# Patient Record
Sex: MALE | Race: WHITE | NOT HISPANIC OR LATINO | Employment: OTHER | URBAN - METROPOLITAN AREA
[De-identification: names, ages, dates, MRNs, and addresses within clinical notes are randomized per-mention and may not be internally consistent; named-entity substitution may affect disease eponyms.]

---

## 2017-01-01 ENCOUNTER — APPOINTMENT (OUTPATIENT)
Dept: CT IMAGING | Facility: HOSPITAL | Age: 72
End: 2017-01-01

## 2017-01-01 ENCOUNTER — APPOINTMENT (OUTPATIENT)
Dept: LAB | Facility: HOSPITAL | Age: 72
End: 2017-01-01

## 2017-01-01 ENCOUNTER — TREATMENT (OUTPATIENT)
Dept: CARDIAC REHAB | Facility: HOSPITAL | Age: 72
End: 2017-01-01

## 2017-01-01 ENCOUNTER — APPOINTMENT (OUTPATIENT)
Dept: GENERAL RADIOLOGY | Facility: HOSPITAL | Age: 72
End: 2017-01-01

## 2017-01-01 ENCOUNTER — APPOINTMENT (OUTPATIENT)
Dept: CARDIOLOGY | Facility: HOSPITAL | Age: 72
End: 2017-01-01
Attending: INTERNAL MEDICINE

## 2017-01-01 ENCOUNTER — APPOINTMENT (OUTPATIENT)
Dept: GENERAL RADIOLOGY | Facility: HOSPITAL | Age: 72
End: 2017-01-01
Attending: INTERNAL MEDICINE

## 2017-01-01 ENCOUNTER — ANESTHESIA EVENT (OUTPATIENT)
Dept: PERIOP | Facility: HOSPITAL | Age: 72
End: 2017-01-01

## 2017-01-01 ENCOUNTER — TRANSITIONAL CARE MANAGEMENT TELEPHONE ENCOUNTER (OUTPATIENT)
Dept: INTERNAL MEDICINE | Facility: CLINIC | Age: 72
End: 2017-01-01

## 2017-01-01 ENCOUNTER — TRANSCRIBE ORDERS (OUTPATIENT)
Dept: LAB | Facility: HOSPITAL | Age: 72
End: 2017-01-01

## 2017-01-01 ENCOUNTER — OFFICE VISIT (OUTPATIENT)
Dept: PULMONOLOGY | Facility: CLINIC | Age: 72
End: 2017-01-01

## 2017-01-01 ENCOUNTER — OUTSIDE FACILITY SERVICE (OUTPATIENT)
Dept: PULMONOLOGY | Facility: CLINIC | Age: 72
End: 2017-01-01

## 2017-01-01 ENCOUNTER — APPOINTMENT (OUTPATIENT)
Dept: CARDIAC REHAB | Facility: HOSPITAL | Age: 72
End: 2017-01-01

## 2017-01-01 ENCOUNTER — TREATMENT (OUTPATIENT)
Dept: INTERNAL MEDICINE | Facility: CLINIC | Age: 72
End: 2017-01-01

## 2017-01-01 ENCOUNTER — HOSPITAL ENCOUNTER (INPATIENT)
Facility: HOSPITAL | Age: 72
LOS: 6 days | Discharge: NURSING, REHAB OR LONG-TERM CARE | End: 2017-10-27
Attending: EMERGENCY MEDICINE | Admitting: INTERNAL MEDICINE

## 2017-01-01 ENCOUNTER — APPOINTMENT (OUTPATIENT)
Dept: MRI IMAGING | Facility: HOSPITAL | Age: 72
End: 2017-01-01

## 2017-01-01 ENCOUNTER — HOSPITAL ENCOUNTER (INPATIENT)
Facility: HOSPITAL | Age: 72
LOS: 5 days | Discharge: HOME HEALTH | End: 2017-08-11
Attending: EMERGENCY MEDICINE | Admitting: HOSPITALIST

## 2017-01-01 ENCOUNTER — HOSPITAL ENCOUNTER (INPATIENT)
Facility: HOSPITAL | Age: 72
LOS: 4 days | Discharge: HOME HEALTH | End: 2017-07-21
Attending: EMERGENCY MEDICINE | Admitting: FAMILY MEDICINE

## 2017-01-01 ENCOUNTER — TRANSCRIBE ORDERS (OUTPATIENT)
Dept: CARDIAC REHAB | Facility: HOSPITAL | Age: 72
End: 2017-01-01

## 2017-01-01 ENCOUNTER — TELEPHONE (OUTPATIENT)
Dept: INTERNAL MEDICINE | Facility: CLINIC | Age: 72
End: 2017-01-01

## 2017-01-01 ENCOUNTER — OFFICE VISIT (OUTPATIENT)
Dept: INTERNAL MEDICINE | Facility: CLINIC | Age: 72
End: 2017-01-01

## 2017-01-01 ENCOUNTER — TELEPHONE (OUTPATIENT)
Dept: CARDIOLOGY | Facility: CLINIC | Age: 72
End: 2017-01-01

## 2017-01-01 ENCOUNTER — ANESTHESIA (OUTPATIENT)
Dept: PERIOP | Facility: HOSPITAL | Age: 72
End: 2017-01-01

## 2017-01-01 ENCOUNTER — HOSPITAL ENCOUNTER (OUTPATIENT)
Facility: HOSPITAL | Age: 72
Setting detail: OBSERVATION
Discharge: HOME | End: 2017-08-25
Attending: EMERGENCY MEDICINE | Admitting: FAMILY MEDICINE

## 2017-01-01 ENCOUNTER — TELEPHONE (OUTPATIENT)
Dept: NEUROLOGY | Facility: CLINIC | Age: 72
End: 2017-01-01

## 2017-01-01 ENCOUNTER — HOSPITAL ENCOUNTER (INPATIENT)
Facility: HOSPITAL | Age: 72
LOS: 16 days | Discharge: NURSING, REHAB OR LONG-TERM CARE | End: 2017-12-06
Attending: EMERGENCY MEDICINE | Admitting: SURGERY

## 2017-01-01 ENCOUNTER — HOSPITAL ENCOUNTER (INPATIENT)
Facility: HOSPITAL | Age: 72
LOS: 11 days | Discharge: NURSING, REHAB OR LONG-TERM CARE | End: 2017-11-16
Attending: EMERGENCY MEDICINE | Admitting: FAMILY MEDICINE

## 2017-01-01 ENCOUNTER — APPOINTMENT (OUTPATIENT)
Dept: CARDIOLOGY | Facility: HOSPITAL | Age: 72
End: 2017-01-01

## 2017-01-01 ENCOUNTER — LAB (OUTPATIENT)
Dept: LAB | Facility: HOSPITAL | Age: 72
End: 2017-01-01

## 2017-01-01 ENCOUNTER — APPOINTMENT (OUTPATIENT)
Dept: NEUROLOGY | Facility: HOSPITAL | Age: 72
End: 2017-01-01

## 2017-01-01 ENCOUNTER — OFFICE VISIT (OUTPATIENT)
Dept: CARDIOLOGY | Facility: CLINIC | Age: 72
End: 2017-01-01

## 2017-01-01 ENCOUNTER — OFFICE VISIT (OUTPATIENT)
Dept: CARDIOLOGY | Facility: HOSPITAL | Age: 72
End: 2017-01-01

## 2017-01-01 ENCOUNTER — LAB (OUTPATIENT)
Dept: LAB | Facility: HOSPITAL | Age: 72
End: 2017-01-01
Attending: INTERNAL MEDICINE

## 2017-01-01 VITALS
BODY MASS INDEX: 25.67 KG/M2 | DIASTOLIC BLOOD PRESSURE: 87 MMHG | RESPIRATION RATE: 16 BRPM | HEART RATE: 64 BPM | WEIGHT: 169.38 LBS | WEIGHT: 188.8 LBS | TEMPERATURE: 98.5 F | OXYGEN SATURATION: 97 % | DIASTOLIC BLOOD PRESSURE: 79 MMHG | HEIGHT: 68 IN | SYSTOLIC BLOOD PRESSURE: 164 MMHG | HEIGHT: 68 IN | OXYGEN SATURATION: 92 % | HEART RATE: 62 BPM | BODY MASS INDEX: 28.61 KG/M2 | TEMPERATURE: 97.6 F | RESPIRATION RATE: 16 BRPM | SYSTOLIC BLOOD PRESSURE: 137 MMHG

## 2017-01-01 VITALS
DIASTOLIC BLOOD PRESSURE: 60 MMHG | WEIGHT: 194 LBS | RESPIRATION RATE: 20 BRPM | HEART RATE: 60 BPM | TEMPERATURE: 97 F | SYSTOLIC BLOOD PRESSURE: 100 MMHG | OXYGEN SATURATION: 93 % | BODY MASS INDEX: 29.5 KG/M2

## 2017-01-01 VITALS
WEIGHT: 185 LBS | HEIGHT: 68 IN | HEART RATE: 52 BPM | TEMPERATURE: 97.5 F | RESPIRATION RATE: 18 BRPM | SYSTOLIC BLOOD PRESSURE: 141 MMHG | OXYGEN SATURATION: 95 % | BODY MASS INDEX: 28.04 KG/M2 | DIASTOLIC BLOOD PRESSURE: 86 MMHG

## 2017-01-01 VITALS
HEART RATE: 66 BPM | OXYGEN SATURATION: 84 % | TEMPERATURE: 97.6 F | RESPIRATION RATE: 16 BRPM | BODY MASS INDEX: 28.79 KG/M2 | DIASTOLIC BLOOD PRESSURE: 72 MMHG | HEIGHT: 68 IN | WEIGHT: 190 LBS | SYSTOLIC BLOOD PRESSURE: 130 MMHG

## 2017-01-01 VITALS
SYSTOLIC BLOOD PRESSURE: 140 MMHG | BODY MASS INDEX: 29.37 KG/M2 | WEIGHT: 193.8 LBS | HEART RATE: 69 BPM | DIASTOLIC BLOOD PRESSURE: 78 MMHG | HEIGHT: 68 IN

## 2017-01-01 VITALS
OXYGEN SATURATION: 95 % | WEIGHT: 197 LBS | RESPIRATION RATE: 16 BRPM | DIASTOLIC BLOOD PRESSURE: 70 MMHG | BODY MASS INDEX: 29.95 KG/M2 | HEART RATE: 68 BPM | SYSTOLIC BLOOD PRESSURE: 136 MMHG | TEMPERATURE: 98.4 F

## 2017-01-01 VITALS
HEART RATE: 67 BPM | SYSTOLIC BLOOD PRESSURE: 128 MMHG | BODY MASS INDEX: 28.92 KG/M2 | DIASTOLIC BLOOD PRESSURE: 80 MMHG | HEIGHT: 68 IN | RESPIRATION RATE: 18 BRPM | OXYGEN SATURATION: 93 % | WEIGHT: 190.8 LBS

## 2017-01-01 VITALS
OXYGEN SATURATION: 94 % | DIASTOLIC BLOOD PRESSURE: 66 MMHG | TEMPERATURE: 98.6 F | WEIGHT: 189 LBS | HEART RATE: 68 BPM | SYSTOLIC BLOOD PRESSURE: 106 MMHG | RESPIRATION RATE: 20 BRPM | BODY MASS INDEX: 28.74 KG/M2

## 2017-01-01 VITALS
DIASTOLIC BLOOD PRESSURE: 70 MMHG | TEMPERATURE: 97.7 F | RESPIRATION RATE: 16 BRPM | WEIGHT: 185 LBS | SYSTOLIC BLOOD PRESSURE: 110 MMHG | OXYGEN SATURATION: 95 % | BODY MASS INDEX: 28.13 KG/M2 | HEART RATE: 64 BPM

## 2017-01-01 VITALS
HEART RATE: 68 BPM | TEMPERATURE: 98.6 F | WEIGHT: 176.81 LBS | BODY MASS INDEX: 26.8 KG/M2 | RESPIRATION RATE: 20 BRPM | DIASTOLIC BLOOD PRESSURE: 76 MMHG | HEIGHT: 68 IN | OXYGEN SATURATION: 97 % | SYSTOLIC BLOOD PRESSURE: 135 MMHG

## 2017-01-01 VITALS
BODY MASS INDEX: 28.73 KG/M2 | SYSTOLIC BLOOD PRESSURE: 135 MMHG | OXYGEN SATURATION: 96 % | TEMPERATURE: 97.7 F | HEART RATE: 57 BPM | HEIGHT: 68 IN | RESPIRATION RATE: 18 BRPM | DIASTOLIC BLOOD PRESSURE: 72 MMHG | WEIGHT: 189.6 LBS

## 2017-01-01 VITALS
BODY MASS INDEX: 28.64 KG/M2 | DIASTOLIC BLOOD PRESSURE: 64 MMHG | RESPIRATION RATE: 16 BRPM | SYSTOLIC BLOOD PRESSURE: 111 MMHG | TEMPERATURE: 99 F | HEART RATE: 75 BPM | OXYGEN SATURATION: 94 % | WEIGHT: 189 LBS | HEIGHT: 68 IN

## 2017-01-01 VITALS
SYSTOLIC BLOOD PRESSURE: 120 MMHG | WEIGHT: 193 LBS | DIASTOLIC BLOOD PRESSURE: 70 MMHG | OXYGEN SATURATION: 96 % | BODY MASS INDEX: 31.15 KG/M2 | HEART RATE: 64 BPM | TEMPERATURE: 97.7 F | RESPIRATION RATE: 16 BRPM

## 2017-01-01 VITALS
OXYGEN SATURATION: 92 % | RESPIRATION RATE: 16 BRPM | HEIGHT: 68 IN | TEMPERATURE: 97.8 F | BODY MASS INDEX: 29.07 KG/M2 | DIASTOLIC BLOOD PRESSURE: 82 MMHG | SYSTOLIC BLOOD PRESSURE: 140 MMHG | HEART RATE: 66 BPM | WEIGHT: 191.8 LBS

## 2017-01-01 DIAGNOSIS — I25.10 CORONARY ARTERY DISEASE INVOLVING NATIVE CORONARY ARTERY OF NATIVE HEART WITHOUT ANGINA PECTORIS: ICD-10-CM

## 2017-01-01 DIAGNOSIS — Z95.5 STENTED CORONARY ARTERY: Primary | ICD-10-CM

## 2017-01-01 DIAGNOSIS — I82.451 DEEP VEIN THROMBOSIS OF PERONEAL VEIN, RIGHT (HCC): ICD-10-CM

## 2017-01-01 DIAGNOSIS — D72.829 LEUKOCYTOSIS, UNSPECIFIED TYPE: ICD-10-CM

## 2017-01-01 DIAGNOSIS — Z74.09 IMPAIRED MOBILITY AND ADLS: ICD-10-CM

## 2017-01-01 DIAGNOSIS — S81.011A KNEE LACERATION, RIGHT, INITIAL ENCOUNTER: ICD-10-CM

## 2017-01-01 DIAGNOSIS — M15.9 PRIMARY OSTEOARTHRITIS INVOLVING MULTIPLE JOINTS: ICD-10-CM

## 2017-01-01 DIAGNOSIS — Z74.09 IMPAIRED FUNCTIONAL MOBILITY, BALANCE, GAIT, AND ENDURANCE: ICD-10-CM

## 2017-01-01 DIAGNOSIS — I50.42 CHRONIC COMBINED SYSTOLIC AND DIASTOLIC CONGESTIVE HEART FAILURE (HCC): ICD-10-CM

## 2017-01-01 DIAGNOSIS — I50.33 ACUTE ON CHRONIC DIASTOLIC HEART FAILURE (HCC): ICD-10-CM

## 2017-01-01 DIAGNOSIS — F33.42 RECURRENT MAJOR DEPRESSIVE DISORDER, IN FULL REMISSION (HCC): ICD-10-CM

## 2017-01-01 DIAGNOSIS — R41.82 ALTERED MENTAL STATUS, UNSPECIFIED ALTERED MENTAL STATUS TYPE: ICD-10-CM

## 2017-01-01 DIAGNOSIS — J44.9 CHRONIC OBSTRUCTIVE PULMONARY DISEASE, UNSPECIFIED COPD TYPE (HCC): ICD-10-CM

## 2017-01-01 DIAGNOSIS — Z78.9 IMPAIRED MOBILITY AND ADLS: ICD-10-CM

## 2017-01-01 DIAGNOSIS — I48.3 TYPICAL ATRIAL FLUTTER (HCC): ICD-10-CM

## 2017-01-01 DIAGNOSIS — R53.1 LEFT-SIDED WEAKNESS: ICD-10-CM

## 2017-01-01 DIAGNOSIS — I95.9 HYPOTENSION, UNSPECIFIED HYPOTENSION TYPE: Primary | ICD-10-CM

## 2017-01-01 DIAGNOSIS — R93.89 ABNORMAL CT SCAN, CHEST: Primary | ICD-10-CM

## 2017-01-01 DIAGNOSIS — R05.9 COUGH: ICD-10-CM

## 2017-01-01 DIAGNOSIS — I21.4 NSTEMI (NON-ST ELEVATED MYOCARDIAL INFARCTION) (HCC): Primary | ICD-10-CM

## 2017-01-01 DIAGNOSIS — L03.115 CELLULITIS OF RIGHT FOOT: ICD-10-CM

## 2017-01-01 DIAGNOSIS — I25.810 CORONARY ARTERY DISEASE INVOLVING CORONARY BYPASS GRAFT OF NATIVE HEART WITHOUT ANGINA PECTORIS: ICD-10-CM

## 2017-01-01 DIAGNOSIS — I13.0 MALIGNANT HYPERTENSIVE HEART AND RENAL DISEASE WITH RENAL FAILURE, STAGE 1 THROUGH STAGE 4 OR UNSPECIFIED CHRONIC KIDNEY DISEASE, WITH HEART FAILURE (HCC): Primary | ICD-10-CM

## 2017-01-01 DIAGNOSIS — K92.0 HEMATEMESIS WITH NAUSEA: ICD-10-CM

## 2017-01-01 DIAGNOSIS — I15.0 RENOVASCULAR HYPERTENSION: ICD-10-CM

## 2017-01-01 DIAGNOSIS — B16.9 ACUTE FULMINATING TYPE B VIRAL HEPATITIS: Primary | ICD-10-CM

## 2017-01-01 DIAGNOSIS — E61.1 IRON DEFICIENCY: ICD-10-CM

## 2017-01-01 DIAGNOSIS — J44.9 CHRONIC OBSTRUCTIVE PULMONARY DISEASE, UNSPECIFIED COPD TYPE (HCC): Primary | ICD-10-CM

## 2017-01-01 DIAGNOSIS — G47.33 OSA (OBSTRUCTIVE SLEEP APNEA): ICD-10-CM

## 2017-01-01 DIAGNOSIS — T82.7XXD INFECTION, DIALYSIS VASCULAR ACCESS, SUBSEQUENT ENCOUNTER: ICD-10-CM

## 2017-01-01 DIAGNOSIS — R79.89 ELEVATED LFTS: ICD-10-CM

## 2017-01-01 DIAGNOSIS — B16.9 ACUTE FULMINATING TYPE B VIRAL HEPATITIS: ICD-10-CM

## 2017-01-01 DIAGNOSIS — Z20.9 HISTORY OF EXPOSURE TO INFECTIOUS DISEASE: ICD-10-CM

## 2017-01-01 DIAGNOSIS — J47.9 BRONCHIECTASIS WITHOUT COMPLICATION (HCC): ICD-10-CM

## 2017-01-01 DIAGNOSIS — S00.81XA ABRASION OF FACE, INITIAL ENCOUNTER: ICD-10-CM

## 2017-01-01 DIAGNOSIS — I13.0 HYPERTENSIVE HEART AND RENAL DISEASE WITH CONGESTIVE HEART FAILURE (HCC): Primary | ICD-10-CM

## 2017-01-01 DIAGNOSIS — L03.116 CELLULITIS OF LEFT LOWER LEG: ICD-10-CM

## 2017-01-01 DIAGNOSIS — F01.50 VASCULAR DEMENTIA WITHOUT BEHAVIORAL DISTURBANCE (HCC): ICD-10-CM

## 2017-01-01 DIAGNOSIS — R55 NEAR SYNCOPE: ICD-10-CM

## 2017-01-01 DIAGNOSIS — I42.9 CARDIOMYOPATHY (HCC): ICD-10-CM

## 2017-01-01 DIAGNOSIS — I50.9 CONGESTIVE HEART FAILURE, UNSPECIFIED CONGESTIVE HEART FAILURE CHRONICITY, UNSPECIFIED CONGESTIVE HEART FAILURE TYPE: ICD-10-CM

## 2017-01-01 DIAGNOSIS — G93.6 CEREBRAL EDEMA (HCC): Primary | ICD-10-CM

## 2017-01-01 DIAGNOSIS — N18.30 CKD (CHRONIC KIDNEY DISEASE), STAGE III (HCC): ICD-10-CM

## 2017-01-01 DIAGNOSIS — E55.9 VITAMIN D DEFICIENCY: ICD-10-CM

## 2017-01-01 DIAGNOSIS — I25.5 ISCHEMIC CARDIOMYOPATHY: ICD-10-CM

## 2017-01-01 DIAGNOSIS — J45.20 MILD INTERMITTENT ASTHMA WITHOUT COMPLICATION: ICD-10-CM

## 2017-01-01 DIAGNOSIS — S02.2XXA CLOSED FRACTURE OF NASAL BONE, INITIAL ENCOUNTER: ICD-10-CM

## 2017-01-01 DIAGNOSIS — I95.9 HYPOTENSION, UNSPECIFIED HYPOTENSION TYPE: ICD-10-CM

## 2017-01-01 DIAGNOSIS — I50.42 CHRONIC COMBINED SYSTOLIC AND DIASTOLIC CONGESTIVE HEART FAILURE (HCC): Primary | ICD-10-CM

## 2017-01-01 DIAGNOSIS — S06.5X0D TRAUMATIC SUBDURAL HEMATOMA WITHOUT LOSS OF CONSCIOUSNESS, SUBSEQUENT ENCOUNTER: ICD-10-CM

## 2017-01-01 DIAGNOSIS — R55 SYNCOPE, NEAR: Primary | ICD-10-CM

## 2017-01-01 DIAGNOSIS — R13.13 PHARYNGEAL DYSPHAGIA: Primary | ICD-10-CM

## 2017-01-01 DIAGNOSIS — R06.02 SOB (SHORTNESS OF BREATH): Primary | ICD-10-CM

## 2017-01-01 DIAGNOSIS — I50.22 CHRONIC SYSTOLIC CONGESTIVE HEART FAILURE (HCC): Primary | Chronic | ICD-10-CM

## 2017-01-01 DIAGNOSIS — N28.9 RENAL INSUFFICIENCY: ICD-10-CM

## 2017-01-01 DIAGNOSIS — E78.5 DYSLIPIDEMIA: ICD-10-CM

## 2017-01-01 DIAGNOSIS — I25.5 ISCHEMIC CARDIOMYOPATHY: Primary | ICD-10-CM

## 2017-01-01 DIAGNOSIS — R13.13 PHARYNGEAL DYSPHAGIA: ICD-10-CM

## 2017-01-01 DIAGNOSIS — S06.5XAA SDH (SUBDURAL HEMATOMA) (HCC): Primary | ICD-10-CM

## 2017-01-01 DIAGNOSIS — B16.9: ICD-10-CM

## 2017-01-01 DIAGNOSIS — R79.89 ELEVATED LFTS: Primary | ICD-10-CM

## 2017-01-01 DIAGNOSIS — S01.511A LIP LACERATION, INITIAL ENCOUNTER: ICD-10-CM

## 2017-01-01 DIAGNOSIS — S81.802A WOUND OF LEFT LEG, INITIAL ENCOUNTER: ICD-10-CM

## 2017-01-01 DIAGNOSIS — J44.1 OBSTRUCTIVE CHRONIC BRONCHITIS WITH EXACERBATION (HCC): ICD-10-CM

## 2017-01-01 DIAGNOSIS — E78.5 DYSLIPIDEMIA, GOAL LDL BELOW 70: ICD-10-CM

## 2017-01-01 DIAGNOSIS — S81.802A WOUND OF LEFT LEG, INITIAL ENCOUNTER: Primary | ICD-10-CM

## 2017-01-01 DIAGNOSIS — Z98.890 STATUS POST CRANIOTOMY: Primary | ICD-10-CM

## 2017-01-01 DIAGNOSIS — R09.02 HYPOXEMIA: ICD-10-CM

## 2017-01-01 DIAGNOSIS — K56.609 SMALL BOWEL OBSTRUCTION (HCC): ICD-10-CM

## 2017-01-01 DIAGNOSIS — I50.22 CHRONIC SYSTOLIC CONGESTIVE HEART FAILURE (HCC): ICD-10-CM

## 2017-01-01 DIAGNOSIS — R10.30 LOWER ABDOMINAL PAIN: ICD-10-CM

## 2017-01-01 DIAGNOSIS — I25.810 CORONARY ARTERY DISEASE INVOLVING CORONARY BYPASS GRAFT OF NATIVE HEART WITHOUT ANGINA PECTORIS: Primary | ICD-10-CM

## 2017-01-01 LAB
25(OH)D3 SERPL-MCNC: 26 NG/ML
ABO + RH BLD: NORMAL
ABO GROUP BLD: NORMAL
ACT BLD: 197 SECONDS (ref 82–152)
ACT BLD: 230 SECONDS (ref 82–152)
ALBUMIN SERPL-MCNC: 2.5 G/DL (ref 3.2–4.8)
ALBUMIN SERPL-MCNC: 2.5 G/DL (ref 3.2–4.8)
ALBUMIN SERPL-MCNC: 2.7 G/DL (ref 3.2–4.8)
ALBUMIN SERPL-MCNC: 2.8 G/DL (ref 3.2–4.8)
ALBUMIN SERPL-MCNC: 2.9 G/DL (ref 3.2–4.8)
ALBUMIN SERPL-MCNC: 3 G/DL (ref 3.2–4.8)
ALBUMIN SERPL-MCNC: 3.1 G/DL (ref 3.2–4.8)
ALBUMIN SERPL-MCNC: 3.1 G/DL (ref 3.2–4.8)
ALBUMIN SERPL-MCNC: 3.2 G/DL (ref 3.2–4.8)
ALBUMIN SERPL-MCNC: 3.3 G/DL (ref 3.2–4.8)
ALBUMIN SERPL-MCNC: 3.4 G/DL (ref 3.2–4.8)
ALBUMIN SERPL-MCNC: 3.4 G/DL (ref 3.2–4.8)
ALBUMIN SERPL-MCNC: 3.5 G/DL (ref 3.2–4.8)
ALBUMIN SERPL-MCNC: 3.6 G/DL (ref 3.2–4.8)
ALBUMIN SERPL-MCNC: 3.8 G/DL (ref 3.2–4.8)
ALBUMIN SERPL-MCNC: 3.9 G/DL (ref 3.2–4.8)
ALBUMIN SERPL-MCNC: 4 G/DL (ref 3.2–4.8)
ALBUMIN/GLOB SERPL: 0.7 G/DL (ref 1.5–2.5)
ALBUMIN/GLOB SERPL: 0.8 G/DL (ref 1.5–2.5)
ALBUMIN/GLOB SERPL: 0.9 G/DL (ref 1.5–2.5)
ALBUMIN/GLOB SERPL: 1 G/DL (ref 1.5–2.5)
ALBUMIN/GLOB SERPL: 1.1 G/DL (ref 1.5–2.5)
ALP SERPL-CCNC: 105 U/L (ref 25–100)
ALP SERPL-CCNC: 105 U/L (ref 25–100)
ALP SERPL-CCNC: 108 U/L (ref 25–100)
ALP SERPL-CCNC: 114 U/L (ref 25–100)
ALP SERPL-CCNC: 122 U/L (ref 25–100)
ALP SERPL-CCNC: 127 U/L (ref 25–100)
ALP SERPL-CCNC: 127 U/L (ref 25–100)
ALP SERPL-CCNC: 128 U/L (ref 25–100)
ALP SERPL-CCNC: 131 U/L (ref 25–100)
ALP SERPL-CCNC: 136 U/L (ref 25–100)
ALP SERPL-CCNC: 143 U/L (ref 25–100)
ALP SERPL-CCNC: 156 U/L (ref 25–100)
ALP SERPL-CCNC: 158 U/L (ref 25–100)
ALP SERPL-CCNC: 230 U/L (ref 25–100)
ALP SERPL-CCNC: 270 U/L (ref 25–100)
ALP SERPL-CCNC: 65 U/L (ref 25–100)
ALP SERPL-CCNC: 67 U/L (ref 25–100)
ALP SERPL-CCNC: 72 U/L (ref 25–100)
ALP SERPL-CCNC: 77 U/L (ref 25–100)
ALP SERPL-CCNC: 78 U/L (ref 25–100)
ALP SERPL-CCNC: 83 U/L (ref 25–100)
ALP SERPL-CCNC: 85 U/L (ref 25–100)
ALP SERPL-CCNC: 85 U/L (ref 25–100)
ALP SERPL-CCNC: 91 U/L (ref 25–100)
ALT SERPL W P-5'-P-CCNC: 10 U/L (ref 7–40)
ALT SERPL W P-5'-P-CCNC: 104 U/L (ref 7–40)
ALT SERPL W P-5'-P-CCNC: 1076 U/L (ref 7–40)
ALT SERPL W P-5'-P-CCNC: 12 U/L (ref 7–40)
ALT SERPL W P-5'-P-CCNC: 1222 U/L (ref 7–40)
ALT SERPL W P-5'-P-CCNC: 128 U/L (ref 7–40)
ALT SERPL W P-5'-P-CCNC: 13 U/L (ref 7–40)
ALT SERPL W P-5'-P-CCNC: 14 U/L (ref 7–40)
ALT SERPL W P-5'-P-CCNC: 16 U/L (ref 7–40)
ALT SERPL W P-5'-P-CCNC: 18 U/L (ref 7–40)
ALT SERPL W P-5'-P-CCNC: 23 U/L (ref 7–40)
ALT SERPL W P-5'-P-CCNC: 294 U/L (ref 7–40)
ALT SERPL W P-5'-P-CCNC: 298 U/L (ref 7–40)
ALT SERPL W P-5'-P-CCNC: 354 U/L (ref 7–40)
ALT SERPL W P-5'-P-CCNC: 371 U/L (ref 7–40)
ALT SERPL W P-5'-P-CCNC: 380 U/L (ref 7–40)
ALT SERPL W P-5'-P-CCNC: 497 U/L (ref 7–40)
ALT SERPL W P-5'-P-CCNC: 613 U/L (ref 7–40)
ALT SERPL W P-5'-P-CCNC: 755 U/L (ref 7–40)
ALT SERPL W P-5'-P-CCNC: 93 U/L (ref 7–40)
AMMONIA BLD-SCNC: 36 UMOL/L (ref 19–60)
ANION GAP SERPL CALCULATED.3IONS-SCNC: 1 MMOL/L (ref 3–11)
ANION GAP SERPL CALCULATED.3IONS-SCNC: 10 MMOL/L (ref 3–11)
ANION GAP SERPL CALCULATED.3IONS-SCNC: 10 MMOL/L (ref 3–11)
ANION GAP SERPL CALCULATED.3IONS-SCNC: 11 MMOL/L (ref 3–11)
ANION GAP SERPL CALCULATED.3IONS-SCNC: 12 MMOL/L (ref 3–11)
ANION GAP SERPL CALCULATED.3IONS-SCNC: 16 MMOL/L (ref 3–11)
ANION GAP SERPL CALCULATED.3IONS-SCNC: 2 MMOL/L (ref 3–11)
ANION GAP SERPL CALCULATED.3IONS-SCNC: 3 MMOL/L (ref 3–11)
ANION GAP SERPL CALCULATED.3IONS-SCNC: 4 MMOL/L (ref 3–11)
ANION GAP SERPL CALCULATED.3IONS-SCNC: 5 MMOL/L (ref 3–11)
ANION GAP SERPL CALCULATED.3IONS-SCNC: 6 MMOL/L (ref 3–11)
ANION GAP SERPL CALCULATED.3IONS-SCNC: 7 MMOL/L (ref 3–11)
ANION GAP SERPL CALCULATED.3IONS-SCNC: 7 MMOL/L (ref 3–11)
ANION GAP SERPL CALCULATED.3IONS-SCNC: 8 MMOL/L (ref 3–11)
ANION GAP SERPL CALCULATED.3IONS-SCNC: 9 MMOL/L (ref 3–11)
ANION GAP SERPL CALCULATED.3IONS-SCNC: 9 MMOL/L (ref 3–11)
APTT PPP: 25.4 SECONDS (ref 24–31)
APTT PPP: 27.8 SECONDS (ref 24–31)
APTT PPP: 27.9 SECONDS (ref 24–31)
APTT PPP: 31 SECONDS (ref 24–31)
ARTICHOKE IGE QN: 106 MG/DL (ref 0–130)
ARTICHOKE IGE QN: 55 MG/DL (ref 0–130)
AST SERPL-CCNC: 10 U/L (ref 0–33)
AST SERPL-CCNC: 12 U/L (ref 0–33)
AST SERPL-CCNC: 13 U/L (ref 0–33)
AST SERPL-CCNC: 143 U/L (ref 0–33)
AST SERPL-CCNC: 16 U/L (ref 0–33)
AST SERPL-CCNC: 164 U/L (ref 0–33)
AST SERPL-CCNC: 214 U/L (ref 0–33)
AST SERPL-CCNC: 26 U/L (ref 0–33)
AST SERPL-CCNC: 28 U/L (ref 0–33)
AST SERPL-CCNC: 30 U/L (ref 0–33)
AST SERPL-CCNC: 317 U/L (ref 0–33)
AST SERPL-CCNC: 324 U/L (ref 0–33)
AST SERPL-CCNC: 407 U/L (ref 0–33)
AST SERPL-CCNC: 408 U/L (ref 0–33)
AST SERPL-CCNC: 42 U/L (ref 0–33)
AST SERPL-CCNC: 5 U/L (ref 0–33)
AST SERPL-CCNC: 527 U/L (ref 0–33)
AST SERPL-CCNC: 605 U/L (ref 0–33)
AST SERPL-CCNC: 798 U/L (ref 0–33)
AST SERPL-CCNC: 9 U/L (ref 0–33)
BACTERIA SPEC AEROBE CULT: ABNORMAL
BACTERIA SPEC AEROBE CULT: ABNORMAL
BACTERIA SPEC AEROBE CULT: NORMAL
BACTERIA UR QL AUTO: ABNORMAL /HPF
BASOPHILS # BLD AUTO: 0 10*3/MM3 (ref 0–0.2)
BASOPHILS # BLD AUTO: 0.01 10*3/MM3 (ref 0–0.2)
BASOPHILS # BLD AUTO: 0.02 10*3/MM3 (ref 0–0.2)
BASOPHILS # BLD AUTO: 0.03 10*3/MM3 (ref 0–0.2)
BASOPHILS # BLD AUTO: 0.03 10*3/MM3 (ref 0–0.2)
BASOPHILS # BLD AUTO: 0.04 10*3/MM3 (ref 0–0.2)
BASOPHILS # BLD AUTO: 0.05 10*3/MM3 (ref 0–0.2)
BASOPHILS # BLD AUTO: 0.06 10*3/MM3 (ref 0–0.2)
BASOPHILS # BLD AUTO: 0.06 10*3/MM3 (ref 0–0.2)
BASOPHILS # BLD AUTO: 0.07 10*3/MM3 (ref 0–0.2)
BASOPHILS # BLD AUTO: 0.07 10*3/MM3 (ref 0–0.2)
BASOPHILS # BLD AUTO: 0.08 10*3/MM3 (ref 0–0.2)
BASOPHILS # BLD AUTO: 0.09 10*3/MM3 (ref 0–0.2)
BASOPHILS # BLD AUTO: 0.1 10*3/MM3 (ref 0–0.2)
BASOPHILS # BLD AUTO: 0.1 10*3/MM3 (ref 0–0.2)
BASOPHILS # BLD AUTO: 0.11 10*3/MM3 (ref 0–0.2)
BASOPHILS # BLD MANUAL: 0 10*3/MM3 (ref 0–0.2)
BASOPHILS NFR BLD AUTO: 0 % (ref 0–1)
BASOPHILS NFR BLD AUTO: 0.1 % (ref 0–1)
BASOPHILS NFR BLD AUTO: 0.2 % (ref 0–1)
BASOPHILS NFR BLD AUTO: 0.3 % (ref 0–1)
BASOPHILS NFR BLD AUTO: 0.3 % (ref 0–1)
BASOPHILS NFR BLD AUTO: 0.5 % (ref 0–1)
BASOPHILS NFR BLD AUTO: 0.6 % (ref 0–1)
BASOPHILS NFR BLD AUTO: 0.7 % (ref 0–1)
BASOPHILS NFR BLD AUTO: 0.8 % (ref 0–1)
BASOPHILS NFR BLD AUTO: 0.9 % (ref 0–1)
BASOPHILS NFR BLD AUTO: 0.9 % (ref 0–1)
BASOPHILS NFR BLD AUTO: 1.2 % (ref 0–1)
BASOPHILS NFR BLD AUTO: 1.5 % (ref 0–1)
BASOPHILS NFR BLD AUTO: 2.1 % (ref 0–1)
BASOPHILS NFR BLD AUTO: 2.1 % (ref 0–1)
BH BB BLOOD EXPIRATION DATE: NORMAL
BH BB BLOOD TYPE BARCODE: 600
BH BB DISPENSE STATUS: NORMAL
BH BB PRODUCT CODE: NORMAL
BH BB UNIT NUMBER: NORMAL
BH CV ECHO MEAS - AO MAX PG (FULL): 11.6 MMHG
BH CV ECHO MEAS - AO MAX PG: 15.9 MMHG
BH CV ECHO MEAS - AO MEAN PG (FULL): 6 MMHG
BH CV ECHO MEAS - AO MEAN PG: 8 MMHG
BH CV ECHO MEAS - AO ROOT AREA (BSA CORRECTED): 1.4
BH CV ECHO MEAS - AO ROOT AREA (BSA CORRECTED): 1.5
BH CV ECHO MEAS - AO ROOT AREA (BSA CORRECTED): 1.7
BH CV ECHO MEAS - AO ROOT AREA (BSA CORRECTED): 1.7
BH CV ECHO MEAS - AO ROOT AREA: 6.2 CM^2
BH CV ECHO MEAS - AO ROOT AREA: 7.5 CM^2
BH CV ECHO MEAS - AO ROOT AREA: 9.1 CM^2
BH CV ECHO MEAS - AO ROOT AREA: 9.1 CM^2
BH CV ECHO MEAS - AO ROOT DIAM: 2.8 CM
BH CV ECHO MEAS - AO ROOT DIAM: 3.1 CM
BH CV ECHO MEAS - AO ROOT DIAM: 3.4 CM
BH CV ECHO MEAS - AO ROOT DIAM: 3.4 CM
BH CV ECHO MEAS - AO V2 MAX: 199 CM/SEC
BH CV ECHO MEAS - AO V2 MEAN: 129.3 CM/SEC
BH CV ECHO MEAS - AO V2 VTI: 39.4 CM
BH CV ECHO MEAS - AVA(I,A): 2 CM^2
BH CV ECHO MEAS - AVA(I,D): 2 CM^2
BH CV ECHO MEAS - AVA(V,A): 1.8 CM^2
BH CV ECHO MEAS - AVA(V,D): 1.8 CM^2
BH CV ECHO MEAS - BSA(HAYCOCK): 2 M^2
BH CV ECHO MEAS - BSA(HAYCOCK): 2.1 M^2
BH CV ECHO MEAS - BSA: 1.9 M^2
BH CV ECHO MEAS - BSA: 2 M^2
BH CV ECHO MEAS - BZI_BMI: 27.1 KILOGRAMS/M^2
BH CV ECHO MEAS - BZI_BMI: 27.4 KILOGRAMS/M^2
BH CV ECHO MEAS - BZI_BMI: 28.7 KILOGRAMS/M^2
BH CV ECHO MEAS - BZI_BMI: 29.8 KILOGRAMS/M^2
BH CV ECHO MEAS - BZI_METRIC_HEIGHT: 172.7 CM
BH CV ECHO MEAS - BZI_METRIC_WEIGHT: 80.7 KG
BH CV ECHO MEAS - BZI_METRIC_WEIGHT: 81.6 KG
BH CV ECHO MEAS - BZI_METRIC_WEIGHT: 85.7 KG
BH CV ECHO MEAS - BZI_METRIC_WEIGHT: 88.9 KG
BH CV ECHO MEAS - CONTRAST EF (2CH): 34.5 ML/M^2
BH CV ECHO MEAS - CONTRAST EF (2CH): 44.3 ML/M^2
BH CV ECHO MEAS - CONTRAST EF (2CH): 47.4 ML/M^2
BH CV ECHO MEAS - CONTRAST EF (2CH): 56.3 ML/M^2
BH CV ECHO MEAS - CONTRAST EF 4CH: 36.1 ML/M^2
BH CV ECHO MEAS - CONTRAST EF 4CH: 41.9 ML/M^2
BH CV ECHO MEAS - CONTRAST EF 4CH: 43.8 ML/M^2
BH CV ECHO MEAS - CONTRAST EF 4CH: 48.2 ML/M^2
BH CV ECHO MEAS - EDV(CUBED): 102.5 ML
BH CV ECHO MEAS - EDV(CUBED): 191.1 ML
BH CV ECHO MEAS - EDV(CUBED): 50.2 ML
BH CV ECHO MEAS - EDV(CUBED): 74.6 ML
BH CV ECHO MEAS - EDV(MOD-SP2): 116 ML
BH CV ECHO MEAS - EDV(MOD-SP2): 149 ML
BH CV ECHO MEAS - EDV(MOD-SP2): 151 ML
BH CV ECHO MEAS - EDV(MOD-SP2): 194 ML
BH CV ECHO MEAS - EDV(MOD-SP4): 144 ML
BH CV ECHO MEAS - EDV(MOD-SP4): 144 ML
BH CV ECHO MEAS - EDV(MOD-SP4): 197 ML
BH CV ECHO MEAS - EDV(MOD-SP4): 203 ML
BH CV ECHO MEAS - EDV(TEICH): 101.3 ML
BH CV ECHO MEAS - EDV(TEICH): 163.9 ML
BH CV ECHO MEAS - EDV(TEICH): 57.8 ML
BH CV ECHO MEAS - EDV(TEICH): 79 ML
BH CV ECHO MEAS - EF(CUBED): 24.3 %
BH CV ECHO MEAS - EF(CUBED): 45.4 %
BH CV ECHO MEAS - EF(CUBED): 55.2 %
BH CV ECHO MEAS - EF(MOD-SP2): 47.4 %
BH CV ECHO MEAS - EF(MOD-SP2): 56.3 %
BH CV ECHO MEAS - EF(MOD-SP4): 43 %
BH CV ECHO MEAS - EF(MOD-SP4): 54 %
BH CV ECHO MEAS - EF(TEICH): 19.2 %
BH CV ECHO MEAS - EF(TEICH): 38.3 %
BH CV ECHO MEAS - EF(TEICH): 47 %
BH CV ECHO MEAS - ESV(CUBED): 144.7 ML
BH CV ECHO MEAS - ESV(CUBED): 40.7 ML
BH CV ECHO MEAS - ESV(CUBED): 45.9 ML
BH CV ECHO MEAS - ESV(MOD-SP2): 127 ML
BH CV ECHO MEAS - ESV(MOD-SP2): 61 ML
BH CV ECHO MEAS - ESV(MOD-SP2): 66 ML
BH CV ECHO MEAS - ESV(MOD-SP2): 83 ML
BH CV ECHO MEAS - ESV(MOD-SP4): 102 ML
BH CV ECHO MEAS - ESV(MOD-SP4): 118 ML
BH CV ECHO MEAS - ESV(MOD-SP4): 81 ML
BH CV ECHO MEAS - ESV(MOD-SP4): 92 ML
BH CV ECHO MEAS - ESV(TEICH): 132.4 ML
BH CV ECHO MEAS - ESV(TEICH): 48.8 ML
BH CV ECHO MEAS - ESV(TEICH): 53.7 ML
BH CV ECHO MEAS - FS: 18.3 %
BH CV ECHO MEAS - FS: 23.5 %
BH CV ECHO MEAS - FS: 8.9 %
BH CV ECHO MEAS - IVS/LVPW: 0.91
BH CV ECHO MEAS - IVS/LVPW: 1
BH CV ECHO MEAS - IVS/LVPW: 1
BH CV ECHO MEAS - IVS/LVPW: 1.3
BH CV ECHO MEAS - IVSD: 1.1 CM
BH CV ECHO MEAS - IVSD: 1.4 CM
BH CV ECHO MEAS - IVSD: 1.4 CM
BH CV ECHO MEAS - IVSD: 1.5 CM
BH CV ECHO MEAS - LA DIMENSION: 3.2 CM
BH CV ECHO MEAS - LA DIMENSION: 3.6 CM
BH CV ECHO MEAS - LA DIMENSION: 3.8 CM
BH CV ECHO MEAS - LA DIMENSION: 3.8 CM
BH CV ECHO MEAS - LA/AO: 1.1
BH CV ECHO MEAS - LA/AO: 1.2
BH CV ECHO MEAS - LAT PEAK E' VEL: 5 CM/SEC
BH CV ECHO MEAS - LV DIASTOLIC VOL/BSA (35-75): 103.9 ML/M^2
BH CV ECHO MEAS - LV DIASTOLIC VOL/BSA (35-75): 72.2 ML/M^2
BH CV ECHO MEAS - LV DIASTOLIC VOL/BSA (35-75): 74 ML/M^2
BH CV ECHO MEAS - LV DIASTOLIC VOL/BSA (35-75): 97.2 ML/M^2
BH CV ECHO MEAS - LV IVRT: 0.1 SEC
BH CV ECHO MEAS - LV MASS(C)D: 193.7 GRAMS
BH CV ECHO MEAS - LV MASS(C)D: 226.9 GRAMS
BH CV ECHO MEAS - LV MASS(C)D: 237.6 GRAMS
BH CV ECHO MEAS - LV MASS(C)D: 246.4 GRAMS
BH CV ECHO MEAS - LV MASS(C)DI: 113.7 GRAMS/M^2
BH CV ECHO MEAS - LV MASS(C)DI: 121.6 GRAMS/M^2
BH CV ECHO MEAS - LV MASS(C)DI: 121.6 GRAMS/M^2
BH CV ECHO MEAS - LV MASS(C)DI: 99.6 GRAMS/M^2
BH CV ECHO MEAS - LV MAX PG: 4.2 MMHG
BH CV ECHO MEAS - LV MEAN PG: 2 MMHG
BH CV ECHO MEAS - LV SYSTOLIC VOL/BSA (12-30): 41.6 ML/M^2
BH CV ECHO MEAS - LV SYSTOLIC VOL/BSA (12-30): 46.1 ML/M^2
BH CV ECHO MEAS - LV SYSTOLIC VOL/BSA (12-30): 50.3 ML/M^2
BH CV ECHO MEAS - LV SYSTOLIC VOL/BSA (12-30): 60.4 ML/M^2
BH CV ECHO MEAS - LV V1 MAX: 103 CM/SEC
BH CV ECHO MEAS - LV V1 MEAN: 65.3 CM/SEC
BH CV ECHO MEAS - LV V1 VTI: 22.6 CM
BH CV ECHO MEAS - LVIDD: 3.7 CM
BH CV ECHO MEAS - LVIDD: 4.2 CM
BH CV ECHO MEAS - LVIDD: 4.7 CM
BH CV ECHO MEAS - LVIDD: 5.8 CM
BH CV ECHO MEAS - LVIDS: 3.4 CM
BH CV ECHO MEAS - LVIDS: 3.6 CM
BH CV ECHO MEAS - LVIDS: 5.3 CM
BH CV ECHO MEAS - LVLD AP2: 10.3 CM
BH CV ECHO MEAS - LVLD AP2: 9.1 CM
BH CV ECHO MEAS - LVLD AP2: 9.4 CM
BH CV ECHO MEAS - LVLD AP2: 9.9 CM
BH CV ECHO MEAS - LVLD AP4: 10.6 CM
BH CV ECHO MEAS - LVLD AP4: 10.7 CM
BH CV ECHO MEAS - LVLD AP4: 9 CM
BH CV ECHO MEAS - LVLD AP4: 9.4 CM
BH CV ECHO MEAS - LVLS AP2: 10.4 CM
BH CV ECHO MEAS - LVLS AP2: 8.5 CM
BH CV ECHO MEAS - LVLS AP2: 9.1 CM
BH CV ECHO MEAS - LVLS AP2: 9.8 CM
BH CV ECHO MEAS - LVLS AP4: 8 CM
BH CV ECHO MEAS - LVLS AP4: 9.1 CM
BH CV ECHO MEAS - LVLS AP4: 9.5 CM
BH CV ECHO MEAS - LVLS AP4: 9.8 CM
BH CV ECHO MEAS - LVOT AREA (M): 3.1 CM^2
BH CV ECHO MEAS - LVOT AREA (M): 3.5 CM^2
BH CV ECHO MEAS - LVOT AREA: 3.1 CM^2
BH CV ECHO MEAS - LVOT AREA: 3.5 CM^2
BH CV ECHO MEAS - LVOT DIAM: 2 CM
BH CV ECHO MEAS - LVOT DIAM: 2.1 CM
BH CV ECHO MEAS - LVPWD: 0.88 CM
BH CV ECHO MEAS - LVPWD: 1.3 CM
BH CV ECHO MEAS - LVPWD: 1.4 CM
BH CV ECHO MEAS - LVPWD: 1.5 CM
BH CV ECHO MEAS - MED PEAK E' VEL: 4.28 CM/SEC
BH CV ECHO MEAS - MV A MAX VEL: 117 CM/SEC
BH CV ECHO MEAS - MV DEC TIME: 0.35 SEC
BH CV ECHO MEAS - MV E MAX VEL: 71.6 CM/SEC
BH CV ECHO MEAS - MV E/A: 0.61
BH CV ECHO MEAS - PA ACC SLOPE: 761.5 CM/SEC^2
BH CV ECHO MEAS - PA ACC TIME: 0.11 SEC
BH CV ECHO MEAS - PA PR(ACCEL): 29.7 MMHG
BH CV ECHO MEAS - PULM A REVS VEL: 24.3 CM/SEC
BH CV ECHO MEAS - PULM DIAS VEL: 59 CM/SEC
BH CV ECHO MEAS - PULM S/D: 0.77
BH CV ECHO MEAS - PULM SYS VEL: 45.5 CM/SEC
BH CV ECHO MEAS - RAP SYSTOLE: 3 MMHG
BH CV ECHO MEAS - RVDD: 3.9 CM
BH CV ECHO MEAS - RVSP: 26 MMHG
BH CV ECHO MEAS - SI(AO): 146.6 ML/M^2
BH CV ECHO MEAS - SI(CUBED): 17.4 ML/M^2
BH CV ECHO MEAS - SI(CUBED): 23.3 ML/M^2
BH CV ECHO MEAS - SI(CUBED): 27.9 ML/M^2
BH CV ECHO MEAS - SI(LVOT): 38.6 ML/M^2
BH CV ECHO MEAS - SI(MOD-SP2): 28.3 ML/M^2
BH CV ECHO MEAS - SI(MOD-SP2): 33.6 ML/M^2
BH CV ECHO MEAS - SI(MOD-SP2): 33.8 ML/M^2
BH CV ECHO MEAS - SI(MOD-SP2): 41.9 ML/M^2
BH CV ECHO MEAS - SI(MOD-SP4): 26.1 ML/M^2
BH CV ECHO MEAS - SI(MOD-SP4): 32.4 ML/M^2
BH CV ECHO MEAS - SI(MOD-SP4): 43.5 ML/M^2
BH CV ECHO MEAS - SI(MOD-SP4): 46.9 ML/M^2
BH CV ECHO MEAS - SI(TEICH): 15.5 ML/M^2
BH CV ECHO MEAS - SI(TEICH): 15.8 ML/M^2
BH CV ECHO MEAS - SI(TEICH): 23.5 ML/M^2
BH CV ECHO MEAS - SV(AO): 297.1 ML
BH CV ECHO MEAS - SV(CUBED): 33.9 ML
BH CV ECHO MEAS - SV(CUBED): 46.4 ML
BH CV ECHO MEAS - SV(CUBED): 56.6 ML
BH CV ECHO MEAS - SV(LVOT): 78.3 ML
BH CV ECHO MEAS - SV(MOD-SP2): 55 ML
BH CV ECHO MEAS - SV(MOD-SP2): 66 ML
BH CV ECHO MEAS - SV(MOD-SP2): 67 ML
BH CV ECHO MEAS - SV(MOD-SP2): 85 ML
BH CV ECHO MEAS - SV(MOD-SP4): 52 ML
BH CV ECHO MEAS - SV(MOD-SP4): 63 ML
BH CV ECHO MEAS - SV(MOD-SP4): 85 ML
BH CV ECHO MEAS - SV(MOD-SP4): 95 ML
BH CV ECHO MEAS - SV(TEICH): 30.2 ML
BH CV ECHO MEAS - SV(TEICH): 31.5 ML
BH CV ECHO MEAS - SV(TEICH): 47.6 ML
BH CV ECHO MEAS - TAPSE (>1.6): 1.9 CM2
BH CV ECHO MEAS - TR MAX VEL: 238 CM/SEC
BH CV VAS BP LEFT ARM: NORMAL MMHG
BH CV VAS BP LEFT ARM: NORMAL MMHG
BH CV XLRA - RV BASE: 4 CM
BH CV XLRA - RV BASE: 4.9 CM
BH CV XLRA - RV LENGTH: 7.5 CM
BH CV XLRA - RV LENGTH: 9.4 CM
BH CV XLRA - RV MID: 4.1 CM
BH CV XLRA - RV MID: 4.2 CM
BH CV XLRA - TDI S': 13 CM/SEC
BILIRUB CONJ SERPL-MCNC: 0.3 MG/DL (ref 0–0.2)
BILIRUB INDIRECT SERPL-MCNC: 0.3 MG/DL (ref 0.1–1.1)
BILIRUB SERPL-MCNC: 0.2 MG/DL (ref 0.3–1.2)
BILIRUB SERPL-MCNC: 0.2 MG/DL (ref 0.3–1.2)
BILIRUB SERPL-MCNC: 0.3 MG/DL (ref 0.3–1.2)
BILIRUB SERPL-MCNC: 0.4 MG/DL (ref 0.3–1.2)
BILIRUB SERPL-MCNC: 0.5 MG/DL (ref 0.3–1.2)
BILIRUB SERPL-MCNC: 0.6 MG/DL (ref 0.3–1.2)
BILIRUB SERPL-MCNC: 0.6 MG/DL (ref 0.3–1.2)
BILIRUB SERPL-MCNC: 0.7 MG/DL (ref 0.3–1.2)
BILIRUB SERPL-MCNC: 0.7 MG/DL (ref 0.3–1.2)
BILIRUB SERPL-MCNC: 0.8 MG/DL (ref 0.3–1.2)
BILIRUB SERPL-MCNC: 0.9 MG/DL (ref 0.3–1.2)
BILIRUB SERPL-MCNC: 1.5 MG/DL (ref 0.3–1.2)
BILIRUB UR QL STRIP: NEGATIVE
BLD GP AB SCN SERPL QL: NEGATIVE
BNP SERPL-MCNC: 214 PG/ML (ref 0–100)
BNP SERPL-MCNC: 219 PG/ML (ref 0–100)
BNP SERPL-MCNC: 404 PG/ML (ref 0–100)
BNP SERPL-MCNC: 752 PG/ML (ref 0–100)
BNP SERPL-MCNC: >5000 PG/ML (ref 0–100)
BUN BLD-MCNC: 19 MG/DL (ref 9–23)
BUN BLD-MCNC: 21 MG/DL (ref 9–23)
BUN BLD-MCNC: 22 MG/DL (ref 9–23)
BUN BLD-MCNC: 22 MG/DL (ref 9–23)
BUN BLD-MCNC: 25 MG/DL (ref 9–23)
BUN BLD-MCNC: 26 MG/DL (ref 9–23)
BUN BLD-MCNC: 27 MG/DL (ref 9–23)
BUN BLD-MCNC: 29 MG/DL (ref 9–23)
BUN BLD-MCNC: 30 MG/DL (ref 9–23)
BUN BLD-MCNC: 31 MG/DL (ref 9–23)
BUN BLD-MCNC: 31 MG/DL (ref 9–23)
BUN BLD-MCNC: 33 MG/DL (ref 9–23)
BUN BLD-MCNC: 35 MG/DL (ref 9–23)
BUN BLD-MCNC: 37 MG/DL (ref 9–23)
BUN BLD-MCNC: 38 MG/DL (ref 9–23)
BUN BLD-MCNC: 39 MG/DL (ref 9–23)
BUN BLD-MCNC: 40 MG/DL (ref 9–23)
BUN BLD-MCNC: 41 MG/DL (ref 9–23)
BUN BLD-MCNC: 42 MG/DL (ref 9–23)
BUN BLD-MCNC: 43 MG/DL (ref 9–23)
BUN BLD-MCNC: 44 MG/DL (ref 9–23)
BUN BLD-MCNC: 46 MG/DL (ref 9–23)
BUN BLD-MCNC: 48 MG/DL (ref 9–23)
BUN BLD-MCNC: 49 MG/DL (ref 9–23)
BUN BLD-MCNC: 49 MG/DL (ref 9–23)
BUN BLD-MCNC: 50 MG/DL (ref 9–23)
BUN BLD-MCNC: 51 MG/DL (ref 9–23)
BUN BLD-MCNC: 52 MG/DL (ref 9–23)
BUN BLD-MCNC: 52 MG/DL (ref 9–23)
BUN BLD-MCNC: 54 MG/DL (ref 9–23)
BUN BLD-MCNC: 56 MG/DL (ref 9–23)
BUN BLD-MCNC: 59 MG/DL (ref 9–23)
BUN BLD-MCNC: 61 MG/DL (ref 9–23)
BUN/CREAT SERPL: 11.9 (ref 7–25)
BUN/CREAT SERPL: 12.2 (ref 7–25)
BUN/CREAT SERPL: 14.4 (ref 7–25)
BUN/CREAT SERPL: 14.7 (ref 7–25)
BUN/CREAT SERPL: 15 (ref 7–25)
BUN/CREAT SERPL: 15.9 (ref 7–25)
BUN/CREAT SERPL: 16.3 (ref 7–25)
BUN/CREAT SERPL: 16.7 (ref 7–25)
BUN/CREAT SERPL: 16.9 (ref 7–25)
BUN/CREAT SERPL: 16.9 (ref 7–25)
BUN/CREAT SERPL: 17.1 (ref 7–25)
BUN/CREAT SERPL: 19.1 (ref 7–25)
BUN/CREAT SERPL: 19.4 (ref 7–25)
BUN/CREAT SERPL: 20 (ref 7–25)
BUN/CREAT SERPL: 20 (ref 7–25)
BUN/CREAT SERPL: 20.6 (ref 7–25)
BUN/CREAT SERPL: 20.8 (ref 7–25)
BUN/CREAT SERPL: 20.9 (ref 7–25)
BUN/CREAT SERPL: 22 (ref 7–25)
BUN/CREAT SERPL: 22.6 (ref 7–25)
BUN/CREAT SERPL: 22.9 (ref 7–25)
BUN/CREAT SERPL: 23.1 (ref 7–25)
BUN/CREAT SERPL: 23.5 (ref 7–25)
BUN/CREAT SERPL: 23.8 (ref 7–25)
BUN/CREAT SERPL: 24.3 (ref 7–25)
BUN/CREAT SERPL: 25.3 (ref 7–25)
BUN/CREAT SERPL: 26.3 (ref 7–25)
BUN/CREAT SERPL: 26.8 (ref 7–25)
BUN/CREAT SERPL: 26.8 (ref 7–25)
BUN/CREAT SERPL: 27.9 (ref 7–25)
BUN/CREAT SERPL: 30.5 (ref 7–25)
BUN/CREAT SERPL: 30.7 (ref 7–25)
BUN/CREAT SERPL: 32.5 (ref 7–25)
BUN/CREAT SERPL: 35 (ref 7–25)
BUN/CREAT SERPL: 40 (ref 7–25)
BUN/CREAT SERPL: 40 (ref 7–25)
BUN/CREAT SERPL: 40.8 (ref 7–25)
BUN/CREAT SERPL: 41 (ref 7–25)
BUN/CREAT SERPL: 41.7 (ref 7–25)
BUN/CREAT SERPL: 43 (ref 7–25)
BUN/CREAT SERPL: 43.3 (ref 7–25)
BUN/CREAT SERPL: 43.3 (ref 7–25)
BUN/CREAT SERPL: 46 (ref 7–25)
BUN/CREAT SERPL: 46 (ref 7–25)
BUN/CREAT SERPL: 46.7 (ref 7–25)
BUN/CREAT SERPL: 49 (ref 7–25)
BUN/CREAT SERPL: 55.7 (ref 7–25)
CA-I SERPL ISE-MCNC: 1.31 MMOL/L (ref 1.12–1.32)
CA-I SERPL ISE-MCNC: 1.39 MMOL/L (ref 1.12–1.32)
CA-I SERPL ISE-MCNC: 1.47 MMOL/L (ref 1.12–1.32)
CALCIUM SPEC-SCNC: 10 MG/DL (ref 8.7–10.4)
CALCIUM SPEC-SCNC: 10 MG/DL (ref 8.7–10.4)
CALCIUM SPEC-SCNC: 10.1 MG/DL (ref 8.7–10.4)
CALCIUM SPEC-SCNC: 10.1 MG/DL (ref 8.7–10.4)
CALCIUM SPEC-SCNC: 10.2 MG/DL (ref 8.7–10.4)
CALCIUM SPEC-SCNC: 10.3 MG/DL (ref 8.7–10.4)
CALCIUM SPEC-SCNC: 10.3 MG/DL (ref 8.7–10.4)
CALCIUM SPEC-SCNC: 10.4 MG/DL (ref 8.7–10.4)
CALCIUM SPEC-SCNC: 10.4 MG/DL (ref 8.7–10.4)
CALCIUM SPEC-SCNC: 8 MG/DL (ref 8.7–10.4)
CALCIUM SPEC-SCNC: 8.1 MG/DL (ref 8.7–10.4)
CALCIUM SPEC-SCNC: 8.1 MG/DL (ref 8.7–10.4)
CALCIUM SPEC-SCNC: 8.3 MG/DL (ref 8.7–10.4)
CALCIUM SPEC-SCNC: 8.4 MG/DL (ref 8.7–10.4)
CALCIUM SPEC-SCNC: 8.4 MG/DL (ref 8.7–10.4)
CALCIUM SPEC-SCNC: 8.5 MG/DL (ref 8.7–10.4)
CALCIUM SPEC-SCNC: 8.6 MG/DL (ref 8.7–10.4)
CALCIUM SPEC-SCNC: 8.6 MG/DL (ref 8.7–10.4)
CALCIUM SPEC-SCNC: 8.8 MG/DL (ref 8.7–10.4)
CALCIUM SPEC-SCNC: 8.9 MG/DL (ref 8.7–10.4)
CALCIUM SPEC-SCNC: 9 MG/DL (ref 8.7–10.4)
CALCIUM SPEC-SCNC: 9.1 MG/DL (ref 8.7–10.4)
CALCIUM SPEC-SCNC: 9.1 MG/DL (ref 8.7–10.4)
CALCIUM SPEC-SCNC: 9.2 MG/DL (ref 8.7–10.4)
CALCIUM SPEC-SCNC: 9.3 MG/DL (ref 8.7–10.4)
CALCIUM SPEC-SCNC: 9.3 MG/DL (ref 8.7–10.4)
CALCIUM SPEC-SCNC: 9.4 MG/DL (ref 8.7–10.4)
CALCIUM SPEC-SCNC: 9.4 MG/DL (ref 8.7–10.4)
CALCIUM SPEC-SCNC: 9.5 MG/DL (ref 8.7–10.4)
CALCIUM SPEC-SCNC: 9.5 MG/DL (ref 8.7–10.4)
CALCIUM SPEC-SCNC: 9.6 MG/DL (ref 8.7–10.4)
CALCIUM SPEC-SCNC: 9.7 MG/DL (ref 8.7–10.4)
CALCIUM SPEC-SCNC: 9.8 MG/DL (ref 8.7–10.4)
CALCIUM SPEC-SCNC: 9.8 MG/DL (ref 8.7–10.4)
CALCIUM SPEC-SCNC: 9.9 MG/DL (ref 8.7–10.4)
CHLORIDE SERPL-SCNC: 100 MMOL/L (ref 99–109)
CHLORIDE SERPL-SCNC: 100 MMOL/L (ref 99–109)
CHLORIDE SERPL-SCNC: 101 MMOL/L (ref 99–109)
CHLORIDE SERPL-SCNC: 101 MMOL/L (ref 99–109)
CHLORIDE SERPL-SCNC: 103 MMOL/L (ref 99–109)
CHLORIDE SERPL-SCNC: 104 MMOL/L (ref 99–109)
CHLORIDE SERPL-SCNC: 105 MMOL/L (ref 99–109)
CHLORIDE SERPL-SCNC: 106 MMOL/L (ref 99–109)
CHLORIDE SERPL-SCNC: 107 MMOL/L (ref 99–109)
CHLORIDE SERPL-SCNC: 108 MMOL/L (ref 99–109)
CHLORIDE SERPL-SCNC: 109 MMOL/L (ref 99–109)
CHLORIDE SERPL-SCNC: 110 MMOL/L (ref 99–109)
CHLORIDE SERPL-SCNC: 111 MMOL/L (ref 99–109)
CHLORIDE SERPL-SCNC: 112 MMOL/L (ref 99–109)
CHLORIDE SERPL-SCNC: 114 MMOL/L (ref 99–109)
CHLORIDE SERPL-SCNC: 115 MMOL/L (ref 99–109)
CHLORIDE SERPL-SCNC: 115 MMOL/L (ref 99–109)
CHLORIDE SERPL-SCNC: 116 MMOL/L (ref 99–109)
CHLORIDE SERPL-SCNC: 119 MMOL/L (ref 99–109)
CHLORIDE SERPL-SCNC: 119 MMOL/L (ref 99–109)
CHLORIDE SERPL-SCNC: 121 MMOL/L (ref 99–109)
CHLORIDE SERPL-SCNC: 98 MMOL/L (ref 99–109)
CHOLEST SERPL-MCNC: 102 MG/DL (ref 0–200)
CHOLEST SERPL-MCNC: 113 MG/DL (ref 0–200)
CHOLEST SERPL-MCNC: 132 MG/DL (ref 0–200)
CHOLEST SERPL-MCNC: 180 MG/DL (ref 0–200)
CLARITY UR: ABNORMAL
CLARITY UR: ABNORMAL
CLARITY UR: CLEAR
CLARITY UR: CLEAR
CO2 SERPL-SCNC: 19 MMOL/L (ref 20–31)
CO2 SERPL-SCNC: 21 MMOL/L (ref 20–31)
CO2 SERPL-SCNC: 22 MMOL/L (ref 20–31)
CO2 SERPL-SCNC: 23 MMOL/L (ref 20–31)
CO2 SERPL-SCNC: 24 MMOL/L (ref 20–31)
CO2 SERPL-SCNC: 25 MMOL/L (ref 20–31)
CO2 SERPL-SCNC: 26 MMOL/L (ref 20–31)
CO2 SERPL-SCNC: 27 MMOL/L (ref 20–31)
CO2 SERPL-SCNC: 28 MMOL/L (ref 20–31)
CO2 SERPL-SCNC: 29 MMOL/L (ref 20–31)
CO2 SERPL-SCNC: 29 MMOL/L (ref 20–31)
CO2 SERPL-SCNC: 30 MMOL/L (ref 20–31)
CO2 SERPL-SCNC: 31 MMOL/L (ref 20–31)
CO2 SERPL-SCNC: 32 MMOL/L (ref 20–31)
CO2 SERPL-SCNC: 33 MMOL/L (ref 20–31)
COLOR UR: YELLOW
CREAT BLD-MCNC: 0.7 MG/DL (ref 0.6–1.3)
CREAT BLD-MCNC: 0.9 MG/DL (ref 0.6–1.3)
CREAT BLD-MCNC: 0.9 MG/DL (ref 0.6–1.3)
CREAT BLD-MCNC: 1 MG/DL (ref 0.6–1.3)
CREAT BLD-MCNC: 1.1 MG/DL (ref 0.6–1.3)
CREAT BLD-MCNC: 1.2 MG/DL (ref 0.6–1.3)
CREAT BLD-MCNC: 1.3 MG/DL (ref 0.6–1.3)
CREAT BLD-MCNC: 1.4 MG/DL (ref 0.6–1.3)
CREAT BLD-MCNC: 1.5 MG/DL (ref 0.6–1.3)
CREAT BLD-MCNC: 1.6 MG/DL (ref 0.6–1.3)
CREAT BLD-MCNC: 1.7 MG/DL (ref 0.6–1.3)
CREAT BLD-MCNC: 1.8 MG/DL (ref 0.6–1.3)
CREAT BLD-MCNC: 1.9 MG/DL (ref 0.6–1.3)
CREAT BLD-MCNC: 2 MG/DL (ref 0.6–1.3)
CREAT BLD-MCNC: 2 MG/DL (ref 0.6–1.3)
CREAT BLD-MCNC: 2.2 MG/DL (ref 0.6–1.3)
CREAT BLD-MCNC: 2.3 MG/DL (ref 0.6–1.3)
CREAT BLD-MCNC: 2.3 MG/DL (ref 0.6–1.3)
CRP SERPL-MCNC: 12.08 MG/DL (ref 0–1)
CRP SERPL-MCNC: 2.17 MG/DL (ref 0–1)
CRP SERPL-MCNC: 3.63 MG/DL (ref 0–1)
CYTO UR: NORMAL
CYTO UR: NORMAL
D-LACTATE SERPL-SCNC: 0.7 MMOL/L (ref 0.5–2)
D-LACTATE SERPL-SCNC: 1.7 MMOL/L (ref 0.5–2)
DEPRECATED RDW RBC AUTO: 47.5 FL (ref 37–54)
DEPRECATED RDW RBC AUTO: 47.6 FL (ref 37–54)
DEPRECATED RDW RBC AUTO: 47.7 FL (ref 37–54)
DEPRECATED RDW RBC AUTO: 49.3 FL (ref 37–54)
DEPRECATED RDW RBC AUTO: 49.4 FL (ref 37–54)
DEPRECATED RDW RBC AUTO: 49.4 FL (ref 37–54)
DEPRECATED RDW RBC AUTO: 49.5 FL (ref 37–54)
DEPRECATED RDW RBC AUTO: 50.2 FL (ref 37–54)
DEPRECATED RDW RBC AUTO: 50.2 FL (ref 37–54)
DEPRECATED RDW RBC AUTO: 50.6 FL (ref 37–54)
DEPRECATED RDW RBC AUTO: 50.7 FL (ref 37–54)
DEPRECATED RDW RBC AUTO: 51.3 FL (ref 37–54)
DEPRECATED RDW RBC AUTO: 52.2 FL (ref 37–54)
DEPRECATED RDW RBC AUTO: 52.6 FL (ref 37–54)
DEPRECATED RDW RBC AUTO: 52.9 FL (ref 37–54)
DEPRECATED RDW RBC AUTO: 53 FL (ref 37–54)
DEPRECATED RDW RBC AUTO: 53.2 FL (ref 37–54)
DEPRECATED RDW RBC AUTO: 53.6 FL (ref 37–54)
DEPRECATED RDW RBC AUTO: 53.7 FL (ref 37–54)
DEPRECATED RDW RBC AUTO: 53.8 FL (ref 37–54)
DEPRECATED RDW RBC AUTO: 54 FL (ref 37–54)
DEPRECATED RDW RBC AUTO: 54.3 FL (ref 37–54)
DEPRECATED RDW RBC AUTO: 54.6 FL (ref 37–54)
DEPRECATED RDW RBC AUTO: 54.7 FL (ref 37–54)
DEPRECATED RDW RBC AUTO: 55.1 FL (ref 37–54)
DEPRECATED RDW RBC AUTO: 55.4 FL (ref 37–54)
DEPRECATED RDW RBC AUTO: 55.6 FL (ref 37–54)
DEPRECATED RDW RBC AUTO: 55.9 FL (ref 37–54)
DEPRECATED RDW RBC AUTO: 56.3 FL (ref 37–54)
DEPRECATED RDW RBC AUTO: 57.3 FL (ref 37–54)
DEPRECATED RDW RBC AUTO: 58.3 FL (ref 37–54)
DEPRECATED RDW RBC AUTO: 58.4 FL (ref 37–54)
DEPRECATED RDW RBC AUTO: 60 FL (ref 37–54)
DEPRECATED RDW RBC AUTO: 60.4 FL (ref 37–54)
DEPRECATED RDW RBC AUTO: 60.5 FL (ref 37–54)
DEPRECATED RDW RBC AUTO: 60.7 FL (ref 37–54)
DEPRECATED RDW RBC AUTO: 60.8 FL (ref 37–54)
DEPRECATED RDW RBC AUTO: 61.9 FL (ref 37–54)
DEPRECATED RDW RBC AUTO: 63.7 FL (ref 37–54)
DEPRECATED RDW RBC AUTO: 64.2 FL (ref 37–54)
DEPRECATED RDW RBC AUTO: 65.2 FL (ref 37–54)
DEPRECATED RDW RBC AUTO: 66 FL (ref 37–54)
DEPRECATED RDW RBC AUTO: 67.9 FL (ref 37–54)
DEPRECATED RDW RBC AUTO: 69.9 FL (ref 37–54)
DICE SURVEILLANCE CULTURE: NORMAL
E/E' RATIO: 15
EOSINOPHIL # BLD AUTO: 0 10*3/MM3 (ref 0–0.3)
EOSINOPHIL # BLD AUTO: 0.01 10*3/MM3 (ref 0–0.3)
EOSINOPHIL # BLD AUTO: 0.01 10*3/MM3 (ref 0–0.3)
EOSINOPHIL # BLD AUTO: 0.04 10*3/MM3 (ref 0–0.3)
EOSINOPHIL # BLD AUTO: 0.09 10*3/MM3 (ref 0–0.3)
EOSINOPHIL # BLD AUTO: 0.1 10*3/MM3 (ref 0–0.3)
EOSINOPHIL # BLD AUTO: 0.12 10*3/MM3 (ref 0–0.3)
EOSINOPHIL # BLD AUTO: 0.13 10*3/MM3 (ref 0–0.3)
EOSINOPHIL # BLD AUTO: 0.14 10*3/MM3 (ref 0–0.3)
EOSINOPHIL # BLD AUTO: 0.17 10*3/MM3 (ref 0–0.3)
EOSINOPHIL # BLD AUTO: 0.17 10*3/MM3 (ref 0–0.3)
EOSINOPHIL # BLD AUTO: 0.22 10*3/MM3 (ref 0–0.3)
EOSINOPHIL # BLD AUTO: 0.23 10*3/MM3 (ref 0–0.3)
EOSINOPHIL # BLD AUTO: 0.26 10*3/MM3 (ref 0–0.3)
EOSINOPHIL # BLD AUTO: 0.26 10*3/MM3 (ref 0–0.3)
EOSINOPHIL # BLD AUTO: 0.27 10*3/MM3 (ref 0–0.3)
EOSINOPHIL # BLD AUTO: 0.27 10*3/MM3 (ref 0–0.3)
EOSINOPHIL # BLD AUTO: 0.32 10*3/MM3 (ref 0–0.3)
EOSINOPHIL # BLD AUTO: 0.33 10*3/MM3 (ref 0–0.3)
EOSINOPHIL # BLD AUTO: 0.36 10*3/MM3 (ref 0–0.3)
EOSINOPHIL # BLD AUTO: 0.57 10*3/MM3 (ref 0–0.3)
EOSINOPHIL # BLD MANUAL: 0.1 10*3/MM3 (ref 0.1–0.3)
EOSINOPHIL NFR BLD AUTO: 0 % (ref 0–3)
EOSINOPHIL NFR BLD AUTO: 0.1 % (ref 0–3)
EOSINOPHIL NFR BLD AUTO: 0.1 % (ref 0–3)
EOSINOPHIL NFR BLD AUTO: 0.3 % (ref 0–3)
EOSINOPHIL NFR BLD AUTO: 0.7 % (ref 0–3)
EOSINOPHIL NFR BLD AUTO: 0.8 % (ref 0–3)
EOSINOPHIL NFR BLD AUTO: 0.9 % (ref 0–3)
EOSINOPHIL NFR BLD AUTO: 1.3 % (ref 0–3)
EOSINOPHIL NFR BLD AUTO: 1.6 % (ref 0–3)
EOSINOPHIL NFR BLD AUTO: 1.7 % (ref 0–3)
EOSINOPHIL NFR BLD AUTO: 2.1 % (ref 0–3)
EOSINOPHIL NFR BLD AUTO: 2.1 % (ref 0–3)
EOSINOPHIL NFR BLD AUTO: 2.2 % (ref 0–3)
EOSINOPHIL NFR BLD AUTO: 2.2 % (ref 0–3)
EOSINOPHIL NFR BLD AUTO: 3.1 % (ref 0–3)
EOSINOPHIL NFR BLD AUTO: 3.3 % (ref 0–3)
EOSINOPHIL NFR BLD AUTO: 3.3 % (ref 0–3)
EOSINOPHIL NFR BLD AUTO: 3.6 % (ref 0–3)
EOSINOPHIL NFR BLD AUTO: 4.9 % (ref 0–3)
EOSINOPHIL NFR BLD AUTO: 5.1 % (ref 0–3)
EOSINOPHIL NFR BLD AUTO: 7.5 % (ref 0–3)
EOSINOPHIL NFR BLD MANUAL: 2 % (ref 0–3)
ERYTHROCYTE [DISTWIDTH] IN BLOOD BY AUTOMATED COUNT: 13.9 % (ref 11.3–14.5)
ERYTHROCYTE [DISTWIDTH] IN BLOOD BY AUTOMATED COUNT: 14 % (ref 11.3–14.5)
ERYTHROCYTE [DISTWIDTH] IN BLOOD BY AUTOMATED COUNT: 14.2 % (ref 11.3–14.5)
ERYTHROCYTE [DISTWIDTH] IN BLOOD BY AUTOMATED COUNT: 14.4 % (ref 11.3–14.5)
ERYTHROCYTE [DISTWIDTH] IN BLOOD BY AUTOMATED COUNT: 14.5 % (ref 11.3–14.5)
ERYTHROCYTE [DISTWIDTH] IN BLOOD BY AUTOMATED COUNT: 14.6 % (ref 11.3–14.5)
ERYTHROCYTE [DISTWIDTH] IN BLOOD BY AUTOMATED COUNT: 14.7 % (ref 11.3–14.5)
ERYTHROCYTE [DISTWIDTH] IN BLOOD BY AUTOMATED COUNT: 14.9 % (ref 11.3–14.5)
ERYTHROCYTE [DISTWIDTH] IN BLOOD BY AUTOMATED COUNT: 14.9 % (ref 11.3–14.5)
ERYTHROCYTE [DISTWIDTH] IN BLOOD BY AUTOMATED COUNT: 15 % (ref 11.3–14.5)
ERYTHROCYTE [DISTWIDTH] IN BLOOD BY AUTOMATED COUNT: 15.1 % (ref 11.3–14.5)
ERYTHROCYTE [DISTWIDTH] IN BLOOD BY AUTOMATED COUNT: 15.1 % (ref 11.3–14.5)
ERYTHROCYTE [DISTWIDTH] IN BLOOD BY AUTOMATED COUNT: 15.2 % (ref 11.3–14.5)
ERYTHROCYTE [DISTWIDTH] IN BLOOD BY AUTOMATED COUNT: 15.3 % (ref 11.3–14.5)
ERYTHROCYTE [DISTWIDTH] IN BLOOD BY AUTOMATED COUNT: 15.3 % (ref 11.3–14.5)
ERYTHROCYTE [DISTWIDTH] IN BLOOD BY AUTOMATED COUNT: 15.4 % (ref 11.3–14.5)
ERYTHROCYTE [DISTWIDTH] IN BLOOD BY AUTOMATED COUNT: 15.7 % (ref 11.3–14.5)
ERYTHROCYTE [DISTWIDTH] IN BLOOD BY AUTOMATED COUNT: 15.8 % (ref 11.3–14.5)
ERYTHROCYTE [DISTWIDTH] IN BLOOD BY AUTOMATED COUNT: 15.8 % (ref 11.3–14.5)
ERYTHROCYTE [DISTWIDTH] IN BLOOD BY AUTOMATED COUNT: 15.9 % (ref 11.3–14.5)
ERYTHROCYTE [DISTWIDTH] IN BLOOD BY AUTOMATED COUNT: 16 % (ref 11.3–14.5)
ERYTHROCYTE [DISTWIDTH] IN BLOOD BY AUTOMATED COUNT: 16.5 % (ref 11.3–14.5)
ERYTHROCYTE [DISTWIDTH] IN BLOOD BY AUTOMATED COUNT: 16.9 % (ref 11.3–14.5)
ERYTHROCYTE [DISTWIDTH] IN BLOOD BY AUTOMATED COUNT: 17 % (ref 11.3–14.5)
ERYTHROCYTE [DISTWIDTH] IN BLOOD BY AUTOMATED COUNT: 17.1 % (ref 11.3–14.5)
ERYTHROCYTE [DISTWIDTH] IN BLOOD BY AUTOMATED COUNT: 17.3 % (ref 11.3–14.5)
ERYTHROCYTE [DISTWIDTH] IN BLOOD BY AUTOMATED COUNT: 17.3 % (ref 11.3–14.5)
ERYTHROCYTE [DISTWIDTH] IN BLOOD BY AUTOMATED COUNT: 17.6 % (ref 11.3–14.5)
ERYTHROCYTE [DISTWIDTH] IN BLOOD BY AUTOMATED COUNT: 17.6 % (ref 11.3–14.5)
ERYTHROCYTE [DISTWIDTH] IN BLOOD BY AUTOMATED COUNT: 17.9 % (ref 11.3–14.5)
ERYTHROCYTE [DISTWIDTH] IN BLOOD BY AUTOMATED COUNT: 18.1 % (ref 11.3–14.5)
ERYTHROCYTE [DISTWIDTH] IN BLOOD BY AUTOMATED COUNT: 18.2 % (ref 11.3–14.5)
ERYTHROCYTE [DISTWIDTH] IN BLOOD BY AUTOMATED COUNT: 18.2 % (ref 11.3–14.5)
ERYTHROCYTE [DISTWIDTH] IN BLOOD BY AUTOMATED COUNT: 18.5 % (ref 11.3–14.5)
ERYTHROCYTE [DISTWIDTH] IN BLOOD BY AUTOMATED COUNT: 19 % (ref 11.3–14.5)
ERYTHROCYTE [DISTWIDTH] IN BLOOD BY AUTOMATED COUNT: 19.2 % (ref 11.3–14.5)
ERYTHROCYTE [DISTWIDTH] IN BLOOD BY AUTOMATED COUNT: 19.2 % (ref 11.3–14.5)
ERYTHROCYTE [DISTWIDTH] IN BLOOD BY AUTOMATED COUNT: 19.9 % (ref 11.3–14.5)
ERYTHROCYTE [DISTWIDTH] IN BLOOD BY AUTOMATED COUNT: 20.1 % (ref 11.3–14.5)
GFR SERPL CREATININE-BSD FRML MDRD: 111 ML/MIN/1.73
GFR SERPL CREATININE-BSD FRML MDRD: 28 ML/MIN/1.73
GFR SERPL CREATININE-BSD FRML MDRD: 28 ML/MIN/1.73
GFR SERPL CREATININE-BSD FRML MDRD: 30 ML/MIN/1.73
GFR SERPL CREATININE-BSD FRML MDRD: 33 ML/MIN/1.73
GFR SERPL CREATININE-BSD FRML MDRD: 33 ML/MIN/1.73
GFR SERPL CREATININE-BSD FRML MDRD: 35 ML/MIN/1.73
GFR SERPL CREATININE-BSD FRML MDRD: 37 ML/MIN/1.73
GFR SERPL CREATININE-BSD FRML MDRD: 40 ML/MIN/1.73
GFR SERPL CREATININE-BSD FRML MDRD: 43 ML/MIN/1.73
GFR SERPL CREATININE-BSD FRML MDRD: 46 ML/MIN/1.73
GFR SERPL CREATININE-BSD FRML MDRD: 50 ML/MIN/1.73
GFR SERPL CREATININE-BSD FRML MDRD: 50 ML/MIN/1.73
GFR SERPL CREATININE-BSD FRML MDRD: 54 ML/MIN/1.73
GFR SERPL CREATININE-BSD FRML MDRD: 60 ML/MIN/1.73
GFR SERPL CREATININE-BSD FRML MDRD: 66 ML/MIN/1.73
GFR SERPL CREATININE-BSD FRML MDRD: 73 ML/MIN/1.73
GFR SERPL CREATININE-BSD FRML MDRD: 83 ML/MIN/1.73
GLOBULIN UR ELPH-MCNC: 2.5 GM/DL
GLOBULIN UR ELPH-MCNC: 3 GM/DL
GLOBULIN UR ELPH-MCNC: 3.3 GM/DL
GLOBULIN UR ELPH-MCNC: 3.5 GM/DL
GLOBULIN UR ELPH-MCNC: 3.5 GM/DL
GLOBULIN UR ELPH-MCNC: 3.6 GM/DL
GLOBULIN UR ELPH-MCNC: 3.7 GM/DL
GLOBULIN UR ELPH-MCNC: 3.7 GM/DL
GLOBULIN UR ELPH-MCNC: 3.8 GM/DL
GLOBULIN UR ELPH-MCNC: 3.8 GM/DL
GLOBULIN UR ELPH-MCNC: 4 GM/DL
GLOBULIN UR ELPH-MCNC: 4.1 GM/DL
GLOBULIN UR ELPH-MCNC: 4.2 GM/DL
GLOBULIN UR ELPH-MCNC: 4.4 GM/DL
GLOBULIN UR ELPH-MCNC: 4.4 GM/DL
GLOBULIN UR ELPH-MCNC: 4.5 GM/DL
GLOBULIN UR ELPH-MCNC: 4.6 GM/DL
GLOBULIN UR ELPH-MCNC: 4.8 GM/DL
GLUCOSE BLD-MCNC: 101 MG/DL (ref 70–100)
GLUCOSE BLD-MCNC: 102 MG/DL (ref 70–100)
GLUCOSE BLD-MCNC: 102 MG/DL (ref 70–100)
GLUCOSE BLD-MCNC: 103 MG/DL (ref 70–100)
GLUCOSE BLD-MCNC: 104 MG/DL (ref 70–100)
GLUCOSE BLD-MCNC: 106 MG/DL (ref 70–100)
GLUCOSE BLD-MCNC: 106 MG/DL (ref 70–100)
GLUCOSE BLD-MCNC: 107 MG/DL (ref 70–100)
GLUCOSE BLD-MCNC: 108 MG/DL (ref 70–100)
GLUCOSE BLD-MCNC: 109 MG/DL (ref 70–100)
GLUCOSE BLD-MCNC: 112 MG/DL (ref 70–100)
GLUCOSE BLD-MCNC: 115 MG/DL (ref 70–100)
GLUCOSE BLD-MCNC: 115 MG/DL (ref 70–100)
GLUCOSE BLD-MCNC: 116 MG/DL (ref 70–100)
GLUCOSE BLD-MCNC: 118 MG/DL (ref 70–100)
GLUCOSE BLD-MCNC: 118 MG/DL (ref 70–100)
GLUCOSE BLD-MCNC: 119 MG/DL (ref 70–100)
GLUCOSE BLD-MCNC: 120 MG/DL (ref 70–100)
GLUCOSE BLD-MCNC: 128 MG/DL (ref 70–100)
GLUCOSE BLD-MCNC: 131 MG/DL (ref 70–100)
GLUCOSE BLD-MCNC: 132 MG/DL (ref 70–100)
GLUCOSE BLD-MCNC: 133 MG/DL (ref 70–100)
GLUCOSE BLD-MCNC: 134 MG/DL (ref 70–100)
GLUCOSE BLD-MCNC: 136 MG/DL (ref 70–100)
GLUCOSE BLD-MCNC: 142 MG/DL (ref 70–100)
GLUCOSE BLD-MCNC: 142 MG/DL (ref 70–100)
GLUCOSE BLD-MCNC: 147 MG/DL (ref 70–100)
GLUCOSE BLD-MCNC: 152 MG/DL (ref 70–100)
GLUCOSE BLD-MCNC: 70 MG/DL (ref 70–100)
GLUCOSE BLD-MCNC: 74 MG/DL (ref 70–100)
GLUCOSE BLD-MCNC: 78 MG/DL (ref 70–100)
GLUCOSE BLD-MCNC: 79 MG/DL (ref 70–100)
GLUCOSE BLD-MCNC: 81 MG/DL (ref 70–100)
GLUCOSE BLD-MCNC: 81 MG/DL (ref 70–100)
GLUCOSE BLD-MCNC: 83 MG/DL (ref 70–100)
GLUCOSE BLD-MCNC: 86 MG/DL (ref 70–100)
GLUCOSE BLD-MCNC: 86 MG/DL (ref 70–100)
GLUCOSE BLD-MCNC: 87 MG/DL (ref 70–100)
GLUCOSE BLD-MCNC: 87 MG/DL (ref 70–100)
GLUCOSE BLD-MCNC: 88 MG/DL (ref 70–100)
GLUCOSE BLD-MCNC: 90 MG/DL (ref 70–100)
GLUCOSE BLD-MCNC: 92 MG/DL (ref 70–100)
GLUCOSE BLD-MCNC: 93 MG/DL (ref 70–100)
GLUCOSE BLD-MCNC: 96 MG/DL (ref 70–100)
GLUCOSE BLD-MCNC: 97 MG/DL (ref 70–100)
GLUCOSE BLDC GLUCOMTR-MCNC: 101 MG/DL (ref 70–130)
GLUCOSE BLDC GLUCOMTR-MCNC: 103 MG/DL (ref 70–130)
GLUCOSE BLDC GLUCOMTR-MCNC: 118 MG/DL (ref 70–130)
GLUCOSE BLDC GLUCOMTR-MCNC: 120 MG/DL (ref 70–130)
GLUCOSE BLDC GLUCOMTR-MCNC: 123 MG/DL (ref 70–130)
GLUCOSE BLDC GLUCOMTR-MCNC: 130 MG/DL (ref 70–130)
GLUCOSE BLDC GLUCOMTR-MCNC: 132 MG/DL (ref 70–130)
GLUCOSE BLDC GLUCOMTR-MCNC: 76 MG/DL (ref 70–130)
GLUCOSE BLDC GLUCOMTR-MCNC: 77 MG/DL (ref 70–130)
GLUCOSE BLDC GLUCOMTR-MCNC: 80 MG/DL (ref 70–130)
GLUCOSE BLDC GLUCOMTR-MCNC: 81 MG/DL (ref 70–130)
GLUCOSE BLDC GLUCOMTR-MCNC: 81 MG/DL (ref 70–130)
GLUCOSE BLDC GLUCOMTR-MCNC: 88 MG/DL (ref 70–130)
GLUCOSE BLDC GLUCOMTR-MCNC: 91 MG/DL (ref 70–130)
GLUCOSE UR STRIP-MCNC: NEGATIVE MG/DL
HBA1C MFR BLD: 5 % (ref 4.8–5.6)
HBA1C MFR BLD: 5.1 % (ref 4.8–5.6)
HCT VFR BLD AUTO: 22.7 % (ref 38.9–50.9)
HCT VFR BLD AUTO: 23.3 % (ref 38.9–50.9)
HCT VFR BLD AUTO: 25.2 % (ref 38.9–50.9)
HCT VFR BLD AUTO: 25.3 % (ref 38.9–50.9)
HCT VFR BLD AUTO: 26.9 % (ref 38.9–50.9)
HCT VFR BLD AUTO: 27.4 % (ref 38.9–50.9)
HCT VFR BLD AUTO: 28 % (ref 38.9–50.9)
HCT VFR BLD AUTO: 28.2 % (ref 38.9–50.9)
HCT VFR BLD AUTO: 28.3 % (ref 38.9–50.9)
HCT VFR BLD AUTO: 28.4 % (ref 38.9–50.9)
HCT VFR BLD AUTO: 28.5 % (ref 38.9–50.9)
HCT VFR BLD AUTO: 28.9 % (ref 38.9–50.9)
HCT VFR BLD AUTO: 28.9 % (ref 38.9–50.9)
HCT VFR BLD AUTO: 29.3 % (ref 38.9–50.9)
HCT VFR BLD AUTO: 29.6 % (ref 38.9–50.9)
HCT VFR BLD AUTO: 29.9 % (ref 38.9–50.9)
HCT VFR BLD AUTO: 29.9 % (ref 38.9–50.9)
HCT VFR BLD AUTO: 30.1 % (ref 38.9–50.9)
HCT VFR BLD AUTO: 30.5 % (ref 38.9–50.9)
HCT VFR BLD AUTO: 31.1 % (ref 38.9–50.9)
HCT VFR BLD AUTO: 31.7 % (ref 38.9–50.9)
HCT VFR BLD AUTO: 31.7 % (ref 38.9–50.9)
HCT VFR BLD AUTO: 32 % (ref 38.9–50.9)
HCT VFR BLD AUTO: 32.6 % (ref 38.9–50.9)
HCT VFR BLD AUTO: 32.6 % (ref 38.9–50.9)
HCT VFR BLD AUTO: 33.1 % (ref 38.9–50.9)
HCT VFR BLD AUTO: 33.4 % (ref 38.9–50.9)
HCT VFR BLD AUTO: 33.5 % (ref 38.9–50.9)
HCT VFR BLD AUTO: 33.5 % (ref 38.9–50.9)
HCT VFR BLD AUTO: 33.7 % (ref 38.9–50.9)
HCT VFR BLD AUTO: 33.7 % (ref 38.9–50.9)
HCT VFR BLD AUTO: 33.8 % (ref 38.9–50.9)
HCT VFR BLD AUTO: 33.9 % (ref 38.9–50.9)
HCT VFR BLD AUTO: 33.9 % (ref 38.9–50.9)
HCT VFR BLD AUTO: 34.1 % (ref 38.9–50.9)
HCT VFR BLD AUTO: 34.7 % (ref 38.9–50.9)
HCT VFR BLD AUTO: 35.2 % (ref 38.9–50.9)
HCT VFR BLD AUTO: 35.9 % (ref 38.9–50.9)
HCT VFR BLD AUTO: 36.4 % (ref 38.9–50.9)
HCT VFR BLD AUTO: 36.5 % (ref 38.9–50.9)
HCT VFR BLD AUTO: 36.6 % (ref 38.9–50.9)
HCT VFR BLD AUTO: 36.7 % (ref 38.9–50.9)
HCT VFR BLD AUTO: 36.9 % (ref 38.9–50.9)
HCT VFR BLD AUTO: 37.6 % (ref 38.9–50.9)
HCT VFR BLD AUTO: 38.2 % (ref 38.9–50.9)
HCT VFR BLD AUTO: 38.5 % (ref 38.9–50.9)
HCT VFR BLD AUTO: 38.7 % (ref 38.9–50.9)
HCT VFR BLD AUTO: 40.1 % (ref 38.9–50.9)
HDLC SERPL-MCNC: 41 MG/DL (ref 40–60)
HDLC SERPL-MCNC: 65 MG/DL (ref 40–60)
HGB BLD-MCNC: 10 G/DL (ref 13.1–17.5)
HGB BLD-MCNC: 10.1 G/DL (ref 13.1–17.5)
HGB BLD-MCNC: 10.1 G/DL (ref 13.1–17.5)
HGB BLD-MCNC: 10.3 G/DL (ref 13.1–17.5)
HGB BLD-MCNC: 10.3 G/DL (ref 13.1–17.5)
HGB BLD-MCNC: 10.4 G/DL (ref 13.1–17.5)
HGB BLD-MCNC: 10.5 G/DL (ref 13.1–17.5)
HGB BLD-MCNC: 10.6 G/DL (ref 13.1–17.5)
HGB BLD-MCNC: 10.9 G/DL (ref 13.1–17.5)
HGB BLD-MCNC: 11 G/DL (ref 13.1–17.5)
HGB BLD-MCNC: 11 G/DL (ref 13.1–17.5)
HGB BLD-MCNC: 11.6 G/DL (ref 13.1–17.5)
HGB BLD-MCNC: 11.7 G/DL (ref 13.1–17.5)
HGB BLD-MCNC: 11.8 G/DL (ref 13.1–17.5)
HGB BLD-MCNC: 11.9 G/DL (ref 13.1–17.5)
HGB BLD-MCNC: 12.1 G/DL (ref 13.1–17.5)
HGB BLD-MCNC: 12.2 G/DL (ref 13.1–17.5)
HGB BLD-MCNC: 12.4 G/DL (ref 13.1–17.5)
HGB BLD-MCNC: 12.5 G/DL (ref 13.1–17.5)
HGB BLD-MCNC: 13 G/DL (ref 13.1–17.5)
HGB BLD-MCNC: 7.3 G/DL (ref 13.1–17.5)
HGB BLD-MCNC: 7.4 G/DL (ref 13.1–17.5)
HGB BLD-MCNC: 8.2 G/DL (ref 13.1–17.5)
HGB BLD-MCNC: 8.3 G/DL (ref 13.1–17.5)
HGB BLD-MCNC: 8.5 G/DL (ref 13.1–17.5)
HGB BLD-MCNC: 8.6 G/DL (ref 13.1–17.5)
HGB BLD-MCNC: 8.7 G/DL (ref 13.1–17.5)
HGB BLD-MCNC: 8.8 G/DL (ref 13.1–17.5)
HGB BLD-MCNC: 9.1 G/DL (ref 13.1–17.5)
HGB BLD-MCNC: 9.4 G/DL (ref 13.1–17.5)
HGB BLD-MCNC: 9.5 G/DL (ref 13.1–17.5)
HGB BLD-MCNC: 9.5 G/DL (ref 13.1–17.5)
HGB BLD-MCNC: 9.6 G/DL (ref 13.1–17.5)
HGB BLD-MCNC: 9.7 G/DL (ref 13.1–17.5)
HGB BLD-MCNC: 9.8 G/DL (ref 13.1–17.5)
HGB BLD-MCNC: 9.9 G/DL (ref 13.1–17.5)
HGB UR QL STRIP.AUTO: ABNORMAL
HGB UR QL STRIP.AUTO: ABNORMAL
HGB UR QL STRIP.AUTO: NEGATIVE
HGB UR QL STRIP.AUTO: NEGATIVE
HOLD SPECIMEN: NORMAL
HYALINE CASTS UR QL AUTO: ABNORMAL /LPF
IMM GRANULOCYTES # BLD: 0.01 10*3/MM3 (ref 0–0.03)
IMM GRANULOCYTES # BLD: 0.02 10*3/MM3 (ref 0–0.03)
IMM GRANULOCYTES # BLD: 0.03 10*3/MM3 (ref 0–0.03)
IMM GRANULOCYTES # BLD: 0.04 10*3/MM3 (ref 0–0.03)
IMM GRANULOCYTES # BLD: 0.05 10*3/MM3 (ref 0–0.03)
IMM GRANULOCYTES # BLD: 0.05 10*3/MM3 (ref 0–0.03)
IMM GRANULOCYTES # BLD: 0.06 10*3/MM3 (ref 0–0.03)
IMM GRANULOCYTES # BLD: 0.06 10*3/MM3 (ref 0–0.03)
IMM GRANULOCYTES # BLD: 0.08 10*3/MM3 (ref 0–0.03)
IMM GRANULOCYTES # BLD: 0.09 10*3/MM3 (ref 0–0.03)
IMM GRANULOCYTES # BLD: 0.1 10*3/MM3 (ref 0–0.03)
IMM GRANULOCYTES # BLD: 0.1 10*3/MM3 (ref 0–0.03)
IMM GRANULOCYTES # BLD: 0.12 10*3/MM3 (ref 0–0.03)
IMM GRANULOCYTES # BLD: 0.13 10*3/MM3 (ref 0–0.03)
IMM GRANULOCYTES # BLD: 0.13 10*3/MM3 (ref 0–0.03)
IMM GRANULOCYTES # BLD: 0.14 10*3/MM3 (ref 0–0.03)
IMM GRANULOCYTES # BLD: 0.15 10*3/MM3 (ref 0–0.03)
IMM GRANULOCYTES # BLD: 0.16 10*3/MM3 (ref 0–0.03)
IMM GRANULOCYTES # BLD: 0.2 10*3/MM3 (ref 0–0.03)
IMM GRANULOCYTES # BLD: 0.3 10*3/MM3 (ref 0–0.03)
IMM GRANULOCYTES NFR BLD: 0.1 % (ref 0–0.6)
IMM GRANULOCYTES NFR BLD: 0.2 % (ref 0–0.6)
IMM GRANULOCYTES NFR BLD: 0.3 % (ref 0–0.6)
IMM GRANULOCYTES NFR BLD: 0.4 % (ref 0–0.6)
IMM GRANULOCYTES NFR BLD: 0.5 % (ref 0–0.6)
IMM GRANULOCYTES NFR BLD: 0.5 % (ref 0–0.6)
IMM GRANULOCYTES NFR BLD: 0.7 % (ref 0–0.6)
IMM GRANULOCYTES NFR BLD: 0.8 % (ref 0–0.6)
IMM GRANULOCYTES NFR BLD: 0.9 % (ref 0–0.6)
IMM GRANULOCYTES NFR BLD: 1 % (ref 0–0.6)
IMM GRANULOCYTES NFR BLD: 1.1 % (ref 0–0.6)
IMM GRANULOCYTES NFR BLD: 1.3 % (ref 0–0.6)
IMM GRANULOCYTES NFR BLD: 1.7 % (ref 0–0.6)
INR PPP: 1.04
INR PPP: 1.1
INR PPP: 1.12
IRON 24H UR-MRATE: 83 MCG/DL (ref 50–175)
IRON 24H UR-MRATE: 95 MCG/DL (ref 50–175)
IRON SATN MFR SERPL: 30 % (ref 20–50)
IRON SATN MFR SERPL: 37 % (ref 20–50)
KETONES UR QL STRIP: NEGATIVE
LAB AP CASE REPORT: NORMAL
LAB AP CASE REPORT: NORMAL
LAB AP CLINICAL INFORMATION: NORMAL
LAB AP CLINICAL INFORMATION: NORMAL
LEFT ATRIUM VOLUME INDEX: 32 ML/M2
LEFT ATRIUM VOLUME INDEX: 51.2 ML/M2
LEUKOCYTE ESTERASE UR QL STRIP.AUTO: ABNORMAL
LEUKOCYTE ESTERASE UR QL STRIP.AUTO: NEGATIVE
LIPASE SERPL-CCNC: 30 U/L (ref 6–51)
LV EF 2D ECHO EST: 30 %
LV EF 2D ECHO EST: 30 %
LV EF 2D ECHO EST: 40 %
LV EF 2D ECHO EST: 54 %
LYMPHOCYTES # BLD AUTO: 0.32 10*3/MM3 (ref 0.6–4.8)
LYMPHOCYTES # BLD AUTO: 0.32 10*3/MM3 (ref 0.6–4.8)
LYMPHOCYTES # BLD AUTO: 0.34 10*3/MM3 (ref 0.6–4.8)
LYMPHOCYTES # BLD AUTO: 0.37 10*3/MM3 (ref 0.6–4.8)
LYMPHOCYTES # BLD AUTO: 0.4 10*3/MM3 (ref 0.6–4.8)
LYMPHOCYTES # BLD AUTO: 0.41 10*3/MM3 (ref 0.6–4.8)
LYMPHOCYTES # BLD AUTO: 0.59 10*3/MM3 (ref 0.6–4.8)
LYMPHOCYTES # BLD AUTO: 0.64 10*3/MM3 (ref 0.6–4.8)
LYMPHOCYTES # BLD AUTO: 0.68 10*3/MM3 (ref 0.6–4.8)
LYMPHOCYTES # BLD AUTO: 0.71 10*3/MM3 (ref 0.6–4.8)
LYMPHOCYTES # BLD AUTO: 0.79 10*3/MM3 (ref 0.6–4.8)
LYMPHOCYTES # BLD AUTO: 0.81 10*3/MM3 (ref 0.6–4.8)
LYMPHOCYTES # BLD AUTO: 0.87 10*3/MM3 (ref 0.6–4.8)
LYMPHOCYTES # BLD AUTO: 0.89 10*3/MM3 (ref 0.6–4.8)
LYMPHOCYTES # BLD AUTO: 0.9 10*3/MM3 (ref 0.6–4.8)
LYMPHOCYTES # BLD AUTO: 0.91 10*3/MM3 (ref 0.6–4.8)
LYMPHOCYTES # BLD AUTO: 0.96 10*3/MM3 (ref 0.6–4.8)
LYMPHOCYTES # BLD AUTO: 1.02 10*3/MM3 (ref 0.6–4.8)
LYMPHOCYTES # BLD AUTO: 1.02 10*3/MM3 (ref 0.6–4.8)
LYMPHOCYTES # BLD AUTO: 1.11 10*3/MM3 (ref 0.6–4.8)
LYMPHOCYTES # BLD AUTO: 1.12 10*3/MM3 (ref 0.6–4.8)
LYMPHOCYTES # BLD AUTO: 1.19 10*3/MM3 (ref 0.6–4.8)
LYMPHOCYTES # BLD AUTO: 1.36 10*3/MM3 (ref 0.6–4.8)
LYMPHOCYTES # BLD AUTO: 1.38 10*3/MM3 (ref 0.6–4.8)
LYMPHOCYTES # BLD AUTO: 1.46 10*3/MM3 (ref 0.6–4.8)
LYMPHOCYTES # BLD AUTO: 1.57 10*3/MM3 (ref 0.6–4.8)
LYMPHOCYTES # BLD AUTO: 1.63 10*3/MM3 (ref 0.6–4.8)
LYMPHOCYTES # BLD AUTO: 1.74 10*3/MM3 (ref 0.6–4.8)
LYMPHOCYTES # BLD AUTO: 1.74 10*3/MM3 (ref 0.6–4.8)
LYMPHOCYTES # BLD MANUAL: 0.97 10*3/MM3 (ref 0.6–4.8)
LYMPHOCYTES NFR BLD AUTO: 1.2 % (ref 24–44)
LYMPHOCYTES NFR BLD AUTO: 1.2 % (ref 24–44)
LYMPHOCYTES NFR BLD AUTO: 1.4 % (ref 24–44)
LYMPHOCYTES NFR BLD AUTO: 1.8 % (ref 24–44)
LYMPHOCYTES NFR BLD AUTO: 11 % (ref 24–44)
LYMPHOCYTES NFR BLD AUTO: 11.8 % (ref 24–44)
LYMPHOCYTES NFR BLD AUTO: 12.8 % (ref 24–44)
LYMPHOCYTES NFR BLD AUTO: 13.4 % (ref 24–44)
LYMPHOCYTES NFR BLD AUTO: 16.1 % (ref 24–44)
LYMPHOCYTES NFR BLD AUTO: 16.1 % (ref 24–44)
LYMPHOCYTES NFR BLD AUTO: 19.4 % (ref 24–44)
LYMPHOCYTES NFR BLD AUTO: 2.7 % (ref 24–44)
LYMPHOCYTES NFR BLD AUTO: 2.9 % (ref 24–44)
LYMPHOCYTES NFR BLD AUTO: 22.9 % (ref 24–44)
LYMPHOCYTES NFR BLD AUTO: 25.2 % (ref 24–44)
LYMPHOCYTES NFR BLD AUTO: 27.8 % (ref 24–44)
LYMPHOCYTES NFR BLD AUTO: 3.1 % (ref 24–44)
LYMPHOCYTES NFR BLD AUTO: 3.6 % (ref 24–44)
LYMPHOCYTES NFR BLD AUTO: 4.2 % (ref 24–44)
LYMPHOCYTES NFR BLD AUTO: 4.7 % (ref 24–44)
LYMPHOCYTES NFR BLD AUTO: 5.2 % (ref 24–44)
LYMPHOCYTES NFR BLD AUTO: 5.2 % (ref 24–44)
LYMPHOCYTES NFR BLD AUTO: 6.1 % (ref 24–44)
LYMPHOCYTES NFR BLD AUTO: 7.4 % (ref 24–44)
LYMPHOCYTES NFR BLD AUTO: 7.6 % (ref 24–44)
LYMPHOCYTES NFR BLD AUTO: 7.7 % (ref 24–44)
LYMPHOCYTES NFR BLD AUTO: 8.3 % (ref 24–44)
LYMPHOCYTES NFR BLD AUTO: 9.2 % (ref 24–44)
LYMPHOCYTES NFR BLD AUTO: 9.5 % (ref 24–44)
LYMPHOCYTES NFR BLD MANUAL: 16 % (ref 0–12)
LYMPHOCYTES NFR BLD MANUAL: 20 % (ref 24–44)
Lab: NORMAL
Lab: NORMAL
MAGNESIUM SERPL-MCNC: 1.7 MG/DL (ref 1.3–2.7)
MAGNESIUM SERPL-MCNC: 1.8 MG/DL (ref 1.3–2.7)
MAGNESIUM SERPL-MCNC: 1.9 MG/DL (ref 1.3–2.7)
MAGNESIUM SERPL-MCNC: 1.9 MG/DL (ref 1.3–2.7)
MAGNESIUM SERPL-MCNC: 2 MG/DL (ref 1.3–2.7)
MAGNESIUM SERPL-MCNC: 2.1 MG/DL (ref 1.3–2.7)
MAGNESIUM SERPL-MCNC: 2.2 MG/DL (ref 1.3–2.7)
MAGNESIUM SERPL-MCNC: 2.3 MG/DL (ref 1.3–2.7)
MAGNESIUM SERPL-MCNC: 2.6 MG/DL (ref 1.3–2.7)
MAXIMAL PREDICTED HEART RATE: 148 BPM
MCH RBC QN AUTO: 28.1 PG (ref 27–31)
MCH RBC QN AUTO: 28.4 PG (ref 27–31)
MCH RBC QN AUTO: 28.7 PG (ref 27–31)
MCH RBC QN AUTO: 28.8 PG (ref 27–31)
MCH RBC QN AUTO: 28.9 PG (ref 27–31)
MCH RBC QN AUTO: 29 PG (ref 27–31)
MCH RBC QN AUTO: 29.1 PG (ref 27–31)
MCH RBC QN AUTO: 29.2 PG (ref 27–31)
MCH RBC QN AUTO: 29.3 PG (ref 27–31)
MCH RBC QN AUTO: 29.4 PG (ref 27–31)
MCH RBC QN AUTO: 29.5 PG (ref 27–31)
MCH RBC QN AUTO: 29.6 PG (ref 27–31)
MCH RBC QN AUTO: 29.7 PG (ref 27–31)
MCH RBC QN AUTO: 29.7 PG (ref 27–31)
MCH RBC QN AUTO: 29.8 PG (ref 27–31)
MCH RBC QN AUTO: 29.9 PG (ref 27–31)
MCH RBC QN AUTO: 30 PG (ref 27–31)
MCH RBC QN AUTO: 30.1 PG (ref 27–31)
MCH RBC QN AUTO: 30.2 PG (ref 27–31)
MCH RBC QN AUTO: 30.3 PG (ref 27–31)
MCH RBC QN AUTO: 30.3 PG (ref 27–31)
MCH RBC QN AUTO: 30.6 PG (ref 27–31)
MCH RBC QN AUTO: 30.6 PG (ref 27–31)
MCH RBC QN AUTO: 30.7 PG (ref 27–31)
MCH RBC QN AUTO: 30.8 PG (ref 27–31)
MCH RBC QN AUTO: 31 PG (ref 27–31)
MCHC RBC AUTO-ENTMCNC: 29.2 G/DL (ref 32–36)
MCHC RBC AUTO-ENTMCNC: 29.9 G/DL (ref 32–36)
MCHC RBC AUTO-ENTMCNC: 30 G/DL (ref 32–36)
MCHC RBC AUTO-ENTMCNC: 30.5 G/DL (ref 32–36)
MCHC RBC AUTO-ENTMCNC: 30.7 G/DL (ref 32–36)
MCHC RBC AUTO-ENTMCNC: 30.7 G/DL (ref 32–36)
MCHC RBC AUTO-ENTMCNC: 30.8 G/DL (ref 32–36)
MCHC RBC AUTO-ENTMCNC: 30.8 G/DL (ref 32–36)
MCHC RBC AUTO-ENTMCNC: 30.9 G/DL (ref 32–36)
MCHC RBC AUTO-ENTMCNC: 30.9 G/DL (ref 32–36)
MCHC RBC AUTO-ENTMCNC: 31 G/DL (ref 32–36)
MCHC RBC AUTO-ENTMCNC: 31 G/DL (ref 32–36)
MCHC RBC AUTO-ENTMCNC: 31.1 G/DL (ref 32–36)
MCHC RBC AUTO-ENTMCNC: 31.1 G/DL (ref 32–36)
MCHC RBC AUTO-ENTMCNC: 31.3 G/DL (ref 32–36)
MCHC RBC AUTO-ENTMCNC: 31.4 G/DL (ref 32–36)
MCHC RBC AUTO-ENTMCNC: 31.5 G/DL (ref 32–36)
MCHC RBC AUTO-ENTMCNC: 31.5 G/DL (ref 32–36)
MCHC RBC AUTO-ENTMCNC: 31.6 G/DL (ref 32–36)
MCHC RBC AUTO-ENTMCNC: 31.7 G/DL (ref 32–36)
MCHC RBC AUTO-ENTMCNC: 31.7 G/DL (ref 32–36)
MCHC RBC AUTO-ENTMCNC: 31.9 G/DL (ref 32–36)
MCHC RBC AUTO-ENTMCNC: 32 G/DL (ref 32–36)
MCHC RBC AUTO-ENTMCNC: 32 G/DL (ref 32–36)
MCHC RBC AUTO-ENTMCNC: 32.1 G/DL (ref 32–36)
MCHC RBC AUTO-ENTMCNC: 32.3 G/DL (ref 32–36)
MCHC RBC AUTO-ENTMCNC: 32.4 G/DL (ref 32–36)
MCHC RBC AUTO-ENTMCNC: 32.5 G/DL (ref 32–36)
MCHC RBC AUTO-ENTMCNC: 32.8 G/DL (ref 32–36)
MCHC RBC AUTO-ENTMCNC: 33.1 G/DL (ref 32–36)
MCHC RBC AUTO-ENTMCNC: 33.6 G/DL (ref 32–36)
MCV RBC AUTO: 88.8 FL (ref 80–99)
MCV RBC AUTO: 88.9 FL (ref 80–99)
MCV RBC AUTO: 90.2 FL (ref 80–99)
MCV RBC AUTO: 90.3 FL (ref 80–99)
MCV RBC AUTO: 90.8 FL (ref 80–99)
MCV RBC AUTO: 91.2 FL (ref 80–99)
MCV RBC AUTO: 91.3 FL (ref 80–99)
MCV RBC AUTO: 91.5 FL (ref 80–99)
MCV RBC AUTO: 92.3 FL (ref 80–99)
MCV RBC AUTO: 92.4 FL (ref 80–99)
MCV RBC AUTO: 92.5 FL (ref 80–99)
MCV RBC AUTO: 92.6 FL (ref 80–99)
MCV RBC AUTO: 92.6 FL (ref 80–99)
MCV RBC AUTO: 92.7 FL (ref 80–99)
MCV RBC AUTO: 92.8 FL (ref 80–99)
MCV RBC AUTO: 93.1 FL (ref 80–99)
MCV RBC AUTO: 93.2 FL (ref 80–99)
MCV RBC AUTO: 93.3 FL (ref 80–99)
MCV RBC AUTO: 93.3 FL (ref 80–99)
MCV RBC AUTO: 93.4 FL (ref 80–99)
MCV RBC AUTO: 93.5 FL (ref 80–99)
MCV RBC AUTO: 93.6 FL (ref 80–99)
MCV RBC AUTO: 93.8 FL (ref 80–99)
MCV RBC AUTO: 93.8 FL (ref 80–99)
MCV RBC AUTO: 93.9 FL (ref 80–99)
MCV RBC AUTO: 94 FL (ref 80–99)
MCV RBC AUTO: 94.1 FL (ref 80–99)
MCV RBC AUTO: 94.5 FL (ref 80–99)
MCV RBC AUTO: 94.7 FL (ref 80–99)
MCV RBC AUTO: 94.9 FL (ref 80–99)
MCV RBC AUTO: 94.9 FL (ref 80–99)
MCV RBC AUTO: 95.3 FL (ref 80–99)
MCV RBC AUTO: 95.4 FL (ref 80–99)
MCV RBC AUTO: 96.1 FL (ref 80–99)
MCV RBC AUTO: 96.4 FL (ref 80–99)
MCV RBC AUTO: 96.6 FL (ref 80–99)
MCV RBC AUTO: 97.1 FL (ref 80–99)
MCV RBC AUTO: 97.2 FL (ref 80–99)
MCV RBC AUTO: 98.6 FL (ref 80–99)
MCV RBC AUTO: 99.7 FL (ref 80–99)
MONOCYTES # BLD AUTO: 0.12 10*3/MM3 (ref 0–1)
MONOCYTES # BLD AUTO: 0.5 10*3/MM3 (ref 0–1)
MONOCYTES # BLD AUTO: 0.51 10*3/MM3 (ref 0–1)
MONOCYTES # BLD AUTO: 0.55 10*3/MM3 (ref 0–1)
MONOCYTES # BLD AUTO: 0.57 10*3/MM3 (ref 0–1)
MONOCYTES # BLD AUTO: 0.59 10*3/MM3 (ref 0–1)
MONOCYTES # BLD AUTO: 0.7 10*3/MM3 (ref 0–1)
MONOCYTES # BLD AUTO: 0.71 10*3/MM3 (ref 0–1)
MONOCYTES # BLD AUTO: 0.71 10*3/MM3 (ref 0–1)
MONOCYTES # BLD AUTO: 0.75 10*3/MM3 (ref 0–1)
MONOCYTES # BLD AUTO: 0.75 10*3/MM3 (ref 0–1)
MONOCYTES # BLD AUTO: 0.78 10*3/MM3 (ref 0–1)
MONOCYTES # BLD AUTO: 0.84 10*3/MM3 (ref 0–1)
MONOCYTES # BLD AUTO: 0.87 10*3/MM3 (ref 0–1)
MONOCYTES # BLD AUTO: 0.93 10*3/MM3 (ref 0–1)
MONOCYTES # BLD AUTO: 0.94 10*3/MM3 (ref 0–1)
MONOCYTES # BLD AUTO: 0.95 10*3/MM3 (ref 0–1)
MONOCYTES # BLD AUTO: 0.98 10*3/MM3 (ref 0–1)
MONOCYTES # BLD AUTO: 1.03 10*3/MM3 (ref 0–1)
MONOCYTES # BLD AUTO: 1.05 10*3/MM3 (ref 0–1)
MONOCYTES # BLD AUTO: 1.16 10*3/MM3 (ref 0–1)
MONOCYTES # BLD AUTO: 1.16 10*3/MM3 (ref 0–1)
MONOCYTES # BLD AUTO: 1.17 10*3/MM3 (ref 0–1)
MONOCYTES # BLD AUTO: 1.21 10*3/MM3 (ref 0–1)
MONOCYTES # BLD AUTO: 1.31 10*3/MM3 (ref 0–1)
MONOCYTES # BLD AUTO: 1.36 10*3/MM3 (ref 0–1)
MONOCYTES # BLD AUTO: 1.5 10*3/MM3 (ref 0–1)
MONOCYTES # BLD AUTO: 1.89 10*3/MM3 (ref 0–1)
MONOCYTES NFR BLD AUTO: 0.5 % (ref 0–12)
MONOCYTES NFR BLD AUTO: 11.3 % (ref 0–12)
MONOCYTES NFR BLD AUTO: 12.1 % (ref 0–12)
MONOCYTES NFR BLD AUTO: 13.8 % (ref 0–12)
MONOCYTES NFR BLD AUTO: 14.7 % (ref 0–12)
MONOCYTES NFR BLD AUTO: 14.9 % (ref 0–12)
MONOCYTES NFR BLD AUTO: 18 % (ref 0–12)
MONOCYTES NFR BLD AUTO: 2.6 % (ref 0–12)
MONOCYTES NFR BLD AUTO: 22.1 % (ref 0–12)
MONOCYTES NFR BLD AUTO: 3.7 % (ref 0–12)
MONOCYTES NFR BLD AUTO: 3.8 % (ref 0–12)
MONOCYTES NFR BLD AUTO: 4 % (ref 0–12)
MONOCYTES NFR BLD AUTO: 4.3 % (ref 0–12)
MONOCYTES NFR BLD AUTO: 4.3 % (ref 0–12)
MONOCYTES NFR BLD AUTO: 4.5 % (ref 0–12)
MONOCYTES NFR BLD AUTO: 4.7 % (ref 0–12)
MONOCYTES NFR BLD AUTO: 5 % (ref 0–12)
MONOCYTES NFR BLD AUTO: 6 % (ref 0–12)
MONOCYTES NFR BLD AUTO: 7.3 % (ref 0–12)
MONOCYTES NFR BLD AUTO: 7.7 % (ref 0–12)
MONOCYTES NFR BLD AUTO: 7.9 % (ref 0–12)
MONOCYTES NFR BLD AUTO: 8 % (ref 0–12)
MONOCYTES NFR BLD AUTO: 8.9 % (ref 0–12)
MONOCYTES NFR BLD AUTO: 8.9 % (ref 0–12)
MONOCYTES NFR BLD AUTO: 9.3 % (ref 0–12)
MONOCYTES NFR BLD AUTO: 9.9 % (ref 0–12)
MONOCYTES NFR BLD AUTO: 9.9 % (ref 0–12)
MUCOUS THREADS URNS QL MICRO: ABNORMAL /HPF
NEUTROPHILS # BLD AUTO: 10 10*3/MM3 (ref 1.5–8.3)
NEUTROPHILS # BLD AUTO: 10.33 10*3/MM3 (ref 1.5–8.3)
NEUTROPHILS # BLD AUTO: 10.47 10*3/MM3 (ref 1.5–8.3)
NEUTROPHILS # BLD AUTO: 11.21 10*3/MM3 (ref 1.5–8.3)
NEUTROPHILS # BLD AUTO: 11.47 10*3/MM3 (ref 1.5–8.3)
NEUTROPHILS # BLD AUTO: 11.64 10*3/MM3 (ref 1.5–8.3)
NEUTROPHILS # BLD AUTO: 13.61 10*3/MM3 (ref 1.5–8.3)
NEUTROPHILS # BLD AUTO: 13.79 10*3/MM3 (ref 1.5–8.3)
NEUTROPHILS # BLD AUTO: 14.08 10*3/MM3 (ref 1.5–8.3)
NEUTROPHILS # BLD AUTO: 18.95 10*3/MM3 (ref 1.5–8.3)
NEUTROPHILS # BLD AUTO: 2.26 10*3/MM3 (ref 1.5–8.3)
NEUTROPHILS # BLD AUTO: 2.79 10*3/MM3 (ref 1.5–8.3)
NEUTROPHILS # BLD AUTO: 21.44 10*3/MM3 (ref 1.5–8.3)
NEUTROPHILS # BLD AUTO: 25.13 10*3/MM3 (ref 1.5–8.3)
NEUTROPHILS # BLD AUTO: 26.88 10*3/MM3 (ref 1.5–8.3)
NEUTROPHILS # BLD AUTO: 3.02 10*3/MM3 (ref 1.5–8.3)
NEUTROPHILS # BLD AUTO: 3.23 10*3/MM3 (ref 1.5–8.3)
NEUTROPHILS # BLD AUTO: 31.99 10*3/MM3 (ref 1.5–8.3)
NEUTROPHILS # BLD AUTO: 37.62 10*3/MM3 (ref 1.5–8.3)
NEUTROPHILS # BLD AUTO: 4.45 10*3/MM3 (ref 1.5–8.3)
NEUTROPHILS # BLD AUTO: 4.51 10*3/MM3 (ref 1.5–8.3)
NEUTROPHILS # BLD AUTO: 5.99 10*3/MM3 (ref 1.5–8.3)
NEUTROPHILS # BLD AUTO: 7.03 10*3/MM3 (ref 1.5–8.3)
NEUTROPHILS # BLD AUTO: 7.03 10*3/MM3 (ref 1.5–8.3)
NEUTROPHILS # BLD AUTO: 8.13 10*3/MM3 (ref 1.5–8.3)
NEUTROPHILS # BLD AUTO: 8.27 10*3/MM3 (ref 1.5–8.3)
NEUTROPHILS # BLD AUTO: 8.28 10*3/MM3 (ref 1.5–8.3)
NEUTROPHILS # BLD AUTO: 8.83 10*3/MM3 (ref 1.5–8.3)
NEUTROPHILS # BLD AUTO: 9.44 10*3/MM3 (ref 1.5–8.3)
NEUTROPHILS # BLD AUTO: 9.91 10*3/MM3 (ref 1.5–8.3)
NEUTROPHILS NFR BLD AUTO: 42.9 % (ref 41–71)
NEUTROPHILS NFR BLD AUTO: 50 % (ref 41–71)
NEUTROPHILS NFR BLD AUTO: 58.4 % (ref 41–71)
NEUTROPHILS NFR BLD AUTO: 59.6 % (ref 41–71)
NEUTROPHILS NFR BLD AUTO: 65.6 % (ref 41–71)
NEUTROPHILS NFR BLD AUTO: 69.1 % (ref 41–71)
NEUTROPHILS NFR BLD AUTO: 72.3 % (ref 41–71)
NEUTROPHILS NFR BLD AUTO: 75.2 % (ref 41–71)
NEUTROPHILS NFR BLD AUTO: 75.2 % (ref 41–71)
NEUTROPHILS NFR BLD AUTO: 75.5 % (ref 41–71)
NEUTROPHILS NFR BLD AUTO: 77.1 % (ref 41–71)
NEUTROPHILS NFR BLD AUTO: 79.2 % (ref 41–71)
NEUTROPHILS NFR BLD AUTO: 80.3 % (ref 41–71)
NEUTROPHILS NFR BLD AUTO: 81.2 % (ref 41–71)
NEUTROPHILS NFR BLD AUTO: 81.8 % (ref 41–71)
NEUTROPHILS NFR BLD AUTO: 82.5 % (ref 41–71)
NEUTROPHILS NFR BLD AUTO: 86.2 % (ref 41–71)
NEUTROPHILS NFR BLD AUTO: 89 % (ref 41–71)
NEUTROPHILS NFR BLD AUTO: 90.2 % (ref 41–71)
NEUTROPHILS NFR BLD AUTO: 90.5 % (ref 41–71)
NEUTROPHILS NFR BLD AUTO: 90.5 % (ref 41–71)
NEUTROPHILS NFR BLD AUTO: 91 % (ref 41–71)
NEUTROPHILS NFR BLD AUTO: 91.4 % (ref 41–71)
NEUTROPHILS NFR BLD AUTO: 92 % (ref 41–71)
NEUTROPHILS NFR BLD AUTO: 92.7 % (ref 41–71)
NEUTROPHILS NFR BLD AUTO: 93.6 % (ref 41–71)
NEUTROPHILS NFR BLD AUTO: 94.5 % (ref 41–71)
NEUTROPHILS NFR BLD AUTO: 95.1 % (ref 41–71)
NEUTROPHILS NFR BLD AUTO: 97.9 % (ref 41–71)
NEUTROPHILS NFR BLD MANUAL: 53 % (ref 41–71)
NEUTS BAND NFR BLD MANUAL: 9 % (ref 0–5)
NITRITE UR QL STRIP: NEGATIVE
PATH REPORT.FINAL DX SPEC: NORMAL
PATH REPORT.FINAL DX SPEC: NORMAL
PATH REPORT.GROSS SPEC: NORMAL
PATH REPORT.GROSS SPEC: NORMAL
PH UR STRIP.AUTO: 5.5 [PH] (ref 5–8)
PH UR STRIP.AUTO: 6.5 [PH] (ref 5–8)
PH UR STRIP.AUTO: <=5 [PH] (ref 5–8)
PH UR STRIP.AUTO: <=5 [PH] (ref 5–8)
PHOSPHATE SERPL-MCNC: 1 MG/DL (ref 2.4–5.1)
PHOSPHATE SERPL-MCNC: 1.2 MG/DL (ref 2.4–5.1)
PHOSPHATE SERPL-MCNC: 1.9 MG/DL (ref 2.4–5.1)
PHOSPHATE SERPL-MCNC: 2 MG/DL (ref 2.4–5.1)
PHOSPHATE SERPL-MCNC: 2.1 MG/DL (ref 2.4–5.1)
PHOSPHATE SERPL-MCNC: 2.2 MG/DL (ref 2.4–5.1)
PHOSPHATE SERPL-MCNC: 2.2 MG/DL (ref 2.4–5.1)
PHOSPHATE SERPL-MCNC: 2.6 MG/DL (ref 2.4–5.1)
PHOSPHATE SERPL-MCNC: 3.1 MG/DL (ref 2.4–5.1)
PHOSPHATE SERPL-MCNC: 3.2 MG/DL (ref 2.4–5.1)
PHOSPHATE SERPL-MCNC: 3.6 MG/DL (ref 2.4–5.1)
PHOSPHATE SERPL-MCNC: 4.4 MG/DL (ref 2.4–5.1)
PHOSPHATE SERPL-MCNC: 5 MG/DL (ref 2.4–5.1)
PHOSPHATE SERPL-MCNC: 5.2 MG/DL (ref 2.4–5.1)
PLAT MORPH BLD: NORMAL
PLATELET # BLD AUTO: 128 10*3/MM3 (ref 150–450)
PLATELET # BLD AUTO: 132 10*3/MM3 (ref 150–450)
PLATELET # BLD AUTO: 135 10*3/MM3 (ref 150–450)
PLATELET # BLD AUTO: 142 10*3/MM3 (ref 150–450)
PLATELET # BLD AUTO: 147 10*3/MM3 (ref 150–450)
PLATELET # BLD AUTO: 151 10*3/MM3 (ref 150–450)
PLATELET # BLD AUTO: 153 10*3/MM3 (ref 150–450)
PLATELET # BLD AUTO: 155 10*3/MM3 (ref 150–450)
PLATELET # BLD AUTO: 157 10*3/MM3 (ref 150–450)
PLATELET # BLD AUTO: 167 10*3/MM3 (ref 150–450)
PLATELET # BLD AUTO: 174 10*3/MM3 (ref 150–450)
PLATELET # BLD AUTO: 175 10*3/MM3 (ref 150–450)
PLATELET # BLD AUTO: 176 10*3/MM3 (ref 150–450)
PLATELET # BLD AUTO: 184 10*3/MM3 (ref 150–450)
PLATELET # BLD AUTO: 194 10*3/MM3 (ref 150–450)
PLATELET # BLD AUTO: 198 10*3/MM3 (ref 150–450)
PLATELET # BLD AUTO: 210 10*3/MM3 (ref 150–450)
PLATELET # BLD AUTO: 217 10*3/MM3 (ref 150–450)
PLATELET # BLD AUTO: 224 10*3/MM3 (ref 150–450)
PLATELET # BLD AUTO: 225 10*3/MM3 (ref 150–450)
PLATELET # BLD AUTO: 227 10*3/MM3 (ref 150–450)
PLATELET # BLD AUTO: 236 10*3/MM3 (ref 150–450)
PLATELET # BLD AUTO: 241 10*3/MM3 (ref 150–450)
PLATELET # BLD AUTO: 255 10*3/MM3 (ref 150–450)
PLATELET # BLD AUTO: 256 10*3/MM3 (ref 150–450)
PLATELET # BLD AUTO: 260 10*3/MM3 (ref 150–450)
PLATELET # BLD AUTO: 263 10*3/MM3 (ref 150–450)
PLATELET # BLD AUTO: 268 10*3/MM3 (ref 150–450)
PLATELET # BLD AUTO: 284 10*3/MM3 (ref 150–450)
PLATELET # BLD AUTO: 285 10*3/MM3 (ref 150–450)
PLATELET # BLD AUTO: 287 10*3/MM3 (ref 150–450)
PLATELET # BLD AUTO: 291 10*3/MM3 (ref 150–450)
PLATELET # BLD AUTO: 294 10*3/MM3 (ref 150–450)
PLATELET # BLD AUTO: 304 10*3/MM3 (ref 150–450)
PLATELET # BLD AUTO: 304 10*3/MM3 (ref 150–450)
PLATELET # BLD AUTO: 311 10*3/MM3 (ref 150–450)
PLATELET # BLD AUTO: 313 10*3/MM3 (ref 150–450)
PLATELET # BLD AUTO: 313 10*3/MM3 (ref 150–450)
PLATELET # BLD AUTO: 332 10*3/MM3 (ref 150–450)
PLATELET # BLD AUTO: 341 10*3/MM3 (ref 150–450)
PLATELET # BLD AUTO: 368 10*3/MM3 (ref 150–450)
PLATELET # BLD AUTO: 378 10*3/MM3 (ref 150–450)
PMV BLD AUTO: 10.1 FL (ref 6–12)
PMV BLD AUTO: 10.4 FL (ref 6–12)
PMV BLD AUTO: 10.5 FL (ref 6–12)
PMV BLD AUTO: 10.6 FL (ref 6–12)
PMV BLD AUTO: 10.7 FL (ref 6–12)
PMV BLD AUTO: 10.8 FL (ref 6–12)
PMV BLD AUTO: 10.9 FL (ref 6–12)
PMV BLD AUTO: 11 FL (ref 6–12)
PMV BLD AUTO: 11.1 FL (ref 6–12)
PMV BLD AUTO: 11.1 FL (ref 6–12)
PMV BLD AUTO: 11.3 FL (ref 6–12)
PMV BLD AUTO: 11.4 FL (ref 6–12)
PMV BLD AUTO: 11.4 FL (ref 6–12)
PMV BLD AUTO: 11.6 FL (ref 6–12)
PMV BLD AUTO: 11.9 FL (ref 6–12)
POTASSIUM BLD-SCNC: 3.2 MMOL/L (ref 3.5–5.5)
POTASSIUM BLD-SCNC: 3.3 MMOL/L (ref 3.5–5.5)
POTASSIUM BLD-SCNC: 3.4 MMOL/L (ref 3.5–5.5)
POTASSIUM BLD-SCNC: 3.5 MMOL/L (ref 3.5–5.5)
POTASSIUM BLD-SCNC: 3.6 MMOL/L (ref 3.5–5.5)
POTASSIUM BLD-SCNC: 3.7 MMOL/L (ref 3.5–5.5)
POTASSIUM BLD-SCNC: 3.8 MMOL/L (ref 3.5–5.5)
POTASSIUM BLD-SCNC: 3.8 MMOL/L (ref 3.5–5.5)
POTASSIUM BLD-SCNC: 3.9 MMOL/L (ref 3.5–5.5)
POTASSIUM BLD-SCNC: 3.9 MMOL/L (ref 3.5–5.5)
POTASSIUM BLD-SCNC: 4 MMOL/L (ref 3.5–5.5)
POTASSIUM BLD-SCNC: 4.2 MMOL/L (ref 3.5–5.5)
POTASSIUM BLD-SCNC: 4.3 MMOL/L (ref 3.5–5.5)
POTASSIUM BLD-SCNC: 4.4 MMOL/L (ref 3.5–5.5)
POTASSIUM BLD-SCNC: 4.5 MMOL/L (ref 3.5–5.5)
POTASSIUM BLD-SCNC: 4.6 MMOL/L (ref 3.5–5.5)
POTASSIUM BLD-SCNC: 4.8 MMOL/L (ref 3.5–5.5)
POTASSIUM BLD-SCNC: 4.8 MMOL/L (ref 3.5–5.5)
POTASSIUM BLD-SCNC: 4.9 MMOL/L (ref 3.5–5.5)
POTASSIUM BLD-SCNC: 5 MMOL/L (ref 3.5–5.5)
POTASSIUM BLD-SCNC: 5 MMOL/L (ref 3.5–5.5)
POTASSIUM BLD-SCNC: 5.1 MMOL/L (ref 3.5–5.5)
POTASSIUM BLD-SCNC: 5.1 MMOL/L (ref 3.5–5.5)
POTASSIUM BLD-SCNC: 5.2 MMOL/L (ref 3.5–5.5)
POTASSIUM BLD-SCNC: 5.2 MMOL/L (ref 3.5–5.5)
POTASSIUM BLD-SCNC: 5.3 MMOL/L (ref 3.5–5.5)
POTASSIUM BLD-SCNC: 5.3 MMOL/L (ref 3.5–5.5)
POTASSIUM BLD-SCNC: 5.4 MMOL/L (ref 3.5–5.5)
POTASSIUM BLD-SCNC: 5.4 MMOL/L (ref 3.5–5.5)
PREALB SERPL-MCNC: 26.1 MG/DL (ref 10–40)
PREALB SERPL-MCNC: 9.5 MG/DL (ref 10–40)
PREALB SERPL-MCNC: <5 MG/DL (ref 10–40)
PROT SERPL-MCNC: 5.3 G/DL (ref 5.7–8.2)
PROT SERPL-MCNC: 6 G/DL (ref 5.7–8.2)
PROT SERPL-MCNC: 6.1 G/DL (ref 5.7–8.2)
PROT SERPL-MCNC: 6.2 G/DL (ref 5.7–8.2)
PROT SERPL-MCNC: 6.6 G/DL (ref 5.7–8.2)
PROT SERPL-MCNC: 6.6 G/DL (ref 5.7–8.2)
PROT SERPL-MCNC: 6.7 G/DL (ref 5.7–8.2)
PROT SERPL-MCNC: 6.7 G/DL (ref 5.7–8.2)
PROT SERPL-MCNC: 6.8 G/DL (ref 5.7–8.2)
PROT SERPL-MCNC: 7.1 G/DL (ref 5.7–8.2)
PROT SERPL-MCNC: 7.2 G/DL (ref 5.7–8.2)
PROT SERPL-MCNC: 7.2 G/DL (ref 5.7–8.2)
PROT SERPL-MCNC: 7.3 G/DL (ref 5.7–8.2)
PROT SERPL-MCNC: 7.4 G/DL (ref 5.7–8.2)
PROT SERPL-MCNC: 7.5 G/DL (ref 5.7–8.2)
PROT SERPL-MCNC: 7.6 G/DL (ref 5.7–8.2)
PROT SERPL-MCNC: 7.6 G/DL (ref 5.7–8.2)
PROT SERPL-MCNC: 7.7 G/DL (ref 5.7–8.2)
PROT SERPL-MCNC: 7.8 G/DL (ref 5.7–8.2)
PROT SERPL-MCNC: 8 G/DL (ref 5.7–8.2)
PROT SERPL-MCNC: 8.4 G/DL (ref 5.7–8.2)
PROT UR QL STRIP: ABNORMAL
PROTHROMBIN TIME: 11.3 SECONDS (ref 9.6–11.5)
PROTHROMBIN TIME: 11.3 SECONDS (ref 9.6–11.5)
PROTHROMBIN TIME: 11.4 SECONDS (ref 9.6–11.5)
PROTHROMBIN TIME: 12 SECONDS (ref 9.6–11.5)
PROTHROMBIN TIME: 12.3 SECONDS (ref 9.6–11.5)
RBC # BLD AUTO: 2.49 10*6/MM3 (ref 4.2–5.76)
RBC # BLD AUTO: 2.77 10*6/MM3 (ref 4.2–5.76)
RBC # BLD AUTO: 2.85 10*6/MM3 (ref 4.2–5.76)
RBC # BLD AUTO: 2.87 10*6/MM3 (ref 4.2–5.76)
RBC # BLD AUTO: 2.9 10*6/MM3 (ref 4.2–5.76)
RBC # BLD AUTO: 2.97 10*6/MM3 (ref 4.2–5.76)
RBC # BLD AUTO: 2.99 10*6/MM3 (ref 4.2–5.76)
RBC # BLD AUTO: 3 10*6/MM3 (ref 4.2–5.76)
RBC # BLD AUTO: 3.02 10*6/MM3 (ref 4.2–5.76)
RBC # BLD AUTO: 3.06 10*6/MM3 (ref 4.2–5.76)
RBC # BLD AUTO: 3.07 10*6/MM3 (ref 4.2–5.76)
RBC # BLD AUTO: 3.15 10*6/MM3 (ref 4.2–5.76)
RBC # BLD AUTO: 3.25 10*6/MM3 (ref 4.2–5.76)
RBC # BLD AUTO: 3.26 10*6/MM3 (ref 4.2–5.76)
RBC # BLD AUTO: 3.26 10*6/MM3 (ref 4.2–5.76)
RBC # BLD AUTO: 3.28 10*6/MM3 (ref 4.2–5.76)
RBC # BLD AUTO: 3.3 10*6/MM3 (ref 4.2–5.76)
RBC # BLD AUTO: 3.38 10*6/MM3 (ref 4.2–5.76)
RBC # BLD AUTO: 3.43 10*6/MM3 (ref 4.2–5.76)
RBC # BLD AUTO: 3.51 10*6/MM3 (ref 4.2–5.76)
RBC # BLD AUTO: 3.52 10*6/MM3 (ref 4.2–5.76)
RBC # BLD AUTO: 3.53 10*6/MM3 (ref 4.2–5.76)
RBC # BLD AUTO: 3.56 10*6/MM3 (ref 4.2–5.76)
RBC # BLD AUTO: 3.57 10*6/MM3 (ref 4.2–5.76)
RBC # BLD AUTO: 3.59 10*6/MM3 (ref 4.2–5.76)
RBC # BLD AUTO: 3.59 10*6/MM3 (ref 4.2–5.76)
RBC # BLD AUTO: 3.6 10*6/MM3 (ref 4.2–5.76)
RBC # BLD AUTO: 3.6 10*6/MM3 (ref 4.2–5.76)
RBC # BLD AUTO: 3.63 10*6/MM3 (ref 4.2–5.76)
RBC # BLD AUTO: 3.64 10*6/MM3 (ref 4.2–5.76)
RBC # BLD AUTO: 3.66 10*6/MM3 (ref 4.2–5.76)
RBC # BLD AUTO: 3.73 10*6/MM3 (ref 4.2–5.76)
RBC # BLD AUTO: 3.77 10*6/MM3 (ref 4.2–5.76)
RBC # BLD AUTO: 3.88 10*6/MM3 (ref 4.2–5.76)
RBC # BLD AUTO: 3.93 10*6/MM3 (ref 4.2–5.76)
RBC # BLD AUTO: 3.95 10*6/MM3 (ref 4.2–5.76)
RBC # BLD AUTO: 3.96 10*6/MM3 (ref 4.2–5.76)
RBC # BLD AUTO: 3.98 10*6/MM3 (ref 4.2–5.76)
RBC # BLD AUTO: 4 10*6/MM3 (ref 4.2–5.76)
RBC # BLD AUTO: 4.02 10*6/MM3 (ref 4.2–5.76)
RBC # BLD AUTO: 4.06 10*6/MM3 (ref 4.2–5.76)
RBC # BLD AUTO: 4.07 10*6/MM3 (ref 4.2–5.76)
RBC # BLD AUTO: 4.09 10*6/MM3 (ref 4.2–5.76)
RBC # BLD AUTO: 4.32 10*6/MM3 (ref 4.2–5.76)
RBC # UR: ABNORMAL /HPF
RBC MORPH BLD: NORMAL
REF LAB TEST METHOD: ABNORMAL
RH BLD: NEGATIVE
SCAN SLIDE: NORMAL
SODIUM BLD-SCNC: 131 MMOL/L (ref 132–146)
SODIUM BLD-SCNC: 132 MMOL/L (ref 132–146)
SODIUM BLD-SCNC: 133 MMOL/L (ref 132–146)
SODIUM BLD-SCNC: 133 MMOL/L (ref 132–146)
SODIUM BLD-SCNC: 134 MMOL/L (ref 132–146)
SODIUM BLD-SCNC: 135 MMOL/L (ref 132–146)
SODIUM BLD-SCNC: 135 MMOL/L (ref 132–146)
SODIUM BLD-SCNC: 136 MMOL/L (ref 132–146)
SODIUM BLD-SCNC: 137 MMOL/L (ref 132–146)
SODIUM BLD-SCNC: 138 MMOL/L (ref 132–146)
SODIUM BLD-SCNC: 139 MMOL/L (ref 132–146)
SODIUM BLD-SCNC: 139 MMOL/L (ref 132–146)
SODIUM BLD-SCNC: 140 MMOL/L (ref 132–146)
SODIUM BLD-SCNC: 141 MMOL/L (ref 132–146)
SODIUM BLD-SCNC: 142 MMOL/L (ref 132–146)
SODIUM BLD-SCNC: 142 MMOL/L (ref 132–146)
SODIUM BLD-SCNC: 143 MMOL/L (ref 132–146)
SODIUM BLD-SCNC: 144 MMOL/L (ref 132–146)
SODIUM BLD-SCNC: 144 MMOL/L (ref 132–146)
SODIUM BLD-SCNC: 145 MMOL/L (ref 132–146)
SODIUM BLD-SCNC: 146 MMOL/L (ref 132–146)
SODIUM BLD-SCNC: 147 MMOL/L (ref 132–146)
SODIUM BLD-SCNC: 148 MMOL/L (ref 132–146)
SODIUM BLD-SCNC: 149 MMOL/L (ref 132–146)
SODIUM BLD-SCNC: 152 MMOL/L (ref 132–146)
SP GR UR STRIP: 1.01 (ref 1–1.03)
SP GR UR STRIP: 1.02 (ref 1–1.03)
SQUAMOUS #/AREA URNS HPF: ABNORMAL /HPF
STRESS TARGET HR: 126 BPM
TIBC SERPL-MCNC: 260 MCG/DL (ref 250–450)
TIBC SERPL-MCNC: 278 MCG/DL (ref 250–450)
TRIGL SERPL-MCNC: 102 MG/DL (ref 0–150)
TRIGL SERPL-MCNC: 109 MG/DL (ref 0–150)
TRIGL SERPL-MCNC: 169 MG/DL (ref 0–150)
TRIGL SERPL-MCNC: 63 MG/DL (ref 0–150)
TROPONIN I SERPL-MCNC: 0.02 NG/ML
TROPONIN I SERPL-MCNC: 0.02 NG/ML (ref 0–0.07)
TROPONIN I SERPL-MCNC: 0.03 NG/ML (ref 0–0.07)
TROPONIN I SERPL-MCNC: 0.03 NG/ML (ref 0–0.07)
TROPONIN I SERPL-MCNC: 0.04 NG/ML
TROPONIN I SERPL-MCNC: 0.06 NG/ML
TROPONIN I SERPL-MCNC: 0.07 NG/ML (ref 0–0.07)
TROPONIN I SERPL-MCNC: 0.17 NG/ML
TROPONIN I SERPL-MCNC: 0.17 NG/ML
TROPONIN I SERPL-MCNC: 0.19 NG/ML
TROPONIN I SERPL-MCNC: 0.21 NG/ML
TROPONIN I SERPL-MCNC: 0.23 NG/ML
TSH SERPL DL<=0.05 MIU/L-ACNC: 1.72 MIU/ML (ref 0.35–5.35)
UNIT  ABO: NORMAL
UNIT  RH: NORMAL
UROBILINOGEN UR QL STRIP: ABNORMAL
WBC MORPH BLD: NORMAL
WBC NRBC COR # BLD: 10.17 10*3/MM3 (ref 3.5–10.8)
WBC NRBC COR # BLD: 10.45 10*3/MM3 (ref 3.5–10.8)
WBC NRBC COR # BLD: 10.72 10*3/MM3 (ref 3.5–10.8)
WBC NRBC COR # BLD: 10.81 10*3/MM3 (ref 3.5–10.8)
WBC NRBC COR # BLD: 11.25 10*3/MM3 (ref 3.5–10.8)
WBC NRBC COR # BLD: 11.51 10*3/MM3 (ref 3.5–10.8)
WBC NRBC COR # BLD: 11.66 10*3/MM3 (ref 3.5–10.8)
WBC NRBC COR # BLD: 11.74 10*3/MM3 (ref 3.5–10.8)
WBC NRBC COR # BLD: 11.75 10*3/MM3 (ref 3.5–10.8)
WBC NRBC COR # BLD: 12.14 10*3/MM3 (ref 3.5–10.8)
WBC NRBC COR # BLD: 12.2 10*3/MM3 (ref 3.5–10.8)
WBC NRBC COR # BLD: 12.23 10*3/MM3 (ref 3.5–10.8)
WBC NRBC COR # BLD: 12.45 10*3/MM3 (ref 3.5–10.8)
WBC NRBC COR # BLD: 12.52 10*3/MM3 (ref 3.5–10.8)
WBC NRBC COR # BLD: 12.52 10*3/MM3 (ref 3.5–10.8)
WBC NRBC COR # BLD: 12.66 10*3/MM3 (ref 3.5–10.8)
WBC NRBC COR # BLD: 12.66 10*3/MM3 (ref 3.5–10.8)
WBC NRBC COR # BLD: 12.89 10*3/MM3 (ref 3.5–10.8)
WBC NRBC COR # BLD: 13 10*3/MM3 (ref 3.5–10.8)
WBC NRBC COR # BLD: 13.22 10*3/MM3 (ref 3.5–10.8)
WBC NRBC COR # BLD: 13.23 10*3/MM3 (ref 3.5–10.8)
WBC NRBC COR # BLD: 13.49 10*3/MM3 (ref 3.5–10.8)
WBC NRBC COR # BLD: 15.08 10*3/MM3 (ref 3.5–10.8)
WBC NRBC COR # BLD: 15.08 10*3/MM3 (ref 3.5–10.8)
WBC NRBC COR # BLD: 15.55 10*3/MM3 (ref 3.5–10.8)
WBC NRBC COR # BLD: 20.47 10*3/MM3 (ref 3.5–10.8)
WBC NRBC COR # BLD: 22.69 10*3/MM3 (ref 3.5–10.8)
WBC NRBC COR # BLD: 25.05 10*3/MM3 (ref 3.5–10.8)
WBC NRBC COR # BLD: 25.68 10*3/MM3 (ref 3.5–10.8)
WBC NRBC COR # BLD: 28.74 10*3/MM3 (ref 3.5–10.8)
WBC NRBC COR # BLD: 33.6 10*3/MM3 (ref 3.5–10.8)
WBC NRBC COR # BLD: 4.69 10*3/MM3 (ref 3.5–10.8)
WBC NRBC COR # BLD: 4.87 10*3/MM3 (ref 3.5–10.8)
WBC NRBC COR # BLD: 41.58 10*3/MM3 (ref 3.5–10.8)
WBC NRBC COR # BLD: 5.26 10*3/MM3 (ref 3.5–10.8)
WBC NRBC COR # BLD: 6.46 10*3/MM3 (ref 3.5–10.8)
WBC NRBC COR # BLD: 6.88 10*3/MM3 (ref 3.5–10.8)
WBC NRBC COR # BLD: 7.61 10*3/MM3 (ref 3.5–10.8)
WBC NRBC COR # BLD: 7.94 10*3/MM3 (ref 3.5–10.8)
WBC NRBC COR # BLD: 8.14 10*3/MM3 (ref 3.5–10.8)
WBC NRBC COR # BLD: 8.36 10*3/MM3 (ref 3.5–10.8)
WBC NRBC COR # BLD: 8.45 10*3/MM3 (ref 3.5–10.8)
WBC NRBC COR # BLD: 8.91 10*3/MM3 (ref 3.5–10.8)
WBC NRBC COR # BLD: 9.73 10*3/MM3 (ref 3.5–10.8)
WBC UR QL AUTO: ABNORMAL /HPF
WHOLE BLOOD HOLD SPECIMEN: NORMAL
YEAST URNS QL MICRO: ABNORMAL /HPF

## 2017-01-01 PROCEDURE — 94760 N-INVAS EAR/PLS OXIMETRY 1: CPT

## 2017-01-01 PROCEDURE — 84100 ASSAY OF PHOSPHORUS: CPT | Performed by: INTERNAL MEDICINE

## 2017-01-01 PROCEDURE — 97110 THERAPEUTIC EXERCISES: CPT

## 2017-01-01 PROCEDURE — 25010000002 ONDANSETRON PER 1 MG: Performed by: NURSE ANESTHETIST, CERTIFIED REGISTERED

## 2017-01-01 PROCEDURE — 84132 ASSAY OF SERUM POTASSIUM: CPT | Performed by: NURSE PRACTITIONER

## 2017-01-01 PROCEDURE — 85027 COMPLETE CBC AUTOMATED: CPT | Performed by: INTERNAL MEDICINE

## 2017-01-01 PROCEDURE — 87070 CULTURE OTHR SPECIMN AEROBIC: CPT | Performed by: INTERNAL MEDICINE

## 2017-01-01 PROCEDURE — 86923 COMPATIBILITY TEST ELECTRIC: CPT

## 2017-01-01 PROCEDURE — 97165 OT EVAL LOW COMPLEX 30 MIN: CPT

## 2017-01-01 PROCEDURE — 25010000002 NEOSTIGMINE PER 0.5 MG: Performed by: NURSE ANESTHETIST, CERTIFIED REGISTERED

## 2017-01-01 PROCEDURE — 99232 SBSQ HOSP IP/OBS MODERATE 35: CPT | Performed by: HOSPITALIST

## 2017-01-01 PROCEDURE — 0DTA0ZZ RESECTION OF JEJUNUM, OPEN APPROACH: ICD-10-PCS | Performed by: SURGERY

## 2017-01-01 PROCEDURE — 84100 ASSAY OF PHOSPHORUS: CPT | Performed by: SURGERY

## 2017-01-01 PROCEDURE — 25010000002 HEPARIN (PORCINE) PER 1000 UNITS: Performed by: NURSE PRACTITIONER

## 2017-01-01 PROCEDURE — 84100 ASSAY OF PHOSPHORUS: CPT | Performed by: NURSE PRACTITIONER

## 2017-01-01 PROCEDURE — 93005 ELECTROCARDIOGRAM TRACING: CPT | Performed by: EMERGENCY MEDICINE

## 2017-01-01 PROCEDURE — 94799 UNLISTED PULMONARY SVC/PX: CPT

## 2017-01-01 PROCEDURE — C1887 CATHETER, GUIDING: HCPCS | Performed by: INTERNAL MEDICINE

## 2017-01-01 PROCEDURE — 99214 OFFICE O/P EST MOD 30 MIN: CPT | Performed by: NURSE PRACTITIONER

## 2017-01-01 PROCEDURE — 93010 ELECTROCARDIOGRAM REPORT: CPT | Performed by: INTERNAL MEDICINE

## 2017-01-01 PROCEDURE — 81001 URINALYSIS AUTO W/SCOPE: CPT | Performed by: EMERGENCY MEDICINE

## 2017-01-01 PROCEDURE — 86900 BLOOD TYPING SEROLOGIC ABO: CPT

## 2017-01-01 PROCEDURE — 84134 ASSAY OF PREALBUMIN: CPT | Performed by: SURGERY

## 2017-01-01 PROCEDURE — 83550 IRON BINDING TEST: CPT | Performed by: INTERNAL MEDICINE

## 2017-01-01 PROCEDURE — 84484 ASSAY OF TROPONIN QUANT: CPT | Performed by: PHYSICIAN ASSISTANT

## 2017-01-01 PROCEDURE — 71010 HC CHEST PA OR AP: CPT

## 2017-01-01 PROCEDURE — 85730 THROMBOPLASTIN TIME PARTIAL: CPT | Performed by: PHYSICIAN ASSISTANT

## 2017-01-01 PROCEDURE — 99291 CRITICAL CARE FIRST HOUR: CPT | Performed by: INTERNAL MEDICINE

## 2017-01-01 PROCEDURE — 70450 CT HEAD/BRAIN W/O DYE: CPT

## 2017-01-01 PROCEDURE — 80048 BASIC METABOLIC PNL TOTAL CA: CPT | Performed by: INTERNAL MEDICINE

## 2017-01-01 PROCEDURE — 82465 ASSAY BLD/SERUM CHOLESTEROL: CPT | Performed by: SURGERY

## 2017-01-01 PROCEDURE — 94660 CPAP INITIATION&MGMT: CPT

## 2017-01-01 PROCEDURE — C1725 CATH, TRANSLUMIN NON-LASER: HCPCS | Performed by: INTERNAL MEDICINE

## 2017-01-01 PROCEDURE — 83735 ASSAY OF MAGNESIUM: CPT | Performed by: NURSE PRACTITIONER

## 2017-01-01 PROCEDURE — 99226 PR SBSQ OBSERVATION CARE/DAY 35 MINUTES: CPT | Performed by: FAMILY MEDICINE

## 2017-01-01 PROCEDURE — 85025 COMPLETE CBC W/AUTO DIFF WBC: CPT | Performed by: EMERGENCY MEDICINE

## 2017-01-01 PROCEDURE — 85025 COMPLETE CBC W/AUTO DIFF WBC: CPT | Performed by: NURSE PRACTITIONER

## 2017-01-01 PROCEDURE — 93457 R HRT ART/GRFT ANGIO: CPT | Performed by: INTERNAL MEDICINE

## 2017-01-01 PROCEDURE — 25010000002 SULFUR HEXAFLUORIDE MICROSPH 60.7-25 MG RECONSTITUTED SUSPENSION: Performed by: INTERNAL MEDICINE

## 2017-01-01 PROCEDURE — 92610 EVALUATE SWALLOWING FUNCTION: CPT

## 2017-01-01 PROCEDURE — 93325 DOPPLER ECHO COLOR FLOW MAPG: CPT

## 2017-01-01 PROCEDURE — 83735 ASSAY OF MAGNESIUM: CPT | Performed by: INTERNAL MEDICINE

## 2017-01-01 PROCEDURE — 84484 ASSAY OF TROPONIN QUANT: CPT | Performed by: NURSE PRACTITIONER

## 2017-01-01 PROCEDURE — 25010000002 METOCLOPRAMIDE PER 10 MG: Performed by: SURGERY

## 2017-01-01 PROCEDURE — 73560 X-RAY EXAM OF KNEE 1 OR 2: CPT

## 2017-01-01 PROCEDURE — 92526 ORAL FUNCTION THERAPY: CPT

## 2017-01-01 PROCEDURE — 94640 AIRWAY INHALATION TREATMENT: CPT

## 2017-01-01 PROCEDURE — 83735 ASSAY OF MAGNESIUM: CPT | Performed by: PHYSICIAN ASSISTANT

## 2017-01-01 PROCEDURE — P9016 RBC LEUKOCYTES REDUCED: HCPCS

## 2017-01-01 PROCEDURE — 82962 GLUCOSE BLOOD TEST: CPT

## 2017-01-01 PROCEDURE — 25010000002 PIPERACILLIN SOD-TAZOBACTAM PER 1 G: Performed by: INTERNAL MEDICINE

## 2017-01-01 PROCEDURE — 80048 BASIC METABOLIC PNL TOTAL CA: CPT | Performed by: NURSE PRACTITIONER

## 2017-01-01 PROCEDURE — 93306 TTE W/DOPPLER COMPLETE: CPT | Performed by: INTERNAL MEDICINE

## 2017-01-01 PROCEDURE — 25010000002 HYDRALAZINE PER 20 MG: Performed by: NURSE PRACTITIONER

## 2017-01-01 PROCEDURE — 93005 ELECTROCARDIOGRAM TRACING: CPT | Performed by: NURSE PRACTITIONER

## 2017-01-01 PROCEDURE — 80053 COMPREHEN METABOLIC PANEL: CPT | Performed by: NURSE PRACTITIONER

## 2017-01-01 PROCEDURE — 99232 SBSQ HOSP IP/OBS MODERATE 35: CPT | Performed by: PSYCHIATRY & NEUROLOGY

## 2017-01-01 PROCEDURE — 36415 COLL VENOUS BLD VENIPUNCTURE: CPT | Performed by: INTERNAL MEDICINE

## 2017-01-01 PROCEDURE — 93798 PHYS/QHP OP CAR RHAB W/ECG: CPT

## 2017-01-01 PROCEDURE — 85610 PROTHROMBIN TIME: CPT | Performed by: NURSE PRACTITIONER

## 2017-01-01 PROCEDURE — 92611 MOTION FLUOROSCOPY/SWALLOW: CPT

## 2017-01-01 PROCEDURE — 85014 HEMATOCRIT: CPT | Performed by: SURGERY

## 2017-01-01 PROCEDURE — 25010000003 POTASSIUM CHLORIDE 10 MEQ/100ML SOLUTION: Performed by: NURSE PRACTITIONER

## 2017-01-01 PROCEDURE — 99213 OFFICE O/P EST LOW 20 MIN: CPT | Performed by: NURSE PRACTITIONER

## 2017-01-01 PROCEDURE — 83735 ASSAY OF MAGNESIUM: CPT | Performed by: EMERGENCY MEDICINE

## 2017-01-01 PROCEDURE — 25010000002 FENTANYL CITRATE (PF) 100 MCG/2ML SOLUTION: Performed by: INTERNAL MEDICINE

## 2017-01-01 PROCEDURE — 99232 SBSQ HOSP IP/OBS MODERATE 35: CPT | Performed by: NURSE PRACTITIONER

## 2017-01-01 PROCEDURE — 99214 OFFICE O/P EST MOD 30 MIN: CPT | Performed by: INTERNAL MEDICINE

## 2017-01-01 PROCEDURE — 97116 GAIT TRAINING THERAPY: CPT

## 2017-01-01 PROCEDURE — B2111ZZ FLUOROSCOPY OF MULTIPLE CORONARY ARTERIES USING LOW OSMOLAR CONTRAST: ICD-10-PCS | Performed by: INTERNAL MEDICINE

## 2017-01-01 PROCEDURE — 99292 CRITICAL CARE ADDL 30 MIN: CPT | Performed by: INTERNAL MEDICINE

## 2017-01-01 PROCEDURE — 99231 SBSQ HOSP IP/OBS SF/LOW 25: CPT | Performed by: NEUROLOGICAL SURGERY

## 2017-01-01 PROCEDURE — 74000 HC ABDOMEN KUB: CPT

## 2017-01-01 PROCEDURE — 25010000002 FUROSEMIDE PER 20 MG: Performed by: INTERNAL MEDICINE

## 2017-01-01 PROCEDURE — 99291 CRITICAL CARE FIRST HOUR: CPT | Performed by: NURSE PRACTITIONER

## 2017-01-01 PROCEDURE — 80069 RENAL FUNCTION PANEL: CPT | Performed by: INTERNAL MEDICINE

## 2017-01-01 PROCEDURE — 84484 ASSAY OF TROPONIN QUANT: CPT | Performed by: EMERGENCY MEDICINE

## 2017-01-01 PROCEDURE — 99231 SBSQ HOSP IP/OBS SF/LOW 25: CPT | Performed by: NURSE PRACTITIONER

## 2017-01-01 PROCEDURE — 99233 SBSQ HOSP IP/OBS HIGH 50: CPT | Performed by: INTERNAL MEDICINE

## 2017-01-01 PROCEDURE — 97530 THERAPEUTIC ACTIVITIES: CPT

## 2017-01-01 PROCEDURE — 99232 SBSQ HOSP IP/OBS MODERATE 35: CPT | Performed by: NEUROLOGICAL SURGERY

## 2017-01-01 PROCEDURE — 94667 MNPJ CHEST WALL 1ST: CPT

## 2017-01-01 PROCEDURE — 99285 EMERGENCY DEPT VISIT HI MDM: CPT

## 2017-01-01 PROCEDURE — 88307 TISSUE EXAM BY PATHOLOGIST: CPT | Performed by: SURGERY

## 2017-01-01 PROCEDURE — 99232 SBSQ HOSP IP/OBS MODERATE 35: CPT | Performed by: INTERNAL MEDICINE

## 2017-01-01 PROCEDURE — G8978 MOBILITY CURRENT STATUS: HCPCS

## 2017-01-01 PROCEDURE — C1713 ANCHOR/SCREW BN/BN,TIS/BN: HCPCS | Performed by: NEUROLOGICAL SURGERY

## 2017-01-01 PROCEDURE — 87086 URINE CULTURE/COLONY COUNT: CPT | Performed by: EMERGENCY MEDICINE

## 2017-01-01 PROCEDURE — 86900 BLOOD TYPING SEROLOGIC ABO: CPT | Performed by: NEUROLOGICAL SURGERY

## 2017-01-01 PROCEDURE — 85025 COMPLETE CBC W/AUTO DIFF WBC: CPT | Performed by: INTERNAL MEDICINE

## 2017-01-01 PROCEDURE — 84478 ASSAY OF TRIGLYCERIDES: CPT | Performed by: SURGERY

## 2017-01-01 PROCEDURE — 25010000002 METHYLPREDNISOLONE PER 40 MG: Performed by: NURSE PRACTITIONER

## 2017-01-01 PROCEDURE — B2181ZZ FLUOROSCOPY OF LEFT INTERNAL MAMMARY BYPASS GRAFT USING LOW OSMOLAR CONTRAST: ICD-10-PCS | Performed by: INTERNAL MEDICINE

## 2017-01-01 PROCEDURE — 83735 ASSAY OF MAGNESIUM: CPT | Performed by: FAMILY MEDICINE

## 2017-01-01 PROCEDURE — 83735 ASSAY OF MAGNESIUM: CPT

## 2017-01-01 PROCEDURE — 25010000002 SULFUR HEXAFLUORIDE MICROSPH 60.7-25 MG RECONSTITUTED SUSPENSION: Performed by: FAMILY MEDICINE

## 2017-01-01 PROCEDURE — 90686 IIV4 VACC NO PRSV 0.5 ML IM: CPT | Performed by: INTERNAL MEDICINE

## 2017-01-01 PROCEDURE — 74230 X-RAY XM SWLNG FUNCJ C+: CPT

## 2017-01-01 PROCEDURE — 86900 BLOOD TYPING SEROLOGIC ABO: CPT | Performed by: EMERGENCY MEDICINE

## 2017-01-01 PROCEDURE — 25010000002 DEXAMETHASONE PER 1 MG: Performed by: NEUROLOGICAL SURGERY

## 2017-01-01 PROCEDURE — 99222 1ST HOSP IP/OBS MODERATE 55: CPT | Performed by: INTERNAL MEDICINE

## 2017-01-01 PROCEDURE — 71250 CT THORAX DX C-: CPT

## 2017-01-01 PROCEDURE — 82330 ASSAY OF CALCIUM: CPT | Performed by: NURSE PRACTITIONER

## 2017-01-01 PROCEDURE — 25010000002 FENTANYL CITRATE (PF) 100 MCG/2ML SOLUTION: Performed by: EMERGENCY MEDICINE

## 2017-01-01 PROCEDURE — 85027 COMPLETE CBC AUTOMATED: CPT | Performed by: NURSE PRACTITIONER

## 2017-01-01 PROCEDURE — B2131ZZ FLUOROSCOPY OF MULTIPLE CORONARY ARTERY BYPASS GRAFTS USING LOW OSMOLAR CONTRAST: ICD-10-PCS | Performed by: INTERNAL MEDICINE

## 2017-01-01 PROCEDURE — 25010000003 CEFAZOLIN IN DEXTROSE 2-4 GM/100ML-% SOLUTION: Performed by: NEUROLOGICAL SURGERY

## 2017-01-01 PROCEDURE — 25010000002 HEPARIN (PORCINE) PER 1000 UNITS: Performed by: PHYSICIAN ASSISTANT

## 2017-01-01 PROCEDURE — 83036 HEMOGLOBIN GLYCOSYLATED A1C: CPT | Performed by: NURSE PRACTITIONER

## 2017-01-01 PROCEDURE — 0CQ0XZZ REPAIR UPPER LIP, EXTERNAL APPROACH: ICD-10-PCS | Performed by: EMERGENCY MEDICINE

## 2017-01-01 PROCEDURE — G8979 MOBILITY GOAL STATUS: HCPCS

## 2017-01-01 PROCEDURE — C8924 2D TTE W OR W/O FOL W/CON,FU: HCPCS

## 2017-01-01 PROCEDURE — 51798 US URINE CAPACITY MEASURE: CPT

## 2017-01-01 PROCEDURE — 93308 TTE F-UP OR LMTD: CPT

## 2017-01-01 PROCEDURE — 85347 COAGULATION TIME ACTIVATED: CPT

## 2017-01-01 PROCEDURE — 99223 1ST HOSP IP/OBS HIGH 75: CPT | Performed by: INTERNAL MEDICINE

## 2017-01-01 PROCEDURE — 84484 ASSAY OF TROPONIN QUANT: CPT

## 2017-01-01 PROCEDURE — 027034Z DILATION OF CORONARY ARTERY, ONE ARTERY WITH DRUG-ELUTING INTRALUMINAL DEVICE, PERCUTANEOUS APPROACH: ICD-10-PCS | Performed by: INTERNAL MEDICINE

## 2017-01-01 PROCEDURE — 25010000002 FENTANYL CITRATE (PF) 100 MCG/2ML SOLUTION: Performed by: NURSE ANESTHETIST, CERTIFIED REGISTERED

## 2017-01-01 PROCEDURE — 25010000002 PHENYLEPHRINE PER 1 ML: Performed by: NURSE ANESTHETIST, CERTIFIED REGISTERED

## 2017-01-01 PROCEDURE — G0179 MD RECERTIFICATION HHA PT: HCPCS | Performed by: INTERNAL MEDICINE

## 2017-01-01 PROCEDURE — 80053 COMPREHEN METABOLIC PANEL: CPT | Performed by: INTERNAL MEDICINE

## 2017-01-01 PROCEDURE — 85007 BL SMEAR W/DIFF WBC COUNT: CPT | Performed by: NURSE PRACTITIONER

## 2017-01-01 PROCEDURE — 70551 MRI BRAIN STEM W/O DYE: CPT

## 2017-01-01 PROCEDURE — 74176 CT ABD & PELVIS W/O CONTRAST: CPT

## 2017-01-01 PROCEDURE — 99284 EMERGENCY DEPT VISIT MOD MDM: CPT

## 2017-01-01 PROCEDURE — 80053 COMPREHEN METABOLIC PANEL: CPT | Performed by: EMERGENCY MEDICINE

## 2017-01-01 PROCEDURE — C1894 INTRO/SHEATH, NON-LASER: HCPCS | Performed by: INTERNAL MEDICINE

## 2017-01-01 PROCEDURE — 94668 MNPJ CHEST WALL SBSQ: CPT

## 2017-01-01 PROCEDURE — 25010000002 SUCCINYLCHOLINE PER 20 MG: Performed by: NURSE ANESTHETIST, CERTIFIED REGISTERED

## 2017-01-01 PROCEDURE — 83880 ASSAY OF NATRIURETIC PEPTIDE: CPT | Performed by: NURSE PRACTITIONER

## 2017-01-01 PROCEDURE — 83880 ASSAY OF NATRIURETIC PEPTIDE: CPT | Performed by: INTERNAL MEDICINE

## 2017-01-01 PROCEDURE — C1769 GUIDE WIRE: HCPCS | Performed by: INTERNAL MEDICINE

## 2017-01-01 PROCEDURE — 70486 CT MAXILLOFACIAL W/O DYE: CPT

## 2017-01-01 PROCEDURE — 25010000002 PIPERACILLIN SOD-TAZOBACTAM PER 1 G: Performed by: PHYSICIAN ASSISTANT

## 2017-01-01 PROCEDURE — 80048 BASIC METABOLIC PNL TOTAL CA: CPT | Performed by: FAMILY MEDICINE

## 2017-01-01 PROCEDURE — 90715 TDAP VACCINE 7 YRS/> IM: CPT | Performed by: NURSE PRACTITIONER

## 2017-01-01 PROCEDURE — 99232 SBSQ HOSP IP/OBS MODERATE 35: CPT | Performed by: FAMILY MEDICINE

## 2017-01-01 PROCEDURE — 25010000002 TDAP 5-2.5-18.5 LF-MCG/0.5 SUSPENSION: Performed by: NURSE PRACTITIONER

## 2017-01-01 PROCEDURE — C8929 TTE W OR WO FOL WCON,DOPPLER: HCPCS

## 2017-01-01 PROCEDURE — 99223 1ST HOSP IP/OBS HIGH 75: CPT | Performed by: HOSPITALIST

## 2017-01-01 PROCEDURE — 85025 COMPLETE CBC W/AUTO DIFF WBC: CPT | Performed by: PHYSICIAN ASSISTANT

## 2017-01-01 PROCEDURE — C9600 PERC DRUG-EL COR STENT SING: HCPCS | Performed by: INTERNAL MEDICINE

## 2017-01-01 PROCEDURE — 25010000002 FENTANYL CITRATE (PF) 100 MCG/2ML SOLUTION: Performed by: NURSE PRACTITIONER

## 2017-01-01 PROCEDURE — 99213 OFFICE O/P EST LOW 20 MIN: CPT | Performed by: INTERNAL MEDICINE

## 2017-01-01 PROCEDURE — 99496 TRANSJ CARE MGMT HIGH F2F 7D: CPT | Performed by: INTERNAL MEDICINE

## 2017-01-01 PROCEDURE — 87040 BLOOD CULTURE FOR BACTERIA: CPT | Performed by: PHYSICIAN ASSISTANT

## 2017-01-01 PROCEDURE — G0378 HOSPITAL OBSERVATION PER HR: HCPCS

## 2017-01-01 PROCEDURE — 85018 HEMOGLOBIN: CPT | Performed by: SURGERY

## 2017-01-01 PROCEDURE — 0 IOPAMIDOL 61 % SOLUTION: Performed by: INTERNAL MEDICINE

## 2017-01-01 PROCEDURE — 85610 PROTHROMBIN TIME: CPT | Performed by: PHYSICIAN ASSISTANT

## 2017-01-01 PROCEDURE — 93005 ELECTROCARDIOGRAM TRACING: CPT | Performed by: PHYSICIAN ASSISTANT

## 2017-01-01 PROCEDURE — 92928 PRQ TCAT PLMT NTRAC ST 1 LES: CPT | Performed by: INTERNAL MEDICINE

## 2017-01-01 PROCEDURE — 36415 COLL VENOUS BLD VENIPUNCTURE: CPT

## 2017-01-01 PROCEDURE — 83540 ASSAY OF IRON: CPT | Performed by: NURSE PRACTITIONER

## 2017-01-01 PROCEDURE — 36415 COLL VENOUS BLD VENIPUNCTURE: CPT | Performed by: NURSE PRACTITIONER

## 2017-01-01 PROCEDURE — 84443 ASSAY THYROID STIM HORMONE: CPT | Performed by: NURSE PRACTITIONER

## 2017-01-01 PROCEDURE — 63710000001 DEXAMETHASONE PER 0.25 MG: Performed by: NEUROLOGICAL SURGERY

## 2017-01-01 PROCEDURE — 93005 ELECTROCARDIOGRAM TRACING: CPT

## 2017-01-01 PROCEDURE — 87077 CULTURE AEROBIC IDENTIFY: CPT | Performed by: EMERGENCY MEDICINE

## 2017-01-01 PROCEDURE — 87186 SC STD MICRODIL/AGAR DIL: CPT | Performed by: EMERGENCY MEDICINE

## 2017-01-01 PROCEDURE — 86900 BLOOD TYPING SEROLOGIC ABO: CPT | Performed by: INTERNAL MEDICINE

## 2017-01-01 PROCEDURE — 80053 COMPREHEN METABOLIC PANEL: CPT | Performed by: PHYSICIAN ASSISTANT

## 2017-01-01 PROCEDURE — 94003 VENT MGMT INPAT SUBQ DAY: CPT

## 2017-01-01 PROCEDURE — C1760 CLOSURE DEV, VASC: HCPCS | Performed by: INTERNAL MEDICINE

## 2017-01-01 PROCEDURE — 009400Z DRAINAGE OF INTRACRANIAL SUBDURAL SPACE WITH DRAINAGE DEVICE, OPEN APPROACH: ICD-10-PCS | Performed by: NEUROLOGICAL SURGERY

## 2017-01-01 PROCEDURE — 25010000002 ONDANSETRON PER 1 MG: Performed by: PHYSICIAN ASSISTANT

## 2017-01-01 PROCEDURE — 25010000002 DEXAMETHASONE SODIUM PHOSPHATE 10 MG/ML SOLUTION 1 ML VIAL: Performed by: NURSE ANESTHETIST, CERTIFIED REGISTERED

## 2017-01-01 PROCEDURE — 86850 RBC ANTIBODY SCREEN: CPT | Performed by: INTERNAL MEDICINE

## 2017-01-01 PROCEDURE — 83605 ASSAY OF LACTIC ACID: CPT | Performed by: PHYSICIAN ASSISTANT

## 2017-01-01 PROCEDURE — 80061 LIPID PANEL: CPT | Performed by: INTERNAL MEDICINE

## 2017-01-01 PROCEDURE — G0181 HOME HEALTH CARE SUPERVISION: HCPCS | Performed by: INTERNAL MEDICINE

## 2017-01-01 PROCEDURE — 99223 1ST HOSP IP/OBS HIGH 75: CPT | Performed by: PSYCHIATRY & NEUROLOGY

## 2017-01-01 PROCEDURE — 99024 POSTOP FOLLOW-UP VISIT: CPT | Performed by: INTERNAL MEDICINE

## 2017-01-01 PROCEDURE — 83036 HEMOGLOBIN GLYCOSYLATED A1C: CPT | Performed by: INTERNAL MEDICINE

## 2017-01-01 PROCEDURE — 88304 TISSUE EXAM BY PATHOLOGIST: CPT | Performed by: NEUROLOGICAL SURGERY

## 2017-01-01 PROCEDURE — 36430 TRANSFUSION BLD/BLD COMPNT: CPT

## 2017-01-01 PROCEDURE — 0 IOPAMIDOL PER 1 ML: Performed by: INTERNAL MEDICINE

## 2017-01-01 PROCEDURE — 25010000002 FUROSEMIDE PER 20 MG: Performed by: EMERGENCY MEDICINE

## 2017-01-01 PROCEDURE — P9041 ALBUMIN (HUMAN),5%, 50ML: HCPCS | Performed by: NURSE ANESTHETIST, CERTIFIED REGISTERED

## 2017-01-01 PROCEDURE — 94002 VENT MGMT INPAT INIT DAY: CPT

## 2017-01-01 PROCEDURE — 85025 COMPLETE CBC W/AUTO DIFF WBC: CPT | Performed by: SURGERY

## 2017-01-01 PROCEDURE — 80053 COMPREHEN METABOLIC PANEL: CPT | Performed by: SURGERY

## 2017-01-01 PROCEDURE — 97162 PT EVAL MOD COMPLEX 30 MIN: CPT

## 2017-01-01 PROCEDURE — G0180 MD CERTIFICATION HHA PATIENT: HCPCS | Performed by: INTERNAL MEDICINE

## 2017-01-01 PROCEDURE — 97164 PT RE-EVAL EST PLAN CARE: CPT

## 2017-01-01 PROCEDURE — 80048 BASIC METABOLIC PNL TOTAL CA: CPT | Performed by: PHYSICIAN ASSISTANT

## 2017-01-01 PROCEDURE — 86140 C-REACTIVE PROTEIN: CPT | Performed by: SURGERY

## 2017-01-01 PROCEDURE — 83735 ASSAY OF MAGNESIUM: CPT | Performed by: SURGERY

## 2017-01-01 PROCEDURE — 85025 COMPLETE CBC W/AUTO DIFF WBC: CPT

## 2017-01-01 PROCEDURE — 99238 HOSP IP/OBS DSCHRG MGMT 30/<: CPT | Performed by: INTERNAL MEDICINE

## 2017-01-01 PROCEDURE — 25010000002 MIDAZOLAM PER 1 MG: Performed by: INTERNAL MEDICINE

## 2017-01-01 PROCEDURE — 80048 BASIC METABOLIC PNL TOTAL CA: CPT | Performed by: NEUROLOGICAL SURGERY

## 2017-01-01 PROCEDURE — 25010000002 ALBUMIN HUMAN 5% PER 50 ML: Performed by: NURSE ANESTHETIST, CERTIFIED REGISTERED

## 2017-01-01 PROCEDURE — 83540 ASSAY OF IRON: CPT | Performed by: INTERNAL MEDICINE

## 2017-01-01 PROCEDURE — 83550 IRON BINDING TEST: CPT | Performed by: NURSE PRACTITIONER

## 2017-01-01 PROCEDURE — 72125 CT NECK SPINE W/O DYE: CPT

## 2017-01-01 PROCEDURE — 73110 X-RAY EXAM OF WRIST: CPT

## 2017-01-01 PROCEDURE — 85730 THROMBOPLASTIN TIME PARTIAL: CPT | Performed by: NURSE PRACTITIONER

## 2017-01-01 PROCEDURE — 61312 CRNEC/CRNOT STTL XDRL/SDRL: CPT | Performed by: NEUROLOGICAL SURGERY

## 2017-01-01 PROCEDURE — 25010000002 PROPOFOL 10 MG/ML EMULSION: Performed by: NURSE ANESTHETIST, CERTIFIED REGISTERED

## 2017-01-01 PROCEDURE — 99223 1ST HOSP IP/OBS HIGH 75: CPT | Performed by: FAMILY MEDICINE

## 2017-01-01 PROCEDURE — 86901 BLOOD TYPING SEROLOGIC RH(D): CPT | Performed by: INTERNAL MEDICINE

## 2017-01-01 PROCEDURE — 87086 URINE CULTURE/COLONY COUNT: CPT | Performed by: INTERNAL MEDICINE

## 2017-01-01 PROCEDURE — 0DH67UZ INSERTION OF FEEDING DEVICE INTO STOMACH, VIA NATURAL OR ARTIFICIAL OPENING: ICD-10-PCS | Performed by: INTERNAL MEDICINE

## 2017-01-01 PROCEDURE — G0008 ADMIN INFLUENZA VIRUS VAC: HCPCS | Performed by: INTERNAL MEDICINE

## 2017-01-01 PROCEDURE — 99217 PR OBSERVATION CARE DISCHARGE MANAGEMENT: CPT | Performed by: FAMILY MEDICINE

## 2017-01-01 PROCEDURE — 4A023N8 MEASUREMENT OF CARDIAC SAMPLING AND PRESSURE, BILATERAL, PERCUTANEOUS APPROACH: ICD-10-PCS | Performed by: INTERNAL MEDICINE

## 2017-01-01 PROCEDURE — 93571 IV DOP VEL&/PRESS C FLO 1ST: CPT | Performed by: INTERNAL MEDICINE

## 2017-01-01 PROCEDURE — 83690 ASSAY OF LIPASE: CPT | Performed by: EMERGENCY MEDICINE

## 2017-01-01 PROCEDURE — 85007 BL SMEAR W/DIFF WBC COUNT: CPT | Performed by: EMERGENCY MEDICINE

## 2017-01-01 PROCEDURE — 85018 HEMOGLOBIN: CPT | Performed by: INTERNAL MEDICINE

## 2017-01-01 PROCEDURE — 86140 C-REACTIVE PROTEIN: CPT | Performed by: INTERNAL MEDICINE

## 2017-01-01 PROCEDURE — 80048 BASIC METABOLIC PNL TOTAL CA: CPT | Performed by: SURGERY

## 2017-01-01 PROCEDURE — 99220 PR INITIAL OBSERVATION CARE/DAY 70 MINUTES: CPT | Performed by: NURSE PRACTITIONER

## 2017-01-01 PROCEDURE — 83605 ASSAY OF LACTIC ACID: CPT | Performed by: EMERGENCY MEDICINE

## 2017-01-01 PROCEDURE — 82140 ASSAY OF AMMONIA: CPT | Performed by: NURSE PRACTITIONER

## 2017-01-01 PROCEDURE — 81001 URINALYSIS AUTO W/SCOPE: CPT | Performed by: INTERNAL MEDICINE

## 2017-01-01 PROCEDURE — 86901 BLOOD TYPING SEROLOGIC RH(D): CPT | Performed by: NEUROLOGICAL SURGERY

## 2017-01-01 PROCEDURE — C1874 STENT, COATED/COV W/DEL SYS: HCPCS | Performed by: INTERNAL MEDICINE

## 2017-01-01 PROCEDURE — 87040 BLOOD CULTURE FOR BACTERIA: CPT | Performed by: EMERGENCY MEDICINE

## 2017-01-01 PROCEDURE — 93308 TTE F-UP OR LMTD: CPT | Performed by: INTERNAL MEDICINE

## 2017-01-01 PROCEDURE — 85018 HEMOGLOBIN: CPT | Performed by: NURSE PRACTITIONER

## 2017-01-01 PROCEDURE — 25010000003 POTASSIUM CHLORIDE 10 MEQ/100ML SOLUTION: Performed by: INTERNAL MEDICINE

## 2017-01-01 PROCEDURE — 86850 RBC ANTIBODY SCREEN: CPT | Performed by: EMERGENCY MEDICINE

## 2017-01-01 PROCEDURE — 25010000002 INFLUENZA VAC SPLIT QUAD 0.5 ML SUSPENSION PREFILLED SYRINGE: Performed by: INTERNAL MEDICINE

## 2017-01-01 PROCEDURE — 83880 ASSAY OF NATRIURETIC PEPTIDE: CPT | Performed by: EMERGENCY MEDICINE

## 2017-01-01 PROCEDURE — 25010000002 DIPHENHYDRAMINE PER 50 MG: Performed by: NURSE PRACTITIONER

## 2017-01-01 PROCEDURE — 84132 ASSAY OF SERUM POTASSIUM: CPT | Performed by: INTERNAL MEDICINE

## 2017-01-01 PROCEDURE — 99221 1ST HOSP IP/OBS SF/LOW 40: CPT | Performed by: NEUROLOGICAL SURGERY

## 2017-01-01 PROCEDURE — 85014 HEMATOCRIT: CPT | Performed by: NURSE PRACTITIONER

## 2017-01-01 PROCEDURE — 90471 IMMUNIZATION ADMIN: CPT | Performed by: NURSE PRACTITIONER

## 2017-01-01 PROCEDURE — 73502 X-RAY EXAM HIP UNI 2-3 VIEWS: CPT

## 2017-01-01 PROCEDURE — 99239 HOSP IP/OBS DSCHRG MGMT >30: CPT | Performed by: INTERNAL MEDICINE

## 2017-01-01 PROCEDURE — 85007 BL SMEAR W/DIFF WBC COUNT: CPT | Performed by: FAMILY MEDICINE

## 2017-01-01 PROCEDURE — 80076 HEPATIC FUNCTION PANEL: CPT | Performed by: NURSE PRACTITIONER

## 2017-01-01 PROCEDURE — 99239 HOSP IP/OBS DSCHRG MGMT >30: CPT | Performed by: NURSE PRACTITIONER

## 2017-01-01 PROCEDURE — 0HQKXZZ REPAIR RIGHT LOWER LEG SKIN, EXTERNAL APPROACH: ICD-10-PCS | Performed by: EMERGENCY MEDICINE

## 2017-01-01 PROCEDURE — 25010000002 PIPERACILLIN SOD-TAZOBACTAM PER 1 G: Performed by: SURGERY

## 2017-01-01 PROCEDURE — 85027 COMPLETE CBC AUTOMATED: CPT | Performed by: NEUROLOGICAL SURGERY

## 2017-01-01 PROCEDURE — 97167 OT EVAL HIGH COMPLEX 60 MIN: CPT

## 2017-01-01 PROCEDURE — 99231 SBSQ HOSP IP/OBS SF/LOW 25: CPT | Performed by: INTERNAL MEDICINE

## 2017-01-01 PROCEDURE — 95819 EEG AWAKE AND ASLEEP: CPT

## 2017-01-01 PROCEDURE — 93005 ELECTROCARDIOGRAM TRACING: CPT | Performed by: ANESTHESIOLOGY

## 2017-01-01 PROCEDURE — 85014 HEMATOCRIT: CPT | Performed by: INTERNAL MEDICINE

## 2017-01-01 PROCEDURE — 85007 BL SMEAR W/DIFF WBC COUNT: CPT | Performed by: INTERNAL MEDICINE

## 2017-01-01 PROCEDURE — 25010000002 BIVALIRUDIN PER 1 MG: Performed by: INTERNAL MEDICINE

## 2017-01-01 PROCEDURE — 93005 ELECTROCARDIOGRAM TRACING: CPT | Performed by: INTERNAL MEDICINE

## 2017-01-01 PROCEDURE — 3E0G76Z INTRODUCTION OF NUTRITIONAL SUBSTANCE INTO UPPER GI, VIA NATURAL OR ARTIFICIAL OPENING: ICD-10-PCS | Performed by: INTERNAL MEDICINE

## 2017-01-01 PROCEDURE — 25010000002 ONDANSETRON PER 1 MG: Performed by: NURSE PRACTITIONER

## 2017-01-01 PROCEDURE — 25010000002 FUROSEMIDE PER 20 MG: Performed by: HOSPITALIST

## 2017-01-01 PROCEDURE — 99024 POSTOP FOLLOW-UP VISIT: CPT | Performed by: NEUROLOGICAL SURGERY

## 2017-01-01 PROCEDURE — 82306 VITAMIN D 25 HYDROXY: CPT | Performed by: INTERNAL MEDICINE

## 2017-01-01 PROCEDURE — 96374 THER/PROPH/DIAG INJ IV PUSH: CPT

## 2017-01-01 PROCEDURE — 83880 ASSAY OF NATRIURETIC PEPTIDE: CPT

## 2017-01-01 PROCEDURE — 84134 ASSAY OF PREALBUMIN: CPT | Performed by: NURSE PRACTITIONER

## 2017-01-01 PROCEDURE — 86901 BLOOD TYPING SEROLOGIC RH(D): CPT | Performed by: EMERGENCY MEDICINE

## 2017-01-01 PROCEDURE — 25010000002 BUPRENORPHINE PER 0.1 MG: Performed by: NURSE ANESTHETIST, CERTIFIED REGISTERED

## 2017-01-01 PROCEDURE — 86850 RBC ANTIBODY SCREEN: CPT | Performed by: NEUROLOGICAL SURGERY

## 2017-01-01 PROCEDURE — 85027 COMPLETE CBC AUTOMATED: CPT | Performed by: SURGERY

## 2017-01-01 PROCEDURE — 71020 CHG CHEST X-RAY 2 VW: CPT | Performed by: NURSE PRACTITIONER

## 2017-01-01 PROCEDURE — 85025 COMPLETE CBC W/AUTO DIFF WBC: CPT | Performed by: FAMILY MEDICINE

## 2017-01-01 PROCEDURE — 80053 COMPREHEN METABOLIC PANEL: CPT

## 2017-01-01 DEVICE — SCRW MATRIXNEURO SD TI 4MM: Type: IMPLANTABLE DEVICE | Site: CRANIAL | Status: FUNCTIONAL

## 2017-01-01 DEVICE — XIENCE ALPINE EVEROLIMUS ELUTING CORONARY STENT SYSTEM 2.50 MM X 23 MM / RAPID-EXCHANGE
Type: IMPLANTABLE DEVICE | Site: CORONARY | Status: FUNCTIONAL
Brand: XIENCE ALPINE

## 2017-01-01 DEVICE — PLT CVR BURHL MATRIXNEURO 12MM: Type: IMPLANTABLE DEVICE | Site: CRANIAL | Status: FUNCTIONAL

## 2017-01-01 DEVICE — DURAGEN® PLUS DURAL REGENERATION MATRIX, 3 IN X 3 IN (7.5 CM X 7.5 CM)
Type: IMPLANTABLE DEVICE | Status: FUNCTIONAL
Brand: DURAGEN® PLUS

## 2017-01-01 RX ORDER — PHENYLEPHRINE HCL IN 0.9% NACL 0.5 MG/5ML
.5-3 SYRINGE (ML) INTRAVENOUS
Status: DISCONTINUED | OUTPATIENT
Start: 2017-01-01 | End: 2017-01-01

## 2017-01-01 RX ORDER — RIVASTIGMINE 9.5 MG/24H
1 PATCH, EXTENDED RELEASE TRANSDERMAL DAILY
Status: DISCONTINUED | OUTPATIENT
Start: 2017-01-01 | End: 2017-01-01 | Stop reason: HOSPADM

## 2017-01-01 RX ORDER — ACETAMINOPHEN 325 MG/1
650 TABLET ORAL EVERY 4 HOURS PRN
Status: DISCONTINUED | OUTPATIENT
Start: 2017-01-01 | End: 2017-01-01 | Stop reason: HOSPADM

## 2017-01-01 RX ORDER — PAROXETINE HYDROCHLORIDE 20 MG/1
30 TABLET, FILM COATED ORAL EVERY MORNING
Status: DISCONTINUED | OUTPATIENT
Start: 2017-01-01 | End: 2017-01-01 | Stop reason: HOSPADM

## 2017-01-01 RX ORDER — DIPHENHYDRAMINE HYDROCHLORIDE 50 MG/ML
25 INJECTION INTRAMUSCULAR; INTRAVENOUS ONCE
Status: COMPLETED | OUTPATIENT
Start: 2017-01-01 | End: 2017-01-01

## 2017-01-01 RX ORDER — AMLODIPINE BESYLATE 5 MG/1
5 TABLET ORAL
Status: DISCONTINUED | OUTPATIENT
Start: 2017-01-01 | End: 2017-01-01

## 2017-01-01 RX ORDER — FUROSEMIDE 40 MG/1
40 TABLET ORAL DAILY
Status: DISCONTINUED | OUTPATIENT
Start: 2017-01-01 | End: 2017-01-01 | Stop reason: HOSPADM

## 2017-01-01 RX ORDER — BISACODYL 10 MG
10 SUPPOSITORY, RECTAL RECTAL DAILY PRN
Start: 2017-01-01 | End: 2018-01-01

## 2017-01-01 RX ORDER — HYDROCODONE BITARTRATE AND ACETAMINOPHEN 5; 325 MG/1; MG/1
1 TABLET ORAL EVERY 4 HOURS PRN
Status: DISCONTINUED | OUTPATIENT
Start: 2017-01-01 | End: 2017-01-01 | Stop reason: HOSPADM

## 2017-01-01 RX ORDER — DEXAMETHASONE 4 MG/1
2 TABLET ORAL EVERY 8 HOURS SCHEDULED
Status: DISCONTINUED | OUTPATIENT
Start: 2017-01-01 | End: 2017-01-01

## 2017-01-01 RX ORDER — DEXAMETHASONE SODIUM PHOSPHATE 10 MG/ML
INJECTION INTRAMUSCULAR; INTRAVENOUS AS NEEDED
Status: DISCONTINUED | OUTPATIENT
Start: 2017-01-01 | End: 2017-01-01

## 2017-01-01 RX ORDER — ONDANSETRON 2 MG/ML
4 INJECTION INTRAMUSCULAR; INTRAVENOUS EVERY 6 HOURS PRN
Status: DISCONTINUED | OUTPATIENT
Start: 2017-01-01 | End: 2017-01-01

## 2017-01-01 RX ORDER — NITROGLYCERIN 0.4 MG/1
0.4 TABLET SUBLINGUAL
Refills: 12
Start: 2017-01-01 | End: 2018-01-01

## 2017-01-01 RX ORDER — SODIUM CHLORIDE 0.9 % (FLUSH) 0.9 %
1-10 SYRINGE (ML) INJECTION AS NEEDED
Status: DISCONTINUED | OUTPATIENT
Start: 2017-01-01 | End: 2017-01-01 | Stop reason: HOSPADM

## 2017-01-01 RX ORDER — LEVOFLOXACIN 500 MG/1
500 TABLET, FILM COATED ORAL EVERY OTHER DAY
Qty: 3 TABLET | Refills: 0 | Status: SHIPPED | OUTPATIENT
Start: 2017-01-01 | End: 2017-01-01

## 2017-01-01 RX ORDER — FUROSEMIDE 10 MG/ML
40 INJECTION INTRAMUSCULAR; INTRAVENOUS ONCE
Status: COMPLETED | OUTPATIENT
Start: 2017-01-01 | End: 2017-01-01

## 2017-01-01 RX ORDER — SODIUM CHLORIDE 9 MG/ML
75 INJECTION, SOLUTION INTRAVENOUS CONTINUOUS
Status: DISCONTINUED | OUTPATIENT
Start: 2017-01-01 | End: 2017-01-01

## 2017-01-01 RX ORDER — ACETAMINOPHEN 650 MG/1
650 SUPPOSITORY RECTAL EVERY 4 HOURS PRN
Status: ON HOLD
Start: 2017-01-01 | End: 2017-01-01

## 2017-01-01 RX ORDER — POTASSIUM CHLORIDE 1.5 G/1.77G
40 POWDER, FOR SOLUTION ORAL AS NEEDED
Status: DISCONTINUED | OUTPATIENT
Start: 2017-01-01 | End: 2017-01-01 | Stop reason: SDUPTHER

## 2017-01-01 RX ORDER — AMINO ACIDS/PROTEIN HYDROLYS 15G-100/30
1 LIQUID (ML) ORAL DAILY
Start: 2017-01-01 | End: 2018-01-01

## 2017-01-01 RX ORDER — CLOPIDOGREL BISULFATE 75 MG/1
75 TABLET ORAL DAILY
Status: DISCONTINUED | OUTPATIENT
Start: 2017-01-01 | End: 2017-01-01 | Stop reason: HOSPADM

## 2017-01-01 RX ORDER — ACETAMINOPHEN 325 MG/1
650 TABLET ORAL EVERY 4 HOURS PRN
Status: DISCONTINUED | OUTPATIENT
Start: 2017-01-01 | End: 2017-01-01

## 2017-01-01 RX ORDER — HEPARIN SODIUM 5000 [USP'U]/ML
5000 INJECTION, SOLUTION INTRAVENOUS; SUBCUTANEOUS EVERY 12 HOURS SCHEDULED
Status: DISCONTINUED | OUTPATIENT
Start: 2017-01-01 | End: 2017-01-01 | Stop reason: HOSPADM

## 2017-01-01 RX ORDER — ALBUMIN, HUMAN INJ 5% 5 %
SOLUTION INTRAVENOUS CONTINUOUS PRN
Status: DISCONTINUED | OUTPATIENT
Start: 2017-01-01 | End: 2017-01-01 | Stop reason: SURG

## 2017-01-01 RX ORDER — METOCLOPRAMIDE HYDROCHLORIDE 5 MG/ML
10 INJECTION INTRAMUSCULAR; INTRAVENOUS EVERY 6 HOURS SCHEDULED
Start: 2017-01-01 | End: 2018-01-01

## 2017-01-01 RX ORDER — SODIUM CHLORIDE 0.9 % (FLUSH) 0.9 %
10 SYRINGE (ML) INJECTION AS NEEDED
Status: DISCONTINUED | OUTPATIENT
Start: 2017-01-01 | End: 2017-01-01 | Stop reason: HOSPADM

## 2017-01-01 RX ORDER — FUROSEMIDE 40 MG/1
40 TABLET ORAL 2 TIMES DAILY
Status: ON HOLD | COMMUNITY
End: 2017-01-01

## 2017-01-01 RX ORDER — MAGNESIUM HYDROXIDE 1200 MG/15ML
LIQUID ORAL AS NEEDED
Status: DISCONTINUED | OUTPATIENT
Start: 2017-01-01 | End: 2017-01-01 | Stop reason: HOSPADM

## 2017-01-01 RX ORDER — ONDANSETRON 2 MG/ML
4 INJECTION INTRAMUSCULAR; INTRAVENOUS ONCE
Status: COMPLETED | OUTPATIENT
Start: 2017-01-01 | End: 2017-01-01

## 2017-01-01 RX ORDER — LIDOCAINE HYDROCHLORIDE 10 MG/ML
INJECTION, SOLUTION INFILTRATION; PERINEURAL AS NEEDED
Status: DISCONTINUED | OUTPATIENT
Start: 2017-01-01 | End: 2017-01-01 | Stop reason: HOSPADM

## 2017-01-01 RX ORDER — BUPROPION HYDROCHLORIDE 75 MG/1
75 TABLET ORAL EVERY 12 HOURS SCHEDULED
Status: DISCONTINUED | OUTPATIENT
Start: 2017-01-01 | End: 2017-01-01 | Stop reason: HOSPADM

## 2017-01-01 RX ORDER — FAMOTIDINE 10 MG/ML
20 INJECTION, SOLUTION INTRAVENOUS ONCE
Status: DISCONTINUED | OUTPATIENT
Start: 2017-01-01 | End: 2017-01-01 | Stop reason: HOSPADM

## 2017-01-01 RX ORDER — CHOLECALCIFEROL (VITAMIN D3) 125 MCG
1 CAPSULE ORAL DAILY
COMMUNITY
End: 2017-01-01 | Stop reason: HOSPADM

## 2017-01-01 RX ORDER — FAMOTIDINE 20 MG/1
20 TABLET, FILM COATED ORAL ONCE
Status: DISCONTINUED | OUTPATIENT
Start: 2017-01-01 | End: 2017-01-01 | Stop reason: HOSPADM

## 2017-01-01 RX ORDER — DEXAMETHASONE 4 MG/1
2 TABLET ORAL EVERY 12 HOURS SCHEDULED
Status: DISCONTINUED | OUTPATIENT
Start: 2017-01-01 | End: 2017-01-01

## 2017-01-01 RX ORDER — MEMANTINE HYDROCHLORIDE 10 MG/1
10 TABLET ORAL 2 TIMES DAILY
Status: DISCONTINUED | OUTPATIENT
Start: 2017-01-01 | End: 2017-01-01 | Stop reason: HOSPADM

## 2017-01-01 RX ORDER — ATORVASTATIN CALCIUM 10 MG/1
10 TABLET, FILM COATED ORAL NIGHTLY
Start: 2017-01-01 | End: 2018-01-01 | Stop reason: SDUPTHER

## 2017-01-01 RX ORDER — ASCORBIC ACID 500 MG
250 TABLET ORAL 2 TIMES DAILY WITH MEALS
Status: DISCONTINUED | OUTPATIENT
Start: 2017-01-01 | End: 2017-01-01 | Stop reason: HOSPADM

## 2017-01-01 RX ORDER — ATORVASTATIN CALCIUM 10 MG/1
10 TABLET, FILM COATED ORAL NIGHTLY
Status: DISCONTINUED | OUTPATIENT
Start: 2017-01-01 | End: 2017-01-01 | Stop reason: HOSPADM

## 2017-01-01 RX ORDER — FAMOTIDINE 10 MG/ML
20 INJECTION, SOLUTION INTRAVENOUS EVERY 12 HOURS SCHEDULED
Status: DISCONTINUED | OUTPATIENT
Start: 2017-01-01 | End: 2017-01-01

## 2017-01-01 RX ORDER — ACETAMINOPHEN 650 MG/1
650 SUPPOSITORY RECTAL EVERY 4 HOURS PRN
Status: DISCONTINUED | OUTPATIENT
Start: 2017-01-01 | End: 2017-01-01

## 2017-01-01 RX ORDER — BISACODYL 5 MG/1
5 TABLET, DELAYED RELEASE ORAL DAILY PRN
Status: DISCONTINUED | OUTPATIENT
Start: 2017-01-01 | End: 2017-01-01 | Stop reason: HOSPADM

## 2017-01-01 RX ORDER — HYDRALAZINE HYDROCHLORIDE 20 MG/ML
5 INJECTION INTRAMUSCULAR; INTRAVENOUS
Status: DISCONTINUED | OUTPATIENT
Start: 2017-01-01 | End: 2017-01-01 | Stop reason: HOSPADM

## 2017-01-01 RX ORDER — BUDESONIDE AND FORMOTEROL FUMARATE DIHYDRATE 160; 4.5 UG/1; UG/1
2 AEROSOL RESPIRATORY (INHALATION)
Status: DISCONTINUED | OUTPATIENT
Start: 2017-01-01 | End: 2017-01-01 | Stop reason: HOSPADM

## 2017-01-01 RX ORDER — BUPROPION HYDROCHLORIDE 150 MG/1
150 TABLET ORAL DAILY
Status: DISCONTINUED | OUTPATIENT
Start: 2017-01-01 | End: 2017-01-01

## 2017-01-01 RX ORDER — NICOTINE 14MG/24HR
1 PATCH, TRANSDERMAL 24 HOURS TRANSDERMAL 2 TIMES DAILY
Qty: 180 CAPSULE | Refills: 1 | Status: SHIPPED | OUTPATIENT
Start: 2017-01-01 | End: 2017-01-01

## 2017-01-01 RX ORDER — SENNA AND DOCUSATE SODIUM 50; 8.6 MG/1; MG/1
1 TABLET, FILM COATED ORAL NIGHTLY PRN
Status: DISCONTINUED | OUTPATIENT
Start: 2017-01-01 | End: 2017-01-01 | Stop reason: HOSPADM

## 2017-01-01 RX ORDER — POTASSIUM CHLORIDE 29.8 MG/ML
20 INJECTION INTRAVENOUS
Status: DISCONTINUED | OUTPATIENT
Start: 2017-01-01 | End: 2017-01-01 | Stop reason: HOSPADM

## 2017-01-01 RX ORDER — CARVEDILOL 6.25 MG/1
3.12 TABLET ORAL EVERY 12 HOURS SCHEDULED
Qty: 60 TABLET | Refills: 6
Start: 2017-01-01 | End: 2017-01-01 | Stop reason: SINTOL

## 2017-01-01 RX ORDER — LIDOCAINE HYDROCHLORIDE 10 MG/ML
10 INJECTION, SOLUTION EPIDURAL; INFILTRATION; INTRACAUDAL; PERINEURAL ONCE
Status: COMPLETED | OUTPATIENT
Start: 2017-01-01 | End: 2017-01-01

## 2017-01-01 RX ORDER — CARVEDILOL 6.25 MG/1
6.25 TABLET ORAL EVERY 12 HOURS SCHEDULED
Start: 2017-01-01 | End: 2018-01-01 | Stop reason: DRUGHIGH

## 2017-01-01 RX ORDER — ACETAMINOPHEN 500 MG
1000 TABLET ORAL EVERY 6 HOURS PRN
Status: DISCONTINUED | OUTPATIENT
Start: 2017-01-01 | End: 2017-01-01

## 2017-01-01 RX ORDER — MEMANTINE HYDROCHLORIDE 10 MG/1
10 TABLET ORAL EVERY 12 HOURS SCHEDULED
Start: 2017-01-01 | End: 2018-01-01 | Stop reason: SDUPTHER

## 2017-01-01 RX ORDER — DEXAMETHASONE SODIUM PHOSPHATE 4 MG/ML
4 INJECTION, SOLUTION INTRA-ARTICULAR; INTRALESIONAL; INTRAMUSCULAR; INTRAVENOUS; SOFT TISSUE EVERY 6 HOURS
Status: DISCONTINUED | OUTPATIENT
Start: 2017-01-01 | End: 2017-01-01

## 2017-01-01 RX ORDER — IPRATROPIUM BROMIDE AND ALBUTEROL SULFATE 2.5; .5 MG/3ML; MG/3ML
3 SOLUTION RESPIRATORY (INHALATION)
Qty: 360 ML
Start: 2017-01-01 | End: 2018-01-01

## 2017-01-01 RX ORDER — FENTANYL CITRATE 50 UG/ML
INJECTION, SOLUTION INTRAMUSCULAR; INTRAVENOUS AS NEEDED
Status: DISCONTINUED | OUTPATIENT
Start: 2017-01-01 | End: 2017-01-01 | Stop reason: HOSPADM

## 2017-01-01 RX ORDER — ONDANSETRON 2 MG/ML
4 INJECTION INTRAMUSCULAR; INTRAVENOUS ONCE AS NEEDED
Status: DISCONTINUED | OUTPATIENT
Start: 2017-01-01 | End: 2017-01-01 | Stop reason: HOSPADM

## 2017-01-01 RX ORDER — FENTANYL CITRATE 50 UG/ML
25 INJECTION, SOLUTION INTRAMUSCULAR; INTRAVENOUS
Status: DISCONTINUED | OUTPATIENT
Start: 2017-01-01 | End: 2017-01-01

## 2017-01-01 RX ORDER — AMINO ACIDS/PROTEIN HYDROLYS 15G-100/30
1 LIQUID (ML) ORAL 3 TIMES DAILY
Status: DISCONTINUED | OUTPATIENT
Start: 2017-01-01 | End: 2017-01-01

## 2017-01-01 RX ORDER — FAMOTIDINE 10 MG/ML
20 INJECTION, SOLUTION INTRAVENOUS DAILY
Status: DISCONTINUED | OUTPATIENT
Start: 2017-01-01 | End: 2017-01-01 | Stop reason: ALTCHOICE

## 2017-01-01 RX ORDER — FUROSEMIDE 40 MG/1
40 TABLET ORAL DAILY
Qty: 30 TABLET | Refills: 11 | Status: SHIPPED | OUTPATIENT
Start: 2017-01-01 | End: 2017-01-01 | Stop reason: HOSPADM

## 2017-01-01 RX ORDER — PANTOPRAZOLE SODIUM 40 MG/10ML
80 INJECTION, POWDER, LYOPHILIZED, FOR SOLUTION INTRAVENOUS ONCE
Status: COMPLETED | OUTPATIENT
Start: 2017-01-01 | End: 2017-01-01

## 2017-01-01 RX ORDER — SODIUM CHLORIDE 9 MG/ML
3 INJECTION, SOLUTION INTRAVENOUS CONTINUOUS
Status: ACTIVE | OUTPATIENT
Start: 2017-01-01 | End: 2017-01-01

## 2017-01-01 RX ORDER — FERROUS SULFATE 325(65) MG
325 TABLET ORAL
Status: DISCONTINUED | OUTPATIENT
Start: 2017-01-01 | End: 2017-01-01 | Stop reason: HOSPADM

## 2017-01-01 RX ORDER — AMLODIPINE BESYLATE 5 MG/1
5 TABLET ORAL
Status: DISCONTINUED | OUTPATIENT
Start: 2017-01-01 | End: 2017-01-01 | Stop reason: HOSPADM

## 2017-01-01 RX ORDER — LEVOFLOXACIN 500 MG/1
500 TABLET, FILM COATED ORAL EVERY OTHER DAY
Status: DISCONTINUED | OUTPATIENT
Start: 2017-01-01 | End: 2017-01-01 | Stop reason: HOSPADM

## 2017-01-01 RX ORDER — SODIUM CHLORIDE 9 MG/ML
50 INJECTION, SOLUTION INTRAVENOUS CONTINUOUS
Status: DISCONTINUED | OUTPATIENT
Start: 2017-01-01 | End: 2017-01-01

## 2017-01-01 RX ORDER — IPRATROPIUM BROMIDE AND ALBUTEROL SULFATE 2.5; .5 MG/3ML; MG/3ML
3 SOLUTION RESPIRATORY (INHALATION)
Status: DISCONTINUED | OUTPATIENT
Start: 2017-01-01 | End: 2017-01-01 | Stop reason: HOSPADM

## 2017-01-01 RX ORDER — BISACODYL 5 MG/1
5 TABLET, DELAYED RELEASE ORAL DAILY PRN
Start: 2017-01-01 | End: 2017-01-01 | Stop reason: HOSPADM

## 2017-01-01 RX ORDER — DOCUSATE SODIUM 50 MG/5 ML
100 LIQUID (ML) ORAL 2 TIMES DAILY PRN
Status: DISCONTINUED | OUTPATIENT
Start: 2017-01-01 | End: 2017-01-01 | Stop reason: HOSPADM

## 2017-01-01 RX ORDER — ATRACURIUM BESYLATE 10 MG/ML
INJECTION, SOLUTION INTRAVENOUS AS NEEDED
Status: DISCONTINUED | OUTPATIENT
Start: 2017-01-01 | End: 2017-01-01 | Stop reason: SURG

## 2017-01-01 RX ORDER — NITROGLYCERIN 0.4 MG/1
0.4 TABLET SUBLINGUAL
Status: DISCONTINUED | OUTPATIENT
Start: 2017-01-01 | End: 2017-01-01 | Stop reason: HOSPADM

## 2017-01-01 RX ORDER — DEXTROSE MONOHYDRATE 50 MG/ML
100 INJECTION, SOLUTION INTRAVENOUS CONTINUOUS
Status: DISCONTINUED | OUTPATIENT
Start: 2017-01-01 | End: 2017-01-01

## 2017-01-01 RX ORDER — GLYCOPYRROLATE 0.2 MG/ML
INJECTION INTRAMUSCULAR; INTRAVENOUS AS NEEDED
Status: DISCONTINUED | OUTPATIENT
Start: 2017-01-01 | End: 2017-01-01 | Stop reason: SURG

## 2017-01-01 RX ORDER — IPRATROPIUM BROMIDE AND ALBUTEROL SULFATE 2.5; .5 MG/3ML; MG/3ML
3 SOLUTION RESPIRATORY (INHALATION) ONCE AS NEEDED
Status: DISCONTINUED | OUTPATIENT
Start: 2017-01-01 | End: 2017-01-01 | Stop reason: HOSPADM

## 2017-01-01 RX ORDER — ONDANSETRON 2 MG/ML
4 INJECTION INTRAMUSCULAR; INTRAVENOUS EVERY 6 HOURS PRN
Status: DISCONTINUED | OUTPATIENT
Start: 2017-01-01 | End: 2017-01-01 | Stop reason: SDUPTHER

## 2017-01-01 RX ORDER — MONTELUKAST SODIUM 10 MG/1
10 TABLET ORAL NIGHTLY
Qty: 30 TABLET | Refills: 11 | Status: SHIPPED | OUTPATIENT
Start: 2017-01-01 | End: 2017-01-01 | Stop reason: HOSPADM

## 2017-01-01 RX ORDER — IPRATROPIUM BROMIDE AND ALBUTEROL SULFATE 2.5; .5 MG/3ML; MG/3ML
3 SOLUTION RESPIRATORY (INHALATION)
Status: DISCONTINUED | OUTPATIENT
Start: 2017-01-01 | End: 2017-01-01

## 2017-01-01 RX ORDER — MONTELUKAST SODIUM 10 MG/1
10 TABLET ORAL NIGHTLY
Status: DISCONTINUED | OUTPATIENT
Start: 2017-01-01 | End: 2017-01-01 | Stop reason: HOSPADM

## 2017-01-01 RX ORDER — ACETAMINOPHEN 325 MG/1
650 TABLET ORAL ONCE
Status: COMPLETED | OUTPATIENT
Start: 2017-01-01 | End: 2017-01-01

## 2017-01-01 RX ORDER — POTASSIUM CHLORIDE 750 MG/1
40 CAPSULE, EXTENDED RELEASE ORAL AS NEEDED
Status: DISCONTINUED | OUTPATIENT
Start: 2017-01-01 | End: 2017-01-01 | Stop reason: SDUPTHER

## 2017-01-01 RX ORDER — IPRATROPIUM BROMIDE AND ALBUTEROL SULFATE 2.5; .5 MG/3ML; MG/3ML
3 SOLUTION RESPIRATORY (INHALATION) EVERY 4 HOURS PRN
Status: DISCONTINUED | OUTPATIENT
Start: 2017-01-01 | End: 2017-01-01 | Stop reason: HOSPADM

## 2017-01-01 RX ORDER — DOCUSATE SODIUM 100 MG/1
100 CAPSULE, LIQUID FILLED ORAL 2 TIMES DAILY
Status: DISCONTINUED | OUTPATIENT
Start: 2017-01-01 | End: 2017-01-01 | Stop reason: HOSPADM

## 2017-01-01 RX ORDER — PROPOFOL 10 MG/ML
VIAL (ML) INTRAVENOUS AS NEEDED
Status: DISCONTINUED | OUTPATIENT
Start: 2017-01-01 | End: 2017-01-01 | Stop reason: SURG

## 2017-01-01 RX ORDER — BISACODYL 10 MG
10 SUPPOSITORY, RECTAL RECTAL DAILY PRN
Status: DISCONTINUED | OUTPATIENT
Start: 2017-01-01 | End: 2017-01-01

## 2017-01-01 RX ORDER — PROMETHAZINE HYDROCHLORIDE 25 MG/ML
6.25 INJECTION, SOLUTION INTRAMUSCULAR; INTRAVENOUS ONCE AS NEEDED
Status: DISCONTINUED | OUTPATIENT
Start: 2017-01-01 | End: 2017-01-01 | Stop reason: HOSPADM

## 2017-01-01 RX ORDER — DOCUSATE SODIUM 50 MG/5 ML
100 LIQUID (ML) ORAL 2 TIMES DAILY PRN
Start: 2017-01-01 | End: 2018-01-01

## 2017-01-01 RX ORDER — FENTANYL CITRATE 50 UG/ML
25 INJECTION, SOLUTION INTRAMUSCULAR; INTRAVENOUS
Status: DISCONTINUED | OUTPATIENT
Start: 2017-01-01 | End: 2017-01-01 | Stop reason: HOSPADM

## 2017-01-01 RX ORDER — FERROUS SULFATE 325(65) MG
325 TABLET ORAL
COMMUNITY
End: 2017-01-01 | Stop reason: HOSPADM

## 2017-01-01 RX ORDER — CEFAZOLIN SODIUM 2 G/100ML
2 INJECTION, SOLUTION INTRAVENOUS EVERY 8 HOURS
Status: COMPLETED | OUTPATIENT
Start: 2017-01-01 | End: 2017-01-01

## 2017-01-01 RX ORDER — PANTOPRAZOLE SODIUM 40 MG/10ML
40 INJECTION, POWDER, LYOPHILIZED, FOR SOLUTION INTRAVENOUS EVERY 12 HOURS
Status: DISCONTINUED | OUTPATIENT
Start: 2017-01-01 | End: 2017-01-01

## 2017-01-01 RX ORDER — FAMOTIDINE 20 MG/1
20 TABLET, FILM COATED ORAL DAILY
COMMUNITY
End: 2017-01-01 | Stop reason: HOSPADM

## 2017-01-01 RX ORDER — ONDANSETRON 2 MG/ML
4 INJECTION INTRAMUSCULAR; INTRAVENOUS EVERY 6 HOURS PRN
Status: DISCONTINUED | OUTPATIENT
Start: 2017-01-01 | End: 2017-01-01 | Stop reason: HOSPADM

## 2017-01-01 RX ORDER — NALOXONE HCL 0.4 MG/ML
0.4 VIAL (ML) INJECTION
Status: DISCONTINUED | OUTPATIENT
Start: 2017-01-01 | End: 2017-01-01

## 2017-01-01 RX ORDER — ONDANSETRON 4 MG/1
4 TABLET, FILM COATED ORAL EVERY 6 HOURS PRN
Start: 2017-01-01 | End: 2018-01-01

## 2017-01-01 RX ORDER — HYDROMORPHONE HYDROCHLORIDE 1 MG/ML
1 INJECTION, SOLUTION INTRAMUSCULAR; INTRAVENOUS; SUBCUTANEOUS EVERY 4 HOURS PRN
Status: DISCONTINUED | OUTPATIENT
Start: 2017-01-01 | End: 2017-01-01

## 2017-01-01 RX ORDER — HYDROCODONE BITARTRATE AND ACETAMINOPHEN 5; 325 MG/1; MG/1
1 TABLET ORAL EVERY 6 HOURS PRN
Qty: 12 TABLET | Refills: 0 | Status: SHIPPED | OUTPATIENT
Start: 2017-01-01 | End: 2017-01-01

## 2017-01-01 RX ORDER — POTASSIUM CHLORIDE 7.45 MG/ML
10 INJECTION INTRAVENOUS
Status: COMPLETED | OUTPATIENT
Start: 2017-01-01 | End: 2017-01-01

## 2017-01-01 RX ORDER — ACETAMINOPHEN 325 MG/1
650 TABLET ORAL EVERY 4 HOURS PRN
Start: 2017-01-01 | End: 2017-01-01 | Stop reason: HOSPADM

## 2017-01-01 RX ORDER — DOCUSATE SODIUM 100 MG/1
100 CAPSULE, LIQUID FILLED ORAL 2 TIMES DAILY PRN
Status: DISCONTINUED | OUTPATIENT
Start: 2017-01-01 | End: 2017-01-01

## 2017-01-01 RX ORDER — LABETALOL HYDROCHLORIDE 5 MG/ML
INJECTION, SOLUTION INTRAVENOUS AS NEEDED
Status: DISCONTINUED | OUTPATIENT
Start: 2017-01-01 | End: 2017-01-01 | Stop reason: SURG

## 2017-01-01 RX ORDER — SENNOSIDES 8.6 MG
2 TABLET ORAL 2 TIMES DAILY
Status: DISCONTINUED | OUTPATIENT
Start: 2017-01-01 | End: 2017-01-01

## 2017-01-01 RX ORDER — FAMOTIDINE 20 MG/1
20 TABLET, FILM COATED ORAL DAILY
Start: 2017-01-01 | End: 2018-01-01 | Stop reason: SDUPTHER

## 2017-01-01 RX ORDER — MEMANTINE HYDROCHLORIDE 10 MG/1
10 TABLET ORAL EVERY 12 HOURS SCHEDULED
Status: DISCONTINUED | OUTPATIENT
Start: 2017-01-01 | End: 2017-01-01 | Stop reason: HOSPADM

## 2017-01-01 RX ORDER — ENTECAVIR 0.5 MG/1
0.5 TABLET, FILM COATED ORAL
Status: DISCONTINUED | OUTPATIENT
Start: 2017-01-01 | End: 2017-01-01 | Stop reason: HOSPADM

## 2017-01-01 RX ORDER — HYDROCODONE BITARTRATE AND ACETAMINOPHEN 5; 325 MG/1; MG/1
1 TABLET ORAL EVERY 4 HOURS PRN
Status: DISCONTINUED | OUTPATIENT
Start: 2017-01-01 | End: 2017-01-01

## 2017-01-01 RX ORDER — CARVEDILOL 3.12 MG/1
3.12 TABLET ORAL EVERY 12 HOURS SCHEDULED
Status: DISCONTINUED | OUTPATIENT
Start: 2017-01-01 | End: 2017-01-01 | Stop reason: HOSPADM

## 2017-01-01 RX ORDER — ACETAMINOPHEN 650 MG/1
650 SUPPOSITORY RECTAL EVERY 4 HOURS PRN
Status: DISCONTINUED | OUTPATIENT
Start: 2017-01-01 | End: 2017-01-01 | Stop reason: HOSPADM

## 2017-01-01 RX ORDER — ASPIRIN 81 MG/1
81 TABLET, CHEWABLE ORAL DAILY
Status: DISCONTINUED | OUTPATIENT
Start: 2017-01-01 | End: 2017-01-01 | Stop reason: HOSPADM

## 2017-01-01 RX ORDER — ONDANSETRON 2 MG/ML
INJECTION INTRAMUSCULAR; INTRAVENOUS AS NEEDED
Status: DISCONTINUED | OUTPATIENT
Start: 2017-01-01 | End: 2017-01-01 | Stop reason: SURG

## 2017-01-01 RX ORDER — SODIUM CHLORIDE 0.9 % (FLUSH) 0.9 %
1-10 SYRINGE (ML) INJECTION AS NEEDED
Status: DISCONTINUED | OUTPATIENT
Start: 2017-01-01 | End: 2017-01-01

## 2017-01-01 RX ORDER — BUDESONIDE AND FORMOTEROL FUMARATE DIHYDRATE 160; 4.5 UG/1; UG/1
2 AEROSOL RESPIRATORY (INHALATION)
Status: DISCONTINUED | OUTPATIENT
Start: 2017-01-01 | End: 2017-01-01

## 2017-01-01 RX ORDER — FUROSEMIDE 20 MG/1
20 TABLET ORAL DAILY
Status: DISCONTINUED | OUTPATIENT
Start: 2017-01-01 | End: 2017-01-01

## 2017-01-01 RX ORDER — ATORVASTATIN CALCIUM 40 MG/1
40 TABLET, FILM COATED ORAL NIGHTLY
Status: DISCONTINUED | OUTPATIENT
Start: 2017-01-01 | End: 2017-01-01 | Stop reason: HOSPADM

## 2017-01-01 RX ORDER — POTASSIUM CHLORIDE 1.5 G/1.77G
40 POWDER, FOR SOLUTION ORAL ONCE
Status: COMPLETED | OUTPATIENT
Start: 2017-01-01 | End: 2017-01-01

## 2017-01-01 RX ORDER — HYDROMORPHONE HYDROCHLORIDE 1 MG/ML
0.5 INJECTION, SOLUTION INTRAMUSCULAR; INTRAVENOUS; SUBCUTANEOUS
Status: CANCELLED | OUTPATIENT
Start: 2017-01-01

## 2017-01-01 RX ORDER — DOCUSATE SODIUM 100 MG/1
100 CAPSULE, LIQUID FILLED ORAL 2 TIMES DAILY
Status: DISCONTINUED | OUTPATIENT
Start: 2017-01-01 | End: 2017-01-01

## 2017-01-01 RX ORDER — HYDROCODONE BITARTRATE AND ACETAMINOPHEN 7.5; 325 MG/1; MG/1
2 TABLET ORAL EVERY 4 HOURS PRN
Status: DISCONTINUED | OUTPATIENT
Start: 2017-01-01 | End: 2017-01-01

## 2017-01-01 RX ORDER — TRAMADOL HYDROCHLORIDE 50 MG/1
50 TABLET ORAL EVERY 4 HOURS PRN
Status: DISCONTINUED | OUTPATIENT
Start: 2017-01-01 | End: 2017-01-01

## 2017-01-01 RX ORDER — SODIUM CHLORIDE 9 MG/ML
INJECTION, SOLUTION INTRAVENOUS CONTINUOUS PRN
Status: DISCONTINUED | OUTPATIENT
Start: 2017-01-01 | End: 2017-01-01 | Stop reason: SURG

## 2017-01-01 RX ORDER — LEVOFLOXACIN 500 MG/1
500 TABLET, FILM COATED ORAL EVERY 24 HOURS
Status: DISCONTINUED | OUTPATIENT
Start: 2017-01-01 | End: 2017-01-01

## 2017-01-01 RX ORDER — CLOPIDOGREL BISULFATE 75 MG/1
TABLET ORAL AS NEEDED
Status: DISCONTINUED | OUTPATIENT
Start: 2017-01-01 | End: 2017-01-01 | Stop reason: HOSPADM

## 2017-01-01 RX ORDER — CEFTRIAXONE SODIUM 1 G/50ML
1 INJECTION, SOLUTION INTRAVENOUS ONCE
Status: DISCONTINUED | OUTPATIENT
Start: 2017-01-01 | End: 2017-01-01

## 2017-01-01 RX ORDER — PHENYLEPHRINE HCL IN 0.9% NACL 0.5 MG/5ML
SYRINGE (ML) INTRAVENOUS
Status: COMPLETED
Start: 2017-01-01 | End: 2017-01-01

## 2017-01-01 RX ORDER — NITROGLYCERIN 20 MG/100ML
10-50 INJECTION INTRAVENOUS
Status: DISCONTINUED | OUTPATIENT
Start: 2017-01-01 | End: 2017-01-01

## 2017-01-01 RX ORDER — MAGNESIUM SULFATE HEPTAHYDRATE 40 MG/ML
2 INJECTION, SOLUTION INTRAVENOUS AS NEEDED
Status: DISCONTINUED | OUTPATIENT
Start: 2017-01-01 | End: 2017-01-01 | Stop reason: HOSPADM

## 2017-01-01 RX ORDER — DEXAMETHASONE SODIUM PHOSPHATE 4 MG/ML
4 INJECTION, SOLUTION INTRA-ARTICULAR; INTRALESIONAL; INTRAMUSCULAR; INTRAVENOUS; SOFT TISSUE 3 TIMES DAILY
Status: DISCONTINUED | OUTPATIENT
Start: 2017-01-01 | End: 2017-01-01

## 2017-01-01 RX ORDER — LORATADINE 10 MG/1
10 TABLET ORAL DAILY
Qty: 30 TABLET | Refills: 1 | Status: SHIPPED | OUTPATIENT
Start: 2017-01-01 | End: 2017-01-01 | Stop reason: HOSPADM

## 2017-01-01 RX ORDER — CEFAZOLIN SODIUM 2 G/100ML
2 INJECTION, SOLUTION INTRAVENOUS EVERY 8 HOURS
Status: DISCONTINUED | OUTPATIENT
Start: 2017-01-01 | End: 2017-01-01

## 2017-01-01 RX ORDER — PROMETHAZINE HYDROCHLORIDE 25 MG/1
25 SUPPOSITORY RECTAL ONCE AS NEEDED
Status: DISCONTINUED | OUTPATIENT
Start: 2017-01-01 | End: 2017-01-01 | Stop reason: HOSPADM

## 2017-01-01 RX ORDER — CARVEDILOL 3.12 MG/1
3.12 TABLET ORAL EVERY 12 HOURS SCHEDULED
Status: DISCONTINUED | OUTPATIENT
Start: 2017-01-01 | End: 2017-01-01

## 2017-01-01 RX ORDER — FENTANYL CITRATE 50 UG/ML
100 INJECTION, SOLUTION INTRAMUSCULAR; INTRAVENOUS
Status: DISCONTINUED | OUTPATIENT
Start: 2017-01-01 | End: 2017-01-01

## 2017-01-01 RX ORDER — BISACODYL 5 MG/1
5 TABLET, DELAYED RELEASE ORAL DAILY PRN
Status: DISCONTINUED | OUTPATIENT
Start: 2017-01-01 | End: 2017-01-01

## 2017-01-01 RX ORDER — DIPHENHYDRAMINE HCL 25 MG
25 CAPSULE ORAL EVERY 6 HOURS PRN
Start: 2017-01-01 | End: 2018-01-01

## 2017-01-01 RX ORDER — FUROSEMIDE 10 MG/ML
20 INJECTION INTRAMUSCULAR; INTRAVENOUS ONCE
Status: COMPLETED | OUTPATIENT
Start: 2017-01-01 | End: 2017-01-01

## 2017-01-01 RX ORDER — DOCUSATE SODIUM 100 MG/1
100 CAPSULE, LIQUID FILLED ORAL 2 TIMES DAILY PRN
Status: DISCONTINUED | OUTPATIENT
Start: 2017-01-01 | End: 2017-01-01 | Stop reason: HOSPADM

## 2017-01-01 RX ORDER — NALOXONE HCL 0.4 MG/ML
0.4 VIAL (ML) INJECTION AS NEEDED
Status: DISCONTINUED | OUTPATIENT
Start: 2017-01-01 | End: 2017-01-01 | Stop reason: HOSPADM

## 2017-01-01 RX ORDER — FUROSEMIDE 10 MG/ML
40 INJECTION INTRAMUSCULAR; INTRAVENOUS EVERY 8 HOURS
Status: DISCONTINUED | OUTPATIENT
Start: 2017-01-01 | End: 2017-01-01

## 2017-01-01 RX ORDER — HYDROMORPHONE HYDROCHLORIDE 1 MG/ML
0.5 INJECTION, SOLUTION INTRAMUSCULAR; INTRAVENOUS; SUBCUTANEOUS
Status: DISCONTINUED | OUTPATIENT
Start: 2017-01-01 | End: 2017-01-01

## 2017-01-01 RX ORDER — FAMOTIDINE 20 MG/1
20 TABLET, FILM COATED ORAL 2 TIMES DAILY
Status: DISCONTINUED | OUTPATIENT
Start: 2017-01-01 | End: 2017-01-01 | Stop reason: HOSPADM

## 2017-01-01 RX ORDER — ASPIRIN 81 MG/1
81 TABLET, CHEWABLE ORAL DAILY
Start: 2017-01-01 | End: 2018-07-05 | Stop reason: HOSPADM

## 2017-01-01 RX ORDER — ONDANSETRON 2 MG/ML
4 INJECTION INTRAMUSCULAR; INTRAVENOUS EVERY 6 HOURS PRN
Start: 2017-01-01 | End: 2018-01-01

## 2017-01-01 RX ORDER — FUROSEMIDE 10 MG/ML
80 INJECTION INTRAMUSCULAR; INTRAVENOUS ONCE
Status: COMPLETED | OUTPATIENT
Start: 2017-01-01 | End: 2017-01-01

## 2017-01-01 RX ORDER — HYDRALAZINE HYDROCHLORIDE 20 MG/ML
10 INJECTION INTRAMUSCULAR; INTRAVENOUS EVERY 6 HOURS PRN
Status: DISCONTINUED | OUTPATIENT
Start: 2017-01-01 | End: 2017-01-01 | Stop reason: HOSPADM

## 2017-01-01 RX ORDER — LABETALOL HYDROCHLORIDE 5 MG/ML
10 INJECTION, SOLUTION INTRAVENOUS
Status: DISCONTINUED | OUTPATIENT
Start: 2017-01-01 | End: 2017-01-01 | Stop reason: HOSPADM

## 2017-01-01 RX ORDER — PAROXETINE 30 MG/1
30 TABLET, FILM COATED ORAL EVERY MORNING
Start: 2017-01-01 | End: 2017-01-01 | Stop reason: HOSPADM

## 2017-01-01 RX ORDER — NALOXONE HCL 0.4 MG/ML
0.1 VIAL (ML) INJECTION
Status: DISCONTINUED | OUTPATIENT
Start: 2017-01-01 | End: 2017-01-01

## 2017-01-01 RX ORDER — ACETAMINOPHEN 325 MG/1
650 TABLET ORAL EVERY 4 HOURS PRN
Start: 2017-01-01 | End: 2018-01-01

## 2017-01-01 RX ORDER — ONDANSETRON 4 MG/1
4 TABLET, FILM COATED ORAL EVERY 6 HOURS PRN
Status: DISCONTINUED | OUTPATIENT
Start: 2017-01-01 | End: 2017-01-01 | Stop reason: SDUPTHER

## 2017-01-01 RX ORDER — SODIUM CHLORIDE 9 MG/ML
INJECTION, SOLUTION INTRAVENOUS AS NEEDED
Status: DISCONTINUED | OUTPATIENT
Start: 2017-01-01 | End: 2017-01-01 | Stop reason: HOSPADM

## 2017-01-01 RX ORDER — NITROGLYCERIN 5 MG/ML
INJECTION, SOLUTION INTRAVENOUS AS NEEDED
Status: DISCONTINUED | OUTPATIENT
Start: 2017-01-01 | End: 2017-01-01 | Stop reason: HOSPADM

## 2017-01-01 RX ORDER — CEFAZOLIN SODIUM 2 G/100ML
2 INJECTION, SOLUTION INTRAVENOUS ONCE
Status: COMPLETED | OUTPATIENT
Start: 2017-01-01 | End: 2017-01-01

## 2017-01-01 RX ORDER — HYDROMORPHONE HYDROCHLORIDE 1 MG/ML
0.25 INJECTION, SOLUTION INTRAMUSCULAR; INTRAVENOUS; SUBCUTANEOUS
Status: DISCONTINUED | OUTPATIENT
Start: 2017-01-01 | End: 2017-01-01 | Stop reason: HOSPADM

## 2017-01-01 RX ORDER — CARVEDILOL 6.25 MG/1
6.25 TABLET ORAL EVERY 12 HOURS SCHEDULED
Status: DISCONTINUED | OUTPATIENT
Start: 2017-01-01 | End: 2017-01-01 | Stop reason: HOSPADM

## 2017-01-01 RX ORDER — DIPHENHYDRAMINE HCL 25 MG
25 CAPSULE ORAL EVERY 6 HOURS PRN
Status: DISCONTINUED | OUTPATIENT
Start: 2017-01-01 | End: 2017-01-01 | Stop reason: HOSPADM

## 2017-01-01 RX ORDER — FERROUS SULFATE 325(65) MG
325 TABLET ORAL
Status: DISCONTINUED | OUTPATIENT
Start: 2017-01-01 | End: 2017-01-01

## 2017-01-01 RX ORDER — POTASSIUM CHLORIDE 750 MG/1
40 CAPSULE, EXTENDED RELEASE ORAL AS NEEDED
Status: DISCONTINUED | OUTPATIENT
Start: 2017-01-01 | End: 2017-01-01 | Stop reason: HOSPADM

## 2017-01-01 RX ORDER — METOLAZONE 2.5 MG/1
2.5 TABLET ORAL 3 TIMES WEEKLY
Qty: 30 TABLET | Refills: 0 | Status: ON HOLD | OUTPATIENT
Start: 2017-01-01 | End: 2017-01-01

## 2017-01-01 RX ORDER — LIDOCAINE HYDROCHLORIDE 10 MG/ML
INJECTION, SOLUTION INFILTRATION; PERINEURAL AS NEEDED
Status: DISCONTINUED | OUTPATIENT
Start: 2017-01-01 | End: 2017-01-01 | Stop reason: SURG

## 2017-01-01 RX ORDER — LIDOCAINE HYDROCHLORIDE 10 MG/ML
0.5 INJECTION, SOLUTION EPIDURAL; INFILTRATION; INTRACAUDAL; PERINEURAL ONCE AS NEEDED
Status: DISCONTINUED | OUTPATIENT
Start: 2017-01-01 | End: 2017-01-01 | Stop reason: HOSPADM

## 2017-01-01 RX ORDER — BISACODYL 10 MG
10 SUPPOSITORY, RECTAL RECTAL DAILY PRN
Status: DISCONTINUED | OUTPATIENT
Start: 2017-01-01 | End: 2017-01-01 | Stop reason: HOSPADM

## 2017-01-01 RX ORDER — SUCCINYLCHOLINE CHLORIDE 20 MG/ML
INJECTION INTRAMUSCULAR; INTRAVENOUS AS NEEDED
Status: DISCONTINUED | OUTPATIENT
Start: 2017-01-01 | End: 2017-01-01 | Stop reason: SURG

## 2017-01-01 RX ORDER — MAGNESIUM SULFATE HEPTAHYDRATE 40 MG/ML
4 INJECTION, SOLUTION INTRAVENOUS ONCE
Status: COMPLETED | OUTPATIENT
Start: 2017-01-01 | End: 2017-01-01

## 2017-01-01 RX ORDER — MORPHINE SULFATE 2 MG/ML
1 INJECTION, SOLUTION INTRAMUSCULAR; INTRAVENOUS EVERY 4 HOURS PRN
Status: DISCONTINUED | OUTPATIENT
Start: 2017-01-01 | End: 2017-01-01

## 2017-01-01 RX ORDER — CARVEDILOL 3.12 MG/1
3.12 TABLET ORAL EVERY 12 HOURS SCHEDULED
Qty: 60 TABLET | Refills: 11 | Status: SHIPPED | OUTPATIENT
Start: 2017-01-01 | End: 2017-01-01 | Stop reason: HOSPADM

## 2017-01-01 RX ORDER — SODIUM CHLORIDE 0.9 % (FLUSH) 0.9 %
10 SYRINGE (ML) INJECTION AS NEEDED
Status: DISCONTINUED | OUTPATIENT
Start: 2017-01-01 | End: 2017-01-01

## 2017-01-01 RX ORDER — FAMOTIDINE 10 MG/ML
20 INJECTION, SOLUTION INTRAVENOUS DAILY
Status: DISCONTINUED | OUTPATIENT
Start: 2017-01-01 | End: 2017-01-01

## 2017-01-01 RX ORDER — PROMETHAZINE HYDROCHLORIDE 25 MG/1
25 TABLET ORAL ONCE AS NEEDED
Status: DISCONTINUED | OUTPATIENT
Start: 2017-01-01 | End: 2017-01-01 | Stop reason: HOSPADM

## 2017-01-01 RX ORDER — ONDANSETRON 4 MG/1
4 TABLET, FILM COATED ORAL EVERY 6 HOURS PRN
Status: DISCONTINUED | OUTPATIENT
Start: 2017-01-01 | End: 2017-01-01

## 2017-01-01 RX ORDER — ONDANSETRON 4 MG/1
4 TABLET, FILM COATED ORAL EVERY 6 HOURS PRN
Start: 2017-01-01 | End: 2017-01-01 | Stop reason: HOSPADM

## 2017-01-01 RX ORDER — ENTECAVIR 0.5 MG/1
0.5 TABLET, FILM COATED ORAL
Start: 2017-01-01 | End: 2018-01-01 | Stop reason: SDUPTHER

## 2017-01-01 RX ORDER — METOCLOPRAMIDE HYDROCHLORIDE 5 MG/ML
10 INJECTION INTRAMUSCULAR; INTRAVENOUS EVERY 6 HOURS SCHEDULED
Status: DISCONTINUED | OUTPATIENT
Start: 2017-01-01 | End: 2017-01-01 | Stop reason: HOSPADM

## 2017-01-01 RX ORDER — MAGNESIUM SULFATE HEPTAHYDRATE 40 MG/ML
4 INJECTION, SOLUTION INTRAVENOUS AS NEEDED
Status: DISCONTINUED | OUTPATIENT
Start: 2017-01-01 | End: 2017-01-01 | Stop reason: HOSPADM

## 2017-01-01 RX ORDER — LABETALOL HYDROCHLORIDE 5 MG/ML
10 INJECTION, SOLUTION INTRAVENOUS
Status: DISCONTINUED | OUTPATIENT
Start: 2017-01-01 | End: 2017-01-01

## 2017-01-01 RX ORDER — FENTANYL CITRATE 50 UG/ML
INJECTION, SOLUTION INTRAMUSCULAR; INTRAVENOUS AS NEEDED
Status: DISCONTINUED | OUTPATIENT
Start: 2017-01-01 | End: 2017-01-01 | Stop reason: SURG

## 2017-01-01 RX ORDER — HYDRALAZINE HYDROCHLORIDE 20 MG/ML
10 INJECTION INTRAMUSCULAR; INTRAVENOUS EVERY 4 HOURS PRN
Status: DISCONTINUED | OUTPATIENT
Start: 2017-01-01 | End: 2017-01-01

## 2017-01-01 RX ORDER — SODIUM CHLORIDE, SODIUM LACTATE, POTASSIUM CHLORIDE, CALCIUM CHLORIDE 600; 310; 30; 20 MG/100ML; MG/100ML; MG/100ML; MG/100ML
9 INJECTION, SOLUTION INTRAVENOUS CONTINUOUS
Status: DISCONTINUED | OUTPATIENT
Start: 2017-01-01 | End: 2017-01-01

## 2017-01-01 RX ORDER — FENTANYL CITRATE 50 UG/ML
50 INJECTION, SOLUTION INTRAMUSCULAR; INTRAVENOUS
Status: CANCELLED | OUTPATIENT
Start: 2017-01-01

## 2017-01-01 RX ORDER — PAROXETINE 10 MG/1
30 TABLET, FILM COATED ORAL EVERY MORNING
Status: DISCONTINUED | OUTPATIENT
Start: 2017-01-01 | End: 2017-01-01

## 2017-01-01 RX ORDER — MONTELUKAST SODIUM 10 MG/1
10 TABLET ORAL NIGHTLY
Status: DISCONTINUED | OUTPATIENT
Start: 2017-01-01 | End: 2017-01-01

## 2017-01-01 RX ORDER — PSEUDOEPHEDRINE HCL 30 MG
100 TABLET ORAL 2 TIMES DAILY PRN
Start: 2017-01-01 | End: 2017-01-01 | Stop reason: HOSPADM

## 2017-01-01 RX ORDER — FAMOTIDINE 20 MG/1
20 TABLET, FILM COATED ORAL DAILY
Status: DISCONTINUED | OUTPATIENT
Start: 2017-01-01 | End: 2017-01-01 | Stop reason: HOSPADM

## 2017-01-01 RX ORDER — FAMOTIDINE 20 MG/1
20 TABLET, FILM COATED ORAL
Status: COMPLETED | OUTPATIENT
Start: 2017-01-01 | End: 2017-01-01

## 2017-01-01 RX ORDER — CLOPIDOGREL BISULFATE 75 MG/1
75 TABLET ORAL DAILY
Qty: 30 TABLET | Refills: 11 | Status: SHIPPED | OUTPATIENT
Start: 2017-01-01 | End: 2017-01-01 | Stop reason: HOSPADM

## 2017-01-01 RX ORDER — SENNA AND DOCUSATE SODIUM 50; 8.6 MG/1; MG/1
1 TABLET, FILM COATED ORAL NIGHTLY PRN
Start: 2017-01-01 | End: 2017-01-01 | Stop reason: HOSPADM

## 2017-01-01 RX ORDER — SODIUM POLYSTYRENE SULFONATE 15 G/60ML
15 SUSPENSION ORAL; RECTAL ONCE
Status: COMPLETED | OUTPATIENT
Start: 2017-01-01 | End: 2017-01-01

## 2017-01-01 RX ORDER — HYDRALAZINE HYDROCHLORIDE 20 MG/ML
20 INJECTION INTRAMUSCULAR; INTRAVENOUS EVERY 4 HOURS PRN
Status: DISCONTINUED | OUTPATIENT
Start: 2017-01-01 | End: 2017-01-01

## 2017-01-01 RX ORDER — SODIUM CHLORIDE 450 MG/100ML
50 INJECTION, SOLUTION INTRAVENOUS CONTINUOUS
Status: DISCONTINUED | OUTPATIENT
Start: 2017-01-01 | End: 2017-01-01

## 2017-01-01 RX ORDER — SODIUM CHLORIDE, SODIUM LACTATE, POTASSIUM CHLORIDE, CALCIUM CHLORIDE 600; 310; 30; 20 MG/100ML; MG/100ML; MG/100ML; MG/100ML
10 INJECTION, SOLUTION INTRAVENOUS CONTINUOUS
Status: DISCONTINUED | OUTPATIENT
Start: 2017-01-01 | End: 2017-01-01

## 2017-01-01 RX ORDER — DOCUSATE SODIUM 50 MG/5 ML
100 LIQUID (ML) ORAL 2 TIMES DAILY
Status: DISCONTINUED | OUTPATIENT
Start: 2017-01-01 | End: 2017-01-01

## 2017-01-01 RX ORDER — HYDROCODONE BITARTRATE AND ACETAMINOPHEN 7.5; 325 MG/1; MG/1
2 TABLET ORAL EVERY 4 HOURS PRN
Status: DISCONTINUED | OUTPATIENT
Start: 2017-01-01 | End: 2017-01-01 | Stop reason: HOSPADM

## 2017-01-01 RX ORDER — METOLAZONE 2.5 MG/1
2.5 TABLET ORAL AS NEEDED
COMMUNITY
End: 2017-01-01 | Stop reason: HOSPADM

## 2017-01-01 RX ORDER — LABETALOL HYDROCHLORIDE 5 MG/ML
5 INJECTION, SOLUTION INTRAVENOUS
Status: DISCONTINUED | OUTPATIENT
Start: 2017-01-01 | End: 2017-01-01 | Stop reason: HOSPADM

## 2017-01-01 RX ORDER — PANTOPRAZOLE SODIUM 40 MG/10ML
40 INJECTION, POWDER, LYOPHILIZED, FOR SOLUTION INTRAVENOUS
Status: DISCONTINUED | OUTPATIENT
Start: 2017-01-01 | End: 2017-01-01

## 2017-01-01 RX ORDER — METHYLPREDNISOLONE SODIUM SUCCINATE 40 MG/ML
20 INJECTION, POWDER, LYOPHILIZED, FOR SOLUTION INTRAMUSCULAR; INTRAVENOUS ONCE
Status: COMPLETED | OUTPATIENT
Start: 2017-01-01 | End: 2017-01-01

## 2017-01-01 RX ORDER — MIDAZOLAM HYDROCHLORIDE 1 MG/ML
INJECTION INTRAMUSCULAR; INTRAVENOUS AS NEEDED
Status: DISCONTINUED | OUTPATIENT
Start: 2017-01-01 | End: 2017-01-01 | Stop reason: HOSPADM

## 2017-01-01 RX ORDER — CLOPIDOGREL BISULFATE 75 MG/1
75 TABLET ORAL DAILY
Status: DISCONTINUED | OUTPATIENT
Start: 2017-01-01 | End: 2017-01-01

## 2017-01-01 RX ORDER — POTASSIUM CHLORIDE 1.5 G/1.77G
40 POWDER, FOR SOLUTION ORAL AS NEEDED
Status: DISCONTINUED | OUTPATIENT
Start: 2017-01-01 | End: 2017-01-01 | Stop reason: HOSPADM

## 2017-01-01 RX ORDER — ENTECAVIR 0.5 MG/1
0.5 TABLET, FILM COATED ORAL DAILY
Status: DISCONTINUED | OUTPATIENT
Start: 2017-01-01 | End: 2017-01-01 | Stop reason: HOSPADM

## 2017-01-01 RX ORDER — ATORVASTATIN CALCIUM 40 MG/1
40 TABLET, FILM COATED ORAL NIGHTLY
Status: DISCONTINUED | OUTPATIENT
Start: 2017-01-01 | End: 2017-01-01

## 2017-01-01 RX ORDER — AMLODIPINE BESYLATE 5 MG/1
5 TABLET ORAL
Start: 2017-01-01 | End: 2017-01-01 | Stop reason: HOSPADM

## 2017-01-01 RX ORDER — RIVASTIGMINE 9.5 MG/24H
1 PATCH, EXTENDED RELEASE TRANSDERMAL DAILY
Start: 2017-01-01 | End: 2018-01-01

## 2017-01-01 RX ORDER — METOLAZONE 2.5 MG/1
2.5 TABLET ORAL 3 TIMES WEEKLY
Status: DISCONTINUED | OUTPATIENT
Start: 2017-01-01 | End: 2017-01-01 | Stop reason: HOSPADM

## 2017-01-01 RX ORDER — ESMOLOL HYDROCHLORIDE 10 MG/ML
INJECTION INTRAVENOUS AS NEEDED
Status: DISCONTINUED | OUTPATIENT
Start: 2017-01-01 | End: 2017-01-01 | Stop reason: SURG

## 2017-01-01 RX ORDER — ONDANSETRON 4 MG/1
4 TABLET, FILM COATED ORAL EVERY 6 HOURS PRN
Status: DISCONTINUED | OUTPATIENT
Start: 2017-01-01 | End: 2017-01-01 | Stop reason: HOSPADM

## 2017-01-01 RX ORDER — FUROSEMIDE 10 MG/ML
80 INJECTION INTRAMUSCULAR; INTRAVENOUS ONCE
Status: DISCONTINUED | OUTPATIENT
Start: 2017-01-01 | End: 2017-01-01

## 2017-01-01 RX ORDER — CHLORHEXIDINE GLUCONATE 0.12 MG/ML
15 RINSE ORAL EVERY 12 HOURS SCHEDULED
Status: DISCONTINUED | OUTPATIENT
Start: 2017-01-01 | End: 2017-01-01

## 2017-01-01 RX ORDER — AMINO ACIDS/PROTEIN HYDROLYS 15G-100/30
1 LIQUID (ML) ORAL DAILY
Status: DISCONTINUED | OUTPATIENT
Start: 2017-01-01 | End: 2017-01-01 | Stop reason: HOSPADM

## 2017-01-01 RX ORDER — DIAPER,BRIEF,INFANT-TODD,DISP
EACH MISCELLANEOUS EVERY 8 HOURS SCHEDULED
Status: DISCONTINUED | OUTPATIENT
Start: 2017-01-01 | End: 2017-01-01

## 2017-01-01 RX ORDER — BUPROPION HYDROCHLORIDE 75 MG/1
75 TABLET ORAL EVERY 12 HOURS SCHEDULED
Start: 2017-01-01 | End: 2018-01-01 | Stop reason: SDUPTHER

## 2017-01-01 RX ORDER — DRONABINOL 2.5 MG/1
2.5 CAPSULE ORAL
COMMUNITY
End: 2017-01-01 | Stop reason: HOSPADM

## 2017-01-01 RX ORDER — HEPARIN SODIUM 5000 [USP'U]/ML
5000 INJECTION, SOLUTION INTRAVENOUS; SUBCUTANEOUS EVERY 8 HOURS SCHEDULED
Status: DISCONTINUED | OUTPATIENT
Start: 2017-01-01 | End: 2017-01-01 | Stop reason: HOSPADM

## 2017-01-01 RX ORDER — SENNA AND DOCUSATE SODIUM 50; 8.6 MG/1; MG/1
1 TABLET, FILM COATED ORAL NIGHTLY PRN
Status: DISCONTINUED | OUTPATIENT
Start: 2017-01-01 | End: 2017-01-01

## 2017-01-01 RX ORDER — DEXTROSE MONOHYDRATE 50 MG/ML
50 INJECTION, SOLUTION INTRAVENOUS CONTINUOUS
Status: ACTIVE | OUTPATIENT
Start: 2017-01-01 | End: 2017-01-01

## 2017-01-01 RX ORDER — ATORVASTATIN CALCIUM 10 MG/1
10 TABLET, FILM COATED ORAL DAILY
Qty: 90 TABLET | Refills: 4 | Status: SHIPPED | OUTPATIENT
Start: 2017-01-01 | End: 2017-01-01 | Stop reason: HOSPADM

## 2017-01-01 RX ORDER — METOCLOPRAMIDE HYDROCHLORIDE 5 MG/ML
10 INJECTION INTRAMUSCULAR; INTRAVENOUS EVERY 6 HOURS
Status: DISCONTINUED | OUTPATIENT
Start: 2017-01-01 | End: 2017-01-01

## 2017-01-01 RX ORDER — BACITRACIN, NEOMYCIN, POLYMYXIN B 400; 3.5; 5 [USP'U]/G; MG/G; [USP'U]/G
1 OINTMENT TOPICAL ONCE
Status: COMPLETED | OUTPATIENT
Start: 2017-01-01 | End: 2017-01-01

## 2017-01-01 RX ADMIN — ENTECAVIR 0.5 MG: 0.5 TABLET ORAL at 09:00

## 2017-01-01 RX ADMIN — SENNOSIDES 17.2 MG: 8.6 TABLET, FILM COATED ORAL at 12:03

## 2017-01-01 RX ADMIN — FUROSEMIDE 40 MG: 10 INJECTION, SOLUTION INTRAMUSCULAR; INTRAVENOUS at 04:45

## 2017-01-01 RX ADMIN — DEXAMETHASONE SODIUM PHOSPHATE 4 MG: 4 INJECTION, SOLUTION INTRA-ARTICULAR; INTRALESIONAL; INTRAMUSCULAR; INTRAVENOUS; SOFT TISSUE at 00:09

## 2017-01-01 RX ADMIN — DEXAMETHASONE SODIUM PHOSPHATE 4 MG: 4 INJECTION, SOLUTION INTRA-ARTICULAR; INTRALESIONAL; INTRAMUSCULAR; INTRAVENOUS; SOFT TISSUE at 00:13

## 2017-01-01 RX ADMIN — ASPIRIN 81 MG 81 MG: 81 TABLET ORAL at 08:35

## 2017-01-01 RX ADMIN — RIVASTIGMINE TRANSDERMAL SYSTEM 1 PATCH: 9.5 PATCH, EXTENDED RELEASE TRANSDERMAL at 08:41

## 2017-01-01 RX ADMIN — HEPARIN SODIUM 5000 UNITS: 5000 INJECTION, SOLUTION INTRAVENOUS; SUBCUTANEOUS at 22:25

## 2017-01-01 RX ADMIN — NICARDIPINE HYDROCHLORIDE 5 MG/HR: 0.1 INJECTION, SOLUTION INTRAVENOUS at 11:28

## 2017-01-01 RX ADMIN — ACETAMINOPHEN 650 MG: 325 TABLET, FILM COATED ORAL at 00:14

## 2017-01-01 RX ADMIN — HEPARIN SODIUM 5000 UNITS: 5000 INJECTION, SOLUTION INTRAVENOUS; SUBCUTANEOUS at 09:07

## 2017-01-01 RX ADMIN — BUDESONIDE AND FORMOTEROL FUMARATE DIHYDRATE 2 PUFF: 160; 4.5 AEROSOL RESPIRATORY (INHALATION) at 21:03

## 2017-01-01 RX ADMIN — RIVASTIGMINE TRANSDERMAL SYSTEM 1 PATCH: 9.5 PATCH, EXTENDED RELEASE TRANSDERMAL at 08:25

## 2017-01-01 RX ADMIN — TAZOBACTAM SODIUM AND PIPERACILLIN SODIUM 3.38 G: 375; 3 INJECTION, SOLUTION INTRAVENOUS at 15:32

## 2017-01-01 RX ADMIN — SACUBITRIL AND VALSARTAN 0.5 TABLET: 49; 51 TABLET, FILM COATED ORAL at 20:16

## 2017-01-01 RX ADMIN — Medication 325 MG: at 08:00

## 2017-01-01 RX ADMIN — BUDESONIDE AND FORMOTEROL FUMARATE DIHYDRATE 2 PUFF: 160; 4.5 AEROSOL RESPIRATORY (INHALATION) at 08:29

## 2017-01-01 RX ADMIN — RIVASTIGMINE TRANSDERMAL SYSTEM 1 PATCH: 9.5 PATCH, EXTENDED RELEASE TRANSDERMAL at 08:15

## 2017-01-01 RX ADMIN — ASPIRIN 81 MG 81 MG: 81 TABLET ORAL at 10:25

## 2017-01-01 RX ADMIN — SACUBITRIL AND VALSARTAN 1 TABLET: 49; 51 TABLET, FILM COATED ORAL at 20:40

## 2017-01-01 RX ADMIN — FAMOTIDINE 20 MG: 20 TABLET ORAL at 17:32

## 2017-01-01 RX ADMIN — BUPROPION HYDROCHLORIDE 75 MG: 75 TABLET, FILM COATED ORAL at 11:25

## 2017-01-01 RX ADMIN — ASPIRIN 81 MG CHEWABLE TABLET 81 MG: 81 TABLET CHEWABLE at 10:02

## 2017-01-01 RX ADMIN — SENNOSIDES A AND B 10 ML: 415.36 LIQUID ORAL at 18:18

## 2017-01-01 RX ADMIN — TAZOBACTAM SODIUM AND PIPERACILLIN SODIUM 3.38 G: 375; 3 INJECTION, SOLUTION INTRAVENOUS at 06:28

## 2017-01-01 RX ADMIN — BUPROPION HYDROCHLORIDE 75 MG: 75 TABLET, FILM COATED ORAL at 20:46

## 2017-01-01 RX ADMIN — SACUBITRIL AND VALSARTAN 0.5 TABLET: 49; 51 TABLET, FILM COATED ORAL at 10:02

## 2017-01-01 RX ADMIN — METOPROLOL TARTRATE 2.5 MG: 5 INJECTION, SOLUTION INTRAVENOUS at 11:48

## 2017-01-01 RX ADMIN — SODIUM CHLORIDE, POTASSIUM CHLORIDE, SODIUM LACTATE AND CALCIUM CHLORIDE 100 ML/HR: 600; 310; 30; 20 INJECTION, SOLUTION INTRAVENOUS at 11:44

## 2017-01-01 RX ADMIN — HEPARIN SODIUM 5000 UNITS: 5000 INJECTION, SOLUTION INTRAVENOUS; SUBCUTANEOUS at 06:02

## 2017-01-01 RX ADMIN — ENTECAVIR 0.5 MG: 0.5 TABLET ORAL at 12:21

## 2017-01-01 RX ADMIN — MEMANTINE 10 MG: 10 TABLET ORAL at 20:44

## 2017-01-01 RX ADMIN — POTASSIUM CHLORIDE 40 MEQ: 1.5 POWDER, FOR SOLUTION ORAL at 16:43

## 2017-01-01 RX ADMIN — LEVOFLOXACIN 500 MG: 500 TABLET, FILM COATED ORAL at 13:55

## 2017-01-01 RX ADMIN — BUDESONIDE AND FORMOTEROL FUMARATE DIHYDRATE 2 PUFF: 160; 4.5 AEROSOL RESPIRATORY (INHALATION) at 09:50

## 2017-01-01 RX ADMIN — ENTECAVIR 0.5 MG: 0.5 TABLET ORAL at 08:34

## 2017-01-01 RX ADMIN — SACUBITRIL AND VALSARTAN 0.5 TABLET: 49; 51 TABLET, FILM COATED ORAL at 17:32

## 2017-01-01 RX ADMIN — ASPIRIN 81 MG 81 MG: 81 TABLET ORAL at 08:27

## 2017-01-01 RX ADMIN — DEXAMETHASONE 2 MG: 4 TABLET ORAL at 15:30

## 2017-01-01 RX ADMIN — SACUBITRIL AND VALSARTAN 1 TABLET: 49; 51 TABLET, FILM COATED ORAL at 08:46

## 2017-01-01 RX ADMIN — SODIUM CHLORIDE 3 ML/KG/HR: 9 INJECTION, SOLUTION INTRAVENOUS at 14:31

## 2017-01-01 RX ADMIN — SACUBITRIL AND VALSARTAN 0.5 TABLET: 49; 51 TABLET, FILM COATED ORAL at 17:53

## 2017-01-01 RX ADMIN — RIVASTIGMINE TRANSDERMAL SYSTEM 1 PATCH: 9.5 PATCH, EXTENDED RELEASE TRANSDERMAL at 08:42

## 2017-01-01 RX ADMIN — BUDESONIDE AND FORMOTEROL FUMARATE DIHYDRATE 2 PUFF: 160; 4.5 AEROSOL RESPIRATORY (INHALATION) at 13:10

## 2017-01-01 RX ADMIN — FAMOTIDINE 20 MG: 20 TABLET, FILM COATED ORAL at 07:41

## 2017-01-01 RX ADMIN — IPRATROPIUM BROMIDE AND ALBUTEROL SULFATE 3 ML: .5; 3 SOLUTION RESPIRATORY (INHALATION) at 08:00

## 2017-01-01 RX ADMIN — POTASSIUM & SODIUM PHOSPHATES POWDER PACK 280-160-250 MG 2 PACKET: 280-160-250 PACK at 18:06

## 2017-01-01 RX ADMIN — SACUBITRIL AND VALSARTAN 0.5 TABLET: 49; 51 TABLET, FILM COATED ORAL at 18:04

## 2017-01-01 RX ADMIN — PAROXETINE HYDROCHLORIDE 30 MG: 10 TABLET, FILM COATED ORAL at 06:30

## 2017-01-01 RX ADMIN — ATORVASTATIN CALCIUM 10 MG: 10 TABLET, FILM COATED ORAL at 21:15

## 2017-01-01 RX ADMIN — IPRATROPIUM BROMIDE AND ALBUTEROL SULFATE 3 ML: .5; 3 SOLUTION RESPIRATORY (INHALATION) at 20:02

## 2017-01-01 RX ADMIN — SACUBITRIL AND VALSARTAN 0.5 TABLET: 49; 51 TABLET, FILM COATED ORAL at 17:18

## 2017-01-01 RX ADMIN — OXYCODONE HYDROCHLORIDE AND ACETAMINOPHEN 250 MG: 500 TABLET ORAL at 08:00

## 2017-01-01 RX ADMIN — IPRATROPIUM BROMIDE AND ALBUTEROL SULFATE 3 ML: .5; 3 SOLUTION RESPIRATORY (INHALATION) at 08:41

## 2017-01-01 RX ADMIN — MEMANTINE 10 MG: 10 TABLET ORAL at 10:10

## 2017-01-01 RX ADMIN — METOCLOPRAMIDE 10 MG: 5 INJECTION, SOLUTION INTRAMUSCULAR; INTRAVENOUS at 05:51

## 2017-01-01 RX ADMIN — SODIUM CHLORIDE 125 ML/HR: 9 INJECTION, SOLUTION INTRAVENOUS at 04:40

## 2017-01-01 RX ADMIN — OXYCODONE HYDROCHLORIDE AND ACETAMINOPHEN 250 MG: 500 TABLET ORAL at 17:43

## 2017-01-01 RX ADMIN — SACUBITRIL AND VALSARTAN 1 TABLET: 49; 51 TABLET, FILM COATED ORAL at 20:44

## 2017-01-01 RX ADMIN — MONTELUKAST SODIUM 10 MG: 10 TABLET, FILM COATED ORAL at 21:23

## 2017-01-01 RX ADMIN — Medication 1 PACKET: at 09:15

## 2017-01-01 RX ADMIN — LABETALOL HYDROCHLORIDE 10 MG: 5 INJECTION INTRAVENOUS at 01:18

## 2017-01-01 RX ADMIN — ACETAMINOPHEN 650 MG: 325 TABLET, FILM COATED ORAL at 10:26

## 2017-01-01 RX ADMIN — SODIUM CHLORIDE 1000 ML: 9 INJECTION, SOLUTION INTRAVENOUS at 01:25

## 2017-01-01 RX ADMIN — METOCLOPRAMIDE 10 MG: 5 INJECTION, SOLUTION INTRAMUSCULAR; INTRAVENOUS at 18:33

## 2017-01-01 RX ADMIN — HEPARIN SODIUM 5000 UNITS: 5000 INJECTION, SOLUTION INTRAVENOUS; SUBCUTANEOUS at 20:43

## 2017-01-01 RX ADMIN — MEMANTINE 10 MG: 10 TABLET ORAL at 20:24

## 2017-01-01 RX ADMIN — MEMANTINE 10 MG: 10 TABLET ORAL at 09:08

## 2017-01-01 RX ADMIN — DEXAMETHASONE SODIUM PHOSPHATE 4 MG: 4 INJECTION, SOLUTION INTRA-ARTICULAR; INTRALESIONAL; INTRAMUSCULAR; INTRAVENOUS; SOFT TISSUE at 11:55

## 2017-01-01 RX ADMIN — PAROXETINE 30 MG: 10 TABLET, FILM COATED ORAL at 06:03

## 2017-01-01 RX ADMIN — RIVASTIGMINE TRANSDERMAL SYSTEM 1 PATCH: 9.5 PATCH, EXTENDED RELEASE TRANSDERMAL at 08:38

## 2017-01-01 RX ADMIN — MEMANTINE 10 MG: 10 TABLET ORAL at 08:41

## 2017-01-01 RX ADMIN — Medication 1 PACKET: at 20:10

## 2017-01-01 RX ADMIN — ATORVASTATIN CALCIUM 10 MG: 10 TABLET, FILM COATED ORAL at 20:24

## 2017-01-01 RX ADMIN — NICARDIPINE HYDROCHLORIDE 7.5 MG/HR: 2.5 INJECTION INTRAVENOUS at 13:46

## 2017-01-01 RX ADMIN — HEPARIN SODIUM 5000 UNITS: 5000 INJECTION, SOLUTION INTRAVENOUS; SUBCUTANEOUS at 20:40

## 2017-01-01 RX ADMIN — RIVASTIGMINE TRANSDERMAL SYSTEM 1 PATCH: 9.5 PATCH, EXTENDED RELEASE TRANSDERMAL at 09:06

## 2017-01-01 RX ADMIN — FUROSEMIDE 40 MG: 10 INJECTION, SOLUTION INTRAMUSCULAR; INTRAVENOUS at 11:05

## 2017-01-01 RX ADMIN — ENTECAVIR 0.5 MG: 0.5 TABLET ORAL at 09:48

## 2017-01-01 RX ADMIN — PAROXETINE HYDROCHLORIDE 30 MG: 10 TABLET, FILM COATED ORAL at 06:13

## 2017-01-01 RX ADMIN — OXYCODONE HYDROCHLORIDE AND ACETAMINOPHEN 250 MG: 500 TABLET ORAL at 18:00

## 2017-01-01 RX ADMIN — METOCLOPRAMIDE 10 MG: 5 INJECTION, SOLUTION INTRAMUSCULAR; INTRAVENOUS at 23:30

## 2017-01-01 RX ADMIN — FAMOTIDINE 20 MG: 20 TABLET ORAL at 09:30

## 2017-01-01 RX ADMIN — SACUBITRIL AND VALSARTAN 0.5 TABLET: 49; 51 TABLET, FILM COATED ORAL at 09:35

## 2017-01-01 RX ADMIN — POTASSIUM & SODIUM PHOSPHATES POWDER PACK 280-160-250 MG 2 PACKET: 280-160-250 PACK at 20:45

## 2017-01-01 RX ADMIN — HEPARIN SODIUM 5000 UNITS: 5000 INJECTION, SOLUTION INTRAVENOUS; SUBCUTANEOUS at 20:32

## 2017-01-01 RX ADMIN — RIVASTIGMINE TRANSDERMAL SYSTEM 1 PATCH: 9.5 PATCH, EXTENDED RELEASE TRANSDERMAL at 09:26

## 2017-01-01 RX ADMIN — METOCLOPRAMIDE 10 MG: 5 INJECTION, SOLUTION INTRAMUSCULAR; INTRAVENOUS at 18:38

## 2017-01-01 RX ADMIN — TAZOBACTAM SODIUM AND PIPERACILLIN SODIUM 3.38 G: 375; 3 INJECTION, SOLUTION INTRAVENOUS at 06:03

## 2017-01-01 RX ADMIN — ASPIRIN 81 MG CHEWABLE TABLET 81 MG: 81 TABLET CHEWABLE at 08:41

## 2017-01-01 RX ADMIN — ATRACURIUM BESYLATE 50 MG: 10 INJECTION, SOLUTION INTRAVENOUS at 08:11

## 2017-01-01 RX ADMIN — ENTECAVIR 0.5 MG: 0.5 TABLET ORAL at 06:24

## 2017-01-01 RX ADMIN — Medication 1 PACKET: at 08:40

## 2017-01-01 RX ADMIN — CARVEDILOL 3.12 MG: 3.12 TABLET, FILM COATED ORAL at 09:08

## 2017-01-01 RX ADMIN — MEMANTINE 10 MG: 10 TABLET ORAL at 17:44

## 2017-01-01 RX ADMIN — Medication 1 PACKET: at 09:21

## 2017-01-01 RX ADMIN — ASPIRIN 81 MG CHEWABLE TABLET 81 MG: 81 TABLET CHEWABLE at 08:45

## 2017-01-01 RX ADMIN — OXYCODONE HYDROCHLORIDE AND ACETAMINOPHEN 250 MG: 500 TABLET ORAL at 17:02

## 2017-01-01 RX ADMIN — TAZOBACTAM SODIUM AND PIPERACILLIN SODIUM 3.38 G: 375; 3 INJECTION, SOLUTION INTRAVENOUS at 09:28

## 2017-01-01 RX ADMIN — NICARDIPINE HYDROCHLORIDE 5 MG/HR: 25 INJECTION INTRAVENOUS at 10:45

## 2017-01-01 RX ADMIN — FUROSEMIDE 40 MG: 40 TABLET ORAL at 09:30

## 2017-01-01 RX ADMIN — METOCLOPRAMIDE 10 MG: 5 INJECTION, SOLUTION INTRAMUSCULAR; INTRAVENOUS at 00:15

## 2017-01-01 RX ADMIN — ACETAMINOPHEN 650 MG: 325 TABLET ORAL at 18:58

## 2017-01-01 RX ADMIN — RIVASTIGMINE TRANSDERMAL SYSTEM 1 PATCH: 9.5 PATCH, EXTENDED RELEASE TRANSDERMAL at 08:27

## 2017-01-01 RX ADMIN — BUDESONIDE AND FORMOTEROL FUMARATE DIHYDRATE 2 PUFF: 160; 4.5 AEROSOL RESPIRATORY (INHALATION) at 19:57

## 2017-01-01 RX ADMIN — FAMOTIDINE 20 MG: 20 TABLET ORAL at 09:42

## 2017-01-01 RX ADMIN — CARVEDILOL 6.25 MG: 6.25 TABLET, FILM COATED ORAL at 23:30

## 2017-01-01 RX ADMIN — IPRATROPIUM BROMIDE AND ALBUTEROL SULFATE 3 ML: .5; 3 SOLUTION RESPIRATORY (INHALATION) at 12:54

## 2017-01-01 RX ADMIN — MEMANTINE 10 MG: 10 TABLET ORAL at 20:48

## 2017-01-01 RX ADMIN — POTASSIUM CHLORIDE 10 MEQ: 7.46 INJECTION, SOLUTION INTRAVENOUS at 15:20

## 2017-01-01 RX ADMIN — PAROXETINE 30 MG: 10 TABLET, FILM COATED ORAL at 06:30

## 2017-01-01 RX ADMIN — ACETAMINOPHEN 650 MG: 325 TABLET, FILM COATED ORAL at 20:45

## 2017-01-01 RX ADMIN — FAMOTIDINE 20 MG: 20 TABLET ORAL at 08:51

## 2017-01-01 RX ADMIN — POTASSIUM CHLORIDE 10 MEQ: 7.46 INJECTION, SOLUTION INTRAVENOUS at 02:00

## 2017-01-01 RX ADMIN — ATORVASTATIN CALCIUM 40 MG: 40 TABLET, FILM COATED ORAL at 20:22

## 2017-01-01 RX ADMIN — ASPIRIN 81 MG 81 MG: 81 TABLET ORAL at 09:18

## 2017-01-01 RX ADMIN — MONTELUKAST SODIUM 10 MG: 10 TABLET, FILM COATED ORAL at 20:40

## 2017-01-01 RX ADMIN — MONTELUKAST SODIUM 10 MG: 10 TABLET, FILM COATED ORAL at 20:22

## 2017-01-01 RX ADMIN — PHENYLEPHRINE HYDROCHLORIDE 80 MCG: 10 INJECTION INTRAVENOUS at 08:30

## 2017-01-01 RX ADMIN — Medication 325 MG: at 14:18

## 2017-01-01 RX ADMIN — ENTECAVIR 0.5 MG: 0.5 TABLET ORAL at 08:42

## 2017-01-01 RX ADMIN — ALBUTEROL SULFATE 2.5 MG: 2.5 SOLUTION RESPIRATORY (INHALATION) at 16:09

## 2017-01-01 RX ADMIN — SACUBITRIL AND VALSARTAN 0.5 TABLET: 49; 51 TABLET, FILM COATED ORAL at 08:21

## 2017-01-01 RX ADMIN — Medication 1 PACKET: at 21:31

## 2017-01-01 RX ADMIN — REMIFENTANIL HYDROCHLORIDE 0.5 MCG/KG/MIN: 1 INJECTION, POWDER, LYOPHILIZED, FOR SOLUTION INTRAVENOUS at 08:26

## 2017-01-01 RX ADMIN — MEMANTINE 10 MG: 10 TABLET ORAL at 20:07

## 2017-01-01 RX ADMIN — DEXAMETHASONE 2 MG: 4 TABLET ORAL at 21:15

## 2017-01-01 RX ADMIN — TAZOBACTAM SODIUM AND PIPERACILLIN SODIUM 3.38 G: 375; 3 INJECTION, SOLUTION INTRAVENOUS at 23:10

## 2017-01-01 RX ADMIN — SODIUM CHLORIDE 125 ML/HR: 9 INJECTION, SOLUTION INTRAVENOUS at 09:04

## 2017-01-01 RX ADMIN — TAZOBACTAM SODIUM AND PIPERACILLIN SODIUM 4.5 G: 500; 4 INJECTION, SOLUTION INTRAVENOUS at 16:03

## 2017-01-01 RX ADMIN — PAROXETINE HYDROCHLORIDE 30 MG: 20 TABLET, FILM COATED ORAL at 09:34

## 2017-01-01 RX ADMIN — CARVEDILOL 3.12 MG: 3.12 TABLET, FILM COATED ORAL at 20:22

## 2017-01-01 RX ADMIN — ALBUMIN HUMAN: 0.05 INJECTION, SOLUTION INTRAVENOUS at 16:24

## 2017-01-01 RX ADMIN — ENTECAVIR 0.5 MG: 0.5 TABLET ORAL at 05:51

## 2017-01-01 RX ADMIN — PROPOFOL 50 MG: 10 INJECTION, EMULSION INTRAVENOUS at 08:15

## 2017-01-01 RX ADMIN — BUDESONIDE AND FORMOTEROL FUMARATE DIHYDRATE 2 PUFF: 160; 4.5 AEROSOL RESPIRATORY (INHALATION) at 12:12

## 2017-01-01 RX ADMIN — FENTANYL CITRATE 25 MCG: 50 INJECTION INTRAMUSCULAR; INTRAVENOUS at 04:19

## 2017-01-01 RX ADMIN — PAROXETINE HYDROCHLORIDE 30 MG: 20 TABLET, FILM COATED ORAL at 06:41

## 2017-01-01 RX ADMIN — SACUBITRIL AND VALSARTAN 1 TABLET: 49; 51 TABLET, FILM COATED ORAL at 09:52

## 2017-01-01 RX ADMIN — BUDESONIDE AND FORMOTEROL FUMARATE DIHYDRATE 2 PUFF: 160; 4.5 AEROSOL RESPIRATORY (INHALATION) at 19:14

## 2017-01-01 RX ADMIN — FENTANYL CITRATE 25 MCG: 50 INJECTION, SOLUTION INTRAMUSCULAR; INTRAVENOUS at 10:06

## 2017-01-01 RX ADMIN — ONDANSETRON 4 MG: 2 INJECTION INTRAMUSCULAR; INTRAVENOUS at 09:27

## 2017-01-01 RX ADMIN — HYDRALAZINE HYDROCHLORIDE 10 MG: 20 INJECTION INTRAMUSCULAR; INTRAVENOUS at 07:37

## 2017-01-01 RX ADMIN — AMLODIPINE BESYLATE 5 MG: 5 TABLET ORAL at 09:40

## 2017-01-01 RX ADMIN — IPRATROPIUM BROMIDE AND ALBUTEROL SULFATE 3 ML: .5; 3 SOLUTION RESPIRATORY (INHALATION) at 20:25

## 2017-01-01 RX ADMIN — RIVASTIGMINE TRANSDERMAL SYSTEM 1 PATCH: 9.5 PATCH, EXTENDED RELEASE TRANSDERMAL at 08:39

## 2017-01-01 RX ADMIN — BUPROPION HYDROCHLORIDE 75 MG: 75 TABLET, FILM COATED ORAL at 21:34

## 2017-01-01 RX ADMIN — SACUBITRIL AND VALSARTAN 0.5 TABLET: 49; 51 TABLET, FILM COATED ORAL at 17:31

## 2017-01-01 RX ADMIN — SULFUR HEXAFLUORIDE 4 ML: KIT at 16:44

## 2017-01-01 RX ADMIN — CARVEDILOL 6.25 MG: 6.25 TABLET, FILM COATED ORAL at 12:53

## 2017-01-01 RX ADMIN — SACUBITRIL AND VALSARTAN 1 TABLET: 49; 51 TABLET, FILM COATED ORAL at 08:24

## 2017-01-01 RX ADMIN — PANTOPRAZOLE SODIUM 40 MG: 40 INJECTION, POWDER, FOR SOLUTION INTRAVENOUS at 06:24

## 2017-01-01 RX ADMIN — RIVASTIGMINE TRANSDERMAL SYSTEM 1 PATCH: 9.5 PATCH, EXTENDED RELEASE TRANSDERMAL at 09:45

## 2017-01-01 RX ADMIN — RIVASTIGMINE TRANSDERMAL SYSTEM 1 PATCH: 9.5 PATCH, EXTENDED RELEASE TRANSDERMAL at 09:31

## 2017-01-01 RX ADMIN — FAMOTIDINE 20 MG: 20 TABLET ORAL at 08:46

## 2017-01-01 RX ADMIN — MEMANTINE 10 MG: 10 TABLET ORAL at 09:17

## 2017-01-01 RX ADMIN — FUROSEMIDE 40 MG: 10 INJECTION, SOLUTION INTRAMUSCULAR; INTRAVENOUS at 14:08

## 2017-01-01 RX ADMIN — CARVEDILOL 6.25 MG: 6.25 TABLET, FILM COATED ORAL at 20:32

## 2017-01-01 RX ADMIN — ONDANSETRON 4 MG: 2 INJECTION INTRAMUSCULAR; INTRAVENOUS at 16:17

## 2017-01-01 RX ADMIN — IPRATROPIUM BROMIDE AND ALBUTEROL SULFATE 3 ML: .5; 3 SOLUTION RESPIRATORY (INHALATION) at 21:04

## 2017-01-01 RX ADMIN — IPRATROPIUM BROMIDE AND ALBUTEROL SULFATE 3 ML: .5; 3 SOLUTION RESPIRATORY (INHALATION) at 13:17

## 2017-01-01 RX ADMIN — NITROGLYCERIN 10 MCG/MIN: 20 INJECTION INTRAVENOUS at 23:53

## 2017-01-01 RX ADMIN — SACUBITRIL AND VALSARTAN 0.5 TABLET: 49; 51 TABLET, FILM COATED ORAL at 09:24

## 2017-01-01 RX ADMIN — METOLAZONE 2.5 MG: 2.5 TABLET ORAL at 08:50

## 2017-01-01 RX ADMIN — ASPIRIN 81 MG CHEWABLE TABLET 81 MG: 81 TABLET CHEWABLE at 08:37

## 2017-01-01 RX ADMIN — MEMANTINE 10 MG: 10 TABLET ORAL at 08:45

## 2017-01-01 RX ADMIN — HEPARIN SODIUM 5000 UNITS: 5000 INJECTION, SOLUTION INTRAVENOUS; SUBCUTANEOUS at 20:24

## 2017-01-01 RX ADMIN — MEMANTINE 10 MG: 10 TABLET ORAL at 20:12

## 2017-01-01 RX ADMIN — SODIUM CHLORIDE 1355 ML: 9 INJECTION, SOLUTION INTRAVENOUS at 14:52

## 2017-01-01 RX ADMIN — CARVEDILOL 6.25 MG: 6.25 TABLET, FILM COATED ORAL at 09:13

## 2017-01-01 RX ADMIN — ATORVASTATIN CALCIUM 10 MG: 10 TABLET, FILM COATED ORAL at 20:11

## 2017-01-01 RX ADMIN — SODIUM CHLORIDE 75 ML/HR: 9 INJECTION, SOLUTION INTRAVENOUS at 12:41

## 2017-01-01 RX ADMIN — IPRATROPIUM BROMIDE AND ALBUTEROL SULFATE 3 ML: .5; 3 SOLUTION RESPIRATORY (INHALATION) at 15:43

## 2017-01-01 RX ADMIN — DEXAMETHASONE SODIUM PHOSPHATE 4 MG: 4 INJECTION, SOLUTION INTRA-ARTICULAR; INTRALESIONAL; INTRAMUSCULAR; INTRAVENOUS; SOFT TISSUE at 05:04

## 2017-01-01 RX ADMIN — Medication 325 MG: at 08:58

## 2017-01-01 RX ADMIN — RIVASTIGMINE TRANSDERMAL SYSTEM 1 PATCH: 9.5 PATCH, EXTENDED RELEASE TRANSDERMAL at 08:52

## 2017-01-01 RX ADMIN — ENTECAVIR 0.5 MG: 0.5 TABLET ORAL at 09:44

## 2017-01-01 RX ADMIN — CLOPIDOGREL BISULFATE 75 MG: 75 TABLET ORAL at 08:49

## 2017-01-01 RX ADMIN — ATORVASTATIN CALCIUM 10 MG: 10 TABLET, FILM COATED ORAL at 21:23

## 2017-01-01 RX ADMIN — MEMANTINE 10 MG: 10 TABLET ORAL at 08:38

## 2017-01-01 RX ADMIN — ENTECAVIR 0.5 MG: 0.5 TABLET ORAL at 10:26

## 2017-01-01 RX ADMIN — DEXAMETHASONE 2 MG: 4 TABLET ORAL at 06:29

## 2017-01-01 RX ADMIN — CARVEDILOL 6.25 MG: 6.25 TABLET, FILM COATED ORAL at 08:46

## 2017-01-01 RX ADMIN — RIVASTIGMINE TRANSDERMAL SYSTEM 1 PATCH: 9.5 PATCH, EXTENDED RELEASE TRANSDERMAL at 08:26

## 2017-01-01 RX ADMIN — ENTECAVIR 0.5 MG: 0.5 TABLET ORAL at 06:44

## 2017-01-01 RX ADMIN — SACUBITRIL AND VALSARTAN 1 TABLET: 49; 51 TABLET, FILM COATED ORAL at 08:38

## 2017-01-01 RX ADMIN — BUDESONIDE AND FORMOTEROL FUMARATE DIHYDRATE 2 PUFF: 160; 4.5 AEROSOL RESPIRATORY (INHALATION) at 20:26

## 2017-01-01 RX ADMIN — ASPIRIN 81 MG 81 MG: 81 TABLET ORAL at 08:32

## 2017-01-01 RX ADMIN — DEXAMETHASONE 2 MG: 4 TABLET ORAL at 13:56

## 2017-01-01 RX ADMIN — DOCUSATE SODIUM 100 MG: 50 LIQUID ORAL at 18:38

## 2017-01-01 RX ADMIN — BUDESONIDE AND FORMOTEROL FUMARATE DIHYDRATE 2 PUFF: 160; 4.5 AEROSOL RESPIRATORY (INHALATION) at 07:32

## 2017-01-01 RX ADMIN — CARVEDILOL 6.25 MG: 6.25 TABLET, FILM COATED ORAL at 21:18

## 2017-01-01 RX ADMIN — IPRATROPIUM BROMIDE AND ALBUTEROL SULFATE 3 ML: .5; 3 SOLUTION RESPIRATORY (INHALATION) at 23:36

## 2017-01-01 RX ADMIN — FAMOTIDINE 20 MG: 20 TABLET ORAL at 17:25

## 2017-01-01 RX ADMIN — SODIUM CHLORIDE, POTASSIUM CHLORIDE, SODIUM LACTATE AND CALCIUM CHLORIDE 100 ML/HR: 600; 310; 30; 20 INJECTION, SOLUTION INTRAVENOUS at 06:43

## 2017-01-01 RX ADMIN — LABETALOL HYDROCHLORIDE 10 MG: 5 INJECTION, SOLUTION INTRAVENOUS at 10:40

## 2017-01-01 RX ADMIN — BUDESONIDE AND FORMOTEROL FUMARATE DIHYDRATE 2 PUFF: 160; 4.5 AEROSOL RESPIRATORY (INHALATION) at 20:19

## 2017-01-01 RX ADMIN — FUROSEMIDE 40 MG: 40 TABLET ORAL at 08:49

## 2017-01-01 RX ADMIN — FAMOTIDINE 20 MG: 10 INJECTION, SOLUTION INTRAVENOUS at 08:30

## 2017-01-01 RX ADMIN — POTASSIUM CHLORIDE 10 MEQ: 7.46 INJECTION, SOLUTION INTRAVENOUS at 14:06

## 2017-01-01 RX ADMIN — RIVASTIGMINE TRANSDERMAL SYSTEM 1 PATCH: 9.5 PATCH, EXTENDED RELEASE TRANSDERMAL at 09:07

## 2017-01-01 RX ADMIN — POTASSIUM & SODIUM PHOSPHATES POWDER PACK 280-160-250 MG 2 PACKET: 280-160-250 PACK at 06:44

## 2017-01-01 RX ADMIN — Medication 1 PACKET: at 17:11

## 2017-01-01 RX ADMIN — ASPIRIN 81 MG 81 MG: 81 TABLET ORAL at 08:46

## 2017-01-01 RX ADMIN — HYDROCORTISONE: 1 CREAM TOPICAL at 00:01

## 2017-01-01 RX ADMIN — FAMOTIDINE 20 MG: 20 TABLET ORAL at 08:42

## 2017-01-01 RX ADMIN — Medication 0.5 MCG/KG/MIN: at 02:25

## 2017-01-01 RX ADMIN — SACUBITRIL AND VALSARTAN 1 TABLET: 49; 51 TABLET, FILM COATED ORAL at 21:01

## 2017-01-01 RX ADMIN — RIVASTIGMINE TRANSDERMAL SYSTEM 1 PATCH: 9.5 PATCH, EXTENDED RELEASE TRANSDERMAL at 09:33

## 2017-01-01 RX ADMIN — BUDESONIDE AND FORMOTEROL FUMARATE DIHYDRATE 2 PUFF: 160; 4.5 AEROSOL RESPIRATORY (INHALATION) at 21:36

## 2017-01-01 RX ADMIN — IPRATROPIUM BROMIDE AND ALBUTEROL SULFATE 3 ML: .5; 3 SOLUTION RESPIRATORY (INHALATION) at 07:31

## 2017-01-01 RX ADMIN — SUCCINYLCHOLINE CHLORIDE 160 MG: 20 INJECTION, SOLUTION INTRAMUSCULAR; INTRAVENOUS at 16:06

## 2017-01-01 RX ADMIN — SENNOSIDES A AND B 10 ML: 415.36 LIQUID ORAL at 08:46

## 2017-01-01 RX ADMIN — ASPIRIN 81 MG CHEWABLE TABLET 81 MG: 81 TABLET CHEWABLE at 10:09

## 2017-01-01 RX ADMIN — IPRATROPIUM BROMIDE AND ALBUTEROL SULFATE 3 ML: .5; 3 SOLUTION RESPIRATORY (INHALATION) at 16:24

## 2017-01-01 RX ADMIN — DEXAMETHASONE SODIUM PHOSPHATE 4 MG: 4 INJECTION, SOLUTION INTRA-ARTICULAR; INTRALESIONAL; INTRAMUSCULAR; INTRAVENOUS; SOFT TISSUE at 06:03

## 2017-01-01 RX ADMIN — SENNOSIDES A AND B 10 ML: 415.36 LIQUID ORAL at 18:38

## 2017-01-01 RX ADMIN — ENTECAVIR 0.5 MG: 0.5 TABLET ORAL at 06:13

## 2017-01-01 RX ADMIN — ATORVASTATIN CALCIUM 10 MG: 10 TABLET, FILM COATED ORAL at 21:34

## 2017-01-01 RX ADMIN — SODIUM CHLORIDE 125 ML/HR: 9 INJECTION, SOLUTION INTRAVENOUS at 16:47

## 2017-01-01 RX ADMIN — SACUBITRIL AND VALSARTAN 1 TABLET: 49; 51 TABLET, FILM COATED ORAL at 09:18

## 2017-01-01 RX ADMIN — BUDESONIDE AND FORMOTEROL FUMARATE DIHYDRATE 2 PUFF: 160; 4.5 AEROSOL RESPIRATORY (INHALATION) at 20:25

## 2017-01-01 RX ADMIN — DEXAMETHASONE 2 MG: 4 TABLET ORAL at 20:48

## 2017-01-01 RX ADMIN — SULFUR HEXAFLUORIDE 3 ML: KIT at 16:00

## 2017-01-01 RX ADMIN — PAROXETINE HYDROCHLORIDE 30 MG: 10 TABLET, FILM COATED ORAL at 06:24

## 2017-01-01 RX ADMIN — IPRATROPIUM BROMIDE AND ALBUTEROL SULFATE 3 ML: .5; 3 SOLUTION RESPIRATORY (INHALATION) at 19:57

## 2017-01-01 RX ADMIN — MONTELUKAST SODIUM 10 MG: 10 TABLET, FILM COATED ORAL at 21:42

## 2017-01-01 RX ADMIN — PROPOFOL 50 MG: 10 INJECTION, EMULSION INTRAVENOUS at 08:13

## 2017-01-01 RX ADMIN — METOCLOPRAMIDE 10 MG: 5 INJECTION, SOLUTION INTRAMUSCULAR; INTRAVENOUS at 18:37

## 2017-01-01 RX ADMIN — ACETAMINOPHEN 650 MG: 325 TABLET, FILM COATED ORAL at 19:43

## 2017-01-01 RX ADMIN — BUDESONIDE AND FORMOTEROL FUMARATE DIHYDRATE 2 PUFF: 160; 4.5 AEROSOL RESPIRATORY (INHALATION) at 19:33

## 2017-01-01 RX ADMIN — METOCLOPRAMIDE 10 MG: 5 INJECTION, SOLUTION INTRAMUSCULAR; INTRAVENOUS at 06:49

## 2017-01-01 RX ADMIN — MEMANTINE 10 MG: 10 TABLET ORAL at 21:23

## 2017-01-01 RX ADMIN — BARIUM SULFATE 100 ML: 0.81 POWDER, FOR SUSPENSION ORAL at 14:00

## 2017-01-01 RX ADMIN — ACETAMINOPHEN 650 MG: 325 TABLET, FILM COATED ORAL at 14:31

## 2017-01-01 RX ADMIN — METOPROLOL TARTRATE 2.5 MG: 5 INJECTION, SOLUTION INTRAVENOUS at 11:46

## 2017-01-01 RX ADMIN — HEPARIN SODIUM 5000 UNITS: 5000 INJECTION, SOLUTION INTRAVENOUS; SUBCUTANEOUS at 20:22

## 2017-01-01 RX ADMIN — MEMANTINE 10 MG: 10 TABLET ORAL at 17:52

## 2017-01-01 RX ADMIN — FAMOTIDINE 20 MG: 20 TABLET ORAL at 08:34

## 2017-01-01 RX ADMIN — SACUBITRIL AND VALSARTAN 1 TABLET: 49; 51 TABLET, FILM COATED ORAL at 21:13

## 2017-01-01 RX ADMIN — SACUBITRIL AND VALSARTAN 1 TABLET: 49; 51 TABLET, FILM COATED ORAL at 09:26

## 2017-01-01 RX ADMIN — IPRATROPIUM BROMIDE AND ALBUTEROL SULFATE 3 ML: .5; 3 SOLUTION RESPIRATORY (INHALATION) at 16:34

## 2017-01-01 RX ADMIN — ENTECAVIR 0.5 MG: 0.5 TABLET ORAL at 11:48

## 2017-01-01 RX ADMIN — METOCLOPRAMIDE 10 MG: 5 INJECTION, SOLUTION INTRAMUSCULAR; INTRAVENOUS at 12:43

## 2017-01-01 RX ADMIN — RIVASTIGMINE TRANSDERMAL SYSTEM 1 PATCH: 9.5 PATCH, EXTENDED RELEASE TRANSDERMAL at 08:48

## 2017-01-01 RX ADMIN — MEMANTINE 10 MG: 10 TABLET ORAL at 17:43

## 2017-01-01 RX ADMIN — METOCLOPRAMIDE 10 MG: 5 INJECTION, SOLUTION INTRAMUSCULAR; INTRAVENOUS at 06:25

## 2017-01-01 RX ADMIN — ATORVASTATIN CALCIUM 10 MG: 10 TABLET, FILM COATED ORAL at 20:48

## 2017-01-01 RX ADMIN — ESMOLOL HYDROCHLORIDE 20 MG: 10 INJECTION, SOLUTION INTRAVENOUS at 10:23

## 2017-01-01 RX ADMIN — ESMOLOL HYDROCHLORIDE 30 MG: 10 INJECTION, SOLUTION INTRAVENOUS at 08:16

## 2017-01-01 RX ADMIN — AMLODIPINE BESYLATE 5 MG: 5 TABLET ORAL at 08:31

## 2017-01-01 RX ADMIN — SACUBITRIL AND VALSARTAN 1 TABLET: 49; 51 TABLET, FILM COATED ORAL at 20:12

## 2017-01-01 RX ADMIN — OXYCODONE HYDROCHLORIDE AND ACETAMINOPHEN 250 MG: 500 TABLET ORAL at 10:10

## 2017-01-01 RX ADMIN — MEMANTINE 10 MG: 10 TABLET ORAL at 08:42

## 2017-01-01 RX ADMIN — ATORVASTATIN CALCIUM 40 MG: 40 TABLET, FILM COATED ORAL at 20:32

## 2017-01-01 RX ADMIN — CARVEDILOL 6.25 MG: 6.25 TABLET, FILM COATED ORAL at 09:18

## 2017-01-01 RX ADMIN — CARVEDILOL 6.25 MG: 6.25 TABLET, FILM COATED ORAL at 20:44

## 2017-01-01 RX ADMIN — HEPARIN SODIUM 5000 UNITS: 5000 INJECTION, SOLUTION INTRAVENOUS; SUBCUTANEOUS at 08:37

## 2017-01-01 RX ADMIN — MONTELUKAST SODIUM 10 MG: 10 TABLET, FILM COATED ORAL at 20:21

## 2017-01-01 RX ADMIN — DEXAMETHASONE SODIUM PHOSPHATE 4 MG: 4 INJECTION, SOLUTION INTRA-ARTICULAR; INTRALESIONAL; INTRAMUSCULAR; INTRAVENOUS; SOFT TISSUE at 02:46

## 2017-01-01 RX ADMIN — SACUBITRIL AND VALSARTAN 0.5 TABLET: 49; 51 TABLET, FILM COATED ORAL at 22:25

## 2017-01-01 RX ADMIN — DOCUSATE SODIUM 100 MG: 100 CAPSULE, LIQUID FILLED ORAL at 09:00

## 2017-01-01 RX ADMIN — IPRATROPIUM BROMIDE AND ALBUTEROL SULFATE 3 ML: .5; 3 SOLUTION RESPIRATORY (INHALATION) at 08:36

## 2017-01-01 RX ADMIN — SACUBITRIL AND VALSARTAN 0.5 TABLET: 49; 51 TABLET, FILM COATED ORAL at 08:50

## 2017-01-01 RX ADMIN — LABETALOL HYDROCHLORIDE 10 MG: 5 INJECTION INTRAVENOUS at 08:47

## 2017-01-01 RX ADMIN — IPRATROPIUM BROMIDE AND ALBUTEROL SULFATE 3 ML: .5; 3 SOLUTION RESPIRATORY (INHALATION) at 07:53

## 2017-01-01 RX ADMIN — BUDESONIDE AND FORMOTEROL FUMARATE DIHYDRATE 2 PUFF: 160; 4.5 AEROSOL RESPIRATORY (INHALATION) at 09:47

## 2017-01-01 RX ADMIN — SENNOSIDES 17.2 MG: 8.6 TABLET, FILM COATED ORAL at 08:59

## 2017-01-01 RX ADMIN — MEMANTINE 10 MG: 10 TABLET ORAL at 17:57

## 2017-01-01 RX ADMIN — Medication 1 PACKET: at 09:31

## 2017-01-01 RX ADMIN — MEMANTINE 10 MG: 10 TABLET ORAL at 20:34

## 2017-01-01 RX ADMIN — DOCUSATE SODIUM 100 MG: 50 LIQUID ORAL at 18:18

## 2017-01-01 RX ADMIN — METOPROLOL TARTRATE 2.5 MG: 5 INJECTION, SOLUTION INTRAVENOUS at 17:19

## 2017-01-01 RX ADMIN — FAMOTIDINE 20 MG: 20 TABLET ORAL at 10:27

## 2017-01-01 RX ADMIN — SACUBITRIL AND VALSARTAN 0.5 TABLET: 49; 51 TABLET, FILM COATED ORAL at 08:46

## 2017-01-01 RX ADMIN — ESMOLOL HYDROCHLORIDE 20 MG: 10 INJECTION, SOLUTION INTRAVENOUS at 10:24

## 2017-01-01 RX ADMIN — BUDESONIDE AND FORMOTEROL FUMARATE DIHYDRATE 2 PUFF: 160; 4.5 AEROSOL RESPIRATORY (INHALATION) at 19:32

## 2017-01-01 RX ADMIN — RIVASTIGMINE TRANSDERMAL SYSTEM 1 PATCH: 9.5 PATCH, EXTENDED RELEASE TRANSDERMAL at 09:52

## 2017-01-01 RX ADMIN — MEMANTINE 10 MG: 10 TABLET ORAL at 17:23

## 2017-01-01 RX ADMIN — BUDESONIDE AND FORMOTEROL FUMARATE DIHYDRATE 2 PUFF: 160; 4.5 AEROSOL RESPIRATORY (INHALATION) at 09:15

## 2017-01-01 RX ADMIN — BUDESONIDE AND FORMOTEROL FUMARATE DIHYDRATE 2 PUFF: 160; 4.5 AEROSOL RESPIRATORY (INHALATION) at 20:13

## 2017-01-01 RX ADMIN — IPRATROPIUM BROMIDE AND ALBUTEROL SULFATE 3 ML: .5; 3 SOLUTION RESPIRATORY (INHALATION) at 13:26

## 2017-01-01 RX ADMIN — ASPIRIN 81 MG CHEWABLE TABLET 81 MG: 81 TABLET CHEWABLE at 08:51

## 2017-01-01 RX ADMIN — OXYCODONE HYDROCHLORIDE AND ACETAMINOPHEN 250 MG: 500 TABLET ORAL at 17:20

## 2017-01-01 RX ADMIN — FAMOTIDINE 20 MG: 20 TABLET ORAL at 17:44

## 2017-01-01 RX ADMIN — Medication 3 MG: at 10:17

## 2017-01-01 RX ADMIN — SODIUM CHLORIDE 125 ML/HR: 9 INJECTION, SOLUTION INTRAVENOUS at 20:53

## 2017-01-01 RX ADMIN — ENTECAVIR 0.5 MG: 0.5 TABLET ORAL at 09:24

## 2017-01-01 RX ADMIN — TETANUS TOXOID, REDUCED DIPHTHERIA TOXOID AND ACELLULAR PERTUSSIS VACCINE, ADSORBED 0.5 ML: 5; 2.5; 8; 8; 2.5 SUSPENSION INTRAMUSCULAR at 22:01

## 2017-01-01 RX ADMIN — Medication 325 MG: at 08:09

## 2017-01-01 RX ADMIN — FAMOTIDINE 20 MG: 20 TABLET ORAL at 08:39

## 2017-01-01 RX ADMIN — IPRATROPIUM BROMIDE AND ALBUTEROL SULFATE 3 ML: .5; 3 SOLUTION RESPIRATORY (INHALATION) at 16:22

## 2017-01-01 RX ADMIN — MONTELUKAST SODIUM 10 MG: 10 TABLET, FILM COATED ORAL at 20:41

## 2017-01-01 RX ADMIN — SACUBITRIL AND VALSARTAN 1 TABLET: 49; 51 TABLET, FILM COATED ORAL at 20:24

## 2017-01-01 RX ADMIN — CARVEDILOL 6.25 MG: 6.25 TABLET, FILM COATED ORAL at 08:24

## 2017-01-01 RX ADMIN — ASPIRIN 81 MG CHEWABLE TABLET 81 MG: 81 TABLET CHEWABLE at 08:42

## 2017-01-01 RX ADMIN — SACUBITRIL AND VALSARTAN 0.5 TABLET: 49; 51 TABLET, FILM COATED ORAL at 18:14

## 2017-01-01 RX ADMIN — MONTELUKAST SODIUM 10 MG: 10 TABLET, FILM COATED ORAL at 20:44

## 2017-01-01 RX ADMIN — CARVEDILOL 6.25 MG: 6.25 TABLET, FILM COATED ORAL at 10:10

## 2017-01-01 RX ADMIN — DEXAMETHASONE SODIUM PHOSPHATE 4 MG: 4 INJECTION, SOLUTION INTRA-ARTICULAR; INTRALESIONAL; INTRAMUSCULAR; INTRAVENOUS; SOFT TISSUE at 17:27

## 2017-01-01 RX ADMIN — AMLODIPINE BESYLATE 5 MG: 5 TABLET ORAL at 08:48

## 2017-01-01 RX ADMIN — ENTECAVIR 0.5 MG: 0.5 TABLET ORAL at 05:18

## 2017-01-01 RX ADMIN — PAROXETINE HYDROCHLORIDE 30 MG: 10 TABLET, FILM COATED ORAL at 06:45

## 2017-01-01 RX ADMIN — FAMOTIDINE 20 MG: 20 TABLET ORAL at 09:25

## 2017-01-01 RX ADMIN — DEXAMETHASONE SODIUM PHOSPHATE 4 MG: 4 INJECTION, SOLUTION INTRA-ARTICULAR; INTRALESIONAL; INTRAMUSCULAR; INTRAVENOUS; SOFT TISSUE at 08:42

## 2017-01-01 RX ADMIN — BUDESONIDE AND FORMOTEROL FUMARATE DIHYDRATE 2 PUFF: 160; 4.5 AEROSOL RESPIRATORY (INHALATION) at 09:05

## 2017-01-01 RX ADMIN — SODIUM CHLORIDE: 9 INJECTION, SOLUTION INTRAVENOUS at 17:10

## 2017-01-01 RX ADMIN — FAMOTIDINE 20 MG: 20 TABLET ORAL at 17:18

## 2017-01-01 RX ADMIN — DEXAMETHASONE SODIUM PHOSPHATE 4 MG: 4 INJECTION, SOLUTION INTRA-ARTICULAR; INTRALESIONAL; INTRAMUSCULAR; INTRAVENOUS; SOFT TISSUE at 03:06

## 2017-01-01 RX ADMIN — MONTELUKAST SODIUM 10 MG: 10 TABLET, FILM COATED ORAL at 20:48

## 2017-01-01 RX ADMIN — INFLUENZA VIRUS VACCINE 0.5 ML: 15; 15; 15; 15 SUSPENSION INTRAMUSCULAR at 14:35

## 2017-01-01 RX ADMIN — ASPIRIN 81 MG CHEWABLE TABLET 81 MG: 81 TABLET CHEWABLE at 09:28

## 2017-01-01 RX ADMIN — METOCLOPRAMIDE 10 MG: 5 INJECTION, SOLUTION INTRAMUSCULAR; INTRAVENOUS at 05:59

## 2017-01-01 RX ADMIN — BUDESONIDE AND FORMOTEROL FUMARATE DIHYDRATE 2 PUFF: 160; 4.5 AEROSOL RESPIRATORY (INHALATION) at 20:50

## 2017-01-01 RX ADMIN — Medication 325 MG: at 08:50

## 2017-01-01 RX ADMIN — METOCLOPRAMIDE 10 MG: 5 INJECTION, SOLUTION INTRAMUSCULAR; INTRAVENOUS at 23:55

## 2017-01-01 RX ADMIN — SACUBITRIL AND VALSARTAN 0.5 TABLET: 49; 51 TABLET, FILM COATED ORAL at 09:45

## 2017-01-01 RX ADMIN — RIVASTIGMINE TRANSDERMAL SYSTEM 1 PATCH: 9.5 PATCH, EXTENDED RELEASE TRANSDERMAL at 09:38

## 2017-01-01 RX ADMIN — CARVEDILOL 6.25 MG: 6.25 TABLET, FILM COATED ORAL at 20:10

## 2017-01-01 RX ADMIN — BUDESONIDE AND FORMOTEROL FUMARATE DIHYDRATE 2 PUFF: 160; 4.5 AEROSOL RESPIRATORY (INHALATION) at 19:29

## 2017-01-01 RX ADMIN — DOCUSATE SODIUM 100 MG: 100 CAPSULE, LIQUID FILLED ORAL at 08:41

## 2017-01-01 RX ADMIN — FAMOTIDINE 20 MG: 20 TABLET ORAL at 09:13

## 2017-01-01 RX ADMIN — MEMANTINE 10 MG: 10 TABLET ORAL at 08:24

## 2017-01-01 RX ADMIN — IPRATROPIUM BROMIDE AND ALBUTEROL SULFATE 3 ML: .5; 3 SOLUTION RESPIRATORY (INHALATION) at 20:06

## 2017-01-01 RX ADMIN — SACUBITRIL AND VALSARTAN 0.5 TABLET: 49; 51 TABLET, FILM COATED ORAL at 18:48

## 2017-01-01 RX ADMIN — IPRATROPIUM BROMIDE AND ALBUTEROL SULFATE 3 ML: .5; 3 SOLUTION RESPIRATORY (INHALATION) at 12:55

## 2017-01-01 RX ADMIN — METOPROLOL TARTRATE 2.5 MG: 5 INJECTION, SOLUTION INTRAVENOUS at 00:08

## 2017-01-01 RX ADMIN — MONTELUKAST SODIUM 10 MG: 10 TABLET, FILM COATED ORAL at 20:32

## 2017-01-01 RX ADMIN — IPRATROPIUM BROMIDE AND ALBUTEROL SULFATE 3 ML: .5; 3 SOLUTION RESPIRATORY (INHALATION) at 08:11

## 2017-01-01 RX ADMIN — PHENYLEPHRINE HYDROCHLORIDE 80 MCG: 10 INJECTION INTRAVENOUS at 08:47

## 2017-01-01 RX ADMIN — BUDESONIDE AND FORMOTEROL FUMARATE DIHYDRATE 2 PUFF: 160; 4.5 AEROSOL RESPIRATORY (INHALATION) at 08:36

## 2017-01-01 RX ADMIN — SENNOSIDES 17.2 MG: 8.6 TABLET, FILM COATED ORAL at 17:58

## 2017-01-01 RX ADMIN — RIVASTIGMINE TRANSDERMAL SYSTEM 1 PATCH: 9.5 PATCH, EXTENDED RELEASE TRANSDERMAL at 11:29

## 2017-01-01 RX ADMIN — DOCUSATE SODIUM 100 MG: 50 LIQUID ORAL at 08:11

## 2017-01-01 RX ADMIN — LABETALOL HYDROCHLORIDE 10 MG: 5 INJECTION INTRAVENOUS at 14:19

## 2017-01-01 RX ADMIN — PROPOFOL 80 MG: 10 INJECTION, EMULSION INTRAVENOUS at 16:06

## 2017-01-01 RX ADMIN — MEMANTINE 10 MG: 10 TABLET ORAL at 08:32

## 2017-01-01 RX ADMIN — POTASSIUM CHLORIDE 10 MEQ: 7.46 INJECTION, SOLUTION INTRAVENOUS at 12:03

## 2017-01-01 RX ADMIN — RIVASTIGMINE TRANSDERMAL SYSTEM 1 PATCH: 9.5 PATCH, EXTENDED RELEASE TRANSDERMAL at 08:34

## 2017-01-01 RX ADMIN — IPRATROPIUM BROMIDE AND ALBUTEROL SULFATE 3 ML: .5; 3 SOLUTION RESPIRATORY (INHALATION) at 11:56

## 2017-01-01 RX ADMIN — CARVEDILOL 6.25 MG: 6.25 TABLET, FILM COATED ORAL at 22:19

## 2017-01-01 RX ADMIN — PAROXETINE 30 MG: 10 TABLET, FILM COATED ORAL at 06:28

## 2017-01-01 RX ADMIN — IPRATROPIUM BROMIDE AND ALBUTEROL SULFATE 3 ML: .5; 3 SOLUTION RESPIRATORY (INHALATION) at 08:50

## 2017-01-01 RX ADMIN — DEXAMETHASONE SODIUM PHOSPHATE 4 MG: 4 INJECTION, SOLUTION INTRA-ARTICULAR; INTRALESIONAL; INTRAMUSCULAR; INTRAVENOUS; SOFT TISSUE at 20:34

## 2017-01-01 RX ADMIN — MONTELUKAST SODIUM 10 MG: 10 TABLET, FILM COATED ORAL at 20:24

## 2017-01-01 RX ADMIN — OXYCODONE HYDROCHLORIDE AND ACETAMINOPHEN 250 MG: 500 TABLET ORAL at 18:04

## 2017-01-01 RX ADMIN — FAMOTIDINE 20 MG: 20 TABLET ORAL at 08:11

## 2017-01-01 RX ADMIN — METOLAZONE 2.5 MG: 2.5 TABLET ORAL at 08:45

## 2017-01-01 RX ADMIN — RIVASTIGMINE TRANSDERMAL SYSTEM 1 PATCH: 9.5 PATCH, EXTENDED RELEASE TRANSDERMAL at 08:49

## 2017-01-01 RX ADMIN — POTASSIUM & SODIUM PHOSPHATES POWDER PACK 280-160-250 MG 2 PACKET: 280-160-250 PACK at 06:14

## 2017-01-01 RX ADMIN — AMLODIPINE BESYLATE 5 MG: 5 TABLET ORAL at 08:56

## 2017-01-01 RX ADMIN — FAMOTIDINE 20 MG: 10 INJECTION, SOLUTION INTRAVENOUS at 09:07

## 2017-01-01 RX ADMIN — METOCLOPRAMIDE 10 MG: 5 INJECTION, SOLUTION INTRAMUSCULAR; INTRAVENOUS at 11:28

## 2017-01-01 RX ADMIN — SACUBITRIL AND VALSARTAN 0.5 TABLET: 49; 51 TABLET, FILM COATED ORAL at 08:51

## 2017-01-01 RX ADMIN — METOCLOPRAMIDE 10 MG: 5 INJECTION, SOLUTION INTRAMUSCULAR; INTRAVENOUS at 13:17

## 2017-01-01 RX ADMIN — IPRATROPIUM BROMIDE AND ALBUTEROL SULFATE 3 ML: .5; 3 SOLUTION RESPIRATORY (INHALATION) at 21:08

## 2017-01-01 RX ADMIN — RIVASTIGMINE TRANSDERMAL SYSTEM 1 PATCH: 9.5 PATCH, EXTENDED RELEASE TRANSDERMAL at 08:03

## 2017-01-01 RX ADMIN — IPRATROPIUM BROMIDE AND ALBUTEROL SULFATE 3 ML: .5; 3 SOLUTION RESPIRATORY (INHALATION) at 09:34

## 2017-01-01 RX ADMIN — Medication 1 PACKET: at 08:25

## 2017-01-01 RX ADMIN — SACUBITRIL AND VALSARTAN 1 TABLET: 49; 51 TABLET, FILM COATED ORAL at 21:34

## 2017-01-01 RX ADMIN — DEXAMETHASONE 2 MG: 4 TABLET ORAL at 21:23

## 2017-01-01 RX ADMIN — DEXTROSE MONOHYDRATE 50 ML/HR: 50 INJECTION, SOLUTION INTRAVENOUS at 13:12

## 2017-01-01 RX ADMIN — DEXAMETHASONE SODIUM PHOSPHATE 4 MG: 4 INJECTION, SOLUTION INTRA-ARTICULAR; INTRALESIONAL; INTRAMUSCULAR; INTRAVENOUS; SOFT TISSUE at 15:45

## 2017-01-01 RX ADMIN — DOCUSATE SODIUM 100 MG: 100 CAPSULE, LIQUID FILLED ORAL at 17:58

## 2017-01-01 RX ADMIN — BUPROPION HYDROCHLORIDE 75 MG: 75 TABLET, FILM COATED ORAL at 09:25

## 2017-01-01 RX ADMIN — IPRATROPIUM BROMIDE AND ALBUTEROL SULFATE 3 ML: .5; 3 SOLUTION RESPIRATORY (INHALATION) at 12:28

## 2017-01-01 RX ADMIN — ASPIRIN 81 MG CHEWABLE TABLET 81 MG: 81 TABLET CHEWABLE at 08:02

## 2017-01-01 RX ADMIN — MEMANTINE 10 MG: 10 TABLET ORAL at 08:50

## 2017-01-01 RX ADMIN — ASPIRIN 81 MG CHEWABLE TABLET 81 MG: 81 TABLET CHEWABLE at 08:56

## 2017-01-01 RX ADMIN — ASPIRIN 81 MG 81 MG: 81 TABLET ORAL at 08:11

## 2017-01-01 RX ADMIN — IPRATROPIUM BROMIDE AND ALBUTEROL SULFATE 3 ML: .5; 3 SOLUTION RESPIRATORY (INHALATION) at 13:07

## 2017-01-01 RX ADMIN — METOCLOPRAMIDE 10 MG: 5 INJECTION, SOLUTION INTRAMUSCULAR; INTRAVENOUS at 00:30

## 2017-01-01 RX ADMIN — SACUBITRIL AND VALSARTAN 0.5 TABLET: 49; 51 TABLET, FILM COATED ORAL at 20:22

## 2017-01-01 RX ADMIN — MEMANTINE 10 MG: 10 TABLET ORAL at 09:39

## 2017-01-01 RX ADMIN — Medication 1 PACKET: at 18:32

## 2017-01-01 RX ADMIN — FUROSEMIDE 20 MG: 20 TABLET ORAL at 09:24

## 2017-01-01 RX ADMIN — FAMOTIDINE 20 MG: 20 TABLET ORAL at 18:14

## 2017-01-01 RX ADMIN — IPRATROPIUM BROMIDE AND ALBUTEROL SULFATE 3 ML: .5; 3 SOLUTION RESPIRATORY (INHALATION) at 21:54

## 2017-01-01 RX ADMIN — MEMANTINE 10 MG: 10 TABLET ORAL at 08:46

## 2017-01-01 RX ADMIN — BUPROPION HYDROCHLORIDE 75 MG: 75 TABLET, FILM COATED ORAL at 09:17

## 2017-01-01 RX ADMIN — IPRATROPIUM BROMIDE AND ALBUTEROL SULFATE 3 ML: .5; 3 SOLUTION RESPIRATORY (INHALATION) at 16:39

## 2017-01-01 RX ADMIN — FUROSEMIDE 20 MG: 10 INJECTION, SOLUTION INTRAMUSCULAR; INTRAVENOUS at 11:30

## 2017-01-01 RX ADMIN — ASPIRIN 81 MG 81 MG: 81 TABLET ORAL at 08:39

## 2017-01-01 RX ADMIN — BUDESONIDE AND FORMOTEROL FUMARATE DIHYDRATE 2 PUFF: 160; 4.5 AEROSOL RESPIRATORY (INHALATION) at 08:28

## 2017-01-01 RX ADMIN — CLOPIDOGREL BISULFATE 75 MG: 75 TABLET ORAL at 08:45

## 2017-01-01 RX ADMIN — MEMANTINE 10 MG: 10 TABLET ORAL at 20:30

## 2017-01-01 RX ADMIN — ENTECAVIR 0.5 MG: 0.5 TABLET ORAL at 06:00

## 2017-01-01 RX ADMIN — ASPIRIN 81 MG 81 MG: 81 TABLET ORAL at 08:24

## 2017-01-01 RX ADMIN — PANTOPRAZOLE SODIUM 80 MG: 40 INJECTION, POWDER, FOR SOLUTION INTRAVENOUS at 13:54

## 2017-01-01 RX ADMIN — OXYCODONE HYDROCHLORIDE AND ACETAMINOPHEN 250 MG: 500 TABLET ORAL at 08:50

## 2017-01-01 RX ADMIN — METOCLOPRAMIDE 10 MG: 5 INJECTION, SOLUTION INTRAMUSCULAR; INTRAVENOUS at 12:56

## 2017-01-01 RX ADMIN — CLOPIDOGREL BISULFATE 75 MG: 75 TABLET ORAL at 08:50

## 2017-01-01 RX ADMIN — CHLORHEXIDINE GLUCONATE 15 ML: 1.2 RINSE ORAL at 20:29

## 2017-01-01 RX ADMIN — SACUBITRIL AND VALSARTAN 1 TABLET: 49; 51 TABLET, FILM COATED ORAL at 21:18

## 2017-01-01 RX ADMIN — SODIUM CHLORIDE: 9 INJECTION, SOLUTION INTRAVENOUS at 16:03

## 2017-01-01 RX ADMIN — HYDROCODONE BITARTRATE AND ACETAMINOPHEN 1 TABLET: 5; 325 TABLET ORAL at 13:56

## 2017-01-01 RX ADMIN — MEMANTINE 10 MG: 10 TABLET ORAL at 20:38

## 2017-01-01 RX ADMIN — BUDESONIDE AND FORMOTEROL FUMARATE DIHYDRATE 2 PUFF: 160; 4.5 AEROSOL RESPIRATORY (INHALATION) at 09:51

## 2017-01-01 RX ADMIN — PAROXETINE HYDROCHLORIDE 30 MG: 20 TABLET, FILM COATED ORAL at 05:29

## 2017-01-01 RX ADMIN — BUDESONIDE AND FORMOTEROL FUMARATE DIHYDRATE 2 PUFF: 160; 4.5 AEROSOL RESPIRATORY (INHALATION) at 08:35

## 2017-01-01 RX ADMIN — MEMANTINE 10 MG: 10 TABLET ORAL at 22:25

## 2017-01-01 RX ADMIN — MONTELUKAST SODIUM 10 MG: 10 TABLET, FILM COATED ORAL at 21:01

## 2017-01-01 RX ADMIN — SODIUM CHLORIDE 50 ML/HR: 9 INJECTION, SOLUTION INTRAVENOUS at 00:18

## 2017-01-01 RX ADMIN — METOCLOPRAMIDE 10 MG: 5 INJECTION, SOLUTION INTRAMUSCULAR; INTRAVENOUS at 00:44

## 2017-01-01 RX ADMIN — AMLODIPINE BESYLATE 5 MG: 5 TABLET ORAL at 09:07

## 2017-01-01 RX ADMIN — HYDRALAZINE HYDROCHLORIDE 10 MG: 20 INJECTION INTRAMUSCULAR; INTRAVENOUS at 15:45

## 2017-01-01 RX ADMIN — FERROUS SULFATE TAB 325 MG (65 MG ELEMENTAL FE) 325 MG: 325 (65 FE) TAB at 08:44

## 2017-01-01 RX ADMIN — FAMOTIDINE 20 MG: 10 INJECTION, SOLUTION INTRAVENOUS at 08:45

## 2017-01-01 RX ADMIN — MEMANTINE 10 MG: 10 TABLET ORAL at 21:01

## 2017-01-01 RX ADMIN — PAROXETINE HYDROCHLORIDE 30 MG: 20 TABLET, FILM COATED ORAL at 06:11

## 2017-01-01 RX ADMIN — HEPARIN SODIUM 5000 UNITS: 5000 INJECTION, SOLUTION INTRAVENOUS; SUBCUTANEOUS at 09:35

## 2017-01-01 RX ADMIN — RIVASTIGMINE TRANSDERMAL SYSTEM 1 PATCH: 9.5 PATCH, EXTENDED RELEASE TRANSDERMAL at 10:04

## 2017-01-01 RX ADMIN — METOCLOPRAMIDE 10 MG: 5 INJECTION, SOLUTION INTRAMUSCULAR; INTRAVENOUS at 12:04

## 2017-01-01 RX ADMIN — FAMOTIDINE 20 MG: 20 TABLET ORAL at 08:24

## 2017-01-01 RX ADMIN — RIVASTIGMINE TRANSDERMAL SYSTEM 1 PATCH: 9.5 PATCH, EXTENDED RELEASE TRANSDERMAL at 09:48

## 2017-01-01 RX ADMIN — PAROXETINE HYDROCHLORIDE 30 MG: 20 TABLET, FILM COATED ORAL at 05:48

## 2017-01-01 RX ADMIN — DEXAMETHASONE SODIUM PHOSPHATE 4 MG: 4 INJECTION, SOLUTION INTRA-ARTICULAR; INTRALESIONAL; INTRAMUSCULAR; INTRAVENOUS; SOFT TISSUE at 18:48

## 2017-01-01 RX ADMIN — IPRATROPIUM BROMIDE AND ALBUTEROL SULFATE 3 ML: .5; 3 SOLUTION RESPIRATORY (INHALATION) at 21:10

## 2017-01-01 RX ADMIN — TRAMADOL HYDROCHLORIDE 50 MG: 50 TABLET, COATED ORAL at 09:38

## 2017-01-01 RX ADMIN — METOCLOPRAMIDE 10 MG: 5 INJECTION, SOLUTION INTRAMUSCULAR; INTRAVENOUS at 08:25

## 2017-01-01 RX ADMIN — BUDESONIDE AND FORMOTEROL FUMARATE DIHYDRATE 2 PUFF: 160; 4.5 AEROSOL RESPIRATORY (INHALATION) at 08:51

## 2017-01-01 RX ADMIN — MEMANTINE 10 MG: 10 TABLET ORAL at 08:09

## 2017-01-01 RX ADMIN — OXYCODONE HYDROCHLORIDE AND ACETAMINOPHEN 250 MG: 500 TABLET ORAL at 08:44

## 2017-01-01 RX ADMIN — ENTECAVIR 0.5 MG: 0.5 TABLET ORAL at 08:48

## 2017-01-01 RX ADMIN — FAMOTIDINE 20 MG: 20 TABLET ORAL at 17:23

## 2017-01-01 RX ADMIN — MONTELUKAST SODIUM 10 MG: 10 TABLET, FILM COATED ORAL at 21:24

## 2017-01-01 RX ADMIN — DEXAMETHASONE 2 MG: 4 TABLET ORAL at 08:56

## 2017-01-01 RX ADMIN — METOCLOPRAMIDE 10 MG: 5 INJECTION, SOLUTION INTRAMUSCULAR; INTRAVENOUS at 17:58

## 2017-01-01 RX ADMIN — FUROSEMIDE 20 MG: 20 TABLET ORAL at 08:51

## 2017-01-01 RX ADMIN — Medication 10 ML: at 12:17

## 2017-01-01 RX ADMIN — ATORVASTATIN CALCIUM 40 MG: 40 TABLET, FILM COATED ORAL at 20:44

## 2017-01-01 RX ADMIN — NITROGLYCERIN 1 INCH: 20 OINTMENT TOPICAL at 17:48

## 2017-01-01 RX ADMIN — ATORVASTATIN CALCIUM 10 MG: 10 TABLET, FILM COATED ORAL at 20:30

## 2017-01-01 RX ADMIN — SACUBITRIL AND VALSARTAN 1 TABLET: 49; 51 TABLET, FILM COATED ORAL at 20:45

## 2017-01-01 RX ADMIN — CARVEDILOL 3.12 MG: 3.12 TABLET, FILM COATED ORAL at 08:02

## 2017-01-01 RX ADMIN — ATORVASTATIN CALCIUM 40 MG: 40 TABLET, FILM COATED ORAL at 20:40

## 2017-01-01 RX ADMIN — ASPIRIN 81 MG CHEWABLE TABLET 81 MG: 81 TABLET CHEWABLE at 08:50

## 2017-01-01 RX ADMIN — IPRATROPIUM BROMIDE AND ALBUTEROL SULFATE 3 ML: .5; 3 SOLUTION RESPIRATORY (INHALATION) at 21:24

## 2017-01-01 RX ADMIN — METOPROLOL TARTRATE 2.5 MG: 5 INJECTION, SOLUTION INTRAVENOUS at 00:21

## 2017-01-01 RX ADMIN — BUPROPION HYDROCHLORIDE 75 MG: 75 TABLET, FILM COATED ORAL at 21:20

## 2017-01-01 RX ADMIN — TAZOBACTAM SODIUM AND PIPERACILLIN SODIUM 3.38 G: 375; 3 INJECTION, SOLUTION INTRAVENOUS at 22:29

## 2017-01-01 RX ADMIN — SACUBITRIL AND VALSARTAN 1 TABLET: 49; 51 TABLET, FILM COATED ORAL at 23:30

## 2017-01-01 RX ADMIN — Medication 325 MG: at 08:44

## 2017-01-01 RX ADMIN — BACITRACIN, NEOMYCIN, POLYMYXIN B 1 APPLICATION: 400; 3.5; 5 OINTMENT TOPICAL at 23:39

## 2017-01-01 RX ADMIN — METOCLOPRAMIDE 10 MG: 5 INJECTION, SOLUTION INTRAMUSCULAR; INTRAVENOUS at 05:52

## 2017-01-01 RX ADMIN — PHENYLEPHRINE HYDROCHLORIDE 100 MCG: 10 INJECTION INTRAVENOUS at 16:15

## 2017-01-01 RX ADMIN — CEFAZOLIN SODIUM 2 G: 2 INJECTION, SOLUTION INTRAVENOUS at 15:40

## 2017-01-01 RX ADMIN — IPRATROPIUM BROMIDE AND ALBUTEROL SULFATE 3 ML: .5; 3 SOLUTION RESPIRATORY (INHALATION) at 11:41

## 2017-01-01 RX ADMIN — DEXAMETHASONE SODIUM PHOSPHATE 4 MG: 4 INJECTION, SOLUTION INTRA-ARTICULAR; INTRALESIONAL; INTRAMUSCULAR; INTRAVENOUS; SOFT TISSUE at 08:44

## 2017-01-01 RX ADMIN — METOPROLOL TARTRATE 2.5 MG: 5 INJECTION, SOLUTION INTRAVENOUS at 06:24

## 2017-01-01 RX ADMIN — MEMANTINE 10 MG: 10 TABLET ORAL at 09:35

## 2017-01-01 RX ADMIN — CHLORHEXIDINE GLUCONATE 15 ML: 1.2 RINSE ORAL at 21:05

## 2017-01-01 RX ADMIN — ATORVASTATIN CALCIUM 10 MG: 10 TABLET, FILM COATED ORAL at 20:46

## 2017-01-01 RX ADMIN — FUROSEMIDE 20 MG: 20 TABLET ORAL at 08:50

## 2017-01-01 RX ADMIN — ALBUTEROL SULFATE 2.5 MG: 2.5 SOLUTION RESPIRATORY (INHALATION) at 08:08

## 2017-01-01 RX ADMIN — DIPHENHYDRAMINE HYDROCHLORIDE 25 MG: 50 INJECTION INTRAMUSCULAR; INTRAVENOUS at 00:01

## 2017-01-01 RX ADMIN — BUDESONIDE AND FORMOTEROL FUMARATE DIHYDRATE 2 PUFF: 160; 4.5 AEROSOL RESPIRATORY (INHALATION) at 21:55

## 2017-01-01 RX ADMIN — FAMOTIDINE 20 MG: 20 TABLET ORAL at 08:38

## 2017-01-01 RX ADMIN — FAMOTIDINE 20 MG: 10 INJECTION, SOLUTION INTRAVENOUS at 20:53

## 2017-01-01 RX ADMIN — ATORVASTATIN CALCIUM 10 MG: 10 TABLET, FILM COATED ORAL at 20:38

## 2017-01-01 RX ADMIN — AMLODIPINE BESYLATE 5 MG: 5 TABLET ORAL at 08:24

## 2017-01-01 RX ADMIN — MEMANTINE 10 MG: 10 TABLET ORAL at 09:28

## 2017-01-01 RX ADMIN — PAROXETINE 30 MG: 10 TABLET, FILM COATED ORAL at 06:51

## 2017-01-01 RX ADMIN — MEMANTINE 10 MG: 10 TABLET ORAL at 08:37

## 2017-01-01 RX ADMIN — SODIUM CHLORIDE: 9 INJECTION, SOLUTION INTRAVENOUS at 08:04

## 2017-01-01 RX ADMIN — PAROXETINE HYDROCHLORIDE 30 MG: 10 TABLET, FILM COATED ORAL at 08:28

## 2017-01-01 RX ADMIN — DEXAMETHASONE SODIUM PHOSPHATE 4 MG: 4 INJECTION, SOLUTION INTRA-ARTICULAR; INTRALESIONAL; INTRAMUSCULAR; INTRAVENOUS; SOFT TISSUE at 15:28

## 2017-01-01 RX ADMIN — IPRATROPIUM BROMIDE AND ALBUTEROL SULFATE 3 ML: .5; 3 SOLUTION RESPIRATORY (INHALATION) at 07:23

## 2017-01-01 RX ADMIN — RIVASTIGMINE TRANSDERMAL SYSTEM 1 PATCH: 9.5 PATCH, EXTENDED RELEASE TRANSDERMAL at 08:56

## 2017-01-01 RX ADMIN — ASPIRIN 81 MG CHEWABLE TABLET 81 MG: 81 TABLET CHEWABLE at 09:08

## 2017-01-01 RX ADMIN — RIVASTIGMINE TRANSDERMAL SYSTEM 1 PATCH: 9.5 PATCH, EXTENDED RELEASE TRANSDERMAL at 10:11

## 2017-01-01 RX ADMIN — CARVEDILOL 6.25 MG: 6.25 TABLET, FILM COATED ORAL at 08:11

## 2017-01-01 RX ADMIN — METOCLOPRAMIDE 10 MG: 5 INJECTION, SOLUTION INTRAMUSCULAR; INTRAVENOUS at 00:07

## 2017-01-01 RX ADMIN — PAROXETINE HYDROCHLORIDE 30 MG: 20 TABLET, FILM COATED ORAL at 10:03

## 2017-01-01 RX ADMIN — FUROSEMIDE 40 MG: 10 INJECTION, SOLUTION INTRAMUSCULAR; INTRAVENOUS at 21:17

## 2017-01-01 RX ADMIN — SACUBITRIL AND VALSARTAN 1 TABLET: 49; 51 TABLET, FILM COATED ORAL at 10:26

## 2017-01-01 RX ADMIN — OXYCODONE HYDROCHLORIDE AND ACETAMINOPHEN 250 MG: 500 TABLET ORAL at 08:08

## 2017-01-01 RX ADMIN — CLOPIDOGREL BISULFATE 75 MG: 75 TABLET ORAL at 08:41

## 2017-01-01 RX ADMIN — MEMANTINE 10 MG: 10 TABLET ORAL at 08:34

## 2017-01-01 RX ADMIN — HEPARIN SODIUM 5000 UNITS: 5000 INJECTION, SOLUTION INTRAVENOUS; SUBCUTANEOUS at 08:42

## 2017-01-01 RX ADMIN — OXYCODONE HYDROCHLORIDE AND ACETAMINOPHEN 250 MG: 500 TABLET ORAL at 08:45

## 2017-01-01 RX ADMIN — MEMANTINE 10 MG: 10 TABLET ORAL at 21:20

## 2017-01-01 RX ADMIN — ENTECAVIR 0.5 MG: 0.5 TABLET ORAL at 09:39

## 2017-01-01 RX ADMIN — ASPIRIN 81 MG 81 MG: 81 TABLET ORAL at 09:15

## 2017-01-01 RX ADMIN — PAROXETINE HYDROCHLORIDE 30 MG: 20 TABLET, FILM COATED ORAL at 06:00

## 2017-01-01 RX ADMIN — CARVEDILOL 6.25 MG: 6.25 TABLET, FILM COATED ORAL at 09:53

## 2017-01-01 RX ADMIN — SACUBITRIL AND VALSARTAN 0.5 TABLET: 49; 51 TABLET, FILM COATED ORAL at 08:10

## 2017-01-01 RX ADMIN — SODIUM POLYSTYRENE SULFONATE 15 G: 15 SUSPENSION ORAL; RECTAL at 11:54

## 2017-01-01 RX ADMIN — RIVASTIGMINE TRANSDERMAL SYSTEM 1 PATCH: 9.5 PATCH, EXTENDED RELEASE TRANSDERMAL at 09:19

## 2017-01-01 RX ADMIN — BUDESONIDE AND FORMOTEROL FUMARATE DIHYDRATE 2 PUFF: 160; 4.5 AEROSOL RESPIRATORY (INHALATION) at 09:53

## 2017-01-01 RX ADMIN — MEMANTINE 10 MG: 10 TABLET ORAL at 17:38

## 2017-01-01 RX ADMIN — METOPROLOL TARTRATE 2.5 MG: 5 INJECTION, SOLUTION INTRAVENOUS at 06:01

## 2017-01-01 RX ADMIN — FENTANYL CITRATE 25 MCG: 50 INJECTION INTRAMUSCULAR; INTRAVENOUS at 16:26

## 2017-01-01 RX ADMIN — MEMANTINE 10 MG: 10 TABLET ORAL at 21:13

## 2017-01-01 RX ADMIN — MEMANTINE 10 MG: 10 TABLET ORAL at 09:26

## 2017-01-01 RX ADMIN — FAMOTIDINE 20 MG: 20 TABLET ORAL at 20:49

## 2017-01-01 RX ADMIN — CARVEDILOL 3.12 MG: 3.12 TABLET, FILM COATED ORAL at 08:42

## 2017-01-01 RX ADMIN — CLOPIDOGREL BISULFATE 75 MG: 75 TABLET ORAL at 08:36

## 2017-01-01 RX ADMIN — PHENYLEPHRINE HYDROCHLORIDE 100 MCG: 10 INJECTION INTRAVENOUS at 16:17

## 2017-01-01 RX ADMIN — PAROXETINE HYDROCHLORIDE 30 MG: 20 TABLET, FILM COATED ORAL at 06:19

## 2017-01-01 RX ADMIN — MEMANTINE 10 MG: 10 TABLET ORAL at 08:11

## 2017-01-01 RX ADMIN — NICARDIPINE HYDROCHLORIDE 7.5 MG/HR: 2.5 INJECTION INTRAVENOUS at 17:28

## 2017-01-01 RX ADMIN — ATORVASTATIN CALCIUM 40 MG: 40 TABLET, FILM COATED ORAL at 22:25

## 2017-01-01 RX ADMIN — DOCUSATE SODIUM 100 MG: 100 CAPSULE, LIQUID FILLED ORAL at 09:16

## 2017-01-01 RX ADMIN — METOCLOPRAMIDE 10 MG: 5 INJECTION, SOLUTION INTRAMUSCULAR; INTRAVENOUS at 06:17

## 2017-01-01 RX ADMIN — RIVASTIGMINE TRANSDERMAL SYSTEM 1 PATCH: 9.5 PATCH, EXTENDED RELEASE TRANSDERMAL at 09:39

## 2017-01-01 RX ADMIN — ENTECAVIR 0.5 MG: 0.5 TABLET ORAL at 06:39

## 2017-01-01 RX ADMIN — ATORVASTATIN CALCIUM 10 MG: 10 TABLET, FILM COATED ORAL at 20:45

## 2017-01-01 RX ADMIN — MEMANTINE 10 MG: 10 TABLET ORAL at 18:04

## 2017-01-01 RX ADMIN — POTASSIUM CHLORIDE 40 MEQ: 1.5 POWDER, FOR SOLUTION ORAL at 00:45

## 2017-01-01 RX ADMIN — MEMANTINE 10 MG: 10 TABLET ORAL at 21:15

## 2017-01-01 RX ADMIN — DEXAMETHASONE SODIUM PHOSPHATE 4 MG: 4 INJECTION, SOLUTION INTRA-ARTICULAR; INTRALESIONAL; INTRAMUSCULAR; INTRAVENOUS; SOFT TISSUE at 20:38

## 2017-01-01 RX ADMIN — OXYCODONE HYDROCHLORIDE AND ACETAMINOPHEN 250 MG: 500 TABLET ORAL at 08:37

## 2017-01-01 RX ADMIN — ENTECAVIR 0.5 MG: 0.5 TABLET, FILM COATED ORAL at 09:37

## 2017-01-01 RX ADMIN — DOCUSATE SODIUM 100 MG: 50 LIQUID ORAL at 08:23

## 2017-01-01 RX ADMIN — HEPARIN SODIUM 5000 UNITS: 5000 INJECTION, SOLUTION INTRAVENOUS; SUBCUTANEOUS at 10:02

## 2017-01-01 RX ADMIN — HEPARIN SODIUM 5000 UNITS: 5000 INJECTION, SOLUTION INTRAVENOUS; SUBCUTANEOUS at 05:22

## 2017-01-01 RX ADMIN — MEMANTINE 10 MG: 10 TABLET ORAL at 08:59

## 2017-01-01 RX ADMIN — FUROSEMIDE 40 MG: 10 INJECTION, SOLUTION INTRAMUSCULAR; INTRAVENOUS at 13:55

## 2017-01-01 RX ADMIN — METOCLOPRAMIDE 10 MG: 5 INJECTION, SOLUTION INTRAMUSCULAR; INTRAVENOUS at 18:25

## 2017-01-01 RX ADMIN — FUROSEMIDE 20 MG: 20 TABLET ORAL at 08:44

## 2017-01-01 RX ADMIN — SULFUR HEXAFLUORIDE 5 ML: KIT at 09:20

## 2017-01-01 RX ADMIN — MONTELUKAST SODIUM 10 MG: 10 TABLET, FILM COATED ORAL at 20:08

## 2017-01-01 RX ADMIN — OXYCODONE HYDROCHLORIDE AND ACETAMINOPHEN 250 MG: 500 TABLET ORAL at 08:39

## 2017-01-01 RX ADMIN — ATORVASTATIN CALCIUM 10 MG: 10 TABLET, FILM COATED ORAL at 21:20

## 2017-01-01 RX ADMIN — MEMANTINE 10 MG: 10 TABLET ORAL at 08:48

## 2017-01-01 RX ADMIN — AMLODIPINE BESYLATE 5 MG: 5 TABLET ORAL at 08:34

## 2017-01-01 RX ADMIN — ATORVASTATIN CALCIUM 10 MG: 10 TABLET, FILM COATED ORAL at 20:08

## 2017-01-01 RX ADMIN — FUROSEMIDE 20 MG: 20 TABLET ORAL at 08:42

## 2017-01-01 RX ADMIN — ATORVASTATIN CALCIUM 10 MG: 10 TABLET, FILM COATED ORAL at 20:10

## 2017-01-01 RX ADMIN — SACUBITRIL AND VALSARTAN 0.5 TABLET: 49; 51 TABLET, FILM COATED ORAL at 17:23

## 2017-01-01 RX ADMIN — CARVEDILOL 6.25 MG: 6.25 TABLET, FILM COATED ORAL at 08:37

## 2017-01-01 RX ADMIN — SACUBITRIL AND VALSARTAN 1 TABLET: 49; 51 TABLET, FILM COATED ORAL at 08:39

## 2017-01-01 RX ADMIN — MEMANTINE 10 MG: 10 TABLET ORAL at 17:40

## 2017-01-01 RX ADMIN — IPRATROPIUM BROMIDE AND ALBUTEROL SULFATE 3 ML: .5; 3 SOLUTION RESPIRATORY (INHALATION) at 13:38

## 2017-01-01 RX ADMIN — FENTANYL CITRATE 25 MCG: 50 INJECTION INTRAMUSCULAR; INTRAVENOUS at 08:45

## 2017-01-01 RX ADMIN — SODIUM CHLORIDE 125 ML/HR: 9 INJECTION, SOLUTION INTRAVENOUS at 00:21

## 2017-01-01 RX ADMIN — PAROXETINE HYDROCHLORIDE 30 MG: 10 TABLET, FILM COATED ORAL at 08:23

## 2017-01-01 RX ADMIN — IPRATROPIUM BROMIDE AND ALBUTEROL SULFATE 3 ML: .5; 3 SOLUTION RESPIRATORY (INHALATION) at 08:35

## 2017-01-01 RX ADMIN — MEMANTINE 10 MG: 10 TABLET ORAL at 21:42

## 2017-01-01 RX ADMIN — METOCLOPRAMIDE 10 MG: 5 INJECTION, SOLUTION INTRAMUSCULAR; INTRAVENOUS at 18:07

## 2017-01-01 RX ADMIN — MEMANTINE 10 MG: 10 TABLET ORAL at 20:16

## 2017-01-01 RX ADMIN — SULFUR HEXAFLUORIDE 4 ML: KIT at 14:30

## 2017-01-01 RX ADMIN — PANTOPRAZOLE SODIUM 40 MG: 40 INJECTION, POWDER, FOR SOLUTION INTRAVENOUS at 06:33

## 2017-01-01 RX ADMIN — SACUBITRIL AND VALSARTAN 0.5 TABLET: 49; 51 TABLET, FILM COATED ORAL at 17:25

## 2017-01-01 RX ADMIN — OXYCODONE HYDROCHLORIDE AND ACETAMINOPHEN 250 MG: 500 TABLET ORAL at 17:44

## 2017-01-01 RX ADMIN — MONTELUKAST SODIUM 10 MG: 10 TABLET, FILM COATED ORAL at 20:46

## 2017-01-01 RX ADMIN — PAROXETINE 30 MG: 10 TABLET, FILM COATED ORAL at 09:28

## 2017-01-01 RX ADMIN — PANTOPRAZOLE SODIUM 40 MG: 40 INJECTION, POWDER, FOR SOLUTION INTRAVENOUS at 06:03

## 2017-01-01 RX ADMIN — BUPROPION HYDROCHLORIDE 75 MG: 75 TABLET, FILM COATED ORAL at 21:01

## 2017-01-01 RX ADMIN — MONTELUKAST SODIUM 10 MG: 10 TABLET, FILM COATED ORAL at 20:38

## 2017-01-01 RX ADMIN — FAMOTIDINE 20 MG: 20 TABLET ORAL at 08:01

## 2017-01-01 RX ADMIN — SACUBITRIL AND VALSARTAN 1 TABLET: 49; 51 TABLET, FILM COATED ORAL at 20:11

## 2017-01-01 RX ADMIN — ASPIRIN 81 MG 81 MG: 81 TABLET ORAL at 09:07

## 2017-01-01 RX ADMIN — SODIUM CHLORIDE 1000 ML: 9 INJECTION, SOLUTION INTRAVENOUS at 13:53

## 2017-01-01 RX ADMIN — PAROXETINE HYDROCHLORIDE 30 MG: 10 TABLET, FILM COATED ORAL at 05:52

## 2017-01-01 RX ADMIN — ASPIRIN 81 MG CHEWABLE TABLET 81 MG: 81 TABLET CHEWABLE at 09:52

## 2017-01-01 RX ADMIN — CLOPIDOGREL BISULFATE 75 MG: 75 TABLET ORAL at 09:16

## 2017-01-01 RX ADMIN — METOPROLOL TARTRATE 2.5 MG: 5 INJECTION, SOLUTION INTRAVENOUS at 11:50

## 2017-01-01 RX ADMIN — GLYCOPYRROLATE 0.3 MG: 0.2 INJECTION, SOLUTION INTRAMUSCULAR; INTRAVENOUS at 10:17

## 2017-01-01 RX ADMIN — TAZOBACTAM SODIUM AND PIPERACILLIN SODIUM 3.38 G: 375; 3 INJECTION, SOLUTION INTRAVENOUS at 13:32

## 2017-01-01 RX ADMIN — SACUBITRIL AND VALSARTAN 0.5 TABLET: 49; 51 TABLET, FILM COATED ORAL at 23:00

## 2017-01-01 RX ADMIN — BUDESONIDE AND FORMOTEROL FUMARATE DIHYDRATE 2 PUFF: 160; 4.5 AEROSOL RESPIRATORY (INHALATION) at 08:02

## 2017-01-01 RX ADMIN — SACUBITRIL AND VALSARTAN 0.5 TABLET: 49; 51 TABLET, FILM COATED ORAL at 17:57

## 2017-01-01 RX ADMIN — PROPOFOL 100 MG: 10 INJECTION, EMULSION INTRAVENOUS at 08:11

## 2017-01-01 RX ADMIN — METOCLOPRAMIDE 10 MG: 5 INJECTION, SOLUTION INTRAMUSCULAR; INTRAVENOUS at 12:07

## 2017-01-01 RX ADMIN — FAMOTIDINE 20 MG: 20 TABLET ORAL at 09:18

## 2017-01-01 RX ADMIN — FAMOTIDINE 20 MG: 10 INJECTION, SOLUTION INTRAVENOUS at 09:39

## 2017-01-01 RX ADMIN — BUDESONIDE AND FORMOTEROL FUMARATE DIHYDRATE 2 PUFF: 160; 4.5 AEROSOL RESPIRATORY (INHALATION) at 08:41

## 2017-01-01 RX ADMIN — FAMOTIDINE 20 MG: 20 TABLET ORAL at 08:10

## 2017-01-01 RX ADMIN — TAZOBACTAM SODIUM AND PIPERACILLIN SODIUM 3.38 G: 375; 3 INJECTION, SOLUTION INTRAVENOUS at 15:15

## 2017-01-01 RX ADMIN — LEVOFLOXACIN 500 MG: 500 TABLET, FILM COATED ORAL at 09:34

## 2017-01-01 RX ADMIN — MEMANTINE 10 MG: 10 TABLET ORAL at 09:25

## 2017-01-01 RX ADMIN — ATORVASTATIN CALCIUM 10 MG: 10 TABLET, FILM COATED ORAL at 23:30

## 2017-01-01 RX ADMIN — METOCLOPRAMIDE 10 MG: 5 INJECTION, SOLUTION INTRAMUSCULAR; INTRAVENOUS at 20:35

## 2017-01-01 RX ADMIN — SACUBITRIL AND VALSARTAN 1 TABLET: 49; 51 TABLET, FILM COATED ORAL at 09:25

## 2017-01-01 RX ADMIN — TAZOBACTAM SODIUM AND PIPERACILLIN SODIUM 3.38 G: 375; 3 INJECTION, SOLUTION INTRAVENOUS at 17:57

## 2017-01-01 RX ADMIN — ASPIRIN 81 MG CHEWABLE TABLET 81 MG: 81 TABLET CHEWABLE at 09:40

## 2017-01-01 RX ADMIN — SACUBITRIL AND VALSARTAN 1 TABLET: 49; 51 TABLET, FILM COATED ORAL at 09:15

## 2017-01-01 RX ADMIN — POTASSIUM CHLORIDE 10 MEQ: 7.46 INJECTION, SOLUTION INTRAVENOUS at 00:35

## 2017-01-01 RX ADMIN — ENTECAVIR 0.5 MG: 0.5 TABLET ORAL at 08:52

## 2017-01-01 RX ADMIN — IOPAMIDOL 20 ML: 612 INJECTION, SOLUTION INTRAVENOUS at 11:16

## 2017-01-01 RX ADMIN — ESMOLOL HYDROCHLORIDE 20 MG: 10 INJECTION, SOLUTION INTRAVENOUS at 08:18

## 2017-01-01 RX ADMIN — TAZOBACTAM SODIUM AND PIPERACILLIN SODIUM 3.38 G: 375; 3 INJECTION, SOLUTION INTRAVENOUS at 22:25

## 2017-01-01 RX ADMIN — Medication 1 PACKET: at 23:30

## 2017-01-01 RX ADMIN — METOCLOPRAMIDE 10 MG: 5 INJECTION, SOLUTION INTRAMUSCULAR; INTRAVENOUS at 00:16

## 2017-01-01 RX ADMIN — METOCLOPRAMIDE 10 MG: 5 INJECTION, SOLUTION INTRAMUSCULAR; INTRAVENOUS at 06:13

## 2017-01-01 RX ADMIN — CARVEDILOL 6.25 MG: 6.25 TABLET, FILM COATED ORAL at 20:40

## 2017-01-01 RX ADMIN — METOCLOPRAMIDE 10 MG: 5 INJECTION, SOLUTION INTRAMUSCULAR; INTRAVENOUS at 09:48

## 2017-01-01 RX ADMIN — OXYCODONE HYDROCHLORIDE AND ACETAMINOPHEN 250 MG: 500 TABLET ORAL at 17:07

## 2017-01-01 RX ADMIN — MEMANTINE 10 MG: 10 TABLET ORAL at 10:25

## 2017-01-01 RX ADMIN — ASPIRIN 81 MG CHEWABLE TABLET 81 MG: 81 TABLET CHEWABLE at 09:35

## 2017-01-01 RX ADMIN — Medication 1 PACKET: at 17:58

## 2017-01-01 RX ADMIN — MEMANTINE 10 MG: 10 TABLET ORAL at 08:49

## 2017-01-01 RX ADMIN — PANTOPRAZOLE SODIUM 40 MG: 40 INJECTION, POWDER, FOR SOLUTION INTRAVENOUS at 06:01

## 2017-01-01 RX ADMIN — SENNOSIDES 17.2 MG: 8.6 TABLET, FILM COATED ORAL at 10:25

## 2017-01-01 RX ADMIN — MEMANTINE 10 MG: 10 TABLET ORAL at 23:30

## 2017-01-01 RX ADMIN — LEVOFLOXACIN 500 MG: 500 TABLET, FILM COATED ORAL at 09:08

## 2017-01-01 RX ADMIN — MEMANTINE 10 MG: 10 TABLET ORAL at 09:24

## 2017-01-01 RX ADMIN — Medication 1 PACKET: at 11:31

## 2017-01-01 RX ADMIN — FUROSEMIDE 40 MG: 40 TABLET ORAL at 08:41

## 2017-01-01 RX ADMIN — BUDESONIDE AND FORMOTEROL FUMARATE DIHYDRATE 2 PUFF: 160; 4.5 AEROSOL RESPIRATORY (INHALATION) at 20:30

## 2017-01-01 RX ADMIN — BUDESONIDE AND FORMOTEROL FUMARATE DIHYDRATE 2 PUFF: 160; 4.5 AEROSOL RESPIRATORY (INHALATION) at 21:13

## 2017-01-01 RX ADMIN — FAMOTIDINE 20 MG: 10 INJECTION, SOLUTION INTRAVENOUS at 08:34

## 2017-01-01 RX ADMIN — IPRATROPIUM BROMIDE AND ALBUTEROL SULFATE 3 ML: .5; 3 SOLUTION RESPIRATORY (INHALATION) at 08:10

## 2017-01-01 RX ADMIN — TAZOBACTAM SODIUM AND PIPERACILLIN SODIUM 3.38 G: 375; 3 INJECTION, SOLUTION INTRAVENOUS at 21:11

## 2017-01-01 RX ADMIN — OXYCODONE HYDROCHLORIDE AND ACETAMINOPHEN 250 MG: 500 TABLET ORAL at 11:55

## 2017-01-01 RX ADMIN — BUDESONIDE AND FORMOTEROL FUMARATE DIHYDRATE 2 PUFF: 160; 4.5 AEROSOL RESPIRATORY (INHALATION) at 20:00

## 2017-01-01 RX ADMIN — SACUBITRIL AND VALSARTAN 0.5 TABLET: 49; 51 TABLET, FILM COATED ORAL at 08:40

## 2017-01-01 RX ADMIN — PAROXETINE 30 MG: 10 TABLET, FILM COATED ORAL at 06:16

## 2017-01-01 RX ADMIN — ASPIRIN 81 MG CHEWABLE TABLET 81 MG: 81 TABLET CHEWABLE at 08:47

## 2017-01-01 RX ADMIN — METOCLOPRAMIDE 10 MG: 5 INJECTION, SOLUTION INTRAMUSCULAR; INTRAVENOUS at 17:08

## 2017-01-01 RX ADMIN — ENTECAVIR 0.5 MG: 0.5 TABLET ORAL at 08:45

## 2017-01-01 RX ADMIN — ATORVASTATIN CALCIUM 10 MG: 10 TABLET, FILM COATED ORAL at 20:12

## 2017-01-01 RX ADMIN — MONTELUKAST SODIUM 10 MG: 10 TABLET, FILM COATED ORAL at 20:49

## 2017-01-01 RX ADMIN — SACUBITRIL AND VALSARTAN 0.5 TABLET: 49; 51 TABLET, FILM COATED ORAL at 08:56

## 2017-01-01 RX ADMIN — HYDRALAZINE HYDROCHLORIDE 20 MG: 20 INJECTION INTRAMUSCULAR; INTRAVENOUS at 02:08

## 2017-01-01 RX ADMIN — ATORVASTATIN CALCIUM 10 MG: 10 TABLET, FILM COATED ORAL at 20:35

## 2017-01-01 RX ADMIN — MEMANTINE 10 MG: 10 TABLET ORAL at 20:11

## 2017-01-01 RX ADMIN — OXYCODONE HYDROCHLORIDE AND ACETAMINOPHEN 250 MG: 500 TABLET ORAL at 17:57

## 2017-01-01 RX ADMIN — MEMANTINE 10 MG: 10 TABLET ORAL at 20:49

## 2017-01-01 RX ADMIN — PAROXETINE HYDROCHLORIDE 30 MG: 10 TABLET, FILM COATED ORAL at 06:36

## 2017-01-01 RX ADMIN — ASPIRIN 81 MG CHEWABLE TABLET 81 MG: 81 TABLET CHEWABLE at 09:25

## 2017-01-01 RX ADMIN — FENTANYL CITRATE 50 MCG: 50 INJECTION, SOLUTION INTRAMUSCULAR; INTRAVENOUS at 08:11

## 2017-01-01 RX ADMIN — HEPARIN SODIUM 5000 UNITS: 5000 INJECTION, SOLUTION INTRAVENOUS; SUBCUTANEOUS at 23:00

## 2017-01-01 RX ADMIN — Medication 1 PACKET: at 08:12

## 2017-01-01 RX ADMIN — SACUBITRIL AND VALSARTAN 0.5 TABLET: 49; 51 TABLET, FILM COATED ORAL at 17:44

## 2017-01-01 RX ADMIN — ATORVASTATIN CALCIUM 10 MG: 10 TABLET, FILM COATED ORAL at 20:49

## 2017-01-01 RX ADMIN — MONTELUKAST SODIUM 10 MG: 10 TABLET, FILM COATED ORAL at 21:16

## 2017-01-01 RX ADMIN — DOCUSATE SODIUM 100 MG: 50 LIQUID ORAL at 08:46

## 2017-01-01 RX ADMIN — IPRATROPIUM BROMIDE AND ALBUTEROL SULFATE 3 ML: .5; 3 SOLUTION RESPIRATORY (INHALATION) at 12:00

## 2017-01-01 RX ADMIN — ENTECAVIR 0.5 MG: 0.5 TABLET ORAL at 09:30

## 2017-01-01 RX ADMIN — DOCUSATE SODIUM 100 MG: 100 CAPSULE, LIQUID FILLED ORAL at 17:40

## 2017-01-01 RX ADMIN — SODIUM POLYSTYRENE SULFONATE 15 G: 15 SUSPENSION ORAL; RECTAL at 13:08

## 2017-01-01 RX ADMIN — SACUBITRIL AND VALSARTAN 0.5 TABLET: 49; 51 TABLET, FILM COATED ORAL at 08:45

## 2017-01-01 RX ADMIN — METOCLOPRAMIDE 10 MG: 5 INJECTION, SOLUTION INTRAMUSCULAR; INTRAVENOUS at 23:40

## 2017-01-01 RX ADMIN — FUROSEMIDE 20 MG: 20 TABLET ORAL at 08:56

## 2017-01-01 RX ADMIN — FAMOTIDINE 20 MG: 10 INJECTION, SOLUTION INTRAVENOUS at 08:42

## 2017-01-01 RX ADMIN — IPRATROPIUM BROMIDE AND ALBUTEROL SULFATE 3 ML: .5; 3 SOLUTION RESPIRATORY (INHALATION) at 15:25

## 2017-01-01 RX ADMIN — SACUBITRIL AND VALSARTAN 0.5 TABLET: 49; 51 TABLET, FILM COATED ORAL at 09:29

## 2017-01-01 RX ADMIN — MONTELUKAST SODIUM 10 MG: 10 TABLET, FILM COATED ORAL at 20:34

## 2017-01-01 RX ADMIN — METOCLOPRAMIDE 10 MG: 5 INJECTION, SOLUTION INTRAMUSCULAR; INTRAVENOUS at 12:42

## 2017-01-01 RX ADMIN — MEMANTINE 10 MG: 10 TABLET ORAL at 08:27

## 2017-01-01 RX ADMIN — MONTELUKAST SODIUM 10 MG: 10 TABLET, FILM COATED ORAL at 22:25

## 2017-01-01 RX ADMIN — FENTANYL CITRATE 25 MCG: 50 INJECTION, SOLUTION INTRAMUSCULAR; INTRAVENOUS at 10:27

## 2017-01-01 RX ADMIN — SACUBITRIL AND VALSARTAN 1 TABLET: 49; 51 TABLET, FILM COATED ORAL at 10:09

## 2017-01-01 RX ADMIN — BUDESONIDE AND FORMOTEROL FUMARATE DIHYDRATE 2 PUFF: 160; 4.5 AEROSOL RESPIRATORY (INHALATION) at 20:43

## 2017-01-01 RX ADMIN — AMLODIPINE BESYLATE 5 MG: 5 TABLET ORAL at 10:28

## 2017-01-01 RX ADMIN — TRAMADOL HYDROCHLORIDE 50 MG: 50 TABLET, COATED ORAL at 20:57

## 2017-01-01 RX ADMIN — FAMOTIDINE 20 MG: 20 TABLET ORAL at 17:52

## 2017-01-01 RX ADMIN — DEXAMETHASONE SODIUM PHOSPHATE 4 MG: 4 INJECTION, SOLUTION INTRA-ARTICULAR; INTRALESIONAL; INTRAMUSCULAR; INTRAVENOUS; SOFT TISSUE at 13:39

## 2017-01-01 RX ADMIN — ENTECAVIR 0.5 MG: 0.5 TABLET, FILM COATED ORAL at 10:00

## 2017-01-01 RX ADMIN — BUDESONIDE AND FORMOTEROL FUMARATE DIHYDRATE 2 PUFF: 160; 4.5 AEROSOL RESPIRATORY (INHALATION) at 07:30

## 2017-01-01 RX ADMIN — BUDESONIDE AND FORMOTEROL FUMARATE DIHYDRATE 2 PUFF: 160; 4.5 AEROSOL RESPIRATORY (INHALATION) at 08:05

## 2017-01-01 RX ADMIN — BUDESONIDE AND FORMOTEROL FUMARATE DIHYDRATE 2 PUFF: 160; 4.5 AEROSOL RESPIRATORY (INHALATION) at 20:10

## 2017-01-01 RX ADMIN — BUPROPION HYDROCHLORIDE 75 MG: 75 TABLET, FILM COATED ORAL at 11:28

## 2017-01-01 RX ADMIN — MEMANTINE 10 MG: 10 TABLET ORAL at 08:39

## 2017-01-01 RX ADMIN — ATORVASTATIN CALCIUM 10 MG: 10 TABLET, FILM COATED ORAL at 23:29

## 2017-01-01 RX ADMIN — CEFAZOLIN SODIUM 2 G: 2 INJECTION, SOLUTION INTRAVENOUS at 00:32

## 2017-01-01 RX ADMIN — LABETALOL HYDROCHLORIDE 10 MG: 5 INJECTION INTRAVENOUS at 04:03

## 2017-01-01 RX ADMIN — MEMANTINE 10 MG: 10 TABLET ORAL at 20:45

## 2017-01-01 RX ADMIN — BUDESONIDE AND FORMOTEROL FUMARATE DIHYDRATE 2 PUFF: 160; 4.5 AEROSOL RESPIRATORY (INHALATION) at 21:29

## 2017-01-01 RX ADMIN — SACUBITRIL AND VALSARTAN 0.5 TABLET: 49; 51 TABLET, FILM COATED ORAL at 17:43

## 2017-01-01 RX ADMIN — DEXAMETHASONE SODIUM PHOSPHATE 61.4 ML: 10 INJECTION, SOLUTION INTRAMUSCULAR; INTRAVENOUS at 16:09

## 2017-01-01 RX ADMIN — ASPIRIN 81 MG CHEWABLE TABLET 81 MG: 81 TABLET CHEWABLE at 08:46

## 2017-01-01 RX ADMIN — BUDESONIDE AND FORMOTEROL FUMARATE DIHYDRATE 2 PUFF: 160; 4.5 AEROSOL RESPIRATORY (INHALATION) at 20:34

## 2017-01-01 RX ADMIN — ENTECAVIR 0.5 MG: 0.5 TABLET, FILM COATED ORAL at 17:52

## 2017-01-01 RX ADMIN — ASPIRIN 81 MG 81 MG: 81 TABLET ORAL at 08:50

## 2017-01-01 RX ADMIN — METOCLOPRAMIDE 10 MG: 5 INJECTION, SOLUTION INTRAMUSCULAR; INTRAVENOUS at 00:22

## 2017-01-01 RX ADMIN — MEMANTINE 10 MG: 10 TABLET ORAL at 09:41

## 2017-01-01 RX ADMIN — MEMANTINE 10 MG: 10 TABLET ORAL at 20:35

## 2017-01-01 RX ADMIN — ATORVASTATIN CALCIUM 10 MG: 10 TABLET, FILM COATED ORAL at 21:01

## 2017-01-01 RX ADMIN — ATORVASTATIN CALCIUM 10 MG: 10 TABLET, FILM COATED ORAL at 21:18

## 2017-01-01 RX ADMIN — BUDESONIDE AND FORMOTEROL FUMARATE DIHYDRATE 2 PUFF: 160; 4.5 AEROSOL RESPIRATORY (INHALATION) at 21:54

## 2017-01-01 RX ADMIN — SENNOSIDES A AND B 10 ML: 415.36 LIQUID ORAL at 08:30

## 2017-01-01 RX ADMIN — AMLODIPINE BESYLATE 5 MG: 5 TABLET ORAL at 08:57

## 2017-01-01 RX ADMIN — METOPROLOL TARTRATE 2.5 MG: 5 INJECTION, SOLUTION INTRAVENOUS at 23:53

## 2017-01-01 RX ADMIN — DEXAMETHASONE SODIUM PHOSPHATE 10 MG: 4 INJECTION, SOLUTION INTRAMUSCULAR; INTRAVENOUS at 08:19

## 2017-01-01 RX ADMIN — ENTECAVIR 0.5 MG: 0.5 TABLET ORAL at 08:24

## 2017-01-01 RX ADMIN — RIVASTIGMINE TRANSDERMAL SYSTEM 1 PATCH: 9.5 PATCH, EXTENDED RELEASE TRANSDERMAL at 08:50

## 2017-01-01 RX ADMIN — BUDESONIDE AND FORMOTEROL FUMARATE DIHYDRATE 2 PUFF: 160; 4.5 AEROSOL RESPIRATORY (INHALATION) at 09:03

## 2017-01-01 RX ADMIN — MEMANTINE 10 MG: 10 TABLET ORAL at 17:49

## 2017-01-01 RX ADMIN — MEMANTINE 10 MG: 10 TABLET ORAL at 08:01

## 2017-01-01 RX ADMIN — HEPARIN SODIUM 5000 UNITS: 5000 INJECTION, SOLUTION INTRAVENOUS; SUBCUTANEOUS at 20:17

## 2017-01-01 RX ADMIN — MEMANTINE 10 MG: 10 TABLET ORAL at 23:00

## 2017-01-01 RX ADMIN — NICARDIPINE HYDROCHLORIDE 5 MG/HR: 0.1 INJECTION, SOLUTION INTRAVENOUS at 10:45

## 2017-01-01 RX ADMIN — FUROSEMIDE 20 MG: 20 TABLET ORAL at 08:47

## 2017-01-01 RX ADMIN — HYDROCORTISONE: 1 CREAM TOPICAL at 05:51

## 2017-01-01 RX ADMIN — PAROXETINE HYDROCHLORIDE 30 MG: 10 TABLET, FILM COATED ORAL at 06:00

## 2017-01-01 RX ADMIN — ATORVASTATIN CALCIUM 10 MG: 10 TABLET, FILM COATED ORAL at 20:34

## 2017-01-01 RX ADMIN — ATORVASTATIN CALCIUM 40 MG: 40 TABLET, FILM COATED ORAL at 20:24

## 2017-01-01 RX ADMIN — FAMOTIDINE 20 MG: 20 TABLET ORAL at 08:47

## 2017-01-01 RX ADMIN — DEXAMETHASONE SODIUM PHOSPHATE 4 MG: 4 INJECTION, SOLUTION INTRA-ARTICULAR; INTRALESIONAL; INTRAMUSCULAR; INTRAVENOUS; SOFT TISSUE at 03:14

## 2017-01-01 RX ADMIN — FAMOTIDINE 20 MG: 20 TABLET ORAL at 09:26

## 2017-01-01 RX ADMIN — FAMOTIDINE 20 MG: 20 TABLET ORAL at 08:56

## 2017-01-01 RX ADMIN — ATORVASTATIN CALCIUM 10 MG: 10 TABLET, FILM COATED ORAL at 21:13

## 2017-01-01 RX ADMIN — METOCLOPRAMIDE 10 MG: 5 INJECTION, SOLUTION INTRAMUSCULAR; INTRAVENOUS at 12:09

## 2017-01-01 RX ADMIN — ENTECAVIR 0.5 MG: 0.5 TABLET ORAL at 08:56

## 2017-01-01 RX ADMIN — BUDESONIDE AND FORMOTEROL FUMARATE DIHYDRATE 2 PUFF: 160; 4.5 AEROSOL RESPIRATORY (INHALATION) at 09:28

## 2017-01-01 RX ADMIN — SODIUM CHLORIDE: 9 INJECTION, SOLUTION INTRAVENOUS at 10:25

## 2017-01-01 RX ADMIN — SACUBITRIL AND VALSARTAN 1 TABLET: 49; 51 TABLET, FILM COATED ORAL at 23:29

## 2017-01-01 RX ADMIN — METOPROLOL TARTRATE 2.5 MG: 5 INJECTION, SOLUTION INTRAVENOUS at 06:29

## 2017-01-01 RX ADMIN — MEMANTINE 10 MG: 10 TABLET ORAL at 09:06

## 2017-01-01 RX ADMIN — FAMOTIDINE 20 MG: 10 INJECTION, SOLUTION INTRAVENOUS at 09:01

## 2017-01-01 RX ADMIN — PAROXETINE 30 MG: 10 TABLET, FILM COATED ORAL at 08:42

## 2017-01-01 RX ADMIN — ASPIRIN 81 MG 81 MG: 81 TABLET ORAL at 09:39

## 2017-01-01 RX ADMIN — BUPROPION HYDROCHLORIDE 75 MG: 75 TABLET, FILM COATED ORAL at 08:39

## 2017-01-01 RX ADMIN — MONTELUKAST SODIUM 10 MG: 10 TABLET, FILM COATED ORAL at 20:11

## 2017-01-01 RX ADMIN — BUDESONIDE AND FORMOTEROL FUMARATE DIHYDRATE 2 PUFF: 160; 4.5 AEROSOL RESPIRATORY (INHALATION) at 19:55

## 2017-01-01 RX ADMIN — AMLODIPINE BESYLATE 5 MG: 5 TABLET ORAL at 08:41

## 2017-01-01 RX ADMIN — METOCLOPRAMIDE 10 MG: 5 INJECTION, SOLUTION INTRAMUSCULAR; INTRAVENOUS at 17:20

## 2017-01-01 RX ADMIN — SACUBITRIL AND VALSARTAN 1 TABLET: 49; 51 TABLET, FILM COATED ORAL at 21:19

## 2017-01-01 RX ADMIN — METOPROLOL TARTRATE 2.5 MG: 5 INJECTION, SOLUTION INTRAVENOUS at 00:34

## 2017-01-01 RX ADMIN — MONTELUKAST SODIUM 10 MG: 10 TABLET, FILM COATED ORAL at 20:07

## 2017-01-01 RX ADMIN — MEMANTINE 10 MG: 10 TABLET ORAL at 08:56

## 2017-01-01 RX ADMIN — MEMANTINE 10 MG: 10 TABLET ORAL at 09:18

## 2017-01-01 RX ADMIN — BUDESONIDE AND FORMOTEROL FUMARATE DIHYDRATE 2 PUFF: 160; 4.5 AEROSOL RESPIRATORY (INHALATION) at 07:23

## 2017-01-01 RX ADMIN — BUDESONIDE AND FORMOTEROL FUMARATE DIHYDRATE 2 PUFF: 160; 4.5 AEROSOL RESPIRATORY (INHALATION) at 19:09

## 2017-01-01 RX ADMIN — CLOPIDOGREL BISULFATE 75 MG: 75 TABLET ORAL at 14:18

## 2017-01-01 RX ADMIN — BUDESONIDE AND FORMOTEROL FUMARATE DIHYDRATE 2 PUFF: 160; 4.5 AEROSOL RESPIRATORY (INHALATION) at 07:05

## 2017-01-01 RX ADMIN — MEMANTINE 10 MG: 10 TABLET ORAL at 20:33

## 2017-01-01 RX ADMIN — Medication 1 PACKET: at 09:33

## 2017-01-01 RX ADMIN — CLOPIDOGREL BISULFATE 75 MG: 75 TABLET ORAL at 08:00

## 2017-01-01 RX ADMIN — FAMOTIDINE 20 MG: 20 TABLET ORAL at 17:57

## 2017-01-01 RX ADMIN — BUPROPION HYDROCHLORIDE 75 MG: 75 TABLET, FILM COATED ORAL at 08:38

## 2017-01-01 RX ADMIN — CARVEDILOL 6.25 MG: 6.25 TABLET, FILM COATED ORAL at 08:47

## 2017-01-01 RX ADMIN — MEMANTINE 10 MG: 10 TABLET ORAL at 21:18

## 2017-01-01 RX ADMIN — FUROSEMIDE 40 MG: 40 TABLET ORAL at 09:41

## 2017-01-01 RX ADMIN — PAROXETINE HYDROCHLORIDE 30 MG: 20 TABLET, FILM COATED ORAL at 05:45

## 2017-01-01 RX ADMIN — PAROXETINE HYDROCHLORIDE 30 MG: 20 TABLET, FILM COATED ORAL at 09:08

## 2017-01-01 RX ADMIN — RIVASTIGMINE TRANSDERMAL SYSTEM 1 PATCH: 9.5 PATCH, EXTENDED RELEASE TRANSDERMAL at 11:28

## 2017-01-01 RX ADMIN — PHENYLEPHRINE HYDROCHLORIDE 100 MCG: 10 INJECTION INTRAVENOUS at 16:19

## 2017-01-01 RX ADMIN — SACUBITRIL AND VALSARTAN 0.5 TABLET: 49; 51 TABLET, FILM COATED ORAL at 08:42

## 2017-01-01 RX ADMIN — MEMANTINE 10 MG: 10 TABLET ORAL at 21:24

## 2017-01-01 RX ADMIN — MAGNESIUM SULFATE IN WATER 4 G: 40 INJECTION, SOLUTION INTRAVENOUS at 00:41

## 2017-01-01 RX ADMIN — RIVASTIGMINE TRANSDERMAL SYSTEM 1 PATCH: 9.5 PATCH, EXTENDED RELEASE TRANSDERMAL at 08:51

## 2017-01-01 RX ADMIN — IPRATROPIUM BROMIDE AND ALBUTEROL SULFATE 3 ML: .5; 3 SOLUTION RESPIRATORY (INHALATION) at 16:19

## 2017-01-01 RX ADMIN — ASPIRIN 81 MG 81 MG: 81 TABLET ORAL at 09:32

## 2017-01-01 RX ADMIN — Medication 1 PACKET: at 08:44

## 2017-01-01 RX ADMIN — CARVEDILOL 3.12 MG: 3.12 TABLET, FILM COATED ORAL at 09:35

## 2017-01-01 RX ADMIN — CARVEDILOL 6.25 MG: 6.25 TABLET, FILM COATED ORAL at 08:40

## 2017-01-01 RX ADMIN — CHLORHEXIDINE GLUCONATE 15 ML: 1.2 RINSE ORAL at 08:28

## 2017-01-01 RX ADMIN — ATRACURIUM BESYLATE 5 MG: 10 INJECTION, SOLUTION INTRAVENOUS at 16:06

## 2017-01-01 RX ADMIN — MONTELUKAST SODIUM 10 MG: 10 TABLET, FILM COATED ORAL at 21:41

## 2017-01-01 RX ADMIN — HEPARIN SODIUM 5000 UNITS: 5000 INJECTION, SOLUTION INTRAVENOUS; SUBCUTANEOUS at 15:19

## 2017-01-01 RX ADMIN — ENTECAVIR 0.5 MG: 0.5 TABLET ORAL at 05:52

## 2017-01-01 RX ADMIN — AMLODIPINE BESYLATE 5 MG: 5 TABLET ORAL at 08:51

## 2017-01-01 RX ADMIN — MEMANTINE 10 MG: 10 TABLET ORAL at 09:53

## 2017-01-01 RX ADMIN — SACUBITRIL AND VALSARTAN 0.5 TABLET: 49; 51 TABLET, FILM COATED ORAL at 18:46

## 2017-01-01 RX ADMIN — TAZOBACTAM SODIUM AND PIPERACILLIN SODIUM 3.38 G: 375; 3 INJECTION, SOLUTION INTRAVENOUS at 06:01

## 2017-01-01 RX ADMIN — HEPARIN SODIUM 5000 UNITS: 5000 INJECTION, SOLUTION INTRAVENOUS; SUBCUTANEOUS at 08:47

## 2017-01-01 RX ADMIN — RIVASTIGMINE TRANSDERMAL SYSTEM 1 PATCH: 9.5 PATCH, EXTENDED RELEASE TRANSDERMAL at 08:13

## 2017-01-01 RX ADMIN — SODIUM CHLORIDE 75 ML/HR: 9 INJECTION, SOLUTION INTRAVENOUS at 00:59

## 2017-01-01 RX ADMIN — SACUBITRIL AND VALSARTAN 1 TABLET: 49; 51 TABLET, FILM COATED ORAL at 08:11

## 2017-01-01 RX ADMIN — POTASSIUM CHLORIDE 40 MEQ: 1.5 POWDER, FOR SOLUTION ORAL at 14:27

## 2017-01-01 RX ADMIN — BUDESONIDE AND FORMOTEROL FUMARATE DIHYDRATE 2 PUFF: 160; 4.5 AEROSOL RESPIRATORY (INHALATION) at 09:48

## 2017-01-01 RX ADMIN — PHENYLEPHRINE HYDROCHLORIDE 200 MCG: 10 INJECTION INTRAVENOUS at 16:21

## 2017-01-01 RX ADMIN — FAMOTIDINE 20 MG: 20 TABLET ORAL at 08:50

## 2017-01-01 RX ADMIN — CARVEDILOL 3.12 MG: 3.12 TABLET, FILM COATED ORAL at 20:21

## 2017-01-01 RX ADMIN — MEMANTINE 10 MG: 10 TABLET ORAL at 21:34

## 2017-01-01 RX ADMIN — CARVEDILOL 6.25 MG: 6.25 TABLET, FILM COATED ORAL at 21:19

## 2017-01-01 RX ADMIN — HEPARIN SODIUM 5000 UNITS: 5000 INJECTION, SOLUTION INTRAVENOUS; SUBCUTANEOUS at 09:52

## 2017-01-01 RX ADMIN — CEFAZOLIN SODIUM 2 G: 2 INJECTION, SOLUTION INTRAVENOUS at 08:04

## 2017-01-01 RX ADMIN — CARVEDILOL 6.25 MG: 6.25 TABLET, FILM COATED ORAL at 09:25

## 2017-01-01 RX ADMIN — CARVEDILOL 6.25 MG: 6.25 TABLET, FILM COATED ORAL at 21:34

## 2017-01-01 RX ADMIN — METOCLOPRAMIDE 10 MG: 5 INJECTION, SOLUTION INTRAMUSCULAR; INTRAVENOUS at 11:31

## 2017-01-01 RX ADMIN — IPRATROPIUM BROMIDE AND ALBUTEROL SULFATE 3 ML: .5; 3 SOLUTION RESPIRATORY (INHALATION) at 20:48

## 2017-01-01 RX ADMIN — IPRATROPIUM BROMIDE AND ALBUTEROL SULFATE 3 ML: .5; 3 SOLUTION RESPIRATORY (INHALATION) at 08:45

## 2017-01-01 RX ADMIN — IPRATROPIUM BROMIDE AND ALBUTEROL SULFATE 3 ML: .5; 3 SOLUTION RESPIRATORY (INHALATION) at 19:20

## 2017-01-01 RX ADMIN — BUDESONIDE AND FORMOTEROL FUMARATE DIHYDRATE 2 PUFF: 160; 4.5 AEROSOL RESPIRATORY (INHALATION) at 23:35

## 2017-01-01 RX ADMIN — DOCUSATE SODIUM 100 MG: 100 CAPSULE, LIQUID FILLED ORAL at 09:39

## 2017-01-01 RX ADMIN — MEMANTINE 10 MG: 10 TABLET ORAL at 08:51

## 2017-01-01 RX ADMIN — FUROSEMIDE 40 MG: 10 INJECTION, SOLUTION INTRAMUSCULAR; INTRAVENOUS at 16:31

## 2017-01-01 RX ADMIN — TAZOBACTAM SODIUM AND PIPERACILLIN SODIUM 3.38 G: 375; 3 INJECTION, SOLUTION INTRAVENOUS at 06:37

## 2017-01-01 RX ADMIN — SACUBITRIL AND VALSARTAN 0.5 TABLET: 49; 51 TABLET, FILM COATED ORAL at 08:48

## 2017-01-01 RX ADMIN — ATORVASTATIN CALCIUM 10 MG: 10 TABLET, FILM COATED ORAL at 20:07

## 2017-01-01 RX ADMIN — ASPIRIN 81 MG 81 MG: 81 TABLET ORAL at 08:38

## 2017-01-01 RX ADMIN — DOCUSATE SODIUM 100 MG: 50 LIQUID ORAL at 09:18

## 2017-01-01 RX ADMIN — PAROXETINE HYDROCHLORIDE 30 MG: 20 TABLET, FILM COATED ORAL at 08:49

## 2017-01-01 RX ADMIN — BUDESONIDE AND FORMOTEROL FUMARATE DIHYDRATE 2 PUFF: 160; 4.5 AEROSOL RESPIRATORY (INHALATION) at 21:01

## 2017-01-01 RX ADMIN — CARVEDILOL 6.25 MG: 6.25 TABLET, FILM COATED ORAL at 09:26

## 2017-01-01 RX ADMIN — DEXAMETHASONE SODIUM PHOSPHATE 4 MG: 4 INJECTION, SOLUTION INTRA-ARTICULAR; INTRALESIONAL; INTRAMUSCULAR; INTRAVENOUS; SOFT TISSUE at 21:24

## 2017-01-01 RX ADMIN — ATORVASTATIN CALCIUM 10 MG: 10 TABLET, FILM COATED ORAL at 20:40

## 2017-01-01 RX ADMIN — BUDESONIDE AND FORMOTEROL FUMARATE DIHYDRATE 2 PUFF: 160; 4.5 AEROSOL RESPIRATORY (INHALATION) at 08:44

## 2017-01-01 RX ADMIN — CLOPIDOGREL BISULFATE 75 MG: 75 TABLET ORAL at 08:27

## 2017-01-01 RX ADMIN — IPRATROPIUM BROMIDE AND ALBUTEROL SULFATE 3 ML: .5; 3 SOLUTION RESPIRATORY (INHALATION) at 16:17

## 2017-01-01 RX ADMIN — BARIUM SULFATE 50 ML: 400 SUSPENSION ORAL at 14:00

## 2017-01-01 RX ADMIN — DEXAMETHASONE 2 MG: 4 TABLET ORAL at 06:57

## 2017-01-01 RX ADMIN — HYDRALAZINE HYDROCHLORIDE 20 MG: 20 INJECTION INTRAMUSCULAR; INTRAVENOUS at 20:20

## 2017-01-01 RX ADMIN — SACUBITRIL AND VALSARTAN 0.5 TABLET: 49; 51 TABLET, FILM COATED ORAL at 17:28

## 2017-01-01 RX ADMIN — ATORVASTATIN CALCIUM 10 MG: 10 TABLET, FILM COATED ORAL at 21:42

## 2017-01-01 RX ADMIN — LIDOCAINE HYDROCHLORIDE 10 ML: 10 INJECTION, SOLUTION EPIDURAL; INFILTRATION; INTRACAUDAL; PERINEURAL at 22:02

## 2017-01-01 RX ADMIN — PAROXETINE HYDROCHLORIDE 30 MG: 20 TABLET, FILM COATED ORAL at 05:54

## 2017-01-01 RX ADMIN — PAROXETINE 30 MG: 10 TABLET, FILM COATED ORAL at 09:41

## 2017-01-01 RX ADMIN — BUDESONIDE AND FORMOTEROL FUMARATE DIHYDRATE 2 PUFF: 160; 4.5 AEROSOL RESPIRATORY (INHALATION) at 20:15

## 2017-01-01 RX ADMIN — FENTANYL CITRATE 100 MCG: 50 INJECTION INTRAMUSCULAR; INTRAVENOUS at 23:39

## 2017-01-01 RX ADMIN — FUROSEMIDE 80 MG: 10 INJECTION, SOLUTION INTRAMUSCULAR; INTRAVENOUS at 17:51

## 2017-01-01 RX ADMIN — RIVASTIGMINE TRANSDERMAL SYSTEM 1 PATCH: 9.5 PATCH, EXTENDED RELEASE TRANSDERMAL at 08:00

## 2017-01-01 RX ADMIN — PAROXETINE HYDROCHLORIDE 30 MG: 20 TABLET, FILM COATED ORAL at 06:38

## 2017-01-01 RX ADMIN — PAROXETINE 30 MG: 10 TABLET, FILM COATED ORAL at 06:00

## 2017-01-01 RX ADMIN — HEPARIN SODIUM 5000 UNITS: 5000 INJECTION, SOLUTION INTRAVENOUS; SUBCUTANEOUS at 10:10

## 2017-01-01 RX ADMIN — METOPROLOL TARTRATE 2.5 MG: 5 INJECTION, SOLUTION INTRAVENOUS at 06:03

## 2017-01-01 RX ADMIN — MEMANTINE 10 MG: 10 TABLET ORAL at 08:02

## 2017-01-01 RX ADMIN — PANTOPRAZOLE SODIUM 40 MG: 40 INJECTION, POWDER, FOR SOLUTION INTRAVENOUS at 06:37

## 2017-01-01 RX ADMIN — METOPROLOL TARTRATE 2.5 MG: 5 INJECTION, SOLUTION INTRAVENOUS at 17:10

## 2017-01-01 RX ADMIN — POTASSIUM CHLORIDE 40 MEQ: 1.5 POWDER, FOR SOLUTION ORAL at 08:24

## 2017-01-01 RX ADMIN — ENTECAVIR 0.5 MG: 0.5 TABLET ORAL at 06:17

## 2017-01-01 RX ADMIN — CARVEDILOL 6.25 MG: 6.25 TABLET, FILM COATED ORAL at 20:12

## 2017-01-01 RX ADMIN — Medication 325 MG: at 08:34

## 2017-01-01 RX ADMIN — DEXAMETHASONE SODIUM PHOSPHATE 4 MG: 4 INJECTION, SOLUTION INTRA-ARTICULAR; INTRALESIONAL; INTRAMUSCULAR; INTRAVENOUS; SOFT TISSUE at 21:42

## 2017-01-01 RX ADMIN — AMLODIPINE BESYLATE 5 MG: 5 TABLET ORAL at 09:30

## 2017-01-01 RX ADMIN — CLOPIDOGREL BISULFATE 75 MG: 75 TABLET ORAL at 08:06

## 2017-01-01 RX ADMIN — OXYCODONE HYDROCHLORIDE AND ACETAMINOPHEN 250 MG: 500 TABLET ORAL at 17:53

## 2017-01-01 RX ADMIN — SACUBITRIL AND VALSARTAN 1 TABLET: 49; 51 TABLET, FILM COATED ORAL at 08:37

## 2017-01-01 RX ADMIN — CARVEDILOL 6.25 MG: 6.25 TABLET, FILM COATED ORAL at 20:24

## 2017-01-01 RX ADMIN — AMLODIPINE BESYLATE 5 MG: 5 TABLET ORAL at 09:38

## 2017-01-01 RX ADMIN — BUDESONIDE AND FORMOTEROL FUMARATE DIHYDRATE 2 PUFF: 160; 4.5 AEROSOL RESPIRATORY (INHALATION) at 21:04

## 2017-01-01 RX ADMIN — SACUBITRIL AND VALSARTAN 0.5 TABLET: 49; 51 TABLET, FILM COATED ORAL at 17:27

## 2017-01-01 RX ADMIN — ASPIRIN 81 MG 81 MG: 81 TABLET ORAL at 08:59

## 2017-01-01 RX ADMIN — METOLAZONE 2.5 MG: 2.5 TABLET ORAL at 08:36

## 2017-01-01 RX ADMIN — RIVASTIGMINE TRANSDERMAL SYSTEM 1 PATCH: 9.5 PATCH, EXTENDED RELEASE TRANSDERMAL at 09:00

## 2017-01-01 RX ADMIN — ATORVASTATIN CALCIUM 10 MG: 10 TABLET, FILM COATED ORAL at 21:24

## 2017-01-01 RX ADMIN — IPRATROPIUM BROMIDE AND ALBUTEROL SULFATE 3 ML: .5; 3 SOLUTION RESPIRATORY (INHALATION) at 16:45

## 2017-01-01 RX ADMIN — RIVASTIGMINE TRANSDERMAL SYSTEM 1 PATCH: 9.5 PATCH, EXTENDED RELEASE TRANSDERMAL at 08:45

## 2017-01-01 RX ADMIN — POTASSIUM CHLORIDE 40 MEQ: 1.5 POWDER, FOR SOLUTION ORAL at 08:47

## 2017-01-01 RX ADMIN — MEMANTINE 10 MG: 10 TABLET ORAL at 10:02

## 2017-01-01 RX ADMIN — HYDRALAZINE HYDROCHLORIDE 10 MG: 20 INJECTION INTRAMUSCULAR; INTRAVENOUS at 03:12

## 2017-01-01 RX ADMIN — POTASSIUM CHLORIDE 10 MEQ: 7.46 INJECTION, SOLUTION INTRAVENOUS at 16:44

## 2017-01-01 RX ADMIN — METHYLPREDNISOLONE SODIUM SUCCINATE 20 MG: 40 INJECTION, POWDER, FOR SOLUTION INTRAMUSCULAR; INTRAVENOUS at 00:01

## 2017-01-01 RX ADMIN — ASPIRIN 81 MG 81 MG: 81 TABLET ORAL at 09:26

## 2017-01-01 RX ADMIN — BARIUM SULFATE 20 ML: 400 PASTE ORAL at 14:00

## 2017-01-01 RX ADMIN — FAMOTIDINE 20 MG: 20 TABLET ORAL at 09:24

## 2017-01-01 RX ADMIN — Medication 325 MG: at 08:27

## 2017-01-01 RX ADMIN — METOCLOPRAMIDE 10 MG: 5 INJECTION, SOLUTION INTRAMUSCULAR; INTRAVENOUS at 11:25

## 2017-01-01 RX ADMIN — RIVASTIGMINE TRANSDERMAL SYSTEM 1 PATCH: 9.5 PATCH, EXTENDED RELEASE TRANSDERMAL at 09:17

## 2017-01-01 RX ADMIN — METOCLOPRAMIDE 10 MG: 5 INJECTION, SOLUTION INTRAMUSCULAR; INTRAVENOUS at 06:45

## 2017-01-01 RX ADMIN — METOCLOPRAMIDE 10 MG: 5 INJECTION, SOLUTION INTRAMUSCULAR; INTRAVENOUS at 14:05

## 2017-01-01 RX ADMIN — SENNOSIDES A AND B 10 ML: 415.36 LIQUID ORAL at 08:23

## 2017-01-01 RX ADMIN — LIDOCAINE HYDROCHLORIDE 50 MG: 10 INJECTION, SOLUTION INFILTRATION; PERINEURAL at 08:11

## 2017-01-01 RX ADMIN — ONDANSETRON 4 MG: 2 INJECTION INTRAMUSCULAR; INTRAVENOUS at 14:03

## 2017-01-01 RX ADMIN — SACUBITRIL AND VALSARTAN 1 TABLET: 49; 51 TABLET, FILM COATED ORAL at 20:33

## 2017-01-01 RX ADMIN — MEMANTINE 10 MG: 10 TABLET ORAL at 20:41

## 2017-01-01 RX ADMIN — PAROXETINE HYDROCHLORIDE 30 MG: 20 TABLET, FILM COATED ORAL at 08:26

## 2017-01-01 RX ADMIN — IPRATROPIUM BROMIDE AND ALBUTEROL SULFATE 3 ML: .5; 3 SOLUTION RESPIRATORY (INHALATION) at 19:54

## 2017-01-01 RX ADMIN — HEPARIN SODIUM 5000 UNITS: 5000 INJECTION, SOLUTION INTRAVENOUS; SUBCUTANEOUS at 20:21

## 2017-01-02 DIAGNOSIS — J45.909 UNCOMPLICATED ASTHMA, UNSPECIFIED ASTHMA SEVERITY: ICD-10-CM

## 2017-01-04 RX ORDER — BUDESONIDE AND FORMOTEROL FUMARATE DIHYDRATE 160; 4.5 UG/1; UG/1
AEROSOL RESPIRATORY (INHALATION)
Refills: 5 | OUTPATIENT
Start: 2017-01-04

## 2017-01-06 PROBLEM — J45.909 ASTHMA: Status: ACTIVE | Noted: 2017-01-06

## 2017-01-06 PROBLEM — J30.9 ALLERGIC RHINITIS: Status: ACTIVE | Noted: 2017-01-06

## 2017-01-06 PROBLEM — R93.89 ABNORMAL CT SCAN, CHEST: Status: ACTIVE | Noted: 2017-01-06

## 2017-01-06 PROBLEM — F32.A DEPRESSION: Status: ACTIVE | Noted: 2017-01-06

## 2017-01-06 PROBLEM — E78.5 DYSLIPIDEMIA: Status: ACTIVE | Noted: 2017-01-06

## 2017-01-06 PROBLEM — F03.90 DEMENTIA (HCC): Status: ACTIVE | Noted: 2017-01-06

## 2017-01-06 PROBLEM — M19.90 OSTEOARTHRITIS: Status: ACTIVE | Noted: 2017-01-06

## 2017-01-06 PROBLEM — I51.89 DIASTOLIC DYSFUNCTION: Status: ACTIVE | Noted: 2017-01-06

## 2017-01-06 PROBLEM — E55.9 VITAMIN D DEFICIENCY: Status: ACTIVE | Noted: 2017-01-06

## 2017-01-06 PROBLEM — I10 HYPERTENSION: Status: ACTIVE | Noted: 2017-01-06

## 2017-01-06 PROBLEM — E66.9 OBESITY: Status: ACTIVE | Noted: 2017-01-06

## 2017-01-06 PROBLEM — G47.33 OSA (OBSTRUCTIVE SLEEP APNEA): Status: ACTIVE | Noted: 2017-01-06

## 2017-01-06 PROBLEM — J47.9 BRONCHIECTASIS (HCC): Status: ACTIVE | Noted: 2017-01-06

## 2017-01-23 ENCOUNTER — OFFICE VISIT (OUTPATIENT)
Dept: PULMONOLOGY | Facility: CLINIC | Age: 72
End: 2017-01-23

## 2017-01-23 VITALS
HEIGHT: 68 IN | HEART RATE: 76 BPM | WEIGHT: 213 LBS | OXYGEN SATURATION: 95 % | SYSTOLIC BLOOD PRESSURE: 152 MMHG | DIASTOLIC BLOOD PRESSURE: 78 MMHG | TEMPERATURE: 98.3 F | BODY MASS INDEX: 32.28 KG/M2 | RESPIRATION RATE: 16 BRPM

## 2017-01-23 DIAGNOSIS — G47.33 OSA (OBSTRUCTIVE SLEEP APNEA): ICD-10-CM

## 2017-01-23 DIAGNOSIS — R93.89 ABNORMAL CT SCAN, CHEST: Primary | ICD-10-CM

## 2017-01-23 DIAGNOSIS — F03.90 DEMENTIA WITHOUT BEHAVIORAL DISTURBANCE, UNSPECIFIED DEMENTIA TYPE: ICD-10-CM

## 2017-01-23 DIAGNOSIS — I10 ESSENTIAL HYPERTENSION: ICD-10-CM

## 2017-01-23 DIAGNOSIS — J47.0 BRONCHIECTASIS WITH ACUTE LOWER RESPIRATORY INFECTION (HCC): ICD-10-CM

## 2017-01-23 DIAGNOSIS — I51.89 DIASTOLIC DYSFUNCTION: ICD-10-CM

## 2017-01-23 PROCEDURE — 94010 BREATHING CAPACITY TEST: CPT | Performed by: NURSE PRACTITIONER

## 2017-01-23 PROCEDURE — 99214 OFFICE O/P EST MOD 30 MIN: CPT | Performed by: NURSE PRACTITIONER

## 2017-01-23 RX ORDER — DOXAZOSIN 2 MG/1
TABLET ORAL NIGHTLY
COMMUNITY
Start: 2016-12-19 | End: 2017-03-06

## 2017-01-23 RX ORDER — VENLAFAXINE HYDROCHLORIDE 150 MG/1
1 CAPSULE, EXTENDED RELEASE ORAL DAILY
COMMUNITY
Start: 2017-01-20 | End: 2017-03-06

## 2017-01-23 RX ORDER — FERROUS SULFATE 325(65) MG
325 TABLET ORAL
COMMUNITY
End: 2017-04-17

## 2017-01-23 RX ORDER — RIVASTIGMINE 9.5 MG/24H
1 PATCH, EXTENDED RELEASE TRANSDERMAL DAILY
COMMUNITY
Start: 2016-12-25 | End: 2017-01-01 | Stop reason: HOSPADM

## 2017-01-23 RX ORDER — MEMANTINE HYDROCHLORIDE 10 MG/1
10 TABLET ORAL 2 TIMES DAILY
COMMUNITY
Start: 2017-01-17 | End: 2017-01-01 | Stop reason: HOSPADM

## 2017-01-23 RX ORDER — LISINOPRIL 10 MG/1
1 TABLET ORAL DAILY
COMMUNITY
Start: 2016-11-11 | End: 2017-04-20 | Stop reason: HOSPADM

## 2017-01-23 RX ORDER — AZITHROMYCIN 250 MG/1
TABLET, FILM COATED ORAL
Qty: 11 TABLET | Refills: 2 | Status: SHIPPED | OUTPATIENT
Start: 2017-01-23 | End: 2017-03-06

## 2017-01-23 RX ORDER — GABAPENTIN 100 MG/1
100 CAPSULE ORAL 2 TIMES DAILY
COMMUNITY
Start: 2017-01-02 | End: 2017-06-27

## 2017-01-23 RX ORDER — NISOLDIPINE 8.5 MG/1
8.5 TABLET, FILM COATED, EXTENDED RELEASE ORAL DAILY
COMMUNITY
Start: 2017-01-02 | End: 2017-05-02 | Stop reason: HOSPADM

## 2017-01-23 RX ORDER — PAROXETINE HYDROCHLORIDE 20 MG/1
1 TABLET, FILM COATED ORAL DAILY
COMMUNITY
Start: 2017-01-10 | End: 2017-01-01

## 2017-01-23 RX ORDER — SIMVASTATIN 40 MG
40 TABLET ORAL DAILY
COMMUNITY
Start: 2017-01-02 | End: 2017-05-02 | Stop reason: HOSPADM

## 2017-01-23 RX ORDER — MONTELUKAST SODIUM 10 MG/1
10 TABLET ORAL NIGHTLY
Qty: 30 TABLET | Refills: 11 | Status: SHIPPED | OUTPATIENT
Start: 2017-01-23 | End: 2017-01-01

## 2017-01-23 RX ORDER — LORATADINE 10 MG/1
10 TABLET ORAL DAILY
COMMUNITY
Start: 2014-10-31 | End: 2017-01-01 | Stop reason: SDUPTHER

## 2017-01-23 NOTE — PROGRESS NOTES
[facility], [place] [facility] Follow up    CHIEF COMPLAINT    Follow-up bronchiectasis    HISTORY OF PRESENT ILLNESS    Magdi Arriaga is a 71 y.o.male here today for follow-up.   He was last in the office and saw Dr. Velez 04/14/2016. He has a history of bronchiectasis, asthma, and high resolution CT scan of the chest most recently in 01/2016 noted below, and abnormal CT of the chest.     He has a history of dementia and is here today with his wife. He does have memory loss and a little bit of forgetfulness, but his wife states that overall he usually is pleasant without agitation. She does have some trouble getting him to use the spacer with the Symbicort, but he does use Symbicort 160, two puffs in the morning and 2 puffs at night. She does not think he rinses his mouth out. He has no nebulizers. He does have a cough but she notes that it usually is dry. She does think that the cough sounds loose and he may swallow sputum. He has not recently been on antibiotics. They deny any wheezing.    He is compliant with the CPAP and wears oxygen with this at night. He does have significant dyspnea but it is unchanged, and he as well as his wife admit it is probably a little bit due to deconditioning.    He has never been seen by a cardiologist in the past. He has no chest pains or palpitations. No history of CAD. No edema.    He has had a V/Q scan in 02/2016 showing low probability for PE. He had an echocardiogram earlier in 2016 showing mild elevation in PA pressures of 38 as well as diastolic dysfunction.     He does take hydrochlorothiazide 25 mg daily.    He does have a history of smoking 1/3 of a pack of cigarettes for 15 years but he quit 20 years ago.    He and his wife are traveling to [place] in about 2 weeks. He denies any nasal drainage. No epistaxis.    He has had no recent hospitalizations or emergency room visits. No fevers, chills or night sweats.      Patient Active Problem List   Diagnosis   • Abnormal CT scan,  chest   • Allergic rhinitis   • Asthma   • Bronchiectasis   • Dementia   • Depression   • Diastolic dysfunction   • Dyslipidemia   • Hypertension   • Obesity   • JONNY (obstructive sleep apnea)   • Osteoarthritis   • Vitamin D deficiency       Allergies   Allergen Reactions   • Sulfa Antibiotics        Current Outpatient Prescriptions:   •  budesonide-formoterol (SYMBICORT) 160-4.5 MCG/ACT inhaler, Inhale 2 puffs 2 (Two) Times a Day., Disp: 1 inhaler, Rfl: 11  •  doxazosin (CARDURA) 2 MG tablet, Every Night., Disp: , Rfl:   •  ferrous sulfate 325 (65 FE) MG tablet, Take 325 mg by mouth Daily With Breakfast., Disp: , Rfl:   •  gabapentin (NEURONTIN) 100 MG capsule, 1 capsule 3 (Three) Times a Day., Disp: , Rfl:   •  lisinopril (PRINIVIL,ZESTRIL) 10 MG tablet, 1 tablet Daily., Disp: , Rfl:   •  loratadine (CLARITIN) 10 MG tablet, Take  by mouth Daily., Disp: , Rfl:   •  memantine (NAMENDA) 10 MG tablet, 1 tablet 2 (Two) Times a Day., Disp: , Rfl:   •  nisoldipine (SULAR) 8.5 MG 24 hr tablet, 1 tablet Daily., Disp: , Rfl:   •  PARoxetine (PAXIL) 20 MG tablet, 1 tablet Daily., Disp: , Rfl:   •  rivastigmine (EXELON) 9.5 MG/24HR patch, Daily., Disp: , Rfl:   •  simvastatin (ZOCOR) 40 MG tablet, 1 tablet Daily., Disp: , Rfl:   •  venlafaxine XR (EFFEXOR-XR) 150 MG 24 hr capsule, 1 capsule Daily., Disp: , Rfl:   •  azithromycin (ZITHROMAX) 250 MG tablet, Take 2 tablets qd x 1 day, then 1 tab qd x 9 day, Disp: 11 tablet, Rfl: 2  •  montelukast (SINGULAIR) 10 MG tablet, Take 1 tablet by mouth Every Night., Disp: 30 tablet, Rfl: 11  •  PROAIR  (90 BASE) MCG/ACT inhaler, INHALE TWO PUFFS BY MOUTH EVERY 4 TO 6 HOURS AS NEEDED, Disp: 1 inhaler, Rfl: 11  MEDICATION LIST AND ALLERGIES REVIEWED.    Social History   Substance Use Topics   • Smoking status: Former Smoker     Years: 15.00     Types: Cigarettes   • Smokeless tobacco: Not on file      Comment: quit 20 years ago   • Alcohol use No       FAMILY AND SOCIAL HISTORY  "REVIEWED.    Review of Systems   Constitutional: Negative for chills, fatigue and fever.   HENT: Negative for nosebleeds, postnasal drip and sore throat.    Eyes: Negative.  Negative for pain and redness.   Respiratory: Positive for cough and shortness of breath. Negative for apnea, choking, chest tightness, wheezing and stridor.    Cardiovascular: Negative for chest pain, palpitations and leg swelling.   Gastrointestinal: Negative for abdominal distention, abdominal pain and nausea.   Endocrine: Negative for cold intolerance and heat intolerance.   Genitourinary: Negative for dysuria, flank pain and hematuria.   Musculoskeletal: Negative for arthralgias and myalgias.   Skin: Negative for color change and rash.   Neurological: Negative for dizziness, syncope and numbness.   Hematological: Negative for adenopathy. Does not bruise/bleed easily.   Psychiatric/Behavioral: Negative for agitation, behavioral problems, confusion and sleep disturbance.   .    Visit Vitals   • /78   • Pulse 76   • Temp 98.3 °F (36.8 °C)   • Resp 16   • Ht 67.5\" (171.5 cm)   • Wt 213 lb (96.6 kg)   • SpO2 95%  Comment: RA   • BMI 32.87 kg/m2     Physical Exam   Constitutional: He is oriented to person, place, and time. Vital signs are normal. He appears well-developed. He is cooperative.  Non-toxic appearance. No distress.   HENT:   Head: Normocephalic. Head is without abrasion and without contusion.   Mouth/Throat: Oropharynx is clear and moist and mucous membranes are normal.   White patch in the back of the posterior pharnyx   Eyes: Conjunctivae are normal. Pupils are equal, round, and reactive to light.   Neck: Trachea normal. No JVD present. No tracheal deviation present. No thyroid mass and no thyromegaly present.   Cardiovascular: Normal rate, regular rhythm and normal heart sounds.  Exam reveals no gallop.    No murmur heard.  no edema cyanosis or calf tenderness   Pulmonary/Chest: Effort normal. No stridor. No respiratory " distress. He has no wheezes. He has no rales.   lungs are coarse, especially in the left upper and right upper lobe posteriorly, when he coughed he has a very strong loose sounding cough and this did improve his lung sounds, he did have very faint coarse Rales in the right base.   Abdominal: Soft. He exhibits no ascites. There is no hepatosplenomegaly. There is no tenderness.   Lymphadenopathy:        Head (right side): No submandibular adenopathy present.        Head (left side): No submandibular adenopathy present.     He has no cervical adenopathy.        Right: No supraclavicular adenopathy present.        Left: No supraclavicular adenopathy present.   Neurological: He is alert and oriented to person, place, and time. He has normal strength.   Skin: Skin is warm and dry. No abrasion and no rash noted.   Psychiatric: He has a normal mood and affect. His speech is normal and behavior is normal. Cognition and memory are normal.       RESULTS    Chest x-ray PA and lateral obtained in the office today compared to his film from February 2016, he appears to have increased prominence of interstitial markings which may just be due to film technique,that film was taken at [facility] to/19/16 which notes the dictation below:COMPARISON- 12/03/2015.      FINDINGS- There are diffuse postinflammatory pulmonary changes, most  prominent in the left upper and left lower lobes. There is no acute  inflammatory process, mass, or effusion. The cardiac silhouette is  normal.      IMPRESSION- Extensive, but stable postinflammatory pulmonary changes.  There has been no change since the previous examination of 12/03/2015.      D- 02/19/2016  Reading Radiologist- MARYA FRANCIS    Pulmonary function tests today reveal no obstruction based on an FEV1/FVC ratio of 83%.  Minute ventilation is low with suboptimal effort, FVC is 2.14 L, 53% which is reduced compared to December 2015 when he was 2.63, 66%.  FEV1 is increased  however at 2.04 L, 70%.  Flow volume loop reveals blunting    PROBLEM LIST    Problem List Items Addressed This Visit        Cardiovascular and Mediastinum    Diastolic dysfunction    Hypertension    Relevant Medications    lisinopril (PRINIVIL,ZESTRIL) 10 MG tablet    doxazosin (CARDURA) 2 MG tablet    nisoldipine (SULAR) 8.5 MG 24 hr tablet       Respiratory    Bronchiectasis    Overview     A.  CT scan of the chest 12/13/2015 reports mild to moderate bronchiectasis.           Relevant Medications    loratadine (CLARITIN) 10 MG tablet    montelukast (SINGULAIR) 10 MG tablet    JONNY (obstructive sleep apnea)    Overview     A.  Obstructive sleep apnea with central component.B.  On home CPAP therapy.            Nervous and Auditory    Dementia    Relevant Medications    rivastigmine (EXELON) 9.5 MG/24HR patch    memantine (NAMENDA) 10 MG tablet    PARoxetine (PAXIL) 20 MG tablet    venlafaxine XR (EFFEXOR-XR) 150 MG 24 hr capsule       Other    Abnormal CT scan, chest - Primary    Relevant Orders    XR Chest PA & Lateral (Completed)    Spirometry Without Bronchodilator (Completed)            DISCUSSION    His x-ray did look as above as if he had more prominent markings in the right lower lung zone but for now he and his wife are leaving going to [place] for 10 days, for the loose cough and congestion I discussed with he and his wife adding an nebulizer machine.    However he does have a history of dementia and although he is agreeable most of the time can become agitated according to his wife.  She does not think he would be very compliant with the nebulizer.  He is compliant with the Symbicort that she has trouble getting him to use the spacer.    For the anti-inflammatory effects and to help clear some of the sputum will give him a course of azithromycin over 10 days, his wife was instructed to have him stop this if he develops diarrhea.  We can always keep him on this chronically, though at that time I would  probably change it to 3 days a week.  Will reassess with a chest x-ray when he returns in about 6-8 weeks.    Remain on the oxygen and BiPAP at night; his shortness of breath and his wife state is about at baseline, we spoke briefly about referring him to a cardiologist given the persistent dyspnea but he has no chest pains or palpitations and no history of coronary artery disease.  We can always reassess this in the future    The patient and his wife weretold to call and given extensions 104 if needed for an earlier visit if problems arise prior to the scheduled followup.      Tonya Bonilla, APRN  01/23/201712:57 PM  Electronically signed     Please note that portions of this note were completed with a voice recognition program. Efforts were made to edit the dictations, but occasionally words are mistranscribed.      CC: Rod Colindres MD

## 2017-01-23 NOTE — PROGRESS NOTES
[facility] Follow [facility]    CHIEF COMPLAINT    ***    HISTORY OF PRESENT ILLNESS    Magdi Arriaga is a 71 y.o.male here today for         Patient Active Problem List   Diagnosis   • Abnormal CT scan, chest   • Allergic rhinitis   • Asthma   • Bronchiectasis   • Dementia   • Depression   • Diastolic dysfunction   • Dyslipidemia   • Hypertension   • Obesity   • JONNY (obstructive sleep apnea)   • Osteoarthritis   • Vitamin D deficiency       Allergies   Allergen Reactions   • Sulfa Antibiotics        Current Outpatient Prescriptions:   •  budesonide-formoterol (SYMBICORT) 160-4.5 MCG/ACT inhaler, Inhale 2 puffs 2 (Two) Times a Day., Disp: 1 inhaler, Rfl: 11  •  doxazosin (CARDURA) 2 MG tablet, Every Night., Disp: , Rfl:   •  ferrous sulfate 325 (65 FE) MG tablet, Take 325 mg by mouth Daily With Breakfast., Disp: , Rfl:   •  gabapentin (NEURONTIN) 100 MG capsule, 1 capsule 3 (Three) Times a Day., Disp: , Rfl:   •  lisinopril (PRINIVIL,ZESTRIL) 10 MG tablet, 1 tablet Daily., Disp: , Rfl:   •  loratadine (CLARITIN) 10 MG tablet, Take  by mouth Daily., Disp: , Rfl:   •  memantine (NAMENDA) 10 MG tablet, 1 tablet 2 (Two) Times a Day., Disp: , Rfl:   •  nisoldipine (SULAR) 8.5 MG 24 hr tablet, 1 tablet Daily., Disp: , Rfl:   •  PARoxetine (PAXIL) 20 MG tablet, 1 tablet Daily., Disp: , Rfl:   •  PROAIR  (90 BASE) MCG/ACT inhaler, As Needed., Disp: , Rfl:   •  rivastigmine (EXELON) 9.5 MG/24HR patch, Daily., Disp: , Rfl:   •  simvastatin (ZOCOR) 40 MG tablet, 1 tablet Daily., Disp: , Rfl:   •  venlafaxine XR (EFFEXOR-XR) 150 MG 24 hr capsule, 1 capsule Daily., Disp: , Rfl:   MEDICATION LIST AND ALLERGIES REVIEWED.    Social History   Substance Use Topics   • Smoking status: Former Smoker     Years: 15.00     Types: Cigarettes   • Smokeless tobacco: Not on file      Comment: quit 20 years ago   • Alcohol use No       FAMILY AND SOCIAL HISTORY REVIEWED.    Review of Systems   Constitutional: Negative for chills,  "fatigue and fever.   HENT: Negative for nosebleeds, postnasal drip, sore throat and trouble swallowing.    Eyes: Negative.  Negative for pain and redness.   Respiratory: Positive for cough and shortness of breath. Negative for apnea, chest tightness, wheezing and stridor.    Cardiovascular: Negative for chest pain, palpitations and leg swelling.   Gastrointestinal: Negative for abdominal distention, abdominal pain and nausea.   Endocrine: Negative for cold intolerance and heat intolerance.   Genitourinary: Negative for dysuria, flank pain and hematuria.   Musculoskeletal: Negative for arthralgias and myalgias.   Skin: Negative for color change and rash.   Neurological: Negative for dizziness, syncope and numbness.   Hematological: Negative for adenopathy. Does not bruise/bleed easily.   Psychiatric/Behavioral: Negative for agitation, behavioral problems, confusion and sleep disturbance.   .    Visit Vitals   • /78   • Pulse 76   • Temp 98.3 °F (36.8 °C)   • Resp 16   • Ht 67.5\" (171.5 cm)   • Wt 213 lb (96.6 kg)   • SpO2 95%  Comment: RA   • BMI 32.87 kg/m2     Physical Exam   Constitutional: He is oriented to person, place, and time. Vital signs are normal. He appears well-developed. He is cooperative.  Non-toxic appearance. No distress.   HENT:   Head: Normocephalic. Head is without abrasion and without contusion.   Mouth/Throat: Oropharynx is clear and moist and mucous membranes are normal.   Eyes: Conjunctivae are normal. Pupils are equal, round, and reactive to light.   Neck: Trachea normal. No JVD present. No tracheal deviation present. No thyroid mass and no thyromegaly present.   Cardiovascular: Normal rate, regular rhythm and normal heart sounds.  Exam reveals no gallop.    No murmur heard.  No edema   Pulmonary/Chest: Effort normal. No stridor. No respiratory distress. He has no wheezes. He has no rales.   Coarse    Abdominal: Soft. He exhibits no ascites. There is no hepatosplenomegaly. There is no " tenderness.   Lymphadenopathy:        Head (right side): No submandibular adenopathy present.        Head (left side): No submandibular adenopathy present.     He has no cervical adenopathy.        Right: No supraclavicular adenopathy present.        Left: No supraclavicular adenopathy present.   Neurological: He is alert and oriented to person, place, and time. He has normal strength.   Skin: Skin is warm and dry. No abrasion and no rash noted.   Psychiatric: He has a normal mood and affect. His speech is normal and behavior is normal. Cognition and memory are normal.       RESULTS        PROBLEM LIST    Problem List Items Addressed This Visit        Other    Abnormal CT scan, chest - Primary    Relevant Orders    XR Chest PA & Lateral (Completed)    Spirometry Without Bronchodilator (Completed)            DISCUSSION    Azithromycin taper 10 days    Add singulari    Fu 6 -8 wks cxr and maykel    Tonya Bonilla, APRN  01/23/20171:05 PM  Electronically signed     Please note that portions of this note were completed with a voice recognition program. Efforts were made to edit the dictations, but occasionally words are mistranscribed.      CC: Rod Colindres MD

## 2017-01-23 NOTE — LETTER
January 27, 2017     Rod Colindres MD  [address] [id number]    Patient: Magdi Arriaga   YOB: 1945   Date of Visit: 1/23/2017       Dear Dr. Bernadine MD:    Magdi Arriaga was in my office today. Below is a copy of my note.    If you have questions, please do not hesitate to call me. I look forward to following Magdi along with you.         Sincerely,        REI Lopes        CC: No Recipients    [facility] ([facility]    CHIEF COMPLAINT    Follow-up bronchiectasis    HISTORY OF PRESENT ILLNESS    Magdi Arriaga is a 71 y.o.male here today for follow-up.   He was last in the office and saw Dr. Velez 04/14/2016. He has a history of bronchiectasis, asthma, and high resolution CT scan of the chest most recently in 01/2016 noted below, and abnormal CT of the chest.     He has a history of dementia and is here today with his wife. He does have memory loss and a little bit of forgetfulness, but his wife states that overall he usually is pleasant without agitation. She does have some trouble getting him to use the spacer with the Symbicort, but he does use Symbicort 160, two puffs in the morning and 2 puffs at night. She does not think he rinses his mouth out. He has no nebulizers. He does have a cough but she notes that it usually is dry. She does think that the cough sounds loose and he may swallow sputum. He has not recently been on antibiotics. They deny any wheezing.    He is compliant with the CPAP and wears oxygen with this at night. He does have significant dyspnea but it is unchanged, and he as well as his wife admit it is probably a little bit due to deconditioning.    He has never been seen by a cardiologist in the past. He has no chest pains or palpitations. No history of CAD. No edema.    He has had a V/Q scan in 02/2016 showing low probability for PE. He had an echocardiogram earlier in 2016 showing mild elevation in PA pressures of 38 as  well as diastolic dysfunction.     He does take hydrochlorothiazide 25 mg daily.    He does have a history of smoking 1/3 of a pack of cigarettes for 15 years but he quit 20 years ago.    He and his wife are traveling to [place] in about 2 weeks. He denies any nasal drainage. No epistaxis.    He has had no recent hospitalizations or emergency room visits. No fevers, chills or night sweats.      Patient Active Problem List   Diagnosis   • Abnormal CT scan, chest   • Allergic rhinitis   • Asthma   • Bronchiectasis   • Dementia   • Depression   • Diastolic dysfunction   • Dyslipidemia   • Hypertension   • Obesity   • JONNY (obstructive sleep apnea)   • Osteoarthritis   • Vitamin D deficiency       Allergies   Allergen Reactions   • Sulfa Antibiotics        Current Outpatient Prescriptions:   •  budesonide-formoterol (SYMBICORT) 160-4.5 MCG/ACT inhaler, Inhale 2 puffs 2 (Two) Times a Day., Disp: 1 inhaler, Rfl: 11  •  doxazosin (CARDURA) 2 MG tablet, Every Night., Disp: , Rfl:   •  ferrous sulfate 325 (65 FE) MG tablet, Take 325 mg by mouth Daily With Breakfast., Disp: , Rfl:   •  gabapentin (NEURONTIN) 100 MG capsule, 1 capsule 3 (Three) Times a Day., Disp: , Rfl:   •  lisinopril (PRINIVIL,ZESTRIL) 10 MG tablet, 1 tablet Daily., Disp: , Rfl:   •  loratadine (CLARITIN) 10 MG tablet, Take  by mouth Daily., Disp: , Rfl:   •  memantine (NAMENDA) 10 MG tablet, 1 tablet 2 (Two) Times a Day., Disp: , Rfl:   •  nisoldipine (SULAR) 8.5 MG 24 hr tablet, 1 tablet Daily., Disp: , Rfl:   •  PARoxetine (PAXIL) 20 MG tablet, 1 tablet Daily., Disp: , Rfl:   •  rivastigmine (EXELON) 9.5 MG/24HR patch, Daily., Disp: , Rfl:   •  simvastatin (ZOCOR) 40 MG tablet, 1 tablet Daily., Disp: , Rfl:   •  venlafaxine XR (EFFEXOR-XR) 150 MG 24 hr capsule, 1 capsule Daily., Disp: , Rfl:   •  azithromycin (ZITHROMAX) 250 MG tablet, Take 2 tablets qd x 1 day, then 1 tab qd x 9 day, Disp: 11 tablet, Rfl: 2  •  montelukast (SINGULAIR) 10 MG tablet, Take  "1 tablet by mouth Every Night., Disp: 30 tablet, Rfl: 11  •  PROAIR  (90 BASE) MCG/ACT inhaler, INHALE TWO PUFFS BY MOUTH EVERY 4 TO 6 HOURS AS NEEDED, Disp: 1 inhaler, Rfl: 11  MEDICATION LIST AND ALLERGIES REVIEWED.    Social History   Substance Use Topics   • Smoking status: Former Smoker     Years: 15.00     Types: Cigarettes   • Smokeless tobacco: Not on file      Comment: quit 20 years ago   • Alcohol use No       FAMILY AND SOCIAL HISTORY REVIEWED.    Review of Systems   Constitutional: Negative for chills, fatigue and fever.   HENT: Negative for nosebleeds, postnasal drip and sore throat.    Eyes: Negative.  Negative for pain and redness.   Respiratory: Positive for cough and shortness of breath. Negative for apnea, choking, chest tightness, wheezing and stridor.    Cardiovascular: Negative for chest pain, palpitations and leg swelling.   Gastrointestinal: Negative for abdominal distention, abdominal pain and nausea.   Endocrine: Negative for cold intolerance and heat intolerance.   Genitourinary: Negative for dysuria, flank pain and hematuria.   Musculoskeletal: Negative for arthralgias and myalgias.   Skin: Negative for color change and rash.   Neurological: Negative for dizziness, syncope and numbness.   Hematological: Negative for adenopathy. Does not bruise/bleed easily.   Psychiatric/Behavioral: Negative for agitation, behavioral problems, confusion and sleep disturbance.   .    Visit Vitals   • /78   • Pulse 76   • Temp 98.3 °F (36.8 °C)   • Resp 16   • Ht 67.5\" (171.5 cm)   • Wt 213 lb (96.6 kg)   • SpO2 95%  Comment: RA   • BMI 32.87 kg/m2     Physical Exam   Constitutional: He is oriented to person, place, and time. Vital signs are normal. He appears well-developed. He is cooperative.  Non-toxic appearance. No distress.   HENT:   Head: Normocephalic. Head is without abrasion and without contusion.   Mouth/Throat: Oropharynx is clear and moist and mucous membranes are normal.   White " patch in the back of the posterior pharnyx   Eyes: Conjunctivae are normal. Pupils are equal, round, and reactive to light.   Neck: Trachea normal. No JVD present. No tracheal deviation present. No thyroid mass and no thyromegaly present.   Cardiovascular: Normal rate, regular rhythm and normal heart sounds.  Exam reveals no gallop.    No murmur heard.  no edema cyanosis or calf tenderness   Pulmonary/Chest: Effort normal. No stridor. No respiratory distress. He has no wheezes. He has no rales.   lungs are coarse, especially in the left upper and right upper lobe posteriorly, when he coughed he has a very strong loose sounding cough and this did improve his lung sounds, he did have very faint coarse Rales in the right base.   Abdominal: Soft. He exhibits no ascites. There is no hepatosplenomegaly. There is no tenderness.   Lymphadenopathy:        Head (right side): No submandibular adenopathy present.        Head (left side): No submandibular adenopathy present.     He has no cervical adenopathy.        Right: No supraclavicular adenopathy present.        Left: No supraclavicular adenopathy present.   Neurological: He is alert and oriented to person, place, and time. He has normal strength.   Skin: Skin is warm and dry. No abrasion and no rash noted.   Psychiatric: He has a normal mood and affect. His speech is normal and behavior is normal. Cognition and memory are normal.       RESULTS    Chest x-ray PA and lateral obtained in the office today compared to his film from February 2016, he appears to have increased prominence of interstitial markings which may just be due to film technique,that film was taken at [facility] to/19/16 which notes the dictation below:COMPARISON- 12/03/2015.      FINDINGS- There are diffuse postinflammatory pulmonary changes, most  prominent in the left upper and left lower lobes. There is no acute  inflammatory process, mass, or effusion. The cardiac silhouette  is  normal.      IMPRESSION- Extensive, but stable postinflammatory pulmonary changes.  There has been no change since the previous examination of 12/03/2015.      D- 02/19/2016  Reading Radiologist- MARYA FRANCIS    Pulmonary function tests today reveal no obstruction based on an FEV1/FVC ratio of 83%.  Minute ventilation is low with suboptimal effort, FVC is 2.14 L, 53% which is reduced compared to December 2015 when he was 2.63, 66%.  FEV1 is increased however at 2.04 L, 70%.  Flow volume loop reveals blunting    PROBLEM LIST    Problem List Items Addressed This Visit        Cardiovascular and Mediastinum    Diastolic dysfunction    Hypertension    Relevant Medications    lisinopril (PRINIVIL,ZESTRIL) 10 MG tablet    doxazosin (CARDURA) 2 MG tablet    nisoldipine (SULAR) 8.5 MG 24 hr tablet       Respiratory    Bronchiectasis    Overview     A.  CT scan of the chest 12/13/2015 reports mild to moderate bronchiectasis.           Relevant Medications    loratadine (CLARITIN) 10 MG tablet    montelukast (SINGULAIR) 10 MG tablet    JONNY (obstructive sleep apnea)    Overview     A.  Obstructive sleep apnea with central component.B.  On home CPAP therapy.            Nervous and Auditory    Dementia    Relevant Medications    rivastigmine (EXELON) 9.5 MG/24HR patch    memantine (NAMENDA) 10 MG tablet    PARoxetine (PAXIL) 20 MG tablet    venlafaxine XR (EFFEXOR-XR) 150 MG 24 hr capsule       Other    Abnormal CT scan, chest - Primary    Relevant Orders    XR Chest PA & Lateral (Completed)    Spirometry Without Bronchodilator (Completed)            DISCUSSION    His x-ray did look as above as if he had more prominent markings in the right lower lung zone but for now he and his wife are leaving going to [place] for 10 days, for the loose cough and congestion I discussed with he and his wife adding an nebulizer machine.    However he does have a history of dementia and although he is agreeable most of the time can become  agitated according to his wife.  She does not think he would be very compliant with the nebulizer.  He is compliant with the Symbicort that she has trouble getting him to use the spacer.    For the anti-inflammatory effects and to help clear some of the sputum will give him a course of azithromycin over 10 days, his wife was instructed to have him stop this if he develops diarrhea.  We can always keep him on this chronically, though at that time I would probably change it to 3 days a week.  Will reassess with a chest x-ray when he returns in about 6-8 weeks.    Remain on the oxygen and BiPAP at night; his shortness of breath and his wife state is about at baseline, we spoke briefly about referring him to a cardiologist given the persistent dyspnea but he has no chest pains or palpitations and no history of coronary artery disease.  We can always reassess this in the future    The patient and his wife weretold to call and given extensions 104 if needed for an earlier visit if problems arise prior to the scheduled followup.      Tonya Bonilla, REI  01/23/201712:57 PM  Electronically signed     Please note that portions of this note were completed with a voice recognition program. Efforts were made to edit the dictations, but occasionally words are mistranscribed.      CC: Rod Colindres MD

## 2017-01-23 NOTE — MR AVS SNAPSHOT
Magdi Arriaga   1/23/2017 12:45 PM   Office Visit    Dept Phone:  [phone]   Encounter #:  [id number]    Provider:  REI Fernández   Department:  [facility]                Your Full Care Plan              Today's Medication Changes          These changes are accurate as of: 1/23/17  1:22 PM.  If you have any questions, ask your nurse or doctor.               New Medication(s)Ordered:     azithromycin 250 MG tablet   Commonly known as:  ZITHROMAX   Take 2 tablets qd x 1 day, then 1 tab qd x 9 day   Started by:  REI Fernández       montelukast 10 MG tablet   Commonly known as:  SINGULAIR   Take 1 tablet by mouth Every Night.   Started by:  REI Fernández            Where to Get Your Medications      These medications were sent to [address] AT [facility] [phone] [facility] [phone] [address], [address] [place] [address]     Phone:  [phone]     azithromycin 250 MG tablet    montelukast 10 MG tablet                  Your Updated Medication List          This list is accurate as of: 1/23/17  1:22 PM.  Always use your most recent med list.                azithromycin 250 MG tablet   Commonly known as:  ZITHROMAX   Take 2 tablets qd x 1 day, then 1 tab qd x 9 day       budesonide-formoterol 160-4.5 MCG/ACT inhaler   Commonly known as:  SYMBICORT   Inhale 2 puffs 2 (Two) Times a Day.       CLARITIN 10 MG tablet   Generic drug:  loratadine       doxazosin 2 MG tablet   Commonly known as:  CARDURA       ferrous sulfate 325 (65 FE) MG tablet       gabapentin 100 MG capsule   Commonly known as:  NEURONTIN       lisinopril 10 MG tablet   Commonly known as:  PRINIVIL,ZESTRIL       memantine 10 MG tablet   Commonly known as:  NAMENDA       montelukast 10 MG tablet   Commonly known as:  SINGULAIR   Take 1 tablet by mouth Every Night.       nisoldipine 8.5 MG 24 hr  tablet   Commonly known as:  SULAR       PARoxetine 20 MG tablet   Commonly known as:  PAXIL       PROAIR  (90 BASE) MCG/ACT inhaler   Generic drug:  albuterol       rivastigmine 9.5 MG/24HR patch   Commonly known as:  EXELON       simvastatin 40 MG tablet   Commonly known as:  ZOCOR       venlafaxine  MG 24 hr capsule   Commonly known as:  EFFEXOR-XR               We Performed the Following     Spirometry Without Bronchodilator     XR Chest PA & Lateral       You Were Diagnosed With        Codes Comments    Abnormal CT scan, chest    -  Primary ICD-10-CM: R93.8  ICD-9-CM: 793.2       Instructions     None    Patient Instructions History      Upcoming Appointments     Visit Type Date Time Department    FOLLOW UP 1/23/2017 12:45 PM [facility]    FULL PFT 1/23/2017 12:00 PM [facility]    CHEST X-RAY 1/23/2017 12:30 PM [facility]    XR CHEST PA AND LATERAL 1/23/2017 12:40 PM [facility]    SPIROMETRY PRE POST 3/6/2017  9:00 AM [facility]    CHEST X-RAY 3/6/2017  9:30 AM [facility]    FOLLOW UP 3/6/2017  9:45 AM [facility]      MyChart Signup     Our records indicate that you have declined [facility] MyChart signup. If you would like to sign up for [facility], please email [email or web address] or call [phone] to obtain an activation code.             Other Info from Your Visit           Your Appointments     Mar 06, 2017  9:00 AM EST   Spirometry Pre Post with [facility]   [facility] (--)    [address]  [facility] [address]   [phone]            Mar 06, 2017  9:30 AM EST   Chest X-Ray with [facility]CH [facility]   [facility] (--)    [address]  [facility] [address]   [phone]            Mar 06, 2017  9:45 AM EST   Follow Up with REI Fernández   [facility] (--)    [address]  [facility] [address] [phone]           Arrive 15 minutes prior to appointment.              Allergies     Sulfa Antibiotics        Reason for Visit     ABN Follow-Up           Vital Signs     Blood Pressure Pulse Temperature Respirations Height Weight    152/78 76 98.3 °F (36.8 °C) 16 67.5\" (171.5 cm) 213 lb (96.6 kg)    Oxygen Saturation Body Mass Index Smoking Status             95% 32.87 kg/m2 Former Smoker         Problems and Diagnoses Noted     Abnormal CT scan, chest      Results     XR Chest PA & Lateral           Spirometry Without Bronchodilator               "

## 2017-03-06 ENCOUNTER — OFFICE VISIT (OUTPATIENT)
Dept: PULMONOLOGY | Facility: CLINIC | Age: 72
End: 2017-03-06

## 2017-03-06 VITALS
SYSTOLIC BLOOD PRESSURE: 128 MMHG | DIASTOLIC BLOOD PRESSURE: 74 MMHG | HEART RATE: 66 BPM | OXYGEN SATURATION: 96 % | TEMPERATURE: 98.6 F | WEIGHT: 206 LBS | HEIGHT: 68 IN | BODY MASS INDEX: 31.22 KG/M2 | RESPIRATION RATE: 16 BRPM

## 2017-03-06 DIAGNOSIS — F01.50 VASCULAR DEMENTIA WITHOUT BEHAVIORAL DISTURBANCE (HCC): ICD-10-CM

## 2017-03-06 DIAGNOSIS — G47.33 OSA (OBSTRUCTIVE SLEEP APNEA): ICD-10-CM

## 2017-03-06 DIAGNOSIS — I51.89 DIASTOLIC DYSFUNCTION: ICD-10-CM

## 2017-03-06 DIAGNOSIS — J30.1 ALLERGIC RHINITIS DUE TO POLLEN, UNSPECIFIED RHINITIS SEASONALITY: ICD-10-CM

## 2017-03-06 DIAGNOSIS — E66.01 MORBID OBESITY, UNSPECIFIED OBESITY TYPE (HCC): ICD-10-CM

## 2017-03-06 DIAGNOSIS — J47.9 BRONCHIECTASIS WITHOUT COMPLICATION (HCC): Primary | ICD-10-CM

## 2017-03-06 PROCEDURE — 94010 BREATHING CAPACITY TEST: CPT | Performed by: NURSE PRACTITIONER

## 2017-03-06 PROCEDURE — 99214 OFFICE O/P EST MOD 30 MIN: CPT | Performed by: NURSE PRACTITIONER

## 2017-03-06 RX ORDER — AZITHROMYCIN 250 MG/1
TABLET, FILM COATED ORAL
Qty: 30 TABLET | Refills: 10 | Status: SHIPPED | OUTPATIENT
Start: 2017-03-06 | End: 2017-04-17

## 2017-03-06 RX ORDER — DOXAZOSIN MESYLATE 1 MG/1
1 TABLET ORAL NIGHTLY
COMMUNITY
End: 2017-05-22 | Stop reason: HOSPADM

## 2017-03-06 NOTE — PROGRESS NOTES
[facility] Follow up    CHIEF COMPLAINT    Follow up JONNY  Bronchiectasis    HISTORY OF PRESENT ILLNESS    Magdi Arriaga is a 71 y.o.male here today for follow-up.  I last saw him in the office one/23/17, that time he had an x-ray which though had no acute infiltrates appeared to show more prominent interstitial markings, he had a cough that was loose and some nasal drainage.  I ordered a course of azithromycin, as he and his wife are about to go on vacation to [place].    His wife indicates that seemed to help a lot with the cough, he took 2 rounds of the azithromycin.  He has chronic exertional dyspnea, with a chronic cough and nasal drainage as well, his wife said that it seems to have helped with all of those things while he is taking it, now that he stopped taking it the congestion has returned and he really does not expectorate.  She thinks that most of the time he swallows it.  He denies hemoptysis and she's never seen him cough up any blood.    He has a history of diastolic dysfunction and chronic lower extremity edema which is stable, he denies any chest pains or palpitations, he is on hydrochlorothiazide 25 mg daily..    They did well on vacation with no difficulties, he tolerated the plane ride very well.    He remains on oxygen and BiPAP at night, he tolerates this well.  He denies daytime hypersomnolence.    He has a history of dementia, pleasantly so with memory loss.  He does have trouble using Symbicort even with a spacer and does not ever rinse his mouth out, I discussed the possibility of ordering nebulizers at his last visit she was fairly certain he would not be compliant taking this.      For the nasal drainage he does remain on Singulair.            Patient Active Problem List   Diagnosis   • Abnormal CT scan, chest   • Allergic rhinitis   • Asthma   • Bronchiectasis   • Dementia   • Depression   • Diastolic dysfunction   • Dyslipidemia   • Hypertension   • Obesity   • JONNY  (obstructive sleep apnea)   • Osteoarthritis   • Vitamin D deficiency       Allergies   Allergen Reactions   • Sulfa Antibiotics        Current Outpatient Prescriptions:   •  budesonide-formoterol (SYMBICORT) 160-4.5 MCG/ACT inhaler, Inhale 2 puffs 2 (Two) Times a Day., Disp: 1 inhaler, Rfl: 11  •  doxazosin (CARDURA) 1 MG tablet, Take 1 mg by mouth Every Night., Disp: , Rfl:   •  ferrous sulfate 325 (65 FE) MG tablet, Take 325 mg by mouth Daily With Breakfast., Disp: , Rfl:   •  gabapentin (NEURONTIN) 100 MG capsule, 1 capsule 2 (Two) Times a Day., Disp: , Rfl:   •  lisinopril (PRINIVIL,ZESTRIL) 10 MG tablet, 1 tablet Daily., Disp: , Rfl:   •  loratadine (CLARITIN) 10 MG tablet, Take  by mouth Daily., Disp: , Rfl:   •  memantine (NAMENDA) 10 MG tablet, 1 tablet 2 (Two) Times a Day., Disp: , Rfl:   •  montelukast (SINGULAIR) 10 MG tablet, Take 1 tablet by mouth Every Night., Disp: 30 tablet, Rfl: 11  •  nisoldipine (SULAR) 8.5 MG 24 hr tablet, 1 tablet Daily., Disp: , Rfl:   •  PARoxetine (PAXIL) 20 MG tablet, 1 tablet Daily., Disp: , Rfl:   •  PROAIR  (90 BASE) MCG/ACT inhaler, INHALE TWO PUFFS BY MOUTH EVERY 4 TO 6 HOURS AS NEEDED, Disp: 1 inhaler, Rfl: 11  •  rivastigmine (EXELON) 9.5 MG/24HR patch, Daily., Disp: , Rfl:   •  simvastatin (ZOCOR) 40 MG tablet, 1 tablet Daily., Disp: , Rfl:   •  azithromycin (ZITHROMAX) 250 MG tablet, Take 1 tablet daily, Disp: 30 tablet, Rfl: 10  MEDICATION LIST AND ALLERGIES REVIEWED.    Social History   Substance Use Topics   • Smoking status: Former Smoker     Years: 15.00     Types: Cigarettes   • Smokeless tobacco: Not on file      Comment: quit 20 years ago   • Alcohol use No       FAMILY AND SOCIAL HISTORY REVIEWED.    Review of Systems   Constitutional: Negative for activity change, chills, fatigue, fever and unexpected weight change.   HENT: Positive for postnasal drip. Negative for hearing loss, nosebleeds and sneezing.    Respiratory: Positive for cough and  "shortness of breath. Negative for apnea, chest tightness, wheezing and stridor.    Cardiovascular: Positive for leg swelling. Negative for chest pain and palpitations.        NO INCREASE IN MILD BLE EDEMA; NO CALF TENDERNESS    Gastrointestinal: Negative for abdominal pain, diarrhea and nausea.   Neurological: Negative for dizziness, syncope and light-headedness.   .    Visit Vitals   • /74   • Pulse 66   • Temp 98.6 °F (37 °C)   • Resp 16   • Ht 67.5\" (171.5 cm)   • Wt 206 lb (93.4 kg)   • SpO2 96%  Comment: RA   • BMI 31.79 kg/m2     Physical Exam   Constitutional: He is oriented to person, place, and time. He appears well-developed. No distress.   HENT:   Head: Normocephalic.   Eyes: Pupils are equal, round, and reactive to light.   Neck: No JVD present. No thyromegaly present.   Cardiovascular: Normal rate, regular rhythm and normal heart sounds.  Exam reveals no friction rub.    Mild BLE edema; no venous cords or calf tenderness.    Pulmonary/Chest: Effort normal. No stridor. No respiratory distress. He has wheezes. He has rales. He exhibits no tenderness.   Bibasilar rales; L > R; with some wheezing in the bases; no rhonchi, mild wheezing.    Lymphadenopathy:     He has no cervical adenopathy.   Neurological: He is alert and oriented to person, place, and time.   Skin: He is not diaphoretic.   Psychiatric: He has a normal mood and affect. His behavior is normal. Thought content normal.       RESULTS    No obstruction, restrictive pattern as before, minute ventilation reduced.      Chest x-ray reveals prominent interstitial markings as in January, no acute infiltrates or effusions, markings still appear little more prominent than prior film from February 2016.    PROBLEM LIST    Problem List Items Addressed This Visit        Cardiovascular and Mediastinum    Diastolic dysfunction       Respiratory    Allergic rhinitis    Bronchiectasis - Primary    Overview     A.  CT scan of the chest 12/13/2015 reports " mild to moderate bronchiectasis.           Relevant Orders    XR Chest PA & Lateral (Completed)    Spirometry Without Bronchodilator (Completed)    JONNY (obstructive sleep apnea)    Overview     A.  Obstructive sleep apnea with central component.B.  On home CPAP therapy.            Digestive    Obesity       Nervous and Auditory    Dementia            DISCUSSION    We'll add a azithromycin for the anti-inflammatory effects, 250 mg daily.  His wife was instructed to make sure that was his stopped.  He has diarrhea or develops any hearing loss.      Continue on BiPAP and oxygen at night.    Follow-up in 6 months, sooner if needed    Tonya Bonilla, APRN  03/06/201710:23 AM  Electronically signed     Please note that portions of this note were completed with a voice recognition program. Efforts were made to edit the dictations, but occasionally words are mistranscribed.      CC: Rod Colindres MD

## 2017-04-17 ENCOUNTER — HOSPITAL ENCOUNTER (INPATIENT)
Facility: HOSPITAL | Age: 72
LOS: 3 days | Discharge: HOME | End: 2017-04-20
Attending: EMERGENCY MEDICINE | Admitting: FAMILY MEDICINE

## 2017-04-17 ENCOUNTER — APPOINTMENT (OUTPATIENT)
Dept: GENERAL RADIOLOGY | Facility: HOSPITAL | Age: 72
End: 2017-04-17

## 2017-04-17 DIAGNOSIS — N28.9 ACUTE ON CHRONIC RENAL INSUFFICIENCY: ICD-10-CM

## 2017-04-17 DIAGNOSIS — N18.9 ACUTE ON CHRONIC RENAL INSUFFICIENCY: ICD-10-CM

## 2017-04-17 DIAGNOSIS — R77.8 ELEVATED TROPONIN: ICD-10-CM

## 2017-04-17 DIAGNOSIS — I21.4 NSTEMI (NON-ST ELEVATED MYOCARDIAL INFARCTION) (HCC): Primary | ICD-10-CM

## 2017-04-17 DIAGNOSIS — I25.118 ATHEROSCLEROSIS OF NATIVE CORONARY ARTERY OF NATIVE HEART WITH OTHER FORM OF ANGINA PECTORIS (HCC): ICD-10-CM

## 2017-04-17 DIAGNOSIS — I50.9 ACUTE ON CHRONIC CONGESTIVE HEART FAILURE, UNSPECIFIED CONGESTIVE HEART FAILURE TYPE: ICD-10-CM

## 2017-04-17 PROBLEM — J44.9 COPD (CHRONIC OBSTRUCTIVE PULMONARY DISEASE) (HCC): Status: ACTIVE | Noted: 2017-04-17

## 2017-04-17 PROBLEM — D64.9 NORMOCYTIC ANEMIA: Status: ACTIVE | Noted: 2017-04-17

## 2017-04-17 PROBLEM — I44.7 LEFT BUNDLE BRANCH BLOCK: Status: ACTIVE | Noted: 2017-04-17

## 2017-04-17 LAB
ALBUMIN SERPL-MCNC: 4.2 G/DL (ref 3.2–4.8)
ALBUMIN/GLOB SERPL: 1.1 G/DL (ref 1.5–2.5)
ALP SERPL-CCNC: 82 U/L (ref 25–100)
ALT SERPL W P-5'-P-CCNC: 12 U/L (ref 7–40)
ANION GAP SERPL CALCULATED.3IONS-SCNC: 7 MMOL/L (ref 3–11)
APTT PPP: 28.1 SECONDS (ref 24–31)
AST SERPL-CCNC: 19 U/L (ref 0–33)
BASOPHILS # BLD AUTO: 0.05 10*3/MM3 (ref 0–0.2)
BASOPHILS # BLD AUTO: 0.06 10*3/MM3 (ref 0–0.2)
BASOPHILS NFR BLD AUTO: 0.5 % (ref 0–1)
BASOPHILS NFR BLD AUTO: 0.6 % (ref 0–1)
BILIRUB SERPL-MCNC: 0.5 MG/DL (ref 0.3–1.2)
BNP SERPL-MCNC: 461 PG/ML (ref 0–100)
BUN BLD-MCNC: 22 MG/DL (ref 9–23)
BUN/CREAT SERPL: 13.8 (ref 7–25)
CALCIUM SPEC-SCNC: 10.6 MG/DL (ref 8.7–10.4)
CHLORIDE SERPL-SCNC: 105 MMOL/L (ref 99–109)
CO2 SERPL-SCNC: 28 MMOL/L (ref 20–31)
CREAT BLD-MCNC: 1.6 MG/DL (ref 0.6–1.3)
CREAT UR-MCNC: 16.5 MG/DL
DEPRECATED RDW RBC AUTO: 52.7 FL (ref 37–54)
DEPRECATED RDW RBC AUTO: 52.8 FL (ref 37–54)
EOSINOPHIL # BLD AUTO: 0.25 10*3/MM3 (ref 0.1–0.3)
EOSINOPHIL # BLD AUTO: 0.29 10*3/MM3 (ref 0.1–0.3)
EOSINOPHIL NFR BLD AUTO: 2.3 % (ref 0–3)
EOSINOPHIL NFR BLD AUTO: 3 % (ref 0–3)
ERYTHROCYTE [DISTWIDTH] IN BLOOD BY AUTOMATED COUNT: 15.5 % (ref 11.3–14.5)
ERYTHROCYTE [DISTWIDTH] IN BLOOD BY AUTOMATED COUNT: 15.7 % (ref 11.3–14.5)
GFR SERPL CREATININE-BSD FRML MDRD: 43 ML/MIN/1.73
GLOBULIN UR ELPH-MCNC: 3.7 GM/DL
GLUCOSE BLD-MCNC: 94 MG/DL (ref 70–100)
HCT VFR BLD AUTO: 37.2 % (ref 38.9–50.9)
HCT VFR BLD AUTO: 38 % (ref 38.9–50.9)
HGB BLD-MCNC: 11.5 G/DL (ref 13.1–17.5)
HGB BLD-MCNC: 11.7 G/DL (ref 13.1–17.5)
HOLD SPECIMEN: NORMAL
HOLD SPECIMEN: NORMAL
IMM GRANULOCYTES # BLD: 0.01 10*3/MM3 (ref 0–0.03)
IMM GRANULOCYTES # BLD: 0.02 10*3/MM3 (ref 0–0.03)
IMM GRANULOCYTES NFR BLD: 0.1 % (ref 0–0.6)
IMM GRANULOCYTES NFR BLD: 0.2 % (ref 0–0.6)
INR PPP: 1.1
LYMPHOCYTES # BLD AUTO: 1.57 10*3/MM3 (ref 0.6–4.8)
LYMPHOCYTES # BLD AUTO: 1.65 10*3/MM3 (ref 0.6–4.8)
LYMPHOCYTES NFR BLD AUTO: 15.5 % (ref 24–44)
LYMPHOCYTES NFR BLD AUTO: 16.1 % (ref 24–44)
MCH RBC QN AUTO: 28.3 PG (ref 27–31)
MCH RBC QN AUTO: 28.4 PG (ref 27–31)
MCHC RBC AUTO-ENTMCNC: 30.8 G/DL (ref 32–36)
MCHC RBC AUTO-ENTMCNC: 30.9 G/DL (ref 32–36)
MCV RBC AUTO: 91.9 FL (ref 80–99)
MCV RBC AUTO: 92 FL (ref 80–99)
MONOCYTES # BLD AUTO: 0.93 10*3/MM3 (ref 0–1)
MONOCYTES # BLD AUTO: 1.05 10*3/MM3 (ref 0–1)
MONOCYTES NFR BLD AUTO: 9.5 % (ref 0–12)
MONOCYTES NFR BLD AUTO: 9.9 % (ref 0–12)
NEUTROPHILS # BLD AUTO: 6.9 10*3/MM3 (ref 1.5–8.3)
NEUTROPHILS # BLD AUTO: 7.64 10*3/MM3 (ref 1.5–8.3)
NEUTROPHILS NFR BLD AUTO: 70.6 % (ref 41–71)
NEUTROPHILS NFR BLD AUTO: 71.7 % (ref 41–71)
PLATELET # BLD AUTO: 226 10*3/MM3 (ref 150–450)
PLATELET # BLD AUTO: 230 10*3/MM3 (ref 150–450)
PMV BLD AUTO: 11.2 FL (ref 6–12)
PMV BLD AUTO: 11.4 FL (ref 6–12)
POTASSIUM BLD-SCNC: 4.7 MMOL/L (ref 3.5–5.5)
PROT SERPL-MCNC: 7.9 G/DL (ref 5.7–8.2)
PROTHROMBIN TIME: 12 SECONDS (ref 9.6–11.5)
RBC # BLD AUTO: 4.05 10*6/MM3 (ref 4.2–5.76)
RBC # BLD AUTO: 4.13 10*6/MM3 (ref 4.2–5.76)
SODIUM BLD-SCNC: 140 MMOL/L (ref 132–146)
SODIUM UR-SCNC: 123 MMOL/L (ref 30–90)
TROPONIN I SERPL-MCNC: 4.66 NG/ML (ref 0–0.07)
TROPONIN I SERPL-MCNC: 4.69 NG/ML (ref 0–0.07)
UUN 24H UR-MCNC: 139 MG/DL
WBC NRBC COR # BLD: 10.65 10*3/MM3 (ref 3.5–10.8)
WBC NRBC COR # BLD: 9.77 10*3/MM3 (ref 3.5–10.8)
WHOLE BLOOD HOLD SPECIMEN: NORMAL
WHOLE BLOOD HOLD SPECIMEN: NORMAL

## 2017-04-17 PROCEDURE — 83880 ASSAY OF NATRIURETIC PEPTIDE: CPT | Performed by: EMERGENCY MEDICINE

## 2017-04-17 PROCEDURE — 25010000002 FUROSEMIDE PER 20 MG: Performed by: EMERGENCY MEDICINE

## 2017-04-17 PROCEDURE — 84540 ASSAY OF URINE/UREA-N: CPT | Performed by: NURSE PRACTITIONER

## 2017-04-17 PROCEDURE — 81003 URINALYSIS AUTO W/O SCOPE: CPT | Performed by: INTERNAL MEDICINE

## 2017-04-17 PROCEDURE — 93005 ELECTROCARDIOGRAM TRACING: CPT

## 2017-04-17 PROCEDURE — 82570 ASSAY OF URINE CREATININE: CPT | Performed by: NURSE PRACTITIONER

## 2017-04-17 PROCEDURE — 84484 ASSAY OF TROPONIN QUANT: CPT | Performed by: NURSE PRACTITIONER

## 2017-04-17 PROCEDURE — 85610 PROTHROMBIN TIME: CPT | Performed by: EMERGENCY MEDICINE

## 2017-04-17 PROCEDURE — 85730 THROMBOPLASTIN TIME PARTIAL: CPT | Performed by: EMERGENCY MEDICINE

## 2017-04-17 PROCEDURE — 25010000002 HEPARIN (PORCINE) PER 1000 UNITS: Performed by: FAMILY MEDICINE

## 2017-04-17 PROCEDURE — 99223 1ST HOSP IP/OBS HIGH 75: CPT | Performed by: FAMILY MEDICINE

## 2017-04-17 PROCEDURE — 71010 HC CHEST PA OR AP: CPT

## 2017-04-17 PROCEDURE — 25010000002 HEPARIN (PORCINE) PER 1000 UNITS: Performed by: EMERGENCY MEDICINE

## 2017-04-17 PROCEDURE — 80053 COMPREHEN METABOLIC PANEL: CPT | Performed by: EMERGENCY MEDICINE

## 2017-04-17 PROCEDURE — 84300 ASSAY OF URINE SODIUM: CPT | Performed by: NURSE PRACTITIONER

## 2017-04-17 PROCEDURE — 93005 ELECTROCARDIOGRAM TRACING: CPT | Performed by: FAMILY MEDICINE

## 2017-04-17 PROCEDURE — 99285 EMERGENCY DEPT VISIT HI MDM: CPT

## 2017-04-17 PROCEDURE — 84484 ASSAY OF TROPONIN QUANT: CPT

## 2017-04-17 PROCEDURE — 85730 THROMBOPLASTIN TIME PARTIAL: CPT | Performed by: FAMILY MEDICINE

## 2017-04-17 PROCEDURE — 85025 COMPLETE CBC W/AUTO DIFF WBC: CPT | Performed by: EMERGENCY MEDICINE

## 2017-04-17 RX ORDER — IPRATROPIUM BROMIDE AND ALBUTEROL SULFATE 2.5; .5 MG/3ML; MG/3ML
3 SOLUTION RESPIRATORY (INHALATION) EVERY 4 HOURS PRN
Status: DISCONTINUED | OUTPATIENT
Start: 2017-04-17 | End: 2017-04-20 | Stop reason: HOSPADM

## 2017-04-17 RX ORDER — GABAPENTIN 100 MG/1
100 CAPSULE ORAL NIGHTLY
Status: DISCONTINUED | OUTPATIENT
Start: 2017-04-17 | End: 2017-04-20 | Stop reason: HOSPADM

## 2017-04-17 RX ORDER — FUROSEMIDE 10 MG/ML
40 INJECTION INTRAMUSCULAR; INTRAVENOUS ONCE
Status: COMPLETED | OUTPATIENT
Start: 2017-04-17 | End: 2017-04-17

## 2017-04-17 RX ORDER — MEMANTINE HYDROCHLORIDE 10 MG/1
10 TABLET ORAL DAILY
Status: DISCONTINUED | OUTPATIENT
Start: 2017-04-18 | End: 2017-04-20 | Stop reason: HOSPADM

## 2017-04-17 RX ORDER — NISOLDIPINE 8.5 MG/1
8.5 TABLET, FILM COATED, EXTENDED RELEASE ORAL
Status: DISCONTINUED | OUTPATIENT
Start: 2017-04-18 | End: 2017-04-20 | Stop reason: HOSPADM

## 2017-04-17 RX ORDER — MELATONIN
2000 DAILY
Status: ON HOLD | COMMUNITY
End: 2017-04-24

## 2017-04-17 RX ORDER — HEPARIN SODIUM 1000 [USP'U]/ML
60 INJECTION, SOLUTION INTRAVENOUS; SUBCUTANEOUS AS NEEDED
Status: DISCONTINUED | OUTPATIENT
Start: 2017-04-17 | End: 2017-04-17 | Stop reason: SDUPTHER

## 2017-04-17 RX ORDER — CETIRIZINE HYDROCHLORIDE 10 MG/1
5 TABLET ORAL DAILY
Status: DISCONTINUED | OUTPATIENT
Start: 2017-04-18 | End: 2017-04-20 | Stop reason: HOSPADM

## 2017-04-17 RX ORDER — SODIUM CHLORIDE 0.9 % (FLUSH) 0.9 %
10 SYRINGE (ML) INJECTION AS NEEDED
Status: DISCONTINUED | OUTPATIENT
Start: 2017-04-17 | End: 2017-04-20 | Stop reason: HOSPADM

## 2017-04-17 RX ORDER — ONDANSETRON 4 MG/1
4 TABLET, FILM COATED ORAL EVERY 6 HOURS PRN
Status: DISCONTINUED | OUTPATIENT
Start: 2017-04-17 | End: 2017-04-20 | Stop reason: HOSPADM

## 2017-04-17 RX ORDER — MONTELUKAST SODIUM 10 MG/1
10 TABLET ORAL NIGHTLY
Status: DISCONTINUED | OUTPATIENT
Start: 2017-04-17 | End: 2017-04-20 | Stop reason: HOSPADM

## 2017-04-17 RX ORDER — ASPIRIN 81 MG/1
324 TABLET, CHEWABLE ORAL ONCE
Status: COMPLETED | OUTPATIENT
Start: 2017-04-17 | End: 2017-04-17

## 2017-04-17 RX ORDER — HEPARIN SODIUM 1000 [USP'U]/ML
30 INJECTION, SOLUTION INTRAVENOUS; SUBCUTANEOUS AS NEEDED
Status: DISCONTINUED | OUTPATIENT
Start: 2017-04-17 | End: 2017-04-17 | Stop reason: SDUPTHER

## 2017-04-17 RX ORDER — ACETAMINOPHEN 325 MG/1
650 TABLET ORAL EVERY 4 HOURS PRN
Status: DISCONTINUED | OUTPATIENT
Start: 2017-04-17 | End: 2017-04-19 | Stop reason: SDUPTHER

## 2017-04-17 RX ORDER — AMOXICILLIN 500 MG
1200 CAPSULE ORAL DAILY
COMMUNITY
End: 2017-01-01 | Stop reason: SINTOL

## 2017-04-17 RX ORDER — RIVASTIGMINE 9.5 MG/24H
1 PATCH, EXTENDED RELEASE TRANSDERMAL DAILY
Status: DISCONTINUED | OUTPATIENT
Start: 2017-04-18 | End: 2017-04-20 | Stop reason: HOSPADM

## 2017-04-17 RX ORDER — HEPARIN SODIUM 1000 [USP'U]/ML
41.6 INJECTION, SOLUTION INTRAVENOUS; SUBCUTANEOUS ONCE
Status: COMPLETED | OUTPATIENT
Start: 2017-04-17 | End: 2017-04-17

## 2017-04-17 RX ORDER — BUDESONIDE AND FORMOTEROL FUMARATE DIHYDRATE 160; 4.5 UG/1; UG/1
2 AEROSOL RESPIRATORY (INHALATION)
Status: DISCONTINUED | OUTPATIENT
Start: 2017-04-17 | End: 2017-04-20 | Stop reason: HOSPADM

## 2017-04-17 RX ORDER — PAROXETINE HYDROCHLORIDE 20 MG/1
20 TABLET, FILM COATED ORAL DAILY
Status: DISCONTINUED | OUTPATIENT
Start: 2017-04-18 | End: 2017-04-20 | Stop reason: HOSPADM

## 2017-04-17 RX ORDER — CLOPIDOGREL BISULFATE 75 MG/1
600 TABLET ORAL ONCE
Status: COMPLETED | OUTPATIENT
Start: 2017-04-17 | End: 2017-04-17

## 2017-04-17 RX ORDER — FERROUS SULFATE 325(65) MG
325 TABLET ORAL
Status: DISCONTINUED | OUTPATIENT
Start: 2017-04-18 | End: 2017-04-20 | Stop reason: HOSPADM

## 2017-04-17 RX ORDER — SODIUM CHLORIDE 0.9 % (FLUSH) 0.9 %
1-10 SYRINGE (ML) INJECTION AS NEEDED
Status: DISCONTINUED | OUTPATIENT
Start: 2017-04-17 | End: 2017-04-20 | Stop reason: HOSPADM

## 2017-04-17 RX ORDER — DOXAZOSIN MESYLATE 4 MG/1
1 TABLET ORAL NIGHTLY
Status: DISCONTINUED | OUTPATIENT
Start: 2017-04-17 | End: 2017-04-20 | Stop reason: HOSPADM

## 2017-04-17 RX ORDER — ONDANSETRON 2 MG/ML
4 INJECTION INTRAMUSCULAR; INTRAVENOUS EVERY 6 HOURS PRN
Status: DISCONTINUED | OUTPATIENT
Start: 2017-04-17 | End: 2017-04-20 | Stop reason: HOSPADM

## 2017-04-17 RX ORDER — NITROGLYCERIN 0.4 MG/1
0.4 TABLET SUBLINGUAL
Status: DISCONTINUED | OUTPATIENT
Start: 2017-04-17 | End: 2017-04-20 | Stop reason: HOSPADM

## 2017-04-17 RX ADMIN — DOXAZOSIN MESYLATE 1 MG: 4 TABLET ORAL at 23:52

## 2017-04-17 RX ADMIN — ASPIRIN 324 MG: 81 TABLET, CHEWABLE ORAL at 18:30

## 2017-04-17 RX ADMIN — FUROSEMIDE 40 MG: 10 INJECTION, SOLUTION INTRAMUSCULAR; INTRAVENOUS at 18:30

## 2017-04-17 RX ADMIN — HEPARIN SODIUM 4000 UNITS: 1000 INJECTION, SOLUTION INTRAVENOUS; SUBCUTANEOUS at 18:38

## 2017-04-17 RX ADMIN — HEPARIN SODIUM 10.4 UNITS/KG/HR: 10000 INJECTION, SOLUTION INTRAVENOUS at 18:38

## 2017-04-17 RX ADMIN — MONTELUKAST SODIUM 10 MG: 10 TABLET, FILM COATED ORAL at 23:52

## 2017-04-17 RX ADMIN — GABAPENTIN 100 MG: 100 CAPSULE ORAL at 23:52

## 2017-04-17 RX ADMIN — CLOPIDOGREL BISULFATE 600 MG: 75 TABLET ORAL at 18:30

## 2017-04-17 NOTE — ED PROVIDER NOTES
Subjective   HPI Comments: Mr. Magdi Arrigaa is a 71 y.o. male who presents to the ED with c/o SOA. He notes of worsening SOA for 3 days along with some pain across his shoulder blades. He has tried taking albuterol since the onset of his sx without any relief of his SOA. Both his SOA and CP progressed as the day progressed and is worsened with exertion. His chest pain radiates to his left shoulder. He denies any N/V/D, abnormal bowels, abnormal urinary sx, fevers, or any other acute sx at this time. He denies a hx of MI.    He only reports of a surgical hx of appendectomy and tonsillectomy.    Patient is a 71 y.o. male presenting with shortness of breath.   History provided by:  Patient  Shortness of Breath   Severity:  Mild  Onset quality:  Gradual  Duration:  3 days  Timing:  Constant  Progression:  Unchanged  Chronicity:  New  Relieved by:  Nothing  Worsened by:  Exertion  Ineffective treatments: Albuterol.  Associated symptoms: chest pain    Associated symptoms: no abdominal pain, no fever and no vomiting        Review of Systems   Constitutional: Negative for chills and fever.   Respiratory: Positive for shortness of breath.    Cardiovascular: Positive for chest pain.   Gastrointestinal: Negative for abdominal pain, blood in stool, diarrhea, nausea and vomiting.   Genitourinary: Negative for difficulty urinating, dysuria and hematuria.   All other systems reviewed and are negative.      Past Medical History:   Diagnosis Date   • DVT (deep venous thrombosis)     Bilateral   • Falls     A.  History of falling traumatic injuries in April 2014 and May 2014 resulting and left rib  fracture and left clavicle fracture.   • Fracture of rib    • History of chest x-ray 02/19/2016    Extensive, but stable postinflammatory pulmonary changes. There has been no change since the previous examination of 12/03/2015   • History of chest x-ray 12/03/2015    Slight increased markings bilaterally, may be progression of his  bronchiectasis   • History of chest x-ray 09/06/2014    There has been no change since 09/05/2014   • History of echocardiogram 02/19/2016    Mild concentric LVH is observed.Estimated EF is 50-55%.E to A reversal in the mitral valve flow pattern suggestive of diastolic dysfunction.RV is mildly dilated.Moderate aortic cusp sclerosis is present.Mitral annular calcification.Mild MR.Evidence of borderline pulmonary hypertension.   • History of PFTs 12/03/2015    Values are slightly worse.TLC is c/w mild restriction.Values reduced from 02/16/13.Diffusion capacity is slightly worse   • History of PFTs 02/06/2013    Mild decrease in FVC, may be due to less maximal effort.TLC is within normal levels. Mild reduction in diffusion in absolute value   • Melena    • Patellar tendon rupture     A.  Status post primary repair of the left periprosthetic patellar tendon tear 6/11/2014, post fall at the end of April 2014.   • Pneumonia      A.  Left lower lobe pneumonia treated at [facility], 5/8/2014 through 5/20/2014.   • Pneumothorax      A.  Status post chest tube placed 5/13/2014 and discontinued 5/15/2014 with stable chest x-ray.   • Rotator cuff tear     A.  Left rotator cuff tear status post fall prior to admission at the end of April 2014.   • Traumatic hemorrhagic shock      A.  Secondary to right renal retroperitoneal hemorrhage, 9/2014.       Allergies   Allergen Reactions   • Sulfa Antibiotics        Past Surgical History:   Procedure Laterality Date   • APPENDECTOMY     • KNEE SURGERY      A.  Status post primary repair of the left periprosthetic patellar tendon tear 6/11/2014, post fall at the end of April 2014   • REPLACEMENT TOTAL KNEE      A.  Left knee replacement in 2009 and right knee replacement in 2013.B.  History of prior right knee tendon repair in 2002.   • TONSILLECTOMY     • VENA CAVA FILTER PLACEMENT         Family History   Problem Relation Age of Onset   • Adopted: Yes       Social History     Social History    • Marital status:      Spouse name: N/A   • Number of children: N/A   • Years of education: N/A     Social History Main Topics   • Smoking status: Former Smoker     Years: 15.00     Types: Cigarettes   • Smokeless tobacco: None      Comment: quit 20 years ago   • Alcohol use No   • Drug use: No   • Sexual activity: Not Asked     Other Topics Concern   • None     Social History Narrative         Objective   Physical Exam   Constitutional: He is oriented to person, place, and time. He appears well-developed and well-nourished. No distress.   HENT:   Head: Normocephalic and atraumatic.   Right Ear: External ear normal.   Left Ear: External ear normal.   Mouth/Throat: Oropharynx is clear and moist.   Eyes: Conjunctivae are normal.   Neck: Normal range of motion. Neck supple.   Cardiovascular: Normal rate, regular rhythm, normal heart sounds and intact distal pulses.  Exam reveals no gallop and no friction rub.    No murmur heard.  Pulmonary/Chest: Effort normal. No respiratory distress.   Inspiratory crackles both lungs from mid lungs down   Abdominal: Soft. There is no tenderness.   Musculoskeletal: Normal range of motion. He exhibits edema (+1 edema bilateral from 2/3 of the way down from knees).   Neurological: He is alert and oriented to person, place, and time.   Skin: Skin is warm and dry.   Psychiatric: He has a normal mood and affect. His behavior is normal.   Nursing note and vitals reviewed.      Procedures         ED Course  ED Course   Comment By Time   Discussed case with Dr. Flores, hospitalist, who recommends 40mg of IV Lasix, Aspirin, Plavix, and Heparin. -JAKOB Velásquez 04/17 1811     Recent Results (from the past 24 hour(s))   BNP    Collection Time: 04/17/17  4:58 PM   Result Value Ref Range    .0 (H) 0.0 - 100.0 pg/mL   CBC Auto Differential    Collection Time: 04/17/17  4:58 PM   Result Value Ref Range    WBC 10.65 3.50 - 10.80 10*3/mm3    RBC 4.05 (L) 4.20 - 5.76 10*6/mm3    Hemoglobin  11.5 (L) 13.1 - 17.5 g/dL    Hematocrit 37.2 (L) 38.9 - 50.9 %    MCV 91.9 80.0 - 99.0 fL    MCH 28.4 27.0 - 31.0 pg    MCHC 30.9 (L) 32.0 - 36.0 g/dL    RDW 15.7 (H) 11.3 - 14.5 %    RDW-SD 52.8 37.0 - 54.0 fl    MPV 11.2 6.0 - 12.0 fL    Platelets 226 150 - 450 10*3/mm3    Neutrophil % 71.7 (H) 41.0 - 71.0 %    Lymphocyte % 15.5 (L) 24.0 - 44.0 %    Monocyte % 9.9 0.0 - 12.0 %    Eosinophil % 2.3 0.0 - 3.0 %    Basophil % 0.5 0.0 - 1.0 %    Immature Grans % 0.1 0.0 - 0.6 %    Neutrophils, Absolute 7.64 1.50 - 8.30 10*3/mm3    Lymphocytes, Absolute 1.65 0.60 - 4.80 10*3/mm3    Monocytes, Absolute 1.05 (H) 0.00 - 1.00 10*3/mm3    Eosinophils, Absolute 0.25 0.10 - 0.30 10*3/mm3    Basophils, Absolute 0.05 0.00 - 0.20 10*3/mm3    Immature Grans, Absolute 0.01 0.00 - 0.03 10*3/mm3   POC Troponin, Rapid    Collection Time: 04/17/17  5:05 PM   Result Value Ref Range    Troponin I 4.66 (C) 0.00 - 0.07 ng/mL   Comprehensive Metabolic Panel    Collection Time: 04/17/17  5:31 PM   Result Value Ref Range    Glucose 94 70 - 100 mg/dL    BUN 22 9 - 23 mg/dL    Creatinine 1.60 (H) 0.60 - 1.30 mg/dL    Sodium 140 132 - 146 mmol/L    Potassium 4.7 3.5 - 5.5 mmol/L    Chloride 105 99 - 109 mmol/L    CO2 28.0 20.0 - 31.0 mmol/L    Calcium 10.6 (H) 8.7 - 10.4 mg/dL    Total Protein 7.9 5.7 - 8.2 g/dL    Albumin 4.20 3.20 - 4.80 g/dL    ALT (SGPT) 12 7 - 40 U/L    AST (SGOT) 19 0 - 33 U/L    Alkaline Phosphatase 82 25 - 100 U/L    Total Bilirubin 0.5 0.3 - 1.2 mg/dL    eGFR Non African Amer 43 (L) >60 mL/min/1.73    Globulin 3.7 gm/dL    A/G Ratio 1.1 (L) 1.5 - 2.5 g/dL    BUN/Creatinine Ratio 13.8 7.0 - 25.0    Anion Gap 7.0 3.0 - 11.0 mmol/L     Note: In addition to lab results from this visit, the labs listed above may include labs taken at another facility or during a different encounter within the last 24 hours. Please correlate lab times with ED admission and discharge times for further clarification of the services performed  during this visit.    XR Chest 1 View    (Results Pending)     Vitals:    04/17/17 1730 04/17/17 1745 04/17/17 1800 04/17/17 1800   BP: 143/82 142/78  141/81   BP Location:       Patient Position:       Pulse:  62 64    Resp:       Temp:       TempSrc:       SpO2:  95% 95%    Weight:       Height:         Medications   sodium chloride 0.9 % flush 10 mL (not administered)   aspirin chewable tablet 324 mg (not administered)   clopidogrel (PLAVIX) tablet 600 mg (not administered)   furosemide (LASIX) injection 40 mg (not administered)   heparin (porcine) injection 4,000 Units (not administered)   heparin infusion 37754 units in 250 mL 0.45 % NaCl (not administered)   heparin (porcine) injection 5,770 Units (not administered)   heparin (porcine) injection 2,890 Units (not administered)     ECG/EMG Results (last 24 hours)     Procedure Component Value Units Date/Time    ECG 12 Lead [[id number]] Collected:  04/17/17 1636     Updated:  04/17/17 1741    Narrative:       Test Reason : SOA Protocol  Blood Pressure : **/** mmHG  Vent. Rate : 061 BPM     Atrial Rate : 061 BPM     P-R Int : 148 ms          QRS Dur : 130 ms      QT Int : 454 ms       P-R-T Axes : 008 -08 137 degrees     QTc Int : 457 ms    Normal sinus rhythm  Left bundle branch block  Abnormal ECG  When compared with ECG of 04-SEP-2014 18:52,  Left bundle branch block is now present  Confirmed by RUBA MORENO ([id number]) on 4/17/2017 5:41:26 PM    Referred By:  MD ER           Confirmed By:RUBA MORENO                    [facility]  Number of Diagnoses or Management Options  Acute on chronic congestive heart failure, unspecified congestive heart failure type: new and requires workup  Elevated troponin: new and requires workup  NSTEMI (non-ST elevated myocardial infarction): new and requires workup  Diagnosis management comments: Labs/EKG consistent with LBBB, Nonstemi, and suspected secondary CHF exacerbation.     Patient placed on oxygen, appear comfortable.      Discussed with Dr. Flores, suggests aspirin, plavix, and heparin bolus and drip, will also give 40 mg IV lasix.     Dr. Flores to admit to Tele.        Amount and/or Complexity of Data Reviewed  Clinical lab tests: ordered and reviewed  Tests in the radiology section of CPT®: ordered and reviewed  Decide to obtain previous medical records or to obtain history from someone other than the patient: yes  Obtain history from someone other than the patient: yes  Review and summarize past medical records: yes  Discuss the patient with other providers: yes  Independent visualization of images, tracings, or specimens: yes    Risk of Complications, Morbidity, and/or Mortality  Presenting problems: high  Diagnostic procedures: high  Management options: high    Patient Progress  Patient progress: stable      Final diagnoses:   NSTEMI (non-ST elevated myocardial infarction)   Elevated troponin   Acute on chronic congestive heart failure, unspecified congestive heart failure type       Documentation assistance provided by christine YOUNG.  Information recorded by the christine was done at my direction and has been verified and validated by me.     Candace Young  04/17/17 1752       Candace Young  04/17/17 1752       Candace Young  04/17/17 1806       Yvette Villanueva MD  04/17/17 1811

## 2017-04-17 NOTE — H&P
[facility] Medicine Services  HISTORY AND PHYSICAL    Primary Care Physician: Rod Colindres MD    Subjective     Chief Complaint: shortness of air    History of Present Illness  This is a pleasant 71 year old male with a history of COPD, essential hypertension, hyperlipidemia, JONNY, Diastolic dysfunction, CKD stage III, OA and dementia who presents to the ED with complaints of shortness of air.  On Friday patient states he returned from a baseball game when he was very short of breath worse with exertion.  He does have underlying COPD and thought this was the causing factor.  He put himself on his CPAP machine and went to bed.  His symptoms continued over the weekend in which he states he used his albuterol inhaler and had some relief.  Today the patient states his symptoms still were present therefore he called his pulmonologist who was unable to see him but referred him to his PCP.  He states his PCP was unable to see him today and recommended he go to [facility].  At [facility] a CXR showed underlying CHF along with EKG showing new LBBB.  He was told to go to the ED.  Currently patient denies any chest pain but states on Friday when his symptoms began he did have some left shoulder pain.  He also denies any fever, chills, nausea, diaphoresis, weakness, numbness or tingling.  He does however complain of a chronic non-productive cough that has worsened over the weekend.  He will be admitted to [facility] under the care of the Hospitalist for further evaluation and treatment.          Review of Systems   Constitutional: Positive for activity change. Negative for appetite change, chills, diaphoresis, fatigue, fever and unexpected weight change.   HENT: Negative.    Eyes: Negative.    Respiratory: Positive for cough and shortness of breath. Negative for chest tightness and wheezing.    Cardiovascular: Positive for leg swelling. Negative for chest pain and palpitations.   Gastrointestinal: Negative for abdominal  distention, abdominal pain, blood in stool, constipation, diarrhea, nausea and vomiting.   Endocrine: Negative.    Genitourinary: Negative for difficulty urinating, dysuria, flank pain, frequency, hematuria and urgency.   Musculoskeletal: Negative.    Skin: Negative.    Allergic/Immunologic: Negative.    Neurological: Negative for dizziness, tremors, seizures, syncope, facial asymmetry, speech difficulty, weakness, light-headedness, numbness and headaches.   Psychiatric/Behavioral: Negative.       Otherwise complete ROS reviewed and negative except as mentioned in the HPI.      Past Medical History:   Past Medical History:   Diagnosis Date   • DVT (deep venous thrombosis)     Bilateral   • Falls     A.  History of falling traumatic injuries in April 2014 and May 2014 resulting and left rib  fracture and left clavicle fracture.   • Fracture of rib    • History of chest x-ray 02/19/2016    Extensive, but stable postinflammatory pulmonary changes. There has been no change since the previous examination of 12/03/2015   • History of chest x-ray 12/03/2015    Slight increased markings bilaterally, may be progression of his bronchiectasis   • History of chest x-ray 09/06/2014    There has been no change since 09/05/2014   • History of echocardiogram 02/19/2016    Mild concentric LVH is observed.Estimated EF is 50-55%.E to A reversal in the mitral valve flow pattern suggestive of diastolic dysfunction.RV is mildly dilated.Moderate aortic cusp sclerosis is present.Mitral annular calcification.Mild MR.Evidence of borderline pulmonary hypertension.   • History of PFTs 12/03/2015    Values are slightly worse.TLC is c/w mild restriction.Values reduced from 02/16/13.Diffusion capacity is slightly worse   • History of PFTs 02/06/2013    Mild decrease in FVC, may be due to less maximal effort.TLC is within normal levels. Mild reduction in diffusion in absolute value   • Melena    • Patellar tendon rupture     A.  Status post  "primary repair of the left periprosthetic patellar tendon tear 6/11/2014, post fall at the end of April 2014.   • Pneumonia      A.  Left lower lobe pneumonia treated at [facility], 5/8/2014 through 5/20/2014.   • Pneumothorax      A.  Status post chest tube placed 5/13/2014 and discontinued 5/15/2014 with stable chest x-ray.   • Rotator cuff tear     A.  Left rotator cuff tear status post fall prior to admission at the end of April 2014.   • Traumatic hemorrhagic shock      A.  Secondary to right renal retroperitoneal hemorrhage, 9/2014.       Past Surgical History:  Past Surgical History:   Procedure Laterality Date   • APPENDECTOMY     • KNEE SURGERY      A.  Status post primary repair of the left periprosthetic patellar tendon tear 6/11/2014, post fall at the end of April 2014   • REPLACEMENT TOTAL KNEE      A.  Left knee replacement in 2009 and right knee replacement in 2013.B.  History of prior right knee tendon repair in 2002.   • TONSILLECTOMY     • VENA CAVA FILTER PLACEMENT         Family History:   Family History   Problem Relation Age of Onset   • Adopted: Yes     Social History:   Social History     Social History   • Marital status:      Spouse name: N/A   • Number of children: N/A   • Years of education: N/A     Occupational History   • Not on file.     Social History Main Topics   • Smoking status: Former Smoker     Years: 15.00     Types: Cigarettes   • Smokeless tobacco: Never Used      Comment: quit 20 years ago   • Alcohol use No   • Drug use: No   • Sexual activity: Not on file     Other Topics Concern   • Not on file     Social History Narrative    Retired, lives with wife in [place].        Medications:    (Not in a hospital admission)  Allergies:  Allergies   Allergen Reactions   • Sulfa Antibiotics        Objective     Vital Signs: /91  Pulse 70  Temp 97.7 °F (36.5 °C) (Oral)   Resp 18  Ht 68\" (172.7 cm)  Wt 212 lb (96.2 kg)  SpO2 94%  BMI 32.23 kg/m2  Physical Exam "   Constitutional: He is oriented to person, place, and time. He appears well-developed and well-nourished. No distress.   Alert and oriented x3, pleasant cooperative.  Appears comfortable and in no acute distress.  Wife at bedside.    HENT:   Head: Normocephalic and atraumatic.   Eyes: Conjunctivae and EOM are normal. Pupils are equal, round, and reactive to light. Right eye exhibits no discharge. Left eye exhibits no discharge. No scleral icterus.   Neck: Normal range of motion. Neck supple. No JVD present. No tracheal deviation present. No thyromegaly present.   Cardiovascular: Normal rate, regular rhythm, normal heart sounds and intact distal pulses.  Exam reveals no gallop and no friction rub.    No murmur heard.  Pulmonary/Chest: Effort normal. No stridor. No respiratory distress. He has no wheezes. He has rales. He exhibits no tenderness.   Fine crackles to LLL   Abdominal: Soft. Bowel sounds are normal. He exhibits no distension and no mass. There is no tenderness. There is no rebound and no guarding. No hernia.   Musculoskeletal: Normal range of motion. He exhibits edema. He exhibits no tenderness or deformity.   Trace edema to bilateral ankles   Lymphadenopathy:     He has no cervical adenopathy.   Neurological: He is alert and oriented to person, place, and time. He has normal reflexes.   Skin: Skin is warm and dry. No rash noted. He is not diaphoretic. No erythema. No pallor.   Psychiatric: He has a normal mood and affect. His behavior is normal. Thought content normal.   Vitals reviewed.            Results Reviewed:    Results from last 7 days  Lab Units 04/17/17  1833   WBC 10*3/mm3 9.77   HEMOGLOBIN g/dL 11.7*   PLATELETS 10*3/mm3 230       Results from last 7 days  Lab Units 04/17/17  1731   SODIUM mmol/L 140   POTASSIUM mmol/L 4.7   TOTAL CO2 mmol/L 28.0   CREATININE mg/dL 1.60*   GLUCOSE mg/dL 94   CALCIUM mg/dL 10.6*     Imaging Results (last 24 hours)     Procedure Component Value Units Date/Time     XR Chest 1 View [[id number]] Updated:  04/17/17 5318          I have personally reviewed and interpreted the radiology studies and ECG obtained at time of admission.     Assessment / Plan      Assessment & Plan  Principal Problem:    NSTEMI (non-ST elevated myocardial infarction)  Active Problems:    Acute on chronic congestive heart failure    Acute on chronic renal insufficiency    Left bundle branch block    Normocytic anemia    Bronchiectasis    Dementia    Diastolic dysfunction    Dyslipidemia    Hypertension    JONNY (obstructive sleep apnea)    COPD (chronic obstructive pulmonary disease)    1) NSTEMI  -troponin 4.66, will continue to trend  -NARINDER score: 3-4  -new LBBB on EKG  -was given 600 mg plavix,  mg in ED along with initiation of heparin drip, patient does have remote history DVT and started on xarelto in which he had a spontaneous right renal retroperitoneal hemorrhage.    -NPO after midnight  -check FLP, Hgb A1c and TSH in am  -EKG in am  -Cardiology to see in am, presumably will need LHC  -prn SL nitro and morphine for chest pain    2) BUBBA on chronic kidney disease stage III  -likely secondary to #1  -baseline creatinine 1.3, currently 1.6  -will hold of on any fluids due to underlying NSTEMI related acute CHF with crackles and SOA, hopefully diuresing with Lasix will actually help his GFR/creatinine   -continue to monitor, if receives LHC will need to closely monitor creatinine s/p contrast  -check urine creatinine, sodium, urea, and protein     3) Acute on Chronic diastolic congestive heart failure  -likely secondary to #1  -CXR pending but appears to have infiltrate  -patient does have underlying diastolic dysfunction per pulmonary and associates Note, unable to find last echo in records  -patient unaware of having diastolic dysfunction.    -was given 40 mg of IV lasix in ED for crackles and SOA (now much improved and diuresing quickly)  -  -BMP in am  -strict I &O  -daily  weights  -echo in am    4) Normocytic anemia  -likely secondary to CKD  -appears at baseline  -no signs of acute blood loss  -will continue to follow    5) COPD  -followed by pulmonary and associates  -continue symbicort and singular   -add prn duo-nebs  -continuous pulse ox  -keep sats > 90%    6) Essential Hypertension  -continue home medications    7) hyperlipidemia  -increase statin to high dose  -currently on simvastatin 40 mg daily    8) JONNY  -cpap at hs    9) Remote history of DVT   -started on Xarelto with subsequent right renal retroperitoneal hemorrhage  -s/p IVC filter            DVT prophylaxis:teds/scds, heparin drip  Code Status: full code      I discussed the patients findings and my recommendations with:patient, patient's family and primary care team.     REI Boss 04/17/17 7:39 PM      Brief Attending Note   I have seen and examined the patient, performing an independent face-to-face diagnostic evaluation.    The plan of care reviewed and developed with the advanced practice clinician (APC).    Brief Summary Statement/HPI:   See thorough history by REI above.  Briefly, 72yo began having left sided upper CP ~4/14/17 radiating to left arm/shoulder and associated with chest tightness perceived as SOA.  Hx sx's were not fully relieved by home inhalers for his COPD or use of home CPAP.  He was seen at [facility] today with CXR shoeing pulm vasc overload and given his history of sx's was sent to [facility] ED for further eval.  Work up reveals NSTEMI with positive troponin and EKG changes with developing LBBB. Currently SOA greatly improved s/p 40mg Lasix x1 and CP free.  S/p ASA/Plavix, on hep gtt.       Attending Physical Exam:  Temp:  [97.5 °F (36.4 °C)-97.7 °F (36.5 °C)] 97.5 °F (36.4 °C)  Heart Rate:  [57-70] 65  Resp:  [18] 18  BP: (110-168)/() 137/65  Constitutional: no acute distress, awake, alert  Eyes: PERRLA, sclerae anicteric, no conjunctival injection  Neck: supple, no thyromegaly,  trachea midline  Respiratory: R>L basilar crackles, nonlabored respirations   Cardiovascular: RRR, no murmur  Gastrointestinal: Positive bowel sounds, soft, nontender, nondistended  Musculoskeletal: No bilateral ankle edema, no clubbing or cyanosis to bilateral lower extremities  Psychiatric: oriented x 3, appropriate affect, cooperative  Neurologic: Strength symmetric in all extremities, Cranial Nerves grossly intact to confrontation         Brief Assessment/Plan :      See above for further detailed assessment and plan developed with APC which I have reviewed and/or edited.    I believe this patient requires INPATIENT status due to the need for care which can only be reasonably provided in an hospital setting such as aggressive/expedited ancillary services and Cards consultation services and the necessity for IV medications, close physician monitoring and need for likely cardiac procedures.  In such, I feel patient’s risk for adverse outcomes and need for care warrant INPATIENT evaluation and predict the patient’s care encounter to likely last beyond 2 midnights.      Dea Flores MD  11:48 PM

## 2017-04-18 ENCOUNTER — APPOINTMENT (OUTPATIENT)
Dept: CARDIOLOGY | Facility: HOSPITAL | Age: 72
End: 2017-04-18

## 2017-04-18 ENCOUNTER — APPOINTMENT (OUTPATIENT)
Dept: ULTRASOUND IMAGING | Facility: HOSPITAL | Age: 72
End: 2017-04-18

## 2017-04-18 LAB
ALBUMIN SERPL-MCNC: 4.3 G/DL (ref 3.2–4.8)
ALBUMIN/GLOB SERPL: 1.1 G/DL (ref 1.5–2.5)
ALP SERPL-CCNC: 84 U/L (ref 25–100)
ALT SERPL W P-5'-P-CCNC: 15 U/L (ref 7–40)
ANION GAP SERPL CALCULATED.3IONS-SCNC: 8 MMOL/L (ref 3–11)
APTT PPP: 35.5 SECONDS (ref 24–31)
APTT PPP: 39.7 SECONDS (ref 24–31)
ARTICHOKE IGE QN: 87 MG/DL (ref 0–130)
AST SERPL-CCNC: 17 U/L (ref 0–33)
BASOPHILS # BLD AUTO: 0.07 10*3/MM3 (ref 0–0.2)
BASOPHILS NFR BLD AUTO: 0.6 % (ref 0–1)
BILIRUB SERPL-MCNC: 0.5 MG/DL (ref 0.3–1.2)
BILIRUB UR QL STRIP: NEGATIVE
BUN BLD-MCNC: 26 MG/DL (ref 9–23)
BUN/CREAT SERPL: 14.4 (ref 7–25)
CALCIUM SPEC-SCNC: 10.8 MG/DL (ref 8.7–10.4)
CHLORIDE SERPL-SCNC: 103 MMOL/L (ref 99–109)
CHOLEST SERPL-MCNC: 166 MG/DL (ref 0–200)
CLARITY UR: CLEAR
CO2 SERPL-SCNC: 29 MMOL/L (ref 20–31)
COLOR UR: YELLOW
CREAT BLD-MCNC: 1.8 MG/DL (ref 0.6–1.3)
DEPRECATED RDW RBC AUTO: 51.6 FL (ref 37–54)
EOSINOPHIL # BLD AUTO: 0.39 10*3/MM3 (ref 0.1–0.3)
EOSINOPHIL NFR BLD AUTO: 3.5 % (ref 0–3)
ERYTHROCYTE [DISTWIDTH] IN BLOOD BY AUTOMATED COUNT: 15.4 % (ref 11.3–14.5)
GFR SERPL CREATININE-BSD FRML MDRD: 37 ML/MIN/1.73
GLOBULIN UR ELPH-MCNC: 3.9 GM/DL
GLUCOSE BLD-MCNC: 98 MG/DL (ref 70–100)
GLUCOSE UR STRIP-MCNC: NEGATIVE MG/DL
HBA1C MFR BLD: 5.3 % (ref 4.8–5.6)
HCT VFR BLD AUTO: 36.6 % (ref 38.9–50.9)
HDLC SERPL-MCNC: 54 MG/DL (ref 40–60)
HGB BLD-MCNC: 11.5 G/DL (ref 13.1–17.5)
HGB UR QL STRIP.AUTO: NEGATIVE
IMM GRANULOCYTES # BLD: 0.03 10*3/MM3 (ref 0–0.03)
IMM GRANULOCYTES NFR BLD: 0.3 % (ref 0–0.6)
INR PPP: 1.11
KETONES UR QL STRIP: NEGATIVE
LEUKOCYTE ESTERASE UR QL STRIP.AUTO: NEGATIVE
LYMPHOCYTES # BLD AUTO: 1.83 10*3/MM3 (ref 0.6–4.8)
LYMPHOCYTES NFR BLD AUTO: 16.2 % (ref 24–44)
MCH RBC QN AUTO: 28.6 PG (ref 27–31)
MCHC RBC AUTO-ENTMCNC: 31.4 G/DL (ref 32–36)
MCV RBC AUTO: 91 FL (ref 80–99)
MONOCYTES # BLD AUTO: 0.98 10*3/MM3 (ref 0–1)
MONOCYTES NFR BLD AUTO: 8.7 % (ref 0–12)
NEUTROPHILS # BLD AUTO: 7.98 10*3/MM3 (ref 1.5–8.3)
NEUTROPHILS NFR BLD AUTO: 70.7 % (ref 41–71)
NITRITE UR QL STRIP: NEGATIVE
PH UR STRIP.AUTO: <=5 [PH] (ref 5–8)
PLATELET # BLD AUTO: 232 10*3/MM3 (ref 150–450)
PMV BLD AUTO: 10.8 FL (ref 6–12)
POTASSIUM BLD-SCNC: 4.1 MMOL/L (ref 3.5–5.5)
PROT SERPL-MCNC: 8.2 G/DL (ref 5.7–8.2)
PROT UR QL STRIP: ABNORMAL
PROTHROMBIN TIME: 12.1 SECONDS (ref 9.6–11.5)
RBC # BLD AUTO: 4.02 10*6/MM3 (ref 4.2–5.76)
SODIUM BLD-SCNC: 140 MMOL/L (ref 132–146)
SP GR UR STRIP: 1.01 (ref 1–1.03)
TRIGL SERPL-MCNC: 121 MG/DL (ref 0–150)
TROPONIN I SERPL-MCNC: 7.4 NG/ML
TROPONIN I SERPL-MCNC: 9.43 NG/ML
TSH SERPL DL<=0.05 MIU/L-ACNC: 1.95 MIU/ML (ref 0.35–5.35)
UROBILINOGEN UR QL STRIP: ABNORMAL
WBC NRBC COR # BLD: 11.28 10*3/MM3 (ref 3.5–10.8)

## 2017-04-18 PROCEDURE — 93458 L HRT ARTERY/VENTRICLE ANGIO: CPT | Performed by: INTERNAL MEDICINE

## 2017-04-18 PROCEDURE — B2211ZZ COMPUTERIZED TOMOGRAPHY (CT SCAN) OF MULTIPLE CORONARY ARTERIES USING LOW OSMOLAR CONTRAST: ICD-10-PCS | Performed by: INTERNAL MEDICINE

## 2017-04-18 PROCEDURE — 93306 TTE W/DOPPLER COMPLETE: CPT | Performed by: INTERNAL MEDICINE

## 2017-04-18 PROCEDURE — C1769 GUIDE WIRE: HCPCS | Performed by: INTERNAL MEDICINE

## 2017-04-18 PROCEDURE — 99233 SBSQ HOSP IP/OBS HIGH 50: CPT | Performed by: INTERNAL MEDICINE

## 2017-04-18 PROCEDURE — 84484 ASSAY OF TROPONIN QUANT: CPT | Performed by: NURSE PRACTITIONER

## 2017-04-18 PROCEDURE — 94799 UNLISTED PULMONARY SVC/PX: CPT

## 2017-04-18 PROCEDURE — 80053 COMPREHEN METABOLIC PANEL: CPT | Performed by: NURSE PRACTITIONER

## 2017-04-18 PROCEDURE — 85730 THROMBOPLASTIN TIME PARTIAL: CPT

## 2017-04-18 PROCEDURE — 85610 PROTHROMBIN TIME: CPT | Performed by: NURSE PRACTITIONER

## 2017-04-18 PROCEDURE — C8929 TTE W OR WO FOL WCON,DOPPLER: HCPCS

## 2017-04-18 PROCEDURE — 94640 AIRWAY INHALATION TREATMENT: CPT

## 2017-04-18 PROCEDURE — 93005 ELECTROCARDIOGRAM TRACING: CPT | Performed by: NURSE PRACTITIONER

## 2017-04-18 PROCEDURE — 93010 ELECTROCARDIOGRAM REPORT: CPT | Performed by: INTERNAL MEDICINE

## 2017-04-18 PROCEDURE — 84443 ASSAY THYROID STIM HORMONE: CPT | Performed by: NURSE PRACTITIONER

## 2017-04-18 PROCEDURE — 0 IOPAMIDOL PER 1 ML: Performed by: INTERNAL MEDICINE

## 2017-04-18 PROCEDURE — 83036 HEMOGLOBIN GLYCOSYLATED A1C: CPT | Performed by: NURSE PRACTITIONER

## 2017-04-18 PROCEDURE — B2151ZZ FLUOROSCOPY OF LEFT HEART USING LOW OSMOLAR CONTRAST: ICD-10-PCS | Performed by: INTERNAL MEDICINE

## 2017-04-18 PROCEDURE — C1894 INTRO/SHEATH, NON-LASER: HCPCS | Performed by: INTERNAL MEDICINE

## 2017-04-18 PROCEDURE — B2111ZZ FLUOROSCOPY OF MULTIPLE CORONARY ARTERIES USING LOW OSMOLAR CONTRAST: ICD-10-PCS | Performed by: INTERNAL MEDICINE

## 2017-04-18 PROCEDURE — 25010000002 HEPARIN (PORCINE) PER 1000 UNITS: Performed by: INTERNAL MEDICINE

## 2017-04-18 PROCEDURE — 80061 LIPID PANEL: CPT | Performed by: NURSE PRACTITIONER

## 2017-04-18 PROCEDURE — 4A023N7 MEASUREMENT OF CARDIAC SAMPLING AND PRESSURE, LEFT HEART, PERCUTANEOUS APPROACH: ICD-10-PCS | Performed by: INTERNAL MEDICINE

## 2017-04-18 PROCEDURE — 25010000002 HEPARIN (PORCINE) PER 1000 UNITS

## 2017-04-18 PROCEDURE — 94760 N-INVAS EAR/PLS OXIMETRY 1: CPT

## 2017-04-18 PROCEDURE — 85025 COMPLETE CBC W/AUTO DIFF WBC: CPT | Performed by: NURSE PRACTITIONER

## 2017-04-18 PROCEDURE — 25010000002 SULFUR HEXAFLUORIDE MICROSPH 60.7-25 MG RECONSTITUTED SUSPENSION: Performed by: INTERNAL MEDICINE

## 2017-04-18 PROCEDURE — 25010000002 FENTANYL CITRATE (PF) 100 MCG/2ML SOLUTION: Performed by: INTERNAL MEDICINE

## 2017-04-18 PROCEDURE — 25010000002 MIDAZOLAM PER 1 MG: Performed by: INTERNAL MEDICINE

## 2017-04-18 PROCEDURE — 99222 1ST HOSP IP/OBS MODERATE 55: CPT | Performed by: INTERNAL MEDICINE

## 2017-04-18 PROCEDURE — 76775 US EXAM ABDO BACK WALL LIM: CPT

## 2017-04-18 RX ORDER — DIPHENHYDRAMINE HYDROCHLORIDE 50 MG/ML
25 INJECTION INTRAMUSCULAR; INTRAVENOUS EVERY 6 HOURS PRN
Status: DISCONTINUED | OUTPATIENT
Start: 2017-04-18 | End: 2017-04-20 | Stop reason: HOSPADM

## 2017-04-18 RX ORDER — ACETAMINOPHEN 325 MG/1
650 TABLET ORAL EVERY 4 HOURS PRN
Status: DISCONTINUED | OUTPATIENT
Start: 2017-04-18 | End: 2017-04-20 | Stop reason: HOSPADM

## 2017-04-18 RX ORDER — HEPARIN SODIUM 1000 [USP'U]/ML
3000 INJECTION, SOLUTION INTRAVENOUS; SUBCUTANEOUS ONCE
Status: COMPLETED | OUTPATIENT
Start: 2017-04-18 | End: 2017-04-18

## 2017-04-18 RX ORDER — MAGNESIUM HYDROXIDE/ALUMINUM HYDROXICE/SIMETHICONE 120; 1200; 1200 MG/30ML; MG/30ML; MG/30ML
30 SUSPENSION ORAL EVERY 6 HOURS PRN
Status: DISCONTINUED | OUTPATIENT
Start: 2017-04-18 | End: 2017-04-20 | Stop reason: HOSPADM

## 2017-04-18 RX ORDER — HEPARIN SODIUM 1000 [USP'U]/ML
1500 INJECTION, SOLUTION INTRAVENOUS; SUBCUTANEOUS ONCE
Status: COMPLETED | OUTPATIENT
Start: 2017-04-18 | End: 2017-04-18

## 2017-04-18 RX ORDER — FENTANYL CITRATE 50 UG/ML
INJECTION, SOLUTION INTRAMUSCULAR; INTRAVENOUS AS NEEDED
Status: DISCONTINUED | OUTPATIENT
Start: 2017-04-18 | End: 2017-04-18 | Stop reason: HOSPADM

## 2017-04-18 RX ORDER — MIDAZOLAM HYDROCHLORIDE 1 MG/ML
INJECTION INTRAMUSCULAR; INTRAVENOUS AS NEEDED
Status: DISCONTINUED | OUTPATIENT
Start: 2017-04-18 | End: 2017-04-18 | Stop reason: HOSPADM

## 2017-04-18 RX ORDER — HYDROCODONE BITARTRATE AND ACETAMINOPHEN 5; 325 MG/1; MG/1
1 TABLET ORAL EVERY 4 HOURS PRN
Status: DISCONTINUED | OUTPATIENT
Start: 2017-04-18 | End: 2017-04-20 | Stop reason: HOSPADM

## 2017-04-18 RX ORDER — LIDOCAINE HYDROCHLORIDE 10 MG/ML
INJECTION, SOLUTION INFILTRATION; PERINEURAL AS NEEDED
Status: DISCONTINUED | OUTPATIENT
Start: 2017-04-18 | End: 2017-04-18 | Stop reason: HOSPADM

## 2017-04-18 RX ADMIN — NISOLDIPINE 8.5 MG: 8.5 TABLET, FILM COATED, EXTENDED RELEASE ORAL at 08:50

## 2017-04-18 RX ADMIN — SULFUR HEXAFLUORIDE 5 ML: KIT at 16:00

## 2017-04-18 RX ADMIN — BUDESONIDE AND FORMOTEROL FUMARATE DIHYDRATE 2 PUFF: 160; 4.5 AEROSOL RESPIRATORY (INHALATION) at 07:54

## 2017-04-18 RX ADMIN — RIVASTIGMINE TRANSDERMAL SYSTEM 1 PATCH: 9.5 PATCH, EXTENDED RELEASE TRANSDERMAL at 08:50

## 2017-04-18 RX ADMIN — FERROUS SULFATE TAB 325 MG (65 MG ELEMENTAL FE) 325 MG: 325 (65 FE) TAB at 08:51

## 2017-04-18 RX ADMIN — PAROXETINE HYDROCHLORIDE 20 MG: 20 TABLET, FILM COATED ORAL at 08:51

## 2017-04-18 RX ADMIN — BUDESONIDE AND FORMOTEROL FUMARATE DIHYDRATE 2 PUFF: 160; 4.5 AEROSOL RESPIRATORY (INHALATION) at 21:26

## 2017-04-18 RX ADMIN — DOXAZOSIN MESYLATE 1 MG: 4 TABLET ORAL at 21:20

## 2017-04-18 RX ADMIN — CETIRIZINE HYDROCHLORIDE 5 MG: 10 TABLET, FILM COATED ORAL at 08:51

## 2017-04-18 RX ADMIN — HEPARIN SODIUM 1500 UNITS: 1000 INJECTION, SOLUTION INTRAVENOUS; SUBCUTANEOUS at 08:51

## 2017-04-18 RX ADMIN — MONTELUKAST SODIUM 10 MG: 10 TABLET, FILM COATED ORAL at 21:21

## 2017-04-18 RX ADMIN — MEMANTINE HYDROCHLORIDE 10 MG: 10 TABLET, FILM COATED ORAL at 08:50

## 2017-04-18 RX ADMIN — HEPARIN SODIUM 3000 UNITS: 1000 INJECTION, SOLUTION INTRAVENOUS; SUBCUTANEOUS at 01:24

## 2017-04-18 RX ADMIN — GABAPENTIN 100 MG: 100 CAPSULE ORAL at 21:20

## 2017-04-18 NOTE — PROGRESS NOTES
Discharge Planning Assessment  [facility]     Patient Name: Magdi Arriaga  MRN: [id number]  Today's Date: 4/18/2017    Admit Date: 4/17/2017          Discharge Needs Assessment       04/18/17 1023    Living Environment    Lives With spouse    Living Arrangements house   2 stairs to enter, second floor stairs, basement stairs    Provides Primary Care For no one    Quality Of Family Relationships supportive;helpful;involved    Able to Return to Prior Living Arrangements yes    Discharge Needs Assessment    Concerns To Be Addressed denies needs/concerns at this time    Readmission Within The Last 30 Days no previous admission in last 30 days    Community Agency Name(S) Pt has used [facility] in the past.  Would like to use them again if HH services recommended.     Anticipated Changes Related to Illness none    Equipment Currently Used at Home cane, straight;shower chair;wheelchair;raised toilet;walker, rolling;respiratory supplies;oxygen   CPAP and Oxygen supplied by [facility]    Equipment Needed After Discharge none    Transportation Available car;family or friend will provide    Discharge Disposition home or self-care    Discharge Contact Information if Applicable Spouse- Linda Arriaga- ([phone]) ([phone])            Discharge Plan       04/18/17 1028    Case Management/Social Work Plan    Plan Home    Patient/Family In Agreement With Plan yes    Additional Comments Spoke with patient and wife at bedside.  Pt resides with spouse in [facility].  Pt is independent with ADL's, however uses a walker or cane as needed.  Pt has a CPAP and Oxygen supplied by [facility] and has used [facility] in the past, not currently.  Pt prefers to use [facility] again if HH services recommended.  Pt and spouse deny any discharge needs at this time, family to transport at discharge.  CM will continue to follow for any discharge needs.         Discharge Placement     No information found         Expected Discharge Date and Time     Expected Discharge Date Expected Discharge Time    Apr 21, 2017               Demographic Summary       04/18/17 1021    Referral Information    Admission Type inpatient    Arrived From admitted as an inpatient    Referral Source admission list    Reason For Consult discharge planning    Record Reviewed history and physical;medical record;patient profile    Contact Information    Permission Granted to Share Information With family/designee    Primary Care Physician Information    Name Rod Colindres            Functional Status       04/18/17 1021    Functional Status Current    Current Functional Level Comment See Nursing assessment    Change in Functional Status Since Onset of Current Illness/Injury yes    Functional Status Prior    Ambulation 1-->assistive equipment   as needed    Transferring 0-->independent    Toileting 0-->independent    Bathing 0-->independent    Dressing 0-->independent    Eating 0-->independent    Communication 0-->understands/communicates without difficulty    Swallowing 0-->swallows foods/liquids without difficulty    IADL    Medications independent    Meal Preparation independent    Housekeeping independent    Laundry independent    Shopping independent    Oral Care independent    Employment/Financial    Employment/Finance Comments Pt confirms he has Medicare A/B and [place] [employer] with prescription coverage            Psychosocial     None            Abuse/Neglect     None            Legal     None            Substance Abuse     None            Patient Forms     None          Kasia Duvall RN

## 2017-04-18 NOTE — PLAN OF CARE
Problem: Patient Care Overview (Adult)  Goal: Plan of Care Review  Outcome: Ongoing (interventions implemented as appropriate)    04/17/17 5981   Coping/Psychosocial Response Interventions   Plan Of Care Reviewed With patient   Patient Care Overview   Progress no change

## 2017-04-18 NOTE — PLAN OF CARE
Problem: Patient Care Overview (Adult)  Goal: Plan of Care Review  Outcome: Ongoing (interventions implemented as appropriate)    04/18/17 7893   Outcome Evaluation   Outcome Summary/Follow up Plan Pt arrived to the floor with a Left radial TR Band in place. Pt is agreeable and pleasant. CABG is planned but not yet scheduled. VSS. NSR. No s/s of discomfort. RA       Goal: Adult Individualization and Mutuality  Outcome: Ongoing (interventions implemented as appropriate)  Goal: Discharge Needs Assessment  Outcome: Ongoing (interventions implemented as appropriate)    Problem: Pain, Acute (Adult)  Goal: Identify Related Risk Factors and Signs and Symptoms  Outcome: Ongoing (interventions implemented as appropriate)  Goal: Acceptable Pain Control/Comfort Level  Outcome: Ongoing (interventions implemented as appropriate)    Problem: Fall Risk (Adult)  Goal: Identify Related Risk Factors and Signs and Symptoms  Outcome: Ongoing (interventions implemented as appropriate)  Goal: Absence of Falls  Outcome: Ongoing (interventions implemented as appropriate)

## 2017-04-18 NOTE — PROGRESS NOTES
[facility] Medicine Services  INPATIENT PROGRESS NOTE    Date of Admission: 4/17/2017  Length of Stay: 1  Primary Care Physician: Rod Colindres MD    Subjective   CC: NSTEMI  HPI:  Denies chest pain or dyspnea. Agreeable to evaluation for CABG and in good spirits.      Review Of Systems:   Review of Systems   Constitutional: Negative.    HENT: Negative.    Eyes: Negative.    Respiratory: Negative.    Cardiovascular: Negative.    Gastrointestinal: Negative.    Endocrine: Negative.    Genitourinary: Negative.    Musculoskeletal: Negative.    Skin: Negative.    Allergic/Immunologic: Negative.    Neurological: Negative.    Hematological: Negative.    Psychiatric/Behavioral: Negative.          Objective      Temp:  [97.5 °F (36.4 °C)-98.5 °F (36.9 °C)] 98 °F (36.7 °C)  Heart Rate:  [57-72] 65  Resp:  [16-18] 16  BP: (110-156)/(56-88) 136/76  Physical Exam    Non toxic, no acute distress  Alert, oriented, sitting up in bed  Speech clear, content appropriate  RRR, no murmur rub or gallop  CTAB  Abdomen soft, non tender, non distended, normal bowel sounds  obese  No lower extremity cyanosis, clubbing or edema  Normal affect  Moves all extremities with ease  No rash UE or LE      Results Review:    I have reviewed the labs, radiology results and diagnostic studies.      Results from last 7 days  Lab Units 04/18/17  0757   WBC 10*3/mm3 11.28*   HEMOGLOBIN g/dL 11.5*   PLATELETS 10*3/mm3 232       Results from last 7 days  Lab Units 04/18/17  0757   SODIUM mmol/L 140   POTASSIUM mmol/L 4.1   CHLORIDE mmol/L 103   TOTAL CO2 mmol/L 29.0   BUN mg/dL 26*   CREATININE mg/dL 1.80*   GLUCOSE mg/dL 98   CALCIUM mg/dL 10.8*       Culture Data: Cultures:         Radiology Data:     I have reviewed the medications.    budesonide-formoterol 2 puff Inhalation BID - RT   cetirizine 5 mg Oral Daily   doxazosin 1 mg Oral Nightly   ferrous sulfate 325 mg Oral Daily With Breakfast   gabapentin 100 mg Oral Nightly    memantine 10 mg Oral Daily   montelukast 10 mg Oral Nightly   nisoldipine 8.5 mg Oral Q24H   PARoxetine 20 mg Oral Daily   pharmacy consult - MTM  Does not apply Daily   rivastigmine 1 patch Transdermal Daily         Assessment/Plan     Problem List  Hospital Problem List     * (Principal)NSTEMI (non-ST elevated myocardial infarction)    Bronchiectasis    Overview Signed 1/6/2017  8:57 AM by Davis BERMAN  CT scan of the chest 12/13/2015 reports mild to moderate bronchiectasis.           Dementia    Diastolic dysfunction    Overview Signed 4/17/2017  7:27 PM by REI Guzman     Mild concentric LVH is observed.Estimated EF is 50-55%.E to A reversal in the mitral valve flow pattern suggestive of diastolic dysfunction.RV is mildly dilated.Moderate aortic cusp sclerosis is present.Mitral annular calcification.Mild MR.Evidence of borderline pulmonary hypertension.         Dyslipidemia    Hypertension    JONNY (obstructive sleep apnea)    Overview Signed 1/6/2017  8:57 AM by Davis BERMAN  Obstructive sleep apnea with central component.B.  On home CPAP therapy.         Acute on chronic congestive heart failure    Acute on chronic renal insufficiency    Left bundle branch block    Normocytic anemia    COPD (chronic obstructive pulmonary disease)               Assessment/Plan:    NSTEMI  Multi-vessel CAD -- CABG evaluation  A/C CHF, mixed  JONNY  Anemia  Asthma  Bronchiectasis  Dementia  HTN  DL  CKDIII  - check UA and consider renal ultrasound in am. BMP am. May benefit from nephrology consultation prior to bypass grafting.  H/o DVT  H/o of RP hemorrhage on xarelto            DVT prophylaxis:  Discharge Planning: I expect patient to be discharged to home in ? days.    Charanjit Yepez MD   04/18/17   7:44 PM    Please note that portions of this note may have been completed with a voice recognition program. Efforts were made to edit the dictations, but occasionally words are mistranscribed.

## 2017-04-18 NOTE — CONSULTS
[facility] Cardiology Services  CONSULTATION NOTE      Date of Hospital Visit: 2017  Hospital Day:  LOS: 1 day     IDENTIFICATION:   Patient Name: Magdi Arriaga    :1945    Primary Care Provider: Rod Colindres MD     Treatment Team:   Attending Provider: Charanjit Yepez MD  Consulting Physician: Kvng Stauffer MD  Nurse Practitioner: REI Kuhn  Admitting Provider: Charanjit Yepez MD    Referral Provider: Dr. Dea Flores    REASON FOR CONSULTATION: NSTEMI    Patient Active Problem List   Diagnosis   • Dementia   • Diastolic dysfunction   • Dyslipidemia   • Hypertension   • Obstructive sleep apnea   • Non-ST elevated myocardial infarction   • Acute on chronic congestive heart failure   • Acute on chronic renal insufficiency   • Left bundle branch block   • Chronic obstructive pulmonary disease     Past Medical History:   Diagnosis Date    Depression     Vitamin D Deficiency     Osteoarthritis     Obesity    • Arthritis    • Asthma    • DVT (deep venous thrombosis) - bilateral    • Falls     A.  History of falling traumatic injuries in ; resulting in left rib  Fracture;left clavicle fracture.   • CXR 2016    Extensive, but stable postinflammatory pulmonary changes. No changes since previous exam 2015   • Echocardiogram 2016    Mild concentric LVH. LVEF 50-55%.E to A reversal in mitral valve flow pattern suggestive of diastolic dysfunction.RV is mildly dilated.Moderate aortic cusp sclerosis present.Mitral annular calcification. Mild MR.Evidence of borderline pulmonary hypertension.   • History of PFT's 2015    Values slightly worse.TLC is c/w mild restriction.Values reduced from 13.Diffusion capacity slightly worse   • History of transfusion    • Patellar tendon rupture     A.  S/P primary repair of left periprosthetic patellar tendon tear 2014, post fall at end of .   • Pneumonia      A.  LLL PNA treated at [facility],  5/8/2014 - 5/20/2014.   • Pneumothorax      A.  S/P chest tube placed 5/13/2014; D.C'd 5/15/2014 with stable chest x-ray.   • Rotator cuff tear - Left      s/p fall prior to admission at end of 04/2014.   • Traumatic hemorrhagic shock      A.  Secondary to right renal retroperitoneal hemorrhage, 9/2014.     Past Surgical History:   Procedure Laterality Date   • APPENDECTOMY     • BACK SURGERY     • FRACTURE SURGERY     • KNEE SURGERY      A.  S/P repair of left periprosthetic patellar tendon tear 6/11/2014, post fall at end of April 2014   • REPLACEMENT TOTAL KNEE      A.  Left TKR 2009; \Right TKR 2013.  Prior right knee tendon repair 2002.   • SKIN BIOPSY     • TONSILLECTOMY     • INFERIOR VENA CAVA FILTER PLACEMENT       Allergies as of 04/17/2017 - [place] as Reviewed 04/17/2017   Allergen Reaction Noted   • Sulfa antibiotics  01/06/2017     MEDICATIONS:  budesonide-formoterol 2 puff Inhalation BID - RT   cetirizine 5 mg Oral Daily   doxazosin 1 mg Oral Nightly   ferrous sulfate 325 mg Oral Daily With Breakfast   gabapentin 100 mg Oral Nightly   memantine 10 mg Oral Daily   montelukast 10 mg Oral Nightly   nisoldipine 8.5 mg Oral Q24H   paroxetine 20 mg Oral Daily   rivastigmine 1 patch Transdermal Daily     HISTORY OF PRESENT ILLNESS: Patient is a 71 year old, hypertensive, hyperlipidemic  male with no PMH of coronary artery Disease with new onset shortness of breath starting last Friday. Though patient has a history of COPD, JONNY, (on CPAP), the shortness of breath was debilitating to the point he could only walk no  More than 10 feet without having to stop and rest. Patient also had upper chest tightness and left shoulder pain.  Patient had only minimal relief with an albuterol inhaler. Yesterday patient contacted his pulmonologist who was  Unable to see him but referred him to his PCP who was also unable to see him, therefore he was referred to a   [facility]. [facility] chest x-ray revealed CHF and an EKG revealed a  "new left bundle branch block. Patient therefore  Was referred to [facility] ED for further evaluation yesterday afternoon. BNP was elevated at 461. Serial  troponins have continued to rise consistent with NSTEMI. Patient was admitted by the Hospitalist and Dr. Dea Flores consulted cardiology for further evaluation and medical management. Patient has multiple risk factors for  CAD.    CARDIAC RISK FACTORS:  advanced age, dyslipidemia, hypertension, male gender, obesity, smoking/ tobacco exposure.     Social History   • Marital status:  - Lives with wife in [place]   • Number of children:    • Years of education:      Occupational History   •      Social History Main Topics   • Smoking status: Former Smoker     Years: 15.00     Types: Cigarettes      Comment: quit 20 years ago   • Alcohol use No   • Drug use: No     Family History   Problem Relation Age of Onset   • Adopted: Yes     REVIEW OF SYSTEMS:   CONST:  Obesity.  No recent weight loss, fever, chills, weakness, fatigue.   HEENT:  No visual loss, blurred vision, double vision, yellow sclerae.                   No hearing loss, congestion, sore throat.   SKIN:      No rashes, urticaria, ulcers, sores.     RESP:     No shortness of breath, hemoptysis, cough, sputum.   GI:           No anorexia, nausea, vomiting, diarrhea. No abdominal pain, melena.   :         No burning on urination, hematuria or increased frequency.  ENDO:    No diaphoresis, cold or heat intolerance. No polyuria, polydipsia.   NEURO: No headache, dizziness, syncope, paralysis, ataxia, parasthesias.                  No change in bowel or bladder control. No history of CVA/TIA  MUSC:    No muscle, back pain, joint pain or stiffness.   HEME:    No anemia, bleeding, bruising. History of DVT with IVC filter.  PSYCH:  History of depression. No anxiety.  I have  PHYSICAL EXAM:  Flowsheet Rows         First Filed Value    Admission Height  68\" (172.7 cm) Documented at 04/17/2017 1609    " Admission Weight  212 lb (96.2 kg) Documented at 04/17/2017 1609        BMI: 32.83 kg/(m^2).       Vital Sign Min/Max for last 24 hours  Temp  Min: 97.5 °F  Max: 98.5 °F    BP  Min: 110/57  Max: 168/77   Pulse  Min: 57  Max: 72   Resp  Min: 16  Max: 18   SpO2  Min: 90 %  Max: 97 %   Flow (L/min)  Min: 2  Max: 2    Intake/Output Summary (Last 24 hours) at 04/18/17 1006  Last data filed at 04/18/17 0556   Gross per 24 hour   Intake                0 ml   Output             2425 ml   Net            -2425 ml           Gen:   No acute distress well-developed well-nourished    Heent:  Oropharynx moist no lesion    Neck: No JVD no thyromegaly 2+ carotids without bruit    Resp:   Bibasal rales     Heart:  Normal S1-S2 no murmurs gallops    Abd:   Positive bowel sounds soft nondistended nontender no masses    Ext: No edema.  1+ pulses    Neuro: Alert and oriented.  Nonfocal.     Skin: Warm and dry no rashes    LAB DATA:   Lab Results (last 24 hours)     POC Troponin, Rapid [[id number]]  (Abnormal)     Troponin I 4.66 (C) ng/mL    BNP [[id number]]  (Abnormal)     .0 (H) pg/mL    POC Troponin, Rapid [[id number]]  (Abnormal)     Troponin I 4.69 (C) ng/mL     Troponin I 7.403 (C) ng/mL    CBC Auto Differential [[id number]]  (Abnormal)     WBC 11.28 (H) 10*3/mm3     RBC 4.02 (L) 10*6/mm3     Hemoglobin 11.5 (L) g/dL     Hematocrit 36.6 (L) %     MCV 91.0 fL     MCH 28.6 pg     MCHC 31.4 (L) g/dL     RDW 15.4 (H) %     RDW-SD 51.6 fl     MPV 10.8 fL     Platelets 232 10*3/mm3    Hemoglobin A1c [[id number]]    Protime-INR [[id number]]  (Abnormal)     Protime 12.1 (H) Seconds     INR    Comprehensive Metabolic Panel [[id number]]  (Abnormal)     Glucose 98 mg/dL     BUN 26 (H) mg/dL     Creatinine 1.80 (H) mg/dL     Sodium 140 mmol/L     Potassium 4.1 mmol/L     Chloride 103 mmol/L     CO2 29.0 mmol/L     Calcium 10.8 (H) mg/dL     Total Protein 8.2 g/dL     Albumin 4.30 g/dL     ALT  15 U/L     AST  17 U/L     ALP 84 U/L     Total Bilirubin  0.5 mg/dL     eGFR Non AA 37 (L) mL/min/1.73     Globulin 3.9 gm/dL     A/G Ratio 1.1 (L) g/dL     BUN/CR Ratio     Anion Gap 8.0 mmol/L    Lipid Panel [[id number]]  (Normal)      mg/dL      mg/dL     HDL  54 mg/dL     LDL  87 mg/dL    TSH [[id number]]  (Normal)     TSH 1.945 mIU/mL    Troponin [[id number]]  (Abnormal)     Troponin I 9.425 (C) ng/mL     DIAGNOSTIC DATA:  EK bpm, NSR. LBBB    IMAGING:  Imaging Results (last 24 hours)     Procedure Component Value Units Date/Time    XR Chest 1 View [[id number]] Collected:  17     Updated:  17    Diffuse increased markings seen throughout the lung fields bilaterally suggesting either diffuse edema or pneumonia. Clinical correlation is needed. The heart is enlarged.       I personally viewed and interpreted patient's EKG, Diagnostics, Labs, and Imaging data.    Assessment:  1. NSTEMI (4.66, 7.4, 9.4)  2. LBBB. (new)  3. A/C CHF.  - CXR + for infiltrate. .  - Given IV Lasix 40 mg x 1.   2. HTN  3. CKD Stage III (Cr. 1.8)  4. COPD  -   Duonebs. O2 Tx.  5. HLP  - Currently on Simvastatin 40 mg nightly  6. Remote h/o DVT   - H/O Xarelto (but d/c due to right renal retroperitoneal hemorrhage)  - s/p IVC filter  7. JONNY  - On CPAP at night.    Plan:  1. Left Heart Catheterization +/- CBI with Dr. Kvng Stauffer.      Patient was informed of this procedure, the risks, benefits, and alternatives and agrees to proceed.      Further recommendations will be based on outcomes of this procedure.    Scribed by Evita Valdivia PA-C for Kvng Harper.  2017     Patient with non-STEMI and new heart failure.  Is doing better with diuresis.  Catheterization plus or minus PCI today.  Continue IV diuresis.  Add beta blocker    Kvng VALDIVIA MD, personally performed the services described in this documentation as scribed by the above individual in my presence, and it is both accurate and complete

## 2017-04-19 ENCOUNTER — APPOINTMENT (OUTPATIENT)
Dept: CARDIOLOGY | Facility: HOSPITAL | Age: 72
End: 2017-04-19

## 2017-04-19 ENCOUNTER — APPOINTMENT (OUTPATIENT)
Dept: CT IMAGING | Facility: HOSPITAL | Age: 72
End: 2017-04-19

## 2017-04-19 LAB
ANION GAP SERPL CALCULATED.3IONS-SCNC: 8 MMOL/L (ref 3–11)
ARTICHOKE IGE QN: 83 MG/DL (ref 0–130)
BUN BLD-MCNC: 26 MG/DL (ref 9–23)
BUN/CREAT SERPL: 14.4 (ref 7–25)
CALCIUM SPEC-SCNC: 10.1 MG/DL (ref 8.7–10.4)
CHLORIDE SERPL-SCNC: 104 MMOL/L (ref 99–109)
CHOLEST SERPL-MCNC: 144 MG/DL (ref 0–200)
CO2 SERPL-SCNC: 28 MMOL/L (ref 20–31)
CREAT BLD-MCNC: 1.8 MG/DL (ref 0.6–1.3)
DEPRECATED RDW RBC AUTO: 52.2 FL (ref 37–54)
ERYTHROCYTE [DISTWIDTH] IN BLOOD BY AUTOMATED COUNT: 15.5 % (ref 11.3–14.5)
GFR SERPL CREATININE-BSD FRML MDRD: 37 ML/MIN/1.73
GLUCOSE BLD-MCNC: 87 MG/DL (ref 70–100)
HCT VFR BLD AUTO: 34.2 % (ref 38.9–50.9)
HDLC SERPL-MCNC: 50 MG/DL (ref 40–60)
HGB BLD-MCNC: 10.4 G/DL (ref 13.1–17.5)
MCH RBC QN AUTO: 27.8 PG (ref 27–31)
MCHC RBC AUTO-ENTMCNC: 30.4 G/DL (ref 32–36)
MCV RBC AUTO: 91.4 FL (ref 80–99)
PA ADP PRP-ACNC: 249 PRU
PLATELET # BLD AUTO: 224 10*3/MM3 (ref 150–450)
PMV BLD AUTO: 11.2 FL (ref 6–12)
POTASSIUM BLD-SCNC: 3.9 MMOL/L (ref 3.5–5.5)
RBC # BLD AUTO: 3.74 10*6/MM3 (ref 4.2–5.76)
SODIUM BLD-SCNC: 140 MMOL/L (ref 132–146)
TRIGL SERPL-MCNC: 99 MG/DL (ref 0–150)
WBC NRBC COR # BLD: 9.62 10*3/MM3 (ref 3.5–10.8)

## 2017-04-19 PROCEDURE — 85027 COMPLETE CBC AUTOMATED: CPT | Performed by: INTERNAL MEDICINE

## 2017-04-19 PROCEDURE — 94640 AIRWAY INHALATION TREATMENT: CPT

## 2017-04-19 PROCEDURE — 80048 BASIC METABOLIC PNL TOTAL CA: CPT | Performed by: INTERNAL MEDICINE

## 2017-04-19 PROCEDURE — 99223 1ST HOSP IP/OBS HIGH 75: CPT | Performed by: THORACIC SURGERY (CARDIOTHORACIC VASCULAR SURGERY)

## 2017-04-19 PROCEDURE — 85576 BLOOD PLATELET AGGREGATION: CPT | Performed by: INTERNAL MEDICINE

## 2017-04-19 PROCEDURE — 80061 LIPID PANEL: CPT | Performed by: INTERNAL MEDICINE

## 2017-04-19 PROCEDURE — 70450 CT HEAD/BRAIN W/O DYE: CPT

## 2017-04-19 PROCEDURE — 93880 EXTRACRANIAL BILAT STUDY: CPT

## 2017-04-19 PROCEDURE — 93880 EXTRACRANIAL BILAT STUDY: CPT | Performed by: INTERNAL MEDICINE

## 2017-04-19 PROCEDURE — 99232 SBSQ HOSP IP/OBS MODERATE 35: CPT | Performed by: INTERNAL MEDICINE

## 2017-04-19 RX ORDER — CARVEDILOL 3.12 MG/1
3.12 TABLET ORAL EVERY 12 HOURS SCHEDULED
Status: DISCONTINUED | OUTPATIENT
Start: 2017-04-19 | End: 2017-04-20 | Stop reason: HOSPADM

## 2017-04-19 RX ORDER — ATORVASTATIN CALCIUM 40 MG/1
40 TABLET, FILM COATED ORAL NIGHTLY
Status: DISCONTINUED | OUTPATIENT
Start: 2017-04-19 | End: 2017-04-20 | Stop reason: HOSPADM

## 2017-04-19 RX ORDER — ASPIRIN 81 MG/1
81 TABLET, CHEWABLE ORAL DAILY
Status: DISCONTINUED | OUTPATIENT
Start: 2017-04-19 | End: 2017-04-20 | Stop reason: HOSPADM

## 2017-04-19 RX ADMIN — BUDESONIDE AND FORMOTEROL FUMARATE DIHYDRATE 2 PUFF: 160; 4.5 AEROSOL RESPIRATORY (INHALATION) at 20:39

## 2017-04-19 RX ADMIN — ATORVASTATIN CALCIUM 40 MG: 40 TABLET, FILM COATED ORAL at 21:24

## 2017-04-19 RX ADMIN — RIVASTIGMINE TRANSDERMAL SYSTEM 1 PATCH: 9.5 PATCH, EXTENDED RELEASE TRANSDERMAL at 08:53

## 2017-04-19 RX ADMIN — CARVEDILOL 3.12 MG: 3.12 TABLET, FILM COATED ORAL at 08:48

## 2017-04-19 RX ADMIN — CARVEDILOL 3.12 MG: 3.12 TABLET, FILM COATED ORAL at 21:23

## 2017-04-19 RX ADMIN — CETIRIZINE HYDROCHLORIDE 5 MG: 10 TABLET, FILM COATED ORAL at 08:48

## 2017-04-19 RX ADMIN — FERROUS SULFATE TAB 325 MG (65 MG ELEMENTAL FE) 325 MG: 325 (65 FE) TAB at 08:49

## 2017-04-19 RX ADMIN — MONTELUKAST SODIUM 10 MG: 10 TABLET, FILM COATED ORAL at 21:25

## 2017-04-19 RX ADMIN — MEMANTINE HYDROCHLORIDE 10 MG: 10 TABLET, FILM COATED ORAL at 08:48

## 2017-04-19 RX ADMIN — GABAPENTIN 100 MG: 100 CAPSULE ORAL at 21:25

## 2017-04-19 RX ADMIN — PAROXETINE HYDROCHLORIDE 20 MG: 20 TABLET, FILM COATED ORAL at 08:48

## 2017-04-19 RX ADMIN — NISOLDIPINE 8.5 MG: 8.5 TABLET, FILM COATED, EXTENDED RELEASE ORAL at 08:49

## 2017-04-19 RX ADMIN — ASPIRIN 81 MG: 81 TABLET, CHEWABLE ORAL at 08:48

## 2017-04-19 RX ADMIN — DOXAZOSIN MESYLATE 1 MG: 4 TABLET ORAL at 21:24

## 2017-04-19 NOTE — PROGRESS NOTES
[facility] Medicine Services  INPATIENT PROGRESS NOTE    Date of Admission: 4/17/2017  Length of Stay: 2  Primary Care Physician: Rod Colindres MD    Subjective   CC: NSTEMI  HPI:    No further CP or Dyspnea. Strength at baseline. Awaits final preop evaluations, then likely home with return next week for surgery.    Review Of Systems:   Review of Systems   Constitutional: Negative.    HENT: Negative.    Eyes: Negative.    Respiratory: Negative.    Cardiovascular: Negative.    Gastrointestinal: Negative.    Endocrine: Negative.    Genitourinary: Negative.    Musculoskeletal: Negative.    Skin: Negative.    Allergic/Immunologic: Negative.    Neurological: Negative.    Hematological: Negative.    Psychiatric/Behavioral: Negative.          Objective      Temp:  [96.9 °F (36.1 °C)-98.2 °F (36.8 °C)] 97.9 °F (36.6 °C)  Heart Rate:  [60-65] 62  Resp:  [16-20] 16  BP: (116-145)/(65-79) 145/75  Physical Exam    Non toxic, no acute distress  Alert, oriented, sitting up in chair  Speech clear, content appropriate  RRR, no murmur rub or gallop  CTAB  Abdomen soft, non tender, non distended, normal bowel sounds  obese  No lower extremity cyanosis, clubbing or edema  Normal affect  Moves all extremities with ease  No rash UE or LE      Results Review:    I have reviewed the labs, radiology results and diagnostic studies.      Results from last 7 days  Lab Units 04/19/17  0511   WBC 10*3/mm3 9.62   HEMOGLOBIN g/dL 10.4*   PLATELETS 10*3/mm3 224       Results from last 7 days  Lab Units 04/19/17  0511   SODIUM mmol/L 140   POTASSIUM mmol/L 3.9   CHLORIDE mmol/L 104   TOTAL CO2 mmol/L 28.0   BUN mg/dL 26*   CREATININE mg/dL 1.80*   GLUCOSE mg/dL 87   CALCIUM mg/dL 10.1       Culture Data: Cultures:       Radiology Data:     I have reviewed the medications.    aspirin 81 mg Oral Daily   atorvastatin 40 mg Oral Nightly   budesonide-formoterol 2 puff Inhalation BID - RT   carvedilol 3.125 mg Oral Q12H    cetirizine 5 mg Oral Daily   doxazosin 1 mg Oral Nightly   ferrous sulfate 325 mg Oral Daily With Breakfast   gabapentin 100 mg Oral Nightly   memantine 10 mg Oral Daily   montelukast 10 mg Oral Nightly   nisoldipine 8.5 mg Oral Q24H   PARoxetine 20 mg Oral Daily   pharmacy consult - MTM  Does not apply Daily   rivastigmine 1 patch Transdermal Daily         Assessment/Plan     Problem List  Hospital Problem List     * (Principal)NSTEMI (non-ST elevated myocardial infarction)    Bronchiectasis    Overview Signed 1/6/2017  8:57 AM by Davis BERMAN  CT scan of the chest 12/13/2015 reports mild to moderate bronchiectasis.           Dementia    Diastolic dysfunction    Overview Signed 4/17/2017  7:27 PM by REI Guzman     Mild concentric LVH is observed.Estimated EF is 50-55%.E to A reversal in the mitral valve flow pattern suggestive of diastolic dysfunction.RV is mildly dilated.Moderate aortic cusp sclerosis is present.Mitral annular calcification.Mild MR.Evidence of borderline pulmonary hypertension.         Dyslipidemia    Hypertension    JONNY (obstructive sleep apnea)    Overview Signed 1/6/2017  8:57 AM by Davis BERMAN  Obstructive sleep apnea with central component.B.  On home CPAP therapy.         Acute on chronic congestive heart failure    Acute on chronic renal insufficiency    Left bundle branch block    Normocytic anemia    COPD (chronic obstructive pulmonary disease)               Assessment/Plan:    NSTEMI  Multi-vessel CAD -- CABG evaluation continues  A/C CHF, mixed  JONNY  Anemia  Asthma  Bronchiectasis  Dementia  HTN  DL  CKDIII  H/o DVT  H/o of RP hemorrhage on xarelto    Preop evaluations from Nephrology and Pulmonology requested - once complete, anticipate DC home with scheduled return next week for CABG      DVT prophylaxis:  Discharge Planning: I expect patient to be discharged to home in 1-2 days.    Charanjit Yepez MD   04/19/17   7:34 PM    Please note that portions  of this note may have been completed with a voice recognition program. Efforts were made to edit the dictations, but occasionally words are mistranscribed.

## 2017-04-19 NOTE — CONSULTS
CTS Consult    Patient Care Team:  Rod Colindres MD as PCP - General  Rod Colindres MD as PCP - Family Medicine      Reason for Consult:  Evaluate for coronary bypass surgery    HPI  Patient is a 71 y.o. male presents to an urgent treatment center with significantly worsening shortness of breath.  The patient is already on home oxygen at night and uses a CPAP machine.  He has significant underlying pulmonary disease and his current patient of pulmonary [facility].  At the urgent treatment center he was noted to have cardiomegaly with probable congestive heart failure on his chest x-ray ultimately he was transferred here.  Cardiac catheterization demonstrated multivessel coronary disease I was asked to see and evaluate for coronary surgery.  The time of my arrival today patient is pain-free and he is accompanied by his wife in the hospital room to answer questions    Review of Systems    Ocular: Denies blurred vision denies retinal disease denies glaucoma  Nose and throat: Denies thyroid disease denies known tracheal malignancy history of tonsillectomy  Pulmonary: denies tuberculosis history denies recent bronchitis positive history of chronic obstructive pulmonary disease  Endocrine: Denies thyroid disease, does have a history of hyperlipidemia  Gastrointestinal: Denies chronic constipation denies chronic diarrhea denies a history of                           ulcer surgery  Neurologic: Denies a history of strokes denies a history of seizures there are reports of baseline dementia  Integument: denies a history of psoriasis or malignant melanoma  Psychiatric: Denies a history of psychosis denies a diagnosis of neurolysis  Cardiac: Denies a history of rheumatic fever or palpitations  Urologic: Denies a history of nephrolithiasis or hematuria history of stage III renal dysfunction  Hematologic: Denies known blood dyscrasias also denies any hemophilia positive history of deep venous thrombosis and in a  vena cava filter  Immunologic: Denies any known immunologic deficiency denies recent steroid usage        History  Past Medical History:   Diagnosis Date   • Arthritis    • Asthma    • CHF (congestive heart failure)    • COPD (chronic obstructive pulmonary disease)    • DVT (deep venous thrombosis)     Bilateral   • Falls     A.  History of falling traumatic injuries in April 2014 and May 2014 resulting and left rib  fracture and left clavicle fracture.   • Fracture of rib    • History of chest x-ray 02/19/2016    Extensive, but stable postinflammatory pulmonary changes. There has been no change since the previous examination of 12/03/2015   • History of chest x-ray 12/03/2015    Slight increased markings bilaterally, may be progression of his bronchiectasis   • History of chest x-ray 09/06/2014    There has been no change since 09/05/2014   • History of echocardiogram 02/19/2016    Mild concentric LVH is observed.Estimated EF is 50-55%.E to A reversal in the mitral valve flow pattern suggestive of diastolic dysfunction.RV is mildly dilated.Moderate aortic cusp sclerosis is present.Mitral annular calcification.Mild MR.Evidence of borderline pulmonary hypertension.   • History of PFTs 12/03/2015    Values are slightly worse.TLC is c/w mild restriction.Values reduced from 02/16/13.Diffusion capacity is slightly worse   • History of PFTs 02/06/2013    Mild decrease in FVC, may be due to less maximal effort.TLC is within normal levels. Mild reduction in diffusion in absolute value   • History of transfusion    • Hypertension    • Melena    • Patellar tendon rupture     A.  Status post primary repair of the left periprosthetic patellar tendon tear 6/11/2014, post fall at the end of April 2014.   • Pneumonia      A.  Left lower lobe pneumonia treated at [facility], 5/8/2014 through 5/20/2014.   • Pneumothorax      A.  Status post chest tube placed 5/13/2014 and discontinued 5/15/2014 with stable chest x-ray.   • Rotator cuff  tear     A.  Left rotator cuff tear status post fall prior to admission at the end of April 2014.   • Traumatic hemorrhagic shock      A.  Secondary to right renal retroperitoneal hemorrhage, 9/2014.     Past Surgical History:   Procedure Laterality Date   • APPENDECTOMY     • BACK SURGERY     • FRACTURE SURGERY     • JOINT REPLACEMENT     • KNEE SURGERY      A.  Status post primary repair of the left periprosthetic patellar tendon tear 6/11/2014, post fall at the end of April 2014   • REPLACEMENT TOTAL KNEE      A.  Left knee replacement in 2009 and right knee replacement in 2013.B.  History of prior right knee tendon repair in 2002.   • SKIN BIOPSY     • TONSILLECTOMY     • VENA CAVA FILTER PLACEMENT       Family History   Problem Relation Age of Onset   • Adopted: Yes     Social History   Substance Use Topics   • Smoking status: Former Smoker     Years: 15.00     Types: Cigarettes   • Smokeless tobacco: Never Used      Comment: quit 20 years ago   • Alcohol use No     Prescriptions Prior to Admission   Medication Sig Dispense Refill Last Dose   • cholecalciferol (VITAMIN D3) 1000 UNITS tablet Take 2,000 Units by mouth Daily.      • Omega-3 Fatty Acids (FISH OIL) 1200 MG capsule capsule Take 1,200 mg by mouth Daily.      • budesonide-formoterol (SYMBICORT) 160-4.5 MCG/ACT inhaler Inhale 2 puffs 2 (Two) Times a Day. 1 inhaler 11 Taking   • doxazosin (CARDURA) 1 MG tablet Take 1 mg by mouth Every Night.      • gabapentin (NEURONTIN) 100 MG capsule Take 1 capsule by mouth 2 (Two) Times a Day.   Taking   • lisinopril (PRINIVIL,ZESTRIL) 10 MG tablet Take 1 tablet by mouth Daily.   Taking   • loratadine (CLARITIN) 10 MG tablet Take 10 mg by mouth Daily.   Taking   • memantine (NAMENDA) 10 MG tablet Take 1 tablet by mouth 2 (Two) Times a Day.   Taking   • montelukast (SINGULAIR) 10 MG tablet Take 1 tablet by mouth Every Night. 30 tablet 11 Taking   • nisoldipine (SULAR) 8.5 MG 24 hr tablet Take 1 tablet by mouth Daily.    Taking   • PARoxetine (PAXIL) 20 MG tablet Take 1 tablet by mouth Daily.   Taking   • PROAIR  (90 BASE) MCG/ACT inhaler INHALE TWO PUFFS BY MOUTH EVERY 4 TO 6 HOURS AS NEEDED 1 inhaler 11 Taking   • rivastigmine (EXELON) 9.5 MG/24HR patch Place 1 patch on the skin Daily.   Taking   • simvastatin (ZOCOR) 40 MG tablet Take 1 tablet by mouth Daily.   Taking     Allergies:  Sulfa antibiotics    Objective    Vital Signs  Temp:  [96.9 °F (36.1 °C)-98.2 °F (36.8 °C)] 96.9 °F (36.1 °C)  Heart Rate:  [57-69] 60  Resp:  [16-20] 20  BP: (112-156)/(56-88) 144/74    Physical Exam:CONSTITUTIONAL: Alert and conversant, in his hospital bed No acute distress  EYES: Sclera clean, Anicteric, Pupils equal  ENT: No nasal deviation, Trachea midline  NECK: No neck masses, Supple  LUNGS: No wheezing, Cough, non-congested  HEART: No rubs, No murmurs  ABDOMEN: Soft, non-distended, No masses, Non tender  to palpation  NEURO: No motor deficits, No sensory deficits, Cranial Nerves 2 through 12 grossly intact  PSYCHIATRIC: Oriented to person, place and time, No memory deficits, Mood appropriate  VASCULAR: No carotid bruits, 1+ edema of the lower extremities, Femoral pulses palpable and symmetric            Data Review:    Results from last 7 days  Lab Units 04/19/17  0511   WBC 10*3/mm3 9.62   HEMOGLOBIN g/dL 10.4*   HEMATOCRIT % 34.2*   PLATELETS 10*3/mm3 224       Results from last 7 days  Lab Units 04/19/17  0511   SODIUM mmol/L 140   POTASSIUM mmol/L 3.9   CHLORIDE mmol/L 104   TOTAL CO2 mmol/L 28.0   BUN mg/dL 26*   CREATININE mg/dL 1.80*   GLUCOSE mg/dL 87   CALCIUM mg/dL 10.1     Coagulation:   Lab Results   Component Value Date    INR 1.11 04/18/2017     Cardiac markers:     ABGs:       Invalid input(s): PO2, PCO2        Radiology:      Imaging Results (last 72 hours)     Procedure Component Value Units Date/Time    XR Chest 1 View [[id number]] Collected:  04/18/17 0945     Updated:  04/18/17 0945    Narrative:       EXAMINATION: XR  CHEST 1 VW- 04/17/2017     INDICATION: SOA triage protocol      COMPARISON: 03/06/2017     FINDINGS: Portable chest reveals heart to be enlarged. Increased  markings seen diffusely throughout the lung fields bilaterally  suggesting either edema or diffuse pneumonia. Clinical correlation is  needed. Degenerative changes seen within the spine. No pleural effusion  or pneumothorax.       Impression:       Diffuse increased markings seen throughout the lung fields  bilaterally suggesting either diffuse edema or pneumonia. Clinical  correlation is needed. The heart is enlarged.     D:  04/17/2017  E:  04/18/2017           Renal Limited [[id number]] Updated:  04/18/17 2232            Assessment:      Principal Problem:    NSTEMI (non-ST elevated myocardial infarction)  Active Problems:    Bronchiectasis    Dementia    Diastolic dysfunction    Dyslipidemia    Hypertension    JONNY (obstructive sleep apnea)    Acute on chronic congestive heart failure    Acute on chronic renal insufficiency    Left bundle branch block    Normocytic anemia    COPD (chronic obstructive pulmonary disease)    This patient is at at least moderate risk or more for coronary surgery.  He has a number of underlying issues including some baseline dementia chronic renal insufficiency congestive heart failure decreased ejection fraction chronic obstructive pulmonary disease with requirement for home oxygen at night.  He states that he can walk 1 flight of stairs but has to stop and rest at the end.  He cannot walk the length of an IL at [facility] without becoming severely winded.  At this point my plan is to try to optimize his status from a pulmonary standpoint and obtain repeat pulmonary function testing and carotid duplex scan today.  We will also obtain a CT scan of the brain secondary to his underlying dementia.  He could potentially be discharged home Thursday or Friday of this week if the other physicians agree and we will try to schedule him for  surgery next week.  I've discussed this plan with Dr. Stauffer and he is in agreement.  We will have pulmonary associates see him on this admission.    Plan:  CT scan of the brain today without contrast.  Carotid duplex scan today.    Please note that portions of this note were completed with a voice recognition program. Efforts were made to edit the dictations, but occasionally words are mistranscribed.    Kanu Berger MD  04/19/17  7:50 AM

## 2017-04-19 NOTE — PROGRESS NOTES
"  [facility] at [facility]   Inpatient Progress Note       LOS: 2 days   Patient Care Team:  Rod Colindres MD as PCP - General  Rod Colindres MD as PCP - Family Medicine    Chief Complaint:  Follow-up for NSTEMI    Subjective     Interval History:   Patient denies any chest pain or dyspnea. States surgeons said his surgery will be early next week.  No further chest pain or cardiovascular symptoms    Review of Systems:   Pertinent positives noted in history, exam, and assessment. Otherwise reviewed and negative.      Objective     Vitals:  Blood pressure 144/74, pulse 60, temperature 96.9 °F (36.1 °C), temperature source Temporal Artery , resp. rate 20, height 66\" (167.6 cm), weight 201 lb 1.6 oz (91.2 kg), SpO2 90 %.   No intake or output data in the 24 hours ending 04/19/17 0751     Physical Exam   Constitutional: He is oriented to person, place, and time. He appears well-developed and well-nourished. No distress.   HENT:   Head: Normocephalic and atraumatic.   Neck: No JVD present. No tracheal deviation present.   Cardiovascular: Normal rate, regular rhythm, normal heart sounds and intact distal pulses.  Exam reveals no friction rub.    No murmur heard.  Pulmonary/Chest: Effort normal and breath sounds normal. No respiratory distress.   Abdominal: Soft. Bowel sounds are normal. There is no tenderness.   Musculoskeletal: He exhibits no edema or deformity.   Neurological: He is alert and oriented to person, place, and time.   Skin: Skin is warm and dry.   Psychiatric: His behavior is normal. Thought content normal.    I have examined the patient and agree with the above findings   Results Review:     I reviewed the patient's new clinical results.      Results from last 7 days  Lab Units 04/19/17  0511   WBC 10*3/mm3 9.62   HEMOGLOBIN g/dL 10.4*   HEMATOCRIT % 34.2*   PLATELETS 10*3/mm3 224       Results from last 7 days  Lab Units 04/19/17  0511 04/18/17  0757   SODIUM mmol/L 140 140   POTASSIUM " "mmol/L 3.9 4.1   CHLORIDE mmol/L 104 103   TOTAL CO2 mmol/L 28.0 29.0   BUN mg/dL 26* 26*   CREATININE mg/dL 1.80* 1.80*   CALCIUM mg/dL 10.1 10.8*   BILIRUBIN mg/dL  --  0.5   ALK PHOS U/L  --  84   ALT (SGPT) U/L  --  15   AST (SGOT) U/L  --  17   GLUCOSE mg/dL 87 98       Results from last 7 days  Lab Units 04/19/17  0511   SODIUM mmol/L 140   POTASSIUM mmol/L 3.9   CHLORIDE mmol/L 104   TOTAL CO2 mmol/L 28.0   BUN mg/dL 26*   CREATININE mg/dL 1.80*   GLUCOSE mg/dL 87   CALCIUM mg/dL 10.1       Results from last 7 days  Lab Units 04/18/17  0757 04/17/17  1658   INR  1.11 1.10       0  Lab Value Date/Time   TROPONINI 9.425 (C) 04/18/2017 0757   TROPONINI 7.403 (C) 04/17/2017 2340       Results from last 7 days  Lab Units 04/18/17  0757   TSH mIU/mL 1.945       Results from last 7 days  Lab Units 04/19/17  0511   CHOLESTEROL mg/dL 144   TRIGLYCERIDES mg/dL 99   HDL CHOL mg/dL 50         LHC:  IMPRESSION:  1. Occluded proximal LAD.  2. 80% large ostial first diagonal stenosis  3. Occluded mid left circumflex  4. 80% large OM1 stenosis  5. 80% mid RCA stenosis  6. LVEF 25    Tele:  SR    Assessment/Plan     Principal Problem:    NSTEMI (non-ST elevated myocardial infarction)  Active Problems:    Bronchiectasis    Dementia    Diastolic dysfunction    Dyslipidemia    Hypertension    JONNY (obstructive sleep apnea)    Acute on chronic congestive heart failure    Acute on chronic renal insufficiency    Left bundle branch block    Normocytic anemia    COPD (chronic obstructive pulmonary disease)      1. NSTEMI with MVCAD  2. Ischemic cardiomyopathy- EF 25% by LV gram  3. Hypertension  4. Dyslipidemia   5. CKD  Overall patient feels fairly well today.  No further chest pain    Plan:  Plan for CABG early next week. Will order ASA, BB, and statin.  Pulmonary evaluation/\"tune up\"    Sosa Decker, REI  04/19/17  7:51 AM  IKvng have reviewed the note in full and agree with all aspects of the above including " physical exam, assessment, labs and plan with changes made accordingly.     Kvng Stauffer MD  04/19/17  9:21 AM        Please note that portions of this note may have been completed with a voice recognition program. Efforts were made to edit the dictations, but occasionally words are mistranscribed.

## 2017-04-19 NOTE — NURSING NOTE
Pt. Referred for Phase II Cardiac Rehab. Staff discussed benefits of exercise, program protocol, and educational material provided. Teach back verified.  Patient has been referred for surgery (possibly Thurs or Fri) so he does not wish to schedule at this time. He does wish to speak to staff following his surgery.  Staff to f/u when patient returns. Left program contact information with patient. Thank you.

## 2017-04-19 NOTE — PLAN OF CARE
Problem: Patient Care Overview (Adult)  Goal: Plan of Care Review  Outcome: Ongoing (interventions implemented as appropriate)    04/19/17 0440   Coping/Psychosocial Response Interventions   Plan Of Care Reviewed With patient   Patient Care Overview   Progress no change   Outcome Evaluation   Outcome Summary/Follow up Plan Pt. was pleasant and cooperative during the evening; TR band was removed at 1900; site is CDI; NSR on the monitor; 2L of O2 used to maintained sats above 90%; seemed to rest well through the night.         Problem: Fall Risk (Adult)  Goal: Identify Related Risk Factors and Signs and Symptoms  Outcome: Ongoing (interventions implemented as appropriate)  Goal: Absence of Falls  Outcome: Ongoing (interventions implemented as appropriate)

## 2017-04-19 NOTE — PLAN OF CARE
Problem: Patient Care Overview (Adult)  Goal: Plan of Care Review  Outcome: Ongoing (interventions implemented as appropriate)    04/19/17 6550   Outcome Evaluation   Outcome Summary/Follow up Plan Pt walked the halls this afternoon. NSR. VSS. No s/s of distress.        Goal: Adult Individualization and Mutuality  Outcome: Ongoing (interventions implemented as appropriate)  Goal: Discharge Needs Assessment  Outcome: Ongoing (interventions implemented as appropriate)    Problem: Pain, Acute (Adult)  Goal: Identify Related Risk Factors and Signs and Symptoms  Outcome: Ongoing (interventions implemented as appropriate)  Goal: Acceptable Pain Control/Comfort Level  Outcome: Ongoing (interventions implemented as appropriate)    Problem: Fall Risk (Adult)  Goal: Identify Related Risk Factors and Signs and Symptoms  Outcome: Ongoing (interventions implemented as appropriate)  Goal: Absence of Falls  Outcome: Ongoing (interventions implemented as appropriate)

## 2017-04-20 ENCOUNTER — TRANSCRIBE ORDERS (OUTPATIENT)
Dept: CARDIAC REHAB | Facility: HOSPITAL | Age: 72
End: 2017-04-20

## 2017-04-20 VITALS
TEMPERATURE: 98.2 F | BODY MASS INDEX: 32.5 KG/M2 | HEART RATE: 56 BPM | HEIGHT: 66 IN | WEIGHT: 202.2 LBS | DIASTOLIC BLOOD PRESSURE: 74 MMHG | SYSTOLIC BLOOD PRESSURE: 142 MMHG | RESPIRATION RATE: 20 BRPM | OXYGEN SATURATION: 98 %

## 2017-04-20 DIAGNOSIS — I21.4 NSTEMI (NON-ST ELEVATED MYOCARDIAL INFARCTION) (HCC): Primary | ICD-10-CM

## 2017-04-20 LAB
ANION GAP SERPL CALCULATED.3IONS-SCNC: 6 MMOL/L (ref 3–11)
BH CV ECHO MEAS - AO MAX PG (FULL): 15.1 MMHG
BH CV ECHO MEAS - AO MAX PG: 20.9 MMHG
BH CV ECHO MEAS - AO MEAN PG (FULL): 8 MMHG
BH CV ECHO MEAS - AO MEAN PG: 11 MMHG
BH CV ECHO MEAS - AO ROOT AREA (BSA CORRECTED): 1.6
BH CV ECHO MEAS - AO ROOT AREA: 8 CM^2
BH CV ECHO MEAS - AO ROOT DIAM: 3.2 CM
BH CV ECHO MEAS - AO V2 MAX: 228.5 CM/SEC
BH CV ECHO MEAS - AO V2 MEAN: 150.5 CM/SEC
BH CV ECHO MEAS - AO V2 VTI: 43.7 CM
BH CV ECHO MEAS - AVA(I,A): 1.6 CM^2
BH CV ECHO MEAS - AVA(I,D): 1.6 CM^2
BH CV ECHO MEAS - AVA(V,A): 1.7 CM^2
BH CV ECHO MEAS - AVA(V,D): 1.7 CM^2
BH CV ECHO MEAS - BSA(HAYCOCK): 2.1 M^2
BH CV ECHO MEAS - BSA: 2 M^2
BH CV ECHO MEAS - BZI_BMI: 32.8 KILOGRAMS/M^2
BH CV ECHO MEAS - BZI_METRIC_HEIGHT: 167.6 CM
BH CV ECHO MEAS - BZI_METRIC_WEIGHT: 92.1 KG
BH CV ECHO MEAS - CONTRAST EF (2CH): 34 ML/M^2
BH CV ECHO MEAS - CONTRAST EF 4CH: 47.6 ML/M^2
BH CV ECHO MEAS - EDV(CUBED): 220.3 ML
BH CV ECHO MEAS - EDV(MOD-SP2): 197 ML
BH CV ECHO MEAS - EDV(MOD-SP4): 248 ML
BH CV ECHO MEAS - EDV(TEICH): 182.8 ML
BH CV ECHO MEAS - EF(CUBED): 29.7 %
BH CV ECHO MEAS - EF(MOD-SP2): 34 %
BH CV ECHO MEAS - EF(MOD-SP4): 47.6 %
BH CV ECHO MEAS - EF(TEICH): 23.7 %
BH CV ECHO MEAS - ESV(CUBED): 154.9 ML
BH CV ECHO MEAS - ESV(MOD-SP2): 130 ML
BH CV ECHO MEAS - ESV(MOD-SP4): 130 ML
BH CV ECHO MEAS - ESV(TEICH): 139.5 ML
BH CV ECHO MEAS - FS: 11.1 %
BH CV ECHO MEAS - IVS/LVPW: 1.2
BH CV ECHO MEAS - IVSD: 1.2 CM
BH CV ECHO MEAS - LA DIMENSION: 3.8 CM
BH CV ECHO MEAS - LA/AO: 1.2
BH CV ECHO MEAS - LAT PEAK E' VEL: 5.2 CM/SEC
BH CV ECHO MEAS - LV DIASTOLIC VOL/BSA (35-75): 123.2 ML/M^2
BH CV ECHO MEAS - LV MASS(C)D: 291.3 GRAMS
BH CV ECHO MEAS - LV MASS(C)DI: 144.7 GRAMS/M^2
BH CV ECHO MEAS - LV MAX PG: 5.9 MMHG
BH CV ECHO MEAS - LV MEAN PG: 3 MMHG
BH CV ECHO MEAS - LV SYSTOLIC VOL/BSA (12-30): 64.6 ML/M^2
BH CV ECHO MEAS - LV V1 MAX: 121 CM/SEC
BH CV ECHO MEAS - LV V1 MEAN: 76.2 CM/SEC
BH CV ECHO MEAS - LV V1 VTI: 22.9 CM
BH CV ECHO MEAS - LVIDD: 6 CM
BH CV ECHO MEAS - LVIDS: 5.4 CM
BH CV ECHO MEAS - LVLD AP2: 10.7 CM
BH CV ECHO MEAS - LVLD AP4: 10.6 CM
BH CV ECHO MEAS - LVLS AP2: 10.2 CM
BH CV ECHO MEAS - LVLS AP4: 9.8 CM
BH CV ECHO MEAS - LVOT AREA (M): 3.1 CM^2
BH CV ECHO MEAS - LVOT AREA: 3.1 CM^2
BH CV ECHO MEAS - LVOT DIAM: 2 CM
BH CV ECHO MEAS - LVPWD: 1 CM
BH CV ECHO MEAS - MED PEAK E' VEL: 3.76 CM/SEC
BH CV ECHO MEAS - MV A MAX VEL: 120 CM/SEC
BH CV ECHO MEAS - MV DEC TIME: 0.31 SEC
BH CV ECHO MEAS - MV E MAX VEL: 99.3 CM/SEC
BH CV ECHO MEAS - MV E/A: 0.83
BH CV ECHO MEAS - PA ACC SLOPE: 1528 CM/SEC^2
BH CV ECHO MEAS - PA ACC TIME: 0.07 SEC
BH CV ECHO MEAS - PA MAX PG: 4.1 MMHG
BH CV ECHO MEAS - PA PR(ACCEL): 49.3 MMHG
BH CV ECHO MEAS - PA V2 MAX: 101 CM/SEC
BH CV ECHO MEAS - PULM DIAS VEL: 47.8 CM/SEC
BH CV ECHO MEAS - PULM S/D: 1
BH CV ECHO MEAS - PULM SYS VEL: 50.1 CM/SEC
BH CV ECHO MEAS - RVDD: 3.5 CM
BH CV ECHO MEAS - SI(AO): 174.4 ML/M^2
BH CV ECHO MEAS - SI(CUBED): 32.5 ML/M^2
BH CV ECHO MEAS - SI(LVOT): 35.7 ML/M^2
BH CV ECHO MEAS - SI(MOD-SP2): 33.3 ML/M^2
BH CV ECHO MEAS - SI(MOD-SP4): 58.6 ML/M^2
BH CV ECHO MEAS - SI(TEICH): 21.5 ML/M^2
BH CV ECHO MEAS - SV(AO): 351.1 ML
BH CV ECHO MEAS - SV(CUBED): 65.5 ML
BH CV ECHO MEAS - SV(LVOT): 71.9 ML
BH CV ECHO MEAS - SV(MOD-SP2): 67 ML
BH CV ECHO MEAS - SV(MOD-SP4): 118 ML
BH CV ECHO MEAS - SV(TEICH): 43.2 ML
BH CV ECHO MEAS - TAPSE (>1.6): 2 CM2
BH CV ECHO MEAS - TR MAX VEL: 268 CM/SEC
BH CV XLRA - RV BASE: 4.2 CM
BH CV XLRA - RV LENGTH: 10.5 CM
BH CV XLRA - RV MID: 4.2 CM
BH CV XLRA - TDI S': 15 CM/SEC
BH CV XLRA MEAS LEFT CCA RATIO VEL: 65.2 CM/SEC
BH CV XLRA MEAS LEFT DIST CCA EDV: 16 CM/SEC
BH CV XLRA MEAS LEFT DIST CCA PSV: 65.2 CM/SEC
BH CV XLRA MEAS LEFT DIST ICA EDV: 20.2 CM/SEC
BH CV XLRA MEAS LEFT DIST ICA PSV: 67 CM/SEC
BH CV XLRA MEAS LEFT ICA RATIO VEL: 91.9 CM/SEC
BH CV XLRA MEAS LEFT ICA/CCA RATIO: 1.4
BH CV XLRA MEAS LEFT MID ICA EDV: 19.6 CM/SEC
BH CV XLRA MEAS LEFT MID ICA PSV: 64 CM/SEC
BH CV XLRA MEAS LEFT PROX CCA EDV: 19 CM/SEC
BH CV XLRA MEAS LEFT PROX CCA PSV: 84.8 CM/SEC
BH CV XLRA MEAS LEFT PROX ECA PSV: 137 CM/SEC
BH CV XLRA MEAS LEFT PROX ICA EDV: 16.6 CM/SEC
BH CV XLRA MEAS LEFT PROX ICA PSV: 91.9 CM/SEC
BH CV XLRA MEAS LEFT PROX SCLA PSV: 93 CM/SEC
BH CV XLRA MEAS LEFT VERTEBRAL A PSV: 32.2 CM/SEC
BH CV XLRA MEAS RIGHT CCA RATIO VEL: 68.2 CM/SEC
BH CV XLRA MEAS RIGHT DIST CCA EDV: 10.7 CM/SEC
BH CV XLRA MEAS RIGHT DIST CCA PSV: 68.2 CM/SEC
BH CV XLRA MEAS RIGHT DIST ICA EDV: 33.7 CM/SEC
BH CV XLRA MEAS RIGHT DIST ICA PSV: 117 CM/SEC
BH CV XLRA MEAS RIGHT ICA RATIO VEL: 50.8 CM/SEC
BH CV XLRA MEAS RIGHT ICA/CCA RATIO: 0.74
BH CV XLRA MEAS RIGHT MID ICA EDV: 33.7 CM/SEC
BH CV XLRA MEAS RIGHT MID ICA PSV: 117 CM/SEC
BH CV XLRA MEAS RIGHT PROX CCA EDV: 25.3 CM/SEC
BH CV XLRA MEAS RIGHT PROX CCA PSV: 103 CM/SEC
BH CV XLRA MEAS RIGHT PROX ECA PSV: 110 CM/SEC
BH CV XLRA MEAS RIGHT PROX ICA EDV: 21.6 CM/SEC
BH CV XLRA MEAS RIGHT PROX ICA PSV: 50.8 CM/SEC
BH CV XLRA MEAS RIGHT PROX SCLA PSV: 234 CM/SEC
BH CV XLRA MEAS RIGHT VERTEBRAL A PSV: 42.2 CM/SEC
BUN BLD-MCNC: 28 MG/DL (ref 9–23)
BUN/CREAT SERPL: 17.5 (ref 7–25)
CALCIUM SPEC-SCNC: 10.2 MG/DL (ref 8.7–10.4)
CHLORIDE SERPL-SCNC: 105 MMOL/L (ref 99–109)
CO2 SERPL-SCNC: 28 MMOL/L (ref 20–31)
CREAT BLD-MCNC: 1.6 MG/DL (ref 0.6–1.3)
E/E' RATIO: 22.8
GFR SERPL CREATININE-BSD FRML MDRD: 43 ML/MIN/1.73
GLUCOSE BLD-MCNC: 87 MG/DL (ref 70–100)
LEFT ATRIUM VOLUME INDEX: 40.3 ML/M2
LV EF 2D ECHO EST: 40 %
POTASSIUM BLD-SCNC: 4.1 MMOL/L (ref 3.5–5.5)
SODIUM BLD-SCNC: 139 MMOL/L (ref 132–146)

## 2017-04-20 PROCEDURE — 99239 HOSP IP/OBS DSCHRG MGMT >30: CPT | Performed by: NURSE PRACTITIONER

## 2017-04-20 PROCEDURE — 99232 SBSQ HOSP IP/OBS MODERATE 35: CPT | Performed by: THORACIC SURGERY (CARDIOTHORACIC VASCULAR SURGERY)

## 2017-04-20 PROCEDURE — 99221 1ST HOSP IP/OBS SF/LOW 40: CPT | Performed by: INTERNAL MEDICINE

## 2017-04-20 PROCEDURE — 99232 SBSQ HOSP IP/OBS MODERATE 35: CPT | Performed by: INTERNAL MEDICINE

## 2017-04-20 PROCEDURE — 80048 BASIC METABOLIC PNL TOTAL CA: CPT | Performed by: INTERNAL MEDICINE

## 2017-04-20 PROCEDURE — 94799 UNLISTED PULMONARY SVC/PX: CPT

## 2017-04-20 PROCEDURE — 94640 AIRWAY INHALATION TREATMENT: CPT

## 2017-04-20 RX ORDER — FERROUS SULFATE 325(65) MG
325 TABLET ORAL
Status: ON HOLD
Start: 2017-04-20 | End: 2017-04-24

## 2017-04-20 RX ORDER — CARVEDILOL 3.12 MG/1
3.12 TABLET ORAL EVERY 12 HOURS SCHEDULED
Qty: 60 TABLET | Refills: 0 | Status: SHIPPED | OUTPATIENT
Start: 2017-04-20 | End: 2017-05-02 | Stop reason: HOSPADM

## 2017-04-20 RX ORDER — NITROGLYCERIN 0.4 MG/1
0.4 TABLET SUBLINGUAL
Qty: 30 TABLET | Refills: 0 | Status: SHIPPED | OUTPATIENT
Start: 2017-04-20 | End: 2017-06-19 | Stop reason: SDUPTHER

## 2017-04-20 RX ORDER — ASPIRIN 81 MG/1
81 TABLET, CHEWABLE ORAL DAILY
Qty: 30 TABLET | Refills: 0 | Status: SHIPPED | OUTPATIENT
Start: 2017-04-20 | End: 2017-05-02 | Stop reason: HOSPADM

## 2017-04-20 RX ORDER — LISINOPRIL 5 MG/1
5 TABLET ORAL
Status: DISCONTINUED | OUTPATIENT
Start: 2017-04-20 | End: 2017-04-20

## 2017-04-20 RX ADMIN — BUDESONIDE AND FORMOTEROL FUMARATE DIHYDRATE 2 PUFF: 160; 4.5 AEROSOL RESPIRATORY (INHALATION) at 09:05

## 2017-04-20 RX ADMIN — LISINOPRIL 5 MG: 5 TABLET ORAL at 10:13

## 2017-04-20 RX ADMIN — CARVEDILOL 3.12 MG: 3.12 TABLET, FILM COATED ORAL at 08:27

## 2017-04-20 RX ADMIN — FERROUS SULFATE TAB 325 MG (65 MG ELEMENTAL FE) 325 MG: 325 (65 FE) TAB at 08:27

## 2017-04-20 RX ADMIN — RIVASTIGMINE TRANSDERMAL SYSTEM 1 PATCH: 9.5 PATCH, EXTENDED RELEASE TRANSDERMAL at 08:28

## 2017-04-20 RX ADMIN — ASPIRIN 81 MG: 81 TABLET, CHEWABLE ORAL at 08:27

## 2017-04-20 RX ADMIN — PAROXETINE HYDROCHLORIDE 20 MG: 20 TABLET, FILM COATED ORAL at 08:27

## 2017-04-20 RX ADMIN — MEMANTINE HYDROCHLORIDE 10 MG: 10 TABLET, FILM COATED ORAL at 08:28

## 2017-04-20 RX ADMIN — NISOLDIPINE 8.5 MG: 8.5 TABLET, FILM COATED, EXTENDED RELEASE ORAL at 08:28

## 2017-04-20 RX ADMIN — CETIRIZINE HYDROCHLORIDE 5 MG: 10 TABLET, FILM COATED ORAL at 08:27

## 2017-04-20 NOTE — CONSULTS
Pulmonary Medicine Consultation     Patient Care Team:  Rod Colindres MD as PCP - General  Rod Colindres MD as PCP - Family Medicine    Reason for Consultation: H/O COPD with anticipated need for CABG    Subjective   History of Present Illness:  Mr. Magdi Arriaga is a 71 y.o. male who is followed in our office by Dr. Velez. He has a history of bronchiectasis as well as obstructive sleep apnea. He also historically has a history of COPD though his most recent spirometry performed on 03/06/2017 show only moderate nonspecific reduction of the flows with no definitive evidence for obstruction. His FEV1 at that time was 1.92 L which is 66% of predicted. I also note that this consult apparently was entered on the 18th however was just called to me today on the 20th.    Patient states that he was in his usual state of health when on April 17 became more short of breath and apparently did have some occasional chest discomfort. He was found on x-ray to have some increased interstitial markings consistent with pulmonary edema as well as cephalization. He was also found to have elevated cardiac troponins and was diagnosed with non-ST elevation MI. He underwent left heart catheterization and was found to have multivessel coronary artery disease. He was evaluated by Dr. Berger and there is a plan for probable coronary artery bypass grafting the beginning of the week.    Patient states that he is currently not short of breath and is pain-free. He does have a cough which is typically nonproductive and this is gone on for several months. He has been compliant with his CPAP which she wears at nighttime. He states that he is never had difficulty with anesthesia and typically does not have exacerbations of his bronchiectasis or underlying lung disease.    He recently has had some lower extremity edema though this has cleared up. He denies any weight loss or weight gain. His appetite is good. He denies fevers, chills, night  sweats, odynophagia, dysphagia, bowel or bladder changes, or hemoptysis.    Review of Systems:  Pertinent items are noted in HPI, all other systems reviewed and negative    Past Medical History:  Past Medical History:   Diagnosis Date   • Arthritis    • Asthma    • CHF (congestive heart failure)    • COPD (chronic obstructive pulmonary disease)    • DVT (deep venous thrombosis)     Bilateral   • Falls     A.  History of falling traumatic injuries in April 2014 and May 2014 resulting and left rib  fracture and left clavicle fracture.   • Fracture of rib    • History of chest x-ray 02/19/2016    Extensive, but stable postinflammatory pulmonary changes. There has been no change since the previous examination of 12/03/2015   • History of chest x-ray 12/03/2015    Slight increased markings bilaterally, may be progression of his bronchiectasis   • History of chest x-ray 09/06/2014    There has been no change since 09/05/2014   • History of echocardiogram 02/19/2016    Mild concentric LVH is observed.Estimated EF is 50-55%.E to A reversal in the mitral valve flow pattern suggestive of diastolic dysfunction.RV is mildly dilated.Moderate aortic cusp sclerosis is present.Mitral annular calcification.Mild MR.Evidence of borderline pulmonary hypertension.   • History of PFTs 12/03/2015    Values are slightly worse.TLC is c/w mild restriction.Values reduced from 02/16/13.Diffusion capacity is slightly worse   • History of PFTs 02/06/2013    Mild decrease in FVC, may be due to less maximal effort.TLC is within normal levels. Mild reduction in diffusion in absolute value   • History of transfusion    • Hypertension    • Melena    • Patellar tendon rupture     A.  Status post primary repair of the left periprosthetic patellar tendon tear 6/11/2014, post fall at the end of April 2014.   • Pneumonia      A.  Left lower lobe pneumonia treated at [facility], 5/8/2014 through 5/20/2014.   • Pneumothorax      A.  Status post chest tube placed  5/13/2014 and discontinued 5/15/2014 with stable chest x-ray.   • Rotator cuff tear     A.  Left rotator cuff tear status post fall prior to admission at the end of April 2014.   • Traumatic hemorrhagic shock      A.  Secondary to right renal retroperitoneal hemorrhage, 9/2014.     Past Surgical History:   Procedure Laterality Date   • APPENDECTOMY     • BACK SURGERY     • CARDIAC CATHETERIZATION N/A 4/18/2017    Procedure: Left Heart Cath;  Surgeon: Kvng Stauffer MD;  Location: [facility] CATH INVASIVE LOCATION;  Service:    • FRACTURE SURGERY     • JOINT REPLACEMENT     • KNEE SURGERY      A.  Status post primary repair of the left periprosthetic patellar tendon tear 6/11/2014, post fall at the end of April 2014   • REPLACEMENT TOTAL KNEE      A.  Left knee replacement in 2009 and right knee replacement in 2013.B.  History of prior right knee tendon repair in 2002.   • SKIN BIOPSY     • TONSILLECTOMY     • VENA CAVA FILTER PLACEMENT         Allergies:  Allergies   Allergen Reactions   • Sulfa Antibiotics        Medications:  No current facility-administered medications on file prior to encounter.      Current Outpatient Prescriptions on File Prior to Encounter   Medication Sig Dispense Refill   • budesonide-formoterol (SYMBICORT) 160-4.5 MCG/ACT inhaler Inhale 2 puffs 2 (Two) Times a Day. 1 inhaler 11   • doxazosin (CARDURA) 1 MG tablet Take 1 mg by mouth Every Night.     • gabapentin (NEURONTIN) 100 MG capsule Take 1 capsule by mouth 2 (Two) Times a Day.     • lisinopril (PRINIVIL,ZESTRIL) 10 MG tablet Take 1 tablet by mouth Daily.     • loratadine (CLARITIN) 10 MG tablet Take 10 mg by mouth Daily.     • memantine (NAMENDA) 10 MG tablet Take 1 tablet by mouth 2 (Two) Times a Day.     • montelukast (SINGULAIR) 10 MG tablet Take 1 tablet by mouth Every Night. 30 tablet 11   • nisoldipine (SULAR) 8.5 MG 24 hr tablet Take 1 tablet by mouth Daily.     • PARoxetine (PAXIL) 20 MG tablet Take 1 tablet by mouth Daily.     •  PROAIR  (90 BASE) MCG/ACT inhaler INHALE TWO PUFFS BY MOUTH EVERY 4 TO 6 HOURS AS NEEDED 1 inhaler 11   • rivastigmine (EXELON) 9.5 MG/24HR patch Place 1 patch on the skin Daily.     • simvastatin (ZOCOR) 40 MG tablet Take 1 tablet by mouth Daily.            aspirin 81 mg Oral Daily   atorvastatin 40 mg Oral Nightly   budesonide-formoterol 2 puff Inhalation BID - RT   carvedilol 3.125 mg Oral Q12H   cetirizine 5 mg Oral Daily   doxazosin 1 mg Oral Nightly   ferrous sulfate 325 mg Oral Daily With Breakfast   gabapentin 100 mg Oral Nightly   lisinopril 5 mg Oral Q24H   memantine 10 mg Oral Daily   montelukast 10 mg Oral Nightly   nisoldipine 8.5 mg Oral Q24H   PARoxetine 20 mg Oral Daily   pharmacy consult - MTM  Does not apply Daily   rivastigmine 1 patch Transdermal Daily       Social History:  Social History     Social History   • Marital status:      Spouse name: N/A   • Number of children: N/A   • Years of education: N/A     Occupational History   • Not on file.     Social History Main Topics   • Smoking status: Former Smoker     Years: 15.00     Types: Cigarettes   • Smokeless tobacco: Never Used      Comment: quit 20 years ago   • Alcohol use No   • Drug use: No   • Sexual activity: Not on file     Other Topics Concern   • Not on file     Social History Narrative    Retired, lives with wife in [facility].        Family History:  Family History   Problem Relation Age of Onset   • Adopted: Yes     Family history is reviewed and is not contributory to the case.    Objective   Objective     Physical Exam:  Vitals:    04/20/17 1013   BP: 110/79   Pulse:    Resp:    Temp:    SpO2:      Physical Exam   Constitutional: He is oriented to person, place, and time. He appears well-developed and well-nourished.   obese   HENT:   Head: Normocephalic and atraumatic.   Nose: Nose normal.   Mouth/Throat: Oropharynx is clear and moist. No oropharyngeal exudate.   Eyes: Conjunctivae and EOM are normal. Pupils are  equal, round, and reactive to light. Right eye exhibits no discharge. Left eye exhibits no discharge. No scleral icterus.   Neck: Normal range of motion. Neck supple. No JVD present. No tracheal deviation present. No thyromegaly present.   Cardiovascular: Normal rate, regular rhythm and normal heart sounds.  Exam reveals no gallop and no friction rub.    No murmur heard.  Pulmonary/Chest: Effort normal. No stridor. No respiratory distress. He has no wheezes. He exhibits no tenderness.   There are mainly basilar coarse crackles on late inspiration which to move somewhat when he coughs. There are also some late inspiratory squats. I do not hear wheezing.   Abdominal: Soft. Bowel sounds are normal. There is no tenderness. There is no rebound and no guarding.   Musculoskeletal: Normal range of motion. He exhibits no edema or deformity.   Lymphadenopathy:     He has no cervical adenopathy.   Neurological: He is alert and oriented to person, place, and time.   Skin: Skin is warm. No erythema. No pallor.   Psychiatric: He has a normal mood and affect. His behavior is normal. Thought content normal.   Nursing note and vitals reviewed.      Lab Results/Imaging/Diagnostics:    Results from last 7 days  Lab Units 04/19/17  0511 04/18/17  0757 04/17/17  1833   WBC 10*3/mm3 9.62 11.28* 9.77   HEMOGLOBIN g/dL 10.4* 11.5* 11.7*   PLATELETS 10*3/mm3 224 232 230       Results from last 7 days  Lab Units 04/20/17  0542 04/19/17  0511 04/18/17  0757   SODIUM mmol/L 139 140 140   POTASSIUM mmol/L 4.1 3.9 4.1   TOTAL CO2 mmol/L 28.0 28.0 29.0   BUN mg/dL 28* 26* 26*   CREATININE mg/dL 1.60* 1.80* 1.80*   GLUCOSE mg/dL 87 87 98     Estimated Creatinine Clearance: 44.9 mL/min (by C-G formula based on Cr of 1.6).      Results from last 7 days  Lab Units 04/18/17  0757   HEMOGLOBIN A1C % 5.30             Assessment/Plan   Impression & Plan     Impression:  Principal Problem:    NSTEMI (non-ST elevated myocardial infarction)  Active  Problems:    Bronchiectasis    COPD (chronic obstructive pulmonary disease)    Dementia    Diastolic dysfunction    Dyslipidemia    Hypertension    JONNY (obstructive sleep apnea)    Acute on chronic congestive heart failure    Acute on chronic renal insufficiency    Left bundle branch block    Normocytic anemia      Plan:  Mr. Arriaga seems to be doing very well from a pulmonary standpoint. He is currently on room air with saturations between 99 and 100%. I see no evidence of active infection. I feel he is a suitable operative candidate for coronary artery bypass grafting. I did explain to him in particular the postoperative concerns from a pulmonary standpoint are primarily atelectasis, pneumonia, and suitable pain control to ensure good pulmonary hygiene. If surgery does proceed as planned, I will plan to follow him in the postoperative setting as I will be working in the CT surgery ICU that week.From my standpoint he would be fine for him to be discharged from the hospital with plans to return for his surgery next week if this is desired by the primary team.    Thank you very much rest been to help in the care of this very nice patient. I will follow along closely with you.    I discussed these findings and my recommendations with patient and family       Donny Hinds MD, [facility]  Pulmonary and Critical Care Medicine  04/20/17 12:13 PM

## 2017-04-20 NOTE — DISCHARGE SUMMARY
[facility]  DISCHARGE SUMMARY       Date of Admission: 4/17/2017  Date of Discharge:  4/20/2017  Primary Care Physician: Rod Colindres MD  Consulting Physician(s)     Provider Relationship    Kvng Stauffer MD Consulting Physician    Bola Velez MD Consulting Physician    Kanu Berger MD Consulting Physician    Tramaine Velarde MD Consulting Physician          Discharge Diagnoses:  Active Hospital Problems (** Indicates Principal Problem)    Diagnosis Date Noted   • **NSTEMI (non-ST elevated myocardial infarction) [I21.4] 04/17/2017   • Acute on chronic congestive heart failure [I50.9] 04/17/2017   • Acute on chronic renal insufficiency [N28.9, N18.9] 04/17/2017   • Left bundle branch block [I44.7] 04/17/2017   • Normocytic anemia [D64.9] 04/17/2017   • COPD (chronic obstructive pulmonary disease) [J44.9] 04/17/2017   • JONNY (obstructive sleep apnea) [G47.33] 01/06/2017     A.  Obstructive sleep apnea with central component.B.  On home CPAP therapy.     • Bronchiectasis [J47.9] 01/06/2017     A.  CT scan of the chest 12/13/2015 reports mild to moderate bronchiectasis.       • Dementia [F03.90] 01/06/2017   • Hypertension [I10] 01/06/2017   • Dyslipidemia [E78.5] 01/06/2017   • Diastolic dysfunction [I51.9] 01/06/2017     Mild concentric LVH is observed.Estimated EF is 50-55%.E to A reversal in the mitral valve flow pattern suggestive of diastolic dysfunction.RV is mildly dilated.Moderate aortic cusp sclerosis is present.Mitral annular calcification.Mild MR.Evidence of borderline pulmonary hypertension.        Resolved Hospital Problems    Diagnosis Date Noted Date Resolved   No resolved problems to display.       Presenting Problem/History of Present Illness  NSTEMI (non-ST elevated myocardial infarction) [I21.4]     Chief Complaint on Day of Discharge: SOA/ chest pain    Hospital Course  Patient is a 71 y.o. male  With PMH significant for COPD, HTN, HLP, JONNY,  diastolic dysfunction, CKD III, dementia that presented to [facility] ED on 4/17/17 with complaints of SOA worsening with exertion.  Symptoms have been occurring intermittently over the weekend and began to worsen which prompted arrival to the ED.  Patient tried to get into see PCP and was referred to [facility] who found a new left bundle branch block on EKG along with chest x-ray showing underlying CHF.  On arrival to the ED, he was given IV Lasix for acute on chronic diastolic congestive heart failure.  Cardiac enzymes were trended and cardiology was also consulted.  During hospitalization patient's troponin did continue to elevate and patient was diagnosed with  NSTEMI.  Patient subsequently underwent left heart catheterization which did reveal significant multivessel coronary artery disease.  Cardiothoracic surgery, Dr. Berger was consulted who did recommend CABG.    Due to patient's underlying pulmonary and kidney issues, nephrology and pulmonary were also consulted.  Patient did have noted elevated creatinine after left heart catheterization.  Nephrology recommended ACE inhibitor the discontinued until after surgery as this has previously caused elevated creatinine to already chronic kidney disease.  Currently, patient is close to baseline with creatinine of 1.6.    After lori Hinds, has clear patient for surgery and feels he is suitable for cardiac surgery at this point.  He will follow patient after surgery in the ICU next week.  Patient has been scheduled for CABG on Monday, 4/24/2017.  Patient will be called with arrival time 2 admissions.  Preadmission testing has been completed during this hospitalization and patient will not need to return prior to the surgery unless warranted.    Patient currently has returned to baseline.  Has ambulated hallways without dyspnea, chest pain, palpitations, dizziness or lightheadedness.  Patient tolerating oral diet and urinating well.  All other medical problems have  remained stable and patient will be discharged home with plans for return on 4/24/2017 for scheduled CABG.    Procedures Performed  Procedure(s):  Left Heart Cath       Consults:   Consults     Date and Time Order Name Status Description    4/19/2017 1934 Inpatient Consult to Nephrology Completed     4/18/2017 1606 Inpatient Consult to Pulmonology Completed     4/18/2017 1517 Inpatient Consult to Cardiothoracic Surgery Completed     4/17/2017 2216 Inpatient Consult to Cardiology Completed           Pertinent Test Results:  Basic Metabolic Panel [[id number]] (Abnormal) Collected: 04/20/17 0542        Lab Status: Final result Specimen: Blood Updated: 04/20/17 0724        Glucose 87 mg/dL         BUN 28 (H) mg/dL         Creatinine 1.60 (H) mg/dL         Sodium 139 mmol/L         Potassium 4.1 mmol/L         Chloride 105 mmol/L         CO2 28.0 mmol/L         Calcium 10.2 mg/dL         eGFR Non African Amer 43 (L) mL/min/1.73         BUN/Creatinine Ratio 17.5        Anion Gap 6.0 mmol/L        Narrative:         National Kidney Foundation Guidelines    Stage                           Description                             GFR                      1                               Normal or High                          90+  2                               Mild decrease                            60-89  3                               Moderate decrease                   30-59  4                               Severe decrease                       15-29  5                               Kidney failure                             <15       P2Y12 Platelet Inhibition [[id number]] Collected: 04/19/17 0511       Lab Status: Final result Specimen: Blood Updated: 04/19/17 0629        P2Y12 Reactivity Unit 249 PRU        Narrative:        CBC (No Diff) [[id number]] (Abnormal) Collected: 04/19/17 0511        Lab Status: Final result Specimen: Blood Updated: 04/19/17 0636        WBC 9.62 10*3/mm3         RBC 3.74 (L) 10*6/mm3          Hemoglobin 10.4 (L) g/dL         Hematocrit 34.2 (L) %         MCV 91.4 fL         MCH 27.8 pg         MCHC 30.4 (L) g/dL         RDW 15.5 (H) %         RDW-SD 52.2 fl         MPV 11.2 fL         Platelets 224 10*3/mm3         Lipid Panel [[id number]] (Normal) Collected: 04/19/17 0511       Lab Status: Final result Specimen: Blood Updated: 04/19/17 0644        Total Cholesterol 144 mg/dL         Triglycerides 99 mg/dL         HDL Cholesterol 50 mg/dL         LDL Cholesterol  83 mg/dL       Troponin [[id number]] (Abnormal) Collected: 04/18/17 0757        Lab Status: Final result Specimen: Blood Updated: 04/18/17 0840        Troponin I 9.425 (C) ng/mL         TSH [[id number]] (Normal) Collected: 04/18/17 0757        Lab Status: Final result Specimen: Blood Updated: 04/18/17 0837        TSH 1.945 mIU/mL        Protime-INR [[id number]] (Abnormal)       Hemoglobin A1c [[id number]] (Normal) Collected: 04/18/17 0757        Lab Status: Final result Specimen: Blood Updated: 04/18/17 1028        Hemoglobin A1C 5.30 %       Narrative:          EXAMINATION: CT HEAD WO CONTRAST-      INDICATION: Dementia, preop CABG; I21.4-Non-ST elevation (NSTEMI)  myocardial infarction; R79.89-Other specified abnormal findings of blood  chemistry; I50.9-Heart failure, unspecified; I25.118-Atherosclerotic  heart disease of native coronary artery with other forms of angina  pectoris.     TECHNIQUE: CT scan of the head was performed at 5 mm intervals. No  intravenous contrast was utilized.     COMPARISON: 02/15/2015.     FINDINGS: There is no intraaxial mass. There is no hemorrhage. There is  no midline shift or extraaxial fluid collection.  There has been no  significant change since 02/15/2015.          Impression:         Normal for age. There are no acute findings.           Duplex Carotid Ultrasound CAR [[id number]] Reviewed: 04/20/17 1506        Order Status: Completed Collected: 04/19/17 0927        Updated: 04/20/17 1044        Prox CCA PSV 84.8  cm/sec         Prox CCA .0 cm/sec         Prox CCA EDV 19.0 cm/sec         Prox CCA EDV 25.3 cm/sec         Dist CCA PSV 65.2 cm/sec         Dist CCA PSV 68.2 cm/sec         Dist CCA EDV 16.0 cm/sec         Dist CCA EDV 10.7 cm/sec         CCA ratio nya 65.2 cm/sec         CCA ratio nya 68.2 cm/sec         Prox ECA .0 cm/sec         Prox ECA .0 cm/sec         Prox ICA PSV 91.9 cm/sec         Prox ICA PSV 50.8 cm/sec         Prox ICA EDV 16.6 cm/sec         Prox ICA EDV 21.6 cm/sec         Mid ICA PSV 64.0 cm/sec         Mid ICA .0 cm/sec         Mid ICA EDV 19.6 cm/sec         Mid ICA EDV 33.7 cm/sec         Dist ICA PSV 67.0 cm/sec         Dist ICA .0 cm/sec         Dist ICA EDV 20.2 cm/sec         Dist ICA EDV 33.7 cm/sec         ICA ratio nya 91.9 cm/sec         ICA ratio nya 50.8 cm/sec         Vertebral A PSV 32.2 cm/sec         Vertebral A PSV 42.2 cm/sec         ICA/CCA ratio 1.4        ICA/CCA ratio 0.74        Prox SCLA PSV 93.0 cm/sec         Prox SCLA .0 cm/sec        Narrative:         · Right internal carotid artery stenosis of 0-49%.  · Left internal carotid artery stenosis of 0-49%.           Renal Limited [[id number]] Not Reviewed       Order Status: Completed Collected: 04/19/17 0823        Updated: 04/19/17 1234       Narrative:         EXAMINATION: US RENAL, LIMITED-04/18/2017:     INDICATION: BUBBA on CKD; I21.4-Non-ST elevation (NSTEMI) myocardial  infarction; R79.89-Other specified abnormal findings of blood chemistry;  I50.9-Heart failure, unspecified; I25.118-Atherosclerotic heart disease  of native coronary artery with other forms of angina pectoris.         COMPARISON: NONE.     FINDINGS:   1. The right kidney measures 11.1 x 5.9 x 5.2 cm. There is 1.3 cm of  cortical thickness. There is no evidence of right renal mass or  hydronephrosis and no obstruction is noted.  2. The left kidney measures 13.5 x 5.5 x 5.6 cm. There is 1.2 cm of  cortical  thickness. There is no evidence of hydronephrosis or solid  mass. There is a simple cyst associated with the left kidney measuring  3.3 x 3.8 x 3 cm.  3. The bladder exam is normal.          Impression:         1.  Simple left renal cyst.  2.  Normal renal size and normal parenchymal complement bilaterally.  3.  Solid renal mass, significant atrophy or hydronephrotic obstruction  is not currently identified.     D:  04/19/2017  E:  04/19/2017            This report was finalized on 4/19/2017 12:32 PM by Dr. Phillip Rivera MD.          XR Chest 1 View [[id number]] Not Reviewed       Order Status: Completed Collected: 04/18/17 0945        Updated: 04/19/17 1108       Narrative:         EXAMINATION: XR CHEST 1 VW- 04/17/2017     INDICATION: SOA triage protocol      COMPARISON: 03/06/2017     FINDINGS: Portable chest reveals heart to be enlarged. Increased  markings seen diffusely throughout the lung fields bilaterally  suggesting either edema or diffuse pneumonia. Clinical correlation is  needed. Degenerative changes seen within the spine. No pleural effusion  or pneumothorax.          Impression:         Diffuse increased markings seen throughout the lung fields  bilaterally suggesting either diffuse edema or pneumonia. Clinical  correlation is needed. The heart is enlarged.     D:  04/17/2017  E:  04/18/2017     This report was finalized on 4/19/2017 11:06 AM by Dr. Carlie Alcantar MD.         Magdi Arriaga [id number] Male 1945 (71 y.o.)         Date of Birth Sex Race Ethnicity Encounter Category     Jul 9, 1945 Male White or  Not  or  Emergency       Physicians      Panel Physicians Referring Physician Case Authorizing Physician     Kvng Stauffer MD (Primary)         Procedures      Left Heart Cath           Conclusion         Clinical History: Non-STEMI, heart failure     Access Site: Left radial artery     Procedure narrative:  Left heart catheterization, left ventriculography,  "and selective coronary tomography via the left radial artery using standard 6 [place] catheters. No complications. Hemostasis using a TR band.      IMPRESSION:  1. Occluded proximal LAD.  2. 80% large ostial first diagonal stenosis  3. Occluded mid left circumflex  4. 80% large OM1 stenosis  5. 80% mid RCA stenosis  6. LVEF 25     RECOMMENDATIONS:  Surgical consult for revascularization.     Angiographic Findings:  right coronary dominance  · LM: Short though no normal vessel without atherosclerosis.  · LAD: Large vessel, 100% occluded proximally, at the origin the first septal . There is a large first diagonal rising just at the conclusion that has some ostial 80% stenosis. Distal LAD, is fed via 4+ over 4+ collaterals from the RCA.  · LCX: Large nondominant vessel. There is a small ramus branch. There is a large OM1 that has a 80% ostial stenosis. The left circumflex beyond the OM1 is occluded. There were 2 left posterior lateral seen to fill via 2+ over 4+ collaterals from the OM1.  · RCA: Large vessel, that is dominant. There is a large RV marginal branch that supplies 4+ over 4+ collaterals to the mid and distal LAD. The distal RCA has a hazy eccentric 90% stenosis in supplies a branching posterior lateral and a right PDA.     LV: LVEF 25%  Mitral regurgitation: Mild         Condition on Discharge:  stable    Physical Exam on Discharge:/74 (BP Location: Left arm, Patient Position: Sitting)  Pulse 56  Temp 98.2 °F (36.8 °C) (Oral)   Resp 20  Ht 66\" (167.6 cm)  Wt 202 lb 3.2 oz (91.7 kg)  SpO2 98%  BMI 32.64 kg/m2  Physical Exam   Constitutional: He is oriented to person, place, and time. He appears well-developed and well-nourished. No distress.   HENT:   Head: Normocephalic and atraumatic.   Eyes: Pupils are equal, round, and reactive to light. No scleral icterus.   Neck: Normal range of motion. No JVD present.   Cardiovascular: Normal rate, regular rhythm, normal heart sounds and intact " distal pulses.    No murmur heard.  Pulmonary/Chest: Effort normal and breath sounds normal. No respiratory distress. He has no wheezes.   Abdominal: Soft. Bowel sounds are normal. He exhibits no distension. There is no tenderness.   Musculoskeletal: Normal range of motion. He exhibits no edema.   Neurological: He is alert and oriented to person, place, and time.   Skin: Skin is warm.   Psychiatric: He has a normal mood and affect. His behavior is normal.         Discharge Disposition  Home or Self Care    Discharge Medications   Magdi Arriaga   Home Medication Instructions RADHA:[id number]    Printed on:04/20/17 3580   Medication Information                      aspirin 81 MG chewable tablet  Chew 1 tablet Daily.             budesonide-formoterol (SYMBICORT) 160-4.5 MCG/ACT inhaler  Inhale 2 puffs 2 (Two) Times a Day.             carvedilol (COREG) 3.125 MG tablet  Take 1 tablet by mouth Every 12 (Twelve) Hours.             cholecalciferol (VITAMIN D3) 1000 UNITS tablet  Take 2,000 Units by mouth Daily.             doxazosin (CARDURA) 1 MG tablet  Take 1 mg by mouth Every Night.             ferrous sulfate 325 (65 FE) MG tablet  Take 1 tablet by mouth Daily With Breakfast.             gabapentin (NEURONTIN) 100 MG capsule  Take 1 capsule by mouth 2 (Two) Times a Day.             loratadine (CLARITIN) 10 MG tablet  Take 10 mg by mouth Daily.             memantine (NAMENDA) 10 MG tablet  Take 1 tablet by mouth 2 (Two) Times a Day.             montelukast (SINGULAIR) 10 MG tablet  Take 1 tablet by mouth Every Night.             nisoldipine (SULAR) 8.5 MG 24 hr tablet  Take 1 tablet by mouth Daily.             nitroglycerin (NITROSTAT) 0.4 MG SL tablet  Place 1 tablet under the tongue Every 5 (Five) Minutes As Needed for Chest Pain. Take no more than 3 doses in 15 minutes.             Omega-3 Fatty Acids (FISH OIL) 1200 MG capsule capsule  Take 1,200 mg by mouth Daily.             PARoxetine (PAXIL) 20 MG  tablet  Take 1 tablet by mouth Daily.             PROAIR  (90 BASE) MCG/ACT inhaler  INHALE TWO PUFFS BY MOUTH EVERY 4 TO 6 HOURS AS NEEDED             rivastigmine (EXELON) 9.5 MG/24HR patch  Place 1 patch on the skin Daily.             simvastatin (ZOCOR) 40 MG tablet  Take 1 tablet by mouth Daily.                 Discharge Diet:   Diet Instructions     Diet: Regular, Consistent Carbohydrate, Cardiac; Thin Liquids, No Restrictions       Discharge Diet:   Regular  Consistent Carbohydrate  Cardiac      Fluid Consistency:  Thin Liquids, No Restrictions                 Discharge Care Plan / Instructions:    Activity at Discharge:   Activity Instructions     Activity as Tolerated                     Follow-up Appointments  Future Appointments  Date Time Provider Department Center   5/30/2017 10:30 AM ORIENTATION -  [facility]     Additional Instructions for the Follow-ups that You Need to Schedule     Discharge Follow-Up With Specified Provider    As directed    To:  [facility] Dr. Berger for CABG   Follow Up Details:  Patient will be called with details                 Test Results Pending at Discharge       REI Manzanares 04/20/17 3:53 PM    Time: Discharge 36 min    Please note that portions of this note may have been completed with a voice recognition program. Efforts were made to edit the dictations, but occasionally words are mistranscribed.

## 2017-04-20 NOTE — PROGRESS NOTES
CTS Progress Note       LOS: 3 days   Patient Care Team:  Rod Colindres MD as PCP - General  Rod Colindres MD as PCP - Family Medicine        Vital Signs  Temp:  [97.7 °F (36.5 °C)-98.2 °F (36.8 °C)] 98.2 °F (36.8 °C)  Heart Rate:  [53-63] 53  Resp:  [16-20] 20  BP: (116-145)/(67-79) 145/77    Physical Exam:  No angina     Results     Results from last 7 days  Lab Units 04/19/17  0511   WBC 10*3/mm3 9.62   HEMOGLOBIN g/dL 10.4*   HEMATOCRIT % 34.2*   PLATELETS 10*3/mm3 224       Results from last 7 days  Lab Units 04/19/17  0511   SODIUM mmol/L 140   POTASSIUM mmol/L 3.9   CHLORIDE mmol/L 104   TOTAL CO2 mmol/L 28.0   BUN mg/dL 26*   CREATININE mg/dL 1.80*   GLUCOSE mg/dL 87   CALCIUM mg/dL 10.1           Imaging Results (last 24 hours)     Procedure Component Value Units Date/Time    XR Chest 1 View [[id number]] Collected:  04/18/17 0945     Updated:  04/19/17 1108    Narrative:       EXAMINATION: XR CHEST 1 VW- 04/17/2017     INDICATION: SOA triage protocol      COMPARISON: 03/06/2017     FINDINGS: Portable chest reveals heart to be enlarged. Increased  markings seen diffusely throughout the lung fields bilaterally  suggesting either edema or diffuse pneumonia. Clinical correlation is  needed. Degenerative changes seen within the spine. No pleural effusion  or pneumothorax.       Impression:       Diffuse increased markings seen throughout the lung fields  bilaterally suggesting either diffuse edema or pneumonia. Clinical  correlation is needed. The heart is enlarged.     D:  04/17/2017  E:  04/18/2017     This report was finalized on 4/19/2017 11:06 AM by Dr. Carlie Alcantar MD.        Renal Limited [[id number]] Collected:  04/19/17 0823     Updated:  04/19/17 1234    Narrative:       EXAMINATION: US RENAL, LIMITED-04/18/2017:     INDICATION: BUBBA on CKD; I21.4-Non-ST elevation (NSTEMI) myocardial  infarction; R79.89-Other specified abnormal findings of blood chemistry;  I50.9-Heart failure, unspecified;  I25.118-Atherosclerotic heart disease  of native coronary artery with other forms of angina pectoris.         COMPARISON: NONE.     FINDINGS:   1. The right kidney measures 11.1 x 5.9 x 5.2 cm. There is 1.3 cm of  cortical thickness. There is no evidence of right renal mass or  hydronephrosis and no obstruction is noted.  2. The left kidney measures 13.5 x 5.5 x 5.6 cm. There is 1.2 cm of  cortical thickness. There is no evidence of hydronephrosis or solid  mass. There is a simple cyst associated with the left kidney measuring  3.3 x 3.8 x 3 cm.  3. The bladder exam is normal.       Impression:       1.  Simple left renal cyst.  2.  Normal renal size and normal parenchymal complement bilaterally.  3.  Solid renal mass, significant atrophy or hydronephrotic obstruction  is not currently identified.     D:  04/19/2017  E:  04/19/2017            This report was finalized on 4/19/2017 12:32 PM by Dr. Phillip Rivera MD.       CT Head Without Contrast [[id number]] Updated:  04/19/17 1525          Assessment    Principal Problem:    NSTEMI (non-ST elevated myocardial infarction)  Active Problems:    Bronchiectasis    Dementia    Diastolic dysfunction    Dyslipidemia    Hypertension    JONNY (obstructive sleep apnea)    Acute on chronic congestive heart failure    Acute on chronic renal insufficiency    Left bundle branch block    Normocytic anemia    COPD (chronic obstructive pulmonary disease)  I cannot see that pulmonary associates have yet seen the patient.  The carotid duplex scan is satisfactory.  The patient has a significant pulmonary history and is a current patient of Dr. Bola Velez.  Would like him to evaluate today.  Patient could then either go home and return for surgery next week.  If I can get him on the schedule for Monday may give him the option of staying here until Monday.  Note creatinine did also have a slight increase since the catheterization.  Plan   Consult Dr. Bola Velez from pulmonary associates.   (Current patient of his).  CMP in a.m.    Please note that portions of this note were completed with a voice recognition program. Efforts were made to edit the dictations, but occasionally words are mistranscribed.    Kanu Berger MD  04/20/17  7:05 AM

## 2017-04-20 NOTE — PLAN OF CARE
Problem: Patient Care Overview (Adult)  Goal: Plan of Care Review  Outcome: Ongoing (interventions implemented as appropriate)    04/20/17 0435   Coping/Psychosocial Response Interventions   Plan Of Care Reviewed With patient   Patient Care Overview   Progress improving   Outcome Evaluation   Outcome Summary/Follow up Plan Pt. up in the recliner during the evening; hopeful at being able to go home but waiting on Pulmonary & Nephrology; Pt. sinus reece overnight on 2L of O2; remaining VSS.         Problem: Fall Risk (Adult)  Goal: Identify Related Risk Factors and Signs and Symptoms  Outcome: Ongoing (interventions implemented as appropriate)  Goal: Absence of Falls  Outcome: Ongoing (interventions implemented as appropriate)

## 2017-04-20 NOTE — CONSULTS
NAL Consult Note    Magdi Arriaga  1945  [id number]    Date of Admit:  4/17/2017    Date of Consult: 4/20/2017        Requesting Provider: No ref. provider found  Evaluating Physician: Tramaine Velarde MD        Reason for Consultation: SOA , ckd    History of present illness:    Patient is a 71 y.o.  Yr old male with a history of COPD, essential hypertension, hyperlipidemia, JONNY, Diastolic dysfunction, CKD stage III, OA and dementia who presents to the ED with complaints of shortness of air. On Friday patient states he returned from a baseball game when he was very short of breath worse with exertion. He does have underlying COPD and thought this was the causing factor. He put himself on his CPAP machine and went to bed. His symptoms continued over the weekend in which he states he used his albuterol inhaler and had some relief. 4/17/17  his symptoms still were present therefore he called his pulmonologist who was unable to see him but referred him to his PCP. He states his PCP was unable to see him and recommended he go to ER. He presented and admitted at [facility]. He had heart cath showing multi vessel disease. His baseline cr runs between 1.4-1.9-- last was 1.7. He has a history of BUBBA with ACE-i which are being avoided for that reason.    Past Medical History:   Diagnosis Date   • Arthritis    • Asthma    • CHF (congestive heart failure)    • COPD (chronic obstructive pulmonary disease)    • DVT (deep venous thrombosis)     Bilateral   • Falls     A.  History of falling traumatic injuries in April 2014 and May 2014 resulting and left rib  fracture and left clavicle fracture.   • Fracture of rib    • History of chest x-ray 02/19/2016    Extensive, but stable postinflammatory pulmonary changes. There has been no change since the previous examination of 12/03/2015   • History of chest x-ray 12/03/2015    Slight increased markings bilaterally, may be progression of his bronchiectasis   • History of chest x-ray  09/06/2014    There has been no change since 09/05/2014   • History of echocardiogram 02/19/2016    Mild concentric LVH is observed.Estimated EF is 50-55%.E to A reversal in the mitral valve flow pattern suggestive of diastolic dysfunction.RV is mildly dilated.Moderate aortic cusp sclerosis is present.Mitral annular calcification.Mild MR.Evidence of borderline pulmonary hypertension.   • History of PFTs 12/03/2015    Values are slightly worse.TLC is c/w mild restriction.Values reduced from 02/16/13.Diffusion capacity is slightly worse   • History of PFTs 02/06/2013    Mild decrease in FVC, may be due to less maximal effort.TLC is within normal levels. Mild reduction in diffusion in absolute value   • History of transfusion    • Hypertension    • Melena    • Patellar tendon rupture     A.  Status post primary repair of the left periprosthetic patellar tendon tear 6/11/2014, post fall at the end of April 2014.   • Pneumonia      A.  Left lower lobe pneumonia treated at [facility], 5/8/2014 through 5/20/2014.   • Pneumothorax      A.  Status post chest tube placed 5/13/2014 and discontinued 5/15/2014 with stable chest x-ray.   • Rotator cuff tear     A.  Left rotator cuff tear status post fall prior to admission at the end of April 2014.   • Traumatic hemorrhagic shock      A.  Secondary to right renal retroperitoneal hemorrhage, 9/2014.       Past Surgical History:   Procedure Laterality Date   • APPENDECTOMY     • BACK SURGERY     • CARDIAC CATHETERIZATION N/A 4/18/2017    Procedure: Left Heart Cath;  Surgeon: Kvng Stauffer MD;  Location: [facility] CATH INVASIVE LOCATION;  Service:    • FRACTURE SURGERY     • JOINT REPLACEMENT     • KNEE SURGERY      A.  Status post primary repair of the left periprosthetic patellar tendon tear 6/11/2014, post fall at the end of April 2014   • REPLACEMENT TOTAL KNEE      A.  Left knee replacement in 2009 and right knee replacement in 2013.B.  History of prior right knee tendon repair in  2002.   • SKIN BIOPSY     • TONSILLECTOMY     • VENA CAVA FILTER PLACEMENT         Social History     Social History   • Marital status:      Spouse name: N/A   • Number of children: N/A   • Years of education: N/A     Social History Main Topics   • Smoking status: Former Smoker     Years: 15.00     Types: Cigarettes   • Smokeless tobacco: Never Used      Comment: quit 20 years ago   • Alcohol use No   • Drug use: No   • Sexual activity: Not Asked     Other Topics Concern   • None     Social History Narrative    Retired, lives with wife in [facility].        family history is not on file. He was adopted.    Allergies   Allergen Reactions   • Sulfa Antibiotics        Medication:    Current Facility-Administered Medications:   •  acetaminophen (TYLENOL) tablet 650 mg, 650 mg, Oral, Q4H PRN, Kvng Stauffer MD  •  aluminum-magnesium hydroxide-simethicone (MAALOX/MYLANTA) suspension 30 mL, 30 mL, Oral, Q6H PRN, Kvng Stauffer MD  •  aspirin chewable tablet 81 mg, 81 mg, Oral, Daily, Sosa Decker APRN, 81 mg at 04/20/17 0827  •  atorvastatin (LIPITOR) tablet 40 mg, 40 mg, Oral, Nightly, Sosa Clevinger APRN, 40 mg at 04/19/17 2124  •  budesonide-formoterol (SYMBICORT) 160-4.5 MCG/ACT inhaler 2 puff, 2 puff, Inhalation, BID - RT, Yue Светлана, APRN, 2 puff at 04/20/17 0905  •  carvedilol (COREG) tablet 3.125 mg, 3.125 mg, Oral, Q12H, Sosa Decker APRN, 3.125 mg at 04/20/17 0827  •  cetirizine (zyrTEC) tablet 5 mg, 5 mg, Oral, Daily, Yue Светлана, APRN, 5 mg at 04/20/17 0827  •  diphenhydrAMINE (BENADRYL) injection 25 mg, 25 mg, Intravenous, Q6H PRN, Kvng Stauffer MD  •  doxazosin (CARDURA) tablet 1 mg, 1 mg, Oral, Nightly, Yue Светлана, APRN, 1 mg at 04/19/17 2124  •  ferrous sulfate tablet 325 mg, 325 mg, Oral, Daily With Breakfast, Yue Светлана, APRN, 325 mg at 04/20/17 0827  •  gabapentin (NEURONTIN) capsule 100 mg, 100 mg, Oral, Nightly, Yue Светлана, APRN, 100 mg at 04/19/17  2125  •  HYDROcodone-acetaminophen (NORCO) 5-325 MG per tablet 1 tablet, 1 tablet, Oral, Q4H PRN, Kvng Stauffer MD  •  ipratropium-albuterol (DUO-NEB) nebulizer solution 3 mL, 3 mL, Nebulization, Q4H PRN, Yue Светлана, APRN  •  memantine (NAMENDA) tablet 10 mg, 10 mg, Oral, Daily, Yue Светлана, APRN, 10 mg at 04/20/17 0828  •  montelukast (SINGULAIR) tablet 10 mg, 10 mg, Oral, Nightly, Yue Светлана, APRN, 10 mg at 04/19/17 2125  •  nisoldipine (SULAR) 24 hr tablet 8.5 mg, 8.5 mg, Oral, Q24H, Yue Светлана, APRN, 8.5 mg at 04/20/17 0828  •  nitroglycerin (NITROSTAT) SL tablet 0.4 mg, 0.4 mg, Sublingual, Q5 Min PRN, Yue Светлана, APRN  •  ondansetron (ZOFRAN) tablet 4 mg, 4 mg, Oral, Q6H PRN **OR** ondansetron (ZOFRAN) injection 4 mg, 4 mg, Intravenous, Q6H PRN, Yue Светлана, APRN  •  PARoxetine (PAXIL) tablet 20 mg, 20 mg, Oral, Daily, Yue Светлана, APRN, 20 mg at 04/20/17 0827  •  Pharmacy Consult - [facility], , Does not apply, Daily, Vesta Ram [facility]  •  rivastigmine (EXELON) 9.5 MG/24HR patch 1 patch, 1 patch, Transdermal, Daily, Yue Светлана, APRN, 1 patch at 04/20/17 0828  •  sodium chloride 0.9 % flush 1-10 mL, 1-10 mL, Intravenous, PRN, Yue Светлана, APRN  •  sodium chloride 0.9 % flush 10 mL, 10 mL, Intravenous, PRN, Yvette Villanueva MD    Prescriptions Prior to Admission   Medication Sig Dispense Refill Last Dose   • cholecalciferol (VITAMIN D3) 1000 UNITS tablet Take 2,000 Units by mouth Daily.      • Omega-3 Fatty Acids (FISH OIL) 1200 MG capsule capsule Take 1,200 mg by mouth Daily.      • budesonide-formoterol (SYMBICORT) 160-4.5 MCG/ACT inhaler Inhale 2 puffs 2 (Two) Times a Day. 1 inhaler 11 Taking   • doxazosin (CARDURA) 1 MG tablet Take 1 mg by mouth Every Night.      • gabapentin (NEURONTIN) 100 MG capsule Take 1 capsule by mouth 2 (Two) Times a Day.   Taking   • lisinopril (PRINIVIL,ZESTRIL) 10 MG tablet Take 1 tablet by mouth Daily.   Taking   • loratadine (CLARITIN)  "10 MG tablet Take 10 mg by mouth Daily.   Taking   • memantine (NAMENDA) 10 MG tablet Take 1 tablet by mouth 2 (Two) Times a Day.   Taking   • montelukast (SINGULAIR) 10 MG tablet Take 1 tablet by mouth Every Night. 30 tablet 11 Taking   • nisoldipine (SULAR) 8.5 MG 24 hr tablet Take 1 tablet by mouth Daily.   Taking   • PARoxetine (PAXIL) 20 MG tablet Take 1 tablet by mouth Daily.   Taking   • PROAIR  (90 BASE) MCG/ACT inhaler INHALE TWO PUFFS BY MOUTH EVERY 4 TO 6 HOURS AS NEEDED 1 inhaler 11 Taking   • rivastigmine (EXELON) 9.5 MG/24HR patch Place 1 patch on the skin Daily.   Taking   • simvastatin (ZOCOR) 40 MG tablet Take 1 tablet by mouth Daily.   Taking       Review of Systems:    Constitutional-- No Fever, chills or sweats. No significant change in weight  Eye-- no diplopia, no conjunctivitis  ENT-- No new hearing or throat complaints.  No epistaxis or oral sores. No odynophagia or dysphagia. No headache, photophobia or neck stiffness.  CV-- No chest pain, palpitations, soa, or edema  Resp-- No SOB/cough/Hemoptysis  GI- No nausea, vomiting, or diarrhea.  No hematochezia, melena, or hematemesis.  -- No dysuria, hematuria, or flank pain. No lower tract obstructive symptoms  Skin-- No rash, warm and dry  Lymph- no painful or swollen lymph nodes in neck/axilla or groin.   Heme- No active bruising or bleeding; no Hx of DVT or PE.  MS-- no swelling or pain in the joints  Neuro-- No acute focal weakness or numbness in the arms or legs.  No seizures.  Psych--No anxiety or depression  Endo--No cold or heat intolerance.  No polyuria, polydipsia, or polyphagia    Full review of systems reviewed and negative otherwise for acute complaints    Physical Exam:   Vital Signs   Blood pressure 110/79, pulse 55, temperature 98.1 °F (36.7 °C), temperature source Oral, resp. rate 20, height 66\" (167.6 cm), weight 202 lb 3.2 oz (91.7 kg), SpO2 97 %.     GENERAL: Awake and alert, in no acute distress.   HEENT: " Normocephalic, atraumatic.  PER.  No conjunctivitis. No icterus. Oropharynx clear without evidence of thrush or exudate. No evidence of peridontal disease.    NECK: Supple without nuchal rigidity. No mass.  LYMPH: No painful cervical, axillary or inguinal lymphadenopathy.  HEART: RRR; No murmur, rubs, gallops. No bruits in neck, abdomen, or groins, no edema  LUNGS: Normal respiratory effort. Nonlabored. No dullness.  No crepitus.  Clear to auscultation bilaterally without wheezing, rales, rhonchi.  ABDOMEN: Soft, nontender, nondistended. Positive bowel sounds. No rebound or guarding. NO mass or HSM.  JOINTS:  Full range of motion, no redness or tenderness.  EXT:  No cyanosis, clubbing or edema.  :  External genitalia without swelling or lesion  SKIN: Warm and dry without rash  NEURO: Oriented to PPT. No focal neurological deficits. Strength equal bilateral  PSYCHIATRIC: Normal insight and judgement. Cooperative with PE    Laboratory Data    Results from last 7 days  Lab Units 04/19/17  0511 04/18/17  0757 04/17/17  1833   HEMOGLOBIN g/dL 10.4* 11.5* 11.7*   HEMATOCRIT % 34.2* 36.6* 38.0*       Results from last 7 days  Lab Units 04/20/17  0542 04/19/17  0511 04/18/17  0757 04/17/17  1731   SODIUM mmol/L 139 140 140 140   POTASSIUM mmol/L 4.1 3.9 4.1 4.7   CHLORIDE mmol/L 105 104 103 105   TOTAL CO2 mmol/L 28.0 28.0 29.0 28.0   BUN mg/dL 28* 26* 26* 22   CREATININE mg/dL 1.60* 1.80* 1.80* 1.60*   CALCIUM mg/dL 10.2 10.1 10.8* 10.6*   ALBUMIN g/dL  --   --  4.30 4.20       Results from last 7 days  Lab Units 04/20/17  0542   GLUCOSE mg/dL 87       Results from last 7 days  Lab Units 04/17/17  2230   COLOR UA  Yellow   CLARITY UA  Clear   PH, URINE  <=5.0   SPECIFIC GRAVITY, URINE  1.008   GLUCOSE UA  Negative   KETONES UA  Negative   BILIRUBIN UA  Negative   PROTEIN UA  Trace*   BLOOD UA  Negative   LEUKOCYTES UA  Negative   NITRITE UA  Negative       Results from last 7 days  Lab Units 04/18/17  0757   ALK PHOS U/L  84   BILIRUBIN mg/dL 0.5   ALT (SGPT) U/L 15   AST (SGOT) U/L 17     Estimated Creatinine Clearance: 44.9 mL/min (by C-G formula based on Cr of 1.6).    Radiology:      Renal Imaging:    I personally read  the radiographic studies    Impression:   BUBBA- Better  CKD; Stage 3- baseline cr around 1.4-1.9, last was 1.7  Anemia; Stable  HTN; In control  CAD; Possible elective CABG next week    PLAN: Thank you for asking us to see Magdi Arriaga, I recommend the following:   DC ace-i at this time- hx of BUBBA with ace-i-- I would avoid till he is done with his surgery  Ok for dc if medically stable till he has surgery    Tramaine Velarde MD  4/20/2017  12:35 PM

## 2017-04-20 NOTE — NURSING NOTE
CT Surgery Nurse Navigator    Reviewed chart and notes.  Met with patient and spouse re: upcoming CABG.  Reviewed CT Surgery booklet. Answered basic questions about post op expectations.   Will follow up after CABG and PRN.    Ling MINAYA

## 2017-04-20 NOTE — PROGRESS NOTES
Pt. Referred for Phase II Cardiac Rehab. Staff discussed benefits of exercise, program protocol, and educational material provided. Teach back verified.  Patient scheduled for orientation at [facility] on Tuesday, May 30th at 1030. Thank you for this referral.

## 2017-04-20 NOTE — PROGRESS NOTES
"  [facility] at [facility]   Inpatient Progress Note       LOS: 3 days   Patient Care Team:  Rod Colindres MD as PCP - General  Rod Colindres MD as PCP - Family Medicine    Chief Complaint:  Follow-up for NSTEMI    Subjective     Interval History:   No cardiac complaints no more chest pain    Review of Systems:   Pertinent positives noted in history, exam, and assessment. Otherwise reviewed and negative.      Objective     Vitals:  Blood pressure 145/77, pulse 53, temperature 98.2 °F (36.8 °C), temperature source Oral, resp. rate 20, height 66\" (167.6 cm), weight 202 lb 3.2 oz (91.7 kg), SpO2 93 %.     Intake/Output Summary (Last 24 hours) at 04/20/17 0803  Last data filed at 04/19/17 1054   Gross per 24 hour   Intake              360 ml   Output                0 ml   Net              360 ml        Physical Exam   Constitutional: He is oriented to person, place, and time. He appears well-developed and well-nourished.   Neck: No JVD present. No tracheal deviation present.   Cardiovascular: Normal rate, regular rhythm and normal heart sounds.  Exam reveals no friction rub.    No murmur heard.  Pulmonary/Chest: Effort normal and breath sounds normal. No respiratory distress.   Abdominal: Soft. Bowel sounds are normal. There is no tenderness.   Musculoskeletal: He exhibits no edema or deformity.   Neurological: He is alert and oriented to person, place, and time.   Skin: Skin is warm and dry.   Psychiatric: His behavior is normal. Thought content normal.     the patient and agree with the above findings  Results Review:     I reviewed the patient's new clinical results.      Results from last 7 days  Lab Units 04/19/17  0511   WBC 10*3/mm3 9.62   HEMOGLOBIN g/dL 10.4*   HEMATOCRIT % 34.2*   PLATELETS 10*3/mm3 224       Results from last 7 days  Lab Units 04/20/17  0542  04/18/17  0757   SODIUM mmol/L 139  < > 140   POTASSIUM mmol/L 4.1  < > 4.1   CHLORIDE mmol/L 105  < > 103   TOTAL CO2 mmol/L 28.0  < " > 29.0   BUN mg/dL 28*  < > 26*   CREATININE mg/dL 1.60*  < > 1.80*   CALCIUM mg/dL 10.2  < > 10.8*   BILIRUBIN mg/dL  --   --  0.5   ALK PHOS U/L  --   --  84   ALT (SGPT) U/L  --   --  15   AST (SGOT) U/L  --   --  17   GLUCOSE mg/dL 87  < > 98   < > = values in this interval not displayed.    Results from last 7 days  Lab Units 04/20/17  0542   SODIUM mmol/L 139   POTASSIUM mmol/L 4.1   CHLORIDE mmol/L 105   TOTAL CO2 mmol/L 28.0   BUN mg/dL 28*   CREATININE mg/dL 1.60*   GLUCOSE mg/dL 87   CALCIUM mg/dL 10.2       Results from last 7 days  Lab Units 04/18/17  0757 04/17/17  1658   INR  1.11 1.10       0  Lab Value Date/Time   TROPONINI 9.425 (C) 04/18/2017 0757   TROPONINI 7.403 (C) 04/17/2017 2340       Results from last 7 days  Lab Units 04/18/17  0757   TSH mIU/mL 1.945       Results from last 7 days  Lab Units 04/19/17  0511   CHOLESTEROL mg/dL 144   TRIGLYCERIDES mg/dL 99   HDL CHOL mg/dL 50         LHC:  IMPRESSION:  1. Occluded proximal LAD.  2. 80% large ostial first diagonal stenosis  3. Occluded mid left circumflex  4. 80% large OM1 stenosis  5. 80% mid RCA stenosis  6. LVEF 25    Tele:  SR    Assessment/Plan     Principal Problem:    NSTEMI (non-ST elevated myocardial infarction)  Active Problems:    Bronchiectasis    Dementia    Diastolic dysfunction    Dyslipidemia    Hypertension    JONNY (obstructive sleep apnea)    Acute on chronic congestive heart failure    Acute on chronic renal insufficiency    Left bundle branch block    Normocytic anemia    COPD (chronic obstructive pulmonary disease)      1. NSTEMI with MVCAD  2. Ischemic cardiomyopathy- EF 25% by LV gram  3. Hypertension  4. Dyslipidemia   5. CKD      Plan:  Start low dose ACE for cardiomyopathy. Still awaiting pulmonary evaluation. May possibly be discharged home prior to  CABG depending on when he can be put on the schedule per CTS. plan CABG, likely next week after pulmonary evaluation.  Necessary, patient is clinically stable from a  cardiovascular standpoint to be furloughed until CABG.    Sosa Decker, REI  04/20/17  8:03 AM    IKvng have reviewed the note in full and agree with all aspects of the above including physical exam, assessment, labs and plan with changes made accordingly.     Kvng Stauffer MD  04/20/17  10:19 AM          Please note that portions of this note may have been completed with a voice recognition program. Efforts were made to edit the dictations, but occasionally words are mistranscribed.

## 2017-04-21 ENCOUNTER — APPOINTMENT (OUTPATIENT)
Dept: PREADMISSION TESTING | Facility: HOSPITAL | Age: 72
End: 2017-04-21

## 2017-04-21 ENCOUNTER — PREP FOR SURGERY (OUTPATIENT)
Dept: CARDIAC SURGERY | Facility: CLINIC | Age: 72
End: 2017-04-21

## 2017-04-21 ENCOUNTER — APPOINTMENT (OUTPATIENT)
Dept: LAB | Facility: HOSPITAL | Age: 72
End: 2017-04-21

## 2017-04-21 ENCOUNTER — HOSPITAL ENCOUNTER (OUTPATIENT)
Dept: GENERAL RADIOLOGY | Facility: HOSPITAL | Age: 72
Discharge: HOME | End: 2017-04-21
Admitting: PHYSICIAN ASSISTANT

## 2017-04-21 ENCOUNTER — HOSPITAL ENCOUNTER (OUTPATIENT)
Dept: PULMONOLOGY | Facility: HOSPITAL | Age: 72
Discharge: HOME | End: 2017-04-21

## 2017-04-21 VITALS — OXYGEN SATURATION: 95 % | HEIGHT: 68 IN | BODY MASS INDEX: 32.91 KG/M2 | WEIGHT: 217.15 LBS

## 2017-04-21 DIAGNOSIS — I25.10 CAD IN NATIVE ARTERY: ICD-10-CM

## 2017-04-21 DIAGNOSIS — I25.10 CAD IN NATIVE ARTERY: Primary | ICD-10-CM

## 2017-04-21 LAB
ABO GROUP BLD: NORMAL
ALBUMIN SERPL-MCNC: 4.1 G/DL (ref 3.2–4.8)
ALBUMIN/GLOB SERPL: 1.2 G/DL (ref 1.5–2.5)
ALP SERPL-CCNC: 84 U/L (ref 25–100)
ALT SERPL W P-5'-P-CCNC: 14 U/L (ref 7–40)
AMPHET+METHAMPHET UR QL: NEGATIVE
AMPHETAMINES UR QL: NEGATIVE
ANION GAP SERPL CALCULATED.3IONS-SCNC: 9 MMOL/L (ref 3–11)
APTT PPP: <24 SECONDS (ref 24–31)
AST SERPL-CCNC: 14 U/L (ref 0–33)
BARBITURATES UR QL SCN: NEGATIVE
BASOPHILS # BLD AUTO: 0.03 10*3/MM3 (ref 0–0.2)
BASOPHILS NFR BLD AUTO: 0.3 % (ref 0–1)
BENZODIAZ UR QL SCN: NEGATIVE
BILIRUB SERPL-MCNC: 0.3 MG/DL (ref 0.3–1.2)
BLD GP AB SCN SERPL QL: NEGATIVE
BUN BLD-MCNC: 33 MG/DL (ref 9–23)
BUN/CREAT SERPL: 20.6 (ref 7–25)
BUPRENORPHINE SERPL-MCNC: NEGATIVE NG/ML
CALCIUM SPEC-SCNC: 10 MG/DL (ref 8.7–10.4)
CANNABINOIDS SERPL QL: NEGATIVE
CHLORIDE SERPL-SCNC: 104 MMOL/L (ref 99–109)
CO2 SERPL-SCNC: 24 MMOL/L (ref 20–31)
COCAINE UR QL: NEGATIVE
CREAT BLD-MCNC: 1.6 MG/DL (ref 0.6–1.3)
DEPRECATED RDW RBC AUTO: 51.7 FL (ref 37–54)
EOSINOPHIL # BLD AUTO: 0.34 10*3/MM3 (ref 0.1–0.3)
EOSINOPHIL NFR BLD AUTO: 3 % (ref 0–3)
ERYTHROCYTE [DISTWIDTH] IN BLOOD BY AUTOMATED COUNT: 15.4 % (ref 11.3–14.5)
GFR SERPL CREATININE-BSD FRML MDRD: 43 ML/MIN/1.73
GLOBULIN UR ELPH-MCNC: 3.4 GM/DL
GLUCOSE BLD-MCNC: 109 MG/DL (ref 70–100)
HCT VFR BLD AUTO: 35.4 % (ref 38.9–50.9)
HGB BLD-MCNC: 10.9 G/DL (ref 13.1–17.5)
IMM GRANULOCYTES # BLD: 0.01 10*3/MM3 (ref 0–0.03)
IMM GRANULOCYTES NFR BLD: 0.1 % (ref 0–0.6)
INR PPP: 1.04
LYMPHOCYTES # BLD AUTO: 0.93 10*3/MM3 (ref 0.6–4.8)
LYMPHOCYTES NFR BLD AUTO: 8.2 % (ref 24–44)
MAGNESIUM SERPL-MCNC: 2.2 MG/DL (ref 1.3–2.7)
MCH RBC QN AUTO: 28.2 PG (ref 27–31)
MCHC RBC AUTO-ENTMCNC: 30.8 G/DL (ref 32–36)
MCV RBC AUTO: 91.5 FL (ref 80–99)
METHADONE UR QL SCN: NEGATIVE
MONOCYTES # BLD AUTO: 0.72 10*3/MM3 (ref 0–1)
MONOCYTES NFR BLD AUTO: 6.3 % (ref 0–12)
NEUTROPHILS # BLD AUTO: 9.38 10*3/MM3 (ref 1.5–8.3)
NEUTROPHILS NFR BLD AUTO: 82.1 % (ref 41–71)
OPIATES UR QL: NEGATIVE
OXYCODONE UR QL SCN: NEGATIVE
PCP UR QL SCN: NEGATIVE
PLATELET # BLD AUTO: 247 10*3/MM3 (ref 150–450)
PMV BLD AUTO: 10.9 FL (ref 6–12)
POTASSIUM BLD-SCNC: 4.4 MMOL/L (ref 3.5–5.5)
PROPOXYPH UR QL: NEGATIVE
PROT SERPL-MCNC: 7.5 G/DL (ref 5.7–8.2)
PROTHROMBIN TIME: 11.4 SECONDS (ref 9.6–11.5)
RBC # BLD AUTO: 3.87 10*6/MM3 (ref 4.2–5.76)
RH BLD: NEGATIVE
SODIUM BLD-SCNC: 137 MMOL/L (ref 132–146)
TRICYCLICS UR QL SCN: NEGATIVE
WBC NRBC COR # BLD: 11.41 10*3/MM3 (ref 3.5–10.8)

## 2017-04-21 PROCEDURE — 94010 BREATHING CAPACITY TEST: CPT | Performed by: INTERNAL MEDICINE

## 2017-04-21 RX ORDER — CHLORHEXIDINE GLUCONATE 0.12 MG/ML
15 RINSE ORAL ONCE
Status: CANCELLED | OUTPATIENT
Start: 2017-04-21 | End: 2017-04-21

## 2017-04-21 RX ORDER — NITROGLYCERIN 0.4 MG/1
0.4 TABLET SUBLINGUAL
Status: CANCELLED | OUTPATIENT
Start: 2017-04-21

## 2017-04-21 RX ORDER — ASPIRIN 325 MG
325 TABLET ORAL NIGHTLY
Status: SHIPPED | OUTPATIENT
Start: 2017-04-21 | End: 2017-04-22

## 2017-04-21 RX ORDER — CHLORHEXIDINE GLUCONATE 500 MG/1
1 CLOTH TOPICAL EVERY 12 HOURS PRN
Status: CANCELLED | OUTPATIENT
Start: 2017-04-21

## 2017-04-21 RX ORDER — ACETAMINOPHEN 325 MG/1
650 TABLET ORAL EVERY 4 HOURS PRN
Status: CANCELLED | OUTPATIENT
Start: 2017-04-21

## 2017-04-21 RX ORDER — ALBUTEROL SULFATE 90 UG/1
2 AEROSOL, METERED RESPIRATORY (INHALATION) EVERY 4 HOURS PRN
COMMUNITY
End: 2017-01-01 | Stop reason: HOSPADM

## 2017-04-21 RX ORDER — CHLORHEXIDINE GLUCONATE 500 MG/1
1 CLOTH TOPICAL EVERY 12 HOURS PRN
Status: DISCONTINUED | OUTPATIENT
Start: 2017-04-21 | End: 2017-05-22

## 2017-04-21 RX ORDER — ASPIRIN 325 MG
325 TABLET ORAL NIGHTLY
Status: CANCELLED | OUTPATIENT
Start: 2017-04-21 | End: 2017-04-22

## 2017-04-23 ENCOUNTER — APPOINTMENT (OUTPATIENT)
Dept: PREADMISSION TESTING | Facility: HOSPITAL | Age: 72
End: 2017-04-23

## 2017-04-24 ENCOUNTER — ANESTHESIA EVENT (OUTPATIENT)
Dept: PERIOP | Facility: HOSPITAL | Age: 72
End: 2017-04-24

## 2017-04-24 ENCOUNTER — HOSPITAL ENCOUNTER (INPATIENT)
Facility: HOSPITAL | Age: 72
LOS: 8 days | Discharge: NURSING, REHAB OR LONG-TERM CARE | End: 2017-05-02
Attending: THORACIC SURGERY (CARDIOTHORACIC VASCULAR SURGERY) | Admitting: THORACIC SURGERY (CARDIOTHORACIC VASCULAR SURGERY)

## 2017-04-24 ENCOUNTER — ANESTHESIA (OUTPATIENT)
Dept: PERIOP | Facility: HOSPITAL | Age: 72
End: 2017-04-24

## 2017-04-24 ENCOUNTER — APPOINTMENT (OUTPATIENT)
Dept: GENERAL RADIOLOGY | Facility: HOSPITAL | Age: 72
End: 2017-04-24

## 2017-04-24 DIAGNOSIS — Z74.09 IMPAIRED FUNCTIONAL MOBILITY, BALANCE, GAIT, AND ENDURANCE: Primary | ICD-10-CM

## 2017-04-24 DIAGNOSIS — I10 ESSENTIAL HYPERTENSION: ICD-10-CM

## 2017-04-24 DIAGNOSIS — I25.10 CAD IN NATIVE ARTERY: ICD-10-CM

## 2017-04-24 DIAGNOSIS — I15.0 RENOVASCULAR HYPERTENSION: ICD-10-CM

## 2017-04-24 PROBLEM — I25.5 ISCHEMIC CARDIOMYOPATHY: Status: ACTIVE | Noted: 2017-04-24

## 2017-04-24 LAB
ABO GROUP BLD: NORMAL
ACT BLD: 131 SECONDS (ref 82–152)
ACT BLD: 131 SECONDS (ref 82–152)
ACT BLD: 152 SECONDS (ref 82–152)
ACT BLD: 477 SECONDS (ref 82–152)
ACT BLD: 497 SECONDS (ref 82–152)
ACT BLD: 590 SECONDS (ref 82–152)
ACT BLD: 616 SECONDS (ref 82–152)
ALBUMIN SERPL-MCNC: 3.1 G/DL (ref 3.2–4.8)
ALBUMIN SERPL-MCNC: 3.8 G/DL (ref 3.2–4.8)
ANION GAP SERPL CALCULATED.3IONS-SCNC: 6 MMOL/L (ref 3–11)
ANION GAP SERPL CALCULATED.3IONS-SCNC: 7 MMOL/L (ref 3–11)
APTT PPP: 27.8 SECONDS (ref 24–31)
ARTERIAL PATENCY WRIST A: ABNORMAL
ARTERIAL PATENCY WRIST A: ABNORMAL
ATMOSPHERIC PRESS: ABNORMAL MMHG
ATMOSPHERIC PRESS: ABNORMAL MMHG
BACTERIA UR QL AUTO: ABNORMAL /HPF
BASE EXCESS BLDA CALC-SCNC: -2.2 MMOL/L (ref 0–2)
BASE EXCESS BLDA CALC-SCNC: -3.5 MMOL/L (ref 0–2)
BASE EXCESS BLDA CALC-SCNC: -4 MMOL/L (ref -5–5)
BASE EXCESS BLDA CALC-SCNC: -4 MMOL/L (ref -5–5)
BASE EXCESS BLDA CALC-SCNC: -5 MMOL/L (ref -5–5)
BASE EXCESS BLDA CALC-SCNC: -6 MMOL/L (ref -5–5)
BASE EXCESS BLDA CALC-SCNC: 2 MMOL/L (ref -5–5)
BASE EXCESS BLDA CALC-SCNC: 3 MMOL/L (ref -5–5)
BDY SITE: ABNORMAL
BDY SITE: ABNORMAL
BILIRUB UR QL STRIP: NEGATIVE
BLD GP AB SCN SERPL QL: NEGATIVE
BUN BLD-MCNC: 29 MG/DL (ref 9–23)
BUN BLD-MCNC: 30 MG/DL (ref 9–23)
BUN/CREAT SERPL: 17.1 (ref 7–25)
BUN/CREAT SERPL: 20 (ref 7–25)
CA-I BLDA-SCNC: 1.11 MMOL/L (ref 1.2–1.32)
CA-I BLDA-SCNC: 1.11 MMOL/L (ref 1.2–1.32)
CA-I BLDA-SCNC: 1.13 MMOL/L (ref 1.2–1.32)
CA-I BLDA-SCNC: 1.24 MMOL/L (ref 1.2–1.32)
CA-I BLDA-SCNC: 1.39 MMOL/L (ref 1.2–1.32)
CA-I BLDA-SCNC: 1.43 MMOL/L (ref 1.2–1.32)
CA-I SERPL ISE-MCNC: 1.48 MMOL/L (ref 1.12–1.32)
CALCIUM SPEC-SCNC: 10.5 MG/DL (ref 8.7–10.4)
CALCIUM SPEC-SCNC: 10.8 MG/DL (ref 8.7–10.4)
CHLORIDE SERPL-SCNC: 110 MMOL/L (ref 99–109)
CHLORIDE SERPL-SCNC: 112 MMOL/L (ref 99–109)
CLARITY UR: ABNORMAL
CO2 BLDA-SCNC: 21 MMOL/L (ref 24–29)
CO2 BLDA-SCNC: 22 MMOL/L (ref 24–29)
CO2 BLDA-SCNC: 23 MMOL/L (ref 24–29)
CO2 BLDA-SCNC: 23 MMOL/L (ref 24–29)
CO2 BLDA-SCNC: 23.9 MMOL/L (ref 22–33)
CO2 BLDA-SCNC: 24.1 MMOL/L (ref 22–33)
CO2 BLDA-SCNC: 28 MMOL/L (ref 24–29)
CO2 BLDA-SCNC: 29 MMOL/L (ref 24–29)
CO2 SERPL-SCNC: 23 MMOL/L (ref 20–31)
CO2 SERPL-SCNC: 27 MMOL/L (ref 20–31)
COHGB MFR BLD: 0.9 % (ref 0–2)
COHGB MFR BLD: 1.2 % (ref 0–2)
COLOR UR: YELLOW
CREAT BLD-MCNC: 1.5 MG/DL (ref 0.6–1.3)
CREAT BLD-MCNC: 1.7 MG/DL (ref 0.6–1.3)
DEPRECATED RDW RBC AUTO: 48.3 FL (ref 37–54)
DEPRECATED RDW RBC AUTO: 49.2 FL (ref 37–54)
ERYTHROCYTE [DISTWIDTH] IN BLOOD BY AUTOMATED COUNT: 14.7 % (ref 11.3–14.5)
ERYTHROCYTE [DISTWIDTH] IN BLOOD BY AUTOMATED COUNT: 15.1 % (ref 11.3–14.5)
GFR SERPL CREATININE-BSD FRML MDRD: 40 ML/MIN/1.73
GFR SERPL CREATININE-BSD FRML MDRD: 46 ML/MIN/1.73
GLUCOSE BLD-MCNC: 116 MG/DL (ref 70–100)
GLUCOSE BLD-MCNC: 168 MG/DL (ref 70–100)
GLUCOSE BLDC GLUCOMTR-MCNC: 111 MG/DL (ref 70–130)
GLUCOSE BLDC GLUCOMTR-MCNC: 129 MG/DL (ref 70–130)
GLUCOSE BLDC GLUCOMTR-MCNC: 139 MG/DL (ref 70–130)
GLUCOSE BLDC GLUCOMTR-MCNC: 139 MG/DL (ref 70–130)
GLUCOSE BLDC GLUCOMTR-MCNC: 150 MG/DL (ref 70–130)
GLUCOSE BLDC GLUCOMTR-MCNC: 154 MG/DL (ref 70–130)
GLUCOSE BLDC GLUCOMTR-MCNC: 165 MG/DL (ref 70–130)
GLUCOSE BLDC GLUCOMTR-MCNC: 166 MG/DL (ref 70–130)
GLUCOSE BLDC GLUCOMTR-MCNC: 172 MG/DL (ref 70–130)
GLUCOSE BLDC GLUCOMTR-MCNC: 92 MG/DL (ref 70–130)
GLUCOSE UR STRIP-MCNC: NEGATIVE MG/DL
HCO3 BLDA-SCNC: 19.8 MMOL/L (ref 22–26)
HCO3 BLDA-SCNC: 20.3 MMOL/L (ref 22–26)
HCO3 BLDA-SCNC: 21.8 MMOL/L (ref 22–26)
HCO3 BLDA-SCNC: 21.9 MMOL/L (ref 22–26)
HCO3 BLDA-SCNC: 22.7 MMOL/L (ref 20–26)
HCO3 BLDA-SCNC: 22.7 MMOL/L (ref 20–26)
HCO3 BLDA-SCNC: 26.4 MMOL/L (ref 22–26)
HCO3 BLDA-SCNC: 27.4 MMOL/L (ref 22–26)
HCT VFR BLD AUTO: 29.2 % (ref 38.9–50.9)
HCT VFR BLD AUTO: 32.3 % (ref 38.9–50.9)
HCT VFR BLD CALC: 30.3 %
HCT VFR BLD CALC: 34.9 %
HCT VFR BLDA CALC: 20 % (ref 38–51)
HCT VFR BLDA CALC: 26 % (ref 38–51)
HCT VFR BLDA CALC: 29 % (ref 38–51)
HCT VFR BLDA CALC: 32 % (ref 38–51)
HGB BLD-MCNC: 10.4 G/DL (ref 13.1–17.5)
HGB BLD-MCNC: 9.5 G/DL (ref 13.1–17.5)
HGB BLDA-MCNC: 10.9 G/DL (ref 12–17)
HGB BLDA-MCNC: 11.4 G/DL (ref 13.5–17.5)
HGB BLDA-MCNC: 6.8 G/DL (ref 12–17)
HGB BLDA-MCNC: 8.8 G/DL (ref 12–17)
HGB BLDA-MCNC: 9.9 G/DL (ref 12–17)
HGB BLDA-MCNC: 9.9 G/DL (ref 13.5–17.5)
HGB UR QL STRIP.AUTO: ABNORMAL
HOROWITZ INDEX BLD+IHG-RTO: 100 %
HOROWITZ INDEX BLD+IHG-RTO: 45 %
HYALINE CASTS UR QL AUTO: ABNORMAL /LPF
INR PPP: 0.99
KETONES UR QL STRIP: NEGATIVE
LEUKOCYTE ESTERASE UR QL STRIP.AUTO: NEGATIVE
MAGNESIUM SERPL-MCNC: 4.1 MG/DL (ref 1.3–2.7)
MAGNESIUM SERPL-MCNC: 4.4 MG/DL (ref 1.3–2.7)
MCH RBC QN AUTO: 28.8 PG (ref 27–31)
MCH RBC QN AUTO: 29.2 PG (ref 27–31)
MCHC RBC AUTO-ENTMCNC: 32.2 G/DL (ref 32–36)
MCHC RBC AUTO-ENTMCNC: 32.5 G/DL (ref 32–36)
MCV RBC AUTO: 89.5 FL (ref 80–99)
MCV RBC AUTO: 89.8 FL (ref 80–99)
METHGB BLD QL: 1.2 % (ref 0–1.5)
METHGB BLD QL: 1.4 % (ref 0–1.5)
MODALITY: ABNORMAL
MODALITY: ABNORMAL
NITRITE UR QL STRIP: NEGATIVE
OXYHGB MFR BLDV: 92.8 % (ref 94–99)
OXYHGB MFR BLDV: 97.7 % (ref 94–99)
PCO2 BLDA: 32.3 MM HG (ref 35–45)
PCO2 BLDA: 38.5 MM HG (ref 35–48)
PCO2 BLDA: 39.1 MM HG (ref 35–45)
PCO2 BLDA: 40.4 MM HG (ref 35–45)
PCO2 BLDA: 40.5 MM HG (ref 35–45)
PCO2 BLDA: 42.6 MM HG (ref 35–45)
PCO2 BLDA: 42.8 MM HG (ref 35–45)
PCO2 BLDA: 46.6 MM HG (ref 35–48)
PH BLDA: 7.3 PH UNITS (ref 7.35–7.45)
PH BLDA: 7.31 PH UNITS (ref 7.35–7.6)
PH BLDA: 7.32 PH UNITS (ref 7.35–7.6)
PH BLDA: 7.35 PH UNITS (ref 7.35–7.6)
PH BLDA: 7.38 PH UNITS (ref 7.35–7.45)
PH BLDA: 7.4 PH UNITS (ref 7.35–7.6)
PH BLDA: 7.42 PH UNITS (ref 7.35–7.6)
PH BLDA: 7.42 PH UNITS (ref 7.35–7.6)
PH UR STRIP.AUTO: 5.5 [PH] (ref 5–8)
PHOSPHATE SERPL-MCNC: 2.8 MG/DL (ref 2.4–5.1)
PHOSPHATE SERPL-MCNC: 3 MG/DL (ref 2.4–5.1)
PLATELET # BLD AUTO: 176 10*3/MM3 (ref 150–450)
PLATELET # BLD AUTO: 177 10*3/MM3 (ref 150–450)
PMV BLD AUTO: 11 FL (ref 6–12)
PMV BLD AUTO: 11 FL (ref 6–12)
PO2 BLDA: 114 MMHG (ref 80–105)
PO2 BLDA: 180 MMHG (ref 80–105)
PO2 BLDA: 187 MMHG (ref 80–105)
PO2 BLDA: 270 MM HG (ref 83–108)
PO2 BLDA: 39 MMHG (ref 80–105)
PO2 BLDA: 502 MMHG (ref 80–105)
PO2 BLDA: 532 MMHG (ref 80–105)
PO2 BLDA: 78.7 MM HG (ref 83–108)
POTASSIUM BLD-SCNC: 4.2 MMOL/L (ref 3.5–5.5)
POTASSIUM BLD-SCNC: 4.8 MMOL/L (ref 3.5–5.5)
POTASSIUM BLDA-SCNC: 4.2 MMOL/L (ref 3.5–4.9)
POTASSIUM BLDA-SCNC: 4.2 MMOL/L (ref 3.5–4.9)
POTASSIUM BLDA-SCNC: 4.6 MMOL/L (ref 3.5–4.9)
POTASSIUM BLDA-SCNC: 5.3 MMOL/L (ref 3.5–4.9)
POTASSIUM BLDA-SCNC: 5.7 MMOL/L (ref 3.5–4.9)
POTASSIUM BLDA-SCNC: 5.7 MMOL/L (ref 3.5–4.9)
PROT UR QL STRIP: ABNORMAL
PROTHROMBIN TIME: 10.8 SECONDS (ref 9.6–11.5)
RBC # BLD AUTO: 3.25 10*6/MM3 (ref 4.2–5.76)
RBC # BLD AUTO: 3.61 10*6/MM3 (ref 4.2–5.76)
RBC # UR: ABNORMAL /HPF
REF LAB TEST METHOD: ABNORMAL
RH BLD: NEGATIVE
SAO2 % BLDA: 100 % (ref 95–98)
SAO2 % BLDA: 71 % (ref 95–98)
SAO2 % BLDA: 98 % (ref 95–98)
SAO2 % BLDA: 99 % (ref 95–98)
SAO2 % BLDCOA: 92.8 %
SODIUM BLD-SCNC: 142 MMOL/L (ref 132–146)
SODIUM BLD-SCNC: 143 MMOL/L (ref 132–146)
SODIUM BLDA-SCNC: 138 MMOL/L (ref 138–146)
SODIUM BLDA-SCNC: 139 MMOL/L (ref 138–146)
SODIUM BLDA-SCNC: 139 MMOL/L (ref 138–146)
SODIUM BLDA-SCNC: 140 MMOL/L (ref 138–146)
SODIUM BLDA-SCNC: 142 MMOL/L (ref 138–146)
SODIUM BLDA-SCNC: 144 MMOL/L (ref 138–146)
SP GR UR STRIP: 1.01 (ref 1–1.03)
SPERM URNS QL MICRO: ABNORMAL /HPF
SQUAMOUS #/AREA URNS HPF: ABNORMAL /HPF
UROBILINOGEN UR QL STRIP: ABNORMAL
WBC NRBC COR # BLD: 20.01 10*3/MM3 (ref 3.5–10.8)
WBC NRBC COR # BLD: 20.64 10*3/MM3 (ref 3.5–10.8)
WBC UR QL AUTO: ABNORMAL /HPF

## 2017-04-24 PROCEDURE — P9016 RBC LEUKOCYTES REDUCED: HCPCS

## 2017-04-24 PROCEDURE — 87086 URINE CULTURE/COLONY COUNT: CPT | Performed by: PHYSICIAN ASSISTANT

## 2017-04-24 PROCEDURE — 33508 ENDOSCOPIC VEIN HARVEST: CPT | Performed by: THORACIC SURGERY (CARDIOTHORACIC VASCULAR SURGERY)

## 2017-04-24 PROCEDURE — 81001 URINALYSIS AUTO W/SCOPE: CPT | Performed by: PHYSICIAN ASSISTANT

## 2017-04-24 PROCEDURE — 86900 BLOOD TYPING SEROLOGIC ABO: CPT | Performed by: THORACIC SURGERY (CARDIOTHORACIC VASCULAR SURGERY)

## 2017-04-24 PROCEDURE — 84132 ASSAY OF SERUM POTASSIUM: CPT

## 2017-04-24 PROCEDURE — 82805 BLOOD GASES W/O2 SATURATION: CPT | Performed by: PHYSICIAN ASSISTANT

## 2017-04-24 PROCEDURE — 36430 TRANSFUSION BLD/BLD COMPNT: CPT

## 2017-04-24 PROCEDURE — 85730 THROMBOPLASTIN TIME PARTIAL: CPT | Performed by: PHYSICIAN ASSISTANT

## 2017-04-24 PROCEDURE — 33533 CABG ARTERIAL SINGLE: CPT | Performed by: PHYSICIAN ASSISTANT

## 2017-04-24 PROCEDURE — 25010000002 ALBUMIN HUMAN 5% PER 50 ML: Performed by: PHYSICIAN ASSISTANT

## 2017-04-24 PROCEDURE — 94799 UNLISTED PULMONARY SVC/PX: CPT

## 2017-04-24 PROCEDURE — 80069 RENAL FUNCTION PANEL: CPT | Performed by: PHYSICIAN ASSISTANT

## 2017-04-24 PROCEDURE — C1751 CATH, INF, PER/CENT/MIDLINE: HCPCS | Performed by: ANESTHESIOLOGY

## 2017-04-24 PROCEDURE — 85610 PROTHROMBIN TIME: CPT | Performed by: PHYSICIAN ASSISTANT

## 2017-04-24 PROCEDURE — 25010000002 CEFUROXIME PER 750 MG: Performed by: PHYSICIAN ASSISTANT

## 2017-04-24 PROCEDURE — 25010000002 POTASSIUM CHLORIDE PER 2 MEQ OF POTASSIUM

## 2017-04-24 PROCEDURE — 25010000002 MIDAZOLAM PER 1 MG: Performed by: ANESTHESIOLOGY

## 2017-04-24 PROCEDURE — 84295 ASSAY OF SERUM SODIUM: CPT

## 2017-04-24 PROCEDURE — 86850 RBC ANTIBODY SCREEN: CPT | Performed by: THORACIC SURGERY (CARDIOTHORACIC VASCULAR SURGERY)

## 2017-04-24 PROCEDURE — 25010000002 ALBUMIN HUMAN 5% PER 50 ML

## 2017-04-24 PROCEDURE — 83735 ASSAY OF MAGNESIUM: CPT | Performed by: PHYSICIAN ASSISTANT

## 2017-04-24 PROCEDURE — 85027 COMPLETE CBC AUTOMATED: CPT | Performed by: PHYSICIAN ASSISTANT

## 2017-04-24 PROCEDURE — 86920 COMPATIBILITY TEST SPIN: CPT

## 2017-04-24 PROCEDURE — 85347 COAGULATION TIME ACTIVATED: CPT

## 2017-04-24 PROCEDURE — 33533 CABG ARTERIAL SINGLE: CPT | Performed by: THORACIC SURGERY (CARDIOTHORACIC VASCULAR SURGERY)

## 2017-04-24 PROCEDURE — 86900 BLOOD TYPING SEROLOGIC ABO: CPT

## 2017-04-24 PROCEDURE — 25010000002 FUROSEMIDE PER 20 MG

## 2017-04-24 PROCEDURE — 0213093 BYPASS CORONARY ARTERY, FOUR OR MORE ARTERIES FROM CORONARY ARTERY WITH AUTOLOGOUS VENOUS TISSUE, OPEN APPROACH: ICD-10-PCS | Performed by: THORACIC SURGERY (CARDIOTHORACIC VASCULAR SURGERY)

## 2017-04-24 PROCEDURE — 94002 VENT MGMT INPAT INIT DAY: CPT

## 2017-04-24 PROCEDURE — 82330 ASSAY OF CALCIUM: CPT

## 2017-04-24 PROCEDURE — 85014 HEMATOCRIT: CPT

## 2017-04-24 PROCEDURE — C1729 CATH, DRAINAGE: HCPCS | Performed by: THORACIC SURGERY (CARDIOTHORACIC VASCULAR SURGERY)

## 2017-04-24 PROCEDURE — 25010000002 PROTAMINE SULFATE PER 10 MG: Performed by: ANESTHESIOLOGY

## 2017-04-24 PROCEDURE — 25010000002 PROTAMINE SULFATE PER 10 MG

## 2017-04-24 PROCEDURE — 02100Z9 BYPASS CORONARY ARTERY, ONE ARTERY FROM LEFT INTERNAL MAMMARY, OPEN APPROACH: ICD-10-PCS | Performed by: THORACIC SURGERY (CARDIOTHORACIC VASCULAR SURGERY)

## 2017-04-24 PROCEDURE — 93005 ELECTROCARDIOGRAM TRACING: CPT | Performed by: PHYSICIAN ASSISTANT

## 2017-04-24 PROCEDURE — P9041 ALBUMIN (HUMAN),5%, 50ML: HCPCS | Performed by: PHYSICIAN ASSISTANT

## 2017-04-24 PROCEDURE — 99291 CRITICAL CARE FIRST HOUR: CPT | Performed by: INTERNAL MEDICINE

## 2017-04-24 PROCEDURE — 25010000002 PHENYLEPHRINE PER 1 ML

## 2017-04-24 PROCEDURE — 82330 ASSAY OF CALCIUM: CPT | Performed by: PHYSICIAN ASSISTANT

## 2017-04-24 PROCEDURE — 25010000002 HEPARIN (PORCINE) PER 1000 UNITS

## 2017-04-24 PROCEDURE — P9035 PLATELET PHERES LEUKOREDUCED: HCPCS

## 2017-04-24 PROCEDURE — 25810000003 DEXTROSE 5 % WITH KCL 20 MEQ 20-5 MEQ/L-% SOLUTION: Performed by: PHYSICIAN ASSISTANT

## 2017-04-24 PROCEDURE — 25010000002 MORPHINE SULFATE (PF) 2 MG/ML SOLUTION: Performed by: THORACIC SURGERY (CARDIOTHORACIC VASCULAR SURGERY)

## 2017-04-24 PROCEDURE — 25010000002 AMIODARONE PER 30 MG

## 2017-04-24 PROCEDURE — 25010000002 PROPOFOL 1000 MG/ML EMULSION: Performed by: PHYSICIAN ASSISTANT

## 2017-04-24 PROCEDURE — 25010000002 PROPOFOL 10 MG/ML EMULSION: Performed by: ANESTHESIOLOGY

## 2017-04-24 PROCEDURE — 82803 BLOOD GASES ANY COMBINATION: CPT

## 2017-04-24 PROCEDURE — 25010000002 CEFUROXIME: Performed by: PHYSICIAN ASSISTANT

## 2017-04-24 PROCEDURE — 25010000002 VANCOMYCIN PER 500 MG: Performed by: THORACIC SURGERY (CARDIOTHORACIC VASCULAR SURGERY)

## 2017-04-24 PROCEDURE — C1713 ANCHOR/SCREW BN/BN,TIS/BN: HCPCS | Performed by: THORACIC SURGERY (CARDIOTHORACIC VASCULAR SURGERY)

## 2017-04-24 PROCEDURE — P9041 ALBUMIN (HUMAN),5%, 50ML: HCPCS

## 2017-04-24 PROCEDURE — B24BZZ4 ULTRASONOGRAPHY OF HEART WITH AORTA, TRANSESOPHAGEAL: ICD-10-PCS | Performed by: ANESTHESIOLOGY

## 2017-04-24 PROCEDURE — 25010000002 HEPARIN (PORCINE) PER 1000 UNITS: Performed by: ANESTHESIOLOGY

## 2017-04-24 PROCEDURE — 71010 HC CHEST PA OR AP: CPT

## 2017-04-24 PROCEDURE — 82947 ASSAY GLUCOSE BLOOD QUANT: CPT

## 2017-04-24 PROCEDURE — C1894 INTRO/SHEATH, NON-LASER: HCPCS | Performed by: THORACIC SURGERY (CARDIOTHORACIC VASCULAR SURGERY)

## 2017-04-24 PROCEDURE — 33521 CABG ARTERY-VEIN FOUR: CPT | Performed by: THORACIC SURGERY (CARDIOTHORACIC VASCULAR SURGERY)

## 2017-04-24 PROCEDURE — 93010 ELECTROCARDIOGRAM REPORT: CPT | Performed by: INTERNAL MEDICINE

## 2017-04-24 PROCEDURE — 25010000002 MANNITOL PER 50 ML

## 2017-04-24 PROCEDURE — 33521 CABG ARTERY-VEIN FOUR: CPT | Performed by: PHYSICIAN ASSISTANT

## 2017-04-24 PROCEDURE — 25010000002 DOPAMINE PER 40 MG

## 2017-04-24 PROCEDURE — 25010000002 INSULIN REGULAR HUMAN PER 5 UNITS: Performed by: PHYSICIAN ASSISTANT

## 2017-04-24 PROCEDURE — 82805 BLOOD GASES W/O2 SATURATION: CPT | Performed by: THORACIC SURGERY (CARDIOTHORACIC VASCULAR SURGERY)

## 2017-04-24 PROCEDURE — 86901 BLOOD TYPING SEROLOGIC RH(D): CPT | Performed by: THORACIC SURGERY (CARDIOTHORACIC VASCULAR SURGERY)

## 2017-04-24 PROCEDURE — 5A1221Z PERFORMANCE OF CARDIAC OUTPUT, CONTINUOUS: ICD-10-PCS | Performed by: THORACIC SURGERY (CARDIOTHORACIC VASCULAR SURGERY)

## 2017-04-24 PROCEDURE — 25010000002 MAGNESIUM SULFATE PER 500 MG OF MAGNESIUM

## 2017-04-24 PROCEDURE — 06BP0ZZ EXCISION OF RIGHT SAPHENOUS VEIN, OPEN APPROACH: ICD-10-PCS | Performed by: THORACIC SURGERY (CARDIOTHORACIC VASCULAR SURGERY)

## 2017-04-24 RX ORDER — MAGNESIUM SULFATE HEPTAHYDRATE 40 MG/ML
2 INJECTION, SOLUTION INTRAVENOUS AS NEEDED
Status: DISCONTINUED | OUTPATIENT
Start: 2017-04-24 | End: 2017-04-27

## 2017-04-24 RX ORDER — POTASSIUM CHLORIDE 29.8 MG/ML
20 INJECTION INTRAVENOUS
Status: DISCONTINUED | OUTPATIENT
Start: 2017-04-24 | End: 2017-04-27

## 2017-04-24 RX ORDER — DEXMEDETOMIDINE HYDROCHLORIDE 4 UG/ML
.2-1.5 INJECTION, SOLUTION INTRAVENOUS
Status: DISCONTINUED | OUTPATIENT
Start: 2017-04-24 | End: 2017-04-27

## 2017-04-24 RX ORDER — PROTAMINE SULFATE 10 MG/ML
INJECTION, SOLUTION INTRAVENOUS
Status: COMPLETED
Start: 2017-04-24 | End: 2017-04-24

## 2017-04-24 RX ORDER — BISACODYL 5 MG/1
10 TABLET, DELAYED RELEASE ORAL DAILY PRN
Status: DISCONTINUED | OUTPATIENT
Start: 2017-04-24 | End: 2017-05-02 | Stop reason: HOSPADM

## 2017-04-24 RX ORDER — HEPARIN SODIUM 1000 [USP'U]/ML
INJECTION, SOLUTION INTRAVENOUS; SUBCUTANEOUS AS NEEDED
Status: DISCONTINUED | OUTPATIENT
Start: 2017-04-24 | End: 2017-04-24 | Stop reason: SURG

## 2017-04-24 RX ORDER — PROPOFOL 10 MG/ML
VIAL (ML) INTRAVENOUS AS NEEDED
Status: DISCONTINUED | OUTPATIENT
Start: 2017-04-24 | End: 2017-04-24 | Stop reason: SURG

## 2017-04-24 RX ORDER — FAMOTIDINE 10 MG/ML
20 INJECTION, SOLUTION INTRAVENOUS
Status: DISCONTINUED | OUTPATIENT
Start: 2017-04-24 | End: 2017-04-24 | Stop reason: HOSPADM

## 2017-04-24 RX ORDER — PROTAMINE SULFATE 10 MG/ML
50 INJECTION, SOLUTION INTRAVENOUS ONCE
Status: COMPLETED | OUTPATIENT
Start: 2017-04-24 | End: 2017-04-24

## 2017-04-24 RX ORDER — FAMOTIDINE 20 MG/1
20 TABLET, FILM COATED ORAL 2 TIMES DAILY
Status: DISCONTINUED | OUTPATIENT
Start: 2017-04-24 | End: 2017-04-27

## 2017-04-24 RX ORDER — DOCUSATE SODIUM 100 MG/1
100 CAPSULE, LIQUID FILLED ORAL 2 TIMES DAILY PRN
Status: DISCONTINUED | OUTPATIENT
Start: 2017-04-24 | End: 2017-05-02 | Stop reason: HOSPADM

## 2017-04-24 RX ORDER — THROMBIN HUMAN AND FIBRINOGEN 2; 5.5 [USP'U]/1; MG/1
PATCH TOPICAL AS NEEDED
Status: DISCONTINUED | OUTPATIENT
Start: 2017-04-24 | End: 2017-04-24 | Stop reason: HOSPADM

## 2017-04-24 RX ORDER — DOPAMINE HYDROCHLORIDE 160 MG/100ML
2-20 INJECTION, SOLUTION INTRAVENOUS CONTINUOUS PRN
Status: DISCONTINUED | OUTPATIENT
Start: 2017-04-24 | End: 2017-04-27

## 2017-04-24 RX ORDER — ALBUMIN, HUMAN INJ 5% 5 %
500 SOLUTION INTRAVENOUS AS NEEDED
Status: COMPLETED | OUTPATIENT
Start: 2017-04-24 | End: 2017-04-24

## 2017-04-24 RX ORDER — ASPIRIN 325 MG
325 TABLET ORAL ONCE
Status: COMPLETED | OUTPATIENT
Start: 2017-04-24 | End: 2017-04-24

## 2017-04-24 RX ORDER — SODIUM CHLORIDE 9 MG/ML
30 INJECTION, SOLUTION INTRAVENOUS CONTINUOUS PRN
Status: DISCONTINUED | OUTPATIENT
Start: 2017-04-24 | End: 2017-04-25

## 2017-04-24 RX ORDER — NALOXONE HCL 0.4 MG/ML
0.4 VIAL (ML) INJECTION
Status: DISCONTINUED | OUTPATIENT
Start: 2017-04-24 | End: 2017-04-25

## 2017-04-24 RX ORDER — ONDANSETRON 2 MG/ML
4 INJECTION INTRAMUSCULAR; INTRAVENOUS EVERY 6 HOURS PRN
Status: DISCONTINUED | OUTPATIENT
Start: 2017-04-24 | End: 2017-04-27

## 2017-04-24 RX ORDER — MAGNESIUM HYDROXIDE 1200 MG/15ML
LIQUID ORAL AS NEEDED
Status: DISCONTINUED | OUTPATIENT
Start: 2017-04-24 | End: 2017-04-24 | Stop reason: HOSPADM

## 2017-04-24 RX ORDER — MAGNESIUM SULFATE HEPTAHYDRATE 40 MG/ML
4 INJECTION, SOLUTION INTRAVENOUS AS NEEDED
Status: DISCONTINUED | OUTPATIENT
Start: 2017-04-24 | End: 2017-04-27

## 2017-04-24 RX ORDER — AMINOCAPROIC ACID 250 MG/ML
INJECTION, SOLUTION INTRAVENOUS AS NEEDED
Status: DISCONTINUED | OUTPATIENT
Start: 2017-04-24 | End: 2017-04-24 | Stop reason: SURG

## 2017-04-24 RX ORDER — SODIUM CHLORIDE 0.9 % (FLUSH) 0.9 %
1-10 SYRINGE (ML) INJECTION AS NEEDED
Status: DISCONTINUED | OUTPATIENT
Start: 2017-04-24 | End: 2017-04-24 | Stop reason: HOSPADM

## 2017-04-24 RX ORDER — MIDAZOLAM HYDROCHLORIDE 1 MG/ML
INJECTION INTRAMUSCULAR; INTRAVENOUS AS NEEDED
Status: DISCONTINUED | OUTPATIENT
Start: 2017-04-24 | End: 2017-04-24 | Stop reason: SURG

## 2017-04-24 RX ORDER — AZITHROMYCIN 250 MG/1
250 TABLET, FILM COATED ORAL DAILY
COMMUNITY
End: 2017-05-22 | Stop reason: HOSPADM

## 2017-04-24 RX ORDER — SODIUM CHLORIDE 0.9 % (FLUSH) 0.9 %
30 SYRINGE (ML) INJECTION ONCE AS NEEDED
Status: DISCONTINUED | OUTPATIENT
Start: 2017-04-24 | End: 2017-04-25

## 2017-04-24 RX ORDER — ROCURONIUM BROMIDE 10 MG/ML
INJECTION, SOLUTION INTRAVENOUS AS NEEDED
Status: DISCONTINUED | OUTPATIENT
Start: 2017-04-24 | End: 2017-04-24 | Stop reason: SURG

## 2017-04-24 RX ORDER — PHENYLEPHRINE HCL IN 0.9% NACL 0.5 MG/5ML
.5-3 SYRINGE (ML) INTRAVENOUS CONTINUOUS PRN
Status: DISCONTINUED | OUTPATIENT
Start: 2017-04-24 | End: 2017-04-27

## 2017-04-24 RX ORDER — ALBUTEROL SULFATE 90 UG/1
4 AEROSOL, METERED RESPIRATORY (INHALATION) EVERY 4 HOURS PRN
Status: DISCONTINUED | OUTPATIENT
Start: 2017-04-24 | End: 2017-04-25

## 2017-04-24 RX ORDER — FENTANYL CITRATE 50 UG/ML
25 INJECTION, SOLUTION INTRAMUSCULAR; INTRAVENOUS
Status: DISCONTINUED | OUTPATIENT
Start: 2017-04-24 | End: 2017-04-25

## 2017-04-24 RX ORDER — METOPROLOL TARTRATE 5 MG/5ML
2.5 INJECTION INTRAVENOUS EVERY 6 HOURS SCHEDULED
Status: DISCONTINUED | OUTPATIENT
Start: 2017-04-24 | End: 2017-04-25

## 2017-04-24 RX ORDER — BISACODYL 10 MG
10 SUPPOSITORY, RECTAL RECTAL DAILY PRN
Status: DISCONTINUED | OUTPATIENT
Start: 2017-04-25 | End: 2017-04-27

## 2017-04-24 RX ORDER — DOBUTAMINE HYDROCHLORIDE 100 MG/100ML
2-20 INJECTION INTRAVENOUS CONTINUOUS PRN
Status: DISCONTINUED | OUTPATIENT
Start: 2017-04-24 | End: 2017-04-25

## 2017-04-24 RX ORDER — CHLORHEXIDINE GLUCONATE 0.12 MG/ML
15 RINSE ORAL EVERY 12 HOURS SCHEDULED
Status: DISCONTINUED | OUTPATIENT
Start: 2017-04-24 | End: 2017-04-25

## 2017-04-24 RX ORDER — HYDROCODONE BITARTRATE AND ACETAMINOPHEN 7.5; 325 MG/1; MG/1
1 TABLET ORAL EVERY 4 HOURS PRN
Status: DISCONTINUED | OUTPATIENT
Start: 2017-04-24 | End: 2017-05-02 | Stop reason: HOSPADM

## 2017-04-24 RX ORDER — CHLORHEXIDINE GLUCONATE 0.12 MG/ML
15 RINSE ORAL ONCE
Status: COMPLETED | OUTPATIENT
Start: 2017-04-24 | End: 2017-04-24

## 2017-04-24 RX ORDER — SENNA AND DOCUSATE SODIUM 50; 8.6 MG/1; MG/1
2 TABLET, FILM COATED ORAL 2 TIMES DAILY
Status: DISCONTINUED | OUTPATIENT
Start: 2017-04-24 | End: 2017-05-02 | Stop reason: HOSPADM

## 2017-04-24 RX ORDER — NITROGLYCERIN 20 MG/100ML
5-200 INJECTION INTRAVENOUS CONTINUOUS PRN
Status: DISCONTINUED | OUTPATIENT
Start: 2017-04-24 | End: 2017-04-27

## 2017-04-24 RX ORDER — PROTAMINE SULFATE 10 MG/ML
INJECTION, SOLUTION INTRAVENOUS AS NEEDED
Status: DISCONTINUED | OUTPATIENT
Start: 2017-04-24 | End: 2017-04-24 | Stop reason: SURG

## 2017-04-24 RX ORDER — ASPIRIN 325 MG
325 TABLET, DELAYED RELEASE (ENTERIC COATED) ORAL DAILY
Status: DISCONTINUED | OUTPATIENT
Start: 2017-04-25 | End: 2017-05-02 | Stop reason: HOSPADM

## 2017-04-24 RX ORDER — ATORVASTATIN CALCIUM 40 MG/1
40 TABLET, FILM COATED ORAL NIGHTLY
Status: DISCONTINUED | OUTPATIENT
Start: 2017-04-24 | End: 2017-05-02 | Stop reason: HOSPADM

## 2017-04-24 RX ORDER — ACETAMINOPHEN 325 MG/1
650 TABLET ORAL EVERY 4 HOURS PRN
Status: DISCONTINUED | OUTPATIENT
Start: 2017-04-24 | End: 2017-04-24 | Stop reason: HOSPADM

## 2017-04-24 RX ORDER — PAPAVERINE HYDROCHLORIDE 30 MG/ML
INJECTION INTRAMUSCULAR; INTRAVENOUS AS NEEDED
Status: DISCONTINUED | OUTPATIENT
Start: 2017-04-24 | End: 2017-04-24 | Stop reason: HOSPADM

## 2017-04-24 RX ORDER — FAMOTIDINE 20 MG/1
20 TABLET, FILM COATED ORAL
Status: DISCONTINUED | OUTPATIENT
Start: 2017-04-24 | End: 2017-04-24 | Stop reason: HOSPADM

## 2017-04-24 RX ORDER — DOPAMINE HYDROCHLORIDE 160 MG/100ML
INJECTION, SOLUTION INTRAVENOUS
Status: COMPLETED
Start: 2017-04-24 | End: 2017-04-24

## 2017-04-24 RX ORDER — ALBUMIN, HUMAN INJ 5% 5 %
SOLUTION INTRAVENOUS
Status: COMPLETED
Start: 2017-04-24 | End: 2017-04-24

## 2017-04-24 RX ORDER — ACETAMINOPHEN 325 MG/1
650 TABLET ORAL EVERY 4 HOURS PRN
Status: DISCONTINUED | OUTPATIENT
Start: 2017-04-24 | End: 2017-05-02 | Stop reason: HOSPADM

## 2017-04-24 RX ORDER — POTASSIUM CHLORIDE, DEXTROSE MONOHYDRATE 150; 5 MG/100ML; G/100ML
30 INJECTION, SOLUTION INTRAVENOUS CONTINUOUS
Status: DISCONTINUED | OUTPATIENT
Start: 2017-04-24 | End: 2017-04-25

## 2017-04-24 RX ORDER — VANCOMYCIN HYDROCHLORIDE 500 MG/10ML
INJECTION, POWDER, LYOPHILIZED, FOR SOLUTION INTRAVENOUS AS NEEDED
Status: DISCONTINUED | OUTPATIENT
Start: 2017-04-24 | End: 2017-04-24 | Stop reason: HOSPADM

## 2017-04-24 RX ORDER — NITROGLYCERIN 0.4 MG/1
0.4 TABLET SUBLINGUAL
Status: DISCONTINUED | OUTPATIENT
Start: 2017-04-24 | End: 2017-04-24 | Stop reason: HOSPADM

## 2017-04-24 RX ORDER — LIDOCAINE HYDROCHLORIDE 10 MG/ML
0.5 INJECTION, SOLUTION EPIDURAL; INFILTRATION; INTRACAUDAL; PERINEURAL ONCE AS NEEDED
Status: COMPLETED | OUTPATIENT
Start: 2017-04-24 | End: 2017-04-24

## 2017-04-24 RX ORDER — SODIUM CHLORIDE 9 MG/ML
INJECTION, SOLUTION INTRAVENOUS CONTINUOUS PRN
Status: DISCONTINUED | OUTPATIENT
Start: 2017-04-24 | End: 2017-04-24 | Stop reason: SURG

## 2017-04-24 RX ORDER — POTASSIUM CHLORIDE 750 MG/1
40 CAPSULE, EXTENDED RELEASE ORAL AS NEEDED
Status: DISCONTINUED | OUTPATIENT
Start: 2017-04-24 | End: 2017-04-27

## 2017-04-24 RX ORDER — CHLORHEXIDINE GLUCONATE 500 MG/1
1 CLOTH TOPICAL EVERY 12 HOURS PRN
Status: DISCONTINUED | OUTPATIENT
Start: 2017-04-24 | End: 2017-04-24 | Stop reason: HOSPADM

## 2017-04-24 RX ORDER — MORPHINE SULFATE 2 MG/ML
2 INJECTION, SOLUTION INTRAMUSCULAR; INTRAVENOUS
Status: DISCONTINUED | OUTPATIENT
Start: 2017-04-24 | End: 2017-04-27

## 2017-04-24 RX ORDER — SUFENTANIL CITRATE 50 UG/ML
INJECTION EPIDURAL; INTRAVENOUS AS NEEDED
Status: DISCONTINUED | OUTPATIENT
Start: 2017-04-24 | End: 2017-04-24 | Stop reason: SURG

## 2017-04-24 RX ORDER — FAMOTIDINE 10 MG/ML
20 INJECTION, SOLUTION INTRAVENOUS 2 TIMES DAILY
Status: DISCONTINUED | OUTPATIENT
Start: 2017-04-24 | End: 2017-04-27

## 2017-04-24 RX ORDER — POTASSIUM CHLORIDE 1.5 G/1.77G
40 POWDER, FOR SOLUTION ORAL AS NEEDED
Status: DISCONTINUED | OUTPATIENT
Start: 2017-04-24 | End: 2017-04-25

## 2017-04-24 RX ORDER — OXYCODONE HYDROCHLORIDE AND ACETAMINOPHEN 5; 325 MG/1; MG/1
2 TABLET ORAL EVERY 4 HOURS PRN
Status: DISCONTINUED | OUTPATIENT
Start: 2017-04-24 | End: 2017-04-27

## 2017-04-24 RX ORDER — MEPERIDINE HYDROCHLORIDE 25 MG/ML
25 INJECTION INTRAMUSCULAR; INTRAVENOUS; SUBCUTANEOUS EVERY 4 HOURS PRN
Status: DISCONTINUED | OUTPATIENT
Start: 2017-04-24 | End: 2017-04-25

## 2017-04-24 RX ORDER — SODIUM CHLORIDE 9 MG/ML
9 INJECTION, SOLUTION INTRAVENOUS CONTINUOUS PRN
Status: DISCONTINUED | OUTPATIENT
Start: 2017-04-24 | End: 2017-04-24 | Stop reason: HOSPADM

## 2017-04-24 RX ADMIN — PROTAMINE SULFATE 50 MG: 10 INJECTION, SOLUTION INTRAVENOUS at 16:19

## 2017-04-24 RX ADMIN — ALBUMIN HUMAN 500 ML: 0.05 INJECTION, SOLUTION INTRAVENOUS at 16:00

## 2017-04-24 RX ADMIN — DOPAMINE HYDROCHLORIDE 5 MCG/KG/MIN: 160 INJECTION, SOLUTION INTRAVENOUS at 16:45

## 2017-04-24 RX ADMIN — SUFENTANIL CITRATE 50 MCG: 50 INJECTION EPIDURAL; INTRAVENOUS at 11:34

## 2017-04-24 RX ADMIN — ALBUMIN HUMAN 500 ML: 0.05 INJECTION, SOLUTION INTRAVENOUS at 18:38

## 2017-04-24 RX ADMIN — HEPARIN SODIUM 35000 UNITS: 1000 INJECTION, SOLUTION INTRAVENOUS; SUBCUTANEOUS at 12:45

## 2017-04-24 RX ADMIN — CEFUROXIME 1.5 G: 1.5 INJECTION, SOLUTION INTRAVENOUS at 15:01

## 2017-04-24 RX ADMIN — PROTAMINE SULFATE 500 MG: 10 INJECTION, SOLUTION INTRAVENOUS at 15:00

## 2017-04-24 RX ADMIN — CHLORHEXIDINE GLUCONATE 15 ML: 1.2 RINSE ORAL at 20:42

## 2017-04-24 RX ADMIN — AMINOCAPROIC ACID 10 G: 250 INJECTION, SOLUTION INTRAVENOUS at 12:12

## 2017-04-24 RX ADMIN — SUFENTANIL CITRATE 100 MCG: 50 INJECTION EPIDURAL; INTRAVENOUS at 15:34

## 2017-04-24 RX ADMIN — SUFENTANIL CITRATE 100 MCG: 50 INJECTION EPIDURAL; INTRAVENOUS at 12:30

## 2017-04-24 RX ADMIN — MUPIROCIN 1 APPLICATION: 20 OINTMENT TOPICAL at 09:56

## 2017-04-24 RX ADMIN — SODIUM CHLORIDE 9 ML/HR: 9 INJECTION, SOLUTION INTRAVENOUS at 09:35

## 2017-04-24 RX ADMIN — MORPHINE SULFATE 2 MG: 2 INJECTION, SOLUTION INTRAMUSCULAR; INTRAVENOUS at 22:27

## 2017-04-24 RX ADMIN — CEFUROXIME 1.5 G: 1.5 INJECTION, SOLUTION INTRAVENOUS at 11:57

## 2017-04-24 RX ADMIN — PROPOFOL 100 MG: 10 INJECTION, EMULSION INTRAVENOUS at 11:34

## 2017-04-24 RX ADMIN — ROCURONIUM BROMIDE 50 MG: 10 INJECTION INTRAVENOUS at 13:08

## 2017-04-24 RX ADMIN — ROCURONIUM BROMIDE 50 MG: 10 INJECTION INTRAVENOUS at 11:34

## 2017-04-24 RX ADMIN — MIDAZOLAM HYDROCHLORIDE 3 MG: 1 INJECTION, SOLUTION INTRAMUSCULAR; INTRAVENOUS at 11:34

## 2017-04-24 RX ADMIN — SODIUM CHLORIDE 2 UNITS/HR: 9 INJECTION, SOLUTION INTRAVENOUS at 19:00

## 2017-04-24 RX ADMIN — ASPIRIN 325 MG ORAL TABLET 325 MG: 325 PILL ORAL at 18:43

## 2017-04-24 RX ADMIN — PROPOFOL 40 MCG/KG/MIN: 10 INJECTION, EMULSION INTRAVENOUS at 18:48

## 2017-04-24 RX ADMIN — LIDOCAINE HYDROCHLORIDE 0.2 ML: 10 INJECTION, SOLUTION EPIDURAL; INFILTRATION; INTRACAUDAL; PERINEURAL at 09:35

## 2017-04-24 RX ADMIN — FAMOTIDINE 20 MG: 20 TABLET ORAL at 09:56

## 2017-04-24 RX ADMIN — POTASSIUM CHLORIDE AND DEXTROSE MONOHYDRATE 30 ML/HR: 150; 5 INJECTION, SOLUTION INTRAVENOUS at 16:15

## 2017-04-24 RX ADMIN — HYDROCODONE BITARTRATE AND ACETAMINOPHEN 1 TABLET: 7.5; 325 TABLET ORAL at 22:27

## 2017-04-24 RX ADMIN — ATORVASTATIN CALCIUM 40 MG: 40 TABLET, FILM COATED ORAL at 20:42

## 2017-04-24 RX ADMIN — CHLORHEXIDINE GLUCONATE 15 ML: 1.2 RINSE ORAL at 09:56

## 2017-04-24 RX ADMIN — PROPOFOL 50 MCG/KG/MIN: 10 INJECTION, EMULSION INTRAVENOUS at 16:20

## 2017-04-24 RX ADMIN — POTASSIUM CHLORIDE AND DEXTROSE MONOHYDRATE 30 ML/HR: 150; 5 INJECTION, SOLUTION INTRAVENOUS at 16:17

## 2017-04-24 RX ADMIN — AMINOCAPROIC ACID 10 G: 250 INJECTION, SOLUTION INTRAVENOUS at 15:03

## 2017-04-24 RX ADMIN — FAMOTIDINE 20 MG: 10 INJECTION, SOLUTION INTRAVENOUS at 18:44

## 2017-04-24 RX ADMIN — CEFUROXIME 1.5 G: 1.5 INJECTION, POWDER, FOR SOLUTION INTRAVENOUS at 22:11

## 2017-04-24 RX ADMIN — SODIUM CHLORIDE: 9 INJECTION, SOLUTION INTRAVENOUS at 10:49

## 2017-04-24 RX ADMIN — MIDAZOLAM HYDROCHLORIDE 2 MG: 1 INJECTION, SOLUTION INTRAMUSCULAR; INTRAVENOUS at 13:08

## 2017-04-24 NOTE — ANESTHESIA PREPROCEDURE EVALUATION
Anesthesia Evaluation     Patient summary reviewed and Nursing notes reviewed      Airway   Mallampati: I  TM distance: >3 FB  Neck ROM: full  Dental      Pulmonary    (+) pneumonia , COPD, asthma, sleep apnea,   Cardiovascular     ECG reviewed    (+) hypertension, past MI , CAD, dysrhythmias, CHF,       Neuro/Psych  (+) psychiatric history, dementia,    GI/Hepatic/Renal/Endo    (+)  chronic renal disease,     Musculoskeletal (-) negative ROS    Abdominal    Substance History - negative use     OB/GYN negative ob/gyn ROS         Other                                    Anesthesia Plan    ASA 4     general   (GERSON PAC)  intravenous induction   Anesthetic plan and risks discussed with patient.    Plan discussed with CRNA.

## 2017-04-24 NOTE — ANESTHESIA PROCEDURE NOTES
Central Line    Patient location during procedure: OR  Indications: vascular access  Staff  Anesthesiologist: BELLA ALLAN  Preanesthetic Checklist  Completed: patient identified, site marked, surgical consent, pre-op evaluation, timeout performed, IV checked, risks and benefits discussed and monitors and equipment checked  Central Line Prep  Sterile Tech:cap, gloves, gown, mask and sterile barriers  Prep: chloraprep  Patient monitoring: blood pressure monitoring, continuous pulse oximetry and EKG  Central Line Procedure  Location:internal jugular  Catheter Type:Cordis  Catheter Size:9 Fr  Guidance:ultrasound guided  PROCEDURE NOTE/ULTRASOUND INTERPRETATION.  Using ultrasound guidance the potential vascular sites for insertion of the catheter were visualized to determine the patency of the vessel to be used for vascular access.  After selecting the appropriate site for insertion, the needle was visualized under ultrasound being inserted into the internal jugular vein, followed by ultrasound confirmation of wire and catheter placement. There were no abnormalities seen on ultrasound; an image was taken; and the patient tolerated the procedure with no complications.   Assessment  Post procedure:biopatch applied, line sutured, occlusive dressing applied and secured with tape  Assessement:blood return through all ports, free fluid flow and chest x-ray ordered  Complications:no  Patient Tolerance:patient tolerated the procedure well with no apparent complications

## 2017-04-24 NOTE — ANESTHESIA POSTPROCEDURE EVALUATION
Patient: Magdi Arriaga    Procedure Summary     Date Anesthesia Start Anesthesia Stop Room / Location    04/24/17 1129   [facility] OR 09 / [facility]       Procedure Diagnosis Surgeon Provider    MEDIAN STERNOTOMY, CORONARY ARTERY BYPASS GRAFT X5 UTILIZING THE INTERNAL MAMMARY ARTERY, ENDOVASCULAR VEING HARVEST UTILIZING RIGHT SAPHENOUS VEIN,TRANSESOPHAGEAL ECHO (N/A Chest) Atherosclerosis of native coronary artery of native heart with other form of angina pectoris  (Atherosclerosis of native coronary artery of native heart with other form of angina pectoris [I25.118]) MD Russell Chao MD          Anesthesia Type: general  Last vitals  BP      Temp      Pulse     Resp      SpO2        Post Anesthesia Care and Evaluation    Patient location during evaluation: ICU  Patient participation: complete - patient cannot participate  Level of consciousness: obtunded/minimal responses  Airway patency: patent  Anesthetic complications: No anesthetic complications  PONV Status: NA  Cardiovascular status: acceptable and hemodynamically stable  Respiratory status: acceptable and ventilator  Hydration status: stable

## 2017-04-24 NOTE — ANESTHESIA PROCEDURE NOTES
Airway  Airway not difficult    General Information and Staff    Patient location during procedure: OR  Anesthesiologist: BELLA ALLAN    Indications and Patient Condition  Indications for airway management: airway protection    Preoxygenated: yes  MILS not maintained throughout  Mask difficulty assessment: 1 - vent by mask    Final Airway Details  Final airway type: endotracheal airway      Successful airway: ETT  Cuffed: yes   Successful intubation technique: direct laryngoscopy  Endotracheal tube insertion site: oral  Blade: Man  Blade size: #3  ETT size: 8.0 mm  Cormack-Lehane Classification: grade I - full view of glottis  Placement verified by: chest auscultation and capnometry   Measured from: lips  ETT to lips (cm): 20  Number of attempts at approach: 1    Additional Comments  Negative epigastric sounds, Breath sound equal bilaterally with symmetric chest rise and fall

## 2017-04-25 ENCOUNTER — APPOINTMENT (OUTPATIENT)
Dept: GENERAL RADIOLOGY | Facility: HOSPITAL | Age: 72
End: 2017-04-25

## 2017-04-25 LAB
ABO + RH BLD: NORMAL
ALBUMIN SERPL-MCNC: 3.6 G/DL (ref 3.2–4.8)
ANION GAP SERPL CALCULATED.3IONS-SCNC: 9 MMOL/L (ref 3–11)
ARTERIAL PATENCY WRIST A: ABNORMAL
ATMOSPHERIC PRESS: ABNORMAL MMHG
BASE EXCESS BLDA CALC-SCNC: -3.5 MMOL/L (ref 0–2)
BASOPHILS # BLD AUTO: 0.02 10*3/MM3 (ref 0–0.2)
BASOPHILS NFR BLD AUTO: 0.2 % (ref 0–1)
BDY SITE: ABNORMAL
BH BB BLOOD EXPIRATION DATE: NORMAL
BH BB BLOOD TYPE BARCODE: 600
BH BB BLOOD TYPE BARCODE: 6200
BH BB DISPENSE STATUS: NORMAL
BH BB PRODUCT CODE: NORMAL
BH BB UNIT NUMBER: NORMAL
BUN BLD-MCNC: 33 MG/DL (ref 9–23)
BUN/CREAT SERPL: 15.7 (ref 7–25)
CALCIUM SPEC-SCNC: 10.2 MG/DL (ref 8.7–10.4)
CHLORIDE SERPL-SCNC: 110 MMOL/L (ref 99–109)
CO2 BLDA-SCNC: 23.8 MMOL/L (ref 22–33)
CO2 SERPL-SCNC: 24 MMOL/L (ref 20–31)
COHGB MFR BLD: 1.3 % (ref 0–2)
CREAT BLD-MCNC: 2.1 MG/DL (ref 0.6–1.3)
CROSSMATCH INTERPRETATION: NORMAL
DEPRECATED RDW RBC AUTO: 52.9 FL (ref 37–54)
EOSINOPHIL # BLD AUTO: 0 10*3/MM3 (ref 0.1–0.3)
EOSINOPHIL NFR BLD AUTO: 0 % (ref 0–3)
ERYTHROCYTE [DISTWIDTH] IN BLOOD BY AUTOMATED COUNT: 15.6 % (ref 11.3–14.5)
GFR SERPL CREATININE-BSD FRML MDRD: 31 ML/MIN/1.73
GLUCOSE BLD-MCNC: 128 MG/DL (ref 70–100)
GLUCOSE BLDC GLUCOMTR-MCNC: 101 MG/DL (ref 70–130)
GLUCOSE BLDC GLUCOMTR-MCNC: 111 MG/DL (ref 70–130)
GLUCOSE BLDC GLUCOMTR-MCNC: 123 MG/DL (ref 70–130)
GLUCOSE BLDC GLUCOMTR-MCNC: 123 MG/DL (ref 70–130)
GLUCOSE BLDC GLUCOMTR-MCNC: 126 MG/DL (ref 70–130)
GLUCOSE BLDC GLUCOMTR-MCNC: 132 MG/DL (ref 70–130)
GLUCOSE BLDC GLUCOMTR-MCNC: 133 MG/DL (ref 70–130)
GLUCOSE BLDC GLUCOMTR-MCNC: 134 MG/DL (ref 70–130)
GLUCOSE BLDC GLUCOMTR-MCNC: 136 MG/DL (ref 70–130)
GLUCOSE BLDC GLUCOMTR-MCNC: 137 MG/DL (ref 70–130)
GLUCOSE BLDC GLUCOMTR-MCNC: 139 MG/DL (ref 70–130)
GLUCOSE BLDC GLUCOMTR-MCNC: 148 MG/DL (ref 70–130)
GLUCOSE BLDC GLUCOMTR-MCNC: 152 MG/DL (ref 70–130)
GLUCOSE BLDC GLUCOMTR-MCNC: 182 MG/DL (ref 70–130)
GLUCOSE BLDC GLUCOMTR-MCNC: 93 MG/DL (ref 70–130)
HCO3 BLDA-SCNC: 22.5 MMOL/L (ref 20–26)
HCT VFR BLD AUTO: 28.9 % (ref 38.9–50.9)
HCT VFR BLD CALC: 29.6 %
HGB BLD-MCNC: 9 G/DL (ref 13.1–17.5)
HGB BLDA-MCNC: 9.7 G/DL (ref 13.5–17.5)
HOROWITZ INDEX BLD+IHG-RTO: 45 %
IMM GRANULOCYTES # BLD: 0.06 10*3/MM3 (ref 0–0.03)
IMM GRANULOCYTES NFR BLD: 0.5 % (ref 0–0.6)
INR PPP: 1.08
LYMPHOCYTES # BLD AUTO: 0.5 10*3/MM3 (ref 0.6–4.8)
LYMPHOCYTES NFR BLD AUTO: 3.8 % (ref 24–44)
MAGNESIUM SERPL-MCNC: 3.5 MG/DL (ref 1.3–2.7)
MCH RBC QN AUTO: 28.8 PG (ref 27–31)
MCHC RBC AUTO-ENTMCNC: 31.1 G/DL (ref 32–36)
MCV RBC AUTO: 92.3 FL (ref 80–99)
METHGB BLD QL: 1.3 % (ref 0–1.5)
MODALITY: ABNORMAL
MONOCYTES # BLD AUTO: 1.26 10*3/MM3 (ref 0–1)
MONOCYTES NFR BLD AUTO: 9.5 % (ref 0–12)
NEUTROPHILS # BLD AUTO: 11.36 10*3/MM3 (ref 1.5–8.3)
NEUTROPHILS NFR BLD AUTO: 86 % (ref 41–71)
OXYHGB MFR BLDV: 94.8 % (ref 94–99)
PCO2 BLDA: 44.8 MM HG (ref 35–48)
PH BLDA: 7.31 PH UNITS (ref 7.35–7.45)
PHOSPHATE SERPL-MCNC: 3.9 MG/DL (ref 2.4–5.1)
PLAT MORPH BLD: NORMAL
PLATELET # BLD AUTO: 186 10*3/MM3 (ref 150–450)
PMV BLD AUTO: 11.1 FL (ref 6–12)
PO2 BLDA: 93.4 MM HG (ref 83–108)
POTASSIUM BLD-SCNC: 4.5 MMOL/L (ref 3.5–5.5)
PROTHROMBIN TIME: 11.8 SECONDS (ref 9.6–11.5)
RBC # BLD AUTO: 3.13 10*6/MM3 (ref 4.2–5.76)
RBC MORPH BLD: NORMAL
SAO2 % BLDCOA: 94.8 %
SODIUM BLD-SCNC: 143 MMOL/L (ref 132–146)
UNIT  ABO: NORMAL
UNIT  RH: NORMAL
WBC MORPH BLD: NORMAL
WBC NRBC COR # BLD: 13.2 10*3/MM3 (ref 3.5–10.8)

## 2017-04-25 PROCEDURE — 94799 UNLISTED PULMONARY SVC/PX: CPT

## 2017-04-25 PROCEDURE — 25010000002 CEFUROXIME: Performed by: PHYSICIAN ASSISTANT

## 2017-04-25 PROCEDURE — 85610 PROTHROMBIN TIME: CPT | Performed by: PHYSICIAN ASSISTANT

## 2017-04-25 PROCEDURE — 99232 SBSQ HOSP IP/OBS MODERATE 35: CPT | Performed by: INTERNAL MEDICINE

## 2017-04-25 PROCEDURE — 25010000002 DOPAMINE PER 40 MG: Performed by: PHYSICIAN ASSISTANT

## 2017-04-25 PROCEDURE — 85007 BL SMEAR W/DIFF WBC COUNT: CPT | Performed by: PHYSICIAN ASSISTANT

## 2017-04-25 PROCEDURE — 94660 CPAP INITIATION&MGMT: CPT

## 2017-04-25 PROCEDURE — 97162 PT EVAL MOD COMPLEX 30 MIN: CPT

## 2017-04-25 PROCEDURE — 99024 POSTOP FOLLOW-UP VISIT: CPT | Performed by: THORACIC SURGERY (CARDIOTHORACIC VASCULAR SURGERY)

## 2017-04-25 PROCEDURE — 71010 HC CHEST PA OR AP: CPT

## 2017-04-25 PROCEDURE — 80069 RENAL FUNCTION PANEL: CPT | Performed by: PHYSICIAN ASSISTANT

## 2017-04-25 PROCEDURE — 83735 ASSAY OF MAGNESIUM: CPT | Performed by: PHYSICIAN ASSISTANT

## 2017-04-25 PROCEDURE — 93010 ELECTROCARDIOGRAM REPORT: CPT | Performed by: INTERNAL MEDICINE

## 2017-04-25 PROCEDURE — 93005 ELECTROCARDIOGRAM TRACING: CPT | Performed by: PHYSICIAN ASSISTANT

## 2017-04-25 PROCEDURE — 82962 GLUCOSE BLOOD TEST: CPT

## 2017-04-25 PROCEDURE — 25010000002 MORPHINE SULFATE (PF) 2 MG/ML SOLUTION: Performed by: THORACIC SURGERY (CARDIOTHORACIC VASCULAR SURGERY)

## 2017-04-25 PROCEDURE — 85025 COMPLETE CBC W/AUTO DIFF WBC: CPT | Performed by: PHYSICIAN ASSISTANT

## 2017-04-25 PROCEDURE — 99233 SBSQ HOSP IP/OBS HIGH 50: CPT | Performed by: INTERNAL MEDICINE

## 2017-04-25 RX ORDER — DOXAZOSIN MESYLATE 1 MG/1
1 TABLET ORAL NIGHTLY
Status: DISCONTINUED | OUTPATIENT
Start: 2017-04-25 | End: 2017-04-29

## 2017-04-25 RX ORDER — PAROXETINE HYDROCHLORIDE 20 MG/1
20 TABLET, FILM COATED ORAL DAILY
Status: DISCONTINUED | OUTPATIENT
Start: 2017-04-25 | End: 2017-05-02 | Stop reason: HOSPADM

## 2017-04-25 RX ORDER — DEXTROSE MONOHYDRATE 25 G/50ML
25 INJECTION, SOLUTION INTRAVENOUS
Status: DISCONTINUED | OUTPATIENT
Start: 2017-04-25 | End: 2017-04-29

## 2017-04-25 RX ORDER — NICOTINE POLACRILEX 4 MG
15 LOZENGE BUCCAL
Status: DISCONTINUED | OUTPATIENT
Start: 2017-04-25 | End: 2017-04-29

## 2017-04-25 RX ORDER — GABAPENTIN 100 MG/1
100 CAPSULE ORAL EVERY 12 HOURS SCHEDULED
Status: DISCONTINUED | OUTPATIENT
Start: 2017-04-25 | End: 2017-05-02 | Stop reason: HOSPADM

## 2017-04-25 RX ORDER — MEMANTINE HYDROCHLORIDE 10 MG/1
10 TABLET ORAL EVERY 12 HOURS SCHEDULED
Status: DISCONTINUED | OUTPATIENT
Start: 2017-04-25 | End: 2017-05-02 | Stop reason: HOSPADM

## 2017-04-25 RX ORDER — DOPAMINE HYDROCHLORIDE 160 MG/100ML
INJECTION, SOLUTION INTRAVENOUS
Status: DISPENSED
Start: 2017-04-25 | End: 2017-04-25

## 2017-04-25 RX ORDER — MONTELUKAST SODIUM 10 MG/1
10 TABLET ORAL NIGHTLY
Status: DISCONTINUED | OUTPATIENT
Start: 2017-04-25 | End: 2017-05-02 | Stop reason: HOSPADM

## 2017-04-25 RX ADMIN — MORPHINE SULFATE 2 MG: 2 INJECTION, SOLUTION INTRAMUSCULAR; INTRAVENOUS at 19:44

## 2017-04-25 RX ADMIN — FAMOTIDINE 20 MG: 10 INJECTION, SOLUTION INTRAVENOUS at 18:38

## 2017-04-25 RX ADMIN — ATORVASTATIN CALCIUM 40 MG: 40 TABLET, FILM COATED ORAL at 21:10

## 2017-04-25 RX ADMIN — GABAPENTIN 100 MG: 100 CAPSULE ORAL at 21:10

## 2017-04-25 RX ADMIN — MONTELUKAST SODIUM 10 MG: 10 TABLET, FILM COATED ORAL at 22:18

## 2017-04-25 RX ADMIN — CHLORHEXIDINE GLUCONATE 15 ML: 1.2 RINSE ORAL at 08:03

## 2017-04-25 RX ADMIN — FAMOTIDINE 20 MG: 20 TABLET ORAL at 08:04

## 2017-04-25 RX ADMIN — CEFUROXIME 1.5 G: 1.5 INJECTION, POWDER, FOR SOLUTION INTRAVENOUS at 16:04

## 2017-04-25 RX ADMIN — HYDROCODONE BITARTRATE AND ACETAMINOPHEN 1 TABLET: 7.5; 325 TABLET ORAL at 19:44

## 2017-04-25 RX ADMIN — CEFUROXIME 1.5 G: 1.5 INJECTION, POWDER, FOR SOLUTION INTRAVENOUS at 22:18

## 2017-04-25 RX ADMIN — METOPROLOL TARTRATE 12.5 MG: 25 TABLET, FILM COATED ORAL at 21:10

## 2017-04-25 RX ADMIN — Medication 2 TABLET: at 18:38

## 2017-04-25 RX ADMIN — DOPAMINE HYDROCHLORIDE 5 MCG/KG/MIN: 160 INJECTION, SOLUTION INTRAVENOUS at 10:49

## 2017-04-25 RX ADMIN — CEFUROXIME 1.5 G: 1.5 INJECTION, POWDER, FOR SOLUTION INTRAVENOUS at 06:16

## 2017-04-25 RX ADMIN — MEMANTINE 10 MG: 10 TABLET ORAL at 21:10

## 2017-04-25 RX ADMIN — HYDROCODONE BITARTRATE AND ACETAMINOPHEN 1 TABLET: 7.5; 325 TABLET ORAL at 08:19

## 2017-04-25 RX ADMIN — ASPIRIN 325 MG: 325 TABLET, DELAYED RELEASE ORAL at 08:04

## 2017-04-25 RX ADMIN — PAROXETINE HYDROCHLORIDE 20 MG: 20 TABLET, FILM COATED ORAL at 16:04

## 2017-04-25 RX ADMIN — Medication 2 TABLET: at 08:04

## 2017-04-26 ENCOUNTER — APPOINTMENT (OUTPATIENT)
Dept: GENERAL RADIOLOGY | Facility: HOSPITAL | Age: 72
End: 2017-04-26

## 2017-04-26 LAB
ANION GAP SERPL CALCULATED.3IONS-SCNC: 4 MMOL/L (ref 3–11)
BACTERIA SPEC AEROBE CULT: NORMAL
BUN BLD-MCNC: 38 MG/DL (ref 9–23)
BUN/CREAT SERPL: 14.1 (ref 7–25)
CALCIUM SPEC-SCNC: 10.4 MG/DL (ref 8.7–10.4)
CHLORIDE SERPL-SCNC: 105 MMOL/L (ref 99–109)
CO2 SERPL-SCNC: 26 MMOL/L (ref 20–31)
CREAT BLD-MCNC: 2.7 MG/DL (ref 0.6–1.3)
CREAT UR-MCNC: 136.3 MG/DL
DEPRECATED RDW RBC AUTO: 50.9 FL (ref 37–54)
EOSINOPHIL SPEC QL MICRO: 0 % EOS/100 CELLS (ref 0–0)
ERYTHROCYTE [DISTWIDTH] IN BLOOD BY AUTOMATED COUNT: 15.5 % (ref 11.3–14.5)
GFR SERPL CREATININE-BSD FRML MDRD: 23 ML/MIN/1.73
GLUCOSE BLD-MCNC: 135 MG/DL (ref 70–100)
GLUCOSE BLDC GLUCOMTR-MCNC: 107 MG/DL (ref 70–130)
GLUCOSE BLDC GLUCOMTR-MCNC: 111 MG/DL (ref 70–130)
GLUCOSE BLDC GLUCOMTR-MCNC: 111 MG/DL (ref 70–130)
GLUCOSE BLDC GLUCOMTR-MCNC: 117 MG/DL (ref 70–130)
GLUCOSE BLDC GLUCOMTR-MCNC: 121 MG/DL (ref 70–130)
HCT VFR BLD AUTO: 26.8 % (ref 38.9–50.9)
HGB BLD-MCNC: 8.5 G/DL (ref 13.1–17.5)
MCH RBC QN AUTO: 28.5 PG (ref 27–31)
MCHC RBC AUTO-ENTMCNC: 31.7 G/DL (ref 32–36)
MCV RBC AUTO: 89.9 FL (ref 80–99)
PLATELET # BLD AUTO: 174 10*3/MM3 (ref 150–450)
PMV BLD AUTO: 11.5 FL (ref 6–12)
POTASSIUM BLD-SCNC: 4.5 MMOL/L (ref 3.5–5.5)
RBC # BLD AUTO: 2.98 10*6/MM3 (ref 4.2–5.76)
SODIUM BLD-SCNC: 135 MMOL/L (ref 132–146)
SODIUM UR-SCNC: 15 MMOL/L (ref 30–90)
WBC NRBC COR # BLD: 12.9 10*3/MM3 (ref 3.5–10.8)

## 2017-04-26 PROCEDURE — 25010000002 CEFUROXIME: Performed by: PHYSICIAN ASSISTANT

## 2017-04-26 PROCEDURE — 99232 SBSQ HOSP IP/OBS MODERATE 35: CPT | Performed by: INTERNAL MEDICINE

## 2017-04-26 PROCEDURE — 93010 ELECTROCARDIOGRAM REPORT: CPT | Performed by: INTERNAL MEDICINE

## 2017-04-26 PROCEDURE — 93005 ELECTROCARDIOGRAM TRACING: CPT | Performed by: PHYSICIAN ASSISTANT

## 2017-04-26 PROCEDURE — 87205 SMEAR GRAM STAIN: CPT | Performed by: INTERNAL MEDICINE

## 2017-04-26 PROCEDURE — 99291 CRITICAL CARE FIRST HOUR: CPT | Performed by: INTERNAL MEDICINE

## 2017-04-26 PROCEDURE — 94799 UNLISTED PULMONARY SVC/PX: CPT

## 2017-04-26 PROCEDURE — 82962 GLUCOSE BLOOD TEST: CPT

## 2017-04-26 PROCEDURE — 99024 POSTOP FOLLOW-UP VISIT: CPT | Performed by: THORACIC SURGERY (CARDIOTHORACIC VASCULAR SURGERY)

## 2017-04-26 PROCEDURE — 85027 COMPLETE CBC AUTOMATED: CPT | Performed by: PHYSICIAN ASSISTANT

## 2017-04-26 PROCEDURE — 97110 THERAPEUTIC EXERCISES: CPT

## 2017-04-26 PROCEDURE — 71010 HC CHEST PA OR AP: CPT

## 2017-04-26 PROCEDURE — 80048 BASIC METABOLIC PNL TOTAL CA: CPT | Performed by: INTERNAL MEDICINE

## 2017-04-26 PROCEDURE — 84300 ASSAY OF URINE SODIUM: CPT | Performed by: INTERNAL MEDICINE

## 2017-04-26 PROCEDURE — 82570 ASSAY OF URINE CREATININE: CPT | Performed by: INTERNAL MEDICINE

## 2017-04-26 RX ADMIN — PAROXETINE HYDROCHLORIDE 20 MG: 20 TABLET, FILM COATED ORAL at 08:35

## 2017-04-26 RX ADMIN — HYDROCODONE BITARTRATE AND ACETAMINOPHEN 1 TABLET: 7.5; 325 TABLET ORAL at 10:07

## 2017-04-26 RX ADMIN — CEFUROXIME 1.5 G: 1.5 INJECTION, POWDER, FOR SOLUTION INTRAVENOUS at 06:01

## 2017-04-26 RX ADMIN — Medication 2 TABLET: at 08:35

## 2017-04-26 RX ADMIN — GABAPENTIN 100 MG: 100 CAPSULE ORAL at 20:02

## 2017-04-26 RX ADMIN — FAMOTIDINE 20 MG: 20 TABLET ORAL at 17:31

## 2017-04-26 RX ADMIN — HYDROCODONE BITARTRATE AND ACETAMINOPHEN 1 TABLET: 7.5; 325 TABLET ORAL at 17:31

## 2017-04-26 RX ADMIN — ATORVASTATIN CALCIUM 40 MG: 40 TABLET, FILM COATED ORAL at 20:02

## 2017-04-26 RX ADMIN — MEMANTINE 10 MG: 10 TABLET ORAL at 20:03

## 2017-04-26 RX ADMIN — FAMOTIDINE 20 MG: 20 TABLET ORAL at 08:35

## 2017-04-26 RX ADMIN — GABAPENTIN 100 MG: 100 CAPSULE ORAL at 10:07

## 2017-04-26 RX ADMIN — Medication 0.02 MCG/KG/MIN: at 10:08

## 2017-04-26 RX ADMIN — MEMANTINE 10 MG: 10 TABLET ORAL at 10:07

## 2017-04-26 RX ADMIN — ASPIRIN 325 MG: 325 TABLET, DELAYED RELEASE ORAL at 08:35

## 2017-04-26 RX ADMIN — Medication 2 TABLET: at 17:31

## 2017-04-26 RX ADMIN — MONTELUKAST SODIUM 10 MG: 10 TABLET, FILM COATED ORAL at 20:02

## 2017-04-27 ENCOUNTER — APPOINTMENT (OUTPATIENT)
Dept: GENERAL RADIOLOGY | Facility: HOSPITAL | Age: 72
End: 2017-04-27

## 2017-04-27 LAB
ANION GAP SERPL CALCULATED.3IONS-SCNC: 8 MMOL/L (ref 3–11)
BASOPHILS # BLD AUTO: 0.05 10*3/MM3 (ref 0–0.2)
BASOPHILS NFR BLD AUTO: 0.4 % (ref 0–1)
BUN BLD-MCNC: 45 MG/DL (ref 9–23)
BUN/CREAT SERPL: 17.3 (ref 7–25)
CA-I SERPL ISE-MCNC: 1.34 MMOL/L (ref 1.12–1.32)
CALCIUM SPEC-SCNC: 10 MG/DL (ref 8.7–10.4)
CHLORIDE SERPL-SCNC: 105 MMOL/L (ref 99–109)
CO2 SERPL-SCNC: 23 MMOL/L (ref 20–31)
CREAT BLD-MCNC: 2.6 MG/DL (ref 0.6–1.3)
DEPRECATED RDW RBC AUTO: 52.2 FL (ref 37–54)
EOSINOPHIL # BLD AUTO: 0.56 10*3/MM3 (ref 0.1–0.3)
EOSINOPHIL NFR BLD AUTO: 4.3 % (ref 0–3)
ERYTHROCYTE [DISTWIDTH] IN BLOOD BY AUTOMATED COUNT: 15.4 % (ref 11.3–14.5)
GFR SERPL CREATININE-BSD FRML MDRD: 24 ML/MIN/1.73
GLUCOSE BLD-MCNC: 89 MG/DL (ref 70–100)
GLUCOSE BLDC GLUCOMTR-MCNC: 113 MG/DL (ref 70–130)
GLUCOSE BLDC GLUCOMTR-MCNC: 154 MG/DL (ref 70–130)
GLUCOSE BLDC GLUCOMTR-MCNC: 90 MG/DL (ref 70–130)
GLUCOSE BLDC GLUCOMTR-MCNC: 96 MG/DL (ref 70–130)
HCT VFR BLD AUTO: 28.6 % (ref 38.9–50.9)
HGB BLD-MCNC: 8.8 G/DL (ref 13.1–17.5)
IMM GRANULOCYTES # BLD: 0.03 10*3/MM3 (ref 0–0.03)
IMM GRANULOCYTES NFR BLD: 0.2 % (ref 0–0.6)
LYMPHOCYTES # BLD AUTO: 1.64 10*3/MM3 (ref 0.6–4.8)
LYMPHOCYTES NFR BLD AUTO: 12.7 % (ref 24–44)
MAGNESIUM SERPL-MCNC: 3 MG/DL (ref 1.3–2.7)
MCH RBC QN AUTO: 28.5 PG (ref 27–31)
MCHC RBC AUTO-ENTMCNC: 30.8 G/DL (ref 32–36)
MCV RBC AUTO: 92.6 FL (ref 80–99)
MONOCYTES # BLD AUTO: 1.77 10*3/MM3 (ref 0–1)
MONOCYTES NFR BLD AUTO: 13.7 % (ref 0–12)
NEUTROPHILS # BLD AUTO: 8.84 10*3/MM3 (ref 1.5–8.3)
NEUTROPHILS NFR BLD AUTO: 68.7 % (ref 41–71)
PHOSPHATE SERPL-MCNC: 3.9 MG/DL (ref 2.4–5.1)
PLATELET # BLD AUTO: 187 10*3/MM3 (ref 150–450)
PMV BLD AUTO: 11.9 FL (ref 6–12)
POTASSIUM BLD-SCNC: 4.3 MMOL/L (ref 3.5–5.5)
RBC # BLD AUTO: 3.09 10*6/MM3 (ref 4.2–5.76)
SODIUM BLD-SCNC: 136 MMOL/L (ref 132–146)
WBC NRBC COR # BLD: 12.89 10*3/MM3 (ref 3.5–10.8)

## 2017-04-27 PROCEDURE — 82330 ASSAY OF CALCIUM: CPT | Performed by: INTERNAL MEDICINE

## 2017-04-27 PROCEDURE — 82962 GLUCOSE BLOOD TEST: CPT

## 2017-04-27 PROCEDURE — 83735 ASSAY OF MAGNESIUM: CPT | Performed by: INTERNAL MEDICINE

## 2017-04-27 PROCEDURE — 97110 THERAPEUTIC EXERCISES: CPT

## 2017-04-27 PROCEDURE — 71010 HC CHEST PA OR AP: CPT

## 2017-04-27 PROCEDURE — 94799 UNLISTED PULMONARY SVC/PX: CPT

## 2017-04-27 PROCEDURE — 94660 CPAP INITIATION&MGMT: CPT

## 2017-04-27 PROCEDURE — 80048 BASIC METABOLIC PNL TOTAL CA: CPT | Performed by: PHYSICIAN ASSISTANT

## 2017-04-27 PROCEDURE — 85025 COMPLETE CBC W/AUTO DIFF WBC: CPT | Performed by: INTERNAL MEDICINE

## 2017-04-27 PROCEDURE — 99232 SBSQ HOSP IP/OBS MODERATE 35: CPT | Performed by: NURSE PRACTITIONER

## 2017-04-27 PROCEDURE — 84100 ASSAY OF PHOSPHORUS: CPT | Performed by: INTERNAL MEDICINE

## 2017-04-27 PROCEDURE — 99024 POSTOP FOLLOW-UP VISIT: CPT | Performed by: THORACIC SURGERY (CARDIOTHORACIC VASCULAR SURGERY)

## 2017-04-27 RX ORDER — HYDROCODONE BITARTRATE AND ACETAMINOPHEN 7.5; 325 MG/1; MG/1
1 TABLET ORAL EVERY 4 HOURS PRN
Qty: 30 TABLET | Refills: 0 | Status: SHIPPED | OUTPATIENT
Start: 2017-04-27 | End: 2017-05-04

## 2017-04-27 RX ORDER — SODIUM CHLORIDE 0.9 % (FLUSH) 0.9 %
1-10 SYRINGE (ML) INJECTION EVERY 8 HOURS
Status: DISCONTINUED | OUTPATIENT
Start: 2017-04-27 | End: 2017-05-02 | Stop reason: HOSPADM

## 2017-04-27 RX ADMIN — Medication 2 TABLET: at 08:06

## 2017-04-27 RX ADMIN — GABAPENTIN 100 MG: 100 CAPSULE ORAL at 21:45

## 2017-04-27 RX ADMIN — MONTELUKAST SODIUM 10 MG: 10 TABLET, FILM COATED ORAL at 21:45

## 2017-04-27 RX ADMIN — METOPROLOL TARTRATE 12.5 MG: 25 TABLET, FILM COATED ORAL at 21:45

## 2017-04-27 RX ADMIN — FAMOTIDINE 20 MG: 20 TABLET ORAL at 08:06

## 2017-04-27 RX ADMIN — GABAPENTIN 100 MG: 100 CAPSULE ORAL at 08:06

## 2017-04-27 RX ADMIN — ATORVASTATIN CALCIUM 40 MG: 40 TABLET, FILM COATED ORAL at 21:45

## 2017-04-27 RX ADMIN — Medication 10 ML: at 21:46

## 2017-04-27 RX ADMIN — Medication 10 ML: at 12:59

## 2017-04-27 RX ADMIN — MEMANTINE 10 MG: 10 TABLET ORAL at 21:45

## 2017-04-27 RX ADMIN — MEMANTINE 10 MG: 10 TABLET ORAL at 08:06

## 2017-04-27 RX ADMIN — ASPIRIN 325 MG: 325 TABLET, DELAYED RELEASE ORAL at 08:06

## 2017-04-27 RX ADMIN — DOXAZOSIN MESYLATE 1 MG: 1 TABLET ORAL at 21:45

## 2017-04-27 RX ADMIN — PAROXETINE HYDROCHLORIDE 20 MG: 20 TABLET, FILM COATED ORAL at 08:06

## 2017-04-28 LAB
ABO + RH BLD: NORMAL
ABO + RH BLD: NORMAL
ALBUMIN SERPL-MCNC: 3.4 G/DL (ref 3.2–4.8)
ANION GAP SERPL CALCULATED.3IONS-SCNC: 4 MMOL/L (ref 3–11)
BH BB BLOOD EXPIRATION DATE: NORMAL
BH BB BLOOD EXPIRATION DATE: NORMAL
BH BB BLOOD TYPE BARCODE: 600
BH BB BLOOD TYPE BARCODE: 600
BH BB DISPENSE STATUS: NORMAL
BH BB DISPENSE STATUS: NORMAL
BH BB PRODUCT CODE: NORMAL
BH BB PRODUCT CODE: NORMAL
BH BB UNIT NUMBER: NORMAL
BH BB UNIT NUMBER: NORMAL
BUN BLD-MCNC: 46 MG/DL (ref 9–23)
BUN/CREAT SERPL: 23 (ref 7–25)
CALCIUM SPEC-SCNC: 9.6 MG/DL (ref 8.7–10.4)
CHLORIDE SERPL-SCNC: 108 MMOL/L (ref 99–109)
CO2 SERPL-SCNC: 24 MMOL/L (ref 20–31)
CREAT BLD-MCNC: 2 MG/DL (ref 0.6–1.3)
CREAT UR-MCNC: 81 MG/DL
CROSSMATCH INTERPRETATION: NORMAL
CROSSMATCH INTERPRETATION: NORMAL
DEPRECATED RDW RBC AUTO: 52.2 FL (ref 37–54)
EOSINOPHIL SPEC QL MICRO: 0 % EOS/100 CELLS (ref 0–0)
ERYTHROCYTE [DISTWIDTH] IN BLOOD BY AUTOMATED COUNT: 15.4 % (ref 11.3–14.5)
GFR SERPL CREATININE-BSD FRML MDRD: 33 ML/MIN/1.73
GLUCOSE BLD-MCNC: 92 MG/DL (ref 70–100)
GLUCOSE BLDC GLUCOMTR-MCNC: 111 MG/DL (ref 70–130)
GLUCOSE BLDC GLUCOMTR-MCNC: 93 MG/DL (ref 70–130)
GLUCOSE BLDC GLUCOMTR-MCNC: 97 MG/DL (ref 70–130)
GLUCOSE BLDC GLUCOMTR-MCNC: 99 MG/DL (ref 70–130)
HCT VFR BLD AUTO: 26.2 % (ref 38.9–50.9)
HGB BLD-MCNC: 8.1 G/DL (ref 13.1–17.5)
MCH RBC QN AUTO: 28.4 PG (ref 27–31)
MCHC RBC AUTO-ENTMCNC: 30.9 G/DL (ref 32–36)
MCV RBC AUTO: 91.9 FL (ref 80–99)
PHOSPHATE SERPL-MCNC: 3.4 MG/DL (ref 2.4–5.1)
PLATELET # BLD AUTO: 211 10*3/MM3 (ref 150–450)
PMV BLD AUTO: 11.4 FL (ref 6–12)
POTASSIUM BLD-SCNC: 4.3 MMOL/L (ref 3.5–5.5)
PROT UR-MCNC: 104 MG/DL (ref 1–14)
RBC # BLD AUTO: 2.85 10*6/MM3 (ref 4.2–5.76)
SODIUM BLD-SCNC: 136 MMOL/L (ref 132–146)
SODIUM UR-SCNC: 40 MMOL/L (ref 30–90)
UNIT  ABO: NORMAL
UNIT  ABO: NORMAL
UNIT  RH: NORMAL
UNIT  RH: NORMAL
WBC NRBC COR # BLD: 12.66 10*3/MM3 (ref 3.5–10.8)

## 2017-04-28 PROCEDURE — 94799 UNLISTED PULMONARY SVC/PX: CPT

## 2017-04-28 PROCEDURE — 94660 CPAP INITIATION&MGMT: CPT

## 2017-04-28 PROCEDURE — 99024 POSTOP FOLLOW-UP VISIT: CPT | Performed by: THORACIC SURGERY (CARDIOTHORACIC VASCULAR SURGERY)

## 2017-04-28 PROCEDURE — 85027 COMPLETE CBC AUTOMATED: CPT | Performed by: INTERNAL MEDICINE

## 2017-04-28 PROCEDURE — 84156 ASSAY OF PROTEIN URINE: CPT | Performed by: INTERNAL MEDICINE

## 2017-04-28 PROCEDURE — 87205 SMEAR GRAM STAIN: CPT | Performed by: INTERNAL MEDICINE

## 2017-04-28 PROCEDURE — 82570 ASSAY OF URINE CREATININE: CPT | Performed by: INTERNAL MEDICINE

## 2017-04-28 PROCEDURE — 99231 SBSQ HOSP IP/OBS SF/LOW 25: CPT | Performed by: PHYSICIAN ASSISTANT

## 2017-04-28 PROCEDURE — 82962 GLUCOSE BLOOD TEST: CPT

## 2017-04-28 PROCEDURE — 80069 RENAL FUNCTION PANEL: CPT | Performed by: INTERNAL MEDICINE

## 2017-04-28 PROCEDURE — 84300 ASSAY OF URINE SODIUM: CPT | Performed by: INTERNAL MEDICINE

## 2017-04-28 PROCEDURE — 99232 SBSQ HOSP IP/OBS MODERATE 35: CPT | Performed by: INTERNAL MEDICINE

## 2017-04-28 RX ORDER — AMLODIPINE BESYLATE 5 MG/1
5 TABLET ORAL
Status: DISCONTINUED | OUTPATIENT
Start: 2017-04-28 | End: 2017-05-01

## 2017-04-28 RX ADMIN — DOXAZOSIN MESYLATE 1 MG: 1 TABLET ORAL at 20:23

## 2017-04-28 RX ADMIN — MEMANTINE 10 MG: 10 TABLET ORAL at 20:22

## 2017-04-28 RX ADMIN — GABAPENTIN 100 MG: 100 CAPSULE ORAL at 20:23

## 2017-04-28 RX ADMIN — MEMANTINE 10 MG: 10 TABLET ORAL at 09:39

## 2017-04-28 RX ADMIN — PAROXETINE HYDROCHLORIDE 20 MG: 20 TABLET, FILM COATED ORAL at 08:46

## 2017-04-28 RX ADMIN — MONTELUKAST SODIUM 10 MG: 10 TABLET, FILM COATED ORAL at 20:22

## 2017-04-28 RX ADMIN — GABAPENTIN 100 MG: 100 CAPSULE ORAL at 08:46

## 2017-04-28 RX ADMIN — ATORVASTATIN CALCIUM 40 MG: 40 TABLET, FILM COATED ORAL at 20:23

## 2017-04-28 RX ADMIN — ASPIRIN 325 MG: 325 TABLET, DELAYED RELEASE ORAL at 08:46

## 2017-04-28 RX ADMIN — Medication 2 TABLET: at 08:46

## 2017-04-28 RX ADMIN — METOPROLOL TARTRATE 12.5 MG: 25 TABLET, FILM COATED ORAL at 08:46

## 2017-04-29 LAB
ANION GAP SERPL CALCULATED.3IONS-SCNC: 12 MMOL/L (ref 3–11)
BUN BLD-MCNC: 49 MG/DL (ref 9–23)
BUN/CREAT SERPL: 24.5 (ref 7–25)
CALCIUM SPEC-SCNC: 10.3 MG/DL (ref 8.7–10.4)
CHLORIDE SERPL-SCNC: 106 MMOL/L (ref 99–109)
CO2 SERPL-SCNC: 21 MMOL/L (ref 20–31)
CREAT BLD-MCNC: 2 MG/DL (ref 0.6–1.3)
GFR SERPL CREATININE-BSD FRML MDRD: 33 ML/MIN/1.73
GLUCOSE BLD-MCNC: 108 MG/DL (ref 70–100)
GLUCOSE BLDC GLUCOMTR-MCNC: 117 MG/DL (ref 70–130)
GLUCOSE BLDC GLUCOMTR-MCNC: 92 MG/DL (ref 70–130)
HCT VFR BLD AUTO: 27.5 % (ref 38.9–50.9)
HGB BLD-MCNC: 8.3 G/DL (ref 13.1–17.5)
POTASSIUM BLD-SCNC: 4.4 MMOL/L (ref 3.5–5.5)
SODIUM BLD-SCNC: 139 MMOL/L (ref 132–146)

## 2017-04-29 PROCEDURE — 82962 GLUCOSE BLOOD TEST: CPT

## 2017-04-29 PROCEDURE — 85014 HEMATOCRIT: CPT | Performed by: PHYSICIAN ASSISTANT

## 2017-04-29 PROCEDURE — 94660 CPAP INITIATION&MGMT: CPT

## 2017-04-29 PROCEDURE — 97110 THERAPEUTIC EXERCISES: CPT

## 2017-04-29 PROCEDURE — 80048 BASIC METABOLIC PNL TOTAL CA: CPT | Performed by: PHYSICIAN ASSISTANT

## 2017-04-29 PROCEDURE — 99231 SBSQ HOSP IP/OBS SF/LOW 25: CPT | Performed by: NURSE PRACTITIONER

## 2017-04-29 PROCEDURE — 97116 GAIT TRAINING THERAPY: CPT

## 2017-04-29 PROCEDURE — 94799 UNLISTED PULMONARY SVC/PX: CPT

## 2017-04-29 PROCEDURE — 99024 POSTOP FOLLOW-UP VISIT: CPT | Performed by: THORACIC SURGERY (CARDIOTHORACIC VASCULAR SURGERY)

## 2017-04-29 PROCEDURE — 85018 HEMOGLOBIN: CPT | Performed by: PHYSICIAN ASSISTANT

## 2017-04-29 PROCEDURE — 99232 SBSQ HOSP IP/OBS MODERATE 35: CPT | Performed by: INTERNAL MEDICINE

## 2017-04-29 RX ORDER — DOXAZOSIN MESYLATE 1 MG/1
2 TABLET ORAL NIGHTLY
Status: DISCONTINUED | OUTPATIENT
Start: 2017-04-29 | End: 2017-04-30

## 2017-04-29 RX ADMIN — MEMANTINE 10 MG: 10 TABLET ORAL at 09:45

## 2017-04-29 RX ADMIN — AMLODIPINE BESYLATE 5 MG: 5 TABLET ORAL at 09:45

## 2017-04-29 RX ADMIN — PAROXETINE HYDROCHLORIDE 20 MG: 20 TABLET, FILM COATED ORAL at 09:45

## 2017-04-29 RX ADMIN — Medication 10 ML: at 04:31

## 2017-04-29 RX ADMIN — GABAPENTIN 100 MG: 100 CAPSULE ORAL at 22:03

## 2017-04-29 RX ADMIN — Medication 2 TABLET: at 18:07

## 2017-04-29 RX ADMIN — GABAPENTIN 100 MG: 100 CAPSULE ORAL at 09:45

## 2017-04-29 RX ADMIN — Medication 10 ML: at 22:04

## 2017-04-29 RX ADMIN — MEMANTINE 10 MG: 10 TABLET ORAL at 22:03

## 2017-04-29 RX ADMIN — ATORVASTATIN CALCIUM 40 MG: 40 TABLET, FILM COATED ORAL at 22:04

## 2017-04-29 RX ADMIN — MONTELUKAST SODIUM 10 MG: 10 TABLET, FILM COATED ORAL at 22:04

## 2017-04-29 RX ADMIN — DOXAZOSIN MESYLATE 2 MG: 1 TABLET ORAL at 22:03

## 2017-04-29 RX ADMIN — ASPIRIN 325 MG: 325 TABLET, DELAYED RELEASE ORAL at 09:45

## 2017-04-29 RX ADMIN — Medication 10 ML: at 14:02

## 2017-04-30 PROCEDURE — 99232 SBSQ HOSP IP/OBS MODERATE 35: CPT | Performed by: INTERNAL MEDICINE

## 2017-04-30 PROCEDURE — 99024 POSTOP FOLLOW-UP VISIT: CPT | Performed by: THORACIC SURGERY (CARDIOTHORACIC VASCULAR SURGERY)

## 2017-04-30 PROCEDURE — 94799 UNLISTED PULMONARY SVC/PX: CPT

## 2017-04-30 PROCEDURE — 94660 CPAP INITIATION&MGMT: CPT

## 2017-04-30 PROCEDURE — 97116 GAIT TRAINING THERAPY: CPT

## 2017-04-30 RX ORDER — AMLODIPINE BESYLATE 5 MG/1
5 TABLET ORAL
Qty: 30 TABLET | Refills: 11 | Status: CANCELLED | OUTPATIENT
Start: 2017-04-30

## 2017-04-30 RX ORDER — DOXAZOSIN MESYLATE 1 MG/1
2 TABLET ORAL EVERY 12 HOURS SCHEDULED
Status: DISCONTINUED | OUTPATIENT
Start: 2017-04-30 | End: 2017-05-02 | Stop reason: HOSPADM

## 2017-04-30 RX ADMIN — Medication 2 TABLET: at 17:15

## 2017-04-30 RX ADMIN — Medication 10 ML: at 19:45

## 2017-04-30 RX ADMIN — ATORVASTATIN CALCIUM 40 MG: 40 TABLET, FILM COATED ORAL at 20:33

## 2017-04-30 RX ADMIN — ASPIRIN 325 MG: 325 TABLET, DELAYED RELEASE ORAL at 09:22

## 2017-04-30 RX ADMIN — MEMANTINE 10 MG: 10 TABLET ORAL at 09:23

## 2017-04-30 RX ADMIN — PAROXETINE HYDROCHLORIDE 20 MG: 20 TABLET, FILM COATED ORAL at 09:23

## 2017-04-30 RX ADMIN — Medication 2 TABLET: at 09:23

## 2017-04-30 RX ADMIN — GABAPENTIN 100 MG: 100 CAPSULE ORAL at 20:33

## 2017-04-30 RX ADMIN — GABAPENTIN 100 MG: 100 CAPSULE ORAL at 09:23

## 2017-04-30 RX ADMIN — AMLODIPINE BESYLATE 5 MG: 5 TABLET ORAL at 09:22

## 2017-04-30 RX ADMIN — MEMANTINE 10 MG: 10 TABLET ORAL at 20:34

## 2017-04-30 RX ADMIN — DOXAZOSIN MESYLATE 2 MG: 1 TABLET ORAL at 20:33

## 2017-04-30 RX ADMIN — MONTELUKAST SODIUM 10 MG: 10 TABLET, FILM COATED ORAL at 20:33

## 2017-04-30 RX ADMIN — Medication 10 ML: at 11:36

## 2017-05-01 LAB
ALBUMIN SERPL-MCNC: 3.7 G/DL (ref 3.2–4.8)
ANION GAP SERPL CALCULATED.3IONS-SCNC: 9 MMOL/L (ref 3–11)
BUN BLD-MCNC: 39 MG/DL (ref 9–23)
BUN/CREAT SERPL: 24.4 (ref 7–25)
CALCIUM SPEC-SCNC: 10.2 MG/DL (ref 8.7–10.4)
CHLORIDE SERPL-SCNC: 106 MMOL/L (ref 99–109)
CO2 SERPL-SCNC: 25 MMOL/L (ref 20–31)
CREAT BLD-MCNC: 1.6 MG/DL (ref 0.6–1.3)
GFR SERPL CREATININE-BSD FRML MDRD: 43 ML/MIN/1.73
GLUCOSE BLD-MCNC: 95 MG/DL (ref 70–100)
PHOSPHATE SERPL-MCNC: 3.1 MG/DL (ref 2.4–5.1)
POTASSIUM BLD-SCNC: 4.4 MMOL/L (ref 3.5–5.5)
SODIUM BLD-SCNC: 140 MMOL/L (ref 132–146)

## 2017-05-01 PROCEDURE — 97116 GAIT TRAINING THERAPY: CPT

## 2017-05-01 PROCEDURE — 99232 SBSQ HOSP IP/OBS MODERATE 35: CPT | Performed by: INTERNAL MEDICINE

## 2017-05-01 PROCEDURE — 97110 THERAPEUTIC EXERCISES: CPT

## 2017-05-01 PROCEDURE — 99024 POSTOP FOLLOW-UP VISIT: CPT | Performed by: THORACIC SURGERY (CARDIOTHORACIC VASCULAR SURGERY)

## 2017-05-01 PROCEDURE — 80069 RENAL FUNCTION PANEL: CPT | Performed by: INTERNAL MEDICINE

## 2017-05-01 PROCEDURE — 94660 CPAP INITIATION&MGMT: CPT

## 2017-05-01 PROCEDURE — 94799 UNLISTED PULMONARY SVC/PX: CPT

## 2017-05-01 RX ORDER — FUROSEMIDE 20 MG/1
20 TABLET ORAL DAILY
Status: DISCONTINUED | OUTPATIENT
Start: 2017-05-01 | End: 2017-05-02 | Stop reason: HOSPADM

## 2017-05-01 RX ORDER — DIAPER,BRIEF,INFANT-TODD,DISP
EACH MISCELLANEOUS EVERY 12 HOURS SCHEDULED
Status: DISCONTINUED | OUTPATIENT
Start: 2017-05-01 | End: 2017-05-02 | Stop reason: HOSPADM

## 2017-05-01 RX ORDER — AMLODIPINE BESYLATE 10 MG/1
10 TABLET ORAL
Status: DISCONTINUED | OUTPATIENT
Start: 2017-05-01 | End: 2017-05-02 | Stop reason: HOSPADM

## 2017-05-01 RX ORDER — ATORVASTATIN CALCIUM 40 MG/1
40 TABLET, FILM COATED ORAL NIGHTLY
Qty: 30 TABLET | Refills: 11 | Status: SHIPPED | OUTPATIENT
Start: 2017-05-01 | End: 2017-06-28 | Stop reason: SDUPTHER

## 2017-05-01 RX ORDER — AMLODIPINE BESYLATE 10 MG/1
5 TABLET ORAL DAILY
Qty: 30 TABLET | Refills: 11 | Status: CANCELLED | OUTPATIENT
Start: 2017-05-01

## 2017-05-01 RX ADMIN — FUROSEMIDE 20 MG: 20 TABLET ORAL at 18:14

## 2017-05-01 RX ADMIN — Medication 10 ML: at 22:40

## 2017-05-01 RX ADMIN — PAROXETINE HYDROCHLORIDE 20 MG: 20 TABLET, FILM COATED ORAL at 09:27

## 2017-05-01 RX ADMIN — ASPIRIN 325 MG: 325 TABLET, DELAYED RELEASE ORAL at 09:26

## 2017-05-01 RX ADMIN — MONTELUKAST SODIUM 10 MG: 10 TABLET, FILM COATED ORAL at 20:17

## 2017-05-01 RX ADMIN — MEMANTINE 10 MG: 10 TABLET ORAL at 20:16

## 2017-05-01 RX ADMIN — DOXAZOSIN MESYLATE 2 MG: 1 TABLET ORAL at 20:16

## 2017-05-01 RX ADMIN — GABAPENTIN 100 MG: 100 CAPSULE ORAL at 20:16

## 2017-05-01 RX ADMIN — GABAPENTIN 100 MG: 100 CAPSULE ORAL at 09:26

## 2017-05-01 RX ADMIN — Medication 2 TABLET: at 18:14

## 2017-05-01 RX ADMIN — AMLODIPINE BESYLATE 10 MG: 10 TABLET ORAL at 09:26

## 2017-05-01 RX ADMIN — DOXAZOSIN MESYLATE 2 MG: 1 TABLET ORAL at 09:26

## 2017-05-01 RX ADMIN — HYDROCODONE BITARTRATE AND ACETAMINOPHEN 1 TABLET: 7.5; 325 TABLET ORAL at 11:44

## 2017-05-01 RX ADMIN — Medication 2 TABLET: at 11:46

## 2017-05-01 RX ADMIN — MEMANTINE 10 MG: 10 TABLET ORAL at 09:29

## 2017-05-01 RX ADMIN — ATORVASTATIN CALCIUM 40 MG: 40 TABLET, FILM COATED ORAL at 20:16

## 2017-05-02 ENCOUNTER — APPOINTMENT (OUTPATIENT)
Dept: CARDIOLOGY | Facility: HOSPITAL | Age: 72
End: 2017-05-02

## 2017-05-02 VITALS
RESPIRATION RATE: 18 BRPM | BODY MASS INDEX: 32.89 KG/M2 | SYSTOLIC BLOOD PRESSURE: 147 MMHG | HEIGHT: 68 IN | OXYGEN SATURATION: 91 % | DIASTOLIC BLOOD PRESSURE: 79 MMHG | TEMPERATURE: 98.8 F | WEIGHT: 217 LBS | HEART RATE: 76 BPM

## 2017-05-02 LAB
BH CV LOWER VASCULAR LEFT COMMON FEMORAL AUGMENT: NORMAL
BH CV LOWER VASCULAR LEFT COMMON FEMORAL COMPRESS: NORMAL
BH CV LOWER VASCULAR LEFT COMMON FEMORAL PHASIC: NORMAL
BH CV LOWER VASCULAR LEFT COMMON FEMORAL SPONT: NORMAL
BH CV LOWER VASCULAR RIGHT COMMON FEMORAL AUGMENT: NORMAL
BH CV LOWER VASCULAR RIGHT COMMON FEMORAL COMPRESS: NORMAL
BH CV LOWER VASCULAR RIGHT COMMON FEMORAL PHASIC: NORMAL
BH CV LOWER VASCULAR RIGHT COMMON FEMORAL SPONT: NORMAL
BH CV LOWER VASCULAR RIGHT DISTAL FEMORAL COMPRESS: NORMAL
BH CV LOWER VASCULAR RIGHT GASTRONEMIUS COMPRESS: NORMAL
BH CV LOWER VASCULAR RIGHT LESSER SAPH COMPRESS: NORMAL
BH CV LOWER VASCULAR RIGHT MID FEMORAL AUGMENT: NORMAL
BH CV LOWER VASCULAR RIGHT MID FEMORAL COMPRESS: NORMAL
BH CV LOWER VASCULAR RIGHT MID FEMORAL PHASIC: NORMAL
BH CV LOWER VASCULAR RIGHT MID FEMORAL SPONT: NORMAL
BH CV LOWER VASCULAR RIGHT PERONEAL COMPRESS: NORMAL
BH CV LOWER VASCULAR RIGHT POPLITEAL AUGMENT: NORMAL
BH CV LOWER VASCULAR RIGHT POPLITEAL COMPRESS: NORMAL
BH CV LOWER VASCULAR RIGHT POPLITEAL PHASIC: NORMAL
BH CV LOWER VASCULAR RIGHT POPLITEAL SPONT: NORMAL
BH CV LOWER VASCULAR RIGHT POSTERIOR TIBIAL COMPRESS: NORMAL
BH CV LOWER VASCULAR RIGHT PROXIMAL FEMORAL COMPRESS: NORMAL
BH CV LOWER VASCULAR RIGHT SAPHENOFEMORAL JUNCTION AUGMENT: NORMAL
BH CV LOWER VASCULAR RIGHT SAPHENOFEMORAL JUNCTION COMPRESS: NORMAL
BH CV LOWER VASCULAR RIGHT SAPHENOFEMORAL JUNCTION PHASIC: NORMAL
BH CV LOWER VASCULAR RIGHT SAPHENOFEMORAL JUNCTION SPONT: NORMAL
POTASSIUM BLD-SCNC: 4.4 MMOL/L (ref 3.5–5.5)

## 2017-05-02 PROCEDURE — 99024 POSTOP FOLLOW-UP VISIT: CPT | Performed by: THORACIC SURGERY (CARDIOTHORACIC VASCULAR SURGERY)

## 2017-05-02 PROCEDURE — 84132 ASSAY OF SERUM POTASSIUM: CPT | Performed by: PHYSICIAN ASSISTANT

## 2017-05-02 PROCEDURE — 94799 UNLISTED PULMONARY SVC/PX: CPT

## 2017-05-02 PROCEDURE — 94660 CPAP INITIATION&MGMT: CPT

## 2017-05-02 PROCEDURE — 93971 EXTREMITY STUDY: CPT

## 2017-05-02 RX ORDER — POTASSIUM CHLORIDE 750 MG/1
20 CAPSULE, EXTENDED RELEASE ORAL 2 TIMES DAILY
Qty: 20 CAPSULE | Refills: 0 | Status: SHIPPED | OUTPATIENT
Start: 2017-05-02 | End: 2017-05-02

## 2017-05-02 RX ORDER — FUROSEMIDE 20 MG/1
20 TABLET ORAL DAILY
Qty: 5 TABLET | Refills: 0 | Status: SHIPPED | OUTPATIENT
Start: 2017-05-02 | End: 2017-05-03 | Stop reason: HOSPADM

## 2017-05-02 RX ORDER — POTASSIUM CHLORIDE 750 MG/1
20 CAPSULE, EXTENDED RELEASE ORAL DAILY
Qty: 5 CAPSULE | Refills: 0 | Status: SHIPPED | OUTPATIENT
Start: 2017-05-02 | End: 2017-05-03 | Stop reason: HOSPADM

## 2017-05-02 RX ORDER — DIAPER,BRIEF,INFANT-TODD,DISP
EACH MISCELLANEOUS EVERY 12 HOURS SCHEDULED
Qty: 3 G | Refills: 0 | Status: SHIPPED | OUTPATIENT
Start: 2017-05-02 | End: 2017-05-03 | Stop reason: HOSPADM

## 2017-05-02 RX ORDER — POTASSIUM CHLORIDE 750 MG/1
20 CAPSULE, EXTENDED RELEASE ORAL 2 TIMES DAILY WITH MEALS
Status: DISCONTINUED | OUTPATIENT
Start: 2017-05-02 | End: 2017-05-02 | Stop reason: HOSPADM

## 2017-05-02 RX ADMIN — ASPIRIN 325 MG: 325 TABLET, DELAYED RELEASE ORAL at 08:37

## 2017-05-02 RX ADMIN — PAROXETINE HYDROCHLORIDE 20 MG: 20 TABLET, FILM COATED ORAL at 08:37

## 2017-05-02 RX ADMIN — DOXAZOSIN MESYLATE 2 MG: 1 TABLET ORAL at 08:36

## 2017-05-02 RX ADMIN — GABAPENTIN 100 MG: 100 CAPSULE ORAL at 08:36

## 2017-05-02 RX ADMIN — FUROSEMIDE 20 MG: 20 TABLET ORAL at 08:37

## 2017-05-02 RX ADMIN — MEMANTINE 10 MG: 10 TABLET ORAL at 08:37

## 2017-05-02 RX ADMIN — BACITRACIN ZINC 1 APPLICATION: 500 OINTMENT TOPICAL at 08:37

## 2017-05-02 RX ADMIN — AMLODIPINE BESYLATE 10 MG: 10 TABLET ORAL at 08:36

## 2017-05-09 ENCOUNTER — TELEPHONE (OUTPATIENT)
Dept: CARDIOLOGY | Facility: CLINIC | Age: 72
End: 2017-05-09

## 2017-05-11 ENCOUNTER — APPOINTMENT (OUTPATIENT)
Dept: CARDIOLOGY | Facility: HOSPITAL | Age: 72
End: 2017-05-11
Attending: EMERGENCY MEDICINE

## 2017-05-11 ENCOUNTER — HOSPITAL ENCOUNTER (INPATIENT)
Facility: HOSPITAL | Age: 72
LOS: 11 days | Discharge: NURSING, REHAB OR LONG-TERM CARE | End: 2017-05-22
Attending: EMERGENCY MEDICINE | Admitting: INTERNAL MEDICINE

## 2017-05-11 ENCOUNTER — APPOINTMENT (OUTPATIENT)
Dept: GENERAL RADIOLOGY | Facility: HOSPITAL | Age: 72
End: 2017-05-11

## 2017-05-11 DIAGNOSIS — N19 RENAL FAILURE: ICD-10-CM

## 2017-05-11 DIAGNOSIS — I25.10 CORONARY ARTERY DISEASE INVOLVING NATIVE CORONARY ARTERY OF NATIVE HEART WITHOUT ANGINA PECTORIS: ICD-10-CM

## 2017-05-11 DIAGNOSIS — Z74.09 IMPAIRED FUNCTIONAL MOBILITY, BALANCE, GAIT, AND ENDURANCE: ICD-10-CM

## 2017-05-11 DIAGNOSIS — I50.9 ACUTE CONGESTIVE HEART FAILURE, UNSPECIFIED CONGESTIVE HEART FAILURE TYPE: ICD-10-CM

## 2017-05-11 DIAGNOSIS — I50.23 ACUTE ON CHRONIC SYSTOLIC CONGESTIVE HEART FAILURE (HCC): ICD-10-CM

## 2017-05-11 DIAGNOSIS — I48.4 ATYPICAL ATRIAL FLUTTER (HCC): Primary | ICD-10-CM

## 2017-05-11 DIAGNOSIS — I48.92 ATRIAL FLUTTER WITH RAPID VENTRICULAR RESPONSE (HCC): ICD-10-CM

## 2017-05-11 DIAGNOSIS — L03.115 CELLULITIS OF RIGHT LEG: ICD-10-CM

## 2017-05-11 DIAGNOSIS — IMO0002 SEROMA: ICD-10-CM

## 2017-05-11 PROBLEM — R60.0 LEG EDEMA, RIGHT: Status: ACTIVE | Noted: 2017-05-11

## 2017-05-11 PROBLEM — D64.9 ANEMIA: Status: ACTIVE | Noted: 2017-05-11

## 2017-05-11 PROBLEM — R06.02 SHORTNESS OF BREATH: Status: ACTIVE | Noted: 2017-05-11

## 2017-05-11 LAB
ALBUMIN SERPL-MCNC: 3.4 G/DL (ref 3.2–4.8)
ALBUMIN/GLOB SERPL: 0.9 G/DL (ref 1.5–2.5)
ALP SERPL-CCNC: 76 U/L (ref 25–100)
ALT SERPL W P-5'-P-CCNC: 24 U/L (ref 7–40)
ANION GAP SERPL CALCULATED.3IONS-SCNC: 5 MMOL/L (ref 3–11)
AST SERPL-CCNC: 18 U/L (ref 0–33)
BASOPHILS # BLD AUTO: 0.05 10*3/MM3 (ref 0–0.2)
BASOPHILS NFR BLD AUTO: 0.4 % (ref 0–1)
BILIRUB SERPL-MCNC: 0.5 MG/DL (ref 0.3–1.2)
BNP SERPL-MCNC: 1290 PG/ML (ref 0–100)
BUN BLD-MCNC: 35 MG/DL (ref 9–23)
BUN/CREAT SERPL: 20.6 (ref 7–25)
CALCIUM SPEC-SCNC: 9.7 MG/DL (ref 8.7–10.4)
CHLORIDE SERPL-SCNC: 106 MMOL/L (ref 99–109)
CO2 SERPL-SCNC: 28 MMOL/L (ref 20–31)
CREAT BLD-MCNC: 1.7 MG/DL (ref 0.6–1.3)
DEPRECATED RDW RBC AUTO: 53 FL (ref 37–54)
EOSINOPHIL # BLD AUTO: 0.1 10*3/MM3 (ref 0.1–0.3)
EOSINOPHIL NFR BLD AUTO: 0.8 % (ref 0–3)
ERYTHROCYTE [DISTWIDTH] IN BLOOD BY AUTOMATED COUNT: 15.6 % (ref 11.3–14.5)
GFR SERPL CREATININE-BSD FRML MDRD: 40 ML/MIN/1.73
GLOBULIN UR ELPH-MCNC: 4 GM/DL
GLUCOSE BLD-MCNC: 96 MG/DL (ref 70–100)
HCT VFR BLD AUTO: 25.8 % (ref 38.9–50.9)
HGB BLD-MCNC: 7.8 G/DL (ref 13.1–17.5)
HOLD SPECIMEN: NORMAL
HOLD SPECIMEN: NORMAL
IMM GRANULOCYTES # BLD: 0.03 10*3/MM3 (ref 0–0.03)
IMM GRANULOCYTES NFR BLD: 0.2 % (ref 0–0.6)
LYMPHOCYTES # BLD AUTO: 1.23 10*3/MM3 (ref 0.6–4.8)
LYMPHOCYTES NFR BLD AUTO: 10.2 % (ref 24–44)
MCH RBC QN AUTO: 27.7 PG (ref 27–31)
MCHC RBC AUTO-ENTMCNC: 30.2 G/DL (ref 32–36)
MCV RBC AUTO: 91.5 FL (ref 80–99)
MONOCYTES # BLD AUTO: 1.15 10*3/MM3 (ref 0–1)
MONOCYTES NFR BLD AUTO: 9.5 % (ref 0–12)
NEUTROPHILS # BLD AUTO: 9.52 10*3/MM3 (ref 1.5–8.3)
NEUTROPHILS NFR BLD AUTO: 78.9 % (ref 41–71)
PLATELET # BLD AUTO: 362 10*3/MM3 (ref 150–450)
PMV BLD AUTO: 9.6 FL (ref 6–12)
POTASSIUM BLD-SCNC: 4.7 MMOL/L (ref 3.5–5.5)
PROT SERPL-MCNC: 7.4 G/DL (ref 5.7–8.2)
RBC # BLD AUTO: 2.82 10*6/MM3 (ref 4.2–5.76)
SODIUM BLD-SCNC: 139 MMOL/L (ref 132–146)
TROPONIN I SERPL-MCNC: 0.16 NG/ML (ref 0–0.07)
TROPONIN I SERPL-MCNC: 0.17 NG/ML (ref 0–0.07)
WBC NRBC COR # BLD: 12.08 10*3/MM3 (ref 3.5–10.8)
WHOLE BLOOD HOLD SPECIMEN: NORMAL
WHOLE BLOOD HOLD SPECIMEN: NORMAL

## 2017-05-11 PROCEDURE — 25010000003 CEFTRIAXONE PER 250 MG: Performed by: EMERGENCY MEDICINE

## 2017-05-11 PROCEDURE — 93306 TTE W/DOPPLER COMPLETE: CPT | Performed by: INTERNAL MEDICINE

## 2017-05-11 PROCEDURE — 93971 EXTREMITY STUDY: CPT | Performed by: INTERNAL MEDICINE

## 2017-05-11 PROCEDURE — 94799 UNLISTED PULMONARY SVC/PX: CPT

## 2017-05-11 PROCEDURE — 87070 CULTURE OTHR SPECIMN AEROBIC: CPT | Performed by: EMERGENCY MEDICINE

## 2017-05-11 PROCEDURE — 25010000002 FUROSEMIDE PER 20 MG: Performed by: EMERGENCY MEDICINE

## 2017-05-11 PROCEDURE — 87205 SMEAR GRAM STAIN: CPT | Performed by: EMERGENCY MEDICINE

## 2017-05-11 PROCEDURE — 94640 AIRWAY INHALATION TREATMENT: CPT

## 2017-05-11 PROCEDURE — 80053 COMPREHEN METABOLIC PANEL: CPT | Performed by: EMERGENCY MEDICINE

## 2017-05-11 PROCEDURE — 93971 EXTREMITY STUDY: CPT

## 2017-05-11 PROCEDURE — 99222 1ST HOSP IP/OBS MODERATE 55: CPT | Performed by: INTERNAL MEDICINE

## 2017-05-11 PROCEDURE — 93005 ELECTROCARDIOGRAM TRACING: CPT | Performed by: EMERGENCY MEDICINE

## 2017-05-11 PROCEDURE — 99285 EMERGENCY DEPT VISIT HI MDM: CPT

## 2017-05-11 PROCEDURE — 99223 1ST HOSP IP/OBS HIGH 75: CPT | Performed by: INTERNAL MEDICINE

## 2017-05-11 PROCEDURE — 85025 COMPLETE CBC W/AUTO DIFF WBC: CPT | Performed by: EMERGENCY MEDICINE

## 2017-05-11 PROCEDURE — 93306 TTE W/DOPPLER COMPLETE: CPT

## 2017-05-11 PROCEDURE — 71010 HC CHEST PA OR AP: CPT

## 2017-05-11 PROCEDURE — 25010000002 DAPTOMYCIN PER 1 MG

## 2017-05-11 PROCEDURE — 25010000002 DIGOXIN PER 500 MCG: Performed by: NURSE PRACTITIONER

## 2017-05-11 PROCEDURE — 84484 ASSAY OF TROPONIN QUANT: CPT

## 2017-05-11 PROCEDURE — 94760 N-INVAS EAR/PLS OXIMETRY 1: CPT

## 2017-05-11 PROCEDURE — 83880 ASSAY OF NATRIURETIC PEPTIDE: CPT | Performed by: EMERGENCY MEDICINE

## 2017-05-11 RX ORDER — GABAPENTIN 100 MG/1
100 CAPSULE ORAL 2 TIMES DAILY
Status: DISCONTINUED | OUTPATIENT
Start: 2017-05-11 | End: 2017-05-22 | Stop reason: HOSPADM

## 2017-05-11 RX ORDER — FUROSEMIDE 20 MG/1
20 TABLET ORAL DAILY
Status: DISCONTINUED | OUTPATIENT
Start: 2017-05-12 | End: 2017-05-12

## 2017-05-11 RX ORDER — ATORVASTATIN CALCIUM 40 MG/1
40 TABLET, FILM COATED ORAL NIGHTLY
Status: DISCONTINUED | OUTPATIENT
Start: 2017-05-11 | End: 2017-05-22 | Stop reason: HOSPADM

## 2017-05-11 RX ORDER — PAROXETINE HYDROCHLORIDE 20 MG/1
20 TABLET, FILM COATED ORAL DAILY
Status: DISCONTINUED | OUTPATIENT
Start: 2017-05-12 | End: 2017-05-22 | Stop reason: HOSPADM

## 2017-05-11 RX ORDER — CEFTRIAXONE SODIUM 1 G/50ML
1 INJECTION, SOLUTION INTRAVENOUS ONCE
Status: COMPLETED | OUTPATIENT
Start: 2017-05-11 | End: 2017-05-11

## 2017-05-11 RX ORDER — SODIUM CHLORIDE 0.9 % (FLUSH) 0.9 %
1-10 SYRINGE (ML) INJECTION AS NEEDED
Status: DISCONTINUED | OUTPATIENT
Start: 2017-05-11 | End: 2017-05-22 | Stop reason: HOSPADM

## 2017-05-11 RX ORDER — ASPIRIN 81 MG/1
81 TABLET, CHEWABLE ORAL DAILY
Status: DISCONTINUED | OUTPATIENT
Start: 2017-05-11 | End: 2017-05-22 | Stop reason: HOSPADM

## 2017-05-11 RX ORDER — BUDESONIDE AND FORMOTEROL FUMARATE DIHYDRATE 160; 4.5 UG/1; UG/1
2 AEROSOL RESPIRATORY (INHALATION)
Status: DISCONTINUED | OUTPATIENT
Start: 2017-05-11 | End: 2017-05-22 | Stop reason: HOSPADM

## 2017-05-11 RX ORDER — MEMANTINE HYDROCHLORIDE 10 MG/1
10 TABLET ORAL 2 TIMES DAILY
Status: DISCONTINUED | OUTPATIENT
Start: 2017-05-11 | End: 2017-05-22 | Stop reason: HOSPADM

## 2017-05-11 RX ORDER — METOPROLOL TARTRATE 5 MG/5ML
2.5 INJECTION INTRAVENOUS ONCE
Status: DISCONTINUED | OUTPATIENT
Start: 2017-05-11 | End: 2017-05-20

## 2017-05-11 RX ORDER — DIGOXIN 0.25 MG/ML
500 INJECTION INTRAMUSCULAR; INTRAVENOUS ONCE
Status: COMPLETED | OUTPATIENT
Start: 2017-05-11 | End: 2017-05-11

## 2017-05-11 RX ORDER — RIVASTIGMINE 9.5 MG/24H
1 PATCH, EXTENDED RELEASE TRANSDERMAL DAILY
Status: DISCONTINUED | OUTPATIENT
Start: 2017-05-12 | End: 2017-05-22 | Stop reason: HOSPADM

## 2017-05-11 RX ORDER — IPRATROPIUM BROMIDE AND ALBUTEROL SULFATE 2.5; .5 MG/3ML; MG/3ML
3 SOLUTION RESPIRATORY (INHALATION) 2 TIMES DAILY PRN
COMMUNITY
End: 2017-01-01

## 2017-05-11 RX ORDER — FUROSEMIDE 10 MG/ML
20 INJECTION INTRAMUSCULAR; INTRAVENOUS ONCE
Status: COMPLETED | OUTPATIENT
Start: 2017-05-11 | End: 2017-05-11

## 2017-05-11 RX ORDER — SODIUM CHLORIDE 0.9 % (FLUSH) 0.9 %
10 SYRINGE (ML) INJECTION AS NEEDED
Status: DISCONTINUED | OUTPATIENT
Start: 2017-05-11 | End: 2017-05-22 | Stop reason: HOSPADM

## 2017-05-11 RX ORDER — AMIODARONE HYDROCHLORIDE 200 MG/1
200 TABLET ORAL DAILY
COMMUNITY
End: 2017-06-05 | Stop reason: ALTCHOICE

## 2017-05-11 RX ORDER — FUROSEMIDE 20 MG/1
20 TABLET ORAL DAILY
COMMUNITY
End: 2017-05-22 | Stop reason: HOSPADM

## 2017-05-11 RX ORDER — AMIODARONE HYDROCHLORIDE 200 MG/1
200 TABLET ORAL DAILY
Status: DISCONTINUED | OUTPATIENT
Start: 2017-05-12 | End: 2017-05-12

## 2017-05-11 RX ADMIN — DAPTOMYCIN 400 MG: 500 INJECTION, POWDER, LYOPHILIZED, FOR SOLUTION INTRAVENOUS at 20:19

## 2017-05-11 RX ADMIN — FUROSEMIDE 20 MG: 10 INJECTION, SOLUTION INTRAMUSCULAR; INTRAVENOUS at 14:10

## 2017-05-11 RX ADMIN — BUDESONIDE AND FORMOTEROL FUMARATE DIHYDRATE 2 PUFF: 160; 4.5 AEROSOL RESPIRATORY (INHALATION) at 21:00

## 2017-05-11 RX ADMIN — ASPIRIN 81 MG 81 MG: 81 TABLET ORAL at 16:44

## 2017-05-11 RX ADMIN — GABAPENTIN 100 MG: 100 CAPSULE ORAL at 20:18

## 2017-05-11 RX ADMIN — CEFTRIAXONE SODIUM 1 G: 1 INJECTION, SOLUTION INTRAVENOUS at 14:36

## 2017-05-11 RX ADMIN — DIGOXIN 500 MCG: 0.25 INJECTION INTRAMUSCULAR; INTRAVENOUS at 16:00

## 2017-05-11 RX ADMIN — METOPROLOL TARTRATE 12.5 MG: 25 TABLET, FILM COATED ORAL at 20:18

## 2017-05-11 RX ADMIN — ATORVASTATIN CALCIUM 40 MG: 40 TABLET, FILM COATED ORAL at 20:18

## 2017-05-12 ENCOUNTER — DOCUMENTATION (OUTPATIENT)
Dept: CARDIOLOGY | Facility: CLINIC | Age: 72
End: 2017-05-12

## 2017-05-12 PROBLEM — T14.8XXA HEMATOMA: Status: ACTIVE | Noted: 2017-05-12

## 2017-05-12 PROBLEM — O22.30 DVT (DEEP VEIN THROMBOSIS) IN PREGNANCY: Status: ACTIVE | Noted: 2017-05-12

## 2017-05-12 LAB
ABO GROUP BLD: NORMAL
ANION GAP SERPL CALCULATED.3IONS-SCNC: 7 MMOL/L (ref 3–11)
BASOPHILS # BLD AUTO: 0.06 10*3/MM3 (ref 0–0.2)
BASOPHILS NFR BLD AUTO: 0.4 % (ref 0–1)
BH CV ECHO MEAS - AO MAX PG (FULL): 4.6 MMHG
BH CV ECHO MEAS - AO MAX PG: 7.1 MMHG
BH CV ECHO MEAS - AO MEAN PG (FULL): 3.2 MMHG
BH CV ECHO MEAS - AO MEAN PG: 4.2 MMHG
BH CV ECHO MEAS - AO ROOT AREA (BSA CORRECTED): 1.3
BH CV ECHO MEAS - AO ROOT AREA: 5.5 CM^2
BH CV ECHO MEAS - AO ROOT DIAM: 2.6 CM
BH CV ECHO MEAS - AO V2 MAX: 132.9 CM/SEC
BH CV ECHO MEAS - AO V2 MEAN: 98.6 CM/SEC
BH CV ECHO MEAS - AO V2 VTI: 17.7 CM
BH CV ECHO MEAS - ASC AORTA: 3.7 CM
BH CV ECHO MEAS - AVA(I,A): 1.2 CM^2
BH CV ECHO MEAS - AVA(I,D): 1.2 CM^2
BH CV ECHO MEAS - AVA(V,A): 1.5 CM^2
BH CV ECHO MEAS - AVA(V,D): 1.5 CM^2
BH CV ECHO MEAS - BSA(HAYCOCK): 2.1 M^2
BH CV ECHO MEAS - BSA(HAYCOCK): 2.1 M^2
BH CV ECHO MEAS - BSA: 2 M^2
BH CV ECHO MEAS - BSA: 2 M^2
BH CV ECHO MEAS - BZI_BMI: 30.4 KILOGRAMS/M^2
BH CV ECHO MEAS - BZI_BMI: 30.4 KILOGRAMS/M^2
BH CV ECHO MEAS - BZI_METRIC_HEIGHT: 172.7 CM
BH CV ECHO MEAS - BZI_METRIC_HEIGHT: 172.7 CM
BH CV ECHO MEAS - BZI_METRIC_WEIGHT: 90.7 KG
BH CV ECHO MEAS - BZI_METRIC_WEIGHT: 90.7 KG
BH CV ECHO MEAS - EDV(CUBED): 215.8 ML
BH CV ECHO MEAS - EDV(TEICH): 179.9 ML
BH CV ECHO MEAS - EF(CUBED): 34.4 %
BH CV ECHO MEAS - EF(TEICH): 27.7 %
BH CV ECHO MEAS - ESV(CUBED): 141.5 ML
BH CV ECHO MEAS - ESV(TEICH): 130.1 ML
BH CV ECHO MEAS - FS: 13.1 %
BH CV ECHO MEAS - IVS/LVPW: 1
BH CV ECHO MEAS - IVSD: 1.5 CM
BH CV ECHO MEAS - LA DIMENSION: 4.1 CM
BH CV ECHO MEAS - LA/AO: 1.6
BH CV ECHO MEAS - LAT PEAK E' VEL: 8.2 CM/SEC
BH CV ECHO MEAS - LV MASS(C)D: 426.7 GRAMS
BH CV ECHO MEAS - LV MASS(C)DI: 208.8 GRAMS/M^2
BH CV ECHO MEAS - LV MAX PG: 2.5 MMHG
BH CV ECHO MEAS - LV MEAN PG: 1.1 MMHG
BH CV ECHO MEAS - LV V1 MAX: 78.5 CM/SEC
BH CV ECHO MEAS - LV V1 MEAN: 47.1 CM/SEC
BH CV ECHO MEAS - LV V1 VTI: 8.7 CM
BH CV ECHO MEAS - LVIDD: 6 CM
BH CV ECHO MEAS - LVIDS: 5.2 CM
BH CV ECHO MEAS - LVOT AREA (M): 2.5 CM^2
BH CV ECHO MEAS - LVOT AREA: 2.5 CM^2
BH CV ECHO MEAS - LVOT DIAM: 1.8 CM
BH CV ECHO MEAS - LVPWD: 1.5 CM
BH CV ECHO MEAS - MED PEAK E' VEL: 2.69 CM/SEC
BH CV ECHO MEAS - PA ACC SLOPE: 448.6 CM/SEC^2
BH CV ECHO MEAS - PA ACC TIME: 0.11 SEC
BH CV ECHO MEAS - PA MAX PG: 1.7 MMHG
BH CV ECHO MEAS - PA PR(ACCEL): 31.5 MMHG
BH CV ECHO MEAS - PA V2 MAX: 65.2 CM/SEC
BH CV ECHO MEAS - RAP SYSTOLE: 8 MMHG
BH CV ECHO MEAS - RVDD: 2.6 CM
BH CV ECHO MEAS - RVSP: 39 MMHG
BH CV ECHO MEAS - SI(AO): 47.2 ML/M^2
BH CV ECHO MEAS - SI(CUBED): 36.4 ML/M^2
BH CV ECHO MEAS - SI(LVOT): 10.7 ML/M^2
BH CV ECHO MEAS - SI(TEICH): 24.3 ML/M^2
BH CV ECHO MEAS - SV(AO): 96.5 ML
BH CV ECHO MEAS - SV(CUBED): 74.3 ML
BH CV ECHO MEAS - SV(LVOT): 21.9 ML
BH CV ECHO MEAS - SV(TEICH): 49.8 ML
BH CV ECHO MEAS - TAPSE (>1.6): 1.7 CM2
BH CV ECHO MEAS - TR MAX VEL: 279 CM/SEC
BH CV LOW VAS RIGHT GASTRONEMIUS VESSEL: 1
BH CV LOWER VASCULAR LEFT COMMON FEMORAL AUGMENT: NORMAL
BH CV LOWER VASCULAR LEFT COMMON FEMORAL COMPRESS: NORMAL
BH CV LOWER VASCULAR LEFT COMMON FEMORAL PHASIC: NORMAL
BH CV LOWER VASCULAR LEFT COMMON FEMORAL SPONT: NORMAL
BH CV LOWER VASCULAR LEFT SAPHENOFEMORAL JUNCTION AUGMENT: NORMAL
BH CV LOWER VASCULAR LEFT SAPHENOFEMORAL JUNCTION COMPRESS: NORMAL
BH CV LOWER VASCULAR LEFT SAPHENOFEMORAL JUNCTION PHASIC: NORMAL
BH CV LOWER VASCULAR LEFT SAPHENOFEMORAL JUNCTION SPONT: NORMAL
BH CV LOWER VASCULAR RIGHT COMMON FEMORAL AUGMENT: NORMAL
BH CV LOWER VASCULAR RIGHT COMMON FEMORAL COMPRESS: NORMAL
BH CV LOWER VASCULAR RIGHT COMMON FEMORAL PHASIC: NORMAL
BH CV LOWER VASCULAR RIGHT COMMON FEMORAL SPONT: NORMAL
BH CV LOWER VASCULAR RIGHT DISTAL FEMORAL AUGMENT: NORMAL
BH CV LOWER VASCULAR RIGHT DISTAL FEMORAL COMPRESS: NORMAL
BH CV LOWER VASCULAR RIGHT DISTAL FEMORAL PHASIC: NORMAL
BH CV LOWER VASCULAR RIGHT DISTAL FEMORAL SPONT: NORMAL
BH CV LOWER VASCULAR RIGHT GASTRONEMIUS COMPRESS: NORMAL
BH CV LOWER VASCULAR RIGHT LESSER SAPH COMPRESS: NORMAL
BH CV LOWER VASCULAR RIGHT MID FEMORAL AUGMENT: NORMAL
BH CV LOWER VASCULAR RIGHT MID FEMORAL COMPRESS: NORMAL
BH CV LOWER VASCULAR RIGHT MID FEMORAL PHASIC: NORMAL
BH CV LOWER VASCULAR RIGHT MID FEMORAL SPONT: NORMAL
BH CV LOWER VASCULAR RIGHT PERONEAL AUGMENT: NORMAL
BH CV LOWER VASCULAR RIGHT PERONEAL COMPRESS: NORMAL
BH CV LOWER VASCULAR RIGHT POPLITEAL AUGMENT: NORMAL
BH CV LOWER VASCULAR RIGHT POPLITEAL COMPRESS: NORMAL
BH CV LOWER VASCULAR RIGHT POPLITEAL PHASIC: NORMAL
BH CV LOWER VASCULAR RIGHT POPLITEAL SPONT: NORMAL
BH CV LOWER VASCULAR RIGHT POSTERIOR TIBIAL AUGMENT: NORMAL
BH CV LOWER VASCULAR RIGHT POSTERIOR TIBIAL COMPRESS: NORMAL
BH CV LOWER VASCULAR RIGHT PROXIMAL FEMORAL AUGMENT: NORMAL
BH CV LOWER VASCULAR RIGHT PROXIMAL FEMORAL COMPRESS: NORMAL
BH CV LOWER VASCULAR RIGHT PROXIMAL FEMORAL PHASIC: NORMAL
BH CV LOWER VASCULAR RIGHT PROXIMAL FEMORAL SPONT: NORMAL
BH CV LOWER VASCULAR RIGHT SAPHENOFEMORAL JUNCTION AUGMENT: NORMAL
BH CV LOWER VASCULAR RIGHT SAPHENOFEMORAL JUNCTION COMPRESS: NORMAL
BH CV LOWER VASCULAR RIGHT SAPHENOFEMORAL JUNCTION PHASIC: NORMAL
BH CV LOWER VASCULAR RIGHT SAPHENOFEMORAL JUNCTION SPONT: NORMAL
BH CV XLRA - RV BASE: 4.7 CM
BH CV XLRA - RV LENGTH: 9.6 CM
BH CV XLRA - RV MID: 3.5 CM
BLD GP AB SCN SERPL QL: NEGATIVE
BUN BLD-MCNC: 37 MG/DL (ref 9–23)
BUN/CREAT SERPL: 20.6 (ref 7–25)
CALCIUM SPEC-SCNC: 9.4 MG/DL (ref 8.7–10.4)
CHLORIDE SERPL-SCNC: 105 MMOL/L (ref 99–109)
CK SERPL-CCNC: 31 U/L (ref 26–174)
CO2 SERPL-SCNC: 26 MMOL/L (ref 20–31)
CREAT BLD-MCNC: 1.8 MG/DL (ref 0.6–1.3)
DEPRECATED RDW RBC AUTO: 50.1 FL (ref 37–54)
EOSINOPHIL # BLD AUTO: 0.1 10*3/MM3 (ref 0.1–0.3)
EOSINOPHIL NFR BLD AUTO: 0.7 % (ref 0–3)
ERYTHROCYTE [DISTWIDTH] IN BLOOD BY AUTOMATED COUNT: 15.2 % (ref 11.3–14.5)
FERRITIN SERPL-MCNC: 431 NG/ML (ref 22–322)
FOLATE SERPL-MCNC: 5.04 NG/ML (ref 3.2–20)
GFR SERPL CREATININE-BSD FRML MDRD: 37 ML/MIN/1.73
GLUCOSE BLD-MCNC: 91 MG/DL (ref 70–100)
GLUCOSE BLDC GLUCOMTR-MCNC: 103 MG/DL (ref 70–130)
GLUCOSE BLDC GLUCOMTR-MCNC: 106 MG/DL (ref 70–130)
GLUCOSE BLDC GLUCOMTR-MCNC: 124 MG/DL (ref 70–130)
GLUCOSE BLDC GLUCOMTR-MCNC: 97 MG/DL (ref 70–130)
HCT VFR BLD AUTO: 25.2 % (ref 38.9–50.9)
HGB BLD-MCNC: 7.6 G/DL (ref 13.1–17.5)
IMM GRANULOCYTES # BLD: 0.04 10*3/MM3 (ref 0–0.03)
IMM GRANULOCYTES NFR BLD: 0.3 % (ref 0–0.6)
IRON 24H UR-MRATE: 11 MCG/DL (ref 50–175)
IRON SATN MFR SERPL: 5 % (ref 20–50)
LEFT ATRIUM VOLUME INDEX: 40.2 ML/M2
LV EF 2D ECHO EST: 30 %
LYMPHOCYTES # BLD AUTO: 1.04 10*3/MM3 (ref 0.6–4.8)
LYMPHOCYTES NFR BLD AUTO: 7.1 % (ref 24–44)
MCH RBC QN AUTO: 27.1 PG (ref 27–31)
MCHC RBC AUTO-ENTMCNC: 30.2 G/DL (ref 32–36)
MCV RBC AUTO: 90 FL (ref 80–99)
MONOCYTES # BLD AUTO: 1.09 10*3/MM3 (ref 0–1)
MONOCYTES NFR BLD AUTO: 7.4 % (ref 0–12)
NEUTROPHILS # BLD AUTO: 12.32 10*3/MM3 (ref 1.5–8.3)
NEUTROPHILS NFR BLD AUTO: 84.1 % (ref 41–71)
PLATELET # BLD AUTO: 374 10*3/MM3 (ref 150–450)
PMV BLD AUTO: 10.1 FL (ref 6–12)
POTASSIUM BLD-SCNC: 4.8 MMOL/L (ref 3.5–5.5)
RBC # BLD AUTO: 2.8 10*6/MM3 (ref 4.2–5.76)
RH BLD: NEGATIVE
SODIUM BLD-SCNC: 138 MMOL/L (ref 132–146)
TIBC SERPL-MCNC: 215 MCG/DL (ref 250–450)
VIT B12 BLD-MCNC: 618 PG/ML (ref 211–911)
WBC NRBC COR # BLD: 14.65 10*3/MM3 (ref 3.5–10.8)

## 2017-05-12 PROCEDURE — 25010000003 CEFTRIAXONE PER 250 MG: Performed by: INTERNAL MEDICINE

## 2017-05-12 PROCEDURE — 86901 BLOOD TYPING SEROLOGIC RH(D): CPT | Performed by: PHYSICIAN ASSISTANT

## 2017-05-12 PROCEDURE — 99232 SBSQ HOSP IP/OBS MODERATE 35: CPT | Performed by: HOSPITALIST

## 2017-05-12 PROCEDURE — 83550 IRON BINDING TEST: CPT | Performed by: INTERNAL MEDICINE

## 2017-05-12 PROCEDURE — 86900 BLOOD TYPING SEROLOGIC ABO: CPT

## 2017-05-12 PROCEDURE — 99232 SBSQ HOSP IP/OBS MODERATE 35: CPT | Performed by: INTERNAL MEDICINE

## 2017-05-12 PROCEDURE — 94799 UNLISTED PULMONARY SVC/PX: CPT

## 2017-05-12 PROCEDURE — 82550 ASSAY OF CK (CPK): CPT | Performed by: INTERNAL MEDICINE

## 2017-05-12 PROCEDURE — 86850 RBC ANTIBODY SCREEN: CPT | Performed by: PHYSICIAN ASSISTANT

## 2017-05-12 PROCEDURE — 86920 COMPATIBILITY TEST SPIN: CPT

## 2017-05-12 PROCEDURE — 83540 ASSAY OF IRON: CPT | Performed by: INTERNAL MEDICINE

## 2017-05-12 PROCEDURE — P9016 RBC LEUKOCYTES REDUCED: HCPCS

## 2017-05-12 PROCEDURE — 93005 ELECTROCARDIOGRAM TRACING: CPT | Performed by: NURSE PRACTITIONER

## 2017-05-12 PROCEDURE — 99024 POSTOP FOLLOW-UP VISIT: CPT | Performed by: THORACIC SURGERY (CARDIOTHORACIC VASCULAR SURGERY)

## 2017-05-12 PROCEDURE — 94640 AIRWAY INHALATION TREATMENT: CPT

## 2017-05-12 PROCEDURE — 94760 N-INVAS EAR/PLS OXIMETRY 1: CPT

## 2017-05-12 PROCEDURE — 36430 TRANSFUSION BLD/BLD COMPNT: CPT

## 2017-05-12 PROCEDURE — 82962 GLUCOSE BLOOD TEST: CPT

## 2017-05-12 PROCEDURE — 80048 BASIC METABOLIC PNL TOTAL CA: CPT | Performed by: NURSE PRACTITIONER

## 2017-05-12 PROCEDURE — 87040 BLOOD CULTURE FOR BACTERIA: CPT | Performed by: INTERNAL MEDICINE

## 2017-05-12 PROCEDURE — 82746 ASSAY OF FOLIC ACID SERUM: CPT | Performed by: INTERNAL MEDICINE

## 2017-05-12 PROCEDURE — 86900 BLOOD TYPING SEROLOGIC ABO: CPT | Performed by: PHYSICIAN ASSISTANT

## 2017-05-12 PROCEDURE — 25010000002 DAPTOMYCIN PER 1 MG

## 2017-05-12 PROCEDURE — 25010000002 FUROSEMIDE PER 20 MG: Performed by: NURSE PRACTITIONER

## 2017-05-12 PROCEDURE — 82728 ASSAY OF FERRITIN: CPT | Performed by: INTERNAL MEDICINE

## 2017-05-12 PROCEDURE — 85025 COMPLETE CBC W/AUTO DIFF WBC: CPT | Performed by: NURSE PRACTITIONER

## 2017-05-12 PROCEDURE — 82607 VITAMIN B-12: CPT | Performed by: INTERNAL MEDICINE

## 2017-05-12 RX ORDER — AMIODARONE HYDROCHLORIDE 200 MG/1
200 TABLET ORAL EVERY 12 HOURS SCHEDULED
Status: DISCONTINUED | OUTPATIENT
Start: 2017-05-12 | End: 2017-05-22 | Stop reason: HOSPADM

## 2017-05-12 RX ORDER — CEFTRIAXONE SODIUM 1 G/50ML
1 INJECTION, SOLUTION INTRAVENOUS EVERY 24 HOURS
Status: DISCONTINUED | OUTPATIENT
Start: 2017-05-12 | End: 2017-05-22 | Stop reason: HOSPADM

## 2017-05-12 RX ORDER — FUROSEMIDE 10 MG/ML
20 INJECTION INTRAMUSCULAR; INTRAVENOUS ONCE
Status: COMPLETED | OUTPATIENT
Start: 2017-05-12 | End: 2017-05-12

## 2017-05-12 RX ORDER — FUROSEMIDE 40 MG/1
40 TABLET ORAL DAILY
Status: DISCONTINUED | OUTPATIENT
Start: 2017-05-13 | End: 2017-05-22 | Stop reason: HOSPADM

## 2017-05-12 RX ADMIN — DOXYCYCLINE 100 MG: 100 INJECTION, POWDER, LYOPHILIZED, FOR SOLUTION INTRAVENOUS at 10:53

## 2017-05-12 RX ADMIN — MEMANTINE 10 MG: 10 TABLET ORAL at 17:40

## 2017-05-12 RX ADMIN — RIVAROXABAN 15 MG: 15 TABLET, FILM COATED ORAL at 18:46

## 2017-05-12 RX ADMIN — BUDESONIDE AND FORMOTEROL FUMARATE DIHYDRATE 2 PUFF: 160; 4.5 AEROSOL RESPIRATORY (INHALATION) at 08:26

## 2017-05-12 RX ADMIN — BUDESONIDE AND FORMOTEROL FUMARATE DIHYDRATE 2 PUFF: 160; 4.5 AEROSOL RESPIRATORY (INHALATION) at 21:08

## 2017-05-12 RX ADMIN — METOPROLOL TARTRATE 12.5 MG: 25 TABLET, FILM COATED ORAL at 20:11

## 2017-05-12 RX ADMIN — GABAPENTIN 100 MG: 100 CAPSULE ORAL at 17:40

## 2017-05-12 RX ADMIN — ASPIRIN 81 MG 81 MG: 81 TABLET ORAL at 09:37

## 2017-05-12 RX ADMIN — DOXYCYCLINE 100 MG: 100 INJECTION, POWDER, LYOPHILIZED, FOR SOLUTION INTRAVENOUS at 20:12

## 2017-05-12 RX ADMIN — CEFTRIAXONE SODIUM 1 G: 1 INJECTION, SOLUTION INTRAVENOUS at 11:53

## 2017-05-12 RX ADMIN — RIVASTIGMINE TRANSDERMAL SYSTEM 1 PATCH: 9.5 PATCH, EXTENDED RELEASE TRANSDERMAL at 17:39

## 2017-05-12 RX ADMIN — FUROSEMIDE 20 MG: 20 TABLET ORAL at 09:27

## 2017-05-12 RX ADMIN — AMIODARONE HYDROCHLORIDE 200 MG: 200 TABLET ORAL at 20:12

## 2017-05-12 RX ADMIN — METOPROLOL TARTRATE 12.5 MG: 25 TABLET, FILM COATED ORAL at 09:27

## 2017-05-12 RX ADMIN — DAPTOMYCIN 400 MG: 500 INJECTION, POWDER, LYOPHILIZED, FOR SOLUTION INTRAVENOUS at 18:46

## 2017-05-12 RX ADMIN — PAROXETINE 20 MG: 20 TABLET, FILM COATED ORAL at 09:27

## 2017-05-12 RX ADMIN — MEMANTINE 10 MG: 10 TABLET ORAL at 09:27

## 2017-05-12 RX ADMIN — ATORVASTATIN CALCIUM 40 MG: 40 TABLET, FILM COATED ORAL at 20:12

## 2017-05-12 RX ADMIN — FUROSEMIDE 20 MG: 10 INJECTION, SOLUTION INTRAMUSCULAR; INTRAVENOUS at 11:50

## 2017-05-12 RX ADMIN — GABAPENTIN 100 MG: 100 CAPSULE ORAL at 09:27

## 2017-05-12 RX ADMIN — AMIODARONE HYDROCHLORIDE 200 MG: 200 TABLET ORAL at 09:27

## 2017-05-13 LAB
ABO + RH BLD: NORMAL
ALBUMIN SERPL-MCNC: 3.2 G/DL (ref 3.2–4.8)
ALBUMIN/GLOB SERPL: 0.9 G/DL (ref 1.5–2.5)
ALP SERPL-CCNC: 77 U/L (ref 25–100)
ALT SERPL W P-5'-P-CCNC: 16 U/L (ref 7–40)
ANION GAP SERPL CALCULATED.3IONS-SCNC: 9 MMOL/L (ref 3–11)
AST SERPL-CCNC: 16 U/L (ref 0–33)
BACTERIA SPEC AEROBE CULT: NORMAL
BH BB BLOOD EXPIRATION DATE: NORMAL
BH BB BLOOD TYPE BARCODE: 600
BH BB DISPENSE STATUS: NORMAL
BH BB PRODUCT CODE: NORMAL
BH BB UNIT NUMBER: NORMAL
BILIRUB SERPL-MCNC: 0.7 MG/DL (ref 0.3–1.2)
BILIRUB UR QL STRIP: NEGATIVE
BUN BLD-MCNC: 37 MG/DL (ref 9–23)
BUN/CREAT SERPL: 23.1 (ref 7–25)
CALCIUM SPEC-SCNC: 9.8 MG/DL (ref 8.7–10.4)
CHLORIDE SERPL-SCNC: 106 MMOL/L (ref 99–109)
CK SERPL-CCNC: 44 U/L (ref 26–174)
CLARITY UR: CLEAR
CO2 SERPL-SCNC: 26 MMOL/L (ref 20–31)
COLOR UR: YELLOW
CREAT BLD-MCNC: 1.6 MG/DL (ref 0.6–1.3)
CROSSMATCH INTERPRETATION: NORMAL
DEPRECATED RDW RBC AUTO: 52.6 FL (ref 37–54)
ERYTHROCYTE [DISTWIDTH] IN BLOOD BY AUTOMATED COUNT: 15.6 % (ref 11.3–14.5)
GFR SERPL CREATININE-BSD FRML MDRD: 43 ML/MIN/1.73
GLOBULIN UR ELPH-MCNC: 3.6 GM/DL
GLUCOSE BLD-MCNC: 100 MG/DL (ref 70–100)
GLUCOSE UR STRIP-MCNC: NEGATIVE MG/DL
GRAM STN SPEC: NORMAL
HCT VFR BLD AUTO: 29.1 % (ref 38.9–50.9)
HGB BLD-MCNC: 8.8 G/DL (ref 13.1–17.5)
HGB UR QL STRIP.AUTO: NEGATIVE
KETONES UR QL STRIP: NEGATIVE
LEUKOCYTE ESTERASE UR QL STRIP.AUTO: NEGATIVE
MCH RBC QN AUTO: 27.7 PG (ref 27–31)
MCHC RBC AUTO-ENTMCNC: 30.2 G/DL (ref 32–36)
MCV RBC AUTO: 91.5 FL (ref 80–99)
NITRITE UR QL STRIP: NEGATIVE
PH UR STRIP.AUTO: <=5 [PH] (ref 5–8)
PLATELET # BLD AUTO: 355 10*3/MM3 (ref 150–450)
PMV BLD AUTO: 9.9 FL (ref 6–12)
POTASSIUM BLD-SCNC: 4.4 MMOL/L (ref 3.5–5.5)
PROT SERPL-MCNC: 6.8 G/DL (ref 5.7–8.2)
PROT UR QL STRIP: ABNORMAL
RBC # BLD AUTO: 3.18 10*6/MM3 (ref 4.2–5.76)
SODIUM BLD-SCNC: 141 MMOL/L (ref 132–146)
SP GR UR STRIP: 1.02 (ref 1–1.03)
UNIT  ABO: NORMAL
UNIT  RH: NORMAL
UROBILINOGEN UR QL STRIP: ABNORMAL
WBC NRBC COR # BLD: 11.9 10*3/MM3 (ref 3.5–10.8)

## 2017-05-13 PROCEDURE — 99232 SBSQ HOSP IP/OBS MODERATE 35: CPT | Performed by: HOSPITALIST

## 2017-05-13 PROCEDURE — 99232 SBSQ HOSP IP/OBS MODERATE 35: CPT | Performed by: INTERNAL MEDICINE

## 2017-05-13 PROCEDURE — 99024 POSTOP FOLLOW-UP VISIT: CPT | Performed by: THORACIC SURGERY (CARDIOTHORACIC VASCULAR SURGERY)

## 2017-05-13 PROCEDURE — 87086 URINE CULTURE/COLONY COUNT: CPT | Performed by: INTERNAL MEDICINE

## 2017-05-13 PROCEDURE — 85027 COMPLETE CBC AUTOMATED: CPT | Performed by: INTERNAL MEDICINE

## 2017-05-13 PROCEDURE — 94799 UNLISTED PULMONARY SVC/PX: CPT

## 2017-05-13 PROCEDURE — 93005 ELECTROCARDIOGRAM TRACING: CPT | Performed by: INTERNAL MEDICINE

## 2017-05-13 PROCEDURE — 25010000002 DAPTOMYCIN PER 1 MG

## 2017-05-13 PROCEDURE — 81003 URINALYSIS AUTO W/O SCOPE: CPT | Performed by: INTERNAL MEDICINE

## 2017-05-13 PROCEDURE — 82550 ASSAY OF CK (CPK): CPT | Performed by: INTERNAL MEDICINE

## 2017-05-13 PROCEDURE — 93010 ELECTROCARDIOGRAM REPORT: CPT | Performed by: INTERNAL MEDICINE

## 2017-05-13 PROCEDURE — 87449 NOS EACH ORGANISM AG IA: CPT | Performed by: INTERNAL MEDICINE

## 2017-05-13 PROCEDURE — 94760 N-INVAS EAR/PLS OXIMETRY 1: CPT

## 2017-05-13 PROCEDURE — 94640 AIRWAY INHALATION TREATMENT: CPT

## 2017-05-13 PROCEDURE — 25010000003 CEFTRIAXONE PER 250 MG: Performed by: INTERNAL MEDICINE

## 2017-05-13 PROCEDURE — 80053 COMPREHEN METABOLIC PANEL: CPT | Performed by: INTERNAL MEDICINE

## 2017-05-13 PROCEDURE — 94660 CPAP INITIATION&MGMT: CPT

## 2017-05-13 RX ORDER — ACETYLCYSTEINE 200 MG/ML
4 SOLUTION ORAL; RESPIRATORY (INHALATION)
Status: DISCONTINUED | OUTPATIENT
Start: 2017-05-13 | End: 2017-05-13

## 2017-05-13 RX ORDER — IPRATROPIUM BROMIDE AND ALBUTEROL SULFATE 2.5; .5 MG/3ML; MG/3ML
3 SOLUTION RESPIRATORY (INHALATION) EVERY 4 HOURS PRN
Status: DISCONTINUED | OUTPATIENT
Start: 2017-05-13 | End: 2017-05-22 | Stop reason: HOSPADM

## 2017-05-13 RX ORDER — IPRATROPIUM BROMIDE AND ALBUTEROL SULFATE 2.5; .5 MG/3ML; MG/3ML
3 SOLUTION RESPIRATORY (INHALATION)
Status: DISCONTINUED | OUTPATIENT
Start: 2017-05-13 | End: 2017-05-20

## 2017-05-13 RX ORDER — FERROUS SULFATE 325(65) MG
325 TABLET ORAL 2 TIMES DAILY WITH MEALS
Status: DISCONTINUED | OUTPATIENT
Start: 2017-05-13 | End: 2017-05-22 | Stop reason: HOSPADM

## 2017-05-13 RX ORDER — ACETYLCYSTEINE 200 MG/ML
4 SOLUTION ORAL; RESPIRATORY (INHALATION)
Status: DISCONTINUED | OUTPATIENT
Start: 2017-05-14 | End: 2017-05-18

## 2017-05-13 RX ADMIN — ATORVASTATIN CALCIUM 40 MG: 40 TABLET, FILM COATED ORAL at 20:34

## 2017-05-13 RX ADMIN — IPRATROPIUM BROMIDE AND ALBUTEROL SULFATE 3 ML: .5; 3 SOLUTION RESPIRATORY (INHALATION) at 19:12

## 2017-05-13 RX ADMIN — DAPTOMYCIN 400 MG: 500 INJECTION, POWDER, LYOPHILIZED, FOR SOLUTION INTRAVENOUS at 18:11

## 2017-05-13 RX ADMIN — Medication 325 MG: at 18:06

## 2017-05-13 RX ADMIN — IPRATROPIUM BROMIDE AND ALBUTEROL SULFATE 3 ML: .5; 3 SOLUTION RESPIRATORY (INHALATION) at 16:12

## 2017-05-13 RX ADMIN — METOPROLOL TARTRATE 12.5 MG: 25 TABLET, FILM COATED ORAL at 20:34

## 2017-05-13 RX ADMIN — DOXYCYCLINE 100 MG: 100 INJECTION, POWDER, LYOPHILIZED, FOR SOLUTION INTRAVENOUS at 20:34

## 2017-05-13 RX ADMIN — METOPROLOL TARTRATE 12.5 MG: 25 TABLET, FILM COATED ORAL at 08:23

## 2017-05-13 RX ADMIN — AMIODARONE HYDROCHLORIDE 200 MG: 200 TABLET ORAL at 20:34

## 2017-05-13 RX ADMIN — RIVASTIGMINE TRANSDERMAL SYSTEM 1 PATCH: 9.5 PATCH, EXTENDED RELEASE TRANSDERMAL at 08:27

## 2017-05-13 RX ADMIN — CEFTRIAXONE SODIUM 1 G: 1 INJECTION, SOLUTION INTRAVENOUS at 08:43

## 2017-05-13 RX ADMIN — AMIODARONE HYDROCHLORIDE 200 MG: 200 TABLET ORAL at 08:29

## 2017-05-13 RX ADMIN — BUDESONIDE AND FORMOTEROL FUMARATE DIHYDRATE 2 PUFF: 160; 4.5 AEROSOL RESPIRATORY (INHALATION) at 09:32

## 2017-05-13 RX ADMIN — FUROSEMIDE 40 MG: 40 TABLET ORAL at 08:23

## 2017-05-13 RX ADMIN — DOXYCYCLINE 100 MG: 100 INJECTION, POWDER, LYOPHILIZED, FOR SOLUTION INTRAVENOUS at 08:30

## 2017-05-13 RX ADMIN — PAROXETINE 20 MG: 20 TABLET, FILM COATED ORAL at 08:32

## 2017-05-13 RX ADMIN — BUDESONIDE AND FORMOTEROL FUMARATE DIHYDRATE 2 PUFF: 160; 4.5 AEROSOL RESPIRATORY (INHALATION) at 19:12

## 2017-05-13 RX ADMIN — MEMANTINE 10 MG: 10 TABLET ORAL at 08:31

## 2017-05-13 RX ADMIN — ASPIRIN 81 MG 81 MG: 81 TABLET ORAL at 08:26

## 2017-05-13 RX ADMIN — GABAPENTIN 100 MG: 100 CAPSULE ORAL at 18:06

## 2017-05-13 RX ADMIN — MEMANTINE 10 MG: 10 TABLET ORAL at 18:07

## 2017-05-13 RX ADMIN — RIVAROXABAN 15 MG: 15 TABLET, FILM COATED ORAL at 18:06

## 2017-05-13 RX ADMIN — GABAPENTIN 100 MG: 100 CAPSULE ORAL at 08:23

## 2017-05-13 RX ADMIN — ACETYLCYSTEINE 4 ML: 200 SOLUTION ORAL; RESPIRATORY (INHALATION) at 16:12

## 2017-05-14 LAB
ANION GAP SERPL CALCULATED.3IONS-SCNC: 9 MMOL/L (ref 3–11)
BASOPHILS # BLD AUTO: 0.06 10*3/MM3 (ref 0–0.2)
BASOPHILS NFR BLD AUTO: 0.5 % (ref 0–1)
BUN BLD-MCNC: 39 MG/DL (ref 9–23)
BUN/CREAT SERPL: 24.4 (ref 7–25)
CALCIUM SPEC-SCNC: 9.7 MG/DL (ref 8.7–10.4)
CHLORIDE SERPL-SCNC: 105 MMOL/L (ref 99–109)
CO2 SERPL-SCNC: 26 MMOL/L (ref 20–31)
CREAT BLD-MCNC: 1.6 MG/DL (ref 0.6–1.3)
DEPRECATED RDW RBC AUTO: 52.7 FL (ref 37–54)
EOSINOPHIL # BLD AUTO: 0.5 10*3/MM3 (ref 0.1–0.3)
EOSINOPHIL NFR BLD AUTO: 4.2 % (ref 0–3)
ERYTHROCYTE [DISTWIDTH] IN BLOOD BY AUTOMATED COUNT: 15.5 % (ref 11.3–14.5)
GFR SERPL CREATININE-BSD FRML MDRD: 43 ML/MIN/1.73
GLUCOSE BLD-MCNC: 103 MG/DL (ref 70–100)
HCT VFR BLD AUTO: 30.1 % (ref 38.9–50.9)
HGB BLD-MCNC: 9.1 G/DL (ref 13.1–17.5)
IMM GRANULOCYTES # BLD: 0.03 10*3/MM3 (ref 0–0.03)
IMM GRANULOCYTES NFR BLD: 0.3 % (ref 0–0.6)
LYMPHOCYTES # BLD AUTO: 1.42 10*3/MM3 (ref 0.6–4.8)
LYMPHOCYTES NFR BLD AUTO: 11.9 % (ref 24–44)
MCH RBC QN AUTO: 27.9 PG (ref 27–31)
MCHC RBC AUTO-ENTMCNC: 30.2 G/DL (ref 32–36)
MCV RBC AUTO: 92.3 FL (ref 80–99)
MONOCYTES # BLD AUTO: 1.07 10*3/MM3 (ref 0–1)
MONOCYTES NFR BLD AUTO: 9 % (ref 0–12)
NEUTROPHILS # BLD AUTO: 8.81 10*3/MM3 (ref 1.5–8.3)
NEUTROPHILS NFR BLD AUTO: 74.1 % (ref 41–71)
PLATELET # BLD AUTO: 352 10*3/MM3 (ref 150–450)
PMV BLD AUTO: 10.1 FL (ref 6–12)
POTASSIUM BLD-SCNC: 4.2 MMOL/L (ref 3.5–5.5)
RBC # BLD AUTO: 3.26 10*6/MM3 (ref 4.2–5.76)
SODIUM BLD-SCNC: 140 MMOL/L (ref 132–146)
WBC NRBC COR # BLD: 11.89 10*3/MM3 (ref 3.5–10.8)

## 2017-05-14 PROCEDURE — 93005 ELECTROCARDIOGRAM TRACING: CPT | Performed by: INTERNAL MEDICINE

## 2017-05-14 PROCEDURE — 94799 UNLISTED PULMONARY SVC/PX: CPT

## 2017-05-14 PROCEDURE — 99024 POSTOP FOLLOW-UP VISIT: CPT | Performed by: THORACIC SURGERY (CARDIOTHORACIC VASCULAR SURGERY)

## 2017-05-14 PROCEDURE — 94640 AIRWAY INHALATION TREATMENT: CPT

## 2017-05-14 PROCEDURE — 93010 ELECTROCARDIOGRAM REPORT: CPT | Performed by: INTERNAL MEDICINE

## 2017-05-14 PROCEDURE — 80048 BASIC METABOLIC PNL TOTAL CA: CPT | Performed by: HOSPITALIST

## 2017-05-14 PROCEDURE — 94760 N-INVAS EAR/PLS OXIMETRY 1: CPT

## 2017-05-14 PROCEDURE — 25010000003 CEFTRIAXONE PER 250 MG: Performed by: INTERNAL MEDICINE

## 2017-05-14 PROCEDURE — 94660 CPAP INITIATION&MGMT: CPT

## 2017-05-14 PROCEDURE — 25010000002 DAPTOMYCIN PER 1 MG

## 2017-05-14 PROCEDURE — 85025 COMPLETE CBC W/AUTO DIFF WBC: CPT | Performed by: HOSPITALIST

## 2017-05-14 PROCEDURE — 99232 SBSQ HOSP IP/OBS MODERATE 35: CPT | Performed by: HOSPITALIST

## 2017-05-14 PROCEDURE — 99232 SBSQ HOSP IP/OBS MODERATE 35: CPT | Performed by: INTERNAL MEDICINE

## 2017-05-14 RX ADMIN — ASPIRIN 81 MG 81 MG: 81 TABLET ORAL at 08:23

## 2017-05-14 RX ADMIN — Medication 325 MG: at 12:18

## 2017-05-14 RX ADMIN — RIVASTIGMINE TRANSDERMAL SYSTEM 1 PATCH: 9.5 PATCH, EXTENDED RELEASE TRANSDERMAL at 08:20

## 2017-05-14 RX ADMIN — IPRATROPIUM BROMIDE AND ALBUTEROL SULFATE 3 ML: .5; 3 SOLUTION RESPIRATORY (INHALATION) at 00:12

## 2017-05-14 RX ADMIN — AMIODARONE HYDROCHLORIDE 200 MG: 200 TABLET ORAL at 08:23

## 2017-05-14 RX ADMIN — PAROXETINE 20 MG: 20 TABLET, FILM COATED ORAL at 08:24

## 2017-05-14 RX ADMIN — DAPTOMYCIN 400 MG: 500 INJECTION, POWDER, LYOPHILIZED, FOR SOLUTION INTRAVENOUS at 19:01

## 2017-05-14 RX ADMIN — BUDESONIDE AND FORMOTEROL FUMARATE DIHYDRATE 2 PUFF: 160; 4.5 AEROSOL RESPIRATORY (INHALATION) at 19:41

## 2017-05-14 RX ADMIN — MEMANTINE 10 MG: 10 TABLET ORAL at 08:24

## 2017-05-14 RX ADMIN — APIXABAN 2.5 MG: 2.5 TABLET, FILM COATED ORAL at 21:12

## 2017-05-14 RX ADMIN — CEFTRIAXONE SODIUM 1 G: 1 INJECTION, SOLUTION INTRAVENOUS at 08:24

## 2017-05-14 RX ADMIN — ACETYLCYSTEINE 4 ML: 200 SOLUTION ORAL; RESPIRATORY (INHALATION) at 00:11

## 2017-05-14 RX ADMIN — ATORVASTATIN CALCIUM 40 MG: 40 TABLET, FILM COATED ORAL at 21:12

## 2017-05-14 RX ADMIN — AMIODARONE HYDROCHLORIDE 200 MG: 200 TABLET ORAL at 21:12

## 2017-05-14 RX ADMIN — Medication 325 MG: at 17:09

## 2017-05-14 RX ADMIN — FUROSEMIDE 40 MG: 40 TABLET ORAL at 08:24

## 2017-05-14 RX ADMIN — IPRATROPIUM BROMIDE AND ALBUTEROL SULFATE 3 ML: .5; 3 SOLUTION RESPIRATORY (INHALATION) at 19:41

## 2017-05-14 RX ADMIN — ACETYLCYSTEINE 4 ML: 200 SOLUTION ORAL; RESPIRATORY (INHALATION) at 19:40

## 2017-05-14 RX ADMIN — ACETYLCYSTEINE 4 ML: 200 SOLUTION ORAL; RESPIRATORY (INHALATION) at 13:45

## 2017-05-14 RX ADMIN — ACETYLCYSTEINE 4 ML: 200 SOLUTION ORAL; RESPIRATORY (INHALATION) at 07:10

## 2017-05-14 RX ADMIN — GABAPENTIN 100 MG: 100 CAPSULE ORAL at 08:24

## 2017-05-14 RX ADMIN — DOXYCYCLINE 100 MG: 100 INJECTION, POWDER, LYOPHILIZED, FOR SOLUTION INTRAVENOUS at 08:24

## 2017-05-14 RX ADMIN — GABAPENTIN 100 MG: 100 CAPSULE ORAL at 17:09

## 2017-05-14 RX ADMIN — IPRATROPIUM BROMIDE AND ALBUTEROL SULFATE 3 ML: .5; 3 SOLUTION RESPIRATORY (INHALATION) at 07:09

## 2017-05-14 RX ADMIN — BUDESONIDE AND FORMOTEROL FUMARATE DIHYDRATE 2 PUFF: 160; 4.5 AEROSOL RESPIRATORY (INHALATION) at 07:10

## 2017-05-14 RX ADMIN — METOPROLOL TARTRATE 25 MG: 25 TABLET ORAL at 21:12

## 2017-05-14 RX ADMIN — APIXABAN 2.5 MG: 2.5 TABLET, FILM COATED ORAL at 12:18

## 2017-05-14 RX ADMIN — IPRATROPIUM BROMIDE AND ALBUTEROL SULFATE 3 ML: .5; 3 SOLUTION RESPIRATORY (INHALATION) at 13:45

## 2017-05-14 RX ADMIN — DOXYCYCLINE 100 MG: 100 INJECTION, POWDER, LYOPHILIZED, FOR SOLUTION INTRAVENOUS at 21:13

## 2017-05-14 RX ADMIN — METOPROLOL TARTRATE 12.5 MG: 25 TABLET, FILM COATED ORAL at 08:24

## 2017-05-14 RX ADMIN — MEMANTINE 10 MG: 10 TABLET ORAL at 17:09

## 2017-05-15 LAB — BACTERIA SPEC AEROBE CULT: NORMAL

## 2017-05-15 PROCEDURE — 25010000002 DAPTOMYCIN PER 1 MG

## 2017-05-15 PROCEDURE — 94799 UNLISTED PULMONARY SVC/PX: CPT

## 2017-05-15 PROCEDURE — 99232 SBSQ HOSP IP/OBS MODERATE 35: CPT | Performed by: INTERNAL MEDICINE

## 2017-05-15 PROCEDURE — 99232 SBSQ HOSP IP/OBS MODERATE 35: CPT | Performed by: NURSE PRACTITIONER

## 2017-05-15 PROCEDURE — 94640 AIRWAY INHALATION TREATMENT: CPT

## 2017-05-15 PROCEDURE — 93005 ELECTROCARDIOGRAM TRACING: CPT | Performed by: INTERNAL MEDICINE

## 2017-05-15 PROCEDURE — 94760 N-INVAS EAR/PLS OXIMETRY 1: CPT

## 2017-05-15 PROCEDURE — 94660 CPAP INITIATION&MGMT: CPT

## 2017-05-15 PROCEDURE — 99024 POSTOP FOLLOW-UP VISIT: CPT | Performed by: THORACIC SURGERY (CARDIOTHORACIC VASCULAR SURGERY)

## 2017-05-15 PROCEDURE — 25010000003 CEFTRIAXONE PER 250 MG: Performed by: INTERNAL MEDICINE

## 2017-05-15 RX ADMIN — IPRATROPIUM BROMIDE AND ALBUTEROL SULFATE 3 ML: .5; 3 SOLUTION RESPIRATORY (INHALATION) at 18:32

## 2017-05-15 RX ADMIN — APIXABAN 2.5 MG: 2.5 TABLET, FILM COATED ORAL at 10:04

## 2017-05-15 RX ADMIN — GABAPENTIN 100 MG: 100 CAPSULE ORAL at 18:22

## 2017-05-15 RX ADMIN — RIVASTIGMINE TRANSDERMAL SYSTEM 1 PATCH: 9.5 PATCH, EXTENDED RELEASE TRANSDERMAL at 10:05

## 2017-05-15 RX ADMIN — BUDESONIDE AND FORMOTEROL FUMARATE DIHYDRATE 2 PUFF: 160; 4.5 AEROSOL RESPIRATORY (INHALATION) at 06:13

## 2017-05-15 RX ADMIN — IPRATROPIUM BROMIDE AND ALBUTEROL SULFATE 3 ML: .5; 3 SOLUTION RESPIRATORY (INHALATION) at 12:24

## 2017-05-15 RX ADMIN — AMIODARONE HYDROCHLORIDE 200 MG: 200 TABLET ORAL at 21:21

## 2017-05-15 RX ADMIN — ACETYLCYSTEINE 4 ML: 200 SOLUTION ORAL; RESPIRATORY (INHALATION) at 00:44

## 2017-05-15 RX ADMIN — DOXYCYCLINE 100 MG: 100 INJECTION, POWDER, LYOPHILIZED, FOR SOLUTION INTRAVENOUS at 10:03

## 2017-05-15 RX ADMIN — DOXYCYCLINE 100 MG: 100 INJECTION, POWDER, LYOPHILIZED, FOR SOLUTION INTRAVENOUS at 18:22

## 2017-05-15 RX ADMIN — PAROXETINE 20 MG: 20 TABLET, FILM COATED ORAL at 10:04

## 2017-05-15 RX ADMIN — ASPIRIN 81 MG 81 MG: 81 TABLET ORAL at 10:04

## 2017-05-15 RX ADMIN — ATORVASTATIN CALCIUM 40 MG: 40 TABLET, FILM COATED ORAL at 21:21

## 2017-05-15 RX ADMIN — BUDESONIDE AND FORMOTEROL FUMARATE DIHYDRATE 2 PUFF: 160; 4.5 AEROSOL RESPIRATORY (INHALATION) at 18:33

## 2017-05-15 RX ADMIN — Medication 325 MG: at 13:23

## 2017-05-15 RX ADMIN — IPRATROPIUM BROMIDE AND ALBUTEROL SULFATE 3 ML: .5; 3 SOLUTION RESPIRATORY (INHALATION) at 06:13

## 2017-05-15 RX ADMIN — ACETYLCYSTEINE 4 ML: 200 SOLUTION ORAL; RESPIRATORY (INHALATION) at 18:33

## 2017-05-15 RX ADMIN — CEFTRIAXONE SODIUM 1 G: 1 INJECTION, SOLUTION INTRAVENOUS at 10:02

## 2017-05-15 RX ADMIN — ACETYLCYSTEINE 4 ML: 200 SOLUTION ORAL; RESPIRATORY (INHALATION) at 12:24

## 2017-05-15 RX ADMIN — APIXABAN 2.5 MG: 2.5 TABLET, FILM COATED ORAL at 21:21

## 2017-05-15 RX ADMIN — IPRATROPIUM BROMIDE AND ALBUTEROL SULFATE 3 ML: .5; 3 SOLUTION RESPIRATORY (INHALATION) at 00:44

## 2017-05-15 RX ADMIN — AMIODARONE HYDROCHLORIDE 200 MG: 200 TABLET ORAL at 10:04

## 2017-05-15 RX ADMIN — GABAPENTIN 100 MG: 100 CAPSULE ORAL at 10:04

## 2017-05-15 RX ADMIN — MEMANTINE 10 MG: 10 TABLET ORAL at 10:03

## 2017-05-15 RX ADMIN — MEMANTINE 10 MG: 10 TABLET ORAL at 18:22

## 2017-05-15 RX ADMIN — METOPROLOL TARTRATE 25 MG: 25 TABLET ORAL at 10:04

## 2017-05-15 RX ADMIN — DOXYCYCLINE 100 MG: 100 INJECTION, POWDER, LYOPHILIZED, FOR SOLUTION INTRAVENOUS at 21:21

## 2017-05-15 RX ADMIN — Medication 325 MG: at 18:22

## 2017-05-15 RX ADMIN — FUROSEMIDE 40 MG: 40 TABLET ORAL at 10:03

## 2017-05-15 RX ADMIN — METOPROLOL TARTRATE 25 MG: 25 TABLET ORAL at 21:21

## 2017-05-15 RX ADMIN — DAPTOMYCIN 400 MG: 500 INJECTION, POWDER, LYOPHILIZED, FOR SOLUTION INTRAVENOUS at 18:23

## 2017-05-16 ENCOUNTER — APPOINTMENT (OUTPATIENT)
Dept: CARDIOLOGY | Facility: HOSPITAL | Age: 72
End: 2017-05-16
Attending: INTERNAL MEDICINE

## 2017-05-16 PROBLEM — Z20.9 HISTORY OF EXPOSURE TO INFECTIOUS DISEASE: Status: ACTIVE | Noted: 2017-05-16

## 2017-05-16 LAB
ANION GAP SERPL CALCULATED.3IONS-SCNC: 19 MMOL/L (ref 3–11)
BACTERIA FLD CULT: NORMAL
BUN BLD-MCNC: 39 MG/DL (ref 9–23)
BUN/CREAT SERPL: 22.9 (ref 7–25)
CALCIUM SPEC-SCNC: 9.7 MG/DL (ref 8.7–10.4)
CHLORIDE SERPL-SCNC: 106 MMOL/L (ref 99–109)
CO2 SERPL-SCNC: 16 MMOL/L (ref 20–31)
CREAT BLD-MCNC: 1.7 MG/DL (ref 0.6–1.3)
DEPRECATED RDW RBC AUTO: 52.9 FL (ref 37–54)
ERYTHROCYTE [DISTWIDTH] IN BLOOD BY AUTOMATED COUNT: 15.5 % (ref 11.3–14.5)
GFR SERPL CREATININE-BSD FRML MDRD: 40 ML/MIN/1.73
GLUCOSE BLD-MCNC: 85 MG/DL (ref 70–100)
HAV IGM SERPL QL IA: NORMAL
HBV CORE IGM SERPL QL IA: NORMAL
HBV SURFACE AG SERPL QL IA: NORMAL
HCT VFR BLD AUTO: 28.8 % (ref 38.9–50.9)
HCV AB SER DONR QL: NORMAL
HGB BLD-MCNC: 8.5 G/DL (ref 13.1–17.5)
LV EF 2D ECHO EST: 25 %
Lab: NORMAL
MCH RBC QN AUTO: 27.4 PG (ref 27–31)
MCHC RBC AUTO-ENTMCNC: 29.5 G/DL (ref 32–36)
MCV RBC AUTO: 92.9 FL (ref 80–99)
ORGANISM ID: NORMAL
PLATELET # BLD AUTO: 356 10*3/MM3 (ref 150–450)
PMV BLD AUTO: 9.9 FL (ref 6–12)
POTASSIUM BLD-SCNC: 4 MMOL/L (ref 3.5–5.5)
RBC # BLD AUTO: 3.1 10*6/MM3 (ref 4.2–5.76)
S PNEUM AG SPEC QL LA: NEGATIVE
SODIUM BLD-SCNC: 141 MMOL/L (ref 132–146)
SPECIMEN SOURCE: NORMAL
WBC NRBC COR # BLD: 10.08 10*3/MM3 (ref 3.5–10.8)

## 2017-05-16 PROCEDURE — 94799 UNLISTED PULMONARY SVC/PX: CPT

## 2017-05-16 PROCEDURE — 99232 SBSQ HOSP IP/OBS MODERATE 35: CPT | Performed by: INTERNAL MEDICINE

## 2017-05-16 PROCEDURE — 93321 DOPPLER ECHO F-UP/LMTD STD: CPT

## 2017-05-16 PROCEDURE — 25010000002 DAPTOMYCIN PER 1 MG

## 2017-05-16 PROCEDURE — 94760 N-INVAS EAR/PLS OXIMETRY 1: CPT

## 2017-05-16 PROCEDURE — 25010000003 CEFTRIAXONE PER 250 MG: Performed by: INTERNAL MEDICINE

## 2017-05-16 PROCEDURE — 92960 CARDIOVERSION ELECTRIC EXT: CPT

## 2017-05-16 PROCEDURE — 80048 BASIC METABOLIC PNL TOTAL CA: CPT | Performed by: NURSE PRACTITIONER

## 2017-05-16 PROCEDURE — 25010000002 MIDAZOLAM PER 1 MG: Performed by: INTERNAL MEDICINE

## 2017-05-16 PROCEDURE — 93325 DOPPLER ECHO COLOR FLOW MAPG: CPT | Performed by: INTERNAL MEDICINE

## 2017-05-16 PROCEDURE — 85027 COMPLETE CBC AUTOMATED: CPT | Performed by: NURSE PRACTITIONER

## 2017-05-16 PROCEDURE — 94660 CPAP INITIATION&MGMT: CPT

## 2017-05-16 PROCEDURE — 80074 ACUTE HEPATITIS PANEL: CPT | Performed by: HOSPITALIST

## 2017-05-16 PROCEDURE — 93005 ELECTROCARDIOGRAM TRACING: CPT | Performed by: INTERNAL MEDICINE

## 2017-05-16 PROCEDURE — 93312 ECHO TRANSESOPHAGEAL: CPT | Performed by: INTERNAL MEDICINE

## 2017-05-16 PROCEDURE — 99232 SBSQ HOSP IP/OBS MODERATE 35: CPT | Performed by: HOSPITALIST

## 2017-05-16 PROCEDURE — 94640 AIRWAY INHALATION TREATMENT: CPT

## 2017-05-16 PROCEDURE — 93321 DOPPLER ECHO F-UP/LMTD STD: CPT | Performed by: INTERNAL MEDICINE

## 2017-05-16 PROCEDURE — B24BZZ4 ULTRASONOGRAPHY OF HEART WITH AORTA, TRANSESOPHAGEAL: ICD-10-PCS | Performed by: INTERNAL MEDICINE

## 2017-05-16 PROCEDURE — 92960 CARDIOVERSION ELECTRIC EXT: CPT | Performed by: INTERNAL MEDICINE

## 2017-05-16 PROCEDURE — 5A2204Z RESTORATION OF CARDIAC RHYTHM, SINGLE: ICD-10-PCS | Performed by: INTERNAL MEDICINE

## 2017-05-16 PROCEDURE — 93010 ELECTROCARDIOGRAM REPORT: CPT | Performed by: INTERNAL MEDICINE

## 2017-05-16 PROCEDURE — 93325 DOPPLER ECHO COLOR FLOW MAPG: CPT

## 2017-05-16 PROCEDURE — 93312 ECHO TRANSESOPHAGEAL: CPT

## 2017-05-16 RX ORDER — MIDAZOLAM HYDROCHLORIDE 1 MG/ML
INJECTION INTRAMUSCULAR; INTRAVENOUS
Status: DISCONTINUED
Start: 2017-05-16 | End: 2017-05-22 | Stop reason: HOSPADM

## 2017-05-16 RX ORDER — FLUMAZENIL 0.1 MG/ML
INJECTION INTRAVENOUS
Status: DISCONTINUED
Start: 2017-05-16 | End: 2017-05-16 | Stop reason: WASHOUT

## 2017-05-16 RX ORDER — NALOXONE HYDROCHLORIDE 0.4 MG/ML
INJECTION, SOLUTION INTRAMUSCULAR; INTRAVENOUS; SUBCUTANEOUS
Status: DISCONTINUED
Start: 2017-05-16 | End: 2017-05-16 | Stop reason: WASHOUT

## 2017-05-16 RX ORDER — MIDAZOLAM HYDROCHLORIDE 1 MG/ML
INJECTION INTRAMUSCULAR; INTRAVENOUS
Status: COMPLETED | OUTPATIENT
Start: 2017-05-16 | End: 2017-05-16

## 2017-05-16 RX ORDER — FENTANYL CITRATE 50 UG/ML
INJECTION, SOLUTION INTRAMUSCULAR; INTRAVENOUS
Status: DISCONTINUED
Start: 2017-05-16 | End: 2017-05-16 | Stop reason: WASHOUT

## 2017-05-16 RX ADMIN — ACETYLCYSTEINE 4 ML: 200 SOLUTION ORAL; RESPIRATORY (INHALATION) at 00:38

## 2017-05-16 RX ADMIN — GABAPENTIN 100 MG: 100 CAPSULE ORAL at 09:27

## 2017-05-16 RX ADMIN — ACETYLCYSTEINE 4 ML: 200 SOLUTION ORAL; RESPIRATORY (INHALATION) at 19:12

## 2017-05-16 RX ADMIN — GABAPENTIN 100 MG: 100 CAPSULE ORAL at 18:08

## 2017-05-16 RX ADMIN — METHOHEXITAL SODIUM 30 MG: 500 INJECTION, POWDER, LYOPHILIZED, FOR SOLUTION INTRAMUSCULAR; INTRAVENOUS; RECTAL at 11:33

## 2017-05-16 RX ADMIN — FUROSEMIDE 40 MG: 40 TABLET ORAL at 09:27

## 2017-05-16 RX ADMIN — IPRATROPIUM BROMIDE AND ALBUTEROL SULFATE 3 ML: .5; 3 SOLUTION RESPIRATORY (INHALATION) at 06:44

## 2017-05-16 RX ADMIN — RIVASTIGMINE TRANSDERMAL SYSTEM 1 PATCH: 9.5 PATCH, EXTENDED RELEASE TRANSDERMAL at 09:26

## 2017-05-16 RX ADMIN — BUDESONIDE AND FORMOTEROL FUMARATE DIHYDRATE 2 PUFF: 160; 4.5 AEROSOL RESPIRATORY (INHALATION) at 06:46

## 2017-05-16 RX ADMIN — ACETYLCYSTEINE 4 ML: 200 SOLUTION ORAL; RESPIRATORY (INHALATION) at 13:08

## 2017-05-16 RX ADMIN — BUDESONIDE AND FORMOTEROL FUMARATE DIHYDRATE 2 PUFF: 160; 4.5 AEROSOL RESPIRATORY (INHALATION) at 19:13

## 2017-05-16 RX ADMIN — AMIODARONE HYDROCHLORIDE 200 MG: 200 TABLET ORAL at 20:25

## 2017-05-16 RX ADMIN — CEFTRIAXONE SODIUM 1 G: 1 INJECTION, SOLUTION INTRAVENOUS at 09:27

## 2017-05-16 RX ADMIN — IPRATROPIUM BROMIDE AND ALBUTEROL SULFATE 3 ML: .5; 3 SOLUTION RESPIRATORY (INHALATION) at 13:05

## 2017-05-16 RX ADMIN — AMIODARONE HYDROCHLORIDE 200 MG: 200 TABLET ORAL at 09:27

## 2017-05-16 RX ADMIN — Medication 325 MG: at 18:08

## 2017-05-16 RX ADMIN — MEMANTINE 10 MG: 10 TABLET ORAL at 18:08

## 2017-05-16 RX ADMIN — DAPTOMYCIN 400 MG: 500 INJECTION, POWDER, LYOPHILIZED, FOR SOLUTION INTRAVENOUS at 18:07

## 2017-05-16 RX ADMIN — METOPROLOL TARTRATE 25 MG: 25 TABLET ORAL at 09:27

## 2017-05-16 RX ADMIN — DOXYCYCLINE 100 MG: 100 INJECTION, POWDER, LYOPHILIZED, FOR SOLUTION INTRAVENOUS at 20:28

## 2017-05-16 RX ADMIN — IPRATROPIUM BROMIDE AND ALBUTEROL SULFATE 3 ML: .5; 3 SOLUTION RESPIRATORY (INHALATION) at 00:38

## 2017-05-16 RX ADMIN — APIXABAN 2.5 MG: 2.5 TABLET, FILM COATED ORAL at 20:25

## 2017-05-16 RX ADMIN — APIXABAN 2.5 MG: 2.5 TABLET, FILM COATED ORAL at 09:27

## 2017-05-16 RX ADMIN — RIVASTIGMINE TRANSDERMAL SYSTEM 1 PATCH: 9.5 PATCH, EXTENDED RELEASE TRANSDERMAL at 09:29

## 2017-05-16 RX ADMIN — ATORVASTATIN CALCIUM 40 MG: 40 TABLET, FILM COATED ORAL at 20:25

## 2017-05-16 RX ADMIN — DOXYCYCLINE 100 MG: 100 INJECTION, POWDER, LYOPHILIZED, FOR SOLUTION INTRAVENOUS at 09:26

## 2017-05-16 RX ADMIN — MIDAZOLAM HYDROCHLORIDE 2 MG: 1 INJECTION, SOLUTION INTRAMUSCULAR; INTRAVENOUS at 11:33

## 2017-05-16 RX ADMIN — PAROXETINE 20 MG: 20 TABLET, FILM COATED ORAL at 09:26

## 2017-05-16 RX ADMIN — ACETYLCYSTEINE 4 ML: 200 SOLUTION ORAL; RESPIRATORY (INHALATION) at 06:45

## 2017-05-16 RX ADMIN — IPRATROPIUM BROMIDE AND ALBUTEROL SULFATE 3 ML: .5; 3 SOLUTION RESPIRATORY (INHALATION) at 19:13

## 2017-05-16 RX ADMIN — ASPIRIN 81 MG 81 MG: 81 TABLET ORAL at 09:27

## 2017-05-16 RX ADMIN — Medication 325 MG: at 12:40

## 2017-05-17 LAB
ANION GAP SERPL CALCULATED.3IONS-SCNC: 6 MMOL/L (ref 3–11)
BACTERIA SPEC AEROBE CULT: NORMAL
BACTERIA SPEC AEROBE CULT: NORMAL
BUN BLD-MCNC: 41 MG/DL (ref 9–23)
BUN/CREAT SERPL: 24.1 (ref 7–25)
CALCIUM SPEC-SCNC: 10.1 MG/DL (ref 8.7–10.4)
CHLORIDE SERPL-SCNC: 108 MMOL/L (ref 99–109)
CO2 SERPL-SCNC: 28 MMOL/L (ref 20–31)
CREAT BLD-MCNC: 1.7 MG/DL (ref 0.6–1.3)
DEPRECATED RDW RBC AUTO: 53.3 FL (ref 37–54)
ERYTHROCYTE [DISTWIDTH] IN BLOOD BY AUTOMATED COUNT: 15.6 % (ref 11.3–14.5)
GFR SERPL CREATININE-BSD FRML MDRD: 40 ML/MIN/1.73
GLUCOSE BLD-MCNC: 108 MG/DL (ref 70–100)
HBV DNA SERPL NAA+PROBE-ACNC: 20 IU/ML
HCT VFR BLD AUTO: 30.3 % (ref 38.9–50.9)
HGB BLD-MCNC: 9 G/DL (ref 13.1–17.5)
LOG10 HBV IU/ML: 1.3 LOG10IU/ML
MCH RBC QN AUTO: 27.6 PG (ref 27–31)
MCHC RBC AUTO-ENTMCNC: 29.7 G/DL (ref 32–36)
MCV RBC AUTO: 92.9 FL (ref 80–99)
PLATELET # BLD AUTO: 342 10*3/MM3 (ref 150–450)
PMV BLD AUTO: 9.1 FL (ref 6–12)
POTASSIUM BLD-SCNC: 3.5 MMOL/L (ref 3.5–5.5)
RBC # BLD AUTO: 3.26 10*6/MM3 (ref 4.2–5.76)
SODIUM BLD-SCNC: 142 MMOL/L (ref 132–146)
TEST INFORMATION: NORMAL
WBC NRBC COR # BLD: 10.76 10*3/MM3 (ref 3.5–10.8)

## 2017-05-17 PROCEDURE — 94799 UNLISTED PULMONARY SVC/PX: CPT

## 2017-05-17 PROCEDURE — 97162 PT EVAL MOD COMPLEX 30 MIN: CPT

## 2017-05-17 PROCEDURE — 99232 SBSQ HOSP IP/OBS MODERATE 35: CPT | Performed by: INTERNAL MEDICINE

## 2017-05-17 PROCEDURE — 85027 COMPLETE CBC AUTOMATED: CPT | Performed by: NURSE PRACTITIONER

## 2017-05-17 PROCEDURE — 94640 AIRWAY INHALATION TREATMENT: CPT

## 2017-05-17 PROCEDURE — 80048 BASIC METABOLIC PNL TOTAL CA: CPT | Performed by: NURSE PRACTITIONER

## 2017-05-17 PROCEDURE — 25010000003 CEFTRIAXONE PER 250 MG: Performed by: INTERNAL MEDICINE

## 2017-05-17 PROCEDURE — 97110 THERAPEUTIC EXERCISES: CPT

## 2017-05-17 PROCEDURE — 99232 SBSQ HOSP IP/OBS MODERATE 35: CPT | Performed by: HOSPITALIST

## 2017-05-17 PROCEDURE — 94760 N-INVAS EAR/PLS OXIMETRY 1: CPT

## 2017-05-17 PROCEDURE — 25010000002 DAPTOMYCIN PER 1 MG

## 2017-05-17 RX ORDER — CASTOR OIL AND BALSAM, PERU 788; 87 MG/G; MG/G
OINTMENT TOPICAL EVERY 12 HOURS SCHEDULED
Status: DISCONTINUED | OUTPATIENT
Start: 2017-05-17 | End: 2017-05-22 | Stop reason: HOSPADM

## 2017-05-17 RX ADMIN — SACUBITRIL AND VALSARTAN 1 TABLET: 24; 26 TABLET, FILM COATED ORAL at 21:12

## 2017-05-17 RX ADMIN — ATORVASTATIN CALCIUM 40 MG: 40 TABLET, FILM COATED ORAL at 21:12

## 2017-05-17 RX ADMIN — BUDESONIDE AND FORMOTEROL FUMARATE DIHYDRATE 2 PUFF: 160; 4.5 AEROSOL RESPIRATORY (INHALATION) at 18:44

## 2017-05-17 RX ADMIN — ACETYLCYSTEINE 4 ML: 200 SOLUTION ORAL; RESPIRATORY (INHALATION) at 13:13

## 2017-05-17 RX ADMIN — APIXABAN 2.5 MG: 2.5 TABLET, FILM COATED ORAL at 21:12

## 2017-05-17 RX ADMIN — ASPIRIN 81 MG 81 MG: 81 TABLET ORAL at 08:50

## 2017-05-17 RX ADMIN — Medication 325 MG: at 17:01

## 2017-05-17 RX ADMIN — ACETYLCYSTEINE 4 ML: 200 SOLUTION ORAL; RESPIRATORY (INHALATION) at 07:44

## 2017-05-17 RX ADMIN — RIVASTIGMINE TRANSDERMAL SYSTEM 1 PATCH: 9.5 PATCH, EXTENDED RELEASE TRANSDERMAL at 09:02

## 2017-05-17 RX ADMIN — BUDESONIDE AND FORMOTEROL FUMARATE DIHYDRATE 2 PUFF: 160; 4.5 AEROSOL RESPIRATORY (INHALATION) at 07:44

## 2017-05-17 RX ADMIN — DOXYCYCLINE 100 MG: 100 INJECTION, POWDER, LYOPHILIZED, FOR SOLUTION INTRAVENOUS at 09:01

## 2017-05-17 RX ADMIN — AMIODARONE HYDROCHLORIDE 200 MG: 200 TABLET ORAL at 08:50

## 2017-05-17 RX ADMIN — IPRATROPIUM BROMIDE AND ALBUTEROL SULFATE 3 ML: .5; 3 SOLUTION RESPIRATORY (INHALATION) at 13:14

## 2017-05-17 RX ADMIN — IPRATROPIUM BROMIDE AND ALBUTEROL SULFATE 3 ML: .5; 3 SOLUTION RESPIRATORY (INHALATION) at 07:43

## 2017-05-17 RX ADMIN — Medication 325 MG: at 11:29

## 2017-05-17 RX ADMIN — METOPROLOL TARTRATE 25 MG: 25 TABLET ORAL at 21:12

## 2017-05-17 RX ADMIN — DOXYCYCLINE 100 MG: 100 INJECTION, POWDER, LYOPHILIZED, FOR SOLUTION INTRAVENOUS at 21:12

## 2017-05-17 RX ADMIN — APIXABAN 2.5 MG: 2.5 TABLET, FILM COATED ORAL at 08:50

## 2017-05-17 RX ADMIN — AMIODARONE HYDROCHLORIDE 200 MG: 200 TABLET ORAL at 21:12

## 2017-05-17 RX ADMIN — MEMANTINE 10 MG: 10 TABLET ORAL at 08:50

## 2017-05-17 RX ADMIN — GABAPENTIN 100 MG: 100 CAPSULE ORAL at 08:50

## 2017-05-17 RX ADMIN — PAROXETINE 20 MG: 20 TABLET, FILM COATED ORAL at 08:50

## 2017-05-17 RX ADMIN — MEMANTINE 10 MG: 10 TABLET ORAL at 17:00

## 2017-05-17 RX ADMIN — GABAPENTIN 100 MG: 100 CAPSULE ORAL at 17:01

## 2017-05-17 RX ADMIN — DAPTOMYCIN 400 MG: 500 INJECTION, POWDER, LYOPHILIZED, FOR SOLUTION INTRAVENOUS at 18:02

## 2017-05-17 RX ADMIN — IPRATROPIUM BROMIDE AND ALBUTEROL SULFATE 3 ML: .5; 3 SOLUTION RESPIRATORY (INHALATION) at 18:43

## 2017-05-17 RX ADMIN — SACUBITRIL AND VALSARTAN 1 TABLET: 24; 26 TABLET, FILM COATED ORAL at 11:29

## 2017-05-17 RX ADMIN — CEFTRIAXONE SODIUM 1 G: 1 INJECTION, SOLUTION INTRAVENOUS at 09:02

## 2017-05-17 RX ADMIN — IPRATROPIUM BROMIDE AND ALBUTEROL SULFATE 3 ML: .5; 3 SOLUTION RESPIRATORY (INHALATION) at 00:46

## 2017-05-17 RX ADMIN — FUROSEMIDE 40 MG: 40 TABLET ORAL at 08:58

## 2017-05-17 RX ADMIN — CASTOR OIL AND BALSAM, PERU: 788; 87 OINTMENT TOPICAL at 21:00

## 2017-05-17 RX ADMIN — METOPROLOL TARTRATE 25 MG: 25 TABLET ORAL at 08:50

## 2017-05-18 LAB — MAGNESIUM SERPL-MCNC: 1.8 MG/DL (ref 1.3–2.7)

## 2017-05-18 PROCEDURE — 94760 N-INVAS EAR/PLS OXIMETRY 1: CPT

## 2017-05-18 PROCEDURE — 94799 UNLISTED PULMONARY SVC/PX: CPT

## 2017-05-18 PROCEDURE — 25010000002 DAPTOMYCIN PER 1 MG

## 2017-05-18 PROCEDURE — 25010000002 HEPATITIS B VACCINE (RECOMBINANT) 10 MCG/0.5ML SUSPENSION: Performed by: HOSPITALIST

## 2017-05-18 PROCEDURE — 93010 ELECTROCARDIOGRAM REPORT: CPT | Performed by: INTERNAL MEDICINE

## 2017-05-18 PROCEDURE — 99232 SBSQ HOSP IP/OBS MODERATE 35: CPT | Performed by: NURSE PRACTITIONER

## 2017-05-18 PROCEDURE — 97110 THERAPEUTIC EXERCISES: CPT

## 2017-05-18 PROCEDURE — 25010000003 CEFTRIAXONE PER 250 MG: Performed by: INTERNAL MEDICINE

## 2017-05-18 PROCEDURE — 94640 AIRWAY INHALATION TREATMENT: CPT

## 2017-05-18 PROCEDURE — 93005 ELECTROCARDIOGRAM TRACING: CPT | Performed by: NURSE PRACTITIONER

## 2017-05-18 PROCEDURE — 90744 HEPB VACC 3 DOSE PED/ADOL IM: CPT | Performed by: HOSPITALIST

## 2017-05-18 PROCEDURE — G0010 ADMIN HEPATITIS B VACCINE: HCPCS | Performed by: HOSPITALIST

## 2017-05-18 PROCEDURE — 83735 ASSAY OF MAGNESIUM: CPT | Performed by: NURSE PRACTITIONER

## 2017-05-18 PROCEDURE — 99232 SBSQ HOSP IP/OBS MODERATE 35: CPT | Performed by: INTERNAL MEDICINE

## 2017-05-18 PROCEDURE — 97116 GAIT TRAINING THERAPY: CPT

## 2017-05-18 RX ORDER — POTASSIUM CHLORIDE 750 MG/1
10 CAPSULE, EXTENDED RELEASE ORAL DAILY
Status: DISCONTINUED | OUTPATIENT
Start: 2017-05-18 | End: 2017-05-19

## 2017-05-18 RX ORDER — CARVEDILOL 3.12 MG/1
3.12 TABLET ORAL EVERY 12 HOURS SCHEDULED
Status: DISCONTINUED | OUTPATIENT
Start: 2017-05-18 | End: 2017-05-22 | Stop reason: HOSPADM

## 2017-05-18 RX ADMIN — POTASSIUM CHLORIDE 10 MEQ: 750 CAPSULE, EXTENDED RELEASE ORAL at 15:12

## 2017-05-18 RX ADMIN — MEMANTINE 10 MG: 10 TABLET ORAL at 17:40

## 2017-05-18 RX ADMIN — PAROXETINE 20 MG: 20 TABLET, FILM COATED ORAL at 09:48

## 2017-05-18 RX ADMIN — HEPATITIS B VACCINE (RECOMBINANT) 10 MCG: 10 INJECTION, SUSPENSION INTRAMUSCULAR at 12:26

## 2017-05-18 RX ADMIN — Medication 325 MG: at 12:25

## 2017-05-18 RX ADMIN — Medication 325 MG: at 17:40

## 2017-05-18 RX ADMIN — AMIODARONE HYDROCHLORIDE 200 MG: 200 TABLET ORAL at 21:02

## 2017-05-18 RX ADMIN — IPRATROPIUM BROMIDE AND ALBUTEROL SULFATE 3 ML: .5; 3 SOLUTION RESPIRATORY (INHALATION) at 13:22

## 2017-05-18 RX ADMIN — APIXABAN 2.5 MG: 2.5 TABLET, FILM COATED ORAL at 09:50

## 2017-05-18 RX ADMIN — RIVASTIGMINE TRANSDERMAL SYSTEM 1 PATCH: 9.5 PATCH, EXTENDED RELEASE TRANSDERMAL at 09:50

## 2017-05-18 RX ADMIN — IPRATROPIUM BROMIDE AND ALBUTEROL SULFATE 3 ML: .5; 3 SOLUTION RESPIRATORY (INHALATION) at 00:00

## 2017-05-18 RX ADMIN — GABAPENTIN 100 MG: 100 CAPSULE ORAL at 17:40

## 2017-05-18 RX ADMIN — GABAPENTIN 100 MG: 100 CAPSULE ORAL at 09:49

## 2017-05-18 RX ADMIN — IPRATROPIUM BROMIDE AND ALBUTEROL SULFATE 3 ML: .5; 3 SOLUTION RESPIRATORY (INHALATION) at 19:46

## 2017-05-18 RX ADMIN — FUROSEMIDE 40 MG: 40 TABLET ORAL at 09:50

## 2017-05-18 RX ADMIN — IPRATROPIUM BROMIDE AND ALBUTEROL SULFATE 3 ML: .5; 3 SOLUTION RESPIRATORY (INHALATION) at 06:45

## 2017-05-18 RX ADMIN — CARVEDILOL 3.12 MG: 3.12 TABLET, FILM COATED ORAL at 12:25

## 2017-05-18 RX ADMIN — BUDESONIDE AND FORMOTEROL FUMARATE DIHYDRATE 2 PUFF: 160; 4.5 AEROSOL RESPIRATORY (INHALATION) at 06:45

## 2017-05-18 RX ADMIN — BUDESONIDE AND FORMOTEROL FUMARATE DIHYDRATE 2 PUFF: 160; 4.5 AEROSOL RESPIRATORY (INHALATION) at 19:46

## 2017-05-18 RX ADMIN — AMIODARONE HYDROCHLORIDE 200 MG: 200 TABLET ORAL at 09:49

## 2017-05-18 RX ADMIN — APIXABAN 2.5 MG: 2.5 TABLET, FILM COATED ORAL at 21:02

## 2017-05-18 RX ADMIN — CEFTRIAXONE SODIUM 1 G: 1 INJECTION, SOLUTION INTRAVENOUS at 12:25

## 2017-05-18 RX ADMIN — ACETYLCYSTEINE 4 ML: 200 SOLUTION ORAL; RESPIRATORY (INHALATION) at 06:45

## 2017-05-18 RX ADMIN — DOXYCYCLINE 100 MG: 100 INJECTION, POWDER, LYOPHILIZED, FOR SOLUTION INTRAVENOUS at 21:03

## 2017-05-18 RX ADMIN — ASPIRIN 81 MG 81 MG: 81 TABLET ORAL at 09:48

## 2017-05-18 RX ADMIN — DOXYCYCLINE 100 MG: 100 INJECTION, POWDER, LYOPHILIZED, FOR SOLUTION INTRAVENOUS at 09:59

## 2017-05-18 RX ADMIN — SACUBITRIL AND VALSARTAN 1 TABLET: 24; 26 TABLET, FILM COATED ORAL at 09:50

## 2017-05-18 RX ADMIN — DAPTOMYCIN 400 MG: 500 INJECTION, POWDER, LYOPHILIZED, FOR SOLUTION INTRAVENOUS at 18:56

## 2017-05-18 RX ADMIN — SACUBITRIL AND VALSARTAN 1 TABLET: 24; 26 TABLET, FILM COATED ORAL at 21:02

## 2017-05-18 RX ADMIN — ATORVASTATIN CALCIUM 40 MG: 40 TABLET, FILM COATED ORAL at 21:02

## 2017-05-18 RX ADMIN — MEMANTINE 10 MG: 10 TABLET ORAL at 09:49

## 2017-05-18 RX ADMIN — CARVEDILOL 3.12 MG: 3.12 TABLET, FILM COATED ORAL at 21:03

## 2017-05-19 LAB
ANION GAP SERPL CALCULATED.3IONS-SCNC: 5 MMOL/L (ref 3–11)
BASOPHILS # BLD AUTO: 0.06 10*3/MM3 (ref 0–0.2)
BASOPHILS NFR BLD AUTO: 0.6 % (ref 0–1)
BUN BLD-MCNC: 31 MG/DL (ref 9–23)
BUN/CREAT SERPL: 22.1 (ref 7–25)
CALCIUM SPEC-SCNC: 9.8 MG/DL (ref 8.7–10.4)
CHLORIDE SERPL-SCNC: 105 MMOL/L (ref 99–109)
CK SERPL-CCNC: 35 U/L (ref 26–174)
CO2 SERPL-SCNC: 28 MMOL/L (ref 20–31)
CREAT BLD-MCNC: 1.4 MG/DL (ref 0.6–1.3)
DEPRECATED RDW RBC AUTO: 52.1 FL (ref 37–54)
EOSINOPHIL # BLD AUTO: 0.52 10*3/MM3 (ref 0.1–0.3)
EOSINOPHIL NFR BLD AUTO: 5.1 % (ref 0–3)
ERYTHROCYTE [DISTWIDTH] IN BLOOD BY AUTOMATED COUNT: 15.5 % (ref 11.3–14.5)
GFR SERPL CREATININE-BSD FRML MDRD: 50 ML/MIN/1.73
GLUCOSE BLD-MCNC: 90 MG/DL (ref 70–100)
HCT VFR BLD AUTO: 29.5 % (ref 38.9–50.9)
HGB BLD-MCNC: 8.8 G/DL (ref 13.1–17.5)
IMM GRANULOCYTES # BLD: 0.03 10*3/MM3 (ref 0–0.03)
IMM GRANULOCYTES NFR BLD: 0.3 % (ref 0–0.6)
LYMPHOCYTES # BLD AUTO: 1.56 10*3/MM3 (ref 0.6–4.8)
LYMPHOCYTES NFR BLD AUTO: 15.2 % (ref 24–44)
MCH RBC QN AUTO: 27.3 PG (ref 27–31)
MCHC RBC AUTO-ENTMCNC: 29.8 G/DL (ref 32–36)
MCV RBC AUTO: 91.6 FL (ref 80–99)
MONOCYTES # BLD AUTO: 0.79 10*3/MM3 (ref 0–1)
MONOCYTES NFR BLD AUTO: 7.7 % (ref 0–12)
NEUTROPHILS # BLD AUTO: 7.3 10*3/MM3 (ref 1.5–8.3)
NEUTROPHILS NFR BLD AUTO: 71.1 % (ref 41–71)
PLATELET # BLD AUTO: 319 10*3/MM3 (ref 150–450)
PMV BLD AUTO: 9.7 FL (ref 6–12)
POTASSIUM BLD-SCNC: 3.4 MMOL/L (ref 3.5–5.5)
RBC # BLD AUTO: 3.22 10*6/MM3 (ref 4.2–5.76)
SODIUM BLD-SCNC: 138 MMOL/L (ref 132–146)
WBC NRBC COR # BLD: 10.26 10*3/MM3 (ref 3.5–10.8)

## 2017-05-19 PROCEDURE — 94640 AIRWAY INHALATION TREATMENT: CPT

## 2017-05-19 PROCEDURE — 94799 UNLISTED PULMONARY SVC/PX: CPT

## 2017-05-19 PROCEDURE — 97110 THERAPEUTIC EXERCISES: CPT

## 2017-05-19 PROCEDURE — 85025 COMPLETE CBC W/AUTO DIFF WBC: CPT | Performed by: NURSE PRACTITIONER

## 2017-05-19 PROCEDURE — 80048 BASIC METABOLIC PNL TOTAL CA: CPT | Performed by: NURSE PRACTITIONER

## 2017-05-19 PROCEDURE — 97116 GAIT TRAINING THERAPY: CPT

## 2017-05-19 PROCEDURE — 82550 ASSAY OF CK (CPK): CPT

## 2017-05-19 PROCEDURE — 99232 SBSQ HOSP IP/OBS MODERATE 35: CPT | Performed by: INTERNAL MEDICINE

## 2017-05-19 PROCEDURE — 99233 SBSQ HOSP IP/OBS HIGH 50: CPT | Performed by: INTERNAL MEDICINE

## 2017-05-19 PROCEDURE — 25010000003 CEFTRIAXONE PER 250 MG: Performed by: INTERNAL MEDICINE

## 2017-05-19 RX ORDER — POTASSIUM CHLORIDE 750 MG/1
20 CAPSULE, EXTENDED RELEASE ORAL DAILY
Status: DISCONTINUED | OUTPATIENT
Start: 2017-05-20 | End: 2017-05-22 | Stop reason: HOSPADM

## 2017-05-19 RX ORDER — FUROSEMIDE 40 MG/1
40 TABLET ORAL DAILY
Qty: 30 TABLET | Refills: 11 | Status: SHIPPED | OUTPATIENT
Start: 2017-05-19 | End: 2017-01-01

## 2017-05-19 RX ORDER — ASPIRIN 81 MG/1
81 TABLET, CHEWABLE ORAL DAILY
Start: 2017-05-19 | End: 2017-01-01 | Stop reason: HOSPADM

## 2017-05-19 RX ORDER — ASPIRIN 81 MG/1
81 TABLET ORAL DAILY
Start: 2017-05-19 | End: 2017-05-22 | Stop reason: HOSPADM

## 2017-05-19 RX ORDER — POTASSIUM CHLORIDE 750 MG/1
20 CAPSULE, EXTENDED RELEASE ORAL DAILY
Qty: 30 CAPSULE | Refills: 11 | Status: SHIPPED | OUTPATIENT
Start: 2017-05-20 | End: 2017-06-27

## 2017-05-19 RX ORDER — CARVEDILOL 3.12 MG/1
3.12 TABLET ORAL EVERY 12 HOURS SCHEDULED
Qty: 60 TABLET | Refills: 11 | Status: SHIPPED | OUTPATIENT
Start: 2017-05-19 | End: 2017-06-05 | Stop reason: SDUPTHER

## 2017-05-19 RX ADMIN — GABAPENTIN 100 MG: 100 CAPSULE ORAL at 17:25

## 2017-05-19 RX ADMIN — CEFTRIAXONE SODIUM 1 G: 1 INJECTION, SOLUTION INTRAVENOUS at 09:52

## 2017-05-19 RX ADMIN — PAROXETINE 20 MG: 20 TABLET, FILM COATED ORAL at 09:52

## 2017-05-19 RX ADMIN — RIVASTIGMINE TRANSDERMAL SYSTEM 1 PATCH: 9.5 PATCH, EXTENDED RELEASE TRANSDERMAL at 09:59

## 2017-05-19 RX ADMIN — BUDESONIDE AND FORMOTEROL FUMARATE DIHYDRATE 2 PUFF: 160; 4.5 AEROSOL RESPIRATORY (INHALATION) at 18:40

## 2017-05-19 RX ADMIN — DOXYCYCLINE 100 MG: 100 INJECTION, POWDER, LYOPHILIZED, FOR SOLUTION INTRAVENOUS at 20:06

## 2017-05-19 RX ADMIN — ASPIRIN 81 MG 81 MG: 81 TABLET ORAL at 09:56

## 2017-05-19 RX ADMIN — FUROSEMIDE 40 MG: 40 TABLET ORAL at 09:52

## 2017-05-19 RX ADMIN — SACUBITRIL AND VALSARTAN 1 TABLET: 24; 26 TABLET, FILM COATED ORAL at 20:06

## 2017-05-19 RX ADMIN — MEMANTINE 10 MG: 10 TABLET ORAL at 09:52

## 2017-05-19 RX ADMIN — BUDESONIDE AND FORMOTEROL FUMARATE DIHYDRATE 2 PUFF: 160; 4.5 AEROSOL RESPIRATORY (INHALATION) at 07:23

## 2017-05-19 RX ADMIN — POTASSIUM CHLORIDE 10 MEQ: 750 CAPSULE, EXTENDED RELEASE ORAL at 09:52

## 2017-05-19 RX ADMIN — APIXABAN 2.5 MG: 2.5 TABLET, FILM COATED ORAL at 09:52

## 2017-05-19 RX ADMIN — Medication 325 MG: at 17:25

## 2017-05-19 RX ADMIN — DOXYCYCLINE 100 MG: 100 INJECTION, POWDER, LYOPHILIZED, FOR SOLUTION INTRAVENOUS at 09:52

## 2017-05-19 RX ADMIN — Medication 325 MG: at 12:45

## 2017-05-19 RX ADMIN — APIXABAN 2.5 MG: 2.5 TABLET, FILM COATED ORAL at 20:05

## 2017-05-19 RX ADMIN — ATORVASTATIN CALCIUM 40 MG: 40 TABLET, FILM COATED ORAL at 20:05

## 2017-05-19 RX ADMIN — AMIODARONE HYDROCHLORIDE 200 MG: 200 TABLET ORAL at 20:06

## 2017-05-19 RX ADMIN — MEMANTINE 10 MG: 10 TABLET ORAL at 17:25

## 2017-05-19 RX ADMIN — IPRATROPIUM BROMIDE AND ALBUTEROL SULFATE 3 ML: .5; 3 SOLUTION RESPIRATORY (INHALATION) at 13:01

## 2017-05-19 RX ADMIN — IPRATROPIUM BROMIDE AND ALBUTEROL SULFATE 3 ML: .5; 3 SOLUTION RESPIRATORY (INHALATION) at 01:01

## 2017-05-19 RX ADMIN — AMIODARONE HYDROCHLORIDE 200 MG: 200 TABLET ORAL at 09:52

## 2017-05-19 RX ADMIN — IPRATROPIUM BROMIDE AND ALBUTEROL SULFATE 3 ML: .5; 3 SOLUTION RESPIRATORY (INHALATION) at 18:40

## 2017-05-19 RX ADMIN — IPRATROPIUM BROMIDE AND ALBUTEROL SULFATE 3 ML: .5; 3 SOLUTION RESPIRATORY (INHALATION) at 07:23

## 2017-05-19 RX ADMIN — SACUBITRIL AND VALSARTAN 1 TABLET: 24; 26 TABLET, FILM COATED ORAL at 09:56

## 2017-05-19 RX ADMIN — CASTOR OIL AND BALSAM, PERU: 788; 87 OINTMENT TOPICAL at 09:52

## 2017-05-19 RX ADMIN — CASTOR OIL AND BALSAM, PERU: 788; 87 OINTMENT TOPICAL at 20:05

## 2017-05-19 RX ADMIN — CARVEDILOL 3.12 MG: 3.12 TABLET, FILM COATED ORAL at 20:10

## 2017-05-19 RX ADMIN — GABAPENTIN 100 MG: 100 CAPSULE ORAL at 09:52

## 2017-05-19 RX ADMIN — CARVEDILOL 3.12 MG: 3.12 TABLET, FILM COATED ORAL at 09:52

## 2017-05-20 PROCEDURE — 99231 SBSQ HOSP IP/OBS SF/LOW 25: CPT | Performed by: PHYSICIAN ASSISTANT

## 2017-05-20 PROCEDURE — 94799 UNLISTED PULMONARY SVC/PX: CPT

## 2017-05-20 PROCEDURE — 94760 N-INVAS EAR/PLS OXIMETRY 1: CPT

## 2017-05-20 PROCEDURE — 25010000003 CEFTRIAXONE PER 250 MG: Performed by: INTERNAL MEDICINE

## 2017-05-20 PROCEDURE — 94640 AIRWAY INHALATION TREATMENT: CPT

## 2017-05-20 RX ORDER — IPRATROPIUM BROMIDE AND ALBUTEROL SULFATE 2.5; .5 MG/3ML; MG/3ML
3 SOLUTION RESPIRATORY (INHALATION) EVERY 6 HOURS PRN
Status: DISCONTINUED | OUTPATIENT
Start: 2017-05-20 | End: 2017-05-22 | Stop reason: HOSPADM

## 2017-05-20 RX ADMIN — IPRATROPIUM BROMIDE AND ALBUTEROL SULFATE 3 ML: .5; 3 SOLUTION RESPIRATORY (INHALATION) at 13:48

## 2017-05-20 RX ADMIN — Medication 325 MG: at 12:32

## 2017-05-20 RX ADMIN — DOXYCYCLINE 100 MG: 100 INJECTION, POWDER, LYOPHILIZED, FOR SOLUTION INTRAVENOUS at 09:01

## 2017-05-20 RX ADMIN — DOXYCYCLINE 100 MG: 100 INJECTION, POWDER, LYOPHILIZED, FOR SOLUTION INTRAVENOUS at 21:31

## 2017-05-20 RX ADMIN — IPRATROPIUM BROMIDE AND ALBUTEROL SULFATE 3 ML: .5; 3 SOLUTION RESPIRATORY (INHALATION) at 01:09

## 2017-05-20 RX ADMIN — Medication 325 MG: at 17:53

## 2017-05-20 RX ADMIN — CARVEDILOL 3.12 MG: 3.12 TABLET, FILM COATED ORAL at 21:31

## 2017-05-20 RX ADMIN — ATORVASTATIN CALCIUM 40 MG: 40 TABLET, FILM COATED ORAL at 21:31

## 2017-05-20 RX ADMIN — PAROXETINE 20 MG: 20 TABLET, FILM COATED ORAL at 09:01

## 2017-05-20 RX ADMIN — MEMANTINE 10 MG: 10 TABLET ORAL at 09:01

## 2017-05-20 RX ADMIN — RIVASTIGMINE TRANSDERMAL SYSTEM 1 PATCH: 9.5 PATCH, EXTENDED RELEASE TRANSDERMAL at 09:00

## 2017-05-20 RX ADMIN — FUROSEMIDE 40 MG: 40 TABLET ORAL at 09:01

## 2017-05-20 RX ADMIN — CEFTRIAXONE SODIUM 1 G: 1 INJECTION, SOLUTION INTRAVENOUS at 09:01

## 2017-05-20 RX ADMIN — ASPIRIN 81 MG 81 MG: 81 TABLET ORAL at 09:01

## 2017-05-20 RX ADMIN — CASTOR OIL AND BALSAM, PERU: 788; 87 OINTMENT TOPICAL at 21:31

## 2017-05-20 RX ADMIN — SACUBITRIL AND VALSARTAN 1 TABLET: 24; 26 TABLET, FILM COATED ORAL at 09:01

## 2017-05-20 RX ADMIN — IPRATROPIUM BROMIDE AND ALBUTEROL SULFATE 3 ML: .5; 3 SOLUTION RESPIRATORY (INHALATION) at 19:50

## 2017-05-20 RX ADMIN — CASTOR OIL AND BALSAM, PERU: 788; 87 OINTMENT TOPICAL at 09:02

## 2017-05-20 RX ADMIN — APIXABAN 2.5 MG: 2.5 TABLET, FILM COATED ORAL at 21:31

## 2017-05-20 RX ADMIN — SACUBITRIL AND VALSARTAN 1 TABLET: 24; 26 TABLET, FILM COATED ORAL at 21:31

## 2017-05-20 RX ADMIN — GABAPENTIN 100 MG: 100 CAPSULE ORAL at 09:01

## 2017-05-20 RX ADMIN — BUDESONIDE AND FORMOTEROL FUMARATE DIHYDRATE 2 PUFF: 160; 4.5 AEROSOL RESPIRATORY (INHALATION) at 19:50

## 2017-05-20 RX ADMIN — MEMANTINE 10 MG: 10 TABLET ORAL at 17:53

## 2017-05-20 RX ADMIN — CARVEDILOL 3.12 MG: 3.12 TABLET, FILM COATED ORAL at 09:01

## 2017-05-20 RX ADMIN — POTASSIUM CHLORIDE 20 MEQ: 750 CAPSULE, EXTENDED RELEASE ORAL at 09:01

## 2017-05-20 RX ADMIN — AMIODARONE HYDROCHLORIDE 200 MG: 200 TABLET ORAL at 09:01

## 2017-05-20 RX ADMIN — IPRATROPIUM BROMIDE AND ALBUTEROL SULFATE 3 ML: .5; 3 SOLUTION RESPIRATORY (INHALATION) at 07:24

## 2017-05-20 RX ADMIN — BUDESONIDE AND FORMOTEROL FUMARATE DIHYDRATE 2 PUFF: 160; 4.5 AEROSOL RESPIRATORY (INHALATION) at 07:25

## 2017-05-20 RX ADMIN — AMIODARONE HYDROCHLORIDE 200 MG: 200 TABLET ORAL at 21:31

## 2017-05-20 RX ADMIN — APIXABAN 2.5 MG: 2.5 TABLET, FILM COATED ORAL at 09:01

## 2017-05-20 RX ADMIN — GABAPENTIN 100 MG: 100 CAPSULE ORAL at 17:53

## 2017-05-21 LAB
ANION GAP SERPL CALCULATED.3IONS-SCNC: 6 MMOL/L (ref 3–11)
BUN BLD-MCNC: 29 MG/DL (ref 9–23)
BUN/CREAT SERPL: 19.3 (ref 7–25)
CALCIUM SPEC-SCNC: 10.3 MG/DL (ref 8.7–10.4)
CHLORIDE SERPL-SCNC: 107 MMOL/L (ref 99–109)
CO2 SERPL-SCNC: 28 MMOL/L (ref 20–31)
CREAT BLD-MCNC: 1.5 MG/DL (ref 0.6–1.3)
DEPRECATED RDW RBC AUTO: 52.9 FL (ref 37–54)
ERYTHROCYTE [DISTWIDTH] IN BLOOD BY AUTOMATED COUNT: 15.9 % (ref 11.3–14.5)
GFR SERPL CREATININE-BSD FRML MDRD: 46 ML/MIN/1.73
GLUCOSE BLD-MCNC: 102 MG/DL (ref 70–100)
HCT VFR BLD AUTO: 31.9 % (ref 38.9–50.9)
HGB BLD-MCNC: 9.7 G/DL (ref 13.1–17.5)
MCH RBC QN AUTO: 27.9 PG (ref 27–31)
MCHC RBC AUTO-ENTMCNC: 30.4 G/DL (ref 32–36)
MCV RBC AUTO: 91.7 FL (ref 80–99)
PLATELET # BLD AUTO: 338 10*3/MM3 (ref 150–450)
PMV BLD AUTO: 9.8 FL (ref 6–12)
POTASSIUM BLD-SCNC: 3.8 MMOL/L (ref 3.5–5.5)
RBC # BLD AUTO: 3.48 10*6/MM3 (ref 4.2–5.76)
SODIUM BLD-SCNC: 141 MMOL/L (ref 132–146)
WBC NRBC COR # BLD: 9.99 10*3/MM3 (ref 3.5–10.8)

## 2017-05-21 PROCEDURE — 94760 N-INVAS EAR/PLS OXIMETRY 1: CPT

## 2017-05-21 PROCEDURE — 94799 UNLISTED PULMONARY SVC/PX: CPT

## 2017-05-21 PROCEDURE — 94660 CPAP INITIATION&MGMT: CPT

## 2017-05-21 PROCEDURE — 85027 COMPLETE CBC AUTOMATED: CPT | Performed by: PHYSICIAN ASSISTANT

## 2017-05-21 PROCEDURE — 25010000003 CEFTRIAXONE PER 250 MG: Performed by: INTERNAL MEDICINE

## 2017-05-21 PROCEDURE — 99232 SBSQ HOSP IP/OBS MODERATE 35: CPT | Performed by: INTERNAL MEDICINE

## 2017-05-21 PROCEDURE — 80048 BASIC METABOLIC PNL TOTAL CA: CPT | Performed by: PHYSICIAN ASSISTANT

## 2017-05-21 PROCEDURE — 97110 THERAPEUTIC EXERCISES: CPT | Performed by: PHYSICAL THERAPIST

## 2017-05-21 PROCEDURE — 94640 AIRWAY INHALATION TREATMENT: CPT

## 2017-05-21 RX ORDER — ACETAMINOPHEN 500 MG
1000 TABLET ORAL EVERY 6 HOURS PRN
Status: DISCONTINUED | OUTPATIENT
Start: 2017-05-21 | End: 2017-05-22 | Stop reason: HOSPADM

## 2017-05-21 RX ORDER — DOXYCYCLINE HYCLATE 100 MG/1
100 CAPSULE ORAL EVERY 12 HOURS SCHEDULED
Status: DISCONTINUED | OUTPATIENT
Start: 2017-05-21 | End: 2017-05-22 | Stop reason: HOSPADM

## 2017-05-21 RX ORDER — AMLODIPINE BESYLATE 5 MG/1
5 TABLET ORAL
Status: DISCONTINUED | OUTPATIENT
Start: 2017-05-21 | End: 2017-05-22 | Stop reason: HOSPADM

## 2017-05-21 RX ADMIN — PAROXETINE 20 MG: 20 TABLET, FILM COATED ORAL at 09:01

## 2017-05-21 RX ADMIN — Medication 325 MG: at 12:20

## 2017-05-21 RX ADMIN — POTASSIUM CHLORIDE 20 MEQ: 750 CAPSULE, EXTENDED RELEASE ORAL at 09:00

## 2017-05-21 RX ADMIN — GABAPENTIN 100 MG: 100 CAPSULE ORAL at 18:00

## 2017-05-21 RX ADMIN — BUDESONIDE AND FORMOTEROL FUMARATE DIHYDRATE 2 PUFF: 160; 4.5 AEROSOL RESPIRATORY (INHALATION) at 08:37

## 2017-05-21 RX ADMIN — BUDESONIDE AND FORMOTEROL FUMARATE DIHYDRATE 2 PUFF: 160; 4.5 AEROSOL RESPIRATORY (INHALATION) at 19:48

## 2017-05-21 RX ADMIN — AMIODARONE HYDROCHLORIDE 200 MG: 200 TABLET ORAL at 09:01

## 2017-05-21 RX ADMIN — MEMANTINE 10 MG: 10 TABLET ORAL at 18:00

## 2017-05-21 RX ADMIN — SACUBITRIL AND VALSARTAN 1 TABLET: 24; 26 TABLET, FILM COATED ORAL at 09:02

## 2017-05-21 RX ADMIN — APIXABAN 2.5 MG: 2.5 TABLET, FILM COATED ORAL at 21:00

## 2017-05-21 RX ADMIN — SACUBITRIL AND VALSARTAN 1 TABLET: 24; 26 TABLET, FILM COATED ORAL at 21:01

## 2017-05-21 RX ADMIN — RIVASTIGMINE TRANSDERMAL SYSTEM 1 PATCH: 9.5 PATCH, EXTENDED RELEASE TRANSDERMAL at 09:04

## 2017-05-21 RX ADMIN — CASTOR OIL AND BALSAM, PERU: 788; 87 OINTMENT TOPICAL at 09:01

## 2017-05-21 RX ADMIN — DOXYCYCLINE HYCLATE 100 MG: 100 CAPSULE ORAL at 21:00

## 2017-05-21 RX ADMIN — DOXYCYCLINE 100 MG: 100 INJECTION, POWDER, LYOPHILIZED, FOR SOLUTION INTRAVENOUS at 09:04

## 2017-05-21 RX ADMIN — FUROSEMIDE 40 MG: 40 TABLET ORAL at 09:01

## 2017-05-21 RX ADMIN — APIXABAN 2.5 MG: 2.5 TABLET, FILM COATED ORAL at 09:01

## 2017-05-21 RX ADMIN — CARVEDILOL 3.12 MG: 3.12 TABLET, FILM COATED ORAL at 21:00

## 2017-05-21 RX ADMIN — GABAPENTIN 100 MG: 100 CAPSULE ORAL at 09:01

## 2017-05-21 RX ADMIN — ATORVASTATIN CALCIUM 40 MG: 40 TABLET, FILM COATED ORAL at 21:00

## 2017-05-21 RX ADMIN — CASTOR OIL AND BALSAM, PERU: 788; 87 OINTMENT TOPICAL at 21:00

## 2017-05-21 RX ADMIN — AMLODIPINE BESYLATE 5 MG: 5 TABLET ORAL at 10:29

## 2017-05-21 RX ADMIN — AMIODARONE HYDROCHLORIDE 200 MG: 200 TABLET ORAL at 21:00

## 2017-05-21 RX ADMIN — ASPIRIN 81 MG 81 MG: 81 TABLET ORAL at 09:01

## 2017-05-21 RX ADMIN — MEMANTINE 10 MG: 10 TABLET ORAL at 09:01

## 2017-05-21 RX ADMIN — CEFTRIAXONE SODIUM 1 G: 1 INJECTION, SOLUTION INTRAVENOUS at 09:03

## 2017-05-21 RX ADMIN — CARVEDILOL 3.12 MG: 3.12 TABLET, FILM COATED ORAL at 09:01

## 2017-05-21 RX ADMIN — ACETAMINOPHEN 1000 MG: 500 TABLET ORAL at 12:20

## 2017-05-21 RX ADMIN — Medication 325 MG: at 18:00

## 2017-05-22 VITALS
BODY MASS INDEX: 30.31 KG/M2 | RESPIRATION RATE: 18 BRPM | OXYGEN SATURATION: 98 % | HEIGHT: 68 IN | WEIGHT: 200 LBS | HEART RATE: 62 BPM | DIASTOLIC BLOOD PRESSURE: 85 MMHG | SYSTOLIC BLOOD PRESSURE: 155 MMHG | TEMPERATURE: 96.7 F

## 2017-05-22 PROBLEM — I50.9 ACUTE ON CHRONIC CONGESTIVE HEART FAILURE (HCC): Status: RESOLVED | Noted: 2017-04-17 | Resolved: 2017-05-22

## 2017-05-22 PROBLEM — N18.30 CKD (CHRONIC KIDNEY DISEASE), STAGE III (HCC): Status: ACTIVE | Noted: 2017-05-22

## 2017-05-22 PROBLEM — R60.0 LEG EDEMA, RIGHT: Status: RESOLVED | Noted: 2017-05-11 | Resolved: 2017-05-22

## 2017-05-22 PROBLEM — N18.9 ACUTE ON CHRONIC RENAL INSUFFICIENCY: Status: RESOLVED | Noted: 2017-04-17 | Resolved: 2017-05-22

## 2017-05-22 PROBLEM — I82.401 ACUTE DEEP VEIN THROMBOSIS (DVT) OF RIGHT LOWER EXTREMITY (HCC): Status: ACTIVE | Noted: 2017-05-12

## 2017-05-22 PROBLEM — I50.9 CHRONIC CONGESTIVE HEART FAILURE (HCC): Status: ACTIVE | Noted: 2017-05-22

## 2017-05-22 PROBLEM — I48.92 ATRIAL FLUTTER WITH RAPID VENTRICULAR RESPONSE (HCC): Status: RESOLVED | Noted: 2017-05-11 | Resolved: 2017-05-22

## 2017-05-22 PROBLEM — N28.9 ACUTE ON CHRONIC RENAL INSUFFICIENCY: Status: RESOLVED | Noted: 2017-04-17 | Resolved: 2017-05-22

## 2017-05-22 PROCEDURE — 97116 GAIT TRAINING THERAPY: CPT

## 2017-05-22 PROCEDURE — 25010000003 CEFTRIAXONE PER 250 MG: Performed by: INTERNAL MEDICINE

## 2017-05-22 PROCEDURE — 97110 THERAPEUTIC EXERCISES: CPT

## 2017-05-22 PROCEDURE — 94640 AIRWAY INHALATION TREATMENT: CPT

## 2017-05-22 PROCEDURE — 99239 HOSP IP/OBS DSCHRG MGMT >30: CPT | Performed by: PHYSICIAN ASSISTANT

## 2017-05-22 PROCEDURE — 94799 UNLISTED PULMONARY SVC/PX: CPT

## 2017-05-22 RX ORDER — FERROUS SULFATE 325(65) MG
325 TABLET ORAL 2 TIMES DAILY WITH MEALS
Start: 2017-05-22 | End: 2017-01-01 | Stop reason: ALTCHOICE

## 2017-05-22 RX ORDER — ACETAMINOPHEN 500 MG
1000 TABLET ORAL EVERY 6 HOURS PRN
Refills: 0
Start: 2017-05-22 | End: 2017-01-01 | Stop reason: HOSPADM

## 2017-05-22 RX ORDER — DOXYCYCLINE HYCLATE 100 MG/1
100 CAPSULE ORAL 2 TIMES DAILY
Refills: 0
Start: 2017-05-22 | End: 2017-05-26

## 2017-05-22 RX ORDER — MULTIVIT WITH MINERALS/LUTEIN
250 TABLET ORAL 2 TIMES DAILY WITH MEALS
Start: 2017-05-22 | End: 2017-01-01 | Stop reason: HOSPADM

## 2017-05-22 RX ORDER — CEFDINIR 300 MG/1
300 CAPSULE ORAL 2 TIMES DAILY
Refills: 0
Start: 2017-05-22 | End: 2017-05-26

## 2017-05-22 RX ORDER — AMLODIPINE BESYLATE 5 MG/1
5 TABLET ORAL
Start: 2017-05-22 | End: 2017-06-28 | Stop reason: SDUPTHER

## 2017-05-22 RX ORDER — SACCHAROMYCES BOULARDII 250 MG
250 CAPSULE ORAL 2 TIMES DAILY
Start: 2017-05-22 | End: 2017-06-21

## 2017-05-22 RX ADMIN — CASTOR OIL AND BALSAM, PERU: 788; 87 OINTMENT TOPICAL at 08:18

## 2017-05-22 RX ADMIN — CEFTRIAXONE SODIUM 1 G: 1 INJECTION, SOLUTION INTRAVENOUS at 10:24

## 2017-05-22 RX ADMIN — MEMANTINE 10 MG: 10 TABLET ORAL at 08:18

## 2017-05-22 RX ADMIN — RIVASTIGMINE TRANSDERMAL SYSTEM 1 PATCH: 9.5 PATCH, EXTENDED RELEASE TRANSDERMAL at 10:24

## 2017-05-22 RX ADMIN — SACUBITRIL AND VALSARTAN 1 TABLET: 24; 26 TABLET, FILM COATED ORAL at 10:24

## 2017-05-22 RX ADMIN — DOXYCYCLINE HYCLATE 100 MG: 100 CAPSULE ORAL at 08:18

## 2017-05-22 RX ADMIN — APIXABAN 2.5 MG: 2.5 TABLET, FILM COATED ORAL at 08:19

## 2017-05-22 RX ADMIN — AMLODIPINE BESYLATE 5 MG: 5 TABLET ORAL at 08:19

## 2017-05-22 RX ADMIN — BUDESONIDE AND FORMOTEROL FUMARATE DIHYDRATE 2 PUFF: 160; 4.5 AEROSOL RESPIRATORY (INHALATION) at 09:44

## 2017-05-22 RX ADMIN — PAROXETINE 20 MG: 20 TABLET, FILM COATED ORAL at 08:18

## 2017-05-22 RX ADMIN — FUROSEMIDE 40 MG: 40 TABLET ORAL at 08:19

## 2017-05-22 RX ADMIN — POTASSIUM CHLORIDE 20 MEQ: 750 CAPSULE, EXTENDED RELEASE ORAL at 08:19

## 2017-05-22 RX ADMIN — ASPIRIN 81 MG 81 MG: 81 TABLET ORAL at 08:18

## 2017-05-22 RX ADMIN — AMIODARONE HYDROCHLORIDE 200 MG: 200 TABLET ORAL at 08:18

## 2017-05-22 RX ADMIN — GABAPENTIN 100 MG: 100 CAPSULE ORAL at 08:19

## 2017-05-24 ENCOUNTER — OFFICE VISIT (OUTPATIENT)
Dept: CARDIAC SURGERY | Facility: CLINIC | Age: 72
End: 2017-05-24

## 2017-05-24 VITALS
DIASTOLIC BLOOD PRESSURE: 80 MMHG | BODY MASS INDEX: 29.55 KG/M2 | OXYGEN SATURATION: 95 % | HEART RATE: 67 BPM | TEMPERATURE: 97.2 F | SYSTOLIC BLOOD PRESSURE: 110 MMHG | WEIGHT: 195 LBS | HEIGHT: 68 IN

## 2017-05-24 DIAGNOSIS — Z95.1 S/P CABG (CORONARY ARTERY BYPASS GRAFT): Primary | ICD-10-CM

## 2017-05-24 DIAGNOSIS — I25.10 CORONARY ARTERY DISEASE INVOLVING NATIVE CORONARY ARTERY OF NATIVE HEART WITHOUT ANGINA PECTORIS: ICD-10-CM

## 2017-05-24 PROCEDURE — 71010: CPT | Performed by: THORACIC SURGERY (CARDIOTHORACIC VASCULAR SURGERY)

## 2017-05-24 PROCEDURE — 99024 POSTOP FOLLOW-UP VISIT: CPT | Performed by: PHYSICIAN ASSISTANT

## 2017-05-26 ENCOUNTER — TELEPHONE (OUTPATIENT)
Dept: CARDIOLOGY | Facility: CLINIC | Age: 72
End: 2017-05-26

## 2017-05-30 ENCOUNTER — APPOINTMENT (OUTPATIENT)
Dept: CARDIAC REHAB | Facility: HOSPITAL | Age: 72
End: 2017-05-30

## 2017-06-05 ENCOUNTER — OFFICE VISIT (OUTPATIENT)
Dept: CARDIOLOGY | Facility: CLINIC | Age: 72
End: 2017-06-05

## 2017-06-05 VITALS
DIASTOLIC BLOOD PRESSURE: 68 MMHG | SYSTOLIC BLOOD PRESSURE: 122 MMHG | WEIGHT: 195 LBS | BODY MASS INDEX: 29.55 KG/M2 | HEART RATE: 67 BPM | HEIGHT: 68 IN

## 2017-06-05 DIAGNOSIS — E78.5 DYSLIPIDEMIA: ICD-10-CM

## 2017-06-05 DIAGNOSIS — I25.10 CORONARY ARTERY DISEASE INVOLVING NATIVE CORONARY ARTERY OF NATIVE HEART WITHOUT ANGINA PECTORIS: ICD-10-CM

## 2017-06-05 DIAGNOSIS — I48.3 TYPICAL ATRIAL FLUTTER (HCC): ICD-10-CM

## 2017-06-05 DIAGNOSIS — I50.22 CHRONIC SYSTOLIC CHF (CONGESTIVE HEART FAILURE) (HCC): ICD-10-CM

## 2017-06-05 DIAGNOSIS — I25.810 CORONARY ARTERY DISEASE INVOLVING CORONARY BYPASS GRAFT OF NATIVE HEART WITHOUT ANGINA PECTORIS: Primary | ICD-10-CM

## 2017-06-05 DIAGNOSIS — I15.0 RENOVASCULAR HYPERTENSION: ICD-10-CM

## 2017-06-05 PROCEDURE — 93000 ELECTROCARDIOGRAM COMPLETE: CPT | Performed by: INTERNAL MEDICINE

## 2017-06-05 PROCEDURE — 99214 OFFICE O/P EST MOD 30 MIN: CPT | Performed by: INTERNAL MEDICINE

## 2017-06-05 RX ORDER — CARVEDILOL 6.25 MG/1
6.25 TABLET ORAL EVERY 12 HOURS SCHEDULED
Qty: 60 TABLET | Refills: 6 | Status: SHIPPED | OUTPATIENT
Start: 2017-06-05 | End: 2017-06-05 | Stop reason: SDUPTHER

## 2017-06-05 RX ORDER — CARVEDILOL 6.25 MG/1
6.25 TABLET ORAL EVERY 12 HOURS SCHEDULED
Qty: 60 TABLET | Refills: 6 | Status: SHIPPED | OUTPATIENT
Start: 2017-06-05 | End: 2017-01-01

## 2017-06-05 NOTE — PROGRESS NOTES
Subjective:     Encounter Date:06/05/2017      Patient ID: Magdi Arriaga is a 71 y.o. male.    Chief Complaint: Coronary Artery Disease; Atrial flutter; Hypertension; and Hyperlipidemia    PROBLEM LIST:  1. Coronary artery disease  a. April 2017 NSTEMI  b. [facility] 4/18/17 showed occluded proximal LAD, 80% large ostial first diagonal, occluded mid Circ, 80 % large OM1, 80% mid RCA. EF 25%.  c. EF 40% by Echo  d. 4/24/17 CABG x 5 HERMAN to LAD, VG sequence OM1 and OM2. VG to DM of LAD and VG to RCA. (Dr. Berger)  2. Atrial flutter s/p ECV May 2017, on life Vest.  3. LBBB  4. Hypertension  5. Hyperlipidemia  6. Sleep apnea  7. COPD  8. Renal insufficiency  9. Arthritis  10. Asthma  11. Hx of bilatreal DVT  12. Depression  13. Hx of hemorrhagic shock and PTX from right renal RP bleed Sep 2014.  14. Surgical Hx:  a. Appendectomy  b. Back surgery  c. CABG  d. L knee surgery  e. L TKR  f. Tonsillectomy  g. IVC filter    History of Present Illness  Patient returns today for follow up after recent CABG. Since then he has been doing much better than before. Will be starting cardiac rehab soon. Wears a life vest. Denies any exertional chest pain, shortness of breath, orthopnea, PND, or palpitations.Her only in a rehabilitation/nursing home facility and is willing to go home.  Just wants to start cardiac rehabilitation.    Allergies   Allergen Reactions   • Sulfa Antibiotics Other (See Comments)     UNKNOWN CHILDHOOD REACTION         Current Outpatient Prescriptions:   •  acetaminophen (TYLENOL) 500 MG tablet, Take 2 tablets by mouth Every 6 (Six) Hours As Needed for Mild Pain (1-3)., Disp: , Rfl: 0  •  albuterol (PROVENTIL HFA;VENTOLIN HFA) 108 (90 BASE) MCG/ACT inhaler, Inhale 2 puffs Every 4 (Four) Hours As Needed for Wheezing., Disp: , Rfl:   •  amLODIPine (NORVASC) 5 MG tablet, Take 1 tablet by mouth Daily., Disp: , Rfl:   •  aspirin 81 MG chewable tablet, Chew 1 tablet Daily., Disp: , Rfl:   •  atorvastatin (LIPITOR)  40 MG tablet, Take 1 tablet by mouth Every Night., Disp: 30 tablet, Rfl: 11  •  budesonide-formoterol (SYMBICORT) 160-4.5 MCG/ACT inhaler, Inhale 2 puffs 2 (Two) Times a Day., Disp: 1 inhaler, Rfl: 11  •  carvedilol (COREG) 6.25 MG tablet, Take 1 tablet by mouth Every 12 (Twelve) Hours., Disp: 60 tablet, Rfl: 6  •  ferrous sulfate 325 (65 FE) MG tablet, Take 1 tablet by mouth 2 (Two) Times a Day With Meals., Disp: , Rfl:   •  furosemide (LASIX) 40 MG tablet, Take 1 tablet by mouth Daily., Disp: 30 tablet, Rfl: 11  •  gabapentin (NEURONTIN) 100 MG capsule, Take 100 mg by mouth 2 (Two) Times a Day., Disp: , Rfl:   •  ipratropium-albuterol (DUONEB) 0.5-2.5 mg/mL nebulizer, Take 3 mL by nebulization Every 4 (Four) Hours As Needed for Wheezing., Disp: , Rfl:   •  loratadine (CLARITIN) 10 MG tablet, Take 10 mg by mouth Daily., Disp: , Rfl:   •  memantine (NAMENDA) 10 MG tablet, Take 10 mg by mouth 2 (Two) Times a Day., Disp: , Rfl:   •  montelukast (SINGULAIR) 10 MG tablet, Take 1 tablet by mouth Every Night., Disp: 30 tablet, Rfl: 11  •  nitroglycerin (NITROSTAT) 0.4 MG SL tablet, Place 1 tablet under the tongue Every 5 (Five) Minutes As Needed for Chest Pain. Take no more than 3 doses in 15 minutes., Disp: 30 tablet, Rfl: 0  •  Omega-3 Fatty Acids (FISH OIL) 1200 MG capsule capsule, Take 1,200 mg by mouth Daily., Disp: , Rfl:   •  PARoxetine (PAXIL) 20 MG tablet, Take 20 mg by mouth Daily., Disp: , Rfl:   •  potassium chloride (MICRO-K) 10 MEQ CR capsule, Take 2 capsules by mouth Daily., Disp: 30 capsule, Rfl: 11  •  rivastigmine (EXELON) 9.5 MG/24HR patch, Place 1 patch on the skin Daily., Disp: , Rfl:   •  saccharomyces boulardii (FLORASTOR) 250 MG capsule, Take 1 capsule by mouth 2 (Two) Times a Day for 30 days., Disp: , Rfl:   •  sacubitril-valsartan (ENTRESTO) 49-51 MG tablet, Take 1 tablet by mouth 2 (Two) Times a Day., Disp: 60 tablet, Rfl: 11  •  vitamin C (ASCORBIC ACID) 250 MG tablet, Take 1 tablet by mouth 2  "(Two) Times a Day With Meals. Take with iron supplement to enhance absorption, Disp: , Rfl:     The following portions of the patient's history were reviewed and updated as appropriate: allergies, current medications, past family history, past medical history, past social history, past surgical history and problem list.    Review of Systems   Constitution: Negative.   Cardiovascular: Negative.    Respiratory: Negative.    Hematologic/Lymphatic: Negative for bleeding problem. Does not bruise/bleed easily.   Skin: Negative for rash.   Musculoskeletal: Negative for muscle weakness and myalgias.   Gastrointestinal: Negative for heartburn, nausea and vomiting.   Neurological: Negative.      Objective:     Vitals:    06/05/17 1056   BP: 122/68   BP Location: Right arm   Patient Position: Sitting   Pulse: 67   Weight: 195 lb (88.5 kg)   Height: 68\" (172.7 cm)         Physical Exam   Constitutional: He is oriented to person, place, and time. He appears well-developed and well-nourished.   HENT:   Mouth/Throat: Oropharynx is clear and moist.   Neck: No JVD present. Carotid bruit is not present. No thyromegaly present.   Cardiovascular: Regular rhythm, S1 normal, S2 normal, normal heart sounds and intact distal pulses.  Exam reveals no gallop, no S3 and no S4.    No murmur heard.  Pulses:       Carotid pulses are 2+ on the right side, and 2+ on the left side.       Radial pulses are 2+ on the right side, and 2+ on the left side.   Pulmonary/Chest: Breath sounds normal.   Noted sternotomy scar   Abdominal: Soft. Bowel sounds are normal. He exhibits no mass. There is no tenderness.   Musculoskeletal: He exhibits no edema.   Neurological: He is alert and oriented to person, place, and time.   Skin: Skin is warm and dry. No rash noted.     Lab Review:      ECG 12 Lead  Date/Time: 6/5/2017 11:28 AM  Performed by: ALIE BUSH  Authorized by: ALIE BUSH   Comparison: compared with previous ECG   Rhythm: sinus rhythm  BPM: " 61  Conduction: 1st degree  Other findings: LVH  Clinical impression: abnormal ECG                Assessment:   Magdi was seen today for coronary artery disease, atrial flutter, hypertension and hyperlipidemia.    Diagnoses and all orders for this visit:    Coronary artery disease involving coronary bypass graft of native heart without angina pectoris  -     ECG 12 Lead    Typical atrial flutter  -     ECG 12 Lead    Renovascular hypertension    Dyslipidemia    Coronary artery disease involving native coronary artery of native heart without angina pectoris  -     carvedilol (COREG) 6.25 MG tablet; Take 1 tablet by mouth Every 12 (Twelve) Hours.        Impression  1. 1.  Coronary artery disease, status post CABG 6 weeks ago.  Is stable.  2. Ischemic cardio myopathy LVEF decreased preoperatively.  Wearing LifeVest.  No current evidence of heart failure  3. Hypertension controlled  4. Atrial flutter status post cardioversion no recurrence  5. Dyslipidemia on statin    Plan:  1. Will do an echocardiogram 90 days from ECV in May 2017.  Assess for ICD risk.  2. Discontinue Amiodarone and Eliquis.  3. Increase Coreg to 6.25 mg twice daily.  Patient may be discharged home from the skilled nursing facility.  Initiate cardiac rehabilitation.  4. Continue current medications.  5. Revisit in 6 weeks, or sooner as needed.    Scribed for Kvng Stauffer MD by Merrick Sprague. 6/5/2017  11:54 AM    I, Kvng Stauffer MD, personally performed the services described in this documentation as scribed by the above individual in my presence, and it is both accurate and complete      Please note that portions of this note may have been completed with a voice recognition program. Efforts were made to edit the dictations, but occasionally words are mistranscribed.

## 2017-06-06 ENCOUNTER — TREATMENT (OUTPATIENT)
Dept: CARDIAC REHAB | Facility: HOSPITAL | Age: 72
End: 2017-06-06

## 2017-06-06 VITALS
SYSTOLIC BLOOD PRESSURE: 102 MMHG | WEIGHT: 197.6 LBS | DIASTOLIC BLOOD PRESSURE: 68 MMHG | HEART RATE: 61 BPM | BODY MASS INDEX: 29.95 KG/M2 | HEIGHT: 68 IN | OXYGEN SATURATION: 96 %

## 2017-06-06 DIAGNOSIS — I21.4 NSTEMI (NON-ST ELEVATED MYOCARDIAL INFARCTION) (HCC): Primary | ICD-10-CM

## 2017-06-06 PROCEDURE — 93798 PHYS/QHP OP CAR RHAB W/ECG: CPT

## 2017-06-06 NOTE — PROGRESS NOTES
Cardiac Rehab Initial Assessment      Name: Magdi Arriaga  :1945 Allergies:Sulfa antibiotics   MRN: [id number] 71 y.o. Physician: Colton Dickens MD   Primary Diagnosis:    Diagnosis Plan   1. NSTEMI (non-ST elevated myocardial infarction)      Event Date: 17 Specialist: Dr. Stauffer   Secondary Diagnosis: CABG Risk Stratification:High Risk Note Author: Nancie Kurtz     Cardiovascular History: Comments NA     EXERCISE AT HOME  no  NA  N/A    EF: 25%      Source: STEPHANIE          Ambulatory Status:Walker  Ambulatory Fall Risk Assessed on Initial Visit: yes 6 Minute Walk Pre- Cardiac Rehab:  Distance:455ft      RPE:14  Max. HR: 82       SPO2:96    MET: 1.7  MPH: NA             RPD: NA  Resting BP: 102/68 LA, 104/64 RA    Peak BP: 120/64  Recovery BP: 110/62  Comments: Patient had to rest during 6 MWT but was able to resume walking and complete.       NUTRITION  Lipids:yes If yes, labs as follows;  Total: No components found for: CHOLESTEROL  HDL:   HDL Cholesterol   Date Value Ref Range Status   2017 50 40 - 60 mg/dL Final    Lipids continued:  LDL:No results found for: LDL  Triglyceride: No components found for: TRIGLYCERIDE   Weight Management:                 Weight: 197.6 lb  Height: 68 in                                   BMI: Body mass index is 30.04 kg/(m^2).  Waist Circumference: na  inches   Alcohol Use: none Diabetes:    Last HGBA1C with date if applicable:No components found for: A1C         SOCIAL HISTORY  Social History     Social History   • Marital status:      Spouse name: N/A   • Number of children: 2   • Years of education: N/A     Occupational History   • [employer] at [employer]     Social History Main Topics   • Smoking status: Former Smoker     Packs/day: 1.00     Years: 15.00     Types: Cigarettes   • Smokeless tobacco: Never Used      Comment: quit 35 years ago   • Alcohol use No   • Drug use: No   • Sexual activity: Defer     Other Topics Concern   • None      Social History Narrative    Retired, lives with wife in [facility].        Educational Level (choose one that applies) na Learning Barriers:[employer], [employer]    Family Support:yes    Living Arrangement: lives with their spouse    Risk Factors: Hyperlipidemia  Yes and Diabetes  Yes If Yes: Do you check blood glucose daily  No Today's glucose level na     Tobacco Adjunct: No  Former smoker      Comorbidities: Dementia, Pulmonary disease, Renal disease, Diabetes Mellitus and Previous MI     PSYCHOSOCIAL  Clinical Depression: no    Stress: no     Assess presence or absence of depression using a valid screening tool: yes      PHYSICAL ASSESSMENT  Influenza vaccine: yes  Pneumococcal vaccine: yes          Angina: no    Describe angina scale of 0 - 4: 0 = none    Today are you having incisional pain? No. If, Yes, Scale: 0    No pain reported at this time.    Today are you having any other pain? No. If, Yes, Scale: na    Na   Diagnosed with Hypertension:yes    Heart Sounds: s1, s2     Lung Sounds: normal air entry, lungs clear to auscultation         Assessment: WNL Orthopedic Problems: BILATERAL KNEE REPLACEMENTS    Are you being hurt, hit, or frightened by anyone at home or in your life? no    Are you being neglected by a caregiver? No Shoulder flexibility/Range of motion: Average     Recommended arm activity: Hand weights    Chair sit and reach within: 0 inches   Leg flexibility: Average    Leg Strength/Balance/Five times sit to stand: 0 seconds.   NA  Chose one: Fall Risk    Recommended stretching: Chair    Assessment: WNL    Family attends [place]: yes Time of arrival: 1105  Time of departure: 1245     Patient Goals: Build stamina to be able to walk short distances without walker by the end of the program.          6/6/2017  2:24 PM  Nancie Kurtz

## 2017-06-07 ENCOUNTER — TREATMENT (OUTPATIENT)
Dept: INTERNAL MEDICINE | Facility: CLINIC | Age: 72
End: 2017-06-07

## 2017-06-07 DIAGNOSIS — I13.0 HYPERTENSIVE HEART AND RENAL DISEASE WITH CONGESTIVE HEART FAILURE (HCC): Primary | ICD-10-CM

## 2017-06-07 DIAGNOSIS — I50.33 ACUTE ON CHRONIC DIASTOLIC HEART FAILURE (HCC): ICD-10-CM

## 2017-06-12 ENCOUNTER — APPOINTMENT (OUTPATIENT)
Dept: GENERAL RADIOLOGY | Facility: HOSPITAL | Age: 72
End: 2017-06-12

## 2017-06-12 ENCOUNTER — HOSPITAL ENCOUNTER (EMERGENCY)
Facility: HOSPITAL | Age: 72
Discharge: HOME | End: 2017-06-13
Attending: EMERGENCY MEDICINE | Admitting: EMERGENCY MEDICINE

## 2017-06-12 ENCOUNTER — TELEPHONE (OUTPATIENT)
Dept: INTERNAL MEDICINE | Facility: CLINIC | Age: 72
End: 2017-06-12

## 2017-06-12 ENCOUNTER — APPOINTMENT (OUTPATIENT)
Dept: CARDIOLOGY | Facility: HOSPITAL | Age: 72
End: 2017-06-12
Attending: EMERGENCY MEDICINE

## 2017-06-12 DIAGNOSIS — R76.8 HEPATITIS B SURFACE ANTIGEN POSITIVE: ICD-10-CM

## 2017-06-12 DIAGNOSIS — N28.9 RENAL INSUFFICIENCY: ICD-10-CM

## 2017-06-12 DIAGNOSIS — L03.115 CELLULITIS OF RIGHT LOWER EXTREMITY: Primary | ICD-10-CM

## 2017-06-12 DIAGNOSIS — R79.82 CRP ELEVATED: ICD-10-CM

## 2017-06-12 DIAGNOSIS — D72.829 LEUKOCYTOSIS, UNSPECIFIED TYPE: ICD-10-CM

## 2017-06-12 LAB
ALBUMIN SERPL-MCNC: 4.2 G/DL (ref 3.2–4.8)
ALBUMIN/GLOB SERPL: 1 G/DL (ref 1.5–2.5)
ALP SERPL-CCNC: 122 U/L (ref 25–100)
ALT SERPL W P-5'-P-CCNC: 16 U/L (ref 7–40)
ANION GAP SERPL CALCULATED.3IONS-SCNC: 7 MMOL/L (ref 3–11)
AST SERPL-CCNC: 17 U/L (ref 0–33)
BACTERIA UR QL AUTO: ABNORMAL /HPF
BASOPHILS # BLD AUTO: 0.03 10*3/MM3 (ref 0–0.2)
BASOPHILS NFR BLD AUTO: 0.2 % (ref 0–1)
BILIRUB SERPL-MCNC: 0.6 MG/DL (ref 0.3–1.2)
BILIRUB UR QL STRIP: NEGATIVE
BUN BLD-MCNC: 38 MG/DL (ref 9–23)
BUN/CREAT SERPL: 19 (ref 7–25)
CALCIUM SPEC-SCNC: 10.4 MG/DL (ref 8.7–10.4)
CHLORIDE SERPL-SCNC: 104 MMOL/L (ref 99–109)
CLARITY UR: CLEAR
CO2 SERPL-SCNC: 27 MMOL/L (ref 20–31)
COLOR UR: YELLOW
CREAT BLD-MCNC: 2 MG/DL (ref 0.6–1.3)
CRP SERPL-MCNC: 11.42 MG/DL (ref 0–1)
DEPRECATED RDW RBC AUTO: 61.6 FL (ref 37–54)
EOSINOPHIL # BLD AUTO: 0.18 10*3/MM3 (ref 0.1–0.3)
EOSINOPHIL NFR BLD AUTO: 1.4 % (ref 0–3)
ERYTHROCYTE [DISTWIDTH] IN BLOOD BY AUTOMATED COUNT: 18.3 % (ref 11.3–14.5)
GFR SERPL CREATININE-BSD FRML MDRD: 33 ML/MIN/1.73
GLOBULIN UR ELPH-MCNC: 4.1 GM/DL
GLUCOSE BLD-MCNC: 95 MG/DL (ref 70–100)
GLUCOSE UR STRIP-MCNC: NEGATIVE MG/DL
HAV IGM SERPL QL IA: ABNORMAL
HBV CORE IGM SERPL QL IA: ABNORMAL
HBV SURFACE AG SERPL QL IA: REACTIVE
HCT VFR BLD AUTO: 32.8 % (ref 38.9–50.9)
HCV AB SER DONR QL: ABNORMAL
HGB BLD-MCNC: 10 G/DL (ref 13.1–17.5)
HGB UR QL STRIP.AUTO: NEGATIVE
HOLD SPECIMEN: NORMAL
HYALINE CASTS UR QL AUTO: ABNORMAL /LPF
IMM GRANULOCYTES # BLD: 0.04 10*3/MM3 (ref 0–0.03)
IMM GRANULOCYTES NFR BLD: 0.3 % (ref 0–0.6)
KETONES UR QL STRIP: NEGATIVE
LEUKOCYTE ESTERASE UR QL STRIP.AUTO: NEGATIVE
LYMPHOCYTES # BLD AUTO: 1.1 10*3/MM3 (ref 0.6–4.8)
LYMPHOCYTES NFR BLD AUTO: 8.6 % (ref 24–44)
MAGNESIUM SERPL-MCNC: 2.1 MG/DL (ref 1.3–2.7)
MCH RBC QN AUTO: 28.3 PG (ref 27–31)
MCHC RBC AUTO-ENTMCNC: 30.5 G/DL (ref 32–36)
MCV RBC AUTO: 92.9 FL (ref 80–99)
MONOCYTES # BLD AUTO: 0.88 10*3/MM3 (ref 0–1)
MONOCYTES NFR BLD AUTO: 6.9 % (ref 0–12)
NEUTROPHILS # BLD AUTO: 10.59 10*3/MM3 (ref 1.5–8.3)
NEUTROPHILS NFR BLD AUTO: 82.6 % (ref 41–71)
NITRITE UR QL STRIP: NEGATIVE
PH UR STRIP.AUTO: <=5 [PH] (ref 5–8)
PLATELET # BLD AUTO: 242 10*3/MM3 (ref 150–450)
PMV BLD AUTO: 10.7 FL (ref 6–12)
POTASSIUM BLD-SCNC: 4.2 MMOL/L (ref 3.5–5.5)
PROT SERPL-MCNC: 8.3 G/DL (ref 5.7–8.2)
PROT UR QL STRIP: ABNORMAL
RBC # BLD AUTO: 3.53 10*6/MM3 (ref 4.2–5.76)
RBC # UR: ABNORMAL /HPF
REF LAB TEST METHOD: ABNORMAL
SODIUM BLD-SCNC: 138 MMOL/L (ref 132–146)
SP GR UR STRIP: 1.01 (ref 1–1.03)
SQUAMOUS #/AREA URNS HPF: ABNORMAL /HPF
TROPONIN I SERPL-MCNC: 0.04 NG/ML
TROPONIN I SERPL-MCNC: 0.04 NG/ML (ref 0–0.07)
UROBILINOGEN UR QL STRIP: ABNORMAL
WBC NRBC COR # BLD: 12.82 10*3/MM3 (ref 3.5–10.8)
WBC UR QL AUTO: ABNORMAL /HPF
WHOLE BLOOD HOLD SPECIMEN: NORMAL
WHOLE BLOOD HOLD SPECIMEN: NORMAL

## 2017-06-12 PROCEDURE — 96368 THER/DIAG CONCURRENT INF: CPT

## 2017-06-12 PROCEDURE — 85025 COMPLETE CBC W/AUTO DIFF WBC: CPT | Performed by: EMERGENCY MEDICINE

## 2017-06-12 PROCEDURE — 93971 EXTREMITY STUDY: CPT | Performed by: INTERNAL MEDICINE

## 2017-06-12 PROCEDURE — 93005 ELECTROCARDIOGRAM TRACING: CPT | Performed by: EMERGENCY MEDICINE

## 2017-06-12 PROCEDURE — 87040 BLOOD CULTURE FOR BACTERIA: CPT | Performed by: EMERGENCY MEDICINE

## 2017-06-12 PROCEDURE — 80074 ACUTE HEPATITIS PANEL: CPT | Performed by: EMERGENCY MEDICINE

## 2017-06-12 PROCEDURE — 36415 COLL VENOUS BLD VENIPUNCTURE: CPT

## 2017-06-12 PROCEDURE — 71020 HC CHEST PA AND LATERAL: CPT

## 2017-06-12 PROCEDURE — 86140 C-REACTIVE PROTEIN: CPT | Performed by: EMERGENCY MEDICINE

## 2017-06-12 PROCEDURE — 96365 THER/PROPH/DIAG IV INF INIT: CPT

## 2017-06-12 PROCEDURE — 83735 ASSAY OF MAGNESIUM: CPT | Performed by: EMERGENCY MEDICINE

## 2017-06-12 PROCEDURE — 84484 ASSAY OF TROPONIN QUANT: CPT

## 2017-06-12 PROCEDURE — 25010000003 CEFTRIAXONE PER 250 MG: Performed by: EMERGENCY MEDICINE

## 2017-06-12 PROCEDURE — 84484 ASSAY OF TROPONIN QUANT: CPT | Performed by: EMERGENCY MEDICINE

## 2017-06-12 PROCEDURE — 80053 COMPREHEN METABOLIC PANEL: CPT | Performed by: EMERGENCY MEDICINE

## 2017-06-12 PROCEDURE — 81001 URINALYSIS AUTO W/SCOPE: CPT | Performed by: EMERGENCY MEDICINE

## 2017-06-12 PROCEDURE — 99284 EMERGENCY DEPT VISIT MOD MDM: CPT

## 2017-06-12 PROCEDURE — 93971 EXTREMITY STUDY: CPT

## 2017-06-12 PROCEDURE — 25010000002 DAPTOMYCIN PER 1 MG: Performed by: EMERGENCY MEDICINE

## 2017-06-12 PROCEDURE — 87341 HEP B SURFACE AG NEUTRLZJ IA: CPT | Performed by: EMERGENCY MEDICINE

## 2017-06-12 RX ORDER — CEFTRIAXONE SODIUM 2 G/50ML
2 INJECTION, SOLUTION INTRAVENOUS ONCE
Status: COMPLETED | OUTPATIENT
Start: 2017-06-12 | End: 2017-06-12

## 2017-06-12 RX ORDER — SODIUM CHLORIDE 0.9 % (FLUSH) 0.9 %
10 SYRINGE (ML) INJECTION AS NEEDED
Status: DISCONTINUED | OUTPATIENT
Start: 2017-06-12 | End: 2017-06-13 | Stop reason: HOSPADM

## 2017-06-12 RX ORDER — SODIUM CHLORIDE 9 MG/ML
125 INJECTION, SOLUTION INTRAVENOUS CONTINUOUS
Status: DISCONTINUED | OUTPATIENT
Start: 2017-06-12 | End: 2017-06-13 | Stop reason: HOSPADM

## 2017-06-12 RX ADMIN — CEFTRIAXONE SODIUM 2 G: 2 INJECTION, SOLUTION INTRAVENOUS at 22:22

## 2017-06-12 RX ADMIN — SODIUM CHLORIDE 1000 ML: 9 INJECTION, SOLUTION INTRAVENOUS at 22:13

## 2017-06-12 RX ADMIN — DAPTOMYCIN 350 MG: 500 INJECTION, POWDER, LYOPHILIZED, FOR SOLUTION INTRAVENOUS at 22:22

## 2017-06-12 NOTE — TELEPHONE ENCOUNTER
Received call from [facility] nurse Miguel Slater [phone] stating pt to start cardiac rehab tomorrow but T 99, BP 90/60 O2 97 % P 73; pt needs to be seen here soon to evaluate per TGF.   nurse called back stating pt was re-admitted to hosp today; still scheduled for OX here 6-27-17

## 2017-06-12 NOTE — ED PROVIDER NOTES
Subjective   HPI Comments: Magdi Arriaga is a 71 y.o. male with hx of 5 CABG, HTN, CHF, and COPD presents to the ED w/ c/o hypotension. He was seen by Eh on 6/5/17 for atrial flutter, CAD, and HTN, and during this time was told to discontinue Ambien and Eliquis and increase his co-regs to 6.5mg BID. He was recently diagnosed with PNA. Today a home health nurse came and measured a BP of 90/60 and a fever of 99, so she recommended presentation to the ED secondary to his other health problems. According to his wife, last night he had an increase in swelling of his feet, but not in his right leg more than baseline. He also notes a slight non-productive cough but denies any abdominal pain, CP, sore throat, dysuria, urinary frequency or urgency, hematuria, hematochezia, or any others symptoms. He reports nothing else acute at this time.   Patient is a 71 y.o. male presenting with general illness.   History provided by:  Patient  Illness   Location:  Hypotension  Severity:  Moderate  Onset quality:  Sudden  Timing:  Constant  Progression:  Improving  Chronicity:  New  Context:  90/60  Relieved by:  None tried  Worsened by:  Nothing   Ineffective treatments:  None tried  Associated symptoms: cough and fever    Associated symptoms: no abdominal pain, no chest pain, no congestion, no diarrhea, no headaches, no nausea, no shortness of breath, no sore throat and no vomiting        Review of Systems   Constitutional: Positive for fever.   HENT: Negative for congestion and sore throat.    Respiratory: Positive for cough. Negative for shortness of breath.    Cardiovascular: Negative for chest pain.   Gastrointestinal: Negative for abdominal pain, blood in stool, diarrhea, nausea and vomiting.   Genitourinary: Negative for dysuria, frequency, hematuria and urgency.   Neurological: Negative for headaches.       Past Medical History:   Diagnosis Date   • Abnormal finding on lung imaging      CT scan of the chest to be wary  2013 reports interstitial lung disease with marked diffuse interstitial and peripheral scarring and significant bronchiectasis in both upper   and lower lung zones.    • Allergic rhinitis     History of allergy injections as a child.   • Arthritis    • Asthma      Patient has had a history of asthma since childhood.   • CHF (congestive heart failure)    • COPD (chronic obstructive pulmonary disease)    • Coronary artery disease    • DVT (deep venous thrombosis)     Bilateral   • Falls     A.  History of falling traumatic injuries in April 2014 and May 2014 resulting and left rib  fracture and left clavicle fracture.   • Fracture of rib    • History of chest x-ray 02/19/2016    Extensive, but stable postinflammatory pulmonary changes. There has been no change since the previous examination of 12/03/2015   • History of chest x-ray 12/03/2015    Slight increased markings bilaterally, may be progression of his bronchiectasis   • History of chest x-ray 09/06/2014    There has been no change since 09/05/2014   • History of echocardiogram 02/19/2016    Mild concentric LVH is observed.Estimated EF is 50-55%.E to A reversal in the mitral valve flow pattern suggestive of diastolic dysfunction.RV is mildly dilated.Moderate aortic cusp sclerosis is present.Mitral annular calcification.Mild MR.Evidence of borderline pulmonary hypertension.   • History of PFTs 12/03/2015    Values are slightly worse.TLC is c/w mild restriction.Values reduced from 02/16/13.Diffusion capacity is slightly worse   • History of PFTs 02/06/2013    Mild decrease in FVC, may be due to less maximal effort.TLC is within normal levels. Mild reduction in diffusion in absolute value   • History of transfusion    • Hypertension    • Melena    • Patellar tendon rupture     A.  Status post primary repair of the left periprosthetic patellar tendon tear 6/11/2014, post fall at the end of April 2014.   • Pneumonia      A.  Left lower lobe pneumonia treated at [facility],  5/8/2014 through 5/20/2014.   • Pneumothorax      A.  Status post chest tube placed 5/13/2014 and discontinued 5/15/2014 with stable chest x-ray.   • Rotator cuff tear     A.  Left rotator cuff tear status post fall prior to admission at the end of April 2014.   • Sleep apnea with use of continuous positive airway pressure (CPAP)    • Traumatic hemorrhagic shock      A.  Secondary to right renal retroperitoneal hemorrhage, 9/2014.       Allergies   Allergen Reactions   • Sulfa Antibiotics Other (See Comments)     UNKNOWN CHILDHOOD REACTION       Past Surgical History:   Procedure Laterality Date   • APPENDECTOMY     • BACK SURGERY     • CARDIAC CATHETERIZATION N/A 4/18/2017    Procedure: Left Heart Cath;  Surgeon: Kvng Stauffer MD;  Location:  [facility];  Service:    • CORONARY ARTERY BYPASS GRAFT N/A 4/24/2017    Procedure: MEDIAN STERNOTOMY, CORONARY ARTERY BYPASS GRAFT X5 UTILIZING THE INTERNAL MAMMARY ARTERY, ENDOVASCULAR VEING HARVEST UTILIZING RIGHT SAPHENOUS VEIN,TRANSESOPHAGEAL ECHO;  Surgeon: Kanu Berger MD;  Location: [facility] OR;  Service:    • FRACTURE SURGERY     • JOINT REPLACEMENT     • KNEE SURGERY      A.  Status post primary repair of the left periprosthetic patellar tendon tear 6/11/2014, post fall at the end of April 2014   • REPLACEMENT TOTAL KNEE      A.  Left knee replacement in 2009 and right knee replacement in 2013.B.  History of prior right knee tendon repair in 2002.   • SKIN BIOPSY     • TONSILLECTOMY     • VENA CAVA FILTER PLACEMENT         Family History   Problem Relation Age of Onset   • Adopted: Yes       Social History     Social History   • Marital status:      Spouse name: N/A   • Number of children: 2   • Years of education: N/A     Occupational History   • Post Office at       Retired     Social History Main Topics   • Smoking status: Former Smoker     Packs/day: 1.00     Years: 15.00     Types: Cigarettes   • Smokeless tobacco: Never Used       Comment: quit 35 years ago   • Alcohol use No   • Drug use: No   • Sexual activity: Defer     Other Topics Concern   • None     Social History Narrative    Retired, lives with wife in [facility].          Objective   Physical Exam   Constitutional: He is oriented to person, place, and time. He appears well-developed and well-nourished. No distress.   HENT:   Head: Normocephalic and atraumatic.   Nose: Nose normal.   Eyes: Conjunctivae and EOM are normal. No scleral icterus. Left pupil is not reactive.   Nonreactive left pupil with an eccentric pupil. Visual field intact in each eye separately and in tandem.   Neck: Normal range of motion. Neck supple.   Cardiovascular: Normal rate and regular rhythm.  Exam reveals no gallop and no friction rub.    Murmur heard.   Systolic murmur is present with a grade of 2/6   Pulmonary/Chest: Effort normal and breath sounds normal. No respiratory distress. He has no wheezes.   Abdominal: Soft. Bowel sounds are normal. He exhibits no distension. There is no tenderness.   Musculoskeletal: Normal range of motion. He exhibits edema (Mild edema of the right leg.).   Neurological: He is alert and oriented to person, place, and time.   Skin: Skin is warm and dry. He is not diaphoretic.   Psychiatric: He has a normal mood and affect. His behavior is normal.   Nursing note and vitals reviewed.      Procedures         ED Course  ED Course   Comment By Time   Patient is serially reevaluated throughout the ED course with last reevaluation now.  He was not hypotensive on arrival and is remained normotensive throughout the ED course.  His creatinine is mildly increased from his baseline.  He does have significant leukocytosis, left shift and an elevated CRP about signs of UTI or pneumonia.  2 troponins of been stable he's never had chest pain palpitations or syncope.I discussed findings at length with the patient and spouseI discussed the evaluation with Dr. Ng, as well as the patient's  evaluation in the previous hospitalization by Dr. Morse.  He feels that if I feel the patient is stable he can go home and follow-up with Dr. Morse in the office tomorrow, but not admission to hospitalist service and they will consult in the morningI discussed findings with the patient and spouse.  He feels well and they've his baseline now.  He has been hydrated in the ED and I treated him with ceftriaxone and daptomycin IV.  He is declined hospitalization now after thorough discussion of risks and benefits and feels well enough to attempt home care with follow-up tomorrow, with the option to immediately return if he feels poorly in any time.I discussed decreasing his Coreg back to his 3.125 doses as it was just increased a week ago.  I'll have him call Dr. Dickens for instructions on whether to return him to the higher dose midweek.  We faxed the forms to [facility] for follow-up tomorrow as per Dr. Ng.  He has 24 hours worth of IV antibiotics on board with stable vitals on last of serial re-evaluations nowI discussed indications for return including significant fever or chills toxicity lethargy hypertension or any worsening symptoms with patient and spouse.Very pleasant and reliable patient and spouse verbalized understanding agreement, need for follow-up, and indications for return. Cheko Jacob MD 06/13 0039                 Recent Results (from the past 24 hour(s))   Comprehensive Metabolic Panel    Collection Time: 06/12/17  6:40 PM   Result Value Ref Range    Glucose 95 70 - 100 mg/dL    BUN 38 (H) 9 - 23 mg/dL    Creatinine 2.00 (H) 0.60 - 1.30 mg/dL    Sodium 138 132 - 146 mmol/L    Potassium 4.2 3.5 - 5.5 mmol/L    Chloride 104 99 - 109 mmol/L    CO2 27.0 20.0 - 31.0 mmol/L    Calcium 10.4 8.7 - 10.4 mg/dL    Total Protein 8.3 (H) 5.7 - 8.2 g/dL    Albumin 4.20 3.20 - 4.80 g/dL    ALT (SGPT) 16 7 - 40 U/L    AST (SGOT) 17 0 - 33 U/L    Alkaline Phosphatase 122 (H) 25 - 100 U/L    Total Bilirubin 0.6  0.3 - 1.2 mg/dL    eGFR Non African Amer 33 (L) >60 mL/min/1.73    Globulin 4.1 gm/dL    A/G Ratio 1.0 (L) 1.5 - 2.5 g/dL    BUN/Creatinine Ratio 19.0 7.0 - 25.0    Anion Gap 7.0 3.0 - 11.0 mmol/L   Magnesium    Collection Time: 06/12/17  6:40 PM   Result Value Ref Range    Magnesium 2.1 1.3 - 2.7 mg/dL   Light Blue Top    Collection Time: 06/12/17  6:40 PM   Result Value Ref Range    Extra Tube hold for add-on    Green Top (Gel)    Collection Time: 06/12/17  6:40 PM   Result Value Ref Range    Extra Tube Hold for add-ons.    Lavender Top    Collection Time: 06/12/17  6:40 PM   Result Value Ref Range    Extra Tube hold for add-on    CBC Auto Differential    Collection Time: 06/12/17  6:40 PM   Result Value Ref Range    WBC 12.82 (H) 3.50 - 10.80 10*3/mm3    RBC 3.53 (L) 4.20 - 5.76 10*6/mm3    Hemoglobin 10.0 (L) 13.1 - 17.5 g/dL    Hematocrit 32.8 (L) 38.9 - 50.9 %    MCV 92.9 80.0 - 99.0 fL    MCH 28.3 27.0 - 31.0 pg    MCHC 30.5 (L) 32.0 - 36.0 g/dL    RDW 18.3 (H) 11.3 - 14.5 %    RDW-SD 61.6 (H) 37.0 - 54.0 fl    MPV 10.7 6.0 - 12.0 fL    Platelets 242 150 - 450 10*3/mm3    Neutrophil % 82.6 (H) 41.0 - 71.0 %    Lymphocyte % 8.6 (L) 24.0 - 44.0 %    Monocyte % 6.9 0.0 - 12.0 %    Eosinophil % 1.4 0.0 - 3.0 %    Basophil % 0.2 0.0 - 1.0 %    Immature Grans % 0.3 0.0 - 0.6 %    Neutrophils, Absolute 10.59 (H) 1.50 - 8.30 10*3/mm3    Lymphocytes, Absolute 1.10 0.60 - 4.80 10*3/mm3    Monocytes, Absolute 0.88 0.00 - 1.00 10*3/mm3    Eosinophils, Absolute 0.18 0.10 - 0.30 10*3/mm3    Basophils, Absolute 0.03 0.00 - 0.20 10*3/mm3    Immature Grans, Absolute 0.04 (H) 0.00 - 0.03 10*3/mm3   C-reactive Protein    Collection Time: 06/12/17  6:40 PM   Result Value Ref Range    C-Reactive Protein 11.42 (H) 0.00 - 1.00 mg/dL   Hepatitis Panel, Acute    Collection Time: 06/12/17  6:40 PM   Result Value Ref Range    Hepatitis B Surface Ag Reactive (C) Non-Reactive    Hep A IgM Non-Reactive Non-Reactive    Hep B C IgM  Non-Reactive Non-Reactive    Hepatitis C Ab Non-Reactive Non-Reactive   POC Troponin, Rapid    Collection Time: 06/12/17  6:51 PM   Result Value Ref Range    Troponin I 0.04 0.00 - 0.07 ng/mL   Urinalysis With / Culture If Indicated    Collection Time: 06/12/17  7:45 PM   Result Value Ref Range    Color, UA Yellow Yellow, Straw    Appearance, UA Clear Clear    pH, UA <=5.0 5.0 - 8.0    Specific Gravity, UA 1.015 1.001 - 1.030    Glucose, UA Negative Negative    Ketones, UA Negative Negative    Bilirubin, UA Negative Negative    Blood, UA Negative Negative    Protein,  mg/dL (2+) (A) Negative    Leuk Esterase, UA Negative Negative    Nitrite, UA Negative Negative    Urobilinogen, UA 0.2 E.U./dL 0.2 - 1.0 E.U./dL   Urinalysis, Microscopic Only    Collection Time: 06/12/17  7:45 PM   Result Value Ref Range    RBC, UA 0-2 None Seen, 0-2 /HPF    WBC, UA 0-2 (A) None Seen /HPF    Bacteria, UA None Seen None Seen, Trace /HPF    Squamous Epithelial Cells, UA None Seen None Seen, 0-2 /HPF    Hyaline Casts, UA 0-6 0 - 6 /LPF    Methodology Automated Microscopy    Duplex Venous Lower Extremity - Right CAR    Collection Time: 06/12/17  7:48 PM   Result Value Ref Range    Right Common Femoral Spont Y     Right Common Femoral Phasic Y     Right Common Femoral Augment Y     Right Common Femoral Competent Y     Right Common Femoral Compress C     Right Saphenofemoral Junction Spont Y     Right Saphenofemoral Junction Phasic Y     Right Saphenofemoral Junction Augment Y     Right Saphenofemoral Junction Competent Y     Right Saphenofemoral Junction Compress C     Right Proximal Femoral Compress C     Right Mid Femoral Spont Y     Right Mid Femoral Phasic Y     Right Mid Femoral Augment Y     Right Mid Femoral Competent Y     Right Mid Femoral Compress C     Right Distal Femoral Compress C     Right Popliteal Spont Y     Right Popliteal Phasic Y     Right Popliteal Augment Y     Right Popliteal Competent Y     Right Popliteal  Compress C     Right Posterior Tibial Compress C     Right Peroneal Compress C     Right GastronemiusSoleal Compress C     Right Lesser Saph Compress C     Left Common Femoral Spont Y     Left Common Femoral Phasic Y     Left Common Femoral Augment Y     Left Common Femoral Competent Y     Left Common Femoral Compress C    Troponin    Collection Time: 06/12/17  9:39 PM   Result Value Ref Range    Troponin I 0.044 (H) <=0.039 ng/mL     Note: In addition to lab results from this visit, the labs listed above may include labs taken at another facility or during a different encounter within the last 24 hours. Please correlate lab times with ED admission and discharge times for further clarification of the services performed during this visit.    XR Chest 2 View   Final Result   Abnormal     Coarse interstitial opacities may represent chronic changes plus or minus    underlying infection.      THIS DOCUMENT HAS BEEN ELECTRONICALLY SIGNED BY BRUNO RAIN MD        Vitals:    06/12/17 2030 06/12/17 2045 06/12/17 2130 06/12/17 2230   BP: 148/72 147/78 130/74 145/75   BP Location:       Patient Position:       Pulse:    67   Resp:       Temp:       TempSrc:       SpO2: 93% 94% 95% 92%   Weight:       Height:         Medications   sodium chloride 0.9 % flush 10 mL (not administered)   DAPTOmycin (CUBICIN) 350 mg in sodium chloride 0.9 % 50 mL IVPB (0 mg Intravenous Stopped 6/12/17 2253)   sodium chloride 0.9 % infusion (not administered)   cefTRIAXone (ROCEPHIN) IVPB 2 g (0 g Intravenous Stopped 6/12/17 2253)   sodium chloride 0.9 % bolus 1,000 mL (1,000 mL Intravenous New Bag 6/12/17 2213)     ECG/EMG Results (last 24 hours)     Procedure Component Value Units Date/Time    ECG 12 Lead [[id number]] Collected:  06/12/17 1816     Updated:  06/12/17 1822              [facility]    Final diagnoses:   Cellulitis of right lower extremity   Leukocytosis, unspecified type   CRP elevated   Renal insufficiency   Hepatitis B surface antigen  positive       Documentation assistance provided by scribsha Dotson.  Information recorded by the scribe was done at my direction and has been verified and validated by me.     Miriam Dotson  06/12/17 1855       Cheko Jacob MD  06/13/17 0039

## 2017-06-13 VITALS
TEMPERATURE: 99 F | SYSTOLIC BLOOD PRESSURE: 125 MMHG | HEIGHT: 68 IN | BODY MASS INDEX: 29.55 KG/M2 | WEIGHT: 195 LBS | OXYGEN SATURATION: 92 % | RESPIRATION RATE: 16 BRPM | DIASTOLIC BLOOD PRESSURE: 72 MMHG | HEART RATE: 65 BPM

## 2017-06-13 LAB
BH CV LOWER VASCULAR LEFT COMMON FEMORAL AUGMENT: NORMAL
BH CV LOWER VASCULAR LEFT COMMON FEMORAL COMPETENT: NORMAL
BH CV LOWER VASCULAR LEFT COMMON FEMORAL COMPRESS: NORMAL
BH CV LOWER VASCULAR LEFT COMMON FEMORAL PHASIC: NORMAL
BH CV LOWER VASCULAR LEFT COMMON FEMORAL SPONT: NORMAL
BH CV LOWER VASCULAR RIGHT COMMON FEMORAL AUGMENT: NORMAL
BH CV LOWER VASCULAR RIGHT COMMON FEMORAL COMPETENT: NORMAL
BH CV LOWER VASCULAR RIGHT COMMON FEMORAL COMPRESS: NORMAL
BH CV LOWER VASCULAR RIGHT COMMON FEMORAL PHASIC: NORMAL
BH CV LOWER VASCULAR RIGHT COMMON FEMORAL SPONT: NORMAL
BH CV LOWER VASCULAR RIGHT DISTAL FEMORAL COMPRESS: NORMAL
BH CV LOWER VASCULAR RIGHT GASTRONEMIUS COMPRESS: NORMAL
BH CV LOWER VASCULAR RIGHT LESSER SAPH COMPRESS: NORMAL
BH CV LOWER VASCULAR RIGHT MID FEMORAL AUGMENT: NORMAL
BH CV LOWER VASCULAR RIGHT MID FEMORAL COMPETENT: NORMAL
BH CV LOWER VASCULAR RIGHT MID FEMORAL COMPRESS: NORMAL
BH CV LOWER VASCULAR RIGHT MID FEMORAL PHASIC: NORMAL
BH CV LOWER VASCULAR RIGHT MID FEMORAL SPONT: NORMAL
BH CV LOWER VASCULAR RIGHT PERONEAL COMPRESS: NORMAL
BH CV LOWER VASCULAR RIGHT POPLITEAL AUGMENT: NORMAL
BH CV LOWER VASCULAR RIGHT POPLITEAL COMPETENT: NORMAL
BH CV LOWER VASCULAR RIGHT POPLITEAL COMPRESS: NORMAL
BH CV LOWER VASCULAR RIGHT POPLITEAL PHASIC: NORMAL
BH CV LOWER VASCULAR RIGHT POPLITEAL SPONT: NORMAL
BH CV LOWER VASCULAR RIGHT POSTERIOR TIBIAL COMPRESS: NORMAL
BH CV LOWER VASCULAR RIGHT PROXIMAL FEMORAL COMPRESS: NORMAL
BH CV LOWER VASCULAR RIGHT SAPHENOFEMORAL JUNCTION AUGMENT: NORMAL
BH CV LOWER VASCULAR RIGHT SAPHENOFEMORAL JUNCTION COMPETENT: NORMAL
BH CV LOWER VASCULAR RIGHT SAPHENOFEMORAL JUNCTION COMPRESS: NORMAL
BH CV LOWER VASCULAR RIGHT SAPHENOFEMORAL JUNCTION PHASIC: NORMAL
BH CV LOWER VASCULAR RIGHT SAPHENOFEMORAL JUNCTION SPONT: NORMAL

## 2017-06-13 NOTE — DISCHARGE INSTRUCTIONS
Rest and elevate your leg    Drink plenty of fluids    Follow up with Dr. Morse in the office later today as arranged.  Call at 8:30 for an appointment    Take your usual medications until follow-up but decrease your Coreg to the previous 3.125 dose.    Take your blood pressure twice a day.  Call Dr. Dickens Wednesday or Thursday to discuss the appropriate dosage of your Coreg, and for instructions on further doses    Immediate return to the ED if you have recurrent low blood pressure, significant fevers or chills, you're feeling worse, or any other acute, emergent, or progressive symptoms as discussed    No vigorous physical activity until released to do so

## 2017-06-15 LAB
HBV SURFACE AG SERPL QL IA: NORMAL
HBV SURFACE AG SERPL QL NT: POSITIVE

## 2017-06-17 LAB
BACTERIA SPEC AEROBE CULT: NORMAL
BACTERIA SPEC AEROBE CULT: NORMAL

## 2017-06-19 RX ORDER — NITROGLYCERIN 0.4 MG/1
0.4 TABLET SUBLINGUAL
Qty: 25 TABLET | Refills: 6 | Status: SHIPPED | OUTPATIENT
Start: 2017-06-19 | End: 2017-01-01 | Stop reason: HOSPADM

## 2017-06-20 ENCOUNTER — TREATMENT (OUTPATIENT)
Dept: CARDIAC REHAB | Facility: HOSPITAL | Age: 72
End: 2017-06-20

## 2017-06-20 DIAGNOSIS — I21.4 NSTEMI (NON-ST ELEVATED MYOCARDIAL INFARCTION) (HCC): Primary | ICD-10-CM

## 2017-06-20 PROCEDURE — 93798 PHYS/QHP OP CAR RHAB W/ECG: CPT

## 2017-06-20 NOTE — PROGRESS NOTES
Attended Phase II Cardiac Rehab. No medication or health history changes reported. See David for details.

## 2017-06-21 ENCOUNTER — TREATMENT (OUTPATIENT)
Dept: CARDIAC REHAB | Facility: HOSPITAL | Age: 72
End: 2017-06-21

## 2017-06-21 DIAGNOSIS — I21.4 NSTEMI (NON-ST ELEVATED MYOCARDIAL INFARCTION) (HCC): Primary | ICD-10-CM

## 2017-06-21 PROCEDURE — 93798 PHYS/QHP OP CAR RHAB W/ECG: CPT

## 2017-06-23 ENCOUNTER — TREATMENT (OUTPATIENT)
Dept: CARDIAC REHAB | Facility: HOSPITAL | Age: 72
End: 2017-06-23

## 2017-06-23 DIAGNOSIS — I21.4 NSTEMI (NON-ST ELEVATED MYOCARDIAL INFARCTION) (HCC): Primary | ICD-10-CM

## 2017-06-23 PROCEDURE — 93798 PHYS/QHP OP CAR RHAB W/ECG: CPT

## 2017-06-27 ENCOUNTER — LAB (OUTPATIENT)
Dept: LAB | Facility: HOSPITAL | Age: 72
End: 2017-06-27

## 2017-06-27 ENCOUNTER — OFFICE VISIT (OUTPATIENT)
Dept: INTERNAL MEDICINE | Facility: CLINIC | Age: 72
End: 2017-06-27

## 2017-06-27 VITALS
HEART RATE: 56 BPM | TEMPERATURE: 97.8 F | WEIGHT: 195 LBS | HEIGHT: 66 IN | BODY MASS INDEX: 31.34 KG/M2 | RESPIRATION RATE: 16 BRPM | DIASTOLIC BLOOD PRESSURE: 80 MMHG | OXYGEN SATURATION: 97 % | SYSTOLIC BLOOD PRESSURE: 130 MMHG

## 2017-06-27 DIAGNOSIS — L03.116 CELLULITIS OF LEFT LOWER LEG: ICD-10-CM

## 2017-06-27 DIAGNOSIS — N18.30 CKD (CHRONIC KIDNEY DISEASE), STAGE III (HCC): ICD-10-CM

## 2017-06-27 DIAGNOSIS — I51.89 DIASTOLIC DYSFUNCTION: Primary | ICD-10-CM

## 2017-06-27 DIAGNOSIS — R06.02 SHORTNESS OF BREATH: ICD-10-CM

## 2017-06-27 DIAGNOSIS — I15.0 RENOVASCULAR HYPERTENSION: ICD-10-CM

## 2017-06-27 DIAGNOSIS — G47.33 OSA (OBSTRUCTIVE SLEEP APNEA): ICD-10-CM

## 2017-06-27 DIAGNOSIS — E66.01 MORBID OBESITY, UNSPECIFIED OBESITY TYPE (HCC): ICD-10-CM

## 2017-06-27 DIAGNOSIS — J45.20 MILD INTERMITTENT ASTHMA WITHOUT COMPLICATION: ICD-10-CM

## 2017-06-27 DIAGNOSIS — F01.50 VASCULAR DEMENTIA WITHOUT BEHAVIORAL DISTURBANCE (HCC): ICD-10-CM

## 2017-06-27 DIAGNOSIS — N42.30: ICD-10-CM

## 2017-06-27 DIAGNOSIS — E78.5 DYSLIPIDEMIA: ICD-10-CM

## 2017-06-27 DIAGNOSIS — D64.9 NORMOCYTIC ANEMIA: ICD-10-CM

## 2017-06-27 DIAGNOSIS — R32 URINARY INCONTINENCE, UNSPECIFIED TYPE: ICD-10-CM

## 2017-06-27 DIAGNOSIS — E55.9 VITAMIN D DEFICIENCY: ICD-10-CM

## 2017-06-27 DIAGNOSIS — I50.42 CHRONIC COMBINED SYSTOLIC AND DIASTOLIC CONGESTIVE HEART FAILURE (HCC): ICD-10-CM

## 2017-06-27 DIAGNOSIS — F33.42 RECURRENT MAJOR DEPRESSIVE DISORDER, IN FULL REMISSION (HCC): ICD-10-CM

## 2017-06-27 LAB
25(OH)D3 SERPL-MCNC: 32.2 NG/ML
ALBUMIN SERPL-MCNC: 4.1 G/DL (ref 3.2–4.8)
ALBUMIN/GLOB SERPL: 1 G/DL (ref 1.5–2.5)
ALP SERPL-CCNC: 138 U/L (ref 25–100)
ALT SERPL W P-5'-P-CCNC: 16 U/L (ref 7–40)
ANION GAP SERPL CALCULATED.3IONS-SCNC: 8 MMOL/L (ref 3–11)
ARTICHOKE IGE QN: 88 MG/DL (ref 0–130)
AST SERPL-CCNC: 21 U/L (ref 0–33)
BACTERIA UR QL AUTO: NORMAL /HPF
BASOPHILS # BLD AUTO: 0.08 10*3/MM3 (ref 0–0.2)
BASOPHILS NFR BLD AUTO: 0.9 % (ref 0–1)
BILIRUB SERPL-MCNC: 0.5 MG/DL (ref 0.3–1.2)
BILIRUB UR QL STRIP: NEGATIVE
BNP SERPL-MCNC: 359 PG/ML (ref 0–100)
BUN BLD-MCNC: 37 MG/DL (ref 9–23)
BUN/CREAT SERPL: 21.8 (ref 7–25)
CALCIUM SPEC-SCNC: 10.6 MG/DL (ref 8.7–10.4)
CHLORIDE SERPL-SCNC: 105 MMOL/L (ref 99–109)
CHOLEST SERPL-MCNC: 176 MG/DL (ref 0–200)
CLARITY UR: CLEAR
CO2 SERPL-SCNC: 27 MMOL/L (ref 20–31)
COLOR UR: YELLOW
CREAT BLD-MCNC: 1.7 MG/DL (ref 0.6–1.3)
CRP SERPL-MCNC: 1.95 MG/DL (ref 0–1)
DEPRECATED RDW RBC AUTO: 61.8 FL (ref 37–54)
EOSINOPHIL # BLD AUTO: 0.38 10*3/MM3 (ref 0–0.3)
EOSINOPHIL NFR BLD AUTO: 4.4 % (ref 0–3)
ERYTHROCYTE [DISTWIDTH] IN BLOOD BY AUTOMATED COUNT: 18 % (ref 11.3–14.5)
ERYTHROCYTE [SEDIMENTATION RATE] IN BLOOD: 55 MM/HR (ref 0–20)
GFR SERPL CREATININE-BSD FRML MDRD: 40 ML/MIN/1.73
GLOBULIN UR ELPH-MCNC: 4.1 GM/DL
GLUCOSE BLD-MCNC: 74 MG/DL (ref 70–100)
GLUCOSE UR STRIP-MCNC: NEGATIVE MG/DL
HCT VFR BLD AUTO: 36.1 % (ref 38.9–50.9)
HDLC SERPL-MCNC: 65 MG/DL (ref 40–60)
HGB BLD-MCNC: 11 G/DL (ref 13.1–17.5)
HGB UR QL STRIP.AUTO: NEGATIVE
HYALINE CASTS UR QL AUTO: NORMAL /LPF
IMM GRANULOCYTES # BLD: 0.02 10*3/MM3 (ref 0–0.03)
IMM GRANULOCYTES NFR BLD: 0.2 % (ref 0–0.6)
IRON 24H UR-MRATE: 48 MCG/DL (ref 50–175)
IRON SATN MFR SERPL: 17 % (ref 20–50)
KETONES UR QL STRIP: NEGATIVE
LEUKOCYTE ESTERASE UR QL STRIP.AUTO: NEGATIVE
LYMPHOCYTES # BLD AUTO: 1.69 10*3/MM3 (ref 0.6–4.8)
LYMPHOCYTES NFR BLD AUTO: 19.7 % (ref 24–44)
MAGNESIUM SERPL-MCNC: 2.1 MG/DL (ref 1.3–2.7)
MCH RBC QN AUTO: 28.6 PG (ref 27–31)
MCHC RBC AUTO-ENTMCNC: 30.5 G/DL (ref 32–36)
MCV RBC AUTO: 94 FL (ref 80–99)
MONOCYTES # BLD AUTO: 0.66 10*3/MM3 (ref 0–1)
MONOCYTES NFR BLD AUTO: 7.7 % (ref 0–12)
NEUTROPHILS # BLD AUTO: 5.77 10*3/MM3 (ref 1.5–8.3)
NEUTROPHILS NFR BLD AUTO: 67.1 % (ref 41–71)
NITRITE UR QL STRIP: NEGATIVE
PH UR STRIP.AUTO: <=5 [PH] (ref 5–8)
PLATELET # BLD AUTO: 270 10*3/MM3 (ref 150–450)
PMV BLD AUTO: 11 FL (ref 6–12)
POTASSIUM BLD-SCNC: 4.6 MMOL/L (ref 3.5–5.5)
PROT SERPL-MCNC: 8.2 G/DL (ref 5.7–8.2)
PROT UR QL STRIP: ABNORMAL
PSA SERPL-MCNC: 0.83 NG/ML (ref 0–4)
RBC # BLD AUTO: 3.84 10*6/MM3 (ref 4.2–5.76)
RBC # UR: NORMAL /HPF
REF LAB TEST METHOD: NORMAL
SODIUM BLD-SCNC: 140 MMOL/L (ref 132–146)
SP GR UR STRIP: 1.01 (ref 1–1.03)
SQUAMOUS #/AREA URNS HPF: NORMAL /HPF
TIBC SERPL-MCNC: 275 MCG/DL (ref 250–450)
TRIGL SERPL-MCNC: 105 MG/DL (ref 0–150)
TSH SERPL DL<=0.05 MIU/L-ACNC: 3.87 MIU/ML (ref 0.35–5.35)
UROBILINOGEN UR QL STRIP: ABNORMAL
WBC NRBC COR # BLD: 8.6 10*3/MM3 (ref 3.5–10.8)
WBC UR QL AUTO: NORMAL /HPF

## 2017-06-27 PROCEDURE — 80053 COMPREHEN METABOLIC PANEL: CPT | Performed by: INTERNAL MEDICINE

## 2017-06-27 PROCEDURE — 85025 COMPLETE CBC W/AUTO DIFF WBC: CPT | Performed by: INTERNAL MEDICINE

## 2017-06-27 PROCEDURE — 83540 ASSAY OF IRON: CPT | Performed by: INTERNAL MEDICINE

## 2017-06-27 PROCEDURE — 99205 OFFICE O/P NEW HI 60 MIN: CPT | Performed by: INTERNAL MEDICINE

## 2017-06-27 PROCEDURE — 80061 LIPID PANEL: CPT | Performed by: INTERNAL MEDICINE

## 2017-06-27 PROCEDURE — 85652 RBC SED RATE AUTOMATED: CPT | Performed by: INTERNAL MEDICINE

## 2017-06-27 PROCEDURE — 83550 IRON BINDING TEST: CPT | Performed by: INTERNAL MEDICINE

## 2017-06-27 PROCEDURE — 36415 COLL VENOUS BLD VENIPUNCTURE: CPT | Performed by: INTERNAL MEDICINE

## 2017-06-27 PROCEDURE — 81001 URINALYSIS AUTO W/SCOPE: CPT | Performed by: INTERNAL MEDICINE

## 2017-06-27 PROCEDURE — 83735 ASSAY OF MAGNESIUM: CPT | Performed by: INTERNAL MEDICINE

## 2017-06-27 PROCEDURE — 93000 ELECTROCARDIOGRAM COMPLETE: CPT | Performed by: INTERNAL MEDICINE

## 2017-06-27 PROCEDURE — 83880 ASSAY OF NATRIURETIC PEPTIDE: CPT | Performed by: INTERNAL MEDICINE

## 2017-06-27 PROCEDURE — 84153 ASSAY OF PSA TOTAL: CPT | Performed by: INTERNAL MEDICINE

## 2017-06-27 PROCEDURE — 82306 VITAMIN D 25 HYDROXY: CPT | Performed by: INTERNAL MEDICINE

## 2017-06-27 PROCEDURE — 86140 C-REACTIVE PROTEIN: CPT | Performed by: INTERNAL MEDICINE

## 2017-06-27 PROCEDURE — 84443 ASSAY THYROID STIM HORMONE: CPT | Performed by: INTERNAL MEDICINE

## 2017-06-27 RX ORDER — AZITHROMYCIN 250 MG/1
1 TABLET, FILM COATED ORAL DAILY
COMMUNITY
Start: 2017-06-16 | End: 2017-01-01 | Stop reason: HOSPADM

## 2017-06-27 RX ORDER — ENTECAVIR 0.5 MG/1
0.5 TABLET, FILM COATED ORAL DAILY
COMMUNITY
Start: 2017-06-15 | End: 2017-01-01 | Stop reason: HOSPADM

## 2017-06-27 RX ORDER — SPIRONOLACTONE 50 MG/1
50 TABLET, FILM COATED ORAL DAILY
Qty: 30 TABLET | Refills: 3 | Status: SHIPPED | OUTPATIENT
Start: 2017-06-27 | End: 2017-01-01 | Stop reason: HOSPADM

## 2017-06-27 NOTE — PATIENT INSTRUCTIONS
1.  Bathe with soap and water - daily.  Apply mupirocin ointment - to left lower shin.    2.  Stop gabapentin - use Tylenol only for back pain.    3.  Continue usual medicines and supplements - as listed.    4.  Follow a low-calorie, well-balanced diet - low in salt and low in sugar.    5.  Appointment with cardiologist t review heart status.    6.  Speak to nurse on Friday - about test results.    7.  Return visit in 2 weeks - nonfasting checkup.    8.  Stop potassium tablet - start spironolactone 50 mg daily to remove excess fluid

## 2017-06-27 NOTE — PROGRESS NOTES
Subjective   Magdi Arriaga is a 71 y.o. male.     Chief Complaint   Patient presents with   • Congestive Heart Failure       History of Present Illness     The patient presented in April with acute heart failure.  A heart catheterization revealed extensive coronary artery disease.  On 4/24/17 the patient underwent CABG procedure.  Echocardiogram on 5/16/17 showed an ejection fraction of 25% with 1+ MR.  He has been given a defibrillation vest and is being assessed for an implantable defibrillator.  He was discharged from [facility] on May 2.  He convalesced for several weeks at [facility].  He is now at home under the care of his wife.  He gets short of breath walking around the house.  He is not able to do normal activities and needs help with activities of daily living.    The following portions of the patient's history were reviewed and updated as appropriate: allergies, current medications, past family history, past medical history, past social history, past surgical history and problem list.    Active Ambulatory Problems     Diagnosis Date Noted   • Abnormal CT scan, chest 01/06/2017   • Allergic rhinitis 01/06/2017   • Asthma 01/06/2017   • Bronchiectasis 01/06/2017   • Dementia 01/06/2017   • Depression 01/06/2017   • Diastolic dysfunction 01/06/2017   • Dyslipidemia 01/06/2017   • Hypertension 01/06/2017   • Obesity 01/06/2017   • JONNY (obstructive sleep apnea) 01/06/2017   • Osteoarthritis 01/06/2017   • Vitamin D deficiency 01/06/2017   • NSTEMI (non-ST elevated myocardial infarction) 04/17/2017   • Left bundle branch block 04/17/2017   • Normocytic anemia 04/17/2017   • COPD (chronic obstructive pulmonary disease) 04/17/2017   • Coronary artery disease involving coronary bypass graft of native heart without angina pectoris 04/24/2017   • Ischemic cardiomyopathy, most recent LVEF 40% 04/24/2017   • Shortness of breath 05/11/2017   • Atrial flutter 05/11/2017   • Acute deep  vein thrombosis (DVT) of right lower extremity 05/12/2017   • Hematoma, calf 05/12/2017   • History of exposure to infectious disease-Patient received platelet transfusion for CABG, 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 05/16/2017   • Chronic congestive heart failure 05/22/2017   • CKD (chronic kidney disease), stage III 05/22/2017   • Cellulitis of left lower leg 06/27/2017     Resolved Ambulatory Problems     Diagnosis Date Noted   • Acute on chronic congestive heart failure 04/17/2017   • Acute on chronic renal insufficiency 04/17/2017   • Anemia 05/11/2017   • Leg edema, right 05/11/2017   • Atrial flutter with rapid ventricular response 05/11/2017     Past Medical History:   Diagnosis Date   • Abnormal finding on lung imaging    • Allergic rhinitis    • Arthritis    • Asthma    • CHF (congestive heart failure) 2017   • COPD (chronic obstructive pulmonary disease)    • Coronary artery disease 2017   • DVT (deep venous thrombosis)    • Exposure to hepatitis B 2017   • Falls    • Fracture of rib    • History of chest x-ray 02/19/2016   • History of chest x-ray 12/03/2015   • History of chest x-ray 09/06/2014   • History of echocardiogram 02/19/2016   • History of PFTs 12/03/2015   • History of PFTs 02/06/2013   • History of transfusion    • Hypertension    • Lumbar spinal stenosis 2013   • Melena    • Patellar tendon rupture    • Pneumonia    • Pneumothorax    • Rotator cuff tear    • Sleep apnea with use of continuous positive airway pressure (CPAP)    • Traumatic hemorrhagic shock      Past Surgical History:   Procedure Laterality Date   • APPENDECTOMY  1959   • BACK SURGERY     • CARDIAC CATHETERIZATION N/A 4/18/2017    Procedure: Left Heart Cath;  Surgeon: Kvng Stauffer MD;  Location: [facility] INVASIVE LOCATION;  Service:    • CORONARY ARTERY BYPASS GRAFT N/A 4/24/2017    Procedure: MEDIAN STERNOTOMY, CORONARY ARTERY BYPASS GRAFT X5 UTILIZING THE INTERNAL MAMMARY ARTERY,  ENDOVASCULAR VEING HARVEST UTILIZING RIGHT SAPHENOUS VEIN,TRANSESOPHAGEAL ECHO;  Surgeon: Kanu Berger MD;  Location: [facility];  Service:    • FRACTURE SURGERY     • KNEE SURGERY  2006, 2014    primary repair -left periprosthetic patellar tendon tear 6/11/2014   • REPLACEMENT TOTAL KNEE Bilateral 2009, 2013    Left - 2009 and right - 2013.   • SKIN BIOPSY     • TONSILLECTOMY  1957   • VENA CAVA FILTER PLACEMENT  2014     Family History   Problem Relation Age of Onset   • Adopted: Yes   • No Known Problems Mother      Adopted as child   • No Known Problems Father      Social History     Social History   • Marital status:      Spouse name: N/A   • Number of children: 2   • Years of education: N/A     Occupational History   • Post Office at       Retired     Social History Main Topics   • Smoking status: Former Smoker     Packs/day: 1.00     Years: 17.00     Types: Cigarettes     Start date: 1965     Quit date: 1982   • Smokeless tobacco: Never Used   • Alcohol use No      Comment: Never drinker   • Drug use: No   • Sexual activity: Defer     Other Topics Concern   • Not on file     Social History Narrative    Domestic life :  Lives in private home with wife        Yarsani :   Quaker        Sleep hygiene :  In bed 11 PM to 8 AM for 8 hours of sleep        Caffeine use :  One cup of coffee and 1 diet cola daily        Exercise habits :   Minimal exercise since heart surgery and heart failure        Diet :  Low calorie diet low in salt and low in sugar        Occupation :   Retired        Hearing :        Vision :        Driving :  No driving             Review of Systems   Constitutional: Positive for fatigue. Negative for appetite change.   HENT: Negative for ear pain and sore throat.    Eyes: Negative for itching and visual disturbance.   Respiratory: Positive for shortness of breath. Negative for cough.         Shortness of breath improved on duonebs   Cardiovascular: Negative for chest pain and  "palpitations.   Gastrointestinal: Negative for abdominal pain and nausea.   Endocrine: Negative for cold intolerance and heat intolerance.   Genitourinary: Negative for dysuria and hematuria.   Musculoskeletal: Negative for arthralgias and back pain.   Skin: Negative for rash and wound.   Allergic/Immunologic: Negative for environmental allergies and food allergies.   Neurological: Negative for dizziness and headaches.   Hematological: Negative for adenopathy. Does not bruise/bleed easily.   Psychiatric/Behavioral: Positive for decreased concentration and sleep disturbance. Negative for dysphoric mood. The patient is not nervous/anxious.         Sleep apnea stable on CPAP  Depression stable on Paxil       Objective   Blood pressure 130/80, pulse 56, temperature 97.8 °F (36.6 °C), temperature source Oral, resp. rate 16, height 66\" (167.6 cm), weight 195 lb (88.5 kg), SpO2 97 %.    Physical Exam   Constitutional: He is oriented to person, place, and time. He appears well-developed and well-nourished. No distress.   HENT:   Right Ear: External ear normal.   Left Ear: External ear normal.   Mouth/Throat: Oropharynx is clear and moist.   Eyes: EOM are normal. Pupils are equal, round, and reactive to light. No scleral icterus.   Neck: Normal range of motion. Neck supple. No JVD present. No thyromegaly present.   Cardiovascular: Normal rate, regular rhythm, normal heart sounds and intact distal pulses.    No murmur heard.  Defibrillation vest in place   Pulmonary/Chest: Effort normal and breath sounds normal. He has no wheezes. He has no rales.   Abdominal: Soft. Bowel sounds are normal. He exhibits no distension and no mass.   Genitourinary:   Genitourinary Comments: Deferred   Musculoskeletal: Normal range of motion. He exhibits no tenderness.   Moderate edema right lower extremity below the knee.   Lymphadenopathy:     He has no cervical adenopathy.   Neurological: He is alert and oriented to person, place, and time. He " displays normal reflexes. No cranial nerve deficit. He exhibits normal muscle tone. Coordination normal.   Vibratory normal  Romberg negative  Gait normal  Plantars downgoing     Skin: Skin is warm and dry.   Exelon patch on left upper arm.   there is moderate generalized edema on scalp on arms and lower legs   Psychiatric: He has a normal mood and affect. His behavior is normal. Judgment and thought content normal.   Affable and talkative   Nursing note and vitals reviewed.      ECG 12 Lead  Date/Time: 6/27/2017 4:11 PM  Performed by: COLTON DICKENS  Authorized by: COLTON DICKENS   Interpreted by ED physician: Colton Dickens M.D.  Comparison: compared with previous ECG from 6/12/2017  Similar to previous ECG  Rhythm: sinus rhythm  Rate: normal  BPM: 54  Conduction: non-specific intraventricular conduction delay  T depression: aVL, I and V2  T flattening: V3, V4, V5 and V6  QRS axis: normal  Other findings: PRWP  Clinical impression: abnormal ECG  Comments: Indication - CHF  Old inferior and anterior injury.  Baseline EKG          Assessment/Plan   Magdi was seen today for congestive heart failure.    Diagnoses and all orders for this visit:    Diastolic dysfunction    Renovascular hypertension  -     Urinalysis With / Microscopic If Indicated  -     Urinalysis, Microscopic Only; Future  -     Urinalysis, Microscopic Only    Chronic combined systolic and diastolic congestive heart failure  -     ECG 12 Lead  -     Magnesium  -     TSH    Mild intermittent asthma without complication    JONNY (obstructive sleep apnea)    Morbid obesity, unspecified obesity type    Vascular dementia without behavioral disturbance    CKD (chronic kidney disease), stage III    Dyslipidemia  -     Comprehensive Metabolic Panel  -     Lipid Panel    Recurrent major depressive disorder, in full remission    Normocytic anemia  -     Iron Profile    Shortness of breath  -     BNP; Future    Cellulitis of left lower leg  -     CBC  & Differential  -     C-reactive Protein  -     Sedimentation Rate  -     CBC Auto Differential    Vitamin D deficiency  -     Vitamin D 25 Hydroxy    Prostate dysplasia  -     PSA    Urinary incontinence, unspecified type   -     PSA    Other orders  -     spironolactone (ALDACTONE) 50 MG tablet; Take 1 tablet by mouth Daily.    The patient has developed severe cardiovascular disease and is high risk for cardiac decompensation.  He currently appears to have compensated CHF with a dilated cardiomyopathy.  He should continue on current medications and may need to lose a few pounds of extra edema.  He will see the cardiologist soon.    The patient received a platelet transfusion from a donor who later tested positive for hepatitis B.  He is being followed by Dr. Ng for hepatitis B prevention.      The patient's had recent cellulitis of his left lower leg.  This appears to be resolved.    The patient has chronic depression and is stable on Paxil.  He and his wife are very positive and interactive about their treatment needs.    The patient has mild cognitive impairment and is doing well on Exelon and Namenda.  He should continue following with Zeeshan Foss M.D.    The patient has obstructive sleep apnea along with COPD, bronchiectasis, and asthma.  I've asked him to stay on regular nebulizers and regular visits with the pulmonologist.    The patient has recent urinary incontinence.  He has no evidence of an active infection.  He has history of treatment for prostatism and OAB is unclear.  We will reassess this in 2 weeks.    Patient Instructions   1.  Bathe with soap and water - daily.  Apply mupirocin ointment - to left lower shin.    2.  Stop gabapentin - use Tylenol only for back pain.    3.  Continue usual medicines and supplements - as listed.    4.  Follow a low-calorie, well-balanced diet - low in salt and low in sugar.    5.  Appointment with cardiologist t review heart status.    6.  Speak to nurse on  Friday - about test results.    7.  Return visit in 2 weeks - nonfasting checkup.    8.  Stop potassium tablet - start spironolactone 50 mg daily to remove excess fluid    9.  Laboratory tests are generally improved from May and early June.    10.  White blood count has improved with a mild persistent anemia.    11.  Renal function mildly impaired creatinine 1.7.  Down from 2.0.    12.  LDL cholesterol adequate at 88.    13.  Iron level improved but still mildly low.        Current Outpatient Prescriptions:   •  acetaminophen (TYLENOL) 500 MG tablet, Take 2 tablets by mouth Every 6 (Six) Hours As Needed for Mild Pain (1-3)., Disp: , Rfl: 0  •  albuterol (PROVENTIL HFA;VENTOLIN HFA) 108 (90 BASE) MCG/ACT inhaler, Inhale 2 puffs Every 4 (Four) Hours As Needed for Wheezing., Disp: , Rfl:   •  amLODIPine (NORVASC) 5 MG tablet, Take 1 tablet by mouth Daily., Disp: 90 tablet, Rfl: 0  •  aspirin 81 MG chewable tablet, Chew 1 tablet Daily., Disp: , Rfl:   •  atorvastatin (LIPITOR) 40 MG tablet, Take 1 tablet by mouth Every Night., Disp: 90 tablet, Rfl: 0  •  azithromycin (ZITHROMAX) 250 MG tablet, Take 1 tablet by mouth Daily., Disp: , Rfl:   •  budesonide-formoterol (SYMBICORT) 160-4.5 MCG/ACT inhaler, Inhale 2 puffs 2 (Two) Times a Day., Disp: 1 inhaler, Rfl: 11  •  carvedilol (COREG) 6.25 MG tablet, Take 1 tablet by mouth Every 12 (Twelve) Hours., Disp: 60 tablet, Rfl: 6  •  entecavir (BARACLUDE) 0.5 MG tablet, Take 1 tablet by mouth Daily., Disp: , Rfl:   •  ferrous sulfate 325 (65 FE) MG tablet, Take 1 tablet by mouth 2 (Two) Times a Day With Meals., Disp: , Rfl:   •  furosemide (LASIX) 40 MG tablet, Take 1 tablet by mouth Daily., Disp: 30 tablet, Rfl: 11  •  ipratropium-albuterol (DUONEB) 0.5-2.5 mg/mL nebulizer, Take 3 mL by nebulization 2 (Two) Times a Day As Needed for Wheezing., Disp: , Rfl:   •  loratadine (CLARITIN) 10 MG tablet, Take 10 mg by mouth Daily., Disp: , Rfl:   •  memantine (NAMENDA) 10 MG tablet,  Take 10 mg by mouth 2 (Two) Times a Day., Disp: , Rfl:   •  montelukast (SINGULAIR) 10 MG tablet, Take 1 tablet by mouth Every Night., Disp: 30 tablet, Rfl: 11  •  mupirocin (BACTROBAN) 2 % ointment, Apply 1 application topically Daily. Inflamed shins, Disp: , Rfl:   •  nitroglycerin (NITROSTAT) 0.4 MG SL tablet, Place 1 tablet under the tongue Every 5 (Five) Minutes As Needed for Chest Pain. Take no more than 3 doses in 15 minutes., Disp: 25 tablet, Rfl: 6  •  Omega-3 Fatty Acids (FISH OIL) 1200 MG capsule capsule, Take 1,200 mg by mouth Daily., Disp: , Rfl:   •  PARoxetine (PAXIL) 20 MG tablet, Take 20 mg by mouth Daily., Disp: , Rfl:   •  rivastigmine (EXELON) 9.5 MG/24HR patch, Place 1 patch on the skin Daily., Disp: , Rfl:   •  Saccharomyces boulardii (PROBIOTIC) 250 MG capsule, Take 1 capsule by mouth 2 (Two) Times a Day., Disp: , Rfl:   •  sacubitril-valsartan (ENTRESTO) 49-51 MG tablet, Take 1 tablet by mouth 2 (Two) Times a Day., Disp: 60 tablet, Rfl: 11  •  spironolactone (ALDACTONE) 50 MG tablet, Take 1 tablet by mouth Daily., Disp: 30 tablet, Rfl: 3  •  vitamin C (ASCORBIC ACID) 250 MG tablet, Take 1 tablet by mouth 2 (Two) Times a Day With Meals. Take with iron supplement to enhance absorption, Disp: , Rfl:     Allergies   Allergen Reactions   • Sulfa Antibiotics      UNKNOWN CHILDHOOD REACTION       Immunization History   Administered Date(s) Administered   • Hep B, Adolescent or Pediatric 05/18/2017     Electronically signed Colton Dickens M.D.6/29/2017 6:43 PM

## 2017-06-28 ENCOUNTER — TREATMENT (OUTPATIENT)
Dept: CARDIAC REHAB | Facility: HOSPITAL | Age: 72
End: 2017-06-28

## 2017-06-28 ENCOUNTER — TELEPHONE (OUTPATIENT)
Dept: INTERNAL MEDICINE | Facility: CLINIC | Age: 72
End: 2017-06-28

## 2017-06-28 DIAGNOSIS — I21.4 NSTEMI (NON-ST ELEVATED MYOCARDIAL INFARCTION) (HCC): Primary | ICD-10-CM

## 2017-06-28 PROCEDURE — 93798 PHYS/QHP OP CAR RHAB W/ECG: CPT

## 2017-06-28 RX ORDER — ATORVASTATIN CALCIUM 40 MG/1
40 TABLET, FILM COATED ORAL NIGHTLY
Qty: 90 TABLET | Refills: 0 | Status: SHIPPED | OUTPATIENT
Start: 2017-06-28 | End: 2017-01-01 | Stop reason: HOSPADM

## 2017-06-28 RX ORDER — NICOTINE 14MG/24HR
1 PATCH, TRANSDERMAL 24 HOURS TRANSDERMAL 2 TIMES DAILY
Start: 2017-06-28 | End: 2017-01-01 | Stop reason: SDUPTHER

## 2017-06-28 RX ORDER — AMLODIPINE BESYLATE 5 MG/1
5 TABLET ORAL DAILY
Qty: 90 TABLET | Refills: 0 | Status: SHIPPED | OUTPATIENT
Start: 2017-06-28 | End: 2017-01-01 | Stop reason: HOSPADM

## 2017-06-28 NOTE — TELEPHONE ENCOUNTER
"----- Message from Natalie King sent at 6/28/2017  9:20 AM EDT -----  Patients wife called and has several questions regarding his medication    please call [phone]      I called wife back. Several questions re medications answered. She also states pt takes florastor probiotic 250 mg bid but not on med list - will add to med list as \"probiotic\" per TGF.  "

## 2017-06-30 ENCOUNTER — TREATMENT (OUTPATIENT)
Dept: CARDIAC REHAB | Facility: HOSPITAL | Age: 72
End: 2017-06-30

## 2017-06-30 DIAGNOSIS — I21.4 NSTEMI (NON-ST ELEVATED MYOCARDIAL INFARCTION) (HCC): Primary | ICD-10-CM

## 2017-06-30 PROCEDURE — 93798 PHYS/QHP OP CAR RHAB W/ECG: CPT

## 2017-07-03 ENCOUNTER — TREATMENT (OUTPATIENT)
Dept: CARDIAC REHAB | Facility: HOSPITAL | Age: 72
End: 2017-07-03

## 2017-07-03 DIAGNOSIS — I21.4 NSTEMI (NON-ST ELEVATED MYOCARDIAL INFARCTION) (HCC): Primary | ICD-10-CM

## 2017-07-03 PROCEDURE — 93798 PHYS/QHP OP CAR RHAB W/ECG: CPT

## 2017-07-05 NOTE — PROGRESS NOTES
CARDIAC/PULMONARY REHAB NUTRITION EDUCATION/ASSESSMENT      71 y.o.         Height: 68  in    Weight: 197.6  lb     BMI: 30 IBW:  154lb      %IBW: 128      Adj BW: 165lb            Time seen:  10:40-11:25 AM   Dx:  CAD and S/p CABG 4/24/17, MI, HF, COPD, CKD, h/o cancer. Pt denies ever having DM.  Cardiac Risk Factors: HTN Weight Assessment: Overweight  Weight Change:  weight loss of 15 lbs over the past 2 month(s)    Intentional?  No Pt lost 15lb from April 24 to June 6, 2017 while he was in hospital and rehab (7% wt change). However, pt reports nl appetite and po intake, and that wt is stable during the past month since he has been at home. Denies any taste/smell changes now.           Food records reviewed? N/A     Occupation:   post office           Who does the patient live with: wife  Who does the cooking at home:  wife  Spouse/significant other's name:  Netta  Spouse/significant other present for diet instruction today? Yes  Patient actively receiving lifestyle support from others at home? Yes       Pertinent Lab Values: 4/19/17  Total:  144mg/dl  HDL:  HDL Cholesterol   Date Value Ref Range Status   06/27/2017 65 (H) 40 - 60 mg/dL Final     LDL:  83mg/dl  Triglyceride: 99mg/dl  Last HGBA1C with date if applicable: 5.3%  Glucose:   Glucose   Date Value Ref Range Status   07/05/2017 86 70 - 100 mg/dL Final                      Assessment / Recommendations:  Pt currently wearing life vest per his report. Pt states PCP= Colton Dickens. Rec [id number] calories/d and 53-74g total fat/d plan.  Pt usually eats 2x/d, stating that he is not usually hungry until noon.  Recommend that pt increase consumption of fruits and vegetables daily, rec change from butter to margarine use,  and rec change from 2% to 1% or skim milk. Pt and wife very receptive to diet teaching and with approp questions.   Motivation level toward diet compliance: strong, and wife very supportive       Education:    Previous cardiac diet education  prior to coming to Cardiac Rehab?  No  Instructed on:  Cardiac diet  Weight management  Lipid management  HF diet  5321-6805 mg/day Na restriction  Label reading for heart health  Tips for heart healthy eating at restaurants  Written materials given:  yes         Plan: RD avail for f/u prn.                 12:09 PM  7/5/2017  Eileen Berrios MS,RD,LD

## 2017-07-05 NOTE — TELEPHONE ENCOUNTER
Called lab results to pt.  Left VM. Per TGF:  -Stop potassium tablet - start spironolactone 50 mg daily   -Laboratory tests gen improved from May & early June.  --white blood count has improved with a mild persistent anemia.   -Renal function mildly impaired creatinine 1.7. Down from 2.0.   -LDL cholesterol adequate at 88.   -Iron level improved but still mildly low.

## 2017-07-11 PROBLEM — R06.02 SHORTNESS OF BREATH: Status: RESOLVED | Noted: 2017-05-11 | Resolved: 2017-01-01

## 2017-07-11 NOTE — PROGRESS NOTES
Subjective   Magdi Arriaga is a 72 y.o. male.     History of Present Illness     The patient presented 2 months ago with acute heart failure.  He underwent CABG on April 24.  His ejection fraction dropped to 25% in mid May.  He has been optimized on medication and is her most recent ejection fraction was recorded at 40%.  He does wear a chronic external defibrillator because of his cardiovascular status.    The following portions of the patient's history were reviewed and updated as appropriate: allergies, current medications, past family history, past medical history, past social history, past surgical history and problem list.    Review of Systems   Constitutional: Negative for appetite change and fatigue.   Respiratory: Positive for shortness of breath. Negative for cough and wheezing.    Cardiovascular: Negative for chest pain and palpitations.   Gastrointestinal: Negative for abdominal distention and nausea.   Neurological: Negative for dizziness and light-headedness.       Objective   Blood pressure 120/70, pulse 64, temperature 97.7 °F (36.5 °C), temperature source Oral, resp. rate 16, weight 193 lb (87.5 kg), SpO2 96 %.    Physical Exam   Constitutional: He is oriented to person, place, and time. He appears well-developed and well-nourished. No distress.   Cardiovascular: Normal rate, regular rhythm and normal heart sounds.    Defibrillation Spencer place.   Pulmonary/Chest: Effort normal and breath sounds normal. He has no wheezes. He has no rales.   Musculoskeletal:   1+ Ankle edema   Neurological: He is alert and oriented to person, place, and time. He exhibits normal muscle tone. Coordination normal.   Psychiatric: He has a normal mood and affect. His behavior is normal. Judgment normal.   Content is shallow   Nursing note and vitals reviewed.    Procedures  Assessment/Plan   Magdi was seen today for shortness of breath.    Diagnoses and all orders for this visit:    Ischemic cardiomyopathy, most  recent LVEF 40%    Chronic combined systolic and diastolic congestive heart failure    Renovascular hypertension    Mild intermittent asthma without complication    Chronic obstructive pulmonary disease, unspecified COPD type    Vascular dementia without behavioral disturbance    Recurrent major depressive disorder, in full remission    Cellulitis of left lower leg    Other orders  -     PARoxetine (PAXIL) 30 MG tablet; Take 1 tablet by mouth Every Morning.    The patient appears cardiovascular compensated on examination today.    The patient's asthma is compensated with normal oxygenation and good air movement.    The patient has a moderate dementia which is compensated on Exelon and Namenda.  He is attentive and cooperative and communicates well.    The patient does have a history of excess anxiety and depression.  He is doing well on Paxil at this time.    Patient Instructions   1.  Verify dose of paroxitine - 20 mg or 30 mg - daily.    2.  Continue usual medicines and supplements - as listed.    3.  Continue regular monitoring - with cardiologist.    4.  Continue cardiac rehabilitation - every week.    5.  Return in one month - fasting checkup.    6.  Folstein test is 21/30.  Moderate generalized decline in cognition.    Electronically signed Colton Dickens M.D.7/13/2017 7:13 PM

## 2017-07-11 NOTE — PATIENT INSTRUCTIONS
1.  Verify dose of paroxitine - 20 mg or 30 mg - daily.    2.  Continue usual medicines and supplements - as listed.    3.  Continue regular monitoring - with cardiologist.    4.  Continue cardiac rehabilitation - every week.    5.  Return in one month - fasting checkup.

## 2017-07-17 PROBLEM — R79.89 ELEVATED LFTS: Status: ACTIVE | Noted: 2017-01-01

## 2017-07-17 PROBLEM — I95.9 HYPOTENSION: Status: ACTIVE | Noted: 2017-01-01

## 2017-07-17 NOTE — H&P
[facility] Medicine Services  HISTORY AND PHYSICAL    Primary Care Physician: Colton Dickens MD    Subjective     Chief Complaint:  Weakness and hypotension     History of Present Illness: This is a 72 year old male with a PMH of CHF, JONNY with home cpap, COPD, CAD with CABG 4/24/17, DVT with vena cava filter, asthma, Exposure to Hepatitis B with platelet transfusion and had a   reactive Hep B surface Ag 6/12/17. The patient presented to [facility] ED with complaints of weakness, dizziness and low blood pressure. He states for last two days he has generally felt unwell. Patient states for last two days he has been feeling dizzy with activity and it resolves when he sits down. He took his blood pressure this am and it was 67/46, he ate and drank and sbp came up to 70.  Per wife yesterday his blood pressure was 79/60 and it came up after he sat down and rested for a while. She states his blood pressure is normally between 120-140. Per his wife he has taken his hypertensive medication yesterday and today. Patient denies any cp, fever, chills or n/v/d.      IN ED: creatinine 2.20, K 5.1, troponin 0.019, HGB 10.3, , , UA neg, CXR stable chronic changes seen, slight prominence of pulmonary vascularity. Patient will be admitted to hospital medicine and Cardiology will be consulted.         Review of Systems   Constitutional: Negative for appetite change, chills and fever.   Respiratory: Positive for shortness of breath. Negative for wheezing.    Cardiovascular: Negative for chest pain and leg swelling.   Gastrointestinal: Negative for abdominal distention, abdominal pain, constipation, diarrhea, nausea and vomiting.   Genitourinary: Negative for dysuria.   Musculoskeletal: Positive for back pain.   Neurological: Positive for dizziness. Negative for syncope.      Otherwise complete ROS performed and negative except as mentioned in the HPI.    Past Medical History:   Diagnosis Date   •  Abnormal finding on lung imaging      CT scan of the chest to be wary 2013 reports interstitial lung disease with marked diffuse interstitial and peripheral scarring and significant bronchiectasis in both upper   and lower lung zones.    • Allergic rhinitis     History of allergy injections as a child.   • Arthritis    • Asthma      Patient has had a history of asthma since childhood.   • CHF (congestive heart failure) 2017    ECHO - EF 25%   • COPD (chronic obstructive pulmonary disease)    • Coronary artery disease 2017    CABG X 5   • DVT (deep venous thrombosis)     Bilateral   • Exposure to hepatitis B 2017    Transfusion - managed by ID   • Falls     A.  History of falling traumatic injuries in April 2014 and May 2014 resulting and left rib  fracture and left clavicle fracture.   • Fracture of rib    • History of chest x-ray 02/19/2016    Extensive, but stable postinflammatory pulmonary changes. There has been no change since the previous examination of 12/03/2015   • History of chest x-ray 12/03/2015    Slight increased markings bilaterally, may be progression of his bronchiectasis   • History of chest x-ray 09/06/2014    There has been no change since 09/05/2014   • History of echocardiogram 02/19/2016    Mild concentric LVH is observed.Estimated EF is 50-55%.E to A reversal in the mitral valve flow pattern suggestive of diastolic dysfunction.RV is mildly dilated.Moderate aortic cusp sclerosis is present.Mitral annular calcification.Mild MR.Evidence of borderline pulmonary hypertension.   • History of PFTs 12/03/2015    Values are slightly worse.TLC is c/w mild restriction.Values reduced from 02/16/13.Diffusion capacity is slightly worse   • History of PFTs 02/06/2013    Mild decrease in FVC, may be due to less maximal effort.TLC is within normal levels. Mild reduction in diffusion in absolute value   • History of transfusion    • Hypertension    • Lumbar spinal stenosis 2013    Spinal surgery   • Patellar  tendon rupture     A.  Status post primary repair of the left periprosthetic patellar tendon tear 6/11/2014, post fall at the end of April 2014.   • Pneumonia      A.  Left lower lobe pneumonia treated at [facility], 5/8/2014 through 5/20/2014.   • Pneumothorax      A.  Status post chest tube placed 5/13/2014 and discontinued 5/15/2014 with stable chest x-ray.   • Sleep apnea with use of continuous positive airway pressure (CPAP)    • Traumatic hemorrhagic shock      A.  Secondary to right renal retroperitoneal hemorrhage, 9/2014.       Past Surgical History:   Procedure Laterality Date   • APPENDECTOMY  1959   • CARDIAC CATHETERIZATION N/A 4/18/2017    Procedure: Left Heart Cath;  Surgeon: Kvng Stauffer MD;  Location:  [facility];  Service:    • COLONOSCOPY W/ POLYPECTOMY     • CORONARY ARTERY BYPASS GRAFT N/A 4/24/2017    Procedure: MEDIAN STERNOTOMY, CORONARY ARTERY BYPASS GRAFT X5 UTILIZING THE INTERNAL MAMMARY ARTERY, ENDOVASCULAR VEING HARVEST UTILIZING RIGHT SAPHENOUS VEIN,TRANSESOPHAGEAL ECHO;  Surgeon: Kanu Berger MD;  Location:  [facility] OR;  Service:    • FRACTURE SURGERY     • KNEE SURGERY  2006, 2014    primary repair -left periprosthetic patellar tendon tear 6/11/2014   • LUMBAR LAMINECTOMY  2013    With discectomy   • REPLACEMENT TOTAL KNEE Bilateral 2009, 2013    Left - 2009 and right - 2013.   • SKIN BIOPSY     • TONSILLECTOMY  1957   • VENA CAVA FILTER PLACEMENT  2014       Family History   Problem Relation Age of Onset   • Adopted: Yes   • No Known Problems Mother      Adopted as child   • No Known Problems Father        Social History     Social History   • Marital status:      Spouse name: N/A   • Number of children: 2   • Years of education: N/A     Occupational History   • Post Office at       Retired     Social History Main Topics   • Smoking status: Former Smoker     Packs/day: 1.00     Years: 17.00     Types: Cigarettes     Start date: 1965     Quit date: 1982   •  Smokeless tobacco: Never Used   • Alcohol use No      Comment: Never drinker   • Drug use: No   • Sexual activity: Defer     Other Topics Concern   • Not on file     Social History Narrative    Domestic life :  Lives in private home with wife        Orthodox :   Taoism        Sleep hygiene :  In bed 11 PM to 8 AM for 8 hours of sleep        Caffeine use :  One cup of coffee and 1 diet cola daily        Exercise habits :   Cardiac rehabilitation since heart surgery and heart failure        Diet :  Low calorie diet low in salt and low in sugar        Occupation :   Retired        Hearing :        Vision :        Driving :  No driving           Medications:  Prescriptions Prior to Admission   Medication Sig Dispense Refill Last Dose   • acetaminophen (TYLENOL) 500 MG tablet Take 2 tablets by mouth Every 6 (Six) Hours As Needed for Mild Pain (1-3).  0 Taking   • albuterol (PROVENTIL HFA;VENTOLIN HFA) 108 (90 BASE) MCG/ACT inhaler Inhale 2 puffs Every 4 (Four) Hours As Needed for Wheezing.   Taking   • amLODIPine (NORVASC) 5 MG tablet Take 1 tablet by mouth Daily. 90 tablet 0 7/17/2017   • aspirin 81 MG chewable tablet Chew 1 tablet Daily.   7/17/2017   • atorvastatin (LIPITOR) 40 MG tablet Take 1 tablet by mouth Every Night. 90 tablet 0 7/16/2017   • azithromycin (ZITHROMAX) 250 MG tablet Take 1 tablet by mouth Daily.   7/17/2017   • budesonide-formoterol (SYMBICORT) 160-4.5 MCG/ACT inhaler Inhale 2 puffs 2 (Two) Times a Day. 1 inhaler 11 7/17/2017   • carvedilol (COREG) 6.25 MG tablet Take 1 tablet by mouth Every 12 (Twelve) Hours. 60 tablet 6 7/17/2017   • entecavir (BARACLUDE) 0.5 MG tablet Take 1 tablet by mouth Daily.   7/17/2017   • IRON PO Take 1 tablet by mouth Daily.   7/17/2017   • loratadine (CLARITIN) 10 MG tablet Take 10 mg by mouth Daily.   7/17/2017   • memantine (NAMENDA) 10 MG tablet Take 10 mg by mouth 2 (Two) Times a Day.   7/17/2017   • montelukast (SINGULAIR) 10 MG tablet Take 1 tablet by mouth  "Every Night. 30 tablet 11 7/16/2017   • nitroglycerin (NITROSTAT) 0.4 MG SL tablet Place 1 tablet under the tongue Every 5 (Five) Minutes As Needed for Chest Pain. Take no more than 3 doses in 15 minutes. 25 tablet 6    • Omega-3 Fatty Acids (FISH OIL) 1200 MG capsule capsule Take 1,200 mg by mouth Daily.   7/17/2017   • PARoxetine (PAXIL) 30 MG tablet Take 1 tablet by mouth Every Morning.   7/17/2017   • rivastigmine (EXELON) 9.5 MG/24HR patch Place 1 patch on the skin Daily.   7/17/2017   • Saccharomyces boulardii (PROBIOTIC) 250 MG capsule Take 1 capsule by mouth 2 (Two) Times a Day. 180 capsule 1 7/16/2017   • sacubitril-valsartan (ENTRESTO) 49-51 MG tablet Take 1 tablet by mouth 2 (Two) Times a Day. 60 tablet 11 7/17/2017   • spironolactone (ALDACTONE) 50 MG tablet Take 1 tablet by mouth Daily. 30 tablet 3 7/17/2017   • vitamin C (ASCORBIC ACID) 250 MG tablet Take 1 tablet by mouth 2 (Two) Times a Day With Meals. Take with iron supplement to enhance absorption   7/17/2017       Allergies:  Allergies   Allergen Reactions   • Sulfa Antibiotics      UNKNOWN CHILDHOOD REACTION         Objective     Physical Exam:  Vital Signs: /86 (BP Location: Right arm, Patient Position: Sitting)  Pulse 58  Temp 97.2 °F (36.2 °C) (Oral)   Resp 18  Ht 68\" (172.7 cm)  Wt 196 lb 3.2 oz (89 kg)  SpO2 92%  BMI 29.83 kg/m2  Physical Exam   Constitutional: He is oriented to person, place, and time. He appears well-developed and well-nourished. No distress.   HENT:   Head: Normocephalic.   Eyes: Pupils are equal, round, and reactive to light.   Neck: Normal range of motion.   Cardiovascular: Normal rate, regular rhythm, normal heart sounds and intact distal pulses.  Exam reveals no friction rub.    No murmur heard.  SB on monitor    Pulmonary/Chest: Effort normal and breath sounds normal. No stridor. No respiratory distress. He has no wheezes. He has no rales.   Abdominal: Soft. Bowel sounds are normal. He exhibits no " distension. There is no guarding.   Musculoskeletal: Normal range of motion. He exhibits no edema.   Neurological: He is alert and oriented to person, place, and time.   Pt does have some short term memory problems. Hx of dementia;   Skin: Skin is warm and dry. He is not diaphoretic.   Scab on right shin    Vitals reviewed.          Results Reviewed:    Results from last 7 days  Lab Units 07/17/17  1227   WBC 10*3/mm3 4.69   HEMOGLOBIN g/dL 10.3*   PLATELETS 10*3/mm3 135*       Results from last 7 days  Lab Units 07/17/17  1227   SODIUM mmol/L 137   POTASSIUM mmol/L 5.1   CO2 mmol/L 24.0   CREATININE mg/dL 2.20*   GLUCOSE mg/dL 136*   CALCIUM mg/dL 9.3       I have personally reviewed and interpreted available lab data, radiology studies and ECG obtained at time of admission.     Assessment / Plan     Problem List:   Hospital Problem List     Dementia    Dyslipidemia    JONNY (obstructive sleep apnea)    Overview Signed 1/6/2017  8:57 AM by Davis BERMAN  Obstructive sleep apnea with central component.B.  On home CPAP therapy.         CKD (chronic kidney disease), stage III    Elevated LFTs    Hypotension          Assessment:    Plan:  Hypotension  -- hold Norvasc and spirolactone per cards recommendations, will resume coreg and Entresto with parameters.   -- consult cardiology    Elevated LFTS  -- could be secondary to shocked liver due to hypotension.   -- LFT were normal 12 days ago.   -- recheck in am  -- Exposure to Hepatitis B with platelet transfusion.  Hepatitis B surface AG reactive on 6/12/17  -- if LFTs remain elevated may consider CT scan of liver.  -- consult ID      JONNY  -- order CPAP for hs    DVT prophylaxis:   Code Status: full code   Admission Status: Patient will be admitted to (LEXOBS or LEXINPT)     REI Montez 07/17/17 6:49 PM    Brief Attending Note       I have seen and examined the patient, performing an independent face-to-face diagnostic evaluation with plan of care  reviewed and developed with the advanced practice clinician (APC).       Brief Summary Statement/HPI:   73 yo with h/o CAD (CABG x 5 on 4/24/17), aflutter, CKD, CHF, DVT, JONNY, HTN and Hep B exposure presents with weakness and  hypotension x 2 days. Patient says there have been no changes to his cardiac meds since discharge from this facility in May. Systolic BP noted in 70's yesterday, and again in 60's today.         Attending Physical Exam:    Gen-no acute distress, non toxic, sitting up in bed\  CV-RRR, S1 S2 normal, no m/r/g  Resp-CTAB, no wheezes, rhonchi or rales  Abd-soft, Non tender, Non distended, normo active bowel sounds  Ext-No lower extremity cyanosis clubbing or edema bilaterlly  Neuro-speech clear, moves all extremities  Psych-normal affect   Skin- No rash on exposed UE or LE bilaterally      [START ON 7/18/2017] aspirin 81 mg Oral Daily   atorvastatin 40 mg Oral Nightly   budesonide-formoterol 2 puff Inhalation BID - RT   carvedilol 6.25 mg Oral Q12H   [START ON 7/18/2017] entecavir 0.5 mg Oral Daily   heparin (porcine) 5,000 Units Subcutaneous Q12H   memantine 10 mg Oral BID   montelukast 10 mg Oral Nightly   [START ON 7/18/2017] PARoxetine 30 mg Oral QAM   [START ON 7/18/2017] rivastigmine 1 patch Transdermal Daily   sacubitril-valsartan 1 tablet Oral BID   [START ON 7/18/2017] vitamin C 250 mg Oral BID With Meals         Brief Assessment/Plan:    Hypotension  - medication effect? Cardiology contacted by ED and suggested holding norvasc and aldactone, with continuation of entresto and coreg - formal consultation in am  - consider echocardiogram  - orthostatics bid  - fall precautions    Elevated LFT's  - recent Hep B exposure, patient started on entecavir by ID -- unclear if LFT elevation is secondary to hepatitis, medication side effect, or hepatic congestion/hypotension.  - will ask ID to evaluate, repeat CMP am    BUBBA on CKD  - element of ATN secondary to hypotension?    Dyslipidemia    Systolic  CHF    H/o HTN    Anemia    JONNY    TCP    Recent DVT - patient says he recently completed course or eliquis    VTE proph: heparin        For additional information see above per APC which I have reviewed and/or edited.      Charanjit Yepez MD  07/17/17  10:30 PM

## 2017-07-17 NOTE — ED PROVIDER NOTES
Subjective   HPI Comments: Mr. Magdi Arriaga is a 72 y.o. male who presents to the ED with c/o generalized weakness. He notes that he started weak all over about 2 days ago. Today while sitting on his bed, he felt an episode of dizziness and checked his blood pressure. He notes a hypotensive pressure of 67/46. He felt extremely weak during this event. He ate some food and drank some water which did raise his pressure into the high 70s systolic. Wife reports that he did take all of his medication today including a hypertension medication. He also has a chronic cough but over the last two week has gotten much worse. He also has some back pain for one week which has limited his mobility. Both patient and wife deny any congestion, abdominal pain, fevers, chills, or any other acute sx or pains at this time. Hx of CABG (5 vessel), HTN, HLD, CHF, and Hepatitis B.     Patient is a 72 y.o. male presenting with weakness.   History provided by:  Patient  Weakness - Generalized   Severity:  Moderate  Onset quality:  Sudden  Duration:  2 days  Timing:  Constant  Progression:  Unchanged  Chronicity:  New  Relieved by:  Eating and drinking fluids  Worsened by:  Nothing  Ineffective treatments:  None tried  Associated symptoms: cough and dizziness    Associated symptoms: no abdominal pain, no diarrhea, no dysuria, no fever, no nausea and no vomiting    Risk factors: coronary artery disease        Review of Systems   Constitutional: Positive for fatigue. Negative for chills and fever.   HENT: Negative for congestion, rhinorrhea and sore throat.    Respiratory: Positive for cough.    Gastrointestinal: Negative for abdominal pain, blood in stool, diarrhea, nausea and vomiting.   Genitourinary: Negative for difficulty urinating, dysuria and hematuria.   Musculoskeletal: Positive for back pain.   Neurological: Positive for dizziness. Negative for syncope.   All other systems reviewed and are negative.      Past Medical History:    Diagnosis Date   • Abnormal finding on lung imaging      CT scan of the chest to be wary 2013 reports interstitial lung disease with marked diffuse interstitial and peripheral scarring and significant bronchiectasis in both upper   and lower lung zones.    • Allergic rhinitis     History of allergy injections as a child.   • Arthritis    • Asthma      Patient has had a history of asthma since childhood.   • CHF (congestive heart failure) 2017    ECHO - EF 25%   • COPD (chronic obstructive pulmonary disease)    • Coronary artery disease 2017    CABG X 5   • DVT (deep venous thrombosis)     Bilateral   • Exposure to hepatitis B 2017    Transfusion - managed by ID   • Falls     A.  History of falling traumatic injuries in April 2014 and May 2014 resulting and left rib  fracture and left clavicle fracture.   • Fracture of rib    • History of chest x-ray 02/19/2016    Extensive, but stable postinflammatory pulmonary changes. There has been no change since the previous examination of 12/03/2015   • History of chest x-ray 12/03/2015    Slight increased markings bilaterally, may be progression of his bronchiectasis   • History of chest x-ray 09/06/2014    There has been no change since 09/05/2014   • History of echocardiogram 02/19/2016    Mild concentric LVH is observed.Estimated EF is 50-55%.E to A reversal in the mitral valve flow pattern suggestive of diastolic dysfunction.RV is mildly dilated.Moderate aortic cusp sclerosis is present.Mitral annular calcification.Mild MR.Evidence of borderline pulmonary hypertension.   • History of PFTs 12/03/2015    Values are slightly worse.TLC is c/w mild restriction.Values reduced from 02/16/13.Diffusion capacity is slightly worse   • History of PFTs 02/06/2013    Mild decrease in FVC, may be due to less maximal effort.TLC is within normal levels. Mild reduction in diffusion in absolute value   • History of transfusion    • Hypertension    • Lumbar spinal stenosis 2013    Spinal  surgery   • Melena    • Patellar tendon rupture     A.  Status post primary repair of the left periprosthetic patellar tendon tear 6/11/2014, post fall at the end of April 2014.   • Pneumonia      A.  Left lower lobe pneumonia treated at [facility], 5/8/2014 through 5/20/2014.   • Pneumothorax      A.  Status post chest tube placed 5/13/2014 and discontinued 5/15/2014 with stable chest x-ray.   • Rotator cuff tear     A.  Left rotator cuff tear status post fall prior to admission at the end of April 2014.   • Sleep apnea with use of continuous positive airway pressure (CPAP)    • Traumatic hemorrhagic shock      A.  Secondary to right renal retroperitoneal hemorrhage, 9/2014.       Allergies   Allergen Reactions   • Sulfa Antibiotics      UNKNOWN CHILDHOOD REACTION       Past Surgical History:   Procedure Laterality Date   • APPENDECTOMY  1959   • CARDIAC CATHETERIZATION N/A 4/18/2017    Procedure: Left Heart Cath;  Surgeon: Kvng Stauffer MD;  Location:  [facility];  Service:    • COLONOSCOPY W/ POLYPECTOMY     • CORONARY ARTERY BYPASS GRAFT N/A 4/24/2017    Procedure: MEDIAN STERNOTOMY, CORONARY ARTERY BYPASS GRAFT X5 UTILIZING THE INTERNAL MAMMARY ARTERY, ENDOVASCULAR VEING HARVEST UTILIZING RIGHT SAPHENOUS VEIN,TRANSESOPHAGEAL ECHO;  Surgeon: Kanu Berger MD;  Location:  [facility] OR;  Service:    • FRACTURE SURGERY     • KNEE SURGERY  2006, 2014    primary repair -left periprosthetic patellar tendon tear 6/11/2014   • LUMBAR LAMINECTOMY  2013    With discectomy   • REPLACEMENT TOTAL KNEE Bilateral 2009, 2013    Left - 2009 and right - 2013.   • SKIN BIOPSY     • TONSILLECTOMY  1957   • VENA CAVA FILTER PLACEMENT  2014       Family History   Problem Relation Age of Onset   • Adopted: Yes   • No Known Problems Mother      Adopted as child   • No Known Problems Father        Social History     Social History   • Marital status:      Spouse name: N/A   • Number of children: 2   • Years of  education: N/A     Occupational History   • Post Office at       Retired     Social History Main Topics   • Smoking status: Former Smoker     Packs/day: 1.00     Years: 17.00     Types: Cigarettes     Start date: 1965     Quit date: 1982   • Smokeless tobacco: Never Used   • Alcohol use No      Comment: Never drinker   • Drug use: No   • Sexual activity: Defer     Other Topics Concern   • None     Social History Narrative    Domestic life :  Lives in private home with wife        Muslim :   Taoist        Sleep hygiene :  In bed 11 PM to 8 AM for 8 hours of sleep        Caffeine use :  One cup of coffee and 1 diet cola daily        Exercise habits :   Cardiac rehabilitation since heart surgery and heart failure        Diet :  Low calorie diet low in salt and low in sugar        Occupation :   Retired        Hearing :        Vision :        Driving :  No driving             Objective   Physical Exam   Constitutional: He is oriented to person, place, and time. He appears well-developed and well-nourished. No distress.   Blood Pressure and Heart Rates  114/52 54 lying  102/65 63 standing   HENT:   Head: Normocephalic and atraumatic.   Nose: Nose normal.   Eyes: Conjunctivae and EOM are normal. Pupils are equal, round, and reactive to light. No scleral icterus.   PERRL at 3 mm and 2 mm, left and right respectively   Neck: Normal range of motion. Neck supple.   Cardiovascular: Normal rate, regular rhythm, normal heart sounds and intact distal pulses.    No murmur heard.  Pulmonary/Chest: Effort normal and breath sounds normal. No respiratory distress.   Abdominal: Soft. Bowel sounds are normal. There is no tenderness.   Musculoskeletal: Normal range of motion.   Neurological: He is alert and oriented to person, place, and time.   Skin: Skin is warm and dry. He is not diaphoretic.   Psychiatric: He has a normal mood and affect. His behavior is normal.   Nursing note and vitals reviewed.      Procedures         ED  Course  ED Course   Comment By Time   Paged for Dr. Stauffer. Candace Velásquez 07/17 1534   Discussed case with Dr. Stauffer, cardiology, who says the spirolactone, and norvasc can be discontinued. -MS Candace Velásquez 07/17 1541   Discussed case with Dr. Cheung, hospital medicine, who will admit the patient. -MS Candace Velásquez 07/17 1633   Dr. Zaragoza is at the bedside re-evaluating the patient. He is discussing with family current findings and plan for admission to the hospital. All are happy with the plan. Candace Velásquez 07/17 1644     Recent Results (from the past 24 hour(s))   Comprehensive Metabolic Panel    Collection Time: 07/17/17 12:27 PM   Result Value Ref Range    Glucose 136 (H) 70 - 100 mg/dL    BUN 46 (H) 9 - 23 mg/dL    Creatinine 2.20 (H) 0.60 - 1.30 mg/dL    Sodium 137 132 - 146 mmol/L    Potassium 5.1 3.5 - 5.5 mmol/L    Chloride 107 99 - 109 mmol/L    CO2 24.0 20.0 - 31.0 mmol/L    Calcium 9.3 8.7 - 10.4 mg/dL    Total Protein 6.8 5.7 - 8.2 g/dL    Albumin 3.30 3.20 - 4.80 g/dL    ALT (SGPT) 497 (H) 7 - 40 U/L    AST (SGOT) 317 (H) 0 - 33 U/L    Alkaline Phosphatase 143 (H) 25 - 100 U/L    Total Bilirubin 0.4 0.3 - 1.2 mg/dL    eGFR Non African Amer 30 (L) >60 mL/min/1.73    Globulin 3.5 gm/dL    A/G Ratio 0.9 (L) 1.5 - 2.5 g/dL    BUN/Creatinine Ratio 20.9 7.0 - 25.0    Anion Gap 6.0 3.0 - 11.0 mmol/L   Magnesium    Collection Time: 07/17/17 12:27 PM   Result Value Ref Range    Magnesium 2.2 1.3 - 2.7 mg/dL   Light Blue Top    Collection Time: 07/17/17 12:27 PM   Result Value Ref Range    Extra Tube hold for add-on    Green Top (Gel)    Collection Time: 07/17/17 12:27 PM   Result Value Ref Range    Extra Tube Hold for add-ons.    Lavender Top    Collection Time: 07/17/17 12:27 PM   Result Value Ref Range    Extra Tube hold for add-on    Gold Top - SST    Collection Time: 07/17/17 12:27 PM   Result Value Ref Range    Extra Tube Hold for add-ons.    CBC Auto Differential    Collection Time: 07/17/17 12:27 PM   Result Value  Ref Range    WBC 4.69 3.50 - 10.80 10*3/mm3    RBC 3.56 (L) 4.20 - 5.76 10*6/mm3    Hemoglobin 10.3 (L) 13.1 - 17.5 g/dL    Hematocrit 33.5 (L) 38.9 - 50.9 %    MCV 94.1 80.0 - 99.0 fL    MCH 28.9 27.0 - 31.0 pg    MCHC 30.7 (L) 32.0 - 36.0 g/dL    RDW 17.3 (H) 11.3 - 14.5 %    RDW-SD 60.7 (H) 37.0 - 54.0 fl    MPV 10.9 6.0 - 12.0 fL    Platelets 135 (L) 150 - 450 10*3/mm3    Neutrophil % 59.6 41.0 - 71.0 %    Lymphocyte % 19.4 (L) 24.0 - 44.0 %    Monocyte % 14.9 (H) 0.0 - 12.0 %    Eosinophil % 3.6 (H) 0.0 - 3.0 %    Basophil % 2.1 (H) 0.0 - 1.0 %    Immature Grans % 0.4 0.0 - 0.6 %    Neutrophils, Absolute 2.79 1.50 - 8.30 10*3/mm3    Lymphocytes, Absolute 0.91 0.60 - 4.80 10*3/mm3    Monocytes, Absolute 0.70 0.00 - 1.00 10*3/mm3    Eosinophils, Absolute 0.17 0.00 - 0.30 10*3/mm3    Basophils, Absolute 0.10 0.00 - 0.20 10*3/mm3    Immature Grans, Absolute 0.02 0.00 - 0.03 10*3/mm3   Troponin    Collection Time: 07/17/17 12:27 PM   Result Value Ref Range    Troponin I 0.019 <=0.039 ng/mL   Urinalysis With / Culture If Indicated    Collection Time: 07/17/17  2:44 PM   Result Value Ref Range    Color, UA Yellow Yellow, Straw    Appearance, UA Clear Clear    pH, UA <=5.0 5.0 - 8.0    Specific Gravity, UA 1.018 1.001 - 1.030    Glucose, UA Negative Negative    Ketones, UA Negative Negative    Bilirubin, UA Negative Negative    Blood, UA Negative Negative    Protein, UA 30 mg/dL (1+) (A) Negative    Leuk Esterase, UA Negative Negative    Nitrite, UA Negative Negative    Urobilinogen, UA 0.2 E.U./dL 0.2 - 1.0 E.U./dL   Urinalysis, Microscopic Only    Collection Time: 07/17/17  2:44 PM   Result Value Ref Range    RBC, UA 0-2 None Seen, 0-2 /HPF    WBC, UA 0-2 (A) None Seen /HPF    Bacteria, UA None Seen None Seen, Trace /HPF    Squamous Epithelial Cells, UA None Seen None Seen, 0-2 /HPF    Hyaline Casts, UA None Seen 0 - 6 /LPF    Methodology Automated Microscopy      Note: In addition to lab results from this visit, the  labs listed above may include labs taken at another facility or during a different encounter within the last 24 hours. Please correlate lab times with ED admission and discharge times for further clarification of the services performed during this visit.    XR Chest 1 View   Final Result   Stable chronic changes seen within the lung fields with no   new focal parenchymal opacification present. Slight prominence of the   pulmonary vascularity.       D:  07/17/2017   E:  07/17/2017       This report was finalized on 7/17/2017 1:44 PM by Dr. Carlie Alcantar MD.            Vitals:    07/17/17 1430 07/17/17 1445 07/17/17 1500 07/17/17 1615   BP: 95/58 103/64 98/68 105/61   BP Location:       Patient Position:       Pulse: 59 56 59 52   Resp:   18 18   Temp:       TempSrc:       SpO2: 95% 94% 92% 94%   Weight:       Height:         Medications   sodium chloride 0.9 % flush 10 mL (10 mL Intravenous Given 7/17/17 1217)     ECG/EMG Results (last 24 hours)     Procedure Component Value Units Date/Time    ECG 12 Lead [[id number]] Collected:  07/17/17 1211     Updated:  07/17/17 1513    Narrative:       Test Reason : Weak/Dizzy/AMS protocol  Blood Pressure : **/** mmHG  Vent. Rate : 055 BPM     Atrial Rate : 055 BPM     P-R Int : 214 ms          QRS Dur : 120 ms      QT Int : 444 ms       P-R-T Axes : 021 -04 101 degrees     QTc Int : 424 ms    Sinus bradycardia with 1st degree AV block  Inferior infarct (cited on or before 13-MAY-2017)  Possible  Anterior infarct , age undetermined  T wave abnormality, consider lateral ischemia  Abnormal ECG    Confirmed by SETH GALEANO MD ([id number]) on 7/17/2017 3:13:31 PM    Referred By:  KATERINE BLAND           Confirmed By:SETH GALEANO MD                          [facility]    Final diagnoses:   Hypotension, unspecified hypotension type   Near syncope   Elevated LFTs       Documentation assistance provided by christine YOUNG.  Information recorded by the scribe was done at my direction and has been  verified and validated by me.     Candace Velásquez  07/17/17 1530       Candace Velásquez  07/17/17 1638       Odell Zaragoza MD  07/18/17 [id number]

## 2017-07-18 NOTE — PROGRESS NOTES
[facility] Medicine Services    ASSESSMENT / PLAN          1.Severe Hypotension , Multifactorial, med related, poor EF, probably cardiogenic, meds adjusted per cardio, continue observation at least 24 hrs   Check orthostatic vitals   2. H/o CAD/4/24/17 CABG x 5 LIMA to LAD, VG sequence OM1 and OM2. VG to DM of LAD and VG to RCA. (Dr. Berger)/April 2017 NSTEMI/Ischemic cardiomyopathy   3. JONNY, cpap qhs   4. Stable COPD  5. BUBBA on CKD, imp[roarthur, nephro notes reviewed, check renal function in am.   6. Hepatitis B, on treatment per ID with viral load increasing, per ID need Op follow up   Mild dementia     Has not walked yet, check orthostatic, if negative will get pt eval   Length of Stay 1 Days   Code- full code   DVT Prophylaxis- sq heparin   Condition--serious  Prognosis-- guarded   Expected Discharge disposition in            Days to     SUBJECTIVE--HPI/ Events overnight / CC- Hospital Follow up visit/ ROS-not detailed ,  as performed below    Feel much better, not walked yet, dizziness none wit sitting or lyingGen -no fevers, chills  CV-no chest pain, palpitations  Resp-no cough, dyspnea  GI-no N/V/D, abd pain      OBJECTIVE        Vital Sign Min/Max for last 24 hours  Temp  Min: 96.9 °F (36.1 °C)  Max: 98 °F (36.7 °C)   BP  Min: 105/66  Max: 148/86   Pulse  Min: 52  Max: 70   Resp  Min: 18  Max: 20   SpO2  Min: 89 %  Max: 94 %   No Data Recorded      Intake/Output Summary (Last 24 hours) at 07/18/17 1608  Last data filed at 07/17/17 2200   Gross per 24 hour   Intake                0 ml   Output              500 ml   Net             -500 ml           Gen/Constitutional-no acute distress  CV-RRR, S1 S2 normal, 2/6 sys m   Resp-CTAB, no wheezes  Abd-soft, NT, ND, +BS  Ext-no edema  Neuro-A&Ox3, no focal deficits  Psych-appropriate mood  Skin, no new rashes over last 24 hours    Medications    aspirin 81 mg Oral Daily   atorvastatin 40 mg Oral Nightly   budesonide-formoterol 2 puff  Inhalation BID - RT   carvedilol 6.25 mg Oral Q12H   entecavir 0.5 mg Oral Daily   heparin (porcine) 5,000 Units Subcutaneous Q12H   memantine 10 mg Oral BID   montelukast 10 mg Oral Nightly   PARoxetine 30 mg Oral QAM   Pharmacy Meds to Bed Consult  Does not apply Daily   rivastigmine 1 patch Transdermal Daily   sacubitril-valsartan 1 tablet Oral BID   vitamin C 250 mg Oral BID With Meals     Meds Prn  •  albuterol  •  ondansetron  •  sodium chloride  •  sodium chloride    I have reviewed the labs, culture data, radiology results, and diagnostic studies.    Results from last 7 days  Lab Units 07/18/17  0445 07/17/17  1227   SODIUM mmol/L 137 137   POTASSIUM mmol/L 5.1 5.1   CHLORIDE mmol/L 111* 107   CO2 mmol/L 24.0 24.0   BUN mg/dL 40* 46*   CREATININE mg/dL 2.00* 2.20*   CALCIUM mg/dL 9.5 9.3   BILIRUBIN mg/dL 0.4 0.4   ALK PHOS U/L 156* 143*   ALT (SGPT) U/L 613* 497*   AST (SGOT) U/L 408* 317*   GLUCOSE mg/dL 102* 136*       Results from last 7 days  Lab Units 07/18/17  0445 07/17/17  1227   WBC 10*3/mm3 4.87 4.69   HEMOGLOBIN g/dL 10.4* 10.3*   HEMATOCRIT % 32.6* 33.5*   PLATELETS 10*3/mm3 147* 135*   MONOCYTES % % 16.0*  --            Culture Data:    Radiology Results:  Imaging Results (last 24 hours)     ** No results found for the last 24 hours. **        *. Please note that portions of this note were completed with a voice recognition program. Efforts were made to edit the dictations, but occasionally words are mistranscribed.  Rohith Phillips MD07/18/174:08 PM

## 2017-07-18 NOTE — CONSULTS
Referring Provider: Rohith Phillips M.D.  Reason for Consultation: BUBBA on CKD stage 3     Subjective     Chief complaint Hypotension and dizziness     History of present illness:  71 y.o. Yr old male with a history of COPD, essential hypertension, hyperlipidemia, JONNY, Diastolic dysfunction, CKD stage III, OA and dementia who presents to the ED with complaints of  Dizziness and hypotension. Patient states for last two days he has been feeling dizzy with activity and reported to have BP as low as 79/60.. Patient denies any cp, fever, chills or n/v/d. Nephrology service has been consulted for further evaluation.     History  Past Medical History:   Diagnosis Date   • Abnormal finding on lung imaging      CT scan of the chest to be wary 2013 reports interstitial lung disease with marked diffuse interstitial and peripheral scarring and significant bronchiectasis in both upper   and lower lung zones.    • Allergic rhinitis     History of allergy injections as a child.   • Arthritis    • Asthma      Patient has had a history of asthma since childhood.   • CHF (congestive heart failure) 2017    ECHO - EF 25%   • COPD (chronic obstructive pulmonary disease)    • Coronary artery disease 2017    CABG X 5   • DVT (deep venous thrombosis)     Bilateral   • Exposure to hepatitis B 2017    Transfusion - managed by ID   • Falls     A.  History of falling traumatic injuries in April 2014 and May 2014 resulting and left rib  fracture and left clavicle fracture.   • Fracture of rib    • History of chest x-ray 02/19/2016    Extensive, but stable postinflammatory pulmonary changes. There has been no change since the previous examination of 12/03/2015   • History of chest x-ray 12/03/2015    Slight increased markings bilaterally, may be progression of his bronchiectasis   • History of chest x-ray 09/06/2014    There has been no change since 09/05/2014   • History of echocardiogram 02/19/2016    Mild concentric LVH is observed.Estimated  EF is 50-55%.E to A reversal in the mitral valve flow pattern suggestive of diastolic dysfunction.RV is mildly dilated.Moderate aortic cusp sclerosis is present.Mitral annular calcification.Mild MR.Evidence of borderline pulmonary hypertension.   • History of PFTs 12/03/2015    Values are slightly worse.TLC is c/w mild restriction.Values reduced from 02/16/13.Diffusion capacity is slightly worse   • History of PFTs 02/06/2013    Mild decrease in FVC, may be due to less maximal effort.TLC is within normal levels. Mild reduction in diffusion in absolute value   • History of transfusion    • Hypertension    • Lumbar spinal stenosis 2013    Spinal surgery   • Patellar tendon rupture     A.  Status post primary repair of the left periprosthetic patellar tendon tear 6/11/2014, post fall at the end of April 2014.   • Pneumonia      A.  Left lower lobe pneumonia treated at [facility], 5/8/2014 through 5/20/2014.   • Pneumothorax      A.  Status post chest tube placed 5/13/2014 and discontinued 5/15/2014 with stable chest x-ray.   • Sleep apnea with use of continuous positive airway pressure (CPAP)    • Traumatic hemorrhagic shock      A.  Secondary to right renal retroperitoneal hemorrhage, 9/2014.   ,   Past Surgical History:   Procedure Laterality Date   • APPENDECTOMY  1959   • CARDIAC CATHETERIZATION N/A 4/18/2017    Procedure: Left Heart Cath;  Surgeon: Kvng Stauffer MD;  Location:  [facility];  Service:    • COLONOSCOPY W/ POLYPECTOMY     • CORONARY ARTERY BYPASS GRAFT N/A 4/24/2017    Procedure: MEDIAN STERNOTOMY, CORONARY ARTERY BYPASS GRAFT X5 UTILIZING THE INTERNAL MAMMARY ARTERY, ENDOVASCULAR VEING HARVEST UTILIZING RIGHT SAPHENOUS VEIN,TRANSESOPHAGEAL ECHO;  Surgeon: Kanu Berger MD;  Location: [facility] OR;  Service:    • FRACTURE SURGERY     • KNEE SURGERY  2006, 2014    primary repair -left periprosthetic patellar tendon tear 6/11/2014   • LUMBAR LAMINECTOMY  2013    With discectomy   •  REPLACEMENT TOTAL KNEE Bilateral 2009, 2013    Left - 2009 and right - 2013.   • SKIN BIOPSY     • TONSILLECTOMY  1957   • VENA CAVA FILTER PLACEMENT  2014   ,   Family History   Problem Relation Age of Onset   • Adopted: Yes   • No Known Problems Mother      Adopted as child   • No Known Problems Father    ,   Social History   Substance Use Topics   • Smoking status: Former Smoker     Packs/day: 1.00     Years: 17.00     Types: Cigarettes     Start date: 1965     Quit date: 1982   • Smokeless tobacco: Never Used   • Alcohol use No      Comment: Never drinker   ,   Prescriptions Prior to Admission   Medication Sig Dispense Refill Last Dose   • acetaminophen (TYLENOL) 500 MG tablet Take 2 tablets by mouth Every 6 (Six) Hours As Needed for Mild Pain (1-3).  0 Taking   • albuterol (PROVENTIL HFA;VENTOLIN HFA) 108 (90 BASE) MCG/ACT inhaler Inhale 2 puffs Every 4 (Four) Hours As Needed for Wheezing.   Taking   • amLODIPine (NORVASC) 5 MG tablet Take 1 tablet by mouth Daily. 90 tablet 0 7/17/2017   • aspirin 81 MG chewable tablet Chew 1 tablet Daily.   7/17/2017   • atorvastatin (LIPITOR) 40 MG tablet Take 1 tablet by mouth Every Night. 90 tablet 0 7/16/2017   • azithromycin (ZITHROMAX) 250 MG tablet Take 1 tablet by mouth Daily.   7/17/2017   • budesonide-formoterol (SYMBICORT) 160-4.5 MCG/ACT inhaler Inhale 2 puffs 2 (Two) Times a Day. 1 inhaler 11 7/17/2017   • carvedilol (COREG) 6.25 MG tablet Take 1 tablet by mouth Every 12 (Twelve) Hours. 60 tablet 6 7/17/2017   • entecavir (BARACLUDE) 0.5 MG tablet Take 1 tablet by mouth Daily.   7/17/2017   • IRON PO Take 1 tablet by mouth Daily.   7/17/2017   • loratadine (CLARITIN) 10 MG tablet Take 10 mg by mouth Daily.   7/17/2017   • memantine (NAMENDA) 10 MG tablet Take 10 mg by mouth 2 (Two) Times a Day.   7/17/2017   • montelukast (SINGULAIR) 10 MG tablet Take 1 tablet by mouth Every Night. 30 tablet 11 7/16/2017   • nitroglycerin (NITROSTAT) 0.4 MG SL tablet Place 1  tablet under the tongue Every 5 (Five) Minutes As Needed for Chest Pain. Take no more than 3 doses in 15 minutes. 25 tablet 6    • Omega-3 Fatty Acids (FISH OIL) 1200 MG capsule capsule Take 1,200 mg by mouth Daily.   7/17/2017   • PARoxetine (PAXIL) 30 MG tablet Take 1 tablet by mouth Every Morning.   7/17/2017   • rivastigmine (EXELON) 9.5 MG/24HR patch Place 1 patch on the skin Daily.   7/17/2017   • Saccharomyces boulardii (PROBIOTIC) 250 MG capsule Take 1 capsule by mouth 2 (Two) Times a Day. 180 capsule 1 7/16/2017   • sacubitril-valsartan (ENTRESTO) 49-51 MG tablet Take 1 tablet by mouth 2 (Two) Times a Day. 60 tablet 11 7/17/2017   • spironolactone (ALDACTONE) 50 MG tablet Take 1 tablet by mouth Daily. 30 tablet 3 7/17/2017   • vitamin C (ASCORBIC ACID) 250 MG tablet Take 1 tablet by mouth 2 (Two) Times a Day With Meals. Take with iron supplement to enhance absorption   7/17/2017   , Scheduled Meds:    aspirin 81 mg Oral Daily   atorvastatin 40 mg Oral Nightly   budesonide-formoterol 2 puff Inhalation BID - RT   carvedilol 6.25 mg Oral Q12H   entecavir 0.5 mg Oral Daily   heparin (porcine) 5,000 Units Subcutaneous Q12H   memantine 10 mg Oral BID   montelukast 10 mg Oral Nightly   PARoxetine 30 mg Oral QAM   Pharmacy Meds to Bed Consult  Does not apply Daily   rivastigmine 1 patch Transdermal Daily   sacubitril-valsartan 1 tablet Oral BID   vitamin C 250 mg Oral BID With Meals   , Continuous Infusions:   , PRN Meds:  •  albuterol  •  ondansetron  •  sodium chloride  •  sodium chloride and Allergies:  Sulfa antibiotics    Review of Systems  Pertinent items are noted in HPI    Objective     Vital Signs  Temp:  [96.9 °F (36.1 °C)-98.7 °F (37.1 °C)] 97.6 °F (36.4 °C)  Heart Rate:  [52-70] 60  Resp:  [18-20] 18  BP: ()/(49-86) 109/62       I/O last 3 completed shifts:  In: 250 [I.V.:250]  Out: 500 [Urine:500]    Physical Exam:     General Appearance:    Alert, cooperative, in no acute distress   Head:     Normocephalic, without obvious abnormality, atraumatic   Eyes:            Lids and lashes normal, conjunctivae and sclerae normal, no   icterus, no pallor, corneas clear, PERRLA   Ears:    Ears appear intact with no abnormalities noted   Throat:   No oral lesions, no thrush, oral mucosa moist   Neck:   No adenopathy, supple, trachea midline, no thyromegaly, no   carotid bruit, no JVD   Back:     No kyphosis present, no scoliosis present, no skin lesions,      erythema or scars, no tenderness to percussion or                   palpation,   range of motion normal   Lungs:     Clear to auscultation,respirations regular, even and                  unlabored    Heart:    Regular rhythm and normal rate, normal S1 and S2, no            murmur, no gallop, no rub, no click   Chest Wall:    No abnormalities observed   Abdomen:     Normal bowel sounds, no masses, no organomegaly, soft        non-tender, non-distended, no guarding, no rebound                tenderness   Rectal:     Deferred   Extremities:   Moves all extremities well, no edema, no cyanosis, no             redness   Pulses:   Pulses palpable and equal bilaterally   Skin:   No bleeding, bruising or rash   Lymph nodes:   No palpable adenopathy   Neurologic:   Cranial nerves 2 - 12 grossly intact, sensation intact, DTR       present and equal bilaterally       Results Review:   I reviewed the patient's new clinical results.    WBC WBC   Date Value Ref Range Status   07/18/2017 4.87 3.50 - 10.80 10*3/mm3 Final   07/17/2017 4.69 3.50 - 10.80 10*3/mm3 Final      HGB Hemoglobin   Date Value Ref Range Status   07/18/2017 10.4 (L) 13.1 - 17.5 g/dL Final   07/17/2017 10.3 (L) 13.1 - 17.5 g/dL Final      HCT Hematocrit   Date Value Ref Range Status   07/18/2017 32.6 (L) 38.9 - 50.9 % Final   07/17/2017 33.5 (L) 38.9 - 50.9 % Final      Platlets No results found for: LABPLAT   MCV MCV   Date Value Ref Range Status   07/18/2017 91.3 80.0 - 99.0 fL Final   07/17/2017 94.1 80.0  - 99.0 fL Final          Sodium Sodium   Date Value Ref Range Status   07/18/2017 137 132 - 146 mmol/L Final   07/17/2017 137 132 - 146 mmol/L Final      Potassium Potassium   Date Value Ref Range Status   07/18/2017 5.1 3.5 - 5.5 mmol/L Final   07/17/2017 5.1 3.5 - 5.5 mmol/L Final      Chloride Chloride   Date Value Ref Range Status   07/18/2017 111 (H) 99 - 109 mmol/L Final   07/17/2017 107 99 - 109 mmol/L Final      CO2 CO2   Date Value Ref Range Status   07/18/2017 24.0 20.0 - 31.0 mmol/L Final   07/17/2017 24.0 20.0 - 31.0 mmol/L Final      BUN BUN   Date Value Ref Range Status   07/18/2017 40 (H) 9 - 23 mg/dL Final   07/17/2017 46 (H) 9 - 23 mg/dL Final      Creatinine Creatinine   Date Value Ref Range Status   07/18/2017 2.00 (H) 0.60 - 1.30 mg/dL Final   07/17/2017 2.20 (H) 0.60 - 1.30 mg/dL Final      Calcium Calcium   Date Value Ref Range Status   07/18/2017 9.5 8.7 - 10.4 mg/dL Final   07/17/2017 9.3 8.7 - 10.4 mg/dL Final      PO4 No results found for: CAPO4   Albumin Albumin   Date Value Ref Range Status   07/18/2017 3.10 (L) 3.20 - 4.80 g/dL Final   07/17/2017 3.30 3.20 - 4.80 g/dL Final      Magnesium Magnesium   Date Value Ref Range Status   07/17/2017 2.2 1.3 - 2.7 mg/dL Final      Uric Acid No results found for: URICACID           aspirin 81 mg Oral Daily   atorvastatin 40 mg Oral Nightly   budesonide-formoterol 2 puff Inhalation BID - RT   carvedilol 6.25 mg Oral Q12H   entecavir 0.5 mg Oral Daily   heparin (porcine) 5,000 Units Subcutaneous Q12H   memantine 10 mg Oral BID   montelukast 10 mg Oral Nightly   PARoxetine 30 mg Oral QAM   Pharmacy Meds to Bed Consult  Does not apply Daily   rivastigmine 1 patch Transdermal Daily   sacubitril-valsartan 1 tablet Oral BID   vitamin C 250 mg Oral BID With Meals          Assessment/Plan     Active Problems:    Dementia    Dyslipidemia    JONNY (obstructive sleep apnea)    CKD (chronic kidney disease), stage III    Elevated LFTs    Hypotension    1- BUBBA- Non  oliguric most likely secondary hypotension - Scr 2.0 improved with improvement in blood pressure.   2- CKD; Stage 3- baseline cr around 1.4-1.9  3- Anemia; Stable- on oral iron supplement at home.   4- hypotension  5- Proteinuria - On Entresto. Will recheck P/C ratio.     Plan;  Agree with holding Amlodipne and Aldactone.   Monitor blood pressure   Avoid nephrotoxic agents.   Renal diet.   No emergent need of dialysis.     I discussed the patients findings and my recommendations with patient and nursing staff    Harish Bolton MD  07/18/17  @[facility]

## 2017-07-18 NOTE — CONSULTS
[facility]   Consult Note    Referring Provider: Dr. Charanjit Yepez    Reason for Consultation: Hypotension    Patient Care Team:  Colton Dickens MD as PCP - General (Internal Medicine)  Kvng Stauffer MD as Consulting Physician (Cardiology)     PROBLEM LIST:  1. Coronary artery disease  a. April 2017 NSTEMI  b. [facility] 4/18/17 showed occluded proximal LAD, 80% large ostial first diagonal, occluded mid Circ, 80 % large OM1, 80% mid RCA. EF 25%.  c. EF 40% by Echo  d. 4/24/17 CABG x 5 HERMAN to LAD, VG sequence OM1 and OM2. VG to DM of LAD and VG to RCA. (Dr. Berger)  2. Atrial flutter s/p ECV May 2017  3. Ischemic cardiomyopathy  a. EF 25% 5/2017  4. LBBB  5. Hypertension  6. Hyperlipidemia  7. Sleep apnea  8. COPD  9. Renal insufficiency  10. Arthritis  11. Asthma  12. Hx of bilatreal DVT  13. Depression  14. Hx of hemorrhagic shock and PTX from right renal RP bleed Sep 2014..  15. Hepatitis B exposure  16. Sleep apnea  17. Surgical Hx:  a. Appendectomy  b. Back surgery  c. CABG  d. L knee surgery  e. L TKR  f. Tonsillectomy  g. IVC filter      Allergies   Allergen Reactions   • Sulfa Antibiotics      UNKNOWN CHILDHOOD REACTION           Current Facility-Administered Medications:   •  acetaminophen (TYLENOL) tablet 1,000 mg, 1,000 mg, Oral, Q6H PRN, Tracee Smith, APRN  •  albuterol (PROVENTIL) nebulizer solution 0.5% 2.5 mg/0.5mL, 2.5 mg, Nebulization, Q6H PRN, Tracee Smith, APRN  •  aspirin chewable tablet 81 mg, 81 mg, Oral, Daily, Tracee Smith APRN, 81 mg at 07/18/17 1009  •  atorvastatin (LIPITOR) tablet 40 mg, 40 mg, Oral, Nightly, Tracee Smith APRN, 40 mg at 07/17/17 2032  •  budesonide-formoterol (SYMBICORT) 160-4.5 MCG/ACT inhaler 2 puff, 2 puff, Inhalation, BID - RT, Tracee Smith APRN, 2 puff at 07/18/17 0730  •  carvedilol (COREG) tablet 6.25 mg, 6.25 mg, Oral, Q12H, Tracee Smith, APRN, 6.25 mg at 07/18/17 1010  •  entecavir (BARACLUDE)  tablet 0.5 mg, 0.5 mg, Oral, Daily, REI Montez  •  heparin (porcine) 5000 UNIT/ML injection 5,000 Units, 5,000 Units, Subcutaneous, Q12H, REI Montez, 5,000 Units at 07/18/17 1010  •  memantine (NAMENDA) tablet 10 mg, 10 mg, Oral, BID, REI Montez, 10 mg at 07/18/17 1010  •  montelukast (SINGULAIR) tablet 10 mg, 10 mg, Oral, Nightly, REI Montez, 10 mg at 07/17/17 2032  •  ondansetron (ZOFRAN) injection 4 mg, 4 mg, Intravenous, Q6H PRN, REI Montez  •  PARoxetine (PAXIL) tablet 30 mg, 30 mg, Oral, QAM, REI Montez, 30 mg at 07/18/17 0529  •  Pharmacy Meds to Bed Consult, , Does not apply, Daily, VIÁN Ibarra, [facility]  •  rivastigmine (EXELON) 9.5 MG/24HR patch 1 patch, 1 patch, Transdermal, Daily, REI Montez, 1 patch at 07/18/17 1011  •  sacubitril-valsartan (ENTRESTO) 49-51 MG tablet 1 tablet, 1 tablet, Oral, BID, REI Montez, 1 tablet at 07/18/17 1009  •  sodium chloride 0.9 % flush 1-10 mL, 1-10 mL, Intravenous, PRN, REI Montez  •  sodium chloride 0.9 % flush 10 mL, 10 mL, Intravenous, PRN, Odell Zaragoza MD, 10 mL at 07/17/17 1217  •  vitamin C (ASCORBIC ACID) tablet 250 mg, 250 mg, Oral, BID With Meals, REI Montez, 250 mg at 07/18/17 1010         Prescriptions Prior to Admission   Medication Sig Dispense Refill Last Dose   • acetaminophen (TYLENOL) 500 MG tablet Take 2 tablets by mouth Every 6 (Six) Hours As Needed for Mild Pain (1-3).  0 Taking   • albuterol (PROVENTIL HFA;VENTOLIN HFA) 108 (90 BASE) MCG/ACT inhaler Inhale 2 puffs Every 4 (Four) Hours As Needed for Wheezing.   Taking   • amLODIPine (NORVASC) 5 MG tablet Take 1 tablet by mouth Daily. 90 tablet 0 7/17/2017   • aspirin 81 MG chewable tablet Chew 1 tablet Daily.   7/17/2017   • atorvastatin (LIPITOR) 40 MG tablet Take 1 tablet by mouth Every Night. 90 tablet 0 7/16/2017   • azithromycin (ZITHROMAX) 250 MG tablet  Take 1 tablet by mouth Daily.   7/17/2017   • budesonide-formoterol (SYMBICORT) 160-4.5 MCG/ACT inhaler Inhale 2 puffs 2 (Two) Times a Day. 1 inhaler 11 7/17/2017   • carvedilol (COREG) 6.25 MG tablet Take 1 tablet by mouth Every 12 (Twelve) Hours. 60 tablet 6 7/17/2017   • entecavir (BARACLUDE) 0.5 MG tablet Take 1 tablet by mouth Daily.   7/17/2017   • IRON PO Take 1 tablet by mouth Daily.   7/17/2017   • loratadine (CLARITIN) 10 MG tablet Take 10 mg by mouth Daily.   7/17/2017   • memantine (NAMENDA) 10 MG tablet Take 10 mg by mouth 2 (Two) Times a Day.   7/17/2017   • montelukast (SINGULAIR) 10 MG tablet Take 1 tablet by mouth Every Night. 30 tablet 11 7/16/2017   • nitroglycerin (NITROSTAT) 0.4 MG SL tablet Place 1 tablet under the tongue Every 5 (Five) Minutes As Needed for Chest Pain. Take no more than 3 doses in 15 minutes. 25 tablet 6    • Omega-3 Fatty Acids (FISH OIL) 1200 MG capsule capsule Take 1,200 mg by mouth Daily.   7/17/2017   • PARoxetine (PAXIL) 30 MG tablet Take 1 tablet by mouth Every Morning.   7/17/2017   • rivastigmine (EXELON) 9.5 MG/24HR patch Place 1 patch on the skin Daily.   7/17/2017   • Saccharomyces boulardii (PROBIOTIC) 250 MG capsule Take 1 capsule by mouth 2 (Two) Times a Day. 180 capsule 1 7/16/2017   • sacubitril-valsartan (ENTRESTO) 49-51 MG tablet Take 1 tablet by mouth 2 (Two) Times a Day. 60 tablet 11 7/17/2017   • spironolactone (ALDACTONE) 50 MG tablet Take 1 tablet by mouth Daily. 30 tablet 3 7/17/2017   • vitamin C (ASCORBIC ACID) 250 MG tablet Take 1 tablet by mouth 2 (Two) Times a Day With Meals. Take with iron supplement to enhance absorption   7/17/2017         Subjective .   History of present illness:    Patient Is a 72-year-old  male who is admitted for symptomatically hypotension.  He earlier this year underwent coronary artery bypass grafting and was diagnosed with ischemic cardio myopathy.  He has been on medical therapy for this since then.  Patient  and wife notes that he has been in his usual state of health up until roughly 2 days ago whenever he began to experience some intermittent dizziness.  This would occur mostly after he takes his medications.  Denies any chest pain, pressure, tightness.  Denies any increasing shortness of breath.  Denies any syncope, or edema.  Yesterday patient was experiencing dizziness and his blood pressure was checked with noted SBP in the 60s.  The wife attempted to feed the patient and was only able to get a systolic up to the 70s.  They then subsequently presented to the hospital for further evaluation.  Here his blood pressures have been in the 90s to low 100s.  Our office was contacted yesterday at which time we recommended discontinuing his Norvasc as well as holding his Aldactone.  Patient is also noted to have some mild renal insufficiency as well.  A day with blood pressure of 1: he feels good.  Denies any other cardiovascular complaints.  Has been dizzy for 3-4 days.      Social History     Social History   • Marital status:      Spouse name: N/A   • Number of children: 2   • Years of education: N/A     Occupational History   • [employer] at       Retired     Social History Main Topics   • Smoking status: Former Smoker     Packs/day: 1.00     Years: 17.00     Types: Cigarettes     Start date: 1965     Quit date: 1982   • Smokeless tobacco: Never Used   • Alcohol use No      Comment: Never drinker   • Drug use: No   • Sexual activity: Defer     Other Topics Concern   • Not on file     Social History Narrative    Domestic life :  Lives in private home with wife        [employer]ist :   Sabianist        Sleep hygiene :  In bed 11 PM to 8 AM for 8 hours of sleep        Caffeine use :  One cup of coffee and 1 diet cola daily        Exercise habits :   Cardiac rehabilitation since heart surgery and heart failure        Diet :  Low calorie diet low in salt and low in sugar        Occupation :   Retired        Hearing  ":        [place] :        Driving :  No driving         Family History   Problem Relation Age of Onset   • Adopted: Yes   • No Known Problems Mother      Adopted as child   • No Known Problems Father          Review of Systems:  Review of Systems   Constitution: Positive for malaise/fatigue. Negative for fever and weakness.   HENT: Negative for headaches and nosebleeds.    Eyes: Negative for redness and visual disturbance.   Cardiovascular: Negative for orthopnea, palpitations and paroxysmal nocturnal dyspnea.   Respiratory: Negative for cough, snoring, sputum production and wheezing.    Hematologic/Lymphatic: Negative for bleeding problem.   Skin: Negative for flushing, itching and rash.   Musculoskeletal: Negative for falls, joint pain and muscle cramps.   Gastrointestinal: Negative for abdominal pain, diarrhea, heartburn, nausea and vomiting.   Genitourinary: Negative for hematuria.   Neurological: Positive for dizziness. Negative for excessive daytime sleepiness and tremors.   Psychiatric/Behavioral: Negative for substance abuse. The patient is not nervous/anxious.               Objective   Vitals:  Blood pressure 109/62, pulse 60, temperature 97.6 °F (36.4 °C), temperature source Axillary, resp. rate 18, height 68\" (172.7 cm), weight 196 lb 3.2 oz (89 kg), SpO2 (!) 89 %.     Intake/Output Summary (Last 24 hours) at 07/18/17 1059  Last data filed at 07/17/17 2200   Gross per 24 hour   Intake              250 ml   Output              500 ml   Net             -250 ml       Physical Exam   Constitutional: He is oriented to person, place, and time. He appears well-developed and well-nourished. No distress.   HENT:   Head: Normocephalic and atraumatic.   Eyes: Pupils are equal, round, and reactive to light. Right eye exhibits no discharge. Left eye exhibits no discharge.   Neck: No JVD present. No tracheal deviation present.   Cardiovascular: Normal rate, regular rhythm and normal heart sounds.  Exam reveals no " friction rub.    No murmur heard.  Pulmonary/Chest: Effort normal and breath sounds normal. No respiratory distress.   Abdominal: Soft. Bowel sounds are normal. There is no tenderness.   Musculoskeletal: He exhibits no edema or deformity.   Neurological: He is alert and oriented to person, place, and time.   Skin: Skin is warm and dry.   Psychiatric: His behavior is normal. Thought content normal.     I have examined the patient and agree with the above findings       Results Review:  I reviewed the patient's new clinical results.    Results from last 7 days  Lab Units 07/18/17  0445   WBC 10*3/mm3 4.87   HEMOGLOBIN g/dL 10.4*   HEMATOCRIT % 32.6*   PLATELETS 10*3/mm3 147*       Results from last 7 days  Lab Units 07/18/17  0445   SODIUM mmol/L 137   POTASSIUM mmol/L 5.1   CHLORIDE mmol/L 111*   CO2 mmol/L 24.0   BUN mg/dL 40*   CREATININE mg/dL 2.00*   CALCIUM mg/dL 9.5   BILIRUBIN mg/dL 0.4   ALK PHOS U/L 156*   ALT (SGPT) U/L 613*   AST (SGOT) U/L 408*   GLUCOSE mg/dL 102*       Results from last 7 days  Lab Units 07/18/17  0445   SODIUM mmol/L 137   POTASSIUM mmol/L 5.1   CHLORIDE mmol/L 111*   CO2 mmol/L 24.0   BUN mg/dL 40*   CREATININE mg/dL 2.00*   GLUCOSE mg/dL 102*   CALCIUM mg/dL 9.5           0  Lab Value Date/Time   TROPONINI 0.019 07/17/2017 1227   TROPONINI 0.044 (H) 06/12/2017 2139   TROPONINI 9.425 (C) 04/18/2017 0757   TROPONINI 7.403 (C) 04/17/2017 2340                       Tele:  Sinus rhythm          Assessment/Plan     1. Hypotension, likely medication induced.  2. Acute renal insufficiency, likely related to hypotension and nephrotoxic agents.  The hospitalist have asked nephrology to evaluate the patient.  3. Coronary artery disease, no current angina  4. Ischemic cardiomyopathy, currently wearing lifevest.  Due for repeat echocardiogram next month.  5. Paroxysmal atrial fibrillation, stable.      Plan:    1. At this time would continue medications as currently ordered given his ischemic  cardiomyopathy.  We'll discontinue to hold Norvasc.  Also will continue to hold Aldactone given his noted renal insufficiency and the fact that he does not appear volume overloaded at this time.  We will check an echocardiogram today to evaluate his LVEF.  If it remains low, will resume low-dose beta blocker, and possibly later his Entresto if his renal function recovers as well as set him up for his ICD..  If however, LVEF has recovered, we may not resume the medications and may stop the lifevest..  If Any further questions please call      REI Quijano obtained past medical, family history, social history, review of systems and functioned as a scribe for the remainder of the dictation for Dr. Stauffer.      REI Quijano  07/18/17  10:59 AM  I, Kvng Stauffer MD, personally performed the services described in this documentation as scribed by the above individual in my presence, and it is both accurate and complete  Dictated utilizing Dragon dictation

## 2017-07-18 NOTE — CONSULTS
Magdi Arriaga  1945  [id number]  7/18/2017      Referring Provider: Hypotension and dizziness.   Reason for Consultation: Hospital medicine service.      Subjective   History of present illness:  Pleasant elderly gentleman.  Known to me from recent office consultation.  Atherosclerotic coronary vascular disease with coronary artery bypass grafting in April 2017.  Postoperative mediastinal bleeding.  Status post platelet transfusion.  The patient was called back by the blood bank and informed that the donor of a 6 pack of platelets that he received, on subsequent testing was hepatitis B DNA positive.  The patient was subsequently found to be hepatitis B antigenemic.  A hepatitis B quantitative PCR performed in my office revealed 6.68 million circulating copies.  At that time, the patient's total bilirubin, alkaline phosphatase, and hepatic aminotransferases were all within normal limits.  The decision analysis regarding antiviral therapy is somewhat difficult as 90% of patients with acute hepatitis B mount an adequate immunologic response that results in clearance of the virus.  However, 10% of patients go on to chronic active hepatitis B and this seems to occur with a greater prevalence in individuals of advanced age.  I made the decision to start the patient on Baraclude which is quite effective in clearing hepatitis B viremia and is associated with a very limited side effect profile.  I saw the patient in the office approximately 10 days ago.  To my surprise, his circulating viral burden while on antiviral therapy, increased from 6.6 to 44,800,000 million copies.  His hepatic aminotransferases are now elevated at 497/317 by ALT/AST.  The patient was actually admitted for a different reason yesterday.  He experienced dizziness with standing and was was found to be hypotensive by his wife with home blood pressure monitor at 67/46 mmHg.  He received intravenous fluid overnight and his antihypertensive  regimen was held.  He does not appear to be clinical toxic this morning.    Past Medical History:   Diagnosis Date   • Abnormal finding on lung imaging      CT scan of the chest to be wary 2013 reports interstitial lung disease with marked diffuse interstitial and peripheral scarring and significant bronchiectasis in both upper   and lower lung zones.    • Allergic rhinitis     History of allergy injections as a child.   • Arthritis    • Asthma      Patient has had a history of asthma since childhood.   • CHF (congestive heart failure) 2017    ECHO - EF 25%   • COPD (chronic obstructive pulmonary disease)    • Coronary artery disease 2017    CABG X 5   • DVT (deep venous thrombosis)     Bilateral   • Exposure to hepatitis B 2017    Transfusion - managed by ID   • Falls     A.  History of falling traumatic injuries in April 2014 and May 2014 resulting and left rib  fracture and left clavicle fracture.   • Fracture of rib    • History of chest x-ray 02/19/2016    Extensive, but stable postinflammatory pulmonary changes. There has been no change since the previous examination of 12/03/2015   • History of chest x-ray 12/03/2015    Slight increased markings bilaterally, may be progression of his bronchiectasis   • History of chest x-ray 09/06/2014    There has been no change since 09/05/2014   • History of echocardiogram 02/19/2016    Mild concentric LVH is observed.Estimated EF is 50-55%.E to A reversal in the mitral valve flow pattern suggestive of diastolic dysfunction.RV is mildly dilated.Moderate aortic cusp sclerosis is present.Mitral annular calcification.Mild MR.Evidence of borderline pulmonary hypertension.   • History of PFTs 12/03/2015    Values are slightly worse.TLC is c/w mild restriction.Values reduced from 02/16/13.Diffusion capacity is slightly worse   • History of PFTs 02/06/2013    Mild decrease in FVC, may be due to less maximal effort.TLC is within normal levels. Mild reduction in diffusion in  absolute value   • History of transfusion    • Hypertension    • Lumbar spinal stenosis 2013    Spinal surgery   • Patellar tendon rupture     A.  Status post primary repair of the left periprosthetic patellar tendon tear 6/11/2014, post fall at the end of April 2014.   • Pneumonia      A.  Left lower lobe pneumonia treated at [facility], 5/8/2014 through 5/20/2014.   • Pneumothorax      A.  Status post chest tube placed 5/13/2014 and discontinued 5/15/2014 with stable chest x-ray.   • Sleep apnea with use of continuous positive airway pressure (CPAP)    • Traumatic hemorrhagic shock      A.  Secondary to right renal retroperitoneal hemorrhage, 9/2014.       Past Surgical History:   Procedure Laterality Date   • APPENDECTOMY  1959   • CARDIAC CATHETERIZATION N/A 4/18/2017    Procedure: Left Heart Cath;  Surgeon: Kvng Stauffer MD;  Location:  [facility];  Service:    • COLONOSCOPY W/ POLYPECTOMY     • CORONARY ARTERY BYPASS GRAFT N/A 4/24/2017    Procedure: MEDIAN STERNOTOMY, CORONARY ARTERY BYPASS GRAFT X5 UTILIZING THE INTERNAL MAMMARY ARTERY, ENDOVASCULAR VEING HARVEST UTILIZING RIGHT SAPHENOUS VEIN,TRANSESOPHAGEAL ECHO;  Surgeon: Kanu Berger MD;  Location:  [facility] OR;  Service:    • FRACTURE SURGERY     • KNEE SURGERY  2006, 2014    primary repair -left periprosthetic patellar tendon tear 6/11/2014   • LUMBAR LAMINECTOMY  2013    With discectomy   • REPLACEMENT TOTAL KNEE Bilateral 2009, 2013    Left - 2009 and right - 2013.   • SKIN BIOPSY     • TONSILLECTOMY  1957   • VENA CAVA FILTER PLACEMENT  2014       Pediatric History   Patient Guardian Status   • Not on file.     Other Topics Concern   • Not on file     Social History Narrative    Domestic life :  Lives in private home with wife        Gnosticist :   Cheondoism        Sleep hygiene :  In bed 11 PM to 8 AM for 8 hours of sleep        Caffeine use :  One cup of coffee and 1 diet cola daily        Exercise habits :   Cardiac rehabilitation  "since heart surgery and heart failure        Diet :  Low calorie diet low in salt and low in sugar        Occupation :   Retired        Hearing :        Vision :        Driving :  No driving           family history includes No Known Problems in his father and mother. He was adopted.    Allergies   Allergen Reactions   • Sulfa Antibiotics      UNKNOWN CHILDHOOD REACTION       Medication:MAR reviewed.  Antibiotics: See below.  IV Anti-Infectives     Ordered     Dose/Rate Route Frequency Start Stop    07/17/17 1953  entecavir (BARACLUDE) tablet 0.5 mg     Ordering Provider:  REI Montez    0.5 mg Oral Daily 07/18/17 1000            Please refer to the medical record for a full medication list    Review of Systems  Pertinent items are noted in HPI, all other systems reviewed and negative    Objective     Physical Exam: See below.  Vital Signs   Temp:  [96.9 °F (36.1 °C)-98 °F (36.7 °C)] 98 °F (36.7 °C)  Heart Rate:  [52-70] 62  Resp:  [18-20] 20  BP: ()/(49-86) 117/67    Blood pressure 117/67, pulse 62, temperature 98 °F (36.7 °C), temperature source Oral, resp. rate 20, height 68\" (172.7 cm), weight 196 lb 3.2 oz (89 kg), SpO2 93 %.  GENERAL: Awake and alert, in no acute distress.  Sitting up in bed eating breakfast.  The patient's wife is present during the exam and interview.  HEENT: Oropharynx without thrush. Sinuses nontender. Dentition in good repair. No cervical adenopathy. No carotid bruits/ jugular venous distention.   EYES: PERRL. No conjunctival injection. No icterus. EOMI.  LYMPHATICS: No lymphadenopathy of the neck or axillary or inguinal regions.   HEART: No murmur, gallop, or pericardial friction rub.  Median sternotomy incision well healed.  LUNGS: Clear to auscultation anteriorly. No percussion dullness.   ABDOMEN: Soft, nontender, nondistended. No appreciable HSM.    SKIN: Warm and dry without cutaneous eruptions. No embolic stigmata.   PSYCHIATRIC: Mental status lucid. Cranial " nerve function intact.       Results Review:   I reviewed the patient's new clinical results.    Lab Results   Component Value Date    WBC 4.87 07/18/2017    HGB 10.4 (L) 07/18/2017    HCT 32.6 (L) 07/18/2017    MCV 91.3 07/18/2017     (L) 07/18/2017       Lab Results   Component Value Date    GLUCOSE 102 (H) 07/18/2017    BUN 40 (H) 07/18/2017    CREATININE 2.00 (H) 07/18/2017    EGFRIFNONA 33 (L) 07/18/2017    BCR 20.0 07/18/2017    CO2 24.0 07/18/2017    CALCIUM 9.5 07/18/2017    ALBUMIN 3.10 (L) 07/18/2017    LABIL2 0.9 (L) 07/18/2017     (H) 07/18/2017     (H) 07/18/2017       Estimated Creatinine Clearance: 36.2 mL/min (by C-G formula based on Cr of 2).      Microbiology:No new culture data.  Hepatitis B DNA PCR reviewed.      Radiology:  Imaging Results (last 72 hours)     Procedure Component Value Units Date/Time    XR Chest 1 View [[id number]] Collected:  07/17/17 1340     Updated:  07/17/17 1346    Narrative:       EXAMINATION: XR CHEST 1 VIEW-07/17/2017:      INDICATION: Weak/Dizzy/AMS, triage protocol.      COMPARISON: 06/12/2017.     FINDINGS: Portable chest reveals the heart to be enlarged. Underlying  chronic and emphysematous changes seen within the lung fields  bilaterally. The patient is status post median sternotomy. Degenerative  changes seen within the spine. Slight prominence of the pulmonary  vascularity. No new focal parenchymal opacification present.           Impression:       Stable chronic changes seen within the lung fields with no  new focal parenchymal opacification present. Slight prominence of the  pulmonary vascularity.     D:  07/17/2017  E:  07/17/2017     This report was finalized on 7/17/2017 1:44 PM by Dr. Carlie Alcantar MD.             ASSESSMENT AND PLAN: Recent coronary artery bypass grafting.  Postoperative bleeding complications.  Transfusion of platelets.  Transfusion associated acute hepatitis B.  Paradoxical increases in viral burden while on  Baraclude.  Hypotensive event with postural near-syncope.  Elevated hepatic aminotransferases/unclear if related to hepatitis B or hypoperfusion hepatitis related to low blood pressure.  Congestive heart failure.  Obstructive sleep apnea.  Chronic obstructive pulmonary disease.  Questionable early dementia.  The patient is afebrile and hemodynamically stable overnight.  Issues related to hepatitis B are outlined.  Utilization management:  I believe that the patient can be safely discharged home today.  I'll see him back in the office one week from today.  I will repeat his hepatitis B DNA PCR at that time and will consider switching his therapy to Truvada.  If his viral burden does not significantly decline, I will ask one of the hepatologists to see him in consultation.       I discussed the patients findings and my recommendations with patient and family    Thank you for this consult.  Our group would be pleased to follow this patient over the course of their hospitalization and assist with outpatient antimicrobial therapy, as indicated.     Antonio Ng MD  7/18/2017

## 2017-07-18 NOTE — PROGRESS NOTES
Discharge Planning Assessment  [facility]     Patient Name: Magdi Arriaga  MRN: [id number]  Today's Date: 7/18/2017    Admit Date: 7/17/2017          Discharge Needs Assessment       07/18/17 0959    Living Environment    Lives With spouse    Living Arrangements house    Home Accessibility no concerns    Stair Railings at Home none    Type of Financial/Environmental Concern none    Transportation Available car;family or friend will provide    Living Environment    Provides Primary Care For no one    Quality Of Family Relationships supportive;helpful;involved    Discharge Needs Assessment    Concerns To Be Addressed discharge planning concerns    Readmission Within The Last 30 Days no previous admission in last 30 days    Outpatient/Agency/Support Group Needs homecare agency (specify level of care)    Community Agency Name(S) [employer], [phone]    Anticipated Changes Related to Illness none    Equipment Currently Used at Home bipap/ cpap;cane, quad;walker, rolling    Equipment Needed After Discharge none    Discharge Facility/Level Of Care Needs home with home health    Discharge Contact Information if Applicable Linda Arriaga [phone]    Discharge Planning Comments Return home with HH            Discharge Plan       07/18/17 1000    Case Management/Social Work Plan    Plan Home with Home Health    Patient/Family In Agreement With Plan yes    Additional Comments Pt lives in [facility] with his wife. He has O2/Bipap, rolling walker from [employer] and HH/skilled nursing for CHF, Kidney Disease, HTN. Pt reports he is able to complete his own ADLs with assistance from his wife, she confirms this. Pt is followed by his PCP and reports his medications are covered by his insurance. Goal for discharge is to return home with HH from [facility], [phone]. CM to follow.        Discharge Placement     No information found                Demographic Summary       07/18/17 0958    Referral Information     Admission Type inpatient    Referral Source physician    Reason For Consult discharge planning    Contact Information    Permission Granted to Share Information With ;family/designee    Primary Care Physician Information    Name Colton Dickens            Functional Status       07/18/17 0958    Functional Status Current    Current Functional Level Comment See PT eval    Functional Status Prior    Ambulation 1-->assistive equipment    Transferring 0-->independent    Toileting 0-->independent    Bathing 0-->independent    Dressing 0-->independent    Eating 0-->independent    Communication 0-->understands/communicates without difficulty    Swallowing 0-->swallows foods/liquids without difficulty    IADL    Medications assistive person    Meal Preparation independent    Housekeeping independent    Laundry independent    Shopping independent    Oral Care independent            Psychosocial     None            Abuse/Neglect     None            Legal     None            Substance Abuse     None            Patient Forms     None          Tracee Tatum RN

## 2017-07-19 NOTE — PROGRESS NOTES
Magdi Arriaga  1945  [id number]  7/19/2017    CC: Hypotension and near syncope.    Magdi Arriaga is a 72 y.o. male here for transfusion associated acute hepatitis B.         Past medical history:  Past Medical History:   Diagnosis Date   • Abnormal finding on lung imaging      CT scan of the chest to be wary 2013 reports interstitial lung disease with marked diffuse interstitial and peripheral scarring and significant bronchiectasis in both upper   and lower lung zones.    • Allergic rhinitis     History of allergy injections as a child.   • Arthritis    • Asthma      Patient has had a history of asthma since childhood.   • CHF (congestive heart failure) 2017    ECHO - EF 25%   • COPD (chronic obstructive pulmonary disease)    • Coronary artery disease 2017    CABG X 5   • DVT (deep venous thrombosis)     Bilateral   • Exposure to hepatitis B 2017    Transfusion - managed by ID   • Falls     A.  History of falling traumatic injuries in April 2014 and May 2014 resulting and left rib  fracture and left clavicle fracture.   • Fracture of rib    • History of chest x-ray 02/19/2016    Extensive, but stable postinflammatory pulmonary changes. There has been no change since the previous examination of 12/03/2015   • History of chest x-ray 12/03/2015    Slight increased markings bilaterally, may be progression of his bronchiectasis   • History of chest x-ray 09/06/2014    There has been no change since 09/05/2014   • History of echocardiogram 02/19/2016    Mild concentric LVH is observed.Estimated EF is 50-55%.E to A reversal in the mitral valve flow pattern suggestive of diastolic dysfunction.RV is mildly dilated.Moderate aortic cusp sclerosis is present.Mitral annular calcification.Mild MR.Evidence of borderline pulmonary hypertension.   • History of PFTs 12/03/2015    Values are slightly worse.TLC is c/w mild restriction.Values reduced from 02/16/13.Diffusion capacity is slightly worse   • History of PFTs  02/06/2013    Mild decrease in FVC, may be due to less maximal effort.TLC is within normal levels. Mild reduction in diffusion in absolute value   • History of transfusion    • Hypertension    • Lumbar spinal stenosis 2013    Spinal surgery   • Patellar tendon rupture     A.  Status post primary repair of the left periprosthetic patellar tendon tear 6/11/2014, post fall at the end of April 2014.   • Pneumonia      A.  Left lower lobe pneumonia treated at [facility], 5/8/2014 through 5/20/2014.   • Pneumothorax      A.  Status post chest tube placed 5/13/2014 and discontinued 5/15/2014 with stable chest x-ray.   • Sleep apnea with use of continuous positive airway pressure (CPAP)    • Traumatic hemorrhagic shock      A.  Secondary to right renal retroperitoneal hemorrhage, 9/2014.       Medications:   Current Facility-Administered Medications:   •  albuterol (PROVENTIL) nebulizer solution 0.5% 2.5 mg/0.5mL, 2.5 mg, Nebulization, Q6H PRN, Tracee Smith APRN  •  aspirin chewable tablet 81 mg, 81 mg, Oral, Daily, Tracee Smith APRN, 81 mg at 07/18/17 1009  •  atorvastatin (LIPITOR) tablet 40 mg, 40 mg, Oral, Nightly, Tracee Smith APRN, 40 mg at 07/18/17 2044  •  budesonide-formoterol (SYMBICORT) 160-4.5 MCG/ACT inhaler 2 puff, 2 puff, Inhalation, BID - RT, Tracee Smith APRN, 2 puff at 07/18/17 2010  •  carvedilol (COREG) tablet 6.25 mg, 6.25 mg, Oral, Q12H, Tracee Smith, APRN, 6.25 mg at 07/18/17 2044  •  entecavir (BARACLUDE) tablet 0.5 mg, 0.5 mg, Oral, Daily, Tracee Smith, APRN  •  heparin (porcine) 5000 UNIT/ML injection 5,000 Units, 5,000 Units, Subcutaneous, Q12H, Tracee Smith APRN, 5,000 Units at 07/18/17 2043  •  memantine (NAMENDA) tablet 10 mg, 10 mg, Oral, BID, Tracee Smith, APRN, 10 mg at 07/18/17 2044  •  montelukast (SINGULAIR) tablet 10 mg, 10 mg, Oral, Nightly, REI Montez, 10 mg at 07/18/17 2044  •  ondansetron (ZOFRAN) injection 4 mg, 4 mg,  "Intravenous, Q6H PRN, REI Montez  •  PARoxetine (PAXIL) tablet 30 mg, 30 mg, Oral, QAM, REI Montez, 30 mg at 07/19/17 0554  •  Pharmacy Meds to Bed Consult, , Does not apply, Daily, IVÁN Ibarra [facility]  •  rivastigmine (EXELON) 9.5 MG/24HR patch 1 patch, 1 patch, Transdermal, Daily, REI Montez, 1 patch at 07/18/17 1011  •  sacubitril-valsartan (ENTRESTO) 49-51 MG tablet 1 tablet, 1 tablet, Oral, BID, REI Montez, 1 tablet at 07/18/17 2044  •  sodium chloride 0.9 % flush 1-10 mL, 1-10 mL, Intravenous, PRN, REI Montez  •  sodium chloride 0.9 % flush 10 mL, 10 mL, Intravenous, PRN, Odell Zaragoza MD, 10 mL at 07/17/17 1217  •  vitamin C (ASCORBIC ACID) tablet 250 mg, 250 mg, Oral, BID With Meals, REI Montez, 250 mg at 07/18/17 1804  Antibiotics:  IV Anti-Infectives     Ordered     Dose/Rate Route Frequency Start Stop    07/17/17 1953  entecavir (BARACLUDE) tablet 0.5 mg     Ordering Provider:  REI Montez    0.5 mg Oral Daily 07/18/17 1000            Allergies:  is allergic to sulfa antibiotics.    Review of Systems: All other reviewed and negative except as per HPI    Blood pressure 121/76, pulse 53, temperature 98 °F (36.7 °C), temperature source Oral, resp. rate 16, height 68\" (172.7 cm), weight 196 lb 3.2 oz (89 kg), SpO2 96 %.  GENERAL: Awake and alert, in no acute distress. Sleeping soundly.  Sonorous respirations.  CPAP not in place.  HEENT: Oropharynx without thrush. Sinuses nontender. Dentition in good repair. No cervical adenopathy. No carotid bruits/ jugular venous distention.   EYES: PERRL. No conjunctival injection. No icterus. EOMI.  LYMPHATICS: No lymphadenopathy of the neck or axillary or inguinal regions.   HEART: No murmur, gallop, or pericardial friction rub.  Median sternotomy well-healed.  LUNGS: Clear to auscultation anteriorly. No percussion dullness.   ABDOMEN: Soft, nontender, nondistended. No " appreciable HSM.    SKIN: Warm and dry without cutaneous eruptions. No embolic stigmata.   PSYCHIATRIC: Mental status lucid. Cranial nerve function intact.       DIAGNOSTICS:  Lab Results   Component Value Date    WBC 5.26 07/19/2017    HGB 10.1 (L) 07/19/2017    HCT 33.8 (L) 07/19/2017     (L) 07/19/2017     Lab Results   Component Value Date    CRP 1.95 (H) 06/27/2017     Lab Results   Component Value Date    SEDRATE 55 (H) 06/27/2017     Lab Results   Component Value Date    GLUCOSE 81 07/19/2017    BUN 35 (H) 07/19/2017    CREATININE 1.70 (H) 07/19/2017    EGFRIFNONA 40 (L) 07/19/2017    BCR 20.6 07/19/2017    CO2 25.0 07/19/2017    CALCIUM 9.6 07/19/2017    ALBUMIN 3.10 (L) 07/18/2017    LABIL2 0.9 (L) 07/18/2017     (H) 07/18/2017     (H) 07/18/2017       Microbiology:Blood cultures ×2 negative.  Urine sediment benign.      RADIOLOGY:  Imaging Results (last 72 hours)     Procedure Component Value Units Date/Time    XR Chest 1 View [[id number]] Collected:  07/17/17 1340     Updated:  07/17/17 1346    Narrative:       EXAMINATION: XR CHEST 1 VIEW-07/17/2017:      INDICATION: Weak/Dizzy/AMS, triage protocol.      COMPARISON: 06/12/2017.     FINDINGS: Portable chest reveals the heart to be enlarged. Underlying  chronic and emphysematous changes seen within the lung fields  bilaterally. The patient is status post median sternotomy. Degenerative  changes seen within the spine. Slight prominence of the pulmonary  vascularity. No new focal parenchymal opacification present.           Impression:       Stable chronic changes seen within the lung fields with no  new focal parenchymal opacification present. Slight prominence of the  pulmonary vascularity.     D:  07/17/2017  E:  07/17/2017     This report was finalized on 7/17/2017 1:44 PM by Dr. Carlie Alcantar MD.             Assessment and Plan: Hypotension.  Near syncope.  Chronic kidney disease stage III.  ASCVD with recent CABG.  Transfusion  associated acute hepatitis B.  Maximum temperature over 24 hours 98.4.  Currently 98.6.  Plasma creatinine is down to 1.7.  Peripheral leukocyte count is 5300.  The hepatic aminotransferases have increased in the setting of low blood pressure with a current ALT of 408 and AST of 613.  The total bilirubin and alkaline phosphatase are unremarkable.  Blood cultures ×2 are negative.  The urine sediment is benign.  I would like to see the patient in follow-up in one week following discharge.      Antonio Ng MD  7/19/2017

## 2017-07-19 NOTE — PROGRESS NOTES
"    [facility] Medicine Services  INPATIENT PROGRESS NOTE    Date of Admission: 7/17/2017  Length of Stay: 2  Primary Care Physician: Colton Dickens MD    Subjective   CC: f/u A/CKD and hypotension    HPI:  Patient is seen resting upright in chair in no acute distress.  No visitors at bedside.  States no eye feel pretty good today\".  Denies any fever, chills, nausea, vomiting, chest pain, shortness of air, dizziness, or syncope.  States he's mildly weak but otherwise feels better.  Has lifevest is in place.  Reports that nephrology has seen him and he is having to stay today to get his potassium down and monitor labs in am.  He has no new issues.  Hopeful to discharge home tomorrow pending nephrology/cardiology final recommendations.      Review of Systems   Gen-no f/c  CV-no chest pain, palpitations  Resp-no cough, dyspnea  GI-no N/V/D, abd pain    Objective      Temp:  [97.5 °F (36.4 °C)-98.4 °F (36.9 °C)] 97.5 °F (36.4 °C)  Heart Rate:  [53-67] 65  Resp:  [16-20] 20  BP: (112-137)/(65-83) 137/83     Physical Exam  Gen/Constitutional-no acute distress, sitting upright in chair visitors at bedside  CV-RRR, S1 S2 normal, 2/6 sys murmur, lifevest in place  Resp-CTAB A/P, no wheezes/no rales  Abd-soft, obese abdomen ,NT, ND, +BS  Ext-no edema, DECKER spont  Neuro-A&Ox4, no focal deficits, follows commands  Psych-appropriate mood, pleasant, calm, and cooperative    Results Review:    I have reviewed the labs, radiology results and diagnostic studies.      Results from last 7 days  Lab Units 07/19/17  0513   WBC 10*3/mm3 5.26   HEMOGLOBIN g/dL 10.1*   PLATELETS 10*3/mm3 128*       Results from last 7 days  Lab Units 07/19/17  0513   SODIUM mmol/L 135   POTASSIUM mmol/L 5.4   CHLORIDE mmol/L 107   CO2 mmol/L 25.0   BUN mg/dL 35*   CREATININE mg/dL 1.70*   GLUCOSE mg/dL 81   CALCIUM mg/dL 9.6       Radiology Data:   Imaging Results (last 24 hours)     ** No results found for the last 24 hours. ** "          I have reviewed the medications.    Assessment/Plan     Problem List  Hospital Problem List     Dementia    Dyslipidemia    JONNY (obstructive sleep apnea)    Overview Signed 1/6/2017  8:57 AM by Davis HIDALGO.  Obstructive sleep apnea with central component.B.  On home CPAP therapy.         CKD (chronic kidney disease), stage III    Elevated LFTs    Hypotension        Assessment:  1.Severe Hypotension , Multifactorial, med related, poor EF, probably cardiogenic, meds adjusted per cardio, continue observation at least 24 hrs     2. H/o CAD/4/24/17 CABG x 5 LIMA to LAD, VG sequence OM1 and OM2. VG to DM of LAD and VG to RCA. (Dr. Berger)/April 2017 NSTEMI/Ischemic cardiomyopathy   --Pt has lifevest in place  3. JONNY, cpap qhs   4. Stable COPD  5. BUBBA on CKD, improving, nephro notes reviewed, check renal function in am.   6. Hepatitis B, on treatment per ID with viral load increasing, per ID need Op follow up   7. Mild dementia   --Dr Ng following.  See note from 7/19  8. Hyperkalemia  --nephrology treating today with kayexalate and ji labs in am .    9. Weakness  Negative orthostatics overnight.    Consult PT for eval      Plan:  --To receive Kayexalate today per nephrology orders for increasing hyperkalemia.  No indication for HD at this time per neph notes.  --Will consult PT for eval  --A.m. Labs  --Plan for possible discharge home in a.m. with home health if patient is potassium improved and hemodynamically stable.  Pending nephrology final recommendations on clearance for discharge as well.   -- A follow-up with Dr. Ng with [facility] in 1 week of discharge.    --Cardiology recommendations, would continue current medications given his ischemic cardiomyopathy.  Continue to hold Norvasc, and Aldactone.  Follow up with cardiology per their recommendations on discharge      Code statud- full code     DVT Prophylaxis- sq heparin     Lisa Garay, APRN   07/19/17   2:41 PM

## 2017-07-19 NOTE — PROGRESS NOTES
Orthostatic vitals normal overnight, Per  discussion, being changed to observation status   Rohith Phillips MD

## 2017-07-19 NOTE — PROGRESS NOTES
"   LOS: 2 days    Patient Care Team:  Colton Dickens MD as PCP - General (Internal Medicine)  Kvng Stauffer MD as Consulting Physician (Cardiology)    Chief Complaint:  Hypotension and dizziness.     Subjective     Interval History:   No acute events overnight. No new complaints.     Review of Systems:   No chest pain, shortness of breath.     Objective     Vital Sign Min/Max for last 24 hours  Temp  Min: 97.8 °F (36.6 °C)  Max: 98.4 °F (36.9 °C)   BP  Min: 112/73  Max: 121/76   Pulse  Min: 53  Max: 67   Resp  Min: 16  Max: 20   SpO2  Min: 95 %  Max: 96 %   No Data Recorded   No Data Recorded     Flowsheet Rows         First Filed Value    Admission Height  68\" (172.7 cm) Documented at 07/17/2017 1211    Admission Weight  200 lb (90.7 kg) Documented at 07/17/2017 1211             I/O last 3 completed shifts:  In: -   Out: 500 [Urine:500]    Physical Exam:     General Appearance:    Alert, cooperative, in no acute distress   Head:    Normocephalic, without obvious abnormality, atraumatic   Eyes:            Lids and lashes normal, conjunctivae and sclerae normal, no   icterus, no pallor, corneas clear, PERRLA           Neck:   No adenopathy, supple, trachea midline, no thyromegaly, no   carotid bruit, no JVD       Lungs:     Clear to auscultation,respirations regular, even and                  unlabored    Heart:    Regular rhythm and normal rate, normal S1 and S2, no            murmur, no gallop, no rub, no click       Abdomen:     Normal bowel sounds, no masses, no organomegaly, soft        non-tender, non-distended, no guarding, no rebound                tenderness       Extremities:   Moves all extremities well, no edema, no cyanosis, no             redness               Neurologic:   Cranial nerves 2 - 12 grossly intact, sensation intact, DTR       present and equal bilaterally         WBC WBC   Date Value Ref Range Status   07/19/2017 5.26 3.50 - 10.80 10*3/mm3 Final   07/18/2017 4.87 3.50 - 10.80 10*3/mm3 " Final   07/17/2017 4.69 3.50 - 10.80 10*3/mm3 Final      HGB Hemoglobin   Date Value Ref Range Status   07/19/2017 10.1 (L) 13.1 - 17.5 g/dL Final   07/18/2017 10.4 (L) 13.1 - 17.5 g/dL Final   07/17/2017 10.3 (L) 13.1 - 17.5 g/dL Final      HCT Hematocrit   Date Value Ref Range Status   07/19/2017 33.8 (L) 38.9 - 50.9 % Final   07/18/2017 32.6 (L) 38.9 - 50.9 % Final   07/17/2017 33.5 (L) 38.9 - 50.9 % Final      Platlets No results found for: LABPLAT   MCV MCV   Date Value Ref Range Status   07/19/2017 93.9 80.0 - 99.0 fL Final   07/18/2017 91.3 80.0 - 99.0 fL Final   07/17/2017 94.1 80.0 - 99.0 fL Final          Sodium Sodium   Date Value Ref Range Status   07/19/2017 135 132 - 146 mmol/L Final   07/18/2017 137 132 - 146 mmol/L Final   07/17/2017 137 132 - 146 mmol/L Final      Potassium Potassium   Date Value Ref Range Status   07/19/2017 5.4 3.5 - 5.5 mmol/L Final   07/18/2017 5.1 3.5 - 5.5 mmol/L Final   07/17/2017 5.1 3.5 - 5.5 mmol/L Final      Chloride Chloride   Date Value Ref Range Status   07/19/2017 107 99 - 109 mmol/L Final   07/18/2017 111 (H) 99 - 109 mmol/L Final   07/17/2017 107 99 - 109 mmol/L Final      CO2 CO2   Date Value Ref Range Status   07/19/2017 25.0 20.0 - 31.0 mmol/L Final   07/18/2017 24.0 20.0 - 31.0 mmol/L Final   07/17/2017 24.0 20.0 - 31.0 mmol/L Final      BUN BUN   Date Value Ref Range Status   07/19/2017 35 (H) 9 - 23 mg/dL Final   07/18/2017 40 (H) 9 - 23 mg/dL Final   07/17/2017 46 (H) 9 - 23 mg/dL Final      Creatinine Creatinine   Date Value Ref Range Status   07/19/2017 1.70 (H) 0.60 - 1.30 mg/dL Final   07/18/2017 2.00 (H) 0.60 - 1.30 mg/dL Final   07/17/2017 2.20 (H) 0.60 - 1.30 mg/dL Final      Calcium Calcium   Date Value Ref Range Status   07/19/2017 9.6 8.7 - 10.4 mg/dL Final   07/18/2017 9.5 8.7 - 10.4 mg/dL Final   07/17/2017 9.3 8.7 - 10.4 mg/dL Final      PO4 No results found for: CAPO4   Albumin Albumin   Date Value Ref Range Status   07/18/2017 3.10 (L) 3.20 -  4.80 g/dL Final   07/17/2017 3.30 3.20 - 4.80 g/dL Final      Magnesium Magnesium   Date Value Ref Range Status   07/19/2017 2.2 1.3 - 2.7 mg/dL Final   07/17/2017 2.2 1.3 - 2.7 mg/dL Final      Uric Acid No results found for: URICACID        Results Review:     I reviewed the patient's new clinical results.      aspirin 81 mg Oral Daily   atorvastatin 40 mg Oral Nightly   budesonide-formoterol 2 puff Inhalation BID - RT   carvedilol 6.25 mg Oral Q12H   entecavir 0.5 mg Oral Daily   heparin (porcine) 5,000 Units Subcutaneous Q12H   memantine 10 mg Oral BID   montelukast 10 mg Oral Nightly   PARoxetine 30 mg Oral QAM   Pharmacy Meds to Bed Consult  Does not apply Daily   rivastigmine 1 patch Transdermal Daily   sacubitril-valsartan 1 tablet Oral BID   vitamin C 250 mg Oral BID With Meals          Medication Review:     Assessment/Plan     Active Problems:    Dementia    Dyslipidemia    JONNY (obstructive sleep apnea)    CKD (chronic kidney disease), stage III    Elevated LFTs    Hypotension      1- BUBBA- Non oliguric most likely secondary hypotension - Scr 1.7 improved with improvement in blood pressure.   2- CKD; Stage 3- baseline cr around 1.4-1.9   3- Anemia; Stable- on oral iron supplement at home.   4- hypotension -improved.  5- Proteinuria - On Entresto.  P/C ratio - 1.2 - on entresto.   6- Hyperkalemia -K 5.4     Plan;  Monitor blood pressure   Avoid nephrotoxic agents.   Renal diet.   No emergent need of dialysis.   Kayxelate dose today. Discussed with Pharmacist about Veltassa. Will follow.     Harish Bolton MD  07/19/17  9:39 AM

## 2017-07-20 NOTE — PLAN OF CARE
Problem: Patient Care Overview (Adult)  Goal: Plan of Care Review  Outcome: Ongoing (interventions implemented as appropriate)  Goal: Adult Individualization and Mutuality  Outcome: Ongoing (interventions implemented as appropriate)  Goal: Discharge Needs Assessment  Outcome: Ongoing (interventions implemented as appropriate)    Problem: Fall Risk (Adult)  Goal: Identify Related Risk Factors and Signs and Symptoms  Outcome: Outcome(s) achieved Date Met:  07/20/17  Goal: Absence of Falls  Outcome: Outcome(s) achieved Date Met:  07/20/17

## 2017-07-20 NOTE — PROGRESS NOTES
"   LOS: 2 days    Patient Care Team:  Colton Dickens MD as PCP - General (Internal Medicine)  Kvng Stauffer MD as Consulting Physician (Cardiology)    Chief Complaint:  Hypotension and dizziness.     Subjective     Interval History:   No acute events overnight. No new complaints.     Review of Systems:   No chest pain, shortness of breath.     Objective     Vital Sign Min/Max for last 24 hours  Temp  Min: 97.2 °F (36.2 °C)  Max: 97.9 °F (36.6 °C)   BP  Min: 118/68  Max: 178/82   Pulse  Min: 57  Max: 65   Resp  Min: 18  Max: 20   No Data Recorded   No Data Recorded   No Data Recorded     Flowsheet Rows         First Filed Value    Admission Height  68\" (172.7 cm) Documented at 07/17/2017 1211    Admission Weight  200 lb (90.7 kg) Documented at 07/17/2017 1211          I/O this shift:  In: -   Out: 300 [Urine:300]       Physical Exam:     General Appearance:    Alert, cooperative, in no acute distress   Head:    Normocephalic, without obvious abnormality, atraumatic   Eyes:            Lids and lashes normal, conjunctivae and sclerae normal, no   icterus, no pallor, corneas clear, PERRLA           Neck:   No adenopathy, supple, trachea midline, no thyromegaly, no   carotid bruit, no JVD       Lungs:     Clear to auscultation,respirations regular, even and                  unlabored    Heart:    Regular rhythm and normal rate, normal S1 and S2, no            murmur, no gallop, no rub, no click       Abdomen:     Normal bowel sounds, no masses, no organomegaly, soft        non-tender, non-distended, no guarding, no rebound                tenderness       Extremities:   Moves all extremities well, no edema, no cyanosis, no             redness               Neurologic:   Cranial nerves 2 - 12 grossly intact, sensation intact, DTR       present and equal bilaterally         WBC WBC   Date Value Ref Range Status   07/20/2017 6.46 3.50 - 10.80 10*3/mm3 Final   07/19/2017 5.26 3.50 - 10.80 10*3/mm3 Final   07/18/2017 " 4.87 3.50 - 10.80 10*3/mm3 Final   07/17/2017 4.69 3.50 - 10.80 10*3/mm3 Final      HGB Hemoglobin   Date Value Ref Range Status   07/20/2017 10.5 (L) 13.1 - 17.5 g/dL Final   07/19/2017 10.1 (L) 13.1 - 17.5 g/dL Final   07/18/2017 10.4 (L) 13.1 - 17.5 g/dL Final   07/17/2017 10.3 (L) 13.1 - 17.5 g/dL Final      HCT Hematocrit   Date Value Ref Range Status   07/20/2017 34.1 (L) 38.9 - 50.9 % Final   07/19/2017 33.8 (L) 38.9 - 50.9 % Final   07/18/2017 32.6 (L) 38.9 - 50.9 % Final   07/17/2017 33.5 (L) 38.9 - 50.9 % Final      Platlets No results found for: LABPLAT   MCV MCV   Date Value Ref Range Status   07/20/2017 93.2 80.0 - 99.0 fL Final   07/19/2017 93.9 80.0 - 99.0 fL Final   07/18/2017 91.3 80.0 - 99.0 fL Final   07/17/2017 94.1 80.0 - 99.0 fL Final          Sodium Sodium   Date Value Ref Range Status   07/20/2017 131 (L) 132 - 146 mmol/L Final   07/19/2017 135 132 - 146 mmol/L Final   07/18/2017 137 132 - 146 mmol/L Final   07/17/2017 137 132 - 146 mmol/L Final      Potassium Potassium   Date Value Ref Range Status   07/20/2017 5.3 3.5 - 5.5 mmol/L Final   07/19/2017 5.4 3.5 - 5.5 mmol/L Final   07/18/2017 5.1 3.5 - 5.5 mmol/L Final   07/17/2017 5.1 3.5 - 5.5 mmol/L Final      Chloride Chloride   Date Value Ref Range Status   07/20/2017 103 99 - 109 mmol/L Final   07/19/2017 107 99 - 109 mmol/L Final   07/18/2017 111 (H) 99 - 109 mmol/L Final   07/17/2017 107 99 - 109 mmol/L Final      CO2 CO2   Date Value Ref Range Status   07/20/2017 26.0 20.0 - 31.0 mmol/L Final   07/19/2017 25.0 20.0 - 31.0 mmol/L Final   07/18/2017 24.0 20.0 - 31.0 mmol/L Final   07/17/2017 24.0 20.0 - 31.0 mmol/L Final      BUN BUN   Date Value Ref Range Status   07/20/2017 33 (H) 9 - 23 mg/dL Final   07/19/2017 35 (H) 9 - 23 mg/dL Final   07/18/2017 40 (H) 9 - 23 mg/dL Final   07/17/2017 46 (H) 9 - 23 mg/dL Final      Creatinine Creatinine   Date Value Ref Range Status   07/20/2017 1.50 (H) 0.60 - 1.30 mg/dL Final   07/19/2017 1.70  (H) 0.60 - 1.30 mg/dL Final   07/18/2017 2.00 (H) 0.60 - 1.30 mg/dL Final   07/17/2017 2.20 (H) 0.60 - 1.30 mg/dL Final      Calcium Calcium   Date Value Ref Range Status   07/20/2017 9.6 8.7 - 10.4 mg/dL Final   07/19/2017 9.6 8.7 - 10.4 mg/dL Final   07/18/2017 9.5 8.7 - 10.4 mg/dL Final   07/17/2017 9.3 8.7 - 10.4 mg/dL Final      PO4 No results found for: CAPO4   Albumin Albumin   Date Value Ref Range Status   07/18/2017 3.10 (L) 3.20 - 4.80 g/dL Final   07/17/2017 3.30 3.20 - 4.80 g/dL Final      Magnesium Magnesium   Date Value Ref Range Status   07/19/2017 2.2 1.3 - 2.7 mg/dL Final   07/17/2017 2.2 1.3 - 2.7 mg/dL Final      Uric Acid No results found for: URICACID        Results Review:     I reviewed the patient's new clinical results.      aspirin 81 mg Oral Daily   atorvastatin 40 mg Oral Nightly   budesonide-formoterol 2 puff Inhalation BID - RT   carvedilol 6.25 mg Oral Q12H   entecavir 0.5 mg Oral Daily   heparin (porcine) 5,000 Units Subcutaneous Q12H   memantine 10 mg Oral BID   montelukast 10 mg Oral Nightly   PARoxetine 30 mg Oral QAM   Pharmacy Meds to Bed Consult  Does not apply Daily   rivastigmine 1 patch Transdermal Daily   sacubitril-valsartan 1 tablet Oral BID   vitamin C 250 mg Oral BID With Meals          Medication Review:     Assessment/Plan     Active Problems:    Dementia    Dyslipidemia    JONNY (obstructive sleep apnea)    CKD (chronic kidney disease), stage III    Elevated LFTs    Hypotension      1- BUBBA- Non oliguric most likely secondary hypotension - Scr 1.5 improved with improvement in blood pressure.   2- CKD; Stage 3- baseline cr around 1.4-1.9   3- Anemia; Stable- on oral iron supplement at home.   4- hypotension -improved.  5- Proteinuria - On Entresto.  P/C ratio - 1.2 - on entresto.   6- Hyperkalemia -K 5.3     Plan;  Monitor blood pressure   Avoid nephrotoxic agents.   Renal diet.   No emergent need of dialysis.   Discussed with Pharmacist about Veltassa. Do not have  Tiera on formulary. Will give a dose of lasix.     Harish Bolton MD  07/20/17  9:11 AM

## 2017-07-20 NOTE — PROGRESS NOTES
Discussed with nephrology , persistent hyperkalemia, on entresto, multiple comorbid conditions with high risk of complications and requiring IV therapy with lasix and required multiple medication adjustment earlier secondary to benigno, severe hypotension at admission with life threatening presentation, should ne inpatient stay .  Will change to inpatient status .  Rohith Phillips MD

## 2017-07-20 NOTE — PROGRESS NOTES
Magdi Arriaga  1945  [id number]  7/20/2017    CC: Hypotension and syncope    Magdi Arriaga is a 72 y.o. male here for recent coronary artery bypass grafting transfusions associated hepatitis B         Past medical history:  Past Medical History:   Diagnosis Date   • Abnormal finding on lung imaging      CT scan of the chest to be wary 2013 reports interstitial lung disease with marked diffuse interstitial and peripheral scarring and significant bronchiectasis in both upper   and lower lung zones.    • Allergic rhinitis     History of allergy injections as a child.   • Arthritis    • Asthma      Patient has had a history of asthma since childhood.   • CHF (congestive heart failure) 2017    ECHO - EF 25%   • COPD (chronic obstructive pulmonary disease)    • Coronary artery disease 2017    CABG X 5   • DVT (deep venous thrombosis)     Bilateral   • Exposure to hepatitis B 2017    Transfusion - managed by ID   • Falls     A.  History of falling traumatic injuries in April 2014 and May 2014 resulting and left rib  fracture and left clavicle fracture.   • Fracture of rib    • History of chest x-ray 02/19/2016    Extensive, but stable postinflammatory pulmonary changes. There has been no change since the previous examination of 12/03/2015   • History of chest x-ray 12/03/2015    Slight increased markings bilaterally, may be progression of his bronchiectasis   • History of chest x-ray 09/06/2014    There has been no change since 09/05/2014   • History of echocardiogram 02/19/2016    Mild concentric LVH is observed.Estimated EF is 50-55%.E to A reversal in the mitral valve flow pattern suggestive of diastolic dysfunction.RV is mildly dilated.Moderate aortic cusp sclerosis is present.Mitral annular calcification.Mild MR.Evidence of borderline pulmonary hypertension.   • History of PFTs 12/03/2015    Values are slightly worse.TLC is c/w mild restriction.Values reduced from 02/16/13.Diffusion capacity is slightly  worse   • History of PFTs 02/06/2013    Mild decrease in FVC, may be due to less maximal effort.TLC is within normal levels. Mild reduction in diffusion in absolute value   • History of transfusion    • Hypertension    • Lumbar spinal stenosis 2013    Spinal surgery   • Patellar tendon rupture     A.  Status post primary repair of the left periprosthetic patellar tendon tear 6/11/2014, post fall at the end of April 2014.   • Pneumonia      A.  Left lower lobe pneumonia treated at [facility], 5/8/2014 through 5/20/2014.   • Pneumothorax      A.  Status post chest tube placed 5/13/2014 and discontinued 5/15/2014 with stable chest x-ray.   • Sleep apnea with use of continuous positive airway pressure (CPAP)    • Traumatic hemorrhagic shock      A.  Secondary to right renal retroperitoneal hemorrhage, 9/2014.       Medications:   Current Facility-Administered Medications:   •  albuterol (PROVENTIL) nebulizer solution 0.5% 2.5 mg/0.5mL, 2.5 mg, Nebulization, Q6H PRN, REI Montez  •  aspirin chewable tablet 81 mg, 81 mg, Oral, Daily, Tracee Smith APRN, 81 mg at 07/19/17 0952  •  atorvastatin (LIPITOR) tablet 40 mg, 40 mg, Oral, Nightly, Tracee Smith APRN, 40 mg at 07/19/17 2024  •  budesonide-formoterol (SYMBICORT) 160-4.5 MCG/ACT inhaler 2 puff, 2 puff, Inhalation, BID - RT, Tracee Smith APRN, 2 puff at 07/20/17 0802  •  carvedilol (COREG) tablet 6.25 mg, 6.25 mg, Oral, Q12H, Tracee Smith APRN, 6.25 mg at 07/19/17 0953  •  entecavir (BARACLUDE) tablet 0.5 mg, 0.5 mg, Oral, Daily, Tracee Smith APRN, 0.5 mg at 07/19/17 1752  •  heparin (porcine) 5000 UNIT/ML injection 5,000 Units, 5,000 Units, Subcutaneous, Q12H, Tracee Smith APRN, 5,000 Units at 07/19/17 2024  •  memantine (NAMENDA) tablet 10 mg, 10 mg, Oral, BID, Tracee Smith APRN, 10 mg at 07/19/17 2024  •  montelukast (SINGULAIR) tablet 10 mg, 10 mg, Oral, Nightly, REI Montez, 10 mg at 07/19/17  "2024  •  ondansetron (ZOFRAN) injection 4 mg, 4 mg, Intravenous, Q6H PRN, REI Montez  •  PARoxetine (PAXIL) tablet 30 mg, 30 mg, Oral, QAM, REI Montez, 30 mg at 07/20/17 0548  •  Pharmacy Meds to Bed Consult, , Does not apply, Daily, IVÁN Ibarra [facility]  •  rivastigmine (EXELON) 9.5 MG/24HR patch 1 patch, 1 patch, Transdermal, Daily, REI Montez, 1 patch at 07/19/17 0952  •  sacubitril-valsartan (ENTRESTO) 49-51 MG tablet 1 tablet, 1 tablet, Oral, BID, ERI Montez, 1 tablet at 07/19/17 2024  •  sodium chloride 0.9 % flush 1-10 mL, 1-10 mL, Intravenous, PRN, REI Montez  •  sodium chloride 0.9 % flush 10 mL, 10 mL, Intravenous, PRN, Odell Zaragoza MD, 10 mL at 07/17/17 1217  •  vitamin C (ASCORBIC ACID) tablet 250 mg, 250 mg, Oral, BID With Meals, REI Montez, 250 mg at 07/19/17 1702  Antibiotics:  IV Anti-Infectives     Ordered     Dose/Rate Route Frequency Start Stop    07/17/17 1953  entecavir (BARACLUDE) tablet 0.5 mg     Ordering Provider:  REI Montez    0.5 mg Oral Daily 07/18/17 1000            Allergies:  is allergic to sulfa antibiotics.    Review of Systems: All other reviewed and negative except as per HPI    Blood pressure 134/74, pulse 65, temperature 97.2 °F (36.2 °C), temperature source Oral, resp. rate 18, height 68\" (172.7 cm), weight 196 lb 3.2 oz (89 kg), SpO2 96 %.  GENERAL: Awake and alert, in no acute distress. Sitting up in bed eating breakfast.  Does not appear to be clinically toxic.  HEENT: Oropharynx without thrush. Sinuses nontender. Dentition in good repair. No cervical adenopathy. No carotid bruits/ jugular venous distention.   EYES: PERRL. No conjunctival injection. No icterus. EOMI.  LYMPHATICS: No lymphadenopathy of the neck or axillary or inguinal regions.   HEART: No murmur, gallop, or pericardial friction rub.   LUNGS: Clear to auscultation anteriorly. No percussion dullness.   ABDOMEN: " Soft, nontender, nondistended. No appreciable HSM.    SKIN: Warm and dry without cutaneous eruptions. No embolic stigmata.   PSYCHIATRIC: Mental status lucid. Cranial nerve function intact.       DIAGNOSTICS:  Lab Results   Component Value Date    WBC 6.46 07/20/2017    HGB 10.5 (L) 07/20/2017    HCT 34.1 (L) 07/20/2017     (L) 07/20/2017     Lab Results   Component Value Date    CRP 1.95 (H) 06/27/2017     Lab Results   Component Value Date    SEDRATE 55 (H) 06/27/2017     Lab Results   Component Value Date    GLUCOSE 79 07/20/2017    BUN 33 (H) 07/20/2017    CREATININE 1.50 (H) 07/20/2017    EGFRIFNONA 46 (L) 07/20/2017    BCR 22.0 07/20/2017    CO2 26.0 07/20/2017    CALCIUM 9.6 07/20/2017    ALBUMIN 3.10 (L) 07/18/2017    LABIL2 0.9 (L) 07/18/2017     (H) 07/18/2017     (H) 07/18/2017       Microbiology:No new culture data.      RADIOLOGY:  Imaging Results (last 72 hours)     Procedure Component Value Units Date/Time    XR Chest 1 View [[id number]] Collected:  07/17/17 1340     Updated:  07/17/17 1346    Narrative:       EXAMINATION: XR CHEST 1 VIEW-07/17/2017:      INDICATION: Weak/Dizzy/AMS, triage protocol.      COMPARISON: 06/12/2017.     FINDINGS: Portable chest reveals the heart to be enlarged. Underlying  chronic and emphysematous changes seen within the lung fields  bilaterally. The patient is status post median sternotomy. Degenerative  changes seen within the spine. Slight prominence of the pulmonary  vascularity. No new focal parenchymal opacification present.           Impression:       Stable chronic changes seen within the lung fields with no  new focal parenchymal opacification present. Slight prominence of the  pulmonary vascularity.     D:  07/17/2017  E:  07/17/2017     This report was finalized on 7/17/2017 1:44 PM by Dr. Carlie Alcantar MD.             Assessment and Plan: Hypotension.  Near-syncope.  Elevated hepatic aminotransferases.  Transfusion associated  hepatitis B.  Worsening hepatic aminotransferases while on baraclude therapy.  Maximum temperature is 97.2 degrees over 24 hours.  Currently 97.9.  Plasma creatinine down to 1.5.  Peripheral leukocyte count 6500.  Liver function studies reviewed.  Acceptable for discharge home today.  Follow-up with me next Thursday.      Antonio Ng MD  7/20/2017

## 2017-07-20 NOTE — PLAN OF CARE
Problem: Patient Care Overview (Adult)  Goal: Plan of Care Review  Outcome: Ongoing (interventions implemented as appropriate)    07/20/17 1013   Coping/Psychosocial Response Interventions   Plan Of Care Reviewed With patient   Patient Care Overview   Progress progress toward functional goals as expected   Outcome Evaluation   Outcome Summary/Follow up Plan Adm w/ hypot, elev LFT's, & recent CABG/+HBV dx/RLE DVT/IVC filter; presents w/ evolving sympt, incl. low HGB (decr from 10.3 at adm to 10.1 yest), low plts, weakness/fatigue w/ mobil. & signif decr. SBP w/ gt ; able to amb. 180 ft w/ R wx but unable to attempt 3 stairs this session; recommend HH f/u to meet all mobil.goals & ret. to PLOF          Problem: Inpatient Physical Therapy  Goal: Bed Mobility Goal LTG- PT  Outcome: Ongoing (interventions implemented as appropriate)    07/20/17 1013   Bed Mobility PT LTG   Bed Mobility PT LTG, Date Established 07/20/17   Bed Mobility PT LTG, Time to Achieve 5 days   Bed Mobility PT LTG, Activity Type all bed mobility   Bed Mobility PT LTG, [place] Level independent       Goal: Transfer Training Goal 1 LTG- PT  Outcome: Ongoing (interventions implemented as appropriate)    07/20/17 1013   Transfer Training PT LTG   Transfer Training PT LTG, Date Established 07/20/17   Transfer Training PT LTG, Time to Achieve 5 days   Transfer Training PT LTG, Activity Type bed to chair /chair to bed;sit to stand/stand to sit;toilet   Transfer Training PT LTG, [place] Level independent   Transfer Training PT LTG, Assist Device walker, rolling       Goal: Gait Training Goal LTG- PT  Outcome: Ongoing (interventions implemented as appropriate)    07/20/17 1013   Gait Training PT LTG   Gait Training Goal PT LTG, Date Established 07/20/17   Gait Training Goal PT LTG, Time to Achieve 5 days   Gait Training Goal PT LTG, [place] Level independent   Gait Training Goal PT LTG, Assist Device walker, rolling  (stable VS)   Gait  Training Goal PT LTG, Distance to Achieve 300       Goal: Stair Training Goal LTG- PT  Outcome: Ongoing (interventions implemented as appropriate)    07/20/17 1013   Stair Training PT LTG   Stair Training Goal PT LTG, Date Established 07/20/17   Stair Training Goal PT LTG, Time to Achieve 5 days   Stair Training Goal PT LTG, Number of Steps 3   Stair Training Goal PT LTG, [place] Level supervision required  (stable VS)   Stair Training Goal PT LTG, Assist Device cane, quad

## 2017-07-20 NOTE — PROGRESS NOTES
[facility] Medicine Services  INPATIENT PROGRESS NOTE    Date of Admission: 7/17/2017  Length of Stay: 3  Primary Care Physician: Colton Dickens MD    Subjective   CC: f/u A/CKD and hypotension    HPI:  Patient is seen resting upright in chair in no acute distress.  No visitors at bedside.  He is awake, alert and watching television.  He appears in a pleasant mood smiling and conversing well.  He denies any chest pain, nausea, vomiting, shortness of air, dizziness, syncope.  He is currently wearing his lifevest. No shocks. Reports he is working with physical therapy well.  Tolerating diet.  Dates his potassium was about the same today and nephrology probably isn't letting him go.  Otherwise no new issues and feels well.      Review of Systems  Gen-no f/c  CV-no chest pain, palpitations  Resp-no cough, dyspnea  GI-no N/V/D, abd pain    Objective      Temp:  [97.2 °F (36.2 °C)-98.9 °F (37.2 °C)] 98.9 °F (37.2 °C)  Heart Rate:  [57-65] 62  Resp:  [18-20] 20  BP: (113-178)/(63-82) 113/63     Physical Exam  Gen/Constitutional-no acute distress, sitting upright in chair. No visitors at bedside  CV-RRR, S1 S2 normal, 2/6 sys murmur, lifevest in place  Resp-CTAB A/P, no wheezes/no rales  Abd-soft, obese abdomen ,NT, ND, +BS, no peritoneal signs  Ext-no edema, DECKER spontaneously  Neuro-A&Ox4, no focal deficits, follows commands, PERTL  Psych-appropriate mood, pleasant, calm, and cooperative     Results Review:    I have reviewed the labs, radiology results and diagnostic studies.      Results from last 7 days  Lab Units 07/20/17  0416   WBC 10*3/mm3 6.46   HEMOGLOBIN g/dL 10.5*   PLATELETS 10*3/mm3 142*       Results from last 7 days  Lab Units 07/20/17  0416   SODIUM mmol/L 131*   POTASSIUM mmol/L 5.3   CHLORIDE mmol/L 103   CO2 mmol/L 26.0   BUN mg/dL 33*   CREATININE mg/dL 1.50*   GLUCOSE mg/dL 79   CALCIUM mg/dL 9.6        Radiology Data:   Imaging Results (last 24 hours)     ** No results  found for the last 24 hours. **          I have reviewed the medications.    Assessment/Plan     Problem List  Hospital Problem List     * (Principal)Hypotension    Dementia    Dyslipidemia    JONNY (obstructive sleep apnea)    Overview Signed 1/6/2017  8:57 AM by Davis BERMAN  Obstructive sleep apnea with central component.B.  On home CPAP therapy.         CKD (chronic kidney disease), stage III    Elevated LFTs        Assessment:  1.Severe Hypotension, Multifactorial, med related, poor EF, probably cardiogenic, meds adjusted per cardio, continue observation at least 24 hrs      2. H/o CAD/4/24/17 CABG x 5 LIMA to LAD, VG sequence OM1 and OM2. VG to DM of LAD and VG to RCA. (Dr. Berger)/April 2017 NSTEMI/Ischemic cardiomyopathy   --Pt has lifevest in place  3. JONNY, cpap qhs   --sats stable  4. Stable COPD  --continue oxygen as needed  5. BUBBA on CKD, improving  --Ears back at baseline with creatinine today 1.5 nephro notes reviewed  6. Hepatitis B, on treatment per ID with viral load increasing, per ID need Op follow up per infectious disease recommendations  7. Mild dementia   8. Hyperkalemia  --nephrology treating today however with Lasix.  No further Kayexalate.  Down to 5.3 from 5.4 only.  During use of Veltassa outpatient.  9. Weakness  Negative orthostatics overnight.    Consult PT for eval        Plan:  --To receive Lasix today per nephrology orders for increasing hyperkalemia.  No indication for HD at this time per neph notes.  --PT consulted yesterday for eval   --A.m. Labs  --Plan for possible discharge home in a.m. with home health if patient is potassium improved and hemodynamically stable.  Pending nephrology final recommendations on clearance for discharge as well. Dr Phillips spoke with Dr Bolton today, states no dc today.  Treat as per nephrology orders.  Possible dc home tomorrow   -- A follow-up with Dr. Ng with [facility] in 1 week of discharge.    --Cardiology recommendations, would continue  current medications given his ischemic cardiomyopathy.  Continue to hold Norvasc, and Aldactone.  Follow up with cardiology per their recommendations on discharge        Code statud- full code      DVT Prophylaxis- sq heparin       Lisa Garay, APRN   07/20/17   4:39 PM

## 2017-07-21 NOTE — PROGRESS NOTES
Continued Stay Note  [facility]     Patient Name: Magdi Arriaga  MRN: [id number]  Today's Date: 7/21/2017    Admit Date: 7/17/2017          Discharge Plan       07/21/17 1426    Case Management/Social Work Plan    Plan Social work called [facility], [phone] and social work will fax the resume home health orders to [facility] at [phone].    Patient/Family In Agreement With Plan yes              Discharge Codes     None        Expected Discharge Date and Time     Expected Discharge Date Expected Discharge Time    Jul 20, 2017             SHAMAR Garza

## 2017-07-21 NOTE — NURSING NOTE
Spoke with REI Quijano on the telephone.  Patient ok to send home on current medication regimen with f/u in the office in 4 weeks.

## 2017-07-21 NOTE — PROGRESS NOTES
"   LOS: 4 days    Patient Care Team:  Colton Dickens MD as PCP - General (Internal Medicine)  Kvng Stauffer MD as Consulting Physician (Cardiology)    Reason For Visit:  F/U BUBBA ON CKD.  Subjective           Review of Systems:    Pulm: No soa   CV:  No CP      Objective       aspirin 81 mg Oral Daily   atorvastatin 40 mg Oral Nightly   budesonide-formoterol 2 puff Inhalation BID - RT   carvedilol 6.25 mg Oral Q12H   entecavir 0.5 mg Oral Daily   heparin (porcine) 5,000 Units Subcutaneous Q12H   memantine 10 mg Oral BID   montelukast 10 mg Oral Nightly   PARoxetine 30 mg Oral QAM   Pharmacy Meds to Bed Consult  Does not apply Daily   rivastigmine 1 patch Transdermal Daily   sacubitril-valsartan 1 tablet Oral BID   vitamin C 250 mg Oral BID With Meals            Vital Signs:  Blood pressure 135/72, pulse 57, temperature 97.7 °F (36.5 °C), temperature source Oral, resp. rate 18, height 68\" (172.7 cm), weight 189 lb 9.6 oz (86 kg), SpO2 96 %.    Flowsheet Rows         First Filed Value    Admission Height  68\" (172.7 cm) Documented at 07/17/2017 1211    Admission Weight  200 lb (90.7 kg) Documented at 07/17/2017 1211          07/20 0701 - 07/21 0700  In: 480 [P.O.:480]  Out: 750 [Urine:750]    Physical Exam:    General Appearance: NAD, alert and cooperative, Ox3  Eyes: PER, conjunctivae and sclerae normal, no icterus  Lungs: respirations regular and unlabored, no crepitus, clear to auscultation  Heart/CV: regular rhythm & normal rate, no murmur, no gallop, no rub and no edema  Abdomen: not distended, soft, non-tender, no masses,  bowel sounds present  Skin: No rash, Warm and dry    Radiology:            Labs:    Results from last 7 days  Lab Units 07/20/17  0416 07/19/17  0513 07/18/17  0445   WBC 10*3/mm3 6.46 5.26 4.87   HEMOGLOBIN g/dL 10.5* 10.1* 10.4*   HEMATOCRIT % 34.1* 33.8* 32.6*   PLATELETS 10*3/mm3 142* 128* 147*       Results from last 7 days  Lab Units 07/21/17  0440 07/20/17  0416 07/19/17  0513 " 07/18/17  0445 07/17/17  1227   SODIUM mmol/L 134 131* 135 137 137   POTASSIUM mmol/L 4.5 5.3 5.4 5.1 5.1   CHLORIDE mmol/L 103 103 107 111* 107   CO2 mmol/L 25.0 26.0 25.0 24.0 24.0   BUN mg/dL 39* 33* 35* 40* 46*   CREATININE mg/dL 1.70* 1.50* 1.70* 2.00* 2.20*   CALCIUM mg/dL 9.2 9.6 9.6 9.5 9.3   MAGNESIUM mg/dL  --   --  2.2  --  2.2   ALBUMIN g/dL 3.00*  --   --  3.10* 3.30       Results from last 7 days  Lab Units 07/21/17  0440   GLUCOSE mg/dL 88         Results from last 7 days  Lab Units 07/21/17  0440   ALK PHOS U/L 230*   BILIRUBIN mg/dL 0.7   ALT (SGPT) U/L 1222*   AST (SGOT) U/L 798*             Results from last 7 days  Lab Units 07/17/17  1444   COLOR UA  Yellow   CLARITY UA  Clear   PH, URINE  <=5.0   SPECIFIC GRAVITY, URINE  1.018   GLUCOSE UA  Negative   KETONES UA  Negative   BILIRUBIN UA  Negative   PROTEIN UA  30 mg/dL (1+)*   BLOOD UA  Negative   LEUKOCYTES UA  Negative   NITRITE UA  Negative       Estimated Creatinine Clearance: 41.9 mL/min (by C-G formula based on Cr of 1.7).      Assessment     Principal Problem:    Hypotension  Active Problems:    Dementia    Dyslipidemia    JONNY (obstructive sleep apnea)    CKD (chronic kidney disease), stage III    Elevated LFTs            Impression: BUBBA RESOLVED. STAGE 3 CKD WITH GFR AT BASELINE. PROTEINURIA. ANEMIA.            Recommendations: DISCHARGE HOME OK WITH RENAL. F/U APPT WITH NAL 8/25/17 AT 10 AM.      Cesar Sotomayor MD  07/21/17  1:14 PM

## 2017-07-21 NOTE — DISCHARGE SUMMARY
"    [facility] Medicine Services  DISCHARGE SUMMARY       Date of Admission: 7/17/2017  Date of Discharge:  7/21/2017  Primary Care Physician: Colton Dickens MD  Consulting Physician(s)     Provider Relationship    Kvng Stauffer MD Consulting Physician    Antonio Ng MD Consulting Physician    Harish Bolton MD Consulting Physician          Discharge Diagnoses:  Active Hospital Problems (** Indicates Principal Problem)    Diagnosis Date Noted   • **Hypotension [I95.9] 07/17/2017   • Elevated LFTs [R79.89] 07/17/2017   • CKD (chronic kidney disease), stage III [N18.3] 05/22/2017   • JONNY (obstructive sleep apnea) [G47.33] 01/06/2017     A.  Obstructive sleep apnea with central component.B.  On home CPAP therapy.     • Dyslipidemia [E78.5] 01/06/2017   • Dementia [F03.90] 01/06/2017      Resolved Hospital Problems    Diagnosis Date Noted Date Resolved   No resolved problems to display.       Presenting Problem/History of Present Illness  Hypotension, unspecified hypotension type [I95.9]  Hypotension, unspecified hypotension type [I95.9]  Hypotension, unspecified hypotension type [I95.9]     Chief Complaint on Day of Discharge: NO issues.  Feels good f/u BUBBA/hypotension    History of Present Illness on Day of Discharge:   Is seen resting upright in chair in no acute distress.  No visitors at bedside.  States he feels \"good\" and requesting discharge home today.  His fever, chills, chest pain, shortness of air, nausea, vomiting, dizziness or syncope.  Rest is in place as prior.  No new issues.  States he feels really good and really would like to go home.  No new issues.    Hospital Course  Patient is a 72 y.o. male with a PMH of CHF, JONNY with home cpap, COPD, CAD with CABG 4/24/17, DVT with vena cava filter, asthma, Exposure to Hepatitis B with platelet transfusion and had a reactive Hep B surface Ag 6/12/17 followed by ID. The patient presented to [facility] ED with complaints of " "weakness, dizziness and low blood pressure. He states for last two days he has generally felt unwell. Patient states for last two days he has been feeling dizzy with activity and it resolves when he sits down. He took his blood pressure in the am and it was 67/46, he ate and drank and sbp came up to 70.  Per wife the day prior his blood pressure was 79/60 and it came up after he sat down and rested for a while. She states his blood pressure is normally between 120-140. Per his wife he has taken his hypertensive medication daily as rxd. Patient denied any cp, fever, chills or n/v/d.       Upon ED eval: creatinine 2.20, K 5.1, troponin 0.019, HGB 10.3, , , UA neg, CXR stable chronic changes seen, slight prominence of pulmonary vascularity. Patient was admitted to hospital medicine and Cardiology was consulted. He was also followed by Nephrology and Infectious disease.      Pt had severe hypotension medically related multifactorial and likely due to poor EF and poor cardiogenic.  Meds were adjusted by cardiology.  Had LifeVest and place due to low EF and STEMI in April with resultant cardiomyopathy.  The patient has history of hepatitis be due to blood-borne exposure from platelets followed by infectious disease outpatient.  They monitored during hospital course.  Also followed by nephrology due to his acute on chronic renal failure with associated hypotension.  Electrolytes were managed closely he did not require hemodialysis.  Required some increased diuresis and Kayexalate due to hyperkalemia which improved.  Developed some elevated liver functions above his baseline.  He remained asymptomatic.  No jaundice.  No abdominal pain, fever, chills, nausea, vomiting.  It was felt that his elevated LFTs were secondary to \"shock liver\" to significant hypo-tension.  Is asymptomatic of issues and it's felt that his liver function should improve over the next several days spontaneously.    Asians renal function " "improved back to baseline.  His electrolytes were within normal limits.  He looks and states he feels good and ready to go home.  Issues.  No edema.  Plans are to discharge home today.  He's been cleared for discharge from a nephrology standpoint my infectious disease, and cardiology.  He will discharge with his LifeVest and place as prior.  He will follow-up with his PCP on Monday or Tuesday 7/25-7/26 and should have recheck of CBC and CMP at that time to further evaluate.  Follow-up with Dr. Ng, his primary infectious disease doctor managing his hepatitis next week per his recommendations.  Follow-up with cardiology in 1 month per their recommendations.  States he feels good and ready for discharge home with no new issues of concern at time of discharge.         Consults:   Consults     Date and Time Order Name Status Description    7/18/2017 1014 Inpatient Consult to Nephrology Completed     7/17/2017 1953 Inpatient Consult to Cardiology Completed     7/17/2017 1953 Inpatient Consult to Infectious Diseases Completed           Pertinent Test Results:  Imaging Results (last 24 hours)     ** No results found for the last 24 hours. **          Results from last 7 days  Lab Units 07/20/17  0416   WBC 10*3/mm3 6.46   HEMOGLOBIN g/dL 10.5*   HEMATOCRIT % 34.1*   PLATELETS 10*3/mm3 142*       Results from last 7 days  Lab Units 07/21/17  0440   SODIUM mmol/L 134   POTASSIUM mmol/L 4.5   CHLORIDE mmol/L 103   CO2 mmol/L 25.0   BUN mg/dL 39*   CREATININE mg/dL 1.70*   GLUCOSE mg/dL 88   CALCIUM mg/dL 9.2         Condition on Discharge:  Stable     Physical Exam on Discharge:/72 (BP Location: Right arm, Patient Position: Lying)  Pulse 57  Temp 97.7 °F (36.5 °C) (Oral)   Resp 18  Ht 68\" (172.7 cm)  Wt 189 lb 9.6 oz (86 kg)  SpO2 96%  BMI 28.83 kg/m2     Physical Exam   Gen/Constitutional-no acute distress, sitting upright in chair. No visitors at bedside  CV-RRR, S1 S2 normal, 2/6 sys murmur, lifevest in " place  Resp-CTAB A/P, no wheezes/no rales, no respiratory distress.  On room air.   Abd-soft, obese abdomen ,NT, ND, +BS, no peritoneal signs--clearly denies any abd pain to palpation or otherwise.   Ext-no edema, DECKER spontaneously  Neuro-A&Ox4, no focal deficits, follows commands, PERTL  Psych-appropriate mood, pleasant, calm, and cooperative  Skin- intact.  NO jaundice      Discharge Disposition  Home or Self Care    Discharge Medications   Magdi Arriaga   Home Medication Instructions RADHA:[id number]    Printed on:07/21/17 5186   Medication Information                      albuterol (PROVENTIL HFA;VENTOLIN HFA) 108 (90 BASE) MCG/ACT inhaler  Inhale 2 puffs Every 4 (Four) Hours As Needed for Wheezing.             aspirin 81 MG chewable tablet  Chew 1 tablet Daily.             atorvastatin (LIPITOR) 40 MG tablet  Take 1 tablet by mouth Every Night.             budesonide-formoterol (SYMBICORT) 160-4.5 MCG/ACT inhaler  Inhale 2 puffs 2 (Two) Times a Day.             carvedilol (COREG) 6.25 MG tablet  Take 1 tablet by mouth Every 12 (Twelve) Hours.             entecavir (BARACLUDE) 0.5 MG tablet  Take 1 tablet by mouth Daily.             IRON PO  Take 1 tablet by mouth Daily.             loratadine (CLARITIN) 10 MG tablet  Take 10 mg by mouth Daily.             memantine (NAMENDA) 10 MG tablet  Take 10 mg by mouth 2 (Two) Times a Day.             montelukast (SINGULAIR) 10 MG tablet  Take 1 tablet by mouth Every Night.             nitroglycerin (NITROSTAT) 0.4 MG SL tablet  Place 1 tablet under the tongue Every 5 (Five) Minutes As Needed for Chest Pain. Take no more than 3 doses in 15 minutes.             Omega-3 Fatty Acids (FISH OIL) 1200 MG capsule capsule  Take 1,200 mg by mouth Daily.             PARoxetine (PAXIL) 30 MG tablet  Take 1 tablet by mouth Every Morning.             PHARMACY MEDS TO BED CONSULT  Daily (Monday-Friday).             rivastigmine (EXELON) 9.5 MG/24HR patch  Place 1 patch on the  skin Daily.             Saccharomyces boulardii (PROBIOTIC) 250 MG capsule  Take 1 capsule by mouth 2 (Two) Times a Day.             sacubitril-valsartan (ENTRESTO) 49-51 MG tablet  Take 1 tablet by mouth 2 (Two) Times a Day.             vitamin C (ASCORBIC ACID) 250 MG tablet  Take 1 tablet by mouth 2 (Two) Times a Day With Meals. Take with iron supplement to enhance absorption                 Discharge Diet:   Diet Instructions     Diet: Regular, Cardiac, Renal; Thin Liquids, No Restrictions       Discharge Diet:   Regular  Cardiac  Renal      Fluid Consistency:  Thin Liquids, No Restrictions                 Discharge Care Plan / Instructions:    Activity at Discharge:   Activity Instructions     Activity as Tolerated           Measure Blood Pressure                     Follow-up Appointments  Future Appointments  Date Time Provider Department Center   7/24/2017 11:00 AM [facility] PHASE II -  [facility]  [facility]   7/26/2017 11:00 AM [facility] PHASE II -  [facility]  [facility]   7/28/2017 11:00 AM [facility] PHASE II -  [facility]  [facility]   7/31/2017 11:00 AM [facility] PHASE II -  [facility]  [facility]   8/2/2017 9:00 AM MEDICATION CLASS -  [facility]  [facility]   8/2/2017 10:00 AM [facility] PHASE II -  [facility]  [facility]   8/4/2017 11:00 AM [facility] PHASE II -  [facility]  [facility]   8/7/2017 11:00 AM [facility] PHASE II -  [facility]  [facility]   8/8/2017 11:00 AM MD DELICIA Jarrell None   8/9/2017 11:00 AM [facility] PHASE II -  [facility]   8/11/2017 11:00 AM [facility] PHASE II -  [facility]  [facility]   8/14/2017 11:00 AM [facility] PHASE II -  [facility]   8/16/2017 11:00 AM [facility] PHASE II -  [facility]   8/18/2017 11:00 AM [facility] PHASE II -  [facility]   8/21/2017 11:00 AM [facility] PHASE II -  [facility]   8/23/2017 11:00 AM [facility] PHASE II -  [facility]OL[facility]  CARD REHAB  [facility]   8/24/2017 11:00 AM [facility] ECHO/VASC CART RM2 [facility]   8/24/2017 1:00 PM Kvng Stauffer MD [facility] None   8/25/2017 11:00 AM [facility] PHASE II - [facility]   8/28/2017 11:00 AM [facility] PHASE II - [facility]  [facility]   8/30/2017 11:00 AM [facility] PHASE II - [facility]  [facility]   9/1/2017 11:00 AM [facility] PHASE II - [facility]   9/6/2017 11:00 AM [facility] PHASE II -  [facility]  [facility]   9/8/2017 9:30 AM RAD TECH PULMO [facility] None   9/8/2017 9:45 AM REI Fernández [facility] None   9/8/2017 11:00 AM [facility] PHASE II -  [facility] CARD REHAB  [facility]   9/11/2017 11:00 AM [facility] PHASE II -  [facility]ARD REHAB  [facility]   9/13/2017 11:00 AM [facility] PHASE II -  [facility]ARD RE[facility]AB  [facility]   9/15/2017 11:00 AM [facility] PHASE II -  [facility] CARD REHAB  [facility]   9/18/2017 11:00 AM [facility] PHASE II -  [facility]ARD REHAB  [facility]   9/20/2017 11:00 AM [facility] PHASE II -  [facility] CARD REHAB  [facility]   9/22/2017 11:00 AM [facility] PHASE II -  [facility] CARD REHAB  [facility]   9/25/2017 11:00 AM [facility] PHASE II -  [facility]ARD RE[facility]     Additional Instructions for the Follow-ups that You Need to Schedule     Additional Discharge Follow-Up (Specify Provider)    As directed    To:  Nephrology per their recs as noted today       Ambulatory Referral to Home Health    As directed    Face to Face Visit Date:  7/21/2017   Follow-up Provider for Plan of Care?:  I treated the patient in an acute care facility and will not continue treatment after discharge.   Follow-up Provider:  JOEY FINE   Reason/Clinical Findings:  Severe Hypotension; BUBBA on CKD; COPD   Describe mobility limitations that make leaving home difficult:  weakness/fatigue w/ mobil. & signif decr. SBP w/ gt   Nursing/Therapeutic Services Requested:   Skilled Nursing  Physical Therapy      Skilled nursing orders:   Medication  education  COPD management      PT orders:   Gait Training  Transfer training  Strengthening      Weight Bearing Status:  As Tolerated   Frequency:  1 Week 1       Discharge Follow-Up With Specified Provider    As directed    To:  Dr Ng next week per his recs.       Discharge Follow-up with PCP    As directed    Follow Up Details:  PCP Monday or Tuesday of next week (and will need ji of cmp at that time).       Discharge Follow-up with Specialty    As directed    Specialty:  Dr. Stauffer   Follow Up:  1 Month   Follow Up Details:  with echo. Cancel appt for next week.   Has the patient’s follow-up appointment been scheduled and documented in the discharge navigator?:  Yes, documented in the appointment section       Discharge Follow-up with Specialty    As directed    Specialty:  Cardiology 4 weeks per their recs   Follow Up:  1 Month                    Lisa Garay, APRN 07/21/17 2:58 PM    Time: 45 minutes    Please note that portions of this note may have been completed with a voice recognition program. Efforts were made to edit the dictations, but occasionally words are mistranscribed.

## 2017-07-22 NOTE — PROGRESS NOTES
The home health nurse Anna Dempsey [phone] called while visiting the patient on Saturday, July 22.  Patient's blood pressures was 96/60 with heart rate of 60.  O2 sat was 97.  The patient was ambulatory but lightheaded.  His blood pressure this morning was 85 systolic.  I've asked her to cut carvedilol down to 3.125 every 12 hours.  Entresto 49/51 to be cut down to one half tab every 12 hours.  Blood pressure medication should be held for systolics less than 110.  Office exam on Monday.

## 2017-07-24 PROBLEM — E61.1 IRON DEFICIENCY: Status: ACTIVE | Noted: 2017-01-01

## 2017-07-24 NOTE — PROGRESS NOTES
Transitional Care Follow Up Visit  Subjective     Magdiantonia Arriaga is a 72 y.o. male who presents for a transitional care management visit.    Within 48 business hours after discharge our office contacted him via telephone to coordinate his care and needs. Pt was here for office visit within 48 hours of discharge. This serves as his phone contact.      I reviewed and discussed the details of that call along with the discharge summary, hospital problems, inpatient lab results, inpatient diagnostic studies, and consultation reports with Magdi.    No flowsheet data found.    History of Present Illness     Course During Hospital Stay:  Pt went to ER on July 19 due to low BP of 67/47 while on way to Cardiac rehab. Echo done on 7/18 showed EF 54% with LVDD, LV hypertrophy, MVR. CXR done 7/17 showed stable chronic changes. Dr. Stauffer was contacted and stopped Amlodipine and Spironolactone. Pt was discharged home on Friday 7/21. His BP has ranged 90/64 - 139/75 with heart rate 66-79. Once home his Carvedilol and Entresto was also held due to continuing low BP readings. Yesterday when his BP brianna to 139/65, his wife gave him 1/2 tab each of Carvedilol and Entresto. Today his BP was 90/64 at home. Pt reports no other symptoms; eating and drinking well, bowels moving well with no constipaton or diarrhea.     Pt had elevated AST/ALT levels in hospital. He has history of exposure to Hep B. He was seen by Dr. Ng, ID, in hospital and has f/u on July 27.     Pt has history of CABG X5 in April 2017.     [facility] has seen him once at home and plans to send PT 2 times/week on Tuesday and Thursday. Pt has good support from his wife Netta and extended family.     The following portions of the patient's history were reviewed and updated as appropriate: allergies, current medications, past family history, past medical history, past social history, past surgical history and problem list.    Review of Systems   Constitutional:  Negative for fatigue and fever.   HENT: Negative for congestion and sore throat.    Eyes: Negative for pain and itching.   Respiratory: Negative for cough and shortness of breath.    Cardiovascular: Negative for chest pain, palpitations and leg swelling.   Gastrointestinal: Negative for abdominal pain and nausea.   Endocrine: Negative for cold intolerance and heat intolerance.   Genitourinary: Negative for dysuria and hematuria.   Musculoskeletal: Negative for arthralgias and back pain.   Skin: Negative for rash and wound.   Allergic/Immunologic: Negative for environmental allergies and food allergies.   Neurological: Positive for weakness and light-headedness. Negative for dizziness, syncope and headaches.   Hematological: Negative for adenopathy. Does not bruise/bleed easily.   Psychiatric/Behavioral: Negative for sleep disturbance. The patient is not nervous/anxious.        Objective   Physical Exam   Constitutional: He is oriented to person, place, and time. He appears well-developed and well-nourished.   HENT:   Right Ear: Tympanic membrane and ear canal normal.   Left Ear: Tympanic membrane and ear canal normal.   Mouth/Throat: Oropharynx is clear and moist and mucous membranes are normal.   Eyes: Conjunctivae and lids are normal.   Cardiovascular: Normal rate, regular rhythm and normal heart sounds.    No murmur heard.  No edema noted. JVP normal.   Pulmonary/Chest: Effort normal and breath sounds normal.   Abdominal: Soft. Normal appearance and bowel sounds are normal. There is no hepatosplenomegaly. There is no tenderness.   Lymphadenopathy:     He has no cervical adenopathy.   Neurological: He is alert and oriented to person, place, and time. He has normal strength.   Skin: Skin is warm and dry.   Psychiatric: He has a normal mood and affect. His behavior is normal. Cognition and memory are impaired (mild cognitive impairment).   Vitals reviewed.      Assessment/Plan   Magdi was seen today for  hypotension.    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      1. Hypotension: Pt's BP continues to run low. Etiology not clear. He is to stay off Amlodipine and Spironolactone (due to borderline high K+ levels in hospital); stop Carvedilol. He is to continue Entresto 1/2 tab BID, unless his systolic BP is less than 110. He is to track his BP BID before taking his dose of Entresto. He was instructed to rise slowly from sitting to standing position, rest at home this week and continue to use a cane for ambulation support.    2. Elevated LFT's: Pt may have acute Hepatitis. Will repeat CMP today. He is to f/u with Dr. Ng, ID, on July 27.    3. Iron Deficiency: Pt's Iron level was 48 June 27, at which time he was started on Ferrous Sulfate 325 mg daily. Will repeat iron level today.     Continue other meds.    He is to continue working with home health PT 2 times/week to help improve overall strength and balance. He has excellent support from his wife at home.    TGF present @ this OX.    Non-fasting f/u in 1 week.              Electronically signed by REI Gabriel 7/25/2017 7:11 AM

## 2017-07-24 NOTE — PATIENT INSTRUCTIONS
1. Stay off Amlodipine and Spironolactone. Stop Carvedilol.    2. Take Entresto 1/2 tb every 12 hours - hold if BP < 110 sysotlic.    3. Take BP & P 2 times/day before BP med and bring readings to each visit.    4. Rise slowly from sitting position. Rest at home this week.    5. Use cane at all times for support.    6. F/u with Dr. Ng on Thursday as scheduled.    7. Continue other meds.    8. Non-fasting f/u in one week.

## 2017-07-25 NOTE — TELEPHONE ENCOUNTER
Pt's wife Netta who is on ALCIRA was notified of labs: platelets slightly improved; ; Creat stable at 1.7; liver enzymes remain elevated; iron better at 83; BNP slightly better at 219; ammonia OK. Instructed to f/u with Dr. Ng on July 27 as scheduled.

## 2017-07-28 NOTE — PROGRESS NOTES
Home health care plan oversight - greater than 30 minutes in this calender month.    Colton Dickens M.D.

## 2017-07-31 PROBLEM — S81.802A WOUND OF LEFT LEG: Status: ACTIVE | Noted: 2017-01-01

## 2017-07-31 NOTE — PROGRESS NOTES
Subjective   Magdi Arriaga is a 72 y.o. male.     History of Present Illness     1. Hypotension: Pt had episode of hypotension July 19. He was hospitalized from July 19 - 21; had cardiac evaluation during this stay. Dr. Stauffer, cardiologist, stopped Amlodipine and Spironolactone. At visit in this office July 24, his BP remained low at which time Carvedilol was stopped. His wife has been giving him Entresto 1/2 tab for BP over 110. His BP has been variable with readings from 96/60 - 148/85. She gave him Entresto today with BP of 148/85.     2. Pt had a fall at home on July 28. He thinks his shoe caught on the carpet. He has wound on left shin, which continues to ooze blood. He also has small wound on back of right calf which occasionally oozes blood. He has moderated dementia. Uses cane for ambulation support. He takes ASA 81 mg daily.     3. Pt had elevated LFT's with labs done July 24. He had f/u with Dr. Ng, ID, on July 27 with labs done, but no change in meds. 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 He continues Baraclude 0.5 mg daily for treatment.     The following portions of the patient's history were reviewed and updated as appropriate: allergies, current medications, past family history, past medical history, past social history, past surgical history and problem list.    Review of Systems   Constitutional: Negative for fatigue and fever.   HENT: Negative for congestion and sore throat.    Eyes: Negative for pain and itching.   Respiratory: Negative for cough and wheezing.    Cardiovascular: Negative for chest pain, palpitations and leg swelling.   Gastrointestinal: Negative for abdominal pain and nausea.   Endocrine: Negative for cold intolerance and heat intolerance.   Genitourinary: Negative for dysuria and hematuria.   Musculoskeletal: Negative for arthralgias and back pain.   Skin: Positive for wound. Negative for rash.   Allergic/Immunologic: Negative for  environmental allergies and food allergies.   Neurological: Negative for dizziness and headaches.   Hematological: Negative for adenopathy. Does not bruise/bleed easily.   Psychiatric/Behavioral: Negative for sleep disturbance. The patient is not nervous/anxious.        Objective   Blood pressure 136/70, pulse 68, temperature 98.4 °F (36.9 °C), temperature source Oral, resp. rate 16, weight 197 lb (89.4 kg), SpO2 95 %.    Physical Exam   Constitutional: He appears well-developed and well-nourished.   Cardiovascular: Normal rate, regular rhythm and normal heart sounds.    No murmur heard.  JVP normal. No edema lower extremities.    Pulmonary/Chest: Effort normal and breath sounds normal. No respiratory distress. He has no wheezes.   Abdominal: Soft. Bowel sounds are normal. There is no tenderness.   Neurological: He is alert. He exhibits normal muscle tone.   Skin: Skin is warm and dry.   2 cm superficial wound left shin; oozing small amount of blood. Small pin head size area oozing blood left calf.    Vitals reviewed.    Procedures  Assessment/Plan   Magdi was seen today for hypotension.    Diagnoses and all orders for this visit:    Wound of left leg, initial encounter    Hypotension, unspecified hypotension type    Elevated LFTs      1. Wound left shin. Due to fall injury 3 days ago. Pt & wife instructed to apply Bacitracin ointment daily, then gauze pad pressure dressing wrapped with Kerlix. Pt is to elevated his legs whenever at rest.     2. Hypotension: BP is variable, but adequately controlled. Pt's wife instructed to give him Entresto 1/2 tab BID; hold dose if systolic BP < 110. Pt is to have assistance with ambulation and use a cane at all times.    3. Pt is to continue home health & PT and was encouraged to work with PT and wife on strengthening and balance exercises. Pt is to hold off on cardiac rehab until after his appt next week.     4. Pt is to f/u with Dr. Ng in 3 months re: elevated LFT's and  exposure to Hep B.    Sharda Gustafson with [facility] notified of above wound treatment; to weigh pt with each visit and call office if pt gains another 2# this week.    Continue other meds.    Fasting f/u in 1 week.    Patient Instructions   1. Apply Bacitracin ointment to wound left shin daily, then apply gauze pressure dressing for the next week.    2. Elevate feet when resting.    3. Work on PT home exercises daily, including stretching.    4. Use cane at all times.    5. Slow pace of walking.    6. Continue Entresto 1/2 tab 2 times/day. Hold only if systolic BP less than 110. Take BP 2 times/day before taking Entresto.     7. Continue other meds as currently on list.    8. Fasting f/u in 1 week.                  Electronically signed by REI Gabriel 7/31/2017 5:33 PM

## 2017-07-31 NOTE — PATIENT INSTRUCTIONS
1. Apply Bacitracin ointment to wound left shin daily, then apply gauze pressure dressing for the next week.    2. Elevate feet when resting.    3. Work on PT home exercises daily, including stretching.    4. Use cane at all times.    5. Slow pace of walking.    6. Continue Entresto 1/2 tab 2 times/day. Hold only if systolic BP less than 110. Take BP 2 times/day before taking Entresto.     7. Continue other meds as currently on list.    8. Fasting f/u in 1 week.

## 2017-08-01 NOTE — TELEPHONE ENCOUNTER
----- Message from Angeli White sent at 7/31/2017  2:57 PM EDT -----  Contact: DELORES  PATIENT NEEDS A SCRIPT FOR [phone]

## 2017-08-01 NOTE — TELEPHONE ENCOUNTER
I called pt. Per TGF pt needs office eval.  Has ox 8/8. Pt states he will remind TGF re eval then.

## 2017-08-04 NOTE — TELEPHONE ENCOUNTER
Pt's wife Netta called to report his BP has been 162/88, short of breath and edema - up 4# this week. Per TGF she is to given him Lasix 40 mg today, Saturday, and Sunday; get weights daily; call on Monday AM with status. Pt has f/u on Tuesday. Netta understands if status worsens over the weekend, he should go to ER.

## 2017-08-06 PROBLEM — R06.02 SOB (SHORTNESS OF BREATH): Status: ACTIVE | Noted: 2017-01-01

## 2017-08-06 PROBLEM — D64.9 NORMOCYTIC ANEMIA: Status: ACTIVE | Noted: 2017-01-01

## 2017-08-06 PROBLEM — J96.21 ACUTE ON CHRONIC RESPIRATORY FAILURE WITH HYPOXIA (HCC): Status: ACTIVE | Noted: 2017-01-01

## 2017-08-06 PROBLEM — I50.33 ACUTE ON CHRONIC DIASTOLIC HEART FAILURE (HCC): Status: ACTIVE | Noted: 2017-01-01

## 2017-08-06 PROBLEM — J96.20 ACUTE ON CHRONIC RESPIRATORY FAILURE (HCC): Status: ACTIVE | Noted: 2017-01-01

## 2017-08-06 PROBLEM — R53.1 WEAKNESS: Status: ACTIVE | Noted: 2017-01-01

## 2017-08-06 NOTE — ED PROVIDER NOTES
Subjective   HPI Comments: Magdi Arriaga is a 72 y.o.male with history of CHF who presents to the ED with c/o SOA which began 3 days ago. He reports becoming progressively more SOA than his baseline, especially during exertional activtiy. He states his SOA does seem to improve while lying down. His wife reports he has experienced some fluid retention with associated weight gain to 202 pounds recently as well. They contacted Dr. Ballesteros regarding this and was started on lasix as of two days ago. His weight has since decreased to 196 but remains 4 pounds above his dry weight. The patient notes his SOA also remains unchanged. He also c/o recently elevated blood pressure, chronic/unchanged cough, and mild dizziness but denies fevers, chills, congestion, runny nose, sore throat, chest pain, abdominal pain, urinary symptoms, bloody stools, or any other complaints at this time. He has been wearing a life vest since May. He is scheduled to wear this until September in order to determine if he needs a permanent heart defibrillator placed.     Patient is a 72 y.o. male presenting with shortness of breath.   History provided by:  Patient and spouse  Shortness of Breath   Severity:  Moderate  Onset quality:  Gradual  Timing:  Constant  Progression:  Unchanged  Chronicity:  Chronic  Context comment:  His wife reports recent fluid retention with weight changes   Relieved by:  Lying down  Worsened by:  Activity  Ineffective treatments: Lasix   Associated symptoms: cough (chronic, unchanged)    Associated symptoms: no abdominal pain, no chest pain, no fever, no sore throat and no vomiting    Risk factors: obesity        Review of Systems   Constitutional: Positive for unexpected weight change (gain). Negative for chills and fever.   HENT: Negative for congestion, rhinorrhea and sore throat.    Respiratory: Positive for cough (chronic, unchanged) and shortness of breath.         Fluid retention.   Cardiovascular: Negative for  chest pain.   Gastrointestinal: Negative for abdominal pain, diarrhea, nausea and vomiting.   Genitourinary: Negative for difficulty urinating, dysuria, frequency, hematuria and urgency.   Neurological: Positive for dizziness.   All other systems reviewed and are negative.      Past Medical History:   Diagnosis Date   • Abnormal finding on lung imaging      CT scan of the chest to be wary 2013 reports interstitial lung disease with marked diffuse interstitial and peripheral scarring and significant bronchiectasis in both upper   and lower lung zones.    • Allergic rhinitis     History of allergy injections as a child.   • Arthritis    • Asthma      Patient has had a history of asthma since childhood.   • CHF (congestive heart failure) 2017    ECHO - EF 25%   • COPD (chronic obstructive pulmonary disease)    • Coronary artery disease 2017    CABG X 5   • DVT (deep venous thrombosis)     Bilateral   • Exposure to hepatitis B 2017    Transfusion - managed by ID   • Falls     A.  History of falling traumatic injuries in April 2014 and May 2014 resulting and left rib  fracture and left clavicle fracture.   • Fracture of rib    • History of chest x-ray 02/19/2016    Extensive, but stable postinflammatory pulmonary changes. There has been no change since the previous examination of 12/03/2015   • History of chest x-ray 12/03/2015    Slight increased markings bilaterally, may be progression of his bronchiectasis   • History of chest x-ray 09/06/2014    There has been no change since 09/05/2014   • History of echocardiogram 02/19/2016    Mild concentric LVH is observed.Estimated EF is 50-55%.E to A reversal in the mitral valve flow pattern suggestive of diastolic dysfunction.RV is mildly dilated.Moderate aortic cusp sclerosis is present.Mitral annular calcification.Mild MR.Evidence of borderline pulmonary hypertension.   • History of PFTs 12/03/2015    Values are slightly worse.TLC is c/w mild restriction.Values reduced from  02/16/13.Diffusion capacity is slightly worse   • History of PFTs 02/06/2013    Mild decrease in FVC, may be due to less maximal effort.TLC is within normal levels. Mild reduction in diffusion in absolute value   • History of transfusion    • Hypertension    • Lumbar spinal stenosis 2013    Spinal surgery   • Patellar tendon rupture     A.  Status post primary repair of the left periprosthetic patellar tendon tear 6/11/2014, post fall at the end of April 2014.   • Pneumonia      A.  Left lower lobe pneumonia treated at [facility], 5/8/2014 through 5/20/2014.   • Pneumothorax      A.  Status post chest tube placed 5/13/2014 and discontinued 5/15/2014 with stable chest x-ray.   • Sleep apnea with use of continuous positive airway pressure (CPAP)    • Traumatic hemorrhagic shock      A.  Secondary to right renal retroperitoneal hemorrhage, 9/2014.       Allergies   Allergen Reactions   • Sulfa Antibiotics      UNKNOWN CHILDHOOD REACTION       Past Surgical History:   Procedure Laterality Date   • APPENDECTOMY  1959   • CARDIAC CATHETERIZATION N/A 4/18/2017    Procedure: Left Heart Cath;  Surgeon: Kvng Stauffer MD;  Location: [facility] CATH INVASIVE LOCATION;  Service:    • COLONOSCOPY W/ POLYPECTOMY     • CORONARY ARTERY BYPASS GRAFT N/A 4/24/2017    Procedure: MEDIAN STERNOTOMY, CORONARY ARTERY BYPASS GRAFT X5 UTILIZING THE INTERNAL MAMMARY ARTERY, ENDOVASCULAR VEING HARVEST UTILIZING RIGHT SAPHENOUS VEIN,TRANSESOPHAGEAL ECHO;  Surgeon: Kanu Breger MD;  Location: [facility] OR;  Service:    • FRACTURE SURGERY     • KNEE SURGERY  2006, 2014    primary repair -left periprosthetic patellar tendon tear 6/11/2014   • LUMBAR LAMINECTOMY  2013    With discectomy   • REPLACEMENT TOTAL KNEE Bilateral 2009, 2013    Left - 2009 and right - 2013.   • SKIN BIOPSY     • TONSILLECTOMY  1957   • VENA CAVA FILTER PLACEMENT  2014       Family History   Problem Relation Age of Onset   • Adopted: Yes   • No Known Problems Mother      Adopted  as child   • No Known Problems Father        Social History     Social History   • Marital status:      Spouse name: N/A   • Number of children: 2   • Years of education: N/A     Occupational History   • Post [employer] at       Retired     Social History Main Topics   • Smoking status: Former Smoker     Packs/day: 1.00     Years: 17.00     Types: Cigarettes     Start date: 1965     Quit date: 1982   • Smokeless tobacco: Never Used   • Alcohol use No      Comment: Never drinker   • Drug use: No   • Sexual activity: Defer     Other Topics Concern   • None     Social History Narrative    Domestic life :  Lives in private home with wife        Yazidi :   Religion        Sleep hygiene :  In bed 11 PM to 8 AM for 8 hours of sleep        Caffeine use :  One cup of coffee and 1 diet cola daily        Exercise habits :   Cardiac rehabilitation since heart surgery and heart failure        Diet :  Low calorie diet low in salt and low in sugar        Occupation :   Retired        Hearing :        Vision :        Driving :  No driving             Objective   Physical Exam   Constitutional: He is oriented to person, place, and time. He appears well-developed and well-nourished. No distress.   HENT:   Head: Normocephalic and atraumatic.   Nose: Nose normal.   Mouth/Throat: Oropharynx is clear and moist.   Eyes: Conjunctivae and EOM are normal. Pupils are equal, round, and reactive to light.   Neck: Normal range of motion. Neck supple.   Cardiovascular: Normal rate, regular rhythm, normal heart sounds and intact distal pulses.  Exam reveals no gallop and no friction rub.    No murmur heard.  Pulmonary/Chest: Effort normal. No respiratory distress. He has rales (lower 2/3s of lung fields).   Abdominal: Soft. Bowel sounds are normal. There is no tenderness.   Musculoskeletal: Normal range of motion.   Pretibial contusions and abrasions.    Neurological: He is alert and oriented to person, place, and time.   Skin: Skin is warm  and dry.   Psychiatric: His behavior is normal.   Nursing note and vitals reviewed.      Procedures         ED Course  ED Course   Comment By Time   Patient is somewhat improved after diuresis and is diuresed well in the emergency department after Nitropaste and IV Lasix.  BNP is greater than 5000 versus 200s 13 days ago.  His white count is mildly elevated.  X-rays read as patchy interstitial changes but probably no markedly change from July.Patient's EF is 25% on review of his old records with last echo in May.  His vitals have been stable throughout the ED course.  He is not acutely hypoxemic now.  He is certainly failed outpatient attempts at diuresis and treatment.I discussed case with Dr. Wilson who admit for inpatient care.  He remains in his life vest while here Cheko Jaocb MD 08/06 1901         Recent Results (from the past 24 hour(s))   Comprehensive Metabolic Panel    Collection Time: 08/06/17  5:24 PM   Result Value Ref Range    Glucose 103 (H) 70 - 100 mg/dL    BUN 19 9 - 23 mg/dL    Creatinine 1.60 (H) 0.60 - 1.30 mg/dL    Sodium 137 132 - 146 mmol/L    Potassium 3.7 3.5 - 5.5 mmol/L    Chloride 105 99 - 109 mmol/L    CO2 23.0 20.0 - 31.0 mmol/L    Calcium 10.0 8.7 - 10.4 mg/dL    Total Protein 8.4 (H) 5.7 - 8.2 g/dL    Albumin 3.60 3.20 - 4.80 g/dL    ALT (SGPT) 371 (H) 7 - 40 U/L    AST (SGOT) 164 (H) 0 - 33 U/L    Alkaline Phosphatase 158 (H) 25 - 100 U/L    Total Bilirubin 0.8 0.3 - 1.2 mg/dL    eGFR Non African Amer 43 (L) >60 mL/min/1.73    Globulin 4.8 gm/dL    A/G Ratio 0.8 (L) 1.5 - 2.5 g/dL    BUN/Creatinine Ratio 11.9 7.0 - 25.0    Anion Gap 9.0 3.0 - 11.0 mmol/L   BNP    Collection Time: 08/06/17  5:24 PM   Result Value Ref Range    BNP >5000.0 (H) 0.0 - 100.0 pg/mL   Light Blue Top    Collection Time: 08/06/17  5:24 PM   Result Value Ref Range    Extra Tube hold for add-on    Green Top (Gel)    Collection Time: 08/06/17  5:24 PM   Result Value Ref Range    Extra Tube Hold for add-ons.     Lavender Top    Collection Time: 08/06/17  5:24 PM   Result Value Ref Range    Extra Tube hold for add-on    Gold Top - SST    Collection Time: 08/06/17  5:24 PM   Result Value Ref Range    Extra Tube Hold for add-ons.    CBC Auto Differential    Collection Time: 08/06/17  5:24 PM   Result Value Ref Range    WBC 10.81 (H) 3.50 - 10.80 10*3/mm3    RBC 3.77 (L) 4.20 - 5.76 10*6/mm3    Hemoglobin 11.0 (L) 13.1 - 17.5 g/dL    Hematocrit 35.2 (L) 38.9 - 50.9 %    MCV 93.4 80.0 - 99.0 fL    MCH 29.2 27.0 - 31.0 pg    MCHC 31.3 (L) 32.0 - 36.0 g/dL    RDW 20.1 (H) 11.3 - 14.5 %    RDW-SD 69.9 (H) 37.0 - 54.0 fl    MPV 11.3 6.0 - 12.0 fL    Platelets 236 150 - 450 10*3/mm3    Neutrophil % 75.2 (H) 41.0 - 71.0 %    Lymphocyte % 11.0 (L) 24.0 - 44.0 %    Monocyte % 12.1 (H) 0.0 - 12.0 %    Eosinophil % 0.9 0.0 - 3.0 %    Basophil % 0.6 0.0 - 1.0 %    Immature Grans % 0.2 0.0 - 0.6 %    Neutrophils, Absolute 8.13 1.50 - 8.30 10*3/mm3    Lymphocytes, Absolute 1.19 0.60 - 4.80 10*3/mm3    Monocytes, Absolute 1.31 (H) 0.00 - 1.00 10*3/mm3    Eosinophils, Absolute 0.10 0.00 - 0.30 10*3/mm3    Basophils, Absolute 0.06 0.00 - 0.20 10*3/mm3    Immature Grans, Absolute 0.02 0.00 - 0.03 10*3/mm3   Magnesium    Collection Time: 08/06/17  5:24 PM   Result Value Ref Range    Magnesium 2.0 1.3 - 2.7 mg/dL   POC Troponin, Rapid    Collection Time: 08/06/17  5:28 PM   Result Value Ref Range    Troponin I 0.07 0.00 - 0.07 ng/mL     Note: In addition to lab results from this visit, the labs listed above may include labs taken at another facility or during a different encounter within the last 24 hours. Please correlate lab times with ED admission and discharge times for further clarification of the services performed during this visit.    XR Chest 1 View   Preliminary Result   Cardiomegaly with patchy diffuse airspace disease but   without consolidation. Considering some difference in technical factors,   there has been little change since  07/17/2017.       DICTATED:     08/06/2017   EDITED:         08/06/2017                Vitals:    08/06/17 1708 08/06/17 1710 08/06/17 1713 08/06/17 1900   BP: 174/97   158/100   BP Location: Left arm      Patient Position: Sitting      Pulse: 93   86   Resp: 22      Temp:   98.6 °F (37 °C)    TempSrc:   Oral    SpO2: (!) 87% 95%  96%   Weight:       Height:         Medications   sodium chloride 0.9 % flush 10 mL (not administered)   nitroglycerin (NITROSTAT) ointment 1 inch (1 inch Topical Given 8/6/17 1748)   furosemide (LASIX) injection 80 mg (80 mg Intravenous Given 8/6/17 1751)     ECG/EMG Results (last 24 hours)     Procedure Component Value Units Date/Time    ECG 12 Lead [[id number]] Collected:  08/06/17 1712     Updated:  08/06/17 1841    Narrative:       Test Reason : SOA Protocol  Blood Pressure : **/** mmHG  Vent. Rate : 090 BPM     Atrial Rate : 090 BPM     P-R Int : 182 ms          QRS Dur : 164 ms      QT Int : 426 ms       P-R-T Axes : 061 -30 120 degrees     QTc Int : 521 ms    Normal sinus rhythm  Left axis deviation  Left bundle branch block  Abnormal ECG  When compared with ECG of 17-JUL-2017 12:11,  SD interval has decreased  Vent. rate has increased BY  35 BPM  Left bundle branch block is now present  Confirmed by ALDAIR BLAND, WYATT ([id number]) on 8/6/2017 6:41:39 PM    Referred By:  ERMD           Confirmed By:WYATT JACOB MD                    [facility]    Final diagnoses:   SOB (shortness of breath)   Renal insufficiency   Congestive heart failure, unspecified congestive heart failure chronicity, unspecified congestive heart failure type   Hypoxemia   Cardiomyopathy       Documentation assistance provided by christine Pichardo.  Information recorded by the scribe was done at my direction and has been verified and validated by me.     Alison Pichardo  08/06/17 1741       Alison Pichardo  08/06/17 1904       Cheko Jacob MD  08/06/17 [id number]

## 2017-08-07 NOTE — PLAN OF CARE
Problem: Patient Care Overview (Adult)  Goal: Plan of Care Review  Outcome: Ongoing (interventions implemented as appropriate)    08/07/17 1155   Coping/Psychosocial Response Interventions   Plan Of Care Reviewed With patient   Patient Care Overview   Progress progress toward functional goals as expected   Outcome Evaluation   Outcome Summary/Follow up Plan Pt presents w/ decreased strength and endurance from baseline. Pt Min Ax2 for STS t/f and CGAx2 to ambulate in hallway, though limited d/t fatigue. Recommend pt DC home w/ assist and HH OT/PT services. Recommend cont skilled IPOT intervention.          Problem: Inpatient Occupational Therapy  Goal: Transfer Training Goal 1 LTG- OT  Outcome: Ongoing (interventions implemented as appropriate)    08/07/17 1155   Transfer Training OT LTG   Transfer Training OT LTG, Date Established 08/07/17   Transfer Training OT LTG, Time to Achieve by discharge   Transfer Training OT LTG, Activity Type sit to stand/stand to sit;bed to chair /chair to bed;toilet   Transfer Training OT LTG, [place] Level supervision required   Transfer Training OT LTG, Additional Goal AAD       Goal: Patient Education Goal LTG- OT  Outcome: Ongoing (interventions implemented as appropriate)    08/07/17 1155   Patient Education OT LTG   Patient Education OT LTG, Date Established 08/07/17   Patient Education OT LTG, Time to Achieve by discharge   Patient Education OT LTG, Education Type written program;HEP;positioning;posture/body mechanics;home safety;adaptive equipment mgmt;energy conservation;adaptive breathing   Patient Education OT LTG, Education Understanding verbalizes understanding       Goal: LB Dressing Goal LTG- OT  Outcome: Ongoing (interventions implemented as appropriate)    08/07/17 1155   LB Dressing OT LTG   LB Dressing Goal OT LTG, Date Established 08/07/17   LB Dressing Goal OT LTG, Time to Achieve by discharge   LB Dressing Goal OT LTG, Activity Type Don socks/pants   LB  Dressing Goal OT LTG, [place] Level contact guard assist   LB Dressing Goal OT LTG, Additional Goal AAD       Goal: Activity Tolerance Goal LTG- OT  Outcome: Ongoing (interventions implemented as appropriate)    08/07/17 1155   Activity Tolerance OT LTG   Activity Tolerance Goal OT LTG, Date Established 08/07/17   Activity Tolerance Goal OT LTG, Time to Achieve by discharge   Activity Tolerance Goal OT LTG, Activity Level 15 min activity   Activity Tolerance Goal OT LTG, Additional Goal x1 seated rest break, O2 sats >89%

## 2017-08-07 NOTE — PROGRESS NOTES
"      HOSPITALIST DAILY PROGRESS NOTE    Chief Complaint: SOA    Subjective   SUBJECTIVE/OVERNIGHT EVENTS   Sitting up in bed with multiple family members at bedside. Feeling much betrer since arrival after IV lasix. Per family had fever > 100 overnight, however this was not charted. No new complaints.     Review of Systems:  Gen-no fevers, no chills  CV-no chest pain, no palpitations  Resp-no cough, no dyspnea  GI-no N/V/D, no abd pain    Objective   OBJECTIVE   I have reviewed the vital signs.  /78  Pulse 69  Temp 97.4 °F (36.3 °C) (Oral)   Resp 17  Ht 68\" (172.7 cm)  Wt 189 lb 3.2 oz (85.8 kg)  SpO2 96%  BMI 28.77 kg/m2    Physical Exam:  Gen-no acute distress, up in bed  CV-RRR, S1 S2 normal, no m/r/g  Resp-CTAB, bibasilar crackles  Abd-soft, NT, ND, +BS  Ext-no edema  Neuro-A&Ox3, no focal deficits  Psych-appropriate mood    Results:  I have reviewed the labs, culture data, radiology results, and diagnostic studies.      Results from last 7 days  Lab Units 08/07/17  0241 08/06/17  1724   WBC 10*3/mm3 10.17 10.81*   HEMOGLOBIN g/dL 9.5* 11.0*   HEMATOCRIT % 30.1* 35.2*   PLATELETS 10*3/mm3 217 236       Results from last 7 days  Lab Units 08/07/17  0241   SODIUM mmol/L 140   POTASSIUM mmol/L 3.6   CHLORIDE mmol/L 105   CO2 mmol/L 24.0   BUN mg/dL 22   CREATININE mg/dL 1.80*   GLUCOSE mg/dL 97   CALCIUM mg/dL 9.2       Culture Data:  Cultures:    Blood Culture   Date Value Ref Range Status   08/06/2017 No growth at less than 24 hours  Preliminary   08/06/2017 No growth at less than 24 hours  Preliminary         Radiology Results: CXR personally reviewed with bilateral airspace disease c/w pulmonary edema. Agree with interpretation.  Imaging Results (last 24 hours)     Procedure Component Value Units Date/Time    XR Chest 1 View [[id number]] Collected:  08/06/17 1837     Updated:  08/06/17 1837    Narrative:          EXAMINATION: XR CHEST, SINGLE VIEW - 08/06/2017     INDICATION: Shortness of air.   "   COMPARISON: 07/17/2017.     FINDINGS: The heart is large. There are mild patchy diffuse airspace  changes without consolidation. There is no mass or effusion.           Impression:       Cardiomegaly with patchy diffuse airspace disease but  without consolidation. Considering some difference in technical factors,  there has been little change since 07/17/2017.     DICTATED:     08/06/2017  EDITED:         08/06/2017                I have reviewed the medications.      Assessment/Plan   ASSESSMENT/PLAN    Principal Problem:    Acute on chronic respiratory failure with hypoxia  Active Problems:    Dementia    Hypertension    COPD (chronic obstructive pulmonary disease)    Coronary artery disease involving coronary bypass graft of native heart without angina pectoris    Ischemic cardiomyopathy, most recent LVEF 40%    CKD (chronic kidney disease), stage III    Elevated LFTs    Acute on chronic diastolic heart failure    Normocytic anemia    Weakness    72 year old male who presented from home with a/c respiratory failure thought to be due to a/c systolic heart failure and bronchitis.    Plan:  --This is most likely due to heart failure exacerbation due to discontinuation of his blood pressure medications by his PCP. Had excellent response to IV lasix. Will repeat dose now.   --Per Dr. Stauffer's note from 2 weeks ago if patient's LV function normal okay to minimize medical therapy from heart failure standpoint. At which time he had an echo with near normal EF. He is off coreg due to hypotension. Is now only on entresto .5 tabs which he is currently tolerating well. Will continue this therapy.   --His NSTEMI is likely a type II due to CHF exacerbation and his troponin is down-trending with diuresis. Patient is recently s/p CABG. Continue asa, statin.   --Recheck limited echo to assess EF.  --Have started levaquin 500 mg daily for presumed bronchitis as patient reportedly with temp increase overnight. Will continue  through 8/13.  --His CKD and LFT's are at baseline. Monitor with diuresis.  --Labs in am.      Chasity Sanchez II,   08/07/17  12:43 PM

## 2017-08-07 NOTE — THERAPY EVALUATION
Acute Care - Physical Therapy Initial Evaluation  [facility]     Patient Name: Magdi Arriaga  : 1945  MRN: [id number]  Today's Date: 2017   Onset of Illness/Injury or Date of Surgery Date: 17  Date of Referral to PT: 17  Referring Physician: MORALES Matthews      Admit Date: 2017     Visit Dx:    ICD-10-CM ICD-9-CM   1. SOB (shortness of breath) R06.02 786.05   2. Renal insufficiency N28.9 593.9   3. Congestive heart failure, unspecified congestive heart failure chronicity, unspecified congestive heart failure type I50.9 428.0   4. Hypoxemia R09.02 799.02   5. Cardiomyopathy I42.9 425.4   6. Impaired functional mobility, balance, gait, and endurance Z74.09 V49.89   7. Impaired mobility and ADLs Z74.09 799.89     Patient Active Problem List   Diagnosis   • Abnormal CT scan, chest   • Allergic rhinitis   • Asthma   • Bronchiectasis   • Dementia   • Depression   • Diastolic dysfunction   • Dyslipidemia   • Hypertension   • Obesity   • JONNY (obstructive sleep apnea)   • Osteoarthritis   • Vitamin D deficiency   • NSTEMI (non-ST elevated myocardial infarction)   • Left bundle branch block   • Normocytic anemia   • COPD (chronic obstructive pulmonary disease)   • Coronary artery disease involving coronary bypass graft of native heart without angina pectoris   • Ischemic cardiomyopathy, most recent LVEF 40%   • Atrial flutter   • Acute deep vein thrombosis (DVT) of right lower extremity   • Hematoma, calf   • History of exposure to infectious disease-Patient received platelet transfusion for CABG, 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   • Chronic congestive heart failure   • CKD (chronic kidney disease), stage III   • Cellulitis of left lower leg   • Elevated LFTs   • Hypotension   • Iron deficiency   • Wound of left leg   • Acute on chronic respiratory failure with hypoxia   • Acute on chronic diastolic heart failure   • Normocytic anemia   • Weakness     Past Medical  History:   Diagnosis Date   • Abnormal finding on lung imaging      CT scan of the chest to be wary 2013 reports interstitial lung disease with marked diffuse interstitial and peripheral scarring and significant bronchiectasis in both upper   and lower lung zones.    • Allergic rhinitis     History of allergy injections as a child.   • Arthritis    • Asthma      Patient has had a history of asthma since childhood.   • CHF (congestive heart failure) 2017    ECHO - EF 25%   • COPD (chronic obstructive pulmonary disease)    • Coronary artery disease 2017    CABG X 5   • DVT (deep venous thrombosis)     Bilateral   • Exposure to hepatitis B 2017    Transfusion - managed by ID   • Falls     A.  History of falling traumatic injuries in April 2014 and May 2014 resulting and left rib  fracture and left clavicle fracture.   • Fracture of rib    • History of chest x-ray 02/19/2016    Extensive, but stable postinflammatory pulmonary changes. There has been no change since the previous examination of 12/03/2015   • History of chest x-ray 12/03/2015    Slight increased markings bilaterally, may be progression of his bronchiectasis   • History of chest x-ray 09/06/2014    There has been no change since 09/05/2014   • History of echocardiogram 02/19/2016    Mild concentric LVH is observed.Estimated EF is 50-55%.E to A reversal in the mitral valve flow pattern suggestive of diastolic dysfunction.RV is mildly dilated.Moderate aortic cusp sclerosis is present.Mitral annular calcification.Mild MR.Evidence of borderline pulmonary hypertension.   • History of PFTs 12/03/2015    Values are slightly worse.TLC is c/w mild restriction.Values reduced from 02/16/13.Diffusion capacity is slightly worse   • History of PFTs 02/06/2013    Mild decrease in FVC, may be due to less maximal effort.TLC is within normal levels. Mild reduction in diffusion in absolute value   • History of transfusion    • Hypertension    • Lumbar spinal stenosis 2013     Spinal surgery   • Patellar tendon rupture     A.  Status post primary repair of the left periprosthetic patellar tendon tear 6/11/2014, post fall at the end of April 2014.   • Pneumonia      A.  Left lower lobe pneumonia treated at [facility], 5/8/2014 through 5/20/2014.   • Pneumothorax      A.  Status post chest tube placed 5/13/2014 and discontinued 5/15/2014 with stable chest x-ray.   • Sleep apnea with use of continuous positive airway pressure (CPAP)    • Traumatic hemorrhagic shock      A.  Secondary to right renal retroperitoneal hemorrhage, 9/2014.     Past Surgical History:   Procedure Laterality Date   • APPENDECTOMY  1959   • CARDIAC CATHETERIZATION N/A 4/18/2017    Procedure: Left Heart Cath;  Surgeon: Kvng Stauffer MD;  Location:  [facility];  Service:    • COLONOSCOPY W/ POLYPECTOMY     • CORONARY ARTERY BYPASS GRAFT N/A 4/24/2017    Procedure: MEDIAN STERNOTOMY, CORONARY ARTERY BYPASS GRAFT X5 UTILIZING THE INTERNAL MAMMARY ARTERY, ENDOVASCULAR VEING HARVEST UTILIZING RIGHT SAPHENOUS VEIN,TRANSESOPHAGEAL ECHO;  Surgeon: Kanu Berger MD;  Location: [facility] include [facility] beds at the [facility] OR;  Service:    • FRACTURE SURGERY     • KNEE SURGERY  2006, 2014    primary repair -left periprosthetic patellar tendon tear 6/11/2014   • LUMBAR LAMINECTOMY  2013    With discectomy   • REPLACEMENT TOTAL KNEE Bilateral 2009, 2013    Left - 2009 and right - 2013.   • SKIN BIOPSY     • TONSILLECTOMY  1957   • VENA CAVA FILTER PLACEMENT  2014          PT ASSESSMENT (last 72 hours)      PT Evaluation       08/07/17 1239 08/07/17 0916    Rehab Evaluation    Document Type  evaluation  -CL    Subjective Information  agree to therapy;complains of;weakness  -CL    Patient Effort, Rehab Treatment  good  -CL    Symptoms Noted During/After Treatment  fatigue  -CL    General Information    Patient Profile Review  yes  -CL    Onset of Illness/Injury or Date of Surgery Date  08/06/17  -CL    Referring Physician  MORALES Matthews  -CL    Pertinent  History Of Current Problem  Pt presented to ED w/ worsening SOA and dyspnea. Pt recently admitted 07/19-07/21 d/t hypotension and poor EF. Pt s/p CABG on 04/24/2017 w/ subsequent Life Vest. Pt found to have acute on chronic resp failure w/ hypoxia and CHF exacerbation.   -CL    Precautions/Limitations  cardiac precautions;fall precautions;oxygen therapy device and L/min;other (see comments)   Life Vest  -CL    Prior Level of Function  independent:;all household mobility;transfer;ADL's;dressing;bathing  -CL    Equipment Currently Used at Home cane, straight;walker, rolling   CPAP through patient aides  -BG cane, straight;raised toilet;shower chair;walker, rolling  -CL    Plans/Goals Discussed With  patient;spouse/S.O.;agreed upon  -CL    Risks Reviewed  patient:;spouse/S.O.:;LOB;nausea/vomiting;increased discomfort;dizziness;change in vital signs;lines disloged  -CL    Benefits Reviewed  patient:;spouse/S.O.:;improve function;increase independence;increase strength;increase balance;decrease pain;increase knowledge  -CL    Barriers to Rehab  medically complex  -CL    Living Environment    Lives With spouse  -BG spouse  -CL    Living Arrangements house  -BG house  -CL    Home Accessibility no concerns  -BG stairs to enter home;bed and bath are not on the first floor   walk-in shower  -CL    Number of Stairs to Enter Home  3  -CL    Type of Financial/Environmental Concern none  -BG     Transportation Available car;family or friend will provide  -BG     Living Environment Comment  Pt has stair lift to second floor to bedroom/bathroom.   -CL    Vital Signs    Pre Systolic BP Rehab  134  -CL    Pre Treatment Diastolic BP  78  -CL    Intra Systolic BP Rehab  120  -CL    Intra Treatment Diastolic BP  73  -CL    Post Systolic BP Rehab  124  -CL    Post Treatment Diastolic BP  78  -CL    Pretreatment Heart Rate (beats/min)  75  -CL    Posttreatment Heart Rate (beats/min)  82  -CL    Pre SpO2 (%)  90  -CL    O2 Delivery Pre  Treatment  room air  -CL    Post SpO2 (%)  95  -CL    O2 Delivery Post Treatment  supplemental O2  -CL    Pre Patient Position  Supine  -CL    Intra Patient Position  Standing  -CL    Post Patient Position  Sitting  -CL    Pain Assessment    Pain Assessment  No/denies pain  -CL    Cognitive Assessment/Intervention    Current Cognitive/Communication Assessment  functional  -CL    Orientation Status  oriented x 4  -CL    Follows Commands/Answers Questions  100% of the time  -CL    Personal Safety  WNL/WFL  -CL    Personal Safety Interventions  fall prevention program maintained;gait belt;nonskid shoes/slippers when out of bed  -CL    ROM (Range of Motion)    General ROM Detail  LUE shldr FE ~120, remainder grossly WFL.   -CL    MMT (Manual Muscle Testing)    General MMT Assessment Detail  BUE grossly 4-/5.   -CL    Bed Mobility, Assessment/Treatment    Bed Mobility, Assistive Device  bed rails;head of bed elevated  -CL    Bed Mob, Supine to Sit, [place]  contact guard assist;verbal cues required  -CL    Bed Mobility, Comment  VCs for sequencing.   -CL    Transfer Assessment/Treatment    Transfers, Sit-Stand [place]  minimum assist (75% patient effort);2 person assist required;verbal cues required  -CL    Transfers, Stand-Sit [place]  contact guard assist;2 person assist required;verbal cues required  -CL    Transfers, Sit-Stand-Sit, Assist Device  rolling walker  -CL    Transfer, Comment  VCs for HP/sequencing. Pt stood from EOB.   -CL    Motor Skills/Interventions    Additional Documentation  Balance Skills Training (Group)  -CL    Balance Skills Training    Sitting-Level of Assistance  Distant supervision  -CL    Sitting-Balance Support  Right upper extremity supported;Left upper extremity supported;Feet supported  -CL    Sitting-Balance Activities  Forward lean;Reaching for objects;Trunk control activities  -CL    Standing-Level of Assistance  Contact guard  -CL    Static Standing Balance Support   assistive device  -CL    Standing-Balance Activities  Weight Shift A-P;Weight Shift R-L  -CL    Gait Balance-Level of Assistance  Contact guard  -CL    Gait Balance Support  assistive device  -CL    Gait Balance Activities  side-stepping;scanning environment R/L  -CL    Therapy Exercises    Bilateral Upper Extremity  10 reps;AROM:;elbow flexion/extension;hand pumps;shoulder extension/flexion;shoulder horizontal abd/add;shoulder protraction/retraction  -CL    Positioning and Restraints    Pre-Treatment Position  in bed  -CL    Post Treatment Position  chair  -CL    In Chair  notified nsg;reclined;call light within reach;encouraged to call for assist;exit alarm on;with family/caregiver;waffle cushion;legs elevated  -CL      08/07/17 0915 08/06/17 2021    Rehab Evaluation    Document Type evaluation  -DM     Subjective Information agree to therapy;complains of;weakness   10-min.delay whileMDassessed;incont.urine(onLasix);cleaned  -DM     Patient Effort, Rehab Treatment good  -DM     Symptoms Noted During/After Treatment fatigue;shortness of breath;significant change in vital signs  -DM     General Information    Patient Profile Review yes  -DM     Onset of Illness/Injury or Date of Surgery Date 08/06/17  -DM     Referring Physician MORALES Elizabeth  -DM     General Observations sup, in bed; tele; o2; iv hep locked; life vest in place; scuds;incont urine(gown/pad soaked);changed both/hygiene performed; issued waff cushion & chair alarm;  had prayer,then exited   -DM     Pertinent History Of Current Problem SOA x 4 days, w/ COPE,NON-PROG cough, periph edema & 11 lb wt gain; PCP init. lasix 3 days P.T.A. on 8-3; recent adm. to hosp. service 7-19 to 7-21 for hypot.   -DM     Precautions/Limitations cardiac precautions;fall precautions;oxygen therapy device and L/min;other (see comments)   dementia;CABG 4/'17;Life Vest till 9/'17;recent hypot.   -DM     Prior Level of Function independent:;all household  mobility;gait;transfer;ADL's;bed mobility;mod assist:;home management   indep gt w/ SPC; wife does major.of household tasks  -DM     Equipment Currently Used at Home cane, straight;raised toilet;walker, rolling;oxygen;shower chair;grab bar  -DM     Plans/Goals Discussed With patient;spouse/S.O.;agreed upon  -DM     Risks Reviewed patient:;spouse/S.O.:;LOB;increased discomfort;change in vital signs;lines disloged  -DM     Benefits Reviewed patient:;spouse/S.O.:;improve function;increase independence;increase strength;increase balance;increase knowledge  -DM     Barriers to Rehab medically complex;cognitive status  -DM     Living Environment    Lives With spouse  -DM spouse  -LS    Living Arrangements house  -DM house  -LS    Home Accessibility bed and bath are not on the first floor;stairs to enter home  -DM grab bars present (toilet);grab bars present (bathtub);stairs to enter home  -LS    Number of Stairs to Enter Home 3  -DM 3  -LS    Stair Railings at Home none  -DM none  -LS    Type of Financial/Environmental Concern none  -DM none  -LS    Transportation Available car;family or friend will provide  -DM car;family or friend will provide  -LS    Living Environment Comment 1st fl. bath, but stair lift to 2nd fl.bdrm/ba.  -DM     Clinical Impression    Date of Referral to PT 08/06/17  -DM     PT Diagnosis impaired funct mobil.  -DM     Criteria for Skilled Therapeutic Interventions Met yes;treatment indicated  -DM     Pathology/Pathophysiology Noted (Describe Specifically for Each System) pulmonary  -DM     Impairments Found (describe specific impairments) gait, locomotion, and balance  -DM     Rehab Potential good, to achieve stated therapy goals  -DM     Vital Signs    Pre Systolic BP Rehab 134  -DM     Pre Treatment Diastolic BP 78  -DM     Intra Systolic BP Rehab 120  -DM     Intra Treatment Diastolic BP 73  -DM     Post Systolic BP Rehab 124  -DM     Post Treatment Diastolic BP 78  -DM     Pretreatment Heart  Rate (beats/min) 75  -DM     Intratreatment Heart Rate (beats/min) 82  -DM     Posttreatment Heart Rate (beats/min) 73  -DM     Pre SpO2 (%) 90  -DM     O2 Delivery Pre Treatment room air   pt.had pulled o2 off;RA sat.90%;o2 replaced;sat. 94%  -DM     Intra SpO2 (%) 94  -DM     O2 Delivery Intra Treatment supplemental O2   4 L  -DM     Post SpO2 (%) 95  -DM     O2 Delivery Post Treatment supplemental O2   4 L  -DM     Pre Patient Position Supine  -DM     Intra Patient Position Standing  -DM     Post Patient Position Sitting  -DM     Pain Assessment    Pain Assessment No/denies pain  -DM     Cognitive Assessment/Intervention    Current Cognitive/Communication Assessment functional  -DM     Orientation Status oriented x 4  -DM     Follows Commands/Answers Questions 100% of the time;able to follow single-step instructions;needs cueing;needs increased time;needs repetition  -DM     Personal Safety WNL/WFL  -DM     Personal Safety Interventions fall prevention program maintained;gait belt;nonskid shoes/slippers when out of bed  -DM     ROM (Range of Motion)    General ROM no range of motion deficits identified  -DM     MMT (Manual Muscle Testing)    General MMT Assessment lower extremity strength deficits identified   L hip flex 4/5  -DM     Bed Mobility, Assessment/Treatment    Bed Mobility, Assistive Device bed rails;head of bed elevated  -DM     Bed Mobility, Roll Right, [place] conditional independence  -DM     Bed Mob, Supine to Sit, [place] contact guard assist;verbal cues required  -DM     Bed Mobility, Safety Issues decreased use of arms for pushing/pulling  -DM     Bed Mobility, Impairments strength decreased;impaired balance  -DM     Bed Mobility, Comment cues for hand placement  -DM     Transfer Assessment/Treatment    Transfers, Sit-Stand [place] minimum assist (75% patient effort);2 person assist required;verbal cues required  -DM     Transfers, Stand-Sit [place] contact guard assist;2  person assist required;verbal cues required  -DM     Transfers, Sit-Stand-Sit, Assist Device rolling walker  -DM     Transfer, Safety Issues weight-shifting ability decreased  -DM     Transfer, Impairments strength decreased;impaired balance  -DM     Transfer, Comment cues for hand placement  -DM     Gait Assessment/Treatment    Gait, [place] Level minimum assist (75% patient effort);1 person + 1 person to manage equipment;verbal cues required   1 stand.rest  -DM     Gait, Assistive Device rolling walker  -DM     Gait, Distance (Feet) 250  -DM     Gait, Gait Pattern Analysis swing-through gait  -DM     Gait, Gait Deviations kasi decreased;decreased heel strike;forward flexed posture;step length decreased;weight-shifting ability decreased;narrow base   drifts R into doorway/toward chair rail onR;scissors on turn  -DM     Gait, Safety Issues step length decreased;supplemental O2  -DM     Gait, Impairments strength decreased;impaired balance  -DM     Gait, Comment cues for incr. step length,trunk ext, PLB   Onset min.incr. SOB,mod.fatigue  -DM     Motor Skills/Interventions    Additional Documentation Balance Skills Training (Group)  -DM     Balance Skills Training    Sitting-Level of Assistance Distant supervision  -DM     Sitting-Balance Support Right upper extremity supported;Left upper extremity supported;Feet supported  -DM     Sitting-Balance Activities Forward lean;Reaching for objects;Trunk control activities   scooting;LE exer  -DM     Sitting # of Minutes 6  -DM     Standing-Level of Assistance Contact guard  -DM     Static Standing Balance Support assistive device  -DM     Standing-Balance Activities Weight Shift A-P;Weight Shift R-L   MIP  -DM     Standing Balance # of Minutes 1  -DM     Gait Balance-Level of Assistance Contact guard   CGA in room  -DM     Gait Balance Support assistive device  -DM     Gait Balance Activities side-stepping;scanning environment R/L  -DM     Therapy Exercises     Bilateral Lower Extremities AROM:;10 reps;sitting;standing;ankle pumps/circles;heel slides;hip abduction/adduction;hip flexion;LAQ;quad sets   MIP  -DM     Positioning and Restraints    Pre-Treatment Position in bed  -DM     Post Treatment Position chair  -DM     In Chair notified nsg;reclined;call light within reach;encouraged to call for assist;exit alarm on;with family/caregiver;waffle cushion;legs elevated  -DM       08/06/17 2019       General Information    Equipment Currently Used at Home cane, quad;bipap/ cpap;walker, rolling;shower chair;grab bar;raised toilet  -       User Key  (r) = Recorded By, (t) = Taken By, (c) = Cosigned By    Initials Name Provider Type    MAJO Canela, PT Physical Therapist    LS Shannon Pennington, RN Registered Nurse    BG Qiana Milan, MSW     BABAK Velasco, OT Occupational Therapist          Physical Therapy Education     Title: PT OT SLP Therapies (Active)     Topic: Physical Therapy (Active)     Point: Mobility training (Active)    Learning Progress Summary    Learner Readiness Method Response Comment Documented by Status   Patient Eager SAYRAE NR  MAJO 08/07/17 1041 Active   Significant Other PAMELA Chow NR  DM 08/07/17 1041 Active               Point: Home exercise program (Active)    Learning Progress Summary    Learner Readiness Method Response Comment Documented by Status   Patient Eagriki COLBERTE NR  MAJO 08/07/17 1041 Active   Significant Other PAMELA Chow NR  MAJO 08/07/17 1041 Active               Point: Body mechanics (Active)    Learning Progress Summary    Learner Readiness Method Response Comment Documented by Status   Patient EagPAMELA Hernandez NR  MAJO 08/07/17 1041 Active   Significant Other PAMELA Chow NR  MAJO 08/07/17 1041 Active               Point: Precautions (Active)    Learning Progress Summary    Learner Readiness Method Response Comment Documented by Status   Patient Eager SAYRAE NR  MAJO 08/07/17 1041 Active   Significant Other PAMELA Chow NR  MAJO 08/07/17 1041  Active                      User Key     Initials Effective Dates Name Provider Type Discipline    DM 06/19/15 -  Loretta Canela, PT Physical Therapist PT                PT Recommendation and Plan  Anticipated Discharge Disposition: home with assist  PT Frequency: daily  Plan of Care Review  Plan Of Care Reviewed With: patient, spouse  Progress: progress towards functional goals is fair  Outcome Summary/Follow up Plan: Presents w/ evolving sympt, incl. 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 falls, & recent IVC d/t LE DVT;limited by baseline demential;able to amb 250 ft w/ R wx,but  mod SOB/fatigue; recommend f/u HHPT at d/c to ret. to PLOF             IP PT Goals       08/07/17 1041          Bed Mobility PT STG    Bed Mobility PT STG, Date Established 08/07/17  -DM      Bed Mobility PT STG, Time to Achieve 3 days  -DM      Bed Mobility PT STG, Activity Type all bed mobility  -DM      Bed Mobility PT STG, [place] Level independent  -DM      Transfer Training PT STG    Transfer Training PT STG, Date Established 08/07/17  -DM      Transfer Training PT STG, Time to Achieve 3 days  -DM      Transfer Training PT STG, Activity Type all transfers  -DM      Transfer Training PT STG, [place] Level independent  -DM      Transfer Training PT STG, Assist Device cane, straight  -DM      Gait Training PT STG    Gait Training Goal PT STG, Date Established 08/07/17  -DM      Gait Training Goal PT STG, Time to Achieve 3 days  -DM      Gait Training Goal PT STG, [place] Level supervision required   STABLE VS  -DM      Gait Training Goal PT STG, Assist Device walker, rolling  -DM      Gait Training Goal PT STG, Distance to Achieve 350  -DM      Stair Training PT STG    Stair Training Goal PT STG, Date Established 08/07/17  -DM      Stair Training Goal PT STG, Time to Achieve 3 days  -DM      Stair Training Goal PT STG, Number of Steps 3  -DM       Stair Training Goal PT STG, [place] Level independent  -DM      Stair Training Goal PT STG, Assist Device cane, straight  -DM        User Key  (r) = Recorded By, (t) = Taken By, (c) = Cosigned By    Initials Name Provider Type    DM Loretta Canela, PT Physical Therapist                Outcome Measures       08/07/17 0916 08/07/17 0915       How much help from another person do you currently need...    Turning from your back to your side while in flat bed without using bedrails?  4  -DM     Moving from lying on back to sitting on the side of a flat bed without bedrails?  3  -DM     Moving to and from a bed to a chair (including a wheelchair)?  3  -DM     Standing up from a chair using your arms (e.g., wheelchair, bedside chair)?  3  -DM     Climbing 3-5 steps with a railing?  3  -DM     To walk in hospital room?  3  -DM     AM-PAC 6 Clicks Score  19  -DM     How much help from another is currently needed...    Putting on and taking off regular lower body clothing? 2  -CL      Bathing (including washing, rinsing, and drying) 2  -CL      Toileting (which includes using toilet bed pan or urinal) 2  -CL      Putting on and taking off regular upper body clothing 3  -CL      Taking care of personal grooming (such as brushing teeth) 3  -CL      Eating meals 4  -CL      Score 16  -CL      Functional Assessment    Outcome Measure Options AM-PAC 6 Clicks Daily Activity (OT)  -CL AM-PAC 6 Clicks Basic Mobility (PT)  -DM       User Key  (r) = Recorded By, (t) = Taken By, (c) = Cosigned By    Initials Name Provider Type    MAJO Canela, PT Physical Therapist    CL Margarita Velasco, OT Occupational Therapist           Time Calculation:         PT Charges       08/07/17 1041          Time Calculation    Start Time 0915  -DM      PT Received On 08/07/17  -DM      PT Goal Re-Cert Due Date 08/17/17  -DM      Time Calculation- PT    Total Timed Code Minutes- PT 36 minute(s)  -DM        User Key  (r) = Recorded By, (t) = Taken  By, (c) = Cosigned By    Initials Name Provider Type    DM Loretta Canela, PT Physical Therapist          Therapy Charges for Today     Code Description Service Date Service Provider Modifiers Qty    96536998414 HC PT EVAL MOD COMPLEXITY 4 8/7/2017 Loretta Canela, PT GP 1    68484642097 HC PT THER PROC EA 15 MIN 8/7/2017 Loretta Canela, PT GP 1    52932955707 HC GAIT TRAINING EA 15 MIN 8/7/2017 Loretta Canela, PT GP 1          PT G-Codes  Outcome Measure Options: AM-PAC 6 Clicks Daily Activity (OT)      Loretta Canela, PT  8/7/2017

## 2017-08-07 NOTE — PLAN OF CARE
Problem: Patient Care Overview (Adult)  Goal: Plan of Care Review  Outcome: Ongoing (interventions implemented as appropriate)    08/07/17 1041   Coping/Psychosocial Response Interventions   Plan Of Care Reviewed With patient;spouse   Patient Care Overview   Progress progress towards functional goals is fair   Outcome Evaluation   Outcome Summary/Follow up Plan Presents w/ evolving sympt, incl. 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 to PLOF          Problem: Inpatient Physical Therapy  Goal: Bed Mobility Goal STG- PT  Outcome: Ongoing (interventions implemented as appropriate)    08/07/17 1041   Bed Mobility PT STG   Bed Mobility PT STG, Date Established 08/07/17   Bed Mobility PT STG, Time to Achieve 3 days   Bed Mobility PT STG, Activity Type all bed mobility   Bed Mobility PT STG, [place] Level independent       Goal: Transfer Training Goal 1 STG- PT  Outcome: Ongoing (interventions implemented as appropriate)    08/07/17 1041   Transfer Training PT STG   Transfer Training PT STG, Date Established 08/07/17   Transfer Training PT STG, Time to Achieve 3 days   Transfer Training PT STG, Activity Type all transfers   Transfer Training PT STG, [place] Level independent   Transfer Training PT STG, Assist Device cane, straight       Goal: Gait Training Goal STG- PT  Outcome: Ongoing (interventions implemented as appropriate)    08/07/17 1041   Gait Training PT STG   Gait Training Goal PT STG, Date Established 08/07/17   Gait Training Goal PT STG, Time to Achieve 3 days   Gait Training Goal PT STG, [place] Level supervision required  (STABLE VS)   Gait Training Goal PT STG, Assist Device walker, rolling   Gait Training Goal PT STG, Distance to Achieve 350       Goal: Stair Training Goal  STG- PT  Outcome: Ongoing (interventions implemented as appropriate)    08/07/17 1041   Stair Training PT STG   Stair Training Goal PT STG, Date Established 08/07/17   Stair Training Goal PT STG, Time to Achieve 3 days   Stair Training Goal PT STG, Number of Steps 3   Stair Training Goal PT STG, [place] Level independent   Stair Training Goal PT STG, Assist Device cane, straight

## 2017-08-07 NOTE — PLAN OF CARE
Problem: Patient Care Overview (Adult)  Goal: Plan of Care Review  Outcome: Ongoing (interventions implemented as appropriate)    08/07/17 0422   Coping/Psychosocial Response Interventions   Plan Of Care Reviewed With spouse;patient   Patient Care Overview   Progress no change   Outcome Evaluation   Outcome Summary/Follow up Plan Patient new admit this shift; no acute overnight events. No c/o pain. o2 sats mid 90's on 4 L NC and CPAP overnight.         Problem: Fall Risk (Adult)  Goal: Identify Related Risk Factors and Signs and Symptoms  Outcome: Ongoing (interventions implemented as appropriate)  Goal: Absence of Falls  Outcome: Ongoing (interventions implemented as appropriate)    Problem: Cardiac: Heart Failure (Adult)  Goal: Signs and Symptoms of Listed Potential Problems Will be Absent or Manageable (Cardiac: Heart Failure)  Outcome: Ongoing (interventions implemented as appropriate)

## 2017-08-07 NOTE — PROGRESS NOTES
Discharge Planning Assessment  [facility]     Patient Name: Magdi Arriaga  MRN: [id number]  Today's Date: 8/7/2017    Admit Date: 8/6/2017          Discharge Needs Assessment       08/07/17 1239    Living Environment    Lives With spouse    Living Arrangements house    Home Accessibility no concerns    Type of Financial/Environmental Concern none    Transportation Available car;family or friend will provide    Living Environment    Provides Primary Care For no one    Quality Of Family Relationships supportive    Able to Return to Prior Living Arrangements yes    Discharge Needs Assessment    Concerns To Be Addressed no discharge needs identified    Equipment Currently Used at Home cane, straight;walker, rolling   CPAP through patient aides    Equipment Needed After Discharge none    Discharge Facility/Level Of Care Needs home with home health            Discharge Plan       08/07/17 1240    Case Management/Social Work Plan    Plan home with [facility]    Patient/Family In Agreement With Plan yes    Additional Comments Spoke with pt and pt's wife at bedside. Pt reports he has all of the equipment he needs at home, has [facility] and would like to discharge home and resume HH with [facility]. Pt had no other discharge needs or concerns at this time. SW called [facility] [phone] and verified that pt has their services. [facility] staff report that pt was just discharged and had been  seen for PT and SN under the diagnosis of chronic kidney disease with heart failure. Pt will need a new order for HH as he was just discharged with [facility]. Home health order will need to be faxed to [phone]. SW continues to follow for discharge needs.     Final Note    Final Note SW is following        Discharge Placement     No information found        Expected Discharge Date and Time     Expected Discharge Date Expected Discharge Time    Aug 9, 2017               Demographic Summary       08/07/17 1238    Referral  Information    Reason For Consult discharge planning            Functional Status       08/07/17 1238    Functional Status Prior    Ambulation 1-->assistive equipment    Transferring 1-->assistive equipment    Toileting 1-->assistive equipment    Bathing 1-->assistive equipment    Dressing 0-->independent    Eating 0-->independent    Communication 0-->understands/communicates without difficulty    IADL    Medications independent    Meal Preparation assistive person    Housekeeping assistive person    Laundry assistive person    Shopping assistive person    Oral Care independent            Psychosocial     None            Abuse/Neglect     None            Legal     None            Substance Abuse     None            Patient Forms     None          SHAMAR Buchanan

## 2017-08-07 NOTE — THERAPY EVALUATION
Acute Care - Occupational Therapy Initial Evaluation  [facility]     Patient Name: Magdi Ariraga  : 1945  MRN: [id number]  Today's Date: 2017  Onset of Illness/Injury or Date of Surgery Date: 17  Date of Referral to OT: 17  Referring Physician: MORALES Matthews    Admit Date: 2017       ICD-10-CM ICD-9-CM   1. SOB (shortness of breath) R06.02 786.05   2. Renal insufficiency N28.9 593.9   3. Congestive heart failure, unspecified congestive heart failure chronicity, unspecified congestive heart failure type I50.9 428.0   4. Hypoxemia R09.02 799.02   5. Cardiomyopathy I42.9 425.4   6. Impaired functional mobility, balance, gait, and endurance Z74.09 V49.89   7. Impaired mobility and ADLs Z74.09 799.89     Patient Active Problem List   Diagnosis   • Abnormal CT scan, chest   • Allergic rhinitis   • Asthma   • Bronchiectasis   • Dementia   • Depression   • Diastolic dysfunction   • Dyslipidemia   • Hypertension   • Obesity   • JONNY (obstructive sleep apnea)   • Osteoarthritis   • Vitamin D deficiency   • NSTEMI (non-ST elevated myocardial infarction)   • Left bundle branch block   • Normocytic anemia   • COPD (chronic obstructive pulmonary disease)   • Coronary artery disease involving coronary bypass graft of native heart without angina pectoris   • Ischemic cardiomyopathy, most recent LVEF 40%   • Atrial flutter   • Acute deep vein thrombosis (DVT) of right lower extremity   • Hematoma, calf   • History of exposure to infectious disease-Patient received platelet transfusion for CABG, 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   • Chronic congestive heart failure   • CKD (chronic kidney disease), stage III   • Cellulitis of left lower leg   • Elevated LFTs   • Hypotension   • Iron deficiency   • Wound of left leg   • Acute on chronic respiratory failure with hypoxia   • Acute on chronic diastolic heart failure   • Normocytic anemia   • Weakness     Past Medical History:    Diagnosis Date   • Abnormal finding on lung imaging      CT scan of the chest to be wary 2013 reports interstitial lung disease with marked diffuse interstitial and peripheral scarring and significant bronchiectasis in both upper   and lower lung zones.    • Allergic rhinitis     History of allergy injections as a child.   • Arthritis    • Asthma      Patient has had a history of asthma since childhood.   • CHF (congestive heart failure) 2017    ECHO - EF 25%   • COPD (chronic obstructive pulmonary disease)    • Coronary artery disease 2017    CABG X 5   • DVT (deep venous thrombosis)     Bilateral   • Exposure to hepatitis B 2017    Transfusion - managed by ID   • Falls     A.  History of falling traumatic injuries in April 2014 and May 2014 resulting and left rib  fracture and left clavicle fracture.   • Fracture of rib    • History of chest x-ray 02/19/2016    Extensive, but stable postinflammatory pulmonary changes. There has been no change since the previous examination of 12/03/2015   • History of chest x-ray 12/03/2015    Slight increased markings bilaterally, may be progression of his bronchiectasis   • History of chest x-ray 09/06/2014    There has been no change since 09/05/2014   • History of echocardiogram 02/19/2016    Mild concentric LVH is observed.Estimated EF is 50-55%.E to A reversal in the mitral valve flow pattern suggestive of diastolic dysfunction.RV is mildly dilated.Moderate aortic cusp sclerosis is present.Mitral annular calcification.Mild MR.Evidence of borderline pulmonary hypertension.   • History of PFTs 12/03/2015    Values are slightly worse.TLC is c/w mild restriction.Values reduced from 02/16/13.Diffusion capacity is slightly worse   • History of PFTs 02/06/2013    Mild decrease in FVC, may be due to less maximal effort.TLC is within normal levels. Mild reduction in diffusion in absolute value   • History of transfusion    • Hypertension    • Lumbar spinal stenosis 2013    Spinal  surgery   • Patellar tendon rupture     A.  Status post primary repair of the left periprosthetic patellar tendon tear 6/11/2014, post fall at the end of April 2014.   • Pneumonia      A.  Left lower lobe pneumonia treated at [facility], 5/8/2014 through 5/20/2014.   • Pneumothorax      A.  Status post chest tube placed 5/13/2014 and discontinued 5/15/2014 with stable chest x-ray.   • Sleep apnea with use of continuous positive airway pressure (CPAP)    • Traumatic hemorrhagic shock      A.  Secondary to right renal retroperitoneal hemorrhage, 9/2014.     Past Surgical History:   Procedure Laterality Date   • APPENDECTOMY  1959   • CARDIAC CATHETERIZATION N/A 4/18/2017    Procedure: Left Heart Cath;  Surgeon: Kvng Stauffer MD;  Location:  [facility];  Service:    • COLONOSCOPY W/ POLYPECTOMY     • CORONARY ARTERY BYPASS GRAFT N/A 4/24/2017    Procedure: MEDIAN STERNOTOMY, CORONARY ARTERY BYPASS GRAFT X5 UTILIZING THE INTERNAL MAMMARY ARTERY, ENDOVASCULAR VEING HARVEST UTILIZING RIGHT SAPHENOUS VEIN,TRANSESOPHAGEAL ECHO;  Surgeon: Kanu Berger MD;  Location: [facility] OR;  Service:    • FRACTURE SURGERY     • KNEE SURGERY  2006, 2014    primary repair -left periprosthetic patellar tendon tear 6/11/2014   • LUMBAR LAMINECTOMY  2013    With discectomy   • REPLACEMENT TOTAL KNEE Bilateral 2009, 2013    Left - 2009 and right - 2013.   • SKIN BIOPSY     • TONSILLECTOMY  1957   • VENA CAVA FILTER PLACEMENT  2014          OT ASSESSMENT FLOWSHEET (last 72 hours)      OT Evaluation       08/07/17 0916 08/07/17 0915 08/06/17 2021 08/06/17 2019       Rehab Evaluation    Document Type evaluation  -CL evaluation  -DM       Subjective Information agree to therapy;complains of;weakness  -CL agree to therapy;complains of;weakness   10-min.delay whileMDassessed;incont.urine(onLasix);cleaned  -DM       Patient Effort, Rehab Treatment good  -CL good  -DM       Symptoms Noted During/After Treatment fatigue  -CL  fatigue;shortness of breath;significant change in vital signs  -DM       General Information    Patient Profile Review yes  -CL yes  -DM       Onset of Illness/Injury or Date of Surgery Date 08/06/17  -CL 08/06/17  -DM       Referring Physician MORALES Matthews  -CL MORALES Elizabeth  -DM       General Observations  sup, in bed; tele; o2; iv hep locked; life vest in place; scuds;incont urine(gown/pad soaked);changed both/hygiene performed; issued waff cushion & chair alarm;  had prayer,then exited   -DM       Pertinent History Of Current Problem Pt presented to ED w/ worsening SOA and dyspnea. Pt recently admitted 07/19-07/21 d/t hypotension and poor EF. Pt s/p CABG on 04/24/2017 w/ subsequent Life Vest. Pt found to have acute on chronic resp failure w/ hypoxia and CHF exacerbation.   -CL SOA x 4 days, w/ COPE,NON-PROG cough, periph edema & 11 lb wt gain; PCP init. lasix 3 days P.T.A. on 8-3; recent adm. to hosp. service 7-19 to 7-21 for hypot.   -DM       Precautions/Limitations cardiac precautions;fall precautions;oxygen therapy device and L/min;other (see comments)   Life Vest  -CL cardiac precautions;fall precautions;oxygen therapy device and L/min;other (see comments)   dementia;CABG 4/'17;Life Vest till 9/'17;recent hypot.   -DM       Prior Level of Function independent:;all household mobility;transfer;ADL's;dressing;bathing  -CL independent:;all household mobility;gait;transfer;ADL's;bed mobility;mod assist:;home management   indep gt w/ SPC; wife does major.of household tasks  -DM       Equipment Currently Used at Home cane, straight;raised toilet;shower chair;walker, rolling  -CL cane, straight;raised toilet;walker, rolling;oxygen;shower chair;grab bar  -DM  cane, quad;bipap/ cpap;walker, rolling;shower chair;grab bar;raised toilet  -LS     Plans/Goals Discussed With patient;spouse/S.O.;agreed upon  -CL        Risks Reviewed patient:;spouse/S.O.:;LOB;nausea/vomiting;increased discomfort;dizziness;change in vital  signs;lines disloged  -CL        Benefits Reviewed patient:;spouse/S.O.:;improve function;increase independence;increase strength;increase balance;decrease pain;increase knowledge  -CL        Barriers to Rehab medically complex  -CL        Living Environment    Lives With spouse  -CL  spouse  -LS      Living Arrangements house  -CL  house  -LS      Home Accessibility stairs to enter home;bed and bath are not on the first floor   walk-in shower  -CL  grab bars present (toilet);grab bars present (bathtub);stairs to enter home  -LS      Number of Stairs to Enter Home 3  -CL  3  -LS      Stair Railings at Home   none  -LS      Type of Financial/Environmental Concern   none  -LS      Transportation Available   car;family or friend will provide  -LS      Living Environment Comment Pt has stair lift to second floor to bedroom/bathroom.   -CL        Clinical Impression    Date of Referral to OT 08/06/17  -CL        OT Diagnosis Decreased independence in ADLs.   -CL        Patient/Family Goals Statement Return to OF.   -CL        Criteria for Skilled Therapeutic Interventions Met yes;treatment indicated  -CL        Rehab Potential good, to achieve stated therapy goals  -CL        Therapy Frequency daily  -CL        Anticipated Equipment Needs At Discharge --   TBA further  -CL        Anticipated Discharge Disposition home with assist;home with home health  -CL        Functional Level Prior    Ambulation    1-->assistive equipment  -LS     Transferring    1-->assistive equipment  -LS     Toileting    1-->assistive equipment  -LS     Bathing    1-->assistive equipment  -LS     Dressing    0-->independent  -LS     Eating    0-->independent  -LS     Communication    0-->understands/communicates without difficulty  -LS     Swallowing    0-->swallows foods/liquids without difficulty  -LS     Vital Signs    Pre Systolic BP Rehab 134  -CL        Pre Treatment Diastolic BP 78  -CL        Intra Systolic BP Rehab 120  -CL         Intra Treatment Diastolic BP 73  -CL        Post Systolic BP Rehab 124  -CL        Post Treatment Diastolic BP 78  -CL        Pretreatment Heart Rate (beats/min) 75  -CL        Posttreatment Heart Rate (beats/min) 82  -CL        Pre SpO2 (%) 90  -CL        O2 Delivery Pre Treatment room air  -CL        Post SpO2 (%) 95  -CL        O2 Delivery Post Treatment supplemental O2  -CL        Pre Patient Position Supine  -CL        Intra Patient Position Standing  -CL        Post Patient Position Sitting  -CL        Pain Assessment    Pain Assessment No/denies pain  -CL        Cognitive Assessment/Intervention    Current Cognitive/Communication Assessment functional  -CL        Orientation Status oriented x 4  -CL        Follows Commands/Answers Questions 100% of the time  -CL        Personal Safety WNL/WFL  -CL        Personal Safety Interventions fall prevention program maintained;gait belt;nonskid shoes/slippers when out of bed  -CL        ROM (Range of Motion)    General ROM Detail LUE shldr FE ~120, remainder grossly WFL.   -CL        MMT (Manual Muscle Testing)    General MMT Assessment Detail BUE grossly 4-/5.   -CL        Bed Mobility, Assessment/Treatment    Bed Mobility, Assistive Device bed rails;head of bed elevated  -CL        Bed Mob, Supine to Sit, Helen contact guard assist;verbal cues required  -CL        Bed Mobility, Comment VCs for sequencing.   -CL        Transfer Assessment/Treatment    Transfers, Sit-Stand Helen minimum assist (75% patient effort);2 person assist required;verbal cues required  -CL        Transfers, Stand-Sit Helen contact guard assist;2 person assist required;verbal cues required  -CL        Transfers, Sit-Stand-Sit, Assist Device rolling walker  -CL        Transfer, Comment VCs for HP/sequencing. Pt stood from EOB.   -CL        Functional Mobility    Functional Mobility- Ind. Level contact guard assist;1 person + 1 person to manage equipment;verbal cues required   -CL        Functional Mobility- Device rolling walker  -CL        Functional Mobility-Distance (Feet) --   in hallway  -CL        Functional Mobility- Comment Pt c/o SOA, O2 sats WNL upon return to room.   -CL        Motor Skills/Interventions    Additional Documentation Balance Skills Training (Group)  -CL        Balance Skills Training    Sitting-Level of Assistance Distant supervision  -CL        Sitting-Balance Support Right upper extremity supported;Left upper extremity supported;Feet supported  -CL        Sitting-Balance Activities Forward lean;Reaching for objects;Trunk control activities  -CL        Standing-Level of Assistance Contact guard  -CL        Static Standing Balance Support assistive device  -CL        Standing-Balance Activities Weight Shift A-P;Weight Shift R-L  -CL        Gait Balance-Level of Assistance Contact guard  -CL        Gait Balance Support assistive device  -CL        Gait Balance Activities side-stepping;scanning environment R/L  -CL        Therapy Exercises    Bilateral Upper Extremity 10 reps;AROM:;elbow flexion/extension;hand pumps;shoulder extension/flexion;shoulder horizontal abd/add;shoulder protraction/retraction  -CL        General Therapy Interventions    Planned Therapy Interventions adaptive equipment training;ADL retraining;balance training;energy conservation;home exercise program;transfer training  -CL        Positioning and Restraints    Pre-Treatment Position in bed  -CL        Post Treatment Position chair  -CL        In Chair notified nsg;reclined;call light within reach;encouraged to call for assist;exit alarm on;with family/caregiver;waffle cushion;legs elevated  -CL          User Key  (r) = Recorded By, (t) = Taken By, (c) = Cosigned By    Initials Name Effective Dates    DM Loretta Canela, PT 06/19/15 -     LS Shannon Pennington RN 06/16/16 -     CL Margarita Velasco OT 06/08/16 -            Occupational Therapy Education     Title: PT OT SLP Therapies (Active)      Topic: Occupational Therapy (Active)     Point: ADL training (Active)    Description: Instruct learner(s) on proper safety adaptation and remediation techniques during self care or transfers.   Instruct in proper use of assistive devices.    Learning Progress Summary    Learner Readiness Method Response Comment Documented by Status   Patient Acceptance E,D NR Pt educated on appropriate safety precautions, t/f techniques, BUE HEP, and benefits of therapy.  08/07/17 1154 Active   Family Acceptance E,D NR Pt educated on appropriate safety precautions, t/f techniques, BUE HEP, and benefits of therapy.  08/07/17 1154 Active               Point: Home exercise program (Active)    Description: Instruct learner(s) on appropriate technique for monitoring, assisting and/or progressing therapeutic exercises/activities.    Learning Progress Summary    Learner Readiness Method Response Comment Documented by Status   Patient Acceptance E,D NR Pt educated on appropriate safety precautions, t/f techniques, BUE HEP, and benefits of therapy.  08/07/17 1154 Active   Family Acceptance E,D NR Pt educated on appropriate safety precautions, t/f techniques, BUE HEP, and benefits of therapy.  08/07/17 1154 Active               Point: Precautions (Active)    Description: Instruct learner(s) on prescribed precautions during self-care and functional transfers.    Learning Progress Summary    Learner Readiness Method Response Comment Documented by Status   Patient Acceptance E,D NR Pt educated on appropriate safety precautions, t/f techniques, BUE HEP, and benefits of therapy.  08/07/17 1154 Active   Family Acceptance E,D NR Pt educated on appropriate safety precautions, t/f techniques, BUE HEP, and benefits of therapy.  08/07/17 1154 Active               Point: Body mechanics (Active)    Description: Instruct learner(s) on proper positioning and spine alignment during self-care, functional mobility activities and/or exercises.     Learning Progress Summary    Learner Readiness Method Response Comment Documented by Status   Patient Acceptance E,D NR Pt educated on appropriate safety precautions, t/f techniques, BUE HEP, and benefits of therapy.  08/07/17 1154 Active   Family Acceptance E,D NR Pt educated on appropriate safety precautions, t/f techniques, BUE HEP, and benefits of therapy.  08/07/17 1154 Active                      User Key     Initials Effective Dates Name Provider Type Discipline     06/08/16 -  Margarita Velasco, OT Occupational Therapist OT                  OT Recommendation and Plan  Anticipated Equipment Needs At Discharge:  (TBA further)  Anticipated Discharge Disposition: home with assist, home with home health  Planned Therapy Interventions: adaptive equipment training, ADL retraining, balance training, energy conservation, home exercise program, transfer training  Therapy Frequency: daily  Plan of Care Review  Plan Of Care Reviewed With: patient  Progress: progress toward functional goals as expected  Outcome Summary/Follow up Plan: Pt presents w/ decreased strength and endurance from baseline. Pt Min Ax2 for STS t/f and CGAx2 to ambulate in hallway, though limited d/t fatigue. Recommend pt DC home w/ assist and HH OT/PT services. Recommend cont skilled IPOT intervention.           OT Goals       08/07/17 1155          Transfer Training OT LTG    Transfer Training OT LTG, Date Established 08/07/17  -CL      Transfer Training OT LTG, Time to Achieve by discharge  -CL      Transfer Training OT LTG, Activity Type sit to stand/stand to sit;bed to chair /chair to bed;toilet  -CL      Transfer Training OT LTG, [place] Level supervision required  -CL      Transfer Training OT LTG, Additional Goal AAD  -CL      Patient Education OT LTG    Patient Education OT LTG, Date Established 08/07/17  -CL      Patient Education OT LTG, Time to Achieve by discharge  -CL      Patient Education OT LTG, Education Type written  program;HEP;positioning;posture/body mechanics;home safety;adaptive equipment mgmt;energy conservation;adaptive breathing  -CL      Patient Education OT LTG, Education Understanding verbalizes understanding  -CL      LB Dressing OT LTG    LB Dressing Goal OT LTG, Date Established 08/07/17  -CL      LB Dressing Goal OT LTG, Time to Achieve by discharge  -CL      LB Dressing Goal OT LTG, Activity Type Don socks/pants  -CL      LB Dressing Goal OT LTG, [place] Level contact guard assist  -CL      LB Dressing Goal OT LTG, Additional Goal AAD  -CL      Activity Tolerance OT LTG    Activity Tolerance Goal OT LTG, Date Established 08/07/17  -CL      Activity Tolerance Goal OT LTG, Time to Achieve by discharge  -CL      Activity Tolerance Goal OT LTG, Activity Level 15 min activity  -CL      Activity Tolerance Goal OT LTG, Additional Goal x1 seated rest break, O2 sats >89%  -CL        User Key  (r) = Recorded By, (t) = Taken By, (c) = Cosigned By    Initials Name Provider Type    BABAK Velasco OT Occupational Therapist                Outcome Measures       08/07/17 0916          How much help from another is currently needed...    Putting on and taking off regular lower body clothing? 2  -CL      Bathing (including washing, rinsing, and drying) 2  -CL      Toileting (which includes using toilet bed pan or urinal) 2  -CL      Putting on and taking off regular upper body clothing 3  -CL      Taking care of personal grooming (such as brushing teeth) 3  -CL      Eating meals 4  -CL      Score 16  -CL      Functional Assessment    Outcome Measure Options AM-PAC 6 Clicks Daily Activity (OT)  -CL        User Key  (r) = Recorded By, (t) = Taken By, (c) = Cosigned By    Initials Name Provider Type    BABAK Velasco OT Occupational Therapist          Time Calculation:   OT Start Time: 0916    Therapy Charges for Today     Code Description Service Date Service Provider Modifiers Qty    [id number] HC OT EVAL LOW COMPLEXITY 4  8/7/2017 Margarita Velasco, OT GO 1               Margarita Velasco, OT  8/7/2017

## 2017-08-07 NOTE — H&P
[facility] Medicine Services  HISTORY AND PHYSICAL    Primary Care Physician: Colton Dickens MD    Subjective     Chief Complaint:  SOA    History of Present Illness:   This is a 72 year old male with a past medical history significant for chronic diastolic dysfunction, JONNY on CPAP nightly, COPD on 2L supplemental oxygen nightly, CAD with CABG X5 4/24/17 with subsequent LifeVest placement until September, DVT with vena cava filter, and recent exposure to Hepatitis B who presents with 4 days of progressively worsening SOB. Dyspnea worse with exertion and improved with rest. There is associated chronic non productive cough and a minimal increase in peripheral edema. States his dry weight is approximately 191.0 but he has recently gone up to about  202.0. No complaints of chest pain, fever, congestion, orthopnea, syncope, or N/V/D. Patient was initially evaluated by home health nurse 3 days ago after which PCP, Dr. Dickens, started Lasix 40 mg BID. Patient has been compliant, but without change in symptoms. States his Cardiologist, Dr. Stauffer, discontinued his long time Lasix in May. Patient was last admitted to this service 7/19-7/21 for hypotension likely multifactorial to poor EF and cardiomyopathy related to MI. At this time, Cardiology also discontinued amlodipine and spironolactone. He has been without chronic diuresis since then. Patient now presenting to [facility] for further evaluation and treatment.    Emergency Department Evaluation: Vitals stable. Patient initially hypoxic at 87 % room air. BNP elevated at > 5,000. Creatinine elevated at 1.6. AST/ALT elevated at 164 and 371 respectively. WBC slightly increased at 10.8. H/H stablt at 11 and 35.2 respectively. CXR demonstrating cardiomegaly with some mild increase in pulmonary vasculature.    Review of Systems   Constitutional: Positive for fatigue and unexpected weight change. Negative for fever.   HENT: Negative for congestion and  trouble swallowing.    Eyes: Negative for photophobia and visual disturbance.   Respiratory: Positive for cough and shortness of breath. Negative for wheezing.    Cardiovascular: Negative for chest pain and leg swelling.   Gastrointestinal: Negative for abdominal pain, diarrhea, nausea and vomiting.   Endocrine: Negative for cold intolerance and heat intolerance.   Genitourinary: Negative for dysuria and flank pain.   Musculoskeletal: Negative for arthralgias and back pain.   Skin: Negative for pallor and rash.   Allergic/Immunologic: Negative for immunocompromised state.   Neurological: Negative for dizziness, syncope and light-headedness.   Hematological: Negative for adenopathy.   Psychiatric/Behavioral: Negative for agitation and confusion.      Otherwise complete ROS performed and negative except as mentioned in the HPI.    Past Medical History:   Diagnosis Date   • Abnormal finding on lung imaging      CT scan of the chest to be wary 2013 reports interstitial lung disease with marked diffuse interstitial and peripheral scarring and significant bronchiectasis in both upper   and lower lung zones.    • Allergic rhinitis     History of allergy injections as a child.   • Arthritis    • Asthma      Patient has had a history of asthma since childhood.   • CHF (congestive heart failure) 2017    ECHO - EF 25%   • COPD (chronic obstructive pulmonary disease)    • Coronary artery disease 2017    CABG X 5   • DVT (deep venous thrombosis)     Bilateral   • Exposure to hepatitis B 2017    Transfusion - managed by ID   • Falls     A.  History of falling traumatic injuries in April 2014 and May 2014 resulting and left rib  fracture and left clavicle fracture.   • Fracture of rib    • History of chest x-ray 02/19/2016    Extensive, but stable postinflammatory pulmonary changes. There has been no change since the previous examination of 12/03/2015   • History of chest x-ray 12/03/2015    Slight increased markings bilaterally,  may be progression of his bronchiectasis   • History of chest x-ray 09/06/2014    There has been no change since 09/05/2014   • History of echocardiogram 02/19/2016    Mild concentric LVH is observed.Estimated EF is 50-55%.E to A reversal in the mitral valve flow pattern suggestive of diastolic dysfunction.RV is mildly dilated.Moderate aortic cusp sclerosis is present.Mitral annular calcification.Mild MR.Evidence of borderline pulmonary hypertension.   • History of PFTs 12/03/2015    Values are slightly worse.TLC is c/w mild restriction.Values reduced from 02/16/13.Diffusion capacity is slightly worse   • History of PFTs 02/06/2013    Mild decrease in FVC, may be due to less maximal effort.TLC is within normal levels. Mild reduction in diffusion in absolute value   • History of transfusion    • Hypertension    • Lumbar spinal stenosis 2013    Spinal surgery   • Patellar tendon rupture     A.  Status post primary repair of the left periprosthetic patellar tendon tear 6/11/2014, post fall at the end of April 2014.   • Pneumonia      A.  Left lower lobe pneumonia treated at [facility], 5/8/2014 through 5/20/2014.   • Pneumothorax      A.  Status post chest tube placed 5/13/2014 and discontinued 5/15/2014 with stable chest x-ray.   • Sleep apnea with use of continuous positive airway pressure (CPAP)    • Traumatic hemorrhagic shock      A.  Secondary to right renal retroperitoneal hemorrhage, 9/2014.       Past Surgical History:   Procedure Laterality Date   • APPENDECTOMY  1959   • CARDIAC CATHETERIZATION N/A 4/18/2017    Procedure: Left Heart Cath;  Surgeon: Kvng Stauffer MD;  Location: [facility] INVASIVE LOCATION;  Service:    • COLONOSCOPY W/ POLYPECTOMY     • CORONARY ARTERY BYPASS GRAFT N/A 4/24/2017    Procedure: MEDIAN STERNOTOMY, CORONARY ARTERY BYPASS GRAFT X5 UTILIZING THE INTERNAL MAMMARY ARTERY, ENDOVASCULAR VEING HARVEST UTILIZING RIGHT SAPHENOUS VEIN,TRANSESOPHAGEAL ECHO;  Surgeon: Kanu Berger MD;   Location: [facility] OR;  Service:    • FRACTURE SURGERY     • KNEE SURGERY  2006, 2014    primary repair -left periprosthetic patellar tendon tear 6/11/2014   • LUMBAR LAMINECTOMY  2013    With discectomy   • REPLACEMENT TOTAL KNEE Bilateral 2009, 2013    Left - 2009 and right - 2013.   • SKIN BIOPSY     • TONSILLECTOMY  1957   • VENA CAVA FILTER PLACEMENT  2014       Family History   Problem Relation Age of Onset   • Adopted: Yes   • No Known Problems Mother      Adopted as child   • No Known Problems Father      Social History     Social History   • Marital status:      Spouse name: N/A   • Number of children: 2   • Years of education: N/A     Occupational History   • Post Office at       Retired     Social History Main Topics   • Smoking status: Former Smoker     Packs/day: 1.00     Years: 17.00     Types: Cigarettes     Start date: 1965     Quit date: 1982   • Smokeless tobacco: Never Used   • Alcohol use No      Comment: Never drinker   • Drug use: No   • Sexual activity: Defer     Other Topics Concern   • Not on file     Social History Narrative    Domestic life :  Lives in private home with wife        Catholic :   Mormon        Sleep hygiene :  In bed 11 PM to 8 AM for 8 hours of sleep        Caffeine use :  One cup of coffee and 1 diet cola daily        Exercise habits :   Cardiac rehabilitation since heart surgery and heart failure        Diet :  Low calorie diet low in salt and low in sugar        Occupation :   Retired        Hearing :        Vision :        Driving :  No driving           Medications:  Prescriptions Prior to Admission   Medication Sig Dispense Refill Last Dose   • albuterol (PROVENTIL HFA;VENTOLIN HFA) 108 (90 BASE) MCG/ACT inhaler Inhale 2 puffs Every 4 (Four) Hours As Needed for Wheezing.   8/6/2017   • aspirin 81 MG chewable tablet Chew 1 tablet Daily.   8/6/2017   • atorvastatin (LIPITOR) 40 MG tablet Take 1 tablet by mouth Every Night. 90 tablet 0 8/5/2017   •  "budesonide-formoterol (SYMBICORT) 160-4.5 MCG/ACT inhaler Inhale 2 puffs 2 (Two) Times a Day. 1 inhaler 11 8/6/2017   • entecavir (BARACLUDE) 0.5 MG tablet Take 1 tablet by mouth Daily.   8/6/2017   • ferrous sulfate 325 (65 FE) MG tablet Take 325 mg by mouth Daily With Breakfast.   8/6/2017   • furosemide (LASIX) 40 MG tablet Take 40 mg by mouth 2 (Two) Times a Day.   8/6/2017   • loratadine (CLARITIN) 10 MG tablet Take 10 mg by mouth Daily.   8/6/2017   • memantine (NAMENDA) 10 MG tablet Take 10 mg by mouth 2 (Two) Times a Day.   8/6/2017   • montelukast (SINGULAIR) 10 MG tablet Take 1 tablet by mouth Every Night. 30 tablet 11 8/5/2017   • nitroglycerin (NITROSTAT) 0.4 MG SL tablet Place 1 tablet under the tongue Every 5 (Five) Minutes As Needed for Chest Pain. Take no more than 3 doses in 15 minutes. 25 tablet 6    • PARoxetine (PAXIL) 30 MG tablet Take 1 tablet by mouth Every Morning.   8/6/2017   • PHARMACY MEDS TO BED CONSULT Daily (Monday-Friday). 1 each 0    • rivastigmine (EXELON) 9.5 MG/24HR patch Place 1 patch on the skin Daily.   8/6/2017   • sacubitril-valsartan (ENTRESTO) 49-51 MG tablet Take 0.5 tablets by mouth 2 (Two) Times a Day. 60 tablet 11 8/6/2017   • vitamin C (ASCORBIC ACID) 250 MG tablet Take 1 tablet by mouth 2 (Two) Times a Day With Meals. Take with iron supplement to enhance absorption   8/6/2017       Allergies:  Allergies   Allergen Reactions   • Sulfa Antibiotics      UNKNOWN CHILDHOOD REACTION         Objective     Physical Exam:  Vital Signs: /88 (BP Location: Left arm, Patient Position: Sitting)  Pulse 80  Temp 98.6 °F (37 °C) (Oral)   Resp 20  Ht 68\" (172.7 cm)  Wt 195 lb (88.5 kg)  SpO2 96%  BMI 29.65 kg/m2  Physical Exam  Temp:  [98.6 °F (37 °C)] 98.6 °F (37 °C)  Heart Rate:  [80-93] 80  Resp:  [20-22] 20  BP: (148-174)/() 148/88  Constitutional: no acute distress, awake, alert, very pleasant  Eyes: PERRLA, sclerae anicteric, no conjunctival injection  Neck: " supple, no thyromegaly, trachea midline  Respiratory: Clear to auscultation bilaterally, nonlabored respirations   Cardiovascular: RRR, no murmurs, rubs, or gallops, palpable pedal pulses bilaterally  Gastrointestinal: Positive bowel sounds, soft, nontender, nondistended  Musculoskeletal: No bilateral ankle edema, no clubbing or cyanosis to bilateral lower extremities  Diffuse contusions, small healing laceratiions  Psychiatric: oriented x 3, appropriate affect, cooperative  Neurologic: Strength symmetric in all extremities, Cranial Nerves grossly intact to confrontation         Results Reviewed:    Results from last 7 days  Lab Units 08/06/17  1724   WBC 10*3/mm3 10.81*   HEMOGLOBIN g/dL 11.0*   PLATELETS 10*3/mm3 236       Results from last 7 days  Lab Units 08/06/17  1724   SODIUM mmol/L 137   POTASSIUM mmol/L 3.7   CO2 mmol/L 23.0   CREATININE mg/dL 1.60*   GLUCOSE mg/dL 103*   CALCIUM mg/dL 10.0       I have personally reviewed and interpreted available lab data, radiology studies and ECG obtained at time of admission.     Assessment / Plan     Problem List:   Hospital Problem List     * (Principal)Acute on chronic respiratory failure with hypoxia    Acute on chronic diastolic heart failure    Hypertension    COPD (chronic obstructive pulmonary disease)    Coronary artery disease involving coronary bypass graft of native heart without angina pectoris    CKD (chronic kidney disease), stage III    Elevated LFTs    Dementia    JONNY (obstructive sleep apnea)    Overview Signed 1/6/2017  8:57 AM by Davis HIDALGO.  Obstructive sleep apnea with central component.B.  On home CPAP therapy.         Ischemic cardiomyopathy, most recent LVEF 40%    History of exposure to infectious disease-Patient received platelet transfusion for CABG, 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    Normocytic anemia          Plan:  1. Acute on chronic respiratory failure wih hypoxia, multifactorial to include  acute exacerbation of diastolic dysfunction and COPD:  - hypoxic on room air at 87%. Improved with 2L nasal cannula.  - failed outpatient treatment with PO lasix  - BNP increased from 219 to 5000 since 7/24/17  - troponin negative. EKG stable. CXR with cardiomegaly and increased vascular markings  - administered 80 mg lasix in ED. Reports marked improvement with symptoms, Now breathing comfortably.  - Continue Lasix 40 mg IV q 8 hours X 4 doses. Monitor potassium, magnesium, additional electrolytes daily  - daily weights, strict I/O, fluid restriction of 1500 ml daily  - pulmonary toilet as needed with; Duo nebs, mucolytics, antitussives  - last echocardiogram 7/2017 EF: 54%    2. Hypertension:  - currently stable with a labile history. Monitor closely.  - Wife gives Entresto 1/2 tab for systolic greater than 110, not as scheduled  - hold for now in the setting of persistent hypotension    3. Elevated LFT:  - Hepatitis B. History of exposure 4/2017 after platelet transfusion for CABG, donor tested positive for HBV att subsequent donation attempt.  - currently followed by Dr. Ng, ID and continues to take Baraclude 0.5 mg daily.   - values trending down from July  - avoid additional hepatotoxic agents    4. CKD stage 3:  - followed by nephrology  - creatinine at baseline 1.5-1.7  - avoid additional nephrotoxic agents    5. Weakness:  - patient with frequent mechanical falls, history of early stage dementia  - continue PT/OT as inpatient    Patient stable for admission to TELEMETRY. Labs and vitals routine per nursing.    DVT prophylaxis: heparin  Code Status: full  Admission Status: Patient will be admitted to (LEXOBS or LEXINPT)     Alonso Matthews PA-C 08/06/17 8:37 PM      Brief Attending Note       I have seen and examined the patient, performing an independent face-to-face diagnostic evaluation with plan of care reviewed and developed with the advanced practice clinician (APC).      Brief Summary  Statement/HPI:   Pt is a 72yom with CAD s/p recent CABG, mixed systolic and diastolic CHF who presented with increase dyspnea. Has not been on any diuretics since mid-July. Had COPE that started gradually last Thur. PCP placed pt on lasix 40mg BID on Fri and did have good UOP with it. However, it did not improve his dyspnea. No CP, palpitations, N/V/D. No LE edema or orthopnea however, had increased abdominal girth. Denies eating out much or drinking lots of fluids. No decreased UOP. Wife reports his weight when he was last discharged was 195lbs.     Attending Physical Exam:  Physical Exam:  Temp:  [98.6 °F (37 °C)] 98.6 °F (37 °C)  Heart Rate:  [80-93] 80  Resp:  [20-22] 20  BP: (148-174)/() 148/88  General - NAD, pt was laying in bed comfortably  HEENT - EOMI, PERRL, oropharynx clear, hearing intact  CVS - RRR, S1 S2, no rubs, gallops or murmurs, 2s cap refill, no peripheral edema, 2+ pulses  Resp - Inspiratory crackles bibasilarly. No wheezes   GI - +BS, soft, ND/NT  MSK - No joint pain or joint edema  Neuro - Lethargic/Awake and oriented, follows commands, interactive, moves all extremities equally  Psych - Appropriate affect, cooperative    Brief Assessment/Plan :    # Acute CHF exacerbation  - ECHO 7/19/2017 shows EF 50-55%, Grade I diastolic dysfunction  - BNP >5000. Last   - CXR personally reviewed. Bilateral airspace and interstitial edema  - Good UOP with 1x IV lasix 80mg   - Weight on reaching floor about 192lbs (?close to baseline)  - Strict Is/Os, daily AM standing weight  - Plan for 1x 40mg Lasix IV in AM with goal of -2L daily  - 2g Na restriction, 1500cc fluid restriction    # HTN  - Recently taken off of multiple meds due to HoTN  - Continue home Entresto    # JONNY  - CPAP    See above for further detailed assessment and plan developed with APC which I have reviewed and/or edited.    I believe this patient meets LEXINPT.    Bernadette Wilson MD  08/06/17  9:48 PM

## 2017-08-07 NOTE — PLAN OF CARE
Problem: Patient Care Overview (Adult)  Goal: Plan of Care Review  Outcome: Ongoing (interventions implemented as appropriate)  Goal: Adult Individualization and Mutuality  Outcome: Ongoing (interventions implemented as appropriate)  Goal: Discharge Needs Assessment  Outcome: Ongoing (interventions implemented as appropriate)    Problem: Fall Risk (Adult)  Goal: Identify Related Risk Factors and Signs and Symptoms  Outcome: Ongoing (interventions implemented as appropriate)  Goal: Absence of Falls  Outcome: Ongoing (interventions implemented as appropriate)    Problem: Cardiac: Heart Failure (Adult)  Goal: Signs and Symptoms of Listed Potential Problems Will be Absent or Manageable (Cardiac: Heart Failure)  Outcome: Ongoing (interventions implemented as appropriate)    Problem: Respiratory Insufficiency (Adult)  Goal: Identify Related Risk Factors and Signs and Symptoms  Outcome: Ongoing (interventions implemented as appropriate)  Goal: Acid/Base Balance  Outcome: Ongoing (interventions implemented as appropriate)  Goal: Effective Ventilation  Outcome: Ongoing (interventions implemented as appropriate)

## 2017-08-08 PROBLEM — J18.9 CAP (COMMUNITY ACQUIRED PNEUMONIA): Status: ACTIVE | Noted: 2017-01-01

## 2017-08-08 NOTE — THERAPY TREATMENT NOTE
Acute Care - Physical Therapy Treatment Note  [facility]     Patient Name: Magdi Arriaga  : 1945  MRN: [id number]  Today's Date: 2017  Onset of Illness/Injury or Date of Surgery Date: 17  Date of Referral to PT: 17  Referring Physician: MORALES Matthews    Admit Date: 2017    Visit Dx:    ICD-10-CM ICD-9-CM   1. SOB (shortness of breath) R06.02 786.05   2. Renal insufficiency N28.9 593.9   3. Congestive heart failure, unspecified congestive heart failure chronicity, unspecified congestive heart failure type I50.9 428.0   4. Hypoxemia R09.02 799.02   5. Cardiomyopathy I42.9 425.4   6. Impaired functional mobility, balance, gait, and endurance Z74.09 V49.89   7. Impaired mobility and ADLs Z74.09 799.89     Patient Active Problem List   Diagnosis   • Abnormal CT scan, chest   • Allergic rhinitis   • Asthma   • Bronchiectasis   • Dementia   • Depression   • Diastolic dysfunction   • Dyslipidemia   • Hypertension   • Obesity   • JONNY (obstructive sleep apnea)   • Osteoarthritis   • Vitamin D deficiency   • NSTEMI (non-ST elevated myocardial infarction)   • Left bundle branch block   • Normocytic anemia   • COPD (chronic obstructive pulmonary disease)   • Coronary artery disease involving coronary bypass graft of native heart without angina pectoris   • Ischemic cardiomyopathy, most recent LVEF 40%   • Atrial flutter   • Acute deep vein thrombosis (DVT) of right lower extremity   • Hematoma, calf   • History of exposure to infectious disease-Patient received platelet transfusion for CABG, 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   • Chronic congestive heart failure   • CKD (chronic kidney disease), stage III   • Cellulitis of left lower leg   • Elevated LFTs   • Hypotension   • Iron deficiency   • Wound of left leg   • Acute on chronic respiratory failure with hypoxia   • Acute on chronic diastolic heart failure   • Normocytic anemia   • Weakness               Adult  Rehabilitation Note       08/08/17 1046          Rehab Assessment/Intervention    Discipline physical therapy assistant  -AS      Document Type therapy note (daily note)  -AS      Subjective Information agree to therapy;complains of;weakness  -AS      Patient Effort, Rehab Treatment good  -AS      Symptoms Noted During/After Treatment none  -AS      Precautions/Limitations cardiac precautions;fall precautions;oxygen therapy device and L/min;other (see comments)   life vest till 9/17  -AS      Recorded by [AS] Estela Nava PTA      Pain Assessment    Pain Assessment No/denies pain  -AS      Recorded by [AS] Estela Nava PTA      Cognitive Assessment/Intervention    Current Cognitive/Communication Assessment functional  -AS      Orientation Status oriented x 4  -AS      Follows Commands/Answers Questions 100% of the time;able to follow single-step instructions  -AS      Personal Safety WNL/WFL  -AS      Personal Safety Interventions fall prevention program maintained;gait belt;nonskid shoes/slippers when out of bed  -AS      Recorded by [AS] Estela Nava PTA      Bed Mobility, Assessment/Treatment    Bed Mobility, Comment patient up in chair  -AS      Recorded by [AS] Estela Nava PTA      Transfer Assessment/Treatment    Transfers, Sit-Stand [place] verbal cues required;contact guard assist;1 person + 1 person to manage equipment  -AS      Transfers, Stand-Sit [place] verbal cues required;contact guard assist;1 person + 1 person to manage equipment  -AS      Transfers, Sit-Stand-Sit, Assist Device rolling walker  -AS      Transfer, Comment verbal cues for hand placement  -AS      Recorded by [AS] Estela Nava PTA      Gait Assessment/Treatment    Gait, [place] Level verbal cues required;minimum assist (75% patient effort);1 person + 1 person to manage equipment  -AS      Gait, Assistive Device rolling walker  -AS      Gait, Distance (Feet) 250  -AS      Gait, Gait  Deviations kasi decreased;forward flexed posture;step length decreased  -AS      Gait, Safety Issues step length decreased;supplemental O2  -AS      Gait, Impairments strength decreased;impaired balance  -AS      Gait, Comment verbal cues to stay inside walker and for posture, good control of walker  -AS      Recorded by [AS] Estela Nava PTA      Therapy Exercises    Bilateral Lower Extremities AROM:;10 reps;sitting;ankle pumps/circles;hip flexion;LAQ  -AS      Recorded by [AS] Estela Nava PTA      Positioning and Restraints    Pre-Treatment Position sitting in chair/recliner  -AS      Post Treatment Position chair  -AS      In Chair reclined;call light within reach;encouraged to call for assist;exit alarm on;with family/caregiver  -AS      Recorded by [AS] Estela Nava PTA        User Key  (r) = Recorded By, (t) = Taken By, (c) = Cosigned By    Initials Name Effective Dates    AS Estela Nava PTA 06/22/15 -                 IP PT Goals       08/08/17 1121 08/07/17 1041       Bed Mobility PT STG    Bed Mobility PT STG, Date Established  08/07/17  -DM     Bed Mobility PT STG, Time to Achieve  3 days  -DM     Bed Mobility PT STG, Activity Type  all bed mobility  -DM     Bed Mobility PT STG, [place] Level  independent  -DM     Bed Mobility PT STG, Date Goal Reviewed 08/08/17  -AS      Bed Mobility PT STG, Outcome goal ongoing  -AS      Transfer Training PT STG    Transfer Training PT STG, Date Established  08/07/17  -DM     Transfer Training PT STG, Time to Achieve  3 days  -DM     Transfer Training PT STG, Activity Type  all transfers  -DM     Transfer Training PT STG, [place] Level  independent  -DM     Transfer Training PT STG, Assist Device  cane, straight  -DM     Transfer Training PT STG, Date Goal Reviewed 08/08/17  -AS      Transfer Training PT STG, Outcome goal ongoing  -AS      Gait Training PT STG    Gait Training Goal PT STG, Date Established  08/07/17  -DM     Gait  Training Goal PT STG, Time to Achieve  3 days  -DM     Gait Training Goal PT STG, [place] Level  supervision required   STABLE VS  -DM     Gait Training Goal PT STG, Assist Device  walker, rolling  -DM     Gait Training Goal PT STG, Distance to Achieve  350  -DM     Gait Training Goal PT STG, Date Goal Reviewed 08/08/17  -AS      Gait Training Goal PT STG, Outcome goal ongoing  -AS      Stair Training PT STG    Stair Training Goal PT STG, Date Established  08/07/17  -DM     Stair Training Goal PT STG, Time to Achieve  3 days  -DM     Stair Training Goal PT STG, Number of Steps  3  -DM     Stair Training Goal PT STG, [place] Level  independent  -DM     Stair Training Goal PT STG, Assist Device  cane, straight  -DM     Stair Training Goal PT STG, Date Goal Reviewed 08/08/17  -AS      Stair Training Goal PT STG, Outcome goal ongoing  -AS        User Key  (r) = Recorded By, (t) = Taken By, (c) = Cosigned By    Initials Name Provider Type    DM Loretta Canela, PT Physical Therapist    AS Estela Nava, PTA Physical Therapy Assistant          Physical Therapy Education     Title: PT OT SLP Therapies (Active)     Topic: Physical Therapy (Active)     Point: Mobility training (Active)    Learning Progress Summary    Learner Readiness Method Response Comment Documented by Status   Patient Acceptance E NR  AS 08/08/17 1121 Active    Eager D,E NR  DM 08/07/17 1041 Active   Significant Other Eager D,E NR  DM 08/07/17 1041 Active               Point: Home exercise program (Active)    Learning Progress Summary    Learner Readiness Method Response Comment Documented by Status   Patient Acceptance E NR  AS 08/08/17 1121 Active    Eager D,E NR  DM 08/07/17 1041 Active   Significant Other Eager D,E NR  DM 08/07/17 1041 Active               Point: Body mechanics (Active)    Learning Progress Summary    Learner Readiness Method Response Comment Documented by Status   Patient Acceptance E NR  AS 08/08/17 1121 Active     Eager D,E NR  DM 08/07/17 1041 Active   Significant Other Eager D,E NR  DM 08/07/17 1041 Active               Point: Precautions (Active)    Learning Progress Summary    Learner Readiness Method Response Comment Documented by Status   Patient Acceptance E NR  AS 08/08/17 1121 Active    Eager D,E NR  DM 08/07/17 1041 Active   Significant Other Misbaher D,E NR  DM 08/07/17 1041 Active                      User Key     Initials Effective Dates Name Provider Type Discipline    DM 06/19/15 -  Loretta Canela, PT Physical Therapist PT    AS 06/22/15 -  Estela Nava, PTA Physical Therapy Assistant PT                    PT Recommendation and Plan  Anticipated Discharge Disposition: home with assist  PT Frequency: daily  Plan of Care Review  Plan Of Care Reviewed With: patient  Progress: improving  Outcome Summary/Follow up Plan: patient with good progress with mobility, needs verbal cues for safe use of walker and good posture.          Outcome Measures       08/08/17 1046 08/07/17 0916 08/07/17 0915    How much help from another person do you currently need...    Turning from your back to your side while in flat bed without using bedrails? 4  -AS  4  -DM    Moving from lying on back to sitting on the side of a flat bed without bedrails? 3  -AS  3  -DM    Moving to and from a bed to a chair (including a wheelchair)? 3  -AS  3  -DM    Standing up from a chair using your arms (e.g., wheelchair, bedside chair)? 3  -AS  3  -DM    Climbing 3-5 steps with a railing? 3  -AS  3  -DM    To walk in hospital room? 3  -AS  3  -DM    AM-PAC 6 Clicks Score 19  -AS  19  -DM    How much help from another is currently needed...    Putting on and taking off regular lower body clothing?  2  -CL     Bathing (including washing, rinsing, and drying)  2  -CL     Toileting (which includes using toilet bed pan or urinal)  2  -CL     Putting on and taking off regular upper body clothing  3  -CL     Taking care of personal grooming (such as  brushing teeth)  3  -CL     Eating meals  4  -CL     Score  16  -CL     Functional Assessment    Outcome Measure Options AM-PAC 6 Clicks Basic Mobility (PT)  -AS AM-PAC 6 Clicks Daily Activity (OT)  -CL AM-PAC 6 Clicks Basic Mobility (PT)  -DM      User Key  (r) = Recorded By, (t) = Taken By, (c) = Cosigned By    Initials Name Provider Type    DM Loretta Canela, PT Physical Therapist    AS Estela Nava, BG Physical Therapy Assistant    CL Margarita Velasco, OT Occupational Therapist           Time Calculation:         PT Charges       08/08/17 1123          Time Calculation    Start Time 1046  -AS      PT Received On 08/08/17  -AS      PT Goal Re-Cert Due Date 08/17/17  -AS      Time Calculation- PT    Total Timed Code Minutes- PT 23 minute(s)  -AS        User Key  (r) = Recorded By, (t) = Taken By, (c) = Cosigned By    Initials Name Provider Type    AS Estela Nava PTA Physical Therapy Assistant          Therapy Charges for Today     Code Description Service Date Service Provider Modifiers Qty    84011490671 HC GAIT TRAINING EA 15 MIN 8/8/2017 Estela Nava PTA GP 1    23014141758 HC PT THER PROC EA 15 MIN 8/8/2017 Estela Nava PTA GP 1    40856010053 HC PT THER SUPP EA 15 MIN 8/8/2017 Estela Nava PTA GP 2          PT G-Codes  Outcome Measure Options: AM-PAC 6 Clicks Basic Mobility (PT)    Estela Nava PTA  8/8/2017

## 2017-08-08 NOTE — CONSULTS
[facility]   Consult Note    Referring Provider: Dr. RIVAS Sanchez    Reason for Consultation: CHF    Patient Care Team:  Colton Dicknes MD as PCP - General (Internal Medicine)  Kvng Stauffer MD as Consulting Physician (Cardiology)     PROBLEM LIST:  1. Coronary artery disease  a. April 2017 NSTEMI  b. [facility] 4/18/17 showed occluded proximal LAD, 80% large ostial first diagonal, occluded mid Circ, 80 % large OM1, 80% mid RCA. EF 25%.  c. EF 40% by Echo  d. 4/24/17 CABG x 5 HERMAN to LAD, VG sequence OM1 and OM2. VG to DM of LAD and VG to RCA. (Dr. Berger)  2. Atrial flutter s/p ECV May 2017  3. Chronic systolic congestive heart failure  a. Multiple admissions post CABG  4. Ischemic cardiomyopathy  a. EF 25% 5/2017  b. Hospitalization 7/17 for symptomatic hypotension and AKD  c. 7/19/17 EF 51-55%, read by Dr. Che.  5. LBBB  6. Hypertension  7. Hyperlipidemia  8. Sleep apnea  9. COPD  10. Renal insufficiency  11. Arthritis  12. Asthma  13. Hx of bilateral DVT  14. Depression  15. Hx of hemorrhagic shock and PTX from right renal RP bleed Sep 2014..  16. 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 Sleep apnea  18. Surgical Hx:  a. Appendectomy  b. Back surgery  c. CABG  d. L knee surgery  e. L TKR  f. Tonsillectomy  g. IVC filter      Allergies   Allergen Reactions   • Sulfa Antibiotics      UNKNOWN CHILDHOOD REACTION           Current Facility-Administered Medications:   •  aspirin chewable tablet 81 mg, 81 mg, Oral, Daily, Alonso Matthews PA-C, 81 mg at 08/08/17 0935  •  atorvastatin (LIPITOR) tablet 40 mg, 40 mg, Oral, Nightly, Alonso Matthews PA-C, 40 mg at 08/07/17 2022  •  budesonide-formoterol (SYMBICORT) 160-4.5 MCG/ACT inhaler 2 puff, 2 puff, Inhalation, BID - RT, Alonso Matthews PA-C, 2 puff at 08/07/17 2025  •  carvedilol (COREG) tablet 3.125 mg, 3.125 mg, Oral, Q12H, Chasity Sanchez II, DO, 3.125 mg at 08/08/17 0935  •  heparin (porcine) 5000 UNIT/ML injection 5,000 Units, 5,000 Units,  Subcutaneous, Q12H, Alonso Matthews PA-C, 5,000 Units at 08/08/17 0935  •  ipratropium-albuterol (DUO-NEB) nebulizer solution 3 mL, 3 mL, Nebulization, 4x Daily - RT, Alonso Matthews PA-C, 3 mL at 08/08/17 0845  •  levoFLOXacin (LEVAQUIN) tablet 500 mg, 500 mg, Oral, Every Other Day, Chinedu Ray [facility], 500 mg at 08/08/17 0934  •  melatonin sublingual tablet 5 mg, 5 mg, Sublingual, Nightly PRN, Alonso Matthews PA-C  •  memantine (NAMENDA) tablet 10 mg, 10 mg, Oral, BID, Alonso Matthews PA-C, 10 mg at 08/08/17 0935  •  montelukast (SINGULAIR) tablet 10 mg, 10 mg, Oral, Nightly, lAonso Matthews PA-C, 10 mg at 08/07/17 2022  •  ondansetron (ZOFRAN) injection 4 mg, 4 mg, Intravenous, Q6H PRN, Alonso Matthews PA-C  •  PARoxetine (PAXIL) tablet 30 mg, 30 mg, Oral, QAM, Alonso Matthews PA-C, 30 mg at 08/08/17 0934  •  Pharmacy Consult - [facility], , Does not apply, Daily, Chinedu Ray [facility]  •  Pharmacy Meds to Bed Consult, , Does not apply, Daily, Chinedu Ray [facility]  •  rivastigmine (EXELON) 9.5 MG/24HR patch 1 patch, 1 patch, Transdermal, Daily, Alonso Matthews PA-C, 1 patch at 08/08/17 0939  •  sacubitril-valsartan (ENTRESTO) 49-51 MG tablet 0.5 tablet, 0.5 tablet, Oral, Q12H, Bernadette Wilson MD, 0.5 tablet at 08/08/17 0935  •  sodium chloride 0.9 % flush 1-10 mL, 1-10 mL, Intravenous, PRN, Alonos Matthews PA-C  •  sodium chloride 0.9 % flush 10 mL, 10 mL, Intravenous, PRN, Cheko Jacob MD         Prescriptions Prior to Admission   Medication Sig Dispense Refill Last Dose   • albuterol (PROVENTIL HFA;VENTOLIN HFA) 108 (90 BASE) MCG/ACT inhaler Inhale 2 puffs Every 4 (Four) Hours As Needed for Wheezing.   8/6/2017   • aspirin 81 MG chewable tablet Chew 1 tablet Daily.   8/6/2017   • atorvastatin (LIPITOR) 40 MG tablet Take 1 tablet by mouth Every Night. 90 tablet 0 8/5/2017   • budesonide-formoterol (SYMBICORT) 160-4.5 MCG/ACT inhaler Inhale 2 puffs 2 (Two) Times a Day. 1 inhaler 11 8/6/2017   •  "entecavir (BARACLUDE) 0.5 MG tablet Take 0.5 mg by mouth Daily.   8/6/2017   • ferrous sulfate 325 (65 FE) MG tablet Take 325 mg by mouth Daily With Breakfast.   8/6/2017   • furosemide (LASIX) 40 MG tablet Take 40 mg by mouth 2 (Two) Times a Day.   8/6/2017   • loratadine (CLARITIN) 10 MG tablet Take 10 mg by mouth Daily.   8/6/2017   • memantine (NAMENDA) 10 MG tablet Take 10 mg by mouth 2 (Two) Times a Day.   8/6/2017   • montelukast (SINGULAIR) 10 MG tablet Take 1 tablet by mouth Every Night. 30 tablet 11 8/5/2017   • nitroglycerin (NITROSTAT) 0.4 MG SL tablet Place 1 tablet under the tongue Every 5 (Five) Minutes As Needed for Chest Pain. Take no more than 3 doses in 15 minutes. 25 tablet 6    • PARoxetine (PAXIL) 30 MG tablet Take 1 tablet by mouth Every Morning.   8/6/2017   • rivastigmine (EXELON) 9.5 MG/24HR patch Place 1 patch on the skin Daily.   8/6/2017   • sacubitril-valsartan (ENTRESTO) 49-51 MG tablet Take 0.5 tablets by mouth 2 (Two) Times a Day. 60 tablet 11 8/6/2017   • vitamin C (ASCORBIC ACID) 250 MG tablet Take 1 tablet by mouth 2 (Two) Times a Day With Meals. Take with iron supplement to enhance absorption   8/6/2017          Subjective .   History of present illness:    Patient is a 72-year-old  male, well-known to us, who is admitted for complaints of shortness of breath.  He has noted history of coronary artery disease with recent coronary artery bypass grafting.  He also at that time been noted to have ischemic cardiomyopathy.  By last echocardiogram his EF was back to normal.  He's had a history of atrial fibrillation/flutter which is currently controlled on medical therapy.  Patient notes that over the last week he has had increasing shortness of breath with exertion.  Denies any shortness of breath at rest.  Denies any wheezing.  Does note \"dry hacking\" cough.  Patient was noted to have a low grade fever at the time of admission on August 6.  Denies any chest pain, pressure, " tightness.  No syncope, near-syncope, or edema.  Does note that he gained roughly 4 pounds over the course of 2 days.  Denies any orthopnea.  Patient's wife notes that while sitting in the room and he takes off his oxygen his oxygen saturation will drop down into the upper 80s.  Due to his symptoms he was admitted for further evaluation.  He was started on antibiotics as well as IV diuretics.  He's continuing to wear his LifeVest.  There is an echocardiogram pending today.  If EF is still improved we'll discontinue this.  Patient notes currently that he still has significant shortness of breath just walking across the room.  Denies any rest discomfort.       Social History     Social History   • Marital status:      Spouse name: N/A   • Number of children: 2   • Years of education: N/A     Occupational History   • [employer] at       Retired     Social History Main Topics   • Smoking status: Former Smoker     Packs/day: 1.00     Years: 17.00     Types: Cigarettes     Start date: 1965     Quit date: 1982   • Smokeless tobacco: Never Used   • Alcohol use No      Comment: Never drinker   • Drug use: No   • Sexual activity: Defer     Other Topics Concern   • Not on file     Social History Narrative    Domestic life :  Lives in private home with wife        Synagogue :   B[employer]hism        Sleep hygiene :  In bed 11 PM to 8 AM for 8 hours of sleep        Caffeine use :  One cup of coffee and 1 diet cola daily        Exercise habits :   Cardiac rehabilitation since heart surgery and heart failure        Diet :  Low calorie diet low in salt and low in sugar        Occupation :   Retired        Hearing :        Vision :        Driving :  No driving         Family History   Problem Relation Age of Onset   • Adopted: Yes   • No Known Problems Mother      Adopted as child   • No Known Problems Father          Review of Systems:  Review of Systems   Constitution: Positive for fever, malaise/fatigue and weight gain.  "Negative for weakness.   HENT: Negative for headaches and nosebleeds.    Eyes: Negative for redness and visual disturbance.   Cardiovascular: Negative for orthopnea, palpitations and paroxysmal nocturnal dyspnea.   Respiratory: Positive for cough. Negative for snoring, sputum production and wheezing.    Hematologic/Lymphatic: Negative for bleeding problem.   Skin: Negative for flushing, itching and rash.   Musculoskeletal: Negative for falls, joint pain and muscle cramps.   Gastrointestinal: Negative for abdominal pain, diarrhea, heartburn, nausea and vomiting.   Genitourinary: Negative for hematuria.   Neurological: Negative for excessive daytime sleepiness, dizziness and tremors.   Psychiatric/Behavioral: Negative for substance abuse. The patient is not nervous/anxious.           Objective   Vitals:  Blood pressure 143/88, pulse 60, temperature 98.2 °F (36.8 °C), temperature source Axillary, resp. rate 17, height 68\" (172.7 cm), weight 188 lb (85.3 kg), SpO2 96 %.     Intake/Output Summary (Last 24 hours) at 08/08/17 1020  Last data filed at 08/08/17 0907   Gross per 24 hour   Intake              600 ml   Output               50 ml   Net              550 ml       Physical Exam   Constitutional: He is oriented to person, place, and time. He appears well-developed and well-nourished. No distress.   HENT:   Head: Normocephalic and atraumatic.   Eyes: Right eye exhibits no discharge. Left eye exhibits no discharge.   Neck: No JVD present. No tracheal deviation present.   Cardiovascular: Normal rate, regular rhythm and normal heart sounds.  Exam reveals no friction rub.    No murmur heard.  Pulmonary/Chest: Effort normal. No respiratory distress.   Bilateral rhonchi   Abdominal: Soft. Bowel sounds are normal. There is no tenderness.   Musculoskeletal: He exhibits no edema or deformity.   Neurological: He is alert and oriented to person, place, and time.   Skin: Skin is warm and dry.   Psychiatric: His behavior is " normal. Thought content normal.     I have Examined the patient and agree with the above       Results Review:  I reviewed the patient's new clinical results.    Results from last 7 days  Lab Units 08/08/17  0538   WBC 10*3/mm3 8.91   HEMOGLOBIN g/dL 10.4*   HEMATOCRIT % 32.6*   PLATELETS 10*3/mm3 241       Results from last 7 days  Lab Units 08/08/17  0538 08/07/17  0241   SODIUM mmol/L 137 140   POTASSIUM mmol/L 3.6 3.6   CHLORIDE mmol/L 100 105   CO2 mmol/L 28.0 24.0   BUN mg/dL 31* 22   CREATININE mg/dL 1.90* 1.80*   CALCIUM mg/dL 9.0 9.2   BILIRUBIN mg/dL  --  0.8   ALK PHOS U/L  --  131*   ALT (SGPT) U/L  --  294*   AST (SGOT) U/L  --  143*   GLUCOSE mg/dL 87 97       Results from last 7 days  Lab Units 08/08/17  0538   SODIUM mmol/L 137   POTASSIUM mmol/L 3.6   CHLORIDE mmol/L 100   CO2 mmol/L 28.0   BUN mg/dL 31*   CREATININE mg/dL 1.90*   GLUCOSE mg/dL 87   CALCIUM mg/dL 9.0           Lab Results  Lab Value Date/Time   TROPONINI 0.173 (H) 08/07/2017 0836   TROPONINI 0.211 (H) 08/07/2017 0241   TROPONINI 0.166 (H) 08/06/2017 2048   TROPONINI 0.019 07/17/2017 1227   TROPONINI 0.044 (H) 06/12/2017 2139   TROPONINI 9.425 (C) 04/18/2017 0757   TROPONINI 7.403 (C) 04/17/2017 2340                        Tele:  SR    EKG:   Echo cardiac exam: Reviewed, LVEF appears to be 25%.  Appears to be significantly decreased from last month when it by my eye appear to be approximately 40%.    Assessment/Plan     1. Dyspnea, Heart failure plus minus some angina.  2. Coronary artery disease, recent CABG. Question some anginal component  3. Mildly elevated troponin, NOT NSTEMI  4. COPD, suspect exacerbation  5. Chronic systolic congestive heart failure. Does not appear volume overloaded at this time. Last EF was normal at last admission.  6. CKD  7. Elevated LFT's. History of Hepatitis B exposure and currently on Lipitor.  8. Cardiomyopathy, LVEF 25%.      Plan:    Hold Lipitor given the LFT abnormalities  Continue  diuresis.  Hold Entresto for now, given worsening renal dysfunction and ongoing diuresis.  Right and left heart catheter in the morning, as well as renal angiographies.   PE evaluation for ICD/pacemaker.    REI Quijano obtained past medical, family history, social history, review of systems and functioned as a scribe for the remainder of the dictation for Dr. Stauffer.      REI Quijano  08/08/17  10:20 AM  I, Kvng Stauffer MD, personally performed the services described in this documentation as scribed by the above individual in my presence, and it is both accurate and complete    Dictated utilizing Dragon dictation

## 2017-08-08 NOTE — PLAN OF CARE
Problem: Patient Care Overview (Adult)  Goal: Plan of Care Review  Outcome: Ongoing (interventions implemented as appropriate)    08/08/17 1121   Coping/Psychosocial Response Interventions   Plan Of Care Reviewed With patient   Patient Care Overview   Progress improving   Outcome Evaluation   Outcome Summary/Follow up Plan patient with good progress with mobility, needs verbal cues for safe use of walker and good posture.         Problem: Inpatient Physical Therapy  Goal: Bed Mobility Goal STG- PT  Outcome: Ongoing (interventions implemented as appropriate)    08/07/17 1041 08/08/17 1121   Bed Mobility PT STG   Bed Mobility PT STG, Date Established 08/07/17 --    Bed Mobility PT STG, Time to Achieve 3 days --    Bed Mobility PT STG, Activity Type all bed mobility --    Bed Mobility PT STG, [place] Level independent --    Bed Mobility PT STG, Date Goal Reviewed --  08/08/17   Bed Mobility PT STG, Outcome --  goal ongoing       Goal: Transfer Training Goal 1 STG- PT  Outcome: Ongoing (interventions implemented as appropriate)    08/07/17 1041 08/08/17 1121   Transfer Training PT STG   Transfer Training PT STG, Date Established 08/07/17 --    Transfer Training PT STG, Time to Achieve 3 days --    Transfer Training PT STG, Activity Type all transfers --    Transfer Training PT STG, [place] Level independent --    Transfer Training PT STG, Assist Device cane, straight --    Transfer Training PT STG, Date Goal Reviewed --  08/08/17   Transfer Training PT STG, Outcome --  goal ongoing       Goal: Gait Training Goal STG- PT  Outcome: Ongoing (interventions implemented as appropriate)    08/07/17 1041 08/08/17 1121   Gait Training PT STG   Gait Training Goal PT STG, Date Established 08/07/17 --    Gait Training Goal PT STG, Time to Achieve 3 days --    Gait Training Goal PT STG, [place] Level supervision required  (STABLE VS) --    Gait Training Goal PT STG, Assist Device walker, rolling --    Gait Training  Goal PT STG, Distance to Achieve 350 --    Gait Training Goal PT STG, Date Goal Reviewed --  08/08/17   Gait Training Goal PT STG, Outcome --  goal ongoing       Goal: Stair Training Goal STG- PT  Outcome: Ongoing (interventions implemented as appropriate)    08/07/17 1041 08/08/17 1121   Stair Training PT STG   Stair Training Goal PT STG, Date Established 08/07/17 --    Stair Training Goal PT STG, Time to Achieve 3 days --    Stair Training Goal PT STG, Number of Steps 3 --    Stair Training Goal PT STG, [place] Level independent --    Stair Training Goal PT STG, Assist Device cane, straight --    Stair Training Goal PT STG, Date Goal Reviewed --  08/08/17   Stair Training Goal PT STG, Outcome --  goal ongoing

## 2017-08-08 NOTE — PLAN OF CARE
Problem: Respiratory Insufficiency (Adult)  Intervention: Provide Oxygenation/Ventilation/Perfusion Support  Will continue to monitor pt

## 2017-08-08 NOTE — PROGRESS NOTES
"      HOSPITALIST DAILY PROGRESS NOTE    Chief Complaint: SOA    Subjective   SUBJECTIVE/OVERNIGHT EVENTS   Sitting up in bed. Overall feeling okay. A bit more weak today. Otherwise no complaints.     Review of Systems:  Gen-no fevers, no chills  CV-no chest pain, no palpitations  Resp-no cough, no dyspnea  GI-no N/V/D, no abd pain    Objective   OBJECTIVE   I have reviewed the vital signs.  /88  Pulse 60  Temp 98.2 °F (36.8 °C) (Axillary)   Resp 17  Ht 68\" (172.7 cm)  Wt 188 lb (85.3 kg)  SpO2 96%  BMI 28.59 kg/m2    Physical Exam:  Gen-no acute distress, up in bed, comfortable  CV-RRR, S1 S2 normal, no m/r/g  Resp-CTAB, no wheezes  Abd-soft, NT, ND, +BS  Ext-no edema  Neuro-A&Ox3, no focal deficits  Psych-appropriate mood    Results:  I have reviewed the labs, culture data, radiology results, and diagnostic studies.      Results from last 7 days  Lab Units 08/08/17  0538 08/07/17  0241 08/06/17  1724   WBC 10*3/mm3 8.91 10.17 10.81*   HEMOGLOBIN g/dL 10.4* 9.5* 11.0*   HEMATOCRIT % 32.6* 30.1* 35.2*   PLATELETS 10*3/mm3 241 217 236       Results from last 7 days  Lab Units 08/08/17  0538   SODIUM mmol/L 137   POTASSIUM mmol/L 3.6   CHLORIDE mmol/L 100   CO2 mmol/L 28.0   BUN mg/dL 31*   CREATININE mg/dL 1.90*   GLUCOSE mg/dL 87   CALCIUM mg/dL 9.0       Culture Data:  Cultures:    Blood Culture   Date Value Ref Range Status   08/06/2017 No growth at 24 hours  Preliminary   08/06/2017 No growth at 24 hours  Preliminary         Radiology Results:  Imaging Results (last 24 hours)     Procedure Component Value Units Date/Time    XR Chest 1 View [[id number]] Collected:  08/06/17 1837     Updated:  08/08/17 0821    Narrative:          EXAMINATION: XR CHEST, SINGLE VIEW - 08/06/2017     INDICATION: Shortness of air.     COMPARISON: 07/17/2017.     FINDINGS: The heart is large. There are mild patchy diffuse airspace  changes without consolidation. There is no mass or effusion.           Impression:       " Cardiomegaly with patchy diffuse airspace disease but  without consolidation. Considering some difference in technical factors,  there has been little change since 07/17/2017.     DICTATED:     08/06/2017  EDITED:         08/06/2017     This report was finalized on 8/8/2017 8:19 AM by Dr. Yo Torres MD.             I have reviewed the medications.      Assessment/Plan   ASSESSMENT/PLAN    Principal Problem:    Acute on chronic respiratory failure with hypoxia  Active Problems:    Dementia    Hypertension    NSTEMI (non-ST elevated myocardial infarction)    COPD (chronic obstructive pulmonary disease)    Coronary artery disease involving coronary bypass graft of native heart without angina pectoris    Ischemic cardiomyopathy, most recent LVEF 40%    CKD (chronic kidney disease), stage III    Elevated LFTs    Acute on chronic diastolic heart failure    Normocytic anemia    Weakness    72 year old male who presented from home with a/c respiratory failure thought to be due to a/c systolic heart failure and bronchitis.     Plan:  --This is most likely due to heart failure exacerbation due to discontinuation of his blood pressure medications by his PCP in addition to suspected pneumonia.  --Per Dr. Stauffer's note from 2 weeks ago if patient's LV function normal okay to minimize medical therapy from heart failure standpoint. Unfortunately is seems at least per the prelim that his EF is 30% again. He has been on and off coreg and entresto per his PCP due to hypotension. Have resumed entresto at half dose and will restart coreg. His family has requested to request his treatment with Dr. Stauffer which I think is reasonable given his recent difficulty with medical management as outpatient. Cards to see today.  --From a heart failure standpoint seems relatively well compensated now and back to his dry weight.   --His elevated troponin is likely a type II due to CHF exacerbation and his troponin is down-trending with diuresis.  Patient is recently s/p CABG. Continue asa, statin, coreg.   --Have started levaquin 500 mg daily for presumed CAP. Will continue through 8/13.  --His CKD and LFT's are at baseline. Monitor with diuresis.  --Labs in am.    Chasity Sanchez II, DO  08/08/17  12:47 PM

## 2017-08-08 NOTE — PLAN OF CARE
Problem: Patient Care Overview (Adult)  Goal: Plan of Care Review  Outcome: Ongoing (interventions implemented as appropriate)    08/08/17 0444   Coping/Psychosocial Response Interventions   Plan Of Care Reviewed With patient   Patient Care Overview   Progress no change   Outcome Evaluation   Outcome Summary/Follow up Plan Pt rested well overnight with no acute overnight events         Problem: Fall Risk (Adult)  Goal: Identify Related Risk Factors and Signs and Symptoms  Outcome: Ongoing (interventions implemented as appropriate)  Goal: Absence of Falls  Outcome: Ongoing (interventions implemented as appropriate)    Problem: Cardiac: Heart Failure (Adult)  Goal: Signs and Symptoms of Listed Potential Problems Will be Absent or Manageable (Cardiac: Heart Failure)  Outcome: Ongoing (interventions implemented as appropriate)    Problem: Respiratory Insufficiency (Adult)  Goal: Identify Related Risk Factors and Signs and Symptoms  Outcome: Ongoing (interventions implemented as appropriate)  Goal: Acid/Base Balance  Outcome: Ongoing (interventions implemented as appropriate)  Goal: Effective Ventilation  Outcome: Ongoing (interventions implemented as appropriate)

## 2017-08-09 NOTE — PROGRESS NOTES
"%   [facility] at [facility]   Inpatient Progress Note       LOS: 3 days   Patient Care Team:  Colton Dickens MD as PCP - General (Internal Medicine)  Kvng Stauffer MD as Consulting Physician (Cardiology)    Chief Complaint:  Follow-up for CHF and cardiomyopathy and CAD    Subjective     Interval History:   Patient notes that he feels good today. Still requiring significant oxygen supplementation. On 5 L when I arrived in the room sats of 99%. Oxygen decreased to 4 L. No chest pain. Breathing feeling better.      Review of Systems:   Pertinent positives noted in history, exam, and assessment. Otherwise reviewed and negative.      Objective     Vitals:  Blood pressure 131/74, pulse 52, temperature 96.6 °F (35.9 °C), temperature source Axillary, resp. rate 17, height 68\" (172.7 cm), weight 190 lb (86.2 kg), SpO2 97 %.     Intake/Output Summary (Last 24 hours) at 08/09/17 0927  Last data filed at 08/09/17 0825   Gross per 24 hour   Intake              480 ml   Output              850 ml   Net             -370 ml     Physical Exam   Constitutional: He is oriented to person, place, and time. He appears well-developed and well-nourished.   Neck: No JVD present. No tracheal deviation present.   Cardiovascular: Normal rate, regular rhythm, normal heart sounds and intact distal pulses.  Exam reveals no friction rub.    No murmur heard.  Pulmonary/Chest: Effort normal.   Decreased bases   Abdominal: Soft. Bowel sounds are normal. There is no tenderness.   Musculoskeletal: He exhibits no edema or deformity.   Neurological: He is alert and oriented to person, place, and time.   Skin: Skin is warm and dry.     I have examined the patient and agree with the above     Results Review:     I reviewed the patient's new clinical results.      Results from last 7 days  Lab Units 08/09/17  0624   WBC 10*3/mm3 8.45   HEMOGLOBIN g/dL 10.4*   HEMATOCRIT % 33.9*   PLATELETS 10*3/mm3 225       Results from last 7 " days  Lab Units 08/09/17  0624 08/08/17  0538   SODIUM mmol/L 135 137   POTASSIUM mmol/L 4.0 3.6   CHLORIDE mmol/L 100 100   CO2 mmol/L 29.0 28.0   BUN mg/dL 43* 31*   CREATININE mg/dL 1.90* 1.90*   CALCIUM mg/dL 8.9 9.0   BILIRUBIN mg/dL  --  0.6   ALK PHOS U/L  --  128*   ALT (SGPT) U/L  --  298*   AST (SGOT) U/L  --  214*   GLUCOSE mg/dL 81 87       Results from last 7 days  Lab Units 08/09/17  0624   SODIUM mmol/L 135   POTASSIUM mmol/L 4.0   CHLORIDE mmol/L 100   CO2 mmol/L 29.0   BUN mg/dL 43*   CREATININE mg/dL 1.90*   GLUCOSE mg/dL 81   CALCIUM mg/dL 8.9           Lab Results  Lab Value Date/Time   TROPONINI 0.173 (H) 08/07/2017 0836   TROPONINI 0.211 (H) 08/07/2017 0241   TROPONINI 0.166 (H) 08/06/2017 2048   TROPONINI 0.019 07/17/2017 1227   TROPONINI 0.044 (H) 06/12/2017 2139   TROPONINI 9.425 (C) 04/18/2017 0757   TROPONINI 7.403 (C) 04/17/2017 2340                     Tele:  SR    Assessment/Plan     Principal Problem:    Acute on chronic respiratory failure with hypoxia  Active Problems:    Dementia    Hypertension    NSTEMI (non-ST elevated myocardial infarction)    COPD (chronic obstructive pulmonary disease)    Coronary artery disease involving coronary bypass graft of native heart without angina pectoris    Ischemic cardiomyopathy, most recent LVEF 40%    CKD (chronic kidney disease), stage III    Elevated LFTs    Acute on chronic diastolic heart failure    Normocytic anemia    Weakness    CAP (community acquired pneumonia)      1. Dyspnea   - Heart failure plus minus some angina and COPD.  2. Coronary artery disease  -recent CABG. Question some anginal component  3. Mildly elevated troponin, NOT NSTEMI  4. COPD  - suspect some component of exacerbation  5. Acute on Chronic systolic congestive heart failure.   6. CKD  7. Elevated LFT's.   - History of Hepatitis B exposure   - Lipitor being held  8. Cardiomyopathy, LVEF 25%     Plan:    Right and left heart catheterization today with renal  angiography.Revealed normal right heart pressures and normal left heart pressures.  All patent grafts.  Ungrafted large first diagonal which received Xience EES.    REI Quijano  08/09/17  9:27 AM    Dictated utilizing Dragon dictation  I, Kvng Stauffer have reviewed the note in full and agree with all aspects of the above including physical exam, assessment, labs and plan with changes made accordingly.     Kvng Stauffer MD  08/09/17  1:59 PM

## 2017-08-09 NOTE — PLAN OF CARE
Problem: Patient Care Overview (Adult)  Goal: Plan of Care Review  Outcome: Ongoing (interventions implemented as appropriate)  Goal: Adult Individualization and Mutuality  Outcome: Ongoing (interventions implemented as appropriate)  Goal: Discharge Needs Assessment  Outcome: Ongoing (interventions implemented as appropriate)    Problem: Fall Risk (Adult)  Goal: Identify Related Risk Factors and Signs and Symptoms  Outcome: Outcome(s) achieved Date Met:  08/09/17  Goal: Absence of Falls  Outcome: Outcome(s) achieved Date Met:  08/09/17    Problem: Cardiac: Heart Failure (Adult)  Goal: Signs and Symptoms of Listed Potential Problems Will be Absent or Manageable (Cardiac: Heart Failure)  Outcome: Ongoing (interventions implemented as appropriate)    Problem: Respiratory Insufficiency (Adult)  Goal: Identify Related Risk Factors and Signs and Symptoms  Outcome: Ongoing (interventions implemented as appropriate)  Goal: Acid/Base Balance  Outcome: Ongoing (interventions implemented as appropriate)  Goal: Effective Ventilation  Outcome: Ongoing (interventions implemented as appropriate)    08/09/17 0217   Respiratory Insufficiency (Adult)   Effective Ventilation making progress toward outcome

## 2017-08-09 NOTE — PLAN OF CARE
Problem: Patient Care Overview (Adult)  Goal: Plan of Care Review  Outcome: Ongoing (interventions implemented as appropriate)    08/09/17 1111   Coping/Psychosocial Response Interventions   Plan Of Care Reviewed With patient   Patient Care Overview   Progress improving   Outcome Evaluation   Outcome Summary/Follow up Plan patieint needs verbal cues for safe use of walker, had LOB posteriorly when turning towards chair needing increased assisr to correct.         Problem: Inpatient Physical Therapy  Goal: Bed Mobility Goal STG- PT  Outcome: Ongoing (interventions implemented as appropriate)    08/07/17 1041 08/09/17 1111   Bed Mobility PT STG   Bed Mobility PT STG, Date Established 08/07/17 --    Bed Mobility PT STG, Time to Achieve 3 days --    Bed Mobility PT STG, Activity Type all bed mobility --    Bed Mobility PT STG, [place] Level independent --    Bed Mobility PT STG, Date Goal Reviewed --  08/09/17   Bed Mobility PT STG, Outcome --  goal ongoing         08/07/17 1041 08/09/17 1111   Bed Mobility PT STG   Bed Mobility PT STG, Date Established 08/07/17 --    Bed Mobility PT STG, Time to Achieve 3 days --    Bed Mobility PT STG, Activity Type all bed mobility --    Bed Mobility PT STG, [place] Level independent --    Bed Mobility PT STG, Date Goal Reviewed --  08/09/17   Bed Mobility PT STG, Outcome --  goal ongoing       Goal: Transfer Training Goal 1 STG- PT  Outcome: Ongoing (interventions implemented as appropriate)    08/07/17 1041 08/09/17 1111   Transfer Training PT STG   Transfer Training PT STG, Date Established 08/07/17 --    Transfer Training PT STG, Time to Achieve 3 days --    Transfer Training PT STG, Activity Type all transfers --    Transfer Training PT STG, [place] Level independent --    Transfer Training PT STG, Assist Device cane, straight --    Transfer Training PT STG, Date Goal Reviewed --  08/09/17   Transfer Training PT STG, Outcome --  goal ongoing       Goal: Gait  Training Goal STG- PT  Outcome: Ongoing (interventions implemented as appropriate)    08/07/17 1041 08/09/17 1111   Gait Training PT STG   Gait Training Goal PT STG, Date Established 08/07/17 --    Gait Training Goal PT STG, Time to Achieve 3 days --    Gait Training Goal PT STG, [place] Level supervision required  (STABLE VS) --    Gait Training Goal PT STG, Assist Device walker, rolling --    Gait Training Goal PT STG, Distance to Achieve 350 --    Gait Training Goal PT STG, Date Goal Reviewed --  08/09/17   Gait Training Goal PT STG, Outcome --  goal ongoing       Goal: Stair Training Goal STG- PT  Outcome: Ongoing (interventions implemented as appropriate)    08/07/17 1041 08/09/17 1111   Stair Training PT STG   Stair Training Goal PT STG, Date Established 08/07/17 --    Stair Training Goal PT STG, Time to Achieve 3 days --    Stair Training Goal PT STG, Number of Steps 3 --    Stair Training Goal PT STG, [place] Level independent --    Stair Training Goal PT STG, Assist Device cane, straight --    Stair Training Goal PT STG, Date Goal Reviewed --  08/09/17   Stair Training Goal PT STG, Outcome --  goal ongoing

## 2017-08-09 NOTE — PLAN OF CARE
Problem: Patient Care Overview (Adult)  Goal: Plan of Care Review  Outcome: Ongoing (interventions implemented as appropriate)    08/09/17 1850   Coping/Psychosocial Response Interventions   Plan Of Care Reviewed With patient;spouse   Patient Care Overview   Progress improving   Outcome Evaluation   Outcome Summary/Follow up Plan returned from the cath lab, venous shealth pulled at 1810, cardiac diet ordered pt tolerated PO intake. doing well no complalints. 2L NC

## 2017-08-09 NOTE — PROGRESS NOTES
"      HOSPITALIST DAILY PROGRESS NOTE    Chief Complaint: SOA    Subjective   SUBJECTIVE/OVERNIGHT EVENTS   No acute events overnight, patient states that he is feeling better, SOA is improved, denies any CP.    Review of Systems:  Gen-no fevers, no chills  CV-no chest pain, no palpitations  Resp-no cough, no dyspnea  GI-no N/V/D, no abd pain    Objective   OBJECTIVE   I have reviewed the vital signs.  /75  Pulse 61  Temp 97.7 °F (36.5 °C) (Axillary)   Resp 16  Ht 68\" (172.7 cm)  Wt 190 lb (86.2 kg)  SpO2 95%  BMI 28.89 kg/m2    Physical Exam:  Gen-no acute distress, comfortable  CV-RRR, S1 S2 normal, no m/r/g  Resp-non-labored respirations, no wheezes  Abd-soft, NT, ND, +BS  Ext-no edema  Neuro-A&Ox3, no focal deficits  Psych-appropriate mood and affect    Results:  I have reviewed the labs, culture data, radiology results, and diagnostic studies.    Results from last 7 days  Lab Units 08/09/17  0624 08/08/17  0538 08/07/17  0241   WBC 10*3/mm3 8.45 8.91 10.17   HEMOGLOBIN g/dL 10.4* 10.4* 9.5*   HEMATOCRIT % 33.9* 32.6* 30.1*   PLATELETS 10*3/mm3 225 241 217       Results from last 7 days  Lab Units 08/09/17  0624   SODIUM mmol/L 135   POTASSIUM mmol/L 4.0   CHLORIDE mmol/L 100   CO2 mmol/L 29.0   BUN mg/dL 43*   CREATININE mg/dL 1.90*   GLUCOSE mg/dL 81   CALCIUM mg/dL 8.9     Culture Data:  Cultures:    Blood Culture   Date Value Ref Range Status   08/06/2017 No growth at 2 days  Preliminary   08/06/2017 No growth at 2 days  Preliminary     Radiology Results:  Imaging Results (last 24 hours)     Procedure Component Value Units Date/Time    XR Chest 1 View [[id number]] Collected:  08/06/17 1837     Updated:  08/08/17 0821    Narrative:          EXAMINATION: XR CHEST, SINGLE VIEW - 08/06/2017     INDICATION: Shortness of air.     COMPARISON: 07/17/2017.     FINDINGS: The heart is large. There are mild patchy diffuse airspace  changes without consolidation. There is no mass or effusion.           " Impression:       Cardiomegaly with patchy diffuse airspace disease but  without consolidation. Considering some difference in technical factors,  there has been little change since 07/17/2017.     DICTATED:     08/06/2017  EDITED:         08/06/2017     This report was finalized on 8/8/2017 8:19 AM by Dr. Yo Torres MD.             I have reviewed the medications.    Assessment/Plan   ASSESSMENT/PLAN    Principal Problem:    Acute on chronic respiratory failure with hypoxia  Active Problems:    Dementia    Hypertension    NSTEMI (non-ST elevated myocardial infarction)    COPD (chronic obstructive pulmonary disease)    Coronary artery disease involving coronary bypass graft of native heart without angina pectoris    Ischemic cardiomyopathy, most recent LVEF 40%    CKD (chronic kidney disease), stage III    Elevated LFTs    Acute on chronic diastolic heart failure    Normocytic anemia    Weakness    CAP (community acquired pneumonia)    72 year old male who presented from home with a/c respiratory failure thought to be due to a/c systolic heart failure and bronchitis.      Plan  --This is most likely due to heart failure exacerbation due to discontinuation of his blood pressure medications by his PCP in addition to suspected pneumonia.  --Per Dr. Stauffer's note from 2 weeks ago if patient's LV function normal okay to minimize medical therapy from heart failure standpoint. Unfortunately is seems at least per the prelim that his EF is 30% again. He has been on and off coreg and entresto per his PCP due to hypotension. Have resumed entresto at half dose and will restart coreg. His family has requested to request his treatment with Dr. Stauffer which I think is reasonable given his recent difficulty with medical management as outpatient. Cards consulted  --From a heart failure standpoint seems relatively well compensated now and back to his dry weight.   --His elevated troponin is likely a type II due to CHF exacerbation  and his troponin is down-trending with diuresis. Patient is recently s/p CABG. Continue asa, statin, coreg. cardiology plans for right and left heart cath with renal angio today???  --continue levaquin 500 mg daily for presumed CAP. Will continue through 8/13.  --His CKD and LFT's are at baseline. Monitor with diuresis.  --Labs in am.  -- PT/OT    Dispo: anticipate d/c in a few days    Rehana Mosher MD  08/09/17  8:12 AM

## 2017-08-09 NOTE — PLAN OF CARE
Problem: Respiratory Insufficiency (Adult)  Intervention: Provide Oxygenation/Ventilation/Perfusion Support  Will continue to monitor patient

## 2017-08-09 NOTE — THERAPY TREATMENT NOTE
Acute Care - Physical Therapy Treatment Note  [facility]     Patient Name: Magdi Arriaga  : 1945  MRN: [id number]  Today's Date: 2017  Onset of Illness/Injury or Date of Surgery Date: 17  Date of Referral to PT: 17  Referring Physician: MORALES Matthews    Admit Date: 2017    Visit Dx:    ICD-10-CM ICD-9-CM   1. SOB (shortness of breath) R06.02 786.05   2. Renal insufficiency N28.9 593.9   3. Congestive heart failure, unspecified congestive heart failure chronicity, unspecified congestive heart failure type I50.9 428.0   4. Hypoxemia R09.02 799.02   5. Cardiomyopathy I42.9 425.4   6. Impaired functional mobility, balance, gait, and endurance Z74.09 V49.89   7. Impaired mobility and ADLs Z74.09 799.89   8. Coronary artery disease involving coronary bypass graft of native heart without angina pectoris I25.810 414.05   9. Ischemic cardiomyopathy, most recent LVEF 40% I25.5 414.8   10. Ischemic cardiomyopathy I25.5 414.8     Patient Active Problem List   Diagnosis   • Abnormal CT scan, chest   • Allergic rhinitis   • Asthma   • Bronchiectasis   • Dementia   • Depression   • Diastolic dysfunction   • Dyslipidemia   • Hypertension   • Obesity   • JONNY (obstructive sleep apnea)   • Osteoarthritis   • Vitamin D deficiency   • NSTEMI (non-ST elevated myocardial infarction)   • Left bundle branch block   • Normocytic anemia   • COPD (chronic obstructive pulmonary disease)   • Coronary artery disease involving coronary bypass graft of native heart without angina pectoris   • Ischemic cardiomyopathy, most recent LVEF 40%   • Atrial flutter   • Acute deep vein thrombosis (DVT) of right lower extremity   • Hematoma, calf   • History of exposure to infectious disease-Patient received platelet transfusion for CABG, 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   • Chronic congestive heart failure   • CKD (chronic kidney disease), stage III   • Cellulitis of left lower leg   • Elevated LFTs    • Hypotension   • Iron deficiency   • Wound of left leg   • Acute on chronic respiratory failure with hypoxia   • Acute on chronic diastolic heart failure   • Normocytic anemia   • Weakness   • CAP (community acquired pneumonia)               Adult Rehabilitation Note       08/09/17 1043 08/08/17 1046       Rehab Assessment/Intervention    Discipline physical therapy assistant  -AS physical therapy assistant  -AS     Document Type therapy note (daily note)  -AS therapy note (daily note)  -AS     Subjective Information agree to therapy;complains of;weakness  -AS agree to therapy;complains of;weakness  -AS     Patient Effort, Rehab Treatment good  -AS good  -AS     Symptoms Noted During/After Treatment fatigue  -AS none  -AS     Precautions/Limitations fall precautions;oxygen therapy device and L/min  -AS cardiac precautions;fall precautions;oxygen therapy device and L/min;other (see comments)   life vest till 9/17  -AS     Recorded by [AS] Estela Nava PTA [AS] Estela Nava PTA     Pain Assessment    Pain Assessment No/denies pain  -AS No/denies pain  -AS     Recorded by [AS] Estela Nava PTA [AS] Estela Nava PTA     Cognitive Assessment/Intervention    Current Cognitive/Communication Assessment functional  -AS functional  -AS     Orientation Status oriented to;person;place  -AS oriented x 4  -AS     Follows Commands/Answers Questions 100% of the time  -% of the time;able to follow single-step instructions  -AS     Personal Safety WNL/WFL  -AS WNL/WFL  -AS     Personal Safety Interventions fall prevention program maintained;gait belt;nonskid shoes/slippers when out of bed;other (see comments)   exit alarm  -AS fall prevention program maintained;gait belt;nonskid shoes/slippers when out of bed  -AS     Recorded by [AS] Estela Nava PTA [AS] Estela Nava PTA     Bed Mobility, Assessment/Treatment    Bed Mob, Supine to Sit, [place] verbal cues required;contact  guard assist  -AS      Bed Mobility, Comment  patient up in chair  -AS     Recorded by [AS] Estela Nava PTA [AS] Estela Nava PTA     Transfer Assessment/Treatment    Transfers, Sit-Stand [place] contact guard assist;verbal cues required  -AS verbal cues required;contact guard assist;1 person + 1 person to manage equipment  -AS     Transfers, Stand-Sit [place] verbal cues required;minimum assist (75% patient effort)  -AS verbal cues required;contact guard assist;1 person + 1 person to manage equipment  -AS     Transfers, Sit-Stand-Sit, Assist Device rolling walker  -AS rolling walker  -AS     Transfer, Comment assist needed when turning to chair due to LOB  -AS verbal cues for hand placement  -AS     Recorded by [AS] Estela Nava PTA [AS] Estela Nava PTA     Gait Assessment/Treatment    Gait, [place] Level verbal cues required;contact guard assist  -AS verbal cues required;minimum assist (75% patient effort);1 person + 1 person to manage equipment  -AS     Gait, Assistive Device rolling walker  -AS rolling walker  -AS     Gait, Distance (Feet) 250  -  -AS     Gait, Gait Deviations kasi decreased;step length decreased;forward flexed posture  -AS kasi decreased;forward flexed posture;step length decreased  -AS     Gait, Safety Issues step length decreased;supplemental O2  -AS step length decreased;supplemental O2  -AS     Gait, Impairments strength decreased;impaired balance  -AS strength decreased;impaired balance  -AS     Gait, Comment verbal cues to stay inside walker and to keep walker with when turning towards chair, LOB posteriorly needing increased assist to correct.  -AS verbal cues to stay inside walker and for posture, good control of walker  -AS     Recorded by [AS] Estela Nava PTA [AS] Estela Nava PTA     Therapy Exercises    Bilateral Lower Extremities AROM:;10 reps;sitting;ankle pumps/circles;hip flexion;LAQ  -AS AROM:;10  reps;sitting;ankle pumps/circles;hip flexion;LAQ  -AS     Recorded by [AS] Estela Nava PTA [AS] Estela Nava PTA     Positioning and Restraints    Pre-Treatment Position in bed  -AS sitting in chair/recliner  -AS     Post Treatment Position chair  -AS chair  -AS     In Chair reclined;call light within reach;encouraged to call for assist;exit alarm on;with family/caregiver;waffle cushion  -AS reclined;call light within reach;encouraged to call for assist;exit alarm on;with family/caregiver  -AS     Recorded by [AS] Estela Nava PTA [AS] Estela Nava PTA       User Key  (r) = Recorded By, (t) = Taken By, (c) = Cosigned By    Initials Name Effective Dates    AS Estela Nava PTA 06/22/15 -                 IP PT Goals       08/09/17 1111 08/08/17 1121 08/07/17 1041    Bed Mobility PT STG    Bed Mobility PT STG, Date Established   08/07/17  -DM    Bed Mobility PT STG, Time to Achieve   3 days  -DM    Bed Mobility PT STG, Activity Type   all bed mobility  -DM    Bed Mobility PT STG, [place] Level   independent  -DM    Bed Mobility PT STG, Date Goal Reviewed 08/09/17  -AS 08/08/17  -AS     Bed Mobility PT STG, Outcome goal ongoing  -AS goal ongoing  -AS     Transfer Training PT STG    Transfer Training PT STG, Date Established   08/07/17  -DM    Transfer Training PT STG, Time to Achieve   3 days  -DM    Transfer Training PT STG, Activity Type   all transfers  -DM    Transfer Training PT STG, [place] Level   independent  -DM    Transfer Training PT STG, Assist Device   cane, straight  -DM    Transfer Training PT STG, Date Goal Reviewed 08/09/17  -AS 08/08/17  -AS     Transfer Training PT STG, Outcome goal ongoing  -AS goal ongoing  -AS     Gait Training PT STG    Gait Training Goal PT STG, Date Established   08/07/17  -DM    Gait Training Goal PT STG, Time to Achieve   3 days  -DM    Gait Training Goal PT STG, [place] Level   supervision required   STABLE VS  -DM    Gait  Training Goal PT STG, Assist Device   walker, rolling  -DM    Gait Training Goal PT STG, Distance to Achieve   350  -DM    Gait Training Goal PT STG, Date Goal Reviewed 08/09/17  -AS 08/08/17  -AS     Gait Training Goal PT STG, Outcome goal ongoing  -AS goal ongoing  -AS     Stair Training PT STG    Stair Training Goal PT STG, Date Established   08/07/17  -DM    Stair Training Goal PT STG, Time to Achieve   3 days  -DM    Stair Training Goal PT STG, Number of Steps   3  -DM    Stair Training Goal PT STG, [place] Level   independent  -DM    Stair Training Goal PT STG, Assist Device   cane, straight  -DM    Stair Training Goal PT STG, Date Goal Reviewed 08/09/17  -AS 08/08/17  -AS     Stair Training Goal PT STG, Outcome goal ongoing  -AS goal ongoing  -AS       User Key  (r) = Recorded By, (t) = Taken By, (c) = Cosigned By    Initials Name Provider Type    DM Loretta Canela, PT Physical Therapist    AS Estela Nava, PTA Physical Therapy Assistant          Physical Therapy Education     Title: PT OT SLP Therapies (Active)     Topic: Physical Therapy (Active)     Point: Mobility training (Active)    Learning Progress Summary    Learner Readiness Method Response Comment Documented by Status   Patient Acceptance E NR  AS 08/09/17 1111 Active    Acceptance E NR  AS 08/08/17 1121 Active    Eager D,E NR  DM 08/07/17 1041 Active   Significant Other Eager D,E NR  DM 08/07/17 1041 Active               Point: Home exercise program (Active)    Learning Progress Summary    Learner Readiness Method Response Comment Documented by Status   Patient Acceptance E NR  AS 08/09/17 1111 Active    Acceptance E NR  AS 08/08/17 1121 Active    Eager D,E NR  DM 08/07/17 1041 Active   Significant Other Eager D,E NR  DM 08/07/17 1041 Active               Point: Body mechanics (Active)    Learning Progress Summary    Learner Readiness Method Response Comment Documented by Status   Patient Acceptance E NR  AS 08/09/17 1111 Active     Acceptance E NR  AS 08/08/17 1121 Active    Eager D,E NR  DM 08/07/17 1041 Active   Significant Other Eager D,E NR  DM 08/07/17 1041 Active               Point: Precautions (Active)    Learning Progress Summary    Learner Readiness Method Response Comment Documented by Status   Patient Acceptance E NR  AS 08/09/17 1111 Active    Acceptance E NR  AS 08/08/17 1121 Active    Eager D,E NR  DM 08/07/17 1041 Active   Significant Other Eager D,E NR  DM 08/07/17 1041 Active                      User Key     Initials Effective Dates Name Provider Type Discipline    DM 06/19/15 -  Loretta Canela, PT Physical Therapist PT    AS 06/22/15 -  Estela Nava, PTA Physical Therapy Assistant PT                    PT Recommendation and Plan  Anticipated Discharge Disposition: home with assist  PT Frequency: daily  Plan of Care Review  Plan Of Care Reviewed With: patient  Progress: improving  Outcome Summary/Follow up Plan: patieint needs verbal cues for safe use of walker, had LOB posteriorly when turning towards chair needing increased assisr to correct.          Outcome Measures       08/09/17 1043 08/08/17 1046 08/07/17 0916    How much help from another person do you currently need...    Turning from your back to your side while in flat bed without using bedrails? 3  -AS 4  -AS     Moving from lying on back to sitting on the side of a flat bed without bedrails? 3  -AS 3  -AS     Moving to and from a bed to a chair (including a wheelchair)? 3  -AS 3  -AS     Standing up from a chair using your arms (e.g., wheelchair, bedside chair)? 3  -AS 3  -AS     Climbing 3-5 steps with a railing? 3  -AS 3  -AS     To walk in hospital room? 3  -AS 3  -AS     AM-PAC 6 Clicks Score 18  -AS 19  -AS     How much help from another is currently needed...    Putting on and taking off regular lower body clothing?   2  -CL    Bathing (including washing, rinsing, and drying)   2  -CL    Toileting (which includes using toilet bed pan or urinal)   2   -CL    Putting on and taking off regular upper body clothing   3  -CL    Taking care of personal grooming (such as brushing teeth)   3  -CL    Eating meals   4  -CL    Score   16  -CL    Functional Assessment    Outcome Measure Options AM-PAC 6 Clicks Basic Mobility (PT)  -AS AM-PAC 6 Clicks Basic Mobility (PT)  -AS AM-PAC 6 Clicks Daily Activity (OT)  -CL      08/07/17 0915          How much help from another person do you currently need...    Turning from your back to your side while in flat bed without using bedrails? 4  -DM      Moving from lying on back to sitting on the side of a flat bed without bedrails? 3  -DM      Moving to and from a bed to a chair (including a wheelchair)? 3  -DM      Standing up from a chair using your arms (e.g., wheelchair, bedside chair)? 3  -DM      Climbing 3-5 steps with a railing? 3  -DM      To walk in hospital room? 3  -DM      AM-PAC 6 Clicks Score 19  -DM      Functional Assessment    Outcome Measure Options AM-PAC 6 Clicks Basic Mobility (PT)  -DM        User Key  (r) = Recorded By, (t) = Taken By, (c) = Cosigned By    Initials Name Provider Type    DM Loretta Canela, PT Physical Therapist    AS Estela Naav PTA Physical Therapy Assistant    CL Margarita Velasco, OT Occupational Therapist           Time Calculation:         PT Charges       08/09/17 1113          Time Calculation    Start Time 1043  -AS      PT Received On 08/09/17  -AS      PT Goal Re-Cert Due Date 08/17/17  -AS      Time Calculation- PT    Total Timed Code Minutes- PT 23 minute(s)  -AS        User Key  (r) = Recorded By, (t) = Taken By, (c) = Cosigned By    Initials Name Provider Type    AS Estela Nava PTA Physical Therapy Assistant          Therapy Charges for Today     Code Description Service Date Service Provider Modifiers Qty    30800506330 HC GAIT TRAINING EA 15 MIN 8/8/2017 Estela Nava PTA GP 1    23024574511 HC PT THER PROC EA 15 MIN 8/8/2017 Estela Nava PTA GP 1     81078135292 HC PT THER SUPP EA 15 MIN 8/8/2017 Estela Nava, PTA GP 2    70509343652 HC GAIT TRAINING EA 15 MIN 8/9/2017 Estela Nava, BG GP 1    74946996369 HC PT THER PROC EA 15 MIN 8/9/2017 Estela Nava, BG GP 1          PT G-Codes  Outcome Measure Options: AM-PAC 6 Clicks Basic Mobility (PT)    Estela Nava PTA  8/9/2017

## 2017-08-10 PROBLEM — B16.9 ACUTE TYPE B VIRAL HEPATITIS: Status: ACTIVE | Noted: 2017-01-01

## 2017-08-10 PROBLEM — R77.8 ELEVATED TROPONIN: Status: ACTIVE | Noted: 2017-04-17

## 2017-08-10 PROBLEM — I50.23 ACUTE ON CHRONIC SYSTOLIC HEART FAILURE (HCC): Status: ACTIVE | Noted: 2017-01-01

## 2017-08-10 PROBLEM — J40 BRONCHITIS: Status: ACTIVE | Noted: 2017-01-01

## 2017-08-10 NOTE — PROGRESS NOTES
"%   [facility]   Inpatient Progress Note       LOS: 4 days   Patient Care Team:  Colton Dickens MD as PCP - General (Internal Medicine)  Kvng Stauffer MD as Consulting Physician (Cardiology)    Chief Complaint:  Follow-up for CHF and cardiomyopathy and CAD    Subjective     Interval History:   Patient states that his breathing is feeling better. No chest pain. Still hypoxic on RA. Sats of 87% still hypoxic on room air.  A less dyspneic however.  No orthopnea.      Review of Systems:   Pertinent positives noted in history, exam, and assessment. Otherwise reviewed and negative.      Objective     Vitals:  Blood pressure 133/79, pulse 66, temperature 98.3 °F (36.8 °C), temperature source Axillary, resp. rate 16, height 68\" (172.7 cm), weight 190 lb (86.2 kg), SpO2 96 %.     Intake/Output Summary (Last 24 hours) at 08/10/17 0824  Last data filed at 08/09/17 1700   Gross per 24 hour   Intake              480 ml   Output              200 ml   Net              280 ml     Physical Exam   Constitutional: He is oriented to person, place, and time. He appears well-developed and well-nourished.   Neck: No JVD present. No tracheal deviation present.   Cardiovascular: Normal rate, regular rhythm, normal heart sounds and intact distal pulses.  Exam reveals no friction rub.    No murmur heard.  Pulmonary/Chest: Effort normal. No respiratory distress.   Abdominal: Soft. Bowel sounds are normal. There is no tenderness.   Musculoskeletal: He exhibits no edema or deformity.   Neurological: He is alert and oriented to person, place, and time.   Skin: Skin is warm and dry.     I have examined the patient and agree with the above findings.     Results Review:     I reviewed the patient's new clinical results.      Results from last 7 days  Lab Units 08/10/17  0508   WBC 10*3/mm3 8.14   HEMOGLOBIN g/dL 10.4*   HEMATOCRIT % 33.1*   PLATELETS 10*3/mm3 227       Results from last 7 days  Lab Units " 08/10/17  0508   SODIUM mmol/L 136   POTASSIUM mmol/L 4.2   CHLORIDE mmol/L 104   CO2 mmol/L 27.0   BUN mg/dL 38*   CREATININE mg/dL 1.60*   CALCIUM mg/dL 8.6*   BILIRUBIN mg/dL 0.4   ALK PHOS U/L 127*   ALT (SGPT) U/L 354*   AST (SGOT) U/L 324*   GLUCOSE mg/dL 88       Results from last 7 days  Lab Units 08/10/17  0508   SODIUM mmol/L 136   POTASSIUM mmol/L 4.2   CHLORIDE mmol/L 104   CO2 mmol/L 27.0   BUN mg/dL 38*   CREATININE mg/dL 1.60*   GLUCOSE mg/dL 88   CALCIUM mg/dL 8.6*           Lab Results  Lab Value Date/Time   TROPONINI 0.173 (H) 08/07/2017 0836   TROPONINI 0.211 (H) 08/07/2017 0241   TROPONINI 0.166 (H) 08/06/2017 2048   TROPONINI 0.019 07/17/2017 1227   TROPONINI 0.044 (H) 06/12/2017 2139   TROPONINI 9.425 (C) 04/18/2017 0757   TROPONINI 7.403 (C) 04/17/2017 2340           Results from last 7 days  Lab Units 08/10/17  0508   CHOLESTEROL mg/dL 113   TRIGLYCERIDES mg/dL 109   HDL CHOL mg/dL 41         Cardiac cath:  IMPRESSION:  1.  Severe proximal LAD/first diagonal stenosis (ungrafted) history of a 2.5 x 23 Xience EES  2.  Occluded LAD with patent vein graft to the diagonal and LIMA to the LAD  3.  Severe left circumflex stenosis with widely patent vein graft to the OM1 and OM 2  4.  Occluded RCA with patent saphenous vein graft  5.  Disease and a small ramus branch (too small for PCI)  6.  Normal right heart pressures and post capillary wedge pressure.    ECHO:  · Left ventricular systolic function is moderately decreased. Estimated EF = 30%.  · The cardiac valves are anatomically and functionally normal.    Tele:  SR    Assessment/Plan     Principal Problem:    Acute on chronic respiratory failure with hypoxia  Active Problems:    Dementia    Hypertension    NSTEMI (non-ST elevated myocardial infarction)    COPD (chronic obstructive pulmonary disease)    Coronary artery disease involving coronary bypass graft of native heart without angina pectoris    Ischemic cardiomyopathy, most recent LVEF  40%    CKD (chronic kidney disease), stage III    Elevated LFTs    Acute on chronic diastolic heart failure    Normocytic anemia    Weakness    CAP (community acquired pneumonia)      1. Dyspnea   - Heart failure plus minus some angina and COPD.  2. Coronary artery disease  -recent CABG.   - s/p intervention to diagonal. Ramus disease will be treated medically  3. ICM  - EF 30%,  - currently has life vest  4. Mildly elevated troponin, NOT NSTEMI  5. COPD  - suspect some component of exacerbation  6. Acute on Chronic systolic congestive heart failure.   7. CKD  8. Elevated LFT's.   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  This improved his dyspnea somewhat.  Ischemic cardio myopathy, has not improved post CABG.  Do not think it will improve with simply stenting of the diagonal.  We'll get electrophysiology involved to see when an ICD can be placed.    Dyspnea is multifactorial.  He no longer has heart failure as he is normal post capillary wedge pressure (was actually somewhat dehydrated by right heart catheter yesterday, and his creatinine has improved with hydration overnight).  COPD is the main issue versus persistent hypoxia.  Defer to hospitalists for this.    We'll add back Entresto.  Low dose with close eye on renal function  Hopefully home 1-2 days    08/10/17  8:24 AM      I, Kvng Stauffer have reviewed the note in full and agree with all aspects of the above including physical exam, assessment, labs and plan with changes made accordingly.     Kvng Stauffer MD  08/10/17  10:57 AM

## 2017-08-10 NOTE — DISCHARGE PLACEMENT REQUEST
"Magdi Arriaga SAYRA (72 y.o. Male)     Date of Birth Social Security Number Address Home Phone MRN    1945  [address] [place] [address] [phone] [id number]    Restoration Marital Status          None        Admission Date Admission Type Admitting Provider Attending Provider Department, Room/Bed    8/6/17 Emergency Rod Wing MD Dossett, Lee M, MD [facility] 6B, N627/1    Discharge Date Discharge Disposition Discharge Destination                      Attending Provider: Rod Wing MD     Allergies:  Sulfa Antibiotics    Isolation:  None   Infection:  None   Code Status:  FULL    Ht:  68\" (172.7 cm)   Wt:  190 lb (86.2 kg)    Admission Cmt:  None   Principal Problem:  Acute on chronic respiratory failure with hypoxia [J96.21]                 Active Insurance as of 8/6/2017     Primary Coverage     Payor Plan Insurance Group Employer/Plan Group    MEDICARE MEDICARE A & B      Payor Plan Address Payor Plan Phone Number Effective From Effective To    [address] [phone] 7/1/2010     [place] [address]       Subscriber Name Subscriber Birth Date Member ID       MAGDI ARRIAGA 1945 [id number]           Secondary Coverage     Payor Plan Insurance Group Employer/Plan Group    ANTH BLUE CROSS ANTH BLUE CROSS BLUE [facility] PPO [id number]     Payor Plan Address Payor Plan Phone Number Effective From Effective To    [address] [phone] 7/1/2013     [place] [address]       Subscriber Name Subscriber Birth Date Member ID       MAGDI ARRIAGA 1945 [id number]                 Emergency Contacts      (Rel.) Home Phone Work Phone Mobile Phone    BartLinda guevara (Spouse) -- -- [phone]               History & Physical      Bernadette Wilson MD at 8/6/2017  8:37 PM              [facility] Medicine Services  HISTORY AND PHYSICAL    Primary Care Physician: Colton Dickens MD    Subjective     Chief " Complaint:  SOA    History of Present Illness:   This is a 72 year old male with a past medical history significant for chronic diastolic dysfunction, JONNY on CPAP nightly, COPD on 2L supplemental oxygen nightly, CAD with CABG X5 4/24/17 with subsequent LifeVest placement until September, DVT with vena cava filter, and recent exposure to Hepatitis B who presents with 4 days of progressively worsening SOB. Dyspnea worse with exertion and improved with rest. There is associated chronic non productive cough and a minimal increase in peripheral edema. States his dry weight is approximately 191.0 but he has recently gone up to about  202.0. No complaints of chest pain, fever, congestion, orthopnea, syncope, or N/V/D. Patient was initially evaluated by home health nurse 3 days ago after which PCP, Dr. Dickens, started Lasix 40 mg BID. Patient has been compliant, but without change in symptoms. States his Cardiologist, Dr. Stauffer, discontinued his long time Lasix in May. Patient was last admitted to this service 7/19-7/21 for hypotension likely multifactorial to poor EF and cardiomyopathy related to MI. At this time, Cardiology also discontinued amlodipine and spironolactone. He has been without chronic diuresis since then. Patient now presenting to [facility] for further evaluation and treatment.    Emergency Department Evaluation: Vitals stable. Patient initially hypoxic at 87 % room air. BNP elevated at > 5,000. Creatinine elevated at 1.6. AST/ALT elevated at 164 and 371 respectively. WBC slightly increased at 10.8. H/H stablt at 11 and 35.2 respectively. CXR demonstrating cardiomegaly with some mild increase in pulmonary vasculature.    Review of Systems   Constitutional: Positive for fatigue and unexpected weight change. Negative for fever.   HENT: Negative for congestion and trouble swallowing.    Eyes: Negative for photophobia and visual disturbance.   Respiratory: Positive for cough and shortness of breath. Negative for  wheezing.    Cardiovascular: Negative for chest pain and leg swelling.   Gastrointestinal: Negative for abdominal pain, diarrhea, nausea and vomiting.   Endocrine: Negative for cold intolerance and heat intolerance.   Genitourinary: Negative for dysuria and flank pain.   Musculoskeletal: Negative for arthralgias and back pain.   Skin: Negative for pallor and rash.   Allergic/Immunologic: Negative for immunocompromised state.   Neurological: Negative for dizziness, syncope and light-headedness.   Hematological: Negative for adenopathy.   Psychiatric/Behavioral: Negative for agitation and confusion.      Otherwise complete ROS performed and negative except as mentioned in the HPI.    Past Medical History:   Diagnosis Date   • Abnormal finding on lung imaging      CT scan of the chest to be wary 2013 reports interstitial lung disease with marked diffuse interstitial and peripheral scarring and significant bronchiectasis in both upper   and lower lung zones.    • Allergic rhinitis     History of allergy injections as a child.   • Arthritis    • Asthma      Patient has had a history of asthma since childhood.   • CHF (congestive heart failure) 2017    ECHO - EF 25%   • COPD (chronic obstructive pulmonary disease)    • Coronary artery disease 2017    CABG X 5   • DVT (deep venous thrombosis)     Bilateral   • Exposure to hepatitis B 2017    Transfusion - managed by ID   • Falls     A.  History of falling traumatic injuries in April 2014 and May 2014 resulting and left rib  fracture and left clavicle fracture.   • Fracture of rib    • History of chest x-ray 02/19/2016    Extensive, but stable postinflammatory pulmonary changes. There has been no change since the previous examination of 12/03/2015   • History of chest x-ray 12/03/2015    Slight increased markings bilaterally, may be progression of his bronchiectasis   • History of chest x-ray 09/06/2014    There has been no change since 09/05/2014   • History of  echocardiogram 02/19/2016    Mild concentric LVH is observed.Estimated EF is 50-55%.E to A reversal in the mitral valve flow pattern suggestive of diastolic dysfunction.RV is mildly dilated.Moderate aortic cusp sclerosis is present.Mitral annular calcification.Mild MR.Evidence of borderline pulmonary hypertension.   • History of PFTs 12/03/2015    Values are slightly worse.TLC is c/w mild restriction.Values reduced from 02/16/13.Diffusion capacity is slightly worse   • History of PFTs 02/06/2013    Mild decrease in FVC, may be due to less maximal effort.TLC is within normal levels. Mild reduction in diffusion in absolute value   • History of transfusion    • Hypertension    • Lumbar spinal stenosis 2013    Spinal surgery   • Patellar tendon rupture     A.  Status post primary repair of the left periprosthetic patellar tendon tear 6/11/2014, post fall at the end of April 2014.   • Pneumonia      A.  Left lower lobe pneumonia treated at [facility], 5/8/2014 through 5/20/2014.   • Pneumothorax      A.  Status post chest tube placed 5/13/2014 and discontinued 5/15/2014 with stable chest x-ray.   • Sleep apnea with use of continuous positive airway pressure (CPAP)    • Traumatic hemorrhagic shock      A.  Secondary to right renal retroperitoneal hemorrhage, 9/2014.       Past Surgical History:   Procedure Laterality Date   • APPENDECTOMY  1959   • CARDIAC CATHETERIZATION N/A 4/18/2017    Procedure: Left Heart Cath;  Surgeon: Kvng Stauffer MD;  Location:  [facility];  Service:    • COLONOSCOPY W/ POLYPECTOMY     • CORONARY ARTERY BYPASS GRAFT N/A 4/24/2017    Procedure: MEDIAN STERNOTOMY, CORONARY ARTERY BYPASS GRAFT X5 UTILIZING THE INTERNAL MAMMARY ARTERY, ENDOVASCULAR VEING HARVEST UTILIZING RIGHT SAPHENOUS VEIN,TRANSESOPHAGEAL ECHO;  Surgeon: Kanu Berger MD;  Location: [facility] OR;  Service:    • FRACTURE SURGERY     • KNEE SURGERY  2006, 2014    primary repair -left periprosthetic patellar tendon  tear 6/11/2014   • LUMBAR LAMINECTOMY  2013    With discectomy   • REPLACEMENT TOTAL KNEE Bilateral 2009, 2013    Left - 2009 and right - 2013.   • SKIN BIOPSY     • TONSILLECTOMY  1957   • VENA CAVA FILTER PLACEMENT  2014       Family History   Problem Relation Age of Onset   • Adopted: Yes   • No Known Problems Mother      Adopted as child   • No Known Problems Father      Social History     Social History   • Marital status:      Spouse name: N/A   • Number of children: 2   • Years of education: N/A     Occupational History   • Post Office at       Retired     Social History Main Topics   • Smoking status: Former Smoker     Packs/day: 1.00     Years: 17.00     Types: Cigarettes     Start date: 1965     Quit date: 1982   • Smokeless tobacco: Never Used   • Alcohol use No      Comment: Never drinker   • Drug use: No   • Sexual activity: Defer     Other Topics Concern   • Not on file     Social History Narrative    Domestic life :  Lives in private home with wife        Yazidi :   Jewish        Sleep hygiene :  In bed 11 PM to 8 AM for 8 hours of sleep        Caffeine use :  One cup of coffee and 1 diet cola daily        Exercise habits :   Cardiac rehabilitation since heart surgery and heart failure        Diet :  Low calorie diet low in salt and low in sugar        Occupation :   Retired        Hearing :        Vision :        Driving :  No driving           Medications:  Prescriptions Prior to Admission   Medication Sig Dispense Refill Last Dose   • albuterol (PROVENTIL HFA;VENTOLIN HFA) 108 (90 BASE) MCG/ACT inhaler Inhale 2 puffs Every 4 (Four) Hours As Needed for Wheezing.   8/6/2017   • aspirin 81 MG chewable tablet Chew 1 tablet Daily.   8/6/2017   • atorvastatin (LIPITOR) 40 MG tablet Take 1 tablet by mouth Every Night. 90 tablet 0 8/5/2017   • budesonide-formoterol (SYMBICORT) 160-4.5 MCG/ACT inhaler Inhale 2 puffs 2 (Two) Times a Day. 1 inhaler 11 8/6/2017   • entecavir (BARACLUDE) 0.5 MG  "tablet Take 1 tablet by mouth Daily.   8/6/2017   • ferrous sulfate 325 (65 FE) MG tablet Take 325 mg by mouth Daily With Breakfast.   8/6/2017   • furosemide (LASIX) 40 MG tablet Take 40 mg by mouth 2 (Two) Times a Day.   8/6/2017   • loratadine (CLARITIN) 10 MG tablet Take 10 mg by mouth Daily.   8/6/2017   • memantine (NAMENDA) 10 MG tablet Take 10 mg by mouth 2 (Two) Times a Day.   8/6/2017   • montelukast (SINGULAIR) 10 MG tablet Take 1 tablet by mouth Every Night. 30 tablet 11 8/5/2017   • nitroglycerin (NITROSTAT) 0.4 MG SL tablet Place 1 tablet under the tongue Every 5 (Five) Minutes As Needed for Chest Pain. Take no more than 3 doses in 15 minutes. 25 tablet 6    • PARoxetine (PAXIL) 30 MG tablet Take 1 tablet by mouth Every Morning.   8/6/2017   • PHARMACY MEDS TO BED CONSULT Daily (Monday-Friday). 1 each 0    • rivastigmine (EXELON) 9.5 MG/24HR patch Place 1 patch on the skin Daily.   8/6/2017   • sacubitril-valsartan (ENTRESTO) 49-51 MG tablet Take 0.5 tablets by mouth 2 (Two) Times a Day. 60 tablet 11 8/6/2017   • vitamin C (ASCORBIC ACID) 250 MG tablet Take 1 tablet by mouth 2 (Two) Times a Day With Meals. Take with iron supplement to enhance absorption   8/6/2017       Allergies:  Allergies   Allergen Reactions   • Sulfa Antibiotics      UNKNOWN CHILDHOOD REACTION         Objective     Physical Exam:  Vital Signs: /88 (BP Location: Left arm, Patient Position: Sitting)  Pulse 80  Temp 98.6 °F (37 °C) (Oral)   Resp 20  Ht 68\" (172.7 cm)  Wt 195 lb (88.5 kg)  SpO2 96%  BMI 29.65 kg/m2  Physical Exam  Temp:  [98.6 °F (37 °C)] 98.6 °F (37 °C)  Heart Rate:  [80-93] 80  Resp:  [20-22] 20  BP: (148-174)/() 148/88  Constitutional: no acute distress, awake, alert, very pleasant  Eyes: PERRLA, sclerae anicteric, no conjunctival injection  Neck: supple, no thyromegaly, trachea midline  Respiratory: Clear to auscultation bilaterally, nonlabored respirations   Cardiovascular: RRR, no murmurs, " rubs, or gallops, palpable pedal pulses bilaterally  Gastrointestinal: Positive bowel sounds, soft, nontender, nondistended  Musculoskeletal: No bilateral ankle edema, no clubbing or cyanosis to bilateral lower extremities  Diffuse contusions, small healing laceratiions  Psychiatric: oriented x 3, appropriate affect, cooperative  Neurologic: Strength symmetric in all extremities, Cranial Nerves grossly intact to confrontation         Results Reviewed:    Results from last 7 days  Lab Units 08/06/17  1724   WBC 10*3/mm3 10.81*   HEMOGLOBIN g/dL 11.0*   PLATELETS 10*3/mm3 236       Results from last 7 days  Lab Units 08/06/17  1724   SODIUM mmol/L 137   POTASSIUM mmol/L 3.7   CO2 mmol/L 23.0   CREATININE mg/dL 1.60*   GLUCOSE mg/dL 103*   CALCIUM mg/dL 10.0       I have personally reviewed and interpreted available lab data, radiology studies and ECG obtained at time of admission.     Assessment / Plan     Problem List:   Hospital Problem List     * (Principal)Acute on chronic respiratory failure with hypoxia    Acute on chronic diastolic heart failure    Hypertension    COPD (chronic obstructive pulmonary disease)    Coronary artery disease involving coronary bypass graft of native heart without angina pectoris    CKD (chronic kidney disease), stage III    Elevated LFTs    Dementia    JONNY (obstructive sleep apnea)    Overview Signed 1/6/2017  8:57 AM by Davis HIDALGO.  Obstructive sleep apnea with central component.B.  On home CPAP therapy.         Ischemic cardiomyopathy, most recent LVEF 40%    History of exposure to infectious disease-Patient received platelet transfusion for CABG, 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    Normocytic anemia          Plan:  1. Acute on chronic respiratory failure wih hypoxia, multifactorial to include acute exacerbation of diastolic dysfunction and COPD:  - hypoxic on room air at 87%. Improved with 2L nasal cannula.  - failed outpatient treatment with  PO lasix  - BNP increased from 219 to 5000 since 7/24/17  - troponin negative. EKG stable. CXR with cardiomegaly and increased vascular markings  - administered 80 mg lasix in ED. Reports marked improvement with symptoms, Now breathing comfortably.  - Continue Lasix 40 mg IV q 8 hours X 4 doses. Monitor potassium, magnesium, additional electrolytes daily  - daily weights, strict I/O, fluid restriction of 1500 ml daily  - pulmonary toilet as needed with; Duo nebs, mucolytics, antitussives  - last echocardiogram 7/2017 EF: 54%    2. Hypertension:  - currently stable with a labile history. Monitor closely.  - Wife gives Entresto 1/2 tab for systolic greater than 110, not as scheduled  - hold for now in the setting of persistent hypotension    3. Elevated LFT:  - Hepatitis B. History of exposure 4/2017 after platelet transfusion for CABG, donor tested positive for HBV att subsequent donation attempt.  - currently followed by Dr. Ng, ID and continues to take Baraclude 0.5 mg daily.   - values trending down from July  - avoid additional hepatotoxic agents    4. CKD stage 3:  - followed by nephrology  - creatinine at baseline 1.5-1.7  - avoid additional nephrotoxic agents    5. Weakness:  - patient with frequent mechanical falls, history of early stage dementia  - continue PT/OT as inpatient    Patient stable for admission to TELEMETRY. Labs and vitals routine per nursing.    DVT prophylaxis: heparin  Code Status: full  Admission Status: Patient will be admitted to (LEXOBS or LEXINPT)     Alonso Matthews PA-C 08/06/17 8:37 PM      Brief Attending Note       I have seen and examined the patient, performing an independent face-to-face diagnostic evaluation with plan of care reviewed and developed with the advanced practice clinician (APC).      Brief Summary Statement/HPI:   Pt is a 72yom with CAD s/p recent CABG, mixed systolic and diastolic CHF who presented with increase dyspnea. Has not been on any diuretics since  mid-July. Had COPE that started gradually last Thur. PCP placed pt on lasix 40mg BID on Fri and did have good UOP with it. However, it did not improve his dyspnea. No CP, palpitations, N/V/D. No LE edema or orthopnea however, had increased abdominal girth. Denies eating out much or drinking lots of fluids. No decreased UOP. Wife reports his weight when he was last discharged was 195lbs.     Attending Physical Exam:  Physical Exam:  Temp:  [98.6 °F (37 °C)] 98.6 °F (37 °C)  Heart Rate:  [80-93] 80  Resp:  [20-22] 20  BP: (148-174)/() 148/88  General - NAD, pt was laying in bed comfortably  HEENT - EOMI, PERRL, oropharynx clear, hearing intact  CVS - RRR, S1 S2, no rubs, gallops or murmurs, 2s cap refill, no peripheral edema, 2+ pulses  Resp - Inspiratory crackles bibasilarly. No wheezes   GI - +BS, soft, ND/NT  MSK - No joint pain or joint edema  Neuro - Lethargic/Awake and oriented, follows commands, interactive, moves all extremities equally  Psych - Appropriate affect, cooperative    Brief Assessment/Plan :    # Acute CHF exacerbation  - ECHO 7/19/2017 shows EF 50-55%, Grade I diastolic dysfunction  - BNP >5000. Last   - CXR personally reviewed. Bilateral airspace and interstitial edema  - Good UOP with 1x IV lasix 80mg   - Weight on reaching floor about 192lbs (?close to baseline)  - Strict Is/Os, daily AM standing weight  - Plan for 1x 40mg Lasix IV in AM with goal of -2L daily  - 2g Na restriction, 1500cc fluid restriction    # HTN  - Recently taken off of multiple meds due to HoTN  - Continue home Entresto    # JONNY  - CPAP    See above for further detailed assessment and plan developed with APC which I have reviewed and/or edited.    I believe this patient meets LEXINPT.    Bernadette Wilson MD  08/06/17  9:48 PM     Ambulatory Referral to Home Health [[id number]] (Order [id number])   Outpatient Referral    Date: 8/9/2017   Department: [address]   Ordering/Authorizing: Kaylynn HOOKER  LISA Stoner           Order History  Outpatient       Date/Time Action Taken User Additional Information       08/09/17 1147 Sign Clary Freitas RN Ordering Mode: Telephone with readback       08/09/17 1535 Verbal Cosign Kaylynn Stoner PA-C            Order Details        Frequency Duration Priority Order Class       None None Routine Internal Referral           Start Date/Time        Start Date       08/09/17           Order Questions        Question Answer Comment       Face to Face Visit Date 8/9/2017        Follow-up Provider for Plan of Care? I treated the patient in an acute care facility and will not continue treatment after discharge.        Follow-up Provider JOEY FINE        Reason/Clinical Findings NSTEMI, COPD        Describe mobility limitations that make leaving home difficult Weakness, impaired physical mobility        Nursing/Therapeutic Services Requested Skilled Nursing         Physical Therapy         Occupational Therapy        Skilled nursing orders: Post CABG care         COPD management         CHF management        PT orders: Therapeutic exercise         Gait Training         Transfer training         Strengthening         Home safety assessment        Weight Bearing Status As Tolerated        Occupational orders: Activities of daily living         Energy conservation         Strengthening         Home safety assessment            Verbal Order Info        Action Created on Order Mode Entered by Responsible Provider Signed by Signed on       Ordering 08/09/17 1147 Telephone with readback YURI Brown PA-C Christine S. Marchik, PA-C 08/09/17 1535           Source Order Set / Preference List        Preference List       AMB [facility] REFERRALS [[id number]]           Clinical Indications           ICD-10-CM ICD-9-CM       SOB (shortness of breath)  - Primary     R06.02 786.05       Congestive heart failure, unspecified congestive heart failure  chronicity, unspecified congestive heart failure type     I50.9 428.0       Impaired functional mobility, balance, gait, and endurance     Z74.09 V49.89       Impaired mobility and ADLs     Z74.09 799.89           Reprint Order Requisition        AMB REFERRAL TO HOME HEALTH (Order #[id number]) on 8/9/17         Encounter-Level Cardiology Documents:        There are no encounter-level cardiology documents.         Encounter        View Encounter         Order Provider Info            Office phone Pager E-mail       Ordering User Clary Freitas RN -- -- --       Authorizing Provider Kaylynn Stoner PA-C [phone] -- --       Attending Provider Rod Wing [phone] -- --       Entered By Clary Freitas RN -- -- --       Ordering Provider Kaylynn Stoner PA-C [phone] -- --       Signed By (on 08/09/2017 7395) Kaylynn Stoner PA-C [phone] -- --           Linked Charges        No charges linked to this procedure         Order Transmittal Tracking        AMB REFERRAL TO HOME HEALTH (Order #[id number]) on 8/9/17         Authorized by:  Kaylynn Stoner PA-C  (NPI: [id number])                      Electronically signed by Bernadette Wilson MD at 8/6/2017 10:52 PM

## 2017-08-10 NOTE — PROGRESS NOTES
Pt. Referred for Phase II Cardiac Rehab. Staff discussed benefits of exercise, program protocol, and educational material provided. Teach back verified.  Patient scheduled for orientation at [facility] on August 31st at 1300. Thank you for this referral.

## 2017-08-10 NOTE — PLAN OF CARE
Problem: Patient Care Overview (Adult)  Goal: Plan of Care Review  Outcome: Ongoing (interventions implemented as appropriate)    08/10/17 1553   Coping/Psychosocial Response Interventions   Plan Of Care Reviewed With patient;spouse   Patient Care Overview   Progress progress toward functional goals as expected   Outcome Evaluation   Outcome Summary/Follow up Plan Pt maintained forward ambulation distance at 250ft with RWx CGA, but was unable to increase distance today due to fatigue. He required verbal cues to increase step length.         Problem: Inpatient Physical Therapy  Goal: Bed Mobility Goal STG- PT  Outcome: Ongoing (interventions implemented as appropriate)    08/07/17 1041 08/09/17 1111 08/10/17 1553   Bed Mobility PT STG   Bed Mobility PT STG, Date Established 08/07/17 --  --    Bed Mobility PT STG, Time to Achieve 3 days --  --    Bed Mobility PT STG, Activity Type all bed mobility --  --    Bed Mobility PT STG, [place] Level independent --  --    Bed Mobility PT STG, Date Goal Reviewed --  08/09/17 --    Bed Mobility PT STG, Outcome --  --  goal ongoing       Goal: Transfer Training Goal 1 STG- PT  Outcome: Ongoing (interventions implemented as appropriate)    08/07/17 1041 08/09/17 1111 08/10/17 1553   Transfer Training PT STG   Transfer Training PT STG, Date Established 08/07/17 --  --    Transfer Training PT STG, Time to Achieve 3 days --  --    Transfer Training PT STG, Activity Type all transfers --  --    Transfer Training PT STG, [place] Level independent --  --    Transfer Training PT STG, Assist Device cane, straight --  --    Transfer Training PT STG, Date Goal Reviewed --  08/09/17 --    Transfer Training PT STG, Outcome --  --  goal ongoing       Goal: Gait Training Goal STG- PT  Outcome: Ongoing (interventions implemented as appropriate)    08/07/17 1041 08/09/17 1111 08/10/17 1553   Gait Training PT STG   Gait Training Goal PT STG, Date Established 08/07/17 --  --    Gait  Training Goal PT STG, Time to Achieve 3 days --  --    Gait Training Goal PT STG, [place] Level supervision required  (STABLE VS) --  --    Gait Training Goal PT STG, Assist Device walker, rolling --  --    Gait Training Goal PT STG, Distance to Achieve 350 --  --    Gait Training Goal PT STG, Date Goal Reviewed --  08/09/17 --    Gait Training Goal PT STG, Outcome --  --  goal ongoing       Goal: Stair Training Goal STG- PT  Outcome: Ongoing (interventions implemented as appropriate)    08/07/17 1041 08/09/17 1111 08/10/17 1553   Stair Training PT STG   Stair Training Goal PT STG, Date Established 08/07/17 --  --    Stair Training Goal PT STG, Time to Achieve 3 days --  --    Stair Training Goal PT STG, Number of Steps 3 --  --    Stair Training Goal PT STG, [place] Level independent --  --    Stair Training Goal PT STG, Assist Device cane, straight --  --    Stair Training Goal PT STG, Date Goal Reviewed --  08/09/17 --    Stair Training Goal PT STG, Outcome --  --  goal ongoing

## 2017-08-10 NOTE — PROGRESS NOTES
Continued Stay Note  [facility]     Patient Name: Magdi Arriaga  MRN: [id number]  Today's Date: 8/10/2017    Admit Date: 8/6/2017          Discharge Plan       08/10/17 1049    Case Management/Social Work Plan    Plan Home with     Patient/Family In Agreement With Plan yes    Additional Comments Spoke with pt and pt's wife for an update. Pt and pt's wife report the plan remains the same for pt to discharge home with [facility] health. ERIK will notify [facility] when pt discharges. SW faxed home health order to [facility].    Final Note    Final Note SW is following              Discharge Codes     None        Expected Discharge Date and Time     Expected Discharge Date Expected Discharge Time    Aug 11, 2017             SHAMAR Buchanan

## 2017-08-10 NOTE — PROGRESS NOTES
[facility] Medicine Services  INPATIENT PROGRESS NOTE    Date of Admission: 8/6/2017  Length of Stay: 4  Primary Care Physician: Colton Dickens MD    Subjective   CC: f/u CHF    HPI:  Doing ok this morning.  Breathing is stable.  Tolerated LHC.  No CP.  Got a little unsteady working with PT yesterday.  Eating well. +BM    Review Of Systems:   Review of Systems   Constitutional: Negative for fever.   Respiratory: Negative for shortness of breath.    Cardiovascular: Negative for chest pain and leg swelling.   Gastrointestinal: Negative for abdominal pain.   All other systems reviewed and are negative.        Objective      Temp:  [97.3 °F (36.3 °C)-98.8 °F (37.1 °C)] 98.3 °F (36.8 °C)  Heart Rate:  [50-66] 66  Resp:  [14-18] 16  BP: (115-151)/(69-97) 133/79  Physical Exam   Constitutional: He is oriented to person, place, and time. He appears well-developed and well-nourished.   Up in bed, no distress   HENT:   Head: Normocephalic and atraumatic.   Eyes: Pupils are equal, round, and reactive to light.   Cardiovascular: Normal rate, regular rhythm and normal heart sounds.    No murmur heard.  Pulmonary/Chest: Effort normal.   Mild bilateral rhonchi   Abdominal: Soft. Bowel sounds are normal. He exhibits no distension.   Musculoskeletal: He exhibits no edema.   Neurological: He is alert and oriented to person, place, and time.   Skin: Skin is warm and dry.   Dry bandages over LE wounds   Psychiatric: He has a normal mood and affect.   Vitals reviewed.        Results Review:    I have reviewed the labs, radiology results and diagnostic studies.      Results from last 7 days  Lab Units 08/10/17  0508   WBC 10*3/mm3 8.14   HEMOGLOBIN g/dL 10.4*   PLATELETS 10*3/mm3 227       Results from last 7 days  Lab Units 08/10/17  0508   SODIUM mmol/L 136   POTASSIUM mmol/L 4.2   CHLORIDE mmol/L 104   CO2 mmol/L 27.0   BUN mg/dL 38*   CREATININE mg/dL 1.60*   GLUCOSE mg/dL 88   CALCIUM mg/dL 8.6*      LFTs reviewed    Culture Data:     Blood Culture   Date Value Ref Range Status   08/06/2017 No growth at 3 days  Preliminary   08/06/2017 No growth at 3 days  Preliminary       Radiology Data:     C results reviewed    I have reviewed the medications.    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  Remains on DAPT.  - EF down from July to 30%, on BB.  Entresto on hold d/t renal insufficiency and some hypotension.  Resume per cards  - Eval for ICD per cards vs lifevest  - slight trop elevation not thought to represent NSTEMI  - renal function stable  - will maricruz need oxygen at discharge, continue nocturnal bipap  - reviewed Dr. Ng consult note from July, got hep B from platelet transfusion earlier this year.  On entecavir but viral load increasing.  LFTs fluctuating, down from 1000's in July.  Agree with holding statin for now.  F/u Hernandez as outpatient.  - continue PT as inpatient, may need HH at discharge  - Tentative d/c home tomorrow     DVT prophylaxis: heparin    High complexity    Rod Wing MD   08/10/17   8:53 AM

## 2017-08-10 NOTE — THERAPY TREATMENT NOTE
Acute Care - Occupational Therapy Treatment Note  [facility]     Patient Name: Magdi Arriaga  : 1945  MRN: [id number]  Today's Date: 8/10/2017  Onset of Illness/Injury or Date of Surgery Date: 17  Date of Referral to OT: 17  Referring Physician: MORALES Matthews      Admit Date: 2017    Visit Dx:     ICD-10-CM ICD-9-CM   1. SOB (shortness of breath) R06.02 786.05   2. Renal insufficiency N28.9 593.9   3. Congestive heart failure, unspecified congestive heart failure chronicity, unspecified congestive heart failure type I50.9 428.0   4. Hypoxemia R09.02 799.02   5. Cardiomyopathy I42.9 425.4   6. Impaired functional mobility, balance, gait, and endurance Z74.09 V49.89   7. Impaired mobility and ADLs Z74.09 799.89   8. Coronary artery disease involving coronary bypass graft of native heart without angina pectoris I25.810 414.05   9. Ischemic cardiomyopathy, most recent LVEF 40% I25.5 414.8   10. Ischemic cardiomyopathy I25.5 414.8     Patient Active Problem List   Diagnosis   • Abnormal CT scan, chest   • Allergic rhinitis   • Asthma   • Bronchiectasis   • Dementia   • Depression   • Diastolic dysfunction   • Dyslipidemia   • Hypertension   • Obesity   • JONNY (obstructive sleep apnea)   • Osteoarthritis   • Vitamin D deficiency   • Elevated troponin   • Left bundle branch block   • Normocytic anemia   • COPD (chronic obstructive pulmonary disease)   • Coronary artery disease involving coronary bypass graft of native heart without angina pectoris   • Ischemic cardiomyopathy, most recent LVEF 40%   • Atrial flutter   • Acute deep vein thrombosis (DVT) of right lower extremity   • Hematoma, calf   • History of exposure to infectious disease-Patient received platelet transfusion for CABG, 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   • Chronic congestive heart failure   • CKD (chronic kidney disease), stage III   • Cellulitis of left lower leg   • Elevated LFTs   • Hypotension   •  Iron deficiency   • Wound of left leg   • Acute on chronic respiratory failure with hypoxia   • Acute on chronic systolic heart failure   • Normocytic anemia   • Weakness   • Bronchitis   • Acute type B viral hepatitis related to platelet transfusion, followed by [facility]             Adult Rehabilitation Note       08/10/17 1356 08/09/17 1043 08/08/17 1046    Rehab Assessment/Intervention    Discipline occupational therapist  -TA physical therapy assistant  -AS physical therapy assistant  -AS    Document Type therapy note (daily note)  -TA therapy note (daily note)  -AS therapy note (daily note)  -AS    Subjective Information agree to therapy;no complaints  -TA agree to therapy;complains of;weakness  -AS agree to therapy;complains of;weakness  -AS    Patient Effort, Rehab Treatment excellent  -TA good  -AS good  -AS    Symptoms Noted During/After Treatment none  -TA fatigue  -AS none  -AS    Precautions/Limitations fall precautions  -TA fall precautions;oxygen therapy device and L/min  -AS cardiac precautions;fall precautions;oxygen therapy device and L/min;other (see comments)   life vest till 9/17  -AS    Recorded by [TA] Yo Murguia OT [AS] Estela Nava PTA [AS] Estela Nava PTA    Vital Signs    Pre SpO2 (%) 90  -TA      O2 Delivery Pre Treatment room air  -TA      Intra SpO2 (%) 90   During ambulation in hallway w/ VC for adapt breathing  -TA      O2 Delivery Intra Treatment room air  -TA      Post SpO2 (%) 93  -TA      O2 Delivery Post Treatment room air  -TA      Pre Patient Position Sitting  -TA      Intra Patient Position Standing  -TA      Post Patient Position Sitting  -TA      Recorded by [TA] Yo Murguia OT      Pain Assessment    Pain Assessment No/denies pain  -TA No/denies pain  -AS No/denies pain  -AS    Recorded by [TA] Yo Murguia OT [AS] Estela Nava PTA [AS] Estela Nava PTA    Cognitive Assessment/Intervention    Current Cognitive/Communication  Assessment functional  -TA functional  -AS functional  -AS    Orientation Status oriented to;person;place;situation  -TA oriented to;person;place  -AS oriented x 4  -AS    Follows Commands/Answers Questions 100% of the time;able to follow single-step instructions;needs cueing  -% of the time  -% of the time;able to follow single-step instructions  -AS    Personal Safety WNL/WFL  -TA WNL/WFL  -AS WNL/WFL  -AS    Personal Safety Interventions fall prevention program maintained;gait belt;muscle strengthening facilitated;nonskid shoes/slippers when out of bed;supervised activity  -TA fall prevention program maintained;gait belt;nonskid shoes/slippers when out of bed;other (see comments)   exit alarm  -AS fall prevention program maintained;gait belt;nonskid shoes/slippers when out of bed  -AS    Recorded by [TA] Yo Murguia, OT [AS] Estela Nava, BG [AS] Estela Nava PTA    Bed Mobility, Assessment/Treatment    Bed Mob, Supine to Sit, [place]  verbal cues required;contact guard assist  -AS     Bed Mobility, Comment Pt UIC  -TA  patient up in chair  -AS    Recorded by [TA] Yo Murguia, CHARANJIT [AS] Estela Nava, BG [AS] Estela Nava, BG    Transfer Assessment/Treatment    Transfers, Sit-Stand [place] stand by assist;verbal cues required  -TA contact guard assist;verbal cues required  -AS verbal cues required;contact guard assist;1 person + 1 person to manage equipment  -AS    Transfers, Stand-Sit [place] contact guard assist;verbal cues required  -TA verbal cues required;minimum assist (75% patient effort)  -AS verbal cues required;contact guard assist;1 person + 1 person to manage equipment  -AS    Transfers, Sit-Stand-Sit, Assist Device rolling walker  -TA rolling walker  -AS rolling walker  -AS    Transfer, Safety Issues weight-shifting ability decreased  -TA      Transfer, Impairments strength decreased;impaired balance  -TA      Transfer, Comment 1 VC  for safe hand placement prior to sitting in chair  -TA assist needed when turning to chair due to LOB  -AS verbal cues for hand placement  -AS    Recorded by [TA] Yo Murguia OT [AS] Estela Nava PTA [AS] Estela Nava PTA    Gait Assessment/Treatment    Gait, [place] Level  verbal cues required;contact guard assist  -AS verbal cues required;minimum assist (75% patient effort);1 person + 1 person to manage equipment  -AS    Gait, Assistive Device  rolling walker  -AS rolling walker  -AS    Gait, Distance (Feet)  250  -  -AS    Gait, Gait Deviations  kasi decreased;step length decreased;forward flexed posture  -AS kasi decreased;forward flexed posture;step length decreased  -AS    Gait, Safety Issues  step length decreased;supplemental O2  -AS step length decreased;supplemental O2  -AS    Gait, Impairments  strength decreased;impaired balance  -AS strength decreased;impaired balance  -AS    Gait, Comment  verbal cues to stay inside walker and to keep walker with when turning towards chair, LOB posteriorly needing increased assist to correct.  -AS verbal cues to stay inside walker and for posture, good control of walker  -AS    Recorded by  [AS] Estela Nava PTA [AS] Estela Nava PTA    Functional Mobility    Functional Mobility- Ind. Level contact guard assist;verbal cues required  -TA      Functional Mobility- Device rolling walker  -TA      Functional Mobility-Distance (Feet) 125  -TA      Functional Mobility- Safety Issues weight-shifting ability decreased  -TA      Functional Mobility- Comment Pt ambulated on RA; VCs required for adaptive breathing; O2 sats 90% or greater throughout  -TA      Recorded by [TA] Yo Murguia OT      Lower Body Dressing Assessment/Training    LB Dressing Assess/Train, Clothing Type donning:;pants;slipper socks  -TA      LB Dressing Assess/Train, Position sitting  -TA      LB Dressing Assess/Train, Gem supervision  required;verbal cues required  -TA      LB Dressing Assess/Train, Impairments strength decreased  -TA      LB Dressing Assess/Train, Comment VCs for EC, pacing, adaptive breathing with LBD.  -TA      Recorded by [TA] Yo Murguia OT      Toileting Assessment/Training    Toileting Assess/Train, Comment Pt denied need to void.  -TA      Recorded by [TA] Yo Murguia OT      Motor Skills/Interventions    Additional Documentation Balance Skills Training (Group)  -TA      Recorded by [TA] Yo Murguia OT      Balance Skills Training    Sitting-Level of Assistance Close supervision  -TA      Sitting-Balance Support Feet supported;Feet unsupported  -TA      Sitting-Balance Activities Lateral lean;Forward lean;Reaching for objects;Reaching across midline;Trunk control activities  -TA      Sitting # of Minutes 8  -TA      Standing-Level of Assistance Close supervision  -TA      Static Standing Balance Support assistive device  -TA      Standing-Balance Activities Weight Shift A-P;Weight Shift R-L;Reaching for objects  -TA      Standing Balance # of Minutes 3  -TA      Recorded by [TA] Yo Murguia OT      Therapy Exercises    Bilateral Lower Extremities  AROM:;10 reps;sitting;ankle pumps/circles;hip flexion;LAQ  -AS AROM:;10 reps;sitting;ankle pumps/circles;hip flexion;LAQ  -AS    Bilateral Upper Extremity AROM:;10 reps;sitting;elbow flexion/extension;hand pumps;shoulder extension/flexion;shoulder horizontal abd/add  -TA      Recorded by [TA] Yo Murguia OT [AS] Estela Nava PTA [AS] Estela Nava PTA    Positioning and Restraints    Pre-Treatment Position sitting in chair/recliner  -TA in bed  -AS sitting in chair/recliner  -AS    Post Treatment Position chair  -TA chair  -AS chair  -AS    In Chair notified nsg;sitting;call light within reach;encouraged to call for assist;exit alarm on  -TA reclined;call light within reach;encouraged to call for assist;exit alarm on;with  family/caregiver;waffle cushion  -AS reclined;call light within reach;encouraged to call for assist;exit alarm on;with family/caregiver  -AS    Recorded by [TA] Yo Murguia OT [AS] Estela Nava PTA [AS] Estela Nava PTA      User Key  (r) = Recorded By, (t) = Taken By, (c) = Cosigned By    Initials Name Effective Dates    AS Estela Nava PTA 06/22/15 -     TA Yo Murguia OT 03/14/16 -                 OT Goals       08/10/17 1429 08/07/17 1155       Transfer Training OT LTG    Transfer Training OT LTG, Date Established  08/07/17  -CL     Transfer Training OT LTG, Time to Achieve  by discharge  -CL     Transfer Training OT LTG, Activity Type  sit to stand/stand to sit;bed to chair /chair to bed;toilet  -CL     Transfer Training OT LTG, [place] Level  supervision required  -CL     Transfer Training OT LTG, Additional Goal  AAD  -CL     Transfer Training OT LTG, Outcome goal partially met   Met for sit>stand this date  -TA      Patient Education OT LTG    Patient Education OT LTG, Date Established  08/07/17  -CL     Patient Education OT LTG, Time to Achieve  by discharge  -CL     Patient Education OT LTG, Education Type  written program;HEP;positioning;posture/body mechanics;home safety;adaptive equipment mgmt;energy conservation;adaptive breathing  -CL     Patient Education OT LTG, Education Understanding  verbalizes understanding  -CL     Patient Education OT LTG Outcome goal ongoing   Progressing well with EC, adapt breathing educ.  -TA      LB Dressing OT LTG    LB Dressing Goal OT LTG, Date Established  08/07/17  -CL     LB Dressing Goal OT LTG, Time to Achieve  by discharge  -CL     LB Dressing Goal OT LTG, Activity Type  Don socks/pants  -CL     LB Dressing Goal OT LTG, [place] Level  contact guard assist  -CL     LB Dressing Goal OT LTG, Additional Goal  AAD  -CL     LB Dressing Goal OT LTG, Outcome goal met  -TA      Activity Tolerance OT LTG    Activity Tolerance  Goal OT LTG, Date Established  08/07/17  -CL     Activity Tolerance Goal OT LTG, Time to Achieve  by discharge  -CL     Activity Tolerance Goal OT LTG, Activity Level  15 min activity  -CL     Activity Tolerance Goal OT LTG, Additional Goal  x1 seated rest break, O2 sats >89%  -CL     Activity Tolerance Goal OT LTG, Outcome goal met  -TA        User Key  (r) = Recorded By, (t) = Taken By, (c) = Cosigned By    Initials Name Provider Type    TA Yo Murguia, OT Occupational Therapist    BABAK Velasco OT Occupational Therapist          Occupational Therapy Education     Title: PT OT SLP Therapies (Active)     Topic: Occupational Therapy (Active)     Point: ADL training (Active)    Description: Instruct learner(s) on proper safety adaptation and remediation techniques during self care or transfers.   Instruct in proper use of assistive devices.    Learning Progress Summary    Learner Readiness Method Response Comment Documented by Status   Patient Acceptance E,D VU,DU,NR EC, pacing, adaptive breathing education with ADLs; reinforced need for call for assist with OOB activities. TA 08/10/17 1428 Done    Acceptance E,D NR Pt educated on appropriate safety precautions, t/f techniques, BUE HEP, and benefits of therapy.  08/07/17 1154 Active   Family Acceptance E,D NR Pt educated on appropriate safety precautions, t/f techniques, BUE HEP, and benefits of therapy.  08/07/17 1154 Active               Point: Home exercise program (Active)    Description: Instruct learner(s) on appropriate technique for monitoring, assisting and/or progressing therapeutic exercises/activities.    Learning Progress Summary    Learner Readiness Method Response Comment Documented by Status   Patient Acceptance E,D NR Pt educated on appropriate safety precautions, t/f techniques, BUE HEP, and benefits of therapy.  08/07/17 1154 Active   Family Acceptance E,D NR Pt educated on appropriate safety precautions, t/f techniques, BUE HEP, and  benefits of therapy.  08/07/17 1154 Active               Point: Precautions (Active)    Description: Instruct learner(s) on prescribed precautions during self-care and functional transfers.    Learning Progress Summary    Learner Readiness Method Response Comment Documented by Status   Patient Acceptance E,D VU,DU,NR EC, pacing, adaptive breathing education with ADLs; reinforced need for call for assist with OOB activities. TA 08/10/17 1428 Done    Acceptance E,D NR Pt educated on appropriate safety precautions, t/f techniques, BUE HEP, and benefits of therapy. CL 08/07/17 1154 Active   Family Acceptance E,D NR Pt educated on appropriate safety precautions, t/f techniques, BUE HEP, and benefits of therapy.  08/07/17 1154 Active               Point: Body mechanics (Active)    Description: Instruct learner(s) on proper positioning and spine alignment during self-care, functional mobility activities and/or exercises.    Learning Progress Summary    Learner Readiness Method Response Comment Documented by Status   Patient Acceptance E,D NR Pt educated on appropriate safety precautions, t/f techniques, BUE HEP, and benefits of therapy.  08/07/17 1154 Active   Family Acceptance E,D NR Pt educated on appropriate safety precautions, t/f techniques, BUE HEP, and benefits of therapy.  08/07/17 1154 Active                      User Key     Initials Effective Dates Name Provider Type Discipline    TA 03/14/16 -  Yo Murguia, OT Occupational Therapist OT     06/08/16 -  Margarita Velasco OT Occupational Therapist OT                  OT Recommendation and Plan  Anticipated Equipment Needs At Discharge:  (TBA further)  Anticipated Discharge Disposition: home with assist, home with home health  Planned Therapy Interventions: adaptive equipment training, ADL retraining, balance training, energy conservation, home exercise program, transfer training  Therapy Frequency: daily  Plan of Care Review  Plan Of Care Reviewed  With: patient  Progress: improving  Outcome Summary/Follow up Plan: Pt ambulated 125' with CGA/RW/VCs for adaptive breathing on RA with O2 sats 90% or greater throughout; supervision for donning pants/socks; progressing with education re adapt breathing, EC.        Outcome Measures       08/10/17 1356 08/09/17 1043 08/08/17 1046    How much help from another person do you currently need...    Turning from your back to your side while in flat bed without using bedrails?  3  -AS 4  -AS    Moving from lying on back to sitting on the side of a flat bed without bedrails?  3  -AS 3  -AS    Moving to and from a bed to a chair (including a wheelchair)?  3  -AS 3  -AS    Standing up from a chair using your arms (e.g., wheelchair, bedside chair)?  3  -AS 3  -AS    Climbing 3-5 steps with a railing?  3  -AS 3  -AS    To walk in hospital room?  3  -AS 3  -AS    AM-PAC 6 Clicks Score  18  -AS 19  -AS    How much help from another is currently needed...    Putting on and taking off regular lower body clothing? 3  -TA      Bathing (including washing, rinsing, and drying) 3  -TA      Toileting (which includes using toilet bed pan or urinal) 3  -TA      Putting on and taking off regular upper body clothing 3  -TA      Taking care of personal grooming (such as brushing teeth) 3  -TA      Eating meals 4  -TA      Score 19  -TA      Functional Assessment    Outcome Measure Options AM-PAC 6 Clicks Daily Activity (OT)  -TA AM-PAC 6 Clicks Basic Mobility (PT)  -AS AM-PAC 6 Clicks Basic Mobility (PT)  -AS      User Key  (r) = Recorded By, (t) = Taken By, (c) = Cosigned By    Initials Name Provider Type    AS Estela Nava PTA Physical Therapy Assistant    DUANE Murguia OT Occupational Therapist           Time Calculation:         Time Calculation- OT       08/10/17 1433          Time Calculation- OT    OT Start Time 1356   ttc 24 minutes  -TA      Total Timed Code Minutes- OT 24 minute(s)  -TA      OT Received On 08/10/17   -DUANE      OT Goal Re-Cert Due Date 08/17/17  -DUANE        User Key  (r) = Recorded By, (t) = Taken By, (c) = Cosigned By    Initials Name Provider Type    DUANE Murguia OT Occupational Therapist           Therapy Charges for Today     Code Description Service Date Service Provider Modifiers Qty    [id number]  OT THERAPEUTIC ACT EA 15 MIN 8/10/2017 Yo Murguia OT GO 2               Yo Murguia OT  8/10/2017

## 2017-08-10 NOTE — PLAN OF CARE
Problem: Patient Care Overview (Adult)  Goal: Plan of Care Review  Outcome: Ongoing (interventions implemented as appropriate)    08/10/17 1429   Coping/Psychosocial Response Interventions   Plan Of Care Reviewed With patient   Patient Care Overview   Progress improving   Outcome Evaluation   Outcome Summary/Follow up Plan Pt ambulated 125' with CGA/RW/VCs for adaptive breathing on RA with O2 sats 90% or greater throughout; supervision for donning pants/socks; progressing with education re adapt breathing, EC.         Problem: Inpatient Occupational Therapy  Goal: Transfer Training Goal 1 LTG- OT  Outcome: Ongoing (interventions implemented as appropriate)    08/07/17 1155 08/10/17 1429   Transfer Training OT LTG   Transfer Training OT LTG, Date Established 08/07/17 --    Transfer Training OT LTG, Time to Achieve by discharge --    Transfer Training OT LTG, Activity Type sit to stand/stand to sit;bed to chair /chair to bed;toilet --    Transfer Training OT LTG, [place] Level supervision required --    Transfer Training OT LTG, Additional Goal AAD --    Transfer Training OT LTG, Outcome --  goal partially met  (Met for sit>stand this date)       Goal: Patient Education Goal LTG- OT  Outcome: Ongoing (interventions implemented as appropriate)    08/07/17 1155 08/10/17 1429   Patient Education OT LTG   Patient Education OT LTG, Date Established 08/07/17 --    Patient Education OT LTG, Time to Achieve by discharge --    Patient Education OT LTG, Education Type written program;HEP;positioning;posture/body mechanics;home safety;adaptive equipment mgmt;energy conservation;adaptive breathing --    Patient Education OT LTG, Education Understanding verbalizes understanding --    Patient Education OT LTG Outcome --  goal ongoing  (Progressing well with EC, adapt breathing educ.)       Goal: LB Dressing Goal LTG- OT  Outcome: Outcome(s) achieved Date Met:  08/10/17    08/07/17 1155 08/10/17 1429   LB Dressing OT LTG   LB  Dressing Goal OT LTG, Date Established 08/07/17 --    LB Dressing Goal OT LTG, Time to Achieve by discharge --    LB Dressing Goal OT LTG, Activity Type Don socks/pants --    LB Dressing Goal OT LTG, [place] Level contact guard assist --    LB Dressing Goal OT LTG, Additional Goal AAD --    LB Dressing Goal OT LTG, Outcome --  goal met       Goal: Activity Tolerance Goal LTG- OT  Outcome: Outcome(s) achieved Date Met:  08/10/17    08/07/17 1155 08/10/17 1429   Activity Tolerance OT LTG   Activity Tolerance Goal OT LTG, Date Established 08/07/17 --    Activity Tolerance Goal OT LTG, Time to Achieve by discharge --    Activity Tolerance Goal OT LTG, Activity Level 15 min activity --    Activity Tolerance Goal OT LTG, Additional Goal x1 seated rest break, O2 sats >89% --    Activity Tolerance Goal OT LTG, Outcome --  goal met

## 2017-08-10 NOTE — PLAN OF CARE
Problem: Patient Care Overview (Adult)  Goal: Plan of Care Review  Outcome: Ongoing (interventions implemented as appropriate)    08/10/17 0302   Coping/Psychosocial Response Interventions   Plan Of Care Reviewed With patient   Patient Care Overview   Progress no change   Outcome Evaluation   Outcome Summary/Follow up Plan rested during the night. no acute events. VSS. right cath site soft, no bleeding noted.        Goal: Adult Individualization and Mutuality  Outcome: Ongoing (interventions implemented as appropriate)  Goal: Discharge Needs Assessment  Outcome: Ongoing (interventions implemented as appropriate)    Problem: Respiratory Insufficiency (Adult)  Goal: Identify Related Risk Factors and Signs and Symptoms  Outcome: Ongoing (interventions implemented as appropriate)  Goal: Acid/Base Balance  Outcome: Ongoing (interventions implemented as appropriate)  Goal: Effective Ventilation  Outcome: Ongoing (interventions implemented as appropriate)

## 2017-08-11 PROBLEM — R53.1 WEAKNESS: Status: RESOLVED | Noted: 2017-01-01 | Resolved: 2017-01-01

## 2017-08-11 PROBLEM — J40 BRONCHITIS: Status: RESOLVED | Noted: 2017-01-01 | Resolved: 2017-01-01

## 2017-08-11 PROBLEM — R77.8 ELEVATED TROPONIN: Status: RESOLVED | Noted: 2017-04-17 | Resolved: 2017-01-01

## 2017-08-11 PROBLEM — I50.23 ACUTE ON CHRONIC SYSTOLIC HEART FAILURE (HCC): Status: RESOLVED | Noted: 2017-01-01 | Resolved: 2017-01-01

## 2017-08-11 PROBLEM — J96.21 ACUTE ON CHRONIC RESPIRATORY FAILURE WITH HYPOXIA (HCC): Status: RESOLVED | Noted: 2017-01-01 | Resolved: 2017-01-01

## 2017-08-11 NOTE — PROGRESS NOTES
"%   [facility]   Inpatient Progress Note       LOS: 5 days   Patient Care Team:  Colton Dickens MD as PCP - General (Internal Medicine)  Kvng Stauffer MD as Consulting Physician (Cardiology)    Chief Complaint:  Follow-up for CHF and cardiomyopathy and CAD    Subjective     Interval History:   Patient up in chair, no cardiac complaints. Wants to go home.  Patient feels good no cardiovascular complaints    Review of Systems:   Pertinent positives noted in history, exam, and assessment. Otherwise reviewed and negative.      Objective     Vitals:  Blood pressure 145/77, pulse 66, temperature 97.8 °F (36.6 °C), temperature source Oral, resp. rate 20, height 68\" (172.7 cm), weight 190 lb (86.2 kg), SpO2 91 %.     Intake/Output Summary (Last 24 hours) at 08/11/17 1047  Last data filed at 08/11/17 0933   Gross per 24 hour   Intake              600 ml   Output                0 ml   Net              600 ml     Physical Exam   Constitutional: He is oriented to person, place, and time. He appears well-developed and well-nourished.   Neck: No JVD present. No tracheal deviation present.   Cardiovascular: Normal rate, regular rhythm, normal heart sounds and intact distal pulses.  Exam reveals no friction rub.    No murmur heard.  Pulmonary/Chest: Effort normal. No respiratory distress.   Abdominal: Soft. Bowel sounds are normal. There is no tenderness.   Musculoskeletal: He exhibits no edema or deformity.   Neurological: He is alert and oriented to person, place, and time.   Skin: Skin is warm and dry.     Examined the patient and agree with the above   Results Review:     I reviewed the patient's new clinical results.      Results from last 7 days  Lab Units 08/10/17  0508   WBC 10*3/mm3 8.14   HEMOGLOBIN g/dL 10.4*   HEMATOCRIT % 33.1*   PLATELETS 10*3/mm3 227       Results from last 7 days  Lab Units 08/10/17  0508   SODIUM mmol/L 136   POTASSIUM mmol/L 4.2   CHLORIDE mmol/L 104   CO2 mmol/L " 27.0   BUN mg/dL 38*   CREATININE mg/dL 1.60*   CALCIUM mg/dL 8.6*   BILIRUBIN mg/dL 0.4   ALK PHOS U/L 127*   ALT (SGPT) U/L 354*   AST (SGOT) U/L 324*   GLUCOSE mg/dL 88       Results from last 7 days  Lab Units 08/10/17  0508   SODIUM mmol/L 136   POTASSIUM mmol/L 4.2   CHLORIDE mmol/L 104   CO2 mmol/L 27.0   BUN mg/dL 38*   CREATININE mg/dL 1.60*   GLUCOSE mg/dL 88   CALCIUM mg/dL 8.6*           Lab Results  Lab Value Date/Time   TROPONINI 0.173 (H) 08/07/2017 0836   TROPONINI 0.211 (H) 08/07/2017 0241   TROPONINI 0.166 (H) 08/06/2017 2048   TROPONINI 0.019 07/17/2017 1227   TROPONINI 0.044 (H) 06/12/2017 2139   TROPONINI 9.425 (C) 04/18/2017 0757   TROPONINI 7.403 (C) 04/17/2017 2340           Results from last 7 days  Lab Units 08/10/17  0508   CHOLESTEROL mg/dL 113   TRIGLYCERIDES mg/dL 109   HDL CHOL mg/dL 41         Cardiac cath:  IMPRESSION:  1.  Severe proximal LAD/first diagonal stenosis (ungrafted) history of a 2.5 x 23 Xience EES  2.  Occluded LAD with patent vein graft to the diagonal and LIMA to the LAD  3.  Severe left circumflex stenosis with widely patent vein graft to the OM1 and OM 2  4.  Occluded RCA with patent saphenous vein graft  5.  Disease and a small ramus branch (too small for PCI)  6.  Normal right heart pressures and post capillary wedge pressure.    ECHO:  · Left ventricular systolic function is moderately decreased. Estimated EF = 30%.  · The cardiac valves are anatomically and functionally normal.    Tele:  SR    Assessment/Plan     Principal Problem:    Acute on chronic respiratory failure with hypoxia  Active Problems:    Dementia    Hypertension    Elevated troponin    COPD (chronic obstructive pulmonary disease)    Coronary artery disease involving coronary bypass graft of native heart without angina pectoris    Ischemic cardiomyopathy, most recent LVEF 40%    CKD (chronic kidney disease), stage III    Elevated LFTs    Acute on chronic systolic heart failure    Normocytic  anemia    Weakness    Bronchitis    Acute type B viral hepatitis related to platelet transfusion, followed by [facility]      1. Dyspnea   - Heart failure plus minus some angina and COPD.  2. Coronary artery disease  -recent CABG.   - s/p intervention to diagonal. Ramus disease will be treated medically  3. ICM  - EF 30%,  - currently has life vest  4. Mildly elevated troponin, NOT NSTEMI  5. COPD  - suspect some component of exacerbation  6. Acute on Chronic systolic congestive heart failure.   7. CKD  8. Elevated LFT's.   - History of Hepatitis B exposure   - Lipitor being held     Plan:  Patient stable from CV standpoint. Patient will need to continue Life Vest for another 90 days secondary to intervention. Will continue current medications and can be discharged from CV standpoint today and follow-up in the office in 4 weeks and get a BMP in 2 weeks.  DC cardiovascular medicines addressed in [facility]   08/11/17  10:47 AM    I, Kvng Stauffer have reviewed the note in full and agree with all aspects of the above including physical exam, assessment, labs and plan with changes made accordingly.     Kvng Stauffer MD  08/11/17  1:24 PM

## 2017-08-11 NOTE — PLAN OF CARE
Problem: Patient Care Overview (Adult)  Goal: Plan of Care Review  Outcome: Outcome(s) achieved Date Met:  08/11/17 08/11/17 1338   Coping/Psychosocial Response Interventions   Plan Of Care Reviewed With patient   Patient Care Overview   Progress progress toward functional goals as expected         08/11/17 1338   Coping/Psychosocial Response Interventions   Plan Of Care Reviewed With patient;family   Patient Care Overview   Progress progress toward functional goals as expected

## 2017-08-11 NOTE — PROGRESS NOTES
"Adult Nutrition  Assessment/PES    Patient Name:  Magdi Arriaga  YOB: 1945  MRN: [id number]  Admit Date:  8/6/2017    Assessment Date:  8/11/2017        Reason for Assessment       08/11/17 1228    Reason for Assessment    Reason For Assessment/Visit length of stay    Time Spent (min) 15    Cardiac CAD   HF    Neurological Dementia    Pulmonary/Critical Care COPD;A/C respiratory failure                Anthropometrics       08/11/17 1229    Anthropometrics    Height 172.7 cm (68\")    Weight 86.2 kg (190 lb)    Ideal Body Weight (IBW)    Ideal Body Weight (IBW), Male (kg) 70.89    % Ideal Body Weight 121.82    Body Mass Index (BMI)    BMI (kg/m2) 28.95                  Nutrition Prescription Ordered       08/11/17 1229    Nutrition Prescription PO    Current PO Diet Regular    Common Modifiers Cardiac;Consistent Carbohydrate            Evaluation of Received Nutrient/Fluid Intake       08/11/17 1229    PO Evaluation    Number of Meals 5    % PO Intake 90              Problem/Interventions:        Problem 1       08/11/17 1229    Nutrition Diagnoses Problem 1    Problem 1 Nutrition Appropriate for Condition at this Time    Etiology (related to) MNT for Treatment/Condition    Signs/Symptoms (evidenced by) PO Intake    Percent (%) intake recorded 90 %    Over number of meals 5                    Intervention Goal       08/11/17 1230    Intervention Goal    PO Maintain intake            Nutrition Intervention       08/11/17 1230    Nutrition Intervention    RD/Tech Action Follow Tx progress              Education/Evaluation       08/11/17 1230    Monitor/Evaluation    Monitor Per protocol        Comments:      Electronically signed by:  iEleen Berrios MS,RD,LD  08/11/17 12:30 PM  "

## 2017-08-11 NOTE — PROGRESS NOTES
Continued Stay Note  [facility]     Patient Name: Magdi Arriaga  MRN: [id number]  Today's Date: 8/11/2017    Admit Date: 8/6/2017          Discharge Plan       08/11/17 1427    Case Management/Social Work Plan    Additional Comments pt qualifies for oxygen continuously. ERIK spoke with pt at bedside. Pt has a CPAP at night with Patient Aides. ERIK sent cotninuous home O2 order to patient aides. ERIK called to follow up with patient aides. Staff verified they received the order and will bring pt a portable and set pt up at home as well.       08/11/17 1315    Case Management/Social Work Plan    Additional Comments SW notified [facility] that pt is discharging home today. [facility] staff report they will put pt on the schedule for tomorrow.               Discharge Codes     None        Expected Discharge Date and Time     Expected Discharge Date Expected Discharge Time    Aug 11, 2017             SHAMAR Buchanan

## 2017-08-11 NOTE — PLAN OF CARE
Problem: Patient Care Overview (Adult)  Goal: Plan of Care Review  Outcome: Ongoing (interventions implemented as appropriate)    08/11/17 0414   Coping/Psychosocial Response Interventions   Plan Of Care Reviewed With patient;spouse   Patient Care Overview   Progress improving   Outcome Evaluation   Outcome Summary/Follow up Plan Pt rested well and had no acute episodes during my shift. VSS. Occasionally bradycardic after beta blockers given.       Goal: Adult Individualization and Mutuality  Outcome: Ongoing (interventions implemented as appropriate)  Goal: Discharge Needs Assessment  Outcome: Ongoing (interventions implemented as appropriate)    Problem: Cardiac: Heart Failure (Adult)  Goal: Signs and Symptoms of Listed Potential Problems Will be Absent or Manageable (Cardiac: Heart Failure)  Outcome: Ongoing (interventions implemented as appropriate)    Problem: NPPV/CPAP (Adult)  Goal: Signs and Symptoms of Listed Potential Problems Will be Absent or Manageable (NPPV/CPAP)  Outcome: Ongoing (interventions implemented as appropriate)

## 2017-08-11 NOTE — DISCHARGE PLACEMENT REQUEST
"Magdi Arriaga (72 y.o. Male) in room N627    Date of Birth Social Security Number Address Home Phone MRN    1945  [id number] [place] [address] [phone] [id number]    Baptism Marital Status          None        Admission Date Admission Type Admitting Provider Attending Provider Department, Room/Bed    8/6/17 Emergency Rod Wing MD Dossett, Lee M, MD [facility] 6B, N627/1    Discharge Date Discharge Disposition Discharge Destination         Home or Self Care             Attending Provider: Rod Wing MD     Allergies:  Sulfa Antibiotics    Isolation:  None   Infection:  None   Code Status:  FULL    Ht:  68\" (172.7 cm)   Wt:  190 lb (86.2 kg)    Admission Cmt:  None   Principal Problem:  Acute on chronic respiratory failure with hypoxia [J96.21]                 Active Insurance as of 8/6/2017     Primary Coverage     Payor Plan Insurance Group Employer/Plan Group    MEDICARE MEDICARE A & B      Payor Plan Address Payor Plan Phone Number Effective From Effective To    [address] [phone] 7/1/2010     [place] [address]       Subscriber Name Subscriber Birth Date Member ID       MAGDI ARRIAGA 1945 [id number]           Secondary Coverage     Payor Plan Insurance Group Employer/Plan Group    ANTH BLUE CROSS AN[employer]M BLUE CROSS BLUE SHIELD PPO [id number]     Payor Plan Address Payor Plan Phone Number Effective From Effective To    [address] [phone] 7/1/2013     [place] [address]       Subscriber Name Subscriber Birth Date Member ID       MAGDI ARRIAGA SAYRA 1945 [id number]                 Emergency Contacts      (Rel.) Home Phone Work Phone Mobile Phone    Tere Arriaganifer (Spouse) -- -- [phone]               History & Physical      Bernadette Wilson MD at 8/6/2017  8:37 PM              [facility] Medicine Services  HISTORY AND PHYSICAL    Primary Care Physician: Colton BOLAND" MD Maninder    Subjective     Chief Complaint:  SOA    History of Present Illness:   This is a 72 year old male with a past medical history significant for chronic diastolic dysfunction, JONNY on CPAP nightly, COPD on 2L supplemental oxygen nightly, CAD with CABG X5 4/24/17 with subsequent LifeVest placement until September, DVT with vena cava filter, and recent exposure to Hepatitis B who presents with 4 days of progressively worsening SOB. Dyspnea worse with exertion and improved with rest. There is associated chronic non productive cough and a minimal increase in peripheral edema. States his dry weight is approximately 191.0 but he has recently gone up to about  202.0. No complaints of chest pain, fever, congestion, orthopnea, syncope, or N/V/D. Patient was initially evaluated by home health nurse 3 days ago after which PCP, Dr. Dickens, started Lasix 40 mg BID. Patient has been compliant, but without change in symptoms. States his Cardiologist, Dr. Stauffer, discontinued his long time Lasix in May. Patient was last admitted to this service 7/19-7/21 for hypotension likely multifactorial to poor EF and cardiomyopathy related to MI. At this time, Cardiology also discontinued amlodipine and spironolactone. He has been without chronic diuresis since then. Patient now presenting to [facility] for further evaluation and treatment.    Emergency Department Evaluation: Vitals stable. Patient initially hypoxic at 87 % room air. BNP elevated at > 5,000. Creatinine elevated at 1.6. AST/ALT elevated at 164 and 371 respectively. WBC slightly increased at 10.8. H/H stablt at 11 and 35.2 respectively. CXR demonstrating cardiomegaly with some mild increase in pulmonary vasculature.    Review of Systems   Constitutional: Positive for fatigue and unexpected weight change. Negative for fever.   HENT: Negative for congestion and trouble swallowing.    Eyes: Negative for photophobia and visual disturbance.   Respiratory: Positive for cough and  shortness of breath. Negative for wheezing.    Cardiovascular: Negative for chest pain and leg swelling.   Gastrointestinal: Negative for abdominal pain, diarrhea, nausea and vomiting.   Endocrine: Negative for cold intolerance and heat intolerance.   Genitourinary: Negative for dysuria and flank pain.   Musculoskeletal: Negative for arthralgias and back pain.   Skin: Negative for pallor and rash.   Allergic/Immunologic: Negative for immunocompromised state.   Neurological: Negative for dizziness, syncope and light-headedness.   Hematological: Negative for adenopathy.   Psychiatric/Behavioral: Negative for agitation and confusion.      Otherwise complete ROS performed and negative except as mentioned in the HPI.    Past Medical History:   Diagnosis Date   • Abnormal finding on lung imaging      CT scan of the chest to be wary 2013 reports interstitial lung disease with marked diffuse interstitial and peripheral scarring and significant bronchiectasis in both upper   and lower lung zones.    • Allergic rhinitis     History of allergy injections as a child.   • Arthritis    • Asthma      Patient has had a history of asthma since childhood.   • CHF (congestive heart failure) 2017    ECHO - EF 25%   • COPD (chronic obstructive pulmonary disease)    • Coronary artery disease 2017    CABG X 5   • DVT (deep venous thrombosis)     Bilateral   • Exposure to hepatitis B 2017    Transfusion - managed by ID   • Falls     A.  History of falling traumatic injuries in April 2014 and May 2014 resulting and left rib  fracture and left clavicle fracture.   • Fracture of rib    • History of chest x-ray 02/19/2016    Extensive, but stable postinflammatory pulmonary changes. There has been no change since the previous examination of 12/03/2015   • History of chest x-ray 12/03/2015    Slight increased markings bilaterally, may be progression of his bronchiectasis   • History of chest x-ray 09/06/2014    There has been no change since  09/05/2014   • History of echocardiogram 02/19/2016    Mild concentric LVH is observed.Estimated EF is 50-55%.E to A reversal in the mitral valve flow pattern suggestive of diastolic dysfunction.RV is mildly dilated.Moderate aortic cusp sclerosis is present.Mitral annular calcification.Mild MR.Evidence of borderline pulmonary hypertension.   • History of PFTs 12/03/2015    Values are slightly worse.TLC is c/w mild restriction.Values reduced from 02/16/13.Diffusion capacity is slightly worse   • History of PFTs 02/06/2013    Mild decrease in FVC, may be due to less maximal effort.TLC is within normal levels. Mild reduction in diffusion in absolute value   • History of transfusion    • Hypertension    • Lumbar spinal stenosis 2013    Spinal surgery   • Patellar tendon rupture     A.  Status post primary repair of the left periprosthetic patellar tendon tear 6/11/2014, post fall at the end of April 2014.   • Pneumonia      A.  Left lower lobe pneumonia treated at [facility], 5/8/2014 through 5/20/2014.   • Pneumothorax      A.  Status post chest tube placed 5/13/2014 and discontinued 5/15/2014 with stable chest x-ray.   • Sleep apnea with use of continuous positive airway pressure (CPAP)    • Traumatic hemorrhagic shock      A.  Secondary to right renal retroperitoneal hemorrhage, 9/2014.       Past Surgical History:   Procedure Laterality Date   • APPENDECTOMY  1959   • CARDIAC CATHETERIZATION N/A 4/18/2017    Procedure: Left Heart Cath;  Surgeon: Kvng Stauffer MD;  Location:  [facility] INVASIVE LOCATION;  Service:    • COLONOSCOPY W/ POLYPECTOMY     • CORONARY ARTERY BYPASS GRAFT N/A 4/24/2017    Procedure: MEDIAN STERNOTOMY, CORONARY ARTERY BYPASS GRAFT X5 UTILIZING THE INTERNAL MAMMARY ARTERY, ENDOVASCULAR VEING HARVEST UTILIZING RIGHT SAPHENOUS VEIN,TRANSESOPHAGEAL ECHO;  Surgeon: Kanu Berger MD;  Location:  [facility] OR;  Service:    • FRACTURE SURGERY     • KNEE SURGERY  2006, 2014    primary repair -left  periprosthetic patellar tendon tear 6/11/2014   • LUMBAR LAMINECTOMY  2013    With discectomy   • REPLACEMENT TOTAL KNEE Bilateral 2009, 2013    Left - 2009 and right - 2013.   • SKIN BIOPSY     • TONSILLECTOMY  1957   • VENA CAVA FILTER PLACEMENT  2014       Family History   Problem Relation Age of Onset   • Adopted: Yes   • No Known Problems Mother      Adopted as child   • No Known Problems Father      Social History     Social History   • Marital status:      Spouse name: N/A   • Number of children: 2   • Years of education: N/A     Occupational History   • Post [employer] at       Retired     Social History Main Topics   • Smoking status: Former Smoker     Packs/day: 1.00     Years: 17.00     Types: Cigarettes     Start date: 1965     Quit date: 1982   • Smokeless tobacco: Never Used   • Alcohol use No      Comment: Never drinker   • Drug use: No   • Sexual activity: Defer     Other Topics Concern   • Not on file     Social History Narrative    Domestic life :  Lives in private home with wife        Holiness :   Moravian        Sleep hygiene :  In bed 11 PM to 8 AM for 8 hours of sleep        Caffeine use :  One cup of coffee and 1 diet cola daily        Exercise habits :   Cardiac rehabilitation since heart surgery and heart failure        Diet :  Low calorie diet low in salt and low in sugar        Occupation :   Retired        Hearing :        Vision :        Driving :  No driving           Medications:  Prescriptions Prior to Admission   Medication Sig Dispense Refill Last Dose   • albuterol (PROVENTIL HFA;VENTOLIN HFA) 108 (90 BASE) MCG/ACT inhaler Inhale 2 puffs Every 4 (Four) Hours As Needed for Wheezing.   8/6/2017   • aspirin 81 MG chewable tablet Chew 1 tablet Daily.   8/6/2017   • atorvastatin (LIPITOR) 40 MG tablet Take 1 tablet by mouth Every Night. 90 tablet 0 8/5/2017   • budesonide-formoterol (SYMBICORT) 160-4.5 MCG/ACT inhaler Inhale 2 puffs 2 (Two) Times a Day. 1 inhaler 11 8/6/2017   •  "entecavir (BARACLUDE) 0.5 MG tablet Take 1 tablet by mouth Daily.   8/6/2017   • ferrous sulfate 325 (65 FE) MG tablet Take 325 mg by mouth Daily With Breakfast.   8/6/2017   • furosemide (LASIX) 40 MG tablet Take 40 mg by mouth 2 (Two) Times a Day.   8/6/2017   • loratadine (CLARITIN) 10 MG tablet Take 10 mg by mouth Daily.   8/6/2017   • memantine (NAMENDA) 10 MG tablet Take 10 mg by mouth 2 (Two) Times a Day.   8/6/2017   • montelukast (SINGULAIR) 10 MG tablet Take 1 tablet by mouth Every Night. 30 tablet 11 8/5/2017   • nitroglycerin (NITROSTAT) 0.4 MG SL tablet Place 1 tablet under the tongue Every 5 (Five) Minutes As Needed for Chest Pain. Take no more than 3 doses in 15 minutes. 25 tablet 6    • PARoxetine (PAXIL) 30 MG tablet Take 1 tablet by mouth Every Morning.   8/6/2017   • PHARMACY MEDS TO BED CONSULT Daily (Monday-Friday). 1 each 0    • rivastigmine (EXELON) 9.5 MG/24HR patch Place 1 patch on the skin Daily.   8/6/2017   • sacubitril-valsartan (ENTRESTO) 49-51 MG tablet Take 0.5 tablets by mouth 2 (Two) Times a Day. 60 tablet 11 8/6/2017   • vitamin C (ASCORBIC ACID) 250 MG tablet Take 1 tablet by mouth 2 (Two) Times a Day With Meals. Take with iron supplement to enhance absorption   8/6/2017       Allergies:  Allergies   Allergen Reactions   • Sulfa Antibiotics      UNKNOWN CHILDHOOD REACTION         Objective     Physical Exam:  Vital Signs: /88 (BP Location: Left arm, Patient Position: Sitting)  Pulse 80  Temp 98.6 °F (37 °C) (Oral)   Resp 20  Ht 68\" (172.7 cm)  Wt 195 lb (88.5 kg)  SpO2 96%  BMI 29.65 kg/m2  Physical Exam  Temp:  [98.6 °F (37 °C)] 98.6 °F (37 °C)  Heart Rate:  [80-93] 80  Resp:  [20-22] 20  BP: (148-174)/() 148/88  Constitutional: no acute distress, awake, alert, very pleasant  Eyes: PERRLA, sclerae anicteric, no conjunctival injection  Neck: supple, no thyromegaly, trachea midline  Respiratory: Clear to auscultation bilaterally, nonlabored respirations "   Cardiovascular: RRR, no murmurs, rubs, or gallops, palpable pedal pulses bilaterally  Gastrointestinal: Positive bowel sounds, soft, nontender, nondistended  Musculoskeletal: No bilateral ankle edema, no clubbing or cyanosis to bilateral lower extremities  Diffuse contusions, small healing laceratiions  Psychiatric: oriented x 3, appropriate affect, cooperative  Neurologic: Strength symmetric in all extremities, Cranial Nerves grossly intact to confrontation         Results Reviewed:    Results from last 7 days  Lab Units 08/06/17  1724   WBC 10*3/mm3 10.81*   HEMOGLOBIN g/dL 11.0*   PLATELETS 10*3/mm3 236       Results from last 7 days  Lab Units 08/06/17  1724   SODIUM mmol/L 137   POTASSIUM mmol/L 3.7   CO2 mmol/L 23.0   CREATININE mg/dL 1.60*   GLUCOSE mg/dL 103*   CALCIUM mg/dL 10.0       I have personally reviewed and interpreted available lab data, radiology studies and ECG obtained at time of admission.     Assessment / Plan     Problem List:   Hospital Problem List     * (Principal)Acute on chronic respiratory failure with hypoxia    Acute on chronic diastolic heart failure    Hypertension    COPD (chronic obstructive pulmonary disease)    Coronary artery disease involving coronary bypass graft of native heart without angina pectoris    CKD (chronic kidney disease), stage III    Elevated LFTs    Dementia    JONNY (obstructive sleep apnea)    Overview Signed 1/6/2017  8:57 AM by Davis HIDALGO.  Obstructive sleep apnea with central component.B.  On home CPAP therapy.         Ischemic cardiomyopathy, most recent LVEF 40%    History of exposure to infectious disease-Patient received platelet transfusion for CABG, 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    Normocytic anemia          Plan:  1. Acute on chronic respiratory failure wih hypoxia, multifactorial to include acute exacerbation of diastolic dysfunction and COPD:  - hypoxic on room air at 87%. Improved with 2L nasal  cannula.  - failed outpatient treatment with PO lasix  - BNP increased from 219 to 5000 since 7/24/17  - troponin negative. EKG stable. CXR with cardiomegaly and increased vascular markings  - administered 80 mg lasix in ED. Reports marked improvement with symptoms, Now breathing comfortably.  - Continue Lasix 40 mg IV q 8 hours X 4 doses. Monitor potassium, magnesium, additional electrolytes daily  - daily weights, strict I/O, fluid restriction of 1500 ml daily  - pulmonary toilet as needed with; Duo nebs, mucolytics, antitussives  - last echocardiogram 7/2017 EF: 54%    2. Hypertension:  - currently stable with a labile history. Monitor closely.  - Wife gives Entresto 1/2 tab for systolic greater than 110, not as scheduled  - hold for now in the setting of persistent hypotension    3. Elevated LFT:  - Hepatitis B. History of exposure 4/2017 after platelet transfusion for CABG, donor tested positive for HBV att subsequent donation attempt.  - currently followed by Dr. Ng, ID and continues to take Baraclude 0.5 mg daily.   - values trending down from July  - avoid additional hepatotoxic agents    4. CKD stage 3:  - followed by nephrology  - creatinine at baseline 1.5-1.7  - avoid additional nephrotoxic agents    5. Weakness:  - patient with frequent mechanical falls, history of early stage dementia  - continue PT/OT as inpatient    Patient stable for admission to TELEMETRY. Labs and vitals routine per nursing.    DVT prophylaxis: heparin  Code Status: full  Admission Status: Patient will be admitted to (LEXOBS or LEXINPT)     Alonso Matthews PA-C 08/06/17 8:37 PM      Brief Attending Note       I have seen and examined the patient, performing an independent face-to-face diagnostic evaluation with plan of care reviewed and developed with the advanced practice clinician (APC).      Brief Summary Statement/HPI:   Pt is a 72yom with CAD s/p recent CABG, mixed systolic and diastolic CHF who presented with increase  dyspnea. Has not been on any diuretics since mid-July. Had COPE that started gradually last Thur. PCP placed pt on lasix 40mg BID on Fri and did have good UOP with it. However, it did not improve his dyspnea. No CP, palpitations, N/V/D. No LE edema or orthopnea however, had increased abdominal girth. Denies eating out much or drinking lots of fluids. No decreased UOP. Wife reports his weight when he was last discharged was 195lbs.     Attending Physical Exam:  Physical Exam:  Temp:  [98.6 °F (37 °C)] 98.6 °F (37 °C)  Heart Rate:  [80-93] 80  Resp:  [20-22] 20  BP: (148-174)/() 148/88  General - NAD, pt was laying in bed comfortably  HEENT - EOMI, PERRL, oropharynx clear, hearing intact  CVS - RRR, S1 S2, no rubs, gallops or murmurs, 2s cap refill, no peripheral edema, 2+ pulses  Resp - Inspiratory crackles bibasilarly. No wheezes   GI - +BS, soft, ND/NT  MSK - No joint pain or joint edema  Neuro - Lethargic/Awake and oriented, follows commands, interactive, moves all extremities equally  Psych - Appropriate affect, cooperative    Brief Assessment/Plan :    # Acute CHF exacerbation  - ECHO 7/19/2017 shows EF 50-55%, Grade I diastolic dysfunction  - BNP >5000. Last   - CXR personally reviewed. Bilateral airspace and interstitial edema  - Good UOP with 1x IV lasix 80mg   - Weight on reaching floor about 192lbs (?close to baseline)  - Strict Is/Os, daily AM standing weight  - Plan for 1x 40mg Lasix IV in AM with goal of -2L daily  - 2g Na restriction, 1500cc fluid restriction    # HTN  - Recently taken off of multiple meds due to HoTN  - Continue home Entresto    # JONNY  - CPAP    See above for further detailed assessment and plan developed with APC which I have reviewed and/or edited.    I believe this patient meets LEXINPT.    Bernadette Wilson MD  08/06/17  9:48 PM         08/11/17 1354  --  66  --  --  room air  --   84       SpO2: at rest at 08/11/17 1354         Oxygen Therapy [EQ60] (Order [id number])    General Supply    Date: 8/11/2017   Department: [address]   Ordering/Authorizing: REI Soni           Order History  Outpatient       Date/Time Action Taken User Additional Information       08/11/17 1411 Sign Clary Freitas RN Ordering Mode: Telephone with readback           Order Details        Frequency Duration Priority Order Class       None None Routine External           Start Date/Time        Start Date       08/11/17           Order Questions        Question Answer Comment       Delivery Modality Nasal Cannula        Liters Per Minute: 2        Duration: Continuous        Equipment  Oxygen Concentrator &  &  Portable Gaseous Oxygen System & Portable Oxygen Contents Gaseous &  Conserving Regulator        Length of Need (99 Months = Lifetime) 99 Months = Lifetime        Note:  Custom values to enter length of need for DME           Verbal Order Info        Action Created on Order Mode Entered by Responsible Provider Signed by Signed on       Ordering 08/11/17 1411 Telephone with readback YURI Brown APRN             Source Order Set / Preference List        Preference List       AMB SUPPLIES [1416]           Clinical Indications           ICD-10-CM ICD-9-CM       SOB (shortness of breath)  - Primary     R06.02 786.05       Congestive heart failure, unspecified congestive heart failure chronicity, unspecified congestive heart failure type     I50.9 428.0       Hypoxemia     R09.02 799.02       Chronic obstructive pulmonary disease, unspecified COPD type     J44.9 496           Reprint Order Requisition        OXYGEN THERAPY (Order #[id number]) on 8/11/17         Encounter-Level Cardiology Documents:        There are no encounter-level cardiology documents.         Encounter        View Encounter         Order Provider Info            Office phone Pager E-mail       Ordering User Clary Freitas RN -- -- --       Authorizing  Provider REI Soni [phone] -- --       Attending Provider Rod Wing [phone] -- --       Entered By Clary Freitas RN -- -- --       Ordering Provider REI Soni [phone] -- --           Linked Charges        No charges linked to this procedure         Order Transmittal Tracking        OXYGEN THERAPY (Order #[id number]) on 8/11/17         Authorized by:  REI Manzanares  (NPI: [id number])                    Electronically signed by Bernadette Wilson MD at 8/6/2017 10:52 PM

## 2017-08-11 NOTE — DISCHARGE SUMMARY
[facility] Medicine Services  DISCHARGE SUMMARY       Date of Admission: 8/6/2017  Date of Discharge:  8/11/2017  Primary Care Physician: Colton Dickens MD  Consulting Physician(s)     Provider Relationship    Kvng Stauffer MD Consulting Physician          Discharge Diagnoses:  Active Hospital Problems (** Indicates Principal Problem)    Diagnosis Date Noted   • Acute type B viral hepatitis related to platelet transfusion, followed by Hernandez [B16.9] 08/10/2017   • Normocytic anemia [D64.9] 08/06/2017   • Elevated LFTs [R79.89] 07/17/2017   • CKD (chronic kidney disease), stage III [N18.3] 05/22/2017   • Coronary artery disease involving coronary bypass graft of native heart without angina pectoris [I25.810] 04/24/2017   • Ischemic cardiomyopathy, most recent LVEF 40% [I25.5] 04/24/2017   • COPD (chronic obstructive pulmonary disease) [J44.9] 04/17/2017   • Dementia [F03.90] 01/06/2017   • Hypertension [I10] 01/06/2017      Resolved Hospital Problems    Diagnosis Date Noted Date Resolved   • **Acute on chronic respiratory failure with hypoxia [J96.21] 08/06/2017 08/11/2017   • Bronchitis [J40] 08/08/2017 08/11/2017   • Acute on chronic systolic heart failure [I50.23] 08/06/2017 08/11/2017   • Weakness [R53.1] 08/06/2017 08/11/2017   • Elevated troponin [R79.89] 04/17/2017 08/11/2017       Presenting Problem/History of Present Illness  SOB (shortness of breath) [R06.02]     Chief Complaint on Day of Discharge: SOA/ [facility]    Hospital Course  Patient is a 72 y.o. male with a past medical history significant for chronic diastolic dysfunction, JONNY on CPAP nightly, COPD on 2L supplemental oxygen nightly, CAD with CABG X5 4/24/17 with subsequent LifeVest placement until September, DVT with vena cava filter, and recent exposure to Hepatitis B who presents with 4 days of progressively worsening SOB. Patient was admitted with acute congestive heart failure with BNP greater than 5000.  He was  "started on supplemental oxygen, IV Lasix and cardiology was consulted.    Patient did have noted mildly elevated troponin this admission which was not deemed an STEMI but troponin leak from acute bronchitis and congestive heart failure.  She did have left heart catheter on 8/9/2017 and had VICKEY to LAD and remains on DDAVP T.  He will continue this at discharge.  Ejection fraction is down from July to 40%.  Cardiology would like to continue beta blocker, Entresto, Plavix.  We are holding off on statin therapy due to elevated LFTs and recent diagnosis of hepatitis B earlier this year.    Patient did have slight hypotension and will need recheck with PCP as well as BE MP in 2 weeks.  Patient to wear LifeVest for the next 90 days and follow with Dr. Stauffer in one month.    She does have suspected exacerbation of COPD we will continue Levaquin every other day ×3 more doses and patient to follow-up in one week with primary care for reevaluation.    Procedures Performed  Procedure(s):  Right and Left Heart Cath       Consults:   Consults     Date and Time Order Name Status Description    8/8/2017 0743 Inpatient Consult to Cardiology Completed     7/18/2017 1014 Inpatient Consult to Nephrology Completed     7/17/2017 1953 Inpatient Consult to Cardiology Completed     7/17/2017 1953 Inpatient Consult to Infectious Diseases Completed           Pertinent Test Results:    Condition on Discharge:  stable    Physical Exam on Discharge:/72  Pulse 57  Temp 97.6 °F (36.4 °C) (Oral)   Resp 16  Ht 68\" (172.7 cm)  Wt 190 lb (86.2 kg)  SpO2 94%  BMI 28.89 kg/m2  Physical Exam   Constitutional: He is oriented to person, place, and time. He appears well-developed. No distress.   HENT:   Head: Normocephalic and atraumatic.   Eyes: Pupils are equal, round, and reactive to light. No scleral icterus.   Neck: Normal range of motion. No JVD present.   Cardiovascular: Normal rate, regular rhythm, normal heart sounds and intact distal " pulses.    No murmur heard.  Pulmonary/Chest: Effort normal. No respiratory distress. He has wheezes (mild exp on right).   Abdominal: Soft. Bowel sounds are normal. He exhibits no distension. There is no tenderness.   Musculoskeletal: Normal range of motion. He exhibits no edema.   Neurological: He is alert and oriented to person, place, and time.   Skin: Skin is warm and dry.   Psychiatric: He has a normal mood and affect. His behavior is normal. Thought content normal.         Discharge Disposition  Home or Self Care    Discharge Medications   Magdi Arriaga   Home Medication Instructions RADHA:[id number]    Printed on:08/11/17 5937   Medication Information                      albuterol (PROVENTIL HFA;VENTOLIN HFA) 108 (90 BASE) MCG/ACT inhaler  Inhale 2 puffs Every 4 (Four) Hours As Needed for Wheezing.             aspirin 81 MG chewable tablet  Chew 1 tablet Daily.             budesonide-formoterol (SYMBICORT) 160-4.5 MCG/ACT inhaler  Inhale 2 puffs 2 (Two) Times a Day.             carvedilol (COREG) 3.125 MG tablet  Take 1 tablet by mouth Every 12 (Twelve) Hours.             clopidogrel (PLAVIX) 75 MG tablet  Take 1 tablet by mouth Daily.             entecavir (BARACLUDE) 0.5 MG tablet  Take 0.5 mg by mouth Daily.             ferrous sulfate 325 (65 FE) MG tablet  Take 325 mg by mouth Daily With Breakfast.             furosemide (LASIX) 40 MG tablet  Take 1 tablet by mouth Daily.             levoFLOXacin (LEVAQUIN) 500 MG tablet  Take 1 tablet by mouth Every Other Day for 7 days. Indications: Pneumonia             loratadine (CLARITIN) 10 MG tablet  Take 10 mg by mouth Daily.             memantine (NAMENDA) 10 MG tablet  Take 10 mg by mouth 2 (Two) Times a Day.             montelukast (SINGULAIR) 10 MG tablet  Take 1 tablet by mouth Every Night.             nitroglycerin (NITROSTAT) 0.4 MG SL tablet  Place 1 tablet under the tongue Every 5 (Five) Minutes As Needed for Chest Pain. Take no more than 3  doses in 15 minutes.             PARoxetine (PAXIL) 30 MG tablet  Take 1 tablet by mouth Every Morning.             rivastigmine (EXELON) 9.5 MG/24HR patch  Place 1 patch on the skin Daily.             sacubitril-valsartan (ENTRESTO) 49-51 MG tablet  Take 0.5 tablets by mouth 2 (Two) Times a Day.             vitamin C (ASCORBIC ACID) 250 MG tablet  Take 1 tablet by mouth 2 (Two) Times a Day With Meals. Take with iron supplement to enhance absorption                 Discharge Diet:   Diet Instructions     Diet: Regular, Cardiac; Thin       Discharge Diet:   Regular  Cardiac      Fluid Consistency:  Thin                 Discharge Care Plan / Instructions:    Activity at Discharge:   Activity Instructions     Activity as Tolerated                     Follow-up Appointments  Future Appointments  Date Time Provider Department Center   8/18/2017 1:00 PM Joey Dickens MD [facility] None   8/24/2017 11:00 AM [facility] ECHO/VASC CART RM2 [facility]   8/24/2017 1:00 PM Kvng Stauffer MD [facility] None   8/31/2017 1:00 PM ORIENTATION -  [facility] CARD REHAB [facility]   9/8/2017 9:30 AM RAD TECH PULMO [facility] None   9/8/2017 9:45 AM REI Fernández [facility] None     Additional Instructions for the Follow-ups that You Need to Schedule     Ambulatory Referral to Home Health    As directed    Face to Face Visit Date:  8/9/2017   Follow-up Provider for Plan of Care?:  I treated the patient in an acute care facility and will not continue treatment after discharge.   Follow-up Provider:  JOEY DICKENS   Reason/Clinical Findings:  NSTEMI, COPD   Describe mobility limitations that make leaving home difficult:  Weakness, impaired physical mobility   Nursing/Therapeutic Services Requested:   Skilled Nursing  Physical Therapy  Occupational Therapy      Skilled nursing orders:   Post CABG care  COPD management  CHF management      PT orders:   Therapeutic exercise  Gait Training  Transfer  training  Strengthening  Home safety assessment      Weight Bearing Status:  As Tolerated   Occupational orders:   Activities of daily living  Energy conservation  Strengthening  Home safety assessment          Discharge Follow-up with PCP    As directed    Follow Up Details:  1 week       Discharge Follow-up with Specialty    As directed    Specialty:  Dr. Stauffer   Follow Up:  1 Month       Basic Metabolic Panel    Aug 16, 2017 (Approximate)    Draw in 2 weeks                 Test Results Pending at Discharge   Order Current Status    Blood Culture Preliminary result    Blood Culture Preliminary result           Miriam Montes, APRN 08/11/17 1:45 PM    Time: Discharge 33 min    Please note that portions of this note may have been completed with a voice recognition program. Efforts were made to edit the dictations, but occasionally words are mistranscribed.

## 2017-08-11 NOTE — PROGRESS NOTES
Continued Stay Note  [facility]     Patient Name: Magdi Arriaga  MRN: [id number]  Today's Date: 8/11/2017    Admit Date: 8/6/2017          Discharge Plan       08/11/17 1315    Case Management/Social Work Plan    Additional Comments SW notified [facility] that pt is discharging home today. [facility] staff report they will put pt on the schedule for tomorrow.               Discharge Codes     None        Expected Discharge Date and Time     Expected Discharge Date Expected Discharge Time    Aug 11, 2017             SHAMAR Buchanan

## 2017-08-14 NOTE — THERAPY DISCHARGE NOTE
Acute Care - Occupational Therapy Discharge Summary  [facility]     Patient Name: Magdi Arriaga  : 1945  MRN: [id number]    Today's Date: 2017  Onset of Illness/Injury or Date of Surgery Date: 17    Date of Referral to OT: 17  Referring Physician: MORALES Matthews      Admit Date: 2017        OT Recommendation and Plan    Visit Dx:    ICD-10-CM ICD-9-CM   1. SOB (shortness of breath) R06.02 786.05   2. Renal insufficiency N28.9 593.9   3. Congestive heart failure, unspecified congestive heart failure chronicity, unspecified congestive heart failure type I50.9 428.0   4. Hypoxemia R09.02 799.02   5. Cardiomyopathy I42.9 425.4   6. Impaired functional mobility, balance, gait, and endurance Z74.09 V49.89   7. Impaired mobility and ADLs Z74.09 799.89   8. Coronary artery disease involving coronary bypass graft of native heart without angina pectoris I25.810 414.05   9. Ischemic cardiomyopathy, most recent LVEF 40% I25.5 414.8   10. Ischemic cardiomyopathy I25.5 414.8   11. CKD (chronic kidney disease), stage III N18.3 585.3   12. Chronic obstructive pulmonary disease, unspecified COPD type J44.9 496                     OT Goals       08/10/17 1429 17 1155       Transfer Training OT LTG    Transfer Training OT LTG, Date Established  17  -CL     Transfer Training OT LTG, Time to Achieve  by discharge  -CL     Transfer Training OT LTG, Activity Type  sit to stand/stand to sit;bed to chair /chair to bed;toilet  -CL     Transfer Training OT LTG, [place] Level  supervision required  -CL     Transfer Training OT LTG, Additional Goal  AAD  -CL     Transfer Training OT LTG, Outcome goal partially met   Met for sit>stand this date  -TA      Patient Education OT LTG    Patient Education OT LTG, Date Established  17  -CL     Patient Education OT LTG, Time to Achieve  by discharge  -CL     Patient Education OT LTG, Education Type  written program;HEP;positioning;posture/body  mechanics;home safety;adaptive equipment mgmt;energy conservation;adaptive breathing  -CL     Patient Education OT LTG, Education Understanding  verbalizes understanding  -CL     Patient Education OT LTG Outcome goal ongoing   Progressing well with EC, adapt breathing educ.  -TA      LB Dressing OT LTG    LB Dressing Goal OT LTG, Date Established  08/07/17  -CL     LB Dressing Goal OT LTG, Time to Achieve  by discharge  -CL     LB Dressing Goal OT LTG, Activity Type  Don socks/pants  -CL     LB Dressing Goal OT LTG, [place] Level  contact guard assist  -CL     LB Dressing Goal OT LTG, Additional Goal  AAD  -CL     LB Dressing Goal OT LTG, Outcome goal met  -TA      Activity Tolerance OT LTG    Activity Tolerance Goal OT LTG, Date Established  08/07/17  -CL     Activity Tolerance Goal OT LTG, Time to Achieve  by discharge  -CL     Activity Tolerance Goal OT LTG, Activity Level  15 min activity  -CL     Activity Tolerance Goal OT LTG, Additional Goal  x1 seated rest break, O2 sats >89%  -CL     Activity Tolerance Goal OT LTG, Outcome goal met  -TA        User Key  (r) = Recorded By, (t) = Taken By, (c) = Cosigned By    Initials Name Provider Type    DUANE Murguia, OT Occupational Therapist    BABAK Velasco, OT Occupational Therapist                  OT Discharge Summary  Reason for Discharge: Discharge from facility  Outcomes Achieved: Refer to plan of care for updates on goals achieved  Discharge Destination: Home      Linda Quiroz OT  8/14/2017

## 2017-08-15 NOTE — THERAPY DISCHARGE NOTE
Acute Care - Physical Therapy Discharge Summary  [facility]       Patient Name: Magdi Arriaga  : 1945  MRN: [id number]    Today's Date: 8/15/2017  Onset of Illness/Injury or Date of Surgery Date: 17    Date of Referral to PT: 17  Referring Physician: MORALES Matthews      Admit Date: 2017      PT Recommendation and Plan    Visit Dx:    ICD-10-CM ICD-9-CM   1. SOB (shortness of breath) R06.02 786.05   2. Renal insufficiency N28.9 593.9   3. Congestive heart failure, unspecified congestive heart failure chronicity, unspecified congestive heart failure type I50.9 428.0   4. Hypoxemia R09.02 799.02   5. Cardiomyopathy I42.9 425.4   6. Impaired functional mobility, balance, gait, and endurance Z74.09 V49.89   7. Impaired mobility and ADLs Z74.09 799.89   8. Coronary artery disease involving coronary bypass graft of native heart without angina pectoris I25.810 414.05   9. Ischemic cardiomyopathy, most recent LVEF 40% I25.5 414.8   10. Ischemic cardiomyopathy I25.5 414.8   11. CKD (chronic kidney disease), stage III N18.3 585.3   12. Chronic obstructive pulmonary disease, unspecified COPD type J44.9 496                       IP PT Goals       08/10/17 1553 17 1111 17 1121    Bed Mobility PT STG    Bed Mobility PT STG, Date Goal Reviewed  17  -AS 17  -AS    Bed Mobility PT STG, Outcome goal ongoing  -DR goal ongoing  -AS goal ongoing  -AS    Transfer Training PT STG    Transfer Training PT STG, Date Goal Reviewed  17  -AS 17  -AS    Transfer Training PT STG, Outcome goal ongoing  -DR goal ongoing  -AS goal ongoing  -AS    Gait Training PT STG    Gait Training Goal PT STG, Date Goal Reviewed  17  -AS 17  -AS    Gait Training Goal PT STG, Outcome goal ongoing  -DR goal ongoing  -AS goal ongoing  -AS    Stair Training PT STG    Stair Training Goal PT STG, Date Goal Reviewed  17  -AS 17  -AS    Stair Training Goal PT STG, Outcome goal ongoing   -DR goal ongoing  -AS goal ongoing  -AS      08/07/17 1041          Bed Mobility PT STG    Bed Mobility PT STG, Date Established 08/07/17  -DM      Bed Mobility PT STG, Time to Achieve 3 days  -DM      Bed Mobility PT STG, Activity Type all bed mobility  -DM      Bed Mobility PT STG, [place] Level independent  -DM      Transfer Training PT STG    Transfer Training PT STG, Date Established 08/07/17  -DM      Transfer Training PT STG, Time to Achieve 3 days  -DM      Transfer Training PT STG, Activity Type all transfers  -DM      Transfer Training PT STG, [place] Level independent  -DM      Transfer Training PT STG, Assist Device cane, straight  -DM      Gait Training PT STG    Gait Training Goal PT STG, Date Established 08/07/17  -DM      Gait Training Goal PT STG, Time to Achieve 3 days  -DM      Gait Training Goal PT STG, [place] Level supervision required   STABLE VS  -DM      Gait Training Goal PT STG, Assist Device walker, rolling  -DM      Gait Training Goal PT STG, Distance to Achieve 350  -DM      Stair Training PT STG    Stair Training Goal PT STG, Date Established 08/07/17  -DM      Stair Training Goal PT STG, Time to Achieve 3 days  -DM      Stair Training Goal PT STG, Number of Steps 3  -DM      Stair Training Goal PT STG, [place] Level independent  -DM      Stair Training Goal PT STG, Assist Device cane, straight  -DM        User Key  (r) = Recorded By, (t) = Taken By, (c) = Cosigned By    Initials Name Provider Type    MAJO Canela, PT Physical Therapist    AS Estela Nava, PTA Physical Therapy Assistant    DR Grzegorz Pacheco, PT Physical Therapist            Goals Status: Treatment plan discontinued secondary to discharge from acute facility.  PT Discharge Summary  Reason for Discharge: Discharge from facility  Outcomes Achieved: Refer to plan of care for updates on goals achieved  Discharge Destination: Home with home health      Sheba Sagastume, PT   8/15/2017

## 2017-08-18 PROBLEM — D64.9 NORMOCYTIC ANEMIA: Status: RESOLVED | Noted: 2017-01-01 | Resolved: 2017-01-01

## 2017-08-18 NOTE — PROGRESS NOTES
Transitional Care Follow Up Visit  Subjective     Magdi Arriaga is a 72 y.o. male who presents for a transitional care management visit.    Within 48 business hours after discharge our office contacted him via telephone to coordinate his care and needs.      I reviewed and discussed the details of that call along with the discharge summary, hospital problems, inpatient lab results, inpatient diagnostic studies, and consultation reports with Magdi.    Date of TCM Phone Call 8/14/2017   [facility]   Date of Admission 8/6/2017   Date of Discharge 8/11/2017   Risk for Readmission (LACE Score) 17   Discharge Disposition Home or Self Care       History of Present Illness   Course During Hospital Stay:  The patient was admitted August 6 - 11th at [facility] for acute heart failure.  Echocardiogram showed an ejection fraction of 30%.  Cardiac catheterization on August 9 showed extensive coronary artery disease with normal right cardiac function.  He received IV diuresis and evaluation by the cardiologist.  He feels markedly improved and was discharged on current medicines.  Because of acute respiratory congestion on COPD, he was given a short course of Levaquin.  He has finished that antibiotic and is breathing easier and coughing less.  He is back at home under the care of his wife.  He is receiving home health for PT and OT with skilled nursing.  The wife remained in the examination room today throughout and notes that he has been uncooperative.  He has not been participating effectively with home health physical therapy.  The patient denies this stating that he is trying to do everything asked of him.  The wife appears exhausted and frustrated.     The following portions of the patient's history were reviewed and updated as appropriate: allergies, current medications, past family history, past medical history, past social history, past surgical history and problem  list.    Review of Systems    Objective   Physical Exam    Assessment/Plan   Magdi was seen today for congestive heart failure.    Diagnoses and all orders for this visit:    Elevated LFTs    Primary osteoarthritis involving multiple joints    Acute type B viral hepatitis    Renovascular hypertension    Ischemic cardiomyopathy, most recent LVEF 30%    Chronic combined systolic and diastolic congestive heart failure    Mild intermittent asthma without complication        Refer to separate note for medical evaluation     Electronically signed Colton Dickens M.D.8/20/2017 3:08 PM

## 2017-08-18 NOTE — PATIENT INSTRUCTIONS
1.  Continue usual medicines and supplements - as listed.    2.  Stand up and stretch on walker - every morning.  Do deep breathing and coughing exercises - to clear lungs.    3.  Obtain a part-time caregiver - 2 hours on Monday Wednesday Friday morning - to improve activities.    4.  Walk through you house 5 minutes  - twice in the morning and twice in the afternoon - to build strength.    5.  Return visit one month - fasting checkup.

## 2017-08-20 NOTE — PROGRESS NOTES
Subjective   Magdi Arriaga is a 72 y.o. male.     History of Present Illness      The patient was admitted August 6 - 11th at [facility] for acute heart failure.  Echocardiogram showed an ejection fraction of 30%.  Cardiac catheterization on August 9 showed extensive coronary artery disease with normal right cardiac function.  He received IV diuresis and evaluation by the cardiologist.  He feels markedly improved and was discharged on current medicines.  Because of acute respiratory congestion on COPD, he was given a short course of Levaquin.  He has finished that antibiotic and is breathing easier and coughing less.  He is back at home under the care of his wife.  He is receiving home health for PT and OT with skilled nursing.  The wife remained in the examination room today throughout and notes that he has been uncooperative.  He has not been participating effectively with home health physical therapy.  The patient denies this stating that he is trying to do everything asked of him.  The wife appears exhausted and frustrated.    The following portions of the patient's history were reviewed and updated as appropriate: allergies, current medications, past family history, past medical history, past social history, past surgical history and problem list.    Review of Systems   Constitutional: Positive for fatigue. Negative for appetite change.   HENT: Negative for ear pain and sore throat.    Respiratory: Positive for cough and shortness of breath. Negative for wheezing.    Cardiovascular: Negative for chest pain and palpitations.   Gastrointestinal: Negative for abdominal pain and nausea.   Musculoskeletal: Positive for arthralgias and gait problem. Negative for back pain.   Neurological: Negative for dizziness and headaches.   Psychiatric/Behavioral: Positive for decreased concentration and dysphoric mood. Negative for sleep disturbance. The patient is not nervous/anxious.        Objective   Blood  pressure 106/66, pulse 68, temperature 98.6 °F (37 °C), temperature source Oral, resp. rate 20, weight 189 lb (85.7 kg), SpO2 94 %.    Physical Exam   Constitutional: He is oriented to person, place, and time. He appears well-developed and well-nourished. No distress.   HENT:   Right Ear: External ear normal.   Left Ear: External ear normal.   Mouth/Throat: Oropharynx is clear and moist.   Neck: Normal range of motion. Neck supple. No JVD present.   Cardiovascular: Normal rate and regular rhythm.    Heart sounds distant   Pulmonary/Chest: Effort normal. He has no wheezes. He has no rales.   Defibrillation vest in place.  Breath sounds generally diminished   Abdominal: Soft. Bowel sounds are normal. He exhibits no distension. There is no tenderness.   Musculoskeletal: Normal range of motion. He exhibits no edema.   Lymphadenopathy:     He has no cervical adenopathy.   Neurological: He is alert and oriented to person, place, and time. He exhibits normal muscle tone. Coordination normal.   Psychiatric: He has a normal mood and affect. His behavior is normal.   Thought content and judgment generally shallow   Nursing note and vitals reviewed.    Procedures  Assessment/Plan   Magdi was seen today for congestive heart failure.    Diagnoses and all orders for this visit:    Elevated LFTs    Primary osteoarthritis involving multiple joints    Acute type B viral hepatitis    Renovascular hypertension    Ischemic cardiomyopathy, most recent LVEF 30%    Chronic combined systolic and diastolic congestive heart failure    Mild intermittent asthma without complication      The patient's heart failure is critical now with a moderately reduced ejection fraction and intermittent decompensation.  I've asked him to weigh daily and he should benefit from increased cardiac monitoring at home.    The patient has mild cognitive impairment and is having questionable response to Exelon.  He is also had moderate anxiety and depression and  seems generally stable on paroxitine.  He appears moderately exhausted and it is not surprising he has difficulty anticipating with PT.  I've asked the wife to be patient as he recovers from the illness.    The patient has a recent acute hepatitis B received from a transfusion during his this spring.  I've advised him to monitor closely with infectious disease.  Apparently his disease and mildly active but stabilizing.    The patient had a recent flare of COPD with cough congestion and a short course of antibiotics.  His lungs appear to be baseline today.    I spent time counseling the patient and his wife on adequate caregiving at home.  The wife appears exhausted and needs to bring and hired help of some sort.  Home health should participate as long as possible particularly with cardiac monitoring.    Patient Instructions   1.  Continue usual medicines and supplements - as listed.    2.  Stand up and stretch on walker - every morning.  Do deep breathing and coughing exercises - to clear lungs.    3.  Obtain a part-time caregiver - 2 hours on Monday Wednesday Friday morning - to improve activities.    4.  Walk through you house 5 minutes  - twice in the morning and twice in the afternoon - to build strength.    5.  Return visit one month - fasting checkup.    6.  Call home health to maximize the degree of cardiac monitoring.    7.  Review with home health degree of participation with PT and OT.    Electronically signed Colton Dickens M.D.8/20/2017 3:15 PM

## 2017-08-23 PROBLEM — R55 SYNCOPE, NEAR: Status: ACTIVE | Noted: 2017-01-01

## 2017-08-23 PROBLEM — R00.1 BRADYCARDIA: Status: ACTIVE | Noted: 2017-01-01

## 2017-08-23 PROBLEM — R55 NEAR SYNCOPE: Status: ACTIVE | Noted: 2017-01-01

## 2017-08-23 NOTE — ED PROVIDER NOTES
Subjective   HPI Comments: 72-year-old white male with known history of CAD and CHF with LifeVest complaining of near syncopal episode that occurred today.  Patient was admitted August 6, 2017 for the chief complaint of dyspnea.  3 days later, he had a subsequent left heart catheter procedure revealing CAD with PCI pain the culprit artery.  It was recommended that medical management for his cardiomyopathy was to continue.  According to family, patient had an episode where he was unresponsive but his eyes were open.  Patient himself is a poor historian and can't recall the details of this event.  Family and patient denies any fall trauma, speech difficulty, or weakness to extremities.  Patient does have baseline dementia and appears to be at this time his usual self.      Patient is a 72 y.o. male presenting with syncope.   History provided by:  Patient  Syncope   Episode history:  Single  Most recent episode:  Today  Timing:  Unable to specify  Progression:  Resolved  Chronicity:  New  Witnessed: yes    Relieved by:  Nothing  Worsened by:  Nothing  Ineffective treatments:  None tried  Associated symptoms: shortness of breath    Associated symptoms: no chest pain and no palpitations        Review of Systems   Respiratory: Positive for shortness of breath.    Cardiovascular: Positive for syncope. Negative for chest pain and palpitations.   All other systems reviewed and are negative.      Past Medical History:   Diagnosis Date   • Abnormal finding on lung imaging      CT scan of the chest to be wary 2013 reports interstitial lung disease with marked diffuse interstitial and peripheral scarring and significant bronchiectasis in both upper   and lower lung zones.    • Allergic rhinitis     History of allergy injections as a child.   • Arthritis    • Asthma      Patient has had a history of asthma since childhood.   • CHF (congestive heart failure) 2017    ECHO - EF 25%   • COPD (chronic obstructive pulmonary disease)     • Coronary artery disease 2017    CABG X 5   • DVT (deep venous thrombosis)     Bilateral   • Exposure to hepatitis B 2017    Transfusion - managed by ID   • Falls     A.  History of falling traumatic injuries in April 2014 and May 2014 resulting and left rib  fracture and left clavicle fracture.   • Fracture of rib    • History of chest x-ray 02/19/2016    Extensive, but stable postinflammatory pulmonary changes. There has been no change since the previous examination of 12/03/2015   • History of chest x-ray 12/03/2015    Slight increased markings bilaterally, may be progression of his bronchiectasis   • History of chest x-ray 09/06/2014    There has been no change since 09/05/2014   • History of echocardiogram 02/19/2016    Mild concentric LVH is observed.Estimated EF is 50-55%.E to A reversal in the mitral valve flow pattern suggestive of diastolic dysfunction.RV is mildly dilated.Moderate aortic cusp sclerosis is present.Mitral annular calcification.Mild MR.Evidence of borderline pulmonary hypertension.   • History of PFTs 12/03/2015    Values are slightly worse.TLC is c/w mild restriction.Values reduced from 02/16/13.Diffusion capacity is slightly worse   • History of PFTs 02/06/2013    Mild decrease in FVC, may be due to less maximal effort.TLC is within normal levels. Mild reduction in diffusion in absolute value   • History of transfusion    • Hypertension    • Lumbar spinal stenosis 2013    Spinal surgery   • Patellar tendon rupture     A.  Status post primary repair of the left periprosthetic patellar tendon tear 6/11/2014, post fall at the end of April 2014.   • Pneumonia      A.  Left lower lobe pneumonia treated at [facility], 5/8/2014 through 5/20/2014.   • Pneumothorax      A.  Status post chest tube placed 5/13/2014 and discontinued 5/15/2014 with stable chest x-ray.   • Sleep apnea with use of continuous positive airway pressure (CPAP)    • Traumatic hemorrhagic shock      A.  Secondary to right renal  retroperitoneal hemorrhage, 9/2014.       Allergies   Allergen Reactions   • Sulfa Antibiotics      UNKNOWN CHILDHOOD REACTION       Past Surgical History:   Procedure Laterality Date   • APPENDECTOMY  1959   • CARDIAC CATHETERIZATION N/A 4/18/2017    Procedure: Left Heart Cath;  Surgeon: Kvng Stauffer MD;  Location:  [facility];  Service:    • CARDIAC CATHETERIZATION N/A 8/9/2017    Procedure: Right and Left Heart Cath;  Surgeon: Kvng Stauffer MD;  Location:  [facility];  Service:    • COLONOSCOPY W/ POLYPECTOMY     • CORONARY ARTERY BYPASS GRAFT N/A 4/24/2017    Procedure: MEDIAN STERNOTOMY, CORONARY ARTERY BYPASS GRAFT X5 UTILIZING THE INTERNAL MAMMARY ARTERY, ENDOVASCULAR VEING HARVEST UTILIZING RIGHT SAPHENOUS VEIN,TRANSESOPHAGEAL ECHO;  Surgeon: Kanu Berger MD;  Location:  [facility] OR;  Service:    • FRACTURE SURGERY     • KNEE SURGERY  2006, 2014    primary repair -left periprosthetic patellar tendon tear 6/11/2014   • LUMBAR LAMINECTOMY  2013    With discectomy   • REPLACEMENT TOTAL KNEE Bilateral 2009, 2013    Left - 2009 and right - 2013.   • SKIN BIOPSY     • TONSILLECTOMY  1957   • VENA CAVA FILTER PLACEMENT  2014       Family History   Problem Relation Age of Onset   • Adopted: Yes   • No Known Problems Mother      Adopted as child   • No Known Problems Father        Social History     Social History   • Marital status:      Spouse name: N/A   • Number of children: 2   • Years of education: N/A     Occupational History   • [employer] at [employer]     Social History Main Topics   • Smoking status: Former Smoker     Packs/day: 1.00     Years: 17.00     Types: Cigarettes     Start date: 1965     Quit date: 1982   • Smokeless tobacco: Never Used   • Alcohol use No      Comment: Never drinker   • Drug use: No   • Sexual activity: Not Asked     Other Topics Concern   • None     Social History Narrative    Domestic life :  Lives in private home with  wife        Quaker :   Tenriism        Sleep hygiene :  In bed 11 PM to 8 AM for 8 hours of sleep        Caffeine use :  One cup of coffee and 1 diet cola daily        Exercise habits :   Cardiac rehabilitation -  Spring 2017 on -since heart surgery and heart failure        Diet :  Low calorie diet - low in salt and low in sugar        Occupation :   Retired        Hearing :        Vision :        Driving :  No driving               Objective   Physical Exam   Constitutional: He appears well-developed and well-nourished.   HENT:   Head: Normocephalic and atraumatic.   Eyes: Conjunctivae are normal.   Neck: Normal range of motion.   Cardiovascular: Normal rate, regular rhythm and normal heart sounds.    No murmur heard.  Pulmonary/Chest: Effort normal. No stridor. No respiratory distress. He has no wheezes. He has rales.   Abdominal: Soft. Bowel sounds are normal. He exhibits no distension.   Musculoskeletal: Normal range of motion.   Neurological: He is alert.   Skin: Skin is warm and dry.   Psychiatric: He has a normal mood and affect. His behavior is normal. Judgment and thought content normal.       Procedures         ED Course  ED Course   Comment By Time   Patient with no other complaints concerns. MORALES Key 08/23 1700          Recent Results (from the past 24 hour(s))   BNP    Collection Time: 08/23/17  1:32 PM   Result Value Ref Range    .0 (H) 0.0 - 100.0 pg/mL   Light Blue Top    Collection Time: 08/23/17  1:32 PM   Result Value Ref Range    Extra Tube hold for add-on    Lavender Top    Collection Time: 08/23/17  1:32 PM   Result Value Ref Range    Extra Tube hold for add-on    Gold Top - SST    Collection Time: 08/23/17  1:32 PM   Result Value Ref Range    Extra Tube Hold for add-ons.    CBC Auto Differential    Collection Time: 08/23/17  1:32 PM   Result Value Ref Range    WBC 9.73 3.50 - 10.80 10*3/mm3    RBC 4.00 (L) 4.20 - 5.76 10*6/mm3    Hemoglobin 11.6 (L) 13.1 - 17.5 g/dL     Hematocrit 36.6 (L) 38.9 - 50.9 %    MCV 91.5 80.0 - 99.0 fL    MCH 29.0 27.0 - 31.0 pg    MCHC 31.7 (L) 32.0 - 36.0 g/dL    RDW 18.2 (H) 11.3 - 14.5 %    RDW-SD 61.9 (H) 37.0 - 54.0 fl    MPV 10.6 6.0 - 12.0 fL    Platelets 268 150 - 450 10*3/mm3    Neutrophil % 72.3 (H) 41.0 - 71.0 %    Lymphocyte % 16.1 (L) 24.0 - 44.0 %    Monocyte % 7.3 0.0 - 12.0 %    Eosinophil % 3.3 (H) 0.0 - 3.0 %    Basophil % 0.8 0.0 - 1.0 %    Immature Grans % 0.2 0.0 - 0.6 %    Neutrophils, Absolute 7.03 1.50 - 8.30 10*3/mm3    Lymphocytes, Absolute 1.57 0.60 - 4.80 10*3/mm3    Monocytes, Absolute 0.71 0.00 - 1.00 10*3/mm3    Eosinophils, Absolute 0.32 (H) 0.00 - 0.30 10*3/mm3    Basophils, Absolute 0.08 0.00 - 0.20 10*3/mm3    Immature Grans, Absolute 0.02 0.00 - 0.03 10*3/mm3   POC Troponin, Rapid    Collection Time: 08/23/17  1:35 PM   Result Value Ref Range    Troponin I 0.02 0.00 - 0.07 ng/mL   Comprehensive Metabolic Panel    Collection Time: 08/23/17  3:19 PM   Result Value Ref Range    Glucose 88 70 - 100 mg/dL    BUN 27 (H) 9 - 23 mg/dL    Creatinine 1.70 (H) 0.60 - 1.30 mg/dL    Sodium 137 132 - 146 mmol/L    Potassium 3.7 3.5 - 5.5 mmol/L    Chloride 106 99 - 109 mmol/L    CO2 25.0 20.0 - 31.0 mmol/L    Calcium 10.3 8.7 - 10.4 mg/dL    Total Protein 8.0 5.7 - 8.2 g/dL    Albumin 3.60 3.20 - 4.80 g/dL    ALT (SGPT) 128 (H) 7 - 40 U/L    AST (SGOT) 30 0 - 33 U/L    Alkaline Phosphatase 108 (H) 25 - 100 U/L    Total Bilirubin 0.5 0.3 - 1.2 mg/dL    eGFR Non African Amer 40 (L) >60 mL/min/1.73    Globulin 4.4 gm/dL    A/G Ratio 0.8 (L) 1.5 - 2.5 g/dL    BUN/Creatinine Ratio 15.9 7.0 - 25.0    Anion Gap 6.0 3.0 - 11.0 mmol/L   Magnesium    Collection Time: 08/23/17  3:19 PM   Result Value Ref Range    Magnesium 2.1 1.3 - 2.7 mg/dL   Green Top (Gel)    Collection Time: 08/23/17  3:19 PM   Result Value Ref Range    Extra Tube Hold for add-ons.    POC Troponin, Rapid    Collection Time: 08/23/17  4:14 PM   Result Value Ref Range     Troponin I 0.03 0.00 - 0.07 ng/mL     Note: In addition to lab results from this visit, the labs listed above may include labs taken at another facility or during a different encounter within the last 24 hours. Please correlate lab times with ED admission and discharge times for further clarification of the services performed during this visit.    CT Head Without Contrast   Preliminary Result   Age-appropriate generalized cerebral atrophy. No evidence of   acute trauma or other acute intracranial disease.       DICTATED:     08/23/2017   EDITED:         08/23/2017          XR Chest 1 View   Preliminary Result   Diffuse interstitial disease, fairly similar to previous   exam, perhaps recurrent interstitial edema. Bronchitis might appear   similar but is considered less likely.       DICTATED:     08/23/2017   EDITED:         08/23/2017            Vitals:    08/23/17 1414 08/23/17 1530 08/23/17 1600 08/23/17 1631   BP:  156/84 144/91 124/75   BP Location:       Patient Position:       Pulse: 51 54 56 54   Resp:       Temp:       TempSrc:       SpO2: 96% 96% 96%    Weight:       Height:         Medications   sodium chloride 0.9 % flush 10 mL (not administered)   furosemide (LASIX) injection 40 mg (40 mg Intravenous Given 8/23/17 1631)     ECG/EMG Results (last 24 hours)     Procedure Component Value Units Date/Time    ECG 12 Lead [[id number]] Collected:  08/23/17 1306     Updated:  08/23/17 1353    ECG 12 Lead [[id number]] Collected:  08/23/17 1322     Updated:  08/23/17 1356    ECG 12 Lead [[id number]] Collected:  08/23/17 1609     Updated:  08/23/17 1608              MDM    Final diagnoses:   Syncope, near   Congestive heart failure, unspecified congestive heart failure chronicity, unspecified congestive heart failure type            MORALES Key  08/23/17 7900

## 2017-08-23 NOTE — PROGRESS NOTES
Discharge Planning Assessment  [facility]     Patient Name: Magdi Arriaga  MRN: [id number]  Today's Date: 8/23/2017    Admit Date: 8/23/2017          Discharge Needs Assessment       08/23/17 1803    Living Environment    Lives With spouse    Living Arrangements house    Transportation Available family or friend will provide    Living Environment    Provides Primary Care For spouse    Primary Care Provided By spouse/significant other    Quality Of Family Relationships supportive;helpful;involved    Able to Return to Prior Living Arrangements yes    Discharge Needs Assessment    [facility] [phone], fax [phone]    Equipment Currently Used at Home cane, straight;walker, rolling;oxygen;bipap/ cpap   CPAP and O2 @ 2L/NC at night provided by [facility] [employer], pt has portable tanks    Discharge Disposition still a patient    Discharge Contact Information if Applicable [phone]            Discharge Plan       08/23/17 1807    Case Management/Social Work Plan    Plan Home with [facility]    Patient/Family In Agreement With Plan yes    Additional Comments Spoke with patient and spouse at . Pt lives with spouse in [facility] [place]. Patient has a CPAP and oxygen at 2L/NC provided by [facility], rolling walker, cane and does not use any DME. His is current with [facility] [phone], fax [phone] and will need a resume home health order prior to DC..  His PCP is Colton Dickens and medications are covered by insurance. Plan for discharge is to return home with home health. Pt to transport home with family, when medically ready. Case Management will follow and assist with any discharge planning needs.        Discharge Placement     No information found                Demographic Summary       08/23/17 1800    Referral Information    Arrived From home or self-care    Reason For Consult discharge planning    Contact Information    Permission Granted to Share  Information With family/designee    Comments Linda Arriaga (Spouse) Mobile Phone: [phone]    Primary Care Physician Information    Name JOEY FINE             Functional Status       08/23/17 2330    Functional Status Prior    Ambulation 1-->assistive equipment   Cane    Transferring 0-->independent    Toileting 0-->independent    Bathing 0-->independent    Dressing 0-->independent    Eating 0-->independent    Communication 0-->understands/communicates without difficulty    Swallowing 0-->swallows foods/liquids without difficulty    IADL    Medications assistive person    Meal Preparation independent    Housekeeping independent    Laundry independent    Shopping independent    Oral Care independent    Employment/Financial    Employment/Finance Comments Healthcare coverage: Medicare and [employer]; Medicaiton coverage: Part D            Psychosocial     None            Abuse/Neglect     None            Legal     None            Substance Abuse     None            Patient Forms     None          Gricelda Portillo RN

## 2017-08-23 NOTE — H&P
"    [facility] Medicine Services  HISTORY AND PHYSICAL    Primary Care Physician: Colton Dickens MD    Subjective     Chief Complaint: near syncope    History of Present Illness:   72 year old male with PMH significant for CAD, ischemic cardiomyopathy with LVEF 30%, HTN, HLD, CKD, HBV, COPD, dementia, and chronic anemia that presents to the ED with complaints of near syncope that occurred earlier today. His wife states that he had participated in home OT and that he was going upstairs to change his clothes (he has a chair lift for the stairs), and that when his son went to check on him, he was found sitting in his chair with a \"dead, staring\" look on his face. When his son called out to him, he did arouse and was able to focus on him and knew where he was. He had complaints of dizziness at that time as well as some SOA. Per his wife, his blood pressure was 120's/80's but his pulse was in the 50's and it normally runs in the 60's. The home health nurse advised him to eat and drink and see if it improved, which he did without relief, prompting them to report to the ED. Upon arrival to the ED, he is found to be mildly bradycardic and remains with some dizziness. His SOA has improved.  He denies any CP, fever, N/V/D. He states that his dizziness is worse with standing. Hospital Medicine will admit and follow.       Review of Systems   Constitutional: Positive for fatigue. Negative for activity change, appetite change, chills, diaphoresis and fever.        Fatigues easily   HENT: Negative.    Eyes: Negative for visual disturbance.   Respiratory: Positive for cough and shortness of breath. Negative for apnea, chest tightness and wheezing.    Cardiovascular: Negative for chest pain, palpitations and leg swelling.   Gastrointestinal: Negative for abdominal pain, constipation, diarrhea, nausea and vomiting.   Genitourinary: Negative for difficulty urinating, dysuria, frequency and urgency. "   Musculoskeletal: Negative for arthralgias and myalgias.   Skin: Negative for color change and pallor.   Neurological: Positive for dizziness and syncope. Negative for weakness, light-headedness and headaches.        Near syncope   Psychiatric/Behavioral: Positive for confusion. The patient is not nervous/anxious.         Mild dementia, at baseline       Otherwise complete ROS performed and negative except as mentioned in the HPI.    Past Medical History:   Diagnosis Date   • Abnormal finding on lung imaging      CT scan of the chest to be wary 2013 reports interstitial lung disease with marked diffuse interstitial and peripheral scarring and significant bronchiectasis in both upper   and lower lung zones.    • Allergic rhinitis     History of allergy injections as a child.   • Arthritis    • Asthma      Patient has had a history of asthma since childhood.   • CHF (congestive heart failure) 2017    ECHO - EF 25%   • COPD (chronic obstructive pulmonary disease)    • Coronary artery disease 2017    CABG X 5   • DVT (deep venous thrombosis)     Bilateral   • Exposure to hepatitis B 2017    Transfusion - managed by ID   • Falls     A.  History of falling traumatic injuries in April 2014 and May 2014 resulting and left rib  fracture and left clavicle fracture.   • Fracture of rib    • History of chest x-ray 02/19/2016    Extensive, but stable postinflammatory pulmonary changes. There has been no change since the previous examination of 12/03/2015   • History of chest x-ray 12/03/2015    Slight increased markings bilaterally, may be progression of his bronchiectasis   • History of chest x-ray 09/06/2014    There has been no change since 09/05/2014   • History of echocardiogram 02/19/2016    Mild concentric LVH is observed.Estimated EF is 50-55%.E to A reversal in the mitral valve flow pattern suggestive of diastolic dysfunction.RV is mildly dilated.Moderate aortic cusp sclerosis is present.Mitral annular  calcification.Mild MR.Evidence of borderline pulmonary hypertension.   • History of PFTs 12/03/2015    Values are slightly worse.TLC is c/w mild restriction.Values reduced from 02/16/13.Diffusion capacity is slightly worse   • History of PFTs 02/06/2013    Mild decrease in FVC, may be due to less maximal effort.TLC is within normal levels. Mild reduction in diffusion in absolute value   • History of transfusion    • Hypertension    • Lumbar spinal stenosis 2013    Spinal surgery   • Patellar tendon rupture     A.  Status post primary repair of the left periprosthetic patellar tendon tear 6/11/2014, post fall at the end of April 2014.   • Pneumonia      A.  Left lower lobe pneumonia treated at [facility], 5/8/2014 through 5/20/2014.   • Pneumothorax      A.  Status post chest tube placed 5/13/2014 and discontinued 5/15/2014 with stable chest x-ray.   • Sleep apnea with use of continuous positive airway pressure (CPAP)    • Traumatic hemorrhagic shock      A.  Secondary to right renal retroperitoneal hemorrhage, 9/2014.       Past Surgical History:   Procedure Laterality Date   • APPENDECTOMY  1959   • CARDIAC CATHETERIZATION N/A 4/18/2017    Procedure: Left Heart Cath;  Surgeon: Kvng Stauffer MD;  Location:  [facility] CATH INVASIVE LOCATION;  Service:    • CARDIAC CATHETERIZATION N/A 8/9/2017    Procedure: Right and Left Heart Cath;  Surgeon: Kvng Stauffer MD;  Location: [facility] CATH INVASIVE LOCATION;  Service:    • COLONOSCOPY W/ POLYPECTOMY     • CORONARY ARTERY BYPASS GRAFT N/A 4/24/2017    Procedure: MEDIAN STERNOTOMY, CORONARY ARTERY BYPASS GRAFT X5 UTILIZING THE INTERNAL MAMMARY ARTERY, ENDOVASCULAR VEING HARVEST UTILIZING RIGHT SAPHENOUS VEIN,TRANSESOPHAGEAL ECHO;  Surgeon: Kanu Berger MD;  Location:  [facility];  Service:    • FRACTURE SURGERY     • KNEE SURGERY  2006, 2014    primary repair -left periprosthetic patellar tendon tear 6/11/2014   • LUMBAR LAMINECTOMY  2013    With discectomy   • REPLACEMENT TOTAL  KNEE Bilateral 2009, 2013    Left - 2009 and right - 2013.   • SKIN BIOPSY     • TONSILLECTOMY  1957   • VENA CAVA FILTER PLACEMENT  2014       Family History   Problem Relation Age of Onset   • Adopted: Yes   • No Known Problems Mother      Adopted as child   • No Known Problems Father        Social History     Social History   • Marital status:      Spouse name: N/A   • Number of children: 2   • Years of education: N/A     Occupational History   • [employer] at       Retired     Social History Main Topics   • Smoking status: Former Smoker     Packs/day: 1.00     Years: 17.00     Types: Cigarettes     Start date: 1965     Quit date: 1982   • Smokeless tobacco: Never Used   • Alcohol use No      Comment: Never drinker   • Drug use: No   • Sexual activity: Not on file     Other Topics Concern   • Not on file     Social History Narrative    Domestic life :  Lives in private home with wife        Alevism :   Holiness        Sleep hygiene :  In bed 11 PM to 8 AM for 8 hours of sleep        Caffeine use :  One cup of coffee and 1 diet cola daily        Exercise habits :   Cardiac rehabilitation -  Spring 2017 on -since heart surgery and heart failure        Diet :  Low calorie diet - low in salt and low in sugar        Occupation :   Retired        Hearing :        Vision :        Driving :  No driving           Medications:  Prescriptions Prior to Admission   Medication Sig Dispense Refill Last Dose   • albuterol (PROVENTIL HFA;VENTOLIN HFA) 108 (90 BASE) MCG/ACT inhaler Inhale 2 puffs Every 4 (Four) Hours As Needed for Wheezing.   8/23/2017   • aspirin 81 MG chewable tablet Chew 1 tablet Daily.   8/23/2017   • budesonide-formoterol (SYMBICORT) 160-4.5 MCG/ACT inhaler Inhale 2 puffs 2 (Two) Times a Day. 1 inhaler 11 8/23/2017   • carvedilol (COREG) 3.125 MG tablet Take 1 tablet by mouth Every 12 (Twelve) Hours. 60 tablet 11 8/23/2017   • clopidogrel (PLAVIX) 75 MG tablet Take 1 tablet by mouth Daily. 30 tablet  "11 8/22/2017   • entecavir (BARACLUDE) 0.5 MG tablet Take 0.5 mg by mouth Daily.   8/23/2017   • ferrous sulfate 325 (65 FE) MG tablet Take 325 mg by mouth Daily With Breakfast.   8/23/2017   • furosemide (LASIX) 40 MG tablet Take 1 tablet by mouth Daily. 30 tablet 11 8/23/2017   • loratadine (CLARITIN) 10 MG tablet Take 10 mg by mouth Daily.   8/23/2017   • memantine (NAMENDA) 10 MG tablet Take 10 mg by mouth 2 (Two) Times a Day.   8/23/2017   • montelukast (SINGULAIR) 10 MG tablet Take 1 tablet by mouth Every Night. 30 tablet 11 8/22/2017   • nitroglycerin (NITROSTAT) 0.4 MG SL tablet Place 1 tablet under the tongue Every 5 (Five) Minutes As Needed for Chest Pain. Take no more than 3 doses in 15 minutes. 25 tablet 6    • PARoxetine (PAXIL) 30 MG tablet Take 1 tablet by mouth Every Morning.   8/23/2017   • rivastigmine (EXELON) 9.5 MG/24HR patch Place 1 patch on the skin Daily.   8/23/2017   • sacubitril-valsartan (ENTRESTO) 49-51 MG tablet Take 0.5 tablets by mouth 2 (Two) Times a Day. 60 tablet 11 8/23/2017   • vitamin C (ASCORBIC ACID) 250 MG tablet Take 1 tablet by mouth 2 (Two) Times a Day With Meals. Take with iron supplement to enhance absorption   8/23/2017       Allergies:  Allergies   Allergen Reactions   • Sulfa Antibiotics      UNKNOWN CHILDHOOD REACTION         Objective     Physical Exam:  Vital Signs: /88 (BP Location: Left arm, Patient Position: Lying)  Pulse 54  Temp 98.7 °F (37.1 °C) (Oral)   Resp 18  Ht 68\" (172.7 cm)  Wt 183 lb 9.6 oz (83.3 kg)  SpO2 95%  BMI 27.92 kg/m2  Physical Exam   Constitutional: He is oriented to person, place, and time. He appears well-developed and well-nourished. No distress.   HENT:   Head: Normocephalic.   Eyes: Pupils are equal, round, and reactive to light.   Neck: Neck supple. No JVD present.   Cardiovascular: Regular rhythm and intact distal pulses.  Bradycardia present.  Exam reveals distant heart sounds. Exam reveals no gallop and no friction rub. "    No murmur heard.  Pulmonary/Chest: Effort normal and breath sounds normal. He has no wheezes. He has no rales.   Abdominal: Soft. Bowel sounds are normal. He exhibits no distension and no mass. There is no tenderness. There is no guarding.   Musculoskeletal: Normal range of motion. He exhibits no edema or tenderness.   Neurological: He is alert and oriented to person, place, and time.   Skin: Skin is warm and dry. No rash noted. He is not diaphoretic. No erythema.   Psychiatric: He has a normal mood and affect. His behavior is normal.   Vitals reviewed.          Results Reviewed:    Results from last 7 days  Lab Units 08/23/17  1332   WBC 10*3/mm3 9.73   HEMOGLOBIN g/dL 11.6*   PLATELETS 10*3/mm3 268       Results from last 7 days  Lab Units 08/23/17  1519   SODIUM mmol/L 137   POTASSIUM mmol/L 3.7   CO2 mmol/L 25.0   CREATININE mg/dL 1.70*   GLUCOSE mg/dL 88   CALCIUM mg/dL 10.3     Imaging Results (last 24 hours)     Procedure Component Value Units Date/Time    XR Chest 1 View [[id number]] Collected:  08/23/17 1632     Updated:  08/23/17 1632    Narrative:          EXAMINATION: XR CHEST, SINGLE VIEW - 08/23/2017     INDICATION: Dyspnea.     COMPARISON: 08/06/2017 portable chest radiograph.     FINDINGS: Note is made of patient's life vest apparatus. Heart is  enlarged. There is a diffuse interstitial disease pattern, similar to  the prior study, although slightly different in distribution, presumably  mild interstitial edema. No densely consolidated lung effusion or  pneumothorax is seen.        Impression:       Diffuse interstitial disease, fairly similar to previous  exam, perhaps recurrent interstitial edema. Bronchitis might appear  similar but is considered less likely.     DICTATED:     08/23/2017  EDITED:         08/23/2017       CT Head Without Contrast [[id number]] Collected:  08/23/17 1633     Updated:  08/23/17 1634    Narrative:       EXAMINATION: CT HEAD WO CONTRAST - 08/23/2017     INDICATION:  Syncope.      TECHNIQUE: 5 mm unenhanced images through the brain.     The radiation dose reduction device was turned on for each scan per the  ALARA (As Low as Reasonably Achievable) protocol.     COMPARISON: 04/19/2017 head CT scan.     FINDINGS: The calvarium appears intact. A small fluid level is seen in  the right maxillary sinus. There is trace left maxillary sinus mucosal  thickening. Paranasal sinuses and mastoids otherwise appear clear. Soft  tissue window images show expected generalized cerebral atrophy for age,  and resulting mild ventricular enlargement. There is no evidence of  hemorrhage, contusion, edema, mass or mass effect, infarct, or abnormal  extra-axial collection.       Impression:       Age-appropriate generalized cerebral atrophy. No evidence of  acute trauma or other acute intracranial disease.     DICTATED:     08/23/2017  EDITED:         08/23/2017             I have personally reviewed and interpreted available lab data, radiology studies and ECG obtained at time of admission.     Assessment / Plan     Problem List:   Hospital Problem List     * (Principal)Near syncope    Bradycardia    Dementia    Dyslipidemia    Hypertension    Normocytic anemia    COPD (chronic obstructive pulmonary disease)    Ischemic cardiomyopathy, most recent LVEF 30%    Chronic congestive heart failure    CKD (chronic kidney disease), stage III    Acute type B viral hepatitis related to platelet transfusion, followed by [facility]          Assessment:  72 year old male presenting to ED with complaints of dizziness, SOA, and near syncope. Currently wears life vest secondary to EF 30%.     Plan:  Near Syncope  -Most recent carotid duplex 04/19/17 showing 0-49% stenosis for both left and right internal carotid artery  -Fall precautions  -Interrogate life vest  -Cardiology consult in am, pt of Dr Stauffer   -Orthostatic BP  -Mg stable  -BMP in am    Bradycardia  -Orthostatics  -Hold parameters for BB    Ischemic  "Cardiomyopathy  -ECHO 08/07/17 with LVEF 30% and placement of life vest  -Interrogate life vest    Chronic CHF  -, Given lasix in ED  -Hold additional lasix for now  -Completes daily weights at home, with no recent change, no edema  -Daily weight    COPD  -PRN nebs  -Continue home meds    Hypertension  -Continue home medications with hold parameters    Hyperlipidemia  -Continue home medications    Normocytic Anemia  -stable  -CBC in am    Chronic Kidney Disease stage 3  -stable  -BMP in am    Acute HBV  -Follows with ID    DVT prophylaxis:  -Heparin  -Teds/scds    Code Status: Full    Admission Status: Patient will be admitted to OBSERVATION status, however if further evaluation or treatment plans warrant, status may change.  Based upon current information, I predict patient's care encounter to be less than or equal to 2 midnights.       Nadia Koenig, APRN 08/23/17 7:17 PM      Brief Attending Note       I have seen and examined the patient, performing an independent face-to-face diagnostic evaluation with plan of care reviewed and developed with the advanced practice clinician (APC).      Brief Summary Statement/HPI:   71 yo M with hx of COPD, CAD s/p CABG in April 2017 with subsequent LifeVest placement, DVT s/p IVC filter, and JONNY presents with episode of near syncope. Patient was found at home sitting in his chair after working with PT, \"staring off into space\" but he easily came around when his son called his name, no confusion afterward. Notes some SOA and dizziness around this time. Came to the ER for evaluation. Cardiology was contacted and recommended admission for observation and to interrogate his LifeVest. HR noted to be in the low to mid 50's. Currently patient has no complaints. Denies any chest pain or dizziness. No SOA.       Attending Physical Exam:  Temp:  [98 °F (36.7 °C)-98.7 °F (37.1 °C)] 98.7 °F (37.1 °C)  Heart Rate:  [51-60] 60  Resp:  [16-18] 16  BP: (124-171)/(73-97) " 171/88  Constitutional: no acute distress, awake, alert  Eyes: PERRLA, sclerae anicteric, no conjunctival injection  Neck: supple, no thyromegaly, trachea midline  Respiratory: Clear to auscultation bilaterally, nonlabored respirations   Cardiovascular: RRR, no murmurs, rubs, or gallops, palpable pedal pulses bilaterally  Gastrointestinal: Positive bowel sounds, soft, nontender, nondistended  Musculoskeletal: No bilateral ankle edema, no clubbing or cyanosis to bilateral lower extremities  Psychiatric: oriented x 3, appropriate affect, cooperative  Neurologic: Strength symmetric in all extremities, Cranial Nerves grossly intact to confrontation       Brief Assessment/Plan:  73 yo M with hx of ischemic cardiomyopathy (EF 35%) with LifeVest in place presents due to near syncopal episode at home.     PLAN:  --CT head negative in ER.   --Recent carotid duplex April 2017 showed no significant stenosis. Will hold off on repeating at this time.  --Need to interrogate LifeVest. Consult Dr. Stauffer in AM.   --Check orthostatic BP.     See above for further detailed assessment and plan developed with APC which I have reviewed and/or edited.    I believe this patient meets OBSERVATION status, however if further evaluation or treatment plans warrant, status may change.  Based upon current information, I predict patient's care encounter to be less than or equal to 2 midnights.      Adriana Toro MD  08/24/17  1:35 AM

## 2017-08-24 PROBLEM — I50.22 CHRONIC SYSTOLIC CONGESTIVE HEART FAILURE (HCC): Chronic | Status: ACTIVE | Noted: 2017-05-22

## 2017-08-24 NOTE — PLAN OF CARE
Problem: Confusion, Chronic (Adult)  Goal: Identify Related Risk Factors and Signs and Symptoms  Outcome: Ongoing (interventions implemented as appropriate)    08/24/17 1311   Confusion, Chronic   Related Risk Factors (Chronic Confusion) aging effects;cognitive impairment;dementia         Problem: Arrhythmia/Dysrhythmia (Symptomatic) (Adult)  Goal: Signs and Symptoms of Listed Potential Problems Will be Absent or Manageable (Arrhythmia/Dysrhythmia)  Outcome: Ongoing (interventions implemented as appropriate)    Problem: Fall Risk (Adult)  Goal: Identify Related Risk Factors and Signs and Symptoms  Outcome: Ongoing (interventions implemented as appropriate)    08/24/17 0344   Fall Risk   Fall Risk: Related Risk Factors history of falls;other (see comments)  (dementia)   Fall Risk: Signs and Symptoms presence of risk factors         Problem: Patient Care Overview (Adult)  Goal: Plan of Care Review  Outcome: Ongoing (interventions implemented as appropriate)    08/24/17 0347 08/24/17 0745   Coping/Psychosocial Response Interventions   Plan Of Care Reviewed With --  patient   Patient Care Overview   Progress no change --    Outcome Evaluation   Outcome Summary/Follow up Plan Stable night, HR has been as low as 52, SB. hypertensive 170's. no syncopal episodes tonight, Lifevest on. No SOB, slept with CPAP maskper pt request. --

## 2017-08-24 NOTE — PROGRESS NOTES
Psychiatric Medicine Services  INPATIENT PROGRESS NOTE    Date of Admission: 8/23/2017  Length of Stay: 0  Primary Care Physician: Colton Dickens MD    Subjective   CC:  Presyncope      HPI:  No complaints today.  Denies CP, SOA, edema, palpitations, presyncope.      Review Of Systems:   Review of Systems   All other systems reviewed and are negative.        Objective      Temp:  [97.5 °F (36.4 °C)-98.8 °F (37.1 °C)] 97.7 °F (36.5 °C)  Heart Rate:  [49-60] 60  Resp:  [16-18] 16  BP: (111-171)/(73-92) 129/79  Physical Exam  Temp:  [97.5 °F (36.4 °C)-98.8 °F (37.1 °C)] 97.7 °F (36.5 °C)  Heart Rate:  [49-60] 60  Resp:  [16-18] 16  BP: (111-171)/(73-92) 129/79  Constitutional: no acute distress, awake, alert  HENT: NCAT  Respiratory: Clear to auscultation bilaterally, no crackles, respiratory effort normal   Cardiovascular: RRR, no murmur  Gastrointestinal: Positive bowel sounds, soft, nontender, nondistended  Musculoskeletal: trace bilateral ankle edema  Psychiatric: oriented x 3, appropriate affect, cooperative  Neurologic: Strength symmetric in all extremities       Results Review:    I have reviewed the labs, radiology results and diagnostic studies.      Results from last 7 days  Lab Units 08/24/17  0856   WBC 10*3/mm3 7.61   HEMOGLOBIN g/dL 11.7*   PLATELETS 10*3/mm3 260       Results from last 7 days  Lab Units 08/24/17  0856   SODIUM mmol/L 137   POTASSIUM mmol/L 3.5   CHLORIDE mmol/L 104   CO2 mmol/L 28.0   BUN mg/dL 26*   CREATININE mg/dL 1.80*   GLUCOSE mg/dL 86   CALCIUM mg/dL 10.2            Radiology Data:  Reviewed    I have reviewed the medications.    Assessment/Plan     Problem List  Hospital Problem List     * (Principal)Near syncope    Dementia    Dyslipidemia    Hypertension    Normocytic anemia    COPD (chronic obstructive pulmonary disease)    Ischemic cardiomyopathy, most recent LVEF 30%    Chronic congestive heart failure    CKD (chronic kidney disease), stage III     Acute type B viral hepatitis related to platelet transfusion, followed by [facility]    Bradycardia    Syncope, near          71 yo M with hx of COPD, CAD s/p CABG in April 2017 and ICM with chronic systolic CHF EF 30% with subsequent current LifeVest placement presents with episode of near syncope:     Assessment/Plan:    - seen by Cards, BB stopped due to mild bradycardia in setting of poor cardiac output.  Needs dual chamber ICD for ICM with poor EF and poss PPM, EP Cards to see and eval  - add back home Lasix to start in am, given IV dose in ED last night.  Creatinine at his baseline.    DVT prophylaxis:  SC hep  Discharge Planning: I expect patient to be discharged home in ~2 days after ICD +/- PPM   placement.    Dea Flores MD   08/24/17   4:45 PM

## 2017-08-24 NOTE — PROGRESS NOTES
"  [facility] at [facility]   Inpatient Progress Note/H&P update       LOS: 0 days   Patient Care Team:  Colton Dickens MD as PCP - General (Internal Medicine)  Kvng Stauffer MD as Consulting Physician (Cardiology)    Chief Complaint:  Follow-up for near syncope    Subjective     Interval History:   Patient is a 72-year-old  male who is admitted for near syncope.  He was admitted earlier this month for heart failure, COPD exacerbation and at that time underwent cardiac catheterization with intervention.  His ejection fraction was still noted to be low and he was discharged home with his LifeVest.  He was doing well until yesterday.  After completing occupational therapy he was upstairs sitting on his bed and became quite dizzy and felt as if he was going to pass out.  His son found him and noted that he had a \"blank look\" on his face.  Patient was arousable and oriented.  Blood pressure was checked by family and noted to be 120s over 80s.  He was noted to be bradycardic with heart rate in the 50s.  Due to his symptoms he was brought to the emergency department for further evaluation.  No noted chest pain, pressure, tightness.  Denies any increased shortness of breath.  Episode of presyncope, no true syncope, a documented heart rate in the 40s.    Review of Systems:   Pertinent positives noted in history, exam, and assessment. Otherwise reviewed and negative.      Objective     Vitals:  Blood pressure 111/83, pulse 53, temperature 97.6 °F (36.4 °C), temperature source Oral, resp. rate 18, height 68\" (172.7 cm), weight 180 lb 9.6 oz (81.9 kg), SpO2 98 %.     Intake/Output Summary (Last 24 hours) at 08/24/17 1051  Last data filed at 08/24/17 0900   Gross per 24 hour   Intake              340 ml   Output              900 ml   Net             -560 ml     Physical Exam   Constitutional: He is oriented to person, place, and time. He appears well-developed and well-nourished.   HENT: "   Mouth/Throat: Oropharynx is clear and moist.   Neck: No JVD present. Carotid bruit is not present. No thyromegaly present.   Cardiovascular: Regular rhythm, S1 normal, S2 normal, normal heart sounds and intact distal pulses.  Exam reveals no gallop, no S3 and no S4.    No murmur heard.  Pulses:       Carotid pulses are 2+ on the right side, and 2+ on the left side.       Radial pulses are 2+ on the right side, and 2+ on the left side.   Pulmonary/Chest: He has wheezes.   Abdominal: Soft. Bowel sounds are normal. He exhibits no mass. There is no tenderness.   Musculoskeletal: He exhibits no edema.   Neurological: He is alert and oriented to person, place, and time.   Skin: Skin is warm and dry. No rash noted.          Results Review:     I reviewed the patient's new clinical results.      Results from last 7 days  Lab Units 08/24/17  0856   WBC 10*3/mm3 7.61   HEMOGLOBIN g/dL 11.7*   HEMATOCRIT % 36.7*   PLATELETS 10*3/mm3 260       Results from last 7 days  Lab Units 08/24/17  0856   SODIUM mmol/L 137   POTASSIUM mmol/L 3.5   CHLORIDE mmol/L 104   CO2 mmol/L 28.0   BUN mg/dL 26*   CREATININE mg/dL 1.80*   CALCIUM mg/dL 10.2   BILIRUBIN mg/dL 0.4   ALK PHOS U/L 105*   ALT (SGPT) U/L 104*   AST (SGOT) U/L 28   GLUCOSE mg/dL 86       Results from last 7 days  Lab Units 08/24/17  0856   SODIUM mmol/L 137   POTASSIUM mmol/L 3.5   CHLORIDE mmol/L 104   CO2 mmol/L 28.0   BUN mg/dL 26*   CREATININE mg/dL 1.80*   GLUCOSE mg/dL 86   CALCIUM mg/dL 10.2           Lab Results  Lab Value Date/Time   TROPONINI 0.039 08/23/2017 2038   TROPONINI 0.173 (H) 08/07/2017 0836   TROPONINI 0.211 (H) 08/07/2017 0241   TROPONINI 0.166 (H) 08/06/2017 2048   TROPONINI 0.019 07/17/2017 1227   TROPONINI 0.044 (H) 06/12/2017 2139   TROPONINI 9.425 (C) 04/18/2017 0757   TROPONINI 7.403 (C) 04/17/2017 2340                     Tele:  SB    Assessment/Plan     Principal Problem:    Near syncope  Active Problems:    Dementia    Dyslipidemia     Hypertension    Normocytic anemia    COPD (chronic obstructive pulmonary disease)    Ischemic cardiomyopathy, most recent LVEF 30%    Chronic congestive heart failure    CKD (chronic kidney disease), stage III    Acute type B viral hepatitis related to platelet transfusion, followed by [facility]    Bradycardia    Syncope, near      1. Near syncope  2. ICM  - EF 30%  - on BB and entresto.  3. CAD  - recent intervention to diagonal.  4. Bradycardia  - HR in 40-50's predominantly  5. Chronic systolic congestive heart failure     Plan:  Stop low-dose carvedilol.  Patient has probable indication for permanent pacemaking.  Also will likely need a biventricular ICD given his wide QRS and persistent low LVEF.  Electrophysiology evaluate for this.  REI Quijano  08/24/17  10:51 AM  IKvng have reviewed the note in full and agree with all aspects of the above including physical exam, assessment, labs and plan with changes made accordingly.     Kvng Stauffer MD  08/24/17  11:10 AM        Dictated utilizing Dragon dictation

## 2017-08-24 NOTE — CONSULTS
EP Roseyutdevon Arriaga  1945  [phone]      08/24/17    DATE OF ADMISSION: 8/23/2017  [facility] 3E    Colton Dickens MD  [address] / [facility] [address]    Chief Complaint   Patient presents with   • Slow Heart Rate   • Shortness of Breath       PROBLEM LIST:    1. Coronary artery disease  a. April 2017 NSTEMI  b. LHC 4/18/17 showed occluded proximal LAD, 80% large ostial first diagonal, occluded mid Circ, 80 % large OM1, 80% mid RCA. EF 25%.  c. EF 40% by Echo  d. 4/24/17 CABG x 5 HERMAN to LAD, VG sequence OM1 and OM2. VG to DM of LAD and VG to RCA. (Dr. Berger)  e. LHC due to angina 8/9/17: severe proximal LAD/first diagonal stenosis with 2.5 x 23 Xience VICKEY placed   2. Atrial flutter s/p ECV May 2017  3. Ischemic cardiomyopathy  a. EF 25% LHC 4/18/17  b. Echocardiogram EF 25% 5/16/2017  c. Echocardiogram 8/7/17: EF 30%  4. CHF Class III Symptoms  LBBB/Wide QRS  5. Hypertension  6. Hyperlipidemia  7. Sleep apnea  8. COPD  9. Chronic Renal insufficiency  10. Arthritis  11. Asthma  12. Hx of bilatreal DVT  13. Depression  14. Hx of hemorrhagic shock and PTX from right renal RP bleed Sep 2014..  15. Hepatitis B exposure  16. Sleep apnea  17. Surgical Hx:  a. Appendectomy  b. Back surgery  c. CABG  d. L knee surgery  e. L TKR  f. Tonsillectomy  g. IVC filter      History of Present Illness:   72 year old WM with history of ICM diagnosed in April of 2017 who EP is asked to see today for possible BiV ICD. The patient underwent CABG 4/18/17 after a NSTEMI. At that time, his EF was 25%. He was discharged home with a Lifevest and put on appropirate CHF medications. In August, he presented back to [facility] with symtpoms of angina and CHF. LHC was performed and he was found to have severe stenosis of his diagonal and was subsequently stented with a VICKEY. His EF at that time on 8/7/17 was 30%. With regards to his symptoms, he has CHF Class III symptoms. Last night, he was  "admitted due to a presyncopal episode in which he states he was walking in his house, rode his chair up the stairs and went into his bedroom, all of a sudden feeling very dizzy/lightheaded. He had to sit down for fear of passing out. He did not pass out. His son states that he had a \"blank look\" on his face. Patient was arousable and oriented. He was noted to have a normal BP and HR in 50s. Normally, his HR runs in the 60s. He states he felt lightheaded for a few hours and came to the ED where reportedly his HR was dropping into the 40s. His BB has been held and he was admitted by the hospitalitis. Dr. Stauffer has seen the patient this morning and asks EP opinion on necessity of a PM vs BiV ICD. He has been wearing a Lifevest and info is currently being downloaded to look at his rates and any arrhythmias he may have had. He denies any CP or palpitations. He has chronic SOB and fatigue since his MI in April.     Allergies   Allergen Reactions   • Sulfa Antibiotics      UNKNOWN CHILDHOOD REACTION       Prior to Admission Medications     Prescriptions Last Dose Informant Patient Reported? Taking?    albuterol (PROVENTIL HFA;VENTOLIN HFA) 108 (90 BASE) MCG/ACT inhaler 8/23/2017 Self Yes Yes    Inhale 2 puffs Every 4 (Four) Hours As Needed for Wheezing.    aspirin 81 MG chewable tablet 8/23/2017  No Yes    Chew 1 tablet Daily.    budesonide-formoterol (SYMBICORT) 160-4.5 MCG/ACT inhaler 8/23/2017 Self No Yes    Inhale 2 puffs 2 (Two) Times a Day.    carvedilol (COREG) 3.125 MG tablet 8/23/2017  No Yes    Take 1 tablet by mouth Every 12 (Twelve) Hours.    clopidogrel (PLAVIX) 75 MG tablet 8/22/2017  No Yes    Take 1 tablet by mouth Daily.    entecavir (BARACLUDE) 0.5 MG tablet 8/23/2017  Yes Yes    Take 0.5 mg by mouth Daily.    ferrous sulfate 325 (65 FE) MG tablet 8/23/2017  Yes Yes    Take 325 mg by mouth Daily With Breakfast.    furosemide (LASIX) 40 MG tablet 8/23/2017  No Yes    Take 1 tablet by mouth Daily.    " loratadine (CLARITIN) 10 MG tablet 8/23/2017 Self Yes Yes    Take 10 mg by mouth Daily.    memantine (NAMENDA) 10 MG tablet 8/23/2017 Self Yes Yes    Take 10 mg by mouth 2 (Two) Times a Day.    montelukast (SINGULAIR) 10 MG tablet 8/22/2017 Self No Yes    Take 1 tablet by mouth Every Night.    nitroglycerin (NITROSTAT) 0.4 MG SL tablet   No Yes    Place 1 tablet under the tongue Every 5 (Five) Minutes As Needed for Chest Pain. Take no more than 3 doses in 15 minutes.    PARoxetine (PAXIL) 30 MG tablet 8/23/2017  No Yes    Take 1 tablet by mouth Every Morning.    rivastigmine (EXELON) 9.5 MG/24HR patch 8/23/2017 Self Yes Yes    Place 1 patch on the skin Daily.    sacubitril-valsartan (ENTRESTO) 49-51 MG tablet 8/23/2017  No Yes    Take 0.5 tablets by mouth 2 (Two) Times a Day.    vitamin C (ASCORBIC ACID) 250 MG tablet 8/23/2017  No Yes    Take 1 tablet by mouth 2 (Two) Times a Day With Meals. Take with iron supplement to enhance absorption            Current Facility-Administered Medications:   •  acetaminophen (TYLENOL) tablet 650 mg, 650 mg, Oral, Q4H PRN, REI Boss  •  aspirin chewable tablet 81 mg, 81 mg, Oral, Daily, REI Guzman, 81 mg at 08/24/17 0827  •  budesonide-formoterol (SYMBICORT) 160-4.5 MCG/ACT inhaler 2 puff, 2 puff, Inhalation, BID - RT, REI Guzman, 2 puff at 08/24/17 0705  •  clopidogrel (PLAVIX) tablet 75 mg, 75 mg, Oral, Daily, REI Guzman, 75 mg at 08/24/17 0827  •  ferrous sulfate tablet 325 mg, 325 mg, Oral, Daily With Breakfast, REI Guzman, 325 mg at 08/24/17 0827  •  heparin (porcine) 5000 UNIT/ML injection 5,000 Units, 5,000 Units, Subcutaneous, Q8H, Yue Sotomayor APRN, 5,000 Units at 08/24/17 0522  •  ipratropium-albuterol (DUO-NEB) nebulizer solution 3 mL, 3 mL, Nebulization, Q4H PRN, REI Boss  •  memantine (NAMENDA) tablet 10 mg, 10 mg, Oral, BID, REI Guzman, 10 mg at 08/24/17 0827  •  PARoxetine (PAXIL)  tablet 30 mg, 30 mg, Oral, QAM, REI Guzman, 30 mg at 08/24/17 0826  •  rivastigmine (EXELON) 9.5 MG/24HR patch 1 patch, 1 patch, Transdermal, Daily, REI Guzman, 1 patch at 08/24/17 0827  •  sacubitril-valsartan (ENTRESTO) 49-51 MG tablet 0.5 tablet, 0.5 tablet, Oral, BID, Nadia Koenig APRN, 0.5 tablet at 08/24/17 0840  •  sodium chloride 0.9 % flush 1-10 mL, 1-10 mL, Intravenous, PRN, Yue Светлана, APRN  •  sodium chloride 0.9 % flush 10 mL, 10 mL, Intravenous, PRN, Triage Protocol Emergency, MD    Social History     Social History   • Marital status:      Spouse name: N/A   • Number of children: 2   • Years of education: N/A     Occupational History   • [employer] at       Retired     Social History Main Topics   • Smoking status: Former Smoker     Packs/day: 1.00     Years: 17.00     Types: Cigarettes     Start date: 1965     Quit date: 1982   • Smokeless tobacco: Never Used   • Alcohol use No      Comment: Never drinker   • Drug use: No   • Sexual activity: Not Asked     Other Topics Concern   • None     Social History Narrative    Domestic life :  Lives in private home with wife        Sikh :   [employer]ehovah[employer] witness        Sleep hygiene :  In bed 11 PM to 8 AM for 8 hours of sleep        Caffeine use :  One cup of coffee and 1 diet cola daily        Exercise habits :   Cardiac rehabilitation -  Spring 2017 on -since heart surgery and heart failure        Diet :  Low calorie diet - low in salt and low in sugar        Occupation :   Retired        Hearing :        Vision :        Driving :  No driving           Family History   Problem Relation Age of Onset   • Adopted: Yes   • No Known Problems Mother      Adopted as child   • No Known Problems Father        REVIEW OF SYSTEMS:   CONST:  No weight loss, fever, chills, weakness or fatigue.   HEENT:  No visual loss, blurred vision, double vision, yellow sclerae.                   No hearing loss, congestion, sore throat.   SKIN:      No  rashes, urticaria, ulcers, sores.     RESP:     No shortness of breath, hemoptysis, cough, sputum.   GI:           No anorexia, nausea, vomiting, diarrhea. No abdominal pain, melena.   :         No burning on urination, hematuria or increased frequency.  ENDO:    No diaphoresis, cold or heat intolerance. No polyuria or polydipsia.   NEURO:  No headache, dizziness, syncope, paralysis, ataxia, or parasthesias.                  No change in bowel or bladder control. No history of CVA/TIA  MUSC:    No muscle, back pain, joint pain or stiffness.   HEME:    No anemia, bleeding, bruising. No history of DVT/PE.  PSYCH:  No history of depression, anxiety    Vitals:    08/24/17 0511 08/24/17 0705 08/24/17 0734 08/24/17 0900   BP: 125/81  111/83    BP Location: Left arm  Left arm    Patient Position: Standing  Lying    Pulse: 52 53 (!) 49 53   Resp:  18 18    Temp:   97.6 °F (36.4 °C)    TempSrc:   Oral    SpO2: 95% 100% 98%    Weight:       Height:             Vital Sign Min/Max for last 24 hours  Temp  Min: 97.5 °F (36.4 °C)  Max: 98.7 °F (37.1 °C)   BP  Min: 111/83  Max: 171/88   Pulse  Min: 49  Max: 60   Resp  Min: 16  Max: 18   SpO2  Min: 90 %  Max: 100 %   Flow (L/min)  Min: 3  Max: 3      Intake/Output Summary (Last 24 hours) at 08/24/17 1118  Last data filed at 08/24/17 0900   Gross per 24 hour   Intake              340 ml   Output              900 ml   Net             -560 ml             Physical Exam:  GEN: Well nourished, Well- developed  No acute distress  HEENT: Normocephalic, Atraumatic, PERRLA, moist mucous membranes  NECK: supple, NO JVD, no thyromegaly, no lymphadenopathy  CARD: S1S2  RRR no murmur, gallop, rub  LUNGS: Clear to auscultation with mild bibasilar crackles, normal respiratory effort  ABDOMEN: Soft, nontender, normal bowel sounds  EXTREMITIES:No gross deformities,  No clubbing, cyanosis, or edema  SKIN: Warm, dry  NEURO: No focal deficits  PSYCHIATRIC: Normal affect and mood      Data:      Results from last 7 days  Lab Units 08/24/17  0856 08/23/17  1332   WBC 10*3/mm3 7.61 9.73   HEMOGLOBIN g/dL 11.7* 11.6*   HEMATOCRIT % 36.7* 36.6*   PLATELETS 10*3/mm3 260 268       Results from last 7 days  Lab Units 08/24/17  0856 08/23/17  1519 08/17/17  1157   SODIUM mmol/L 137 137 137   POTASSIUM mmol/L 3.5 3.7 4.2   CHLORIDE mmol/L 104 106 103   CO2 mmol/L 28.0 25.0 28.0   BUN mg/dL 26* 27* 30*   CREATININE mg/dL 1.80* 1.70* 1.80*   GLUCOSE mg/dL 86 88 101*                        Results from last 7 days  Lab Units 08/23/17  2038   TROPONIN I ng/mL 0.039               Intake/Output Summary (Last 24 hours) at 08/24/17 1118  Last data filed at 08/24/17 0900   Gross per 24 hour   Intake              340 ml   Output              900 ml   Net             -560 ml       Chest X-Ray:  Imaging Results (last 24 hours)     Procedure Component Value Units Date/Time    XR Chest 1 View [[id number]] Collected:  08/23/17 1632     Updated:  08/23/17 6417    Narrative:          EXAMINATION: XR CHEST, SINGLE VIEW - 08/23/2017     INDICATION: Dyspnea.     COMPARISON: 08/06/2017 portable chest radiograph.     FINDINGS: Note is made of patient's life vest apparatus. Heart is  enlarged. There is a diffuse interstitial disease pattern, similar to  the prior study, although slightly different in distribution, presumably  mild interstitial edema. No densely consolidated lung effusion or  pneumothorax is seen.        Impression:       Diffuse interstitial disease, fairly similar to previous  exam, perhaps recurrent interstitial edema. Bronchitis might appear  similar but is considered less likely.     DICTATED:     08/23/2017  EDITED:         08/23/2017     This report was finalized on 8/23/2017 10:45 PM by DR. Dc Rowell MD.       CT Head Without Contrast [[id number]] Collected:  08/23/17 1633     Updated:  08/23/17 2314    Narrative:       EXAMINATION: CT HEAD WO CONTRAST - 08/23/2017     INDICATION: Syncope.      TECHNIQUE: 5 mm  unenhanced images through the brain.     The radiation dose reduction device was turned on for each scan per the  ALARA (As Low as Reasonably Achievable) protocol.     COMPARISON: 2017 head CT scan.     FINDINGS: The calvarium appears intact. A small fluid level is seen in  the right maxillary sinus. There is trace left maxillary sinus mucosal  thickening. Paranasal sinuses and mastoids otherwise appear clear. Soft  tissue window images show expected generalized cerebral atrophy for age,  and resulting mild ventricular enlargement. There is no evidence of  hemorrhage, contusion, edema, mass or mass effect, infarct, or abnormal  extra-axial collection.       Impression:       Age-appropriate generalized cerebral atrophy. No evidence of  acute trauma or other acute intracranial disease.     DICTATED:     2017  EDITED:         2017     This report was finalized on 2017 11:12 PM by DR. Dc Rowell MD.             Telemetry: sinus reece 49- 60  EK17: Sinus reece 49 bpm, NS intraventricular block  ms    Assessment and Plan:   1. Presyncope- possibly due to bradycardia, agree with holding coreg. Follow up on strips from Lifevest.   2. ICM with persistently low EF 25-30% on appropriate medications. Has not met 90 day criteria since last stent on 17, but may require PM due to bradycardia  3. CHF Class III   4. Wide QRS        Scribed for Jack Gonzalez MD by Sheba Poole PA-C. 2017  11:18 AM      IJack MD, personally performed the services face to face as described and documented by the above named individual. I have made any necessary edits and it is both accurate and complete 2017  8:28 PM

## 2017-08-24 NOTE — PROGRESS NOTES
Continued Stay Note  [facility]     Patient Name: Magdi Arriaga  MRN: [id number]  Today's Date: 8/24/2017    Admit Date: 8/23/2017          Discharge Plan       08/24/17 0839    Case Management/Social Work Plan    Plan Home with [facility]    Additional Comments Per Margie with [facility], patient is followed for skilled nursing, PT, and OT for a dx of CKD with HF. Patient will need an order to resume home health if his admission status changes to inpatient. CM will continue to follow.               Discharge Codes     None            Ludmila Cobb

## 2017-08-24 NOTE — PLAN OF CARE
Problem: Arrhythmia/Dysrhythmia (Symptomatic) (Adult)  Goal: Signs and Symptoms of Listed Potential Problems Will be Absent or Manageable (Arrhythmia/Dysrhythmia)  Outcome: Ongoing (interventions implemented as appropriate)    08/24/17 0344   Arrhythmia/Dysrhythmia (Symptomatic)   Problems Assessed (Arrhythmia/Dysrhythmia) all   Problems Present (Arrhythmia/Dysrhythmia) syncope;electrophysiological conduction defect;other (see comments)  (bradycardia)         Problem: Fall Risk (Adult)  Goal: Identify Related Risk Factors and Signs and Symptoms  Outcome: Ongoing (interventions implemented as appropriate)    08/24/17 0344   Fall Risk   Fall Risk: Related Risk Factors history of falls;other (see comments)  (dementia)   Fall Risk: Signs and Symptoms presence of risk factors       Goal: Absence of Falls  Outcome: Outcome(s) achieved Date Met:  08/24/17 08/24/17 0344   Fall Risk (Adult)   Absence of Falls achieves outcome

## 2017-08-24 NOTE — PLAN OF CARE
Problem: Patient Care Overview (Adult)  Goal: Plan of Care Review  Outcome: Ongoing (interventions implemented as appropriate)    08/24/17 0539   Coping/Psychosocial Response Interventions   Plan Of Care Reviewed With patient   Patient Care Overview   Progress no change   Outcome Evaluation   Outcome Summary/Follow up Plan Stable night, HR has been as low as 52, SB. hypertensive 170's. no syncopal episodes tonight, Lifevest on. No SOB, slept with CPAP mask per pt request.

## 2017-08-25 NOTE — DISCHARGE SUMMARY
"      HOSPITAL MEDICINE DISCHARGE SUMMARY    Date of Admission: 8/23/2017  Date of Discharge:  8/25/2017    Discharge Diagnoses:  Principal Problem:    Near syncope  Active Problems:    Dementia    Dyslipidemia    Hypertension    Normocytic anemia    COPD (chronic obstructive pulmonary disease)    Ischemic cardiomyopathy, most recent LVEF 30%    Chronic systolic congestive heart failure    CKD (chronic kidney disease), stage III    Bradycardia    Syncope, near      Presenting Problem/Hospital Course  Syncope, near [R55]     73 yo M with hx of COPD, CAD s/p CABG in April 2017 and ICM with chronic systolic CHF EF 30% with subsequent current LifeVest placement presents with episode of near syncope. He has been monitored on tele and noted to have intermittent mild bradycardia to upper 40's so his Coreg has been discontinued.  Cardiology and EP Cardiology has evaluated, review of his LifeVest showed NO evidence of dysrhythmia or bradycardia at time of presyncope.  HE has not met the \"90 day critieria\"  Since last stent placed 8/7/17 to proceed with ICD +/- PPM but will be evaluated for this as outpatient once timing appropriate.  He is to stop BB medication, continue to wear his LifeVest and f/u with Dr. Stauffer and Dr. Gonzalez as scheduled.         Discharge Day HPI:  No further presyncope.  Denies CP, SOA/CPOE, palpitations.       Consults:   Consults     Date and Time Order Name Status Description    8/23/2017 1956 Inpatient Consult to Cardiology      8/8/2017 0743 Inpatient Consult to Cardiology Completed           Pertinent Test Results:   Lab Results (last 7 days)     Procedure Component Value Units Date/Time    CBC & Differential [[id number]] Collected:  08/23/17 1332    Specimen:  Blood Updated:  08/23/17 1346    Narrative:       The following orders were created for panel order CBC & Differential.  Procedure                               Abnormality         Status                     ---------                     "           -----------         ------                     CBC Auto Differential[[id number]]        Abnormal            Final result                 Please view results for these tests on the individual orders.    CBC Auto Differential [[id number]]  (Abnormal) Collected:  08/23/17 1332    Specimen:  Blood Updated:  08/23/17 1346     WBC 9.73 10*3/mm3      RBC 4.00 (L) 10*6/mm3      Hemoglobin 11.6 (L) g/dL      Hematocrit 36.6 (L) %      MCV 91.5 fL      MCH 29.0 pg      MCHC 31.7 (L) g/dL      RDW 18.2 (H) %      RDW-SD 61.9 (H) fl      MPV 10.6 fL      Platelets 268 10*3/mm3      Neutrophil % 72.3 (H) %      Lymphocyte % 16.1 (L) %      Monocyte % 7.3 %      Eosinophil % 3.3 (H) %      Basophil % 0.8 %      Immature Grans % 0.2 %      Neutrophils, Absolute 7.03 10*3/mm3      Lymphocytes, Absolute 1.57 10*3/mm3      Monocytes, Absolute 0.71 10*3/mm3      Eosinophils, Absolute 0.32 (H) 10*3/mm3      Basophils, Absolute 0.08 10*3/mm3      Immature Grans, Absolute 0.02 10*3/mm3     POC Troponin, Rapid [[id number]]  (Normal) Collected:  08/23/17 1335    Specimen:  Blood Updated:  08/23/17 1355     Troponin I 0.02 ng/mL       Serial Number: [id number]:  [id number]       BNP [[id number]]  (Abnormal) Collected:  08/23/17 1332    Specimen:  Blood Updated:  08/23/17 1410     .0 (H) pg/mL     Light Blue Top [[id number]] Collected:  08/23/17 1332    Specimen:  Blood Updated:  08/23/17 1501     Extra Tube hold for add-on      Auto resulted       Lavender Top [[id number]] Collected:  08/23/17 1332    Specimen:  Blood Updated:  08/23/17 1501     Extra Tube hold for add-on      Auto resulted       Gold Top - SST [[id number]] Collected:  08/23/17 1332    Specimen:  Blood Updated:  08/23/17 1501     Extra Tube Hold for add-ons.      Auto resulted.       Comprehensive Metabolic Panel [[id number]]  (Abnormal) Collected:  08/23/17 1519    Specimen:  Blood Updated:  08/23/17 1551     Glucose 88 mg/dL      BUN 27 (H) mg/dL       Creatinine 1.70 (H) mg/dL      Sodium 137 mmol/L      Potassium 3.7 mmol/L      Chloride 106 mmol/L      CO2 25.0 mmol/L      Calcium 10.3 mg/dL      Total Protein 8.0 g/dL      Albumin 3.60 g/dL      ALT (SGPT) 128 (H) U/L      AST (SGOT) 30 U/L      Alkaline Phosphatase 108 (H) U/L      Total Bilirubin 0.5 mg/dL      eGFR Non African Amer 40 (L) mL/min/1.73      Globulin 4.4 gm/dL      A/G Ratio 0.8 (L) g/dL      BUN/Creatinine Ratio 15.9     Anion Gap 6.0 mmol/L     Narrative:       National Kidney Foundation Guidelines    Stage     Description        GFR  1         Normal or High     90+  2         Mild decrease      60-89  3         Moderate decrease  30-59  4         Severe decrease    15-29  5         Kidney failure     <15    Magnesium [[id number]]  (Normal) Collected:  08/23/17 1519    Specimen:  Blood Updated:  08/23/17 1551     Magnesium 2.1 mg/dL     POC Troponin, Rapid [[id number]]  (Normal) Collected:  08/23/17 1614    Specimen:  Blood Updated:  08/23/17 1630     Troponin I 0.03 ng/mL       Serial Number: [id number]:  [id number]       [facility] Draw [[id number]] Collected:  08/23/17 1332    Specimen:  Blood Updated:  08/23/17 1701    Narrative:       The following orders were created for panel order [facility].  Procedure                               Abnormality         Status                     ---------                               -----------         ------                     Light Blue Top[[id number]]                                   Final result               Green Top (Gel)[[id number]]                                  Final result               Lavender Top[[id number]]                                     Final result               Gold Top - SST[[id number]]                                   Final result               Green Top (No Gel)[[id number]]                                                            Please view results for these tests on the individual orders.    Green Top (Gel) [[id number]]  Collected:  08/23/17 1519    Specimen:  Blood Updated:  08/23/17 1701     Extra Tube Hold for add-ons.      Auto resulted.       Troponin [[id number]]  (Normal) Collected:  08/23/17 2038    Specimen:  Blood Updated:  08/23/17 2126     Troponin I 0.039 ng/mL     Narrative:       Ultra Troponin I Reference Range:    <=0.039 ng/mL: Negative  0.04-0.779 ng/mL: Indeterminate Range. Clinical correlation required.  >=0.78  ng/mL: Consistent with myocardial injury. Clinical correlation required.    CBC Auto Differential [[id number]]  (Abnormal) Collected:  08/24/17 0856    Specimen:  Blood Updated:  08/24/17 0931     WBC 7.61 10*3/mm3      RBC 4.07 (L) 10*6/mm3      Hemoglobin 11.7 (L) g/dL      Hematocrit 36.7 (L) %      MCV 90.2 fL      MCH 28.7 pg      MCHC 31.9 (L) g/dL      RDW 17.9 (H) %      RDW-SD 60.0 (H) fl      MPV 10.4 fL      Platelets 260 10*3/mm3      Neutrophil % 58.4 %      Lymphocyte % 22.9 (L) %      Monocyte % 9.9 %      Eosinophil % 7.5 (H) %      Basophil % 1.2 (H) %      Immature Grans % 0.1 %      Neutrophils, Absolute 4.45 10*3/mm3      Lymphocytes, Absolute 1.74 10*3/mm3      Monocytes, Absolute 0.75 10*3/mm3      Eosinophils, Absolute 0.57 (H) 10*3/mm3      Basophils, Absolute 0.09 10*3/mm3      Immature Grans, Absolute 0.01 10*3/mm3     Comprehensive Metabolic Panel [[id number]]  (Abnormal) Collected:  08/24/17 0856    Specimen:  Blood Updated:  08/24/17 0951     Glucose 86 mg/dL      BUN 26 (H) mg/dL      Creatinine 1.80 (H) mg/dL      Sodium 137 mmol/L      Potassium 3.5 mmol/L      Chloride 104 mmol/L      CO2 28.0 mmol/L      Calcium 10.2 mg/dL      Total Protein 8.0 g/dL      Albumin 3.50 g/dL      ALT (SGPT) 104 (H) U/L      AST (SGOT) 28 U/L      Alkaline Phosphatase 105 (H) U/L      Total Bilirubin 0.4 mg/dL      eGFR Non African Amer 37 (L) mL/min/1.73      Globulin 4.5 gm/dL      A/G Ratio 0.8 (L) g/dL      BUN/Creatinine Ratio 14.4     Anion Gap 5.0 mmol/L     Narrative:       National  Kidney Foundation Guidelines    Stage     Description        GFR  1         Normal or High     90+  2         Mild decrease      60-89  3         Moderate decrease  30-59  4         Severe decrease    15-29  5         Kidney failure     <15        Imaging Results (all)     Procedure Component Value Units Date/Time    XR Chest 1 View [[id number]] Collected:  08/23/17 1632     Updated:  08/23/17 1161    Narrative:          EXAMINATION: XR CHEST, SINGLE VIEW - 08/23/2017     INDICATION: Dyspnea.     COMPARISON: 08/06/2017 portable chest radiograph.     FINDINGS: Note is made of patient's life vest apparatus. Heart is  enlarged. There is a diffuse interstitial disease pattern, similar to  the prior study, although slightly different in distribution, presumably  mild interstitial edema. No densely consolidated lung effusion or  pneumothorax is seen.        Impression:       Diffuse interstitial disease, fairly similar to previous  exam, perhaps recurrent interstitial edema. Bronchitis might appear  similar but is considered less likely.     DICTATED:     08/23/2017  EDITED:         08/23/2017     This report was finalized on 8/23/2017 10:45 PM by DR. Dc Rowell MD.       CT Head Without Contrast [[id number]] Collected:  08/23/17 1633     Updated:  08/23/17 8082    Narrative:       EXAMINATION: CT HEAD WO CONTRAST - 08/23/2017     INDICATION: Syncope.      TECHNIQUE: 5 mm unenhanced images through the brain.     The radiation dose reduction device was turned on for each scan per the  ALARA (As Low as Reasonably Achievable) protocol.     COMPARISON: 04/19/2017 head CT scan.     FINDINGS: The calvarium appears intact. A small fluid level is seen in  the right maxillary sinus. There is trace left maxillary sinus mucosal  thickening. Paranasal sinuses and mastoids otherwise appear clear. Soft  tissue window images show expected generalized cerebral atrophy for age,  and resulting mild ventricular enlargement. There is no  "evidence of  hemorrhage, contusion, edema, mass or mass effect, infarct, or abnormal  extra-axial collection.       Impression:       Age-appropriate generalized cerebral atrophy. No evidence of  acute trauma or other acute intracranial disease.     DICTATED:     08/23/2017  EDITED:         08/23/2017     This report was finalized on 8/23/2017 11:12 PM by DR. Dc Rowell MD.               Physical Exam on Discharge:    /86 (BP Location: Left arm, Patient Position: Sitting)  Pulse 52  Temp 97.5 °F (36.4 °C) (Oral)   Resp 18  Ht 68\" (172.7 cm)  Wt 185 lb (83.9 kg)  SpO2 95%  BMI 28.13 kg/m2  Gen-no acute distress  CV-RRR, HR 60's  Resp-CTAB, no wheezes  Abd-soft, NT, ND, +BS  Ext-no edema  Neuro-A&Ox3, no focal deficits  Psych-appropriate mood      Discharge Disposition  Home or Self Care    Discharge Medications   Magdi Arriaga   Home Medication Instructions RADHA:[id number]    Printed on:08/25/17 7350   Medication Information                      albuterol (PROVENTIL HFA;VENTOLIN HFA) 108 (90 BASE) MCG/ACT inhaler  Inhale 2 puffs Every 4 (Four) Hours As Needed for Wheezing.             aspirin 81 MG chewable tablet  Chew 1 tablet Daily.             budesonide-formoterol (SYMBICORT) 160-4.5 MCG/ACT inhaler  Inhale 2 puffs 2 (Two) Times a Day.             clopidogrel (PLAVIX) 75 MG tablet  Take 1 tablet by mouth Daily.             entecavir (BARACLUDE) 0.5 MG tablet  Take 0.5 mg by mouth Daily.             ferrous sulfate 325 (65 FE) MG tablet  Take 325 mg by mouth Daily With Breakfast.             furosemide (LASIX) 40 MG tablet  Take 1 tablet by mouth Daily.             loratadine (CLARITIN) 10 MG tablet  Take 10 mg by mouth Daily.             memantine (NAMENDA) 10 MG tablet  Take 10 mg by mouth 2 (Two) Times a Day.             montelukast (SINGULAIR) 10 MG tablet  Take 1 tablet by mouth Every Night.             nitroglycerin (NITROSTAT) 0.4 MG SL tablet  Place 1 tablet under the tongue Every 5 " (Five) Minutes As Needed for Chest Pain. Take no more than 3 doses in 15 minutes.             PARoxetine (PAXIL) 30 MG tablet  Take 1 tablet by mouth Every Morning.             rivastigmine (EXELON) 9.5 MG/24HR patch  Place 1 patch on the skin Daily.             sacubitril-valsartan (ENTRESTO) 49-51 MG tablet  Take 0.5 tablets by mouth 2 (Two) Times a Day.             vitamin C (ASCORBIC ACID) 250 MG tablet  Take 1 tablet by mouth 2 (Two) Times a Day With Meals. Take with iron supplement to enhance absorption                 Discharge Diet   Diet Instructions     Diet: Regular, Cardiac; Thin       Discharge Diet:   Regular  Cardiac      Fluid Consistency:  Thin                 Activity at Discharge  Activity Instructions     Activity as Tolerated                     Follow-up Appointments  Future Appointments  Date Time Provider Department Center   8/31/2017 1:00 PM ORIENTATION -  [facility]   9/8/2017 9:30 AM RAD TECH PULMO CRITCARE [facility] None   9/8/2017 9:45 AM REI Fernández [facility] None   9/11/2017 2:00 PM Kvng Stauffer MD [facility] None   9/22/2017 9:30 AM Colton Dickens MD [facility] None           Time: 35 min

## 2017-08-25 NOTE — PROGRESS NOTES
"  [facility]   Inpatient Progress Note/H&P update       LOS: 0 days   Patient Care Team:  Colton Dickens MD as PCP - General (Internal Medicine)  Kvng Stauffer MD as Consulting Physician (Cardiology)    Chief Complaint:  Follow-up for near syncope    Subjective     Interval History:   Patient feels well. No further dizziness or pre syncope. Still with bradycardia despite BB discontinuation.  Feels good ambulating without symptoms.    Review of Systems:   Pertinent positives noted in history, exam, and assessment. Otherwise reviewed and negative.      Objective     Vitals:  Blood pressure 141/86, pulse 52, temperature 97.5 °F (36.4 °C), temperature source Oral, resp. rate 18, height 68\" (172.7 cm), weight 185 lb (83.9 kg), SpO2 95 %.     Intake/Output Summary (Last 24 hours) at 08/25/17 1349  Last data filed at 08/25/17 1230   Gross per 24 hour   Intake              540 ml   Output              400 ml   Net              140 ml     Physical Exam   Constitutional: He is oriented to person, place, and time. He appears well-developed and well-nourished. No distress.   Neck: No JVD present. No tracheal deviation present.   Cardiovascular: Normal rate, regular rhythm, normal heart sounds and intact distal pulses.  Exam reveals no friction rub.    No murmur heard.  Pulmonary/Chest: Effort normal. No respiratory distress.   Mild wheeze right base   Abdominal: Soft. Bowel sounds are normal. There is no tenderness.   Musculoskeletal: He exhibits no edema or deformity.   Neurological: He is alert and oriented to person, place, and time.     I've examined the patient and agree with the above     Results Review:     I reviewed the patient's new clinical results.      Results from last 7 days  Lab Units 08/24/17  0856   WBC 10*3/mm3 7.61   HEMOGLOBIN g/dL 11.7*   HEMATOCRIT % 36.7*   PLATELETS 10*3/mm3 260       Results from last 7 days  Lab Units 08/24/17  0856   SODIUM mmol/L 137   POTASSIUM " mmol/L 3.5   CHLORIDE mmol/L 104   CO2 mmol/L 28.0   BUN mg/dL 26*   CREATININE mg/dL 1.80*   CALCIUM mg/dL 10.2   BILIRUBIN mg/dL 0.4   ALK PHOS U/L 105*   ALT (SGPT) U/L 104*   AST (SGOT) U/L 28   GLUCOSE mg/dL 86       Results from last 7 days  Lab Units 08/24/17  0856   SODIUM mmol/L 137   POTASSIUM mmol/L 3.5   CHLORIDE mmol/L 104   CO2 mmol/L 28.0   BUN mg/dL 26*   CREATININE mg/dL 1.80*   GLUCOSE mg/dL 86   CALCIUM mg/dL 10.2           Lab Results  Lab Value Date/Time   TROPONINI 0.039 08/23/2017 2038   TROPONINI 0.173 (H) 08/07/2017 0836   TROPONINI 0.211 (H) 08/07/2017 0241   TROPONINI 0.166 (H) 08/06/2017 2048   TROPONINI 0.019 07/17/2017 1227   TROPONINI 0.044 (H) 06/12/2017 2139   TROPONINI 9.425 (C) 04/18/2017 0757   TROPONINI 7.403 (C) 04/17/2017 2340                     Tele:  SB    Assessment/Plan     Principal Problem:    Near syncope  Active Problems:    Dementia    Dyslipidemia    Hypertension    Normocytic anemia    COPD (chronic obstructive pulmonary disease)    Ischemic cardiomyopathy, most recent LVEF 30%    Chronic systolic congestive heart failure    CKD (chronic kidney disease), stage III    Bradycardia    Syncope, near      1. Near syncope  1. The patient's symptoms,did not correspond to any arrhythmias or bradycardia on the life Vest.  2. ICM  - EF 30%Echo reviewed by myself.  - on entresto.  3. CAD  - recent intervention to diagonal.  4. Bradycardia  - HR in 40-50's predominantly  - BB discontinued. Has not received any doses this admit.  5. Chronic systolic congestive heart failure   6. HTN, -154    Plan:  From cardiac standpoint can be discharged home. Keep scheduled follow-up on 9/11/17.Discharge off of beta blocker.  A fast.  Recheck 90 day echocardiogram.    Sosa Decker, REI  08/25/17  1:49 PM  Kvng VALDIVIA have reviewed the note in full and agree with all aspects of the above including physical exam, assessment, labs and plan with changes made accordingly.      Kvng Stauffer MD  08/25/17  3:02 PM          Dictated utilizing Dragon dictation

## 2017-08-25 NOTE — PLAN OF CARE
Problem: Fall Risk (Adult)  Goal: Absence of Falls  Outcome: Ongoing (interventions implemented as appropriate)    08/25/17 0010   Fall Risk (Adult)   Absence of Falls making progress toward outcome

## 2017-08-25 NOTE — PLAN OF CARE
Problem: Arrhythmia/Dysrhythmia (Symptomatic) (Adult)  Goal: Signs and Symptoms of Listed Potential Problems Will be Absent or Manageable (Arrhythmia/Dysrhythmia)  Outcome: Ongoing (interventions implemented as appropriate)    08/24/17 0344   Arrhythmia/Dysrhythmia (Symptomatic)   Problems Assessed (Arrhythmia/Dysrhythmia) all   Problems Present (Arrhythmia/Dysrhythmia) syncope;electrophysiological conduction defect;other (see comments)  (bradycardia)         Problem: Fall Risk (Adult)  Goal: Identify Related Risk Factors and Signs and Symptoms  Outcome: Ongoing (interventions implemented as appropriate)    08/24/17 0344   Fall Risk   Fall Risk: Related Risk Factors history of falls;other (see comments)  (dementia)   Fall Risk: Signs and Symptoms presence of risk factors         Problem: Patient Care Overview (Adult)  Goal: Plan of Care Review  Outcome: Ongoing (interventions implemented as appropriate)    08/24/17 0347 08/25/17 0810   Coping/Psychosocial Response Interventions   Plan Of Care Reviewed With --  patient   Patient Care Overview   Progress no change --    Outcome Evaluation   Outcome Summary/Follow up Plan Stable night, HR has been as low as 52, SB. hypertensive 170's. no syncopal episodes tonight, Lifevest on. No SOB, slept with CPAP maskper pt request. --

## 2017-08-29 NOTE — TELEPHONE ENCOUNTER
I called pt to schedule hospital f/u - no later than 9/8. He was d/c'd home 8/25. Pt states doing fine. Ok for appt. Pt transferred to  to schedule.

## 2017-08-29 NOTE — OUTREACH NOTE
[place] call completed.  Please refer to TCM call flowsheet for call documentation.  Patient will be in for 9/22/17 appointment.  Mrs. Arriaga declined for patient to be seen sooner.  TCM will not be applicable at 9/22/17 appointment.

## 2017-09-05 NOTE — TELEPHONE ENCOUNTER
----- Message from Amelia Campbell sent at 9/5/2017  3:43 PM EDT -----  Contact: [facility]  VERBAL ORDER NEEDED: Sharda with [facility] needs a verbal order to go back and address a wound that has reopened on a patient. [phone]      Verbal ok given to Sharda per TGF.

## 2017-09-08 NOTE — TELEPHONE ENCOUNTER
Sharda w/[facility] called to report a 3lb wght gain overnight (wght 183 yest and 186 today on home scale). /98. She says he's taking lasix 40mg daily.  Per TGF, take lasix 40mg now and start metolazone 2.5mg tomorrow am with lasix and 3 times weekly prn edema. RX sent to [facility]. Cont to monitor weight. Sharda verb understanding.

## 2017-09-08 NOTE — PROGRESS NOTES
[facility] ([facility] up    CHIEF COMPLAINT    Follow up     HISTORY OF PRESENT ILLNESS    Magdi Arriaga is a 72 y.o.male here today for follow-up.  I last saw him in the office March 2017.  At that time I placed him on a azithromycin every day as he has a history of chronic bronchitis, abnormal x-ray with reticulonodular markings, chronic cough, congestion, and obstructive sleep apnea.    When I last saw him there about to go to [place] on vacation, he took 2 rounds of a azithromycin and had less cough and congestion as well as less shortness of breath.  He continues to cough but most of time swallows whatever he expectorates, he's never had blood and he doesn't taste blood.    He does have a lot of nasal drainage, he thinks it pretty stable and doesn't really have any nosebleeds or sinusitis requiring antibiotics.  .  He remains on Singulair.  He remains on CPAP and is done well with this, he has no daytime hypersomnolence.  He can't sleep without the CPAP.    He does have mild dementia, pleasantly so, just with memory loss.  He has never been compliant using Symbicort even with a spacer, he never rinse his mouth out and in the past I talked about ordering nebulizers.  She felt he would never do this regularly    He is here for a follow-up x-ray.  Unfortunately since I saw him he was hospitalized twice, beginning of August for a few days and then a couple days at the end of August.    He has a history of CAD, and since I saw him actually underwent a CABG in April of this year, he also has a history of cardiomyopathy with chronic systolic congestive heart failure.  When I last saw him he is wearing a LifeVest, when he presented to the hospital was for syncope and review of the life that showed no evidence of dysrhythmia or bradycardia.  He had a stent placed in August on the seventh and proceeded with a permanent pacemaker ICD.  He denies chest pains or palpitations and feels like he is breathing much  better since the stent and heart surgery, and follows along with Dr. Sadler on [facility].  He has chronic lower extremity edema which is mild and stable.    Patient Active Problem List   Diagnosis   • Abnormal CT scan, chest   • Allergic rhinitis   • Asthma   • Bronchiectasis   • Dementia   • JONNY (obstructive sleep apnea)   • Osteoarthritis   • Vitamin D deficiency   • Left bundle branch block   • Normocytic anemia   • COPD (chronic obstructive pulmonary disease)   • Coronary artery disease involving coronary bypass graft of native heart without angina pectoris   • Ischemic cardiomyopathy, most recent LVEF 30%   • Atrial flutter   • Acute deep vein thrombosis (DVT) of right lower extremity   • Hematoma, calf   • History of exposure to infectious disease-Patient received platelet transfusion for CABG, 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   • Chronic systolic congestive heart failure   • CKD (chronic kidney disease), stage III   • Cellulitis of left lower leg   • Acute type B viral hepatitis related to platelet transfusion, followed by [facility]   Allergen Reactions   • Sulfa Antibiotics      UNKNOWN CHILDHOOD REACTION       Current Outpatient Prescriptions:   •  albuterol (PROVENTIL HFA;VENTOLIN HFA) 108 (90 BASE) MCG/ACT inhaler, Inhale 2 puffs Every 4 (Four) Hours As Needed for Wheezing., Disp: , Rfl:   •  aspirin 81 MG chewable tablet, Chew 1 tablet Daily., Disp: , Rfl:   •  budesonide-formoterol (SYMBICORT) 160-4.5 MCG/ACT inhaler, Inhale 2 puffs 2 (Two) Times a Day., Disp: 1 inhaler, Rfl: 11  •  clopidogrel (PLAVIX) 75 MG tablet, Take 1 tablet by mouth Daily., Disp: 30 tablet, Rfl: 11  •  entecavir (BARACLUDE) 0.5 MG tablet, Take 0.5 mg by mouth Daily., Disp: , Rfl:   •  ferrous sulfate 325 (65 FE) MG tablet, Take 325 mg by mouth Daily With Breakfast., Disp: , Rfl:   •  furosemide (LASIX) 40 MG tablet, Take 1 tablet by mouth Daily., Disp: 30 tablet, Rfl: 11  •  loratadine  (CLARITIN) 10 MG tablet, Take 10 mg by mouth Daily., Disp: , Rfl:   •  memantine (NAMENDA) 10 MG tablet, Take 10 mg by mouth 2 (Two) Times a Day., Disp: , Rfl:   •  montelukast (SINGULAIR) 10 MG tablet, Take 1 tablet by mouth Every Night., Disp: 30 tablet, Rfl: 11  •  nitroglycerin (NITROSTAT) 0.4 MG SL tablet, Place 1 tablet under the tongue Every 5 (Five) Minutes As Needed for Chest Pain. Take no more than 3 doses in 15 minutes., Disp: 25 tablet, Rfl: 6  •  PARoxetine (PAXIL) 30 MG tablet, Take 1 tablet by mouth Every Morning., Disp: , Rfl:   •  rivastigmine (EXELON) 9.5 MG/24HR patch, Place 1 patch on the skin Daily., Disp: , Rfl:   •  sacubitril-valsartan (ENTRESTO) 49-51 MG tablet, Take 0.5 tablets by mouth 2 (Two) Times a Day., Disp: 60 tablet, Rfl: 11  •  vitamin C (ASCORBIC ACID) 250 MG tablet, Take 1 tablet by mouth 2 (Two) Times a Day With Meals. Take with iron supplement to enhance absorption, Disp: , Rfl:   MEDICATION LIST AND ALLERGIES REVIEWED.    Social History   Substance Use Topics   • Smoking status: Former Smoker     Packs/day: 1.00     Years: 17.00     Types: Cigarettes     Start date: 1965     Quit date: 1982   • Smokeless tobacco: Never Used   • Alcohol use No      Comment: Never drinker       FAMILY AND SOCIAL HISTORY REVIEWED.    Review of Systems   Constitutional: Negative for activity change, chills, fatigue and fever.   HENT: Positive for congestion and postnasal drip. Negative for hearing loss, nosebleeds, sneezing and trouble swallowing.    Respiratory: Positive for cough and shortness of breath. Negative for apnea, chest tightness, wheezing and stridor.         Much less sob since his CABG x 5   Cardiovascular: Positive for leg swelling. Negative for chest pain and palpitations.        NO INCREASE IN MILD BLE EDEMA; NO CALF TENDERNESS    Gastrointestinal: Negative for abdominal pain, diarrhea and nausea.   Neurological: Negative for dizziness, syncope and light-headedness.   .    BP  "140/82  Pulse 66  Temp 97.8 °F (36.6 °C)  Resp 16  Ht 68\" (172.7 cm)  Wt 191 lb 12.8 oz (87 kg)  SpO2 92%  BMI 29.16 kg/m2  Physical Exam   Constitutional: He is oriented to person, place, and time. He appears well-developed. No distress.   HENT:   Head: Normocephalic.   Eyes: Pupils are equal, round, and reactive to light.   Neck: No JVD present. No thyromegaly present.   Cardiovascular: Normal rate, regular rhythm and normal heart sounds.  Exam reveals no friction rub.    Mild BLE edema; no venous cords or calf tenderness.    Pulmonary/Chest: Effort normal. No stridor. No respiratory distress. He has no wheezes. He has rales. He exhibits no tenderness.   L>R, coarse rales, no wheezing   Lymphadenopathy:     He has no cervical adenopathy.   Neurological: He is alert and oriented to person, place, and time.   Skin: He is not diaphoretic.   Psychiatric: He has a normal mood and affect. His behavior is normal. Thought content normal.       RESULTS    Chest x-ray PA and lateral obtained in the office today reveals improvement in the interstitial markings compared to his film a flash 2017 [facility],, compared to her film in the office for marked earlier this year the bases appear to have a little more prominent markings especially the left base but this could be explained by the chronic changes from his CABG he had April the chair.  Overall the upper lobes with a little bit better compared to March.  No acute effusions or consolidations.    PROBLEM LIST    Chronic bronchitis    Remote history of tobacco abuse, smoking for only 15 years before he quit over 20 years ago    Obstructive sleep apnea  --On CPAP    Diastolic and congestive heart failure  --With an ICD permanent pacemaker    Very mild dementia with memory loss  --On Namenda    Abnormal chest x-ray  --With reticulonodular markings, probably combination of fluid from his heart failure, and changes consistent with interstitial lung disease    DISCUSSION    He has " improved after his CABG, for now I will order an HRCT of the chest just to get a baseline, as it's been difficult to get him to follow instructions enough for an accurate pulmonary function study in the past.   We discussed the possibility of medicating for pulmonary fibrosis to prevent further deterioration if he does have honeycombing and fibrosis on scan.    Refilled Singulair    He'll follow-up in February after an HRCT the chest at that time with FULL PULMONARY FUNCTION TESTING    Continue the BiPAP and the oxygen at night    The patient was told to call and given extensions 132, 112, 104 if needed for an earlier visit if problems arise prior to the scheduled followup.      Of note is no longer on the antibiotic daily, if he does have a lot of congestion there were encouraged to call for an antibiotic.    Tonya Bonilla, APRN  09/08/201710:12 AM  Electronically signed     Please note that portions of this note were completed with a voice recognition program. Efforts were made to edit the dictations, but occasionally words are mistranscribed.      CC: Colton Dickens MD

## 2017-09-11 NOTE — PROGRESS NOTES
Subjective:     Encounter Date:09/11/2017      Patient ID: Magdi Arriaga is a 72 y.o. male.    Chief Complaint: Coronary Artery Disease; Atrial flutter; Hypertension; Hyperlipidemia; and Sleep apnea    PROBLEM LIST:  1. Coronary artery disease  a. April 2017 NSTEMI  b. C 4/18/17 showed occluded proximal LAD, 80% large ostial first diagonal, occluded mid Circ, 80 % large OM1, 80% mid RCA. EF 25%.  c. EF 40% by Echo  d. 4/24/17 CABG x 5 HERMAN to LAD, VG sequence OM1 and OM2. VG to DM of LAD and VG to RCA. (Dr. Berger)  e. Echo 8/24/2017: LVEF 30%  2. Atrial flutter s/p ECV May 2017, on life Vest.  3. LBBB  4. Hypertension  5. Hyperlipidemia  6. Sleep apnea  7. COPD  8. Renal insufficiency  9. Arthritis  10. Asthma  11. Hx of bilatreal DVT  12. Depression  13. Hx of hemorrhagic shock and PTX from right renal RP bleed Sep 2014.  14. Surgical Hx:  a. Appendectomy  b. Back surgery  c. CABG  d. L knee surgery  e. L TKR  f. Tonsillectomy  g. IVC filter    History of Present Illness  Magdi Arriaga returns today for a 1 month follow up with a history of coronary artery disease and other cardiac risk factors. Since last visit he has been doing well form a cardiovascular standpoint. Experiences occasional, infrequent bouts of light-headedness and dizziness. Has been tracking weight fluctuations. Denies any exertional chest pain, shortness of breath, orthopnea, PND, or palpitations. Mr. Arriaga remains active by participating in rehabilitation.    Allergies   Allergen Reactions   • Sulfa Antibiotics      UNKNOWN CHILDHOOD REACTION       Current Outpatient Prescriptions:   •  albuterol (PROVENTIL HFA;VENTOLIN HFA) 108 (90 BASE) MCG/ACT inhaler, Inhale 2 puffs Every 4 (Four) Hours As Needed for Wheezing., Disp: , Rfl:   •  aspirin 81 MG chewable tablet, Chew 1 tablet Daily., Disp: , Rfl:   •  budesonide-formoterol (SYMBICORT) 160-4.5 MCG/ACT inhaler, Inhale 2 puffs 2 (Two) Times a Day., Disp: 1 inhaler, Rfl:  11  •  clopidogrel (PLAVIX) 75 MG tablet, Take 1 tablet by mouth Daily., Disp: 30 tablet, Rfl: 11  •  entecavir (BARACLUDE) 0.5 MG tablet, Take 0.5 mg by mouth Daily., Disp: , Rfl:   •  ferrous sulfate 325 (65 FE) MG tablet, Take 325 mg by mouth Daily With Breakfast., Disp: , Rfl:   •  furosemide (LASIX) 40 MG tablet, Take 1 tablet by mouth Daily., Disp: 30 tablet, Rfl: 11  •  loratadine (CLARITIN) 10 MG tablet, Take 10 mg by mouth Daily., Disp: , Rfl:   •  memantine (NAMENDA) 10 MG tablet, Take 10 mg by mouth 2 (Two) Times a Day., Disp: , Rfl:   •  metOLazone (ZAROXOLYN) 2.5 MG tablet, Take 1 tablet by mouth 3 (Three) Times a Week. As needed for edema, Disp: 30 tablet, Rfl: 0  •  montelukast (SINGULAIR) 10 MG tablet, Take 1 tablet by mouth Every Night., Disp: 30 tablet, Rfl: 11  •  nitroglycerin (NITROSTAT) 0.4 MG SL tablet, Place 1 tablet under the tongue Every 5 (Five) Minutes As Needed for Chest Pain. Take no more than 3 doses in 15 minutes., Disp: 25 tablet, Rfl: 6  •  PARoxetine (PAXIL) 30 MG tablet, Take 1 tablet by mouth Every Morning., Disp: , Rfl:   •  rivastigmine (EXELON) 9.5 MG/24HR patch, Place 1 patch on the skin Daily., Disp: , Rfl:   •  sacubitril-valsartan (ENTRESTO) 49-51 MG tablet, Take 0.5 tablets by mouth 2 (Two) Times a Day., Disp: 60 tablet, Rfl: 11  •  vitamin C (ASCORBIC ACID) 250 MG tablet, Take 1 tablet by mouth 2 (Two) Times a Day With Meals. Take with iron supplement to enhance absorption, Disp: , Rfl:     The following portions of the patient's history were reviewed and updated as appropriate: allergies, current medications, past family history, past medical history, past social history, past surgical history and problem list.    Review of Systems   Constitution: Negative.   Cardiovascular: Negative.    Respiratory: Negative.    Hematologic/Lymphatic: Negative for bleeding problem. Does not bruise/bleed easily.   Skin: Negative for rash.   Musculoskeletal: Negative for muscle weakness  "and myalgias.   Gastrointestinal: Negative for heartburn, nausea and vomiting.   Neurological: Negative.           Objective:     Vitals:    09/11/17 1439   BP: 140/78   BP Location: Right arm   Patient Position: Sitting   Pulse: 69   Weight: 193 lb 12.8 oz (87.9 kg)   Height: 68\" (172.7 cm)       Physical Exam   Constitutional: He is oriented to person, place, and time. He appears well-developed and well-nourished.   HENT:   Mouth/Throat: Oropharynx is clear and moist.   Neck: No JVD present. Carotid bruit is not present. No thyromegaly present.   Cardiovascular: Regular rhythm, S1 normal, S2 normal, normal heart sounds and intact distal pulses.  Exam reveals no gallop, no S3 and no S4.    No murmur heard.  Pulses:       Carotid pulses are 2+ on the right side, and 2+ on the left side.       Radial pulses are 2+ on the right side, and 2+ on the left side.   Pulmonary/Chest: He has wheezes.   Abdominal: Soft. Bowel sounds are normal. He exhibits no mass. There is no tenderness.   Musculoskeletal: He exhibits no edema.   Neurological: He is alert and oriented to person, place, and time.   Skin: Skin is warm and dry. No rash noted.     Lab Review:    Procedures        Assessment:   Magdi was seen today for coronary artery disease, atrial flutter, hypertension, hyperlipidemia and sleep apnea.    Diagnoses and all orders for this visit:    Coronary artery disease involving coronary bypass graft of native heart without angina pectoris    Typical atrial flutter    Renovascular hypertension    Dyslipidemia    Impression  1. Coronary artery disease is well-controlled.  2. Hypertension is controlled.  3. Dyslipidemia is well-controlled.  4. Cardiomyopathy, currently functional class II heart failure.  Intolerant to beta blocker due to dizziness/hypotension    Plan:  1. Begin Lipitor 10 mg.  2. Do CMP in 1 month   3. Echo in 6 weeks a( 90 day for possible ICD).  4. Continue current medications.  5. Revisit in 6 weeks after " Echo, or sooner as needed.    Scribed for Kvng Stauffer MD by Claudia García. 9/11/2017  2:53 PM    I, Kvng Stauffer MD, personally performed the services described in this documentation as scribed by the above individual in my presence, and it is both accurate and complete      Please note that portions of this note may have been completed with a voice recognition program. Efforts were made to edit the dictations, but occasionally words are mistranscribed.

## 2017-09-22 PROBLEM — L03.116 CELLULITIS OF LEFT LOWER LEG: Status: RESOLVED | Noted: 2017-06-27 | Resolved: 2017-01-01

## 2017-09-22 NOTE — PATIENT INSTRUCTIONS
1.  Continue usual medicines and supplements - as listed.    2.  Follow well-balanced diet - low in salt and low in sugar.    3.  Continue cardiac rehabilitation - every week.    4.  Elevate feet when sitting in chairs - for more than 15 minutes.    5.  Continue regular cleaning and covering - of skin wounds.    6.  Speak to nurse next week - about test results.    7.  Return visit 2 months - fasting checkup.

## 2017-09-22 NOTE — PROGRESS NOTES
Subjective   Magdi Arriaga is a 72 y.o. male.     History of Present Illness     The patient was admitted to [facility] August 23-25 with acute heart failure.  He was.  She diuresed with iv medication and released to home.  His ejection fraction on echocardiogram was 30%.  Chest x-ray showed pulmonary edema.  He is breathing somewhat better but still has dyspnea on exertion.  He's been particularly bothered by lightheadedness with change of position.  He has had no falls.  He has had no exertional chest pains.    The following portions of the patient's history were reviewed and updated as appropriate: allergies, current medications, past family history, past medical history, past social history, past surgical history and problem list.    Review of Systems   Constitutional: Negative for appetite change and fatigue.   HENT: Negative for ear pain and sore throat.    Respiratory: Positive for shortness of breath. Negative for cough and wheezing.         Asthma well controlled on inhalers   Cardiovascular: Negative for chest pain and palpitations.   Gastrointestinal: Negative for abdominal pain and nausea.   Musculoskeletal: Negative for arthralgias and back pain.   Neurological: Positive for dizziness. Negative for headaches.        Dizziness in the morning especially with change of position   Psychiatric/Behavioral: Positive for dysphoric mood and sleep disturbance. The patient is nervous/anxious.         Anxiety and dysphoria responding well to Paxil       Objective   Blood pressure 110/70, pulse 64, temperature 97.7 °F (36.5 °C), temperature source Oral, resp. rate 16, weight 185 lb (83.9 kg), SpO2 95 %.    Physical Exam   Constitutional: He is oriented to person, place, and time. He appears well-developed and well-nourished. No distress.   Cardiovascular: Normal rate and regular rhythm.    Heart tones are distant   Pulmonary/Chest: Effort normal. He has no wheezes. He has no rales.    Defibrillation vest in place.  Breath sounds diminished   Abdominal: Soft. Bowel sounds are normal. He exhibits no distension and no mass. There is no tenderness.   Musculoskeletal: Normal range of motion. He exhibits no edema.   Neurological: He is alert and oriented to person, place, and time. He exhibits normal muscle tone. Coordination normal.   Psychiatric: He has a normal mood and affect. His behavior is normal. Judgment and thought content normal.   Nursing note and vitals reviewed.    Procedures  Assessment/Plan   Magdi was seen today for dizziness.    Diagnoses and all orders for this visit:    Chronic systolic congestive heart failure    Renovascular hypertension  -     Magnesium    Bronchiectasis without complication    Acute type B viral hepatitis    Dyslipidemia  -     Comprehensive Metabolic Panel  -     Lipid Panel    Wound of left leg, initial encounter  -     CBC & Differential  -     C-reactive Protein  -     CBC Auto Differential    Recurrent major depressive disorder, in full remission    Vitamin D deficiency  -     Vitamin D 25 Hydroxy    Iron deficiency  -     Iron Profile      The patient's dizziness is a complication of his low volume state and his cardiac medications.  These appear to be orthostatic with his blood pressure changing 10 points on examination today with low systolic of 110.  I've asked the patient and wife in the room today to monitor him carefully and to just slowly with standing with good support.  He should stabilize his weight at approximately 185 to minimize recurring heart failure.    The patient's acute heart failure has compensated at this time.  He should gradually resume cardiac rehabilitation to maintain strength and functional capacity.    The patient has mild renal impairment with a creatinine dropping from 1.8 -  1.5 over the last month.  The heart failure itself is likely causing poor renal perfusion.    The patient has mild chronic anemia but this is  adequate and stable with a hematocrit of 37%.    The patient is vitamin D deficient with a blood level of 26.  He had been 32 in June of this year.  He should stay on permanent replacement of a specific vitamin D tablet.    The patient has high cardiovascular risk with known coronary disease and CABG procedure 5 months ago.  He should maintain his LDL cholesterol well below 100.  His LDL cholesterol had been 88 in June  and 55 earlier in August.  Likely he has missed medications and this should be reevaluated in 2 months.    Patient Instructions   1.  Continue usual medicines and supplements - as listed.    2.  Follow well-balanced diet - low in salt and low in sugar.    3.  Continue cardiac rehabilitation - every week.    4.  Elevate feet when sitting in chairs - for more than 15 minutes.    5.  Continue regular cleaning and covering - of skin wounds.    6.  Speak to nurse next week - about test results.    7.  Return visit 2 months - fasting checkup.    8.  Kidney function slightly improved with creatinine 1.5.    9.  Blood count mildly anemic but stable for 1 month ago.    10.  Vitamin D level mildly low at 26.  Start vitamin D3 2000 units daily.    11.  LDL cholesterol mildly elevated at 106.  Reevaluate next visit for possible increase of Lipitor.    12.  Chemistry panel, iron, and magnesium are acceptable.    Electronically signed Colton Dickens M.D.9/24/2017 1:57 PM

## 2017-09-26 NOTE — TELEPHONE ENCOUNTER
Called labs to pt .Left VM w spouse. Per TGF:  -Kidney function slightly improved with creatinine 1.5.  -Blood count mildly anemic but stable for 1 month ago.  -Vit D level mildly low at 26. Start vit D3 2000u daily.  -LDL mildly elevated at 106.  Reevaluate next visit for possible increase of Lipitor.  -Chemistry panel, iron, and mg ok  Spouse verb understanding.

## 2017-10-21 PROBLEM — S06.5XAA SDH (SUBDURAL HEMATOMA) (HCC): Status: ACTIVE | Noted: 2017-01-01

## 2017-10-21 PROBLEM — S22.41XA CLOSED FRACTURE OF MULTIPLE RIBS OF RIGHT SIDE: Status: ACTIVE | Noted: 2017-01-01

## 2017-10-22 PROBLEM — S06.5X0A TRAUMATIC SUBDURAL HEMATOMA WITHOUT LOSS OF CONSCIOUSNESS (HCC): Status: ACTIVE | Noted: 2017-01-01

## 2017-10-25 PROBLEM — Z86.718 HISTORY OF DVT (DEEP VEIN THROMBOSIS): Status: ACTIVE | Noted: 2017-05-12

## 2017-10-25 PROBLEM — S06.5XAA SUBDURAL HEMATOMA (HCC): Status: ACTIVE | Noted: 2017-01-01

## 2017-10-25 PROBLEM — Z86.79 HISTORY OF ATRIAL FLUTTER: Status: ACTIVE | Noted: 2017-05-11

## 2017-10-25 NOTE — TELEPHONE ENCOUNTER
----- Message from Zeeshan Foss MD sent at 10/25/2017  2:19 PM EDT -----  Contact: JUSTICE (WIFE)  I'm sorry, but my schedule would not allow me to see him in the hospital. However, it is also unlikely that I would be of help in the hospital. However, if neurology is needed, then his doctors in the hospital can consult one of my colleagues in neurology at the hospital. Sorry.  ----- Message -----     From: Patricia Danielle Snellen, CMA     Sent: 10/25/2017  12:49 PM       To: Zeeshan Foss MD        ----- Message -----     From: Sveta Hauser     Sent: 10/25/2017  12:33 PM       To: [facility] [place]    AJ    Justice called about her , Magdi.  He has an appt today at 2, but he is in the hospital in ICU.  He had a bad fall and has a hematoma.  The nurse wanted her to call and see if Dr. Foss would see him in the hospital.  I told her he was seeing pt's in the office today, but I would definitely relay the message.      You may call her back at [phone].

## 2017-11-01 NOTE — TELEPHONE ENCOUNTER
Msg left on Netta (wife) phone requesting her to call the office to schedule OX within 1 week of d/c'd home from [facility].

## 2017-11-05 PROBLEM — D72.829 LEUKOCYTOSIS: Status: ACTIVE | Noted: 2017-01-01

## 2017-11-05 PROBLEM — I95.9 HYPOTENSION: Status: RESOLVED | Noted: 2017-01-01 | Resolved: 2017-01-01

## 2017-11-05 PROBLEM — R55 NEAR SYNCOPE: Status: RESOLVED | Noted: 2017-01-01 | Resolved: 2017-01-01

## 2017-11-05 PROBLEM — G93.6 CEREBRAL EDEMA (HCC): Status: ACTIVE | Noted: 2017-01-01

## 2017-11-05 PROBLEM — R53.1 WEAKNESS OF LEFT SIDE OF BODY: Status: ACTIVE | Noted: 2017-01-01

## 2017-11-05 NOTE — PLAN OF CARE
Problem: Patient Care Overview (Adult)  Goal: Plan of Care Review  Outcome: Ongoing (interventions implemented as appropriate)    11/05/17 8287   Coping/Psychosocial Response Interventions   Plan Of Care Reviewed With patient;spouse   Patient Care Overview   Progress no change   Outcome Evaluation   Outcome Summary/Follow up Plan Patient uses assist of two people, gait belt and wheelchair for transfers. Continues with left side weakness.       Goal: Adult Individualization and Mutuality  Outcome: Ongoing (interventions implemented as appropriate)    Problem: Fall Risk (Adult)  Goal: Absence of Falls  Outcome: Ongoing (interventions implemented as appropriate)    Problem: Skin Integrity Impairment, Risk/Actual (Adult)  Goal: Identify Related Risk Factors and Signs and Symptoms  Outcome: Ongoing (interventions implemented as appropriate)  Goal: Skin Integrity/Wound Healing  Outcome: Ongoing (interventions implemented as appropriate)

## 2017-11-05 NOTE — ED PROVIDER NOTES
Subjective   HPI Comments: Mr. Magdi Arriaga is a 72 year old male who presents to the ED with c/o worsening weakness and confusion. The patient sustained a head injury with associated left sided weakness after a fall which occurred on 10/21/17. He was admitted to the hospital for approximately one week and then discharged to [facility] for further rehabilitation for his left sided weakness. Over the course of the last two days the left sided weakness has worsened. Additionally, he has become progressively more confused and less conversant. His wife notes he had difficulty washing his hands at a sink today due to confusion which is highly unusual for the patient. The patient also complains of dizziness, recent falls, and a headache. His wife denies any speech changes, fevers, chills, nausea, vomiting, changes in the patient's baseline cough, or any other complaints at this time.       Patient is a 72 y.o. male presenting with weakness.   History provided by:  Patient and spouse  Weakness - Generalized   Severity:  Moderate  Onset quality:  Sudden  Duration:  2 days  Timing:  Constant  Progression:  Worsening  Chronicity:  New  Relieved by:  None tried  Worsened by:  Nothing  Ineffective treatments:  None tried  Associated symptoms: difficulty walking, dizziness, falls and headaches    Associated symptoms: no aphasia, no cough (nothing new from baseline ), no fever, no nausea and no vomiting    Risk factors: congestive heart failure    Risk factors: no diabetes        Review of Systems   Constitutional: Negative for chills and fever.   Respiratory: Negative for cough (nothing new from baseline ).    Gastrointestinal: Negative for nausea and vomiting.   Musculoskeletal: Positive for falls.   Neurological: Positive for dizziness, weakness (left sided) and headaches. Negative for speech difficulty.   Psychiatric/Behavioral: Positive for confusion.   All other systems reviewed and are negative.      Past Medical  History:   Diagnosis Date   • Abnormal finding on lung imaging      CT scan of the chest to be wary 2013 reports interstitial lung disease with marked diffuse interstitial and peripheral scarring and significant bronchiectasis in both upper   and lower lung zones.    • Allergic rhinitis     History of allergy injections as a child.   • Arthritis    • Asthma      Patient has had a history of asthma since childhood.   • CHF (congestive heart failure) 2017    ECHO - EF 25%   • COPD (chronic obstructive pulmonary disease)    • Coronary artery disease 2017    CABG X 5   • DVT (deep venous thrombosis)     Bilateral   • Exposure to hepatitis B 2017    Transfusion - managed by ID   • Falls     A.  History of falling traumatic injuries in April 2014 and May 2014 resulting and left rib  fracture and left clavicle fracture.   • Fracture of rib    • History of chest x-ray 02/19/2016    Extensive, but stable postinflammatory pulmonary changes. There has been no change since the previous examination of 12/03/2015   • History of chest x-ray 12/03/2015    Slight increased markings bilaterally, may be progression of his bronchiectasis   • History of chest x-ray 09/06/2014    There has been no change since 09/05/2014   • History of echocardiogram 02/19/2016    Mild concentric LVH is observed.Estimated EF is 50-55%.E to A reversal in the mitral valve flow pattern suggestive of diastolic dysfunction.RV is mildly dilated.Moderate aortic cusp sclerosis is present.Mitral annular calcification.Mild MR.Evidence of borderline pulmonary hypertension.   • History of PFTs 12/03/2015    Values are slightly worse.TLC is c/w mild restriction.Values reduced from 02/16/13.Diffusion capacity is slightly worse   • History of PFTs 02/06/2013    Mild decrease in FVC, may be due to less maximal effort.TLC is within normal levels. Mild reduction in diffusion in absolute value   • History of transfusion    • Hypertension    • Lumbar spinal stenosis 2013     Spinal surgery   • Patellar tendon rupture     A.  Status post primary repair of the left periprosthetic patellar tendon tear 6/11/2014, post fall at the end of April 2014.   • Pneumonia      A.  Left lower lobe pneumonia treated at [facility], 5/8/2014 through 5/20/2014.   • Pneumothorax      A.  Status post chest tube placed 5/13/2014 and discontinued 5/15/2014 with stable chest x-ray.   • Sleep apnea with use of continuous positive airway pressure (CPAP)    • Traumatic hemorrhagic shock      A.  Secondary to right renal retroperitoneal hemorrhage, 9/2014.       Allergies   Allergen Reactions   • Sulfa Antibiotics      UNKNOWN CHILDHOOD REACTION       Past Surgical History:   Procedure Laterality Date   • APPENDECTOMY  1959   • CARDIAC CATHETERIZATION N/A 4/18/2017    Procedure: Left Heart Cath;  Surgeon: Kvng Stauffer MD;  Location:  [facility];  Service:    • CARDIAC CATHETERIZATION N/A 8/9/2017    Procedure: Right and Left Heart Cath;  Surgeon: Kvng Stauffer MD;  Location:  [facility];  Service:    • COLONOSCOPY W/ POLYPECTOMY     • CORONARY ARTERY BYPASS GRAFT N/A 4/24/2017    Procedure: MEDIAN STERNOTOMY, CORONARY ARTERY BYPASS GRAFT X5 UTILIZING THE INTERNAL MAMMARY ARTERY, ENDOVASCULAR VEING HARVEST UTILIZING RIGHT SAPHENOUS VEIN,TRANSESOPHAGEAL ECHO;  Surgeon: Kanu Berger MD;  Location:  [facility] OR;  Service:    • FRACTURE SURGERY     • KNEE SURGERY  2006, 2014    primary repair -left periprosthetic patellar tendon tear 6/11/2014   • LUMBAR LAMINECTOMY  2013    With discectomy   • REPLACEMENT TOTAL KNEE Bilateral 2009, 2013    Left - 2009 and right - 2013.   • SKIN BIOPSY     • TONSILLECTOMY  1957   • VENA CAVA FILTER PLACEMENT  2014       Family History   Problem Relation Age of Onset   • Adopted: Yes   • No Known Problems Mother      Adopted as child   • No Known Problems Father      No knowledge of birth family       Social History     Social History   • Marital  status:      Spouse name: N/A   • Number of children: 2   • Years of education: N/A     Occupational History   • Post Office at       Retired     Social History Main Topics   • Smoking status: Former Smoker     Packs/day: 1.00     Years: 17.00     Types: Cigarettes     Start date: 1965     Quit date: 1982   • Smokeless tobacco: Never Used   • Alcohol use No      Comment: Never drinker   • Drug use: No   • Sexual activity: Not Asked     Other Topics Concern   • None     Social History Narrative    Domestic life :  Lives in private home with wife        Sabianism :   Voodoo        Sleep hygiene :  In bed 11 PM to 8 AM for 8 hours of sleep        Caffeine use :  4 cup of coffee and 1 diet cola daily        Exercise habits :   Cardiac rehabilitation -  Spring 2017 on -since heart surgery and heart failure        Diet :  Low calorie diet - low in salt and low in sugar        Occupation :   Retired        Hearing :        Vision :        Driving :  No driving             Objective   Physical Exam   Constitutional: He is oriented to person, place, and time. He appears well-developed and well-nourished. No distress.   HENT:   Head: Normocephalic and atraumatic.   Nose: Nose normal.   Eyes: Conjunctivae are normal. No scleral icterus.   Neck: Normal range of motion. Neck supple.   Cardiovascular: Normal rate, regular rhythm and normal heart sounds.    No murmur heard.  Pulmonary/Chest: Effort normal and breath sounds normal. No respiratory distress.   Abdominal: Soft. Bowel sounds are normal. There is no tenderness.   Musculoskeletal: Normal range of motion. He exhibits no edema.   Neurological: He is alert and oriented to person, place, and time.   Significant left leg weakness and moderate left arm weakness with pronator drift. Decreased strength on the left during  strength testing. No facial droop. Normal speech with no slurring.   Skin: Skin is warm and dry.   Psychiatric: He has a normal mood and affect.  His behavior is normal.   Nursing note and vitals reviewed.      Procedures         ED Course  ED Course     CT head is interpreted as edema in the R cerebral hemisphere with midline shift.  I compared to prior CT studies.  Labs reviewed.  Consulted Dr Frank who recommends MRI and will see pt in consult today, requests hospitalist admit.  CXR negative.  UA benign.  Patient stable on serial rechecks.  Discussed exam findings, test results so far and concerns in detail at the bedside.  Discussed need for admission for further evaluation and treatment.    In the event this turned out to be CVA he is obviously not an intervention candidate due to active SDH.                  MDM  Number of Diagnoses or Management Options  Altered mental status, unspecified altered mental status type:   Cerebral edema:   Left-sided weakness:      Amount and/or Complexity of Data Reviewed  Clinical lab tests: reviewed and ordered  Tests in the radiology section of CPT®: reviewed and ordered  Independent visualization of images, tracings, or specimens: yes        Final diagnoses:   Cerebral edema   Left-sided weakness   Altered mental status, unspecified altered mental status type       Documentation assistance provided by christine Pichardo.  Information recorded by the scribe was done at my direction and has been verified and validated by me.     Alison Pichardo  11/05/17 2431       Dudley Marrufo MD  11/05/17 0447

## 2017-11-05 NOTE — CONSULTS
NEUROSURGERY CONSULTATION    Referring Provider: Dr. Marrufo  Patient Care Team:  Colton Dickens MD as PCP - General (Internal Medicine)  Kvng Stauffer MD as Consulting Physician (Cardiology)    Chief Complaint: Headache, left-sided weakness, and confusion.    History of Present Illness: Mr. Arriaga is a 72-year-old right-handed gentleman who is known to our service.  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  On 10/21/17 he suffered a ground-level fall and was noted to have an interhemispheric acute subdural hematoma.  He was managed nonoperatively and went to [facility].  While in the hospital the last time he did manifest some left leg weakness.  Over the last week or so he's developed increased confusion.  Over the last 3-4 days he developed increasing weakness on the left side.  His leg is weaker and he has also developed some arm weakness.  He complains of some bifrontal headache.  His appetite has been okay.  He denies any nausea or vomiting.  Today he was readmitted to the hospital through the emergency room given these increased difficulties.      Review of Systems:  Musculoskeletal and Neurological systems were reviewed and are negative except for:  Musculoskeletal: positive for joint pain (L hip)  Neurological: positive for difficulty walking, confusion, headaches and weakness    History:  Past Medical History:   Diagnosis Date   • Abnormal finding on lung imaging      CT scan of the chest to be wary 2013 reports interstitial lung disease with marked diffuse interstitial and peripheral scarring and significant bronchiectasis in both upper   and lower lung zones.    • Allergic rhinitis     History of allergy injections as a child.   • Arthritis    • Asthma      Patient has had a history of asthma since childhood.   • CHF (congestive heart failure) 2017    ECHO - EF 25%   • COPD (chronic obstructive pulmonary disease)    • Coronary artery disease 2017    CABG X 5   • DVT (deep  venous thrombosis)     Bilateral   • Exposure to hepatitis B 2017    Transfusion - managed by ID   • Falls     A.  History of falling traumatic injuries in April 2014 and May 2014 resulting and left rib  fracture and left clavicle fracture.   • Fracture of rib    • History of chest x-ray 02/19/2016    Extensive, but stable postinflammatory pulmonary changes. There has been no change since the previous examination of 12/03/2015   • History of chest x-ray 12/03/2015    Slight increased markings bilaterally, may be progression of his bronchiectasis   • History of chest x-ray 09/06/2014    There has been no change since 09/05/2014   • History of echocardiogram 02/19/2016    Mild concentric LVH is observed.Estimated EF is 50-55%.E to A reversal in the mitral valve flow pattern suggestive of diastolic dysfunction.RV is mildly dilated.Moderate aortic cusp sclerosis is present.Mitral annular calcification.Mild MR.Evidence of borderline pulmonary hypertension.   • History of PFTs 12/03/2015    Values are slightly worse.TLC is c/w mild restriction.Values reduced from 02/16/13.Diffusion capacity is slightly worse   • History of PFTs 02/06/2013    Mild decrease in FVC, may be due to less maximal effort.TLC is within normal levels. Mild reduction in diffusion in absolute value   • History of transfusion    • Hypertension    • Lumbar spinal stenosis 2013    Spinal surgery   • Patellar tendon rupture     A.  Status post primary repair of the left periprosthetic patellar tendon tear 6/11/2014, post fall at the end of April 2014.   • Pneumonia      A.  Left lower lobe pneumonia treated at [facility], 5/8/2014 through 5/20/2014.   • Pneumothorax      A.  Status post chest tube placed 5/13/2014 and discontinued 5/15/2014 with stable chest x-ray.   • Sleep apnea with use of continuous positive airway pressure (CPAP)    • Traumatic hemorrhagic shock      A.  Secondary to right renal retroperitoneal hemorrhage, 9/2014.   ,   Past Surgical  History:   Procedure Laterality Date   • APPENDECTOMY  1959   • CARDIAC CATHETERIZATION N/A 4/18/2017    Procedure: Left Heart Cath;  Surgeon: Kvng Stauffer MD;  Location:  [facility];  Service:    • CARDIAC CATHETERIZATION N/A 8/9/2017    Procedure: Right and Left Heart Cath;  Surgeon: Kvng Stauffer MD;  Location:  [facility];  Service:    • COLONOSCOPY W/ POLYPECTOMY     • CORONARY ANGIOPLASTY WITH STENT PLACEMENT     • CORONARY ARTERY BYPASS GRAFT N/A 4/24/2017    Procedure: MEDIAN STERNOTOMY, CORONARY ARTERY BYPASS GRAFT X5 UTILIZING THE INTERNAL MAMMARY ARTERY, ENDOVASCULAR VEING HARVEST UTILIZING RIGHT SAPHENOUS VEIN,TRANSESOPHAGEAL ECHO;  Surgeon: Kanu Berger MD;  Location:  [facility] OR;  Service:    • FRACTURE SURGERY     • KNEE SURGERY  2006, 2014    primary repair -left periprosthetic patellar tendon tear 6/11/2014   • LUMBAR LAMINECTOMY  2013    With discectomy   • REPLACEMENT TOTAL KNEE Bilateral 2009, 2013    Left - 2009 and right - 2013.   • SKIN BIOPSY     • TONSILLECTOMY  1957   • VENA CAVA FILTER PLACEMENT  2014   ,   Family History   Problem Relation Age of Onset   • Adopted: Yes   • No Known Problems Mother      Adopted as child   • No Known Problems Father      No knowledge of birth family   ,   Social History   Substance Use Topics   • Smoking status: Former Smoker     Packs/day: 1.00     Years: 17.00     Types: Cigarettes     Start date: 1965     Quit date: 1982   • Smokeless tobacco: Never Used   • Alcohol use No      Comment: Never drinker   ,   Prescriptions Prior to Admission   Medication Sig Dispense Refill Last Dose   • albuterol (PROVENTIL HFA;VENTOLIN HFA) 108 (90 BASE) MCG/ACT inhaler Inhale 2 puffs Every 4 (Four) Hours As Needed for Wheezing.   Taking   • aspirin 81 MG chewable tablet Chew 1 tablet Daily.   11/5/2017   • atorvastatin (LIPITOR) 10 MG tablet Take 1 tablet by mouth Daily. 90 tablet 4 11/4/2017   • budesonide-formoterol  (SYMBICORT) 160-4.5 MCG/ACT inhaler Inhale 2 puffs 2 (Two) Times a Day. 1 inhaler 11 Taking   • Cholecalciferol (VITAMIN D3) 2000 units tablet Take 1 tablet by mouth Daily.   11/5/2017   • clopidogrel (PLAVIX) 75 MG tablet Take 1 tablet by mouth Daily. 30 tablet 11 11/5/2017   • dronabinol (MARINOL) 2.5 MG capsule Take 2.5 mg by mouth 2 (Two) Times a Day Before Meals.   11/5/2017   • entecavir (BARACLUDE) 0.5 MG tablet Take 0.5 mg by mouth Daily.   11/5/2017   • famotidine (PEPCID) 20 MG tablet Take 20 mg by mouth Daily.   11/5/2017   • ferrous sulfate 325 (65 FE) MG tablet Take 325 mg by mouth Daily With Breakfast.   11/5/2017   • furosemide (LASIX) 40 MG tablet Take 1 tablet by mouth Daily. 30 tablet 11 Taking   • loratadine (CLARITIN) 10 MG tablet Take 1 tablet by mouth Daily. 30 tablet 1 11/5/2017   • memantine (NAMENDA) 10 MG tablet Take 10 mg by mouth 2 (Two) Times a Day.   11/5/2017   • metOLazone (ZAROXOLYN) 2.5 MG tablet Take 1 tablet by mouth 3 (Three) Times a Week. As needed for edema (Patient taking differently: Take 2.5 mg by mouth As Needed (Edema).) 30 tablet 0 Taking   • metOLazone (ZAROXOLYN) 2.5 MG tablet Take 2.5 mg by mouth As Needed (Excess weight gain).      • montelukast (SINGULAIR) 10 MG tablet Take 1 tablet by mouth Every Night. 30 tablet 11 11/4/2017   • nitroglycerin (NITROSTAT) 0.4 MG SL tablet Place 1 tablet under the tongue Every 5 (Five) Minutes As Needed for Chest Pain. Take no more than 3 doses in 15 minutes. 25 tablet 6 Taking   • PARoxetine (PAXIL) 30 MG tablet Take 1 tablet by mouth Every Morning.   11/5/2017   • rivastigmine (EXELON) 9.5 MG/24HR patch Place 1 patch on the skin Daily.   11/5/2017   • sacubitril-valsartan (ENTRESTO) 49-51 MG tablet Take 0.5 tablets by mouth 2 (Two) Times a Day. 60 tablet 11 11/5/2017   • vitamin C (ASCORBIC ACID) 250 MG tablet Take 1 tablet by mouth 2 (Two) Times a Day With Meals. Take with iron supplement to enhance absorption   11/5/2017    and  "Allergies:  Sulfa antibiotics      Physical Exam:  Vital Signs: Blood pressure 168/88, pulse 60, temperature 97.6 °F (36.4 °C), temperature source Oral, resp. rate 18, height 68\" (172.7 cm), weight 200 lb (90.7 kg), SpO2 98 %.  MUSCULOSKELETAL:  Neck tenderness to palpation is not observed.   ROM in neck is normal.  NEUROLOGICAL:  The patient is awake and reasonably alert.  He follows simple commands.  He is oriented to \"October\" 2017.  Strength is antigravity in the left arm.  He is unable to significantly move the left leg.  Right sided motor function is largely intact.  Muscle tone is normal throughout.  Station and gait were not tested given that the patient is bedbound.  Sensation is intact to light touch testing throughout.  Deep tendon reflexes are difficult to elicit throughout..  Kemar's Sign is negative bilaterally.   CRANIAL NERVES:  Cranial Nerve II: Visual fields are full to confrontation.  Cranial Nerve III, IV, and VI: Right pupil is small and reactive.  The left pupil is larger and irregular and probably postsurgical.  Extraocular movements are intact.  Nystagmus is not present.  Cranial Nerve V: Facial sensation is intact to light touch.  Cranial Nerve VII: Muscles of facial expression demonstate no weakness or asymmetry.  Cranial Nerve VIII: Hearing is intact to finger rub bilaterally.  Cranial Nerve IX and X: Palate elevates symmetrically.  Cranial Nerve XI: Shoulder shrug is intact bilaterally.  Cranial Nerve XII: Tongue is midline without evidence of atrophy or fasciculation.    Data Review:  CT scan from today demonstrates the known interhemispheric subdural hematoma which is now subacute.  There is some degree of right convexity subdural hematoma that is subacute on as well.  There is slightly more right to left shift and was noted on the prior CT.  MRI confirms the subacute subdural hematoma over the right convexity.  This is larger than previous.    Diagnosis:  1.  Right convexity and " interhemispheric subdural hematoma, subacute.  2.  Multiple cardiac issues including coronary artery disease, ischemic cardiomyopathy, and atrial flutter.  3.  Chronic renal insufficiency.  4.  COPD.  5.  Hypertension.  6.  Dementia.    Treatment Recommendations:  I've discontinued the patient's Plavix.  He may remain on his baby aspirin.  I'm also going to start Decadron.  If we do not see any improvement in his condition over the next couple of days then we will likely need to pursue right craniotomy to evacuate the convexity subdural hematoma.  I have discussed my findings and recommendations at length with the patient and his wife.      True Frank MD  11/05/17  6:31 PM

## 2017-11-05 NOTE — H&P
[facility] Medicine Services  HISTORY AND PHYSICAL    Patient Name: Magdi Arriaga  : 1945  MRN: [id number]  Primary Care Physician: Colton Dickens MD    Subjective   Subjective     Chief Complaint:  Worsening left sided weakness and confusion    HPI:  Magdi Arriaga is a 72 y.o. male who presents to the ED with c/o worsening weakness and confusion. The patient sustained a head injury with associated left sided weakness after a fall which occurred on 10/21/17. He was admitted to the hospital for approximately one week and then discharged to [facility] for further rehabilitation for his left sided weakness. Over the course of the last two days the left sided weakness has worsened. Additionally, he has become progressively more confused and less conversant. His wife notes he had difficulty washing his hands and shaving at a sink today due to confusion which is highly unusual for the patient. The patient also complains of dizziness, recent falls, and a headache. His wife denies any speech changes, fevers, chills, nausea, vomiting, changes in the patient's baseline cough, or any other complaints at this time. Patient will be admitted to the hospital medicine service for further evaluation and treatment.     Review of Systems   Constitutional: Negative for activity change, appetite change, chills, diaphoresis, fatigue, fever and unexpected weight change.   HENT: Negative for congestion, dental problem, drooling, ear discharge, ear pain, facial swelling, hearing loss, mouth sores, nosebleeds, postnasal drip, rhinorrhea, sinus pain, sinus pressure, sneezing, sore throat, tinnitus and trouble swallowing.    Eyes: Negative.    Respiratory: Negative.    Cardiovascular: Negative.    Gastrointestinal: Negative.    Endocrine: Negative.    Genitourinary: Negative.    Musculoskeletal: Negative.    Skin: Negative.    Allergic/Immunologic: Negative.    Neurological: Positive for  dizziness, weakness (worsening left sided weakness) and headaches. Negative for tremors, seizures, syncope, speech difficulty, light-headedness and numbness.   Hematological: Negative.    Psychiatric/Behavioral: Positive for confusion. Negative for agitation, behavioral problems, decreased concentration, dysphoric mood, hallucinations, self-injury, sleep disturbance and suicidal ideas. The patient is not nervous/anxious and is not hyperactive.       Otherwise 10-system ROS reviewed and is negative except as mentioned in the HPI.    Personal History     Past Medical History:   Diagnosis Date   • Abnormal finding on lung imaging      CT scan of the chest to be wary 2013 reports interstitial lung disease with marked diffuse interstitial and peripheral scarring and significant bronchiectasis in both upper   and lower lung zones.    • Allergic rhinitis     History of allergy injections as a child.   • Arthritis    • Asthma      Patient has had a history of asthma since childhood.   • CHF (congestive heart failure) 2017    ECHO - EF 25%   • COPD (chronic obstructive pulmonary disease)    • Coronary artery disease 2017    CABG X 5   • DVT (deep venous thrombosis)     Bilateral   • Exposure to hepatitis B 2017    Transfusion - managed by ID   • Falls     A.  History of falling traumatic injuries in April 2014 and May 2014 resulting and left rib  fracture and left clavicle fracture.   • Fracture of rib    • History of chest x-ray 02/19/2016    Extensive, but stable postinflammatory pulmonary changes. There has been no change since the previous examination of 12/03/2015   • History of chest x-ray 12/03/2015    Slight increased markings bilaterally, may be progression of his bronchiectasis   • History of chest x-ray 09/06/2014    There has been no change since 09/05/2014   • History of echocardiogram 02/19/2016    Mild concentric LVH is observed.Estimated EF is 50-55%.E to A reversal in the mitral valve flow pattern suggestive  of diastolic dysfunction.RV is mildly dilated.Moderate aortic cusp sclerosis is present.Mitral annular calcification.Mild MR.Evidence of borderline pulmonary hypertension.   • History of PFTs 12/03/2015    Values are slightly worse.TLC is c/w mild restriction.Values reduced from 02/16/13.Diffusion capacity is slightly worse   • History of PFTs 02/06/2013    Mild decrease in FVC, may be due to less maximal effort.TLC is within normal levels. Mild reduction in diffusion in absolute value   • History of transfusion    • Hypertension    • Lumbar spinal stenosis 2013    Spinal surgery   • Patellar tendon rupture     A.  Status post primary repair of the left periprosthetic patellar tendon tear 6/11/2014, post fall at the end of April 2014.   • Pneumonia      A.  Left lower lobe pneumonia treated at [facility], 5/8/2014 through 5/20/2014.   • Pneumothorax      A.  Status post chest tube placed 5/13/2014 and discontinued 5/15/2014 with stable chest x-ray.   • Sleep apnea with use of continuous positive airway pressure (CPAP)    • Traumatic hemorrhagic shock      A.  Secondary to right renal retroperitoneal hemorrhage, 9/2014.       Past Surgical History:   Procedure Laterality Date   • APPENDECTOMY  1959   • CARDIAC CATHETERIZATION N/A 4/18/2017    Procedure: Left Heart Cath;  Surgeon: Kvng Stauffer MD;  Location:  [facility] CATH INVASIVE LOCATION;  Service:    • CARDIAC CATHETERIZATION N/A 8/9/2017    Procedure: Right and Left Heart Cath;  Surgeon: Kvng Stauffer MD;  Location:  [facility] CATH INVASIVE LOCATION;  Service:    • COLONOSCOPY W/ POLYPECTOMY     • CORONARY ARTERY BYPASS GRAFT N/A 4/24/2017    Procedure: MEDIAN STERNOTOMY, CORONARY ARTERY BYPASS GRAFT X5 UTILIZING THE INTERNAL MAMMARY ARTERY, ENDOVASCULAR VEING HARVEST UTILIZING RIGHT SAPHENOUS VEIN,TRANSESOPHAGEAL ECHO;  Surgeon: Kanu Berger MD;  Location:  [facility] OR;  Service:    • FRACTURE SURGERY     • KNEE SURGERY  2006, 2014    primary repair -left  periprosthetic patellar tendon tear 6/11/2014   • LUMBAR LAMINECTOMY  2013    With discectomy   • REPLACEMENT TOTAL KNEE Bilateral 2009, 2013    Left - 2009 and right - 2013.   • SKIN BIOPSY     • TONSILLECTOMY  1957   • VENA CAVA FILTER PLACEMENT  2014       Family History: family history includes No Known Problems in his father and mother. He was adopted.     Social History:  reports that he quit smoking about 35 years ago. His smoking use included Cigarettes. He started smoking about 52 years ago. He has a 17.00 pack-year smoking history. He has never used smokeless tobacco. He reports that he does not drink alcohol or use illicit drugs.    Medications:    (Not in a hospital admission)    Allergies   Allergen Reactions   • Sulfa Antibiotics      UNKNOWN CHILDHOOD REACTION       Objective   Objective     Vital Signs:   Temp:  [97.6 °F (36.4 °C)] 97.6 °F (36.4 °C)  Heart Rate:  [66] 66  Resp:  [20] 20  BP: (156-161)/() 157/106        Physical Exam   Constitutional: He appears well-developed and well-nourished. No distress.   HENT:   Head: Normocephalic.   Eyes: Pupils are equal, round, and reactive to light.   Bruising to right eye lid   Neck: Normal range of motion. Neck supple.   Cardiovascular: Normal rate, regular rhythm, normal heart sounds and intact distal pulses.  Exam reveals no gallop and no friction rub.    No murmur heard.  Pulmonary/Chest: Effort normal and breath sounds normal. No respiratory distress. He has no wheezes. He has no rales. He exhibits no tenderness.   Abdominal: Soft. Bowel sounds are normal. He exhibits no distension. There is no tenderness. There is no rebound and no guarding.   Musculoskeletal: Normal range of motion. He exhibits no edema, tenderness or deformity.   Neurological: He is alert. He has normal strength. He exhibits abnormal muscle tone.   Disoriented to time, left-sided motor deficits.   Skin: Skin is warm and dry. Abrasion noted. No rash noted. He is not  diaphoretic. No erythema. No pallor.        Psychiatric: He has a normal mood and affect. His speech is delayed. Cognition and memory are impaired.      Results Reviewed:  I have personally reviewed current lab, radiology, and data and agree.      Results from last 7 days  Lab Units 11/05/17  1229   WBC 10*3/mm3 12.23*   HEMOGLOBIN g/dL 12.4*   HEMATOCRIT % 38.2*   PLATELETS 10*3/mm3 332       Results from last 7 days  Lab Units 11/05/17  1229   SODIUM mmol/L 138   POTASSIUM mmol/L 4.5   CHLORIDE mmol/L 108   CO2 mmol/L 29.0   BUN mg/dL 27*   CREATININE mg/dL 1.30   GLUCOSE mg/dL 102*   CALCIUM mg/dL 10.4   ALT (SGPT) U/L 13   AST (SGOT) U/L 10     No results found for: BNP  No results found for: PHART  Imaging Results (last 24 hours)     Procedure Component Value Units Date/Time    XR Chest 1 View [[id number]] Collected:  11/05/17 1255     Updated:  11/05/17 1454    Narrative:          EXAMINATION: XR CHEST 1 VW - 11/05/2017      INDICATION: Weakness, dizziness, altered mental status.     COMPARISON: Chest x-ray dated 09/08/2017.     FINDINGS: Cardiomediastinal contour enlarged and unchanged. Multiple  electronic devices overlying the chest obscure the field-of-view without  focal airspace opacity or overt edema identified. No discrete  pneumothorax or large pleural effusion.           Impression:       No acute cardiopulmonary process is identified within  limited field-of-view, given multiple electronic devices overlying the  chest.     DICTATED:     11/05/2017  EDITED:         11/05/2017                   CT Head Without Contrast [[id number]] Collected:  11/05/17 1339     Updated:  11/05/17 1500    Narrative:       EXAMINATION: CT HEAD WO CONTRAST - 11/05/2017     INDICATION: Weakness.      TECHNIQUE: Axial CT of the head without intravenous contrast.     The radiation dose reduction device was turned on for each scan per the  ALARA (As Low as Reasonably Achievable) protocol.     COMPARISON: CT head 10/24/2017.      FINDINGS: Hyperattenuation in right parafalcine location near the vertex  consistent with subdural hematoma. This is decreased attenuation in  prominence from prior comparison 10/24/2017 consistent with evolution of  subacute subdural hematoma. There is additionally noted extra-axial  fluid adjacent to the right cerebral hemisphere convexity consistent  with evolving subdural hematoma of likely subacute origin. No new  intracranial hemorrhage however there is progressive edema of the right  cerebral hemisphere with increased mass effect on the right lateral  ventricle and new progressed midline shift at approximately  7 mm  leftward at the level of septum pellucidum, not seen on prior  examination. Foramen magnum appears widely patent. Globes and orbits  unremarkable. Visualized paranasal sinuses and mastoid air cells  demonstrate circumferential thickening of the right maxillary sinus with  air-fluid levels in the bilateral maxillary sinuses.       Impression:       Evolving subacute right subdural hematoma formations with  progressive edema of the right cerebral hemisphere creating increased  mass effect and leftward midline shift at the level of septum pellucidum  when compared to 10/24/2017. No new intracranial hemorrhage.     DICTATED:     11/05/2017  EDITED:         11/05/2017                    Results for orders placed during the hospital encounter of 08/23/17   Adult Transthoracic Echo Limited With Contrast    Narrative · Left ventricular systolic function is moderately decreased. Estimated EF   = 30%.  · An apical left ventricular aneurysm is present.          Assessment/Plan   Assessment / Plan     Hospital Problem List     Traumatic SDH    Weakness of left side of body    Hypertension    Dementia    Dyslipidemia    JONNY (obstructive sleep apnea)    Overview Signed 1/6/2017  8:57 AM by Davis Long     A.  Obstructive sleep apnea with central component.B.  On home CPAP therapy.         COPD (chronic  obstructive pulmonary disease)    Coronary artery disease involving coronary bypass graft of native heart without angina pectoris    Overview Signed 10/25/2017 11:32 AM by MORALES Anaya     1. [facility] 8-8-17:   1.  Severe proximal LAD/first diagonal stenosis (ungrafted) history of a 2.5 x 23 Xience EES   2.  Occluded LAD with patent vein graft to the diagonal and LIMA to the LAD   3.  Severe left circumflex stenosis with widely patent vein graft to the OM1 and OM 2   4.  Occluded RCA with patent saphenous vein graft   5.  Disease and a small ramus branch (too small for PCI)   6.  Normal right heart pressures and post capillary wedge pressure.         Ischemic cardiomyopathy, most recent LVEF 30%    Overview Addendum 10/25/2017 11:33 AM by MORALES Anaya     1. Echo 8-7-17:   · Left ventricular systolic function is moderately decreased. Estimated EF = 30%.  · The cardiac valves are anatomically and functionally normal.    2. Echo 8-24-17:  · Left ventricular systolic function is moderately decreased. Estimated EF = 30%.  · An apical left ventricular aneurysm is present.         History of atrial flutter    History of DVT (deep vein thrombosis)    CKD (chronic kidney disease), stage III            Assessment & Plan:    1.  Edema right cerebral hemisphere with subacute right SDH  *Here 10/21-12/27 with traumatic SDH s/p fall, no surgical intervention   -Consult Dr. Sena   -NPO   -MRI today   -neuro checks   -fall precautions   -will defer initiation of steroids to Dr. Sena    2.  CAD s/p stents, currently on asa and plavix    3.  Hypertension   -continue home meds   -vs q4 hours    4.  Right knee abrasion  *sutures placed one week ago in ED from fall   -remove sutures    5.  Leukocytosis   -follow-up on ua   -cbc in the am and monitor    6.  History of CHF  *has not had his Lasix in over a week   -resume lasix   -strict I&O's   -daily weights    7.   COPD   -Duonebs prn    8.  JONNY   -CPAP qhs    9.   Dementia    10.  CKD, stable   -bmp in the am and monitor    11.  Ischemic cardiomyopathy  12.  History of atrial flutter, now NSR    13. History of DVT          DVT prophylaxis:  TEDS/SCUDS, hold anticoagulation until neurosurgical evaluation    CODE STATUS:  Prior        Swathi TONY REI Valdivia   11/05/17   3:04 PM      Brief Attending Admission Attestation     I have seen and examined the patient, performing an independent face-to-face diagnostic evaluation with plan of care reviewed and developed with the advanced practice clinician (APC) REI Martinez.      Brief Summary Statement/HPI:   Magdi Arriaga is a pleasant 72 y.o. male with PMH recent small traumatic SDH s/p fall on 10/21/2017.  He was here from 10/21/2017 through 10/27/2017 in the neurosurgical ICU where he was monitored before transfer to [facility].  Due to the small nature of the SDH and recent VICKEY to the LAD in August of this year, he was maintained on plavix therapy.  Today he was sent back to the [facility] ED for a 2 day history worsening of his residual left sided weakness and a worsening of his confusion.  He also has PMH significant for CAD s/p CABG in 4/2017, SHF with EF 30% wearing lifevest, atrial flutter, chronic bronchitis, CKD III, dementia, HTN, JONNY with cpap at home, hep B infection s/p platelet transfusion (followed by Dr. Ng).      Dr. Frank has been notified of abnormal CT scan head and has recommended MRI brain now, NPO status.  He will see the patient today.    Attending Physical Exam:  Constitutional: No acute distress, awake, alert  Eyes: PERRLA, sclerae anicteric, no conjunctival injection, left antonio-orbital bruise  HENT: NCAT, mucous membranes moist  Neck: Supple, no thyromegaly, no lymphadenopathy, trachea midline  Respiratory: Clear to auscultation bilaterally, nonlabored respirations   Cardiovascular: RRR, palpable pedal pulses bilaterally  Gastrointestinal: Positive bowel sounds, soft, nontender,  nondistended  Musculoskeletal: No bilateral ankle edema, no clubbing or cyanosis to extremities  Psychiatric: Appropriate affect, cooperative  Neurologic: Oriented x 2 (does not know day/year), strength 2+ LLE, Cranial Nerves grossly intact to confrontation, speech clear  Skin: laceration left knee, intact with prolene sutures.    Brief Assessment/Plan :  See above for further detailed assessment and plan developed with APC which I have reviewed and/or edited.    Left sided weakness and confusion in the setting of recent SDH now with potential edema and shift on CT:  --Stat MRI brain  --NPO  --Dr. Frank aware and will assess patient today  --If MRI worrisome for further bleeding, or if there are other operative findings, we will need to consider ASA and plavix therapy in the setting of recent VICKEY - risk/benefit  --No pharmacologic DVT prophy    Leukocytosis:  --CXR not impressive for changes  --UA pending  --Recheck in AM    Systolic HF:  --Has not been on lasix or PRN zaoxolyn since transfer to [facility]  --REstart lasix at 20 mg daily and monitor strict I/O and daily weights.  Can titrate upward if needed to original dose of 40mg    Knee laceration:  --Have nursing d/c sutures.        I believe this patient meets inpatient status - neuro changes in the setting of recent SDH and CT worrisome - Advanced imaging pending, specialty consultation pending, requires Q4 hour neuro checks.    Linda Garcia MD  11/05/17  3:36 PM

## 2017-11-06 NOTE — PLAN OF CARE
Problem: Fall Risk (Adult)  Goal: Absence of Falls  Outcome: Ongoing (interventions implemented as appropriate)    11/06/17 0529   Fall Risk (Adult)   Absence of Falls making progress toward outcome         Problem: Skin Integrity Impairment, Risk/Actual (Adult)  Goal: Skin Integrity/Wound Healing  Outcome: Ongoing (interventions implemented as appropriate)    11/06/17 0529   Skin Integrity Impairment, Risk/Actual (Adult)   Skin Integrity/Wound Healing making progress toward outcome

## 2017-11-06 NOTE — PLAN OF CARE
Problem: Patient Care Overview (Adult)  Goal: Plan of Care Review  Outcome: Ongoing (interventions implemented as appropriate)    11/06/17 0529   Coping/Psychosocial Response Interventions   Plan Of Care Reviewed With patient;spouse   Patient Care Overview   Progress no change   Outcome Evaluation   Outcome Summary/Follow up Plan Patient with no acute distress overnight. Decadron administered over this evening in IV push per MD order. Stitches in Right knee removed and loose dressing applied.         Problem: Fall Risk (Adult)  Goal: Identify Related Risk Factors and Signs and Symptoms  Outcome: Outcome(s) achieved Date Met:  11/06/17 11/06/17 0529   Fall Risk   Fall Risk: Related Risk Factors age-related changes;gait/mobility problems;history of falls;environment unfamiliar   Fall Risk: Signs and Symptoms presence of risk factors       Goal: Absence of Falls  Outcome: Ongoing (interventions implemented as appropriate)    11/06/17 0529   Fall Risk (Adult)   Absence of Falls making progress toward outcome         Problem: Skin Integrity Impairment, Risk/Actual (Adult)  Goal: Identify Related Risk Factors and Signs and Symptoms  Outcome: Outcome(s) achieved Date Met:  11/06/17 11/06/17 0529   Skin Integrity Impairment, Risk/Actual   Skin Integrity Impairment, Risk/Actual: Related Risk Factors age extremes;immobility       Goal: Skin Integrity/Wound Healing  Outcome: Ongoing (interventions implemented as appropriate)    11/06/17 0529   Skin Integrity Impairment, Risk/Actual (Adult)   Skin Integrity/Wound Healing making progress toward outcome

## 2017-11-06 NOTE — PROGRESS NOTES
Discharge Planning Assessment  [facility]     Patient Name: Magdi Arriaga  MRN: [id number]  Today's Date: 11/6/2017    Admit Date: 11/5/2017          Discharge Needs Assessment       11/06/17 1154    Living Environment    Lives With spouse    Living Arrangements house    Transportation Available car;family or friend will provide    Living Environment    Provides Primary Care For no one    Quality Of Family Relationships supportive    Able to Return to Prior Living Arrangements yes    Discharge Needs Assessment    Equipment Currently Used at Home bipap/ cpap;walker, rolling;cane, straight;commode;raised toilet;shower chair            Discharge Plan       11/06/17 1155    Case Management/Social Work Plan    Plan Home    Patient/Family In Agreement With Plan yes    Additional Comments Spoke with patient at bedside. He lives with his wife in a two story house, w/ a stair chair lift, in [facility] he was independent with ADL's and uses RW to assist w/ mobility. He has Bipap through [facility]. Family available to transport at [place]. Goal is to return home. CM will follow.         Discharge Placement     No information found        Expected Discharge Date and Time     Expected Discharge Date Expected Discharge Time    Nov 10, 2017               Demographic Summary       11/06/17 1153    Referral Information    Arrived From home or self-care    Reason For Consult discharge planning            Functional Status       11/06/17 1153    Functional Status Prior    Ambulation 1-->assistive equipment    Transferring 1-->assistive equipment    Toileting 0-->independent    Bathing 0-->independent    Dressing 0-->independent    Eating 0-->independent    Communication 0-->understands/communicates without difficulty    Swallowing 0-->swallows foods/liquids without difficulty    Activity Tolerance    Usual Activity Tolerance good    Current Activity Tolerance moderate            Psychosocial     None            Abuse/Neglect      None            Legal     None            Substance Abuse     None            Patient Forms     None          Marisabel Che RN

## 2017-11-06 NOTE — PROGRESS NOTES
[facility] Medicine Services  PROGRESS NOTE    Patient Name: Magdi Arriaga  : 1945  MRN: [id number]    Date of Admission: 2017  Length of Stay: 1  Primary Care Physician: Colton Dickens MD    Subjective   Subjective     CC:  Sent from [facility] with left side weakness after recent SDH    HPI:  Feels good.  No headache.  No complaints.   L leg paresis has been ongoing since fall 10/21- he was not standing at [facility].     Review of Systems  Gen- No fevers, chills  CV- No chest pain, palpitations  Resp- No cough, dyspnea  GI- No N/V/D, abd pain  Otherwise ROS is negative except as mentioned in the HPI.    Objective   Objective     Vital Signs:   Temp:  [97.8 °F (36.6 °C)-98.2 °F (36.8 °C)] 98.1 °F (36.7 °C)  Heart Rate:  [57-69] 66  Resp:  [16-20] 16  BP: (146-178)/() 146/91        Physical Exam:  Constitutional: No acute distress, awake, alert, conversant  Eyes: PERRLA, sclerae anicteric, no conjunctival injection  HENT: NCAT, mucous membranes moist  Neck: Supple, no thyromegaly, no lymphadenopathy, trachea midline  Respiratory: Clear to auscultation bilaterally, nonlabored respirations   Cardiovascular: RRR, no murmurs, rubs, or gallops, palpable pedal pulses bilaterally  Gastrointestinal: Positive bowel sounds, soft, nontender, nondistended  Musculoskeletal: No bilateral ankle edema, no clubbing or cyanosis to extremities  Psychiatric: Appropriate affect, cooperative  Neurologic: Oriented x 3, left UE with dysmetria and incoordiation; lle strength is 0/5.  Cranial Nerves grossly intact to confrontation, speech clear  Skin: No rashes      Results Reviewed:  I have personally reviewed current lab, radiology, and data and agree.      Results from last 7 days  Lab Units 17  1229   WBC 10*3/mm3 11.25* 12.23*   HEMOGLOBIN g/dL 12.2* 12.4*   HEMATOCRIT % 37.6* 38.2*   PLATELETS 10*3/mm3 368 332       Results from last 7 days  Lab Units 1719  11/05/17  1229   SODIUM mmol/L 136 138   POTASSIUM mmol/L 5.2 4.5   CHLORIDE mmol/L 105 108   CO2 mmol/L 23.0 29.0   BUN mg/dL 30* 27*   CREATININE mg/dL 1.30 1.30   GLUCOSE mg/dL 128* 102*   CALCIUM mg/dL 10.0 10.4   ALT (SGPT) U/L  --  13   AST (SGOT) U/L  --  10     No results found for: BNP  No results found for: PHART    Microbiology Results Abnormal     Procedure Component Value - Date/Time    Urine Culture - Urine, Urine, Clean Catch [[id number]]  (Normal) Collected:  11/05/17 1437    Lab Status:  Preliminary result Specimen:  Urine from Urine, Clean Catch Updated:  11/06/17 0752     Urine Culture No growth          Imaging Results (last 24 hours)     Procedure Component Value Units Date/Time    CT Head Without Contrast [[id number]] Collected:  11/05/17 1339     Updated:  11/05/17 1500    Narrative:       EXAMINATION: CT HEAD WO CONTRAST - 11/05/2017     INDICATION: Weakness.      TECHNIQUE: Axial CT of the head without intravenous contrast.     The radiation dose reduction device was turned on for each scan per the  ALARA (As Low as Reasonably Achievable) protocol.     COMPARISON: CT head 10/24/2017.     FINDINGS: Hyperattenuation in right parafalcine location near the vertex  consistent with subdural hematoma. This is decreased attenuation in  prominence from prior comparison 10/24/2017 consistent with evolution of  subacute subdural hematoma. There is additionally noted extra-axial  fluid adjacent to the right cerebral hemisphere convexity consistent  with evolving subdural hematoma of likely subacute origin. No new  intracranial hemorrhage however there is progressive edema of the right  cerebral hemisphere with increased mass effect on the right lateral  ventricle and new progressed midline shift at approximately  7 mm  leftward at the level of septum pellucidum, not seen on prior  examination. Foramen magnum appears widely patent. Globes and orbits  unremarkable. Visualized paranasal sinuses and mastoid  air cells  demonstrate circumferential thickening of the right maxillary sinus with  air-fluid levels in the bilateral maxillary sinuses.       Impression:       Evolving subacute right subdural hematoma formations with  progressive edema of the right cerebral hemisphere creating increased  mass effect and leftward midline shift at the level of septum pellucidum  when compared to 10/24/2017. No new intracranial hemorrhage.     DICTATED:     11/05/2017  EDITED:         11/05/2017                MRI Brain Without Contrast [[id number]] Collected:  11/05/17 1706     Updated:  11/05/17 9362    Narrative:       EXAMINATION: MRI BRAIN WO CONTRAST - 11/05/2017     INDICATION:  G93.6-Cerebral edema; R53.1-Weakness; R41.82-Altered mental  status, unspecified.     TECHNIQUE: Multiplanar MRI of the brain without intravenous contrast  administration.     COMPARISON: CT head performed earlier same day.     FINDINGS: No restriction on diffusion-weighted sequences. Subacute  subdural hematoma formations in the right parafalcine and right cerebral  convexity extra-axial locations with cerebral edema of the right  cerebral hemisphere producing mass effect on the right lateral ventricle  as well as leftward midline shift similar to prior comparison. No acute  intracranial hemorrhage is identified. Third ventricle and fourth  ventricle are patent. Foramen magnum and cervicomedullary junction  widely patent. Pituitary and sella within normal limits. Globes and  orbits unremarkable. Visualized paranasal sinuses demonstrate  circumferential mucosal thickening of the right maxillary sinus with  moderate air-fluid level and small air-fluid level left maxillary sinus.          Impression:        Right parasagittal and cerebral convexity extra-axial fluid collection  consistent with subacute subdural hematomas producing mass effect on the  adjacent right cerebral hemisphere which is mildly edematous with mass  effect in the right lateral  ventricle and midline shift similar to  prior. No evidence for acute ischemia.     Circumferential mucosal thickening and air-fluid levels within the  maxillary sinuses, right greater than left.     DICTATED:     11/05/2017  EDITED:         11/05/2017                    Results for orders placed during the hospital encounter of 08/23/17   Adult Transthoracic Echo Limited With Contrast    Narrative · Left ventricular systolic function is moderately decreased. Estimated EF   = 30%.  · An apical left ventricular aneurysm is present.          I have reviewed the medications.    Assessment/Plan   Assessment / Plan     Hospital Problem List     Dementia    Dyslipidemia    Hypertension    JONNY (obstructive sleep apnea)    Overview Signed 1/6/2017  8:57 AM by Davis BERMAN  Obstructive sleep apnea with central component.B.  On home CPAP therapy.         COPD (chronic obstructive pulmonary disease)    Coronary artery disease involving coronary bypass graft of native heart without angina pectoris    Overview Signed 10/25/2017 11:32 AM by MORALES Anaya     1. [facility] 8-8-17:   1.  Severe proximal LAD/first diagonal stenosis (ungrafted) history of a 2.5 x 23 Xience EES   2.  Occluded LAD with patent vein graft to the diagonal and LIMA to the LAD   3.  Severe left circumflex stenosis with widely patent vein graft to the OM1 and OM 2   4.  Occluded RCA with patent saphenous vein graft   5.  Disease and a small ramus branch (too small for PCI)   6.  Normal right heart pressures and post capillary wedge pressure.         Ischemic cardiomyopathy, most recent LVEF 30%    Overview Addendum 10/25/2017 11:33 AM by MORALES Anaya     1. Echo 8-7-17:   · Left ventricular systolic function is moderately decreased. Estimated EF = 30%.  · The cardiac valves are anatomically and functionally normal.    2. Echo 8-24-17:  · Left ventricular systolic function is moderately decreased. Estimated EF = 30%.  · An apical left  ventricular aneurysm is present.         History of atrial flutter    History of DVT (deep vein thrombosis)    CKD (chronic kidney disease), stage III    Traumatic SDH    Weakness of left side of body    Cerebral edema    Leukocytosis             Brief Hospital Course to date:  Magdi Arriaga is a 72 y.o. male who fell 10/21, spent a week in hospital, was dc;d to [facility], then got progressively more weak/confujsed.  Found to have SDH.        SDH, traumatic, right 10/21  -- now clinically worsening  -- mild mass effect by MRI  -- NSurg watching; consider crani weds      Systolic HF:  --Has not been on lasix or PRN zaoxolyn since transfer to [facility]  --REstart lasix at 20 mg daily and monitor strict I/O and daily weights.  Can titrate upward if needed to original dose of 40mg    CAD with CABG, also VICKEY to LAD in 8/17  -- hodlign plavix in setting of sdh.  -- EF 30 percent, lifevest    Aflutter  ckd - stable 1.3  JONNY cpap  Copd  Hist of vte     Knee laceration:  -- dressed, sutures out       DVT Prophylaxis:      CODE STATUS: Full Code    Disposition: I expect the patient to be discharged ?? in ?? days.    Makenna Franco MD  11/06/17  2:58 PM

## 2017-11-06 NOTE — PROGRESS NOTES
"NEUROSURGERY PROGRESS NOTE     LOS: 1 day   Patient Care Team:  Colton Dickens MD as PCP - General (Internal Medicine)  Kvng Stauffer MD as Consulting Physician (Cardiology)    Chief Complaint: Confusion and increased left-sided weakness.    Interval History:   Patient Complaints: Mild bifrontal headache.  Patient Denies: Nausea or vomiting.    Review of Systems:   Musculoskeletal and Neurological systems were reviewed and are negative except for:    Neurological: positive for difficulty walking, confusion, headaches and weakness    Vital Signs: Blood pressure 157/85, pulse 65, temperature 98.2 °F (36.8 °C), temperature source Oral, resp. rate 18, height 68\" (172.7 cm), weight 175 lb 11.2 oz (79.7 kg), SpO2 95 %.  Intake/Output: No intake or output data in the 24 hours ending 11/06/17 0634    Physical Exam:  The patient awakens and follow simple commands.  His speech is sparse but fluent.  Left arm strength remains antigravity.  He is unable to move his left leg.     Assessment/Plan:  1.  Right convexity and interhemispheric subdural hematoma, subacute.  Continue Decadron and observed.  If patient is not improving then I will recommend right craniotomy to evacuate his subdural hematoma.  At this juncture I would plan on doing that Wednesday morning if he is not making progress.  2.  Multiple cardiac issues including coronary artery disease, ischemic cardiomyopathy, and atrial flutter.  He continues on baby aspirin.  I have held his Plavix in light of potential craniotomy.  3.  Chronic renal insufficiency.  4.  COPD.  5.  Hypertension.  6.  Dementia.      True Frank MD  11/06/17  6:34 AM      "

## 2017-11-07 NOTE — PROGRESS NOTES
[facility] Medicine Services  PROGRESS NOTE    Patient Name: Magdi Arriaga  : 1945  MRN: [id number]    Date of Admission: 2017  Length of Stay: 2  Primary Care Physician: Colton Dickens MD    Subjective   Subjective     CC:  Sent from [facility] with left side weakness after recent SDH    HPI:  Still with left sided weakness, no headache, no complaints    Review of Systems  Gen- No fevers, chills  CV- No chest pain, palpitations  Resp- No cough, dyspnea  GI- No N/V/D, abd pain  Otherwise ROS is negative except as mentioned in the HPI.    Objective   Objective     Vital Signs:   Temp:  [98.1 °F (36.7 °C)-98.3 °F (36.8 °C)] 98.3 °F (36.8 °C)  Heart Rate:  [63-76] 68  Resp:  [16] 16  BP: (131-174)/(83-94) 174/94        Physical Exam:  Gen-no acute distress, laying in bed  CV-RRR, S1 S2 normal, no m/r/g  Resp-CTAB, no wheezes  Abd-soft, NT, ND, +BS  Ext-no edema, LUE 2/5, LLE 1/5  Neuro-A&Ox3,  Psych-appropriate mood        Results Reviewed:  I have personally reviewed current lab, radiology, and data and agree.      Results from last 7 days  Lab Units 17  04317  1229   WBC 10*3/mm3 13.22* 11.25* 12.23*   HEMOGLOBIN g/dL 11.9* 12.2* 12.4*   HEMATOCRIT % 35.9* 37.6* 38.2*   PLATELETS 10*3/mm3 378 368 332       Results from last 7 days  Lab Units 17  0436 17  0517  1229   SODIUM mmol/L 133 136 138   POTASSIUM mmol/L 5.0 5.2 4.5   CHLORIDE mmol/L 104 105 108   CO2 mmol/L 28.0 23.0 29.0   BUN mg/dL 46* 30* 27*   CREATININE mg/dL 1.50* 1.30 1.30   GLUCOSE mg/dL 133* 128* 102*   CALCIUM mg/dL 9.7 10.0 10.4   ALT (SGPT) U/L  --   --  13   AST (SGOT) U/L  --   --  10     No results found for: BNP  No results found for: PHART    Microbiology Results Abnormal     Procedure Component Value - Date/Time    Urine Culture - Urine, Urine, Clean Catch [[id number]]  (Normal) Collected:  17 1447    Lab Status:  Final result Specimen:  Urine  from Urine, Clean Catch Updated:  11/07/17 0827     Urine Culture No growth at 2 days          Imaging Results (last 24 hours)     Procedure Component Value Units Date/Time    XR Chest 1 View [[id number]] Collected:  11/05/17 1255     Updated:  11/07/17 0826    Narrative:          EXAMINATION: XR CHEST 1 VW - 11/05/2017      INDICATION: Weakness, dizziness, altered mental status.     COMPARISON: Chest x-ray dated 09/08/2017.     FINDINGS: Cardiomediastinal contour enlarged and unchanged. Multiple  electronic devices overlying the chest obscure the field-of-view without  focal airspace opacity or overt edema identified. No discrete  pneumothorax or large pleural effusion.           Impression:       No acute cardiopulmonary process is identified within  limited field-of-view, given multiple electronic devices overlying the  chest.     DICTATED:     11/05/2017  EDITED:         11/05/2017           This report was finalized on 11/7/2017 8:24 AM by Dr. Kenny Hong.       CT Head Without Contrast [[id number]] Collected:  11/05/17 1339     Updated:  11/07/17 0920    Narrative:       EXAMINATION: CT HEAD WO CONTRAST - 11/05/2017     INDICATION: Weakness.      TECHNIQUE: Axial CT of the head without intravenous contrast.     The radiation dose reduction device was turned on for each scan per the  ALARA (As Low as Reasonably Achievable) protocol.     COMPARISON: CT head 10/24/2017.     FINDINGS: Hyperattenuation in right parafalcine location near the vertex  consistent with subdural hematoma. This is decreased attenuation in  prominence from prior comparison 10/24/2017 consistent with evolution of  subacute subdural hematoma. There is additionally noted extra-axial  fluid adjacent to the right cerebral hemisphere convexity consistent  with evolving subdural hematoma of likely subacute origin. No new  intracranial hemorrhage however there is progressive edema of the right  cerebral hemisphere with increased mass effect on the  right lateral  ventricle and new progressed midline shift at approximately  7 mm  leftward at the level of septum pellucidum, not seen on prior  examination. Foramen magnum appears widely patent. Globes and orbits  unremarkable. Visualized paranasal sinuses and mastoid air cells  demonstrate circumferential thickening of the right maxillary sinus with  air-fluid levels in the bilateral maxillary sinuses.       Impression:       Evolving subacute right subdural hematoma formations with  progressive edema of the right cerebral hemisphere creating increased  mass effect and leftward midline shift at the level of septum pellucidum  when compared to 10/24/2017. No new intracranial hemorrhage.     DICTATED:     11/05/2017  EDITED:         11/05/2017        This report was finalized on 11/7/2017 9:18 AM by Dr. Kenny Hong.       MRI Brain Without Contrast [[id number]] Collected:  11/05/17 1706     Updated:  11/07/17 0921    Narrative:       EXAMINATION: MRI BRAIN WO CONTRAST - 11/05/2017     INDICATION:  G93.6-Cerebral edema; R53.1-Weakness; R41.82-Altered mental  status, unspecified.     TECHNIQUE: Multiplanar MRI of the brain without intravenous contrast  administration.     COMPARISON: CT head performed earlier same day.     FINDINGS: No restriction on diffusion-weighted sequences. Subacute  subdural hematoma formations in the right parafalcine and right cerebral  convexity extra-axial locations with cerebral edema of the right  cerebral hemisphere producing mass effect on the right lateral ventricle  as well as leftward midline shift similar to prior comparison. No acute  intracranial hemorrhage is identified. Third ventricle and fourth  ventricle are patent. Foramen magnum and cervicomedullary junction  widely patent. Pituitary and sella within normal limits. Globes and  orbits unremarkable. Visualized paranasal sinuses demonstrate  circumferential mucosal thickening of the right maxillary sinus with  moderate  air-fluid level and small air-fluid level left maxillary sinus.          Impression:        Right parasagittal and cerebral convexity extra-axial fluid collection  consistent with subacute subdural hematomas producing mass effect on the  adjacent right cerebral hemisphere which is mildly edematous with mass  effect in the right lateral ventricle and midline shift similar to  prior. No evidence for acute ischemia.     Circumferential mucosal thickening and air-fluid levels within the  maxillary sinuses, right greater than left.     DICTATED:     11/05/2017  EDITED:         11/05/2017        This report was finalized on 11/7/2017 9:19 AM by Dr. Kenny Hong.           Results for orders placed during the hospital encounter of 08/23/17   Adult Transthoracic Echo Limited With Contrast    Narrative · Left ventricular systolic function is moderately decreased. Estimated EF   = 30%.  · An apical left ventricular aneurysm is present.          I have reviewed the medications.    Assessment/Plan   Assessment / Plan     Hospital Problem List     Dementia    Dyslipidemia    Hypertension    JONNY (obstructive sleep apnea)    Overview Signed 1/6/2017  8:57 AM by Davis HIDALGO.  Obstructive sleep apnea with central component.B.  On home CPAP therapy.         COPD (chronic obstructive pulmonary disease)    Coronary artery disease involving coronary bypass graft of native heart without angina pectoris    Overview Signed 10/25/2017 11:32 AM by MORALES Anaya     1. [facility] 8-8-17:   1.  Severe proximal LAD/first diagonal stenosis (ungrafted) history of a 2.5 x 23 Xience EES   2.  Occluded LAD with patent vein graft to the diagonal and LIMA to the LAD   3.  Severe left circumflex stenosis with widely patent vein graft to the OM1 and OM 2   4.  Occluded RCA with patent saphenous vein graft   5.  Disease and a small ramus branch (too small for PCI)   6.  Normal right heart pressures and post capillary wedge pressure.          Ischemic cardiomyopathy, most recent LVEF 30%    Overview Addendum 10/25/2017 11:33 AM by MORALES Anaya     1. Echo 8-7-17:   · Left ventricular systolic function is moderately decreased. Estimated EF = 30%.  · The cardiac valves are anatomically and functionally normal.    2. Echo 8-24-17:  · Left ventricular systolic function is moderately decreased. Estimated EF = 30%.  · An apical left ventricular aneurysm is present.         History of atrial flutter    History of DVT (deep vein thrombosis)    CKD (chronic kidney disease), stage III    Traumatic SDH    Weakness of left side of body    Cerebral edema    Leukocytosis             Brief Hospital Course to date:  Magdi Arriaga is a 72 y.o. male who fell 10/21, spent a week in hospital, was dc;d to [facility], then got progressively more weak/confused.  Found to have SDH.        SDH, traumatic, right 10/21  -- now clinically worsening, mild mass effect by MRI, on dexamethasone  -- plan for craniotomy tomorrow with Dr. Palafox     Systolic HF:  --Continue Lasix 20mg daily, takes 40mg at home, no signs of acute exacerbations  --continue entresto    CAD with CABG, also VICKEY to LAD in 8/17  -- holding plavix in setting of SDH  -- EF 30 percent, lifevest    Aflutter  ckd - stable 1.3  JONNY cpap  Copd  Hist of vte     Knee laceration:  -- dressed, sutures out       DVT Prophylaxis:  Mechanical due to SDH    CODE STATUS: Full Code    Disposition: I expect the patient to be discharged ?? in ?? days.    Vangie Reyna, APRN  11/07/17  3:13 PM

## 2017-11-07 NOTE — PLAN OF CARE
Problem: Patient Care Overview (Adult)  Goal: Plan of Care Review  Outcome: Ongoing (interventions implemented as appropriate)    11/07/17 0904   Coping/Psychosocial Response Interventions   Plan Of Care Reviewed With patient   Outcome Evaluation   Outcome Summary/Follow up Plan PT evaluation completed. Pt demonstrated no active movement of LLE, max A x 2 for bed mobility, mod A sitting balance EOB.         Problem: Inpatient Physical Therapy  Goal: Bed Mobility Goal LTG- PT  Outcome: Ongoing (interventions implemented as appropriate)    11/07/17 0904   Bed Mobility PT LTG   Bed Mobility PT LTG, Time to Achieve 1 wk   Bed Mobility PT LTG, Activity Type all bed mobility   Bed Mobility PT LTG, Dickey Level minimum assist (75% patient effort)   Bed Mobility PT LTG, Outcome goal ongoing       Goal: Transfer Training Goal 1 LTG- PT  Outcome: Ongoing (interventions implemented as appropriate)    11/07/17 0904   Transfer Training PT LTG   Transfer Training PT LTG, Time to Achieve 1 wk   Transfer Training PT LTG, Activity Type all transfers   Transfer Training PT LTG, Dickey Level moderate assist (50% patient effort)   Transfer Training PT LTG, Assist Device walker, rolling   Transfer Training PT LTG, Outcome goal ongoing       Goal: Gait Training Goal LTG- PT  Outcome: Ongoing (interventions implemented as appropriate)    11/07/17 0904   Gait Training PT LTG   Gait Training Goal PT LTG, Time to Achieve 1 wk   Gait Training Goal PT LTG, Dickey Level moderate assist (50% patient effort)   Gait Training Goal PT LTG, Assist Device walker, rolling   Gait Training Goal PT LTG, Distance to Achieve 150   Gait Training Goal PT LTG, Outcome goal ongoing

## 2017-11-07 NOTE — PLAN OF CARE
Problem: Patient Care Overview (Adult)  Goal: Plan of Care Review  Outcome: Ongoing (interventions implemented as appropriate)    11/07/17 0430   Coping/Psychosocial Response Interventions   Plan Of Care Reviewed With patient;spouse   Patient Care Overview   Progress no change   Outcome Evaluation   Outcome Summary/Follow up Plan Patient with no acute distress overnight. Steroid IV push continued per MD order. Pt remains with Left sided weakness.         Problem: Fall Risk (Adult)  Goal: Absence of Falls  Outcome: Ongoing (interventions implemented as appropriate)    11/07/17 0430   Fall Risk (Adult)   Absence of Falls making progress toward outcome         Problem: Skin Integrity Impairment, Risk/Actual (Adult)  Goal: Skin Integrity/Wound Healing  Outcome: Ongoing (interventions implemented as appropriate)    11/07/17 0430   Skin Integrity Impairment, Risk/Actual (Adult)   Skin Integrity/Wound Healing making progress toward outcome

## 2017-11-07 NOTE — PROGRESS NOTES
"NEUROSURGERY PROGRESS NOTE     LOS: 2 days   Patient Care Team:  Colton Dickens MD as PCP - General (Internal Medicine)  Kvng Stauffer MD as Consulting Physician (Cardiology)    Chief Complaint: Left-sided weakness and confusion.    Interval History:   Patient Complaints: Headache.  Patient Denies: Nausea or vomiting.    Review of Systems:   Musculoskeletal and Neurological systems were reviewed and are negative except for:  Musculoskeletal: positive for joint pain  Neurological: positive for difficulty walking, confusion and weakness    Vital Signs: Blood pressure 146/83, pulse 70, temperature 98.2 °F (36.8 °C), temperature source Oral, resp. rate 16, height 68\" (172.7 cm), weight 177 lb 8 oz (80.5 kg), SpO2 97 %.  Intake/Output:   Intake/Output Summary (Last 24 hours) at 11/07/17 0654  Last data filed at 11/06/17 1300   Gross per 24 hour   Intake              840 ml   Output                0 ml   Net              840 ml       Physical Exam:  The patient is resting quietly in bed but awakens easily.  He has a little bit more verbal output today as compared to yesterday.  He follows simple commands.  He continues to be weak on his entire left side.  The left arm is antigravity but there is no movement in the leg.     Assessment/Plan:  1.  Right convexity and interhemispheric subdural hematoma, subacute.   The patient may be very modestly better but continues to have significant weakness on the left side that has progressed since his last admission.  I'm not sure that steroids are a long-term solution for what is ailing him currently we cannot indefinitely have him off of his antiplatelet agents.  I have recommended right craniotomy to evacuate the subdural hematoma.  We may not be able to access all of it particularly the interhemispheric component.  At great length I've explained the nature of the procedure as well as the potential risks, complications, and limitations.  The patient and his wife understand " and wish to proceed.  Surgery will be scheduled for first thing tomorrow morning.  2.  Multiple cardiac issues including coronary artery disease, ischemic cardiomyopathy, and atrial flutter.  He continues on baby aspirin.  I have held his Plavix in light of potential craniotomy.  3.  Chronic renal insufficiency.  4.  COPD.  5.  Hypertension.  6.  Dementia.      True Frank MD  11/07/17  6:54 AM

## 2017-11-07 NOTE — PLAN OF CARE
Problem: Patient Care Overview (Adult)  Goal: Plan of Care Review  Outcome: Ongoing (interventions implemented as appropriate)    11/07/17 1265   Coping/Psychosocial Response Interventions   Plan Of Care Reviewed With patient;spouse   Patient Care Overview   Progress no change   Outcome Evaluation   Outcome Summary/Follow up Plan pt and spouse state they are in agreement for surgery tomorrow. understand npo after midnight.

## 2017-11-08 NOTE — ANESTHESIA PROCEDURE NOTES
Arterial Line    Patient location during procedure: pre-op  Start time: 11/8/2017 7:00 AM   Line placed for hemodynamic monitoring and MD/Surgeon request.  Performed By   CRNA: FAINA JOSHI  Preanesthetic Checklist  Completed: patient identified, site marked, surgical consent, pre-op evaluation, timeout performed, IV checked, risks and benefits discussed and monitors and equipment checked  Arterial Line Prep   Sterile Tech: cap, gloves and sterile barriers  Prep: ChloraPrep  Patient monitoring: blood pressure monitoring, continuous pulse oximetry and EKG  Arterial Line Procedure   Laterality:left  Location:  radial artery  Catheter size: 20 G   Guidance: palpation technique  Number of attempts: 2  Successful placement: yes          Post Assessment   Dressing Type: line sutured, secured with tape and wrist guard applied (Clear dressing applied (Tegaderm)).   Complications no  Circ/Move/Sens Assessment: normal and unchanged.   Patient Tolerance: patient tolerated the procedure well with no apparent complications

## 2017-11-08 NOTE — PLAN OF CARE
Problem: Patient Care Overview (Adult)  Goal: Plan of Care Review  Outcome: Ongoing (interventions implemented as appropriate)    11/08/17 0737   Coping/Psychosocial Response Interventions   Plan Of Care Reviewed With patient;spouse   Patient Care Overview   Progress no change   Outcome Evaluation   Outcome Summary/Follow up Plan Pt no acute distress over night.         Problem: Fall Risk (Adult)  Goal: Absence of Falls  Outcome: Ongoing (interventions implemented as appropriate)    11/08/17 0737   Fall Risk (Adult)   Absence of Falls making progress toward outcome         Problem: Skin Integrity Impairment, Risk/Actual (Adult)  Goal: Skin Integrity/Wound Healing  Outcome: Ongoing (interventions implemented as appropriate)    11/08/17 0737   Skin Integrity Impairment, Risk/Actual (Adult)   Skin Integrity/Wound Healing making progress toward outcome

## 2017-11-08 NOTE — ANESTHESIA PREPROCEDURE EVALUATION
Anesthesia Evaluation     Patient summary reviewed and Nursing notes reviewed   NPO Solid Status: > 8 hours  NPO Liquid Status: > 8 hours     Airway   Mallampati: III  TM distance: >3 FB  Neck ROM: full  no difficulty expected  Dental      Pulmonary - normal exam   (+) pneumonia resolved , COPD, asthma, sleep apnea,   Cardiovascular - normal exam    (+) hypertension, CAD, CABG, dysrhythmias, CHF, DVT,     ROS comment: Interpretation Summary     · Left ventricular systolic function is moderately decreased. Estimated EF = 30%.  · An apical left ventricular aneurysm is present.          IMPRESSION:  1.  Severe proximal LAD/first diagonal stenosis (ungrafted) history of a 2.5 x 23 Xience EES  2.  Occluded LAD with patent vein graft to the diagonal and LIMA to the LAD  3.  Severe left circumflex stenosis with widely patent vein graft to the OM1 and OM 2  4.  Occluded RCA with patent saphenous vein graft  5.  Disease and a small ramus branch (too small for PCI)  6.  Normal right heart pressures and post capillary wedge pressure.               Neuro/Psych  (+) psychiatric history, dementia,    GI/Hepatic/Renal/Endo    (+)  hepatitis B,     Musculoskeletal     Abdominal    Substance History      OB/GYN          Other   (+) arthritis                                     Anesthesia Plan    ASA 4     general   ([place] and 2 piv)  intravenous induction   Anesthetic plan and risks discussed with patient.    Plan discussed with CRNA.

## 2017-11-08 NOTE — ANESTHESIA PROCEDURE NOTES
Airway  Urgency: elective    Airway not difficult    General Information and Staff    Patient location during procedure: OR  CRNA: FAINA JOSHI    Indications and Patient Condition  Indications for airway management: airway protection    Preoxygenated: yes  MILS maintained throughout  Mask difficulty assessment: 2 - vent by mask + OA or adjuvant +/- NMBA    Final Airway Details  Final airway type: endotracheal airway      Successful airway: ETT  Cuffed: yes   Successful intubation technique: direct laryngoscopy  Facilitating devices/methods: Bougie  Endotracheal tube insertion site: oral  Blade: Koenig  Blade size: #2  ETT size: 7.5 mm  Cormack-Lehane Classification: grade IIb - view of arytenoids or posterior of glottis only  Placement verified by: chest auscultation and capnometry   Cuff volume (mL): 5  Measured from: lips  ETT to lips (cm): 22  Number of attempts at approach: 1    Additional Comments  Pt to OR 13.  Pt moved to OR table with roller and OR staff assisistance . ASA monitors placed. Pre-O2 with 100% Oxygen. SIVI. Atraumatic intubation.

## 2017-11-08 NOTE — OP NOTE
NEUROSURGICAL OPERATIVE NOTE        PREOPERATIVE DIAGNOSIS:    Right Interhemispheric and convexity subdural hematoma      POSTOPERATIVE DIAGNOSIS:  Same      PROCEDURE:  Right craniotomy for evacuation of interhemispheric and convexity subacute subdural hematoma      SURGEON:  True Frank M.D.      ASSISTANT:  Treesa Blair PA-C      ANESTHESIA:  General      ESTIMATED BLOOD LOSS:  150 mL      SPECIMEN:  Membrane and hematoma to pathology      DRAINS:  None      COMPLICATIONS:  None      CLINICAL NOTE:  The patient is a 72-year-old gentleman who recently has suffered a ground-level fall and developed an acute interhemispheric subdural hematoma.  He was managed nonoperatively and went to rehabilitation but has developed increasing weakness in his left leg and arm.  Studies demonstrate the ongoing interhemispheric subdural hematoma as well as a convexity subdural hematoma with associated mass effect.  As such, he presents at this time for right craniotomy to evacuate the above.  The nature of the procedure as well as the potential risks, complications, limitations, and alternatives to the procedure were discussed at length with the patient and the patient has agreed to proceed with surgery.      TECHNICAL NOTE:  DESCRIPTION OF PROCEDURE: The patient was brought to the operating room and placed on the operating room table in the supine position. General endotracheal anesthesia was achieved. His head was affixed to the operating room table via Dunlap 3-point pin fixation. The right frontoparietal region was prepared and draped in the usual fashion. A horseshoe-shaped incision with the closed end in the sagittal midline was marked. This area was prepared and draped in the usual fashion. The incision was fashioned and the scalp flap was reflected inferiorly. Four bur holes were placed at the corners of the exposure. The craniotome was utilized to harvest the bone flap.  Dural tack-up sutures were placed. The dura  was reflected to the midline. Working inferiorly into the low frontal and temporal region, subacute subdural hematoma was evacuated as well as some membrane. I then worked interhemispheric to resect the much larger subacute clot. This was evacuated with irrigation and suction. Good decompression of the hemisphere was accomplished. No active bleeding points were noted. The dura was reapproximated in an interrupted fashion with 4-0 dural silk sutures. The dura was covered with Surgicel followed by nonsuturable Duragen. The bone flap was returned to its native location with Synthes craniofacial plates and screws. The galea was reapproximated in an interrupted fashion with 3-0 Vicryl suture. The skin was closed in a running locked fashion with a 3-0 nylon suture. Sterile dressing was applied. He was subsequently extubated and taken to recovery room in satisfactory condition. There were no overt intraoperative complications.             True Frank M.D.

## 2017-11-08 NOTE — PROGRESS NOTES
Intensive Care Follow-up     Hospital:  LOS: 3 days   Mr. Magdi Arriaga, 72 y.o. male is followed for:   Traumatic subdural hematoma without loss of consciousness        Subjective   Interval History:  This is a 72-year-old gentleman who is known to me from his previous admission on 10/21/2017 during which time he had slipped and injured his head resulting in subdural hematoma. Apparently he has recently been at [facility] and developed prior to this most recent admission some weakness in the left side and confusion. He was found to have worsening subdural hematoma. Today he was taken to the operating room for craniotomy and decompression. He is brought to the intensive care unit for postoperative care. He is very sleepy surgery however is hemodynamically stable and not in any distress.    The patient's relevant past medical, surgical and social history were reviewed and updated in Epic as appropriate.        Objective     Infusions:    niCARdipine 5-15 mg/hr Last Rate: 7.5 mg/hr (11/08/17 1346)   niCARdipine 5-15 mg/hr Last Rate: 5 mg/hr (11/08/17 1128)   sodium chloride 75 mL/hr Last Rate: 75 mL/hr (11/08/17 1241)     Medications:    aspirin 81 mg Oral Daily   atorvastatin 10 mg Oral Nightly   budesonide-formoterol 2 puff Inhalation BID - RT   ceFAZolin 2 g Intravenous Q8H   dexamethasone 4 mg Intravenous Q6H   entecavir 0.5 mg Oral Q24H   furosemide 20 mg Oral Daily   memantine 10 mg Oral Q12H   montelukast 10 mg Oral Nightly   PARoxetine 30 mg Oral QAM   rivastigmine 1 patch Transdermal Daily   sacubitril-valsartan 0.5 tablet Oral BID       Vital Sign Min/Max for last 24 hours  Temp  Min: 95.4 °F (35.2 °C)  Max: 98.1 °F (36.7 °C)   BP  Min: 100/53  Max: 158/92   Pulse  Min: 57  Max: 82   Resp  Min: 14  Max: 18   SpO2  Min: 93 %  Max: 100 %   Flow (L/min)  Min: 2  Max: 6       Input/Output for last 24 hour shift         Objective:  General Appearance:  Well-appearing, comfortable and in no acute distress.  "   Vital signs: (most recent): Blood pressure 110/61, pulse 58, temperature 95.4 °F (35.2 °C), temperature source Core, resp. rate 16, height 68\" (172.7 cm), weight 177 lb (80.3 kg), SpO2 95 %.  No fever.    Output: Producing urine.    HEENT: Normal HEENT exam.    Lungs:  Normal respiratory rate and normal effort.  He is not in respiratory distress.  Breath sounds clear to auscultation.  No stridor.  No wheezes, rales, rhonchi or decreased breath sounds.    Heart: Normal rate.  Regular rhythm.  S1 normal and S2 normal.  No murmur, gallop or friction rub.   Chest: Symmetric chest wall expansion.   Abdomen: Abdomen is soft and non-distended.  Bowel sounds are normal.   There is no abdominal tenderness.     Extremities: There is no deformity or dependent edema.    Neurological: (Easily arousable.).    Pupils:  Pupils are equal, round, and reactive to light.  Pupils are equal.   Skin:  Warm and dry.  No rash or cyanosis.               Results from last 7 days  Lab Units 11/07/17  0436 11/06/17 0519 11/05/17  1229   WBC 10*3/mm3 13.22* 11.25* 12.23*   HEMOGLOBIN g/dL 11.9* 12.2* 12.4*   PLATELETS 10*3/mm3 378 368 332       Results from last 7 days  Lab Units 11/07/17  0436 11/06/17 0519 11/05/17  1229   SODIUM mmol/L 133 136 138   POTASSIUM mmol/L 5.0 5.2 4.5   CO2 mmol/L 28.0 23.0 29.0   BUN mg/dL 46* 30* 27*   CREATININE mg/dL 1.50* 1.30 1.30   MAGNESIUM mg/dL  --   --  2.3   GLUCOSE mg/dL 133* 128* 102*     Estimated Creatinine Clearance: 50.6 mL/min (by C-G formula based on Cr of 1.5).          I reviewed the patient's results and images.     Assessment/Plan   Impression      Principal Problem:    Traumatic SDH, s/p right craniotomy on 11/8/2017  Active Problems:    COPD (chronic obstructive pulmonary disease)    Dementia    Dyslipidemia    Hypertension    JONNY (obstructive sleep apnea)    Coronary artery disease involving coronary bypass graft of native heart without angina pectoris    Ischemic cardiomyopathy, most " recent LVEF 30%    History of atrial flutter    History of DVT (deep vein thrombosis)    CKD (chronic kidney disease), stage III    Weakness of left side of body    Cerebral edema    Leukocytosis       Plan        The patient will be observed in the intensive care unit.  We will follow the protocol for neurological checks.  Avoid any nephrotoxins.  Follow-up labs and orders a been placed for tomorrow morning.       I discussed the patient's findings and my recommendations with patient and nursing staff         Donny Hinds MD, [facility]  Pulmonary and Critical Care Medicine  11/08/17 2:14 PM

## 2017-11-08 NOTE — ANESTHESIA POSTPROCEDURE EVALUATION
Patient: Magdi Arriaga    Procedure Summary     Date Anesthesia Start Anesthesia Stop Room / Location    11/08/17 0804   [facility] OR 13 / [facility]       Procedure Diagnosis Surgeon Provider    CRANIOTOMY FOR SUBDURAL HEMATOMA (Right Head) Traumatic subdural hematoma without loss of consciousness, subsequent encounter  (Traumatic subdural hematoma without loss of consciousness, subsequent encounter [S06.5X0D]) MD Zeeshan Collado MD          Anesthesia Type: general  Last vitals 11/8/17 @ 1040  /73 Labetalol 10mg IVP given at BS  130/72 after Labetalol   Temp   97.0   Pulse   71   Resp   16   SpO2   100% on 6L NC     Post Anesthesia Care and Evaluation    Patient location during evaluation: PACU  Patient participation: complete - patient cannot participate  Level of consciousness: responsive to physical stimuli  Pain score: 0  Pain management: adequate  Airway patency: patent  Anesthetic complications: No anesthetic complications    Cardiovascular status: stable  Respiratory status: acceptable, nasal cannula, oral airway and spontaneous ventilation  Hydration status: stable    Comments: Pt transferred to PACU with O2. Vital signs stable. Report to PACU RN and care accepted.    11/8/17 @1053 Pt awake, following commands

## 2017-11-09 PROBLEM — R31.29 HEMATURIA, MICROSCOPIC: Status: ACTIVE | Noted: 2017-01-01

## 2017-11-09 NOTE — PLAN OF CARE
Problem: Patient Care Overview (Adult)  Goal: Plan of Care Review  Outcome: Ongoing (interventions implemented as appropriate)    11/09/17 0947   Coping/Psychosocial Response Interventions   Plan Of Care Reviewed With patient;spouse   Outcome Evaluation   Outcome Summary/Follow up Plan PT re-evaluation complete. Pt cont to demonstrates L-sided weakness and decreased indep re: functional mobility, warranting further skilled PT services to promote PLOF. Goals modified to reflect current functional status. Recommend return to IP rehab at d/c. Pt very motivated to participate.          Problem: Inpatient Physical Therapy  Goal: Bed Mobility Goal LTG- PT  Outcome: Revised    11/09/17 0947   Bed Mobility PT LTG   Bed Mobility PT LTG, Time to Achieve 2 wks   Bed Mobility PT LTG, Activity Type supine to sit/sit to supine   Bed Mobility PT LTG, [place] Level moderate assist (50% patient effort)   Bed Mobility PT LTG, Date Goal Reviewed 11/09/17   Bed Mobility PT LTG, Outcome goal revised       Goal: Transfer Training Goal 1 LTG- PT  Outcome: Revised    11/09/17 0947   Transfer Training PT LTG   Transfer Training PT LTG, Time to Achieve 2 wks   Transfer Training PT LTG, Activity Type sit to stand/stand to sit   Transfer Training PT LTG, [place] Level moderate assist (50% patient effort);2 person assist required   Transfer Training PT LTG, Assist Device other (see comments)  (approp AD)   Transfer Training PT LTG, Date Goal Reviewed 11/09/17   Transfer Training PT LTG, Outcome goal revised       Goal: Gait Training Goal LTG- PT  Outcome: Revised    11/09/17 0947   Gait Training PT LTG   Gait Training Goal PT LTG, Time to Achieve 2 wks   Gait Training Goal PT LTG, [place] Level moderate assist (50% patient effort);2 person assist required   Gait Training Goal PT LTG, Assist Device other (see comments)  (approp AD; possible ARJO use)   Gait Training Goal PT LTG, Distance to Achieve 5   Gait Training Goal PT LTG,  Date Goal Reviewed 11/09/17   Gait Training Goal PT LTG, Outcome goal revised

## 2017-11-09 NOTE — THERAPY EVALUATION
Acute Care - Occupational Therapy Initial Evaluation  [facility]     Patient Name: Magdi Arriaga  : 1945  MRN: [id number]  Today's Date: 2017  Onset of Illness/Injury or Date of Surgery Date: 17  Date of Referral to OT: 17  Referring Physician: MD Zac    Admit Date: 2017       ICD-10-CM ICD-9-CM   1. Cerebral edema G93.6 348.5   2. Left-sided weakness R53.1 728.87   3. Altered mental status, unspecified altered mental status type R41.82 780.97   4. Traumatic subdural hematoma without loss of consciousness, subsequent encounter S06.5X0D V58.89     852.21   5. Impaired functional mobility, balance, gait, and endurance Z74.09 V49.89   6. Impaired mobility and ADLs Z74.09 799.89     Patient Active Problem List   Diagnosis   • Abnormal CT scan, chest   • Allergic rhinitis   • Asthma   • Bronchiectasis   • Dementia   • Depression   • Dyslipidemia   • Hypertension   • Obesity   • JONNY (obstructive sleep apnea)   • Osteoarthritis   • Vitamin D deficiency   • Left bundle branch block   • Normocytic anemia   • COPD (chronic obstructive pulmonary disease)   • Coronary artery disease involving coronary bypass graft of native heart without angina pectoris   • Ischemic cardiomyopathy, most recent LVEF 30%   • History of atrial flutter   • History of DVT (deep vein thrombosis)   • Hematoma, calf   • History of exposure to infectious disease-Patient received platelet transfusion for CABG, 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, followed by [facility]   • Bradycardia   • Syncope, near   • Closed fracture of multiple ribs of right side   • Traumatic SDH, s/p right craniotomy on 2017   • Weakness of left side of body   • Cerebral edema   • Leukocytosis     Past Medical History:   Diagnosis Date   •  Abnormal finding on lung imaging      CT scan of the chest to be wary 2013 reports interstitial lung disease with marked diffuse interstitial and peripheral scarring and significant bronchiectasis in both upper   and lower lung zones.    • Allergic rhinitis     History of allergy injections as a child.   • Arthritis    • Asthma      Patient has had a history of asthma since childhood.   • CHF (congestive heart failure) 2017    ECHO - EF 25%   • COPD (chronic obstructive pulmonary disease)    • Coronary artery disease 2017    CABG X 5   • DVT (deep venous thrombosis)     Bilateral   • Exposure to hepatitis B 2017    Transfusion - managed by ID   • Falls     A.  History of falling traumatic injuries in April 2014 and May 2014 resulting and left rib  fracture and left clavicle fracture.   • Fracture of rib    • History of chest x-ray 02/19/2016    Extensive, but stable postinflammatory pulmonary changes. There has been no change since the previous examination of 12/03/2015   • History of chest x-ray 12/03/2015    Slight increased markings bilaterally, may be progression of his bronchiectasis   • History of chest x-ray 09/06/2014    There has been no change since 09/05/2014   • History of echocardiogram 02/19/2016    Mild concentric LVH is observed.Estimated EF is 50-55%.E to A reversal in the mitral valve flow pattern suggestive of diastolic dysfunction.RV is mildly dilated.Moderate aortic cusp sclerosis is present.Mitral annular calcification.Mild MR.Evidence of borderline pulmonary hypertension.   • History of PFTs 12/03/2015    Values are slightly worse.TLC is c/w mild restriction.Values reduced from 02/16/13.Diffusion capacity is slightly worse   • History of PFTs 02/06/2013    Mild decrease in FVC, may be due to less maximal effort.TLC is within normal levels. Mild reduction in diffusion in absolute value   • History of transfusion    • Hypertension    • Lumbar spinal stenosis 2013    Spinal surgery   • Patellar  tendon rupture     A.  Status post primary repair of the left periprosthetic patellar tendon tear 6/11/2014, post fall at the end of April 2014.   • Pneumonia      A.  Left lower lobe pneumonia treated at [facility], 5/8/2014 through 5/20/2014.   • Pneumothorax      A.  Status post chest tube placed 5/13/2014 and discontinued 5/15/2014 with stable chest x-ray.   • Sleep apnea with use of continuous positive airway pressure (CPAP)    • Traumatic hemorrhagic shock      A.  Secondary to right renal retroperitoneal hemorrhage, 9/2014.     Past Surgical History:   Procedure Laterality Date   • APPENDECTOMY  1959   • CARDIAC CATHETERIZATION N/A 4/18/2017    Procedure: Left Heart Cath;  Surgeon: Kvng Stauffer MD;  Location:  [facility];  Service:    • CARDIAC CATHETERIZATION N/A 8/9/2017    Procedure: Right and Left Heart Cath;  Surgeon: Kvng Stauffer MD;  Location:  [facility];  Service:    • COLONOSCOPY W/ POLYPECTOMY     • CORONARY ANGIOPLASTY WITH STENT PLACEMENT     • CORONARY ARTERY BYPASS GRAFT N/A 4/24/2017    Procedure: MEDIAN STERNOTOMY, CORONARY ARTERY BYPASS GRAFT X5 UTILIZING THE INTERNAL MAMMARY ARTERY, ENDOVASCULAR VEING HARVEST UTILIZING RIGHT SAPHENOUS VEIN,TRANSESOPHAGEAL ECHO;  Surgeon: Kanu Berger MD;  Location:  [facility] OR;  Service:    • FRACTURE SURGERY     • KNEE SURGERY  2006, 2014    primary repair -left periprosthetic patellar tendon tear 6/11/2014   • LUMBAR LAMINECTOMY  2013    With discectomy   • REPLACEMENT TOTAL KNEE Bilateral 2009, 2013    Left - 2009 and right - 2013.   • SKIN BIOPSY     • TONSILLECTOMY  1957   • VENA CAVA FILTER PLACEMENT  2014          OT ASSESSMENT FLOWSHEET (last 72 hours)      OT Evaluation       11/09/17 0808 11/09/17 0805 11/07/17 1119 11/07/17 0813 11/06/17 1154    Rehab Evaluation    Document Type evaluation  -LS evaluation  -CL  evaluation  -SH     Subjective Information  agree to therapy;no complaints  -CL  no  complaints;agree to therapy  -     Evaluation Not Performed   unable to evaluate, medical status change   HOLD OT. pt undergoing craniotomy tomorrow.  -      Patient Effort, Rehab Treatment  excellent  -CL  good  -SH     Symptoms Noted During/After Treatment  none  -CL       General Information    Patient Profile Review  yes  -CL  yes  -SH     Onset of Illness/Injury or Date of Surgery Date  11/05/17  -CL  11/05/17  -SH     Referring Physician  MD Zac  -CL  REI Robert  -     General Observations    Pt supine in bed, room air.  -SH     Pertinent History Of Current Problem  Pt presented to ED w/ worsening weakness and confusion. Pt recently admitted w/ a head injury w/ associated L sided weakness s/p fall on 10/21/2017. MRI (+) worsening subacute subdural hematoma. Pt s/p R craniotomy on 11/08.   -CL  Pt presented to ED with increased left side weakness and confusion. Pt s/p head injury 10/21/17. Pt hospitalized for 1 week and then transferred to [facility]. CT: subacute interhemispheric subdural hematoma.   -     Precautions/Limitations  fall precautions;other (see comments)   L sided weakness, previous R sided rib fxs  -CL  fall precautions  -     Prior Level of Function  max assist:;all household mobility;transfer;ADL's;dressing;bathing   Pt independent w/ mobility/ADLs prior to PTA  -CL  independent:;ADL's   Per chart: RW for ambulation.   -     Equipment Currently Used at Home  cane, straight;commode;walker, rolling;shower chair  -CL  bipap/ cpap;walker, rolling;cane, straight;commode;raised toilet;shower chair  -SH bipap/ cpap;walker, rolling;cane, straight;commode;raised toilet;shower chair  -MB    Plans/Goals Discussed With  spouse/S.O.;patient;agreed upon  -CL  patient;agreed upon  -     Risks Reviewed  patient:;spouse/S.O.:;LOB;nausea/vomiting;dizziness;increased discomfort;change in vital signs;lines disloged  -CL  patient:;LOB;change in vital signs;increased discomfort  -     Benefits  Reviewed  patient:;spouse/S.O.:;improve function;increase independence;increase strength;decrease pain;increase balance;increase knowledge  -CL  patient:;improve function;increase strength;increase independence  -SH     Barriers to Rehab  medically complex  -CL  medically complex  -SH     Living Environment    Lives With  spouse  -CL  spouse  -SH spouse  -MB    Living Arrangements  house  -CL  house  -SH house  -MB    Home Accessibility  stairs to enter home;stairs within home   walk-in shower  -CL  stairs within home;stairs to enter home  -SH     Number of Stairs to Enter Home  3  -CL  3  -SH     Number of Stairs Within Home  13   though pt has stair lift  -CL  13   Pt reports having a chair lift.  -SH     Stair Railings at Home    outside, present on right side  -SH     Transportation Available     car;family or friend will provide  -MB    Clinical Impression    Date of Referral to OT  11/08/17  -CL       OT Diagnosis  Decreased independence in ADLs.   -CL       Patient/Family Goals Statement  Return to PLOF.   -CL       Criteria for Skilled Therapeutic Interventions Met  yes;treatment indicated  -CL       Rehab Potential  good, to achieve stated therapy goals  -CL       Therapy Frequency  daily  -CL       Anticipated Equipment Needs At Discharge  --   TBA further  -CL       Anticipated Discharge Disposition  inpatient rehabilitation facility  -CL       Vital Signs    Pre Systolic BP Rehab  131  -CL  171   RN notified of elevated BP. RN stated ok to continue with PT  -SH     Pre Treatment Diastolic BP  69  -CL  102  -SH     Post Systolic BP Rehab  132  -CL  184   RN in room to give pt meds at completion of session.   -SH     Post Treatment Diastolic BP  70  -CL  102   RN aware of pre and post BP.  -SH     Pretreatment Heart Rate (beats/min)  70  -CL  66  -SH     Intratreatment Heart Rate (beats/min)  64  -CL       Posttreatment Heart Rate (beats/min)    72  -SH     Pre SpO2 (%)  97  -CL  96  -SH     O2 Delivery  Pre Treatment  room air  -CL  room air  -SH     Post SpO2 (%)  95  -CL  95  -SH     O2 Delivery Post Treatment  room air  -CL  room air  -SH     Pre Patient Position  Supine  -CL  Supine  -SH     Intra Patient Position  Standing  -CL  Sitting  -SH     Post Patient Position  Sitting  -CL  Supine  -SH     Pain Assessment    Pain Assessment  No/denies pain  -CL  No/denies pain  -     Vision Assessment/Intervention    Visual Impairment  WFL  -CL  WFL with corrective lenses  -     Cognitive Assessment/Intervention    Current Cognitive/Communication Assessment  functional  -CL  functional  -     Orientation Status  oriented to;person;place;required verbal cueing (specifiy in comments);time  -CL  oriented to;person;situation   Stated month was October, correct year; stated in rehab  -     Follows Commands/Answers Questions  75% of the time;needs cueing;needs increased time;needs repetition  -  able to follow single-step instructions;100% of the time  -     Personal Safety  mild impairment;decreased awareness, need for assist;decreased awareness, need for safety  -  mild impairment  -     Personal Safety Interventions  fall prevention program maintained;gait belt;nonskid shoes/slippers when out of bed  -  fall prevention program maintained;nonskid shoes/slippers when out of bed   No OOB activity performed this session.  -     ROM (Range of Motion)    General ROM    upper extremity range of motion deficits identified;lower extremity range of motion deficits identified  -     General ROM Detail  BUE grossly WFL.   -CL  PROM L UE and LLE WFL. AROM LLE absent. See OT eval for LUE.  -     MMT (Manual Muscle Testing)    General MMT Assessment    upper extremity strength deficits identified;lower extremity strength deficits identified  -     General MMT Assessment Detail  RUE shldr FE 3+/5, bicep/tricep 3+/5,  4-/5. LUE shldr FE 3-/5, bicep/tricep 2+/5,  (limited d/t wrist blocking splint for art  line) though observed slight digit F/E.   -CL  0/5 MMT L LE  -SH     Upper Extremity    Upper Ext Manual Muscle Testing    other (see comments)   See OT Eval, generalized weakness evident.  -     Bed Mobility, Assessment/Treatment    Bed Mobility, Assistive Device  bed rails;head of bed elevated;draw sheet  -CL  bed rails  -     Bed Mobility, Roll Left, [place]    moderate assist (50% patient effort);verbal cues required  -     Bed Mobility, Roll Right, [place]    verbal cues required;moderate assist (50% patient effort)  -     Bed Mobility, Scoot/Bridge, [place]    maximum assist (25% patient effort);2 person assist required;nonverbal cues required (demo/gesture);verbal cues required  -     Bed Mob, Supine to Sit, [place]  maximum assist (25% patient effort);2 person assist required;verbal cues required  -CL  maximum assist (25% patient effort);2 person assist required;verbal cues required;nonverbal cues required (demo/gesture)  -     Bed Mob, Sit to Supine, [place]    verbal cues required;nonverbal cues required (demo/gesture);maximum assist (25% patient effort);2 person assist required  -     Bed Mobility, Safety Issues    decreased use of arms for pushing/pulling;decreased use of legs for bridging/pushing;impaired trunk control for bed mobility  -     Bed Mobility, Impairments    strength decreased;impaired balance;coordination impaired;motor control impaired  -     Bed Mobility, Comment  VCs for HP/sequencing.   -CL  Pt demonstrated good strength of R UE/LE and direction following; limited by weakness of L side,   -SH     Transfer Assessment/Treatment    Transfers, Bed-Chair [place]  maximum assist (25% patient effort);2 person assist required;verbal cues required  -CL  not appropriate to assess;unable to perform  -SH     Transfers, Sit-Stand [place]  maximum assist (25% patient effort);2 person assist required;verbal cues required  -CL       Transfers,  Stand-Sit [place]  maximum assist (25% patient effort);2 person assist required;verbal cues required  -CL       Transfer, Comment  Pt stood from EOB x2 reps w/ OT/PT on either side of pt w/ BUE support and B feet/knees blocked. Upon standing second time, pt performed stand/pivot to chair as pt only stood ~75% upright.   -CL  L LE flaccid.  -SH     Functional Mobility    Functional Mobility- Ind. Level  unable to perform;not appropriate to assess  -CL       Motor Skills/Interventions    Additional Documentation  Balance Skills Training (Group)  -CL  Balance Skills Training (Group)  -     Balance Skills Training    Sitting-Level of Assistance  Moderate assistance   at EOB, posterior lean  -CL  Moderate assistance  -     Sitting-Balance Support  Right upper extremity supported;Left upper extremity supported;Feet supported  -CL  Feet supported;Right upper extremity supported  -     Sitting-Balance Activities  Forward lean;Lateral lean;Reaching for objects;Trunk control activities  -  Lateral lean;Forward lean;Trunk control activities  -     Sitting # of Minutes    5  -     Standing-Level of Assistance  Maximum assistance;x2  -CL       Static Standing Balance Support  Right upper extremity supported;Left upper extremity supported  -CL       Standing-Balance Activities  Weight Shift A-P;Weight Shift R-L  -CL       Sensory Assessment/Intervention    Light Touch  LUE;RUE  -CL       LUE Light Touch  WNL  -CL       RUE Light Touch  WNL  -CL       Positioning and Restraints    Pre-Treatment Position  in bed  -CL  in bed  -SH     Post Treatment Position  chair  -CL  bed  -SH     In Bed    notified nsg;supine;call light within reach;encouraged to call for assist;exit alarm on;with nsg;SCD pump applied  -     In Chair  notified nsg;reclined;call light within reach;encouraged to call for assist;exit alarm on;with family/caregiver;RUE elevated;LUE elevated;waffle cushion;on mechanical lift sling;legs  elevated;heels elevated  -CL       Lower Extremity    Lower Ext Manual Muscle Testing    left hip strength deficit;left knee strength deficit;left ankle strength deficit  -       11/06/17 1153                Functional Level Prior    Ambulation 1-->assistive equipment  -MB        Transferring 1-->assistive equipment  -MB        Toileting 0-->independent  -MB        Bathing 0-->independent  -MB        Dressing 0-->independent  -MB        Eating 0-->independent  -MB        Communication 0-->understands/communicates without difficulty  -MB        Swallowing 0-->swallows foods/liquids without difficulty  -MB          User Key  (r) = Recorded By, (t) = Taken By, (c) = Cosigned By    Initials Name Effective Dates     Amena SANTIAGO Ryan, PT 06/19/15 -     LS Keli Awad, PT 06/19/15 -     TRAE Clark, OT 03/14/16 -     MB Marisabel Che RN 10/26/17 -     CL Margarita Velasco, OT 06/08/16 -            Occupational Therapy Education     Title: PT OT SLP Therapies (Active)     Topic: Occupational Therapy (Active)     Point: ADL training (Active)    Description: Instruct learner(s) on proper safety adaptation and remediation techniques during self care or transfers.   Instruct in proper use of assistive devices.    Learning Progress Summary    Learner Readiness Method Response Comment Documented by Status   Patient Acceptance E,D NR Pt educated on appropriate safety precautions, t/f techniques, positioning, and benefits of therapy.  11/09/17 0923 Active   Family Acceptance E,D NR Pt educated on appropriate safety precautions, t/f techniques, positioning, and benefits of therapy.  11/09/17 0923 Active               Point: Precautions (Active)    Description: Instruct learner(s) on prescribed precautions during self-care and functional transfers.    Learning Progress Summary    Learner Readiness Method Response Comment Documented by Status   Patient Acceptance E,D NR Pt educated on appropriate safety precautions, t/f  techniques, positioning, and benefits of therapy.  11/09/17 0923 Active   Family Acceptance E,D NR Pt educated on appropriate safety precautions, t/f techniques, positioning, and benefits of therapy.  11/09/17 0923 Active               Point: Body mechanics (Active)    Description: Instruct learner(s) on proper positioning and spine alignment during self-care, functional mobility activities and/or exercises.    Learning Progress Summary    Learner Readiness Method Response Comment Documented by Status   Patient Acceptance E,D NR Pt educated on appropriate safety precautions, t/f techniques, positioning, and benefits of therapy.  11/09/17 0923 Active   Family Acceptance E,D NR Pt educated on appropriate safety precautions, t/f techniques, positioning, and benefits of therapy.  11/09/17 0923 Active                      User Key     Initials Effective Dates Name Provider Type Discipline     06/08/16 -  Margarita Velasco OT Occupational Therapist OT                  OT Recommendation and Plan  Anticipated Equipment Needs At Discharge:  (TBA further)  Anticipated Discharge Disposition: inpatient rehabilitation facility  Therapy Frequency: daily  Plan of Care Review  Plan Of Care Reviewed With: patient, spouse  Progress: progress toward functional goals as expected  Outcome Summary/Follow up Plan: OT completed a brief chart review relating to presenting physical deficits limiting functional performance. Pt Max Ax2 for stand/pivot t/f from bed to chair. Recommend cont skilled IPOT intervention. Recommend pt return to IP rehab upon DC.           OT Goals       11/09/17 0927          Transfer Training OT LTG    Transfer Training OT LTG, Date Established 11/09/17  -CL      Transfer Training OT LTG, Time to Achieve by discharge  -CL      Transfer Training OT LTG, Activity Type sit to stand/stand to sit;bed to chair /chair to bed;toilet  -CL      Transfer Training OT LTG, [place] Level moderate assist (50% patient  effort);2 person assist required  -CL      Transfer Training OT LTG, Additional Goal AAD  -CL      Strength OT LTG    Strength Goal OT LTG, Date Established 11/09/17  -CL      Strength Goal OT LTG, Time to Achieve by discharge  -CL      Strength Goal OT LTG, Measure to Achieve Pt will tolerate BUE AROM/AAROM HEP x15 reps to increase strength/endurance to promote ADL performance.   -CL      Static Sitting Balance OT LTG    Static Sitting Balance OT LTG, Date Established 11/09/17  -CL      Static Sitting Balance OT LTG, Time to Achieve by discharge  -CL      Static Sitting Balance OT LTG, [place] Level contact guard assist  -CL      Static Sitting Balance OT LTG, Additional Goal AAD, at EOB  -CL      Eating Self-Feeding OT LTG    Eat Self Feeding Goal OT LTG, Date Established 11/09/17  -CL      Eat Self Feeding Goal OT LTG, Time to Achieve by discharge  -CL      Eat Self Feed Goal OT LTG, [place] Level contact guard assist  -CL      Eat Self Feeding Goal OT LTG, Additional Goal AAD, supported sitting  -CL        User Key  (r) = Recorded By, (t) = Taken By, (c) = Cosigned By    Initials Name Provider Type    CL Margarita Velasco OT Occupational Therapist                Outcome Measures       11/09/17 0805 11/07/17 0813       How much help from another person do you currently need...    Turning from your back to your side while in flat bed without using bedrails?  2  -SH     Moving from lying on back to sitting on the side of a flat bed without bedrails?  1  -SH     Moving to and from a bed to a chair (including a wheelchair)?  1  -SH     Standing up from a chair using your arms (e.g., wheelchair, bedside chair)?  1  -SH     Climbing 3-5 steps with a railing?  1  -SH     To walk in hospital room?  1  -SH     AM-PAC 6 Clicks Score  7  -SH     How much help from another is currently needed...    Putting on and taking off regular lower body clothing? 1  -CL      Bathing (including washing, rinsing, and drying) 1  -CL       Toileting (which includes using toilet bed pan or urinal) 1  -CL      Putting on and taking off regular upper body clothing 1  -CL      Taking care of personal grooming (such as brushing teeth) 2  -CL      Eating meals 2  -CL      Score 8  -CL      Modified Aida Scale    Modified Aida Scale 4 - Moderately severe disability.  Unable to walk without assistance, and unable to attend to own bodily needs without assistance.  -CL      Functional Assessment    Outcome Measure Options AM-PAC 6 Clicks Daily Activity (OT);Modified Aida  -CL AM-PAC 6 Clicks Basic Mobility (PT)  -       User Key  (r) = Recorded By, (t) = Taken By, (c) = Cosigned By    Initials Name Provider Type     Amena Davis PT Physical Therapist    CL Margarita Velasco OT Occupational Therapist          Time Calculation:   OT Start Time: 0805    Therapy Charges for Today     Code Description Service Date Service Provider Modifiers Qty    [id number]  OT EVAL LOW COMPLEXITY 4 11/9/2017 Margarita Velasco OT GO 1               Margarita Velasco OT  11/9/2017

## 2017-11-09 NOTE — PROGRESS NOTES
"NEUROSURGERY PROGRESS NOTE     LOS: 4 days   Patient Care Team:  Colton Dickens MD as PCP - General (Internal Medicine)  Kvng Stauffer MD as Consulting Physician (Cardiology)    Chief Complaint: Left-sided weakness and confusion.    POD#: 1 Day Post-Op  Procedures:  Right craniotomy for evacuation of convexity and interhemispheric subdural hematoma    Interval History:   Patient Complaints: Mild headache.  Patient Denies: New weakness or numbness.    Vital Signs: Blood pressure 122/63, pulse 70, temperature 97.7 °F (36.5 °C), temperature source Core, resp. rate 16, height 68\" (172.7 cm), weight 177 lb (80.3 kg), SpO2 97 %.  Intake/Output:   Intake/Output Summary (Last 24 hours) at 11/09/17 0611  Last data filed at 11/09/17 0600   Gross per 24 hour   Intake             3180 ml   Output             1435 ml   Net             1745 ml       Physical Exam:  The patient awakens easily.  He follows simple commands.  His left arm is at least antigravity.  He continues to have no movement in his left leg.  Dry dressing is in place on his incision.     Data Review:      Results from last 7 days  Lab Units 11/09/17  0357   WBC 10*3/mm3 12.52*   HEMOGLOBIN g/dL 9.6*   HEMATOCRIT % 30.5*   PLATELETS 10*3/mm3 313       Results from last 7 days  Lab Units 11/09/17  0357   SODIUM mmol/L 138   POTASSIUM mmol/L 4.9   CHLORIDE mmol/L 112*   CO2 mmol/L 23.0   BUN mg/dL 52*   CREATININE mg/dL 1.20   GLUCOSE mg/dL 120*   CALCIUM mg/dL 8.4*     CT of the brain from this morning demonstrates some pneumocephalus.  He continues to have some hemispheric edema.  There is less right to left shift.  There is no significant residual subdural hematoma.    Assessment/Plan:  1.  Right convexity and interhemispheric subdural hematoma, subacute that is post craniotomy for evacuation.  2.  Multiple cardiac issues including coronary artery disease, ischemic cardiomyopathy, and atrial flutter.  He continues on baby aspirin.  I have held his Plavix " in light of craniotomy.  3.  Chronic renal insufficiency.  4.  COPD.  5.  Hypertension.  6.  Dementia.  7.  Disposition: Mobilize.  I would continue to observe him in the ICU at least today.  I anticipate that the patient will return to rehabilitation within the next several days.      True Frank MD  11/09/17  6:11 AM

## 2017-11-09 NOTE — PROGRESS NOTES
Continued Stay Note  [facility]     Patient Name: Magdi Arriaga  MRN: [id number]  Today's Date: 11/9/2017    Admit Date: 11/5/2017          Discharge Plan       11/09/17 1105    Case Management/Social Work Plan    Plan [facility]    Patient/Family In Agreement With Plan yes    Additional Comments Talked with Mr. Arriaga and his wife @ [facility].  Mr. Arriaga would like to transfer to [facility] prior to going home.  Referral given to Rivka ferris [facility].        11/09/17 1046    Case Management/Social Work Plan    Patient/Family In Agreement With Plan yes              Discharge Codes     None        Expected Discharge Date and Time     Expected Discharge Date Expected Discharge Time    Nov 10, 2017             Hoang Fraser RN

## 2017-11-09 NOTE — PLAN OF CARE
Problem: Patient Care Overview (Adult)  Goal: Plan of Care Review  Outcome: Ongoing (interventions implemented as appropriate)    11/09/17 4102   Coping/Psychosocial Response Interventions   Plan Of Care Reviewed With patient   Patient Care Overview   Progress progress towards functional goals is fair   Outcome Evaluation   Outcome Summary/Follow up Plan Clinical swallow eval. Pt with increased reposes time. Voice waxing and waning in strength during eval. Baseline throat clearing noted. Oral phase functional. No overt s/s of asp w/ thins, nectar, pureed, and solid. REC: continue current diet, meds whole in thins. SLP to follow up for diet tolerance x1.

## 2017-11-09 NOTE — PLAN OF CARE
Problem: Patient Care Overview (Adult)  Goal: Plan of Care Review  Outcome: Ongoing (interventions implemented as appropriate)    11/09/17 0927   Coping/Psychosocial Response Interventions   Plan Of Care Reviewed With patient;spouse   Patient Care Overview   Progress progress toward functional goals as expected   Outcome Evaluation   Outcome Summary/Follow up Plan OT completed a brief chart review relating to presenting physical deficits limiting functional performance. Pt Max Ax2 for stand/pivot t/f from bed to chair. Recommend cont skilled IPOT intervention. Recommend pt return to IP rehab upon DC.          Problem: Inpatient Occupational Therapy  Goal: Transfer Training Goal 1 LTG- OT  Outcome: Ongoing (interventions implemented as appropriate)    11/09/17 0927   Transfer Training OT LTG   Transfer Training OT LTG, Date Established 11/09/17   Transfer Training OT LTG, Time to Achieve by discharge   Transfer Training OT LTG, Activity Type sit to stand/stand to sit;bed to chair /chair to bed;toilet   Transfer Training OT LTG, [place] Level moderate assist (50% patient effort);2 person assist required   Transfer Training OT LTG, Additional Goal AAD       Goal: Strength Goal LTG- OT  Outcome: Ongoing (interventions implemented as appropriate)    11/09/17 0927   Strength OT LTG   Strength Goal OT LTG, Date Established 11/09/17   Strength Goal OT LTG, Time to Achieve by discharge   Strength Goal OT LTG, Measure to Achieve Pt will tolerate BUE AROM/AAROM HEP x15 reps to increase strength/endurance to promote ADL performance.        Goal: Static Sitting Balance Goal LTG- OT  Outcome: Ongoing (interventions implemented as appropriate)    11/09/17 0927   Static Sitting Balance OT LTG   Static Sitting Balance OT LTG, Date Established 11/09/17   Static Sitting Balance OT LTG, Time to Achieve by discharge   Static Sitting Balance OT LTG, [place] Level contact guard assist   Static Sitting Balance OT LTG, Additional  Goal AAD, at EOB       Goal: Eating Self-Feeding Goal LTG- OT  Outcome: Ongoing (interventions implemented as appropriate)    11/09/17 0927   Eating Self-Feeding OT LTG   Eat Self Feeding Goal OT LTG, Date Established 11/09/17   Eat Self Feeding Goal OT LTG, Time to Achieve by discharge   Eat Self Feed Goal OT LTG, [place] Level contact guard assist   Eat Self Feeding Goal OT LTG, Additional Goal AAD, supported sitting

## 2017-11-09 NOTE — PROGRESS NOTES
"Intensive Care Follow-up     Hospital:  LOS: 4 days   Mr. Magdi Arriaga, 72 y.o. male is followed for:   Traumatic subdural hematoma without loss of consciousness        Subjective   Interval History:  The chart has been reviewed. The patient has remained afebrile overnight. He is beginning to have better movement in his left upper extremity though he is not moving his left lower extremity very much. He states that he feels much better today compared with yesterday. Nursing staff has been concerned about possibly some choking while drinking liquids.    The patient's relevant past medical, surgical and social history were reviewed and updated in Epic as appropriate.        Objective     Infusions:    niCARdipine 5-15 mg/hr Last Rate: Stopped (11/09/17 0000)     Medications:    aspirin 81 mg Oral Daily   atorvastatin 10 mg Oral Nightly   budesonide-formoterol 2 puff Inhalation BID - RT   dexamethasone 4 mg Intravenous Q6H   entecavir 0.5 mg Oral Q24H   furosemide 20 mg Oral Daily   memantine 10 mg Oral Q12H   montelukast 10 mg Oral Nightly   PARoxetine 30 mg Oral QAM   rivastigmine 1 patch Transdermal Daily   sacubitril-valsartan 0.5 tablet Oral BID       Vital Sign Min/Max for last 24 hours  Temp  Min: 95.4 °F (35.2 °C)  Max: 97.7 °F (36.5 °C)   BP  Min: 102/58  Max: 136/73   Pulse  Min: 55  Max: 76   Resp  Min: 14  Max: 18   SpO2  Min: 91 %  Max: 99 %   Flow (L/min)  Min: 2  Max: 2       Input/Output for last 24 hour shift  11/08 0701 - 11/09 0700  In: 3652 [P.O.:472; I.V.:3080]  Out: 1435 [Urine:1285]      Objective:  General Appearance:  Well-appearing, comfortable and in no acute distress.    Vital signs: (most recent): Blood pressure 126/64, pulse 66, temperature 97.7 °F (36.5 °C), temperature source Oral, resp. rate 16, height 68\" (172.7 cm), weight 177 lb (80.3 kg), SpO2 95 %.  No fever.    Output: Producing urine.    HEENT: Normal HEENT exam.    Lungs:  Normal respiratory rate and normal effort.  He is " not in respiratory distress.  Breath sounds clear to auscultation.  No stridor.  No wheezes, rales, rhonchi or decreased breath sounds.    Heart: Normal rate.  Regular rhythm.  S1 normal and S2 normal.  No murmur, gallop or friction rub.   Chest: Symmetric chest wall expansion.   Abdomen: Abdomen is soft and non-distended.  Bowel sounds are normal.   There is no abdominal tenderness.     Extremities: There is no deformity or dependent edema.  (Patient has originally 305  strength in the left upper extremity and is able to hold antigravity)  Neurological: Patient is alert and oriented to person, place and time.    Pupils:  Pupils are equal, round, and reactive to light.  Pupils are equal.   Skin:  Warm and dry.  No rash or cyanosis.               Results from last 7 days  Lab Units 11/09/17 0357 11/07/17 0436 11/06/17 0519   WBC 10*3/mm3 12.52* 13.22* 11.25*   HEMOGLOBIN g/dL 9.6* 11.9* 12.2*   PLATELETS 10*3/mm3 313 378 368       Results from last 7 days  Lab Units 11/09/17 0357 11/07/17 0436 11/06/17 0519 11/05/17  1229   SODIUM mmol/L 138 133 136 138   POTASSIUM mmol/L 4.9 5.0 5.2 4.5   CO2 mmol/L 23.0 28.0 23.0 29.0   BUN mg/dL 52* 46* 30* 27*   CREATININE mg/dL 1.20 1.50* 1.30 1.30   MAGNESIUM mg/dL  --   --   --  2.3   GLUCOSE mg/dL 120* 133* 128* 102*     Estimated Creatinine Clearance: 63.2 mL/min (by C-G formula based on Cr of 1.2).          I reviewed the patient's results and images.     Assessment/Plan   Impression      Principal Problem:    Traumatic SDH, s/p right craniotomy on 11/8/2017  Active Problems:    COPD (chronic obstructive pulmonary disease)    Dementia    Dyslipidemia    Hypertension    JONNY (obstructive sleep apnea)    Coronary artery disease involving coronary bypass graft of native heart without angina pectoris    Ischemic cardiomyopathy, most recent LVEF 30%    History of atrial flutter    History of DVT (deep vein thrombosis)    CKD (chronic kidney disease), stage III     Weakness of left side of body    Cerebral edema    Leukocytosis    Hematuria, microscopic; in the presence of a hoang catheter       Plan        Continue with physical therapy.  Stop current IV fluids.  We will do a formal speech evaluation today and advance his diet as tolerated.  He will remain in the intensive care unit today for close neurological checks.  Follow-up labs and orders have been placed.     I discussed the patient's findings and my recommendations with patient, family and nursing staff         Donny Hinds MD, [facility]  Pulmonary and Critical Care Medicine  11/09/17 11:38 AM

## 2017-11-09 NOTE — THERAPY EVALUATION
Acute Care - Speech Language Pathology   Swallow Initial Evaluation [facility]   Clinical Swallow Evaluation       Patient Name: Magdi Arriaga  : 1945  MRN: [id number]  Today's Date: 2017  Onset of Illness/Injury or Date of Surgery Date: 17            Admit Date: 2017    Visit Dx:     ICD-10-CM ICD-9-CM   1. Cerebral edema G93.6 348.5   2. Left-sided weakness R53.1 728.87   3. Altered mental status, unspecified altered mental status type R41.82 780.97   4. Traumatic subdural hematoma without loss of consciousness, subsequent encounter S06.5X0D V58.89     852.21   5. Impaired functional mobility, balance, gait, and endurance Z74.09 V49.89   6. Impaired mobility and ADLs Z74.09 799.89     Patient Active Problem List   Diagnosis   • Abnormal CT scan, chest   • Allergic rhinitis   • Asthma   • Bronchiectasis   • Dementia   • Depression   • Dyslipidemia   • Hypertension   • Obesity   • JONNY (obstructive sleep apnea)   • Osteoarthritis   • Vitamin D deficiency   • Left bundle branch block   • Normocytic anemia   • COPD (chronic obstructive pulmonary disease)   • Coronary artery disease involving coronary bypass graft of native heart without angina pectoris   • Ischemic cardiomyopathy, most recent LVEF 30%   • History of atrial flutter   • History of DVT (deep vein thrombosis)   • Hematoma, calf   • History of exposure to infectious disease-Patient received platelet transfusion for CABG, 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, followed by [facility]   • Bradycardia   • Syncope, near   • Closed fracture of multiple ribs of right side   • Traumatic SDH, s/p right craniotomy on 2017   • Weakness of left side of body   • Cerebral edema   • Leukocytosis   • Hematuria, microscopic; in the presence of a hoang  catheter     Past Medical History:   Diagnosis Date   • Abnormal finding on lung imaging      CT scan of the chest to be wary 2013 reports interstitial lung disease with marked diffuse interstitial and peripheral scarring and significant bronchiectasis in both upper   and lower lung zones.    • Allergic rhinitis     History of allergy injections as a child.   • Arthritis    • Asthma      Patient has had a history of asthma since childhood.   • CHF (congestive heart failure) 2017    ECHO - EF 25%   • COPD (chronic obstructive pulmonary disease)    • Coronary artery disease 2017    CABG X 5   • DVT (deep venous thrombosis)     Bilateral   • Exposure to hepatitis B 2017    Transfusion - managed by ID   • Falls     A.  History of falling traumatic injuries in April 2014 and May 2014 resulting and left rib  fracture and left clavicle fracture.   • Fracture of rib    • History of chest x-ray 02/19/2016    Extensive, but stable postinflammatory pulmonary changes. There has been no change since the previous examination of 12/03/2015   • History of chest x-ray 12/03/2015    Slight increased markings bilaterally, may be progression of his bronchiectasis   • History of chest x-ray 09/06/2014    There has been no change since 09/05/2014   • History of echocardiogram 02/19/2016    Mild concentric LVH is observed.Estimated EF is 50-55%.E to A reversal in the mitral valve flow pattern suggestive of diastolic dysfunction.RV is mildly dilated.Moderate aortic cusp sclerosis is present.Mitral annular calcification.Mild MR.Evidence of borderline pulmonary hypertension.   • History of PFTs 12/03/2015    Values are slightly worse.TLC is c/w mild restriction.Values reduced from 02/16/13.Diffusion capacity is slightly worse   • History of PFTs 02/06/2013    Mild decrease in FVC, may be due to less maximal effort.TLC is within normal levels. Mild reduction in diffusion in absolute value   • History of transfusion    • Hypertension    •  Lumbar spinal stenosis 2013    Spinal surgery   • Patellar tendon rupture     A.  Status post primary repair of the left periprosthetic patellar tendon tear 6/11/2014, post fall at the end of April 2014.   • Pneumonia      A.  Left lower lobe pneumonia treated at [facility], 5/8/2014 through 5/20/2014.   • Pneumothorax      A.  Status post chest tube placed 5/13/2014 and discontinued 5/15/2014 with stable chest x-ray.   • Sleep apnea with use of continuous positive airway pressure (CPAP)    • Traumatic hemorrhagic shock      A.  Secondary to right renal retroperitoneal hemorrhage, 9/2014.     Past Surgical History:   Procedure Laterality Date   • APPENDECTOMY  1959   • CARDIAC CATHETERIZATION N/A 4/18/2017    Procedure: Left Heart Cath;  Surgeon: Kvng Stauffer MD;  Location:  [facility];  Service:    • CARDIAC CATHETERIZATION N/A 8/9/2017    Procedure: Right and Left Heart Cath;  Surgeon: Kvng Stauffer MD;  Location:  [facility];  Service:    • COLONOSCOPY W/ POLYPECTOMY     • CORONARY ANGIOPLASTY WITH STENT PLACEMENT     • CORONARY ARTERY BYPASS GRAFT N/A 4/24/2017    Procedure: MEDIAN STERNOTOMY, CORONARY ARTERY BYPASS GRAFT X5 UTILIZING THE INTERNAL MAMMARY ARTERY, ENDOVASCULAR VEING HARVEST UTILIZING RIGHT SAPHENOUS VEIN,TRANSESOPHAGEAL ECHO;  Surgeon: Kanu Berger MD;  Location:  [facility] OR;  Service:    • FRACTURE SURGERY     • KNEE SURGERY  2006, 2014    primary repair -left periprosthetic patellar tendon tear 6/11/2014   • LUMBAR LAMINECTOMY  2013    With discectomy   • KY HARLEY HOLE EVAC SUBDUR/EXTRA HEMATOMA Right 11/8/2017    Procedure: CRANIOTOMY FOR SUBDURAL HEMATOMA;  Surgeon: True Frank MD;  Location:  [facility] OR;  Service: Neurosurgery   • REPLACEMENT TOTAL KNEE Bilateral 2009, 2013    Left - 2009 and right - 2013.   • SKIN BIOPSY     • TONSILLECTOMY  1957   • VENA CAVA FILTER PLACEMENT  2014          SWALLOW EVALUATION (last 72 hours)      Swallow Evaluation        11/09/17 1500                Clinical Impression    Patient's Goals For Discharge patient did not state  -        Therapy Frequency PRN  -        SLP Diet Recommendation regular textures;thin liquids  -        SLP Rec. for Method of Medication Administration meds whole with thin liquid  -        Oral Motor Structure and Function    Oral Motor Anatomy and Physiology patient demonstrates anatomy and physiology that is WNL  -        Mucosal Quality moist, healthy  -LS        Volitional Swallow no difficulties initiating volitional swallow  -LS        Clinical Swallow Exam    Mode of Presentation self fed;cup;spoon;straw  -LS        Oral Phase Results intact oral phase without signs of dysfunction  -LS        Pharyngeal Phase Results no signs/symptoms of pharyngeal impairment  -        Summary of Clinical Exam Clinical swallow eval. Pt with increased reposonse time. Voice waxing and waning in strength during eval. Baseline throat clearing noted. Oral phase functional. No overt s/s of asp w/ thins, nectar, pureed, and solid. REC: continue current diet, meds whole in thins. SLP to follow up for diet tolerance x1.  -          User Key  (r) = Recorded By, (t) = Taken By, (c) = Cosigned By    Initials Name Effective Dates     Maria Guadalupe Guzman MS [facility]-SLP 06/22/15 -         EDUCATION  The patient has been educated in the following areas:   Dysphagia (Swallowing Impairment) Oral Care/Hydration.    SLP Recommendation and Plan     SLP Diet Recommendation: regular textures, thin liquids     SLP Rec. for Method of Medication Administration: meds whole with thin liquid                 Therapy Frequency: PRN             Plan of Care Review  Plan Of Care Reviewed With: patient  Progress: progress towards functional goals is fair  Outcome Summary/Follow up Plan: Clinical swallow eval. Pt with increased reposonse time. Voice waxing and waning in strength during eval. Baseline throat clearing noted. Oral phase  functional. No overt s/s of asp w/ thins, nectar, pureed, and solid. REC: continue current diet, meds whole in thins. SLP to follow up for diet tolerance x1.             Time Calculation:         Time Calculation- SLP       11/09/17 1646          Time Calculation- SLP    SLP Start Time 1500  -LS      SLP Received On 11/09/17  -        User Key  (r) = Recorded By, (t) = Taken By, (c) = Cosigned By    Initials Name Provider Type     Maria Guadalupe Guzman MS CCC-SLP Speech and Language Pathologist          Therapy Charges for Today     Code Description Service Date Service Provider Modifiers Qty    [id number] HC ST EVAL ORAL PHARYNG SWALLOW 3 11/9/2017 Maria Guadalupe Guzman MS CCC-SLP GN 1               Maria Guadalupe Guzman MS CCC-SLP  11/9/2017

## 2017-11-09 NOTE — PROGRESS NOTES
Multidisciplinary Rounds    Time: 20min  Patient Name: Magdi Arriaga  Date of Encounter: 11/09/17 9:12 AM  MRN: [id number]  Admission date: 11/5/2017      Reason for visit: MDR. RD to continue to follow per protocol.     Additional information obtained during MDR: Plan to consult SLP for swallow eval    Current diet: Diet Regular; Cardiac      Intervention:  Follow treatment plan  Care plan reviewed    Follow up:   Per protocol      Opal Fenton MS RD/BRENDEN CNSC  9:12 AM

## 2017-11-09 NOTE — THERAPY RE-EVALUATION
Acute Care - Physical Therapy Re-Evaluation  [facility]     Patient Name: Magdi Arriaga  : 1945  MRN: [id number]  Today's Date: 2017   Onset of Illness/Injury or Date of Surgery Date: 17  Date of Referral to PT: 17  Referring Physician: MD Zac      Admit Date: 2017     Visit Dx:    ICD-10-CM ICD-9-CM   1. Cerebral edema G93.6 348.5   2. Left-sided weakness R53.1 728.87   3. Altered mental status, unspecified altered mental status type R41.82 780.97   4. Traumatic subdural hematoma without loss of consciousness, subsequent encounter S06.5X0D V58.89     852.21   5. Impaired functional mobility, balance, gait, and endurance Z74.09 V49.89   6. Impaired mobility and ADLs Z74.09 799.89     Patient Active Problem List   Diagnosis   • Abnormal CT scan, chest   • Allergic rhinitis   • Asthma   • Bronchiectasis   • Dementia   • Depression   • Dyslipidemia   • Hypertension   • Obesity   • JONNY (obstructive sleep apnea)   • Osteoarthritis   • Vitamin D deficiency   • Left bundle branch block   • Normocytic anemia   • COPD (chronic obstructive pulmonary disease)   • Coronary artery disease involving coronary bypass graft of native heart without angina pectoris   • Ischemic cardiomyopathy, most recent LVEF 30%   • History of atrial flutter   • History of DVT (deep vein thrombosis)   • Hematoma, calf   • History of exposure to infectious disease-Patient received platelet transfusion for CABG, 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, followed by [facility]   • Bradycardia   • Syncope, near   • Closed fracture of multiple ribs of right side   • Traumatic SDH, s/p right craniotomy on 2017   • Weakness of left side of body   • Cerebral edema   • Leukocytosis     Past Medical History:   Diagnosis Date   •  Abnormal finding on lung imaging      CT scan of the chest to be wary 2013 reports interstitial lung disease with marked diffuse interstitial and peripheral scarring and significant bronchiectasis in both upper   and lower lung zones.    • Allergic rhinitis     History of allergy injections as a child.   • Arthritis    • Asthma      Patient has had a history of asthma since childhood.   • CHF (congestive heart failure) 2017    ECHO - EF 25%   • COPD (chronic obstructive pulmonary disease)    • Coronary artery disease 2017    CABG X 5   • DVT (deep venous thrombosis)     Bilateral   • Exposure to hepatitis B 2017    Transfusion - managed by ID   • Falls     A.  History of falling traumatic injuries in April 2014 and May 2014 resulting and left rib  fracture and left clavicle fracture.   • Fracture of rib    • History of chest x-ray 02/19/2016    Extensive, but stable postinflammatory pulmonary changes. There has been no change since the previous examination of 12/03/2015   • History of chest x-ray 12/03/2015    Slight increased markings bilaterally, may be progression of his bronchiectasis   • History of chest x-ray 09/06/2014    There has been no change since 09/05/2014   • History of echocardiogram 02/19/2016    Mild concentric LVH is observed.Estimated EF is 50-55%.E to A reversal in the mitral valve flow pattern suggestive of diastolic dysfunction.RV is mildly dilated.Moderate aortic cusp sclerosis is present.Mitral annular calcification.Mild MR.Evidence of borderline pulmonary hypertension.   • History of PFTs 12/03/2015    Values are slightly worse.TLC is c/w mild restriction.Values reduced from 02/16/13.Diffusion capacity is slightly worse   • History of PFTs 02/06/2013    Mild decrease in FVC, may be due to less maximal effort.TLC is within normal levels. Mild reduction in diffusion in absolute value   • History of transfusion    • Hypertension    • Lumbar spinal stenosis 2013    Spinal surgery   • Patellar  tendon rupture     A.  Status post primary repair of the left periprosthetic patellar tendon tear 6/11/2014, post fall at the end of April 2014.   • Pneumonia      A.  Left lower lobe pneumonia treated at [facility], 5/8/2014 through 5/20/2014.   • Pneumothorax      A.  Status post chest tube placed 5/13/2014 and discontinued 5/15/2014 with stable chest x-ray.   • Sleep apnea with use of continuous positive airway pressure (CPAP)    • Traumatic hemorrhagic shock      A.  Secondary to right renal retroperitoneal hemorrhage, 9/2014.     Past Surgical History:   Procedure Laterality Date   • APPENDECTOMY  1959   • CARDIAC CATHETERIZATION N/A 4/18/2017    Procedure: Left Heart Cath;  Surgeon: Kvng Stauffer MD;  Location:  [facility];  Service:    • CARDIAC CATHETERIZATION N/A 8/9/2017    Procedure: Right and Left Heart Cath;  Surgeon: Kvng Stauffer MD;  Location:  [facility];  Service:    • COLONOSCOPY W/ POLYPECTOMY     • CORONARY ANGIOPLASTY WITH STENT PLACEMENT     • CORONARY ARTERY BYPASS GRAFT N/A 4/24/2017    Procedure: MEDIAN STERNOTOMY, CORONARY ARTERY BYPASS GRAFT X5 UTILIZING THE INTERNAL MAMMARY ARTERY, ENDOVASCULAR VEING HARVEST UTILIZING RIGHT SAPHENOUS VEIN,TRANSESOPHAGEAL ECHO;  Surgeon: Kanu Berger MD;  Location:  [facility] OR;  Service:    • FRACTURE SURGERY     • KNEE SURGERY  2006, 2014    primary repair -left periprosthetic patellar tendon tear 6/11/2014   • LUMBAR LAMINECTOMY  2013    With discectomy   • REPLACEMENT TOTAL KNEE Bilateral 2009, 2013    Left - 2009 and right - 2013.   • SKIN BIOPSY     • TONSILLECTOMY  1957   • VENA CAVA FILTER PLACEMENT  2014          PT ASSESSMENT (last 72 hours)      PT Evaluation       11/09/17 0808 11/09/17 0805    Rehab Evaluation    Document Type evaluation  -LS evaluation  -CL    Subjective Information agree to therapy;no complaints  -LS agree to therapy;no complaints  -CL    Patient Effort, Rehab Treatment excellent  -LS  excellent  -CL    Symptoms Noted During/After Treatment none  -LS none  -CL    General Information    Patient Profile Review yes  -LS yes  -CL    Onset of Illness/Injury or Date of Surgery Date 11/05/17  -LS 11/05/17  -CL    Referring Physician MD Zac  -LS MD Zac  -CL    Pertinent History Of Current Problem To ED from [facility] with increased L sided weakness and confusion. MRI demonstrated worsening subacute SDH. S/p R craniotomy 11/8. PMH significant for fall on 10/21 with hospitalization for head injury and L-sided weakness.   -LS Pt presented to ED w/ worsening weakness and confusion. Pt recently admitted w/ a head injury w/ associated L sided weakness s/p fall on 10/21/2017. MRI (+) worsening subacute subdural hematoma. Pt s/p R craniotomy on 11/08.   -CL    Precautions/Limitations fall precautions   L weakness; previous R rib fx's  -LS fall precautions;other (see comments)   L sided weakness, previous R sided rib fxs  -CL    Prior Level of Function --   please see IE; pt Indep prior to recent decline in health  -LS max assist:;all household mobility;transfer;ADL's;dressing;bathing   Pt independent w/ mobility/ADLs prior to PTA  -CL    Equipment Currently Used at Home cane, straight;commode;walker, rolling;shower chair  -LS cane, straight;commode;walker, rolling;shower chair  -CL    Plans/Goals Discussed With patient;spouse/S.O.;agreed upon  -LS spouse/S.O.;patient;agreed upon  -CL    Risks Reviewed patient:;spouse/S.O.:;LOB;dizziness;increased discomfort  -LS patient:;spouse/S.O.:;LOB;nausea/vomiting;dizziness;increased discomfort;change in vital signs;lines disloged  -CL    Benefits Reviewed patient:;spouse/S.O.:;improve function;increase independence;increase strength;increase knowledge  -LS patient:;spouse/S.O.:;improve function;increase independence;increase strength;decrease pain;increase balance;increase knowledge  -CL    Barriers to Rehab medically complex  -LS medically complex  -CL    Living  Environment    Lives With  spouse  -CL    Living Arrangements  house  -CL    Home Accessibility  stairs to enter home;stairs within home   walk-in shower  -CL    Number of Stairs to Enter Home  3  -CL    Number of Stairs Within Home  13   though pt has stair lift  -CL    Living Environment Comment Please refer to IE.   -LS     Clinical Impression    Date of Referral to PT 11/08/17  -LS     PT Diagnosis impaired functional mobility, balance, gait  -LS     Patient/Family Goals Statement return to OF  -LS     Criteria for Skilled Therapeutic Interventions Met yes;treatment indicated  -LS     Rehab Potential good, to achieve stated therapy goals  -LS     Vital Signs    Pre Systolic BP Rehab 131  -  -CL    Pre Treatment Diastolic BP 69  -LS 69  -CL    Post Systolic BP Rehab 132  -  -CL    Post Treatment Diastolic BP 70  -LS 70  -CL    Pretreatment Heart Rate (beats/min) 73  -LS 70  -CL    Intratreatment Heart Rate (beats/min)  64  -CL    Posttreatment Heart Rate (beats/min) 66  -LS     Pre SpO2 (%) 97  -LS 97  -CL    O2 Delivery Pre Treatment room air  -LS room air  -CL    Post SpO2 (%) 93  -LS 95  -CL    O2 Delivery Post Treatment room air  -LS room air  -CL    Pre Patient Position Supine  -LS Supine  -CL    Intra Patient Position Standing  -LS Standing  -CL    Post Patient Position Sitting  -LS Sitting  -CL    Pain Assessment    Pain Assessment 0-10  -LS No/denies pain  -CL    Pain Score 0  -LS     Post Pain Score 0  -LS     Vision Assessment/Intervention    Visual Impairment  WFL  -CL    Cognitive Assessment/Intervention    Current Cognitive/Communication Assessment functional  -LS functional  -CL    Orientation Status oriented to;person;place;required verbal cueing (specifiy in comments)   cues for year/month  -LS oriented to;person;place;required verbal cueing (specifiy in comments);time  -CL    Follows Commands/Answers Questions able to follow single-step instructions;100% of the time;needs  cueing;needs increased time  -LS 75% of the time;needs cueing;needs increased time;needs repetition  -    Personal Safety mild impairment;decreased awareness, need for assist;decreased awareness, need for safety;decreased insight to deficits  -LS mild impairment;decreased awareness, need for assist;decreased awareness, need for safety  -    Personal Safety Interventions fall prevention program maintained;gait belt;nonskid shoes/slippers when out of bed  -LS fall prevention program maintained;gait belt;nonskid shoes/slippers when out of bed  -CL    ROM (Range of Motion)    General ROM lower extremity range of motion deficits identified  -LS     General ROM Detail  BUE grossly WFL.   -CL    General LE Assessment    ROM Detail RLE WFL; PROM LLE WFL (AROM limited by weakness)  -LS     MMT (Manual Muscle Testing)    General MMT Assessment lower extremity strength deficits identified  -     General MMT Assessment Detail R hip flex: 3-/5, R knee ext/ ankle DF: 3+/5; LLE: 0/5  -LS RUE shldr FE 3+/5, bicep/tricep 3+/5,  4-/5. LUE shldr FE 3-/5, bicep/tricep 2+/5,  (limited d/t wrist blocking splint for art line) though observed slight digit F/E.   -CL    Bed Mobility, Assessment/Treatment    Bed Mobility, Assistive Device draw sheet;head of bed elevated  - bed rails;head of bed elevated;draw sheet  -    Bed Mob, Supine to Sit, [place] maximum assist (25% patient effort);2 person assist required;verbal cues required  - maximum assist (25% patient effort);2 person assist required;verbal cues required  -    Bed Mob, Sit to Supine, Polk not tested  -LS     Bed Mobility, Impairments strength decreased;impaired balance  -     Bed Mobility, Comment  VCs for HP/sequencing.   -CL    Transfer Assessment/Treatment    Transfers, Bed-Chair [place] maximum assist (25% patient effort);2 person assist required;verbal cues required  - maximum assist (25% patient effort);2 person assist  required;verbal cues required  -CL    Transfers, Sit-Stand White maximum assist (25% patient effort);2 person assist required;verbal cues required  -LS maximum assist (25% patient effort);2 person assist required;verbal cues required  -CL    Transfers, Stand-Sit White maximum assist (25% patient effort);2 person assist required;verbal cues required  -LS maximum assist (25% patient effort);2 person assist required;verbal cues required  -CL    Transfer, Impairments strength decreased;impaired balance  -LS     Transfer, Comment PT/OT on either side of pt; performed STS x2 from EOB. Unable to achieve fully upright posture.   -LS Pt stood from EOB x2 reps w/ OT/PT on either side of pt w/ BUE support and B feet/knees blocked. Upon standing second time, pt performed stand/pivot to chair as pt only stood ~75% upright.   -CL    Gait Assessment/Treatment    Gait, White Level not appropriate to assess  -LS     Motor Skills/Interventions    Additional Documentation Balance Skills Training (Group)  -LS Balance Skills Training (Group)  -CL    Balance Skills Training    Sitting-Level of Assistance Moderate assistance   progressed to CGA  -LS Moderate assistance   at EOB, posterior lean  -CL    Sitting-Balance Support Right upper extremity supported;Feet supported  -LS Right upper extremity supported;Left upper extremity supported;Feet supported  -CL    Sitting-Balance Activities Trunk control activities  -LS Forward lean;Lateral lean;Reaching for objects;Trunk control activities  -CL    Standing-Level of Assistance Maximum assistance;x2  -LS Maximum assistance;x2  -CL    Static Standing Balance Support Right upper extremity supported;Left upper extremity supported  -LS Right upper extremity supported;Left upper extremity supported  -CL    Standing-Balance Activities Weight Shift A-P;Weight Shift R-L  -LS Weight Shift A-P;Weight Shift R-L  -CL    Therapy Exercises    Right Lower Extremity AROM:;10  reps;sitting;ankle pumps/circles  -LS     Left Lower Extremity PROM:;5 reps;supine;ankle pumps/circles;heel slides;hip abduction/adduction  -LS     Sensory Assessment/Intervention    Light Touch RLE;LLE  -LS LUE;RUE  -CL    LUE Light Touch  WNL  -CL    RUE Light Touch  WNL  -CL    LLE Light Touch WNL  -LS     RLE Light Touch WNL  -LS     Positioning and Restraints    Pre-Treatment Position in bed  -LS in bed  -CL    Post Treatment Position chair  -LS chair  -CL    In Chair notified nsg;reclined;call light within reach;encouraged to call for assist;exit alarm on;RUE elevated;LUE elevated;on mechanical lift sling;waffle cushion;legs elevated;heels elevated;compression device  -LS notified nsg;reclined;call light within reach;encouraged to call for assist;exit alarm on;with family/caregiver;RUE elevated;LUE elevated;waffle cushion;on mechanical lift sling;legs elevated;heels elevated  -CL      11/07/17 1119 11/07/17 0813    Rehab Evaluation    Document Type  evaluation  -    Subjective Information  no complaints;agree to therapy  -    Evaluation Not Performed unable to evaluate, medical status change   HOLD OT. pt undergoing craniotomy tomorrow.  -     Patient Effort, Rehab Treatment  good  -    General Information    Patient Profile Review  yes  -    Onset of Illness/Injury or Date of Surgery Date  11/05/17  -    Referring Physician  REI Robert  -    General Observations  Pt supine in bed, room air.  -    Pertinent History Of Current Problem  Pt presented to ED with increased left side weakness and confusion. Pt s/p head injury 10/21/17. Pt hospitalized for 1 week and then transferred to [facility]. CT: subacute interhemispheric subdural hematoma.   -    Precautions/Limitations  fall precautions  -    Prior Level of Function  independent:;ADL's   Per chart: RW for ambulation.   -    Equipment Currently Used at Home  bipap/ cpap;walker, rolling;cane, straight;commode;raised toilet;shower chair  -     Plans/Goals Discussed With  patient;agreed upon  -    Risks Reviewed  patient:;LOB;change in vital signs;increased discomfort  -    Benefits Reviewed  patient:;improve function;increase strength;increase independence  -    Barriers to Rehab  medically complex  -    Living Environment    Lives With  spouse  -    Living Arrangements  house  -    Home Accessibility  stairs within home;stairs to enter home  -    Number of Stairs to Enter Home  3  -    Number of Stairs Within Home  13   Pt reports having a chair lift.  -    Stair Railings at Home  outside, present on right side  -    Clinical Impression    Date of Referral to PT  11/05/17  -    PT Diagnosis  Impaired Functional Mobility  -    Functional Level At Time Of Evaluation  Dependent, L LE flaccid  -    Patient/Family Goals Statement  Do whatever is asked of him to get better  -    Criteria for Skilled Therapeutic Interventions Met  yes;treatment indicated  -    Rehab Potential  good, to achieve stated therapy goals  -    Vital Signs    Pre Systolic BP Rehab  171   RN notified of elevated BP. RN stated ok to continue with PT  -    Pre Treatment Diastolic BP  102  -    Post Systolic BP Rehab  184   RN in room to give pt meds at completion of session.   -    Post Treatment Diastolic BP  102   RN aware of pre and post BP.  -    Pretreatment Heart Rate (beats/min)  66  -    Posttreatment Heart Rate (beats/min)  72  -SH    Pre SpO2 (%)  96  -SH    O2 Delivery Pre Treatment  room air  -SH    Post SpO2 (%)  95  -SH    O2 Delivery Post Treatment  room air  -SH    Pre Patient Position  Supine  -SH    Intra Patient Position  Sitting  -SH    Post Patient Position  Supine  -SH    Pain Assessment    Pain Assessment  No/denies pain  -    Vision Assessment/Intervention    Visual Impairment  WFL with corrective lenses  -    Cognitive Assessment/Intervention    Current Cognitive/Communication Assessment  functional  -     Orientation Status  oriented to;person;situation   Stated month was October, correct year; stated in rehab  -    Follows Commands/Answers Questions  able to follow single-step instructions;100% of the time  -    Personal Safety  mild impairment  -    Personal Safety Interventions  fall prevention program maintained;nonskid shoes/slippers when out of bed   No OOB activity performed this session.  -    ROM (Range of Motion)    General ROM  upper extremity range of motion deficits identified;lower extremity range of motion deficits identified  -    General ROM Detail  PROM L UE and LLE WFL. AROM LLE absent. See OT eval for LUE.  -    MMT (Manual Muscle Testing)    General MMT Assessment  upper extremity strength deficits identified;lower extremity strength deficits identified  -    General MMT Assessment Detail  0/5 MMT L LE  -    Upper Extremity    Upper Ext Manual Muscle Testing  other (see comments)   See OT Eval, generalized weakness evident.  -    Lower Extremity    Lower Ext Manual Muscle Testing  left hip strength deficit;left knee strength deficit;left ankle strength deficit  -    Bed Mobility, Assessment/Treatment    Bed Mobility, Assistive Device  bed rails  -    Bed Mobility, Roll Left, Blaine  moderate assist (50% patient effort);verbal cues required  -    Bed Mobility, Roll Right, Blaine  verbal cues required;moderate assist (50% patient effort)  -    Bed Mobility, Scoot/Bridge, Blaine  maximum assist (25% patient effort);2 person assist required;nonverbal cues required (demo/gesture);verbal cues required  -    Bed Mob, Supine to Sit, Blaine  maximum assist (25% patient effort);2 person assist required;verbal cues required;nonverbal cues required (demo/gesture)  -    Bed Mob, Sit to Supine, Blaine  verbal cues required;nonverbal cues required (demo/gesture);maximum assist (25% patient effort);2 person assist required  -    Bed Mobility, Safety  Issues  decreased use of arms for pushing/pulling;decreased use of legs for bridging/pushing;impaired trunk control for bed mobility  -    Bed Mobility, Impairments  strength decreased;impaired balance;coordination impaired;motor control impaired  -    Bed Mobility, Comment  Pt demonstrated good strength of R UE/LE and direction following; limited by weakness of L side,   -    Transfer Assessment/Treatment    Transfers, Bed-Chair Kane  not appropriate to assess;unable to perform  -    Transfer, Comment  L LE flaccid.  -    Gait Assessment/Treatment    Gait, Kane Level  not appropriate to assess;unable to perform  -    Motor Skills/Interventions    Additional Documentation  Balance Skills Training (Group)  -    Balance Skills Training    Sitting-Level of Assistance  Moderate assistance  -    Sitting-Balance Support  Feet supported;Right upper extremity supported  -    Sitting-Balance Activities  Lateral lean;Forward lean;Trunk control activities  -    Sitting # of Minutes  5  -    Positioning and Restraints    Pre-Treatment Position  in bed  -    Post Treatment Position  bed  -    In Bed  notified nsg;supine;call light within reach;encouraged to call for assist;exit alarm on;with nsg;SCD pump applied  -      11/06/17 1154       General Information    Equipment Currently Used at Home bipap/ cpap;walker, rolling;cane, straight;commode;raised toilet;shower chair  -MB     Living Environment    Lives With spouse  -MB     Living Arrangements house  -MB     Transportation Available car;family or friend will provide  -MB       User Key  (r) = Recorded By, (t) = Taken By, (c) = Cosigned By    Initials Name Provider Type     Amena Davis, PT Physical Therapist    MAMIE Awad, PT Physical Therapist    TRAE Clark, OT Occupational Therapist    PRASHANT Che, YURI Case Manager    BABAK Velasco, OT Occupational Therapist          Physical Therapy Education     Title: PT  OT SLP Therapies (Active)     Topic: Physical Therapy (Active)     Point: Mobility training (Active)    Learning Progress Summary    Learner Readiness Method Response Comment Documented by Status   Patient Acceptance E,D NR   11/09/17 0947 Active    Acceptance ESAYRA DU   11/07/17 0903 Done   Significant Other Acceptance E,D NR   11/09/17 0947 Active               Point: Home exercise program (Active)    Learning Progress Summary    Learner Readiness Method Response Comment Documented by Status   Patient Acceptance E,D NR   11/09/17 0947 Active    Acceptance E,KRISTINE BATRES   11/07/17 0903 Done   Significant Other Acceptance E,D NR   11/09/17 0947 Active               Point: Body mechanics (Active)    Learning Progress Summary    Learner Readiness Method Response Comment Documented by Status   Patient Acceptance E,D NR   11/09/17 0947 Active   Significant Other Acceptance E,D NR   11/09/17 0947 Active               Point: Precautions (Active)    Learning Progress Summary    Learner Readiness Method Response Comment Documented by Status   Patient Acceptance E,D NR   11/09/17 0947 Active   Significant Other Acceptance E,D NR   11/09/17 0947 Active                      User Key     Initials Effective Dates Name Provider Type Discipline     06/19/15 -  Amena Davis, PT Physical Therapist PT     06/19/15 -  Keli Awad, PT Physical Therapist PT                PT Recommendation and Plan  Anticipated Equipment Needs At Discharge: wheelchair (TBA)  Anticipated Discharge Disposition: inpatient rehabilitation facility  PT Frequency: daily  Plan of Care Review  Plan Of Care Reviewed With: patient, spouse  Outcome Summary/Follow up Plan: PT re-evaluation complete. Pt cont to demonstrates L-sided weakness and decreased indep re: functional mobility, warranting further skilled PT services to promote PLOF. Goals modified to reflect current functional status. Recommend return to IP rehab at d/c. Pt very  motivated to participate.           IP PT Goals       11/09/17 0947 11/07/17 0904       Bed Mobility PT LTG    Bed Mobility PT LTG, Time to Achieve 2 wks  -LS 1 wk  -     Bed Mobility PT LTG, Activity Type supine to sit/sit to supine  -LS all bed mobility  -SH     Bed Mobility PT LTG, [place] Level moderate assist (50% patient effort)  -LS minimum assist (75% patient effort)  -     Bed Mobility PT LTG, Date Goal Reviewed 11/09/17  -LS      Bed Mobility PT LTG, Outcome goal revised  -LS goal ongoing  -     Transfer Training PT LTG    Transfer Training PT LTG, Time to Achieve 2 wks  -LS 1 wk  -SH     Transfer Training PT LTG, Activity Type sit to stand/stand to sit  -LS all transfers  -     Transfer Training PT LTG, [place] Level moderate assist (50% patient effort);2 person assist required  -LS moderate assist (50% patient effort)  -     Transfer Training PT LTG, Assist Device other (see comments)   approp AD  -LS walker, rolling  -     Transfer Training PT  LTG, Date Goal Reviewed 11/09/17  -LS      Transfer Training PT LTG, Outcome goal revised  -LS goal ongoing  -     Gait Training PT LTG    Gait Training Goal PT LTG, Time to Achieve 2 wks  -LS 1 wk  -     Gait Training Goal PT LTG, Gates Level moderate assist (50% patient effort);2 person assist required  -LS moderate assist (50% patient effort)  -     Gait Training Goal PT LTG, Assist Device other (see comments)   approp AD; possible ARJO use  -LS walker, rolling  -SH     Gait Training Goal PT LTG, Distance to Achieve 5  -  -SH     Gait Training Goal PT LTG, Date Goal Reviewed 11/09/17  -LS      Gait Training Goal PT LTG, Outcome goal revised  -LS goal ongoing  -       User Key  (r) = Recorded By, (t) = Taken By, (c) = Cosigned By    Initials Name Provider Type     Amena Davis, PT Physical Therapist     Keli Awad, PT Physical Therapist                Outcome Measures       11/09/17 0808 11/09/17 0805  11/07/17 0813    How much help from another person do you currently need...    Turning from your back to your side while in flat bed without using bedrails? 2  -LS  2  -SH    Moving from lying on back to sitting on the side of a flat bed without bedrails? 2  -LS  1  -SH    Moving to and from a bed to a chair (including a wheelchair)? 2  -LS  1  -SH    Standing up from a chair using your arms (e.g., wheelchair, bedside chair)? 2  -LS  1  -SH    Climbing 3-5 steps with a railing? 1  -LS  1  -SH    To walk in hospital room? 1  -LS  1  -SH    AM-PAC 6 Clicks Score 10  -LS  7  -SH    How much help from another is currently needed...    Putting on and taking off regular lower body clothing?  1  -CL     Bathing (including washing, rinsing, and drying)  1  -CL     Toileting (which includes using toilet bed pan or urinal)  1  -CL     Putting on and taking off regular upper body clothing  1  -CL     Taking care of personal grooming (such as brushing teeth)  2  -CL     Eating meals  2  -CL     Score  8  -CL     Modified Randolph Scale    Modified Randolph Scale 4 - Moderately severe disability.  Unable to walk without assistance, and unable to attend to own bodily needs without assistance.  -LS 4 - Moderately severe disability.  Unable to walk without assistance, and unable to attend to own bodily needs without assistance.  -CL     Functional Assessment    Outcome Measure Options AM-PAC 6 Clicks Basic Mobility (PT)  -LS AM-PAC 6 Clicks Daily Activity (OT);Modified Randolph  -CL AM-PAC 6 Clicks Basic Mobility (PT)  -      User Key  (r) = Recorded By, (t) = Taken By, (c) = Cosigned By    Initials Name Provider Type    SH Amena Davis, PT Physical Therapist    LS Keli Awad, PT Physical Therapist    CL Margarita Velasco, OT Occupational Therapist           Time Calculation:         PT Charges       11/09/17 0953          Time Calculation    Start Time 0808  -      PT Received On 11/09/17  -      PT Goal Re-Cert Due Date 11/17/17   -LS      Time Calculation- PT    Total Timed Code Minutes- PT 24 minute(s)  -LS        User Key  (r) = Recorded By, (t) = Taken By, (c) = Cosigned By    Initials Name Provider Type    LS Keli Awad, PT Physical Therapist          Therapy Charges for Today     Code Description Service Date Service Provider Modifiers Qty    46359362218 HC PT RE-EVAL ESTABLISHED PLAN 2 11/9/2017 Keli Awad, PT GP 1    03252650411 HC PT THER PROC EA 15 MIN 11/9/2017 Keli Awad, PT GP 2          PT G-Codes  Outcome Measure Options: AM-PAC 6 Clicks Basic Mobility (PT)      Keli Awad, PT  11/9/2017

## 2017-11-10 NOTE — PROGRESS NOTES
Adult Nutrition  Assessment/PES    Patient Name:  Magdi Arriaga  YOB: 1945  MRN: [id number]  Admit Date:  11/5/2017    Assessment Date:  11/10/2017       Please have nursing assist pt with meals, observe pt is only using his R. Hand, has difficulty opening items, ? If needs adaptive utensils            Reason for Assessment       11/10/17 1808    Reason for Assessment    Reason For Assessment/Visit length of stay    Time Spent (min) 45    Diagnosis s/p Fall 10-21-17    Cardiac CAD;CHF;HTN;Dyslipidemia;CABG    Neurological Dementia, SDH s/p R. Craniotomy 11-8-17    Ortho Spinal Stenosis    Pulmonary/Critical Care COPD;Obstructive sleep apnea;Pneumonia    Renal CKD    Substance Use Tobacco- remote    Other diagnosis h/o DVT        Patient Active Problem List   Diagnosis   • Abnormal CT scan, chest   • Allergic rhinitis   • Asthma   • Bronchiectasis   • Dementia   • Depression   • Dyslipidemia   • Hypertension   • Obesity   • JONNY (obstructive sleep apnea)   • Osteoarthritis   • Vitamin D deficiency   • Left bundle branch block   • Normocytic anemia   • COPD (chronic obstructive pulmonary disease)   • Coronary artery disease involving coronary bypass graft of native heart without angina pectoris   • Ischemic cardiomyopathy, most recent LVEF 30%   • History of atrial flutter   • History of DVT (deep vein thrombosis)   • Hematoma, calf   • History of exposure to infectious disease-Patient received platelet transfusion for CABG, 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 Pt with increased reposes time. Voice waxing and waning in strength during eval. Baseline throat clearing noted. Oral phase functional. No overt s/s of asp w/ thins, nectar, pureed, and solid. REC: continue current diet, meds whole in thins. SLP to follow up for diet tolerance x1.                Results from last 7 days  Lab Units 11/10/17  0428  11/05/17  1229   SODIUM mmol/L 133  < > 138   POTASSIUM mmol/L 4.6  < > 4.5   CHLORIDE mmol/L 105  < > 108   CO2 mmol/L 23.0  < > 29.0   BUN mg/dL 50*  < > 27*   CREATININE mg/dL 1.20  < > 1.30   CALCIUM mg/dL 9.0  < > 10.4   BILIRUBIN mg/dL  --   --  0.4   ALK PHOS U/L  --   --  105*   ALT (SGPT) U/L  --   --  13   AST (SGOT) U/L  --   --  10   GLUCOSE mg/dL 142*  < > 102*   < > = values in this interval not displayed.        Physical Findings       11/10/17 1811    Physical Findings/Assessment    Additional Documentation Physical Appearance (Group)   pt very pleasant, sitting up in chair, attempting to eat dinner, observe pt only using  R. Hand, has not touched dinner yet, needed assistance with opening napkin etc.    Per RN:  doing well, L. sided weakness     per PT: pt not walking yet,  can not use L. leg    Physical Appearance    Skin surgical wound;other (see comments)   R. knee laceration              Nutrition Prescription Ordered       11/10/17 1813    Nutrition Prescription PO    Current PO Diet Regular    Common Modifiers Cardiac            Evaluation of Received Nutrient/Fluid Intake       11/10/17 1813    " PO Evaluation    Number of Days PO Intake Evaluated Insufficient Data    Number of Meals 1    % PO Intake 25            Problem/Interventions:        Problem 1       11/10/17 1806    Nutrition Diagnoses Problem 1    Problem 1 Predicted Suboptimal Intake    Etiology (related to) Medical Diagnosis    Neurological SDH    Signs/Symptoms (evidenced by) PO Intake    Percent (%) intake recorded 25 %   insufficient data    Over number of meals 1                    Intervention Goal       11/10/17 1813    Intervention Goal    General Nutrition support treatment    PO Establish PO            Nutrition Intervention       11/10/17 1813    Nutrition Intervention    RD/Tech Action Interview for preference;Menu provided;Menu adjusted;Supplement provided            Nutrition Prescription       11/10/17 1813    Nutrition Prescription PO    PO Prescription Begin/change supplement    Supplement Boost Plus    Supplement Frequency 3 times a day            Education/Evaluation       11/10/17 1813    Monitor/Evaluation    Monitor Per protocol;PO intake;Pertinent labs;Weight;Skin status;Symptoms        Electronically signed by:  Laya Li RD  11/10/17 6:14 PM

## 2017-11-10 NOTE — PROGRESS NOTES
"Intensive Care Follow-up     Hospital:  LOS: 5 days   Mr. Magdi Arriaga, 72 y.o. male is followed for:   Traumatic subdural hematoma without loss of consciousness        Subjective   Interval History:  The chart has been reviewed. The patient continues to have some weakness in the left side. He is been eating without difficulty. Physical therapy has been working with him. He remains afebrile. In the dynamics have been stable.    The patient's relevant past medical, surgical and social history were reviewed and updated in Epic as appropriate.        Objective     Infusions:    niCARdipine 5-15 mg/hr Last Rate: Stopped (11/09/17 0000)     Medications:    aspirin 81 mg Oral Daily   atorvastatin 10 mg Oral Nightly   budesonide-formoterol 2 puff Inhalation BID - RT   dexamethasone 4 mg Intravenous TID   entecavir 0.5 mg Oral Q24H   famotidine 20 mg Oral BID   furosemide 20 mg Oral Daily   memantine 10 mg Oral Q12H   montelukast 10 mg Oral Nightly   PARoxetine 30 mg Oral QAM   rivastigmine 1 patch Transdermal Daily   sacubitril-valsartan 0.5 tablet Oral BID       Vital Sign Min/Max for last 24 hours  Temp  Min: 97.6 °F (36.4 °C)  Max: 98.1 °F (36.7 °C)   BP  Min: 119/68  Max: 177/98   Pulse  Min: 57  Max: 71   Resp  Min: 14  Max: 20   SpO2  Min: 90 %  Max: 97 %   Flow (L/min)  Min: 2  Max: 2       Input/Output for last 24 hour shift  11/09 0701 - 11/10 0700  In: 331 [I.V.:331]  Out: 290 [Urine:290]      Objective:  General Appearance:  Well-appearing, comfortable and in no acute distress.    Vital signs: (most recent): Blood pressure 139/72, pulse 59, temperature 97.6 °F (36.4 °C), temperature source Oral, resp. rate 16, height 68\" (172.7 cm), weight 177 lb (80.3 kg), SpO2 95 %.  No fever.    Output: Producing urine.    HEENT: Normal HEENT exam.    Lungs:  Normal respiratory rate and normal effort.  He is not in respiratory distress.  Breath sounds clear to auscultation.  No stridor.  No wheezes, rales, rhonchi or " decreased breath sounds.    Heart: Normal rate.  Regular rhythm.  S1 normal and S2 normal.  No murmur, gallop or friction rub.   Chest: Symmetric chest wall expansion.   Abdomen: Abdomen is soft and non-distended.  Bowel sounds are normal.   There is no abdominal tenderness.     Extremities: There is no deformity or dependent edema.  (Patient has originally 4/5  strength in the left upper extremity and is able to hold antigravity)  Neurological: Patient is alert and oriented to person, place and time.    Pupils:  Pupils are equal, round, and reactive to light.  Pupils are equal.   Skin:  Warm and dry.  No rash or cyanosis.               Results from last 7 days  Lab Units 11/10/17  0428 11/09/17  0357 11/07/17  0436   WBC 10*3/mm3 12.66* 12.52* 13.22*   HEMOGLOBIN g/dL 10.1* 9.6* 11.9*   PLATELETS 10*3/mm3 304 313 378       Results from last 7 days  Lab Units 11/10/17  0428 11/09/17  0357 11/07/17  0436  11/05/17  1229   SODIUM mmol/L 133 138 133  < > 138   POTASSIUM mmol/L 4.6 4.9 5.0  < > 4.5   CO2 mmol/L 23.0 23.0 28.0  < > 29.0   BUN mg/dL 50* 52* 46*  < > 27*   CREATININE mg/dL 1.20 1.20 1.50*  < > 1.30   MAGNESIUM mg/dL  --   --   --   --  2.3   GLUCOSE mg/dL 142* 120* 133*  < > 102*   < > = values in this interval not displayed.  Estimated Creatinine Clearance: 63.2 mL/min (by C-G formula based on Cr of 1.2).            I reviewed the patient's results and images.     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  Follow-up labs have been ordered for tomorrow morning.  He likely will require some inpatient rehabilitation again.  We will plan to transition him to telemetry today.    Plan of care and goals reviewed with mulitdisciplinary team at daily rounds.   I discussed the patient's findings and my recommendations with patient and nursing staff       Donny Hinds MD, [facility]  Pulmonary and Critical Care Medicine  11/10/17 10:59 AM

## 2017-11-10 NOTE — THERAPY TREATMENT NOTE
Acute Care - Physical Therapy Treatment Note  [facility]     Patient Name: Magdi Arriaga  : 1945  MRN: [id number]  Today's Date: 11/10/2017  Onset of Illness/Injury or Date of Surgery Date: 17  Date of Referral to PT: 17  Referring Physician: MD Zac    Admit Date: 2017    Visit Dx:    ICD-10-CM ICD-9-CM   1. Cerebral edema G93.6 348.5   2. Left-sided weakness R53.1 728.87   3. Altered mental status, unspecified altered mental status type R41.82 780.97   4. Traumatic subdural hematoma without loss of consciousness, subsequent encounter S06.5X0D V58.89     852.21   5. Impaired functional mobility, balance, gait, and endurance Z74.09 V49.89   6. Impaired mobility and ADLs Z74.09 799.89     Patient Active Problem List   Diagnosis   • Abnormal CT scan, chest   • Allergic rhinitis   • Asthma   • Bronchiectasis   • Dementia   • Depression   • Dyslipidemia   • Hypertension   • Obesity   • JONNY (obstructive sleep apnea)   • Osteoarthritis   • Vitamin D deficiency   • Left bundle branch block   • Normocytic anemia   • COPD (chronic obstructive pulmonary disease)   • Coronary artery disease involving coronary bypass graft of native heart without angina pectoris   • Ischemic cardiomyopathy, most recent LVEF 30%   • History of atrial flutter   • History of DVT (deep vein thrombosis)   • Hematoma, calf   • History of exposure to infectious disease-Patient received platelet transfusion for CABG, 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, followed by [facility]   • Bradycardia   • Syncope, near   • Closed fracture of multiple ribs of right side   • Traumatic SDH, s/p right craniotomy on 2017   • Weakness of left side of body   • Cerebral edema   • Leukocytosis   • Hematuria, microscopic; in the presence of a  hoang catheter               Adult Rehabilitation Note       11/10/17 1520          Rehab Assessment/Intervention    Discipline physical therapist  -      Document Type therapy note (daily note)  -LS      Subjective Information agree to therapy;no complaints  -LS      Patient Effort, Rehab Treatment good  -LS      Precautions/Limitations fall precautions;other (see comments)   L-sided weakness; previous R rib fx's  -LS      Recorded by [LS] Keli Awad, PT      Vital Signs    Pre Systolic BP Rehab 144  -LS      Pre Treatment Diastolic BP 74  -LS      Post Systolic BP Rehab 157  -LS      Post Treatment Diastolic BP 78  -LS      Pretreatment Heart Rate (beats/min) 69  -LS      Posttreatment Heart Rate (beats/min) 61  -LS      Pre SpO2 (%) 95  -LS      O2 Delivery Pre Treatment room air  -LS      Post SpO2 (%) 95  -LS      O2 Delivery Post Treatment room air  -LS      Pre Patient Position Supine  -LS      Intra Patient Position Standing  -LS      Post Patient Position Sitting  -LS      Recorded by [LS] Keli Awad, PT      Pain Assessment    Pain Assessment 0-10  -LS      Pain Score 0  -LS      Post Pain Score 0  -LS      Recorded by [LS] Keli Awad, PT      Cognitive Assessment/Intervention    Current Cognitive/Communication Assessment functional  -LS      Orientation Status oriented x 4  -LS      Follows Commands/Answers Questions able to follow single-step instructions;75% of the time;needs cueing;needs increased time;needs repetition  -LS      Personal Safety mild impairment;decreased awareness, need for assist;decreased awareness, need for safety;decreased insight to deficits  -LS      Personal Safety Interventions fall prevention program maintained;gait belt;nonskid shoes/slippers when out of bed  -LS      Recorded by [LS] Keli Awad, PT      Bed Mobility, Assessment/Treatment    Bed Mobility, Assistive Device head of bed elevated;draw sheet  -LS      Bed Mob, Supine to Sit, [place] maximum  assist (25% patient effort);2 person assist required;verbal cues required  -LS      Bed Mobility, Impairments strength decreased;impaired balance  -LS      Bed Mobility, Comment VC's for sequencing; assist to RLE and at trunk.   -LS      Recorded by [LS] Keli Awad, PT      Transfer Assessment/Treatment    Transfers, Bed-Chair [employer] maximum assist (25% patient effort);2 person assist required;verbal cues required  -LS      Transfers, Sit-Stand [employer] maximum assist (25% patient effort);2 person assist required;verbal cues required  -LS      Transfers, Stand-Sit [employer] maximum assist (25% patient effort);2 person assist required;verbal cues required  -LS      Transfer, Impairments strength decreased;impaired balance  -LS      Transfer, Comment PT/tech on either side of pt for BUE support and blocking of feet/knees. Performed x2 from EOB (pivot to chair on 2nd stand). Pt with significant difficulty in advancing LEs during pivot.  -LS      Recorded by [LS] Keli Awad, PT      Gait Assessment/Treatment    Gait, [employer] Level not appropriate to assess  -LS      Recorded by [LS] Keli Awad, PT      Motor Skills/Interventions    Additional Documentation Balance Skills Training (Group)  -LS      Recorded by [LS] Keli Awad, PT      Balance Skills Training    Sitting-Level of Assistance Moderate assistance   L lateral and posterior lean; able to progress to CGA   -LS      Sitting-Balance Support Right upper extremity supported;Feet supported  -LS      Sitting-Balance Activities Trunk control activities;Lateral lean;Forward lean  -LS      Standing-Level of Assistance Maximum assistance;x2  -LS      Static Standing Balance Support Right upper extremity supported;Left upper extremity supported  -LS      Recorded by [LS] Keli Awad, PT      Therapy Exercises    Right Lower Extremity AROM:;10 reps;supine;ankle pumps/circles;heel slides;hip abduction/adduction  -LS      Left Lower Extremity  PROM:;supine;hamstring stretch;calf stretch;10 reps;heel raises;hip abduction/adduction  -LS      Right Upper Extremity AROM:;10 reps;sitting;elbow flexion/extension;shoulder abduction/adduction;shoulder extension/flexion  -LS      Bilateral Upper Extremity PROM:;supine;elbow flexion/extension;shoulder abduction/adduction;shoulder extension/flexion   edu pt in using RUE to assist LUE  -LS      Recorded by [LS] Keli Awad, PT      Positioning and Restraints    Pre-Treatment Position in bed  -LS      Post Treatment Position chair  -LS      In Chair notified nsg;reclined;call light within reach;encouraged to call for assist;exit alarm on;RUE elevated;LUE elevated;waffle cushion;on mechanical lift sling;legs elevated;heels elevated  -LS      Recorded by [LS] Keli Awad, PT        User Key  (r) = Recorded By, (t) = Taken By, (c) = Cosigned By    Initials Name Effective Dates    LS Keli Awad, PT 06/19/15 -                 IP PT Goals       11/10/17 1608 11/09/17 0947 11/07/17 0904    Bed Mobility PT LTG    Bed Mobility PT LTG, Time to Achieve  2 wks  -LS 1 wk  -    Bed Mobility PT LTG, Activity Type  supine to sit/sit to supine  -LS all bed mobility  -    Bed Mobility PT LTG, [place] Level  moderate assist (50% patient effort)  -LS minimum assist (75% patient effort)  -    Bed Mobility PT LTG, Date Goal Reviewed  11/09/17  -LS     Bed Mobility PT LTG, Outcome goal ongoing  -LS goal revised  -LS goal ongoing  -    Transfer Training PT LTG    Transfer Training PT LTG, Time to Achieve  2 wks  -LS 1 wk  -    Transfer Training PT LTG, Activity Type  sit to stand/stand to sit  -LS all transfers  -    Transfer Training PT LTG, [place] Level  moderate assist (50% patient effort);2 person assist required  -LS moderate assist (50% patient effort)  -    Transfer Training PT LTG, Assist Device  other (see comments)   approp AD  -LS walker, rolling  -SH    Transfer Training PT  LTG, Date Goal Reviewed   11/09/17  -     Transfer Training PT LTG, Outcome goal ongoing  -LS goal revised  -LS goal ongoing  -    Gait Training PT LTG    Gait Training Goal PT LTG, Time to Achieve  2 wks  -LS 1 wk  -SH    Gait Training Goal PT LTG, [place] Level  moderate assist (50% patient effort);2 person assist required  -LS moderate assist (50% patient effort)  -    Gait Training Goal PT LTG, Assist Device  other (see comments)   approp AD; possible ARJO use  -LS walker, rolling  -    Gait Training Goal PT LTG, Distance to Achieve  5  -  -SH    Gait Training Goal PT LTG, Date Goal Reviewed  11/09/17  -LS     Gait Training Goal PT LTG, Outcome goal ongoing  -LS goal revised  -LS goal ongoing  -      User Key  (r) = Recorded By, (t) = Taken By, (c) = Cosigned By    Initials Name Provider Type    JACQUI Davis, PT Physical Therapist    LS Keli Awad, PT Physical Therapist          Physical Therapy Education     Title: PT OT SLP Therapies (Active)     Topic: Physical Therapy (Active)     Point: Mobility training (Active)    Learning Progress Summary    Learner Readiness Method Response Comment Documented by Status   Patient Acceptance E,D NR   11/10/17 1608 Active    Acceptance E,D NR   11/09/17 0947 Active    Acceptance E,D KRISTINE RAMIREZ   11/07/17 0903 Done   Significant Other Acceptance E,D NR   11/09/17 0947 Active               Point: Home exercise program (Active)    Learning Progress Summary    Learner Readiness Method Response Comment Documented by Status   Patient Acceptance E,D NR   11/10/17 1608 Active    Acceptance E,D NR   11/09/17 0947 Active    Acceptance E,D KRISTINE RAMIREZ   11/07/17 0903 Done   Significant Other Acceptance E,D NR   11/09/17 0947 Active               Point: Body mechanics (Active)    Learning Progress Summary    Learner Readiness Method Response Comment Documented by Status   Patient Acceptance E,D NR   11/10/17 1608 Active    Acceptance E,D NR   11/09/17 0947 Active    Significant Other Acceptance E,D NR   11/09/17 0947 Active               Point: Precautions (Active)    Learning Progress Summary    Learner Readiness Method Response Comment Documented by Status   Patient Acceptance E,D NR  LS 11/10/17 1608 Active    Acceptance ED NR  LS 11/09/17 0947 Active   Significant Other Acceptance ED NR  LS 11/09/17 0947 Active                      User Key     Initials Effective Dates Name Provider Type Discipline     06/19/15 -  Amena Davis, PT Physical Therapist PT     06/19/15 -  Keli Awad, PT Physical Therapist PT                    PT Recommendation and Plan  Anticipated Equipment Needs At Discharge: wheelchair (TBA)  Anticipated Discharge Disposition: inpatient rehabilitation facility  PT Frequency: daily  Plan of Care Review  Plan Of Care Reviewed With: patient  Progress: progress toward functional goals is gradual  Outcome Summary/Follow up Plan: Pt continues to require max x2 assist for STS and pivot to chair. Gave excellent effort with sitting balance activities and with ther ex of extremities. Will cont to progress as clinically warranted.           Outcome Measures       11/10/17 1520 11/09/17 0808 11/09/17 0805    How much help from another person do you currently need...    Turning from your back to your side while in flat bed without using bedrails? 2  -LS 2  -LS     Moving from lying on back to sitting on the side of a flat bed without bedrails? 2  -LS 2  -LS     Moving to and from a bed to a chair (including a wheelchair)? 2  -LS 2  -LS     Standing up from a chair using your arms (e.g., wheelchair, bedside chair)? 2  -LS 2  -LS     Climbing 3-5 steps with a railing? 1  -LS 1  -LS     To walk in hospital room? 1  -LS 1  -LS     AM-PAC 6 Clicks Score 10  -LS 10  -LS     How much help from another is currently needed...    Putting on and taking off regular lower body clothing?   1  -CL    Bathing (including washing, rinsing, and drying)   1  -CL     Toileting (which includes using toilet bed pan or urinal)   1  -CL    Putting on and taking off regular upper body clothing   1  -CL    Taking care of personal grooming (such as brushing teeth)   2  -CL    Eating meals   2  -CL    Score   8  -CL    Modified Aida Scale    Modified [place]ancock Scale  4 - Moderately severe disability.  Unable to walk without assistance, and unable to attend to own bodily needs without assistance.  -LS 4 - Moderately severe disability.  Unable to walk without assistance, and unable to attend to own bodily needs without assistance.  -CL    Functional Assessment    Outcome Measure Options AM-PAC 6 Clicks Basic Mobility (PT)  -LS AM-PAC 6 Clicks Basic Mobility (PT)  -LS AM-PAC 6 Clicks Daily Activity (OT);Modified Aida  -CL      User Key  (r) = Recorded By, (t) = Taken By, (c) = Cosigned By    Initials Name Provider Type    MAMIE Awad, PT Physical Therapist    CL Margarita Velasco, OT Occupational Therapist           Time Calculation:         PT Charges       11/10/17 1612          Time Calculation    Start Time 1420  -      PT Received On 11/10/17  -      PT Goal Re-Cert Due Date 11/17/17  -      Time Calculation- PT    Total Timed Code Minutes- PT 23 minute(s)  -        User Key  (r) = Recorded By, (t) = Taken By, (c) = Cosigned By    Initials Name Provider Type    MAMIE Awad, PT Physical Therapist          Therapy Charges for Today     Code Description Service Date Service Provider Modifiers Qty    13485551685 HC PT RE-EVAL ESTABLISHED PLAN 2 11/9/2017 Keli Awad, PT GP 1    13511579240 HC PT THER PROC EA 15 MIN 11/9/2017 Keli Awad, PT GP 2    32989091174 HC PT THER PROC EA 15 MIN 11/10/2017 Keli Awad, PT GP 2    52191307343 HC PT THER SUPP EA 15 MIN 11/10/2017 Keli Awad, PT GP 2          PT G-Codes  Outcome Measure Options: AM-PAC 6 Clicks Basic Mobility (PT)    Keli Awad, PT  11/10/2017

## 2017-11-10 NOTE — PLAN OF CARE
Problem: Patient Care Overview (Adult)  Goal: Plan of Care Review  Outcome: Ongoing (interventions implemented as appropriate)    11/10/17 1608   Coping/Psychosocial Response Interventions   Plan Of Care Reviewed With patient   Patient Care Overview   Progress progress toward functional goals is gradual   Outcome Evaluation   Outcome Summary/Follow up Plan Pt continues to require max x2 assist for STS and pivot to chair. Gave excellent effort with sitting balance activities and with ther ex of extremities. Will cont to progress as clinically warranted.          Problem: Inpatient Physical Therapy  Goal: Bed Mobility Goal LTG- PT  Outcome: Ongoing (interventions implemented as appropriate)    11/09/17 0947 11/10/17 1608   Bed Mobility PT LTG   Bed Mobility PT LTG, Time to Achieve 2 wks --    Bed Mobility PT LTG, Activity Type supine to sit/sit to supine --    Bed Mobility PT LTG, [place] Level moderate assist (50% patient effort) --    Bed Mobility PT LTG, Outcome --  goal ongoing       Goal: Transfer Training Goal 1 LTG- PT  Outcome: Ongoing (interventions implemented as appropriate)    11/09/17 0947 11/10/17 1608   Transfer Training PT LTG   Transfer Training PT LTG, Time to Achieve 2 wks --    Transfer Training PT LTG, Activity Type sit to stand/stand to sit --    Transfer Training PT LTG, [place] Level moderate assist (50% patient effort);2 person assist required --    Transfer Training PT LTG, Assist Device other (see comments)  (approp AD) --    Transfer Training PT LTG, Outcome --  goal ongoing       Goal: Gait Training Goal LTG- PT  Outcome: Ongoing (interventions implemented as appropriate)    11/09/17 0947 11/10/17 1608   Gait Training PT LTG   Gait Training Goal PT LTG, Time to Achieve 2 wks --    Gait Training Goal PT LTG, [place] Level moderate assist (50% patient effort);2 person assist required --    Gait Training Goal PT LTG, Assist Device other (see comments)  (approp AD; possible ARJO  use) --    Gait Training Goal PT LTG, Distance to Achieve 5 --    Gait Training Goal PT LTG, Outcome --  goal ongoing

## 2017-11-10 NOTE — PROGRESS NOTES
Continued Stay Note  [facility]     Patient Name: Magdi Arriaga  MRN: [id number]  Today's Date: 11/10/2017    Admit Date: 11/5/2017          Discharge Plan       11/10/17 1027    Case Management/Social Work Plan    Plan [facility]    Patient/Family In Agreement With Plan yes    Additional Comments Per Rivka, she hopes to have a bed for Mr. Arriaga on Monday 11/13.                Discharge Codes     None        Expected Discharge Date and Time     Expected Discharge Date Expected Discharge Time    Nov 10, 2017             Hoang Fraser RN

## 2017-11-10 NOTE — PROGRESS NOTES
"NEUROSURGERY PROGRESS NOTE     LOS: 5 days   Patient Care Team:  Colton Dickens MD as PCP - General (Internal Medicine)  Kvng Stauffer MD as Consulting Physician (Cardiology)    Chief Complaint: Left sided weakness and confusion.    POD#: 2 Days Post-Op  Procedures:  Right craniotomy for evacuation of convexity and interhemispheric subdural hematoma.    Interval History:   Patient Complaints: Mild headache.  Patient Denies: Nausea or vomiting.    Vital Signs: Blood pressure 153/82, pulse 58, temperature 98.1 °F (36.7 °C), temperature source Oral, resp. rate 20, height 68\" (172.7 cm), weight 177 lb (80.3 kg), SpO2 97 %.  Intake/Output:   Intake/Output Summary (Last 24 hours) at 11/10/17 0711  Last data filed at 11/09/17 0900   Gross per 24 hour   Intake              331 ml   Output              290 ml   Net               41 ml       Physical Exam:  The patient awakens easily and is more interactive.  He is generally oriented and follow simple commands.  He continues to move his left arm and it is at least antigravity.  There is no movement in his left leg.     Data Review:      Results from last 7 days  Lab Units 11/10/17  0428   SODIUM mmol/L 133   POTASSIUM mmol/L 4.6   CHLORIDE mmol/L 105   CO2 mmol/L 23.0   BUN mg/dL 50*   CREATININE mg/dL 1.20   GLUCOSE mg/dL 142*   CALCIUM mg/dL 9.0       Results from last 7 days  Lab Units 11/10/17  0428   WBC 10*3/mm3 12.66*   HEMOGLOBIN g/dL 10.1*   HEMATOCRIT % 31.7*   PLATELETS 10*3/mm3 304         Assessment/Plan:  1.  Right convexity and interhemispheric subdural hematoma, subacute that is post craniotomy for evacuation.  2.  Multiple cardiac issues including coronary artery disease, ischemic cardiomyopathy, and atrial flutter.  He continues on baby aspirin.  I have held his Plavix in light of craniotomy.  3.  Chronic renal insufficiency.  4.  COPD.  5.  Hypertension.  6.  Dementia.  7.  Disposition: Mobilize.  Okay for floor from neurosurgical perspective..  I " anticipate that the patient will return to rehabilitation within the next several days.  Watch sodium.      True Frank MD  11/10/17  7:11 AM

## 2017-11-11 NOTE — PROGRESS NOTES
"NEUROSURGERY PROGRESS NOTE     LOS: 6 days   Patient Care Team:  Colton Dickens MD as PCP - General (Internal Medicine)  Kvng Stauffer MD as Consulting Physician (Cardiology)    Chief Complaint: Confusion and left-sided weakness.    POD#: 3 Days Post-Op  Procedures:  Right craniotomy for evacuation of convexity and interhemispheric subdural hematoma.    Interval History:   Patient Complaints: None.  Patient Denies: Headache, nausea, or vomiting.    Vital Signs: Blood pressure 168/84, pulse 66, temperature 97.3 °F (36.3 °C), temperature source Oral, resp. rate 20, height 68\" (172.7 cm), weight 177 lb (80.3 kg), SpO2 96 %.  Intake/Output: No intake or output data in the 24 hours ending 11/11/17 0741    Physical Exam:  The patient awakens easily.  He follows simple commands.  He is generally oriented.  His left arm is antigravity.  There is no movement in his left leg.     Data Review:      Results from last 7 days  Lab Units 11/11/17  0348   SODIUM mmol/L 132   POTASSIUM mmol/L 4.4   CHLORIDE mmol/L 101   CO2 mmol/L 25.0   BUN mg/dL 46*   CREATININE mg/dL 1.00   GLUCOSE mg/dL 131*   CALCIUM mg/dL 9.7       Results from last 7 days  Lab Units 11/11/17  0348   WBC 10*3/mm3 11.51*   HEMOGLOBIN g/dL 10.9*   HEMATOCRIT % 33.7*   PLATELETS 10*3/mm3 313         Assessment/Plan:  1.  Right convexity and interhemispheric subdural hematoma, subacute that is post craniotomy for evacuation.  2.  Multiple cardiac issues including coronary artery disease, ischemic cardiomyopathy, and atrial flutter.  He continues on baby aspirin.  I have held his Plavix in light of craniotomy.  3.  Chronic renal insufficiency.  4.  COPD.  5.  Hypertension.  6.  Dementia.  7.  Disposition: Mobilize.  Okay for floor from neurosurgical perspective..  I anticipate that the patient will return to rehabilitation within the next several days.  Watch sodium.      True Frank MD  11/11/17  7:41 AM    "

## 2017-11-11 NOTE — PLAN OF CARE
Problem: Patient Care Overview (Adult)  Goal: Plan of Care Review  Outcome: Ongoing (interventions implemented as appropriate)  Goal: Adult Individualization and Mutuality  Outcome: Ongoing (interventions implemented as appropriate)    11/11/17 1630   Individualization   Patient Specific Preferences to be without falls    Patient Specific Goals to return to rehab and get well         Problem: Fall Risk (Adult)  Goal: Absence of Falls  Outcome: Ongoing (interventions implemented as appropriate)    Problem: Skin Integrity Impairment, Risk/Actual (Adult)  Goal: Skin Integrity/Wound Healing  Outcome: Ongoing (interventions implemented as appropriate)

## 2017-11-11 NOTE — PROGRESS NOTES
"Intensive Care Follow-up     Hospital:  LOS: 6 days   Mr. Magdi Arriaga, 72 y.o. male is followed for:   Traumatic subdural hematoma without loss of consciousness        Subjective   Interval History:  The chart has been reviewed. He did pass a swallow evaluation but his appetite remains poor. He does have a headache but denies nausea or vomiting. He continues to have left sided weakness.    The patient's relevant past medical, surgical and social history were reviewed and updated in Epic as appropriate.        Objective     Infusions:    niCARdipine 5-15 mg/hr Last Rate: Stopped (11/09/17 0000)     Medications:    amLODIPine 5 mg Oral Q24H   aspirin 81 mg Oral Daily   atorvastatin 10 mg Oral Nightly   budesonide-formoterol 2 puff Inhalation BID - RT   dexamethasone 2 mg Oral Q8H   entecavir 0.5 mg Oral Q24H   famotidine 20 mg Oral BID   furosemide 20 mg Oral Daily   memantine 10 mg Oral Q12H   montelukast 10 mg Oral Nightly   PARoxetine 30 mg Oral QAM   rivastigmine 1 patch Transdermal Daily   sacubitril-valsartan 0.5 tablet Oral BID       Vital Sign Min/Max for last 24 hours  Temp  Min: 97.3 °F (36.3 °C)  Max: 97.8 °F (36.6 °C)   BP  Min: 129/75  Max: 170/91   Pulse  Min: 54  Max: 74   Resp  Min: 16  Max: 20   SpO2  Min: 92 %  Max: 98 %   Flow (L/min)  Min: 2  Max: 2       Input/Output for last 24 hour shift         Objective:  General Appearance:  Well-appearing, comfortable and in no acute distress.    Vital signs: (most recent): Blood pressure 154/77, pulse 61, temperature 97.7 °F (36.5 °C), temperature source Oral, resp. rate 18, height 68\" (172.7 cm), weight 177 lb (80.3 kg), SpO2 98 %.  No fever.    Output: Producing urine.    HEENT: (Craniotomy dressing intact. Pupils are equal and reactive to light. Conjunctiva pink. Tongue is midline. Oral mucosa moist.)    Lungs:  Normal respiratory rate and normal effort.  He is not in respiratory distress.  Breath sounds clear to auscultation.  No stridor.  No " wheezes, rales, rhonchi or decreased breath sounds.    Heart: Normal rate.  Regular rhythm.  S1 normal and S2 normal.  No murmur, gallop or friction rub.   Chest: Symmetric chest wall expansion.   Abdomen: Abdomen is soft and non-distended.  Bowel sounds are normal.   There is no abdominal tenderness.     Extremities: There is no deformity or dependent edema.  (Patient has originally 4/5  strength in the left upper extremity and is able to hold antigravity)  Neurological: Patient is alert and oriented to person, place and time.  (Unable to  with his left hand. Cannot press down with his left foot or lift his left leg off of the bed. Alert and cooperative. Follows commands with his right side).    Pupils:  Pupils are equal, round, and reactive to light.  Pupils are equal.   Skin:  Warm and dry.  No rash or cyanosis.               Results from last 7 days  Lab Units 11/11/17  0348 11/10/17  0428 11/09/17  0357   WBC 10*3/mm3 11.51* 12.66* 12.52*   HEMOGLOBIN g/dL 10.9* 10.1* 9.6*   PLATELETS 10*3/mm3 313 304 313       Results from last 7 days  Lab Units 11/11/17 0348 11/10/17  0428 11/09/17  0357  11/05/17  1229   SODIUM mmol/L 132 133 138  < > 138   POTASSIUM mmol/L 4.4 4.6 4.9  < > 4.5   CO2 mmol/L 25.0 23.0 23.0  < > 29.0   BUN mg/dL 46* 50* 52*  < > 27*   CREATININE mg/dL 1.00 1.20 1.20  < > 1.30   MAGNESIUM mg/dL 2.2  --   --   --  2.3   GLUCOSE mg/dL 131* 142* 120*  < > 102*   < > = values in this interval not displayed.  Estimated Creatinine Clearance: 75.8 mL/min (by C-G formula based on Cr of 1).            I reviewed the patient's results and images.      FINDINGS:   1. Scalp hematoma and edema is noted from the previous rightward  craniotomy decompression.  2. Intracranial and subdural air is seen diffusely from the surgical  procedure.  3. There is a small low-attenuation collection seen in the parasagittal  region rightward of the falx cerebra.  4. Evidence of residual acute blood is not  identified and there is no  significant residual collection over the right convexity.  5. There is mass effect in the right hemisphere which is minimal  producing a minimal right to left midline shift.      IMPRESSION:  1. Postoperative changes noted which are expected.  2. No evidence of new or recurrent high attenuation acute bleed.  3. Postoperative changes are quite acceptable as described above.  4. There is significant postoperative edema in the rightward scalp.      D:  11/09/2017     Assessment/Plan   Impression      Principal Problem:    Traumatic SDH, s/p right craniotomy on 11/8/2017  Active Problems:    Dementia    Dyslipidemia    Hypertension    JONNY (obstructive sleep apnea)    COPD (chronic obstructive pulmonary disease)    Coronary artery disease involving coronary bypass graft of native heart without angina pectoris    Ischemic cardiomyopathy, most recent LVEF 30%    History of atrial flutter    History of DVT (deep vein thrombosis)    CKD (chronic kidney disease), stage III    Weakness of left side of body    Cerebral edema    Leukocytosis    Hematuria, microscopic; in the presence of a hoang catheter    72-year-old gentleman following sustaining a head injury, some rib fractures on October 21. At that time he had a subdural hematoma. He was at [facility] and developed progressive left-sided weakness and returned to [facility] where his CT scan revealed more right to left shift from his subacute subdural hematoma which had increased in size. On November 18 underwent craniotomy and evacuation of a right subdural hematoma. Postoperatively his confusion has improved. He has a known history of coronary artery disease with previous bypass. Heart catheterization in August of this year revealed severe proximal LAD first diagonal stenosis with previous stent, occluded LAD with patent graft to a diagonal and LIMA graft, severe left circumflex stenosis with widely patent grafts to OM1 and OM 2, occluded right  coronary artery with patent saphenous vein graft and a left ventricular ejection fraction of 30%. He has a history of atrial fibrillation but currently remains in sinus rhythm. He also has a history of chronic kidney disease, however his current serum creatinine is 1 and his urine output is adequate. He does not have diabetes but blood sugars are mildly elevated from systemic steroids. He has not required insulin. Dyes cultures revealed no Acinetobacter or MRSA from his nares. Hawk catheter is out and he is voiding.       Plan        Continue with physical therapy.  PT/OT  Encourage PO intake   Hold plavix  Amlodipine  Decadron 2mg Q8H  Daily lasix  Replace lytes as needed  He likely will require some inpatient rehabilitation again.    We will plan to transition him to telemetry today.              Usha Almodovar MD    Pulmonary and Critical Care Medicine  11/11/17 12:20 PM

## 2017-11-12 NOTE — PROGRESS NOTES
"NEUROSURGERY PROGRESS NOTE    Vital Signs  Blood pressure 165/93, pulse 71, temperature 98.2 °F (36.8 °C), temperature source Oral, resp. rate 16, height 68\" (172.7 cm), weight 174 lb 14.4 oz (79.3 kg), SpO2 96 %.    Interval History:   POD #4: Right craniotomy for convexity and interhemispheric subdural.  Moved out of ICU yesterday.    Alert, speech intact, left hemiplegia, motor normal on the right.  Currently sitting up in chair at bedside.    Taking diet.  Working with physical therapy.    Incision healing well.  No drainage.      ASSESSMENT: POD #4: Right craniotomy for subdural.  Left knee plegia unchanged since surgery.    PLAN: Continue efforts with physical therapy.  Plan for rehabilitation this coming week.    Phillip Reynaga MD  11/12/17  10:06 AM    "

## 2017-11-12 NOTE — PLAN OF CARE
Problem: Patient Care Overview (Adult)  Goal: Plan of Care Review  Outcome: Ongoing (interventions implemented as appropriate)    11/12/17 1104   Coping/Psychosocial Response Interventions   Plan Of Care Reviewed With patient   Patient Care Overview   Progress progress toward functional goals is gradual   Outcome Evaluation   Outcome Summary/Follow up Plan pt up in chair.attempted standing twice,unable to come up erect.instructed wife in exercises         Problem: Inpatient Physical Therapy  Goal: Bed Mobility Goal LTG- PT  Outcome: Ongoing (interventions implemented as appropriate)    11/09/17 0947 11/12/17 1104   Bed Mobility PT LTG   Bed Mobility PT LTG, Time to Achieve 2 wks --    Bed Mobility PT LTG, Activity Type supine to sit/sit to supine --    Bed Mobility PT LTG, [place] Level moderate assist (50% patient effort) --    Bed Mobility PT LTG, Date Goal Reviewed 11/09/17 --    Bed Mobility PT LTG, Outcome --  goal ongoing       Goal: Transfer Training Goal 1 LTG- PT  Outcome: Ongoing (interventions implemented as appropriate)    11/09/17 0947 11/12/17 1104   Transfer Training PT LTG   Transfer Training PT LTG, Time to Achieve 2 wks --    Transfer Training PT LTG, Activity Type sit to stand/stand to sit --    Transfer Training PT LTG, [place] Level moderate assist (50% patient effort);2 person assist required --    Transfer Training PT LTG, Assist Device other (see comments)  (approp AD) --    Transfer Training PT LTG, Date Goal Reviewed 11/09/17 --    Transfer Training PT LTG, Outcome --  goal ongoing       Goal: Gait Training Goal LTG- PT  Outcome: Ongoing (interventions implemented as appropriate)    11/09/17 0947 11/12/17 1104   Gait Training PT LTG   Gait Training Goal PT LTG, Time to Achieve 2 wks --    Gait Training Goal PT LTG, [place] Level moderate assist (50% patient effort);2 person assist required --    Gait Training Goal PT LTG, Assist Device other (see comments)  (approp AD;  possible ARJO use) --    Gait Training Goal PT LTG, Distance to Achieve 5 --    Gait Training Goal PT LTG, Date Goal Reviewed 11/09/17 --    Gait Training Goal PT LTG, Outcome --  goal ongoing

## 2017-11-12 NOTE — PROGRESS NOTES
INTENSIVIST NOTE     Hospital:  LOS: 7 days   Mr. Magdi Arriaga, 72 y.o. male is followed for:   Principal Problem:    Traumatic SDH, s/p right craniotomy on 11/8/2017  Active Problems:    COPD (chronic obstructive pulmonary disease)    Cerebral edema    Dementia    Hypertension    JONNY (obstructive sleep apnea)    Coronary artery disease involving coronary bypass graft of native heart without angina pectoris    Ischemic cardiomyopathy, most recent LVEF 30%    History of atrial flutter    CKD (chronic kidney disease), stage III    Weakness of left side of body    Leukocytosis    Hematuria, microscopic; in the presence of a hoang catheter    Dyslipidemia    History of DVT (deep vein thrombosis)          SUBJECTIVE   Subjective  Magdi Arriaga is a 72 y.o. male, former smoker, who sustained a head injury, and rib fractures on October 21. At that time he had a subdural hematoma. He was at [facility] and developed progressive left-sided weakness and returned to [facility] where his CT scan revealed more right to left shift from his subacute subdural hematoma which had increased in size. On November 18 he underwent craniotomy and evacuation of a right subdural hematoma. Postoperatively his confusion has improved. He has a known history of coronary artery disease with previous bypass. Heart catheterization in August of this year revealed severe proximal LAD first diagonal stenosis with previous stent, occluded LAD with patent graft to a diagonal and LIMA graft, severe left circumflex stenosis with widely patent grafts to OM1 and OM 2, occluded right coronary artery with patent saphenous vein graft and a left ventricular ejection fraction of 30%. He has a history of atrial fibrillation but currently remains in sinus rhythm. He also has a history of chronic kidney disease, however his current serum creatinine is 1 and his urine output is adequate. He does not have diabetes but blood sugars are mildly elevated from  "systemic steroids. He has not required insulin. Dyes cultures revealed no Acinetobacter or MRSA from his nares. Hawk catheter is out and he is voiding.    Interval History:  No issues or events overnight. Feeling well. Up in chair.      The patient's relevant past medical, surgical and social history were reviewed and updated in Epic as appropriate.        OBJECTIVE     Vital Sign Min/Max for last 24 hours  Temp  Min: 98.2 °F (36.8 °C)  Max: 98.4 °F (36.9 °C)   BP  Min: 144/80  Max: 176/97   Pulse  Min: 61  Max: 73   Resp  Min: 16  Max: 17   SpO2  Min: 93 %  Max: 98 %   Flow (L/min)  Min: 2  Max: 2           Intake/Output Summary (Last 24 hours) at 11/12/17 1010  Last data filed at 11/11/17 2035   Gross per 24 hour   Intake              120 ml   Output                0 ml   Net              120 ml      Flowsheet Rows         First Filed Value    Admission Height  68\" (172.7 cm) Documented at 11/05/2017 1219    Admission Weight  200 lb (90.7 kg) Documented at 11/05/2017 1219        Body mass index is 26.59 kg/(m^2).   Last 3 weights    11/07/17  0500 11/08/17  0803 11/12/17  0500   Weight: 177 lb 8 oz (80.5 kg) 177 lb (80.3 kg) 174 lb 14.4 oz (79.3 kg)        Telemetry: SR      Medications: (drips)    niCARdipine 5-15 mg/hr Last Rate: Stopped (11/09/17 0000)     Medications:    amLODIPine 5 mg Oral Q24H   aspirin 81 mg Oral Daily   atorvastatin 10 mg Oral Nightly   budesonide-formoterol 2 puff Inhalation BID - RT   dexamethasone 2 mg Oral Q8H   entecavir 0.5 mg Oral Q24H   famotidine 20 mg Oral BID   furosemide 20 mg Oral Daily   memantine 10 mg Oral Q12H   montelukast 10 mg Oral Nightly   PARoxetine 30 mg Oral QAM   rivastigmine 1 patch Transdermal Daily   sacubitril-valsartan 0.5 tablet Oral BID     Objective    Physical Exam:  Constitutional:  Appears well-developed and well-nourished. No distress. Up in Chair. Working with therapy.  HEENT:  Normocephalic and atraumatic. PERRL. Or dressing to head. " C/D/Intact  Neck:  Neck supple. No JVD present.   CV: Normal rate, regular rhythm, intact distal pulses.  No gallop, murmur or rub.   Pulmonary/Chest: Effort normal and breath sounds normal but with some decrease in bases. No respiratory distress. Rare coarse crackles  Abdominal: Soft. + BS.  No distension and no mass. There is no tenderness.   Musculoskeletal: Normal muscle tone and strength  Neurological: Alert and oriented to person, place, and time.  No focal deficits  Skin: Skin is warm and dry. No rash noted.   Extremities:  No clubbing, edema or cyanosis  Psychiatric: Normal mood and affect. Behavior is normal.       Results from last 7 days  Lab Units 11/11/17  0348 11/10/17  0428 11/09/17  0357   WBC 10*3/mm3 11.51* 12.66* 12.52*   HEMOGLOBIN g/dL 10.9* 10.1* 9.6*   PLATELETS 10*3/mm3 313 304 313       Results from last 7 days  Lab Units 11/11/17  0348 11/10/17  0428 11/09/17  0357  11/05/17  1229   SODIUM mmol/L 132 133 138  < > 138   POTASSIUM mmol/L 4.4 4.6 4.9  < > 4.5   CO2 mmol/L 25.0 23.0 23.0  < > 29.0   CREATININE mg/dL 1.00 1.20 1.20  < > 1.30   MAGNESIUM mg/dL 2.2  --   --   --  2.3   GLUCOSE mg/dL 131* 142* 120*  < > 102*   < > = values in this interval not displayed.  Estimated Creatinine Clearance: 74.9 mL/min (by C-G formula based on Cr of 1).      Lab Results   Component Value Date    .0 (H) 08/23/2017             Lab Results   Component Value Date    TSH 1.724 10/22/2017     Lab Results   Component Value Date    LACTATE 0.7 08/07/2017     No results found for: CORTISOL  No results found for: PREALBUMIN  No results found for: PROCALCITO   I reviewed the patient's results and images.     Images: None today. CXR for am.     Assessment/Plan   ASSESSMENT / PLAN     72 y.o.male:    Principal Problem:    Traumatic SDH, s/p right craniotomy on 11/8/2017  Active Problems:    COPD (chronic obstructive pulmonary disease)    Cerebral edema    Dementia    Hypertension    JONNY (obstructive sleep  apnea)    Coronary artery disease involving coronary bypass graft of native heart without angina pectoris    Ischemic cardiomyopathy, most recent LVEF 30%    History of atrial flutter    CKD (chronic kidney disease), stage III    Weakness of left side of body    Leukocytosis    Hematuria, microscopic; in the presence of a hoang catheter    Dyslipidemia    History of DVT (deep vein thrombosis)       Assessment & Plan     Aggressive pulmonary hygiene, push incentive spirometer hourly while awake.  Continue with physical therapy. PT/OT  Encourage PO intake   Hold plavix  Amlodipine  Continue decadron 2mg Q8H  Daily lasix  Replace lytes as needed  AM Labs and CXR   He likely will require some inpatient rehabilitation again.       Plan of care and goals reviewed with mulitdisciplinary team at daily rounds.   Coordination of care. Discussed with patient and family   I discussed the patient's findings and my recommendations with patient     Time spent: 30 min (It does not include procedure time).    REI Vasquez, ACNP-BC  Pulmonary & Critical Care Medicine  11/12/17 10:10 AM     *Please note that portions of this note were completed with a voice recognition program. Efforts were made to edit the dictations, but occasionally words are mistranscribed.

## 2017-11-12 NOTE — PLAN OF CARE
Problem: Patient Care Overview (Adult)  Goal: Plan of Care Review  Outcome: Ongoing (interventions implemented as appropriate)    11/12/17 1526   Coping/Psychosocial Response Interventions   Plan Of Care Reviewed With patient   Patient Care Overview   Progress improving   Outcome Evaluation   Outcome Summary/Follow up Plan Patient up in chair improving on transfer utilizing a strong two person assist. VSS         Problem: Fall Risk (Adult)  Goal: Absence of Falls  Outcome: Ongoing (interventions implemented as appropriate)    Problem: Skin Integrity Impairment, Risk/Actual (Adult)  Goal: Skin Integrity/Wound Healing  Outcome: Ongoing (interventions implemented as appropriate)

## 2017-11-12 NOTE — THERAPY TREATMENT NOTE
Acute Care - Physical Therapy Treatment Note  [facility]     Patient Name: Magdi Arriaga  : 1945  MRN: [id number]  Today's Date: 2017  Onset of Illness/Injury or Date of Surgery Date: 17  Date of Referral to PT: 17  Referring Physician: MD Zac    Admit Date: 2017    Visit Dx:    ICD-10-CM ICD-9-CM   1. Cerebral edema G93.6 348.5   2. Left-sided weakness R53.1 728.87   3. Altered mental status, unspecified altered mental status type R41.82 780.97   4. Traumatic subdural hematoma without loss of consciousness, subsequent encounter S06.5X0D V58.89     852.21   5. Impaired functional mobility, balance, gait, and endurance Z74.09 V49.89   6. Impaired mobility and ADLs Z74.09 799.89     Patient Active Problem List   Diagnosis   • Abnormal CT scan, chest   • Allergic rhinitis   • Asthma   • Bronchiectasis   • Dementia   • Depression   • Dyslipidemia   • Hypertension   • Obesity   • JONNY (obstructive sleep apnea)   • Osteoarthritis   • Vitamin D deficiency   • Left bundle branch block   • Normocytic anemia   • COPD (chronic obstructive pulmonary disease)   • Coronary artery disease involving coronary bypass graft of native heart without angina pectoris   • Ischemic cardiomyopathy, most recent LVEF 30%   • History of atrial flutter   • History of DVT (deep vein thrombosis)   • Hematoma, calf   • History of exposure to infectious disease-Patient received platelet transfusion for CABG, 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, followed by [facility]   • Bradycardia   • Syncope, near   • Closed fracture of multiple ribs of right side   • Traumatic SDH, s/p right craniotomy on 2017   • Weakness of left side of body   • Cerebral edema   • Leukocytosis   • Hematuria, microscopic; in the presence of a  hoang catheter               Adult Rehabilitation Note       11/12/17 1010 11/10/17 1520       Rehab Assessment/Intervention    Discipline physical therapy assistant  -UD physical therapist  -LS     Document Type therapy note (daily note)  -UD therapy note (daily note)  -LS     Subjective Information agree to therapy;complains of;weakness  -UD agree to therapy;no complaints  -LS     Patient Effort, Rehab Treatment good  -UD good  -LS     Precautions/Limitations fall precautions  -UD fall precautions;other (see comments)   L-sided weakness; previous R rib fx's  -LS     Recorded by [UD] Olya Mayfield PTA [LS] Keli Awad, PT     Vital Signs    Pre Systolic BP Rehab  144  -LS     Pre Treatment Diastolic BP  74  -LS     Post Systolic BP Rehab  157  -LS     Post Treatment Diastolic BP  78  -LS     Pretreatment Heart Rate (beats/min)  69  -LS     Posttreatment Heart Rate (beats/min)  61  -LS     Pre SpO2 (%)  95  -LS     O2 Delivery Pre Treatment  room air  -LS     Post SpO2 (%)  95  -LS     O2 Delivery Post Treatment  room air  -LS     Pre Patient Position Sitting  -UD Supine  -LS     Intra Patient Position Standing  -UD Standing  -LS     Post Patient Position Sitting  -UD Sitting  -LS     Recorded by [UD] Olya Mayfield PTA [LS] Keli Awad, PT     Pain Assessment    Pain Assessment No/denies pain  -UD 0-10  -LS     Pain Score 0  -UD 0  -LS     Post Pain Score 0  -UD 0  -LS     Recorded by [UD] Olya Mayfield PTA [LS] Keli Awad, PT     Cognitive Assessment/Intervention    Current Cognitive/Communication Assessment  functional  -LS     Orientation Status oriented x 4  -UD oriented x 4  -LS     Follows Commands/Answers Questions 100% of the time  -UD able to follow single-step instructions;75% of the time;needs cueing;needs increased time;needs repetition  -LS     Personal Safety  mild impairment;decreased awareness, need for assist;decreased awareness, need for safety;decreased insight to deficits  -LS     Personal  Safety Interventions fall prevention program maintained  -UD fall prevention program maintained;gait belt;nonskid shoes/slippers when out of bed  -LS     Recorded by [UD] Olya Mayfield PTA [LS] Keli Awad, PT     Bed Mobility, Assessment/Treatment    Bed Mobility, Assistive Device  head of bed elevated;draw sheet  -LS     Bed Mob, Supine to Sit, [place] not tested   up in chair  -UD maximum assist (25% patient effort);2 person assist required;verbal cues required  -LS     Bed Mobility, Impairments  strength decreased;impaired balance  -LS     Bed Mobility, Comment  VC's for sequencing; assist to RLE and at trunk.   -LS     Recorded by [UD] Olya Mayfield PTA [LS] Keli Awad, PT     Transfer Assessment/Treatment    Transfers, Bed-Chair [place]  maximum assist (25% patient effort);2 person assist required;verbal cues required  -LS     Transfers, Sit-Stand [place] maximum assist (25% patient effort);2 person assist required  -UD maximum assist (25% patient effort);2 person assist required;verbal cues required  -LS     Transfers, Stand-Sit [place] maximum assist (25% patient effort);2 person assist required  -UD maximum assist (25% patient effort);2 person assist required;verbal cues required  -LS     Transfer, Impairments  strength decreased;impaired balance  -LS     Transfer, Comment  PT/tech on either side of pt for BUE support and blocking of feet/knees. Performed x2 from EOB (pivot to chair on 2nd stand). Pt with significant difficulty in advancing LEs during pivot.  -LS     Recorded by [UD] Olya Mayfield PTA [LS] Keli Awad, PT     Gait Assessment/Treatment    Gait, [place] Level unable to perform  -UD not appropriate to assess  -LS     Recorded by [UD] Olya Mayfield PTA [LS] Keli Awad, PT     Motor Skills/Interventions    Additional Documentation  Balance Skills Training (Group)  -LS     Recorded by  [LS] Keli Awad, PT     Balance Skills Training    Sitting-Level of  Assistance  Moderate assistance   L lateral and posterior lean; able to progress to CGA   -LS     Sitting-Balance Support  Right upper extremity supported;Feet supported  -LS     Sitting-Balance Activities  Trunk control activities;Lateral lean;Forward lean  -LS     Standing-Level of Assistance  Maximum assistance;x2  -LS     Static Standing Balance Support  Right upper extremity supported;Left upper extremity supported  -LS     Recorded by  [LS] Keli Awad, PT     Therapy Exercises    Right Lower Extremity AROM:;10 reps;sitting  -UD AROM:;10 reps;supine;ankle pumps/circles;heel slides;hip abduction/adduction  -LS     Left Lower Extremity PROM:;10 reps;sitting  -UD PROM:;supine;hamstring stretch;calf stretch;10 reps;heel raises;hip abduction/adduction  -LS     Right Upper Extremity AROM:;10 reps;sitting  -UD AROM:;10 reps;sitting;elbow flexion/extension;shoulder abduction/adduction;shoulder extension/flexion  -LS     Left Upper Extremity PROM:;10 reps;sitting  -UD      Bilateral Upper Extremity  PROM:;supine;elbow flexion/extension;shoulder abduction/adduction;shoulder extension/flexion   edu pt in using RUE to assist LUE  -LS     Recorded by [UD] Olya Mayfield PTA [LS] Keli Awad, PT     Positioning and Restraints    Pre-Treatment Position sitting in chair/recliner  -UD in bed  -LS     Post Treatment Position chair  -UD chair  -LS     In Chair notified nsg;reclined;sitting;call light within reach;exit alarm on;with family/caregiver;legs elevated  -UD notified nsg;reclined;call light within reach;encouraged to call for assist;exit alarm on;RUE elevated;LUE elevated;waffle cushion;on mechanical lift sling;legs elevated;heels elevated  -LS     Recorded by [UD] Olya Mayfield, BG [LS] Keli Awad, PT       User Key  (r) = Recorded By, (t) = Taken By, (c) = Cosigned By    Initials Name Effective Dates    SELWYN Mayfield PTA 06/22/15 -     LS Keli Awad, PT 06/19/15 -                 IP PT Goals       11/12/17  1104 11/10/17 1608 11/09/17 0947    Bed Mobility PT LTG    Bed Mobility PT LTG, Time to Achieve   2 wks  -LS    Bed Mobility PT LTG, Activity Type   supine to sit/sit to supine  -LS    Bed Mobility PT LTG, [place] Level   moderate assist (50% patient effort)  -LS    Bed Mobility PT LTG, Date Goal Reviewed   11/09/17  -LS    Bed Mobility PT LTG, Outcome goal ongoing  -UD goal ongoing  -LS goal revised  -LS    Transfer Training PT LTG    Transfer Training PT LTG, Time to Achieve   2 wks  -LS    Transfer Training PT LTG, Activity Type   sit to stand/stand to sit  -LS    Transfer Training PT LTG, [place] Level   moderate assist (50% patient effort);2 person assist required  -LS    Transfer Training PT LTG, Assist Device   other (see comments)   approp AD  -LS    Transfer Training PT  LTG, Date Goal Reviewed   11/09/17  -LS    Transfer Training PT LTG, Outcome goal ongoing  -UD goal ongoing  -LS goal revised  -LS    Gait Training PT LTG    Gait Training Goal PT LTG, Time to Achieve   2 wks  -LS    Gait Training Goal PT LTG, [place] Level   moderate assist (50% patient effort);2 person assist required  -LS    Gait Training Goal PT LTG, Assist Device   other (see comments)   approp AD; possible ARJO use  -LS    Gait Training Goal PT LTG, Distance to Achieve   5  -LS    Gait Training Goal PT LTG, Date Goal Reviewed   11/09/17  -LS    Gait Training Goal PT LTG, Outcome goal ongoing  -UD goal ongoing  -LS goal revised  -LS      11/07/17 0904          Bed Mobility PT LTG    Bed Mobility PT LTG, Time to Achieve 1 wk  -SH      Bed Mobility PT LTG, Activity Type all bed mobility  -SH      Bed Mobility PT LTG, [place] Level minimum assist (75% patient effort)  -SH      Bed Mobility PT LTG, Outcome goal ongoing  -SH      Transfer Training PT LTG    Transfer Training PT LTG, Time to Achieve 1 wk  -SH      Transfer Training PT LTG, Activity Type all transfers  -SH      Transfer Training PT LTG, [place] Level  moderate assist (50% patient effort)  -      Transfer Training PT LTG, Assist Device walker, rolling  -SH      Transfer Training PT LTG, Outcome goal ongoing  -      Gait Training PT LTG    Gait Training Goal PT LTG, Time to Achieve 1 wk  -SH      Gait Training Goal PT LTG, [place] Level moderate assist (50% patient effort)  -SH      Gait Training Goal PT LTG, Assist Device walker, rolling  -SH      Gait Training Goal PT LTG, Distance to Achieve 150  -SH      Gait Training Goal PT LTG, Outcome goal ongoing  -        User Key  (r) = Recorded By, (t) = Taken By, (c) = Cosigned By    Initials Name Provider Type    UD Olya Mayfield, PTA Physical Therapy Assistant     Amena Davis, PT Physical Therapist    LS Keli Awad, PT Physical Therapist          Physical Therapy Education     Title: PT OT SLP Therapies (Active)     Topic: Physical Therapy (Active)     Point: Mobility training (Active)    Learning Progress Summary    Learner Readiness Method Response Comment Documented by Status   Patient Eager E,D DU,NR   11/12/17 1104 Done    Acceptance E,D [facility] 11/10/17 1608 Active    Acceptance E,D NR   11/09/17 0947 Active    Acceptance E,D VU,DU   11/07/17 0903 Done   Family Eager E,D DU,NR   11/12/17 1104 Done   Significant Other Acceptance E,D NR   11/09/17 0947 Active               Point: Home exercise program (Active)    Learning Progress Summary    Learner Readiness Method Response Comment Documented by Status   Patient Eager E,D DU,NR   11/12/17 1104 Done    Acceptance E,D NR   11/10/17 1608 Active    Acceptance E,D NR   11/09/17 0947 Active    Acceptance E,D VU,DU   11/07/17 0903 Done   Family Eager E,D DU,NR   11/12/17 1104 Done   Significant Other Acceptance E,D NR   11/09/17 0947 Active               Point: Body mechanics (Active)    Learning Progress Summary    Learner Readiness Method Response Comment Documented by Status   Patient Eager E,D DU,NR   11/12/17 1104 Done     Acceptance E,D NR   11/10/17 1608 Active    Acceptance E,D NR  LS 11/09/17 0947 Active   Family Eager E,D DU,NR   11/12/17 1104 Done   Significant Other Acceptance E,D NR   11/09/17 0947 Active               Point: Precautions (Active)    Learning Progress Summary    Learner Readiness Method Response Comment Documented by Status   Patient Eager E,D DU,NR   11/12/17 1104 Done    Acceptance E,D NR  LS 11/10/17 1608 Active    Acceptance E,D NR  LS 11/09/17 0947 Active   Family Eager E,D DU,NR   11/12/17 1104 Done   Significant Other Acceptance E,D NR   11/09/17 0947 Active                      User Key     Initials Effective Dates Name Provider Type Discipline     06/22/15 -  Olya Mayfield, PTA Physical Therapy Assistant PT     06/19/15 -  Amena Davis, PT Physical Therapist PT     06/19/15 -  Keli Awad, PT Physical Therapist PT                    PT Recommendation and Plan  Anticipated Equipment Needs At Discharge: wheelchair (TBA)  Anticipated Discharge Disposition: inpatient rehabilitation facility  PT Frequency: daily  Plan of Care Review  Plan Of Care Reviewed With: patient  Progress: progress toward functional goals is gradual  Outcome Summary/Follow up Plan: pt up in chair.attempted standing twice,unable to come up erect.instructed wife in exercises          Outcome Measures       11/12/17 1010 11/10/17 1520       How much help from another person do you currently need...    Turning from your back to your side while in flat bed without using bedrails? 2  -UD 2  -LS     Moving from lying on back to sitting on the side of a flat bed without bedrails? 2  -UD 2  -LS     Moving to and from a bed to a chair (including a wheelchair)? 2  -UD 2  -LS     Standing up from a chair using your arms (e.g., wheelchair, bedside chair)? 2  -UD 2  -LS     Climbing 3-5 steps with a railing? 1  -UD 1  -LS     To walk in hospital room? 1  -UD 1  -LS     AM-PAC 6 Clicks Score 10  -UD 10  -LS     Functional  Assessment    Outcome Measure Options  AM-PAC 6 Clicks Basic Mobility (PT)  -       User Key  (r) = Recorded By, (t) = Taken By, (c) = Cosigned By    Initials Name Provider Type    SELWYN Mayfield PTA Physical Therapy Assistant    LS Keli Awad, PT Physical Therapist           Time Calculation:         PT Charges       11/12/17 1106          Time Calculation    PT Received On 11/12/17  -UD      PT Goal Re-Cert Due Date 11/17/17  -      Time Calculation- PT    Total Timed Code Minutes- PT 24 minute(s)  -        User Key  (r) = Recorded By, (t) = Taken By, (c) = Cosigned By    Initials Name Provider Type    SELWYN Mayfield PTA Physical Therapy Assistant          Therapy Charges for Today     Code Description Service Date Service Provider Modifiers Qty    26817366790 HC PT THER PROC EA 15 MIN 11/12/2017 Olya Mayfield PTA GP 2    23434134994 HC PT THER SUPP EA 15 MIN 11/12/2017 Olya Mayfield PTA GP 1          PT G-Codes  Outcome Measure Options: AM-PAC 6 Clicks Basic Mobility (PT)    Olya Mayfield PTA  11/12/2017

## 2017-11-13 NOTE — PLAN OF CARE
Problem: Patient Care Overview (Adult)  Goal: Plan of Care Review  Outcome: Ongoing (interventions implemented as appropriate)    11/13/17 5370   Coping/Psychosocial Response Interventions   Plan Of Care Reviewed With patient   Patient Care Overview   Progress no change   Outcome Evaluation   Outcome Summary/Follow up Plan Left side flaccid, increased nonproductive cough. out of bed several times with mechanical lift today         Problem: Fall Risk (Adult)  Goal: Absence of Falls  Outcome: Ongoing (interventions implemented as appropriate)    Problem: Skin Integrity Impairment, Risk/Actual (Adult)  Goal: Skin Integrity/Wound Healing  Outcome: Ongoing (interventions implemented as appropriate)

## 2017-11-13 NOTE — THERAPY TREATMENT NOTE
Acute Care - Occupational Therapy Treatment Note  [facility]     Patient Name: Magdi Arriaga  : 1945  MRN: [id number]  Today's Date: 2017  Onset of Illness/Injury or Date of Surgery Date: 17  Date of Referral to OT: 17  Referring Physician: MD aZc      Admit Date: 2017    Visit Dx:     ICD-10-CM ICD-9-CM   1. Cerebral edema G93.6 348.5   2. Left-sided weakness R53.1 728.87   3. Altered mental status, unspecified altered mental status type R41.82 780.97   4. Traumatic subdural hematoma without loss of consciousness, subsequent encounter S06.5X0D V58.89     852.21   5. Impaired functional mobility, balance, gait, and endurance Z74.09 V49.89   6. Impaired mobility and ADLs Z74.09 799.89     Patient Active Problem List   Diagnosis   • Abnormal CT scan, chest   • Allergic rhinitis   • Asthma   • Bronchiectasis   • Dementia   • Depression   • Dyslipidemia   • Hypertension   • Obesity   • JONNY (obstructive sleep apnea)   • Osteoarthritis   • Vitamin D deficiency   • Left bundle branch block   • Normocytic anemia   • COPD (chronic obstructive pulmonary disease)   • Coronary artery disease involving coronary bypass graft of native heart without angina pectoris   • Ischemic cardiomyopathy, most recent LVEF 30%   • History of atrial flutter   • History of DVT (deep vein thrombosis)   • Hematoma, calf   • History of exposure to infectious disease-Patient received platelet transfusion for CABG, 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, followed by [facility]   • Bradycardia   • Syncope, near   • Closed fracture of multiple ribs of right side   • Traumatic SDH, s/p right craniotomy on 2017   • Weakness of left side of body   • Cerebral edema   • Leukocytosis   • Hematuria, microscopic; in the presence  of a hoang catheter             Adult Rehabilitation Note       11/13/17 0858 11/12/17 1010 11/10/17 1520    Rehab Assessment/Intervention    Discipline occupational therapist  -TA physical therapy assistant  -UD physical therapist  -LS    Document Type therapy note (daily note)  -TA therapy note (daily note)  -UD therapy note (daily note)  -LS    Subjective Information agree to therapy;no complaints  -TA agree to therapy;complains of;weakness  -UD agree to therapy;no complaints  -LS    Patient Effort, Rehab Treatment good  -TA good  -UD good  -LS    Symptoms Noted During/After Treatment none  -TA      Precautions/Limitations fall precautions;other (see comments)   L sided weakness  -TA fall precautions  -UD fall precautions;other (see comments)   L-sided weakness; previous R rib fx's  -LS    Recorded by [TA] Yo Murguia, OT [UD] Olya Mayfield, BG [LS] Keli Awad, PT    Vital Signs    Pre Systolic BP Rehab --   RN cleared pt for tx, VSS  -TA  144  -LS    Pre Treatment Diastolic BP   74  -LS    Post Systolic BP Rehab   157  -LS    Post Treatment Diastolic BP   78  -LS    Pretreatment Heart Rate (beats/min)   69  -LS    Posttreatment Heart Rate (beats/min)   61  -LS    Pre SpO2 (%)   95  -LS    O2 Delivery Pre Treatment room air  -TA  room air  -LS    Post SpO2 (%) 95  -TA  95  -LS    O2 Delivery Post Treatment room air  -TA  room air  -LS    Pre Patient Position Supine  -TA Sitting  -UD Supine  -LS    Intra Patient Position Standing  -TA Standing  -UD Standing  -LS    Post Patient Position Sitting  -TA Sitting  -UD Sitting  -LS    Recorded by [TA] Yo Murguia OT [UD] Olya Mayfield, BG [LS] Keli Awad, PT    Pain Assessment    Pain Assessment No/denies pain  -TA No/denies pain  -UD 0-10  -LS    Pain Score  0  -UD 0  -LS    Post Pain Score  0  -UD 0  -LS    Recorded by [TA] Yo Murguia OT [UD] Olya Mayfield PTA [LS] Keli Awad, PT    Cognitive Assessment/Intervention    Current  Cognitive/Communication Assessment functional  -TA  functional  -LS    Orientation Status oriented x 4  -TA oriented x 4  -UD oriented x 4  -LS    Follows Commands/Answers Questions 100% of the time;able to follow single-step instructions;needs cueing  -% of the time  -UD able to follow single-step instructions;75% of the time;needs cueing;needs increased time;needs repetition  -LS    Personal Safety mild impairment  -TA  mild impairment;decreased awareness, need for assist;decreased awareness, need for safety;decreased insight to deficits  -LS    Personal Safety Interventions fall prevention program maintained;gait belt;muscle strengthening facilitated;nonskid shoes/slippers when out of bed;supervised activity  -TA fall prevention program maintained  -UD fall prevention program maintained;gait belt;nonskid shoes/slippers when out of bed  -LS    Recorded by [TA] Yo Murguia OT [UD] Olya Mayfield PTA [LS] Keli Awad, PT    Bed Mobility, Assessment/Treatment    Bed Mobility, Assistive Device bed rails;head of bed elevated  -TA  head of bed elevated;draw sheet  -LS    Bed Mobility, Roll Left, [place] verbal cues required;moderate assist (50% patient effort)  -TA      Bed Mobility, Roll Right, [place] verbal cues required;moderate assist (50% patient effort)  -TA      Bed Mobility, Scoot/Bridge, [place] maximum assist (25% patient effort);2 person assist required  -TA      Bed Mob, Supine to Sit, [place] not tested  -TA not tested   up in chair  -UD maximum assist (25% patient effort);2 person assist required;verbal cues required  -LS    Bed Mobility, Safety Issues decreased use of arms for pushing/pulling;decreased use of legs for bridging/pushing;impaired trunk control for bed mobility  -TA      Bed Mobility, Impairments strength decreased;impaired balance;motor control impaired;coordination impaired  -TA  strength decreased;impaired balance  -LS    Bed Mobility, Comment Vcs for  sequencing, holding bed rails  -TA  VC's for sequencing; assist to RLE and at trunk.   -LS    Recorded by [TA] Yo Murguia OT [UD] Olya Mayfield PTA [LS] Keli Awad, PT    Transfer Assessment/Treatment    Transfers, Bed-Chair [place] dependent (less than 25% patient effort);2 person assist required  -TA  maximum assist (25% patient effort);2 person assist required;verbal cues required  -LS    Transfers, Bed-Chair-Bed, Assist Device mechanical lift  -TA      Transfers, Sit-Stand [place] maximum assist (25% patient effort);2 person assist required;verbal cues required  -TA maximum assist (25% patient effort);2 person assist required  -UD maximum assist (25% patient effort);2 person assist required;verbal cues required  -LS    Transfers, Stand-Sit [place] maximum assist (25% patient effort);2 person assist required;verbal cues required  -TA maximum assist (25% patient effort);2 person assist required  -UD maximum assist (25% patient effort);2 person assist required;verbal cues required  -LS    Transfers, Sit-Stand-Sit, Assist Device other (see comments)   gait belt, BUE support; L knee blocked  -TA      Transfer, Impairments strength decreased;impaired balance  -TA  strength decreased;impaired balance  -LS    Transfer, Comment STS x 2 trials  -TA  PT/tech on either side of pt for BUE support and blocking of feet/knees. Performed x2 from EOB (pivot to chair on 2nd stand). Pt with significant difficulty in advancing LEs during pivot.  -LS    Recorded by [TA] Yo Murguia OT [UD] Olya Mayfield PTA [LS] Keli Awad, PT    Gait Assessment/Treatment    Gait, [place] Level  unable to perform  -UD not appropriate to assess  -LS    Recorded by  [UD] Olya Mayfield PTA [LS] Keli Awad, PT    Motor Skills/Interventions    Additional Documentation Balance Skills Training (Group)  -TA  Balance Skills Training (Group)  -LS    Recorded by [TA] Yo Murguia OT  [LS] Keli Awad, PT     Balance Skills Training    Sitting-Level of Assistance Moderate assistance  -TA  Moderate assistance   L lateral and posterior lean; able to progress to CGA   -LS    Sitting-Balance Support Feet supported;Right upper extremity supported;Left upper extremity supported  -TA  Right upper extremity supported;Feet supported  -LS    Sitting-Balance Activities Forward lean;Left UE Weight Bearing;Trunk control activities;Lateral lean  -TA  Trunk control activities;Lateral lean;Forward lean  -LS    Sitting # of Minutes 5  -TA      Standing-Level of Assistance Maximum assistance;x2  -TA  Maximum assistance;x2  -LS    Static Standing Balance Support Right upper extremity supported;Left upper extremity supported  -TA  Right upper extremity supported;Left upper extremity supported  -LS    Standing-Balance Activities Weight Shift A-P;Weight Shift R-L   VCs for postural corrections  -TA      Recorded by [TA] Yo Murguia OT  [LS] Keli Awad, PT    Therapy Exercises    Right Lower Extremity  AROM:;10 reps;sitting  -UD AROM:;10 reps;supine;ankle pumps/circles;heel slides;hip abduction/adduction  -LS    Left Lower Extremity  PROM:;10 reps;sitting  -UD PROM:;supine;hamstring stretch;calf stretch;10 reps;heel raises;hip abduction/adduction  -LS    Right Upper Extremity AROM:;10 reps;sitting;elbow flexion/extension;hand pumps;shoulder extension/flexion  -TA AROM:;10 reps;sitting  -UD AROM:;10 reps;sitting;elbow flexion/extension;shoulder abduction/adduction;shoulder extension/flexion  -LS    Left Upper Extremity PROM:;10 reps;sitting;elbow flexion/extension;hand pumps;pronation/supination;shoulder extension/flexion;shoulder horizontal abd/add  -TA PROM:;10 reps;sitting  -UD     Bilateral Upper Extremity   PROM:;supine;elbow flexion/extension;shoulder abduction/adduction;shoulder extension/flexion   edu pt in using RUE to assist LUE  -LS    Recorded by [TA] Yo Murguia, CHARANJIT [UD] Olya Mayfield, BG [LS] Keli Awad, PT     Positioning and Restraints    Pre-Treatment Position in bed  -TA sitting in chair/recliner  -UD in bed  -LS    Post Treatment Position chair  -TA chair  -UD chair  -LS    In Chair notified nsg;reclined;call light within reach;encouraged to call for assist;exit alarm on;RUE elevated;LUE elevated;on mechanical lift sling;legs elevated  -TA notified nsg;reclined;sitting;call light within reach;exit alarm on;with family/caregiver;legs elevated  -UD notified nsg;reclined;call light within reach;encouraged to call for assist;exit alarm on;RUE elevated;LUE elevated;waffle cushion;on mechanical lift sling;legs elevated;heels elevated  -LS    Recorded by [TA] Yo Murguia, OT [UD] Olya Mayfield, PTA [LS] Keli Awad, PT      User Key  (r) = Recorded By, (t) = Taken By, (c) = Cosigned By    Initials Name Effective Dates    UD Olya Mayfield, BG 06/22/15 -     LS Keli Awad, PT 06/19/15 -     TA Yo Murguia OT 03/14/16 -                 OT Goals       11/13/17 0952 11/09/17 0927       Transfer Training OT LTG    Transfer Training OT LTG, Date Established  11/09/17  -CL     Transfer Training OT LTG, Time to Achieve  by discharge  -CL     Transfer Training OT LTG, Activity Type  sit to stand/stand to sit;bed to chair /chair to bed;toilet  -CL     Transfer Training OT LTG, [place] Level  moderate assist (50% patient effort);2 person assist required  -CL     Transfer Training OT LTG, Additional Goal  AAD  -CL     Transfer Training OT LTG, Outcome goal ongoing   Max A x 2 STS this date  -TA      Strength OT LTG    Strength Goal OT LTG, Date Established  11/09/17  -CL     Strength Goal OT LTG, Time to Achieve  by discharge  -CL     Strength Goal OT LTG, Measure to Achieve  Pt will tolerate BUE AROM/AAROM HEP x15 reps to increase strength/endurance to promote ADL performance.   -CL     Strength Goal OT LTG, Outcome goal ongoing  -TA      Static Sitting Balance OT LTG    Static Sitting Balance OT LTG, Date  Established  11/09/17  -CL     Static Sitting Balance OT LTG, Time to Achieve  by discharge  -CL     Static Sitting Balance OT LTG, [place] Level  contact guard assist  -CL     Static Sitting Balance OT LTG, Additional Goal  AAD, at EOB  -CL     Static Sitting Balance OT LTG, Outcome goal ongoing   Min A at front of chair this date  -TA      Eating Self-Feeding OT LTG    Eat Self Feeding Goal OT LTG, Date Established  11/09/17  -CL     Eat Self Feeding Goal OT LTG, Time to Achieve  by discharge  -CL     Eat Self Feed Goal OT LTG, [place] Level  contact guard assist  -CL     Eat Self Feeding Goal OT LTG, Additional Goal  AAD, supported sitting  -CL     Eat Self Feeding Goal OT LTG, Outcome goal ongoing  -TA        User Key  (r) = Recorded By, (t) = Taken By, (c) = Cosigned By    Initials Name Provider Type    TA Yo Murguia, OT Occupational Therapist    BABAK Velasco, OT Occupational Therapist          Occupational Therapy Education     Title: PT OT SLP Therapies (Active)     Topic: Occupational Therapy (Active)     Point: ADL training (Active)    Description: Instruct learner(s) on proper safety adaptation and remediation techniques during self care or transfers.   Instruct in proper use of assistive devices.    Learning Progress Summary    Learner Readiness Method Response Comment Documented by Status   Patient Acceptance E,D NR Pt educated on appropriate safety precautions, t/f techniques, positioning, and benefits of therapy.  11/09/17 0923 Active   Family Acceptance E,D NR Pt educated on appropriate safety precautions, t/f techniques, positioning, and benefits of therapy.  11/09/17 0923 Active               Point: Home exercise program (Done)    Description: Instruct learner(s) on appropriate technique for monitoring, assisting and/or progressing therapeutic exercises/activities.    Learning Progress Summary    Learner Readiness Method Response Comment Documented by Status   Patient  Acceptance E,D VU,NR SROM initiated; body mechanics with bed mobility/STS transfers; reinforced need for call for assist with OOB activities. TA 11/13/17 0951 Done               Point: Precautions (Active)    Description: Instruct learner(s) on prescribed precautions during self-care and functional transfers.    Learning Progress Summary    Learner Readiness Method Response Comment Documented by Status   Patient Acceptance E,D VU,NR SROM initiated; body mechanics with bed mobility/STS transfers; reinforced need for call for assist with OOB activities. TA 11/13/17 0951 Done    Acceptance E,D NR Pt educated on appropriate safety precautions, t/f techniques, positioning, and benefits of therapy.  11/09/17 0923 Active   Family Acceptance E,D NR Pt educated on appropriate safety precautions, t/f techniques, positioning, and benefits of therapy.  11/09/17 0923 Active               Point: Body mechanics (Active)    Description: Instruct learner(s) on proper positioning and spine alignment during self-care, functional mobility activities and/or exercises.    Learning Progress Summary    Learner Readiness Method Response Comment Documented by Status   Patient Acceptance E,D VU,NR SROM initiated; body mechanics with bed mobility/STS transfers; reinforced need for call for assist with OOB activities. TA 11/13/17 0951 Done    Acceptance E,D NR Pt educated on appropriate safety precautions, t/f techniques, positioning, and benefits of therapy.  11/09/17 0923 Active   Family Acceptance E,D NR Pt educated on appropriate safety precautions, t/f techniques, positioning, and benefits of therapy.  11/09/17 0923 Active                      User Key     Initials Effective Dates Name Provider Type Discipline    TA 03/14/16 -  Yo Murguia, OT Occupational Therapist OT     06/08/16 -  Margarita Velasco OT Occupational Therapist OT                  OT Recommendation and Plan  Anticipated Equipment Needs At Discharge:  (TBA  further)  Anticipated Discharge Disposition: inpatient rehabilitation facility  Therapy Frequency: daily  Plan of Care Review  Plan Of Care Reviewed With: patient  Progress: progress toward functional goals as expected  Outcome Summary/Follow up Plan: Pt denies pain, eager to participate in tx this date. Mod A to roll R/L; Max A x 2 STS with L knee blocked x 2 trials this date. Continue per OT POC.        Outcome Measures       11/13/17 0858 11/12/17 1010 11/10/17 1520    How much help from another person do you currently need...    Turning from your back to your side while in flat bed without using bedrails?  2  -UD 2  -LS    Moving from lying on back to sitting on the side of a flat bed without bedrails?  2  -UD 2  -LS    Moving to and from a bed to a chair (including a wheelchair)?  2  -UD 2  -LS    Standing up from a chair using your arms (e.g., wheelchair, bedside chair)?  2  -UD 2  -LS    Climbing 3-5 steps with a railing?  1  -UD 1  -LS    To walk in hospital room?  1  -UD 1  -LS    AM-PAC 6 Clicks Score  10  -UD 10  -LS    How much help from another is currently needed...    Putting on and taking off regular lower body clothing? 1  -TA      Bathing (including washing, rinsing, and drying) 1  -TA      Toileting (which includes using toilet bed pan or urinal) 1  -TA      Putting on and taking off regular upper body clothing 1  -TA      Taking care of personal grooming (such as brushing teeth) 2  -TA      Eating meals 3  -TA      Score 9  -TA      Modified [place] Scale    Modified [place] Scale 4 - Moderately severe disability.  Unable to walk without assistance, and unable to attend to own bodily needs without assistance.  -TA      Functional Assessment    Outcome Measure Options AM-PAC 6 Clicks Daily Activity (OT);Modified [place]  -TA  AM-PAC 6 Clicks Basic Mobility (PT)  -LS      User Key  (r) = Recorded By, (t) = Taken By, (c) = Cosigned By    Initials Name Provider Type    SELWYN Mayfield PTA Physical  Therapy Assistant    LS Keli Awad, PT Physical Therapist    DUANE Murguia OT Occupational Therapist           Time Calculation:         Time Calculation- OT       11/13/17 0956          Time Calculation- OT    OT Start Time 0858   ttc 16 minutes  -TA      Total Timed Code Minutes- OT 16 minute(s)  -TA      OT Received On 11/13/17  -TA      OT Goal Re-Cert Due Date 11/19/17  -TA        User Key  (r) = Recorded By, (t) = Taken By, (c) = Cosigned By    Initials Name Provider Type    DUANE Murguia OT Occupational Therapist           Therapy Charges for Today     Code Description Service Date Service Provider Modifiers Qty    [id number] HC OT THERAPEUTIC ACT EA 15 MIN 11/13/2017 Yo Murguia OT GO 1               Yo Murguia OT  11/13/2017

## 2017-11-13 NOTE — CONSULTS
[facility]  Cardiovascular Consultation Note    Reason for consultation: CHF    History of Present Illness:  This is a 72-year-old white male with a history of hypertension hyperlipidemia and multiple other conditions as well as ischemic heart disease.  He had bypass surgery several years ago.  He was here in August and at that time was found to have cardiomyopathy EF 30%.  His catheter showed patent grafts but he had a 90% proximal LAD lesion which left an unprotected diagonal and he had a stent placed with a drug-eluting stent.  The patient was brought in here he coughs he had an expanding subdural hematoma.  He had previously fallen and developed a subdural hematoma and this did not require surgical intervention.  He went to [facility] had acute changes and was found to have a worsening subdural hematoma and he had to have surgery.  We are asked to see this patient because in the interim he's developed a chest x-ray consistent with CHF and elevated BNP.  Patient was here in August he has no recollection of any symptoms and does not recall having a stent.  He denies any active dyspnea on exertion or chest pain or resting dyspnea.  When the patient left the hospital his EF was 30% and a follow-up echocardiogram has not been performed yet.  He did not leave here with a lifevest.  He does have dementia as well    Cardiac risk factors: #1 male sex #2 increasing age #3 history of hypertension #4 hyperlipidemia #5 documented prior CAD as well as carotid disease    Past medical and surgical history, social and family history reviewed in EMR.    REVIEW OF SYSTEMS:     Past Medical History:   Diagnosis Date   • Abnormal finding on lung imaging      CT scan of the chest to be wary 2013 reports interstitial lung disease with marked diffuse interstitial and peripheral scarring and significant bronchiectasis in both upper   and lower lung zones.    • Allergic rhinitis     History of  allergy injections as a child.   • Arthritis    • Asthma      Patient has had a history of asthma since childhood.   • CHF (congestive heart failure) 2017    ECHO - EF 25%   • COPD (chronic obstructive pulmonary disease)    • Coronary artery disease 2017    CABG X 5   • DVT (deep venous thrombosis)     Bilateral   • Exposure to hepatitis B 2017    Transfusion - managed by ID   • Falls     A.  History of falling traumatic injuries in April 2014 and May 2014 resulting and left rib  fracture and left clavicle fracture.   • Fracture of rib    • History of chest x-ray 02/19/2016    Extensive, but stable postinflammatory pulmonary changes. There has been no change since the previous examination of 12/03/2015   • History of chest x-ray 12/03/2015    Slight increased markings bilaterally, may be progression of his bronchiectasis   • History of chest x-ray 09/06/2014    There has been no change since 09/05/2014   • History of echocardiogram 02/19/2016    Mild concentric LVH is observed.Estimated EF is 50-55%.E to A reversal in the mitral valve flow pattern suggestive of diastolic dysfunction.RV is mildly dilated.Moderate aortic cusp sclerosis is present.Mitral annular calcification.Mild MR.Evidence of borderline pulmonary hypertension.   • History of PFTs 12/03/2015    Values are slightly worse.TLC is c/w mild restriction.Values reduced from 02/16/13.Diffusion capacity is slightly worse   • History of PFTs 02/06/2013    Mild decrease in FVC, may be due to less maximal effort.TLC is within normal levels. Mild reduction in diffusion in absolute value   • History of transfusion    • Hypertension    • Lumbar spinal stenosis 2013    Spinal surgery   • Patellar tendon rupture     A.  Status post primary repair of the left periprosthetic patellar tendon tear 6/11/2014, post fall at the end of April 2014.   • Pneumonia      A.  Left lower lobe pneumonia treated at [facility], 5/8/2014 through 5/20/2014.   • Pneumothorax      A.  Status  post chest tube placed 5/13/2014 and discontinued 5/15/2014 with stable chest x-ray.   • Sleep apnea with use of continuous positive airway pressure (CPAP)    • Traumatic hemorrhagic shock      A.  Secondary to right renal retroperitoneal hemorrhage, 9/2014.     Past Surgical History:   Procedure Laterality Date   • APPENDECTOMY  1959   • CARDIAC CATHETERIZATION N/A 4/18/2017    Procedure: Left Heart Cath;  Surgeon: Kvng Stauffer MD;  Location:  [facility];  Service:    • CARDIAC CATHETERIZATION N/A 8/9/2017    Procedure: Right and Left Heart Cath;  Surgeon: Kvng Stauffer MD;  Location:  [facility];  Service:    • COLONOSCOPY W/ POLYPECTOMY     • CORONARY ANGIOPLASTY WITH STENT PLACEMENT     • CORONARY ARTERY BYPASS GRAFT N/A 4/24/2017    Procedure: MEDIAN STERNOTOMY, CORONARY ARTERY BYPASS GRAFT X5 UTILIZING THE INTERNAL MAMMARY ARTERY, ENDOVASCULAR VEING HARVEST UTILIZING RIGHT SAPHENOUS VEIN,TRANSESOPHAGEAL ECHO;  Surgeon: Kanu Berger MD;  Location: [facility] OR;  Service:    • FRACTURE SURGERY     • KNEE SURGERY  2006, 2014    primary repair -left periprosthetic patellar tendon tear 6/11/2014   • LUMBAR LAMINECTOMY  2013    With discectomy   • VT HARLEY HOLE EVAC SUBDUR/EXTRA HEMATOMA Right 11/8/2017    Procedure: CRANIOTOMY FOR SUBDURAL HEMATOMA;  Surgeon: True rFank MD;  Location: [facility] OR;  Service: Neurosurgery   • REPLACEMENT TOTAL KNEE Bilateral 2009, 2013    Left - 2009 and right - 2013.   • SKIN BIOPSY     • TONSILLECTOMY  1957   • VENA CAVA FILTER PLACEMENT  2014     Social History     Social History   • Marital status:      Spouse name: N/A   • Number of children: 2   • Years of education: N/A     Occupational History   • Post Office at       [employer]     Social History Main Topics   • Smoking status: Former Smoker     Packs/day: 1.00     Years: 17.00     Types: Cigarettes     Start date: 1965     Quit date: 1982   • Smokeless tobacco: Never Used    • Alcohol use No      Comment: Never drinker   • Drug use: No   • Sexual activity: Not on file     Other Topics Concern   • Not on file     Social History Narrative    Domestic life :  Lives in private home with wife        [employer]ism :   [employer]        Sleep hygiene :  In bed 11 PM to 8 AM for 8 hours of sleep        Caffeine use :  4 cup of coffee and 1 diet cola daily        Exercise habits :   Cardiac rehabilitation -  Spring 2017 on -since heart surgery and heart failure        Diet :  Low calorie diet - low in salt and low in sugar        Occupation :   Retired        Hearing :        Vision :        Driving :  No driving         Family History   Problem Relation Age of Onset   • Adopted: Yes   • No Known Problems Mother      Adopted as child   • No Known Problems Father      No knowledge of birth family       H&P ROS reviewed and pertinent CV ROS as noted in HPI.    Cardiac:  The patient denies chest pain or shortness of breath but he is a poor historian based on his dementia as well as this recent head trauma.  Currently denies being short of breath  Respiratory:  No hemoptysis no orthopnea no present dyspnea no wheezing  Lower Extremities:  Denies swelling  GI:  No nausea vomiting  Neuro:  Came in with some weakness in his left arm and that slowly improved.  His dural hematoma was evacuated by Dr. Frank  Hematology:  No obvious bleeding diathesis ecchymosis or petechiae      Physical Exam: General  pleasant white male sitting in a recliner no acute distress responds slowly       HEENT: Scars present over his right carotid is no JVP is tongue is midline       Respiratory:  Equal bilateral symmetrical expansion and clear to auscultation on the left with isolated crackles on the right       Cardiovascular: Regular rate and rhythm with expiratory split S1 with no murmur and no peripheral edema       GI: Positive bowel sounds nontender to palpation       Lower Extremities:        Neuro: Cranial nerves II  through XII are grossly normal he is able to move his left upper extremity but is weak       Skin:  Warm and dry       Psych: Patient has a flat affect but is pleasant    Results Review: EKG shows sinus rhythm LVH diffuse ST abnormalities.  I personally reviewed his cardiac catheter film which showed a 90% lesion of the proximal LAD with an unprotected diagonal which underwent successful stenting of his bypass grafts were widely patent           Vital Sign Min/Max for last 24 hours  Temp  Min: 97.1 °F (36.2 °C)  Max: 97.9 °F (36.6 °C)   BP  Min: 142/89  Max: 158/91   Pulse  Min: 60  Max: 87   Resp  Min: 16  Max: 18   SpO2  Min: 92 %  Max: 95 %   Flow (L/min)  Min: 2  Max: 2      Intake/Output Summary (Last 24 hours) at 11/13/17 1120  Last data filed at 11/12/17 2237   Gross per 24 hour   Intake              600 ml   Output                0 ml   Net              600 ml             Current Facility-Administered Medications:   •  acetaminophen (TYLENOL) suppository 650 mg, 650 mg, Rectal, Q4H PRN, True Frank MD  •  acetaminophen (TYLENOL) tablet 650 mg, 650 mg, Oral, Q4H PRN, True Frank MD  •  albuterol (PROVENTIL) nebulizer solution 0.5% 2.5 mg/0.5mL, 2.5 mg, Nebulization, Q4H PRN, RIE Mccracken  •  amLODIPine (NORVASC) tablet 5 mg, 5 mg, Oral, Q24H, Gaurang Hickman MD, 5 mg at 11/13/17 0848  •  aspirin chewable tablet 81 mg, 81 mg, Oral, Daily, REI Mccracken, 81 mg at 11/13/17 0847  •  atorvastatin (LIPITOR) tablet 10 mg, 10 mg, Oral, Nightly, REI Mccracken, 10 mg at 11/12/17 2123  •  bisacodyl (DULCOLAX) EC tablet 5 mg, 5 mg, Oral, Daily PRN, True Frank MD  •  bisacodyl (DULCOLAX) suppository 10 mg, 10 mg, Rectal, Daily PRN, True Frank MD  •  budesonide-formoterol (SYMBICORT) 160-4.5 MCG/ACT inhaler 2 puff, 2 puff, Inhalation, BID - RT, REI Mcrcacken, 2 puff at 11/13/17 0947  •  dexamethasone (DECADRON) tablet 2 mg, 2 mg, Oral, Q12H, True Frank MD  •   docusate sodium (COLACE) capsule 100 mg, 100 mg, Oral, BID PRN, True Frank MD  •  entecavir (BARACLUDE) tablet 0.5 mg, 0.5 mg, Oral, Q24H, Makenna Franco MD, 0.5 mg at 11/13/17 0848  •  famotidine (PEPCID) tablet 20 mg, 20 mg, Oral, BID, Nanda Lazaro, APRN, 20 mg at 11/13/17 0847  •  [START ON 11/14/2017] furosemide (LASIX) tablet 40 mg, 40 mg, Oral, Daily, Charanjit Yepez MD  •  hydrALAZINE (APRESOLINE) injection 10 mg, 10 mg, Intravenous, Q6H PRN, REI Mccracken, 10 mg at 11/11/17 0312  •  HYDROcodone-acetaminophen (NORCO) 5-325 MG per tablet 1 tablet, 1 tablet, Oral, Q4H PRN, True Frank MD, 1 tablet at 11/08/17 1356  •  HYDROcodone-acetaminophen (NORCO) 7.5-325 MG per tablet 2 tablet, 2 tablet, Oral, Q4H PRN, True Frank MD  •  ipratropium-albuterol (DUO-NEB) nebulizer solution 3 mL, 3 mL, Nebulization, Q4H PRN, REI Mccracken  •  labetalol (NORMODYNE,TRANDATE) injection 10 mg, 10 mg, Intravenous, Q10 Min PRN, True Frank MD  •  magnesium hydroxide (MILK OF MAGNESIA) suspension 2400 mg/10mL 10 mL, 10 mL, Oral, Daily PRN, True Frank MD  •  memantine (NAMENDA) tablet 10 mg, 10 mg, Oral, Q12H, REI Mccracken, 10 mg at 11/13/17 0848  •  montelukast (SINGULAIR) tablet 10 mg, 10 mg, Oral, Nightly, REI Mccracken, 10 mg at 11/12/17 2123  •  niCARdipine (CARDENE) 25 mg/250 mL (0.1 mg/mL) 0.9% NS VTB infusion, 5-15 mg/hr, Intravenous, Titrated, True Frank MD, Stopped at 11/09/17 0000  •  ondansetron (ZOFRAN) tablet 4 mg, 4 mg, Oral, Q6H PRN **OR** ondansetron (ZOFRAN) injection 4 mg, 4 mg, Intravenous, Q6H PRN, True Frank MD  •  PARoxetine (PAXIL) tablet 30 mg, 30 mg, Oral, QAM, REI Mccracken, 30 mg at 11/13/17 0651  •  rivastigmine (EXELON) 9.5 MG/24HR patch 1 patch, 1 patch, Transdermal, Daily, REI Mccracken, 1 patch at 11/13/17 0849  •  sacubitril-valsartan (ENTRESTO) 49-51 MG tablet 0.5 tablet, 0.5 tablet, Oral, BID, Kyara Robert,  APRN, 0.5 tablet at 11/13/17 0848  •  sennosides-docusate sodium (SENOKOT-S) 8.6-50 MG tablet 1 tablet, 1 tablet, Oral, Nightly PRN, True Frank MD    Lab Review:     Results from last 7 days  Lab Units 11/13/17  0431 11/11/17 0348 11/10/17  0428   WBC 10*3/mm3 15.08* 11.51* 12.66*   HEMOGLOBIN g/dL 11.8* 10.9* 10.1*   PLATELETS 10*3/mm3 311 313 304       Results from last 7 days  Lab Units 11/13/17  0431 11/11/17 0348 11/10/17  0428   SODIUM mmol/L 134 132 133   POTASSIUM mmol/L 5.0 4.4 4.6   CO2 mmol/L 27.0 25.0 23.0   BUN mg/dL 49* 46* 50*   CREATININE mg/dL 1.00 1.00 1.20   MAGNESIUM mg/dL 2.3 2.2  --    PHOSPHORUS mg/dL 3.6  --   --    GLUCOSE mg/dL 106* 131* 142*     Estimated Creatinine Clearance: 76.3 mL/min (by C-G formula based on Cr of 1).        .lipd  Lab Results   Component Value Date    TRIG 102 09/22/2017    HDL 65 (H) 09/22/2017    LDLDIRECT 106 09/22/2017    AST 42 (H) 11/13/2017    ALT 93 (H) 11/13/2017       Radiology Reports:  Imaging Results (last 72 hours)     Procedure Component Value Units Date/Time    XR Chest 1 View [[id number]] Collected:  11/13/17 0903     Updated:  11/13/17 1052    Narrative:       EXAMINATION: XR CHEST 1 VW-11/13/2017:      INDICATION: COPD; G93.6-Cerebral edema; R53.1-Weakness; R41.82-Altered  mental status, unspecified; S06.6S4X-Mtsvkupsc subdural hemorrhage  without loss of consciousness, subsequent encounter; Z74.09-Other  reduced mobility; Z74.09-Other reduced mobility.      COMPARISON: 11/05/2017.     FINDINGS: Portable chest reveals the patient to be status post median  sternotomy. Degenerative changes seen within the spine. The heart is  enlarged. Increased pulmonary vascularity bilaterally worsening in the  interval. No definite pleural effusion or pneumothorax. Degenerative  changes seen within the shoulders.           Impression:       Heart is enlarged with worsening pulmonary vascularity in  the interval.     D:  11/13/2017  E:  11/13/2017     This  report was finalized on 11/13/2017 10:50 AM by Dr. Carlie Alcantar MD.             Assessment/Plan: 1This patient has a history of ischemic cardiomyopathy came in for worsening neurologic symptoms due to worsening subdural hematoma.  Over the course of his admission he developed a chest x-ray with CHF and elevated BNP.  His CHF meds including Entresto have been restarted.  He currently appears be well compensated.  2 CAD-status post a drug-eluting stent in August he's only on aspirin now only need to resume Plavix when okay with neurosurgery  3 limited 2-D echocardiogram today to reassess EF for possible LifeVest placement      Kanu Wilder MD  11/13/17  11:20 AM

## 2017-11-13 NOTE — THERAPY TREATMENT NOTE
Acute Care - Physical Therapy Treatment Note  [facility]     Patient Name: Magdi Arriaga  : 1945  MRN: [id number]  Today's Date: 2017  Onset of Illness/Injury or Date of Surgery Date: 17  Date of Referral to PT: 17  Referring Physician: MD Zac    Admit Date: 2017    Visit Dx:    ICD-10-CM ICD-9-CM   1. Cerebral edema G93.6 348.5   2. Left-sided weakness R53.1 728.87   3. Altered mental status, unspecified altered mental status type R41.82 780.97   4. Traumatic subdural hematoma without loss of consciousness, subsequent encounter S06.5X0D V58.89     852.21   5. Impaired functional mobility, balance, gait, and endurance Z74.09 V49.89   6. Impaired mobility and ADLs Z74.09 799.89     Patient Active Problem List   Diagnosis   • Abnormal CT scan, chest   • Allergic rhinitis   • Asthma   • Bronchiectasis   • Dementia   • Depression   • Dyslipidemia   • Hypertension   • Obesity   • JONNY (obstructive sleep apnea)   • Osteoarthritis   • Vitamin D deficiency   • Left bundle branch block   • Normocytic anemia   • COPD (chronic obstructive pulmonary disease)   • Coronary artery disease involving coronary bypass graft of native heart without angina pectoris   • Ischemic cardiomyopathy, most recent LVEF 30%   • History of atrial flutter   • History of DVT (deep vein thrombosis)   • Hematoma, calf   • History of exposure to infectious disease-Patient received platelet transfusion for CABG, 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, followed by [facility]   • Bradycardia   • Syncope, near   • Closed fracture of multiple ribs of right side   • Traumatic SDH, s/p right craniotomy on 2017   • Weakness of left side of body   • Cerebral edema   • Leukocytosis   • Hematuria, microscopic; in the presence of a  hoang catheter               Adult Rehabilitation Note       11/13/17 0858 11/13/17 0855 11/12/17 1010    Rehab Assessment/Intervention    Discipline occupational therapist  -TA physical therapy assistant  -UD physical therapy assistant  -UD    Document Type therapy note (daily note)  -TA therapy note (daily note)  -UD therapy note (daily note)  -UD    Subjective Information agree to therapy;no complaints  -TA agree to therapy;no complaints  -UD agree to therapy;complains of;weakness  -UD    Patient Effort, Rehab Treatment good  -TA good  -UD good  -UD    Symptoms Noted During/After Treatment none  -TA      Precautions/Limitations fall precautions;other (see comments)   L sided weakness  -TA fall precautions  -UD fall precautions  -UD    Recorded by [TA] Yo Murguia OT [UD] Olya Mayfield PTA [UD] Olya Mayfield PTA    Vital Signs    Pre Systolic BP Rehab --   RN cleared pt for tx, VSS  -TA      O2 Delivery Pre Treatment room air  -TA      Post SpO2 (%) 95  -TA      O2 Delivery Post Treatment room air  -TA room air  -UD     Pre Patient Position Supine  -TA Supine  -UD Sitting  -UD    Intra Patient Position Standing  -TA Standing  -UD Standing  -UD    Post Patient Position Sitting  -TA Sitting  -UD Sitting  -UD    Recorded by [TA] Yo Murguia OT [UD] Olya Mayfield PTA [UD] Olya Mayfield PTA    Pain Assessment    Pain Assessment No/denies pain  -TA No/denies pain  -UD No/denies pain  -UD    Pain Score  0  -UD 0  -UD    Post Pain Score  0  -UD 0  -UD    Recorded by [TA] Yo Murguia OT [UD] Olya Mayfield PTA [UD] Olya Mayfield PTA    Cognitive Assessment/Intervention    Current Cognitive/Communication Assessment functional  -TA      Orientation Status oriented x 4  -TA oriented x 4  -UD oriented x 4  -UD    Follows Commands/Answers Questions 100% of the time;able to follow single-step instructions;needs cueing  -% of the time  -% of the time  -UD    Personal Safety mild impairment  -TA       Personal Safety Interventions fall prevention program maintained;gait belt;muscle strengthening facilitated;nonskid shoes/slippers when out of bed;supervised activity  -TA fall prevention program maintained  -UD fall prevention program maintained  -UD    Recorded by [TA] Yo Murguia OT [UD] Olya Mayfeild PTA [UD] Olya Mayfield PTA    Bed Mobility, Assessment/Treatment    Bed Mobility, Assistive Device bed rails;head of bed elevated  -TA      Bed Mobility, Roll Left, [place] verbal cues required;moderate assist (50% patient effort)  -TA moderate assist (50% patient effort)  -UD     Bed Mobility, Roll Right, [place] verbal cues required;moderate assist (50% patient effort)  -TA moderate assist (50% patient effort)  -UD     Bed Mobility, Scoot/Bridge, [place] maximum assist (25% patient effort);2 person assist required  -TA      Bed Mob, Supine to Sit, [place] not tested  -TA  not tested   up in chair  -UD    Bed Mobility, Safety Issues decreased use of arms for pushing/pulling;decreased use of legs for bridging/pushing;impaired trunk control for bed mobility  -TA      Bed Mobility, Impairments strength decreased;impaired balance;motor control impaired;coordination impaired  -TA      Bed Mobility, Comment Vcs for sequencing, holding bed rails  -TA      Recorded by [TA] Yo Murguia OT [UD] Olya Mayfield PTA [UD] Olya Mayfield PTA    Transfer Assessment/Treatment    Transfers, Bed-Chair [place] dependent (less than 25% patient effort);2 person assist required  -TA dependent (less than 25% patient effort)  -UD     Transfers, Bed-Chair-Bed, Assist Device mechanical lift  -TA mechanical lift  -UD     Transfers, Sit-Stand [place] maximum assist (25% patient effort);2 person assist required;verbal cues required  -TA maximum assist (25% patient effort);2 person assist required  -UD maximum assist (25% patient effort);2 person assist required  -UD    Transfers, Stand-Sit  [place] maximum assist (25% patient effort);2 person assist required;verbal cues required  -TA maximum assist (25% patient effort);2 person assist required  -UD maximum assist (25% patient effort);2 person assist required  -UD    Transfers, Sit-Stand-Sit, Assist Device other (see comments)   gait belt, BUE support; L knee blocked  -TA      Transfer, Impairments strength decreased;impaired balance  -TA      Transfer, Comment STS x 2 trials  -TA      Recorded by [TA] Yo Murguia OT [UD] Olya Mayfield PTA [UD] Olya Mayfield PTA    Gait Assessment/Treatment    Gait, Falls Level  unable to perform  -UD unable to perform  -UD    Recorded by  [UD] Olya Mayfield PTA [UD] Olya Mayfield PTA    Motor Skills/Interventions    Additional Documentation Balance Skills Training (Group)  -TA      Recorded by [TA] Yo Murguia OT      Balance Skills Training    Sitting-Level of Assistance Moderate assistance  -TA      Sitting-Balance Support Feet supported;Right upper extremity supported;Left upper extremity supported  -TA      Sitting-Balance Activities Forward lean;Left UE Weight Bearing;Trunk control activities;Lateral lean  -TA      Sitting # of Minutes 5  -TA      Standing-Level of Assistance Maximum assistance;x2  -TA      Static Standing Balance Support Right upper extremity supported;Left upper extremity supported  -TA      Standing-Balance Activities Weight Shift A-P;Weight Shift R-L   VCs for postural corrections  -TA      Recorded by [TA] Yo Murguia OT      Therapy Exercises    Right Lower Extremity   AROM:;10 reps;sitting  -UD    Left Lower Extremity   PROM:;10 reps;sitting  -UD    Bilateral Lower Extremities  AROM:;10 reps;AAROM:;sitting  -UD     Right Upper Extremity AROM:;10 reps;sitting;elbow flexion/extension;hand pumps;shoulder extension/flexion  -TA  AROM:;10 reps;sitting  -UD    Left Upper Extremity PROM:;10 reps;sitting;elbow flexion/extension;hand  pumps;pronation/supination;shoulder extension/flexion;shoulder horizontal abd/add  -TA  PROM:;10 reps;sitting  -UD    Recorded by [TA] Yo Murguia OT [UD] Olya Mayfield PTA [UD] Olya Mayfield PTA    Positioning and Restraints    Pre-Treatment Position in bed  -TA in bed  -UD sitting in chair/recliner  -UD    Post Treatment Position chair  -TA chair  -UD chair  -UD    In Chair notified nsg;reclined;call light within reach;encouraged to call for assist;exit alarm on;RUE elevated;LUE elevated;on mechanical lift sling;legs elevated  -TA notified nsg;reclined;sitting;call light within reach;exit alarm on;on mechanical lift sling;legs elevated  -UD notified nsg;reclined;sitting;call light within reach;exit alarm on;with family/caregiver;legs elevated  -UD    Recorded by [TA] Yo Murguia OT [UD] Olya Mayfield PTA [UD] Olya Mayfield PTA      11/10/17 1520          Rehab Assessment/Intervention    Discipline physical therapist  -LS      Document Type therapy note (daily note)  -LS      Subjective Information agree to therapy;no complaints  -LS      Patient Effort, Rehab Treatment good  -LS      Precautions/Limitations fall precautions;other (see comments)   L-sided weakness; previous R rib fx's  -LS      Recorded by [LS] Keli Awad, PT      Vital Signs    Pre Systolic BP Rehab 144  -LS      Pre Treatment Diastolic BP 74  -LS      Post Systolic BP Rehab 157  -LS      Post Treatment Diastolic BP 78  -LS      Pretreatment Heart Rate (beats/min) 69  -LS      Posttreatment Heart Rate (beats/min) 61  -LS      Pre SpO2 (%) 95  -LS      O2 Delivery Pre Treatment room air  -LS      Post SpO2 (%) 95  -LS      O2 Delivery Post Treatment room air  -LS      Pre Patient Position Supine  -LS      Intra Patient Position Standing  -LS      Post Patient Position Sitting  -LS      Recorded by [LS] Keli Awad, PT      Pain Assessment    Pain Assessment 0-10  -LS      Pain Score 0  -LS      Post Pain Score 0  -LS       Recorded by [LS] Keli Awad, PT      Cognitive Assessment/Intervention    Current Cognitive/Communication Assessment functional  -LS      Orientation Status oriented x 4  -LS      Follows Commands/Answers Questions able to follow single-step instructions;75% of the time;needs cueing;needs increased time;needs repetition  -LS      Personal Safety mild impairment;decreased awareness, need for assist;decreased awareness, need for safety;decreased insight to deficits  -LS      Personal Safety Interventions fall prevention program maintained;gait belt;nonskid shoes/slippers when out of bed  -LS      Recorded by [LS] Keli Awad, PT      Bed Mobility, Assessment/Treatment    Bed Mobility, Assistive Device head of bed elevated;draw sheet  -LS      Bed Mob, Supine to Sit, [place] maximum assist (25% patient effort);2 person assist required;verbal cues required  -LS      Bed Mobility, Impairments strength decreased;impaired balance  -LS      Bed Mobility, Comment VC's for sequencing; assist to RLE and at trunk.   -LS      Recorded by [LS] Keli Awad, PT      Transfer Assessment/Treatment    Transfers, Bed-Chair [place] maximum assist (25% patient effort);2 person assist required;verbal cues required  -LS      Transfers, Sit-Stand [place] maximum assist (25% patient effort);2 person assist required;verbal cues required  -LS      Transfers, Stand-Sit [place] maximum assist (25% patient effort);2 person assist required;verbal cues required  -LS      Transfer, Impairments strength decreased;impaired balance  -LS      Transfer, Comment PT/tech on either side of pt for BUE support and blocking of feet/knees. Performed x2 from EOB (pivot to chair on 2nd stand). Pt with significant difficulty in advancing LEs during pivot.  -LS      Recorded by [LS] Keli Awad, PT      Gait Assessment/Treatment    Gait, Greer Level not appropriate to assess  -LS      Recorded by [LS] Keli Awad, PT      Motor  Skills/Interventions    Additional Documentation Balance Skills Training (Group)  -LS      Recorded by [LS] Keli Awad, PT      Balance Skills Training    Sitting-Level of Assistance Moderate assistance   L lateral and posterior lean; able to progress to CGA   -LS      Sitting-Balance Support Right upper extremity supported;Feet supported  -LS      Sitting-Balance Activities Trunk control activities;Lateral lean;Forward lean  -LS      Standing-Level of Assistance Maximum assistance;x2  -LS      Static Standing Balance Support Right upper extremity supported;Left upper extremity supported  -LS      Recorded by [LS] Keli Awad, PT      Therapy Exercises    Right Lower Extremity AROM:;10 reps;supine;ankle pumps/circles;heel slides;hip abduction/adduction  -LS      Left Lower Extremity PROM:;supine;hamstring stretch;calf stretch;10 reps;heel raises;hip abduction/adduction  -LS      Right Upper Extremity AROM:;10 reps;sitting;elbow flexion/extension;shoulder abduction/adduction;shoulder extension/flexion  -LS      Bilateral Upper Extremity PROM:;supine;elbow flexion/extension;shoulder abduction/adduction;shoulder extension/flexion   edu pt in using RUE to assist LUE  -LS      Recorded by [LS] Keli Awad, PT      Positioning and Restraints    Pre-Treatment Position in bed  -LS      Post Treatment Position chair  -LS      In Chair notified nsg;reclined;call light within reach;encouraged to call for assist;exit alarm on;RUE elevated;LUE elevated;waffle cushion;on mechanical lift sling;legs elevated;heels elevated  -LS      Recorded by [LS] Keli Awad, PT        User Key  (r) = Recorded By, (t) = Taken By, (c) = Cosigned By    Initials Name Effective Dates    UD Olya Mayfield, PTA 06/22/15 -     LS Keli Awad, PT 06/19/15 -     TA Yo Murguia, OT 03/14/16 -                 IP PT Goals       11/13/17 1044 11/12/17 1104 11/10/17 1608    Bed Mobility PT LTG    Bed Mobility PT LTG, Outcome goal ongoing  -  goal ongoing  -UD goal ongoing  -LS    Transfer Training PT LTG    Transfer Training PT LTG, Outcome goal ongoing  -UD goal ongoing  -UD goal ongoing  -LS    Gait Training PT LTG    Gait Training Goal PT LTG, Outcome goal ongoing  -UD goal ongoing  -UD goal ongoing  -LS      11/09/17 0947 11/07/17 0904       Bed Mobility PT LTG    Bed Mobility PT LTG, Time to Achieve 2 wks  -LS 1 wk  -SH     Bed Mobility PT LTG, Activity Type supine to sit/sit to supine  -LS all bed mobility  -SH     Bed Mobility PT LTG, [place] Level moderate assist (50% patient effort)  -LS minimum assist (75% patient effort)  -     Bed Mobility PT LTG, Date Goal Reviewed 11/09/17  -LS      Bed Mobility PT LTG, Outcome goal revised  -LS goal ongoing  -SH     Transfer Training PT LTG    Transfer Training PT LTG, Time to Achieve 2 wks  -LS 1 wk  -SH     Transfer Training PT LTG, Activity Type sit to stand/stand to sit  -LS all transfers  -SH     Transfer Training PT LTG, [place] Level moderate assist (50% patient effort);2 person assist required  -LS moderate assist (50% patient effort)  -     Transfer Training PT LTG, Assist Device other (see comments)   approp AD  -LS walker, rolling  -SH     Transfer Training PT  LTG, Date Goal Reviewed 11/09/17  -LS      Transfer Training PT LTG, Outcome goal revised  -LS goal ongoing  -SH     Gait Training PT LTG    Gait Training Goal PT LTG, Time to Achieve 2 wks  -LS 1 wk  -SH     Gait Training Goal PT LTG, [place] Level moderate assist (50% patient effort);2 person assist required  -LS moderate assist (50% patient effort)  -     Gait Training Goal PT LTG, Assist Device other (see comments)   approp AD; possible ARJO use  -LS walker, rolling  -SH     Gait Training Goal PT LTG, Distance to Achieve 5  -  -SH     Gait Training Goal PT LTG, Date Goal Reviewed 11/09/17  -LS      Gait Training Goal PT LTG, Outcome goal revised  -LS goal ongoing  -       User Key  (r) = Recorded By, (t)  = Taken By, (c) = Cosigned By    Initials Name Provider Type    UD Olya Mayfield, PTA Physical Therapy Assistant     Amena Davis, PT Physical Therapist    LS Keli Awad, PT Physical Therapist          Physical Therapy Education     Title: PT OT SLP Therapies (Active)     Topic: Physical Therapy (Active)     Point: Mobility training (Active)    Learning Progress Summary    Learner Readiness Method Response Comment Documented by Status   Patient Acceptance E,D DU,NR   11/13/17 1044 Done    Eager E,D DU,NR   11/12/17 1104 Done    Acceptance E,D NR   11/10/17 1608 Active    Acceptance E,D NR   11/09/17 0947 Active    Acceptance E,D VU,DU   11/07/17 0903 Done   Family Eager E,D DU,NR   11/12/17 1104 Done   Significant Other Acceptance E,D [facility] 11/09/17 0947 Active               Point: Home exercise program (Active)    Learning Progress Summary    Learner Readiness Method Response Comment Documented by Status   Patient Acceptance E,D DU,NR   11/13/17 1044 Done    Eager E,D DU,NR   11/12/17 1104 Done    Acceptance E,D NR   11/10/17 1608 Active    Acceptance E,D NR   11/09/17 0947 Active    Acceptance E,D VU,DU   11/07/17 0903 Done   Family Eager E,D DU,NR   11/12/17 1104 Done   Significant Other Acceptance E,D NR   11/09/17 0947 Active               Point: Body mechanics (Active)    Learning Progress Summary    Learner Readiness Method Response Comment Documented by Status   Patient Acceptance E,D DU,NR   11/13/17 1044 Done    Eager E,D DU,NR   11/12/17 1104 Done    Acceptance E,D NR   11/10/17 1608 Active    Acceptance E,D NR   11/09/17 0947 Active   Family Eager E,D DU,NR   11/12/17 1104 Done   Significant Other Acceptance E,D [facility] 11/09/17 0947 Active               Point: Precautions (Active)    Learning Progress Summary    Learner Readiness Method Response Comment Documented by Status   Patient Acceptance E,D DU,NR   11/13/17 1044 Done    Eager E,D DU,NR   11/12/17  1104 Done    Acceptance E,D NR   11/10/17 1608 Active    Acceptance E,D NR   11/09/17 0947 Active   Family Eager E,D DU,NR   11/12/17 1104 Done   Significant Other Acceptance E,D NR   11/09/17 0947 Active                      User Key     Initials Effective Dates Name Provider Type Discipline     06/22/15 -  Olya Mayfield, PTA Physical Therapy Assistant PT     06/19/15 -  Amena Davis, PT Physical Therapist PT     06/19/15 -  Keli Awad, PT Physical Therapist PT                    PT Recommendation and Plan  Anticipated Equipment Needs At Discharge: wheelchair (TBA)  Anticipated Discharge Disposition: inpatient rehabilitation facility  PT Frequency: daily  Plan of Care Review  Plan Of Care Reviewed With: patient  Progress: no change  Outcome Summary/Follow up Plan: pt still lt side flaccid.oob with lift.worked on standing twice          Outcome Measures       11/13/17 0858 11/13/17 0855 11/12/17 1010    How much help from another person do you currently need...    Turning from your back to your side while in flat bed without using bedrails?  2  -UD 2  -UD    Moving from lying on back to sitting on the side of a flat bed without bedrails?  2  -UD 2  -UD    Moving to and from a bed to a chair (including a wheelchair)?  2  -UD 2  -UD    Standing up from a chair using your arms (e.g., wheelchair, bedside chair)?  2  -UD 2  -UD    Climbing 3-5 steps with a railing?  1  -UD 1  -UD    To walk in hospital room?  1  -UD 1  -UD    AM-PAC 6 Clicks Score  10  -UD 10  -UD    How much help from another is currently needed...    Putting on and taking off regular lower body clothing? 1  -TA      Bathing (including washing, rinsing, and drying) 1  -TA      Toileting (which includes using toilet bed pan or urinal) 1  -TA      Putting on and taking off regular upper body clothing 1  -TA      Taking care of personal grooming (such as brushing teeth) 2  -TA      Eating meals 3  -TA      Score 9  -TA      Modified  May Scale    Modified Aida Scale 4 - Moderately severe disability.  Unable to walk without assistance, and unable to attend to own bodily needs without assistance.  -TA      Functional Assessment    Outcome Measure Options AM-PAC 6 Clicks Daily Activity (OT);Modified Aida  -TA        11/10/17 1520          How much help from another person do you currently need...    Turning from your back to your side while in flat bed without using bedrails? 2  -LS      Moving from lying on back to sitting on the side of a flat bed without bedrails? 2  -LS      Moving to and from a bed to a chair (including a wheelchair)? 2  -LS      Standing up from a chair using your arms (e.g., wheelchair, bedside chair)? 2  -LS      Climbing 3-5 steps with a railing? 1  -LS      To walk in hospital room? 1  -LS      AM-PAC 6 Clicks Score 10  -LS      Functional Assessment    Outcome Measure Options AM-PAC 6 Clicks Basic Mobility (PT)  -LS        User Key  (r) = Recorded By, (t) = Taken By, (c) = Cosigned By    Initials Name Provider Type    SELWYN Mayfield PTA Physical Therapy Assistant    MAMIE Awad, PT Physical Therapist    TA Yo Murguia, OT Occupational Therapist           Time Calculation:         PT Charges       11/13/17 1046          Time Calculation    PT Received On 11/13/17  -      PT Goal Re-Cert Due Date 11/17/17  -      Time Calculation- PT    Total Timed Code Minutes- PT 20 minute(s)  -UD        User Key  (r) = Recorded By, (t) = Taken By, (c) = Cosigned By    Initials Name Provider Type    SELWYN Mayfield PTA Physical Therapy Assistant          Therapy Charges for Today     Code Description Service Date Service Provider Modifiers Qty    13572092493 HC PT THER PROC EA 15 MIN 11/12/2017 Olya Mayfield PTA GP 2    85034602258 HC PT THER SUPP EA 15 MIN 11/12/2017 Olya Mayfield, BG GP 1    84627561595 HC PT THER PROC EA 15 MIN 11/13/2017 Olya Mayfield PTA GP 1          PT G-Codes  Outcome Measure Options:  AM-PAC 6 Clicks Daily Activity (OT), Modified Aida Mayfield, PTA  11/13/2017

## 2017-11-13 NOTE — PROGRESS NOTES
"Adult Nutrition  Assessment/PES    Patient Name:  Magdi Arriaga  YOB: 1945  MRN: [id number]  Admit Date:  11/5/2017    Assessment Date:  11/13/2017              Reason for Assessment       11/13/17 1536    Reason for Assessment    Reason For Assessment/Visit follow up protocol    Time Spent (min) 20    Diagnosis Diagnosis   Per notes this admission               Nutrition/Diet History       11/13/17 1539    Nutrition/Diet History    Reported/Observed By Patient    Appetite Good    Other Pt reported having a good appetite, reported eating about the same amount he does at home, drinks 2 supplements per day.             Anthropometrics       11/13/17 1540    Anthropometrics    Height 172.7 cm (68\")    Weight 80.8 kg (178 lb 1.6 oz)    Ideal Body Weight (IBW)    Ideal Body Weight (IBW), Male (kg) 70.89    % Ideal Body Weight 114.19    Body Mass Index (BMI)    BMI (kg/m2) 27.14            Labs/Tests/Procedures/Meds       11/13/17 1541    Labs/Tests/Procedures/Meds    Labs/Tests Review Reviewed                Nutrition Prescription Ordered       11/13/17 1541    Nutrition Prescription PO    Current PO Diet Dysphagia    Dysphagia Level 3  Pureed with some mashed    Fluid Consistency Nectar/syrup thick    Supplement Boost Plus   strawberry    Supplement Frequency 3 times a day    Common Modifiers Cardiac            Evaluation of Received Nutrient/Fluid Intake       11/13/17 1541    PO Evaluation    Number of Meals 3    % PO Intake 50   Pt reported drinking 2 boosts per day.             Problem/Interventions:        Problem 1       11/13/17 1542    Nutrition Diagnoses Problem 1    Problem 1 Inadequate Intake/Infusion    Etiology (related to) Medical Diagnosis   clinical condition     Signs/Symptoms (evidenced by) PO Intake    Percent (%) intake recorded 50 %   Pt reported drinking 2 boosts per day.    Over number of meals 3                    Intervention Goal       11/13/17 1542    Intervention Goal    " General Nutrition support treatment    PO Increase intake            Nutrition Intervention       11/13/17 1542    Nutrition Intervention    RD/Tech Action Advise alternate selection;Advise available snack;Interview for preference;Encourage intake;Follow Tx progress;Care plan reviewd              Education/Evaluation       11/13/17 1543    Monitor/Evaluation    Monitor Per protocol;PO intake;Supplement intake        Electronically signed by:  Estela Patrick  11/13/17 3:43 PM

## 2017-11-13 NOTE — PROGRESS NOTES
[facility] Medicine Services  PROGRESS NOTE    Patient Name: Magdi Arriaga  : 1945  MRN: [id number]    Date of Admission: 2017  Length of Stay: 8  Primary Care Physician: Colton Dickens MD    Subjective   Subjective     CC:  Subdural hematoma    HPI:  Wet cough today. Persistent left sided weakness.    Review of Systems   Constitutional: Negative for chills and fever.   Eyes: Negative.    Respiratory: Positive for cough.    Cardiovascular: Negative.    Gastrointestinal: Negative.    Genitourinary: Negative.    Neurological: Positive for weakness.   Psychiatric/Behavioral: Negative.          Otherwise ROS is negative except as mentioned in the HPI.    Objective   Objective     Vital Signs:   Temp:  [97.1 °F (36.2 °C)-97.9 °F (36.6 °C)] 97.4 °F (36.3 °C)  Heart Rate:  [60-87] 87  Resp:  [16-18] 18  BP: (142-158)/(85-91) 158/91        Physical Exam:  Constitutional -no acute distress, up in chair  HEENT-NCAT, mucous membranes moist, clean bandage on top of head  CV-RRR, S1 S2 normal, no m/r/g  Resp-bibasilar rales, wet cough  Abd-soft, non-tender, non-distended, normo active bowel sounds  Ext-No lower extremity cyanosis, clubbing or edema bilaterally  Neuro-alert , speech clear, left hemiparesis   Psych-normal affect   Skin- No rash on exposed UE or LE bilaterally    Results Reviewed:  I have personally reviewed current lab, radiology, and data and agree.      Results from last 7 days  Lab Units 11/13/17  0431 11/11/17  0348 11/10/17  0428   WBC 10*3/mm3 15.08* 11.51* 12.66*   HEMOGLOBIN g/dL 11.8* 10.9* 10.1*   HEMATOCRIT % 36.5* 33.7* 31.7*   PLATELETS 10*3/mm3 311 313 304       Results from last 7 days  Lab Units 11/13/17  0431 11/11/17  0348 11/10/17  0428   SODIUM mmol/L 134 132 133   POTASSIUM mmol/L 5.0 4.4 4.6   CHLORIDE mmol/L 105 101 105   CO2 mmol/L 27.0 25.0 23.0   BUN mg/dL 49* 46* 50*   CREATININE mg/dL 1.00 1.00 1.20   GLUCOSE mg/dL 106* 131* 142*    CALCIUM mg/dL 9.4 9.7 9.0   ALT (SGPT) U/L 93*  --   --    AST (SGOT) U/L 42*  --   --      No results found for: BNP  No results found for: PHART    Microbiology Results Abnormal     Procedure Component Value - Date/Time    Urine Culture - Urine, Urine, Clean Catch [[id number]]  (Normal) Collected:  11/08/17 1309    Lab Status:  Final result Specimen:  Urine from Urine, Catheter Updated:  11/10/17 0755     Urine Culture No growth at 2 days    Urine Culture - Urine, Urine, Clean Catch [[id number]]  (Normal) Collected:  11/05/17 1437    Lab Status:  Final result Specimen:  Urine from Urine, Clean Catch Updated:  11/07/17 0827     Urine Culture No growth at 2 days          Imaging Results (last 24 hours)     Procedure Component Value Units Date/Time    XR Chest 1 View [[id number]] Collected:  11/13/17 0903     Updated:  11/13/17 0903    Narrative:       EXAMINATION: XR CHEST 1 VW-11/13/2017:      INDICATION: COPD; G93.6-Cerebral edema; R53.1-Weakness; R41.82-Altered  mental status, unspecified; S06.1M1A-Jhposhvdj subdural hemorrhage  without loss of consciousness, subsequent encounter; Z74.09-Other  reduced mobility; Z74.09-Other reduced mobility.      COMPARISON: 11/05/2017.     FINDINGS: Portable chest reveals the patient to be status post median  sternotomy. Degenerative changes seen within the spine. The heart is  enlarged. Increased pulmonary vascularity bilaterally worsening in the  interval. No definite pleural effusion or pneumothorax. Degenerative  changes seen within the shoulders.           Impression:       Heart is enlarged with worsening pulmonary vascularity in  the interval.     D:  11/13/2017  E:  11/13/2017              Results for orders placed during the hospital encounter of 08/23/17   Adult Transthoracic Echo Limited With Contrast    Narrative · Left ventricular systolic function is moderately decreased. Estimated EF   = 30%.  · An apical left ventricular aneurysm is present.          I have  reviewed the medications.    amLODIPine 5 mg Oral Q24H   aspirin 81 mg Oral Daily   atorvastatin 10 mg Oral Nightly   budesonide-formoterol 2 puff Inhalation BID - RT   dexamethasone 2 mg Oral Q12H   entecavir 0.5 mg Oral Q24H   famotidine 20 mg Oral BID   furosemide 40 mg Intravenous Once   [START ON 11/14/2017] furosemide 40 mg Oral Daily   memantine 10 mg Oral Q12H   montelukast 10 mg Oral Nightly   PARoxetine 30 mg Oral QAM   rivastigmine 1 patch Transdermal Daily   sacubitril-valsartan 0.5 tablet Oral BID         Assessment/Plan   Assessment / Plan     Hospital Problem List     * (Principal)Traumatic SDH, s/p right craniotomy on 11/8/2017    Dementia    Dyslipidemia    Hypertension    JONNY (obstructive sleep apnea)    Overview Signed 1/6/2017  8:57 AM by Davis HIDALGO.  Obstructive sleep apnea with central component.B.  On home CPAP therapy.         COPD (chronic obstructive pulmonary disease)    Coronary artery disease involving coronary bypass graft of native heart without angina pectoris    Overview Signed 10/25/2017 11:32 AM by MORALES Anaya     1. [facility] 8-8-17:   1.  Severe proximal LAD/first diagonal stenosis (ungrafted) history of a 2.5 x 23 Xience EES   2.  Occluded LAD with patent vein graft to the diagonal and LIMA to the LAD   3.  Severe left circumflex stenosis with widely patent vein graft to the OM1 and OM 2   4.  Occluded RCA with patent saphenous vein graft   5.  Disease and a small ramus branch (too small for PCI)   6.  Normal right heart pressures and post capillary wedge pressure.         Ischemic cardiomyopathy, most recent LVEF 30%    Overview Addendum 10/25/2017 11:33 AM by MORALES Anaya     1. Echo 8-7-17:   · Left ventricular systolic function is moderately decreased. Estimated EF = 30%.  · The cardiac valves are anatomically and functionally normal.    2. Echo 8-24-17:  · Left ventricular systolic function is moderately decreased. Estimated EF = 30%.  · An apical  left ventricular aneurysm is present.         History of atrial flutter    History of DVT (deep vein thrombosis)    CKD (chronic kidney disease), stage III    Weakness of left side of body    Cerebral edema    Leukocytosis    Hematuria, microscopic; in the presence of a hoang catheter             Brief Hospital Course to date:  72-year-old gentleman following sustaining a head injury, some rib fractures on October 21. At that time he had a subdural hematoma. He was at [facility] and developed progressive left-sided weakness and returned to [facility] where his CT scan revealed more right to left shift from his subacute subdural hematoma which had increased in size. On November 18 underwent craniotomy and evacuation of a right subdural hematoma. Postoperatively his confusion has improved. He has a known history of coronary artery disease with previous bypass. Heart catheterization in August of this year revealed severe proximal LAD first diagonal stenosis with previous stent, occluded LAD with patent graft to a diagonal and LIMA graft, severe left circumflex stenosis with widely patent grafts to OM1 and OM 2, occluded right coronary artery with patent saphenous vein graft and a left ventricular ejection fraction of 30%. He has a history of atrial fibrillation but currently remains in sinus rhythm. He also has a history of chronic kidney disease, however his current serum creatinine is 1 and his urine output is adequate. He does not have diabetes but blood sugars are mildly elevated from systemic steroids. He has not required insulin      Assessment & Plan:    Subdural Hematoma  - s/p evacuation  - residual left HP  - PT/OT-- to rehab soon    CAD  - plavix held due to SDH, continue asa    ICM, EF 30%  - currently appears clinically volume overloaded (increased vascular markings on CXR, wet cough, rales on exam).  Will check bmp, repeat cxr am, and give IV lasix x1 today, increase oral lasix from 20->40mg tomorrow  -  restart entresto? Metolazone? Monitor renal function, check BMP am.  - continued need for life vest?    Leukocytosis  - suspect due to decadron, no discrete infiltrate on CXR, patient afebrile  - will ask speech to re-evaluate in case silent aspiration    CKD III    JONNY  - CPAP QHS    Heb B  - entacavir, follows with Dr Ng    DVT Prophylaxis:      CODE STATUS: Full Code    Disposition: I expect the patient to be discharged to rehab in 2 days.    Charanjit Yepez MD  11/13/17  10:12 AM

## 2017-11-13 NOTE — PLAN OF CARE
Problem: Patient Care Overview (Adult)  Goal: Plan of Care Review  Outcome: Ongoing (interventions implemented as appropriate)    11/13/17 2100   Coping/Psychosocial Response Interventions   Plan Of Care Reviewed With patient;spouse   Patient Care Overview   Progress improving   Outcome Evaluation   Outcome Summary/Follow up Plan Patient rested comfortably throughout night. Patient is alert and oriented. Patient required 2L O2 nasal canula when sleeping. Patient did not report any pain or request pain medication suring night. Vital signs remain stable Will continue to monitor.        Goal: Adult Individualization and Mutuality  Outcome: Ongoing (interventions implemented as appropriate)  Goal: Discharge Needs Assessment  Outcome: Ongoing (interventions implemented as appropriate)    Problem: Fall Risk (Adult)  Goal: Absence of Falls  Outcome: Ongoing (interventions implemented as appropriate)    Problem: Skin Integrity Impairment, Risk/Actual (Adult)  Goal: Skin Integrity/Wound Healing  Outcome: Ongoing (interventions implemented as appropriate)

## 2017-11-13 NOTE — PLAN OF CARE
Problem: Patient Care Overview (Adult)  Goal: Plan of Care Review  Outcome: Ongoing (interventions implemented as appropriate)    11/13/17 1044   Coping/Psychosocial Response Interventions   Plan Of Care Reviewed With patient   Patient Care Overview   Progress no change   Outcome Evaluation   Outcome Summary/Follow up Plan pt still lt side flaccid.oob with lift.worked on standing twice         Problem: Inpatient Physical Therapy  Goal: Bed Mobility Goal LTG- PT  Outcome: Ongoing (interventions implemented as appropriate)    11/09/17 0947 11/13/17 1044   Bed Mobility PT LTG   Bed Mobility PT LTG, Time to Achieve 2 wks --    Bed Mobility PT LTG, Activity Type supine to sit/sit to supine --    Bed Mobility PT LTG, [place] Level moderate assist (50% patient effort) --    Bed Mobility PT LTG, Date Goal Reviewed 11/09/17 --    Bed Mobility PT LTG, Outcome --  goal ongoing       Goal: Transfer Training Goal 1 LTG- PT  Outcome: Ongoing (interventions implemented as appropriate)    11/09/17 0947 11/13/17 1044   Transfer Training PT LTG   Transfer Training PT LTG, Time to Achieve 2 wks --    Transfer Training PT LTG, Activity Type sit to stand/stand to sit --    Transfer Training PT LTG, [place] Level moderate assist (50% patient effort);2 person assist required --    Transfer Training PT LTG, Assist Device other (see comments)  (approp AD) --    Transfer Training PT LTG, Date Goal Reviewed 11/09/17 --    Transfer Training PT LTG, Outcome --  goal ongoing       Goal: Gait Training Goal LTG- PT  Outcome: Ongoing (interventions implemented as appropriate)    11/09/17 0947 11/13/17 1044   Gait Training PT LTG   Gait Training Goal PT LTG, Time to Achieve 2 wks --    Gait Training Goal PT LTG, [place] Level moderate assist (50% patient effort);2 person assist required --    Gait Training Goal PT LTG, Assist Device other (see comments)  (approp AD; possible ARJO use) --    Gait Training Goal PT LTG, Distance to  Achieve 5 --    Gait Training Goal PT LTG, Date Goal Reviewed 11/09/17 --    Gait Training Goal PT LTG, Outcome --  goal ongoing

## 2017-11-13 NOTE — PLAN OF CARE
Problem: Patient Care Overview (Adult)  Goal: Plan of Care Review  Outcome: Ongoing (interventions implemented as appropriate)    11/13/17 1658   Coping/Psychosocial Response Interventions   Plan Of Care Reviewed With patient;spouse;family   Patient Care Overview   Progress progress toward functional goals as expected   Outcome Evaluation   Outcome Summary/Follow up Plan Re-consulted for swallowing, given coughing w/ liquids, change in lung sounds, leukocytosis, and incr'd wet coughing. No overt clinical s/sxs aspiration at bedside, but proceeded w/ MBS given concerns.      MBS revealed functional oral phase of the swallow, moderate pharyngeal dysphagia. Pharyngeal phase c/b penetration of thin liquid during the swallow 2' delayed pharyngeal swallow initiation to pyriforms and reduced closure @ the entrance to the airway. Penetrated material did not fully expel laryngeal vestibule, despite cued cough, putting pt @ risk of eventual aspiration. No penetration/aspiration noted w/ nectar-thick liquids, pudding, nor solid. There was moderate vallecular/pyriform residue w/ nectar-thick liquids & pudding consistency, and severe vallecular/pyriform residue w/ solid 2' reduced base of tongue retraction, reduced post pharyng wall contraction, and reduced hyolaryngeal excursion (resulting in reduced PES opening). Chin tuck did not improve pharyngeal clearance. Use of liquid wash partially cleared residue.      Rec: dysphagia level III diet (puree w/ some mashed), nectar-thick liquids; supervision - encourage small bites/sips, mult swallows, liq wash, cough intermittently w/ PO. SLP will f/u to trial dysphagia tx.         Problem: Inpatient SLP  Goal: Dysphagia- Patient will safely consume diet as per recommendation with no signs/symptoms of aspiration  Outcome: Ongoing (interventions implemented as appropriate)    11/13/17 1658   Safely Consume Diet   Safely Consume Diet- SLP, Date Established 11/13/17   Safely Consume Diet-  SLP, Time to Achieve by discharge

## 2017-11-13 NOTE — MBS/VFSS/FEES
Acute Care - Speech Language Pathology   Swallow Re-Evaluation [facility]   Clinical Swallow Re-evaluation  & Modified Barium Swallow Study (MBS)     Patient Name: Magdi Arriaga  : 1945  MRN: [id number]  Today's Date: 2017  Onset of Illness/Injury or Date of Surgery Date: 17            Admit Date: 2017    Visit Dx:     ICD-10-CM ICD-9-CM   1. Cerebral edema G93.6 348.5   2. Left-sided weakness R53.1 728.87   3. Altered mental status, unspecified altered mental status type R41.82 780.97   4. Traumatic subdural hematoma without loss of consciousness, subsequent encounter S06.5X0D V58.89     852.21   5. Impaired functional mobility, balance, gait, and endurance Z74.09 V49.89   6. Impaired mobility and ADLs Z74.09 799.89   7. Pharyngeal dysphagia R13.13 787.23     Patient Active Problem List   Diagnosis   • Abnormal CT scan, chest   • Allergic rhinitis   • Asthma   • Bronchiectasis   • Dementia   • Depression   • Dyslipidemia   • Hypertension   • Obesity   • JONNY (obstructive sleep apnea)   • Osteoarthritis   • Vitamin D deficiency   • Left bundle branch block   • Normocytic anemia   • COPD (chronic obstructive pulmonary disease)   • Coronary artery disease involving coronary bypass graft of native heart without angina pectoris   • Ischemic cardiomyopathy, most recent LVEF 30%   • History of atrial flutter   • History of DVT (deep vein thrombosis)   • Hematoma, calf   • History of exposure to infectious disease-Patient received platelet transfusion for CABG, 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, followed by [facility]   • Bradycardia   • Syncope, near   • Closed fracture of multiple ribs of right side   • Traumatic SDH, s/p right craniotomy on 2017   • Weakness of left side of body   • Cerebral  edema   • Leukocytosis   • Hematuria, microscopic; in the presence of a hoang catheter     Past Medical History:   Diagnosis Date   • Abnormal finding on lung imaging      CT scan of the chest to be wary 2013 reports interstitial lung disease with marked diffuse interstitial and peripheral scarring and significant bronchiectasis in both upper   and lower lung zones.    • Allergic rhinitis     History of allergy injections as a child.   • Arthritis    • Asthma      Patient has had a history of asthma since childhood.   • CHF (congestive heart failure) 2017    ECHO - EF 25%   • COPD (chronic obstructive pulmonary disease)    • Coronary artery disease 2017    CABG X 5   • DVT (deep venous thrombosis)     Bilateral   • Exposure to hepatitis B 2017    Transfusion - managed by ID   • Falls     A.  History of falling traumatic injuries in April 2014 and May 2014 resulting and left rib  fracture and left clavicle fracture.   • Fracture of rib    • History of chest x-ray 02/19/2016    Extensive, but stable postinflammatory pulmonary changes. There has been no change since the previous examination of 12/03/2015   • History of chest x-ray 12/03/2015    Slight increased markings bilaterally, may be progression of his bronchiectasis   • History of chest x-ray 09/06/2014    There has been no change since 09/05/2014   • History of echocardiogram 02/19/2016    Mild concentric LVH is observed.Estimated EF is 50-55%.E to A reversal in the mitral valve flow pattern suggestive of diastolic dysfunction.RV is mildly dilated.Moderate aortic cusp sclerosis is present.Mitral annular calcification.Mild MR.Evidence of borderline pulmonary hypertension.   • History of PFTs 12/03/2015    Values are slightly worse.TLC is c/w mild restriction.Values reduced from 02/16/13.Diffusion capacity is slightly worse   • History of PFTs 02/06/2013    Mild decrease in FVC, may be due to less maximal effort.TLC is within normal levels. Mild reduction in  diffusion in absolute value   • History of transfusion    • Hypertension    • Lumbar spinal stenosis 2013    Spinal surgery   • Patellar tendon rupture     A.  Status post primary repair of the left periprosthetic patellar tendon tear 6/11/2014, post fall at the end of April 2014.   • Pneumonia      A.  Left lower lobe pneumonia treated at [facility], 5/8/2014 through 5/20/2014.   • Pneumothorax      A.  Status post chest tube placed 5/13/2014 and discontinued 5/15/2014 with stable chest x-ray.   • Sleep apnea with use of continuous positive airway pressure (CPAP)    • Traumatic hemorrhagic shock      A.  Secondary to right renal retroperitoneal hemorrhage, 9/2014.     Past Surgical History:   Procedure Laterality Date   • APPENDECTOMY  1959   • CARDIAC CATHETERIZATION N/A 4/18/2017    Procedure: Left Heart Cath;  Surgeon: Kvng Stauffer MD;  Location:  [facility];  Service:    • CARDIAC CATHETERIZATION N/A 8/9/2017    Procedure: Right and Left Heart Cath;  Surgeon: Kvng Stauffer MD;  Location:  [facility];  Service:    • COLONOSCOPY W/ POLYPECTOMY     • CORONARY ANGIOPLASTY WITH STENT PLACEMENT     • CORONARY ARTERY BYPASS GRAFT N/A 4/24/2017    Procedure: MEDIAN STERNOTOMY, CORONARY ARTERY BYPASS GRAFT X5 UTILIZING THE INTERNAL MAMMARY ARTERY, ENDOVASCULAR VEING HARVEST UTILIZING RIGHT SAPHENOUS VEIN,TRANSESOPHAGEAL ECHO;  Surgeon: Kanu Berger MD;  Location:  [facility] OR;  Service:    • FRACTURE SURGERY     • KNEE SURGERY  2006, 2014    primary repair -left periprosthetic patellar tendon tear 6/11/2014   • LUMBAR LAMINECTOMY  2013    With discectomy   • AR HARLEY HOLE EVAC SUBDUR/EXTRA HEMATOMA Right 11/8/2017    Procedure: CRANIOTOMY FOR SUBDURAL HEMATOMA;  Surgeon: True Frank MD;  Location:  [facility];  Service: Neurosurgery   • REPLACEMENT TOTAL KNEE Bilateral 2009, 2013    Left - 2009 and right - 2013.   • SKIN BIOPSY     • TONSILLECTOMY  1957   • VENA CAVA FILTER  "PLACEMENT  2014          SWALLOW EVALUATION (last 72 hours)      Swallow Evaluation       11/13/17 1100                Rehab Evaluation    Document Type re-evaluation  -        Subjective Information no complaints;agree to therapy  -        General Information    Patient Profile Review yes  -        Onset of Illness/Injury 11/05/17  -        Subjective Patient Observations Pt alert, cooperative. No family present.  -        Pertinent History Of Current Problem Pt adm w/ traumatic SDH s/p R crani 11/8. No s/sxs aspiration per clinical swallow eval 11/10. RN noted pt coughing w/ liquids today & appears to have change in lung sounds. MD reported leukocytosis & wet cough. Pt also admitted to occasional \"choking\" w/ liquids. PMH: dementia, CKD, COPD, asthma.   -        Current Diet Limitations regular solid;thin liquids  -        Precautions/Limitations, Vision WFL with corrective lenses;other (see comments)   for purposes of eval  -AC        Precautions/Limitations, Hearing WFL;other (see comments)   for purposes of eval  -AC        Prior Level of Function- Communication other (comment)   baseline dementia per chart  -        Prior Level of Function- Swallowing no diet consistency restrictions  -        Plans/Goals Discussed With patient;agreed upon  -        Barriers to Rehab none identified  -        Clinical Impression    Patient's Goals For Discharge patient did not state  -        Family Goals For Discharge patient able to eat/drink without coughing/choking  -        SLP Swallowing Diagnosis moderate dysphagia;pharyngeal dysfunction  -        Rehab Potential/Prognosis, Swallowing good, to achieve stated therapy goals  -        Criteria for Skilled Therapeutic Interventions Met skilled criteria for dysphagia intervention met  -        Therapy Frequency 5 times/wk  -        SLP Diet Recommendation III - pureed with some mashed;nectar/syrup-thick liquids  -        Recommended " "Feeding/Eating Techniques small sips/bites;multiple swallow technique;maintain upright posture during/after eating for 30 mins;alternate between small bites and sips of food/liquid;other (see comments)   cough intermittently w/ PO  -AC        SLP Rec. for Method of Medication Administration meds whole in pudding/applesauce;meds crushed in pudding/applesauce   as tolerated  -AC        Anticipated Discharge Disposition --   will likely need cont'd services after d/c  -AC        Pain Assessment    Pain Assessment No/denies pain  -AC        Cognitive Assessment/Intervention    Orientation Status oriented to;person;time;other (see comments)   initially said \"[place]\" for place, but corrected w/ cues  -AC        Follows Commands/Answers Questions able to follow single-step instructions;100% of the time  -AC        Oral Motor Structure and Function    Dentition Assessment present and adequate  -AC        Oral Motor Assessment Comment Appeared grossly WFL for swallowing/communication  -        Clinical Swallow Exam    Mode of Presentation self fed;fed by clinician;spoon;cup;straw  -AC        Oral Phase Results intact oral phase without signs of dysfunction  -AC        Pharyngeal Phase Results susupected impaired pharyngeal phase of swallowing  -AC        Summary of Clinical Exam Trialed thin via cup/straw, nectar via cup/straw, puree, and solid. Oral phase seemingly WFL. No overt clinical s/sxs aspiration; however, given report of coughing w/ liquids & onset of wet cough/leukocytosis/worsening lung sounds, rec'd MBS to further assess swallow fxn & r/o silent aspiration.  -AC        Videofluoroscopic Swallowing Exam    Risks/Benefits Reviewed risks/benefits explained;patient;family;agreed to eval  -        Radiologic Views Used for Examination left lateral view  -AC        VFSS    Mode of Presentation thin:;nectar:;pudding:;cohesive solid:;fed by clinician;spoon;cup;straw  -AC        Pharyngeal Phase Physiologic " Impairment bolus to pyriforms before initiation of response;tongue base retraction reduced;reduced pharyngeal wall contraction;reduced anterior hyolaryngeal excursion;reduced closure vestibule  -AC        Post Swallow Residue all consistencies:;post swallow residue present;residue present pyriform sinuses;residue present in valleculae;clears residue valleculae with cue;clears residue pyriforms with cue  -AC        Rosenbek's PenAsp Scale thin:;5-->Level 5;nectar:;pudding:;cohesive solid:;1-->Level 1  -AC        Att Compensatory Maneuvers chin down position;effortful (hard) swallow;bolus size;bolus presentation style  -AC        Pharyngeal Phase Results impaired pharyngeal phase of swallowing  -        Summary of VFSS Oral phase WFL, moderate pharyngeal dysphagia. Pharyngeal phase c/b penetration of thin liquid during the swallow 2' delayed pharyngeal swallow initiation to pyriforms and reduced closure @ the entrance to the airway. Penetrated material did not fully expel laryngeal vestibule, despite cued cough, putting pt @ risk of eventual aspiration. No penetration/aspiration noted w/ nectar-thick liquids, pudding, nor solid.  There was moderate vallecular/pyriform residue w/ nectar-thick liquids & pudding consistency, and severe vallecular/pyriform residue w/ solid 2' reduced base of tongue retraction, reduced post pharyng wall contraction, and reduced hyolaryngeal excursion (resulting in reduced PES opening). Chin tuck did not improve pharyngeal clearance. Use of liquid wash partially cleared residue. Rec: dysphagia level III diet (puree w/ some mashed), nectar-thick liquids; supervision - encourage small bites/sips, mult swallows, liq wash, cough intermittently w/ PO. SLP will f/u to trial dysphagia tx.  -        VFSS Swallow Recommendations    Eating Assistance requires caregiver assistance for eating;needs constant supervision during self eating activity  -        Oral Care oral care with toothbrush and  dentifrice BID and PRN  -AC        Dysphagia Treatment Objectives and Progress    Dysphagia Treatment Objectives Improve timing of pharyngeal response;Improve closure at entrance to airway;Improve hyolaryngeal excursion;Improve tongue base & pharyngeal wall squeeze;Other 1;Other 2  -AC        Improve timing of pharyngeal response    To improve timing of pharyngeal response, patient will: Swallow in timely way using three second prep;Swallow in timely way using Mendelsohn maneuver;80%;with inconsistent cues  -AC        Status: Improve timing of pharyngeal response New  -AC        Timing of Pharyngeal Response Progress continue to adress  -AC        Improve closure at entrance to airway    To improve closure at entrance to airway, patient will: Complete super-supraglottic swallow;80%;with inconsistent cues  -AC        Status: Improve closure at entrance to airway New  -AC        Closure at Entrance to Airway Progress continue to adress  -AC        Improve hyolaryngeal excursion    To improve hyolaryngeal excursion, patient will: Complete chin tuck against resistance (comment number of repetitions);with inconsistent cues   15 sec, 5 reps  -AC        Status: Improve hyolaryngeal excursion New  -AC        Hyolaryngeal Excursion Progress continue to adress  -AC        Improve tongue base & pharyngeal wall squeeze    To improve tongue base & pharyngeal wall squeeze, patient will: Complete effortful swallow;Complete tongue base retraction;80%;with inconsistent cues  -AC        Status: Improve tongue base & pharyngeal wall squeeze New  -AC        Tongue Base/Pharyngeal Wall Squeeze Progress continue to adress  -AC        Dysphagia Other 1    Dysphagia Other 1 Objective Pt will tolerate recommended diet w/o overt clinical s/sxs aspiration w/ intermittent cues.  -AC        Status: Dysphagia Other 1 New  -AC        Dysphagia Other 1 Progress continue to address  -AC        Dysphagia Other 2    Dysphagia Other 2 Objective Pt  will utilize compensatory strats when eating/drinking to improve safety of the swallow w/ 100% acc w/ intermittent cues.  -AC        Status: Dysphagia Other 2 New  -AC        Dysphagia Other 2 Progress continue to address  -AC          User Key  (r) = Recorded By, (t) = Taken By, (c) = Cosigned By    Initials Name Effective Dates    AC [place] TAYLOR Weems, MS CCC-SLP 07/27/17 -         EDUCATION  The patient has been educated in the following areas:   Dysphagia (Swallowing Impairment) Oral Care/Hydration Modified Diet Instruction.    SLP Recommendation and Plan  SLP Swallowing Diagnosis: moderate dysphagia, pharyngeal dysfunction  SLP Diet Recommendation: III - pureed with some mashed, nectar/syrup-thick liquids  Recommended Feeding/Eating Techniques: small sips/bites, multiple swallow technique, maintain upright posture during/after eating for 30 mins, alternate between small bites and sips of food/liquid, other (see comments) (cough intermittently w/ PO)  SLP Rec. for Method of Medication Administration: meds whole in pudding/applesauce, meds crushed in pudding/applesauce (as tolerated)  Criteria for Skilled Therapeutic Interventions Met: skilled criteria for dysphagia intervention met  Anticipated Discharge Disposition:  (will likely need cont'd services after d/c)  Rehab Potential/Prognosis, Swallowing: good, to achieve stated therapy goals  Therapy Frequency: 5 times/wk    Plan of Care Review  Plan Of Care Reviewed With: patient, spouse, family  Progress: progress toward functional goals as expected  Outcome Summary/Follow up Plan: Re-consulted for swallowing, given coughing w/ liquids, change in lung sounds, leukocytosis, and incr'd wet coughing. No overt clinical s/sxs aspiration at bedside, but proceeded w/ MBS given concerns.  MBS revealed functional oral phase of the swallow, moderate pharyngeal dysphagia. Pharyngeal phase c/b penetration of thin liquid during the swallow 2' delayed pharyngeal swallow initiation  to pyriforms and reduced closure @ the entrance to the airway. Penetrated material did not fully expel laryngeal vestibule, despite cued cough, putting pt @ risk of eventual aspiration. No penetration/aspiration noted w/ nectar-thick liquids, pudding, nor solid.  There was moderate vallecular/pyriform residue w/ nectar-thick liquids & pudding consistency, and severe vallecular/pyriform residue w/ solid 2' reduced base of tongue retraction, reduced post pharyng wall contraction, and reduced hyolaryngeal excursion (resulting in reduced PES opening). Chin tuck did not improve pharyngeal clearance. Use of liquid wash partially cleared residue. Rec: dysphagia level III diet (puree w/ some mashed), nectar-thick liquids; supervision - encourage small bites/sips, mult swallows, liq wash, cough intermittently w/ PO. SLP will f/u to trial dysphagia tx.          IP SLP Goals       11/13/17 1658          Safely Consume Diet    Safely Consume Diet- SLP, Date Established 11/13/17  -      Safely Consume Diet- SLP, Time to Achieve by discharge  -        User Key  (r) = Recorded By, (t) = Taken By, (c) = Cosigned By    Initials Name Provider Type    RUTHANN Weems MS CCC-SLP Speech and Language Pathologist         Time Calculation:         Time Calculation- SLP       11/13/17 1701          Time Calculation- SLP    SLP Start Time 1100  -      SLP Received On 11/13/17  -        User Key  (r) = Recorded By, (t) = Taken By, (c) = Cosigned By    Initials Name Provider Type    RUTHANN Weems MS CCC-SLP Speech and Language Pathologist          Therapy Charges for Today     Code Description Service Date Service Provider Modifiers Qty    19325866110 HC ST EVAL ORAL PHARYNG SWALLOW 3 11/13/2017 MS ROSENDO Solis GN 1    [id number] HC ST MOTION FLUORO EVAL SWALLOW 8 11/13/2017 MS ROSENDO Solis GN 1             MS ROSENDO Solis  11/13/2017

## 2017-11-13 NOTE — PROGRESS NOTES
Continued Stay Note  [facility]     Patient Name: Magdi Arriaga  MRN: [id number]  Today's Date: 11/13/2017    Admit Date: 11/5/2017          Discharge Plan       11/13/17 1109    Case Management/Social Work Plan    Plan [facility]    Patient/Family In Agreement With Plan yes    Additional Comments Per [facility], they can accept Mr. Arriaga into an acute rehab bed when he is medically ready and a bed is available. Per Dr. Yepez, Mr. Arriaga is not medically ready to transfer today. I notified [facility] that his transfer would occur tomorrow at the earliest. I discussed this with Mr. Arriaga at the bedside. He is agreeable to this plan.     Please let case management know when Mr. Arriaga may be medically ready so this can be communicated to [facility] and an ambulance can be arranged for transport.              Discharge Codes     None        Expected Discharge Date and Time     Expected Discharge Date Expected Discharge Time    Nov 13, 2017             Nitish Arriaga

## 2017-11-13 NOTE — PROGRESS NOTES
"NEUROSURGERY PROGRESS NOTE     LOS: 8 days   Patient Care Team:  Colton Dickens MD as PCP - General (Internal Medicine)  Kvng Stauffer MD as Consulting Physician (Cardiology)    Chief Complaint: Confusion and left-sided weakness.    POD#: 5 Days Post-Op  Procedures:  Right craniotomy for evacuation of convexity and interhemispheric subdural hematoma.    Interval History:   Patient Complaints: None.  Patient Denies: Headache or new weakness.    Vital Signs: Blood pressure 156/85, pulse 85, temperature 97.9 °F (36.6 °C), temperature source Oral, resp. rate 16, height 68\" (172.7 cm), weight 178 lb 1.6 oz (80.8 kg), SpO2 92 %.  Intake/Output:   Intake/Output Summary (Last 24 hours) at 11/13/17 0645  Last data filed at 11/12/17 2237   Gross per 24 hour   Intake             1080 ml   Output                0 ml   Net             1080 ml       Physical Exam:  The patient awakens easily and follows simple commands.  He is generally oriented.  His left side is weak.  His left arm remains antigravity.  He has no significant movement in his left leg.     Data Review:      Results from last 7 days  Lab Units 11/13/17  0431   WBC 10*3/mm3 15.08*   HEMOGLOBIN g/dL 11.8*   HEMATOCRIT % 36.5*   PLATELETS 10*3/mm3 311       Results from last 7 days  Lab Units 11/13/17  0431   SODIUM mmol/L 134   POTASSIUM mmol/L 5.0   CHLORIDE mmol/L 105   CO2 mmol/L 27.0   BUN mg/dL 49*   CREATININE mg/dL 1.00   GLUCOSE mg/dL 106*   CALCIUM mg/dL 9.4       Assessment/Plan:  1.  Right convexity and interhemispheric subdural hematoma, subacute that is post craniotomy for evacuation.  2.  Multiple cardiac issues including coronary artery disease, ischemic cardiomyopathy, and atrial flutter.  He continues on baby aspirin.  I have held his Plavix in light of craniotomy.  3.  Chronic renal insufficiency.  4.  COPD.  5.  Hypertension.  6.  Dementia.  7.  Sleep apnea: Okay to resume CPAP.  7.  Disposition: Mobilize.  From a neurosurgical standpoint we " can work towards getting patient back to rehabilitation in the next couple of days.      True Frank MD  11/13/17  6:45 AM

## 2017-11-13 NOTE — PLAN OF CARE
Problem: Patient Care Overview (Adult)  Goal: Plan of Care Review  Outcome: Ongoing (interventions implemented as appropriate)    11/13/17 0952   Coping/Psychosocial Response Interventions   Plan Of Care Reviewed With patient   Patient Care Overview   Progress progress toward functional goals as expected   Outcome Evaluation   Outcome Summary/Follow up Plan Pt denies pain, eager to participate in tx this date. Mod A to roll R/L; Max A x 2 STS with L knee blocked x 2 trials this date. Continue per OT POC.         Problem: Inpatient Occupational Therapy  Goal: Transfer Training Goal 1 LTG- OT  Outcome: Ongoing (interventions implemented as appropriate)    11/09/17 0927 11/13/17 0952   Transfer Training OT LTG   Transfer Training OT LTG, Date Established 11/09/17 --    Transfer Training OT LTG, Time to Achieve by discharge --    Transfer Training OT LTG, Activity Type sit to stand/stand to sit;bed to chair /chair to bed;toilet --    Transfer Training OT LTG, [place] Level moderate assist (50% patient effort);2 person assist required --    Transfer Training OT LTG, Additional Goal AAD --    Transfer Training OT LTG, Outcome --  goal ongoing  (Max A x 2 STS this date)       Goal: Strength Goal LTG- OT  Outcome: Ongoing (interventions implemented as appropriate)    11/09/17 0927 11/13/17 0952   Strength OT LTG   Strength Goal OT LTG, Date Established 11/09/17 --    Strength Goal OT LTG, Time to Achieve by discharge --    Strength Goal OT LTG, Measure to Achieve Pt will tolerate BUE AROM/AAROM HEP x15 reps to increase strength/endurance to promote ADL performance.  --    Strength Goal OT LTG, Outcome --  goal ongoing       Goal: Static Sitting Balance Goal LTG- OT  Outcome: Ongoing (interventions implemented as appropriate)    11/09/17 0927 11/13/17 0952   Static Sitting Balance OT LTG   Static Sitting Balance OT LTG, Date Established 11/09/17 --    Static Sitting Balance OT LTG, Time to Achieve by discharge --     Static Sitting Balance OT LTG, [place] Level contact guard assist --    Static Sitting Balance OT LTG, Additional Goal AAD, at EOB --    Static Sitting Balance OT LTG, Outcome --  goal ongoing  (Min A at front of chair this date)       Goal: Eating Self-Feeding Goal LTG- OT  Outcome: Ongoing (interventions implemented as appropriate)    11/09/17 0927 11/13/17 0952   Eating Self-Feeding OT LTG   Eat Self Feeding Goal OT LTG, Date Established 11/09/17 --    Eat Self Feeding Goal OT LTG, Time to Achieve by discharge --    Eat Self Feed Goal OT LTG, [place] Level contact guard assist --    Eat Self Feeding Goal OT LTG, Additional Goal AAD, supported sitting --    Eat Self Feeding Goal OT LTG, Outcome --  goal ongoing

## 2017-11-14 NOTE — PLAN OF CARE
Problem: Patient Care Overview (Adult)  Goal: Plan of Care Review  Outcome: Ongoing (interventions implemented as appropriate)    11/14/17 0429   Coping/Psychosocial Response Interventions   Plan Of Care Reviewed With patient   Patient Care Overview   Progress improving   Outcome Evaluation   Outcome Summary/Follow up Plan Patient rested comfortably throughout night. Patient did not report any pain or request pain medication during night. Patient did use CPAP while sleeping during nihgt. Vital signs remain stable. Will continue to monitor.        Goal: Adult Individualization and Mutuality  Outcome: Ongoing (interventions implemented as appropriate)  Goal: Discharge Needs Assessment  Outcome: Ongoing (interventions implemented as appropriate)    Problem: Fall Risk (Adult)  Goal: Absence of Falls  Outcome: Ongoing (interventions implemented as appropriate)    Problem: Skin Integrity Impairment, Risk/Actual (Adult)  Goal: Skin Integrity/Wound Healing  Outcome: Ongoing (interventions implemented as appropriate)

## 2017-11-14 NOTE — PROGRESS NOTES
[facility] Medicine Services  PROGRESS NOTE    Patient Name: Magdi Arriaga  : 1945  MRN: [id number]    Date of Admission: 2017  Length of Stay: 9  Primary Care Physician: Colton Dickens MD    Subjective   Subjective     CC:  Subdural hematoma    Subjective:  Wife in room today.  Asking about imaging of left hip.  Patient had fall and is having residual pain in left hip.  No overnight events.  Wore CPAP last night.  Discussed plan to remove staples later.    Review of Systems   Constitutional: Negative for chills and fever.   Eyes: Negative.    Respiratory: Positive for cough.    Cardiovascular: Negative.    Gastrointestinal: Negative.    Genitourinary: Negative.    Neurological: Positive for weakness.   Psychiatric/Behavioral: Negative.        Otherwise ROS is negative except as mentioned in the HPI.    Objective   Objective     Vital Signs:   Temp:  [97.8 °F (36.6 °C)-98.7 °F (37.1 °C)] 97.9 °F (36.6 °C)  Heart Rate:  [64-71] 69  Resp:  [16-18] 16  BP: (132-153)/() 153/111        Physical Exam:  Constitutional - no acute distress, up in chair  HEENT-NCAT, mucous membranes moist, clean bandage on top of head  CV-RRR, S1 S2 normal, no m/r/g  Resp - bibasilar rales, wet cough  Abd-soft, non-tender, non-distended, normo active bowel sounds  Ext-No lower extremity cyanosis, clubbing or edema bilaterally  Neuro - alert , speech clear, left hemiparesis   Psych - normal affect   Skin - No rash on exposed UE or LE bilaterally    Results Reviewed:  I have personally reviewed current lab, radiology, and data and agree.      Results from last 7 days  Lab Units 17  04017   WBC 10*3/mm3 22.69* 15.08* 11.51*   HEMOGLOBIN g/dL 11.8* 11.8* 10.9*   HEMATOCRIT % 36.4* 36.5* 33.7*   PLATELETS 10*3/mm3 287 311 313       Results from last 7 days  Lab Units 17  0401 17   SODIUM mmol/L 138 134 132   POTASSIUM mmol/L  4.5 5.0 4.4   CHLORIDE mmol/L 103 105 101   CO2 mmol/L 31.0 27.0 25.0   BUN mg/dL 52* 49* 46*   CREATININE mg/dL 1.20 1.00 1.00   GLUCOSE mg/dL 118* 106* 131*   CALCIUM mg/dL 9.8 9.4 9.7   ALT (SGPT) U/L  --  93*  --    AST (SGOT) U/L  --  42*  --      BNP   Date Value Ref Range Status   11/14/2017 214.0 (H) 0.0 - 100.0 pg/mL Final     No results found for: PHART    Microbiology Results Abnormal     Procedure Component Value - Date/Time    Urine Culture - Urine, Urine, Clean Catch [[id number]]  (Normal) Collected:  11/08/17 1309    Lab Status:  Final result Specimen:  Urine from Urine, Catheter Updated:  11/10/17 0755     Urine Culture No growth at 2 days    Urine Culture - Urine, Urine, Clean Catch [[id number]]  (Normal) Collected:  11/05/17 1437    Lab Status:  Final result Specimen:  Urine from Urine, Clean Catch Updated:  11/07/17 0827     Urine Culture No growth at 2 days          Imaging Results (last 24 hours)     Procedure Component Value Units Date/Time    FL Video Swallow With Speech [[id number]] Collected:  11/13/17 1517     Updated:  11/13/17 2223    Narrative:       EXAMINATION: FL VIDEO SWALLOW W SPEECH-     INDICATION: dysphagia; G93.6-Cerebral edema; R53.1-Weakness;  R41.82-Altered mental status, unspecified; S06.2W7J-Lvcjjfsuo subdural  hemorrhage without loss of consciousness, subsequent encounter;  Z74.09-Other reduced mobility; Z74.09-Other reduced mobility         TECHNIQUE: 1 minute and 18 seconds of fluoroscopic time was used for  this exam. 1 associated image was saved. The patient was evaluated in  the seated lateral position while taking a variety of consistencies of  barium by mouth under the direction of speech pathology.     COMPARISON: NONE     FINDINGS: There was penetration that cleared without aspiration with  sips of thin barium. There was no penetration and no aspiration with  nectar, pudding, or solid consistency barium.          Impression:       Fluoroscopy provided for a modified  barium swallow. Please  see speech therapy report for full details and recommendations.         This report was finalized on 11/13/2017 10:21 PM by DR. Dc Rowell MD.       XR Chest 1 View [[id number]] Collected:  11/14/17 0848     Updated:  11/14/17 1210    Narrative:       EXAMINATION: XR CHEST 1 VW- 11/14/2017     INDICATION: pulmonary edema, leukocytosis; G93.6-Cerebral edema;  R53.1-Weakness; R41.82-Altered mental status, unspecified;  S06.4W1B-Asrgxhoyi subdural hemorrhage without loss of consciousness,  subsequent encounter; Z74.09-Other reduced mobility; R13.13-Dysphagia,  pharyngeal phase      COMPARISON: 11/13/2017     FINDINGS: Cardiomediastinal contour enlarged and unchanged. Increased  pulmonary vascularity with minimal bibasilar atelectasis greatest on the  left. No pneumothorax or significant pleural effusion.           Impression:       No significant interval change.        D:  11/14/2017  E:  11/14/2017     This report was finalized on 11/14/2017 12:08 PM by Dr. Kenny Hong.       XR Hip With or Without Pelvis 2 - 3 View Left [[id number]] Collected:  11/14/17 1255     Updated:  11/14/17 1445    Narrative:       EXAMINATION: XR HIP W OR WO PELVIS, 2-3 VIEW, LEFT-11/14/2017:      INDICATION: Left hip pain after fall; G93.6-Cerebral edema;  R53.1-Weakness; R41.82-Altered mental status, unspecified;  S06.5P2W-Oxleadgyq subdural hemorrhage without loss of consciousness,  subsequent encounter; Z74.09-Other reduced mobility; Z74.09-Other  reduced mobility; R13.13-Dysphagia, pharyngeal phase.      COMPARISON: NONE.     FINDINGS: No acute fracture or osseous malalignment with femoral head  well seated in the acetabulum. SI joints and pubic symphysis without  significant diastasis. Degenerative changes of the bilateral hips are  noted. Osteophytosis and joint space narrowing. Vascular calcifications  are identified. Large rectal stool ball incidentally noted.           Impression:       No acute osseous  abnormality with normal alignment of the  left hip joint demonstrating background degenerative change. If there is  persistent and/or progressive symptoms associated recommend repeat  radiographs and/or CT bony pelvis for further evaluation.     D:  11/14/2017  E:  11/14/2017                 Results for orders placed during the hospital encounter of 08/23/17   Adult Transthoracic Echo Limited With Contrast    Narrative · Left ventricular systolic function is moderately decreased. Estimated EF   = 30%.  · An apical left ventricular aneurysm is present.          I have reviewed the medications.    amLODIPine 5 mg Oral Q24H   aspirin 81 mg Oral Daily   atorvastatin 10 mg Oral Nightly   budesonide-formoterol 2 puff Inhalation BID - RT   entecavir 0.5 mg Oral Q24H   famotidine 20 mg Oral BID   furosemide 40 mg Oral Daily   memantine 10 mg Oral Q12H   montelukast 10 mg Oral Nightly   PARoxetine 30 mg Oral QAM   rivastigmine 1 patch Transdermal Daily   sacubitril-valsartan 0.5 tablet Oral BID         Assessment/Plan   Assessment / Plan     Hospital Problem List     * (Principal)Traumatic SDH, s/p right craniotomy on 11/8/2017    Dementia    Dyslipidemia    Hypertension    JONNY (obstructive sleep apnea)    Overview Signed 1/6/2017  8:57 AM by Davis HIDALGO.  Obstructive sleep apnea with central component.B.  On home CPAP therapy.         COPD (chronic obstructive pulmonary disease)    Coronary artery disease involving coronary bypass graft of native heart without angina pectoris    Overview Signed 10/25/2017 11:32 AM by MORALES Anaya     1. [facility] 8-8-17:   1.  Severe proximal LAD/first diagonal stenosis (ungrafted) history of a 2.5 x 23 Xience EES   2.  Occluded LAD with patent vein graft to the diagonal and LIMA to the LAD   3.  Severe left circumflex stenosis with widely patent vein graft to the OM1 and OM 2   4.  Occluded RCA with patent saphenous vein graft   5.  Disease and a small ramus branch (too small  for PCI)   6.  Normal right heart pressures and post capillary wedge pressure.         Ischemic cardiomyopathy, most recent LVEF 30%    Overview Addendum 10/25/2017 11:33 AM by MORALES Anaya     1. Echo 8-7-17:   · Left ventricular systolic function is moderately decreased. Estimated EF = 30%.  · The cardiac valves are anatomically and functionally normal.    2. Echo 8-24-17:  · Left ventricular systolic function is moderately decreased. Estimated EF = 30%.  · An apical left ventricular aneurysm is present.         History of atrial flutter    History of DVT (deep vein thrombosis)    CKD (chronic kidney disease), stage III    Weakness of left side of body    Cerebral edema    Leukocytosis    Hematuria, microscopic; in the presence of a hoang catheter             Brief Hospital Course to date:  72-year-old gentleman following sustaining a head injury, some rib fractures on October 21. At that time he had a subdural hematoma. He was at [facility] and developed progressive left-sided weakness and returned to [facility] where his CT scan revealed more right to left shift from his subacute subdural hematoma which had increased in size. On November 18 underwent craniotomy and evacuation of a right subdural hematoma. Postoperatively his confusion has improved. He has a known history of coronary artery disease with previous bypass. Heart catheterization in August of this year revealed severe proximal LAD first diagonal stenosis with previous stent, occluded LAD with patent graft to a diagonal and LIMA graft, severe left circumflex stenosis with widely patent grafts to OM1 and OM 2, occluded right coronary artery with patent saphenous vein graft and a left ventricular ejection fraction of 30%. He has a history of atrial fibrillation but currently remains in sinus rhythm. He also has a history of chronic kidney disease, however his current serum creatinine is 1 and his urine output is adequate. He does not have  diabetes but blood sugars are mildly elevated from systemic steroids. He has not required insulin      Assessment & Plan:    Subdural Hematoma  - s/p evacuation  - residual left HP  - PT/OT-- to rehab soon  - will need sutures removed and wound check    CAD  - plavix held due to SDH, continue asa, will restart plavix 3-4 weeks per Cardio    ICM, EF 30%  - currently appears clinically volume overloaded (increased vascular markings on CXR, wet cough, rales on exam)  - life vest to be removed    Leukocytosis  - suspect due to decadron, no discrete infiltrate on CXR, patient afebrile  - will ask speech to re-evaluate in case silent aspiration    CKD III    JONNY  - CPAP at bedtime    Heb B  - entacavir, follows with Dr Ng    Left Hip XR  Trial of Skeletal Muscle Relaxant    DVT Prophylaxis:  SCDs due to blood in brain (SDH)    CODE STATUS: Full Code    Disposition: I expect the patient to be discharged to rehab in 2 days.    Rubens Kim MD  11/14/17  2:48 PM

## 2017-11-14 NOTE — PROGRESS NOTES
"NEUROSURGERY PROGRESS NOTE     LOS: 9 days   Patient Care Team:  Colton Dickens MD as PCP - General (Internal Medicine)  Kvng Stauffer MD as Consulting Physician (Cardiology)    Chief Complaint: Confusion and left-sided weakness.    POD#: 6 Days Post-Op  Procedures:  Right craniotomy for evacuation of convexity and interhemispheric subdural hematoma.    Interval History:   Patient Complaints: None.  Patient Denies: Headache or new weakness.    Vital Signs: Blood pressure 148/87, pulse 64, temperature 98.7 °F (37.1 °C), temperature source Oral, resp. rate 16, height 68\" (172.7 cm), weight 172 lb 9.6 oz (78.3 kg), SpO2 91 %.  Intake/Output:   Intake/Output Summary (Last 24 hours) at 11/14/17 0601  Last data filed at 11/13/17 2340   Gross per 24 hour   Intake              360 ml   Output                0 ml   Net              360 ml       Physical Exam:  Patient is awake, pleasant, and verbally interactive.  He is generally oriented.  His left upper extremity strength appears to be slightly better than on admission.  There is still no movement in his left leg.  Wound is intact.     Data Review:       Results from last 7 days  Lab Units 11/14/17  0401   WBC 10*3/mm3 22.69*   HEMOGLOBIN g/dL 11.8*   HEMATOCRIT % 36.4*   PLATELETS 10*3/mm3 287       Results from last 7 days  Lab Units 11/14/17  0401   SODIUM mmol/L 138   POTASSIUM mmol/L 4.5   CHLORIDE mmol/L 103   CO2 mmol/L 31.0   BUN mg/dL 52*   CREATININE mg/dL 1.20   GLUCOSE mg/dL 118*   CALCIUM mg/dL 9.8         Assessment/Plan:  1.  Right convexity and interhemispheric subdural hematoma, subacute that is post craniotomy for evacuation.  2.  Multiple cardiac issues including coronary artery disease, ischemic cardiomyopathy, and atrial flutter.  He continues on baby aspirin.  I have held his Plavix in light of craniotomy.  Patient may resume Plavix 3-4 weeks from the time of his craniotomy.  3.  Chronic renal insufficiency.  4.  COPD.  5.  Hypertension.  6. "  Dementia.  7.  Sleep apnea: Okay to resume CPAP.  7.  Disposition: Mobilize.  From a neurosurgical standpoint we can work towards getting patient back to rehabilitation in the next couple of days.  The patient will follow-up in our clinic in about 10 days for suture removal and wound check.  Discontinue Decadron.      True Frank MD  11/14/17  6:52 AM

## 2017-11-14 NOTE — PLAN OF CARE
Problem: Patient Care Overview (Adult)  Goal: Plan of Care Review  Outcome: Ongoing (interventions implemented as appropriate)    11/14/17 1541   Coping/Psychosocial Response Interventions   Plan Of Care Reviewed With patient   Patient Care Overview   Progress improving   Outcome Evaluation   Outcome Summary/Follow up Plan Resting comfortably; more alert today. VSS. continue to monitor.          Problem: Fall Risk (Adult)  Goal: Absence of Falls  Outcome: Ongoing (interventions implemented as appropriate)    Problem: Skin Integrity Impairment, Risk/Actual (Adult)  Goal: Skin Integrity/Wound Healing  Outcome: Ongoing (interventions implemented as appropriate)

## 2017-11-14 NOTE — PROGRESS NOTES
"  [facility]   Inpatient Progress Note       LOS: 9 days   Patient Care Team:  Colton Dickens MD as PCP - General (Internal Medicine)  Kvng Stauffer MD as Consulting Physician (Cardiology)    Chief Complaint:  Follow-up for ischemic cardiomyopathy and systolic congestive heart failure    Subjective     Interval History:   Patient in bed. No current CV complaints.  CV complaints      Review of Systems:   Pertinent positives noted in history, exam, and assessment. Otherwise reviewed and negative.      Objective     Vitals:  Blood pressure (!) 153/111, pulse 65, temperature 97.9 °F (36.6 °C), temperature source Axillary, resp. rate 16, height 68\" (172.7 cm), weight 172 lb 9.6 oz (78.3 kg), SpO2 98 %.     Intake/Output Summary (Last 24 hours) at 11/14/17 1027  Last data filed at 11/14/17 0900   Gross per 24 hour   Intake              480 ml   Output                0 ml   Net              480 ml     Physical Exam   Constitutional: He is oriented to person, place, and time. He appears well-developed and well-nourished.   Neck: No JVD present. No tracheal deviation present.   Cardiovascular: Normal rate, regular rhythm, normal heart sounds and intact distal pulses.  Exam reveals no friction rub.    No murmur heard.  Pulmonary/Chest: Effort normal. No respiratory distress.   Basilar crackles   Abdominal: Soft. Bowel sounds are normal. There is no tenderness.   Musculoskeletal: He exhibits no edema or deformity.   Neurological: He is alert and oriented to person, place, and time.   Left sided weakness     I have examined the patient and agree with the above exam     Results Review:     I reviewed the patient's new clinical results.      Results from last 7 days  Lab Units 11/14/17  0401   WBC 10*3/mm3 22.69*   HEMOGLOBIN g/dL 11.8*   HEMATOCRIT % 36.4*   PLATELETS 10*3/mm3 287       Results from last 7 days  Lab Units 11/14/17  0401 11/13/17  0431   SODIUM mmol/L 138 134   POTASSIUM mmol/L " 4.5 5.0   CHLORIDE mmol/L 103 105   CO2 mmol/L 31.0 27.0   BUN mg/dL 52* 49*   CREATININE mg/dL 1.20 1.00   CALCIUM mg/dL 9.8 9.4   BILIRUBIN mg/dL  --  0.8   ALK PHOS U/L  --  91   ALT (SGPT) U/L  --  93*   AST (SGOT) U/L  --  42*   GLUCOSE mg/dL 118* 106*       Results from last 7 days  Lab Units 11/14/17  0401   SODIUM mmol/L 138   POTASSIUM mmol/L 4.5   CHLORIDE mmol/L 103   CO2 mmol/L 31.0   BUN mg/dL 52*   CREATININE mg/dL 1.20   GLUCOSE mg/dL 118*   CALCIUM mg/dL 9.8           Lab Results  Lab Value Date/Time   TROPONINI 0.059 (H) 10/21/2017 2153   TROPONINI 0.039 08/23/2017 2038   TROPONINI 0.173 (H) 08/07/2017 0836   TROPONINI 0.211 (H) 08/07/2017 0241   TROPONINI 0.166 (H) 08/06/2017 2048   TROPONINI 0.019 07/17/2017 1227   TROPONINI 0.044 (H) 06/12/2017 2139   TROPONINI 9.425 (C) 04/18/2017 0757   TROPONINI 7.403 (C) 04/17/2017 2340                     Tele:      Assessment/Plan     Principal Problem:    Traumatic SDH, s/p right craniotomy on 11/8/2017  Active Problems:    Dementia    Dyslipidemia    Hypertension    JONNY (obstructive sleep apnea)    COPD (chronic obstructive pulmonary disease)    Coronary artery disease involving coronary bypass graft of native heart without angina pectoris    Ischemic cardiomyopathy, most recent LVEF 30%    History of atrial flutter    History of DVT (deep vein thrombosis)    CKD (chronic kidney disease), stage III    Weakness of left side of body    Cerebral edema    Leukocytosis    Hematuria, microscopic; in the presence of a hoang catheter    1. Ischemic cardiomyopathy  - EF 40% by echo.   - Life vest may be discontinued. Will alert rep.  2. SDH  - s/p evacuation  - residual left HP  - dysphagia   3. Coronary artery disease  - currently on ASA. Plavix held secondary to #2.  - Per NS may resume plavix 3-4 weeks from time of craniotomy.  4. Dyslipidemia  5. Leukocytosis, per Hospitalists.  6. Acute on chronic systolic congestive heart failure  - Entresto has been  resumed.  - No BB secondary to symptomatic bradycardia on BB in the past  - given IV lasix yesterday      Plan:  Will discontinue life vest (rep has been contacted) as LVEF has significantly improved.  Patient does not need Plavix resumed, it is been 3 months since his PCI.  His stent thrombosis risk is quite low, and certainly outweighed by the risk of intracranial bleeding.  I would not resume this medication.  Continue aspirin, statin, Entresto and current diuretics.  No active cardiovascular issues, we will see again as an outpatient.    Sosa Decker, REI  11/14/17  10:27 AM  I, Kvng Stauffer have reviewed the note in full and agree with all aspects of the above including physical exam, assessment, labs and plan with changes made accordingly.     Kvng Stauffer MD  11/14/17  3:13 PM        Dictated utilizing Dragon dictation

## 2017-11-15 PROBLEM — R31.29 HEMATURIA, MICROSCOPIC: Status: RESOLVED | Noted: 2017-01-01 | Resolved: 2017-01-01

## 2017-11-15 NOTE — PROGRESS NOTES
Continued Stay Note  [facility]     Patient Name: Magdi Arriaga  MRN: [id number]  Today's Date: 11/15/2017    Admit Date: 11/5/2017          Discharge Plan       11/15/17 1108    Case Management/Social Work Plan    Plan [facility]    Patient/Family In Agreement With Plan yes    Additional Comments Called Rivka at [facility] and she is still working up pt's referral and will get back to CM when a bed becomes available. Pt has d/c orders and is medically ready. CM will follow.               Discharge Codes     None        Expected Discharge Date and Time     Expected Discharge Date Expected Discharge Time    Nov 15, 2017             Kala Woods

## 2017-11-15 NOTE — PLAN OF CARE
Problem: Patient Care Overview (Adult)  Goal: Plan of Care Review  Outcome: Ongoing (interventions implemented as appropriate)    11/15/17 1331   Coping/Psychosocial Response Interventions   Plan Of Care Reviewed With patient;family   Patient Care Overview   Progress improving   Outcome Evaluation   Outcome Summary/Follow up Plan Pt with significant increase in functional ROM in L UE. Pt continues to need max x 2 to stand, poor standing posture/endurance. Pt scheduled to return to [facility] when available.         Problem: Inpatient Occupational Therapy  Goal: Transfer Training Goal 1 LTG- OT  Outcome: Ongoing (interventions implemented as appropriate)    11/09/17 0927 11/13/17 0952   Transfer Training OT LTG   Transfer Training OT LTG, Date Established 11/09/17 --    Transfer Training OT LTG, Time to Achieve by discharge --    Transfer Training OT LTG, Activity Type sit to stand/stand to sit;bed to chair /chair to bed;toilet --    Transfer Training OT LTG, [place] Level moderate assist (50% patient effort);2 person assist required --    Transfer Training OT LTG, Additional Goal AAD --    Transfer Training OT LTG, Outcome --  goal ongoing  (Max A x 2 STS this date)       Goal: Strength Goal LTG- OT  Outcome: Ongoing (interventions implemented as appropriate)    11/09/17 0927 11/13/17 0952   Strength OT LTG   Strength Goal OT LTG, Date Established 11/09/17 --    Strength Goal OT LTG, Time to Achieve by discharge --    Strength Goal OT LTG, Measure to Achieve Pt will tolerate BUE AROM/AAROM HEP x15 reps to increase strength/endurance to promote ADL performance.  --    Strength Goal OT LTG, Outcome --  goal ongoing       Goal: Static Sitting Balance Goal LTG- OT  Outcome: Ongoing (interventions implemented as appropriate)    11/09/17 0927 11/13/17 0952   Static Sitting Balance OT LTG   Static Sitting Balance OT LTG, Date Established 11/09/17 --    Static Sitting Balance OT LTG, Time to Achieve by discharge --    Static  Sitting Balance OT LTG, [place] Level contact guard assist --    Static Sitting Balance OT LTG, Additional Goal AAD, at EOB --    Static Sitting Balance OT LTG, Outcome --  goal ongoing  (Min A at front of chair this date)       Goal: Eating Self-Feeding Goal LTG- OT  Outcome: Ongoing (interventions implemented as appropriate)    11/09/17 0927 11/13/17 0952   Eating Self-Feeding OT LTG   Eat Self Feeding Goal OT LTG, Date Established 11/09/17 --    Eat Self Feeding Goal OT LTG, Time to Achieve by discharge --    Eat Self Feed Goal OT LTG, [place] Level contact guard assist --    Eat Self Feeding Goal OT LTG, Additional Goal AAD, supported sitting --    Eat Self Feeding Goal OT LTG, Outcome --  goal ongoing

## 2017-11-15 NOTE — PLAN OF CARE
Problem: Patient Care Overview (Adult)  Goal: Plan of Care Review  Outcome: Ongoing (interventions implemented as appropriate)    11/15/17 1147   Coping/Psychosocial Response Interventions   Plan Of Care Reviewed With patient   Patient Care Overview   Progress progress toward functional goals as expected   Outcome Evaluation   Outcome Summary/Follow up Plan Pt participated in dysphagia tx. RN reported pt has not consistently been compliant w/ recs for nectar-thick liquids. Pt reported drinking rec'd liquids and no family present during session. Provided edu re: results of MBS, risks of aspiration, modified diet rationale. Pt stated understanding & agreement. Pt participated in exercises, but req'd consistent cueing. Pt also required consistent cueing to use compensatory strategies for safe swallowing, including: small bites/sips, multiple dry swallows, alternate bites/sips, and cough intermittently after couple bites/sips.      Rec: cont dysphagia level III diet (puree w/ some mashed), nectar-thick liquids; supervision w/ all PO to ensure use of compensatory strats previously mentioned. Pt would benefit from cont'd dysphagia tx & will require cont'd SLP services for dysphagia @ next level of care.         Problem: Inpatient SLP  Goal: Dysphagia- Patient will safely consume diet as per recommendation with no signs/symptoms of aspiration  Outcome: Ongoing (interventions implemented as appropriate)    11/13/17 1658 11/15/17 1147   Safely Consume Diet   Safely Consume Diet- SLP, Date Established 11/13/17 --    Safely Consume Diet- SLP, Time to Achieve by discharge --    Safely Consume Diet- SLP, Date Goal Reviewed --  11/15/17   Safely Consume Diet- SLP, Outcome --  goal ongoing

## 2017-11-15 NOTE — PLAN OF CARE
Problem: Patient Care Overview (Adult)  Goal: Plan of Care Review  Outcome: Ongoing (interventions implemented as appropriate)    11/15/17 1356   Coping/Psychosocial Response Interventions   Plan Of Care Reviewed With patient   Patient Care Overview   Progress no change   Outcome Evaluation   Outcome Summary/Follow up Plan pt up in chair.still needs max. assist and lots of verbal cues for standing.attempted 3 times         Problem: Inpatient Physical Therapy  Goal: Bed Mobility Goal LTG- PT  Outcome: Ongoing (interventions implemented as appropriate)    11/09/17 0947 11/15/17 1356   Bed Mobility PT LTG   Bed Mobility PT LTG, Time to Achieve 2 wks --    Bed Mobility PT LTG, Activity Type supine to sit/sit to supine --    Bed Mobility PT LTG, [place] Level moderate assist (50% patient effort) --    Bed Mobility PT LTG, Date Goal Reviewed 11/09/17 --    Bed Mobility PT LTG, Outcome --  goal ongoing       Goal: Transfer Training Goal 1 LTG- PT  Outcome: Ongoing (interventions implemented as appropriate)    11/09/17 0947 11/15/17 1356   Transfer Training PT LTG   Transfer Training PT LTG, Time to Achieve 2 wks --    Transfer Training PT LTG, Activity Type sit to stand/stand to sit --    Transfer Training PT LTG, [place] Level moderate assist (50% patient effort);2 person assist required --    Transfer Training PT LTG, Assist Device other (see comments)  (approp AD) --    Transfer Training PT LTG, Date Goal Reviewed 11/09/17 --    Transfer Training PT LTG, Outcome --  goal ongoing       Goal: Gait Training Goal LTG- PT  Outcome: Ongoing (interventions implemented as appropriate)    11/09/17 0947 11/15/17 1356   Gait Training PT LTG   Gait Training Goal PT LTG, Time to Achieve 2 wks --    Gait Training Goal PT LTG, [place] Level moderate assist (50% patient effort);2 person assist required --    Gait Training Goal PT LTG, Assist Device other (see comments)  (approp AD; possible ARJO use) --    Gait Training  Goal PT LTG, Distance to Achieve 5 --    Gait Training Goal PT LTG, Date Goal Reviewed 11/09/17 --    Gait Training Goal PT LTG, Outcome --  goal ongoing

## 2017-11-15 NOTE — THERAPY TREATMENT NOTE
Acute Care - Speech Language Pathology   Swallow Treatment Note [facility]     Patient Name: Magdi Arriaga  : 1945  MRN: [id number]  Today's Date: 11/15/2017  Onset of Illness/Injury or Date of Surgery Date: 17            Admit Date: 2017    Visit Dx:      ICD-10-CM ICD-9-CM   1. Cerebral edema G93.6 348.5   2. Left-sided weakness R53.1 728.87   3. Altered mental status, unspecified altered mental status type R41.82 780.97   4. Traumatic subdural hematoma without loss of consciousness, subsequent encounter S06.5X0D V58.89     852.21   5. Impaired functional mobility, balance, gait, and endurance Z74.09 V49.89   6. Impaired mobility and ADLs Z74.09 799.89   7. Pharyngeal dysphagia R13.13 787.23     Patient Active Problem List   Diagnosis   • Abnormal CT scan, chest   • Allergic rhinitis   • Asthma   • Bronchiectasis   • Dementia   • Depression   • Dyslipidemia   • Hypertension   • Obesity   • JONNY (obstructive sleep apnea)   • Osteoarthritis   • Vitamin D deficiency   • Left bundle branch block   • Normocytic anemia   • COPD (chronic obstructive pulmonary disease)   • Coronary artery disease involving coronary bypass graft of native heart without angina pectoris   • Ischemic cardiomyopathy, most recent LVEF 30%   • History of atrial flutter   • History of DVT (deep vein thrombosis)   • Hematoma, calf   • History of exposure to infectious disease-Patient received platelet transfusion for CABG, 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, followed by [facility]   • Bradycardia   • Syncope, near   • Closed fracture of multiple ribs of right side   • Traumatic SDH, s/p right craniotomy on 2017   • Weakness of left side of body   • Cerebral edema   • Leukocytosis             Adult Rehabilitation Note        11/15/17 0945 11/13/17 0858 11/13/17 0855    Rehab Assessment/Intervention    Discipline speech language pathologist  -AC occupational therapist  -TA physical therapy assistant  -UD    Document Type therapy note (daily note)  -AC therapy note (daily note)  -TA therapy note (daily note)  -UD    Subjective Information no complaints;agree to therapy  -AC agree to therapy;no complaints  -TA agree to therapy;no complaints  -UD    Patient Effort, Rehab Treatment good  -AC good  -TA good  -UD    Symptoms Noted During/After Treatment  none  -TA     Precautions/Limitations  fall precautions;other (see comments)   L sided weakness  -TA fall precautions  -UD    Recorded by [AC] Mahogany Weems, MS CCC-SLP [TA] Yo Murguia OT [UD] Olya Mayfield PTA    Vital Signs    Pre Systolic BP Rehab  --   RN cleared pt for tx, VSS  -TA     O2 Delivery Pre Treatment  room air  -TA     Post SpO2 (%)  95  -TA     O2 Delivery Post Treatment  room air  -TA room air  -UD    Pre Patient Position  Supine  -TA Supine  -UD    Intra Patient Position  Standing  -TA Standing  -UD    Post Patient Position  Sitting  -TA Sitting  -UD    Recorded by  [TA] Yo Murguia OT [UD] Olya Mayfield PTA    Pain Assessment    Pain Assessment No/denies pain  -AC No/denies pain  -TA No/denies pain  -UD    Pain Score   0  -UD    Post Pain Score   0  -UD    Recorded by [AC] Mahogany Weems, MS CCC-SLP [TA] Yo Murguia OT [UD] Olya Mayfield PTA    Cognitive Assessment/Intervention    Current Cognitive/Communication Assessment  functional  -TA     Orientation Status  oriented x 4  -TA oriented x 4  -UD    Follows Commands/Answers Questions  100% of the time;able to follow single-step instructions;needs cueing  -% of the time  -UD    Personal Safety  mild impairment  -TA     Personal Safety Interventions  fall prevention program maintained;gait belt;muscle strengthening facilitated;nonskid shoes/slippers when out of bed;supervised activity  -TA fall  prevention program maintained  -UD    Recorded by  [TA] Yo Murguia OT [UD] Olya Mayfield, PTA    Improve timing of pharyngeal response    To improve timing of pharyngeal response, patient will: Swallow in timely way using three second prep;Swallow in timely way using Mendelsohn maneuver;80%;with inconsistent cues  -AC      Status: Improve timing of pharyngeal response Progressing as expected  -AC      Timing of Pharyngeal Response Progress 50%;with inconsistent cues;continue to adress  -AC      Comments: Improve timing of pharyngeal response 3-sec prep  -AC      Recorded by [AC] Mahogany Weems MS CCC-SLP      Improve closure at entrance to airway    To improve closure at entrance to airway, patient will: Complete super-supraglottic swallow;80%;with inconsistent cues  -AC      Status: Improve closure at entrance to airway Progressing as expected  -AC      Closure at Entrance to Airway Progress 30%;with inconsistent cues;continue to adress  -AC      Recorded by [AC] Mahogany Weems MS CCC-SLP      Improve hyolaryngeal excursion    To improve hyolaryngeal excursion, patient will: Complete chin tuck against resistance (comment number of repetitions);with inconsistent cues  -AC      Status: Improve hyolaryngeal excursion Progressing as expected  -AC      Hyolaryngeal Excursion Progress with inconsistent cues;continue to adress  -AC      Comments: Improve hyolaryngeal excursion 15 sec, 3 reps  -AC      Recorded by [AC] Mahogany Weems MS CCC-SLP      Improve tongue base & pharyngeal wall squeeze    To improve tongue base & pharyngeal wall squeeze, patient will: Complete effortful swallow;Complete tongue base retraction;80%;with inconsistent cues  -AC      Status: Improve tongue base & pharyngeal wall squeeze Progressing as expected  -AC      Tongue Base/Pharyngeal Wall Squeeze Progress 50%;with consistent cues;continue to adress  -AC      Comments: Improve tongue base & pharyngeal wall squeeze effortful swallow  -AC       Recorded by [AC] Mahogany Weems, MS CCC-SLP      Dysphagia Other 1    Dysphagia Other 1 Objective Pt will tolerate recommended diet w/o overt clinical s/sxs aspiration w/ intermittent cues.  -AC      Status: Dysphagia Other 1 Progressing as expected  -AC      Dysphagia Other 1 Progress continue to address  -AC      Comments: Dysphagia Other 1 Trialed nectar via cup and puree. Cough noted x1 when pt taking consecutive drink liquid wash. When encouraged to take small sips and intemittently use dry multiple swallows, no overt clinical s/sxs aspiration noted. Pt cont to require supervision w/ eating/drinking to ensure use of compensatory strategies for improved swallow safety.  -AC      Recorded by [AC] Mahogany Weems, MS CCC-SLP      Dysphagia Other 2    Dysphagia Other 2 Objective Pt will utilize compensatory strats when eating/drinking to improve safety of the swallow w/ 100% acc w/ intermittent cues.  -AC      Status: Dysphagia Other 2 Progressing as expected  -AC      Dysphagia Other 2 Progress 50%;with inconsistent cues;continue to address  -AC      Comments: Dysphagia Other 2 50% acc w/ intermittent cues. 100% acc w/ consistent cues. Utilized small bites/sips, multiple dry swallows, liquid wash, and intermittent volitional cough.  -AC      Recorded by [AC] Mahogany Weems, MS CCC-SLP      Bed Mobility, Assessment/Treatment    Bed Mobility, Assistive Device  bed rails;head of bed elevated  -TA     Bed Mobility, Roll Left, [place]  verbal cues required;moderate assist (50% patient effort)  -TA moderate assist (50% patient effort)  -UD    Bed Mobility, Roll Right, [place]  verbal cues required;moderate assist (50% patient effort)  -TA moderate assist (50% patient effort)  -UD    Bed Mobility, Scoot/Bridge, [place]  maximum assist (25% patient effort);2 person assist required  -TA     Bed Mob, Supine to Sit, [place]  not tested  -TA     Bed Mobility, Safety Issues  decreased use of arms for  pushing/pulling;decreased use of legs for bridging/pushing;impaired trunk control for bed mobility  -TA     Bed Mobility, Impairments  strength decreased;impaired balance;motor control impaired;coordination impaired  -TA     Bed Mobility, Comment  Vcs for sequencing, holding bed rails  -TA     Recorded by  [TA] Yo Murguia OT [UD] Olya Mayfield PTA    Transfer Assessment/Treatment    Transfers, Bed-Chair [place]  dependent (less than 25% patient effort);2 person assist required  -TA dependent (less than 25% patient effort)  -UD    Transfers, Bed-Chair-Bed, Assist Device  mechanical lift  -TA mechanical lift  -UD    Transfers, Sit-Stand [place]  maximum assist (25% patient effort);2 person assist required;verbal cues required  -TA maximum assist (25% patient effort);2 person assist required  -UD    Transfers, Stand-Sit [place]  maximum assist (25% patient effort);2 person assist required;verbal cues required  -TA maximum assist (25% patient effort);2 person assist required  -UD    Transfers, Sit-Stand-Sit, Assist Device  other (see comments)   gait belt, BUE support; L knee blocked  -TA     Transfer, Impairments  strength decreased;impaired balance  -TA     Transfer, Comment  STS x 2 trials  -TA     Recorded by  [TA] Yo Murguia OT [UD] Olya Mayfield PTA    Gait Assessment/Treatment    Gait, [place] Level   unable to perform  -UD    Recorded by   [UD] Olya Mayfield PTA    Motor Skills/Interventions    Additional Documentation  Balance Skills Training (Group)  -TA     Recorded by  [TA] Yo Murguia OT     Balance Skills Training    Sitting-Level of Assistance  Moderate assistance  -TA     Sitting-Balance Support  Feet supported;Right upper extremity supported;Left upper extremity supported  -TA     Sitting-Balance Activities  Forward lean;Left UE Weight Bearing;Trunk control activities;Lateral lean  -TA     Sitting # of Minutes  5  -TA     Standing-Level of Assistance   Maximum assistance;x2  -TA     Static Standing Balance Support  Right upper extremity supported;Left upper extremity supported  -TA     Standing-Balance Activities  Weight Shift A-P;Weight Shift R-L   VCs for postural corrections  -TA     Recorded by  [TA] Yo Murguia OT     Therapy Exercises    Bilateral Lower Extremities   AROM:;10 reps;AAROM:;sitting  -UD    Right Upper Extremity  AROM:;10 reps;sitting;elbow flexion/extension;hand pumps;shoulder extension/flexion  -TA     Left Upper Extremity  PROM:;10 reps;sitting;elbow flexion/extension;hand pumps;pronation/supination;shoulder extension/flexion;shoulder horizontal abd/add  -TA     Recorded by  [TA] Yo Murguia OT [UD] Olya Mayfield PTA    Positioning and Restraints    Pre-Treatment Position  in bed  -TA in bed  -UD    Post Treatment Position  chair  -TA chair  -UD    In Chair  notified nsg;reclined;call light within reach;encouraged to call for assist;exit alarm on;RUE elevated;LUE elevated;on mechanical lift sling;legs elevated  -TA notified nsg;reclined;sitting;call light within reach;exit alarm on;on mechanical lift sling;legs elevated  -UD    Recorded by  [TA] Yo Murguia OT [UD] Olya Mayfield PTA      User Key  (r) = Recorded By, (t) = Taken By, (c) = Cosigned By    Initials Name Effective Dates    UD Olya Mayfield PTA 06/22/15 -     AC Mahogany Weems MS CCC-SLP 07/27/17 -     TA Yo Murguia OT 03/14/16 -                    SLP Goals       11/15/17 1147 11/13/17 1658       Safely Consume Diet    Safely Consume Diet- SLP, Date Established  11/13/17  -AC     Safely Consume Diet- SLP, Time to Achieve  by discharge  -AC     Safely Consume Diet- SLP, Date Goal Reviewed 11/15/17  -AC      Safely Consume Diet- SLP, Outcome goal ongoing  -AC        User Key  (r) = Recorded By, (t) = Taken By, (c) = Cosigned By    Initials Name Provider Type    AC Mahogany Weems MS CCC-SLP Speech and Language Pathologist          EDUCATION  The  patient has been educated in the following areas:   Dysphagia (Swallowing Impairment) Oral Care/Hydration Modified Diet Instruction.    SLP Recommendation and Plan  Plan of Care Review  Plan Of Care Reviewed With: patient  Progress: progress toward functional goals as expected  Outcome Summary/Follow up Plan: Pt participated in dysphagia tx. RN reported pt has not consistently been compliant w/ recs for nectar-thick liquids. Pt reported drinking rec'd liquids and no family present during session. Provided edu re: results of MBS, risks of aspiration, modified diet rationale. Pt stated understanding & agreement. Pt participated in exercises, but req'd consistent cueing. Pt also required consistent cueing to use compensatory strategies for safe swallowing, including: small bites/sips, multiple dry swallows, alternate bites/sips, and cough intermittently after couple bites/sips. Rec: cont dysphagia level III diet (puree w/ some mashed), nectar-thick liquids; supervision w/ all PO to ensure use of compensatory strats previously mentioned. Pt would benefit from cont'd dysphagia tx & will require cont'd SLP services for dysphagia @ next level of care.    Time Calculation:         Time Calculation- SLP       11/15/17 1151          Time Calculation- SLP    SLP Start Time 0945  -      SLP Received On 11/15/17  -        User Key  (r) = Recorded By, (t) = Taken By, (c) = Cosigned By    Initials Name Provider Type     Mahogany Weems MS CCC-SLP Speech and Language Pathologist          Therapy Charges for Today     Code Description Service Date Service Provider Modifiers Qty    [id number]  ST TREATMENT SWALLOW 3 11/15/2017 Mahogany Weems MS CCC-SLP GN 1                 Mahogany Weems MS CCC-GAL  11/15/2017

## 2017-11-15 NOTE — DISCHARGE SUMMARY
[facility]  DISCHARGE SUMMARY    Patient Name: Magdi Arriaga  : 1945  MRN: [id number]    Date of Admission: 2017  Date of Discharge:    Length of Stay: 10  Primary Care Physician: Colton Dickens MD    Consults     Date and Time Order Name Status Description    2017 1011 Inpatient Consult to Cardiology Completed     2017 1720 Inpatient Consult to Neurosurgery Completed     10/24/2017 1732 Inpatient Consult to Cardiology Completed     10/21/2017 2231 Inpatient Consult to Neurosurgery Completed         Hospital Course     Presenting Problem:   Cerebral edema [G93.6]    Active Hospital Problems (** Indicates Principal Problem)    Diagnosis Date Noted   • **Traumatic SDH, s/p right craniotomy on 2017 [S06.5X0A] 10/22/2017   • Weakness of left side of body [R53.1] 2017   • Cerebral edema [G93.6] 2017   • Leukocytosis [D72.829] 2017   • CKD (chronic kidney disease), stage III [N18.3] 2017   • History of DVT (deep vein thrombosis) [Z86.718] 2017   • History of atrial flutter [Z86.79] 2017   • Ischemic cardiomyopathy, most recent LVEF 30% [I25.5] 2017   • Coronary artery disease involving coronary bypass graft of native heart without angina pectoris [I25.810] 2017   • COPD (chronic obstructive pulmonary disease) [J44.9] 2017   • JONNY (obstructive sleep apnea) [G47.33] 2017   • Hypertension [I10] 2017   • Dyslipidemia [E78.5] 2017   • Dementia [F03.90] 2017      Resolved Hospital Problems    Diagnosis Date Noted Date Resolved   • Hematuria, microscopic; in the presence of a hoang catheter [R31.29] 2017 11/15/2017          Hospital Course:  Magdi Arriaga is a 72 y.o. male sustained a head injury, some rib fractures on . At that time he had a subdural hematoma. He was at [facility] and developed progressive left-sided weakness and returned to  [facility] where his CT scan revealed more right to left shift from his subacute subdural hematoma which had increased in size. On November 18 underwent craniotomy and evacuation of a right subdural hematoma. Postoperatively his confusion has improved. He has a known history of coronary artery disease with previous bypass. Heart catheterization in August of this year revealed severe proximal LAD first diagonal stenosis with previous stent, occluded LAD with patent graft to a diagonal and LIMA graft, severe left circumflex stenosis with widely patent grafts to OM1 and OM 2, occluded right coronary artery with patent saphenous vein graft and a left ventricular ejection fraction of 30%. He has a history of atrial fibrillation but currently remains in sinus rhythm. He also has a history of chronic kidney disease, however his current serum creatinine is 1 and his urine output is adequate. He does not have diabetes but blood sugars are mildly elevated from systemic steroids. He has not required insulin.    Surgical site is healing well with sutures intact.  He is scheduled to return to neurosurgery office in approximately 10 days to follow up with PA for wound check as well as suture removal at that time.  Cardiology has been following daily.  Medications have been adjusted.  Life vest will be discontinued and ref has been contacted for this.  EF has improved significantly to 40%.  He will continue aspirin-low dose, statin, interest oh and Lasix at discharge.  Patient has been 3 months since PCI and has a very low risk for thrombosis of his stent.  Due to this and in light of subdural hematoma, Plavix will not be resumed.  He will need follow-up with Dr. Stauffer in 6 weeks.    Patient does have residual left hemiparesis and has been working with physical therapy and occupational therapy.  He will need continued rehabilitation following discharge.  Patient to be discharged to [facility] for ongoing  therapy.  He is to continue CPAP at night for obstructive sleep apnea.  Would recommend repeat CBC in 3-5 days for leukocytosis.  Suspect due to Decadron which has been discontinued as of 11/14/2017.  He shouldn't to follow-up with primary care 1 week following discharge from [facility].    Procedure(s):  CRANIOTOMY FOR SUBDURAL HEMATOMA       Day of Discharge     HPI:   He shouldn't sleeping family with CPAP and intact this morning.  Arouses easily and answers questions by nodding head.  Follows command.  Patient currently denies pain, nausea, vomiting, fever or chills.  Patient states he is ready for rehabilitation.    Review of Systems  Gen- No fevers, chills  CV- No chest pain, palpitations  Resp- No cough, dyspnea  GI- No N/V/D, abd pain      Otherwise ROS is negative except as mentioned in the HPI.    Vital Signs:   Temp:  [98 °F (36.7 °C)-98.2 °F (36.8 °C)] 98 °F (36.7 °C)  Heart Rate:  [63-76] 69  Resp:  [16] 16  BP: (121-123)/(76-87) 121/76     Physical Exam:  Constitutional: No acute distress, sleeping but arouses easily- CPAP in place  HENT: NCAT, mucous membranes moist  Respiratory: Clear to auscultation bilaterally, respiratory effort normal   Cardiovascular: RRR, no murmurs, rubs, or gallops, palpable pedal pulses bilaterally  Gastrointestinal: Positive bowel sounds, soft, nontender, nondistended  Musculoskeletal: No bilateral ankle edema  Psychiatric: drowsy but responsive, appropriate  Neurologic: Oriented, nods with CPAP in place, left side hemiparesis   Skin: No rashes      Pertinent  and/or Most Recent Results         Results from last 7 days  Lab Units 11/14/17  0401 11/13/17  0431 11/11/17  0348 11/10/17  0428 11/09/17  0357   WBC 10*3/mm3 22.69* 15.08* 11.51* 12.66* 12.52*   HEMOGLOBIN g/dL 11.8* 11.8* 10.9* 10.1* 9.6*   HEMATOCRIT % 36.4* 36.5* 33.7* 31.7* 30.5*   PLATELETS 10*3/mm3 287 311 313 304 313   SODIUM mmol/L 138 134 132 133 138   POTASSIUM mmol/L 4.5 5.0 4.4 4.6 4.9   CHLORIDE  mmol/L 103 105 101 105 112*   CO2 mmol/L 31.0 27.0 25.0 23.0 23.0   BUN mg/dL 52* 49* 46* 50* 52*   CREATININE mg/dL 1.20 1.00 1.00 1.20 1.20   GLUCOSE mg/dL 118* 106* 131* 142* 120*   CALCIUM mg/dL 9.8 9.4 9.7 9.0 8.4*       Results from last 7 days  Lab Units 11/13/17  0431   BILIRUBIN mg/dL 0.8   ALK PHOS U/L 91   ALT (SGPT) U/L 93*   AST (SGOT) U/L 42*           Invalid input(s): TG, LDLREALC    Results from last 7 days  Lab Units 11/14/17  0401 11/13/17  0431   BNP pg/mL 214.0* 404.0*     Brief Urine Lab Results  (Last result in the past 365 days)      Color   Clarity   Blood   Leuk Est   Nitrite   Protein   CREAT   Urine HCG        11/08/17 1309 Yellow Cloudy(A) Large (3+)(A) Negative Negative 30 mg/dL (1+)(A)               Microbiology Results Abnormal     Procedure Component Value - Date/Time    Urine Culture - Urine, Urine, Clean Catch [[id number]]  (Normal) Collected:  11/08/17 1309    Lab Status:  Final result Specimen:  Urine from Urine, Catheter Updated:  11/10/17 0755     Urine Culture No growth at 2 days    Urine Culture - Urine, Urine, Clean Catch [[id number]]  (Normal) Collected:  11/05/17 1437    Lab Status:  Final result Specimen:  Urine from Urine, Clean Catch Updated:  11/07/17 0827     Urine Culture No growth at 2 days          Imaging Results (all)     Procedure Component Value Units Date/Time    XR Chest 1 View [[id number]] Collected:  11/05/17 1255     Updated:  11/07/17 0826    Narrative:          EXAMINATION: XR CHEST 1 VW - 11/05/2017      INDICATION: Weakness, dizziness, altered mental status.     COMPARISON: Chest x-ray dated 09/08/2017.     FINDINGS: Cardiomediastinal contour enlarged and unchanged. Multiple  electronic devices overlying the chest obscure the field-of-view without  focal airspace opacity or overt edema identified. No discrete  pneumothorax or large pleural effusion.           Impression:       No acute cardiopulmonary process is identified within  limited field-of-view,  given multiple electronic devices overlying the  chest.     DICTATED:     11/05/2017  EDITED:         11/05/2017           This report was finalized on 11/7/2017 8:24 AM by Dr. Kenny Hong.       CT Head Without Contrast [[id number]] Collected:  11/05/17 1339     Updated:  11/07/17 0920    Narrative:       EXAMINATION: CT HEAD WO CONTRAST - 11/05/2017     INDICATION: Weakness.      TECHNIQUE: Axial CT of the head without intravenous contrast.     The radiation dose reduction device was turned on for each scan per the  ALARA (As Low as Reasonably Achievable) protocol.     COMPARISON: CT head 10/24/2017.     FINDINGS: Hyperattenuation in right parafalcine location near the vertex  consistent with subdural hematoma. This is decreased attenuation in  prominence from prior comparison 10/24/2017 consistent with evolution of  subacute subdural hematoma. There is additionally noted extra-axial  fluid adjacent to the right cerebral hemisphere convexity consistent  with evolving subdural hematoma of likely subacute origin. No new  intracranial hemorrhage however there is progressive edema of the right  cerebral hemisphere with increased mass effect on the right lateral  ventricle and new progressed midline shift at approximately  7 mm  leftward at the level of septum pellucidum, not seen on prior  examination. Foramen magnum appears widely patent. Globes and orbits  unremarkable. Visualized paranasal sinuses and mastoid air cells  demonstrate circumferential thickening of the right maxillary sinus with  air-fluid levels in the bilateral maxillary sinuses.       Impression:       Evolving subacute right subdural hematoma formations with  progressive edema of the right cerebral hemisphere creating increased  mass effect and leftward midline shift at the level of septum pellucidum  when compared to 10/24/2017. No new intracranial hemorrhage.     DICTATED:     11/05/2017  EDITED:         11/05/2017        This report was  finalized on 11/7/2017 9:18 AM by Dr. Kenny Hong.       MRI Brain Without Contrast [[id number]] Collected:  11/05/17 1706     Updated:  11/07/17 0921    Narrative:       EXAMINATION: MRI BRAIN WO CONTRAST - 11/05/2017     INDICATION:  G93.6-Cerebral edema; R53.1-Weakness; R41.82-Altered mental  status, unspecified.     TECHNIQUE: Multiplanar MRI of the brain without intravenous contrast  administration.     COMPARISON: CT head performed earlier same day.     FINDINGS: No restriction on diffusion-weighted sequences. Subacute  subdural hematoma formations in the right parafalcine and right cerebral  convexity extra-axial locations with cerebral edema of the right  cerebral hemisphere producing mass effect on the right lateral ventricle  as well as leftward midline shift similar to prior comparison. No acute  intracranial hemorrhage is identified. Third ventricle and fourth  ventricle are patent. Foramen magnum and cervicomedullary junction  widely patent. Pituitary and sella within normal limits. Globes and  orbits unremarkable. Visualized paranasal sinuses demonstrate  circumferential mucosal thickening of the right maxillary sinus with  moderate air-fluid level and small air-fluid level left maxillary sinus.          Impression:        Right parasagittal and cerebral convexity extra-axial fluid collection  consistent with subacute subdural hematomas producing mass effect on the  adjacent right cerebral hemisphere which is mildly edematous with mass  effect in the right lateral ventricle and midline shift similar to  prior. No evidence for acute ischemia.     Circumferential mucosal thickening and air-fluid levels within the  maxillary sinuses, right greater than left.     DICTATED:     11/05/2017  EDITED:         11/05/2017        This report was finalized on 11/7/2017 9:19 AM by Dr. Kenny Hong.       CT Head Without Contrast [[id number]] Collected:  11/09/17 1207     Updated:  11/09/17 1231    Narrative:        EXAMINATION: CT HEAD WO CONTRAST- 11/09/2017     INDICATION: Post-op craniotomy; G93.6-Cerebral edema; R53.1-Weakness;  R41.82-Altered mental status, unspecified; S06.2N5R-Ceknchajl subdural  hemorrhage without loss of consciousness, subsequent encounter;  Z74.09-Other reduced mobility         TECHNIQUE: CT data set of the brain was performed without intravenous  contrast.     The radiation dose reduction device was turned on for each scan per the  ALARA (As Low as Reasonably Achievable) protocol.     COMPARISON: Compared to MR datasets 11/05/2017.     FINDINGS:   1. Scalp hematoma and edema is noted from the previous rightward  craniotomy decompression.  2. Intracranial and subdural air is seen diffusely from the surgical  procedure.  3. There is a small low-attenuation collection seen in the parasagittal  region rightward of the falx cerebra.  4. Evidence of residual acute blood is not identified and there is no  significant residual collection over the right convexity.  5. There is mass effect in the right hemisphere which is minimal  producing a minimal right to left midline shift.       Impression:       1. Postoperative changes noted which are expected.  2. No evidence of new or recurrent high attenuation acute bleed.  3. Postoperative changes are quite acceptable as described above.  4. There is significant postoperative edema in the rightward scalp.     D:  11/09/2017  E:  11/09/2017        This report was finalized on 11/9/2017 12:29 PM by Dr. Phillip Rivera MD.       XR Chest 1 View [[id number]] Collected:  11/13/17 0903     Updated:  11/13/17 1052    Narrative:       EXAMINATION: XR CHEST 1 VW-11/13/2017:      INDICATION: COPD; G93.6-Cerebral edema; R53.1-Weakness; R41.82-Altered  mental status, unspecified; S06.6Y4A-Mplzcgeeo subdural hemorrhage  without loss of consciousness, subsequent encounter; Z74.09-Other  reduced mobility; Z74.09-Other reduced mobility.      COMPARISON: 11/05/2017.     FINDINGS:  Portable chest reveals the patient to be status post median  sternotomy. Degenerative changes seen within the spine. The heart is  enlarged. Increased pulmonary vascularity bilaterally worsening in the  interval. No definite pleural effusion or pneumothorax. Degenerative  changes seen within the shoulders.           Impression:       Heart is enlarged with worsening pulmonary vascularity in  the interval.     D:  11/13/2017  E:  11/13/2017     This report was finalized on 11/13/2017 10:50 AM by Dr. Carlie Alcantar MD.       FL Video Swallow With Speech [[id number]] Collected:  11/13/17 1517     Updated:  11/13/17 2223    Narrative:       EXAMINATION: FL VIDEO SWALLOW W SPEECH-     INDICATION: dysphagia; G93.6-Cerebral edema; R53.1-Weakness;  R41.82-Altered mental status, unspecified; S06.1F8A-Zpvtckzap subdural  hemorrhage without loss of consciousness, subsequent encounter;  Z74.09-Other reduced mobility; Z74.09-Other reduced mobility         TECHNIQUE: 1 minute and 18 seconds of fluoroscopic time was used for  this exam. 1 associated image was saved. The patient was evaluated in  the seated lateral position while taking a variety of consistencies of  barium by mouth under the direction of speech pathology.     COMPARISON: NONE     FINDINGS: There was penetration that cleared without aspiration with  sips of thin barium. There was no penetration and no aspiration with  nectar, pudding, or solid consistency barium.          Impression:       Fluoroscopy provided for a modified barium swallow. Please  see speech therapy report for full details and recommendations.         This report was finalized on 11/13/2017 10:21 PM by DR. Dc Rowell MD.       XR Chest 1 View [[id number]] Collected:  11/14/17 0848     Updated:  11/14/17 1210    Narrative:       EXAMINATION: XR CHEST 1 VW- 11/14/2017     INDICATION: pulmonary edema, leukocytosis; G93.6-Cerebral edema;  R53.1-Weakness; R41.82-Altered mental status,  unspecified;  S06.5L4U-Pjhbbztxr subdural hemorrhage without loss of consciousness,  subsequent encounter; Z74.09-Other reduced mobility; R13.13-Dysphagia,  pharyngeal phase      COMPARISON: 11/13/2017     FINDINGS: Cardiomediastinal contour enlarged and unchanged. Increased  pulmonary vascularity with minimal bibasilar atelectasis greatest on the  left. No pneumothorax or significant pleural effusion.           Impression:       No significant interval change.        D:  11/14/2017  E:  11/14/2017     This report was finalized on 11/14/2017 12:08 PM by Dr. Kenny Hong.       XR Hip With or Without Pelvis 2 - 3 View Left [[id number]] Collected:  11/14/17 1255     Updated:  11/14/17 1449    Narrative:       EXAMINATION: XR HIP W OR WO PELVIS, 2-3 VIEW, LEFT-11/14/2017:      INDICATION: Left hip pain after fall; G93.6-Cerebral edema;  R53.1-Weakness; R41.82-Altered mental status, unspecified;  S06.1N9Y-Jufsgszdb subdural hemorrhage without loss of consciousness,  subsequent encounter; Z74.09-Other reduced mobility; Z74.09-Other  reduced mobility; R13.13-Dysphagia, pharyngeal phase.      COMPARISON: NONE.     FINDINGS: No acute fracture or osseous malalignment with femoral head  well seated in the acetabulum. SI joints and pubic symphysis without  significant diastasis. Degenerative changes of the bilateral hips are  noted. Osteophytosis and joint space narrowing. Vascular calcifications  are identified. Large rectal stool ball incidentally noted.           Impression:       No acute osseous abnormality with normal alignment of the  left hip joint demonstrating background degenerative change. If there is  persistent and/or progressive symptoms associated recommend repeat  radiographs and/or CT bony pelvis for further evaluation.     D:  11/14/2017  E:  11/14/2017     This report was finalized on 11/14/2017 2:47 PM by Dr. Kenny Hong.             Results for orders placed during the hospital encounter of 11/05/17    Adult Transthoracic Echo Limited W/ Cont if Necessary Per Protocol    Narrative · Left ventricular systolic function is mildly decreased. Estimated EF =   40%.            Discharge Details      Magdi Arriaga   Home Medication Instructions RADHA:[id number]    Printed on:11/15/17 0904   Medication Information                      acetaminophen (TYLENOL) 325 MG tablet  Take 2 tablets by mouth Every 4 (Four) Hours As Needed for Mild Pain .             acetaminophen (TYLENOL) 650 MG suppository  Insert 1 suppository into the rectum Every 4 (Four) Hours As Needed for Mild Pain .             albuterol (PROVENTIL HFA;VENTOLIN HFA) 108 (90 BASE) MCG/ACT inhaler  Inhale 2 puffs Every 4 (Four) Hours As Needed for Wheezing.             amLODIPine (NORVASC) 5 MG tablet  Take 1 tablet by mouth Daily.             aspirin 81 MG chewable tablet  Chew 1 tablet Daily.             atorvastatin (LIPITOR) 10 MG tablet  Take 1 tablet by mouth Daily.             bisacodyl (DULCOLAX) 5 MG EC tablet  Take 1 tablet by mouth Daily As Needed for Constipation.             budesonide-formoterol (SYMBICORT) 160-4.5 MCG/ACT inhaler  Inhale 2 puffs 2 (Two) Times a Day.             Cholecalciferol (VITAMIN D3) 2000 units tablet  Take 1 tablet by mouth Daily.             docusate sodium 100 MG capsule  Take 100 mg by mouth 2 (Two) Times a Day As Needed for Constipation.             entecavir (BARACLUDE) 0.5 MG tablet  Take 0.5 mg by mouth Daily.             famotidine (PEPCID) 20 MG tablet  Take 20 mg by mouth Daily.             ferrous sulfate 325 (65 FE) MG tablet  Take 325 mg by mouth Daily With Breakfast.             furosemide (LASIX) 40 MG tablet  Take 1 tablet by mouth Daily.             HYDROcodone-acetaminophen (NORCO) 5-325 MG per tablet  Take 1 tablet by mouth Every 6 (Six) Hours As Needed for Moderate Pain  for up to 3 days.             memantine (NAMENDA) 10 MG tablet  Take 10 mg by mouth 2 (Two) Times a Day.              montelukast (SINGULAIR) 10 MG tablet  Take 1 tablet by mouth Every Night.             nitroglycerin (NITROSTAT) 0.4 MG SL tablet  Place 1 tablet under the tongue Every 5 (Five) Minutes As Needed for Chest Pain. Take no more than 3 doses in 15 minutes.             ondansetron (ZOFRAN) 4 MG tablet  Take 1 tablet by mouth Every 6 (Six) Hours As Needed for Nausea or Vomiting.             PARoxetine (PAXIL) 30 MG tablet  Take 1 tablet by mouth Every Morning.             rivastigmine (EXELON) 9.5 MG/24HR patch  Place 1 patch on the skin Daily.             sacubitril-valsartan (ENTRESTO) 49-51 MG tablet  Take 0.5 tablets by mouth 2 (Two) Times a Day.             sennosides-docusate sodium (SENOKOT-S) 8.6-50 MG tablet  Take 1 tablet by mouth At Night As Needed for Constipation.             vitamin C (ASCORBIC ACID) 250 MG tablet  Take 1 tablet by mouth 2 (Two) Times a Day With Meals. Take with iron supplement to enhance absorption                   Discharge Disposition:  Rehab Facility or Unit (DC - External)    Discharge Diet:  Diet Instructions     Diet: Dysphagia, Cardiac; Nectar / Syrup Thick Liquids; Pureed With Some Mashed       Discharge Diet:   Dysphagia  Cardiac      Fluid Consistency:  Nectar / Syrup Thick Liquids   Pureed Options:  Pureed With Some Mashed                 Discharge Activity:  Activity Instructions     Activity as Tolerated                     Special Instructions:      Future Appointments  Date Time Provider Department Center   11/29/2017 9:30 AM Colton Dickens MD [facility]  [facility] None   2/13/2018 10:00 AM [facility] CT 1  [facility] CT [facility]   2/19/2018 9:45 AM REI Fernández [facility] None       Additional Instructions for the Follow-ups that You Need to Schedule     Discharge Follow-up with PCP    As directed    Follow Up Details:  1 week post [facility]       Discharge Follow-up with Specialty: cara; 6 Weeks    As directed    Specialty:  cara   Follow Up:  6 Weeks        Discharge Follow-up with Specified Provider: Zac; 2 Weeks    As directed    To:  Zac   Follow Up:  2 Weeks   Follow Up Details:  Follow-up with physician's assistant in approximately 10 days for suture removal and/or wound check.                 Time Spent on Discharge:  34min    Miriam Montes, REI  11/15/17  9:04 AM

## 2017-11-15 NOTE — THERAPY TREATMENT NOTE
Acute Care - Occupational Therapy Treatment Note  [facility]     Patient Name: Magdi Arriaga  : 1945  MRN: [id number]  Today's Date: 11/15/2017  Onset of Illness/Injury or Date of Surgery Date: 17  Date of Referral to OT: 17  Referring Physician: MD Zac      Admit Date: 2017    Visit Dx:     ICD-10-CM ICD-9-CM   1. Cerebral edema G93.6 348.5   2. Left-sided weakness R53.1 728.87   3. Altered mental status, unspecified altered mental status type R41.82 780.97   4. Traumatic subdural hematoma without loss of consciousness, subsequent encounter S06.5X0D V58.89     852.21   5. Impaired functional mobility, balance, gait, and endurance Z74.09 V49.89   6. Impaired mobility and ADLs Z74.09 799.89   7. Pharyngeal dysphagia R13.13 787.23     Patient Active Problem List   Diagnosis   • Abnormal CT scan, chest   • Allergic rhinitis   • Asthma   • Bronchiectasis   • Dementia   • Depression   • Dyslipidemia   • Hypertension   • Obesity   • JONNY (obstructive sleep apnea)   • Osteoarthritis   • Vitamin D deficiency   • Left bundle branch block   • Normocytic anemia   • COPD (chronic obstructive pulmonary disease)   • Coronary artery disease involving coronary bypass graft of native heart without angina pectoris   • Ischemic cardiomyopathy, most recent LVEF 30%   • History of atrial flutter   • History of DVT (deep vein thrombosis)   • Hematoma, calf   • History of exposure to infectious disease-Patient received platelet transfusion for CABG, 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, followed by [facility]   • Bradycardia   • Syncope, near   • Closed fracture of multiple ribs of right side   • Traumatic SDH, s/p right craniotomy on 2017   • Weakness of left side of body   • Cerebral edema   • Leukocytosis              Adult Rehabilitation Note       11/15/17 1305 11/15/17 0945 11/13/17 0858    Rehab Assessment/Intervention    Discipline occupational therapist   ,  -AN speech language pathologist  -AC occupational therapist  -TA    Document Type therapy note (daily note)  -AN therapy note (daily note)  -AC therapy note (daily note)  -TA    Subjective Information no complaints;agree to therapy  -AN no complaints;agree to therapy  -AC agree to therapy;no complaints  -TA    Patient Effort, Rehab Treatment good  -AN good  -AC good  -TA    Symptoms Noted During/After Treatment   none  -TA    Precautions/Limitations fall precautions;other (see comments)   L side weakness  -AN  fall precautions;other (see comments)   L sided weakness  -TA    Recorded by [AN] Nasreen Constantino OT [AC] Mahogany Weems, MS CCC-SLP [TA] Yo Murguia OT    Vital Signs    Pre Systolic BP Rehab --   vitals stable  -AN  --   RN cleared pt for tx, VSS  -TA    O2 Delivery Pre Treatment   room air  -TA    Post SpO2 (%)   95  -TA    O2 Delivery Post Treatment   room air  -TA    Pre Patient Position   Supine  -TA    Intra Patient Position   Standing  -TA    Post Patient Position   Sitting  -TA    Recorded by [AN] Nasreen Constantino OT  [TA] Yo Murguia OT    Pain Assessment    Pain Assessment No/denies pain  -AN No/denies pain  -AC No/denies pain  -TA    Recorded by [AN] Nasreen Constantino OT [AC] Mahogany Weems, MS CCC-SLP [TA] Yo Murguia OT    Cognitive Assessment/Intervention    Current Cognitive/Communication Assessment functional  -AN  functional  -TA    Orientation Status oriented to;person;place;time   said year 2070  -AN  oriented x 4  -TA    Follows Commands/Answers Questions able to follow single-step instructions;75% of the time;100% of the time;needs cueing;needs increased time;needs repetition  -AN  100% of the time;able to follow single-step instructions;needs cueing  -TA    Personal Safety mild impairment  -AN  mild impairment   -TA    Personal Safety Interventions fall prevention program maintained  -AN  fall prevention program maintained;gait belt;muscle strengthening facilitated;nonskid shoes/slippers when out of bed;supervised activity  -TA    Recorded by [AN] Nasreen Constantino OT  [TA] Yo Murguia OT    Improve timing of pharyngeal response    To improve timing of pharyngeal response, patient will:  Swallow in timely way using three second prep;Swallow in timely way using Mendelsohn maneuver;80%;with inconsistent cues  -AC     Status: Improve timing of pharyngeal response  Progressing as expected  -AC     Timing of Pharyngeal Response Progress  50%;with inconsistent cues;continue to adress  -AC     Comments: Improve timing of pharyngeal response  3-sec prep  -AC     Recorded by  [AC] Mahogany Weems MS CCC-SLP     Improve closure at entrance to airway    To improve closure at entrance to airway, patient will:  Complete super-supraglottic swallow;80%;with inconsistent cues  -AC     Status: Improve closure at entrance to airway  Progressing as expected  -AC     Closure at Entrance to Airway Progress  30%;with inconsistent cues;continue to adress  -AC     Recorded by  [AC] Mahogany Weems MS CCC-SLP     Improve hyolaryngeal excursion    To improve hyolaryngeal excursion, patient will:  Complete chin tuck against resistance (comment number of repetitions);with inconsistent cues  -AC     Status: Improve hyolaryngeal excursion  Progressing as expected  -AC     Hyolaryngeal Excursion Progress  with inconsistent cues;continue to adress  -AC     Comments: Improve hyolaryngeal excursion  15 sec, 3 reps  -AC     Recorded by  [AC] Mahogany Weems MS CCC-SLP     Improve tongue base & pharyngeal wall squeeze    To improve tongue base & pharyngeal wall squeeze, patient will:  Complete effortful swallow;Complete tongue base retraction;80%;with inconsistent cues  -AC     Status: Improve tongue base & pharyngeal wall squeeze  Progressing as expected   -AC     Tongue Base/Pharyngeal Wall Squeeze Progress  50%;with consistent cues;continue to adress  -AC     Comments: Improve tongue base & pharyngeal wall squeeze  effortful swallow  -AC     Recorded by  [AC] Mahogany Weems, MS CCC-SLP     Dysphagia Other 1    Dysphagia Other 1 Objective  Pt will tolerate recommended diet w/o overt clinical s/sxs aspiration w/ intermittent cues.  -AC     Status: Dysphagia Other 1  Progressing as expected  -AC     Dysphagia Other 1 Progress  continue to address  -AC     Comments: Dysphagia Other 1  Trialed nectar via cup and puree. Cough noted x1 when pt taking consecutive drink liquid wash. When encouraged to take small sips and intemittently use dry multiple swallows, no overt clinical s/sxs aspiration noted. Pt cont to require supervision w/ eating/drinking to ensure use of compensatory strategies for improved swallow safety.  -AC     Recorded by  [AC] Mahogany Weems, MS CCC-SLP     Dysphagia Other 2    Dysphagia Other 2 Objective  Pt will utilize compensatory strats when eating/drinking to improve safety of the swallow w/ 100% acc w/ intermittent cues.  -AC     Status: Dysphagia Other 2  Progressing as expected  -AC     Dysphagia Other 2 Progress  50%;with inconsistent cues;continue to address  -AC     Comments: Dysphagia Other 2  50% acc w/ intermittent cues. 100% acc w/ consistent cues. Utilized small bites/sips, multiple dry swallows, liquid wash, and intermittent volitional cough.  -AC     Recorded by  [AC] Mahogany Weems, MS CCC-SLP     ROM (Range of Motion)    General ROM Detail L UE shoulder 25%, elbow 50%, wrist 35-50%, hand 75 %  -AN      Recorded by [AN] Nasreen Constantino OT      Bed Mobility, Assessment/Treatment    Bed Mobility, Assistive Device   bed rails;head of bed elevated  -TA    Bed Mobility, Roll Left, [place]   verbal cues required;moderate assist (50% patient effort)  -TA    Bed Mobility, Roll Right, [place]   verbal cues required;moderate assist (50%  patient effort)  -TA    Bed Mobility, Scoot/Bridge, [place]   maximum assist (25% patient effort);2 person assist required  -TA    Bed Mob, Supine to Sit, [place]   not tested  -TA    Bed Mobility, Safety Issues   decreased use of arms for pushing/pulling;decreased use of legs for bridging/pushing;impaired trunk control for bed mobility  -TA    Bed Mobility, Impairments   strength decreased;impaired balance;motor control impaired;coordination impaired  -TA    Bed Mobility, Comment   Vcs for sequencing, holding bed rails  -TA    Recorded by   [TA] Yo Murguia OT    Transfer Assessment/Treatment    Transfers, Bed-Chair [place]   dependent (less than 25% patient effort);2 person assist required  -TA    Transfers, Bed-Chair-Bed, Assist Device   mechanical lift  -TA    Transfers, Sit-Stand [place] maximum assist (25% patient effort);2 person assist required   3 attempts, unable to fully extend trunk  -AN  maximum assist (25% patient effort);2 person assist required;verbal cues required  -TA    Transfers, Stand-Sit [place]   maximum assist (25% patient effort);2 person assist required;verbal cues required  -TA    Transfers, Sit-Stand-Sit, Assist Device   other (see comments)   gait belt, BUE support; L knee blocked  -TA    Transfer, Impairments impaired balance;strength decreased  -AN  strength decreased;impaired balance  -TA    Transfer, Comment   STS x 2 trials  -TA    Recorded by [KWAKU] Nasreen Constantino OT  [TA] Yo Murguia OT    ADL Assessment/Intervention    Additional Documentation --   pt declined ADL tasks  -AN      Recorded by [KWAKU] Nasreen Constantino OT      Motor Skills/Interventions    Additional Documentation   Balance Skills Training (Group)  -TA    Recorded by   [TA] Yo Murguia OT    Balance Skills Training    Sitting-Level of Assistance   Moderate assistance  -TA    Sitting-Balance Support   Feet supported;Right upper extremity supported;Left upper extremity  supported  -TA    Sitting-Balance Activities   Forward lean;Left UE Weight Bearing;Trunk control activities;Lateral lean  -TA    Sitting # of Minutes   5  -TA    Standing-Level of Assistance Maximum assistance;x2  -AN  Maximum assistance;x2  -TA    Static Standing Balance Support Right upper extremity supported;Left upper extremity supported  -AN  Right upper extremity supported;Left upper extremity supported  -TA    Standing-Balance Activities   Weight Shift A-P;Weight Shift R-L   VCs for postural corrections  -TA    Recorded by [AN] Nasreen Constantino OT  [TA] Yo Murguia OT    Therapy Exercises    Right Upper Extremity AROM:;10 reps;sitting;elbow flexion/extension;hand pumps;shoulder horizontal abd/add;shoulder protraction/retraction;shoulder rolls/shrugs  -AN  AROM:;10 reps;sitting;elbow flexion/extension;hand pumps;shoulder extension/flexion  -TA    Left Upper Extremity AAROM:;10 reps;sitting;elbow flexion/extension;hand pumps;pronation/supination;shoulder abduction/adduction  -AN  PROM:;10 reps;sitting;elbow flexion/extension;hand pumps;pronation/supination;shoulder extension/flexion;shoulder horizontal abd/add  -TA    Recorded by [AN] Nasreen Constantino OT  [TA] Yo Murguia OT    Positioning and Restraints    Pre-Treatment Position sitting in chair/recliner  -AN  in bed  -TA    Post Treatment Position chair  -AN  chair  -TA    In Chair reclined;call light within reach;encouraged to call for assist;with family/caregiver  -AN  notified nsg;reclined;call light within reach;encouraged to call for assist;exit alarm on;RUE elevated;LUE elevated;on mechanical lift sling;legs elevated  -TA    Recorded by [AN] Nasreen Constantino OT  [TA] Yo Murguia OT      11/13/17 0855          Rehab Assessment/Intervention    Discipline physical therapy assistant  -UD      Document Type therapy note (daily note)  -UD      Subjective Information agree to therapy;no complaints  -UD      Patient Effort, Rehab Treatment  good  -UD      Precautions/Limitations fall precautions  -UD      Recorded by [UD] Olya Mayfield PTA      Vital Signs    O2 Delivery Post Treatment room air  -UD      Pre Patient Position Supine  -UD      Intra Patient Position Standing  -UD      Post Patient Position Sitting  -UD      Recorded by [UD] Olya Mayfield PTA      Pain Assessment    Pain Assessment No/denies pain  -UD      Pain Score 0  -UD      Post Pain Score 0  -UD      Recorded by [UD] Olya Mayfield PTA      Cognitive Assessment/Intervention    Orientation Status oriented x 4  -UD      Follows Commands/Answers Questions 100% of the time  -UD      Personal Safety Interventions fall prevention program maintained  -UD      Recorded by [UD] Olya Mayfield PTA      Bed Mobility, Assessment/Treatment    Bed Mobility, Roll Left, [place] moderate assist (50% patient effort)  -UD      Bed Mobility, Roll Right, [place] moderate assist (50% patient effort)  -UD      Recorded by [UD] Olya Mayfield PTA      Transfer Assessment/Treatment    Transfers, Bed-Chair [place] dependent (less than 25% patient effort)  -UD      Transfers, Bed-Chair-Bed, Assist Device mechanical lift  -UD      Transfers, Sit-Stand [place] maximum assist (25% patient effort);2 person assist required  -UD      Transfers, Stand-Sit [place] maximum assist (25% patient effort);2 person assist required  -UD      Recorded by [UD] Olya Mayfield PTA      Gait Assessment/Treatment    Gait, [place] Level unable to perform  -UD      Recorded by [UD] Olya Mayfield PTA      Therapy Exercises    Bilateral Lower Extremities AROM:;10 reps;AAROM:;sitting  -UD      Recorded by [UD] Olya Mayfield PTA      Positioning and Restraints    Pre-Treatment Position in bed  -UD      Post Treatment Position chair  -UD      In Chair notified nsg;reclined;sitting;call light within reach;exit alarm on;on mechanical lift sling;legs elevated  -UD      Recorded by [UD] Olya Mayfield PTA        User  Key  (r) = Recorded By, (t) = Taken By, (c) = Cosigned By    Initials Name Effective Dates    UD Olya Chaparro, PTA 06/22/15 -     KWAKU Constantino, OT 06/22/15 -     AC Mahogany Weems, MS CCC-SLP 07/27/17 -     TA Yo TAYLOR Murguia, OT 03/14/16 -                 OT Goals       11/13/17 0952 11/09/17 0927       Transfer Training OT LTG    Transfer Training OT LTG, Date Established  11/09/17  -CL     Transfer Training OT LTG, Time to Achieve  by discharge  -CL     Transfer Training OT LTG, Activity Type  sit to stand/stand to sit;bed to chair /chair to bed;toilet  -CL     Transfer Training OT LTG, [place] Level  moderate assist (50% patient effort);2 person assist required  -CL     Transfer Training OT LTG, Additional Goal  AAD  -CL     Transfer Training OT LTG, Outcome goal ongoing   Max A x 2 STS this date  -TA      Strength OT LTG    Strength Goal OT LTG, Date Established  11/09/17  -CL     Strength Goal OT LTG, Time to Achieve  by discharge  -CL     Strength Goal OT LTG, Measure to Achieve  Pt will tolerate BUE AROM/AAROM HEP x15 reps to increase strength/endurance to promote ADL performance.   -CL     Strength Goal OT LTG, Outcome goal ongoing  -TA      Static Sitting Balance OT LTG    Static Sitting Balance OT LTG, Date Established  11/09/17  -CL     Static Sitting Balance OT LTG, Time to Achieve  by discharge  -CL     Static Sitting Balance OT LTG, [place] Level  contact guard assist  -CL     Static Sitting Balance OT LTG, Additional Goal  AAD, at EOB  -CL     Static Sitting Balance OT LTG, Outcome goal ongoing   Min A at front of chair this date  -TA      Eating Self-Feeding OT LTG    Eat Self Feeding Goal OT LTG, Date Established  11/09/17  -CL     Eat Self Feeding Goal OT LTG, Time to Achieve  by discharge  -CL     Eat Self Feed Goal OT LTG, [place] Level  contact guard assist  -CL     Eat Self Feeding Goal OT LTG, Additional Goal  AAD, supported sitting  -CL     Eat Self Feeding Goal OT LTG,  Outcome goal ongoing  -TA        User Key  (r) = Recorded By, (t) = Taken By, (c) = Cosigned By    Initials Name Provider Type    TA Yo Murguia, OT Occupational Therapist    CL Margarita Velasco, OT Occupational Therapist          Occupational Therapy Education     Title: PT OT SLP Therapies (Active)     Topic: Occupational Therapy (Active)     Point: ADL training (Active)    Description: Instruct learner(s) on proper safety adaptation and remediation techniques during self care or transfers.   Instruct in proper use of assistive devices.    Learning Progress Summary    Learner Readiness Method Response Comment Documented by Status   Patient Acceptance E,D NR Pt educated on appropriate safety precautions, t/f techniques, positioning, and benefits of therapy. CL 11/09/17 0923 Active   Family Acceptance E,D NR Pt educated on appropriate safety precautions, t/f techniques, positioning, and benefits of therapy. CL 11/09/17 0923 Active               Point: Home exercise program (Done)    Description: Instruct learner(s) on appropriate technique for monitoring, assisting and/or progressing therapeutic exercises/activities.    Learning Progress Summary    Learner Readiness Method Response Comment Documented by Status   Patient Acceptance E,TB,D VU,NR Educated on UE SROM, AAROM ex and functiional practice. AN 11/15/17 1329 Done    Acceptance E,D VU,NR SROM initiated; body mechanics with bed mobility/STS transfers; reinforced need for call for assist with OOB activities. TA 11/13/17 0951 Done   Family Acceptance E,TB,D VU,NR Educated on UE SROM, AAROM ex and functiional practice. AN 11/15/17 1329 Done               Point: Precautions (Active)    Description: Instruct learner(s) on prescribed precautions during self-care and functional transfers.    Learning Progress Summary    Learner Readiness Method Response Comment Documented by Status   Patient Acceptance E,D VU,NR SROM initiated; body mechanics with bed mobility/STS  transfers; reinforced need for call for assist with OOB activities.  11/13/17 0951 Done    Acceptance E,D NR Pt educated on appropriate safety precautions, t/f techniques, positioning, and benefits of therapy.  11/09/17 0923 Active   Family Acceptance E,D NR Pt educated on appropriate safety precautions, t/f techniques, positioning, and benefits of therapy.  11/09/17 0923 Active               Point: Body mechanics (Active)    Description: Instruct learner(s) on proper positioning and spine alignment during self-care, functional mobility activities and/or exercises.    Learning Progress Summary    Learner Readiness Method Response Comment Documented by Status   Patient Acceptance E,D VU,NR SROM initiated; body mechanics with bed mobility/STS transfers; reinforced need for call for assist with OOB activities.  11/13/17 0951 Done    Acceptance E,D NR Pt educated on appropriate safety precautions, t/f techniques, positioning, and benefits of therapy.  11/09/17 0923 Active   Family Acceptance E,D NR Pt educated on appropriate safety precautions, t/f techniques, positioning, and benefits of therapy.  11/09/17 0923 Active                      User Key     Initials Effective Dates Name Provider Type Discipline     06/22/15 -  Nasreen Constantino OT Occupational Therapist OT    TA 03/14/16 -  Yo Murguia OT Occupational Therapist OT    CL 06/08/16 -  Margarita Velasco, OT Occupational Therapist OT                  OT Recommendation and Plan  Anticipated Equipment Needs At Discharge:  (TBA further)  Anticipated Discharge Disposition: inpatient rehabilitation facility  Therapy Frequency: daily  Plan of Care Review  Plan Of Care Reviewed With: patient, family  Progress: improving  Outcome Summary/Follow up Plan: Pt with significant increase in functional ROM in L UE. Pt continues to need max x 2 to stand, poor standing posture/endurance. Pt scheduled to return to [facility] when available.        Outcome Measures        11/15/17 1305 11/13/17 0858 11/13/17 0855    How much help from another person do you currently need...    Turning from your back to your side while in flat bed without using bedrails?   2  -UD    Moving from lying on back to sitting on the side of a flat bed without bedrails?   2  -UD    Moving to and from a bed to a chair (including a wheelchair)?   2  -UD    Standing up from a chair using your arms (e.g., wheelchair, bedside chair)?   2  -UD    Climbing 3-5 steps with a railing?   1  -UD    To walk in hospital room?   1  -UD    AM-PAC 6 Clicks Score   10  -UD    How much help from another is currently needed...    Putting on and taking off regular lower body clothing? 1  -AN 1  -TA     Bathing (including washing, rinsing, and drying) 1  -AN 1  -TA     Toileting (which includes using toilet bed pan or urinal) 1  -AN 1  -TA     Putting on and taking off regular upper body clothing 1  -AN 1  -TA     Taking care of personal grooming (such as brushing teeth) 2  -AN 2  -TA     Eating meals 3  -AN 3  -TA     Score 9  -AN 9  -TA     Modified Boss Scale    Modified Boss Scale  4 - Moderately severe disability.  Unable to walk without assistance, and unable to attend to own bodily needs without assistance.  -TA     Functional Assessment    Outcome Measure Options  AM-PAC 6 Clicks Daily Activity (OT);Modified Boss  -TA       User Key  (r) = Recorded By, (t) = Taken By, (c) = Cosigned By    Initials Name Provider Type    SELWYN Mayfield PTA Physical Therapy Assistant    KWAKU Constantino, OT Occupational Therapist    DUANE Murguia, OT Occupational Therapist           Time Calculation:         Time Calculation- OT       11/15/17 1332          Time Calculation- OT    OT Start Time 1305  -AN      Total Timed Code Minutes- OT 15 minute(s)  -AN      OT Received On 11/15/17  -AN      OT Goal Re-Cert Due Date 11/25/17  -AN        User Key  (r) = Recorded By, (t) = Taken By, (c) = Cosigned By    Initials Name Provider  Type    AN Nasreen Constantino OT Occupational Therapist           Therapy Charges for Today     Code Description Service Date Service Provider Modifiers Qty    [id number] HC OT THERAPEUTIC ACT EA 15 MIN 11/15/2017 Nasreen Constantino OT GO 1               Nasreen Constantino OT  11/15/2017

## 2017-11-15 NOTE — PLAN OF CARE
Problem: Patient Care Overview (Adult)  Goal: Plan of Care Review  Outcome: Ongoing (interventions implemented as appropriate)    11/14/17 1541 11/14/17 2200 11/15/17 0513   Coping/Psychosocial Response Interventions   Plan Of Care Reviewed With --  patient;spouse --    Patient Care Overview   Progress improving --  --    Outcome Evaluation   Outcome Summary/Follow up Plan --  --  Tolerating current plan of care, VSS, slept well through the night with CPAP. Plan for D/C to [facility] in 2 days, will continue to monitor closely         Problem: Fall Risk (Adult)  Goal: Absence of Falls  Outcome: Ongoing (interventions implemented as appropriate)    Problem: Skin Integrity Impairment, Risk/Actual (Adult)  Goal: Skin Integrity/Wound Healing  Outcome: Ongoing (interventions implemented as appropriate)

## 2017-11-15 NOTE — THERAPY TREATMENT NOTE
Acute Care - Physical Therapy Treatment Note  [facility]     Patient Name: Magdi Arriaga  : 1945  MRN: [id number]  Today's Date: 11/15/2017  Onset of Illness/Injury or Date of Surgery Date: 17  Date of Referral to PT: 17  Referring Physician: MD Zac    Admit Date: 2017    Visit Dx:    ICD-10-CM ICD-9-CM   1. Cerebral edema G93.6 348.5   2. Left-sided weakness R53.1 728.87   3. Altered mental status, unspecified altered mental status type R41.82 780.97   4. Traumatic subdural hematoma without loss of consciousness, subsequent encounter S06.5X0D V58.89     852.21   5. Impaired functional mobility, balance, gait, and endurance Z74.09 V49.89   6. Impaired mobility and ADLs Z74.09 799.89   7. Pharyngeal dysphagia R13.13 787.23     Patient Active Problem List   Diagnosis   • Abnormal CT scan, chest   • Allergic rhinitis   • Asthma   • Bronchiectasis   • Dementia   • Depression   • Dyslipidemia   • Hypertension   • Obesity   • JONNY (obstructive sleep apnea)   • Osteoarthritis   • Vitamin D deficiency   • Left bundle branch block   • Normocytic anemia   • COPD (chronic obstructive pulmonary disease)   • Coronary artery disease involving coronary bypass graft of native heart without angina pectoris   • Ischemic cardiomyopathy, most recent LVEF 30%   • History of atrial flutter   • History of DVT (deep vein thrombosis)   • Hematoma, calf   • History of exposure to infectious disease-Patient received platelet transfusion for CABG, 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, followed by [facility]   • Bradycardia   • Syncope, near   • Closed fracture of multiple ribs of right side   • Traumatic SDH, s/p right craniotomy on 2017   • Weakness of left side of body   • Cerebral edema   • Leukocytosis                Adult Rehabilitation Note       11/15/17 1305 11/15/17 1301 11/15/17 0945    Rehab Assessment/Intervention    Discipline occupational therapist   ,  -AN physical therapy assistant  -UD speech language pathologist  -AC    Document Type therapy note (daily note)  -AN therapy note (daily note)  -UD therapy note (daily note)  -AC    Subjective Information no complaints;agree to therapy  -AN agree to therapy;no complaints  -UD no complaints;agree to therapy  -AC    Patient Effort, Rehab Treatment good  -AN good  -UD good  -AC    Precautions/Limitations fall precautions;other (see comments)   L side weakness  -AN fall precautions  -UD     Recorded by [AN] Nasreen Constantino OT [UD] Olya Mayfield, PTA [AC] Mahogany Weems, MS CCC-SLP    Vital Signs    Pre Systolic BP Rehab --   vitals stable  -AN      Pre Patient Position  Sitting  -UD     Intra Patient Position  Standing  -UD     Post Patient Position  Sitting  -UD     Recorded by [AN] Nasreen Constantino, OT [UD] Olya Mayfield, PTA     Pain Assessment    Pain Assessment No/denies pain  -AN No/denies pain  -UD No/denies pain  -AC    Recorded by [AN] Nasreen Constantino, OT [UD] Olya Mayfield, PTA [AC] Mahogany Weems, MS CCC-SLP    Cognitive Assessment/Intervention    Current Cognitive/Communication Assessment functional  -AN      Orientation Status oriented to;person;place;time   said year 2070  -AN oriented to;person;place;situation  -UD     Follows Commands/Answers Questions able to follow single-step instructions;75% of the time;100% of the time;needs cueing;needs increased time;needs repetition  -AN able to follow single-step instructions  -UD     Personal Safety mild impairment  -AN      Personal Safety Interventions fall prevention program maintained  -AN fall prevention program maintained  -UD     Recorded by [AN] Nasreen Constantino, OT [UD] Olya Mayfield, PTA     Improve timing of pharyngeal response    To improve timing of pharyngeal response, patient will:   Swallow in timely way using  three second prep;Swallow in timely way using Mendelsohn maneuver;80%;with inconsistent cues  -AC    Status: Improve timing of pharyngeal response   Progressing as expected  -AC    Timing of Pharyngeal Response Progress   50%;with inconsistent cues;continue to adress  -AC    Comments: Improve timing of pharyngeal response   3-sec prep  -AC    Recorded by   [AC] Mahogany Weems, MS CCC-SLP    Improve closure at entrance to airway    To improve closure at entrance to airway, patient will:   Complete super-supraglottic swallow;80%;with inconsistent cues  -AC    Status: Improve closure at entrance to airway   Progressing as expected  -AC    Closure at Entrance to Airway Progress   30%;with inconsistent cues;continue to adress  -AC    Recorded by   [AC] Mahogany Weems MS CCC-SLP    Improve hyolaryngeal excursion    To improve hyolaryngeal excursion, patient will:   Complete chin tuck against resistance (comment number of repetitions);with inconsistent cues  -AC    Status: Improve hyolaryngeal excursion   Progressing as expected  -AC    Hyolaryngeal Excursion Progress   with inconsistent cues;continue to adress  -AC    Comments: Improve hyolaryngeal excursion   15 sec, 3 reps  -AC    Recorded by   [AC] Mahogany Weems MS CCC-SLP    Improve tongue base & pharyngeal wall squeeze    To improve tongue base & pharyngeal wall squeeze, patient will:   Complete effortful swallow;Complete tongue base retraction;80%;with inconsistent cues  -AC    Status: Improve tongue base & pharyngeal wall squeeze   Progressing as expected  -AC    Tongue Base/Pharyngeal Wall Squeeze Progress   50%;with consistent cues;continue to adress  -AC    Comments: Improve tongue base & pharyngeal wall squeeze   effortful swallow  -AC    Recorded by   [AC] Mahogany Weems MS CCC-SLP    Dysphagia Other 1    Dysphagia Other 1 Objective   Pt will tolerate recommended diet w/o overt clinical s/sxs aspiration w/ intermittent cues.  -AC    Status: Dysphagia Other 1    Progressing as expected  -AC    Dysphagia Other 1 Progress   continue to address  -AC    Comments: Dysphagia Other 1   Trialed nectar via cup and puree. Cough noted x1 when pt taking consecutive drink liquid wash. When encouraged to take small sips and intemittently use dry multiple swallows, no overt clinical s/sxs aspiration noted. Pt cont to require supervision w/ eating/drinking to ensure use of compensatory strategies for improved swallow safety.  -AC    Recorded by   [AC] Mahogany Weems MS CCC-SLP    Dysphagia Other 2    Dysphagia Other 2 Objective   Pt will utilize compensatory strats when eating/drinking to improve safety of the swallow w/ 100% acc w/ intermittent cues.  -AC    Status: Dysphagia Other 2   Progressing as expected  -AC    Dysphagia Other 2 Progress   50%;with inconsistent cues;continue to address  -AC    Comments: Dysphagia Other 2   50% acc w/ intermittent cues. 100% acc w/ consistent cues. Utilized small bites/sips, multiple dry swallows, liquid wash, and intermittent volitional cough.  -AC    Recorded by   [AC] Mahogany Weems MS CCC-SLP    ROM (Range of Motion)    General ROM Detail L UE shoulder 25%, elbow 50%, wrist 35-50%, hand 75 %  -AN      Recorded by [AN] Nasreen Constantino OT      Bed Mobility, Assessment/Treatment    Bed Mob, Supine to Sit, [place]  not tested   up in chair  -UD     Recorded by  [UD] Olya Mayfield PTA     Transfer Assessment/Treatment    Transfers, Sit-Stand [place] maximum assist (25% patient effort);2 person assist required   3 attempts, unable to fully extend trunk  -AN maximum assist (25% patient effort);2 person assist required  -UD     Transfers, Stand-Sit [place]  maximum assist (25% patient effort);2 person assist required  -UD     Transfer, Impairments impaired balance;strength decreased  -AN      Recorded by [AN] Nasreen Constantino OT [UD] Olya Mayfield PTA     Gait Assessment/Treatment    Gait, [place] Level  unable to perform  -UD      Recorded by  [UD] Olya Mayfield PTA     ADL Assessment/Intervention    Additional Documentation --   pt declined ADL tasks  -AN      Recorded by [AN] Nasreen Constantino OT      Balance Skills Training    Standing-Level of Assistance Maximum assistance;x2  -AN Maximum assistance;x2  -UD     Static Standing Balance Support Right upper extremity supported;Left upper extremity supported  -AN      Recorded by [AN] Nasreen Constantino OT [UD] Olya Mayfield PTA     Therapy Exercises    Bilateral Lower Extremities  AROM:;10 reps;sitting  -UD     Right Upper Extremity AROM:;10 reps;sitting;elbow flexion/extension;hand pumps;shoulder horizontal abd/add;shoulder protraction/retraction;shoulder rolls/shrugs  -AN      Left Upper Extremity AAROM:;10 reps;sitting;elbow flexion/extension;hand pumps;pronation/supination;shoulder abduction/adduction  -AN      Recorded by [AN] Nasreen Constantino OT [UD] Olya Mayfield PTA     Positioning and Restraints    Pre-Treatment Position sitting in chair/recliner  -AN sitting in chair/recliner  -UD     Post Treatment Position chair  -AN chair  -UD     In Chair reclined;call light within reach;encouraged to call for assist;with family/caregiver  -AN notified nsg;reclined;sitting;call light within reach;exit alarm on;with family/caregiver;on mechanical lift sling;legs elevated  -UD     Recorded by [AN] Nasreen Constantino OT [UD] Olya Mayfield PTA       11/13/17 0858 11/13/17 0855       Rehab Assessment/Intervention    Discipline occupational therapist  -TA physical therapy assistant  -UD     Document Type therapy note (daily note)  -TA therapy note (daily note)  -UD     Subjective Information agree to therapy;no complaints  -TA agree to therapy;no complaints  -UD     Patient Effort, Rehab Treatment good  -TA good  -UD     Symptoms Noted During/After Treatment none  -TA      Precautions/Limitations fall precautions;other (see comments)   L sided weakness  -TA fall precautions  -UD     Recorded by [TA] Yo VAZQUEZ  CHARANJIT Murguia [UD] Olya Mayfield PTA     Vital Signs    Pre Systolic BP Rehab --   RN cleared pt for tx, VSS  -TA      O2 Delivery Pre Treatment room air  -TA      Post SpO2 (%) 95  -TA      O2 Delivery Post Treatment room air  -TA room air  -UD     Pre Patient Position Supine  -TA Supine  -UD     Intra Patient Position Standing  -TA Standing  -UD     Post Patient Position Sitting  -TA Sitting  -UD     Recorded by [TA] Yo Murguia OT [UD] Olya Mayfield PTA     Pain Assessment    Pain Assessment No/denies pain  -TA No/denies pain  -UD     Pain Score  0  -UD     Post Pain Score  0  -UD     Recorded by [TA] Yo Murguia OT [UD] Olya Mayfield PTA     Cognitive Assessment/Intervention    Current Cognitive/Communication Assessment functional  -TA      Orientation Status oriented x 4  -TA oriented x 4  -UD     Follows Commands/Answers Questions 100% of the time;able to follow single-step instructions;needs cueing  -% of the time  -UD     Personal Safety mild impairment  -TA      Personal Safety Interventions fall prevention program maintained;gait belt;muscle strengthening facilitated;nonskid shoes/slippers when out of bed;supervised activity  -TA fall prevention program maintained  -UD     Recorded by [TA] Yo Murguia OT [UD] Olya Mayfield PTA     Bed Mobility, Assessment/Treatment    Bed Mobility, Assistive Device bed rails;head of bed elevated  -TA      Bed Mobility, Roll Left, [place] verbal cues required;moderate assist (50% patient effort)  -TA moderate assist (50% patient effort)  -UD     Bed Mobility, Roll Right, [place] verbal cues required;moderate assist (50% patient effort)  -TA moderate assist (50% patient effort)  -UD     Bed Mobility, Scoot/Bridge, [place] maximum assist (25% patient effort);2 person assist required  -TA      Bed Mob, Supine to Sit, [place] not tested  -TA      Bed Mobility, Safety Issues decreased use of arms for pushing/pulling;decreased use  of legs for bridging/pushing;impaired trunk control for bed mobility  -TA      Bed Mobility, Impairments strength decreased;impaired balance;motor control impaired;coordination impaired  -TA      Bed Mobility, Comment Vcs for sequencing, holding bed rails  -TA      Recorded by [TA] Yo Murguia OT [UD] Olya Mayfield PTA     Transfer Assessment/Treatment    Transfers, Bed-Chair [place] dependent (less than 25% patient effort);2 person assist required  -TA dependent (less than 25% patient effort)  -UD     Transfers, Bed-Chair-Bed, Assist Device mechanical lift  -TA mechanical lift  -UD     Transfers, Sit-Stand [place] maximum assist (25% patient effort);2 person assist required;verbal cues required  -TA maximum assist (25% patient effort);2 person assist required  -UD     Transfers, Stand-Sit [place] maximum assist (25% patient effort);2 person assist required;verbal cues required  -TA maximum assist (25% patient effort);2 person assist required  -UD     Transfers, Sit-Stand-Sit, Assist Device other (see comments)   gait belt, BUE support; L knee blocked  -TA      Transfer, Impairments strength decreased;impaired balance  -TA      Transfer, Comment STS x 2 trials  -TA      Recorded by [TA] Yo Murguia OT [UD] Olya Mayfield PTA     Gait Assessment/Treatment    Gait, [place] Level  unable to perform  -UD     Recorded by  [UD] Olya Mayfield PTA     Motor Skills/Interventions    Additional Documentation Balance Skills Training (Group)  -TA      Recorded by [TA] Yo Murguia OT      Balance Skills Training    Sitting-Level of Assistance Moderate assistance  -TA      Sitting-Balance Support Feet supported;Right upper extremity supported;Left upper extremity supported  -TA      Sitting-Balance Activities Forward lean;Left UE Weight Bearing;Trunk control activities;Lateral lean  -TA      Sitting # of Minutes 5  -TA      Standing-Level of Assistance Maximum assistance;x2  -TA       Static Standing Balance Support Right upper extremity supported;Left upper extremity supported  -TA      Standing-Balance Activities Weight Shift A-P;Weight Shift R-L   VCs for postural corrections  -TA      Recorded by [TA] Yo Murguia OT      Therapy Exercises    Bilateral Lower Extremities  AROM:;10 reps;AAROM:;sitting  -UD     Right Upper Extremity AROM:;10 reps;sitting;elbow flexion/extension;hand pumps;shoulder extension/flexion  -TA      Left Upper Extremity PROM:;10 reps;sitting;elbow flexion/extension;hand pumps;pronation/supination;shoulder extension/flexion;shoulder horizontal abd/add  -TA      Recorded by [TA] Yo Murguia OT [UD] Olya Mayfield PTA     Positioning and Restraints    Pre-Treatment Position in bed  -TA in bed  -UD     Post Treatment Position chair  -TA chair  -UD     In Chair notified nsg;reclined;call light within reach;encouraged to call for assist;exit alarm on;RUE elevated;LUE elevated;on mechanical lift sling;legs elevated  -TA notified nsg;reclined;sitting;call light within reach;exit alarm on;on mechanical lift sling;legs elevated  -UD     Recorded by [TA] Yo Murguia OT [UD] Olya Mayfield PTA       User Key  (r) = Recorded By, (t) = Taken By, (c) = Cosigned By    Initials Name Effective Dates    UD Olya Mayfield, BG 06/22/15 -     KWAKU Constantino, OT 06/22/15 -     AC Mahogany Weems MS CCC-SLP 07/27/17 -     TA Yo Murguia OT 03/14/16 -                 IP PT Goals       11/15/17 1356 11/13/17 1044 11/12/17 1104    Bed Mobility PT LTG    Bed Mobility PT LTG, Outcome goal ongoing  -UD goal ongoing  -UD goal ongoing  -UD    Transfer Training PT LTG    Transfer Training PT LTG, Outcome goal ongoing  -UD goal ongoing  -UD goal ongoing  -UD    Gait Training PT LTG    Gait Training Goal PT LTG, Outcome goal ongoing  -UD goal ongoing  -UD goal ongoing  -UD      11/10/17 1608 11/09/17 0947 11/07/17 0904    Bed Mobility PT LTG    Bed Mobility PT LTG, Time to  Achieve  2 wks  -LS 1 wk  -SH    Bed Mobility PT LTG, Activity Type  supine to sit/sit to supine  -LS all bed mobility  -SH    Bed Mobility PT LTG, [place] Level  moderate assist (50% patient effort)  -LS minimum assist (75% patient effort)  -    Bed Mobility PT LTG, Date Goal Reviewed  11/09/17  -LS     Bed Mobility PT LTG, Outcome goal ongoing  -LS goal revised  -LS goal ongoing  -    Transfer Training PT LTG    Transfer Training PT LTG, Time to Achieve  2 wks  -LS 1 wk  -SH    Transfer Training PT LTG, Activity Type  sit to stand/stand to sit  -LS all transfers  -    Transfer Training PT LTG, [place] Level  moderate assist (50% patient effort);2 person assist required  -LS moderate assist (50% patient effort)  -    Transfer Training PT LTG, Assist Device  other (see comments)   approp AD  -LS walker, rolling  -SH    Transfer Training PT  LTG, Date Goal Reviewed  11/09/17  -LS     Transfer Training PT LTG, Outcome goal ongoing  -LS goal revised  -LS goal ongoing  -    Gait Training PT LTG    Gait Training Goal PT LTG, Time to Achieve  2 wks  -LS 1 wk  -SH    Gait Training Goal PT LTG, [place] Level  moderate assist (50% patient effort);2 person assist required  -LS moderate assist (50% patient effort)  -    Gait Training Goal PT LTG, Assist Device  other (see comments)   approp AD; possible ARJO use  -LS walker, rolling  -    Gait Training Goal PT LTG, Distance to Achieve  5  -  -SH    Gait Training Goal PT LTG, Date Goal Reviewed  11/09/17  -LS     Gait Training Goal PT LTG, Outcome goal ongoing  -LS goal revised  -LS goal ongoing  -      User Key  (r) = Recorded By, (t) = Taken By, (c) = Cosigned By    Initials Name Provider Type    SELWYN Mayfield, PTA Physical Therapy Assistant    JACQUI Davis, PT Physical Therapist    LS Keli Awad, PT Physical Therapist          Physical Therapy Education     Title: PT OT SLP Therapies (Active)     Topic: Physical Therapy  (Active)     Point: Mobility training (Active)    Learning Progress Summary    Learner Readiness Method Response Comment Documented by Status   Patient Acceptance E,D DU,NR   11/15/17 1356 Done    Acceptance E,D DU,NR   11/13/17 1044 Done    Eager E,D DU,NR   11/12/17 1104 Done    Acceptance E,D NR   11/10/17 1608 Active    Acceptance E,D NR   11/09/17 0947 Active    Acceptance E,D VU,DU   11/07/17 0903 Done   Family Acceptance E,D DU,NR   11/15/17 1356 Done    Eager E,D DU,NR   11/12/17 1104 Done   Significant Other Acceptance E,D NR   11/09/17 0947 Active               Point: Home exercise program (Active)    Learning Progress Summary    Learner Readiness Method Response Comment Documented by Status   Patient Acceptance E,D DU,NR   11/15/17 1356 Done    Acceptance E,D DU,NR   11/13/17 1044 Done    Eager E,D DU,NR   11/12/17 1104 Done    Acceptance E,D NR   11/10/17 1608 Active    Acceptance E,D NR   11/09/17 0947 Active    Acceptance E,D VU,DU   11/07/17 0903 Done   Family Acceptance E,D DU,NR   11/15/17 1356 Done    Eager E,D DU,NR   11/12/17 1104 Done   Significant Other Acceptance E,D NR   11/09/17 0947 Active               Point: Body mechanics (Active)    Learning Progress Summary    Learner Readiness Method Response Comment Documented by Status   Patient Acceptance E,D DU,NR   11/15/17 1356 Done    Acceptance E,D DU,NR   11/13/17 1044 Done    Eager E,D DU,NR   11/12/17 1104 Done    Acceptance E,D NR   11/10/17 1608 Active    Acceptance E,D NR   11/09/17 0947 Active   Family Acceptance E,D DU,NR   11/15/17 1356 Done    Eager E,D DU,NR   11/12/17 1104 Done   Significant Other Acceptance E,D NR   11/09/17 0947 Active               Point: Precautions (Active)    Learning Progress Summary    Learner Readiness Method Response Comment Documented by Status   Patient Acceptance E,D DU,NR   11/15/17 1356 Done    Acceptance E,D KRISTINE,NR  UD 11/13/17 1044 Done    Eager  E,D DU,NR   11/12/17 1104 Done    Acceptance E,D NR   11/10/17 1608 Active    Acceptance E,D NR   11/09/17 0947 Active   Family Acceptance E,D DU,NR   11/15/17 1356 Done    Eager E,D DU,NR   11/12/17 1104 Done   Significant Other Acceptance E,D NR   11/09/17 0947 Active                      User Key     Initials Effective Dates Name Provider Type Discipline     06/22/15 -  Olya Mayifeld, PTA Physical Therapy Assistant PT     06/19/15 -  Amena Davis, PT Physical Therapist PT     06/19/15 -  Keli Awad, PT Physical Therapist PT                    PT Recommendation and Plan  Anticipated Equipment Needs At Discharge: wheelchair (TBA)  Anticipated Discharge Disposition: inpatient rehabilitation facility  PT Frequency: daily  Plan of Care Review  Plan Of Care Reviewed With: patient  Progress: no change  Outcome Summary/Follow up Plan: pt up in chair.still needs max. assist and lots of verbal cues for standing.attempted 3 times          Outcome Measures       11/15/17 1305 11/15/17 1301 11/13/17 0858    How much help from another person do you currently need...    Turning from your back to your side while in flat bed without using bedrails?  2  -UD     Moving from lying on back to sitting on the side of a flat bed without bedrails?  2  -UD     Moving to and from a bed to a chair (including a wheelchair)?  2  -UD     Standing up from a chair using your arms (e.g., wheelchair, bedside chair)?  2  -UD     Climbing 3-5 steps with a railing?  1  -UD     To walk in hospital room?  1  -UD     AM-PAC 6 Clicks Score  10  -UD     How much help from another is currently needed...    Putting on and taking off regular lower body clothing? 1  -AN  1  -TA    Bathing (including washing, rinsing, and drying) 1  -AN  1  -TA    Toileting (which includes using toilet bed pan or urinal) 1  -AN  1  -TA    Putting on and taking off regular upper body clothing 1  -AN  1  -TA    Taking care of personal grooming (such as  brushing teeth) 2  -AN  2  -TA    Eating meals 3  -AN  3  -TA    Score 9  -AN  9  -TA    Modified [place] Scale    Modified [place] Scale   4 - Moderately severe disability.  Unable to walk without assistance, and unable to attend to own bodily needs without assistance.  -TA    Functional Assessment    Outcome Measure Options   AM-PAC 6 Clicks Daily Activity (OT);Modified [place]  -TA      11/13/17 0855          How much help from another person do you currently need...    Turning from your back to your side while in flat bed without using bedrails? 2  -UD      Moving from lying on back to sitting on the side of a flat bed without bedrails? 2  -UD      Moving to and from a bed to a chair (including a wheelchair)? 2  -UD      Standing up from a chair using your arms (e.g., wheelchair, bedside chair)? 2  -UD      Climbing 3-5 steps with a railing? 1  -UD      To walk in hospital room? 1  -UD      AM-PAC 6 Clicks Score 10  -UD        User Key  (r) = Recorded By, (t) = Taken By, (c) = Cosigned By    Initials Name Provider Type    SELWYN Mayfield PTA Physical Therapy Assistant    KWAKU Constantino, OT Occupational Therapist    DUANE Murguia, OT Occupational Therapist           Time Calculation:         PT Charges       11/15/17 1358          Time Calculation    PT Received On 11/15/17  -      PT Goal Re-Cert Due Date 11/17/17  -      Time Calculation- PT    Total Timed Code Minutes- PT 18 minute(s)  -UD        User Key  (r) = Recorded By, (t) = Taken By, (c) = Cosigned By    Initials Name Provider Type    SELWYN Mayfield PTA Physical Therapy Assistant          Therapy Charges for Today     Code Description Service Date Service Provider Modifiers Qty    29587803179 HC PT THER PROC EA 15 MIN 11/15/2017 Olya Mayfield PTA GP 1          PT G-Codes  Outcome Measure Options: AM-PAC 6 Clicks Daily Activity (OT), Marielena Mayfield PTA  11/15/2017

## 2017-11-16 NOTE — THERAPY TREATMENT NOTE
Acute Care - Speech Language Pathology   Swallow Treatment Note [facility]     Patient Name: Magdi Arriaga  : 1945  MRN: [id number]  Today's Date: 2017  Onset of Illness/Injury or Date of Surgery Date: 17            Admit Date: 2017    Visit Dx:      ICD-10-CM ICD-9-CM   1. Cerebral edema G93.6 348.5   2. Left-sided weakness R53.1 728.87   3. Altered mental status, unspecified altered mental status type R41.82 780.97   4. Traumatic subdural hematoma without loss of consciousness, subsequent encounter S06.5X0D V58.89     852.21   5. Impaired functional mobility, balance, gait, and endurance Z74.09 V49.89   6. Impaired mobility and ADLs Z74.09 799.89   7. Pharyngeal dysphagia R13.13 787.23     Patient Active Problem List   Diagnosis   • Abnormal CT scan, chest   • Allergic rhinitis   • Asthma   • Bronchiectasis   • Dementia   • Depression   • Dyslipidemia   • Hypertension   • Obesity   • JONNY (obstructive sleep apnea)   • Osteoarthritis   • Vitamin D deficiency   • Left bundle branch block   • Normocytic anemia   • COPD (chronic obstructive pulmonary disease)   • Coronary artery disease involving coronary bypass graft of native heart without angina pectoris   • Ischemic cardiomyopathy, most recent LVEF 30%   • History of atrial flutter   • History of DVT (deep vein thrombosis)   • Hematoma, calf   • History of exposure to infectious disease-Patient received platelet transfusion for CABG, 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, followed by [facility]   • Bradycardia   • Syncope, near   • Closed fracture of multiple ribs of right side   • Traumatic SDH, s/p right craniotomy on 2017   • Weakness of left side of body   • Cerebral edema   • Leukocytosis             Adult Rehabilitation Note        11/16/17 1445 11/15/17 1305 11/15/17 1301    Rehab Assessment/Intervention    Discipline speech language pathologist  -AC occupational therapist   ,  -AN physical therapy assistant  -UD    Document Type therapy note (daily note)  -AC therapy note (daily note)  -AN therapy note (daily note)  -UD    Subjective Information no complaints;agree to therapy  -AC no complaints;agree to therapy  -AN agree to therapy;no complaints  -UD    Patient Effort, Rehab Treatment good  -AC good  -AN good  -UD    Precautions/Limitations  fall precautions;other (see comments)   L side weakness  -AN fall precautions  -UD    Recorded by [AC] Mahogany Weems, MS CCC-SLP [AN] Nasreen Constantino, OT [UD] Olya Mayfield, PTA    Vital Signs    Pre Systolic BP Rehab  --   vitals stable  -AN     Pre Patient Position   Sitting  -UD    Intra Patient Position   Standing  -UD    Post Patient Position   Sitting  -UD    Recorded by  [AN] Nasreen Constantino, OT [UD] Olya Mayfield, PTA    Pain Assessment    Pain Assessment No/denies pain  -AC No/denies pain  -AN No/denies pain  -UD    Recorded by [AC] Mahogany Weems, MS CCC-SLP [AN] Nasreen Constantino, OT [UD] Olya Mayfield, PTA    Cognitive Assessment/Intervention    Current Cognitive/Communication Assessment  functional  -AN     Orientation Status  oriented to;person;place;time   said year 2070  -AN oriented to;person;place;situation  -UD    Follows Commands/Answers Questions  able to follow single-step instructions;75% of the time;100% of the time;needs cueing;needs increased time;needs repetition  -AN able to follow single-step instructions  -UD    Personal Safety  mild impairment  -AN     Personal Safety Interventions  fall prevention program maintained  -AN fall prevention program maintained  -UD    Recorded by  [AN] Nasreen Constantino, OT [UD] Olya Mayfield, PTA    Improve timing of pharyngeal response    To improve timing of pharyngeal response, patient will: Swallow in timely way using three second prep;Swallow in timely  way using Mendelsohn maneuver;80%;with inconsistent cues  -AC      Status: Improve timing of pharyngeal response Progressing as expected  -AC      Timing of Pharyngeal Response Progress 60%;with inconsistent cues;continue to adress  -AC      Comments: Improve timing of pharyngeal response 3-sec prep  -AC      Recorded by [AC] Mahogany Weems MS CCC-SLP      Improve closure at entrance to airway    To improve closure at entrance to airway, patient will: Complete super-supraglottic swallow;80%;with inconsistent cues  -AC      Status: Improve closure at entrance to airway Progressing as expected  -AC      Closure at Entrance to Airway Progress continue to adress  -AC      Recorded by [AC] Mahogany Weems MS CCC-SLP      Improve hyolaryngeal excursion    To improve hyolaryngeal excursion, patient will: Complete chin tuck against resistance (comment number of repetitions);with inconsistent cues  -AC      Status: Improve hyolaryngeal excursion Progressing as expected  -AC      Hyolaryngeal Excursion Progress with inconsistent cues;continue to adress  -AC      Comments: Improve hyolaryngeal excursion 15 sec, 3 reps  -AC      Recorded by [AC] Mahogany Weems MS CCC-SLP      Improve tongue base & pharyngeal wall squeeze    To improve tongue base & pharyngeal wall squeeze, patient will: Complete effortful swallow;Complete tongue base retraction;80%;with inconsistent cues  -AC      Status: Improve tongue base & pharyngeal wall squeeze Progressing as expected  -AC      Tongue Base/Pharyngeal Wall Squeeze Progress 50%;with inconsistent cues;continue to adress  -AC      Recorded by [AC] Mahogany Weems MS CCC-SLP      Dysphagia Other 1    Dysphagia Other 1 Objective Pt will tolerate recommended diet w/o overt clinical s/sxs aspiration w/ intermittent cues.  -AC      Status: Dysphagia Other 1 Progressing as expected  -AC      Dysphagia Other 1 Progress continue to address  -AC      Comments: Dysphagia Other 1 Trialed nectar-thick  "liquids via cup. Pt demonstrated throat clearing w/ initial sip of nectar. Pt reported he had \"a tickle\" in his throat. When instructed to clear his throat and swallow hard prior to further administrations, no other overt s/sxs aspiration noted w/ nectar-thick via cup.   -AC      Recorded by [AC] Mahogany Weems MS CCC-SLP      Dysphagia Other 2    Dysphagia Other 2 Objective Pt will utilize compensatory strats when eating/drinking to improve safety of the swallow w/ 100% acc w/ intermittent cues.  -AC      Status: Dysphagia Other 2 Progressing as expected  -AC      Dysphagia Other 2 Progress 50%;with inconsistent cues;continue to address  -AC      Comments: Dysphagia Other 2 Pt cont to require consistent cues to use compensatory strategies when eating/drinking. RN reported family continued to bring pt thin liquids, despite recommendations that were given verbally and written instructions hanging above HOB.  -AC      Recorded by [AC] Mahogany Weems MS CCC-SLP      ROM (Range of Motion)    General ROM Detail  L UE shoulder 25%, elbow 50%, wrist 35-50%, hand 75 %  -AN     Recorded by  [AN] Nasreen Constantino OT     Bed Mobility, Assessment/Treatment    Bed Mob, Supine to Sit, [place]   not tested   up in chair  -UD    Recorded by   [UD] Olya Mayfield PTA    Transfer Assessment/Treatment    Transfers, Sit-Stand [place]  maximum assist (25% patient effort);2 person assist required   3 attempts, unable to fully extend trunk  -AN maximum assist (25% patient effort);2 person assist required  -UD    Transfers, Stand-Sit [place]   maximum assist (25% patient effort);2 person assist required  -UD    Transfer, Impairments  impaired balance;strength decreased  -AN     Recorded by  [AN] Naseren Constantino OT [UD] Olya Mayfield PTA    Gait Assessment/Treatment    Gait, [place] Level   unable to perform  -UD    Recorded by   [UD] Olya Mayfield, BG    ADL Assessment/Intervention    Additional Documentation  --   pt " declined ADL tasks  -AN     Recorded by  [AN] Nsareen Constantino OT     Balance Skills Training    Standing-Level of Assistance  Maximum assistance;x2  -AN Maximum assistance;x2  -UD    Static Standing Balance Support  Right upper extremity supported;Left upper extremity supported  -AN     Recorded by  [AN] Nasreen Constantino OT [UD] Olya Mayfield PTA    Therapy Exercises    Bilateral Lower Extremities   AROM:;10 reps;sitting  -UD    Right Upper Extremity  AROM:;10 reps;sitting;elbow flexion/extension;hand pumps;shoulder horizontal abd/add;shoulder protraction/retraction;shoulder rolls/shrugs  -AN     Left Upper Extremity  AAROM:;10 reps;sitting;elbow flexion/extension;hand pumps;pronation/supination;shoulder abduction/adduction  -AN     Recorded by  [AN] Nasreen Constantino OT [UD] Olya Mayfield PTA    Positioning and Restraints    Pre-Treatment Position  sitting in chair/recliner  -AN sitting in chair/recliner  -UD    Post Treatment Position  chair  -AN chair  -UD    In Chair  reclined;call light within reach;encouraged to call for assist;with family/caregiver  -AN notified nsg;reclined;sitting;call light within reach;exit alarm on;with family/caregiver;on mechanical lift sling;legs elevated  -UD    Recorded by  [AN] Nasreen Constantino OT [UD] Olya Mayfield PTA      11/15/17 8123          Rehab Assessment/Intervention    Discipline speech language pathologist  -AC      Document Type therapy note (daily note)  -AC      Subjective Information no complaints;agree to therapy  -AC      Patient Effort, Rehab Treatment good  -AC      Recorded by [AC] Mahogany Weems MS CCC-SLP      Pain Assessment    Pain Assessment No/denies pain  -AC      Recorded by [AC] Mahogany Weems MS CCC-SLP      Improve timing of pharyngeal response    To improve timing of pharyngeal response, patient will: Swallow in timely way using three second prep;Swallow in timely way using Mendelsohn maneuver;80%;with inconsistent cues  -AC      Status: Improve timing of  pharyngeal response Progressing as expected  -AC      Timing of Pharyngeal Response Progress 50%;with inconsistent cues;continue to adress  -AC      Comments: Improve timing of pharyngeal response 3-sec prep  -AC      Recorded by [AC] Mahogany Weems MS CCC-SLP      Improve closure at entrance to airway    To improve closure at entrance to airway, patient will: Complete super-supraglottic swallow;80%;with inconsistent cues  -AC      Status: Improve closure at entrance to airway Progressing as expected  -AC      Closure at Entrance to Airway Progress 30%;with inconsistent cues;continue to adress  -AC      Recorded by [AC] Mahogany Weems MS CCC-SLP      Improve hyolaryngeal excursion    To improve hyolaryngeal excursion, patient will: Complete chin tuck against resistance (comment number of repetitions);with inconsistent cues  -AC      Status: Improve hyolaryngeal excursion Progressing as expected  -AC      Hyolaryngeal Excursion Progress with inconsistent cues;continue to adress  -AC      Comments: Improve hyolaryngeal excursion 15 sec, 3 reps  -AC      Recorded by [AC] Mahogany Weems MS CCC-SLP      Improve tongue base & pharyngeal wall squeeze    To improve tongue base & pharyngeal wall squeeze, patient will: Complete effortful swallow;Complete tongue base retraction;80%;with inconsistent cues  -AC      Status: Improve tongue base & pharyngeal wall squeeze Progressing as expected  -AC      Tongue Base/Pharyngeal Wall Squeeze Progress 50%;with consistent cues;continue to adress  -AC      Comments: Improve tongue base & pharyngeal wall squeeze effortful swallow  -AC      Recorded by [AC] Mahogany Weems MS CCC-SLP      Dysphagia Other 1    Dysphagia Other 1 Objective Pt will tolerate recommended diet w/o overt clinical s/sxs aspiration w/ intermittent cues.  -AC      Status: Dysphagia Other 1 Progressing as expected  -AC      Dysphagia Other 1 Progress continue to address  -AC      Comments: Dysphagia Other 1 Trialed  nectar via cup and puree. Cough noted x1 when pt taking consecutive drink liquid wash. When encouraged to take small sips and intemittently use dry multiple swallows, no overt clinical s/sxs aspiration noted. Pt cont to require supervision w/ eating/drinking to ensure use of compensatory strategies for improved swallow safety.  -AC      Recorded by [AC] Mahogany Weems MS CCC-SLP      Dysphagia Other 2    Dysphagia Other 2 Objective Pt will utilize compensatory strats when eating/drinking to improve safety of the swallow w/ 100% acc w/ intermittent cues.  -AC      Status: Dysphagia Other 2 Progressing as expected  -AC      Dysphagia Other 2 Progress 50%;with inconsistent cues;continue to address  -AC      Comments: Dysphagia Other 2 50% acc w/ intermittent cues. 100% acc w/ consistent cues. Utilized small bites/sips, multiple dry swallows, liquid wash, and intermittent volitional cough.  -AC      Recorded by [AC] Mahogany Weems MS CCC-SLP        User Key  (r) = Recorded By, (t) = Taken By, (c) = Cosigned By    Initials Name Effective Dates    UD Olya Mayfield, PTA 06/22/15 -     AN Nasreen Constantino, OT 06/22/15 -     AC Mahogany Weems MS CCC-SLP 07/27/17 -                   IP SLP Goals       11/16/17 1526 11/15/17 1147 11/13/17 1658    Safely Consume Diet    Safely Consume Diet- SLP, Date Established   11/13/17  -AC    Safely Consume Diet- SLP, Time to Achieve   by discharge  -AC    Safely Consume Diet- SLP, Date Goal Reviewed 11/16/17  -AC 11/15/17  -AC     Safely Consume Diet- SLP, Outcome goal ongoing  -AC goal ongoing  -AC       User Key  (r) = Recorded By, (t) = Taken By, (c) = Cosigned By    Initials Name Provider Type    AC Mahogany Weems MS CCC-SLP Speech and Language Pathologist          EDUCATION  The patient has been educated in the following areas:   Dysphagia (Swallowing Impairment) Oral Care/Hydration Modified Diet Instruction.    SLP Recommendation and Plan  Plan of Care Review  Plan Of Care Reviewed  "With: patient  Progress: progress toward functional goals as expected  Outcome Summary/Follow up Plan: Pt participated in dysphagia tx before discharging to [facility] today. Pt very agreeable to complete exercises, but needs cueing & is quite weak. Trialed nectar-thick liquids via cup. Pt demonstrated throat  clear following initial sip; however, he reported having \"tickle\" in throat. After cued to clear throat and swallow hard, no further throat clearing or s/sxs aspiration noted w/ nectar-thick. Pt cont to require consistent cues to use compensatory strategies when eating/drinking. RN reported family continued to bring pt thin liquids, despite recommendations that were given verbally and written instructions hanging above HOB. Rec: cont dysphagia level III (puree w/ some mashed) diet & nectar-thick liquids; supervision - cue small bites/sips, mult swallows, liq wash, cough intermittently; meds whole/crushed w/ puree, as tolerated. Cont dysphagia tx @ next level of care.     Time Calculation:         Time Calculation- SLP       11/16/17 1532          Time Calculation- SLP    SLP Start Time 1445  -      SLP Received On 11/16/17  -        User Key  (r) = Recorded By, (t) = Taken By, (c) = Cosigned By    Initials Name Provider Type     Mahogany Weems MS CCC-SLP Speech and Language Pathologist        Therapy Charges for Today     Code Description Service Date Service Provider Modifiers Qty    99955902083 HC ST TREATMENT SWALLOW 3 11/15/2017 Mahogany Weems MS CCC-GAL GN 1    [id number] HC ST TREATMENT SWALLOW 3 11/16/2017 MS ROSENDO Solis GN 1                 MS ROSENDO Solis  11/16/2017  "

## 2017-11-16 NOTE — PROGRESS NOTES
"Adult Nutrition  Assessment/PES    Patient Name:  Magdi Arriaga  YOB: 1945  MRN: [id number]  Admit Date:  11/5/2017    Assessment Date:  11/16/2017          Reason for Assessment       11/16/17 1020    Reason for Assessment    Reason For Assessment/Visit follow up protocol    Time Spent (min) 20    Diagnosis Diagnosis   per notes this admission              Nutrition/Diet History       11/16/17 1025    Nutrition/Diet History    Reported/Observed By Patient    Appetite Fair    Other Patient reports appetite is OK. Observed tray; patient had not yet eaten breakfast at time of visit. No family in room to verify PO intake. Patient fell asleep before I could ask him about using boost supplements            Anthropometrics       11/16/17 1027    Anthropometrics (Special Considerations)    Height Used for Calculations 1.727 m (5' 8\")    Weight Used for Calculations 76.8 kg (169 lb 5 oz)   lift/sling scale weight per charting 11/16            Labs/Tests/Procedures/Meds       11/16/17 1028    Labs/Tests/Procedures/Meds    Labs/Tests Review Reviewed                Nutrition Prescription Ordered       11/16/17 1028    Nutrition Prescription PO    Current PO Diet Dysphagia    Dysphagia Level 3  Pureed with some mashed    Fluid Consistency Nectar/syrup thick    Supplement Boost Plus    Supplement Frequency 3 times a day    Common Modifiers Cardiac            Evaluation of Received Nutrient/Fluid Intake       11/16/17 1029    PO Evaluation    Number of Days PO Intake Evaluated --   PO intake recorded from (11/14 - 11/15)    Number of Meals 6    % PO Intake 50%            Problem/Interventions:        Problem 1       11/16/17 1029    Nutrition Diagnoses Problem 1    Problem 1 Inadequate Intake/Infusion    Inadequate Intake Type Oral    Etiology (related to) --   clinical condition    Signs/Symptoms (evidenced by) PO Intake    Percent (%) intake recorded 50 %    Over number of meals 6                "   Intervention Goal       11/16/17 1030    Intervention Goal    General Nutrition support treatment    PO Increase intake            Nutrition Intervention       11/16/17 1030    Nutrition Intervention    RD/Tech Action Advise alternate selection;Encourage intake;Follow Tx progress;Care plan reviewd              Education/Evaluation       11/16/17 1030    Monitor/Evaluation    Monitor Per protocol;PO intake;Supplement intake        Electronically signed by:  Audrey Kirkland  11/16/17 10:31 AM

## 2017-11-16 NOTE — PLAN OF CARE
"Problem: Patient Care Overview (Adult)  Goal: Plan of Care Review  Outcome: Ongoing (interventions implemented as appropriate)    11/16/17 1526   Coping/Psychosocial Response Interventions   Plan Of Care Reviewed With patient   Patient Care Overview   Progress progress toward functional goals as expected   Outcome Evaluation   Outcome Summary/Follow up Plan Pt participated in dysphagia tx before discharging to [facility] today. Pt very agreeable to complete exercises, but needs cueing & is quite weak. Trialed nectar-thick liquids via cup. Pt demonstrated throat clear following initial sip; however, he reported having \"tickle\" in throat. After cued to clear throat and swallow hard, no further throat clearing or s/sxs aspiration noted w/ nectar-thick. Pt cont to require consistent cues to use compensatory strategies when eating/drinking. RN reported family continued to bring pt thin liquids, despite recommendations that were given verbally and written instructions hanging above HOB.      Rec: cont dysphagia level III (puree w/ some mashed) diet & nectar-thick liquids; supervision - cue small bites/sips, mult swallows, liq wash, cough intermittently; meds whole/crushed w/ puree, as tolerated. Cont dysphagia tx @ next level of care.         Problem: Inpatient SLP  Goal: Dysphagia- Patient will safely consume diet as per recommendation with no signs/symptoms of aspiration  Outcome: Ongoing (interventions implemented as appropriate)    11/13/17 1658 11/16/17 1526   Safely Consume Diet   Safely Consume Diet- SLP, Date Established 11/13/17 --    Safely Consume Diet- SLP, Time to Achieve by discharge --    Safely Consume Diet- SLP, Date Goal Reviewed --  11/16/17   Safely Consume Diet- SLP, Outcome --  goal ongoing           "

## 2017-11-16 NOTE — PROGRESS NOTES
Continued Stay Note  [facility]     Patient Name: Magdi Arriaga  MRN: [id number]  Today's Date: 11/16/2017    Admit Date: 11/5/2017          Discharge Plan       11/16/17 1500    Case Management/Social Work Plan    Additional Comments Efaxed d/c summary from 11/16 and progress note addendum for today to Spinal Cord Unit at [phone]. CM will follow.       11/16/17 1330    Case Management/Social Work Plan    Plan [facility] Spinal [facility] Unit    Patient/Family In Agreement With Plan yes    Additional Comments Spoke to Rivka at [facility] and they can offer pt a bed today on the spinal cord unit if medically ready. APRN notified. Spoke to pt and wife via phone and are agreeable. Pt has an ambulance with [facility] scheduled at 1500. Nurse to call report to [phone]. Please send hard copy of d/c summary with packet. [facility] will obtain copy of the d/c summary via epic. CM will cont to follow.               Discharge Codes     None        Expected Discharge Date and Time     Expected Discharge Date Expected Discharge Time    Nov 15, 2017             Kala Woods

## 2017-11-16 NOTE — PROGRESS NOTES
Continued Stay Note  [facility]     Patient Name: Magdi Arriaga  MRN: [id number]  Today's Date: 11/16/2017    Admit Date: 11/5/2017          Discharge Plan       11/16/17 1330    Case Management/Social Work Plan    Plan [facility] Spinal Cord Unit    Patient/Family In Agreement With Plan yes    Additional Comments Spoke to Rivka at [facility] and they can offer pt a bed today on the spinal cord unit if medically ready. APRN notified. Spoke to pt and wife via phone and are agreeable. Pt has an ambulance with [facility] scheduled at 1500. Nurse to call report to [phone]. Please send hard copy of d/c summary with packet. [facility] will obtain copy of the d/c summary via epic. CM will cont to follow.               Discharge Codes     None        Expected Discharge Date and Time     Expected Discharge Date Expected Discharge Time    Nov 15, 2017             Kala Woods

## 2017-11-16 NOTE — PROGRESS NOTES
[facility] Medicine Services  PROGRESS NOTE    Patient Name: Magdi Arriaga  : 1945  MRN: [id number]    Date of Admission: 2017  Length of Stay: 11  Primary Care Physician: Colton Dickens MD    Subjective   Subjective     CC:  FU Subdural hematoma    HPI:    Patient resting in bed this morning with no new complaints except for being sleepy.  No family at bedside today.          Review of Systems   Constitutional: Negative for chills and fever.   Eyes: Negative.    Respiratory: Positive for cough.    Cardiovascular: Negative.    Gastrointestinal: Negative.    Genitourinary: Negative.    Neurological: Positive for weakness.   Psychiatric/Behavioral: Negative.        Otherwise ROS is negative except as mentioned in the HPI.    Objective   Objective     Vital Signs:   Temp:  [97.6 °F (36.4 °C)-99.4 °F (37.4 °C)] 97.6 °F (36.4 °C)  Heart Rate:  [56-81] 62  Resp:  [16-18] 16  BP: (111-137)/(69-87) 137/87        Physical Exam:    Constitutional: No acute distress, asleep but arouses to voice, resting comfortably in bed.   HENT: NCAT, mucous membranes moist  Respiratory: Occasional crackle, respiratory effort normal   Cardiovascular: RRR, no murmurs, rubs, or gallops, palpable pedal pulses bilaterally  Gastrointestinal: Positive bowel sounds, soft, nontender, nondistended  Musculoskeletal: No bilateral ankle edema  Psychiatric: Appropriate affect, cooperative  Neurologic: Oriented x 3, strength symmetric in all extremities, speech clear  Skin: No rashes      Results Reviewed:  I have personally reviewed current lab, radiology, and data and agree.      Results from last 7 days  Lab Units 17  0348   WBC 10*3/mm3 22.69* 15.08* 11.51*   HEMOGLOBIN g/dL 11.8* 11.8* 10.9*   HEMATOCRIT % 36.4* 36.5* 33.7*   PLATELETS 10*3/mm3 287 311 313       Results from last 7 days  Lab Units 17  04017  0431 17  0348   SODIUM mmol/L 138 134  132   POTASSIUM mmol/L 4.5 5.0 4.4   CHLORIDE mmol/L 103 105 101   CO2 mmol/L 31.0 27.0 25.0   BUN mg/dL 52* 49* 46*   CREATININE mg/dL 1.20 1.00 1.00   GLUCOSE mg/dL 118* 106* 131*   CALCIUM mg/dL 9.8 9.4 9.7   ALT (SGPT) U/L  --  93*  --    AST (SGOT) U/L  --  42*  --      BNP   Date Value Ref Range Status   11/14/2017 214.0 (H) 0.0 - 100.0 pg/mL Final     No results found for: PHART    Microbiology Results Abnormal     Procedure Component Value - Date/Time    Urine Culture - Urine, Urine, Clean Catch [[id number]]  (Normal) Collected:  11/08/17 1309    Lab Status:  Final result Specimen:  Urine from Urine, Catheter Updated:  11/10/17 0755     Urine Culture No growth at 2 days    Urine Culture - Urine, Urine, Clean Catch [[id number]]  (Normal) Collected:  11/05/17 1437    Lab Status:  Final result Specimen:  Urine from Urine, Clean Catch Updated:  11/07/17 0827     Urine Culture No growth at 2 days          Imaging Results (last 24 hours)     ** No results found for the last 24 hours. **        Results for orders placed during the hospital encounter of 11/05/17   Adult Transthoracic Echo Limited W/ Cont if Necessary Per Protocol    Narrative · Left ventricular systolic function is mildly decreased. Estimated EF =   40%.          I have reviewed the medications.    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  Obstructive sleep apnea with central component.B.  On home CPAP therapy.         COPD (chronic obstructive pulmonary disease)    Coronary artery disease involving coronary bypass graft of native heart without angina pectoris    Overview Signed 10/25/2017 11:32 AM by MORALES Anaya     1. [facility] 8-8-17:   1.  Severe proximal LAD/first diagonal stenosis (ungrafted) history of a 2.5 x 23 Xience EES   2.  Occluded LAD with patent vein graft to the diagonal and LIMA to the LAD   3.  Severe left circumflex stenosis with widely patent vein graft to the OM1 and OM 2   4.  Occluded RCA with patent saphenous vein graft   5.  Disease and a small ramus branch (too small for PCI)   6.  Normal right heart pressures and post capillary wedge pressure.         Ischemic cardiomyopathy, most recent LVEF 30%    Overview Addendum 10/25/2017 11:33 AM by MORALES Anaya     1. Echo 8-7-17:   · Left ventricular systolic function is moderately decreased. Estimated EF = 30%.  · The cardiac valves are anatomically and functionally normal.    2. Echo 8-24-17:  · Left ventricular systolic function is moderately decreased. Estimated EF = 30%.  · An apical left ventricular aneurysm is present.         History of atrial flutter    History of DVT (deep vein thrombosis)    CKD (chronic kidney disease), stage III    Weakness of left side of body    Cerebral edema    Leukocytosis             Brief Hospital Course to date:  72-year-old gentleman following sustaining a head injury, some rib fractures on October 21. At that time he had a subdural hematoma. He was at [facility] and developed progressive left-sided weakness and returned to [facility] where his CT scan revealed more right to left shift from his subacute subdural hematoma which had increased in size. On November 18 underwent craniotomy and evacuation of a right subdural hematoma.      Assessment & Plan:    Subdural Hematoma  - s/p evacuation  - residual left HP  -  PT/OT-- to rehab    CAD  - plavix held due to SDH, continue asa, will restart plavix 3-4 weeks per Cardio    ICM, EF 30%  - life vest to be removed    Leukocytosis  - suspect due to decadron, no discrete infiltrate on CXR, patient afebrile  - speech recommends continuing dysphagia level 3 diet (puree with some mashed), nectar-thick liquids.      CKD III    JONNY  - CPAP at bedtime    Heb B  - entacavir, follows with Dr Ng    DVT Prophylaxis:  SCDs due to blood in brain (SDH)    CODE STATUS: Full Code    Disposition: I expect the patient to be discharged to rehab today.  Discharge summary completed yesterday.      Yolanda Steel, APRN  11/16/17  1:21 PM

## 2017-11-16 NOTE — PLAN OF CARE
Problem: Patient Care Overview (Adult)  Goal: Plan of Care Review  Outcome: Ongoing (interventions implemented as appropriate)    11/15/17 1356 11/15/17 2200 11/16/17 4894   Coping/Psychosocial Response Interventions   Plan Of Care Reviewed With --  patient;spouse --    Patient Care Overview   Progress no change --  --    Outcome Evaluation   Outcome Summary/Follow up Plan --  --  Tolerating current plan of care, VSS, rested well with CPAP. Plan for D/C today to [facility]. Will continue to monitor closely         Problem: Fall Risk (Adult)  Goal: Absence of Falls  Outcome: Ongoing (interventions implemented as appropriate)    Problem: Skin Integrity Impairment, Risk/Actual (Adult)  Goal: Skin Integrity/Wound Healing  Outcome: Ongoing (interventions implemented as appropriate)

## 2017-11-20 PROBLEM — I63.9 CVA (CEREBRAL VASCULAR ACCIDENT) (HCC): Status: ACTIVE | Noted: 2017-01-01

## 2017-11-20 PROBLEM — R40.1 STUPOR: Status: ACTIVE | Noted: 2017-01-01

## 2017-11-20 PROBLEM — K56.609 SMALL BOWEL OBSTRUCTION (HCC): Status: ACTIVE | Noted: 2017-01-01

## 2017-11-20 NOTE — ANESTHESIA POSTPROCEDURE EVALUATION
Patient: Magdi Arriaga    Procedure Summary     Date Anesthesia Start Anesthesia Stop Room / Location    11/20/17 1603 1742  [facility] / [facility]       Procedure Diagnosis Surgeon Provider    LAPAROTOMY EXPLORATORY SMALL BOWEL RESECTION (N/A Abdomen); COLON RESECTION SMALL BOWEL (N/A Abdomen) Small bowel obstruction due to adhesions MD Rc Ramos MD          Anesthesia Type: general  Last vitals  BP   123/90 (11/20/17 1540)   Temp   97.8 °F (36.6 °C) (11/20/17 1540)   Pulse   88 (11/20/17 1540)   Resp   18 (11/20/17 1540)     SpO2   100 % (11/20/17 1735)     Post Anesthesia Care and Evaluation    Patient location during evaluation: ICU  Patient participation: complete - patient cannot participate  Level of consciousness: lethargic  Pain score: 0  Pain management: adequate  Airway patency: patent  Anesthetic complications: No anesthetic complications  PONV Status: none  Cardiovascular status: acceptable  Respiratory status: ETT, intubated and ventilator  Hydration status: acceptable

## 2017-11-20 NOTE — ANESTHESIA PROCEDURE NOTES
TAP Block    Patient location during procedure: OR  Start time: 11/20/2017 4:09 PM  Reason for block: at surgeon's request and post-op pain management  Performed by  CRNA: NATANAEL MORROW  Preanesthetic Checklist  Completed: patient identified, site marked, surgical consent, pre-op evaluation, timeout performed, IV checked, risks and benefits discussed and monitors and equipment checked  Prep:  Pt Position: supine  Sterile barriers:cap, gloves, sterile barriers and mask  Prep: ChloraPrep  Patient monitoring: blood pressure monitoring, continuous pulse oximetry and EKG  Procedure  Sedation:yes  Performed under: general  Guidance:ultrasound guided  Images:still images obtained    Laterality:Bilateral  Block Type:TAP  Injection Technique:single-shot  Needle Type:short-bevel and echogenic  Needle Gauge:20 G    Medications  Comment:Block Injection:  LA dose divided between Right and Left block       Adjuncts:  Decadron 4mg PSF, Buprenex 0.3mg (Per total volume of LA)  Local Injected:bupivacaine 0.25% Local Amount Injected:60mL  Post Assessment  Injection Assessment: negative aspiration for heme, incremental injection and no paresthesia on injection  Patient Tolerance:comfortable throughout block  Complications:no  Additional Notes      Under Ultrasound guidance, a BBraun 4inch 360 degree needle was advanced with Normal Saline hydro dissection of tissue.  The Internal Oblique and Transversus Abdominus muscles where visualized.  At or before the aponeurosis of Internal Oblique, local anesthetic spread was visualized in the Transversus Abdominus Plane. Injection was made incrementally with aspiration every 5 mls.  There was no  intravascular injection,  injection pressure was normal, there was no neural injection, and the procedure was completed without difficulty.  Thank You.

## 2017-11-20 NOTE — ANESTHESIA PROCEDURE NOTES
Airway  Urgency: elective    Date/Time: 11/20/2017 4:07 PM  Airway not difficult    General Information and Staff    Patient location during procedure: OR  CRNA: NATANAEL MORROW    Indications and Patient Condition  Indications for airway management: airway protection    Preoxygenated: yes  MILS not maintained throughout  Mask difficulty assessment: 1 - vent by mask    Final Airway Details  Final airway type: endotracheal airway      Successful airway: ETT  Cuffed: yes   Successful intubation technique: direct laryngoscopy  Facilitating devices/methods: intubating stylet and cricoid pressure  Endotracheal tube insertion site: oral  Blade: Man  Blade size: #3  ETT size: 8.0 mm  Cormack-Lehane Classification: grade IIb - view of arytenoids or posterior of glottis only  Placement verified by: chest auscultation and capnometry   Measured from: lips  ETT to lips (cm): 20  Number of attempts at approach: 1    Additional Comments  Negative epigastric sounds, Breath sound equal bilaterally with symmetric chest rise and fall

## 2017-11-20 NOTE — ANESTHESIA PREPROCEDURE EVALUATION
Anesthesia Evaluation     Patient summary reviewed          Airway   Mallampati: I  TM distance: <3 FB  Neck ROM: full  no difficulty expected  Dental - normal exam     Pulmonary - normal exam   (+) COPD, asthma, sleep apnea on CPAP,   Cardiovascular - normal exam    ECG reviewed    (+) hypertension, CAD, CABG > 6 Months, dysrhythmias, CHF, DVT,       Neuro/Psych  (+) psychiatric history Depression, dementia,    GI/Hepatic/Renal/Endo    (+)  hepatitis B, liver disease, renal disease,     Musculoskeletal     Abdominal  - normal exam    Bowel sounds: normal.   Substance History      OB/GYN          Other   (+) arthritis                                   Anesthesia Plan    ASA 4 - emergent     general   (Taps for post op pain)  intravenous induction   Anesthetic plan and risks discussed with patient.    Plan discussed with CRNA.

## 2017-11-27 PROBLEM — E61.1 IRON DEFICIENCY: Status: RESOLVED | Noted: 2017-01-01 | Resolved: 2017-01-01

## 2017-11-27 PROBLEM — R93.89 ABNORMAL CT SCAN, CHEST: Status: RESOLVED | Noted: 2017-01-06 | Resolved: 2017-01-01

## 2017-11-27 PROBLEM — I44.7 LEFT BUNDLE BRANCH BLOCK: Status: RESOLVED | Noted: 2017-04-17 | Resolved: 2017-01-01

## 2017-11-27 PROBLEM — J30.9 ALLERGIC RHINITIS: Status: RESOLVED | Noted: 2017-01-06 | Resolved: 2017-01-01

## 2017-11-27 PROBLEM — J45.909 ASTHMA: Status: RESOLVED | Noted: 2017-01-06 | Resolved: 2017-01-01

## 2017-11-27 PROBLEM — M19.90 OSTEOARTHRITIS: Status: RESOLVED | Noted: 2017-01-06 | Resolved: 2017-01-01

## 2017-11-27 PROBLEM — R79.89 ELEVATED LFTS: Status: RESOLVED | Noted: 2017-01-01 | Resolved: 2017-01-01

## 2017-11-27 PROBLEM — E55.9 VITAMIN D DEFICIENCY: Status: RESOLVED | Noted: 2017-01-06 | Resolved: 2017-01-01

## 2017-11-27 PROBLEM — I49.3 PVC (PREMATURE VENTRICULAR CONTRACTION): Status: ACTIVE | Noted: 2017-01-01

## 2017-11-27 PROBLEM — D64.9 NORMOCYTIC ANEMIA: Status: RESOLVED | Noted: 2017-04-17 | Resolved: 2017-01-01

## 2017-11-27 PROBLEM — R00.1 BRADYCARDIA: Status: RESOLVED | Noted: 2017-01-01 | Resolved: 2017-01-01

## 2017-11-27 PROBLEM — S22.41XA CLOSED FRACTURE OF MULTIPLE RIBS OF RIGHT SIDE: Status: RESOLVED | Noted: 2017-01-01 | Resolved: 2017-01-01

## 2017-11-27 PROBLEM — T14.8XXA HEMATOMA: Status: RESOLVED | Noted: 2017-05-12 | Resolved: 2017-01-01

## 2017-11-27 PROBLEM — S81.802A WOUND OF LEFT LEG: Status: RESOLVED | Noted: 2017-01-01 | Resolved: 2017-01-01

## 2017-11-27 PROBLEM — R55 SYNCOPE, NEAR: Status: RESOLVED | Noted: 2017-01-01 | Resolved: 2017-01-01

## 2017-11-27 PROBLEM — I47.29 NSVT (NONSUSTAINED VENTRICULAR TACHYCARDIA) (HCC): Status: ACTIVE | Noted: 2017-01-01

## 2017-11-27 PROBLEM — J47.9 BRONCHIECTASIS (HCC): Status: RESOLVED | Noted: 2017-01-06 | Resolved: 2017-01-01

## 2017-11-27 PROBLEM — R53.1 WEAKNESS OF LEFT SIDE OF BODY: Status: RESOLVED | Noted: 2017-01-01 | Resolved: 2017-01-01

## 2017-11-27 PROBLEM — I63.9 CVA (CEREBRAL VASCULAR ACCIDENT) (HCC): Status: RESOLVED | Noted: 2017-01-01 | Resolved: 2017-01-01

## 2017-11-27 PROBLEM — F32.A DEPRESSION: Status: RESOLVED | Noted: 2017-01-06 | Resolved: 2017-01-01

## 2017-12-01 NOTE — PLAN OF CARE
Problem: Patient Care Overview (Adult)  Goal: Plan of Care Review  Outcome: Ongoing (interventions implemented as appropriate)    12/01/17 1347   Outcome Evaluation   Outcome Summary/Follow up Plan Pt with improved indep re: sitting balance today; req'd increased A for STS attempts. Limited today by initial significant lethargy with constant vc's for opening eyes and attending to task. Will cont to progress PT POC. Wife verbalized intention of appropriate technique in assisting with ROM/ ther ex of LEs.    Coping/Psychosocial Response Interventions   Plan Of Care Reviewed With patient;spouse   Patient Care Overview   Progress progress toward functional goals is gradual         Problem: Inpatient Physical Therapy  Goal: Bed Mobility Goal LTG- PT  Outcome: Ongoing (interventions implemented as appropriate)    11/23/17 1102 12/01/17 1347   Bed Mobility PT LTG   Bed Mobility PT LTG, Date Established 11/23/17 --    Bed Mobility PT LTG, Time to Achieve 2 wks --    Bed Mobility PT LTG, Activity Type supine to sit/sit to supine --    Bed Mobility PT LTG, [place] Level moderate assist (50% patient effort) --    Bed Mobility PT LTG, Outcome --  goal ongoing       Goal: Transfer Training Goal 1 LTG- PT  Outcome: Ongoing (interventions implemented as appropriate)    11/25/17 1519 12/01/17 1347   Transfer Training PT LTG   Transfer Training PT LTG, Date Established 11/25/17 --    Transfer Training PT LTG, Time to Achieve 2 wks --    Transfer Training PT LTG, Activity Type sit to stand/stand to sit --    Transfer Training PT LTG, [place] Level moderate assist (50% patient effort);2 person assist required --    Transfer Training PT LTG, Outcome --  goal ongoing       Goal: Dynamic Sitting Balance Goal LTG- PT  Outcome: Ongoing (interventions implemented as appropriate)    11/23/17 1102 12/01/17 1347   Dynamic Sitting Balance PT LTG   Dynamic Sitting Balance PT LTG, Date Established 11/23/17 --    Dynamic Sitting Balance  PT LTG, Time to Achieve 2 wks --    Dynamic Sitting Balance PT LTG, [place] Level minimum assist (75% patient effort) --    Dynamic Sitting Balance PT LTG, Outcome --  goal ongoing

## 2017-12-01 NOTE — THERAPY TREATMENT NOTE
Acute Care - Physical Therapy Treatment Note  [facility]     Patient Name: Magdi Arriaga  : 1945  MRN: [id number]  Today's Date: 2017  Onset of Illness/Injury or Date of Surgery Date: 17  Date of Referral to PT: 17  Referring Physician: Earl    Admit Date: 2017    Visit Dx:    ICD-10-CM ICD-9-CM   1. Pharyngeal dysphagia R13.13 787.23   2. Small bowel obstruction K56.609 560.9   3. Lower abdominal pain R10.30 789.09   4. Leukocytosis, unspecified type D72.829 288.60   5. Hematemesis with nausea K92.0 578.0     787.02   6. Impaired mobility and ADLs Z74.09 799.89   7. 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, followed by [facility]   • Traumatic SDH, s/p right craniotomy on 2017   • Small bowel obstruction (S/P Exp lap for acute SBO 17)   • Altered mental status post op   • PVC (premature ventricular contraction)   • NSVT (nonsustained ventricular tachycardia)               Adult Rehabilitation Note       17 1320 17 1130 17 1430    Rehab Assessment/Intervention    Discipline physical therapist  -LS speech language pathologist  -SM speech language pathologist  -LSA    Document Type therapy note (daily note)  -LS therapy note (daily note)  -SM therapy note  (daily note)  -LSA    Subjective Information agree to therapy;no complaints  -LS agree to therapy  -SM no complaints;agree to therapy  -LSA    Patient Effort, Rehab Treatment good  -LS adequate  -SM     Symptoms Noted Comment significant lethary initially today; vc's for opening eyes and attending to task.   -LS      Precautions/Limitations fall precautions;oxygen therapy device and L/min;other (see comments)   abd incision; L-sided weakness  -LS      Recorded by [LS] Keli Awad, PT [SM] Socorro Massey, MS CCC-SLP [LSA] Maria Guadalupe Guzman, MS CCC-SLP    Vital Signs    Pre Systolic BP Rehab 134  -LS      Pre Treatment Diastolic BP 75  -LS      Post Systolic BP Rehab --   with OT at end of session  -LS      Pretreatment Heart Rate (beats/min) 62  -LS      Pre SpO2 (%) 99  -LS      O2 Delivery Pre Treatment supplemental O2  -LS      Pre Patient Position Sitting  -LS      Intra Patient Position Standing  -LS      Post Patient Position Sitting  -LS      Recorded by [LS] Keli Awad, PT      Pain Assessment    Pain Assessment 0-10  -LS      Pain Score 0  -LS      Post Pain Score 0  -LS      Recorded by [LS] Keli Awad, PT      Cognitive Assessment/Intervention    Current Cognitive/Communication Assessment functional  -LS      Orientation Status oriented to;person;place;required verbal cueing (specifiy in comments)   cues for month  -LS      Follows Commands/Answers Questions able to follow single-step instructions;needs cueing;needs increased time;needs repetition;50% of the time;75% of the time   improved with increased alertness  -LS      Personal Safety moderate impairment;decreased awareness, need for assist;decreased awareness, need for safety;decreased insight to deficits  -LS      Personal Safety Interventions fall prevention program maintained;gait belt;nonskid shoes/slippers when out of bed  -LS      Recorded by [LS] Keli Awad, PT      Dysphagia Treatment Objectives and Progress    Dysphagia  Treatment Objectives  Improve oral skills  -SM     To improve timing of pharyngeal response, patient will:  Swallow in timely way using three second prep;Swallow in timely way using super-supraglottic swallow;80%;with consistent cues  -SM Swallow in timely way using three second prep;Swallow in timely way using super-supraglottic swallow;80%;with consistent cues  -LSA    Timing of Pharyngeal Response Progress  0%;continue to adress  -SM 10%;with consistent cues;continue to adress  -LSA    To improve closure at entrance to airway, patient will:  Complete super-supraglottic swallow;80%;with consistent cues  -SM Complete super-supraglottic swallow;80%;with consistent cues  -LSA    Closure at Entrance to Airway Progress  continue to adress  -SM continue to adress  -LSA    To improve hyolaryngeal excursion, patient will:  Complete chin tuck against resistance (comment number of repetitions);80%;with consistent cues   30 sec reps x4  -SM Complete chin tuck against resistance (comment number of repetitions);80%;with consistent cues  -LSA    Hyolaryngeal Excursion Progress  20%;with consistent cues;continue to adress  -SM 20%;with consistent cues  -LSA    To improve tongue base & pharyngeal wall squeeze, patient will:  Complete effortful swallow;Complete tongue hold swallow;80%;with consistent cues  -SM Complete effortful swallow;Complete tongue hold swallow;80%;with consistent cues  -LSA    Tongue Base/Pharyngeal Wall Squeeze Progress  20%;with consistent cues;continue to adress  -SM 20%;with consistent cues  -LSA    Recorded by  [SM] Socorro Massey, MS CCC-SLP [LSA] Maria Guadalupe Guzman MS CCC-SLP    Improve oral skills    To Improve Oral Skills, patient will:  Increase tongue tip elevation;80%;with consistent cues  -SM     Status: Improve Oral Skills  New  -SM     Oral Skills Progress  20%;with consistent cues;continue to adress  -SM     Recorded by  [SM] Socorro Massey, MS CCC-SLP     Improve timing of pharyngeal  response    Status: Improve timing of pharyngeal response  Progress slower than expected  -SM Progress slower than expected  -LSA    Comments: Improve timing of pharyngeal response  Too fatigued to trial SSG. Decreased secretion management, did not trials 3-sec prep.  -SM     Recorded by  [SM] Socorro Massey, MS CCC-SLP [LSA] Maria Guadalupe Guzman MS CCC-SLP    Improve closure at entrance to airway    Status: Improve closure at entrance to airway  Progress slower than expected  -SM Progress slower than expected  -LSA    Recorded by  [SM] Socorro Massey, MS CCC-SLP [LSA] Maria Guadalupe Guzman MS CCC-SLP    Improve hyolaryngeal excursion    Status: Improve hyolaryngeal excursion  Progress slower than expected  -SM Progress slower than expected  -LSA    Comments: Improve hyolaryngeal excursion   Max cues needed and limited ROM  -LSA    Recorded by  [SM] Socorro Massey MS CCC-SLP [LSA] Maria Guadalupe Guzman MS CCC-SLP    Improve tongue base & pharyngeal wall squeeze    Status: Improve tongue base & pharyngeal wall squeeze  Progress slower than expected  -SM Progress slower than expected  -LSA    Comments: Improve tongue base & pharyngeal wall squeeze  Focused on just effortful swallow  -SM Max cues needed.  -LSA    Recorded by  [SM] Socorro Massey MS CCC-SLP [LSA] Maria Guadalupe Guzman, MS CCC-SLP    Dysphagia Other 1    Dysphagia Other 1 Objective  Tolerate ice chip and H2O trials without s/s aspiration with 50% accuracy and cues  -SM     Status: Dysphagia Other 1  New  -SM     Dysphagia Other 1 Progress  0%;with consistent cues;continue to address  -SM     Comments: Dysphagia Other 1  Wet secretions at baseline, increased with limited ice chip trial. Cough thogh difficulty getting to mouth. RN reports frequentr suctioning  -SM     Recorded by  [SM] Socorro Massey, MS CCC-SLP     Bed Mobility, Assessment/Treatment    Bed Mobility, Comment UIC  -LS      Recorded by [LS] Keli Awad, PT      Transfer  Assessment/Treatment    Transfers, Sit-Stand [place] dependent (less than 25% patient effort);2 person assist required;verbal cues required  -LS      Transfers, Stand-Sit [place] dependent (less than 25% patient effort);2 person assist required;verbal cues required  -LS      Transfer, Impairments strength decreased;impaired balance  -LS      Transfer, Comment Attempted x2 from recliner; able to clear hips but unable to achieve fully upright posture. VC's for hand placement and weightshifting forward to initiate.   -LS      Recorded by [LS] Keli Awad, PT      Gait Assessment/Treatment    Gait, [place] Level not appropriate to assess  -LS      Recorded by [LS] Keli Awad PT      Balance Skills Training    Sitting-Level of Assistance Minimum assistance   L lateral lean; vc's for leaning on R elbow  -LS      Sitting-Balance Support Right upper extremity supported;Left upper extremity supported;Feet supported  -LS      Sitting-Balance Activities Lateral lean;Forward lean  -LS      Sitting # of Minutes 8  -LS      Standing-Level of Assistance Dependent;x2  -LS      Static Standing Balance Support Right upper extremity supported;Left upper extremity supported  -LS      Recorded by [LS] Keli Awad, PT      Therapy Exercises    Right Lower Extremity AAROM:;10 reps;sitting;ankle pumps/circles;LAQ;hip flexion  -LS      Left Lower Extremity PROM:;10 reps;sitting;calf stretch;hamstring stretch;ankle pumps/circles;hip flexion;LAQ  -LS      Recorded by [LS] Keli Awad, PT      Positioning and Restraints    Pre-Treatment Position sitting in chair/recliner  -LS      Post Treatment Position chair  -LS      In Chair notified nsg;reclined;call light within reach;encouraged to call for assist;exit alarm on;with family/caregiver;RUE elevated;LUE elevated;with OT;on mechanical lift sling;waffle cushion;legs elevated;heels elevated;L multipodus;R waffle boot  -LS      Recorded by [LS] Keli Awad, PT         11/29/17 1439 11/29/17 1434 11/29/17 1130    Rehab Assessment/Intervention    Discipline physical therapist  -LS occupational therapist  -CL speech language pathologist  -LSA    Document Type therapy note (daily note)  -LS therapy note (daily note)  -CL     Subjective Information agree to therapy;no complaints  -LS agree to therapy;no complaints  -CL agree to therapy  -LSA    Patient Effort, Rehab Treatment good  -LS good  -CL     Symptoms Noted During/After Treatment fatigue  -LS fatigue  -CL     Precautions/Limitations fall precautions;other (see comments);oxygen therapy device and L/min   abd incision; L-sided weakness  -LS fall precautions;other (see comments)   abd incision, L sided weakness  -CL     Recorded by [LS] Keli Awad, PT [CL] Margarita Velasco OT [LSA] Maria Guadalupe Guzman MS CCC-SLP    Vital Signs    Pre Systolic BP Rehab 152  -  -CL     Pre Treatment Diastolic BP 86  -LS 86  -CL     Post Systolic BP Rehab 145  -  -CL     Post Treatment Diastolic BP 83  -LS 83  -CL     Pretreatment Heart Rate (beats/min) 74  -LS 74  -CL     Posttreatment Heart Rate (beats/min) 67  -LS 67  -CL     Pre SpO2 (%)  96  -CL     O2 Delivery Pre Treatment supplemental O2  -LS supplemental O2  -CL     Post SpO2 (%) 97  -LS 97  -CL     O2 Delivery Post Treatment supplemental O2  -LS supplemental O2  -CL     Pre Patient Position Sitting  -LS Sitting  -CL     Intra Patient Position Standing  -LS Standing  -CL     Post Patient Position Sitting  -LS Sitting  -CL     Recorded by [LS] Keli Awad, PT [CL] Margarita Velasco OT     Pain Assessment    Pain Assessment 0-10  -LS No/denies pain  -CL     Pain Score 0  -LS 0  -CL     Post Pain Score 0  -LS 0  -CL     Recorded by [LS] Keli Awad, PT [CL] Margarita Velasco OT     Cognitive Assessment/Intervention    Current Cognitive/Communication Assessment functional  -LS functional  -CL     Orientation Status oriented to;person;situation  -LS oriented to;person;situation  -CL     Follows  Commands/Answers Questions able to follow single-step instructions;75% of the time;needs cueing;needs increased time;needs repetition   decreased processing  -LS 75% of the time;needs cueing;needs increased time;needs repetition  -CL     Personal Safety moderate impairment;decreased awareness, need for assist;decreased awareness, need for safety;decreased insight to deficits  -LS moderate impairment;decreased awareness, need for assist;decreased awareness, need for safety  -CL     Personal Safety Interventions fall prevention program maintained;gait belt;nonskid shoes/slippers when out of bed  -LS fall prevention program maintained;gait belt;nonskid shoes/slippers when out of bed  -CL     Recorded by [LS] Keli Awad, PT [CL] Margarita Velasco OT     Dysphagia Treatment Objectives and Progress    To improve closure at entrance to airway, patient will:   Complete super-supraglottic swallow;80%;with consistent cues  -LSA    Closure at Entrance to Airway Progress   continue to adress   did not address 2' lethragy  -LSA    To improve hyolaryngeal excursion, patient will:   Complete chin tuck against resistance (comment number of repetitions);80%;with consistent cues  -LSA    Hyolaryngeal Excursion Progress   10%;20%;with consistent cues  -LSA    To improve tongue base & pharyngeal wall squeeze, patient will:   Complete effortful swallow;Complete tongue hold swallow;80%;with consistent cues  -LSA    Tongue Base/Pharyngeal Wall Squeeze Progress   20%;with consistent cues  -LSA    Recorded by   [LSA] Maria Guadalupe Guzman MS CCC-SLP    Improve closure at entrance to airway    Status: Improve closure at entrance to airway   Progress slower than expected  -LSA    Recorded by   [LSA] Maria Guadalupe Guzman MS CCC-SLP    Improve hyolaryngeal excursion    Status: Improve hyolaryngeal excursion   Progress slower than expected  -LSA    Comments: Improve hyolaryngeal excursion   Max cues needed to complete task.  -LSA    Recorded by   [LSA]  Maria Guadalupe Guzman MS CCC-SLP    Improve tongue base & pharyngeal wall squeeze    Status: Improve tongue base & pharyngeal wall squeeze   Progress slower than expected  -LSA    Comments: Improve tongue base & pharyngeal wall squeeze   Max cures needed.  -LSA    Recorded by   [LSA] Maria Guadalupe Guzman MS CCC-SLP    Bed Mobility, Assessment/Treatment    Bed Mobility, Comment Sitting EOB upon arrival with NSG.   -LS Pt received sitting EOB w/ nursing.   -CL     Recorded by [LS] Keli Awad, PT [CL] Margarita Velasco OT     Transfer Assessment/Treatment    Transfers, Bed-Chair [place] maximum assist (25% patient effort);2 person assist required;verbal cues required  -LS maximum assist (25% patient effort);2 person assist required;verbal cues required  -CL     Transfers, Sit-Stand [place] maximum assist (25% patient effort);2 person assist required;verbal cues required  -LS maximum assist (25% patient effort);2 person assist required;verbal cues required  -CL     Transfers, Stand-Sit [place] maximum assist (25% patient effort);2 person assist required;verbal cues required  -LS maximum assist (25% patient effort);2 person assist required;verbal cues required  -CL     Transfer, Impairments strength decreased;impaired balance  -LS      Transfer, Comment STS performed from EOB for pivot to recliner. Pt unable to achieve fully upright posture during transfer.   -LS Pt stood from EOB w/ OT/PT on either side of pt w/ B feet/knees blocked. Pt stood ~75% upright and performed bent-pivot to chair.   -CL     Recorded by [LS] Keli Awad, PT [CL] Margarita Velasco OT     Gait Assessment/Treatment    Gait, [place] Level not appropriate to assess  -LS      Recorded by [LS] Keli Awad, PT      Functional Mobility    Functional Mobility- Ind. Level  not appropriate to assess;unable to perform  -CL     Recorded by  [CL] Margarita Velasco OT     Motor Skills/Interventions    Additional Documentation Balance Skills Training (Group)   -LS Balance Skills Training (Group)  -CL     Recorded by [LS] Keli Awad, PT [CL] Margarita Velasco OT     Balance Skills Training    Sitting-Level of Assistance Maximum assistance   able to progress to moments of min A  -LS Maximum assistance   moments of Min A  -CL     Sitting-Balance Support Right upper extremity supported;Left upper extremity supported  -LS Right upper extremity supported;Left upper extremity supported;Feet supported  -CL     Sitting-Balance Activities Lateral lean;Trunk control activities  -LS Forward lean;Lateral lean;Reaching for objects;Right UE Weight Bearing;Left UE Weight Bearing;Trunk control activities  -CL     Standing-Level of Assistance Maximum assistance;x2  -LS Maximum assistance;x2  -CL     Static Standing Balance Support assistive device;Left upper extremity supported;Right upper extremity supported  -LS Right upper extremity supported;Left upper extremity supported  -CL     Standing-Balance Activities  Weight Shift A-P;Weight Shift R-L  -CL     Recorded by [LS] eKli Awad, PT [CL] Margarita Velasco OT     Therapy Exercises    Right Lower Extremity AAROM:;10 reps;sitting;ankle pumps/circles;hamstring stretch;hip flexion;LAQ  -LS      Left Lower Extremity PROM:;10 reps;sitting;ankle pumps/circles;hamstring stretch;LAQ;hip flexion  -LS      Bilateral Upper Extremity  10 reps;AROM:;elbow flexion/extension;hand pumps;shoulder horizontal abd/add;shoulder protraction/retraction  -CL     Recorded by [LS] Keli Awad, PT [CL] Margarita Velasco OT     Positioning and Restraints    Pre-Treatment Position in bed  -LS in bed  -CL     Post Treatment Position chair  -LS chair  -CL     In Chair notified nsg;reclined;call light within reach;encouraged to call for assist;exit alarm on;with family/caregiver;RUE elevated;LUE elevated;waffle cushion;on mechanical lift sling;legs elevated;heels elevated;waffle boot/both  -LS notified nsg;reclined;call light within reach;encouraged to call for assist;exit  alarm on;with family/caregiver;LUE elevated;waffle cushion;on mechanical lift sling;legs elevated;heels elevated;waffle boot/both  -CL     Recorded by [LS] Keli Awad, PT [CL] Margarita Velasco, OT       User Key  (r) = Recorded By, (t) = Taken By, (c) = Cosigned By    Initials Name Effective Dates     Socorro RASHMI Massey, MS CCC-SLP 06/22/15 -     LSA Maria Guadalupe Guzman, MS [facility]-SLP 06/22/15 -     LS Keli Awad, PT 06/19/15 -     CL Margarita Velasco, OT 06/08/16 -                 IP PT Goals       12/01/17 1347 11/29/17 1528 11/27/17 1608    Bed Mobility PT LTG    Bed Mobility PT LTG, Outcome goal ongoing  -LS goal ongoing  -LS goal ongoing  -LS    Transfer Training PT LTG    Transfer Training PT LTG, Outcome goal ongoing  -LS goal ongoing  -LS goal ongoing  -LS    Dynamic Sitting Balance PT LTG    Dynamic Sitting Balance PT LTG, Outcome goal ongoing  -LS goal ongoing  -LS goal ongoing  -LS      11/25/17 1519 11/23/17 1102       Bed Mobility PT LTG    Bed Mobility PT LTG, Date Established  11/23/17  -LSA     Bed Mobility PT LTG, Time to Achieve  2 wks  -LSA     Bed Mobility PT LTG, Activity Type  supine to sit/sit to supine  -LSA     Bed Mobility PT LTG, [place] Level  moderate assist (50% patient effort)  -LSA     Bed Mobility PT LTG, Outcome goal ongoing  -KR goal ongoing  -LSA     Transfer Training PT LTG    Transfer Training PT LTG, Date Established 11/25/17  -KR 11/23/17  -LSA     Transfer Training PT LTG, Time to Achieve 2 wks  -KR 2 wks  -LSA     Transfer Training PT LTG, Activity Type sit to stand/stand to sit  -KR sit to stand/stand to sit  -LSA     Transfer Training PT LTG, [place] Level moderate assist (50% patient effort);2 person assist required  -KR maximum assist (25% patient effort);2 person assist required  -LSA     Transfer Training PT  LTG, Date Goal Reviewed 11/25/17  -KR      Transfer Training PT LTG, Outcome goal revised  -KR goal ongoing  -LSA     Transfer Training PT LTG, Reason Goal Not  Met goals revised this date  -KR      Dynamic Sitting Balance PT LTG    Dynamic Sitting Balance PT LTG, Date Established  11/23/17  -LSA     Dynamic Sitting Balance PT LTG, Time to Achieve  2 wks  -LSA     Dynamic Sitting Balance PT LTG, [place] Level  minimum assist (75% patient effort)  -LSA     Dynamic Sitting Balance PT LTG, Outcome goal ongoing  -KR goal ongoing  -LSA       User Key  (r) = Recorded By, (t) = Taken By, (c) = Cosigned By    Initials Name Provider Type    LSA Anna Crawford, PT Physical Therapist    LS Keli Awad, PT Physical Therapist    KR Sri Mejía, PT Physical Therapist          Physical Therapy Education     Title: PT OT SLP Therapies (Active)     Topic: Physical Therapy (Active)     Point: Mobility training (Active)    Learning Progress Summary    Learner Readiness Method Response Comment Documented by Status   Patient Acceptance E,D NR Wife verbalized understanding of appropriate technique in assisting pt with ROM and ther ex.  12/01/17 1346 Active    Acceptance E,D NR   11/29/17 1528 Active    Acceptance E,D NR   11/27/17 1608 Active    Acceptance E NR  KR 11/25/17 1519 Active   Family Acceptance E,D NR   11/29/17 1528 Active   Significant Other Acceptance E,D NR Wife verbalized understanding of appropriate technique in assisting pt with ROM and ther ex.  12/01/17 1346 Active               Point: Home exercise program (Active)    Learning Progress Summary    Learner Readiness Method Response Comment Documented by Status   Patient Acceptance E,D NR Wife verbalized understanding of appropriate technique in assisting pt with ROM and ther ex.  12/01/17 1346 Active    Acceptance E,D NR   11/29/17 1528 Active    Acceptance E,D NR   11/27/17 1608 Active    Acceptance E VU,NR Encouraged pt and spouse to work on quad sets. Also reviewed w/spouse to keep L heel elevated off of surface to help prevent skin breakdown. LS1 11/23/17 1105 Done   Family Acceptance E,D NR   LS 11/29/17 1528 Active    Acceptance E VU,NR Encouraged pt and spouse to work on quad sets. Also reviewed w/spouse to keep L heel elevated off of surface to help prevent skin breakdown. LS1 11/23/17 1105 Done   Significant Other Acceptance E,D NR Wife verbalized understanding of appropriate technique in assisting pt with ROM and ther ex. LS 12/01/17 1346 Active               Point: Body mechanics (Active)    Learning Progress Summary    Learner Readiness Method Response Comment Documented by Status   Patient Acceptance E,D NR Wife verbalized understanding of appropriate technique in assisting pt with ROM and ther ex. LS 12/01/17 1346 Active    Acceptance E,D NR  LS 11/29/17 1528 Active    Acceptance E,D NR  LS 11/27/17 1608 Active    Acceptance E NR  KR 11/25/17 1519 Active   Family Acceptance E,D NR  LS 11/29/17 1528 Active   Significant Other Acceptance E,D NR Wife verbalized understanding of appropriate technique in assisting pt with ROM and ther ex. LS 12/01/17 1346 Active               Point: Precautions (Active)    Learning Progress Summary    Learner Readiness Method Response Comment Documented by Status   Patient Acceptance E,D NR Wife verbalized understanding of appropriate technique in assisting pt with ROM and ther ex. LS 12/01/17 1346 Active    Acceptance E,D NR  LS 11/29/17 1528 Active    Acceptance E,D NR  LS 11/27/17 1608 Active    Acceptance E NR  KR 11/25/17 1519 Active   Family Acceptance E,D NR  LS 11/29/17 1528 Active   Significant Other Acceptance E,D NR Wife verbalized understanding of appropriate technique in assisting pt with ROM and ther ex. LS 12/01/17 1346 Active                      User Key     Initials Effective Dates Name Provider Type Discipline    LS1 06/19/15 -  Anna Crawford, PT Physical Therapist PT    LS 06/19/15 -  Keli Awad, PT Physical Therapist PT    KR 09/25/17 -  Sri Mejía, PT Physical Therapist PT                    PT Recommendation and Plan  Anticipated  Discharge Disposition: inpatient rehabilitation facility  Planned Therapy Interventions: balance training, bed mobility training, patient/family education, home exercise program, strengthening, transfer training  PT Frequency: daily, per priority policy  Plan of Care Review  Plan Of Care Reviewed With: patient, spouse  Progress: progress toward functional goals is gradual  Outcome Summary/Follow up Plan: Pt with improved indep re: sitting balance today; req'd increased A for STS attempts. Limited today by initial significant lethargy with constant vc's for opening eyes and attending to task. Will cont to progress PT POC. Wife verbalized intention of appropriate technique in assisting with ROM/ ther ex of LEs.           Outcome Measures       12/01/17 1320 11/29/17 1439 11/29/17 1434    How much help from another person do you currently need...    Turning from your back to your side while in flat bed without using bedrails? 2  -LS 2  -LS     Moving from lying on back to sitting on the side of a flat bed without bedrails? 2  -LS 2  -LS     Moving to and from a bed to a chair (including a wheelchair)? 1  -LS 2  -LS     Standing up from a chair using your arms (e.g., wheelchair, bedside chair)? 1  -LS 2  -LS     Climbing 3-5 steps with a railing? 1  -LS 1  -LS     To walk in hospital room? 1  -LS 1  -LS     AM-PAC 6 Clicks Score 8  -LS 10  -LS     How much help from another is currently needed...    Putting on and taking off regular lower body clothing?   1  -CL    Bathing (including washing, rinsing, and drying)   2  -CL    Toileting (which includes using toilet bed pan or urinal)   1  -CL    Putting on and taking off regular upper body clothing   2  -CL    Taking care of personal grooming (such as brushing teeth)   2  -CL    Eating meals   1  -CL    Score   9  -CL    Modified Aida Scale    Modified [place] Scale   4 - Moderately severe disability.  Unable to walk without assistance, and unable to attend to own bodily  needs without assistance.  -CL    Functional Assessment    Outcome Measure Options AM-PAC 6 Clicks Basic Mobility (PT)  -LS AM-PAC 6 Clicks Basic Mobility (PT)  -LS AM-PAC 6 Clicks Daily Activity (OT)  -CL      User Key  (r) = Recorded By, (t) = Taken By, (c) = Cosigned By    Initials Name Provider Type    LS Keli Awad, PT Physical Therapist    CL Margarita Velasco, OT Occupational Therapist           Time Calculation:         PT Charges       12/01/17 1349          Time Calculation    Start Time 1320  -      PT Received On 12/01/17  -      PT Goal Re-Cert Due Date 12/03/17  -      Time Calculation- PT    Total Timed Code Minutes- PT 17 minute(s)  -        User Key  (r) = Recorded By, (t) = Taken By, (c) = Cosigned By    Initials Name Provider Type    MAMIE Awad, PT Physical Therapist          Therapy Charges for Today     Code Description Service Date Service Provider Modifiers Qty    59637469202 HC PT THER PROC EA 15 MIN 12/1/2017 Keli Awad, PT GP 1          PT G-Codes  Outcome Measure Options: AM-PAC 6 Clicks Basic Mobility (PT)    Keli Awad, PT  12/1/2017

## 2017-12-01 NOTE — PROGRESS NOTES
Multidisciplinary Rounds    Time: 20min  Patient Name: Magdi Arriaga  Date of Encounter: 12/01/17 1:58 PM  MRN: [id number]  Admission date: 11/20/2017      Reason for visit: MDR. RD to continue to follow per protocol.     Additional information obtained during MDR: Per RN pt more confused and agitated today. Pt continuing to tolerate EN regimen. Pt continuing to require oral suctioning Q2hrs.     Current diet: Diet, Tube Feeding Tube Feeding Formula: Peptamen 1.5 (Peptide Based, Calorically Dense); PO Tray: Patient is NPO (No Tray) Goal rate 75mL/hr +1 packet prostat, daily GV: 1.2L    Recommendations include continuing current nutrition support regimen.    Intervention:  Follow treatment plan  Care plan reviewed    Follow up:   Per protocol      Keiko Burroughs RDN, LD  1:58 PM

## 2017-12-01 NOTE — PLAN OF CARE
Problem: Patient Care Overview (Adult)  Goal: Plan of Care Review  Outcome: Ongoing (interventions implemented as appropriate)  Goal: Adult Individualization and Mutuality  Outcome: Ongoing (interventions implemented as appropriate)  Goal: Discharge Needs Assessment  Outcome: Ongoing (interventions implemented as appropriate)    Problem: Bowel Resection (Adult)  Goal: Signs and Symptoms of Listed Potential Problems Will be Absent or Manageable (Bowel Resection)  Outcome: Ongoing (interventions implemented as appropriate)    Problem: Pressure Ulcer Risk (Humza Scale) (Adult,Obstetrics,Pediatric)  Goal: Skin Integrity  Outcome: Ongoing (interventions implemented as appropriate)

## 2017-12-01 NOTE — PROGRESS NOTES
"Magdi Arriaga  1945  [id number]    Surgery Progress Note    Date of visit: 12/1/2017    Subjective:up in chair  Sleeping  Denies any complaints  Tolerating TF  BM with no blood    Objective:    /83  Pulse 64  Temp 98.1 °F (36.7 °C) (Axillary)   Resp 20  Ht 67.99\" (172.7 cm)  Wt 173 lb (78.5 kg)  SpO2 98%  BMI 26.3 kg/m2    Intake/Output Summary (Last 24 hours) at 12/01/17 1030  Last data filed at 12/01/17 0600   Gross per 24 hour   Intake             2191 ml   Output                0 ml   Net             2191 ml         L: normal air entry  Abd: soft and non tender. Incision healing well      LABS:      Results from last 7 days  Lab Units 12/01/17  0721   WBC 10*3/mm3 13.49*   HEMOGLOBIN g/dL 9.5*   HEMATOCRIT % 29.6*   PLATELETS 10*3/mm3 284       Results from last 7 days  Lab Units 12/01/17  0721  11/28/17  0309   SODIUM mmol/L 145  < > 149*   POTASSIUM mmol/L 4.0  < > 3.6  3.6   CHLORIDE mmol/L 111*  < > 119*   CO2 mmol/L 32.0*  < > 27.0   BUN mg/dL 43*  < > 44*   CREATININE mg/dL 1.00  < > 1.10   CALCIUM mg/dL 8.8  < > 8.8   BILIRUBIN mg/dL  --   --  1.5*   ALK PHOS U/L  --   --  65   ALT (SGPT) U/L  --   --  13   AST (SGOT) U/L  --   --  12   GLUCOSE mg/dL 112*  < > 132*   < > = values in this interval not displayed.    Results from last 7 days  Lab Units 12/01/17  0721   SODIUM mmol/L 145   POTASSIUM mmol/L 4.0   CHLORIDE mmol/L 111*   CO2 mmol/L 32.0*   BUN mg/dL 43*   CREATININE mg/dL 1.00   GLUCOSE mg/dL 112*   CALCIUM mg/dL 8.8       Lab Results  Lab Value Date/Time   LIPASE 30 11/20/2017 1236         Assessment/ Plan: stable post surgical course  Sleeping most of the day  CT scan of head done  Medications adjusted and neurology is following  Continue with current management    Problem List Items Addressed This Visit     Small bowel obstruction (S/P Exp lap for acute SBO 11/20/17)    Relevant Orders    Tissue Pathology Exam - Tissue, Small Intestine (Completed)      Other Visit " Diagnoses     Pharyngeal dysphagia    -  Primary    Lower abdominal pain        Leukocytosis, unspecified type        Hematemesis with nausea                Cj Reyes MD  12/1/2017  10:30 AM

## 2017-12-01 NOTE — PLAN OF CARE
Problem: Patient Care Overview (Adult)  Goal: Plan of Care Review  Outcome: Ongoing (interventions implemented as appropriate)    11/30/17 4601   Outcome Evaluation   Outcome Summary/Follow up Plan Up to chair since 10:00, TF increased to 75ml/hr, paxil DC'd per neurology for drowsiness and wellbutrin to be started tomorrow instead. VSS. Oral secretions continue to require subglottal suctioning d/t weak cough.   Coping/Psychosocial Response Interventions   Plan Of Care Reviewed With spouse;patient   Patient Care Overview   Progress progress toward functional goals is gradual       Goal: Adult Individualization and Mutuality  Outcome: Ongoing (interventions implemented as appropriate)  Goal: Discharge Needs Assessment  Outcome: Ongoing (interventions implemented as appropriate)    Problem: Bowel Resection (Adult)  Goal: Signs and Symptoms of Listed Potential Problems Will be Absent or Manageable (Bowel Resection)  Outcome: Ongoing (interventions implemented as appropriate)    Problem: Pressure Ulcer Risk (Humza Scale) (Adult,Obstetrics,Pediatric)  Goal: Skin Integrity  Outcome: Ongoing (interventions implemented as appropriate)    Problem: Gastrointestinal Bleeding (Adult)  Goal: Signs and Symptoms of Listed Potential Problems Will be Absent or Manageable (Gastrointestinal Bleeding)  Outcome: Outcome(s) achieved Date Met:  11/30/17

## 2017-12-01 NOTE — PROGRESS NOTES
Critical Care Note     LOS: 11 days   Patient Care Team:  Colton Dickens MD as PCP - General (Internal Medicine)  Kvng Stauffer MD as Consulting Physician (Cardiology)    Chief Complaint/Reason for visit:    Chief Complaint   Patient presents with   • Vomiting   SBO s/p surgery, post op confusion    Subjective        72-year-old male admitted to [facility] on 11/20 in transfer from [facility] with abdominal pain, nausea, and vomiting.  He was found to have a small bowel obstruction and underwent resection of a necrotic segment of the proximal jejunum.  He has a recent history of craniotomy due to subdural hematoma. There was some question of new CVA but this was ruled out and it was decided to avoid anticoagulants.  He also has a history of congestive heart failure, chronic kidney disease, CABG ×5 in April of this year, as well as COPD.  He was transferred to the ICU postoperatively and has progressed slowly. He has had several bloody stools and a total of 3 units of packed red blood cells. His most recent hemoglobin is now 10.4 improved from 7.4. Corpak was placed under fluoroscopy and tube feeding initiated  He has a known history of cardiomyopathy. Entresto and Coreg were restarted. . He had some runs of atrial fibrillation, and nonsustained ventricular tachycardia. Cardiology evaluated and reported that his EF was now greater than 35% which is improved. They recommended beta blockers and electrolyte replacement and maintaining a hemoglobin greater than 8.  Nursing reports that he is silently aspirating. Corpak placed under fluoroscopy. Initially started on a low rate and he is tolerating. Speech therapy is working with him. They note very weak orally, with difficulty just protruding his tongue.    Interval History:   Significantly more obtunded this morning. Will not even awaken and verbalize or follow commands. Continues to require aggressive suctioning to clear  "secretions.      Review of Systems:    All systems were reviewed and negative except as noted in subjective.    Medical history, surgical history, social history, family history reviewed    Objective     Intake/Output:    Intake/Output Summary (Last 24 hours) at 12/01/17 1636  Last data filed at 12/01/17 0600   Gross per 24 hour   Intake             1900 ml   Output                0 ml   Net             1900 ml       Nutrition:  Diet, Tube Feeding Tube Feeding Formula: Peptamen 1.5 (Peptide Based, Calorically Dense); PO Tray: Patient is NPO (No Tray)    Infusions:    lactated ringers 10 mL/hr Last Rate: 10 mL/hr (11/24/17 1203)          Telemetry:  Sinus Rhythm: normal sinus rhythm  Atrial Rhythm: atrial fibrillation (intermittent bursts of afib)       Vital Signs  Blood pressure 129/77, pulse 71, temperature 98.5 °F (36.9 °C), temperature source Oral, resp. rate 16, height 67.99\" (172.7 cm), weight 173 lb (78.5 kg), SpO2 97 %.    Physical Exam:  General Appearance:  Frail appearing older gentleman in no acute distress.   Head:   Right craniotomy site with sutures inta   Eyes:          No jaundice. Pupils equal reactive to light    Ears:     Throat: Oral mucosa moist Nasal Corpak in position    Neck: Trachea midline, no palpable thyroid    Back:      Lungs:   Breath sounds are bilateral, few rhonchi anteriorly     Heart:  regular, normal S1, S2    Abdomen:   Midline incision intact. Few bowel sounds present, nondistended    Rectal:   Deferred   Extremities: No pitting edema, Extremely poor musculature of the lower extremities    Pulses:    Skin: Warm and dry    Lymph nodes:    Neurologic: Poorly responsive, not following commands, generalized weakness       Results Review:     I reviewed the patient's new clinical results.     Results from last 7 days  Lab Units 12/01/17  0721 11/30/17  0451 11/29/17  0308  11/28/17  0309  11/25/17  1746   SODIUM mmol/L 145 148* 152*  --  149*  < > 146   POTASSIUM mmol/L 4.0 3.7 3.8 "  < > 3.6  3.6  < > 3.2*   CHLORIDE mmol/L 111* 114* 119*  --  119*  < > 114*   CO2 mmol/L 32.0* 24.0 31.0  --  27.0  < > 27.0   BUN mg/dL 43* 49* 48*  --  44*  < > 37*   CREATININE mg/dL 1.00 1.20 1.20  --  1.10  < > 1.40*   CALCIUM mg/dL 8.8 9.1 8.8  --  8.8  < > 8.5*   BILIRUBIN mg/dL  --   --   --   --  1.5*  --  0.5   ALK PHOS U/L  --   --   --   --  65  --  67   ALT (SGPT) U/L  --   --   --   --  13  --  12   AST (SGOT) U/L  --   --   --   --  12  --  10   GLUCOSE mg/dL 112* 120* 120*  --  132*  < > 92   < > = values in this interval not displayed.    Results from last 7 days  Lab Units 12/01/17  0721 11/30/17  0451 11/29/17  0308   WBC 10*3/mm3 13.49* 12.45* 12.20*   HEMOGLOBIN g/dL 9.5* 10.0* 9.4*   HEMATOCRIT % 29.6* 32.0* 29.9*   PLATELETS 10*3/mm3 284 263 256         Lab Results   Component Value Date    BLOODCX No growth at 5 days 11/20/2017     Lab Results   Component Value Date    URINECX 10,000-20,000 CFU/mL Enterococcus faecalis (A) 11/20/2017       I reviewed the patient's new imaging including images and reports.      FINDINGS: Portable chest reveals patient to be status post median  sternotomy. Heart is enlarged. Markings are again seen within the right  upper lobe and left lung base. Degenerative change is seen within the  spine.  No pleural effusion or pneumothorax.          IMPRESSION:  Stable chest as above. Mild increased markings remaining in  the right upper lobe and left lung base.      D:  11/27/2017     IMPRESSION:  1.  Recent right frontoparietal craniotomy with tiny focus of residual   postoperative pneumocephalus.  2.  Mild residual/chronic subdural collection or hematoma along the right   frontoparietal convexity and anterior right parafalcine region measuring up to   5 mm in thickness.  3.  Improving/resolving anterosuperior parafalcine right frontal lobe edema   versus small subacute to remote infarct.  4.  Diffuse atrophy and mild white matter chronic small vessel ischemic  changes.     THIS DOCUMENT HAS BEEN ELECTRONICALLY SIGNED BY GORDY NAIK JR. MD       Imaging      CT Head Without Contrast (Order #[id number]) on 11/29/2017 - Imaging Information       All medications reviewed.     aspirin 81 mg Oral Daily   atorvastatin 10 mg Oral Nightly   buPROPion 75 mg Oral Q12H   carvedilol 6.25 mg Oral Q12H   docusate sodium 100 mg Oral BID   entecavir 0.5 mg Oral Q24H   famotidine 20 mg Oral Daily   ipratropium-albuterol 3 mL Nebulization 4x Daily - RT   memantine 10 mg Oral Q12H   metoclopramide 10 mg Intravenous Q6H   PRO-STAT 1 packet Nasogastric Daily   rivastigmine 1 patch Transdermal Daily   sacubitril-valsartan 1 tablet Oral Q12H   sennosides 10 mL Oral BID         Assessment/Plan     Principal Problem:    Altered mental status post op  Active Problems:    Small bowel obstruction (S/P Exp lap for acute SBO 11/20/17)    Dementia    Hyperlipidemia LDL goal <70    Essential hypertension    COPD (chronic obstructive pulmonary disease)    Coronary artery disease involving coronary bypass graft of native heart without angina pectoris    Ischemic cardiomyopathy    History of DVT (deep vein thrombosis)    CKD (chronic kidney disease), stage III    Acute type B viral hepatitis related to platelet transfusion, followed by [facility]    Traumatic SDH, 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 He has required 3 units of packed red blood cells postoperatively. Bloody stools have resolved. Corpak was placed under fluoroscopy and tube feeding initiated. He is tolerating rate of  75ml/h. He continues to have generalized weakness, L>R.  Mentation markedly worse  today. Repeat CT scan of the head 11/27 revealed no change and a minimal residual subdural hematoma. He remains afebrile. He has no sign of congestive heart failure. He is having some bursts of atrial fibrillation and nonsustained ventricular tachycardia.  His cough is very weak and he needs significant assistance in mobilizing respiratory secretions. Therefore he likely needs to stay in the intensive care unit.  His wife was not present at both times I checked in on him today. I think it is time for a significant discussion about goals and overall prognosis. He has not made any significant progression this week. If he is to continue as a full resuscitation and he likely needs a tracheostomy for secretions and a PEG tube for feeding. Given his traumatic brain injury and debilitated state, it might be time to discuss comfort or least no heroic measures.    PLAN:  Aspirin, statin  Replace electrolytes  Decrease free water  Coreg, Entresto  Entecavir for exposure to Hepatitis B  Continue Exelon, Namenda for dementia  Increase tube feeding  Resume physical therapy, occupational therapy if able.  Currently to lethargic  NS to remove sutures tomorrow  Leave in icu for respiratory secretions  Neurology stopped Paxil and started Welbutrin    VTE Prophylaxis:SCDS    Stress Ulcer Prophylaxis: Jhonatan Almodovar MD  12/01/17  4:36 PM      Time: 35min  I personally provided care to this critically ill patient as documented above.  Critical care time does not include time spent on separately billed procedures.  Non of my critical care time was concurrent with other critical care providers.

## 2017-12-01 NOTE — THERAPY TREATMENT NOTE
Acute Care - Speech Language Pathology   Swallow Treatment Note [facility]     Patient Name: Magdi Arriaga  : 1945  MRN: [id number]  Today's Date: 2017  Onset of Illness/Injury or Date of Surgery Date: 17            Admit Date: 2017    Visit Dx:      ICD-10-CM ICD-9-CM   1. Pharyngeal dysphagia R13.13 787.23   2. Small bowel obstruction K56.609 560.9   3. Lower abdominal pain R10.30 789.09   4. Leukocytosis, unspecified type D72.829 288.60   5. Hematemesis with nausea K92.0 578.0     787.02   6. Impaired mobility and ADLs Z74.09 799.89   7. 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, followed by [facility]   • Traumatic SDH, s/p right craniotomy on 2017   • Small bowel obstruction (S/P Exp lap for acute SBO 17)   • Altered mental status post op   • PVC (premature ventricular contraction)   • NSVT (nonsustained ventricular tachycardia)             Adult Rehabilitation Note       17 1130 17 1430 17 1439    Rehab Assessment/Intervention    Discipline speech language pathologist  -SM speech language pathologist  -LS physical therapist  -LSA    Document Type therapy note (daily note)  -SM therapy note (daily note)  -LS therapy note (daily note)  -LSA    Subjective  Information agree to therapy  -SM no complaints;agree to therapy  -LS agree to therapy;no complaints  -LSA    Patient Effort, Rehab Treatment adequate  -SM  good  -LSA    Symptoms Noted During/After Treatment   fatigue  -LSA    Precautions/Limitations   fall precautions;other (see comments);oxygen therapy device and L/min   abd incision; L-sided weakness  -LSA    Recorded by [SM] Socorro Massey, MS CCC-SLP [LS] Maria Guadalupe Guzman, MS CCC-SLP [LSA] Keli Awad, PT    Vital Signs    Pre Systolic BP Rehab   152  -LSA    Pre Treatment Diastolic BP   86  -LSA    Post Systolic BP Rehab   145  -LSA    Post Treatment Diastolic BP   83  -LSA    Pretreatment Heart Rate (beats/min)   74  -LSA    Posttreatment Heart Rate (beats/min)   67  -LSA    O2 Delivery Pre Treatment   supplemental O2  -LSA    Post SpO2 (%)   97  -LSA    O2 Delivery Post Treatment   supplemental O2  -LSA    Pre Patient Position   Sitting  -LSA    Intra Patient Position   Standing  -LSA    Post Patient Position   Sitting  -LSA    Recorded by   [LSA] Keli Awad, PT    Pain Assessment    Pain Assessment   0-10  -LSA    Pain Score   0  -LSA    Post Pain Score   0  -LSA    Recorded by   [LSA] Keli Awad, PT    Cognitive Assessment/Intervention    Current Cognitive/Communication Assessment   functional  -LSA    Orientation Status   oriented to;person;situation  -LSA    Follows Commands/Answers Questions   able to follow single-step instructions;75% of the time;needs cueing;needs increased time;needs repetition   decreased processing  -LSA    Personal Safety   moderate impairment;decreased awareness, need for assist;decreased awareness, need for safety;decreased insight to deficits  -LSA    Personal Safety Interventions   fall prevention program maintained;gait belt;nonskid shoes/slippers when out of bed  -LSA    Recorded by   [LSA] Keli Awad, PT    Dysphagia Treatment Objectives and Progress    Dysphagia Treatment Objectives Improve oral skills   -SM      To improve timing of pharyngeal response, patient will: Swallow in timely way using three second prep;Swallow in timely way using super-supraglottic swallow;80%;with consistent cues  -SM Swallow in timely way using three second prep;Swallow in timely way using super-supraglottic swallow;80%;with consistent cues  -LS     Timing of Pharyngeal Response Progress 0%;continue to adress  -SM 10%;with consistent cues;continue to adress  -LS     To improve closure at entrance to airway, patient will: Complete super-supraglottic swallow;80%;with consistent cues  -SM Complete super-supraglottic swallow;80%;with consistent cues  -LS     Closure at Entrance to Airway Progress continue to adress  -SM continue to adress  -LS     To improve hyolaryngeal excursion, patient will: Complete chin tuck against resistance (comment number of repetitions);80%;with consistent cues   30 sec reps x4  -SM Complete chin tuck against resistance (comment number of repetitions);80%;with consistent cues  -LS     Hyolaryngeal Excursion Progress 20%;with consistent cues;continue to adress  -SM 20%;with consistent cues  -LS     To improve tongue base & pharyngeal wall squeeze, patient will: Complete effortful swallow;Complete tongue hold swallow;80%;with consistent cues  -SM Complete effortful swallow;Complete tongue hold swallow;80%;with consistent cues  -LS     Tongue Base/Pharyngeal Wall Squeeze Progress 20%;with consistent cues;continue to adress  -SM 20%;with consistent cues  -LS     Recorded by [SM] Socorro Massey, MS CCC-SLP [LS] Maria Guadalupe Guzman MS CCC-SLP     Improve oral skills    To Improve Oral Skills, patient will: Increase tongue tip elevation;80%;with consistent cues  -SM      Status: Improve Oral Skills New  -SM      Oral Skills Progress 20%;with consistent cues;continue to adress  -SM      Recorded by [SM] Socorro Massey, MS CCC-SLP      Improve timing of pharyngeal response    Status: Improve timing of pharyngeal  response Progress slower than expected  -SM Progress slower than expected  -LS     Comments: Improve timing of pharyngeal response Too fatigued to trial SSG. Decreased secretion management, did not trials 3-sec prep.  -SM      Recorded by [SM] Socorro Massey MS CCC-SLP [LS] Maria Guadalupe Guzman MS CCC-SLP     Improve closure at entrance to airway    Status: Improve closure at entrance to airway Progress slower than expected  -SM Progress slower than expected  -LS     Recorded by [SM] Socorro Massey MS CCC-SLP [LS] Maria Guadalupe Guzman MS CCC-SLP     Improve hyolaryngeal excursion    Status: Improve hyolaryngeal excursion Progress slower than expected  -SM Progress slower than expected  -LS     Comments: Improve hyolaryngeal excursion  Max cues needed and limited ROM  -LS     Recorded by [SM] Socorro Massey MS CCC-SLP [LS] Maria Guadalupe Guzman MS CCC-SLP     Improve tongue base & pharyngeal wall squeeze    Status: Improve tongue base & pharyngeal wall squeeze Progress slower than expected  -SM Progress slower than expected  -LS     Comments: Improve tongue base & pharyngeal wall squeeze Focused on just effortful swallow  -SM Max cues needed.  -LS     Recorded by [SM] Socorro Massey MS CCC-SLP [LS] Maria Guadalupe Guzman MS CCC-SLP     Dysphagia Other 1    Dysphagia Other 1 Objective Tolerate ice chip and H2O trials without s/s aspiration with 50% accuracy and cues  -SM      Status: Dysphagia Other 1 New  -SM      Dysphagia Other 1 Progress 0%;with consistent cues;continue to address  -SM      Comments: Dysphagia Other 1 Wet secretions at baseline, increased with limited ice chip trial. Cough thogh difficulty getting to mouth. RN reports frequentr suctioning  -SM      Recorded by [SM] Socorro Massey, MS CCC-SLP      Bed Mobility, Assessment/Treatment    Bed Mobility, Comment   Sitting EOB upon arrival with NSG.   -LSA    Recorded by   [LSA] Keli Awad, PT    Transfer Assessment/Treatment    Transfers,  Bed-Chair [place]   maximum assist (25% patient effort);2 person assist required;verbal cues required  -LSA    Transfers, Sit-Stand [place]   maximum assist (25% patient effort);2 person assist required;verbal cues required  -LSA    Transfers, Stand-Sit [place]   maximum assist (25% patient effort);2 person assist required;verbal cues required  -LSA    Transfer, Impairments   strength decreased;impaired balance  -LSA    Transfer, Comment   STS performed from EOB for pivot to recliner. Pt unable to achieve fully upright posture during transfer.   -LSA    Recorded by   [LSA] Keli Awad PT    Gait Assessment/Treatment    Gait, [place] Level   not appropriate to assess  -LSA    Recorded by   [LSA] Keli Awad PT    Motor Skills/Interventions    Additional Documentation   Balance Skills Training (Group)  -LSA    Recorded by   [LSA] Keli Awad PT    Balance Skills Training    Sitting-Level of Assistance   Maximum assistance   able to progress to moments of min A  -LSA    Sitting-Balance Support   Right upper extremity supported;Left upper extremity supported  -LSA    Sitting-Balance Activities   Lateral lean;Trunk control activities  -LSA    Standing-Level of Assistance   Maximum assistance;x2  -LSA    Static Standing Balance Support   assistive device;Left upper extremity supported;Right upper extremity supported  -LSA    Recorded by   [LSA] Keli Awad PT    Therapy Exercises    Right Lower Extremity   AAROM:;10 reps;sitting;ankle pumps/circles;hamstring stretch;hip flexion;LAQ  -LSA    Left Lower Extremity   PROM:;10 reps;sitting;ankle pumps/circles;hamstring stretch;LAQ;hip flexion  -LSA    Recorded by   [LSA] Keli Awad, PT    Positioning and Restraints    Pre-Treatment Position   in bed  -LSA    Post Treatment Position   chair  -LSA    In Chair   notified nsg;reclined;call light within reach;encouraged to call for assist;exit alarm on;with family/caregiver;RUE elevated;LUE  elevated;waffle cushion;on mechanical lift sling;legs elevated;heels elevated;waffle boot/both  -LSA    Recorded by   [LSA] Keli Awad, PT      11/29/17 1434 11/29/17 1130       Rehab Assessment/Intervention    Discipline occupational therapist  -CL speech language pathologist  -LS     Document Type therapy note (daily note)  -CL      Subjective Information agree to therapy;no complaints  -CL agree to therapy  -LS     Patient Effort, Rehab Treatment good  -CL      Symptoms Noted During/After Treatment fatigue  -CL      Precautions/Limitations fall precautions;other (see comments)   abd incision, L sided weakness  -CL      Recorded by [CL] Margarita Velasco OT [LS] Maria Guadalupe Guzman MS CCC-SLP     Vital Signs    Pre Systolic BP Rehab 152  -CL      Pre Treatment Diastolic BP 86  -CL      Post Systolic BP Rehab 145  -CL      Post Treatment Diastolic BP 83  -CL      Pretreatment Heart Rate (beats/min) 74  -CL      Posttreatment Heart Rate (beats/min) 67  -CL      Pre SpO2 (%) 96  -CL      O2 Delivery Pre Treatment supplemental O2  -CL      Post SpO2 (%) 97  -CL      O2 Delivery Post Treatment supplemental O2  -CL      Pre Patient Position Sitting  -CL      Intra Patient Position Standing  -CL      Post Patient Position Sitting  -CL      Recorded by [CL] Margarita Velasco OT      Pain Assessment    Pain Assessment No/denies pain  -CL      Pain Score 0  -CL      Post Pain Score 0  -CL      Recorded by [CL] Margarita Velasco OT      Cognitive Assessment/Intervention    Current Cognitive/Communication Assessment functional  -CL      Orientation Status oriented to;person;situation  -CL      Follows Commands/Answers Questions 75% of the time;needs cueing;needs increased time;needs repetition  -CL      Personal Safety moderate impairment;decreased awareness, need for assist;decreased awareness, need for safety  -CL      Personal Safety Interventions fall prevention program maintained;gait belt;nonskid shoes/slippers when out of bed  -CL       Recorded by [CL] Margarita Velasco OT      Dysphagia Treatment Objectives and Progress    To improve closure at entrance to airway, patient will:  Complete super-supraglottic swallow;80%;with consistent cues  -LS     Closure at Entrance to Airway Progress  continue to adress   did not address 2' lethragy  -LS     To improve hyolaryngeal excursion, patient will:  Complete chin tuck against resistance (comment number of repetitions);80%;with consistent cues  -LS     Hyolaryngeal Excursion Progress  10%;20%;with consistent cues  -LS     To improve tongue base & pharyngeal wall squeeze, patient will:  Complete effortful swallow;Complete tongue hold swallow;80%;with consistent cues  -LS     Tongue Base/Pharyngeal Wall Squeeze Progress  20%;with consistent cues  -LS     Recorded by  [LS] Maria Guadalupe Gzuman MS CCC-SLP     Improve closure at entrance to airway    Status: Improve closure at entrance to airway  Progress slower than expected  -LS     Recorded by  [LS] Maria Guadalupe Guzman MS CCC-SLP     Improve hyolaryngeal excursion    Status: Improve hyolaryngeal excursion  Progress slower than expected  -LS     Comments: Improve hyolaryngeal excursion  Max cues needed to complete task.  -LS     Recorded by  [LS] Maria Guadalupe Guzman MS CCC-SLP     Improve tongue base & pharyngeal wall squeeze    Status: Improve tongue base & pharyngeal wall squeeze  Progress slower than expected  -LS     Comments: Improve tongue base & pharyngeal wall squeeze  Max cures needed.  -LS     Recorded by  [LS] Maria Guadalupe Guzman MS CCC-SLP     Bed Mobility, Assessment/Treatment    Bed Mobility, Comment Pt received sitting EOB w/ nursing.   -CL      Recorded by [CL] aMrgarita Velasco OT      Transfer Assessment/Treatment    Transfers, Bed-Chair [place] maximum assist (25% patient effort);2 person assist required;verbal cues required  -CL      Transfers, Sit-Stand [place] maximum assist (25% patient effort);2 person assist required;verbal cues required  -CL       Transfers, Stand-Sit [place] maximum assist (25% patient effort);2 person assist required;verbal cues required  -CL      Transfer, Comment Pt stood from EOB w/ OT/PT on either side of pt w/ B feet/knees blocked. Pt stood ~75% upright and performed bent-pivot to chair.   -CL      Recorded by [CL] Margarita Velasco OT      Functional Mobility    Functional Mobility- Ind. Level not appropriate to assess;unable to perform  -CL      Recorded by [CL] Margarita Velasco OT      Motor Skills/Interventions    Additional Documentation Balance Skills Training (Group)  -CL      Recorded by [CL] Margarita Velasco OT      Balance Skills Training    Sitting-Level of Assistance Maximum assistance   moments of Min A  -CL      Sitting-Balance Support Right upper extremity supported;Left upper extremity supported;Feet supported  -CL      Sitting-Balance Activities Forward lean;Lateral lean;Reaching for objects;Right UE Weight Bearing;Left UE Weight Bearing;Trunk control activities  -CL      Standing-Level of Assistance Maximum assistance;x2  -CL      Static Standing Balance Support Right upper extremity supported;Left upper extremity supported  -CL      Standing-Balance Activities Weight Shift A-P;Weight Shift R-L  -CL      Recorded by [CL] Margarita Velasco OT      Therapy Exercises    Bilateral Upper Extremity 10 reps;AROM:;elbow flexion/extension;hand pumps;shoulder horizontal abd/add;shoulder protraction/retraction  -CL      Recorded by [CL] Margarita Velasco OT      Positioning and Restraints    Pre-Treatment Position in bed  -CL      Post Treatment Position chair  -CL      In Chair notified nsg;reclined;call light within reach;encouraged to call for assist;exit alarm on;with family/caregiver;LUE elevated;waffle cushion;on mechanical lift sling;legs elevated;heels elevated;waffle boot/both  -CL      Recorded by [CL] Margarita Velasco OT        User Key  (r) = Recorded By, (t) = Taken By, (c) = Cosigned By    Initials Name Effective Dates    SM  Socorro Massey, MS CCC-SLP 06/22/15 -     LS Maria Guadalupe Guzman, MS [facility]-SLP 06/22/15 -     LSA Keli Awad, PT 06/19/15 -     CL Margarita Velasco, OT 06/08/16 -                   IP SLP Goals       11/30/17 1505 11/29/17 1155 11/28/17 1124    Repeat Evaluation Needed    Swallow Skills to Level that Repeat Evaluation Indicated- SLP, Date Goal Reviewed 11/30/17  -LS 11/29/17  -LS 11/28/17  -SG    Swallow Skills to Level that Repeat Evaluation Indicated- SLP, Outcome   goal ongoing  -SG      11/25/17 1409          Repeat Evaluation Needed    Swallow Skills to Level that Repeat Evaluation Indicated- SLP, Date Established 11/25/17  -SG      Swallow Skills to Level that Repeat Evaluation Indicated- SLP, Time to Achieve by discharge  -SG      Swallow Skills to Level that Repeat Evaluation Indicated- SLP, Date Goal Reviewed 11/25/17  -SG      Swallow Skills to Level that Repeat Evaluation Indicated- SLP, Outcome goal ongoing  -SG        User Key  (r) = Recorded By, (t) = Taken By, (c) = Cosigned By    Initials Name Provider Type    SG Milla Uriarte, MS CCC-SLP Speech and Language Pathologist     Maria Guadalupe Guzman, MS CCC-SLP Speech and Language Pathologist          EDUCATION  The patient's wife has been educated in the following areas:   Dysphagia (Swallowing Impairment) Oral Care/Hydration NPO rationale.    SLP Recommendation and Plan                                           Plan of Care Review  Plan Of Care Reviewed With: patient  Outcome Summary/Follow up Plan: Drowsy though able to participate in some dysphagia tx with max cues. Focused on limited trials (lignual resistance strengthening, effortful swallow, chin tuck against resistance). Wife with good understanding - will continue atempts with them throughout the day. Decreased secretion management, nowhere near ready to consider PO intake. Will continue to follow closely. Thanks            Time Calculation:         Time Calculation- SLP       12/01/17  1159          Time Calculation- SLP    SLP Start Time 1130  -      SLP Received On 12/01/17  -        User Key  (r) = Recorded By, (t) = Taken By, (c) = Cosigned By    Initials Name Provider Type    DERREK Massey MS CCC-SLP Speech and Language Pathologist          Therapy Charges for Today     Code Description Service Date Service Provider Modifiers Qty    [id number]  ST TREATMENT SWALLOW 2 12/1/2017 Socorro Massey MS CCC-SLP GN 1                 Socorro Massey MS CCC-SLP  12/1/2017

## 2017-12-01 NOTE — PLAN OF CARE
Problem: Patient Care Overview (Adult)  Goal: Plan of Care Review    12/01/17 0525   Outcome Evaluation   Outcome Summary/Follow up Plan Pt. up in chair until 2000. Pt. drowsy most of the night, sleeping in between care. Incontinent of urine x7. Oral-tracheal/subglottal suctioned q2h with small to moderate thick creamy secretions removed each times. VSS.

## 2017-12-01 NOTE — PLAN OF CARE
Problem: Patient Care Overview (Adult)  Goal: Plan of Care Review  Outcome: Ongoing (interventions implemented as appropriate)    12/01/17 1156   Outcome Evaluation   Outcome Summary/Follow up Plan Drowsy though able to participate in some dysphagia tx with max cues. Focused on limited trials (lignual resistance strengthening, effortful swallow, chin tuck against resistance). Wife with good understanding - will continue atempts with them throughout the day. Decreased secretion management, nowhere near ready to consider PO intake. Will continue to follow closely. Thanks    Coping/Psychosocial Response Interventions   Plan Of Care Reviewed With patient         Problem: Inpatient SLP  Goal: Dysphagia- Patient will improve swallowing skills to the level that a repeat evaluation is indicated  Outcome: Ongoing (interventions implemented as appropriate)    11/25/17 1409 11/28/17 1124 11/30/17 1505   Repeat Evaluation Needed   Swallow Skills to Level that Repeat Evaluation Indicated- SLP, Date Established 11/25/17 --  --    Swallow Skills to Level that Repeat Evaluation Indicated- SLP, Time to Achieve by discharge --  --    Swallow Skills to Level that Repeat Evaluation Indicated- SLP, Date Goal Reviewed --  --  11/30/17   Swallow Skills to Level that Repeat Evaluation Indicated- SLP, Outcome --  goal ongoing --

## 2017-12-01 NOTE — THERAPY TREATMENT NOTE
Acute Care - Occupational Therapy Treatment Note  [facility]     Patient Name: Magdi Arriaga  : 1945  MRN: [id number]  Today's Date: 2017  Onset of Illness/Injury or Date of Surgery Date: 17  Date of Referral to OT: 17  Referring Physician: Earl      Admit Date: 2017    Visit Dx:     ICD-10-CM ICD-9-CM   1. Pharyngeal dysphagia R13.13 787.23   2. Small bowel obstruction K56.609 560.9   3. Lower abdominal pain R10.30 789.09   4. Leukocytosis, unspecified type D72.829 288.60   5. Hematemesis with nausea K92.0 578.0     787.02   6. Impaired mobility and ADLs Z74.09 799.89   7. 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, followed by [facility]   • Traumatic SDH, s/p right craniotomy on 2017   • Small bowel obstruction (S/P Exp lap for acute SBO 17)   • Altered mental status post op   • PVC (premature ventricular contraction)   • NSVT (nonsustained ventricular tachycardia)             Adult Rehabilitation Note       17 1328 17 1320 17 1130    Rehab Assessment/Intervention    Discipline occupational therapist  -CL physical therapist  -LS speech language pathologist  -SM    Document Type therapy note (daily note)  -CL therapy note (daily note)  -LS therapy note  (daily note)  -SM    Subjective Information agree to therapy;no complaints  -CL agree to therapy;no complaints  -LS agree to therapy  -SM    Patient Effort, Rehab Treatment good  -CL good  -LS adequate  -SM    Symptoms Noted Comment  significant lethary initially today; vc's for opening eyes and attending to task.   -LS     Precautions/Limitations fall precautions;oxygen therapy device and L/min;other (see comments)   NG, abd incision, L sided weakness  -CL fall precautions;oxygen therapy device and L/min;other (see comments)   abd incision; L-sided weakness  -LS     Specific Treatment Considerations Pt w/ significant lethargy.   -CL      Recorded by [CL] Margarita Velasco OT [LS] Keli Awad, PT [SM] Socorro Massey, MS CCC-SLP    Vital Signs    Pre Systolic BP Rehab 134  -  -LS     Pre Treatment Diastolic BP 75  -CL 75  -LS     Post Systolic BP Rehab 141  -CL --   with OT at end of session  -LS     Post Treatment Diastolic BP 79  -CL      Pretreatment Heart Rate (beats/min) 64  -CL 62  -LS     Posttreatment Heart Rate (beats/min) 64  -CL      Pre SpO2 (%) 99  -CL 99  -LS     O2 Delivery Pre Treatment supplemental O2  -CL supplemental O2  -LS     Post SpO2 (%) 98  -CL      O2 Delivery Post Treatment supplemental O2  -CL      Pre Patient Position Sitting  -CL Sitting  -LS     Intra Patient Position Standing  -CL Standing  -LS     Post Patient Position Sitting  -CL Sitting  -LS     Recorded by [CL] Margarita Velasco OT [LS] Keli Awad, PT     Pain Assessment    Pain Assessment No/denies pain  -CL 0-10  -LS     Pain Score 0  -CL 0  -LS     Post Pain Score 0  -CL 0  -LS     Recorded by [CL] Margarita Velasco OT [LS] Keli Awad, PT     Cognitive Assessment/Intervention    Current Cognitive/Communication Assessment impaired  -CL functional  -LS     Orientation Status oriented to;person;place;required verbal cueing (specifiy in comments);time  -CL oriented to;person;place;required verbal cueing (specifiy in comments)    cues for month  -LS     Follows Commands/Answers Questions 50% of the time;75% of the time;needs cueing;needs increased time;needs repetition  - able to follow single-step instructions;needs cueing;needs increased time;needs repetition;50% of the time;75% of the time   improved with increased alertness  -LS     Personal Safety moderate impairment;decreased awareness, need for assist;decreased awareness, need for safety  -CL moderate impairment;decreased awareness, need for assist;decreased awareness, need for safety;decreased insight to deficits  -LS     Personal Safety Interventions fall prevention program maintained;gait belt;nonskid shoes/slippers when out of bed  -CL fall prevention program maintained;gait belt;nonskid shoes/slippers when out of bed  -LS     Recorded by [CL] Margarita Velasco OT [LS] Keli Awad, PT     Dysphagia Treatment Objectives and Progress    Dysphagia Treatment Objectives   Improve oral skills  -SM    To improve timing of pharyngeal response, patient will:   Swallow in timely way using three second prep;Swallow in timely way using super-supraglottic swallow;80%;with consistent cues  -SM    Timing of Pharyngeal Response Progress   0%;continue to adress  -SM    To improve closure at entrance to airway, patient will:   Complete super-supraglottic swallow;80%;with consistent cues  -SM    Closure at Entrance to Airway Progress   continue to adress  -SM    To improve hyolaryngeal excursion, patient will:   Complete chin tuck against resistance (comment number of repetitions);80%;with consistent cues   30 sec reps x4  -SM    Hyolaryngeal Excursion Progress   20%;with consistent cues;continue to adress  -SM    To improve tongue base & pharyngeal wall squeeze, patient will:   Complete effortful swallow;Complete tongue hold swallow;80%;with consistent cues  -SM    Tongue Base/Pharyngeal Wall Squeeze Progress   20%;with consistent cues;continue to adress  -SM    Recorded by   [SM] Socorro CARABALLO  MS Bryson CCC-SLP    Improve oral skills    To Improve Oral Skills, patient will:   Increase tongue tip elevation;80%;with consistent cues  -SM    Status: Improve Oral Skills   New  -SM    Oral Skills Progress   20%;with consistent cues;continue to adress  -SM    Recorded by   [SM] Socorro Massey MS CCC-SLP    Improve timing of pharyngeal response    Status: Improve timing of pharyngeal response   Progress slower than expected  -SM    Comments: Improve timing of pharyngeal response   Too fatigued to trial SSG. Decreased secretion management, did not trials 3-sec prep.  -SM    Recorded by   [SM] Socorro Massey MS CCC-SLP    Improve closure at entrance to airway    Status: Improve closure at entrance to airway   Progress slower than expected  -SM    Recorded by   [SM] Socorro Massey MS CCC-SLP    Improve hyolaryngeal excursion    Status: Improve hyolaryngeal excursion   Progress slower than expected  -SM    Recorded by   [SM] Socorro Massey MS CCC-SLP    Improve tongue base & pharyngeal wall squeeze    Status: Improve tongue base & pharyngeal wall squeeze   Progress slower than expected  -SM    Comments: Improve tongue base & pharyngeal wall squeeze   Focused on just effortful swallow  -SM    Recorded by   [SM] Socorro Massey MS CCC-SLP    Dysphagia Other 1    Dysphagia Other 1 Objective   Tolerate ice chip and H2O trials without s/s aspiration with 50% accuracy and cues  -SM    Status: Dysphagia Other 1   New  -SM    Dysphagia Other 1 Progress   0%;with consistent cues;continue to address  -SM    Comments: Dysphagia Other 1   Wet secretions at baseline, increased with limited ice chip trial. Cough thogh difficulty getting to mouth. RN reports frequentr suctioning  -SM    Recorded by   [SM] Socorro Massey MS CCC-SLP    Bed Mobility, Assessment/Treatment    Bed Mobility, Comment UIC.   -CL UIC  -LS     Recorded by [CL] Margarita Velasco OT [LS] Keli Awad, PT     Transfer  Assessment/Treatment    Transfers, Sit-Stand [place] dependent (less than 25% patient effort);2 person assist required;verbal cues required  -CL dependent (less than 25% patient effort);2 person assist required;verbal cues required  -LS     Transfers, Stand-Sit [place] dependent (less than 25% patient effort);2 person assist required;verbal cues required  -CL dependent (less than 25% patient effort);2 person assist required;verbal cues required  -LS     Transfer, Impairments  strength decreased;impaired balance  -LS     Transfer, Comment Pt attempted x2 reps w/ OT/PT on either side of pt w/ BUE support and B feet/knees blocked. Pt unable to clear hips first rep. Second rep pt cleared hips and stood ~50% upright w/ support posteriorly to pelvis to facilitate upright position.   -CL Attempted x2 from recliner; able to clear hips but unable to achieve fully upright posture. VC's for hand placement and weightshifting forward to initiate.   -LS     Recorded by [CL] Margarita Velasco OT [LS] Keli Awad PT     Gait Assessment/Treatment    Gait, [place] Level  not appropriate to assess  -LS     Recorded by  [LS] Keli Awad PT     Functional Mobility    Functional Mobility- Ind. Level unable to perform;not appropriate to assess  -CL      Recorded by [CL] Margarita Velasco OT      Balance Skills Training    Sitting-Level of Assistance Minimum assistance   in recliner  -CL Minimum assistance   L lateral lean; vc's for leaning on R elbow  -LS     Sitting-Balance Support Left upper extremity supported;Right upper extremity supported;Feet supported  -CL Right upper extremity supported;Left upper extremity supported;Feet supported  -LS     Sitting-Balance Activities Forward lean;Lateral lean;Right UE Weight Bearing;Trunk control activities  -CL Lateral lean;Forward lean  -LS     Sitting # of Minutes 8  -CL 8  -LS     Standing-Level of Assistance Dependent;x2  -CL Dependent;x2  -LS     Static Standing Balance Support  Right upper extremity supported;Left upper extremity supported  -CL Right upper extremity supported;Left upper extremity supported  -LS     Standing-Balance Activities Weight Shift A-P  -CL      Recorded by [CL] Margarita Velasco OT [LS] Keli Awad, PT     Therapy Exercises    Right Lower Extremity  AAROM:;10 reps;sitting;ankle pumps/circles;LAQ;hip flexion  -LS     Left Lower Extremity  PROM:;10 reps;sitting;calf stretch;hamstring stretch;ankle pumps/circles;hip flexion;LAQ  -LS     Bilateral Upper Extremity 10 reps;AAROM:;elbow flexion/extension;hand pumps;shoulder extension/flexion   Pt grasped towel roll to intiate BUE movement w/ func task  -CL      Recorded by [CL] Margarita Velasco OT [LS] Keli Awad, PT     Positioning and Restraints    Pre-Treatment Position sitting in chair/recliner  -CL sitting in chair/recliner  -LS     Post Treatment Position chair  -CL chair  -LS     In Chair notified nsg;reclined;call light within reach;encouraged to call for assist;exit alarm on;with family/caregiver;RUE elevated;LUE elevated;waffle cushion;on mechanical lift sling;legs elevated;heels elevated   L multipodus boot, R waffle boot  -CL notified nsg;reclined;call light within reach;encouraged to call for assist;exit alarm on;with family/caregiver;RUE elevated;LUE elevated;with OT;on mechanical lift sling;waffle cushion;legs elevated;heels elevated;L multipodus;R waffle boot  -LS     Recorded by [CL] Margarita Velasco OT [LS] Keli Awad, PT       11/30/17 1430 11/29/17 1439 11/29/17 1434    Rehab Assessment/Intervention    Discipline speech language pathologist  -LSA physical therapist  -LS occupational therapist  -CL    Document Type therapy note (daily note)  -LSA therapy note (daily note)  -LS therapy note (daily note)  -CL    Subjective Information no complaints;agree to therapy  -LSA agree to therapy;no complaints  -LS agree to therapy;no complaints  -CL    Patient Effort, Rehab Treatment  good  -LS good  -CL    Symptoms  Noted During/After Treatment  fatigue  -LS fatigue  -CL    Precautions/Limitations  fall precautions;other (see comments);oxygen therapy device and L/min   abd incision; L-sided weakness  -LS fall precautions;other (see comments)   abd incision, L sided weakness  -CL    Recorded by [LSA] Maria Guadalupe Guzman MS CCC-SLP [LS] Keli Awad, PT [CL] Margarita Velasco OT    Vital Signs    Pre Systolic BP Rehab  152  -  -CL    Pre Treatment Diastolic BP  86  -LS 86  -CL    Post Systolic BP Rehab  145  -  -CL    Post Treatment Diastolic BP  83  -LS 83  -CL    Pretreatment Heart Rate (beats/min)  74  -LS 74  -CL    Posttreatment Heart Rate (beats/min)  67  -LS 67  -CL    Pre SpO2 (%)   96  -CL    O2 Delivery Pre Treatment  supplemental O2  -LS supplemental O2  -CL    Post SpO2 (%)  97  -LS 97  -CL    O2 Delivery Post Treatment  supplemental O2  -LS supplemental O2  -CL    Pre Patient Position  Sitting  -LS Sitting  -CL    Intra Patient Position  Standing  -LS Standing  -CL    Post Patient Position  Sitting  -LS Sitting  -CL    Recorded by  [LS] Keli Awad, PT [CL] Magrarita Velasco OT    Pain Assessment    Pain Assessment  0-10  -LS No/denies pain  -CL    Pain Score  0  -LS 0  -CL    Post Pain Score  0  -LS 0  -CL    Recorded by  [LS] Keli Awad, PT [CL] Margarita Velasco OT    Cognitive Assessment/Intervention    Current Cognitive/Communication Assessment  functional  -LS functional  -CL    Orientation Status  oriented to;person;situation  -LS oriented to;person;situation  -CL    Follows Commands/Answers Questions  able to follow single-step instructions;75% of the time;needs cueing;needs increased time;needs repetition   decreased processing  -LS 75% of the time;needs cueing;needs increased time;needs repetition  -CL    Personal Safety  moderate impairment;decreased awareness, need for assist;decreased awareness, need for safety;decreased insight to deficits  -LS moderate impairment;decreased awareness, need for  assist;decreased awareness, need for safety  -CL    Personal Safety Interventions  fall prevention program maintained;gait belt;nonskid shoes/slippers when out of bed  -LS fall prevention program maintained;gait belt;nonskid shoes/slippers when out of bed  -CL    Recorded by  [LS] Keli Awad, PT [CL] Margarita Velasco OT    Dysphagia Treatment Objectives and Progress    To improve timing of pharyngeal response, patient will: Swallow in timely way using three second prep;Swallow in timely way using super-supraglottic swallow;80%;with consistent cues  -LSA      Timing of Pharyngeal Response Progress 10%;with consistent cues;continue to adress  -LSA      To improve closure at entrance to airway, patient will: Complete super-supraglottic swallow;80%;with consistent cues  -LSA      Closure at Entrance to Airway Progress continue to adress  -LSA      To improve hyolaryngeal excursion, patient will: Complete chin tuck against resistance (comment number of repetitions);80%;with consistent cues  -LSA      Hyolaryngeal Excursion Progress 20%;with consistent cues  -LSA      To improve tongue base & pharyngeal wall squeeze, patient will: Complete effortful swallow;Complete tongue hold swallow;80%;with consistent cues  -LSA      Tongue Base/Pharyngeal Wall Squeeze Progress 20%;with consistent cues  -LSA      Recorded by [LSA] Maria Guadalupe Guzman MS CCC-SLP      Improve timing of pharyngeal response    Status: Improve timing of pharyngeal response Progress slower than expected  -LSA      Recorded by [LSA] Maria Guadalupe Guzman MS CCC-SLP      Improve closure at entrance to airway    Status: Improve closure at entrance to airway Progress slower than expected  -LSA      Recorded by [LSA] Maria Guadalupe Guzman MS CCC-SLP      Improve hyolaryngeal excursion    Status: Improve hyolaryngeal excursion Progress slower than expected  -LSA      Comments: Improve hyolaryngeal excursion Max cues needed and limited ROM  -LSA      Recorded by [LSA] Maria Guadalupe ADAM  MS Thomas CCC-SLP      Improve tongue base & pharyngeal wall squeeze    Status: Improve tongue base & pharyngeal wall squeeze Progress slower than expected  -LSA      Comments: Improve tongue base & pharyngeal wall squeeze Max cues needed.  -LSA      Recorded by [LSA] Maria Guadalupe Guzman MS CCC-SLP      Bed Mobility, Assessment/Treatment    Bed Mobility, Comment  Sitting EOB upon arrival with NSG.   -LS Pt received sitting EOB w/ nursing.   -CL    Recorded by  [LS] Keli Awad, PT [CL] Margarita Velasco OT    Transfer Assessment/Treatment    Transfers, Bed-Chair [place]  maximum assist (25% patient effort);2 person assist required;verbal cues required  -LS maximum assist (25% patient effort);2 person assist required;verbal cues required  -CL    Transfers, Sit-Stand [place]  maximum assist (25% patient effort);2 person assist required;verbal cues required  -LS maximum assist (25% patient effort);2 person assist required;verbal cues required  -CL    Transfers, Stand-Sit [place]  maximum assist (25% patient effort);2 person assist required;verbal cues required  -LS maximum assist (25% patient effort);2 person assist required;verbal cues required  -CL    Transfer, Impairments  strength decreased;impaired balance  -LS     Transfer, Comment  STS performed from EOB for pivot to recliner. Pt unable to achieve fully upright posture during transfer.   -LS Pt stood from EOB w/ OT/PT on either side of pt w/ B feet/knees blocked. Pt stood ~75% upright and performed bent-pivot to chair.   -CL    Recorded by  [LS] Keli Awad, PT [CL] Margarita Velasco OT    Gait Assessment/Treatment    Gait, [place] Level  not appropriate to assess  -LS     Recorded by  [LS] Keli Awad PT     Functional Mobility    Functional Mobility- Ind. Level   not appropriate to assess;unable to perform  -CL    Recorded by   [CL] Margarita Velasco OT    Motor Skills/Interventions    Additional Documentation  Balance Skills Training (Group)  -  Balance Skills Training (Group)  -CL    Recorded by  [LS] Keli Awad, PT [CL] Margarita Velasco OT    Balance Skills Training    Sitting-Level of Assistance  Maximum assistance   able to progress to moments of min A  -LS Maximum assistance   moments of Min A  -CL    Sitting-Balance Support  Right upper extremity supported;Left upper extremity supported  -LS Right upper extremity supported;Left upper extremity supported;Feet supported  -CL    Sitting-Balance Activities  Lateral lean;Trunk control activities  -LS Forward lean;Lateral lean;Reaching for objects;Right UE Weight Bearing;Left UE Weight Bearing;Trunk control activities  -CL    Standing-Level of Assistance  Maximum assistance;x2  -LS Maximum assistance;x2  -CL    Static Standing Balance Support  assistive device;Left upper extremity supported;Right upper extremity supported  -LS Right upper extremity supported;Left upper extremity supported  -CL    Standing-Balance Activities   Weight Shift A-P;Weight Shift R-L  -CL    Recorded by  [LS] Keli Awad, PT [CL] Margarita Velasco OT    Therapy Exercises    Right Lower Extremity  AAROM:;10 reps;sitting;ankle pumps/circles;hamstring stretch;hip flexion;LAQ  -LS     Left Lower Extremity  PROM:;10 reps;sitting;ankle pumps/circles;hamstring stretch;LAQ;hip flexion  -LS     Bilateral Upper Extremity   10 reps;AROM:;elbow flexion/extension;hand pumps;shoulder horizontal abd/add;shoulder protraction/retraction  -CL    Recorded by  [LS] Keli Awad, PT [CL] Margarita Velasco OT    Positioning and Restraints    Pre-Treatment Position  in bed  -LS in bed  -CL    Post Treatment Position  chair  -LS chair  -CL    In Chair  notified nsg;reclined;call light within reach;encouraged to call for assist;exit alarm on;with family/caregiver;RUE elevated;LUE elevated;waffle cushion;on mechanical lift sling;legs elevated;heels elevated;waffle boot/both  -LS notified nsg;reclined;call light within reach;encouraged to call for assist;exit alarm  on;with family/caregiver;LUE elevated;waffle cushion;on mechanical lift sling;legs elevated;heels elevated;waffle boot/both  -CL    Recorded by  [LS] Keli Awad, PT [CL] Margarita Velasco OT      11/29/17 7420          Rehab Assessment/Intervention    Discipline speech language pathologist  -LSA      Subjective Information agree to therapy  -LSA      Recorded by [LSA] MS ROSENDO Cruz      Dysphagia Treatment Objectives and Progress    To improve closure at entrance to airway, patient will: Complete super-supraglottic swallow;80%;with consistent cues  -LSA      Closure at Entrance to Airway Progress continue to adress   did not address 2' lethragy  -LSA      To improve hyolaryngeal excursion, patient will: Complete chin tuck against resistance (comment number of repetitions);80%;with consistent cues  -LSA      Hyolaryngeal Excursion Progress 10%;20%;with consistent cues  -LSA      To improve tongue base & pharyngeal wall squeeze, patient will: Complete effortful swallow;Complete tongue hold swallow;80%;with consistent cues  -LSA      Tongue Base/Pharyngeal Wall Squeeze Progress 20%;with consistent cues  -LSA      Recorded by [LSA] MS ROSENDO Cruz      Improve closure at entrance to airway    Status: Improve closure at entrance to airway Progress slower than expected  -LSA      Recorded by [LSA] MS ROSENDO Cruz      Improve hyolaryngeal excursion    Status: Improve hyolaryngeal excursion Progress slower than expected  -LSA      Comments: Improve hyolaryngeal excursion Max cues needed to complete task.  -LSA      Recorded by [LSA] MS ROSENDO Cruz      Improve tongue base & pharyngeal wall squeeze    Status: Improve tongue base & pharyngeal wall squeeze Progress slower than expected  -LSA      Comments: Improve tongue base & pharyngeal wall squeeze Max cures needed.  -LSA      Recorded by [LSA] MS ROSNEDO Cruz        User Key  (r) = Recorded By, (t) = Taken By,  (c) = Cosigned By    Initials Name Effective Dates    SM Socorro RASHMI Massey, MS CCC-SLP 06/22/15 -     LSA Maria Guadalupe Guzman, MS CCC-SLP 06/22/15 -     LS Keli Awad, PT 06/19/15 -     CL Margarita Velasco, OT 06/08/16 -                 OT Goals       12/01/17 1524 11/29/17 1546 11/27/17 1528    Bed Mobility OT LTG    Bed Mobility OT LTG, Outcome goal ongoing  -CL goal ongoing  -CL goal ongoing  -JR    Transfer Training OT LTG    Transfer Training OT LTG, Outcome goal ongoing  -CL goal ongoing  -CL goal ongoing  -JR    Strength OT LTG    Strength Goal OT LTG, Outcome goal ongoing  -CL goal ongoing  -CL goal ongoing  -JR    Grooming OT LTG    Grooming Goal OT LTG, Outcome goal ongoing  -CL goal ongoing  -CL goal ongoing  -JR      11/23/17 1105          Bed Mobility OT LTG    Bed Mobility OT LTG, Date Established 11/23/17  -AC      Bed Mobility OT LTG, Time to Achieve by discharge  -AC      Bed Mobility OT LTG, Activity Type supine to sit/sit to supine  -AC      Bed Mobility OT LTG, [place] Level moderate assist (50% patient effort);2 person assist required  -AC      Transfer Training OT LTG    Transfer Training OT LTG, Date Established 11/23/17  -AC      Transfer Training OT LTG, Time to Achieve by discharge  -AC      Transfer Training OT LTG, Activity Type bed to chair /chair to bed;sit to stand/stand to sit  -AC      Transfer Training OT LTG, [place] Level moderate assist (50% patient effort);2 person assist required  -AC      Strength OT LTG    Strength Goal OT LTG, Date Established 11/23/17  -AC      Strength Goal OT LTG, Time to Achieve by discharge  -AC      Strength Goal OT LTG, Measure to Achieve Pt will increse LUE strength 1 MMG as needed to support ADLs  -AC      Grooming OT LTG    Grooming Goal OT LTG, Date Established 11/23/17  -AC      Grooming Goal OT LTG, Time to Achieve by discharge  -AC      Grooming Goal OT LTG, Activity Type pt will wash face and comb hair using R hand given setup  -AC       Grooming Goal OT LTG, [place] Level set up required  -AC        User Key  (r) = Recorded By, (t) = Taken By, (c) = Cosigned By    Initials Name Provider Type    AC Margie Ferrera, OT Occupational Therapist    JR Linda Quiroz, OT Occupational Therapist    BABAK Velasco, OT Occupational Therapist          Occupational Therapy Education     Title: PT OT SLP Therapies (Active)     Topic: Occupational Therapy (Active)     Point: ADL training (Active)    Description: Instruct learner(s) on proper safety adaptation and remediation techniques during self care or transfers.   Instruct in proper use of assistive devices.    Learning Progress Summary    Learner Readiness Method Response Comment Documented by Status   Patient Acceptance E,D NR Pt educated on BUE HEP, positioning, safety precautions, t/f techniques, and benefits of therapy.  12/01/17 1523 Active    Acceptance E,D NR Pt educated on appropriate safety precautions, t/f techniques, HEP, positioning, and benefits of therapy.  11/29/17 1545 Active    Acceptance E NR Educated pt on B UE HEP, transfer technique and benefits of therapy.  11/27/17 1528 Active    Acceptance E VU,NR benefits of activity, UE HEP  11/23/17 1102 Done   Family Acceptance E,D NR Pt educated on BUE HEP, positioning, safety precautions, t/f techniques, and benefits of therapy.  12/01/17 1523 Active    Acceptance E,D NR Pt educated on appropriate safety precautions, t/f techniques, HEP, positioning, and benefits of therapy.  11/29/17 1545 Active    Acceptance E VU,NR benefits of activity, UE HEP  11/23/17 1102 Done               Point: Home exercise program (Active)    Description: Instruct learner(s) on appropriate technique for monitoring, assisting and/or progressing therapeutic exercises/activities.    Learning Progress Summary    Learner Readiness Method Response Comment Documented by Status   Patient Acceptance E,D NR Pt educated on BUE HEP, positioning, safety  precautions, t/f techniques, and benefits of therapy.  12/01/17 1523 Active    Acceptance E,D NR Pt educated on appropriate safety precautions, t/f techniques, HEP, positioning, and benefits of therapy.  11/29/17 1545 Active    Acceptance E NR Educated pt on B UE HEP, transfer technique and benefits of therapy.  11/27/17 1528 Active   Family Acceptance E,D NR Pt educated on BUE HEP, positioning, safety precautions, t/f techniques, and benefits of therapy.  12/01/17 1523 Active    Acceptance E,D NR Pt educated on appropriate safety precautions, t/f techniques, HEP, positioning, and benefits of therapy.  11/29/17 1545 Active               Point: Precautions (Active)    Description: Instruct learner(s) on prescribed precautions during self-care and functional transfers.    Learning Progress Summary    Learner Readiness Method Response Comment Documented by Status   Patient Acceptance E,D NR Pt educated on BUE HEP, positioning, safety precautions, t/f techniques, and benefits of therapy.  12/01/17 1523 Active    Acceptance E,D NR Pt educated on appropriate safety precautions, t/f techniques, HEP, positioning, and benefits of therapy.  11/29/17 1545 Active   Family Acceptance E,D NR Pt educated on BUE HEP, positioning, safety precautions, t/f techniques, and benefits of therapy.  12/01/17 1523 Active    Acceptance E,D NR Pt educated on appropriate safety precautions, t/f techniques, HEP, positioning, and benefits of therapy.  11/29/17 1545 Active               Point: Body mechanics (Active)    Description: Instruct learner(s) on proper positioning and spine alignment during self-care, functional mobility activities and/or exercises.    Learning Progress Summary    Learner Readiness Method Response Comment Documented by Status   Patient Acceptance E,D NR Pt educated on BUE HEP, positioning, safety precautions, t/f techniques, and benefits of therapy.  12/01/17 1523 Active    Acceptance E,D NR Pt  educated on appropriate safety precautions, t/f techniques, HEP, positioning, and benefits of therapy. CL 11/29/17 1545 Active   Family Acceptance E,D NR Pt educated on BUE HEP, positioning, safety precautions, t/f techniques, and benefits of therapy. CL 12/01/17 1523 Active    Acceptance E,D NR Pt educated on appropriate safety precautions, t/f techniques, HEP, positioning, and benefits of therapy. CL 11/29/17 1545 Active                      User Key     Initials Effective Dates Name Provider Type Discipline     06/23/15 -  Margie Ferrera, OT Occupational Therapist OT    JR 06/22/15 -  Linda Quiroz, OT Occupational Therapist OT    CL 06/08/16 -  Margarita Velasco, OT Occupational Therapist OT                  OT Recommendation and Plan  Anticipated Discharge Disposition: inpatient rehabilitation facility  Planned Therapy Interventions: ADL retraining, balance training, bed mobility training, strengthening, transfer training  Therapy Frequency: daily  Plan of Care Review  Plan Of Care Reviewed With: patient  Progress: progress toward functional goals is gradual  Outcome Summary/Follow up Plan: Pt Depx2 for STS t/f, only able to clear hips from chair. Pt session limited d/t lethargy. Pt/spouse educated on BUE HEP w/ functional task performance to improve command following. Recommend cont skilled IPOT POC.         Outcome Measures       12/01/17 1328 12/01/17 1320 11/29/17 1439    How much help from another person do you currently need...    Turning from your back to your side while in flat bed without using bedrails?  2  -LS 2  -LS    Moving from lying on back to sitting on the side of a flat bed without bedrails?  2  -LS 2  -LS    Moving to and from a bed to a chair (including a wheelchair)?  1  -LS 2  -LS    Standing up from a chair using your arms (e.g., wheelchair, bedside chair)?  1  -LS 2  -LS    Climbing 3-5 steps with a railing?  1  -LS 1  -LS    To walk in hospital room?  1  -LS 1  -LS    AM-PAC 6 Clicks  Score  8  -LS 10  -LS    How much help from another is currently needed...    Putting on and taking off regular lower body clothing? 1  -CL      Bathing (including washing, rinsing, and drying) 2  -CL      Toileting (which includes using toilet bed pan or urinal) 1  -CL      Putting on and taking off regular upper body clothing 2  -CL      Taking care of personal grooming (such as brushing teeth) 2  -CL      Eating meals 1  -CL      Score 9  -CL      Modified [place] Scale    Modified [place] Scale 4 - Moderately severe disability.  Unable to walk without assistance, and unable to attend to own bodily needs without assistance.  -CL      Functional Assessment    Outcome Measure Options AM-PAC 6 Clicks Daily Activity (OT)  -CL AM-PAC 6 Clicks Basic Mobility (PT)  -LS AM-PAC 6 Clicks Basic Mobility (PT)  -LS      11/29/17 1434          How much help from another is currently needed...    Putting on and taking off regular lower body clothing? 1  -CL      Bathing (including washing, rinsing, and drying) 2  -CL      Toileting (which includes using toilet bed pan or urinal) 1  -CL      Putting on and taking off regular upper body clothing 2  -CL      Taking care of personal grooming (such as brushing teeth) 2  -CL      Eating meals 1  -CL      Score 9  -CL      Modified [place] Scale    Modified [place] Scale 4 - Moderately severe disability.  Unable to walk without assistance, and unable to attend to own bodily needs without assistance.  -CL      Functional Assessment    Outcome Measure Options AM-PAC 6 Clicks Daily Activity (OT)  -CL        User Key  (r) = Recorded By, (t) = Taken By, (c) = Cosigned By    Initials Name Provider Type    LS Keli Awad, PT Physical Therapist    CL Margarita Velasco, OT Occupational Therapist           Time Calculation:         Time Calculation- OT       12/01/17 1526          Time Calculation- OT    OT Start Time 1328  -CL      Total Timed Code Minutes- OT 13 minute(s)  -CL      OT Received On  12/01/17  -BABAK      OT Goal Re-Cert Due Date 12/03/17  -CL        User Key  (r) = Recorded By, (t) = Taken By, (c) = Cosigned By    Initials Name Provider Type    CL Margarita Velasco OT Occupational Therapist           Therapy Charges for Today     Code Description Service Date Service Provider Modifiers Qty    [id number]  OT THERAPEUTIC ACT EA 15 MIN 12/1/2017 Margarita Velasco OT GO 1               Margarita Velasco OT  12/1/2017

## 2017-12-01 NOTE — DISCHARGE PLACEMENT REQUEST
"Magdi Arriaga (72 y.o. Male)   Please call  back at [phone].  Cinthya Sanderson RN    Date of Birth Social Security Number Address Home Phone MRN    1945  [id number] [place] [address] [phone] [id number]    Amish Marital Status          None        Admission Date Admission Type Admitting Provider Attending Provider Department, Room/Bed    11/20/17 Emergency Sadiq-Cj Caicedo MD Thompson, Bola Laguna MD [facility] 2A ICU, N201/1    Discharge Date Discharge Disposition Discharge Destination                      Attending Provider: Bola Dixon MD     Allergies:  Sulfa Antibiotics    Isolation:  None   Infection:  None   Code Status:  FULL    Ht:  67.99\" (172.7 cm)   Wt:  173 lb (78.5 kg)    Admission Cmt:  None   Principal Problem:  Altered mental status post op [R40.1]                 Active Insurance as of 11/20/2017     Primary Coverage     Payor Plan Insurance Group Employer/Plan Group    MEDICARE MEDICARE A & B      Payor Plan Address Payor Plan Phone Number Effective From Effective To    [address] [phone] 7/1/2010     [place] [address]       Subscriber Name Subscriber Birth Date Member ID       MAGDI ARRIAGA 1945 [id number]           Secondary Coverage     Payor Plan Insurance Group Employer/Plan Group    ANTHEM BLUE CROSS ANTHEM BLUE CROSS BLUE SHIELD PPO [id number]     Payor Plan Address Payor Plan Phone Number Effective From Effective To    [address] [phone] 7/1/2013     [place] [address]       Subscriber Name Subscriber Birth Date Member ID       MAGDI ARRAIGA 1945 [id number]                 Emergency Contacts      (Rel.) Home Phone Work Phone Mobile Phone    Linda Arriaga (Power of ) [phone] -- [phone]            Insurance Information                MEDICARE/MEDICARE A & B Phone: [phone]    Subscriber: Magdi Arriaga Subscriber#: " [id number]    Group#:  Precert#:         ANTHEM BLUE CROSS/ANTHEM BLUE CROSS BLUE SHIELD PPO Phone: [phone]    Subscriber: Magdi Arriaga Subscriber#: [id number]    Group#: [id number] Precert#:              History & Physical      Bola Dixon MD at 11/20/2017  6:49 PM            CRITICAL CARE ADMISSION NOTE    Chief Complaint     Small bowel obstruction, exploratory laparotomy, postoperative altered mental status, postoperative respiratory failure    History of Present Illness     Magdi Arriaga is a 72 y.o. male, former smoker, To [facility] from [facility] with complaints of diffuse lower abdominal pain, nausea and vomiting.  Patient was recently admitted for craniotomy due to subdural hematoma and discharged on 11/15/17 [facility].  Information is obtained from emergency department records as there is no family present at this time.  Apparently the patient had a hypotensive episode today during a vomiting spell.  Blood pressure was 83/63.  Emesis in the ED was noted to be black coffee ground appearing.  He has a past history significant for congestive heart failure, chronic kidney disease, CABG ×5 in April of this year, coronary artery disease with stent, COPD, and asthma.  Upon arrival to the emergency department the patient denied a recent history of melena or hematochezia.  He did not remember any recent bowel movements while at [facility].    He is seen in ICU following exploratory laparotomy for small bowel obstruction due to adhesions.  Upon arrival the patient was noted to be intubated, a right pinpoint pupil with little reaction and a left pupil which was reactive to light and accommodation.  He moved to noxious stimuli but did not localize to this.  He did stick his tongue out to command.  Apparently he was unable to wake up while in the recovery room and was transferred to ICU intubated and on mechanical ventilation.    Patient  has been moved back to the ICU after having undergone a CT scan of the head.  This reveals a possible acute or evolving right sided infarct.  The patient is currently awake and alert, intubated, but follows commands.  He has left-sided weakness but apparently has a baseline history of left-sided weakness.    Nursing staff have suction only swallow secretions from his endotracheal tube    Problem List, Surgical History, Family, Social History, and ROS     Patient Active Problem List   Diagnosis   • Abnormal CT scan, chest   • Allergic rhinitis   • Asthma   • Bronchiectasis   • Dementia   • Depression   • Dyslipidemia   • Hypertension   • Obesity   • JONNY (obstructive sleep apnea)   • Osteoarthritis   • Vitamin D deficiency   • Left bundle branch block   • Normocytic anemia   • COPD (chronic obstructive pulmonary disease)   • Coronary artery disease involving coronary bypass graft of native heart without angina pectoris   • Ischemic cardiomyopathy, most recent LVEF 30%   • History of atrial flutter   • History of DVT (deep vein thrombosis)   • Hematoma, calf   • History of exposure to infectious disease-Patient received platelet transfusion for CABG, 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, followed by [facility]   • Bradycardia   • Syncope, near   • Closed fracture of multiple ribs of right side   • Traumatic SDH, s/p right craniotomy on 11/8/2017   • Weakness of left side of body   • Cerebral edema   • Leukocytosis   • Small bowel obstruction     Past Surgical History:   Procedure Laterality Date   • APPENDECTOMY  1959   • CARDIAC CATHETERIZATION N/A 4/18/2017    Procedure: Left Heart Cath;  Surgeon: Kvng Stauffer MD;  [facility] CATH INVASIVE LOCATION;  Service:    • CARDIAC CATHETERIZATION N/A 8/9/2017    Procedure: Right  and Left Heart Cath;  Surgeon: Kvng Stauffer MD;  Location:  [facility] CATH INVASIVE LOCATION;  Service:    • COLONOSCOPY W/ POLYPECTOMY     • CORONARY ANGIOPLASTY WITH STENT PLACEMENT     • CORONARY ARTERY BYPASS GRAFT N/A 4/24/2017    Procedure: MEDIAN STERNOTOMY, CORONARY ARTERY BYPASS GRAFT X5 UTILIZING THE INTERNAL MAMMARY ARTERY, ENDOVASCULAR VEING HARVEST UTILIZING RIGHT SAPHENOUS VEIN,TRANSESOPHAGEAL ECHO;  Surgeon: Kanu Berger MD;  Location:  [facility] OR;  Service:    • FRACTURE SURGERY     • KNEE SURGERY  2006, 2014    primary repair -left periprosthetic patellar tendon tear 6/11/2014   • LUMBAR LAMINECTOMY  2013    With discectomy   • DE HARLEY HOLE EVAC SUBDUR/EXTRA HEMATOMA Right 11/8/2017    Procedure: CRANIOTOMY FOR SUBDURAL HEMATOMA;  Surgeon: True Frank MD;  Location:  [facility] OR;  Service: Neurosurgery   • REPLACEMENT TOTAL KNEE Bilateral 2009, 2013    Left - 2009 and right - 2013.   • SKIN BIOPSY     • TONSILLECTOMY  1957   • VENA CAVA FILTER PLACEMENT  2014       Allergies   Allergen Reactions   • Sulfa Antibiotics      UNKNOWN CHILDHOOD REACTION     No current facility-administered medications on file prior to encounter.      Current Outpatient Prescriptions on File Prior to Encounter   Medication Sig   • acetaminophen (TYLENOL) 325 MG tablet Take 2 tablets by mouth Every 4 (Four) Hours As Needed for Mild Pain .   • acetaminophen (TYLENOL) 650 MG suppository Insert 1 suppository into the rectum Every 4 (Four) Hours As Needed for Mild Pain .   • albuterol (PROVENTIL HFA;VENTOLIN HFA) 108 (90 BASE) MCG/ACT inhaler Inhale 2 puffs Every 4 (Four) Hours As Needed for Wheezing.   • amLODIPine (NORVASC) 5 MG tablet Take 1 tablet by mouth Daily.   • aspirin 81 MG chewable tablet Chew 1 tablet Daily.   • atorvastatin (LIPITOR) 10 MG tablet Take 1 tablet by mouth Daily.   • bisacodyl (DULCOLAX) 5 MG EC tablet Take 1 tablet by mouth Daily As Needed for Constipation.   • budesonide-formoterol (SYMBICORT)  160-4.5 MCG/ACT inhaler Inhale 2 puffs 2 (Two) Times a Day.   • Cholecalciferol (VITAMIN D3) 2000 units tablet Take 1 tablet by mouth Daily.   • docusate sodium 100 MG capsule Take 100 mg by mouth 2 (Two) Times a Day As Needed for Constipation.   • entecavir (BARACLUDE) 0.5 MG tablet Take 0.5 mg by mouth Daily.   • famotidine (PEPCID) 20 MG tablet Take 20 mg by mouth Daily.   • ferrous sulfate 325 (65 FE) MG tablet Take 325 mg by mouth Daily With Breakfast.   • furosemide (LASIX) 40 MG tablet Take 1 tablet by mouth Daily.   • memantine (NAMENDA) 10 MG tablet Take 10 mg by mouth 2 (Two) Times a Day.   • montelukast (SINGULAIR) 10 MG tablet Take 1 tablet by mouth Every Night.   • nitroglycerin (NITROSTAT) 0.4 MG SL tablet Place 1 tablet under the tongue Every 5 (Five) Minutes As Needed for Chest Pain. Take no more than 3 doses in 15 minutes.   • ondansetron (ZOFRAN) 4 MG tablet Take 1 tablet by mouth Every 6 (Six) Hours As Needed for Nausea or Vomiting.   • PARoxetine (PAXIL) 30 MG tablet Take 1 tablet by mouth Every Morning.   • rivastigmine (EXELON) 9.5 MG/24HR patch Place 1 patch on the skin Daily.   • sacubitril-valsartan (ENTRESTO) 49-51 MG tablet Take 0.5 tablets by mouth 2 (Two) Times a Day.   • sennosides-docusate sodium (SENOKOT-S) 8.6-50 MG tablet Take 1 tablet by mouth At Night As Needed for Constipation.   • vitamin C (ASCORBIC ACID) 250 MG tablet Take 1 tablet by mouth 2 (Two) Times a Day With Meals. Take with iron supplement to enhance absorption     MEDICATION LIST AND ALLERGIES REVIEWED.    Family History   Problem Relation Age of Onset   • Adopted: Yes   • No Known Problems Mother      Adopted as child   • No Known Problems Father      No knowledge of birth family     Social History   Substance Use Topics   • Smoking status: Former Smoker     Packs/day: 1.00     Years: 17.00     Types: Cigarettes     Start date: 1965     Quit date: 1982   • Smokeless tobacco: Never Used   • Alcohol use No       "Comment: Never drinker     Social History     Social History Narrative    Domestic life :  Lives in private home with wife        Anglican :   Moravian        Sleep hygiene :  In bed 11 PM to 8 AM for 8 hours of sleep        Caffeine use :  4 cup of coffee and 1 diet cola daily        Exercise habits :   Cardiac rehabilitation -  Spring 2017 on -since heart surgery and heart failure        Diet :  Low calorie diet - low in salt and low in sugar        Occupation :   Retired        Hearing :        Vision :        Driving :  No driving         FAMILY AND SOCIAL HISTORY REVIEWED.    Review of Systems  Unable to assess secondary to patient's neurological status and intubation status.  There is no family present at this time.    Physical Exam and Clinical Information   /76  Pulse 101  Temp 97.8 °F (36.6 °C) (Temporal Artery )   Resp 15  Ht 68\" (172.7 cm)  Wt 173 lb (78.5 kg)  SpO2 100%  BMI 26.3 kg/m2     Physical Exam:   GENERAL: Intubated, ventilated, no distress   HEENT: ET tube at 22 cm.  Pupils reactive bilaterally   LUNGS: No wheezes or rhonchi   HEART: Regular rate and rhythm with distant heart sounds, no murmurs   ABDOMEN: Midline incision without drainage.  Abdomen soft but diffusely tender   EXTREMITIES: Trace edema.  Palpable pulses.  No cyanosis   NEURO/PSYCH: Patient is intubated and mechanically ventilated but will follow commands such as wiggling toes, grasp, and other commands although strength is asymmetric, weaker on the left than the right    Results from last 7 days  Lab Units 11/20/17  1236 11/14/17  0401   WBC 10*3/mm3 41.58* 22.69*   HEMOGLOBIN g/dL 13.0* 11.8*   PLATELETS 10*3/mm3 341 287       Results from last 7 days  Lab Units 11/20/17  1236 11/14/17  0401   SODIUM mmol/L 139 138   POTASSIUM mmol/L 5.4 4.5   CO2 mmol/L 30.0 31.0   BUN mg/dL 56* 52*   CREATININE mg/dL 2.30* 1.20   GLUCOSE mg/dL 152* 118*     Estimated Creatinine Clearance: 32.2 mL/min (by C-G formula based on Cr of " 2.3).          Lab Results   Component Value Date    LACTATE 1.7 11/20/2017        IMAGES: Noncontrast CT scan of the head reviewed.  I booked dense area possibly representing a small acute infarct.    Chest x-ray done postoperatively just prior to reintubation reveals no infiltrates or consolidation    I reviewed the patient's results and images.     \[facility] [place] [facility]\"" Problem List     * (Principal)Small bowel obstruction    COPD (chronic obstructive pulmonary disease)    Traumatic SDH, s/p right craniotomy on 11/8/2017    Cerebral edema    Bronchiectasis    Overview Signed 1/6/2017  8:57 AM by Davis BERMAN  CT scan of the chest 12/13/2015 reports mild to moderate bronchiectasis.           Dementia    Hypertension    JONNY (obstructive sleep apnea)    Overview Signed 1/6/2017  8:57 AM by Davis HIDALGO.  Obstructive sleep apnea with central component.B.  On home CPAP therapy.         Coronary artery disease involving coronary bypass graft of native heart without angina pectoris    Overview Signed 10/25/2017 11:32 AM by MORALES Anaya     1. [facility] 8-8-17:   1.  Severe proximal LAD/first diagonal stenosis (ungrafted) history of a 2.5 x 23 Xience EES   2.  Occluded LAD with patent vein graft to the diagonal and LIMA to the LAD   3.  Severe left circumflex stenosis with widely patent vein graft to the OM1 and OM 2   4.  Occluded RCA with patent saphenous vein graft   5.  Disease and a small ramus branch (too small for PCI)   6.  Normal right heart pressures and post capillary wedge pressure.         Ischemic cardiomyopathy, most recent LVEF 30%    Overview Addendum 10/25/2017 11:33 AM by MORALES Anaya     1. Echo 8-7-17:   · Left ventricular systolic function is moderately decreased. Estimated EF = 30%.  · The cardiac valves are anatomically and functionally normal.    2. Echo 8-24-17:  · Left ventricular systolic function is moderately decreased. Estimated EF = 30%.  · An apical left  ventricular aneurysm is present.         CKD (chronic kidney disease), stage III    Dyslipidemia    History of DVT (deep vein thrombosis)        Plan/Recommendations     1. Admit to ICU  2. Neurology consultation pending; consider CT perfusion although contrast would certainly aggravate current renal dysfunction  3. Now that he is awake and follow commands, attempt weaning and extubate  4. Monitor Creatinine, consult Nephrology if needed  5. Serial neurologic exam  6. AM labs, Chest Xray    Critical Care time spent in direct patient care: 40 minutes (excluding procedure time, if applicable) including high complexity decision making to assess, manipulate, and support vital organ system failure in this individual who has impairment of one or more vital organ systems such that there is a high probability of imminent or life threatening deterioration in the patient’s condition.    Junior Bazan, APRN, [facility]  Pulmonary and Critical Care Service  11/20/2017 7:00 PM     I have seen and examined patient, performing a face-to-face diagnostic evaluation with plan of care reviewed and developed with APRN and nursing staff. I have addended and modified the above history of present illness, physical examination, and assessment and plan to reflect my findings and impressions.    BEATRIZ Dixon MD  Pulmonary and Critical Care Medicine  11/20/17 6:49 PM     CC: Colton Dickens MD    Please note that portions of this note were completed with a voice recognition program. Efforts were made to edit the dictations, but occasionally words are mistranscribed.     Electronically signed by Bola Dixon MD at 11/20/2017  7:39 PM        Hospital Medications (active)       Dose Frequency Start End    acetaminophen (TYLENOL) tablet 650 mg 650 mg Every 4 Hours PRN 11/20/2017     Sig - Route: Take 2 tablets by mouth Every 4 (Four) Hours As Needed for Headache or Fever (fever greater than 101.5 F). - Oral    Linked Group 1:   "\"Or\" Linked Group Details        albuterol (PROVENTIL) nebulizer solution 0.5% 2.5 mg/0.5mL 2.5 mg Every 4 Hours PRN 11/20/2017     Sig - Route: Take 0.5 mL by nebulization Every 4 (Four) Hours As Needed for Wheezing. - Nebulization    aspirin chewable tablet 81 mg 81 mg Daily 11/21/2017     Sig - Route: Chew 1 tablet Daily. - Oral    atorvastatin (LIPITOR) tablet 10 mg 10 mg Nightly 11/20/2017     Sig - Route: Take 1 tablet by mouth Every Night. - Oral    bisacodyl (DULCOLAX) suppository 10 mg 10 mg Daily PRN 11/20/2017     Sig - Route: Insert 1 suppository into the rectum Daily As Needed for Constipation. - Rectal    buPROPion (WELLBUTRIN) tablet 75 mg 75 mg Every 12 Hours Scheduled 12/1/2017     Sig - Route: Take 1 tablet by mouth Every 12 (Twelve) Hours. - Oral    carvedilol (COREG) tablet 6.25 mg 6.25 mg Every 12 Hours Scheduled 11/27/2017     Sig - Route: Take 1 tablet by mouth Every 12 (Twelve) Hours. - Oral    diphenhydrAMINE (BENADRYL) capsule 25 mg 25 mg Every 6 Hours PRN 11/24/2017     Sig - Route: Take 1 capsule by mouth Every 6 (Six) Hours As Needed for Itching or Allergies. - Oral    docusate sodium (COLACE) liquid 100 mg 100 mg 2 Times Daily 11/26/2017     Sig - Route: Take 10 mL by mouth 2 (Two) Times a Day. - Oral    entecavir (BARACLUDE) tablet 0.5 mg 0.5 mg Every 24 Hours Scheduled 11/22/2017     Sig - Route: Take 1 tablet by mouth Daily. - Oral    famotidine (PEPCID) tablet 20 mg 20 mg Daily 11/26/2017     Sig - Route: Take 1 tablet by mouth Daily. - Oral    HYDROcodone-acetaminophen (HYCET) 7.5-325 MG/15ML solution 15 mL 15 mL Every 6 Hours PRN 11/28/2017 12/8/2017    Sig - Route: Take 15 mL by mouth Every 6 (Six) Hours As Needed for Moderate Pain . - Oral    ipratropium-albuterol (DUO-NEB) nebulizer solution 3 mL 3 mL 4 Times Daily - RT 11/29/2017     Sig - Route: Take 3 mL by nebulization 4 (Four) Times a Day. - Nebulization    lactated ringers infusion 10 mL/hr Continuous 11/23/2017     Sig - " "Route: Infuse 10 mL/hr into a venous catheter Continuous. - Intravenous    Magnesium Sulfate 2 gram Bolus, followed by 8 gram infusion (total Mg dose 10 grams)- Mg less than or equal to 1mg/dL 2 g As Needed 11/27/2017     Sig - Route: Infuse 50 mL into a venous catheter As Needed (Mg less than or equal to 1mg/dL). - Intravenous    Linked Group 2:  \"Or\" Linked Group Details        magnesium sulfate 4 gram infusion- Mg 1.6-1.9 mg/dL 4 g As Needed 11/27/2017     Sig - Route: Infuse 100 mL into a venous catheter As Needed (Mg 1.6-1.9 mg/dL). - Intravenous    Linked Group 2:  \"Or\" Linked Group Details        Magnesium Sulfate 6 gram Infusion (2 gm x 3) -Mg 1.1 -1.5 mg/dL 2 g As Needed 11/27/2017     Sig - Route: Infuse 50 mL into a venous catheter As Needed (Mg 1.1 -1.5 mg/dL). - Intravenous    Linked Group 2:  \"Or\" Linked Group Details        memantine (NAMENDA) tablet 10 mg 10 mg Every 12 Hours Scheduled 11/20/2017     Sig - Route: Take 1 tablet by mouth Every 12 (Twelve) Hours. - Oral    metoclopramide (REGLAN) injection 10 mg 10 mg Every 6 Hours Scheduled 11/27/2017     Sig - Route: Infuse 2 mL into a venous catheter Every 6 (Six) Hours. - Intravenous    nitroglycerin (NITROSTAT) SL tablet 0.4 mg 0.4 mg Every 5 Minutes PRN 11/20/2017     Sig - Route: Place 1 tablet under the tongue Every 5 (Five) Minutes As Needed for Chest Pain. - Sublingual    ondansetron (ZOFRAN) injection 4 mg 4 mg Every 6 Hours PRN 11/20/2017     Sig - Route: Infuse 2 mL into a venous catheter Every 6 (Six) Hours As Needed for Nausea or Vomiting. - Intravenous    Linked Group 3:  \"Or\" Linked Group Details        ondansetron (ZOFRAN) tablet 4 mg 4 mg Every 6 Hours PRN 11/20/2017     Sig - Route: Take 1 tablet by mouth Every 6 (Six) Hours As Needed for Nausea or Vomiting. - Oral    Linked Group 3:  \"Or\" Linked Group Details        potassium chloride (KLOR-CON) packet 40 mEq 40 mEq As Needed 11/27/2017     Sig - Route: Take 40 mEq by mouth As " "Needed (potassium replacement, see admin instructions). - Oral    Linked Group 4:  \"Or\" Linked Group Details        potassium chloride (MICRO-K) CR capsule 40 mEq 40 mEq As Needed 11/27/2017     Sig - Route: Take 4 capsules by mouth As Needed (potassium replacement.  see admin instructions). - Oral    Linked Group 4:  \"Or\" Linked Group Details        potassium chloride 20 mEq in 50 mL IVPB 20 mEq Every 1 Hour PRN 11/27/2017     Sig - Route: Infuse 50 mL into a venous catheter Every 1 (One) Hour As Needed (potassium protocol CENTRAL IV - see admin instructions). - Intravenous    Linked Group 4:  \"Or\" Linked Group Details        PRO-STAT 1 packet 1 packet Daily 12/1/2017     Sig - Route: 1 packet by Nasogastric route Daily. - Nasogastric    rivastigmine (EXELON) 9.5 MG/24HR patch 1 patch 1 patch Daily 11/21/2017     Sig - Route: Place 1 patch on the skin Daily. - Transdermal    sacubitril-valsartan (ENTRESTO) 49-51 MG tablet 1 tablet 1 tablet Every 12 Hours Scheduled 11/25/2017     Sig - Route: Take 1 tablet by mouth Every 12 (Twelve) Hours. - Oral    sennosides (SENOKOT) 8.8 MG/5ML syrup 10 mL 10 mL 2 Times Daily 11/26/2017     Sig - Route: Take 10 mL by mouth 2 (Two) Times a Day. - Oral    sodium chloride 0.9 % flush 1-10 mL 1-10 mL As Needed 11/20/2017     Sig - Route: Infuse 1-10 mL into a venous catheter As Needed for Line Care. - Intravenous    sodium chloride 0.9 % flush 10 mL 10 mL As Needed 11/20/2017     Sig - Route: Infuse 10 mL into a venous catheter As Needed for Line Care. - Intravenous    Cosign for Ordering: Accepted by Odell Zaragoza MD on 11/20/2017 10:13 PM    buPROPion XL (WELLBUTRIN XL) 24 hr tablet 150 mg (Discontinued) 150 mg Daily 11/30/2017 12/1/2017    Sig - Route: Take 1 tablet by mouth Daily. - Oral             Physician Progress Notes (last 24 hours) (Notes from 11/30/2017  2:09 PM through 12/1/2017  2:09 PM)      Usha Almodovar MD at 11/30/2017  4:15 PM  Version 1 of 1     "     Critical Care Note     LOS: 10 days   Patient Care Team:  Colton Dickens MD as PCP - General (Internal Medicine)  Kvng Stauffer MD as Consulting Physician (Cardiology)    Chief Complaint/Reason for visit:    Chief Complaint   Patient presents with   • Vomiting   SBO s/p surgery, post op confusion    Subjective        72-year-old male admitted to [facility] on 11/20 in transfer from [facility] with abdominal pain, nausea, and vomiting.  He was found to have a small bowel obstruction and underwent resection of a necrotic segment of the proximal jejunum.  He has a recent history of craniotomy due to subdural hematoma. There was some question of new CVA but this was ruled out and it was decided to avoid anticoagulants.  He also has a history of congestive heart failure, chronic kidney disease, CABG ×5 in April of this year, as well as COPD.  He was transferred to the ICU postoperatively and has progressed slowly. He has had several bloody stools and a total of 3 units of packed red blood cells. His most recent hemoglobin is now 10.4 improved from 7.4. Corpak was placed under fluoroscopy and tube feeding initiated  He has a known history of cardiomyopathy. Entresto and Coreg were restarted. . He had some runs of atrial fibrillation, and nonsustained ventricular tachycardia. Cardiology evaluated and reported that his EF was now greater than 35% which is improved. They recommended beta blockers and electrolyte replacement and maintaining a hemoglobin greater than 8.  Nursing reports that he is silently aspirating. Corpak placed under fluoroscopy. Initially started on a low rate and he is tolerating. Speech therapy is working with him. They note very weak orally, with difficulty just protruding his tongue.    Interval History:    He has generalized weakness that is worse on the left. Left-sided weakness occurred after his subdural hematoma. Mentation continues to wax and wane. This  "morning he is more alert. He has very poor secretions mobilization and is requiring frequent assistance to mobilize respiratory secretions.      Review of Systems:    All systems were reviewed and negative except as noted in subjective.    Medical history, surgical history, social history, family history reviewed    Objective     Intake/Output:    Intake/Output Summary (Last 24 hours) at 11/30/17 1615  Last data filed at 11/30/17 1400   Gross per 24 hour   Intake           3080.8 ml   Output                0 ml   Net           3080.8 ml       Nutrition:  Diet, Tube Feeding Tube Feeding Formula: Peptamen 1.5 (Peptide Based, Calorically Dense); PO Tray: Patient is NPO (No Tray)    Infusions:    lactated ringers 10 mL/hr Last Rate: 10 mL/hr (11/24/17 1203)          Telemetry:  Sinus Rhythm: normal sinus rhythm  Atrial Rhythm: atrial fibrillation (intermittent bursts of afib)       Vital Signs  Blood pressure 140/78, pulse 67, temperature 97.7 °F (36.5 °C), temperature source Axillary, resp. rate 16, height 67.99\" (172.7 cm), weight 173 lb (78.5 kg), SpO2 98 %.    Physical Exam:  General Appearance:  Frail appearing older gentleman in no acute distress.   Head:   Right craniotomy site with sutures inta   Eyes:          No jaundice. Pupils equal reactive to light    Ears:     Throat: Oral mucosa moist Nasal Corpak in position    Neck: Trachea midline, no palpable thyroid    Back:      Lungs:   Breath sounds are bilateral, few rhonchi anteriorly     Heart:  regular, normal S1, S2    Abdomen:   Midline incision intact. Few bowel sounds present, nondistended    Rectal:   Deferred   Extremities: No pitting edema, Extremely poor musculature of the lower extremities    Pulses:    Skin: Warm and dry    Lymph nodes:    Neurologic: more alert today. Sitting upright in bed. Will follow commands and answer questions. Left upper extremity 3 out of 5 strength. Cannot lift his left leg off of the bed. Voice is extremely weak and he " can only just whisper       Results Review:     I reviewed the patient's new clinical results.     Results from last 7 days  Lab Units 11/30/17 0451 11/29/17 0308 11/28/17 1943 11/28/17 0309 11/25/17  1746   SODIUM mmol/L 148* 152*  --  149*  < > 146   POTASSIUM mmol/L 3.7 3.8 4.3 3.6  3.6  < > 3.2*   CHLORIDE mmol/L 114* 119*  --  119*  < > 114*   CO2 mmol/L 24.0 31.0  --  27.0  < > 27.0   BUN mg/dL 49* 48*  --  44*  < > 37*   CREATININE mg/dL 1.20 1.20  --  1.10  < > 1.40*   CALCIUM mg/dL 9.1 8.8  --  8.8  < > 8.5*   BILIRUBIN mg/dL  --   --   --  1.5*  --  0.5   ALK PHOS U/L  --   --   --  65  --  67   ALT (SGPT) U/L  --   --   --  13  --  12   AST (SGOT) U/L  --   --   --  12  --  10   GLUCOSE mg/dL 120* 120*  --  132*  < > 92   < > = values in this interval not displayed.    Results from last 7 days  Lab Units 11/30/17 0451 11/29/17 0308 11/28/17 1943 11/28/17 0309   WBC 10*3/mm3 12.45* 12.20*  --   --  11.75*   HEMOGLOBIN g/dL 10.0* 9.4* 9.7*  < > 9.7*  9.7*   HEMATOCRIT % 32.0* 29.9* 29.9*  < > 28.9*  28.9*   PLATELETS 10*3/mm3 263 256  --   --  194   < > = values in this interval not displayed.      Lab Results   Component Value Date    BLOODCX No growth at 5 days 11/20/2017     Lab Results   Component Value Date    URINECX 10,000-20,000 CFU/mL Enterococcus faecalis (A) 11/20/2017       I reviewed the patient's new imaging including images and reports.      FINDINGS: Portable chest reveals patient to be status post median  sternotomy. Heart is enlarged. Markings are again seen within the right  upper lobe and left lung base. Degenerative change is seen within the  spine.  No pleural effusion or pneumothorax.          IMPRESSION:  Stable chest as above. Mild increased markings remaining in  the right upper lobe and left lung base.      D:  11/27/2017     IMPRESSION:  1.  Recent right frontoparietal craniotomy with tiny focus of residual   postoperative pneumocephalus.  2.  Mild residual/chronic  subdural collection or hematoma along the right   frontoparietal convexity and anterior right parafalcine region measuring up to   5 mm in thickness.  3.  Improving/resolving anterosuperior parafalcine right frontal lobe edema   versus small subacute to remote infarct.  4.  Diffuse atrophy and mild white matter chronic small vessel ischemic changes.     THIS DOCUMENT HAS BEEN ELECTRONICALLY SIGNED BY GORDY NAIK JR. MD       Imaging      CT Head Without Contrast (Order #[id number]) on 11/29/2017 - Imaging Information       All medications reviewed.     aspirin 81 mg Oral Daily   atorvastatin 10 mg Oral Nightly   buPROPion  mg Oral Daily   carvedilol 6.25 mg Oral Q12H   docusate sodium 100 mg Oral BID   entecavir 0.5 mg Oral Q24H   famotidine 20 mg Oral Daily   ipratropium-albuterol 3 mL Nebulization 4x Daily - RT   memantine 10 mg Oral Q12H   metoclopramide 10 mg Intravenous Q6H   [START ON 12/1/2017] PRO-STAT 1 packet Nasogastric Daily   rivastigmine 1 patch Transdermal Daily   sacubitril-valsartan 1 tablet Oral Q12H   sennosides 10 mL Oral BID         Assessment/Plan     Principal Problem:    Altered mental status post op  Active Problems:    Small bowel obstruction (S/P Exp lap for acute SBO 11/20/17)    Dementia    Hyperlipidemia LDL goal <70    Essential hypertension    COPD (chronic obstructive pulmonary disease)    Coronary artery disease involving coronary bypass graft of native heart without angina pectoris    Ischemic cardiomyopathy    History of DVT (deep vein thrombosis)    CKD (chronic kidney disease), stage III    Acute type B viral hepatitis related to platelet transfusion, followed by [facility]    Traumatic SDH, 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 He has required 3 units of packed red blood cells postoperatively. Bloody stools have resolved. Corpak was placed under fluoroscopy and tube feeding initiated. He is tolerating rate of 50ml/h with goal 75ml/h. He continues to have generalized weakness, L>R. mentation continues to wax and wane. Repeat CT scan of the head revealed no change and a minimal residual subdural hematoma. He remains afebrile. He has no sign of congestive heart failure. He is having some bursts of atrial fibrillation and nonsustained ventricular tachycardia.  His cough is very weak and he needs significant assistance in mobilizing respiratory secretions. Therefore he likely needs to stay in the intensive care unit    PLAN:  Aspirin, statin  Replace electrolytes  Replace free water  Coreg, Entresto  Entecavir for exposure to Hepatitis B  Continue Exelon, Namenda for dementia  Increase tube feeding, and continue free water  Resume physical therapy, occupational therapy  NS to remove sutures tomorrow  Leave in icu for respiratory secretions  Neurology stopped Paxil and started Welbutrin    VTE Prophylaxis:SCDS    Stress Ulcer Prophylaxis: Pepwon Almodovar MD  11/30/17  4:15 PM      Time: 25min  I personally provided care to this critically ill patient as documented above.  Critical care time does not include time spent on separately billed procedures.  Non of my critical care time was concurrent with other critical care providers.      Electronically signed by Usha Almodovar MD at 11/30/2017  4:20 PM      Cj Reyes MD at 12/1/2017 10:30 AM  Version 1 of 1         Magdi Arriaga  1945  [id number]    Surgery Progress Note    Date of visit: 12/1/2017    Subjective:up in chair  Sleeping  Denies any complaints  Tolerating TF  BM with no blood    Objective:    /83  Pulse 64  Temp 98.1 °F (36.7 °C)  "(Axillary)   Resp 20  Ht 67.99\" (172.7 cm)  Wt 173 lb (78.5 kg)  SpO2 98%  BMI 26.3 kg/m2    Intake/Output Summary (Last 24 hours) at 12/01/17 1030  Last data filed at 12/01/17 0600   Gross per 24 hour   Intake             2191 ml   Output                0 ml   Net             2191 ml         L: normal air entry  Abd: soft and non tender. Incision healing well      LABS:      Results from last 7 days  Lab Units 12/01/17  0721   WBC 10*3/mm3 13.49*   HEMOGLOBIN g/dL 9.5*   HEMATOCRIT % 29.6*   PLATELETS 10*3/mm3 284       Results from last 7 days  Lab Units 12/01/17  0721  11/28/17  0309   SODIUM mmol/L 145  < > 149*   POTASSIUM mmol/L 4.0  < > 3.6  3.6   CHLORIDE mmol/L 111*  < > 119*   CO2 mmol/L 32.0*  < > 27.0   BUN mg/dL 43*  < > 44*   CREATININE mg/dL 1.00  < > 1.10   CALCIUM mg/dL 8.8  < > 8.8   BILIRUBIN mg/dL  --   --  1.5*   ALK PHOS U/L  --   --  65   ALT (SGPT) U/L  --   --  13   AST (SGOT) U/L  --   --  12   GLUCOSE mg/dL 112*  < > 132*   < > = values in this interval not displayed.    Results from last 7 days  Lab Units 12/01/17  0721   SODIUM mmol/L 145   POTASSIUM mmol/L 4.0   CHLORIDE mmol/L 111*   CO2 mmol/L 32.0*   BUN mg/dL 43*   CREATININE mg/dL 1.00   GLUCOSE mg/dL 112*   CALCIUM mg/dL 8.8       Lab Results  Lab Value Date/Time   LIPASE 30 11/20/2017 1236         Assessment/ Plan: stable post surgical course  Sleeping most of the day  CT scan of head done  Medications adjusted and neurology is following  Continue with current management    Problem List Items Addressed This Visit     Small bowel obstruction (S/P Exp lap for acute SBO 11/20/17)    Relevant Orders    Tissue Pathology Exam - Tissue, Small Intestine (Completed)      Other Visit Diagnoses     Pharyngeal dysphagia    -  Primary    Lower abdominal pain        Leukocytosis, unspecified type        Hematemesis with nausea                Cj Reyes MD  12/1/2017  10:30 AM       Electronically signed by Cj Reyes, " MD at 2017 10:33 AM           Physical Therapy Notes (most recent note)      Keli Awad, PT at 2017  1:50 PM  Version 1 of 1         Acute Care - Physical Therapy Treatment Note   [facility]     Patient Name: Magdi Arriaga  : 1945  MRN: [id number]  Today's Date: 2017  Onset of Illness/Injury or Date of Surgery Date: 17  Date of Referral to PT: 17  Referring Physician: Earl    Admit Date: 2017    Visit Dx:    ICD-10-CM ICD-9-CM   1. Pharyngeal dysphagia R13.13 787.23   2. Small bowel obstruction K56.609 560.9   3. Lower abdominal pain R10.30 789.09   4. Leukocytosis, unspecified type D72.829 288.60   5. Hematemesis with nausea K92.0 578.0     787.02   6. Impaired mobility and ADLs Z74.09 799.89   7. 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, followed by [facility]   • Traumatic SDH, s/p right craniotomy on 2017   • Small bowel obstruction (S/P Exp lap for acute SBO 17)   • Altered mental status post op   • PVC (premature ventricular contraction)   • NSVT (nonsustained ventricular tachycardia)               Adult Rehabilitation Note       17 1320 17 1130 17 1430    Rehab Assessment/Intervention    Discipline physical therapist  - speech  language pathologist  -SM speech language pathologist  -LSA    Document Type therapy note (daily note)  -LS therapy note (daily note)  -SM therapy note (daily note)  -LSA    Subjective Information agree to therapy;no complaints  -LS agree to therapy  -SM no complaints;agree to therapy  -LSA    Patient Effort, Rehab Treatment good  -LS adequate  -SM     Symptoms Noted Comment significant lethary initially today; vc's for opening eyes and attending to task.   -LS      Precautions/Limitations fall precautions;oxygen therapy device and L/min;other (see comments)   abd incision; L-sided weakness  -LS      Recorded by [LS] Keli Awad, PT [SM] Socorro Massey, MS CCC-SLP [LSA] Maria Guadalupe Guzman, MS CCC-SLP    Vital Signs    Pre Systolic BP Rehab 134  -LS      Pre Treatment Diastolic BP 75  -LS      Post Systolic BP Rehab --   with OT at end of session  -LS      Pretreatment Heart Rate (beats/min) 62  -LS      Pre SpO2 (%) 99  -LS      O2 Delivery Pre Treatment supplemental O2  -LS      Pre Patient Position Sitting  -LS      Intra Patient Position Standing  -LS      Post Patient Position Sitting  -LS      Recorded by [LS] Keli Awad, PT      Pain Assessment    Pain Assessment 0-10  -LS      Pain Score 0  -LS      Post Pain Score 0  -LS      Recorded by [LS] Keli Awad, PT      Cognitive Assessment/Intervention    Current Cognitive/Communication Assessment functional  -LS      Orientation Status oriented to;person;place;required verbal cueing (specifiy in comments)   cues for month  -LS      Follows Commands/Answers Questions able to follow single-step instructions;needs cueing;needs increased time;needs repetition;50% of the time;75% of the time   improved with increased alertness  -LS      Personal Safety moderate impairment;decreased awareness, need for assist;decreased awareness, need for safety;decreased insight to deficits  -      Personal Safety Interventions fall prevention program maintained;gait  belt;nonskid shoes/slippers when out of bed  -LS      Recorded by [LS] Keli Awad, PT      Dysphagia Treatment Objectives and Progress    Dysphagia Treatment Objectives  Improve oral skills  -SM     To improve timing of pharyngeal response, patient will:  Swallow in timely way using three second prep;Swallow in timely way using super-supraglottic swallow;80%;with consistent cues  -SM Swallow in timely way using three second prep;Swallow in timely way using super-supraglottic swallow;80%;with consistent cues  -LSA    Timing of Pharyngeal Response Progress  0%;continue to adress  -SM 10%;with consistent cues;continue to adress  -LSA    To improve closure at entrance to airway, patient will:  Complete super-supraglottic swallow;80%;with consistent cues  -SM Complete super-supraglottic swallow;80%;with consistent cues  -LSA    Closure at Entrance to Airway Progress  continue to adress  -SM continue to adress  -LSA    To improve hyolaryngeal excursion, patient will:  Complete chin tuck against resistance (comment number of repetitions);80%;with consistent cues   30 sec reps x4  -SM Complete chin tuck against resistance (comment number of repetitions);80%;with consistent cues  -LSA    Hyolaryngeal Excursion Progress  20%;with consistent cues;continue to adress  -SM 20%;with consistent cues  -LSA    To improve tongue base & pharyngeal wall squeeze, patient will:  Complete effortful swallow;Complete tongue hold swallow;80%;with consistent cues  -SM Complete effortful swallow;Complete tongue hold swallow;80%;with consistent cues  -LSA    Tongue Base/Pharyngeal Wall Squeeze Progress  20%;with consistent cues;continue to adress  -SM 20%;with consistent cues  -LSA    Recorded by  [SM] Socorro Massey, MS CCC-SLP [LSA] Maria Guadalupe Guzman, MS CCC-SLP    Improve oral skills    To Improve Oral Skills, patient will:  Increase tongue tip elevation;80%;with consistent cues  -SM     Status: Improve Oral Skills  New  -SM     Oral  Skills Progress  20%;with consistent cues;continue to adress  -SM     Recorded by  [SM] Socorro Massey, MS CCC-SLP     Improve timing of pharyngeal response    Status: Improve timing of pharyngeal response  Progress slower than expected  -SM Progress slower than expected  -LSA    Comments: Improve timing of pharyngeal response  Too fatigued to trial SSG. Decreased secretion management, did not trials 3-sec prep.  -SM     Recorded by  [SM] Socorro Massey, MS CCC-SLP [LSA] Maria Guadalupe Guzman MS CCC-SLP    Improve closure at entrance to airway    Status: Improve closure at entrance to airway  Progress slower than expected  -SM Progress slower than expected  -LSA    Recorded by  [SM] Socorro Massey, MS CCC-SLP [LSA] Maria Guadalupe Guzman MS CCC-SLP    Improve hyolaryngeal excursion    Status: Improve hyolaryngeal excursion  Progress slower than expected  -SM Progress slower than expected  -LSA    Comments: Improve hyolaryngeal excursion   Max cues needed and limited ROM  -LSA    Recorded by  [SM] Socorro Massey MS CCC-SLP [LSA] Maria Guadalupe Guzman MS CCC-SLP    Improve tongue base & pharyngeal wall squeeze    Status: Improve tongue base & pharyngeal wall squeeze  Progress slower than expected  -SM Progress slower than expected  -LSA    Comments: Improve tongue base & pharyngeal wall squeeze  Focused on just effortful swallow  -SM Max cues needed.  -LSA    Recorded by  [SM] Socorro Massey MS CCC-SLP [LSA] Maria Guadalupe Guzman MS CCC-SLP    Dysphagia Other 1    Dysphagia Other 1 Objective  Tolerate ice chip and H2O trials without s/s aspiration with 50% accuracy and cues  -SM     Status: Dysphagia Other 1  New  -SM     Dysphagia Other 1 Progress  0%;with consistent cues;continue to address  -SM     Comments: Dysphagia Other 1  Wet secretions at baseline, increased with limited ice chip trial. Cough thogh difficulty getting to mouth. RN reports frequentr suctioning  -SM     Recorded by  [SM] Socorro CARABALLO  MS Bryson CCC-SLP     Bed Mobility, Assessment/Treatment    Bed Mobility, Comment UIC  -LS      Recorded by [LS] Keli Awad, PT      Transfer Assessment/Treatment    Transfers, Sit-Stand [place] dependent (less than 25% patient effort);2 person assist required;verbal cues required  -LS      Transfers, Stand-Sit [place] dependent (less than 25% patient effort);2 person assist required;verbal cues required  -LS      Transfer, Impairments strength decreased;impaired balance  -LS      Transfer, Comment Attempted x2 from recliner; able to clear hips but unable to achieve fully upright posture. VC's for hand placement and weightshifting forward to initiate.   -LS      Recorded by [LS] Keli Awad, PT      Gait Assessment/Treatment    Gait, [place] Level not appropriate to assess  -LS      Recorded by [LS] Keli Awad, PT      Balance Skills Training    Sitting-Level of Assistance Minimum assistance   L lateral lean; vc's for leaning on R elbow  -LS      Sitting-Balance Support Right upper extremity supported;Left upper extremity supported;Feet supported  -LS      Sitting-Balance Activities Lateral lean;Forward lean  -LS      Sitting # of Minutes 8  -LS      Standing-Level of Assistance Dependent;x2  -LS      Static Standing Balance Support Right upper extremity supported;Left upper extremity supported  -LS      Recorded by [LS] Keli Awad, PT      Therapy Exercises    Right Lower Extremity AAROM:;10 reps;sitting;ankle pumps/circles;LAQ;hip flexion  -LS      Left Lower Extremity PROM:;10 reps;sitting;calf stretch;hamstring stretch;ankle pumps/circles;hip flexion;LAQ  -LS      Recorded by [LS] Keli Awad, PT      Positioning and Restraints    Pre-Treatment Position sitting in chair/recliner  -LS      Post Treatment Position chair  -LS      In Chair notified nsg;reclined;call light within reach;encouraged to call for assist;exit alarm on;with family/caregiver;RUE elevated;LUE elevated;with  OT;on mechanical lift sling;waffle cushion;legs elevated;heels elevated;L multipodus;R waffle boot  -LS      Recorded by [LS] Keli Awad, PT        11/29/17 1439 11/29/17 1434 11/29/17 1130    Rehab Assessment/Intervention    Discipline physical therapist  -LS occupational therapist  -CL speech language pathologist  -LSA    Document Type therapy note (daily note)  -LS therapy note (daily note)  -CL     Subjective Information agree to therapy;no complaints  -LS agree to therapy;no complaints  -CL agree to therapy  -LSA    Patient Effort, Rehab Treatment good  -LS good  -CL     Symptoms Noted During/After Treatment fatigue  -LS fatigue  -CL     Precautions/Limitations fall precautions;other (see comments);oxygen therapy device and L/min   abd incision; L-sided weakness  -LS fall precautions;other (see comments)   abd incision, L sided weakness  -CL     Recorded by [LS] Keli Awad, PT [CL] Margarita Velasco OT [LSA] Maria Guadalupe Guzman MS CCC-SLP    Vital Signs    Pre Systolic BP Rehab 152  -  -CL     Pre Treatment Diastolic BP 86  -LS 86  -CL     Post Systolic BP Rehab 145  -  -CL     Post Treatment Diastolic BP 83  -LS 83  -CL     Pretreatment Heart Rate (beats/min) 74  -LS 74  -CL     Posttreatment Heart Rate (beats/min) 67  -LS 67  -CL     Pre SpO2 (%)  96  -CL     O2 Delivery Pre Treatment supplemental O2  -LS supplemental O2  -CL     Post SpO2 (%) 97  -LS 97  -CL     O2 Delivery Post Treatment supplemental O2  -LS supplemental O2  -CL     Pre Patient Position Sitting  -LS Sitting  -CL     Intra Patient Position Standing  -LS Standing  -CL     Post Patient Position Sitting  -LS Sitting  -CL     Recorded by [LS] Keli Awad, PT [CL] Margarita Velasco OT     Pain Assessment    Pain Assessment 0-10  -LS No/denies pain  -CL     Pain Score 0  -LS 0  -CL     Post Pain Score 0  -LS 0  -CL     Recorded by [LS] Keli Awad, PT [CL] Margarita Velasco OT     Cognitive Assessment/Intervention    Current  Cognitive/Communication Assessment functional  -LS functional  -CL     Orientation Status oriented to;person;situation  -LS oriented to;person;situation  -CL     Follows Commands/Answers Questions able to follow single-step instructions;75% of the time;needs cueing;needs increased time;needs repetition   decreased processing  -LS 75% of the time;needs cueing;needs increased time;needs repetition  -CL     Personal Safety moderate impairment;decreased awareness, need for assist;decreased awareness, need for safety;decreased insight to deficits  -LS moderate impairment;decreased awareness, need for assist;decreased awareness, need for safety  -CL     Personal Safety Interventions fall prevention program maintained;gait belt;nonskid shoes/slippers when out of bed  -LS fall prevention program maintained;gait belt;nonskid shoes/slippers when out of bed  -CL     Recorded by [LS] Keli Awad, PT [CL] Margarita Velasco OT     Dysphagia Treatment Objectives and Progress    To improve closure at entrance to airway, patient will:   Complete super-supraglottic swallow;80%;with consistent cues  -LSA    Closure at Entrance to Airway Progress   continue to adress   did not address 2' lethragy  -LSA    To improve hyolaryngeal excursion, patient will:   Complete chin tuck against resistance (comment number of repetitions);80%;with consistent cues  -LSA    Hyolaryngeal Excursion Progress   10%;20%;with consistent cues  -LSA    To improve tongue base & pharyngeal wall squeeze, patient will:   Complete effortful swallow;Complete tongue hold swallow;80%;with consistent cues  -LSA    Tongue Base/Pharyngeal Wall Squeeze Progress   20%;with consistent cues  -LSA    Recorded by   [LSA] Maria Guadalupe Guzman MS CCC-SLP    Improve closure at entrance to airway    Status: Improve closure at entrance to airway   Progress slower than expected  -LSA    Recorded by   [LSA] Maria Guadalupe Guzman MS CCC-SLP    Improve hyolaryngeal excursion    Status: Improve  hyolaryngeal excursion   Progress slower than expected  -LSA    Comments: Improve hyolaryngeal excursion   Max cues needed to complete task.  -LSA    Recorded by   [LSA] Maria Guadalupe Guzman MS CCC-SLP    Improve tongue base & pharyngeal wall squeeze    Status: Improve tongue base & pharyngeal wall squeeze   Progress slower than expected  -LSA    Comments: Improve tongue base & pharyngeal wall squeeze   Max cures needed.  -LSA    Recorded by   [LSA] Maria Guadalupe Guzman MS CCC-SLP    Bed Mobility, Assessment/Treatment    Bed Mobility, Comment Sitting EOB upon arrival with NSG.   -LS Pt received sitting EOB w/ nursing.   -CL     Recorded by [LS] Keli Awad, PT [CL] Margarita Velasco OT     Transfer Assessment/Treatment    Transfers, Bed-Chair [place] maximum assist (25% patient effort);2 person assist required;verbal cues required  -LS maximum assist (25% patient effort);2 person assist required;verbal cues required  -CL     Transfers, Sit-Stand [place] maximum assist (25% patient effort);2 person assist required;verbal cues required  -LS maximum assist (25% patient effort);2 person assist required;verbal cues required  -CL     Transfers, Stand-Sit [place] maximum assist (25% patient effort);2 person assist required;verbal cues required  -LS maximum assist (25% patient effort);2 person assist required;verbal cues required  -CL     Transfer, Impairments strength decreased;impaired balance  -LS      Transfer, Comment STS performed from EOB for pivot to recliner. Pt unable to achieve fully upright posture during transfer.   -LS Pt stood from EOB w/ OT/PT on either side of pt w/ B feet/knees blocked. Pt stood ~75% upright and performed bent-pivot to chair.   -CL     Recorded by [LS] Keli Awad, PT [CL] Margarita Velasco OT     Gait Assessment/Treatment    Gait, [place] Level not appropriate to assess  -LS      Recorded by [LS] Keli Awad PT      Functional Mobility    Functional Mobility- Ind. Level  not  appropriate to assess;unable to perform  -CL     Recorded by  [CL] Margarita Velasco OT     Motor Skills/Interventions    Additional Documentation Balance Skills Training (Group)  -LS Balance Skills Training (Group)  -CL     Recorded by [LS] Keli Awad, PT [CL] Margarita Velasco OT     Balance Skills Training    Sitting-Level of Assistance Maximum assistance   able to progress to moments of min A  -LS Maximum assistance   moments of Min A  -CL     Sitting-Balance Support Right upper extremity supported;Left upper extremity supported  -LS Right upper extremity supported;Left upper extremity supported;Feet supported  -CL     Sitting-Balance Activities Lateral lean;Trunk control activities  -LS Forward lean;Lateral lean;Reaching for objects;Right UE Weight Bearing;Left UE Weight Bearing;Trunk control activities  -CL     Standing-Level of Assistance Maximum assistance;x2  -LS Maximum assistance;x2  -CL     Static Standing Balance Support assistive device;Left upper extremity supported;Right upper extremity supported  -LS Right upper extremity supported;Left upper extremity supported  -CL     Standing-Balance Activities  Weight Shift A-P;Weight Shift R-L  -CL     Recorded by [LS] Keli Awad, PT [CL] Margarita Velasco OT     Therapy Exercises    Right Lower Extremity AAROM:;10 reps;sitting;ankle pumps/circles;hamstring stretch;hip flexion;LAQ  -LS      Left Lower Extremity PROM:;10 reps;sitting;ankle pumps/circles;hamstring stretch;LAQ;hip flexion  -LS      Bilateral Upper Extremity  10 reps;AROM:;elbow flexion/extension;hand pumps;shoulder horizontal abd/add;shoulder protraction/retraction  -CL     Recorded by [LS] Keli Awad, PT [CL] Margarita Velasco OT     Positioning and Restraints    Pre-Treatment Position in bed  -LS in bed  -CL     Post Treatment Position chair  -LS chair  -CL     In Chair notified nsg;reclined;call light within reach;encouraged to call for assist;exit alarm on;with family/caregiver;RUE elevated;LUE  elevated;waffle cushion;on mechanical lift sling;legs elevated;heels elevated;waffle boot/both  -LS notified nsg;reclined;call light within reach;encouraged to call for assist;exit alarm on;with family/caregiver;LUE elevated;waffle cushion;on mechanical lift sling;legs elevated;heels elevated;waffle boot/both  -CL     Recorded by [LS] Keli Awad, PT [CL] Margarita Velasco, OT       User Key  (r) = Recorded By, (t) = Taken By, (c) = Cosigned By    Initials Name Effective Dates     Socorroalex Massey, MS CCC-SLP 06/22/15 -     LSA Maria Guadalupe Guzman, MS [facility]-SLP 06/22/15 -     LS Keli Awad, PT 06/19/15 -     CL Margarita Velasco, OT 06/08/16 -                 IP PT Goals       12/01/17 1347 11/29/17 1528 11/27/17 1608    Bed Mobility PT LTG    Bed Mobility PT LTG, Outcome goal ongoing  -LS goal ongoing  -LS goal ongoing  -LS    Transfer Training PT LTG    Transfer Training PT LTG, Outcome goal ongoing  -LS goal ongoing  -LS goal ongoing  -LS    Dynamic Sitting Balance PT LTG    Dynamic Sitting Balance PT LTG, Outcome goal ongoing  -LS goal ongoing  -LS goal ongoing  -LS      11/25/17 1519 11/23/17 1102       Bed Mobility PT LTG    Bed Mobility PT LTG, Date Established  11/23/17  -LSA     Bed Mobility PT LTG, Time to Achieve  2 wks  -LSA     Bed Mobility PT LTG, Activity Type  supine to sit/sit to supine  -LSA     Bed Mobility PT LTG, [place] Level  moderate assist (50% patient effort)  -LSA     Bed Mobility PT LTG, Outcome goal ongoing  -KR goal ongoing  -LSA     Transfer Training PT LTG    Transfer Training PT LTG, Date Established 11/25/17  -KR 11/23/17  -LSA     Transfer Training PT LTG, Time to Achieve 2 wks  -KR 2 wks  -LSA     Transfer Training PT LTG, Activity Type sit to stand/stand to sit  -KR sit to stand/stand to sit  -LSA     Transfer Training PT LTG, [place] Level moderate assist (50% patient effort);2 person assist required  -KR maximum assist (25% patient effort);2 person assist required  -LSA      Transfer Training PT  LTG, Date Goal Reviewed 11/25/17  -KR      Transfer Training PT LTG, Outcome goal revised  -KR goal ongoing  -LSA     Transfer Training PT LTG, Reason Goal Not Met goals revised this date  -KR      Dynamic Sitting Balance PT LTG    Dynamic Sitting Balance PT LTG, Date Established  11/23/17  -LSA     Dynamic Sitting Balance PT LTG, Time to Achieve  2 wks  -LSA     Dynamic Sitting Balance PT LTG, [place] Level  minimum assist (75% patient effort)  -LSA     Dynamic Sitting Balance PT LTG, Outcome goal ongoing  -KR goal ongoing  -LSA       User Key  (r) = Recorded By, (t) = Taken By, (c) = Cosigned By    Initials Name Provider Type    LSA Anna Crawford, PT Physical Therapist    LS Keli Awad, PT Physical Therapist    KR Sri Mejía, PT Physical Therapist          Physical Therapy Education     Title: PT OT SLP Therapies (Active)     Topic: Physical Therapy (Active)     Point: Mobility training (Active)    Learning Progress Summary    Learner Readiness Method Response Comment Documented by Status   Patient Acceptance E,D NR Wife verbalized understanding of appropriate technique in assisting pt with ROM and ther ex.  12/01/17 1346 Active    Acceptance E,D NR   11/29/17 1528 Active    Acceptance E,D NR   11/27/17 1608 Active    Acceptance E NR  KR 11/25/17 1519 Active   Family Acceptance E,D NR   11/29/17 1528 Active   Significant Other Acceptance E,D NR Wife verbalized understanding of appropriate technique in assisting pt with ROM and ther ex.  12/01/17 1346 Active               Point: Home exercise program (Active)    Learning Progress Summary    Learner Readiness Method Response Comment Documented by Status   Patient Acceptance E,D NR Wife verbalized understanding of appropriate technique in assisting pt with ROM and ther ex.  12/01/17 1346 Active    Acceptance E,D NR   11/29/17 1528 Active    Acceptance E,D NR   11/27/17 1608 Active    Acceptance E VU,NR  Encouraged pt and spouse to work on quad sets. Also reviewed w/spouse to keep L heel elevated off of surface to help prevent skin breakdown. LS1 11/23/17 1105 Done   Family Acceptance E,D NR  LS 11/29/17 1528 Active    Acceptance E VU,NR Encouraged pt and spouse to work on quad sets. Also reviewed w/spouse to keep L heel elevated off of surface to help prevent skin breakdown. LS1 11/23/17 1105 Done   Significant Other Acceptance E,D NR Wife verbalized understanding of appropriate technique in assisting pt with ROM and ther ex. LS 12/01/17 1346 Active               Point: Body mechanics (Active)    Learning Progress Summary    Learner Readiness Method Response Comment Documented by Status   Patient Acceptance E,D NR Wife verbalized understanding of appropriate technique in assisting pt with ROM and ther ex. LS 12/01/17 1346 Active    Acceptance E,D NR   11/29/17 1528 Active    Acceptance E,D NR   11/27/17 1608 Active    Acceptance E NR  KR 11/25/17 1519 Active   Family Acceptance E,D NR  LS 11/29/17 1528 Active   Significant Other Acceptance E,D NR Wife verbalized understanding of appropriate technique in assisting pt with ROM and ther ex.  12/01/17 1346 Active               Point: Precautions (Active)    Learning Progress Summary    Learner Readiness Method Response Comment Documented by Status   Patient Acceptance E,D NR Wife verbalized understanding of appropriate technique in assisting pt with ROM and ther ex. LS 12/01/17 1346 Active    Acceptance E,D NR   11/29/17 1528 Active    Acceptance E,D NR   11/27/17 1608 Active    Acceptance E NR  KR 11/25/17 1519 Active   Family Acceptance E,D NR   11/29/17 1528 Active   Significant Other Acceptance E,D NR Wife verbalized understanding of appropriate technique in assisting pt with ROM and ther ex.  12/01/17 1346 Active                      User Key     Initials Effective Dates Name Provider Type Discipline    LS1 06/19/15 -  Anna Crawford, PT  Physical Therapist PT    LS 06/19/15 -  Keli Awad, PT Physical Therapist PT    KR 09/25/17 -  Sri Mejía, PT Physical Therapist PT                    PT Recommendation and Plan  Anticipated Discharge Disposition: inpatient rehabilitation facility  Planned Therapy Interventions: balance training, bed mobility training, patient/family education, home exercise program, strengthening, transfer training  PT Frequency: daily, per priority policy  Plan of Care Review  Plan Of Care Reviewed With: patient, spouse  Progress: progress toward functional goals is gradual  Outcome Summary/Follow up Plan: Pt with improved indep re: sitting balance today; req'd increased A for STS attempts. Limited today by initial significant lethargy with constant vc's for opening eyes and attending to task. Will cont to progress PT POC. Wife verbalized intention of appropriate technique in assisting with ROM/ ther ex of LEs.           Outcome Measures       12/01/17 1320 11/29/17 1439 11/29/17 1434    How much help from another person do you currently need...    Turning from your back to your side while in flat bed without using bedrails? 2  -LS 2  -LS     Moving from lying on back to sitting on the side of a flat bed without bedrails? 2  -LS 2  -LS     Moving to and from a bed to a chair (including a wheelchair)? 1  -LS 2  -LS     Standing up from a chair using your arms (e.g., wheelchair, bedside chair)? 1  -LS 2  -LS     Climbing 3-5 steps with a railing? 1  -LS 1  -LS     To walk in hospital room? 1  -LS 1  -LS     AM-PAC 6 Clicks Score 8  -LS 10  -LS     How much help from another is currently needed...    Putting on and taking off regular lower body clothing?   1  -CL    Bathing (including washing, rinsing, and drying)   2  -CL    Toileting (which includes using toilet bed pan or urinal)   1  -CL    Putting on and taking off regular upper body clothing   2  -CL    Taking care of personal grooming (such as brushing teeth)   2  -CL     Eating meals   1  -CL    Score   9  -CL    Modified [place] Scale    Modified [place] Scale   4 - Moderately severe disability.  Unable to walk without assistance, and unable to attend to own bodily needs without assistance.  -CL    Functional Assessment    Outcome Measure Options AM-PAC 6 Clicks Basic Mobility (PT)  -LS AM-PAC 6 Clicks Basic Mobility (PT)  -LS AM-PAC 6 Clicks Daily Activity (OT)  -CL      User Key  (r) = Recorded By, (t) = Taken By, (c) = Cosigned By    Initials Name Provider Type    LS Keli Awad, PT Physical Therapist    CL Margarita Velasco, OT Occupational Therapist           Time Calculation:         PT Charges       12/01/17 1349          Time Calculation    Start Time 1320  -      PT Received On 12/01/17  -      PT Goal Re-Cert Due Date 12/03/17  -      Time Calculation- PT    Total Timed Code Minutes- PT 17 minute(s)  -LS        User Key  (r) = Recorded By, (t) = Taken By, (c) = Cosigned By    Initials Name Provider Type     Keli Awad, PT Physical Therapist          Therapy Charges for Today     Code Description Service Date Service Provider Modifiers Qty    [id number]  PT THER PROC EA 15 MIN 12/1/2017 Keli Awad, PT GP 1          PT G-Codes  Outcome Measure Options: AM-PAC 6 Clicks Basic Mobility (PT)    Keli Awad, PT  12/1/2017            Electronically signed by Keli Awad, PT at 12/1/2017  1:50 PM

## 2017-12-01 NOTE — PLAN OF CARE
Problem: Patient Care Overview (Adult)  Goal: Plan of Care Review  Outcome: Ongoing (interventions implemented as appropriate)    12/01/17 1524   Outcome Evaluation   Outcome Summary/Follow up Plan Pt Depx2 for STS t/f, only able to clear hips from chair. Pt session limited d/t lethargy. Pt/spouse educated on BUE HEP w/ functional task performance to improve command following. Recommend cont skilled IPOT POC.    Coping/Psychosocial Response Interventions   Plan Of Care Reviewed With patient   Patient Care Overview   Progress progress toward functional goals is gradual         Problem: Inpatient Occupational Therapy  Goal: Bed Mobility Goal LTG- OT  Outcome: Ongoing (interventions implemented as appropriate)    11/23/17 1105 12/01/17 1524   Bed Mobility OT LTG   Bed Mobility OT LTG, Date Established 11/23/17 --    Bed Mobility OT LTG, Time to Achieve by discharge --    Bed Mobility OT LTG, Activity Type supine to sit/sit to supine --    Bed Mobility OT LTG, [place] Level moderate assist (50% patient effort);2 person assist required --    Bed Mobility OT LTG, Outcome --  goal ongoing       Goal: Transfer Training Goal 1 LTG- OT  Outcome: Ongoing (interventions implemented as appropriate)    11/23/17 1105 12/01/17 1524   Transfer Training OT LTG   Transfer Training OT LTG, Date Established 11/23/17 --    Transfer Training OT LTG, Time to Achieve by discharge --    Transfer Training OT LTG, Activity Type bed to chair /chair to bed;sit to stand/stand to sit --    Transfer Training OT LTG, [place] Level moderate assist (50% patient effort);2 person assist required --    Transfer Training OT LTG, Outcome --  goal ongoing       Goal: Strength Goal LTG- OT  Outcome: Ongoing (interventions implemented as appropriate)    11/23/17 1105 12/01/17 1524   Strength OT LTG   Strength Goal OT LTG, Date Established 11/23/17 --    Strength Goal OT LTG, Time to Achieve by discharge --    Strength Goal OT LTG, Measure to Achieve  Pt will increse LUE strength 1 MMG as needed to support ADLs --    Strength Goal OT LTG, Outcome --  goal ongoing       Goal: Grooming Goal LTG- OT  Outcome: Ongoing (interventions implemented as appropriate)    11/23/17 1105 12/01/17 1524   Grooming OT LTG   Grooming Goal OT LTG, Date Established 11/23/17 --    Grooming Goal OT LTG, Time to Achieve by discharge --    Grooming Goal OT LTG, Activity Type pt will wash face and comb hair using R hand given setup --    Grooming Goal OT LTG, [place] Level set up required --    Grooming Goal OT LTG, Outcome --  goal ongoing

## 2017-12-01 NOTE — PROGRESS NOTES
Continued Stay Note  [facility]     Patient Name: Magdi Arriaga  MRN: [id number]  Today's Date: 12/1/2017    Admit Date: 11/20/2017          Discharge Plan       12/01/17 1359    Case Management/Social Work Plan    Plan Rehab vs SNF    Patient/Family In Agreement With Plan yes    Additional Comments Patient sleeping, PA here removing staples.  Spoke with wife at bedside regarding discharge plan.  Wife is in agreement to look at Skilled Nursing facilities.  CM patito provide list of [facility] facilities and have encouraged her to visit those she is interested in.  Wife has agreed to sending referrals to [facility]. [place], [facility], and [facility].  Referrals faxed to [facility], [place], and [facility].  Referral called to [facility].  CM will continue to follow.              Discharge Codes     None        Expected Discharge Date and Time     Expected Discharge Date Expected Discharge Time    Dec 5, 2017             Cinthya Sanderson RN

## 2017-12-01 NOTE — PLAN OF CARE
Problem: Patient Care Overview (Adult)  Goal: Plan of Care Review  Outcome: Ongoing (interventions implemented as appropriate)    12/01/17 1524 12/01/17 1827   Outcome Evaluation   Outcome Summary/Follow up Plan --  Up to chair since 0900, crani stitches removed by neurosurgery at bedside, VSS, thick subglottal secretions suctioned out x3. Worked with PT, OT, and speech today. Free water decreased to 25ml/hr.    Coping/Psychosocial Response Interventions   Plan Of Care Reviewed With --  patient   Patient Care Overview   Progress progress toward functional goals is gradual --        Goal: Adult Individualization and Mutuality  Outcome: Ongoing (interventions implemented as appropriate)  Goal: Discharge Needs Assessment  Outcome: Ongoing (interventions implemented as appropriate)    Problem: Bowel Resection (Adult)  Goal: Signs and Symptoms of Listed Potential Problems Will be Absent or Manageable (Bowel Resection)  Outcome: Ongoing (interventions implemented as appropriate)

## 2017-12-02 NOTE — PROGRESS NOTES
Intensive Care Follow-up      LOS: 12 days     Mr. Magdi Arriaga, 72 y.o. male is followed for: Stupor     Subjective - Interval History     Patient is arousable but still has poor cough.  He denies any new complaints.    The patient's relevant past medical, surgical and social history were reviewed and updated in Epic as appropriate.     Objective     Infusions:     Medications:    aspirin 81 mg Oral Daily   atorvastatin 10 mg Oral Nightly   buPROPion 75 mg Oral Q12H   carvedilol 6.25 mg Oral Q12H   docusate sodium 100 mg Oral BID   entecavir 0.5 mg Oral Q24H   famotidine 20 mg Oral Daily   ipratropium-albuterol 3 mL Nebulization 4x Daily - RT   memantine 10 mg Oral Q12H   metoclopramide 10 mg Intravenous Q6H   PRO-STAT 1 packet Nasogastric Daily   rivastigmine 1 patch Transdermal Daily   sacubitril-valsartan 1 tablet Oral Q12H   sennosides 10 mL Oral BID       Vital Sign Min/Max for last 24 hours  Temp  Min: 98 °F (36.7 °C)  Max: 98.5 °F (36.9 °C)   BP  Min: 121/74  Max: 161/79   Pulse  Min: 60  Max: 71   Resp  Min: 16  Max: 20   SpO2  Min: 96 %  Max: 99 %   Flow (L/min)  Min: 2  Max: 2      Intake/Output Summary (Last 24 hours) at 12/02/17 0717  Last data filed at 12/02/17 0600   Gross per 24 hour   Intake             2437 ml   Output                0 ml   Net             2437 ml           Physical Exam:   GENERAL: Arousable but seems somewhat lethargic   HEENT: Surgical scar right frontal area.  Has a feeding tube in place   LUNGS: Scattered rhonchi bilaterally   HEART: As audible normal S1 and S2   ABDOMEN: Soft   EXTREMITIES: No evident edema his boots on both feet.  Seems to have decreased  in his left hand   NEURO/PSYCH: Remains lethargic      Results from last 7 days  Lab Units 12/02/17 0258 12/01/17 0721 11/30/17  0451   WBC 10*3/mm3 12.14* 13.49* 12.45*   HEMOGLOBIN g/dL 8.8* 9.5* 10.0*   PLATELETS 10*3/mm3 285 284 263       Results from last 7 days  Lab Units 12/02/17 0258 12/01/17 0721  11/30/17  0451  11/27/17  0342  11/26/17  0302   SODIUM mmol/L 146 145 148*  < > 146  --  147*   POTASSIUM mmol/L 4.0 4.0 3.7  < > 3.4*  < > 3.8   CO2 mmol/L 30.0 32.0* 24.0  < > 28.0  --  27.0   BUN mg/dL 41* 43* 49*  < > 42*  --  39*   CREATININE mg/dL 1.00 1.00 1.20  < > 1.20  --  1.20   MAGNESIUM mg/dL 2.0 2.1 2.2  < > 2.3  < > 2.2   PHOSPHORUS mg/dL 1.2*  --   --   --  2.1*  --  2.2*   GLUCOSE mg/dL 107* 112* 120*  < > 87  --  74   < > = values in this interval not displayed.  Estimated Creatinine Clearance: 74.1 mL/min (by C-G formula based on Cr of 1).              Lab Results   Component Value Date    LACTATE 1.7 11/20/2017          Images: No new imaging    I reviewed the patient's results and images.     Impression      Hospital Problem List     * (Principal)Altered mental status post op    Dementia          Hyperlipidemia LDL goal <70    Overview Signed 11/27/2017  3:37 PM by REI Baires     · High intensity statin therapy indicted given presence of CAD               Essential hypertension          JONNY (obstructive sleep apnea)    Overview Deleted 11/27/2017  3:45 PM by Henri Montes IV, MD            COPD (chronic obstructive pulmonary disease)              Coronary artery disease involving coronary bypass graft of native heart without angina pectoris    Overview Addendum 11/27/2017  3:45 PM by Henri Montes IV, MD     · CABG by Yohannes Berger (4/24/17): LIMA to LAD, sequential SVG to OM1/OM 2, SVG to diagonal, SVG to RCA  · Cardiac cath (08/08/2017): VICKEY to LAD/diagnonal. Patent SVG to diagonal and patent LIMA to LAD. Patent SVG to OM 1 and OM2. Patnet SVG to RCA. Small Ramus branch to small for PCI.         Ischemic cardiomyopathy    Overview Addendum 11/27/2017  3:48 PM by Henri Montes IV, MD     · Echo (08/07/2017): LVEF 30%. The cardiac valves are anatomically and functionally normal.  · Echo (08/24/2017): LVEF 30%.  An apical left ventricular aneurysm is  present.  · Echo (11/14/2017): LVEF 40%.         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  According to Dr. Almodovar he has not improved significantly in the past week.  He still needs significant help with clearing secretions.  We will continue current supportive measures.  We'll try to meet with his wife regarding goals of care.     Plan of care and goals reviewed with nurse at daily rounds.   I discussed the patient's findings and my recommendations with patient and nursing staff       Bola Velez MD  Pulmonary and Critical Care Medicine  12/02/17 7:17 AM

## 2017-12-02 NOTE — PLAN OF CARE
Problem: Patient Care Overview (Adult)  Goal: Plan of Care Review  Outcome: Ongoing (interventions implemented as appropriate)    12/02/17 5100   Outcome Evaluation   Outcome Summary/Follow up Plan Pt requiring frequent deep oral suctioning. Coughing but still unable to clear secretions. Dr. Velez discussed the possibility of a trach/peg with the paient and his wife today. The patient's wife would like to think about it before making any decisions. Stool softeners held, liquid stool. Phos low, Dr. Velez did not want to replace. Up in chair for 10hrs. Afebrile VSS   Coping/Psychosocial Response Interventions   Plan Of Care Reviewed With patient;spouse   Patient Care Overview   Progress no change       Goal: Adult Individualization and Mutuality  Outcome: Ongoing (interventions implemented as appropriate)  Goal: Discharge Needs Assessment  Outcome: Ongoing (interventions implemented as appropriate)    Problem: Bowel Resection (Adult)  Goal: Signs and Symptoms of Listed Potential Problems Will be Absent or Manageable (Bowel Resection)  Outcome: Ongoing (interventions implemented as appropriate)    Problem: Fall Risk (Adult)  Goal: Identify Related Risk Factors and Signs and Symptoms  Outcome: Outcome(s) achieved Date Met:  12/02/17  Goal: Absence of Falls  Outcome: Ongoing (interventions implemented as appropriate)

## 2017-12-02 NOTE — SIGNIFICANT NOTE
Patient's wife is here this afternoon.  Discussed with her that we should be considering our goals of care in this situation.  She is to think about this but did not have any questions at this time.  They have two sons.  Bola Velez MD [facility]  Sleep Medicine  Pulmonary and Critical Care Medicine

## 2017-12-02 NOTE — PLAN OF CARE
Problem: Patient Care Overview (Adult)  Goal: Plan of Care Review  Outcome: Ongoing (interventions implemented as appropriate)    12/02/17 0459   Outcome Evaluation   Outcome Summary/Follow up Plan vss, no changes in neuro status this shift, 2Lnc, cough a little stronger but still requires subglottal suction every few hours. SR on monitor, incontinent on urine multiple episodes, tolerating tube feeds.   Coping/Psychosocial Response Interventions   Plan Of Care Reviewed With patient         Problem: Fall Risk (Adult)  Goal: Identify Related Risk Factors and Signs and Symptoms  Outcome: Ongoing (interventions implemented as appropriate)    12/02/17 0459   Fall Risk   Fall Risk: Related Risk Factors bladder function altered;confusion/agitation;culprit medication(s);fatigue/slow reaction;depression/anxiety;gait/mobility problems;history of falls;neuro disease/injury;polypharmacy;sleep pattern alteration;environment unfamiliar   Fall Risk: Signs and Symptoms presence of risk factors

## 2017-12-03 NOTE — PROGRESS NOTES
INTENSIVIST NOTE     Hospital:  LOS: 13 days   Mr. Magdi Arriaga, 72 y.o. male is followed for:   Principal Problem:    Altered mental status post op  Active Problems:    COPD (chronic obstructive pulmonary disease)    Traumatic SDH, s/p right craniotomy on 11/8/2017    Small bowel obstruction (S/P Exp lap for acute SBO 11/20/17)    Dementia    Essential hypertension    JONNY (obstructive sleep apnea)    Coronary artery disease involving coronary bypass graft of native heart without angina pectoris    Ischemic cardiomyopathy    CKD (chronic kidney disease), stage III    Hyperlipidemia LDL goal <70    History of DVT (deep vein thrombosis)    Acute type B viral hepatitis related to platelet transfusion, followed by Hernandez    PVC (premature ventricular contraction)    NSVT (nonsustained ventricular tachycardia)          SUBJECTIVE   Subjective    72-year-old male admitted to [facility] on 11/20 in transfer from [facility] with abdominal pain, nausea, and vomiting.  He was found to have a small bowel obstruction and underwent resection of a necrotic segment of the proximal jejunum.  He has a recent history of craniotomy due to subdural hematoma. There was some question of new CVA but this was ruled out and it was decided to avoid anticoagulants.  He also has a history of congestive heart failure, chronic kidney disease, CABG ×5 in April of this year, as well as COPD.  He was transferred to the ICU postoperatively and has progressed slowly. He has had several bloody stools and a total of 3 units of packed red blood cells. His most recent hemoglobin is now 10.4 improved from 7.4. Corpak was placed under fluoroscopy and tube feeding initiated  He has a known history of cardiomyopathy. Entresto and Coreg were restarted. . He had some runs of atrial fibrillation, and nonsustained ventricular tachycardia. Cardiology evaluated and reported that his EF was now greater than 35% which is improved.  "They recommended beta blockers and electrolyte replacement and maintaining a hemoglobin greater than 8.  Nursing reports that he is silently aspirating. Corpak placed under fluoroscopy. Initially started on a low rate and he is tolerating. Speech therapy is working with him. They note very weak orally, with difficulty just protruding his tongue.     Interval History:   Up in chair today.  Remains obtunded and will not verbalize or follow commands.  Poor cough and requires aggressive suctioning to clear secretions.    The patient's relevant past medical, surgical and social history were reviewed and updated in Epic as appropriate.        OBJECTIVE     Vital Sign Min/Max for last 24 hours  Temp  Min: 97.6 °F (36.4 °C)  Max: 99.1 °F (37.3 °C)   BP  Min: 121/72  Max: 155/85   Pulse  Min: 64  Max: 80   Resp  Min: 12  Max: 18   SpO2  Min: 93 %  Max: 98 %   Flow (L/min)  Min: 2  Max: 2           Intake/Output Summary (Last 24 hours) at 12/03/17 1630  Last data filed at 12/03/17 0800   Gross per 24 hour   Intake             2058 ml   Output                0 ml   Net             2058 ml      Flowsheet Rows         First Filed Value    Admission Height  68\" (172.7 cm) Documented at 11/20/2017 1213    Admission Weight  173 lb (78.5 kg) Documented at 11/20/2017 1213        Body mass index is 26.89 kg/(m^2).   Last 3 weights    11/20/17  1213 12/03/17  0600   Weight: 173 lb (78.5 kg) 176 lb 12.9 oz (80.2 kg)        Telemetry: SR      Medications: (drips)     Medications:    aspirin 81 mg Oral Daily   atorvastatin 10 mg Oral Nightly   buPROPion 75 mg Oral Q12H   carvedilol 6.25 mg Oral Q12H   docusate sodium 100 mg Oral BID   entecavir 0.5 mg Oral Q24H   famotidine 20 mg Oral Daily   ipratropium-albuterol 3 mL Nebulization 4x Daily - RT   memantine 10 mg Oral Q12H   metoclopramide 10 mg Intravenous Q6H   PRO-STAT 1 packet Nasogastric Daily   rivastigmine 1 patch Transdermal Daily   sacubitril-valsartan 1 tablet Oral Q12H "   sennosides 10 mL Oral BID       Objective    Physical Exam:  Constitutional:  Appears well-developed and well-nourished. No distress.  up in chair  HEENT:  Normocephalic and atraumatic. PERRL  Neck:  Neck supple. No JVD present.   CV: Normal rate, regular rhythm, intact distal pulses.  No gallop, murmur or rub.   Pulmonary/Chest: Coarse bilaterally, poor cough. No respiratory distress.   Abdominal: Soft. + BS.  No distension and no mass. There is no tenderness.   Musculoskeletal: Normal muscle tone and strength  Neurological: Obtunded, does not verbalize or follow commands  Skin: Skin is warm and dry. No rash noted.   Extremities:  No clubbing, edema or cyanosis  Psychiatric: Obtunded.      Results from last 7 days  Lab Units 12/02/17  0258 12/01/17  0721 11/30/17  0451   WBC 10*3/mm3 12.14* 13.49* 12.45*   HEMOGLOBIN g/dL 8.8* 9.5* 10.0*   PLATELETS 10*3/mm3 285 284 263       Results from last 7 days  Lab Units 12/03/17  1154 12/02/17  1849 12/02/17  0258 12/01/17  0721   SODIUM mmol/L  --  139 146 145   POTASSIUM mmol/L  --  4.0 4.0 4.0   CO2 mmol/L  --  31.0 30.0 32.0*   CREATININE mg/dL  --  0.90 1.00 1.00   MAGNESIUM mg/dL  --  2.1 2.0 2.1   PHOSPHORUS mg/dL 2.0* 1.0* 1.2*  --    GLUCOSE mg/dL  --  104* 107* 112*   CRP mg/dL  --  3.63*  --   --    TRIGLYCERIDES mg/dL  --  169*  --   --      Estimated Creatinine Clearance: 84.2 mL/min (by C-G formula based on Cr of 0.9).      Lab Results   Component Value Date    .0 (H) 11/14/2017             Lab Results   Component Value Date    TSH 1.724 10/22/2017     Lab Results   Component Value Date    LACTATE 1.7 11/20/2017     No results found for: CORTISOL  Lab Results   Component Value Date    PREALBUMIN 9.5 (L) 12/02/2017     No results found for: PROCALCITO   I reviewed the patient's results and images. Images:       Assessment/Plan   ASSESSMENT / PLAN     72 y.o.male:    Principal Problem:    Altered mental status post op  Active Problems:    COPD (chronic  obstructive pulmonary disease)    Traumatic SDH, s/p right craniotomy on 11/8/2017    Small bowel obstruction (S/P Exp lap for acute SBO 11/20/17)    Dementia    Essential hypertension    JONNY (obstructive sleep apnea)    Coronary artery disease involving coronary bypass graft of native heart without angina pectoris    Ischemic cardiomyopathy    CKD (chronic kidney disease), stage III    Hyperlipidemia LDL goal <70    History of DVT (deep vein thrombosis)    Acute type B viral hepatitis related to platelet transfusion, followed by Hernandez    PVC (premature ventricular contraction)    NSVT (nonsustained ventricular tachycardia)       Assessment & Plan        Patient is up in chair at this point in time.  He is sedate and is being suctioned every 2 hours.  Goals of care were discussed initially by Dr. Velez yesterday.  No further discussion has been made today however.    Aspirin, statin  Replace electrolytes  Decrease free water  Coreg, Entresto  Entecavir for exposure to Hepatitis B  Continue Exelon, Namenda for dementia  Increase tube feeding  Resume physical therapy, occupational therapy if able.  Currently to lethargic  Keep in ICU due to respiratory secretions    VTE Prophylaxis:SCDS     Stress Ulcer Prophylaxis: Pepcid         Plan of care and goals reviewed with mulitdisciplinary team at daily rounds.   Coordination of care. Discussed with Nursing.     I discussed the patient's findings and my recommendations with nursing staff     Time spent Critical care 30 min (It does not include procedure time).    REI Vasquez, ACNP-BC  Pulmonary & Critical Care Medicine  12/03/17 4:30 PM     *Please note that portions of this note were completed with a voice recognition program. Efforts were made to edit the dictations, but occasionally words are mistranscribed.

## 2017-12-03 NOTE — PLAN OF CARE
Problem: Patient Care Overview (Adult)  Goal: Plan of Care Review  Outcome: Ongoing (interventions implemented as appropriate)    12/03/17 0655   Outcome Evaluation   Outcome Summary/Follow up Plan Suctioned every two hours. Changed brief and cleaned up every two hours. VSS. Rested comfortably..    Coping/Psychosocial Response Interventions   Plan Of Care Reviewed With patient   Patient Care Overview   Progress no change

## 2017-12-04 NOTE — PROGRESS NOTES
INTENSIVIST   PROGRESS NOTE     Hospital:  LOS: 14 days     Mr. Magdi Arriaga, 72 y.o. male is followed for a Chief Complaint of: Altered Mental Status      Subjective   S   72-year-old male admitted to [facility] on 11/20 in transfer from [facility] with abdominal pain, nausea, and vomiting.  He was found to have a small bowel obstruction and underwent resection of a necrotic segment of the proximal jejunum.  He has a recent history of craniotomy due to subdural hematoma. There was some question of new CVA but this was ruled out and it was decided to avoid anticoagulants.  He also has a history of congestive heart failure, chronic kidney disease, CABG ×5 in April of this year, as well as COPD.  He was transferred to the ICU postoperatively and has progressed slowly. He has had several bloody stools and a total of 3 units of packed red blood cells. His most recent hemoglobin is now 10.4 improved from 7.4. Corpak was placed under fluoroscopy and tube feeding initiated  He has a known history of cardiomyopathy. Entresto and Coreg were restarted. . He had some runs of atrial fibrillation, and nonsustained ventricular tachycardia. Cardiology evaluated and reported that his EF was now greater than 35% which is improved. They recommended beta blockers and electrolyte replacement and maintaining a hemoglobin greater than 8.    Nursing reports that he is silently aspirating. Corpak placed under fluoroscopy. Initially started on a low rate and he is tolerating. Speech therapy is working with him. They note very weak orally, with difficulty just protruding his tongue.    Interval History:  Slightly more alert today. Asking to get up out of bed. Still confused. Requiring suctioning q2h.        The patient's relevant past medical, surgical and social history were reviewed and updated in Epic as appropriate.      ROS: Unable to obtain secondary to patient's mental status.     Objective   O      Vitals:  Temp  Min: 97.6 °F (36.4 °C)  Max: 98.1 °F (36.7 °C)  BP  Min: 120/71  Max: 161/82  Pulse  Min: 68  Max: 77  Resp  Min: 14  Max: 20  SpO2  Min: 93 %  Max: 98 % Flow (L/min)  Min: 2  Max: 2    Intake/Ouptut 24 hrs (7:00AM - 6:59 AM)  Intake & Output (last 3 days)       12/01 0701 - 12/02 0700 12/02 0701 - 12/03 0700 12/03 0701 - 12/04 0700 12/04 0701 - 12/05 0700    Other 877 835 673     NG/GT 1560 1527 1598     Total Intake(mL/kg) 2437 (31.1) 2362 (29.5) 2271 (28.3)     Net +2437 +2362 +2271              Unmeasured Urine Occurrence 7 x 8 x 8 x     Unmeasured Stool Occurrence 1 x 1 x 2 x 1 x            Physical Examination  Telemetry:  Sinus Rhythm: normal sinus rhythm  Atrial Rhythm: atrial fibrillation (intermittent bursts of afib)      Constitutional:  No acute distress.   Cardiovascular: Normal rate, regular and rhythm. Normal heart sounds.  No murmurs, gallop or rub.   Respiratory: No respiratory distress. Normal respiratory effort.  Coarse bilaterally.     Abdominal:  Soft. No masses. Non-tender. No distension. No HSM.   Extremities: No digital cyanosis. No clubbing.  No peripheral edema.   Neurological:   Alert.   Disoriented.  Does not follow commands.              Results from last 7 days  Lab Units 12/04/17  0330 12/02/17  0258 12/01/17  0721   WBC 10*3/mm3 11.74* 12.14* 13.49*   HEMOGLOBIN g/dL 8.7* 8.8* 9.5*   MCV fL 93.6 93.1 94.0   PLATELETS 10*3/mm3 291 285 284       Results from last 7 days  Lab Units 12/04/17  0330 12/04/17  0009 12/03/17  1154 12/02/17  1849 12/02/17  0258   SODIUM mmol/L 143  --   --  139 146   POTASSIUM mmol/L 4.4  --   --  4.0 4.0   CO2 mmol/L 30.0  --   --  31.0 30.0   CREATININE mg/dL 0.70  --   --  0.90 1.00   GLUCOSE mg/dL 116*  --   --  104* 107*   MAGNESIUM mg/dL 2.1  --   --  2.1 2.0   PHOSPHORUS mg/dL 1.9* 2.0* 2.0* 1.0* 1.2*     Estimated Creatinine Clearance: 94.7 mL/min (by C-G formula based on Cr of 0.7).    Results from last 7 days  Lab Units  12/04/17  0330 12/02/17  1849 11/28/17  0309   ALK PHOS U/L 77 72 65   BILIRUBIN mg/dL 0.2* 0.2* 1.5*   ALT (SGPT) U/L 16 13 13   AST (SGOT) U/L 16 12 12             Images:  CXR  No new imaging.          Results: Reviewed.  I reviewed the patient's new laboratory and imaging results.      Medications: Reviewed.    Assessment/Plan   A / P     Mr. Arriaga is a 73yo M who was admitted to the ICU for encephalopathy and respiratory failure after an ex-lap for SBO on 11/20/17.     Nutrition:   Diet, Tube Feeding Tube Feeding Formula: Peptamen 1.5 (Peptide Based, Calorically Dense); PO Tray: Patient is NPO (No Tray)  Advance Directives: Full Code    Hospital Problem List     * (Principal)Altered mental status post op    Dementia    Hyperlipidemia LDL goal <70    Overview Signed 11/27/2017  3:37 PM by REI Baires     · High intensity statin therapy indicted given presence of CAD         Essential hypertension    JONNY (obstructive sleep apnea)    Overview Deleted 11/27/2017  3:45 PM by Henri Montes IV, MD            COPD (chronic obstructive pulmonary disease)    Coronary artery disease involving coronary bypass graft of native heart without angina pectoris    Overview Addendum 11/27/2017  3:45 PM by Henri Montes IV, MD     · CABG by Yohannes Berger (4/24/17): LIMA to LAD, sequential SVG to OM1/OM 2, SVG to diagonal, SVG to RCA  · Cardiac cath (08/08/2017): VICKEY to LAD/diagnonal. Patent SVG to diagonal and patent LIMA to LAD. Patent SVG to OM 1 and OM2. Patnet SVG to RCA. Small Ramus branch to small for PCI.         Ischemic cardiomyopathy    Overview Addendum 11/27/2017  3:48 PM by Henri Montes IV, MD     · Echo (08/07/2017): LVEF 30%. The cardiac valves are anatomically and functionally normal.  · Echo (08/24/2017): LVEF 30%.  An apical left ventricular aneurysm is present.  · Echo (11/14/2017): LVEF 40%.         History of DVT (deep vein thrombosis)    CKD (chronic kidney  disease), stage III    Acute type B viral hepatitis related to platelet transfusion, followed by [facility]    Traumatic SDH, s/p right craniotomy on 11/8/2017    Small bowel obstruction (S/P Exp lap for acute SBO 11/20/17)    PVC (premature ventricular contraction)    NSVT (nonsustained ventricular tachycardia)          Assessment / Plan:  He remains confused but may be slightly more responsive today. He is still requiring frequent suctioning per nursing. An initial goals of care discussion was initiated recently. His wife is still deciding regarding tracheostomy and PEG tube placement.     1. Continue ICU care as he is at high risk for reintubation if not frequently suctioned.   2. Continue to monitor mental status.   3. Change stool softeners to PRN.   4. PT to work with patient today.   5. AM labs and CXR    Plan to update the patient's family and further discuss goals of care when family at bedside later today.     Plan of care and goals reviewed during interdisciplinary rounds.  I discussed the patient's findings and my recommendations with nursing staff    Critical Care Time: was greater than 30 minutes.(This excludes time spent performing separately reportable procedures and services).      Eliz Hearn, DO    Intensive Care Medicine and Pulmonary Medicine

## 2017-12-04 NOTE — PROGRESS NOTES
Neurology       Patient Care Team:  Colton Dickens MD as PCP - General (Internal Medicine)  Kvng Stauffer MD as Consulting Physician (Cardiology)    Chief complaint: Altered mental state    History:  Patient is more alert off Paxil and on the Wellbutrin 150 sustained-release.    He now has a feeding tube and is working a little better with therapist.    He apparently still has difficulty with secretions.      Past Medical History:   Diagnosis Date   • Abnormal CT scan, chest 1/6/2017   • Abnormal finding on lung imaging      CT scan of the chest to be wary 2013 reports interstitial lung disease with marked diffuse interstitial and peripheral scarring and significant bronchiectasis in both upper   and lower lung zones.    • Allergic rhinitis     History of allergy injections as a child.   • Arthritis    • Asthma      Patient has had a history of asthma since childhood.   • Bradycardia 8/23/2017   • Bronchiectasis 1/6/2017    A.  CT scan of the chest 12/13/2015 reports mild to moderate bronchiectasis.    • CHF (congestive heart failure) 2017    ECHO - EF 25%   • Closed fracture of multiple ribs of right side 10/21/2017   • COPD (chronic obstructive pulmonary disease)    • Coronary artery disease 2017    CABG X 5   • Depression 1/6/2017   • DVT (deep venous thrombosis)     Bilateral   • Exposure to hepatitis B 2017    Transfusion - managed by ID   • Falls     A.  History of falling traumatic injuries in April 2014 and May 2014 resulting and left rib  fracture and left clavicle fracture.   • Fracture of rib    • Hematoma, calf 5/12/2017   • History of echocardiogram 02/19/2016    Mild concentric LVH is observed.Estimated EF is 50-55%.E to A reversal in the mitral valve flow pattern suggestive of diastolic dysfunction.RV is mildly dilated.Moderate aortic cusp sclerosis is present.Mitral annular calcification.Mild MR.Evidence of borderline pulmonary hypertension.   • History of transfusion    • Hypertension    •  Iron deficiency 7/24/2017   • Left bundle branch block 4/17/2017   • Lumbar spinal stenosis 2013    Spinal surgery   • Normocytic anemia 4/17/2017   • Osteoarthritis 1/6/2017   • Patellar tendon rupture     A.  Status post primary repair of the left periprosthetic patellar tendon tear 6/11/2014, post fall at the end of April 2014.   • Pneumonia      A.  Left lower lobe pneumonia treated at [facility], 5/8/2014 through 5/20/2014.   • Pneumothorax      A.  Status post chest tube placed 5/13/2014 and discontinued 5/15/2014 with stable chest x-ray.   • R/O acute CVA by CT scan 11/20/2017   • Sleep apnea with use of continuous positive airway pressure (CPAP)    • Syncope, near 8/23/2017   • Traumatic hemorrhagic shock      A.  Secondary to right renal retroperitoneal hemorrhage, 9/2014.   • Vitamin D deficiency 1/6/2017   • Wound of left leg 7/31/2017       Vital Signs   Vitals:    12/04/17 0934 12/04/17 1000 12/04/17 1100 12/04/17 1255   BP:  134/80 146/90    BP Location:  Left arm Left arm    Patient Position:       Pulse: 72 70 70 67   Resp: 16 16  16   Temp:       TempSrc:       SpO2: 98% 95% 97% 98%   Weight:       Height:           Physical Exam:   General: Awake              Neuro: Following commands    Results Review:  Reviewed    Results from last 7 days  Lab Units 12/04/17  0330   WBC 10*3/mm3 11.74*   HEMOGLOBIN g/dL 8.7*   HEMATOCRIT % 28.0*   PLATELETS 10*3/mm3 291       Results from last 7 days  Lab Units 12/04/17  0330 12/02/17  1849 12/02/17  0258  11/28/17  0309   SODIUM mmol/L 143 139 146  < > 149*   POTASSIUM mmol/L 4.4 4.0 4.0  < > 3.6  3.6   CHLORIDE mmol/L 109 107 111*  < > 119*   CO2 mmol/L 30.0 31.0 30.0  < > 27.0   BUN mg/dL 39* 39* 41*  < > 44*   CREATININE mg/dL 0.70 0.90 1.00  < > 1.10   CALCIUM mg/dL 9.3 8.9 8.6*  < > 8.8   BILIRUBIN mg/dL 0.2* 0.2*  --   --  1.5*   ALK PHOS U/L 77 72  --   --  65   ALT (SGPT) U/L 16 13  --   --  13   AST (SGOT) U/L 16 12  --   --  12   GLUCOSE mg/dL 116* 104* 107*   < > 132*   < > = values in this interval not displayed.    Imaging Results (last 24 hours)     ** No results found for the last 24 hours. **          Assessment:  Altered mental state secondary to subdural hematoma     Plan:  Hopefully the nutrition and medication adjustments will be enough to allow the patient to make the turn around that he needs to get well.    Comment:  Guarded prognosis was discussed at length with the patient's wife who understands gravity of his problem.         I discussed the patients findings and my recommendations with family    Omid Tipton MD  12/04/17  2:25 PM

## 2017-12-04 NOTE — PROGRESS NOTES
Continued Stay Note  [facility]     Patient Name: Magdi Arriaga  MRN: [id number]  Today's Date: 12/4/2017    Admit Date: 11/20/2017          Discharge Plan       12/04/17 1504    Case Management/Social Work Plan    Plan SNF    Additional Comments Call received from Teagan gomez [facility].  They may have a bed for patient.  In rounds discussed that patient may need LTACH level of care.  PT to see patient today.  CM will continue to follow.              Discharge Codes     None        Expected Discharge Date and Time     Expected Discharge Date Expected Discharge Time    Dec 8, 2017             Cinthya Sanderson RN

## 2017-12-04 NOTE — THERAPY PROGRESS REPORT/RE-CERT
Acute Care - Speech Language Pathology   Swallow Progress Note [facility]     Patient Name: Magdi Arriaga  : 1945  MRN: [id number]  Today's Date: 2017  Onset of Illness/Injury or Date of Surgery Date: 17            Admit Date: 2017    Visit Dx:      ICD-10-CM ICD-9-CM   1. Pharyngeal dysphagia R13.13 787.23   2. Small bowel obstruction K56.609 560.9   3. Lower abdominal pain R10.30 789.09   4. Leukocytosis, unspecified type D72.829 288.60   5. Hematemesis with nausea K92.0 578.0     787.02   6. Impaired mobility and ADLs Z74.09 799.89   7. 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, followed by [facility]   • Traumatic SDH, s/p right craniotomy on 2017   • Small bowel obstruction (S/P Exp lap for acute SBO 17)   • Altered mental status post op   • PVC (premature ventricular contraction)   • NSVT (nonsustained ventricular tachycardia)             Adult Rehabilitation Note       17 1030          Rehab Assessment/Intervention    Discipline (P)  speech language pathologist  -AS      Document Type (P)  therapy note (daily note)  -AS      Subjective Information (P)  agree to therapy  -AS      Patient Effort, Rehab Treatment (P)  fair  -AS      Recorded by [AS] Estela Blanco, Speech  Therapy Student      Pain Assessment    Pain Assessment (P)  No/denies pain  -AS      Pain Score (P)  0  -AS      Post Pain Score (P)  0  -AS      Recorded by [AS] Estela Blanco Speech Therapy Student      Cognitive Assessment/Intervention    Current Cognitive/Communication Assessment (P)  could not assess   2' lethargy   -AS      Orientation Status (P)  oriented to;person  -AS      Follows Commands/Answers Questions (P)  25% of the time;able to follow single-step instructions;needs repetition;needs increased time  -AS      Recorded by [AS] Estela Blanco, Speech Therapy Student      Dysphagia Treatment Objectives and Progress    To improve timing of pharyngeal response, patient will: (P)  Swallow in timely way using three second prep;Swallow in timely way using super-supraglottic swallow;80%;with consistent cues  -AS      Timing of Pharyngeal Response Progress (P)  continue to adress  -AS      To improve closure at entrance to airway, patient will: (P)  Complete super-supraglottic swallow;80%;with consistent cues  -AS      Closure at Entrance to Airway Progress (P)  continue to adress  -AS      To improve hyolaryngeal excursion, patient will: (P)  Complete chin tuck against resistance (comment number of repetitions);80%;with consistent cues  -AS      Hyolaryngeal Excursion Progress (P)  0%;with consistent cues;continue to adress  -AS      To improve tongue base & pharyngeal wall squeeze, patient will: (P)  Complete effortful swallow;Complete tongue hold swallow;80%;with consistent cues  -AS      Tongue Base/Pharyngeal Wall Squeeze Progress (P)  continue to adress  -AS      Recorded by [AS] Estela Blanco, Speech Therapy Student      Improve oral skills    To Improve Oral Skills, patient will: (P)  Increase tongue tip elevation;80%;with consistent cues  -AS      Status: Improve Oral Skills (P)  Progressing as expected  -AS      Oral Skills Progress (P)  0%;with consistent cues;continue to adress  -AS      Recorded by [AS]  Estela Blanco Speech Therapy Student      Improve timing of pharyngeal response    Status: Improve timing of pharyngeal response (P)  Progress slower than expected  -AS      Comments: Improve timing of pharyngeal response (P)  DNA 2' lethargy   -AS      Recorded by [AS] Ramona Tirado Therapy Student      Improve closure at entrance to airway    Status: Improve closure at entrance to airway (P)  Progress slower than expected  -AS      Recorded by [AS] Ramona Tirado Therapy Student      Improve hyolaryngeal excursion    Status: Improve hyolaryngeal excursion (P)  Progress slower than expected  -AS      Comments: Improve hyolaryngeal excursion (P)  Pt unable to perform 2' lethargic, even w/ max cues and prompts.   -AS      Recorded by [AS] Ramona Tirado Therapy Student      Improve tongue base & pharyngeal wall squeeze    Status: Improve tongue base & pharyngeal wall squeeze (P)  Progress slower than expected  -AS      Comments: Improve tongue base & pharyngeal wall squeeze (P)  DNA PO trials 2' lethargy.   -AS      Recorded by [AS] Ramona Tirado Therapy Student      Dysphagia Other 1    Dysphagia Other 1 Objective (P)  Tolerate ice chip and H2O trials without s/s aspiration with 50% accuracy and cues  -AS      Status: Dysphagia Other 1 (P)  Progress slower than expected  -AS      Dysphagia Other 1 Progress (P)  continue to address  -AS      Comments: Dysphagia Other 1 (P)  DNA PO trials 2' lethargy. Pt unable to stay alert for PO trials even w/ max cues.   -AS      Recorded by [AS] Ramona Tirado Therapy Student        User Key  (r) = Recorded By, (t) = Taken By, (c) = Cosigned By    Initials Name Effective Dates    AS Estela Blanco Speech Therapy Student 08/24/17 -                   IP SLP Goals       12/04/17 1341 11/30/17 1505 11/29/17 1155    Repeat Evaluation Needed    Swallow Skills to Level that Repeat Evaluation Indicated- SLP, Date Goal Reviewed (P)  12/04/17  -AS 11/30/17  -LS  11/29/17  -LS      11/28/17 1124 11/25/17 1409       Repeat Evaluation Needed    Swallow Skills to Level that Repeat Evaluation Indicated- SLP, Date Established  11/25/17  -SG     Swallow Skills to Level that Repeat Evaluation Indicated- SLP, Time to Achieve  by discharge  -SG     Swallow Skills to Level that Repeat Evaluation Indicated- SLP, Date Goal Reviewed 11/28/17  -SG 11/25/17  -SG     Swallow Skills to Level that Repeat Evaluation Indicated- SLP, Outcome goal ongoing  -SG goal ongoing  -SG       User Key  (r) = Recorded By, (t) = Taken By, (c) = Cosigned By    Initials Name Provider Type    SG Milla Maddenmisha Uriarte, MS CCC-SLP Speech and Language Pathologist    MAMIE Guzman, MS CCC-SLP Speech and Language Pathologist    AS Estela Blanco, Speech Therapy Student Speech Therapy Student          EDUCATION  The patient has been educated in the following areas:   Dysphagia (Swallowing Impairment) Oral Care/Hydration NPO rationale.    SLP Recommendation and Plan     Plan of Care Review  Plan Of Care Reviewed With: (P) patient  Progress: (P) no change  Outcome Summary/Follow up Plan: (P) Dysphagia treatment complete. Pt lethargy and minimally cooperative. Pt unable to stay alert, even w/ oral care and max cues given. Trialed lingual exercise, which pt provided attempt X1. No PO trials were given 2' pt lethargy. SLP will continue to follow for dysphagia tx. REC: NPO, continue alt means nutrition. Meds via alt route.       Time Calculation:         Time Calculation- SLP       12/04/17 1346          Time Calculation- SLP    SLP Start Time (P)  1030  -AS      SLP Received On (P)  12/04/17  -AS        User Key  (r) = Recorded By, (t) = Taken By, (c) = Cosigned By    Initials Name Provider Type    AS Estela Blanco Speech Therapy Student Speech Therapy Student          Therapy Charges for Today     Code Description Service Date Service Provider Modifiers Qty    [id number]  ST TREATMENT SWALLOW 2 12/4/2017  Estela Blanco, Speech Therapy Student GN 1          Estela Blanco, Speech Therapy Student  12/4/2017

## 2017-12-04 NOTE — PLAN OF CARE
Problem: Patient Care Overview (Adult)  Goal: Plan of Care Review  Outcome: Ongoing (interventions implemented as appropriate)    12/04/17 1506   Outcome Evaluation   Outcome Summary/Follow up Plan Pt continues to demonstrate improved level of alertness upon sitting upright at edge of recliner; able to correct forward and L lateral lean with verbal and tactile cues. Progressing slowly towards established goals due to complex medical status. Would benefit from further skilled PT services to promote PLOF and improved functional mobility.    Coping/Psychosocial Response Interventions   Plan Of Care Reviewed With patient   Patient Care Overview   Progress progress toward functional goals is gradual         Problem: Inpatient Physical Therapy  Goal: Bed Mobility Goal LTG- PT  Outcome: Ongoing (interventions implemented as appropriate)    11/23/17 1102 12/04/17 1506   Bed Mobility PT LTG   Bed Mobility PT LTG, Date Established 11/23/17 --    Bed Mobility PT LTG, Time to Achieve 2 wks --    Bed Mobility PT LTG, Activity Type supine to sit/sit to supine --    Bed Mobility PT LTG, [place] Level moderate assist (50% patient effort) --    Bed Mobility PT LTG, Date Goal Reviewed --  12/04/17   Bed Mobility PT LTG, Outcome --  goal ongoing       Goal: Transfer Training Goal 1 LTG- PT  Outcome: Ongoing (interventions implemented as appropriate)    11/25/17 1519 12/04/17 1506   Transfer Training PT LTG   Transfer Training PT LTG, Date Established 11/25/17 --    Transfer Training PT LTG, Time to Achieve 2 wks --    Transfer Training PT LTG, Activity Type sit to stand/stand to sit --    Transfer Training PT LTG, [place] Level moderate assist (50% patient effort);2 person assist required --    Transfer Training PT LTG, Date Goal Reviewed --  12/04/17   Transfer Training PT LTG, Outcome --  goal ongoing       Goal: Dynamic Sitting Balance Goal LTG- PT  Outcome: Ongoing (interventions implemented as appropriate)    11/23/17  1102 12/04/17 1506   Dynamic Sitting Balance PT LTG   Dynamic Sitting Balance PT LTG, Date Established 11/23/17 --    Dynamic Sitting Balance PT LTG, Time to Achieve 2 wks --    Dynamic Sitting Balance PT LTG, [place] Level minimum assist (75% patient effort) --    Dynamic Sitting Balance PT LTG, Date Goal Reviewed --  12/04/17   Dynamic Sitting Balance PT LTG, Outcome --  goal ongoing

## 2017-12-04 NOTE — PLAN OF CARE
Problem: Patient Care Overview (Adult)  Goal: Plan of Care Review  Outcome: Ongoing (interventions implemented as appropriate)    12/04/17 6869   Outcome Evaluation   Outcome Summary/Follow up Plan Dr. Hearn and Dr. Tipton spoke with pt's wife regarding prognosis and goals of care. Still no decision on trach/peg. Dr. Hearn would like to wait a few more days to see if there is any improvement and keep him in the ICU. Pt required less suctioning today, secretions have slowed but he is still not able to clear them on his own. Stool softeners switched to PRN r/t liquid BM.   Coping/Psychosocial Response Interventions   Plan Of Care Reviewed With spouse   Patient Care Overview   Progress no change       Goal: Adult Individualization and Mutuality  Outcome: Ongoing (interventions implemented as appropriate)  Goal: Discharge Needs Assessment  Outcome: Ongoing (interventions implemented as appropriate)    Problem: Fall Risk (Adult)  Goal: Absence of Falls  Outcome: Ongoing (interventions implemented as appropriate)

## 2017-12-04 NOTE — THERAPY TREATMENT NOTE
Acute Care - Physical Therapy Treatment Note  [facility]     Patient Name: Magdi Arriaga  : 1945  MRN: [id number]  Today's Date: 2017  Onset of Illness/Injury or Date of Surgery Date: 17  Date of Referral to PT: 17  Referring Physician: Earl    Admit Date: 2017    Visit Dx:    ICD-10-CM ICD-9-CM   1. Pharyngeal dysphagia R13.13 787.23   2. Small bowel obstruction K56.609 560.9   3. Lower abdominal pain R10.30 789.09   4. Leukocytosis, unspecified type D72.829 288.60   5. Hematemesis with nausea K92.0 578.0     787.02   6. Impaired mobility and ADLs Z74.09 799.89   7. 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, followed by [facility]   • Traumatic SDH, s/p right craniotomy on 2017   • Small bowel obstruction (S/P Exp lap for acute SBO 17)   • Altered mental status post op   • PVC (premature ventricular contraction)   • NSVT (nonsustained ventricular tachycardia)               Adult Rehabilitation Note       17 1350 17 1030       Rehab Assessment/Intervention    Discipline physical therapist  -LS speech language pathologist  -CALIN,AS,LSA2     Document Type progress note  -LS therapy note (daily note)  -LSROKCY,AS,LSA2     Subjective Information agree to therapy;no  complaints  -LS agree to therapy  -LSA,AS,LSA2     Patient Effort, Rehab Treatment good  -LS fair  -LSA,AS,LSA2     Precautions/Limitations fall precautions;oxygen therapy device and L/min;other (see comments)   NG, abd incision, L-side weakness  -LS      Recorded by [LS] Keli Awad, PT [LSA,AS,LSA2] Maria Guadalupe Guzman, MS CCC-SLP (r) Estela Blanco, Speech Therapy Student (t) Maria Guadalupe Guzman, MS CCC-SLP (c)     Vital Signs    Pre Systolic BP Rehab 145  -LS      Pre Treatment Diastolic BP 88  -LS      Post Systolic BP Rehab 141  -LS      Post Treatment Diastolic BP 86  -LS      Pretreatment Heart Rate (beats/min) 75  -LS      Posttreatment Heart Rate (beats/min) 74  -LS      Pre SpO2 (%) 97  -LS      O2 Delivery Pre Treatment supplemental O2  -LS      Post SpO2 (%) 97  -LS      O2 Delivery Post Treatment supplemental O2  -LS      Pre Patient Position Sitting  -LS      Intra Patient Position Standing  -LS      Post Patient Position Sitting  -LS      Recorded by [LS] Keli Awad, PT      Pain Assessment    Pain Assessment [place] FACES  -LS No/denies pain  -LSA,AS,LSA2     Pain Score 0  -LS 0  -LSA,AS,LSA2     Post Pain Score 0  -LS 0  -LSA,AS,LSA2     Recorded by [LS] Keli Awad, PT [LSA,AS,LSA2] Maria Guadalupe Guzman, MS CCC-SLP (r) Estela Blanco, Speech Therapy Student (t) Maria Guadalupe Guzman, MS CCC-SLP (c)     Cognitive Assessment/Intervention    Current Cognitive/Communication Assessment functional   delayed processing  -LS could not assess   2' lethargy   -LSA,AS,LSA2     Orientation Status oriented to;person;place;time  -LS oriented to;person  -LSA,AS,LSA2     Follows Commands/Answers Questions able to follow single-step instructions;50% of the time;75% of the time;needs cueing;needs increased time;needs repetition  -LS 25% of the time;able to follow single-step instructions;needs repetition;needs increased time  -LSA,AS,LSA2     Personal Safety moderate impairment;decreased awareness, need for assist;decreased  awareness, need for safety;decreased insight to deficits  -LS      Personal Safety Interventions fall prevention program maintained;gait belt;nonskid shoes/slippers when out of bed  -LS      Recorded by [LS] Keli Awad, PT [LSA,AS,LSA2] Maria Guadalupe Guzman, MS CCC-SLP (r) Estela Blanco, Speech Therapy Student (t) Maria Guadalupe Guzman, MS CCC-SLP (c)     Dysphagia Treatment Objectives and Progress    To improve timing of pharyngeal response, patient will:  Swallow in timely way using three second prep;Swallow in timely way using super-supraglottic swallow;80%;with consistent cues  -LSA,AS,LSA2     Timing of Pharyngeal Response Progress  continue to adress  -LSA,AS,LSA2     To improve closure at entrance to airway, patient will:  Complete super-supraglottic swallow;80%;with consistent cues  -LSA,AS,LSA2     Closure at Entrance to Airway Progress  continue to adress  -LSA,AS,LSA2     To improve hyolaryngeal excursion, patient will:  Complete chin tuck against resistance (comment number of repetitions);80%;with consistent cues  -LSA,AS,LSA2     Hyolaryngeal Excursion Progress  0%;with consistent cues;continue to adress  -LSA,AS,LSA2     To improve tongue base & pharyngeal wall squeeze, patient will:  Complete effortful swallow;Complete tongue hold swallow;80%;with consistent cues  -LSA,AS,LSA2     Tongue Base/Pharyngeal Wall Squeeze Progress  continue to adress  -LSA,AS,LSA2     Recorded by  [LSA,AS,LSA2] Maria Guadalupe Guzman, MS CCC-SLP (r) Estela Blanco, Speech Therapy Student (t) Maria Guadalupe Guzman, MS CCC-SLP (c)     Improve oral skills    To Improve Oral Skills, patient will:  Increase tongue tip elevation;80%;with consistent cues  -LSA,AS,LSA2     Status: Improve Oral Skills  Progressing as expected  -LSA,AS,LSA2     Oral Skills Progress  0%;with consistent cues;continue to adress  -LSA,AS,LSA2     Recorded by  [LSA,AS,LSA2] Maria Guadalupe Guzman, MS CCC-SLP (r) Estela Blanco, Speech Therapy Student (t) Maria Guadalupe Guzman MS CCC-SLP (c)      Improve timing of pharyngeal response    Status: Improve timing of pharyngeal response  Progress slower than expected  -LSA,AS,LSA2     Comments: Improve timing of pharyngeal response  DNA 2' lethargy   -LSA,AS,LSA2     Recorded by  [LSA,AS,LSA2] Maria Guadalupe Guzman MS CCC-SLP (r) Estela Blanco, Speech Therapy Student (t) Maria Guadalupe Guzman MS CCC-SLP (c)     Improve closure at entrance to airway    Status: Improve closure at entrance to airway  Progress slower than expected  -LSA,AS,LSA2     Recorded by  [LSA,AS,LSA2] Maria Guadalupe Guzman MS CCC-SLP (r) Estela Blanco, Speech Therapy Student (t) Maria Guadalupe Guzman MS CCC-SLP (c)     Improve hyolaryngeal excursion    Status: Improve hyolaryngeal excursion  Progress slower than expected  -LSA,AS,LSA2     Comments: Improve hyolaryngeal excursion  Pt unable to perform 2' lethargic, even w/ max cues and prompts.   -LSA,AS,LSA2     Recorded by  [LSA,AS,LSA2] Maria Guadalupe Guzman MS CCC-SLP (r) Estela Blanco, Speech Therapy Student (t) Maria Guadalupe Guzman MS CCC-SLP (c)     Improve tongue base & pharyngeal wall squeeze    Status: Improve tongue base & pharyngeal wall squeeze  Progress slower than expected  -LSA,AS,LSA2     Comments: Improve tongue base & pharyngeal wall squeeze  DNA PO trials 2' lethargy.   -LSA,AS,LSA2     Recorded by  [LSA,AS,LSA2] Maria Guadalupe Guzman MS CCC-SLP (r) Estela Blanco, Speech Therapy Student (t) Maria Guadalupe Guzman MS CCC-SLP (c)     Dysphagia Other 1    Dysphagia Other 1 Objective  Tolerate ice chip and H2O trials without s/s aspiration with 50% accuracy and cues  -LSA,AS,LSA2     Status: Dysphagia Other 1  Progress slower than expected  -LSA,AS,LSA2     Dysphagia Other 1 Progress  continue to address  -LSA,AS,LSA2     Comments: Dysphagia Other 1  DNA PO trials 2' lethargy. Pt unable to stay alert for PO trials even w/ max cues.   -LSA,AS,LSA2     Recorded by  [LSA,AS,LSA2] Maria Guadalupe Guzman MS CCC-SLP (r) Estela Blanco, Speech Therapy Student (t) Maria Guadalupe Guzman, MS CCC-SLP  (c)     MMT (Manual Muscle Testing)    General MMT Assessment Detail LLE: 0/5; RLE grossly 3-/5  -LS      Recorded by [MAMIE] Keli Awad, PT      Bed Mobility, Assessment/Treatment    Bed Mobility, Comment UIC upon arrival.   -LS      Recorded by [MAMIE] Keli Awad, PT      Transfer Assessment/Treatment    Transfers, Sit-Stand [place] dependent (less than 25% patient effort);2 person assist required;verbal cues required  -LS      Transfers, Stand-Sit [place] dependent (less than 25% patient effort);2 person assist required;verbal cues required  -LS      Transfer, Impairments strength decreased;impaired balance  -LS      Transfer, Comment Attempted STS x2 from recliner; able to clear hips from cahir surface but unable to achieve upright despite verbal and tactile cues.   -LS      Recorded by [MAMIE] Keli Awad, PT      Gait Assessment/Treatment    Gait, [place] Level not appropriate to assess  -LS      Recorded by [LS] Keli Awad, PT      Motor Skills/Interventions    Additional Documentation Balance Skills Training (Group)  -LS      Recorded by [MAMIE] Keli Awad, PT      Balance Skills Training    Sitting-Level of Assistance Minimum assistance   brief moments of supervision; L lateral and anterior lean  -LS      Sitting-Balance Support Right upper extremity supported;Left upper extremity supported;Feet supported  -LS      Standing-Level of Assistance Dependent;x2  -LS      Static Standing Balance Support Right upper extremity supported;Left upper extremity supported  -LS      Standing-Balance Activities Weight Shift A-P  -LS      Recorded by [MAMIE] Keli Awad, PT      Therapy Exercises    Right Lower Extremity AAROM:;10 reps;ankle pumps/circles;LAQ  -LS      Left Lower Extremity PROM:;10 reps;ankle pumps/circles;calf stretch;hamstring stretch  -LS      Bilateral Upper Extremity AAROM:;10 reps;sitting;elbow flexion/extension;hand pumps;shoulder extension/flexion  -LS      Recorded by [MAMIE] Keli AVILA  Ritesh, PT      Positioning and Restraints    Pre-Treatment Position sitting in chair/recliner  -LS      Post Treatment Position chair  -LS      In Chair notified nsg;reclined;call light within reach;encouraged to call for assist;exit alarm on;with family/caregiver;RUE elevated;LUE elevated;waffle cushion;on mechanical lift sling;legs elevated;heels elevated;L waffle boot;R multipodus   MD present  -LS      Recorded by [LS] Keli Awad, PT        User Key  (r) = Recorded By, (t) = Taken By, (c) = Cosigned By    Initials Name Effective Dates    LSA Maria Guadalupe Guzman, MS CCC-SLP 06/22/15 -     LS Keli Awad, PT 06/19/15 -     AS Estela Blanco, Speech Therapy Student 08/24/17 -                 IP PT Goals       12/04/17 1506 12/01/17 1347 11/29/17 1528    Bed Mobility PT LTG    Bed Mobility PT LTG, Date Goal Reviewed 12/04/17  -LS      Bed Mobility PT LTG, Outcome goal ongoing  -LS goal ongoing  -LS goal ongoing  -LS    Transfer Training PT LTG    Transfer Training PT  LTG, Date Goal Reviewed 12/04/17  -LS      Transfer Training PT LTG, Outcome goal ongoing  -LS goal ongoing  -LS goal ongoing  -LS    Dynamic Sitting Balance PT LTG    Dynamic Sitting Balance PT LTG, Date Goal Reviewed 12/04/17  -LS      Dynamic Sitting Balance PT LTG, Outcome goal ongoing  -LS goal ongoing  -LS goal ongoing  -LS      11/27/17 1608 11/25/17 1519 11/23/17 1102    Bed Mobility PT LTG    Bed Mobility PT LTG, Date Established   11/23/17  -LSA    Bed Mobility PT LTG, Time to Achieve   2 wks  -LSA    Bed Mobility PT LTG, Activity Type   supine to sit/sit to supine  -LSA    Bed Mobility PT LTG, [place] Level   moderate assist (50% patient effort)  -LSA    Bed Mobility PT LTG, Outcome goal ongoing  -LS goal ongoing  -KR goal ongoing  -LSA    Transfer Training PT LTG    Transfer Training PT LTG, Date Established  11/25/17  -KR 11/23/17  -LSA    Transfer Training PT LTG, Time to Achieve  2 wks  -KR 2 wks  -LSA    Transfer Training PT LTG,  Activity Type  sit to stand/stand to sit  -KR sit to stand/stand to sit  -LSA    Transfer Training PT LTG, [place] Level  moderate assist (50% patient effort);2 person assist required  -KR maximum assist (25% patient effort);2 person assist required  -LSA    Transfer Training PT  LTG, Date Goal Reviewed  11/25/17  -KR     Transfer Training PT LTG, Outcome goal ongoing  -LS goal revised  -KR goal ongoing  -LSA    Transfer Training PT LTG, Reason Goal Not Met  goals revised this date  -KR     Dynamic Sitting Balance PT LTG    Dynamic Sitting Balance PT LTG, Date Established   11/23/17  -LSA    Dynamic Sitting Balance PT LTG, Time to Achieve   2 wks  -LSA    Dynamic Sitting Balance PT LTG, [place] Level   minimum assist (75% patient effort)  -LSA    Dynamic Sitting Balance PT LTG, Outcome goal ongoing  -LS goal ongoing  -KR goal ongoing  -LSA      User Key  (r) = Recorded By, (t) = Taken By, (c) = Cosigned By    Initials Name Provider Type    LSA Anna Crawford, PT Physical Therapist    LS Keli Awad, PT Physical Therapist    KR Sri Mejía, PT Physical Therapist          Physical Therapy Education     Title: PT OT SLP Therapies (Active)     Topic: Physical Therapy (Active)     Point: Mobility training (Active)    Learning Progress Summary    Learner Readiness Method Response Comment Documented by Status   Patient Acceptance E,D NR   12/04/17 1505 Active    Acceptance E,D NR Wife verbalized understanding of appropriate technique in assisting pt with ROM and ther ex.  12/01/17 1346 Active    Acceptance E,D NR  LS 11/29/17 1528 Active    Acceptance E,D NR   11/27/17 1608 Active    Acceptance E NR  KR 11/25/17 1519 Active   Family Acceptance E,D NR   11/29/17 1528 Active   Significant Other Acceptance E,D NR  LS 12/04/17 1505 Active    Acceptance E,D NR Wife verbalized understanding of appropriate technique in assisting pt with ROM and ther ex.  12/01/17 1346 Active               Point: Home  exercise program (Active)    Learning Progress Summary    Learner Readiness Method Response Comment Documented by Status   Patient Acceptance E,D NR  LS 12/04/17 1505 Active    Acceptance E,D NR Wife verbalized understanding of appropriate technique in assisting pt with ROM and ther ex. LS 12/01/17 1346 Active    Acceptance E,D NR   11/29/17 1528 Active    Acceptance E,D NR   11/27/17 1608 Active    Acceptance E VU,NR Encouraged pt and spouse to work on quad sets. Also reviewed w/spouse to keep L heel elevated off of surface to help prevent skin breakdown. LS1 11/23/17 1105 Done   Family Acceptance E,D NR   11/29/17 1528 Active    Acceptance E VU,NR Encouraged pt and spouse to work on quad sets. Also reviewed w/spouse to keep L heel elevated off of surface to help prevent skin breakdown. LS 11/23/17 1105 Done   Significant Other Acceptance E,D NR   12/04/17 1505 Active    Acceptance E,D NR Wife verbalized understanding of appropriate technique in assisting pt with ROM and ther ex.  12/01/17 1346 Active               Point: Body mechanics (Active)    Learning Progress Summary    Learner Readiness Method Response Comment Documented by Status   Patient Acceptance E,D NR   12/04/17 1505 Active    Acceptance E,D NR Wife verbalized understanding of appropriate technique in assisting pt with ROM and ther ex.  12/01/17 1346 Active    Acceptance E,D NR   11/29/17 1528 Active    Acceptance E,D NR   11/27/17 1608 Active    Acceptance E NR  KR 11/25/17 1519 Active   Family Acceptance E,D NR   11/29/17 1528 Active   Significant Other Acceptance E,D NR   12/04/17 1505 Active    Acceptance E,D NR Wife verbalized understanding of appropriate technique in assisting pt with ROM and ther ex.  12/01/17 1346 Active               Point: Precautions (Active)    Learning Progress Summary    Learner Readiness Method Response Comment Documented by Status   Patient Acceptance E,D NR   12/04/17 1505 Active     Acceptance E,D NR Wife verbalized understanding of appropriate technique in assisting pt with ROM and ther ex. LS 12/01/17 1346 Active    Acceptance E,D NR  LS 11/29/17 1528 Active    Acceptance E,D NR  LS 11/27/17 1608 Active    Acceptance E NR  KR 11/25/17 1519 Active   Family Acceptance E,D NR  LS 11/29/17 1528 Active   Significant Other Acceptance E,D NR  LS 12/04/17 1505 Active    Acceptance E,D NR Wife verbalized understanding of appropriate technique in assisting pt with ROM and ther ex. LS 12/01/17 1346 Active                      User Key     Initials Effective Dates Name Provider Type Discipline    LS1 06/19/15 -  Anna Crawford, PT Physical Therapist PT    LS 06/19/15 -  Keli Awad, PT Physical Therapist PT    KR 09/25/17 -  Sri Mejía, PT Physical Therapist PT                    PT Recommendation and Plan  Anticipated Discharge Disposition: inpatient rehabilitation facility  Planned Therapy Interventions: balance training, bed mobility training, patient/family education, home exercise program, strengthening, transfer training  PT Frequency: daily, per priority policy  Plan of Care Review  Plan Of Care Reviewed With: patient  Progress: progress toward functional goals is gradual  Outcome Summary/Follow up Plan: Pt continues to demonstrate improved level of alertness upon sitting upright at edge of recliner; able to correct forward and L lateral lean with verbal and tactile cues. Progressing slowly towards established goals due to complex medical status. Would benefit from further skilled PT services to promote PLOF and improved functional mobility.           Outcome Measures       12/04/17 1350          How much help from another person do you currently need...    Turning from your back to your side while in flat bed without using bedrails? 2  -LS      Moving from lying on back to sitting on the side of a flat bed without bedrails? 2  -LS      Moving to and from a bed to a chair (including a  wheelchair)? 1  -LS      Standing up from a chair using your arms (e.g., wheelchair, bedside chair)? 1  -LS      Climbing 3-5 steps with a railing? 1  -LS      To walk in hospital room? 1  -LS      AM-PAC 6 Clicks Score 8  -LS      Functional Assessment    Outcome Measure Options AM-PAC 6 Clicks Basic Mobility (PT)  -LS        User Key  (r) = Recorded By, (t) = Taken By, (c) = Cosigned By    Initials Name Provider Type    MAMIE Awad, PT Physical Therapist           Time Calculation:         PT Charges       12/04/17 1511          Time Calculation    Start Time 1350  -LS      PT Received On 12/04/17  -LS      PT Goal Re-Cert Due Date 12/14/17  -      Time Calculation- PT    Total Timed Code Minutes- PT 24 minute(s)  -        User Key  (r) = Recorded By, (t) = Taken By, (c) = Cosigned By    Initials Name Provider Type    MAMIE Awad, PT Physical Therapist          Therapy Charges for Today     Code Description Service Date Service Provider Modifiers Qty    38140875138 HC PT THER PROC EA 15 MIN 12/4/2017 Keli Awad, PT GP 2          PT G-Codes  Outcome Measure Options: AM-PAC 6 Clicks Basic Mobility (PT)    Keli Awad, PT  12/4/2017

## 2017-12-04 NOTE — PLAN OF CARE
Problem: Patient Care Overview (Adult)  Goal: Plan of Care Review  Outcome: Ongoing (interventions implemented as appropriate)    12/04/17 2935   Outcome Evaluation   Outcome Summary/Follow up Plan Suctioned as needed for secretions. Rested well.    Coping/Psychosocial Response Interventions   Plan Of Care Reviewed With patient   Patient Care Overview   Progress no change         Problem: Bowel Resection (Adult)  Goal: Signs and Symptoms of Listed Potential Problems Will be Absent or Manageable (Bowel Resection)  Outcome: Ongoing (interventions implemented as appropriate)    Problem: Fall Risk (Adult)  Goal: Absence of Falls  Outcome: Ongoing (interventions implemented as appropriate)

## 2017-12-04 NOTE — PLAN OF CARE
Problem: Patient Care Overview (Adult)  Goal: Plan of Care Review  Outcome: Ongoing (interventions implemented as appropriate)    12/04/17 1341   Outcome Evaluation   Outcome Summary/Follow up Plan Dysphagia treatment complete. Pt lethargy and minimally cooperative. Pt unable to stay alert, even w/ oral care and max cues given. Trialed lingual exercise, which pt provided attempt X1. No PO trials were given 2' pt lethargy. SLP will continue to follow for dysphagia tx.  REC: NPO, continue alt means nutrition. Meds via alt route.    Coping/Psychosocial Response Interventions   Plan Of Care Reviewed With patient   Patient Care Overview   Progress no change         Problem: Inpatient SLP  Goal: Dysphagia- Patient will improve swallowing skills to the level that a repeat evaluation is indicated  Outcome: Ongoing (interventions implemented as appropriate)    11/25/17 1409 12/04/17 1341   Repeat Evaluation Needed   Swallow Skills to Level that Repeat Evaluation Indicated- SLP, Date Established 11/25/17 --    Swallow Skills to Level that Repeat Evaluation Indicated- SLP, Time to Achieve by discharge --    Swallow Skills to Level that Repeat Evaluation Indicated- SLP, Date Goal Reviewed --  12/04/17

## 2017-12-04 NOTE — PROGRESS NOTES
"Magdi Arriaga  1945  [id number]    Surgery Progress Note    Date of visit: 12/4/2017    Subjective: Awake and responsive  Denies any abdominal pain  Has been tolerating tube feeds    Objective:    /82  Pulse 73  Temp 98.1 °F (36.7 °C) (Axillary)   Resp 16  Ht 67.99\" (172.7 cm)  Wt 176 lb 12.9 oz (80.2 kg)  SpO2 95%  BMI 26.89 kg/m2    Intake/Output Summary (Last 24 hours) at 12/04/17 0806  Last data filed at 12/04/17 0600   Gross per 24 hour   Intake             1924 ml   Output                0 ml   Net             1924 ml       CV: Regular rate and rhythm  L: normal air entry    Abd: Soft and nontender  Incision healing well      LABS:      Results from last 7 days  Lab Units 12/04/17  0330   WBC 10*3/mm3 11.74*   HEMOGLOBIN g/dL 8.7*   HEMATOCRIT % 28.0*   PLATELETS 10*3/mm3 291       Results from last 7 days  Lab Units 12/04/17  0330   SODIUM mmol/L 143   POTASSIUM mmol/L 4.4   CHLORIDE mmol/L 109   CO2 mmol/L 30.0   BUN mg/dL 39*   CREATININE mg/dL 0.70   CALCIUM mg/dL 9.3   BILIRUBIN mg/dL 0.2*   ALK PHOS U/L 77   ALT (SGPT) U/L 16   AST (SGOT) U/L 16   GLUCOSE mg/dL 116*       Results from last 7 days  Lab Units 12/04/17  0330   SODIUM mmol/L 143   POTASSIUM mmol/L 4.4   CHLORIDE mmol/L 109   CO2 mmol/L 30.0   BUN mg/dL 39*   CREATININE mg/dL 0.70   GLUCOSE mg/dL 116*   CALCIUM mg/dL 9.3       Lab Results  Lab Value Date/Time   LIPASE 30 11/20/2017 1236         Assessment/ Plan: Stable and prolonged hospital course  H&H stable  Patient is more awake today  He is having problems with secretions and silent aspiration  Family is still considering PEG and Trach    Problem List Items Addressed This Visit     Small bowel obstruction (S/P Exp lap for acute SBO 11/20/17)    Relevant Orders    Tissue Pathology Exam - Tissue, Small Intestine (Completed)      Other Visit Diagnoses     Pharyngeal dysphagia    -  Primary    Lower abdominal pain        Leukocytosis, unspecified type        Hematemesis " with nausea                Cj Reyes MD  12/4/2017  8:06 AM

## 2017-12-05 NOTE — THERAPY PROGRESS REPORT/RE-CERT
Acute Care - Occupational Therapy Progress Note  Psychiatric     Patient Name: Magdi Arriaga  : 1945  MRN: [id number]  Today's Date: 2017  Onset of Illness/Injury or Date of Surgery Date: 17  Date of Referral to OT: 17  Referring Physician: Earl      Admit Date: 2017    Visit Dx:     ICD-10-CM ICD-9-CM   1. Pharyngeal dysphagia R13.13 787.23   2. Small bowel obstruction K56.609 560.9   3. Lower abdominal pain R10.30 789.09   4. Leukocytosis, unspecified type D72.829 288.60   5. Hematemesis with nausea K92.0 578.0     787.02   6. Impaired mobility and ADLs Z74.09 799.89   7. 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, followed by [facility]   • Traumatic SDH, s/p right craniotomy on 2017   • Small bowel obstruction (S/P Exp lap for acute SBO 17)   • Altered mental status post op   • PVC (premature ventricular contraction)   • NSVT (nonsustained ventricular tachycardia)             Adult Rehabilitation Note       17 1350 17 1345 17 1350    Rehab Assessment/Intervention    Discipline physical therapist  -LS occupational therapist  -CL physical therapist  -LS    Document Type therapy note (daily note)  -LS progress note  -CL progress note  -LS    Subjective  Information agree to therapy;no complaints  -LS agree to therapy;no complaints  -CL agree to therapy;no complaints  -LS    Patient Effort, Rehab Treatment good  -LS good  -CL good  -LS    Precautions/Limitations fall precautions;oxygen therapy device and L/min;other (see comments)   abd incision; LLE weakness  -LS fall precautions;oxygen therapy device and L/min;other (see comments)   L sided weakness, abd incision  -CL fall precautions;oxygen therapy device and L/min;other (see comments)   NG, abd incision, L-side weakness  -LS    Specific Treatment Considerations Continues to demonstrate decreased processing of commands.   -LS      Recorded by [LS] Keli Awad, PT [CL] Margarita Velasco OT [LS] Keli Awad, PT    Vital Signs    Pre Systolic BP Rehab 140  -  -  -LS    Pre Treatment Diastolic BP 83  -LS 83  -CL 88  -LS    Post Systolic BP Rehab 143  -  -  -LS    Post Treatment Diastolic BP 87  -LS 87  -CL 86  -LS    Pretreatment Heart Rate (beats/min) 73  -LS 73  -CL 75  -LS    Posttreatment Heart Rate (beats/min) 72  -LS 72  -CL 74  -LS    Pre SpO2 (%)  98  -CL 97  -LS    O2 Delivery Pre Treatment supplemental O2  -LS supplemental O2  -CL supplemental O2  -LS    Post SpO2 (%) 96  -LS 96  -CL 97  -LS    O2 Delivery Post Treatment supplemental O2  -LS supplemental O2  -CL supplemental O2  -LS    Pre Patient Position Sitting  -LS Sitting  -CL Sitting  -LS    Intra Patient Position Standing  -LS Standing  -CL Standing  -LS    Post Patient Position Sitting  -LS Sitting  -CL Sitting  -LS    Recorded by [LS] Keli Awad, PT [CL] Margarita Velasco OT [LS] Keli Awad, PT    Pain Assessment    Pain Assessment 0-10  -LS No/denies pain  -CL [place] FACES  -LS    [place] FACES Pain Rating 0  -LS 0  -CL     Pain Score 0  -LS 0  -CL 0  -LS    Post Pain Score 0  -LS 0  -CL 0  -LS    Recorded by [LS] Keli Awad, PT [CL] Margarita Velasco OT [LS] Keli Awad PT    Cognitive Assessment/Intervention     Current Cognitive/Communication Assessment functional  -LS impaired  -CL functional   delayed processing  -LS    Orientation Status oriented to;person;place;required verbal cueing (specifiy in comments)   cues for date  -LS oriented to;person;place;required verbal cueing (specifiy in comments);time  -CL oriented to;person;place;time  -LS    Follows Commands/Answers Questions able to follow single-step instructions;75% of the time;needs cueing;needs increased time;needs repetition  -LS 75% of the time;needs cueing;needs repetition  -CL able to follow single-step instructions;50% of the time;75% of the time;needs cueing;needs increased time;needs repetition  -LS    Personal Safety moderate impairment;decreased awareness, need for assist;decreased awareness, need for safety;decreased insight to deficits  -LS moderate impairment;decreased awareness, need for assist;decreased awareness, need for safety  -CL moderate impairment;decreased awareness, need for assist;decreased awareness, need for safety;decreased insight to deficits  -LS    Personal Safety Interventions fall prevention program maintained;gait belt;nonskid shoes/slippers when out of bed  -LS fall prevention program maintained;gait belt;nonskid shoes/slippers when out of bed  -CL fall prevention program maintained;gait belt;nonskid shoes/slippers when out of bed  -LS    Recorded by [LS] Keli Awad, PT [CL] Margarita Velasco, OT [LS] Keli Awad PT    MMT (Manual Muscle Testing)    General MMT Assessment Detail   LLE: 0/5; RLE grossly 3-/5  -LS    Recorded by   [LS] Keli Awad PT    Bed Mobility, Assessment/Treatment    Bed Mobility, Comment UIC  -LS UIC.   -CL UIC upon arrival.   -LS    Recorded by [LS] Keli Awad, PT [CL] Margarita Velasco OT [LS] Keli Awad PT    Transfer Assessment/Treatment    Transfers, Sit-Stand [place] maximum assist (25% patient effort);2 person assist required;verbal cues required  -LS maximum assist (25% patient effort);2  person assist required;verbal cues required  -CL dependent (less than 25% patient effort);2 person assist required;verbal cues required  -LS    Transfers, Stand-Sit [place] maximum assist (25% patient effort);2 person assist required;verbal cues required  -LS maximum assist (25% patient effort);2 person assist required;verbal cues required  -CL dependent (less than 25% patient effort);2 person assist required;verbal cues required  -LS    Transfer, Impairments strength decreased;impaired balance  -LS  strength decreased;impaired balance  -LS    Transfer, Comment Attempted STS x3 from recliner; vc's for hand placement and rocking forward to intiate. Able to clear hips from chair but unable to achieve upright posture despite verbal and tactile cues for hip/trunk extension.   -LS Pt stood from chair w/ OT/PT on either side of pt w/ BUE support and B feet/knees blocked. Pt achieved ~50% upright position w/ posterior pelvic support to facilitate upright posture.    -CL Attempted STS x2 from recliner; able to clear hips from cahir surface but unable to achieve upright despite verbal and tactile cues.   -LS    Recorded by [LS] Keli Awad, PT [CL] Margarita Velasco OT [LS] Keli Awad, PT    Gait Assessment/Treatment    Gait, [place] Level not appropriate to assess  -LS  not appropriate to assess  -LS    Recorded by [LS] Keli Awad, PT  [LS] Keil Awad, PT    Motor Skills/Interventions    Additional Documentation Balance Skills Training (Group)  -LS  Balance Skills Training (Group)  -LS    Recorded by [LS] Keli Awad, PT  [LS] Keli Awad, PT    Balance Skills Training    Sitting-Level of Assistance Minimum assistance  -LS Minimum assistance;x2  -CL Minimum assistance   brief moments of supervision; L lateral and anterior lean  -LS    Sitting-Balance Support Right upper extremity supported;Left upper extremity supported;Feet supported  -LS Right upper extremity supported;Left upper extremity  supported;Feet supported  -CL Right upper extremity supported;Left upper extremity supported;Feet supported  -LS    Sitting-Balance Activities Lateral lean;Forward lean;Trunk control activities  -LS Forward lean;Reaching for objects;Trunk control activities  -CL     Sitting # of Minutes 10  -LS      Standing-Level of Assistance Maximum assistance;x2  -LS Maximum assistance;x2  -CL Dependent;x2  -LS    Static Standing Balance Support Right upper extremity supported;Left upper extremity supported  -LS Right upper extremity supported;Left upper extremity supported  -CL Right upper extremity supported;Left upper extremity supported  -LS    Standing-Balance Activities Weight Shift A-P  -LS Weight Shift A-P  -CL Weight Shift A-P  -LS    Recorded by [LS] Keli Awad, PT [CL] Margarita Velasco OT [LS] Keli Awad PT    Therapy Exercises    Right Lower Extremity AAROM:;sitting;ankle pumps/circles;calf stretch;hamstring stretch;hip flexion  -LS  AAROM:;10 reps;ankle pumps/circles;LAQ  -LS    Left Lower Extremity PROM:;ankle pumps/circles;calf stretch;hip flexion;LAQ  -LS  PROM:;10 reps;ankle pumps/circles;calf stretch;hamstring stretch  -LS    Right Upper Extremity  AROM:;10 reps;elbow flexion/extension;hand pumps;shoulder extension/flexion  -CL     Left Upper Extremity  AAROM:;PROM:;10 reps;elbow flexion/extension;hand pumps;pronation/supination;shoulder extension/flexion;shoulder ER/IR   wrist F/E  -CL     Bilateral Upper Extremity   AAROM:;10 reps;sitting;elbow flexion/extension;hand pumps;shoulder extension/flexion  -LS    Recorded by [LS] Keli Awad, PT [CL] Margarita Velasco, CHARANJIT [LS] Keli Awad PT    Positioning and Restraints    Pre-Treatment Position sitting in chair/recliner  -LS sitting in chair/recliner  -CL sitting in chair/recliner  -LS    Post Treatment Position chair  -LS chair  -CL chair  -LS    In Chair notified nsg;reclined;call light within reach;encouraged to call for assist;exit alarm on;with  family/caregiver;with OT;RUE elevated;LUE elevated;waffle cushion;on mechanical lift sling;heels elevated;R waffle boot;L multipodus  -LS notified nsg;reclined;call light within reach;encouraged to call for assist;exit alarm on;with family/caregiver;RUE elevated;LUE elevated;waffle cushion;on mechanical lift sling;legs elevated;heels elevated;R waffle boot;L multipodus  -CL notified nsg;reclined;call light within reach;encouraged to call for assist;exit alarm on;with family/caregiver;RUE elevated;LUE elevated;waffle cushion;on mechanical lift sling;legs elevated;heels elevated;L waffle boot;R multipodus   MD present  -LS    Recorded by [LS] Keli Awad, PT [CL] Margarita Velasco OT [LS] Keli Awad, PT      12/04/17 1030          Rehab Assessment/Intervention    Discipline speech language pathologist  -LSA,AS,LSA2      Document Type therapy note (daily note)  -LSA,AS,LSA2      Subjective Information agree to therapy  -LSA,AS,LSA2      Patient Effort, Rehab Treatment fair  -LSA,AS,LSA2      Recorded by [LSA,AS,LSA2] Maria Guadalupe Guzman MS CCC-SLP (r) Estela Blanco, Speech Therapy Student (t) Maria Guadalupe Guzman MS CCC-SLP (c)      Pain Assessment    Pain Assessment No/denies pain  -LSA,AS,LSA2      Pain Score 0  -LSA,AS,LSA2      Post Pain Score 0  -LSA,AS,LSA2      Recorded by [LSA,AS,LSA2] Maria Guadalupe Guzman MS CCC-SLP (r) Estela Blanco, Speech Therapy Student (t) Maria Guadalupe Guzman MS CCC-SLP (c)      Cognitive Assessment/Intervention    Current Cognitive/Communication Assessment could not assess   2' lethargy   -LSA,AS,LSA2      Orientation Status oriented to;person  -LSA,AS,LSA2      Follows Commands/Answers Questions 25% of the time;able to follow single-step instructions;needs repetition;needs increased time  -LSA,AS,LSA2      Recorded by [LSA,AS,LSA2] Maria Guadalupe Guzman MS CCC-SLP (r) Estela Blanco, Speech Therapy Student (t) Maria Guadalupe Guzman, MS CCC-SLP (c)      Dysphagia Treatment Objectives and Progress    To improve timing  of pharyngeal response, patient will: Swallow in timely way using three second prep;Swallow in timely way using super-supraglottic swallow;80%;with consistent cues  -LSA,AS,LSA2      Timing of Pharyngeal Response Progress continue to adress  -LSA,AS,LSA2      To improve closure at entrance to airway, patient will: Complete super-supraglottic swallow;80%;with consistent cues  -LSA,AS,LSA2      Closure at Entrance to Airway Progress continue to adress  -LSA,AS,LSA2      To improve hyolaryngeal excursion, patient will: Complete chin tuck against resistance (comment number of repetitions);80%;with consistent cues  -LSA,AS,LSA2      Hyolaryngeal Excursion Progress 0%;with consistent cues;continue to adress  -LSA,AS,LSA2      To improve tongue base & pharyngeal wall squeeze, patient will: Complete effortful swallow;Complete tongue hold swallow;80%;with consistent cues  -LSA,AS,LSA2      Tongue Base/Pharyngeal Wall Squeeze Progress continue to adress  -LSA,AS,LSA2      Recorded by [LSA,AS,LSA2] Maria Guadalupe Guzman MS CCC-SLP (r) Estela Blanco, Speech Therapy Student (t) Maria Guadalupe Guzman MS CCC-SLP (c)      Improve oral skills    To Improve Oral Skills, patient will: Increase tongue tip elevation;80%;with consistent cues  -LSA,AS,LSA2      Status: Improve Oral Skills Progressing as expected  -LSA,AS,LSA2      Oral Skills Progress 0%;with consistent cues;continue to adress  -LSA,AS,LSA2      Recorded by [LSA,AS,LSA2] Maria Guadalupe Guzman MS CCC-SLP (r) Estela Blanco, Speech Therapy Student (t) Maria Guadalupe Guzman MS CCC-SLP (c)      Improve timing of pharyngeal response    Status: Improve timing of pharyngeal response Progress slower than expected  -LSA,AS,LSA2      Comments: Improve timing of pharyngeal response DNA 2' lethargy   -LSA,AS,LSA2      Recorded by [LSA,AS,LSA2] Maria Guadalupe Guzman MS CCC-SLP (r) Estela Blanco, Speech Therapy Student (t) Maria Guadalupe Guzman MS CCC-SLP (c)      Improve closure at entrance to airway    Status: Improve  closure at entrance to airway Progress slower than expected  -LSA,AS,LSA2      Recorded by [LSA,AS,LSA2] Maria Guadalupe Guzman MS CCC-SLP (r) Estela Blanco, Speech Therapy Student (t) Maria Guadalupe Guzman MS CCC-SLP (c)      Improve hyolaryngeal excursion    Status: Improve hyolaryngeal excursion Progress slower than expected  -LSA,AS,LSA2      Comments: Improve hyolaryngeal excursion Pt unable to perform 2' lethargic, even w/ max cues and prompts.   -LSA,AS,LSA2      Recorded by [LSA,AS,LSA2] Maria Guadalupe Guzman MS CCC-SLP (r) Estela Blanco, Speech Therapy Student (t) Maria Guadalupe Guzman MS CCC-SLP (c)      Improve tongue base & pharyngeal wall squeeze    Status: Improve tongue base & pharyngeal wall squeeze Progress slower than expected  -LSA,AS,LSA2      Comments: Improve tongue base & pharyngeal wall squeeze DNA PO trials 2' lethargy.   -LSA,AS,LSA2      Recorded by [LSA,AS,LSA2] Maria Guadalupe Guzman MS CCC-SLP (r) Estela Blanco, Speech Therapy Student (t) Maria Guadalupe Guzman MS CCC-SLP (c)      Dysphagia Other 1    Dysphagia Other 1 Objective Tolerate ice chip and H2O trials without s/s aspiration with 50% accuracy and cues  -LSA,AS,LSA2      Status: Dysphagia Other 1 Progress slower than expected  -LSA,AS,LSA2      Dysphagia Other 1 Progress continue to address  -LSA,AS,LSA2      Comments: Dysphagia Other 1 DNA PO trials 2' lethargy. Pt unable to stay alert for PO trials even w/ max cues.   -LSA,AS,LSA2      Recorded by [LSA,AS,LSA2] Maria Guadalupe Guzman MS CCC-SLP (r) Estela Blanco, Speech Therapy Student (t) Maria Guadalupe Guzman MS CCC-SLP (c)        User Key  (r) = Recorded By, (t) = Taken By, (c) = Cosigned By    Initials Name Effective Dates    LSA Maria Guadalupe Guzman MS CCC-SLP 06/22/15 -     LS Keli Awad, PT 06/19/15 -     CL Margarita Velasco, OT 06/08/16 -     AS Estela Blanco, Speech Therapy Student 08/24/17 -                 OT Goals       12/05/17 1711 12/01/17 1524 11/29/17 1546    Bed Mobility OT LTG    Bed Mobility OT LTG, Outcome goal  ongoing  -CL goal ongoing  -CL goal ongoing  -CL    Transfer Training OT LTG    Transfer Training OT LTG, Outcome goal ongoing  -CL goal ongoing  -CL goal ongoing  -CL    Strength OT LTG    Strength Goal OT LTG, Outcome goal ongoing  -CL goal ongoing  -CL goal ongoing  -CL    Grooming OT LTG    Grooming Goal OT LTG, Outcome goal ongoing  -CL goal ongoing  -CL goal ongoing  -CL      11/27/17 1528 11/23/17 1105       Bed Mobility OT LTG    Bed Mobility OT LTG, Date Established  11/23/17  -AC     Bed Mobility OT LTG, Time to Achieve  by discharge  -AC     Bed Mobility OT LTG, Activity Type  supine to sit/sit to supine  -AC     Bed Mobility OT LTG, [place] Level  moderate assist (50% patient effort);2 person assist required  -AC     Bed Mobility OT LTG, Outcome goal ongoing  -JR      Transfer Training OT LTG    Transfer Training OT LTG, Date Established  11/23/17  -AC     Transfer Training OT LTG, Time to Achieve  by discharge  -AC     Transfer Training OT LTG, Activity Type  bed to chair /chair to bed;sit to stand/stand to sit  -AC     Transfer Training OT LTG, [place] Level  moderate assist (50% patient effort);2 person assist required  -AC     Transfer Training OT LTG, Outcome goal ongoing  -JR      Strength OT LTG    Strength Goal OT LTG, Date Established  11/23/17  -AC     Strength Goal OT LTG, Time to Achieve  by discharge  -AC     Strength Goal OT LTG, Measure to Achieve  Pt will increse LUE strength 1 MMG as needed to support ADLs  -AC     Strength Goal OT LTG, Outcome goal ongoing  -JR      Grooming OT LTG    Grooming Goal OT LTG, Date Established  11/23/17  -AC     Grooming Goal OT LTG, Time to Achieve  by discharge  -AC     Grooming Goal OT LTG, Activity Type  pt will wash face and comb hair using R hand given setup  -AC     Grooming Goal OT LTG, [place] Level  set up required  -AC     Grooming Goal OT LTG, Outcome goal ongoing  -JR        User Key  (r) = Recorded By, (t) = Taken By, (c) =  Cosigned By    Initials Name Provider Type    AC Margie Ferrera, OT Occupational Therapist    JR Linda Quiroz, OT Occupational Therapist    CL Margarita Velasco, OT Occupational Therapist          Occupational Therapy Education     Title: PT OT SLP Therapies (Active)     Topic: Occupational Therapy (Active)     Point: ADL training (Active)    Description: Instruct learner(s) on proper safety adaptation and remediation techniques during self care or transfers.   Instruct in proper use of assistive devices.    Learning Progress Summary    Learner Readiness Method Response Comment Documented by Status   Patient Acceptance E,D NR Pt educated on appropriate safety precautions, t/f techniques, HEP, positioning, and benefits of therapy.  12/05/17 1711 Active    Acceptance E,D NR Pt educated on BUE HEP, positioning, safety precautions, t/f techniques, and benefits of therapy.  12/01/17 1523 Active    Acceptance E,D NR Pt educated on appropriate safety precautions, t/f techniques, HEP, positioning, and benefits of therapy.  11/29/17 1545 Active    Acceptance E NR Educated pt on B UE HEP, transfer technique and benefits of therapy.  11/27/17 1528 Active    Acceptance E VU,NR benefits of activity, UE HEP  11/23/17 1102 Done   Family Acceptance E,D NR Pt educated on appropriate safety precautions, t/f techniques, HEP, positioning, and benefits of therapy.  12/05/17 1711 Active    Acceptance E,D NR Pt educated on BUE HEP, positioning, safety precautions, t/f techniques, and benefits of therapy.  12/01/17 1523 Active    Acceptance E,D NR Pt educated on appropriate safety precautions, t/f techniques, HEP, positioning, and benefits of therapy.  11/29/17 1545 Active    Acceptance E VU,NR benefits of activity, UE HEP  11/23/17 1102 Done               Point: Home exercise program (Active)    Description: Instruct learner(s) on appropriate technique for monitoring, assisting and/or progressing therapeutic  exercises/activities.    Learning Progress Summary    Learner Readiness Method Response Comment Documented by Status   Patient Acceptance E,D NR Pt educated on appropriate safety precautions, t/f techniques, HEP, positioning, and benefits of therapy. CL 12/05/17 1711 Active    Acceptance E,D NR Pt educated on BUE HEP, positioning, safety precautions, t/f techniques, and benefits of therapy. CL 12/01/17 1523 Active    Acceptance E,D NR Pt educated on appropriate safety precautions, t/f techniques, HEP, positioning, and benefits of therapy. CL 11/29/17 1545 Active    Acceptance E NR Educated pt on B UE HEP, transfer technique and benefits of therapy.  11/27/17 1528 Active   Family Acceptance E,D NR Pt educated on appropriate safety precautions, t/f techniques, HEP, positioning, and benefits of therapy. CL 12/05/17 1711 Active    Acceptance E,D NR Pt educated on BUE HEP, positioning, safety precautions, t/f techniques, and benefits of therapy.  12/01/17 1523 Active    Acceptance E,D NR Pt educated on appropriate safety precautions, t/f techniques, HEP, positioning, and benefits of therapy.  11/29/17 1545 Active               Point: Precautions (Active)    Description: Instruct learner(s) on prescribed precautions during self-care and functional transfers.    Learning Progress Summary    Learner Readiness Method Response Comment Documented by Status   Patient Acceptance E,D NR Pt educated on appropriate safety precautions, t/f techniques, HEP, positioning, and benefits of therapy. CL 12/05/17 1711 Active    Acceptance E,D NR Pt educated on BUE HEP, positioning, safety precautions, t/f techniques, and benefits of therapy.  12/01/17 1523 Active    Acceptance E,D NR Pt educated on appropriate safety precautions, t/f techniques, HEP, positioning, and benefits of therapy.  11/29/17 1545 Active   Family Acceptance E,D NR Pt educated on appropriate safety precautions, t/f techniques, HEP, positioning, and benefits of  therapy.  12/05/17 1711 Active    Acceptance E,D NR Pt educated on BUE HEP, positioning, safety precautions, t/f techniques, and benefits of therapy.  12/01/17 1523 Active    Acceptance E,D NR Pt educated on appropriate safety precautions, t/f techniques, HEP, positioning, and benefits of therapy.  11/29/17 1545 Active               Point: Body mechanics (Active)    Description: Instruct learner(s) on proper positioning and spine alignment during self-care, functional mobility activities and/or exercises.    Learning Progress Summary    Learner Readiness Method Response Comment Documented by Status   Patient Acceptance E,D NR Pt educated on appropriate safety precautions, t/f techniques, HEP, positioning, and benefits of therapy.  12/05/17 1711 Active    Acceptance E,D NR Pt educated on BUE HEP, positioning, safety precautions, t/f techniques, and benefits of therapy.  12/01/17 1523 Active    Acceptance E,D NR Pt educated on appropriate safety precautions, t/f techniques, HEP, positioning, and benefits of therapy.  11/29/17 1545 Active   Family Acceptance E,D NR Pt educated on appropriate safety precautions, t/f techniques, HEP, positioning, and benefits of therapy.  12/05/17 1711 Active    Acceptance E,D NR Pt educated on BUE HEP, positioning, safety precautions, t/f techniques, and benefits of therapy.  12/01/17 1523 Active    Acceptance E,D NR Pt educated on appropriate safety precautions, t/f techniques, HEP, positioning, and benefits of therapy.  11/29/17 1545 Active                      User Key     Initials Effective Dates Name Provider Type Discipline     06/23/15 -  Margie Ferrera, OT Occupational Therapist OT     06/22/15 -  Linda Quiroz OT Occupational Therapist OT     06/08/16 -  Margarita Velasco OT Occupational Therapist OT                  OT Recommendation and Plan  Anticipated Discharge Disposition: inpatient rehabilitation facility  Planned Therapy Interventions: ADL  retraining, balance training, bed mobility training, strengthening, transfer training  Therapy Frequency: daily  Plan of Care Review  Plan Of Care Reviewed With: patient  Progress: progress toward functional goals is gradual  Outcome Summary/Follow up Plan: Pt conts to require Max Ax2 for STS t/f, though only achieving ~50% upright. Pt progressing w/ HEP, though conts to demo significant L sided weakness. Recommend cont skilled IPOT POC.         Outcome Measures       12/05/17 1350 12/05/17 1345 12/04/17 1350    How much help from another person do you currently need...    Turning from your back to your side while in flat bed without using bedrails? 2  -LS  2  -LS    Moving from lying on back to sitting on the side of a flat bed without bedrails? 2  -LS  2  -LS    Moving to and from a bed to a chair (including a wheelchair)? 1  -LS  1  -LS    Standing up from a chair using your arms (e.g., wheelchair, bedside chair)? 2  -LS  1  -LS    Climbing 3-5 steps with a railing? 1  -LS  1  -LS    To walk in hospital room? 1  -LS  1  -LS    AM-PAC 6 Clicks Score 9  -LS  8  -LS    How much help from another is currently needed...    Putting on and taking off regular lower body clothing?  1  -CL     Bathing (including washing, rinsing, and drying)  2  -CL     Toileting (which includes using toilet bed pan or urinal)  1  -CL     Putting on and taking off regular upper body clothing  2  -CL     Taking care of personal grooming (such as brushing teeth)  2  -CL     Eating meals  1  -CL     Score  9  -CL     Modified [place] Scale    Modified [place] Scale  4 - Moderately severe disability.  Unable to walk without assistance, and unable to attend to own bodily needs without assistance.  -CL     Functional Assessment    Outcome Measure Options AM-PAC 6 Clicks Basic Mobility (PT)  -LS AM-PAC 6 Clicks Daily Activity (OT)  -CL AM-PAC 6 Clicks Basic Mobility (PT)  -LS      User Key  (r) = Recorded By, (t) = Taken By, (c) = Cosigned By     Initials Name Provider Type    LS Keli Awad, PT Physical Therapist    CL Margarita Velasco OT Occupational Therapist           Time Calculation:         Time Calculation- OT       12/05/17 1714          Time Calculation- OT    OT Start Time 1345  -CL      Total Timed Code Minutes- OT 17 minute(s)  -CL      OT Received On 12/05/17  -CL      OT Goal Re-Cert Due Date 12/15/17  -CL        User Key  (r) = Recorded By, (t) = Taken By, (c) = Cosigned By    Initials Name Provider Type    CL Margarita Velasco OT Occupational Therapist           Therapy Charges for Today     Code Description Service Date Service Provider Modifiers Qty    [id number]  OT THERAPEUTIC ACT EA 15 MIN 12/5/2017 Margarita Velasco OT GO 1               Margarita Velasco OT  12/5/2017

## 2017-12-05 NOTE — PLAN OF CARE
Problem: Patient Care Overview (Adult)  Goal: Plan of Care Review  Outcome: Ongoing (interventions implemented as appropriate)    12/05/17 0524   Outcome Evaluation   Outcome Summary/Follow up Plan Required less suctioning this shift. BMx1, multiple urine incontinences. VSS.   Coping/Psychosocial Response Interventions   Plan Of Care Reviewed With patient   Patient Care Overview   Progress no change         Problem: Bowel Resection (Adult)  Goal: Signs and Symptoms of Listed Potential Problems Will be Absent or Manageable (Bowel Resection)  Outcome: Ongoing (interventions implemented as appropriate)    Problem: Fall Risk (Adult)  Goal: Absence of Falls  Outcome: Ongoing (interventions implemented as appropriate)

## 2017-12-05 NOTE — PLAN OF CARE
Problem: Patient Care Overview (Adult)  Goal: Plan of Care Review  Outcome: Ongoing (interventions implemented as appropriate)    12/05/17 1506   Outcome Evaluation   Outcome Summary/Follow up Plan Pt progressed sitting balance duration today; improved ability to achieve midline position with verbal and tactile cues. Cont to be unable to achieve fully upright posture during STS attempts. Would benefit from use of LiteGait mech gait assist for progressing standing activity during future sessions.    Coping/Psychosocial Response Interventions   Plan Of Care Reviewed With patient;spouse   Patient Care Overview   Progress progress toward functional goals is gradual         Problem: Inpatient Physical Therapy  Goal: Bed Mobility Goal LTG- PT  Outcome: Ongoing (interventions implemented as appropriate)    11/23/17 1102 12/05/17 1506   Bed Mobility PT LTG   Bed Mobility PT LTG, Date Established 11/23/17 --    Bed Mobility PT LTG, Time to Achieve 2 wks --    Bed Mobility PT LTG, Activity Type supine to sit/sit to supine --    Bed Mobility PT LTG, [place] Level moderate assist (50% patient effort) --    Bed Mobility PT LTG, Outcome --  goal ongoing       Goal: Transfer Training Goal 1 LTG- PT  Outcome: Ongoing (interventions implemented as appropriate)    11/25/17 1519 12/05/17 1506   Transfer Training PT LTG   Transfer Training PT LTG, Date Established 11/25/17 --    Transfer Training PT LTG, Time to Achieve 2 wks --    Transfer Training PT LTG, Activity Type sit to stand/stand to sit --    Transfer Training PT LTG, [place] Level moderate assist (50% patient effort);2 person assist required --    Transfer Training PT LTG, Outcome --  goal ongoing       Goal: Dynamic Sitting Balance Goal LTG- PT  Outcome: Ongoing (interventions implemented as appropriate)    11/23/17 1102 12/05/17 1506   Dynamic Sitting Balance PT LTG   Dynamic Sitting Balance PT LTG, Date Established 11/23/17 --    Dynamic Sitting Balance PT LTG,  Time to Achieve 2 wks --    Dynamic Sitting Balance PT LTG, [place] Level minimum assist (75% patient effort) --    Dynamic Sitting Balance PT LTG, Outcome --  goal ongoing

## 2017-12-05 NOTE — THERAPY TREATMENT NOTE
Acute Care - Physical Therapy Treatment Note  [facility]     Patient Name: Magdi Arriaga  : 1945  MRN: [id number]  Today's Date: 2017  Onset of Illness/Injury or Date of Surgery Date: 17  Date of Referral to PT: 17  Referring Physician: Earl    Admit Date: 2017    Visit Dx:    ICD-10-CM ICD-9-CM   1. Pharyngeal dysphagia R13.13 787.23   2. Small bowel obstruction K56.609 560.9   3. Lower abdominal pain R10.30 789.09   4. Leukocytosis, unspecified type D72.829 288.60   5. Hematemesis with nausea K92.0 578.0     787.02   6. Impaired mobility and ADLs Z74.09 799.89   7. 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, followed by [facility]   • Traumatic SDH, s/p right craniotomy on 2017   • Small bowel obstruction (S/P Exp lap for acute SBO 17)   • Altered mental status post op   • PVC (premature ventricular contraction)   • NSVT (nonsustained ventricular tachycardia)               Adult Rehabilitation Note       17 1350 17 1350 17 1030    Rehab Assessment/Intervention    Discipline physical therapist  -LS physical therapist  -LS speech language pathologist  -LSA,AS,LSA2    Document Type therapy note (daily note)  -LS progress note  -LS therapy note (daily note)   -LSA,AS,LSA2    Subjective Information agree to therapy;no complaints  -LS agree to therapy;no complaints  -LS agree to therapy  -LSA,AS,LSA2    Patient Effort, Rehab Treatment good  -LS good  -LS fair  -LSA,AS,LSA2    Precautions/Limitations fall precautions;oxygen therapy device and L/min;other (see comments)   abd incision; LLE weakness  -LS fall precautions;oxygen therapy device and L/min;other (see comments)   NG, abd incision, L-side weakness  -LS     Specific Treatment Considerations Continues to demonstrate decreased processing of commands.   -LS      Recorded by [LS] Keli Awad, PT [LS] Keli Awad, PT [LSA,AS,LSA2] Maria Guadalupe Guzman, MS CCC-SLP (r) Estela Blanco, Speech Therapy Student (t) Maria Guadalupe Guzman, MS CCC-SLP (c)    Vital Signs    Pre Systolic BP Rehab 140  -  -LS     Pre Treatment Diastolic BP 83  -LS 88  -LS     Post Systolic BP Rehab 143  -  -LS     Post Treatment Diastolic BP 87  -LS 86  -LS     Pretreatment Heart Rate (beats/min) 73  -LS 75  -LS     Posttreatment Heart Rate (beats/min) 72  -LS 74  -LS     Pre SpO2 (%)  97  -LS     O2 Delivery Pre Treatment supplemental O2  -LS supplemental O2  -LS     Post SpO2 (%) 96  -LS 97  -LS     O2 Delivery Post Treatment supplemental O2  -LS supplemental O2  -LS     Pre Patient Position Sitting  -LS Sitting  -LS     Intra Patient Position Standing  -LS Standing  -LS     Post Patient Position Sitting  -LS Sitting  -LS     Recorded by [LS] Keli Awad, PT [LS] Keli Awad, PT     Pain Assessment    Pain Assessment 0-10  -LS [place] FACES  -LS No/denies pain  -LSA,AS,LSA2    [place] FACES Pain Rating 0  -LS      Pain Score 0  -LS 0  -LS 0  -LSA,AS,LSA2    Post Pain Score 0  -LS 0  -LS 0  -LSA,AS,LSA2    Recorded by [LS] Keli Awad, PT [LS] Keli Awad, PT [LSA,AS,LSA2] Maria Guadalupe Guzman, MS CCC-SLP (r) Estela Blanco, Speech Therapy Student (t) Maria Guadalupe Guzman, MS CCC-SLP (c)    Cognitive Assessment/Intervention    Current  Cognitive/Communication Assessment functional  -LS functional   delayed processing  -LS could not assess   2' lethargy   -LSA,AS,LSA2    Orientation Status oriented to;person;place;required verbal cueing (specifiy in comments)   cues for date  -LS oriented to;person;place;time  -LS oriented to;person  -LSA,AS,LSA2    Follows Commands/Answers Questions able to follow single-step instructions;75% of the time;needs cueing;needs increased time;needs repetition  -LS able to follow single-step instructions;50% of the time;75% of the time;needs cueing;needs increased time;needs repetition  -LS 25% of the time;able to follow single-step instructions;needs repetition;needs increased time  -LSA,AS,LSA2    Personal Safety moderate impairment;decreased awareness, need for assist;decreased awareness, need for safety;decreased insight to deficits  -LS moderate impairment;decreased awareness, need for assist;decreased awareness, need for safety;decreased insight to deficits  -LS     Personal Safety Interventions fall prevention program maintained;gait belt;nonskid shoes/slippers when out of bed  -LS fall prevention program maintained;gait belt;nonskid shoes/slippers when out of bed  -LS     Recorded by [LS] Keli Awad, PT [LS] Keli Awad, PT [LSA,AS,LSA2] Maria Guadalupe Guzman, MS CCC-SLP (r) Estela Blanco, Speech Therapy Student (t) Maria Guadalupe Guzman MS CCC-SLP (c)    Dysphagia Treatment Objectives and Progress    To improve timing of pharyngeal response, patient will:   Swallow in timely way using three second prep;Swallow in timely way using super-supraglottic swallow;80%;with consistent cues  -LSA,AS,LSA2    Timing of Pharyngeal Response Progress   continue to adress  -LSA,AS,LSA2    To improve closure at entrance to airway, patient will:   Complete super-supraglottic swallow;80%;with consistent cues  -LSA,AS,LSA2    Closure at Entrance to Airway Progress   continue to adress  -LSA,AS,LSA2    To improve hyolaryngeal excursion,  patient will:   Complete chin tuck against resistance (comment number of repetitions);80%;with consistent cues  -LSA,AS,LSA2    Hyolaryngeal Excursion Progress   0%;with consistent cues;continue to adress  -LSA,AS,LSA2    To improve tongue base & pharyngeal wall squeeze, patient will:   Complete effortful swallow;Complete tongue hold swallow;80%;with consistent cues  -LSA,AS,LSA2    Tongue Base/Pharyngeal Wall Squeeze Progress   continue to adress  -LSA,AS,LSA2    Recorded by   [LSA,AS,LSA2] Maria Guadalupe Guzman MS CCC-SLP (r) Estela Blanco, Speech Therapy Student (t) Maria Guadalupe Guzman MS CCC-SLP (c)    Improve oral skills    To Improve Oral Skills, patient will:   Increase tongue tip elevation;80%;with consistent cues  -LSA,AS,LSA2    Status: Improve Oral Skills   Progressing as expected  -LSA,AS,LSA2    Oral Skills Progress   0%;with consistent cues;continue to adress  -LSA,AS,LSA2    Recorded by   [LSA,AS,LSA2] Maria Guadalupe Guzman MS CCC-SLP (r) Estela Blanco Speech Therapy Student (t) Maria Guadalupe Guzman MS CCC-SLP (c)    Improve timing of pharyngeal response    Status: Improve timing of pharyngeal response   Progress slower than expected  -LSA,AS,LSA2    Comments: Improve timing of pharyngeal response   DNA 2' lethargy   -LSA,AS,LSA2    Recorded by   [LSA,AS,LSA2] Maria Guadalupe Guzman MS CCC-SLP (r) Estela Blanco, Speech Therapy Student (t) Maria Guadalupe Guzman MS CCC-SLP (c)    Improve closure at entrance to airway    Status: Improve closure at entrance to airway   Progress slower than expected  -LSA,AS,LSA2    Recorded by   [LSA,AS,LSA2] Maria Guadalupe Guzman MS CCC-SLP (r) Estela Blanco, Speech Therapy Student (t) Maria Guadalupe Guzman MS CCC-SLP (c)    Improve hyolaryngeal excursion    Status: Improve hyolaryngeal excursion   Progress slower than expected  -LSA,AS,LSA2    Comments: Improve hyolaryngeal excursion   Pt unable to perform 2' lethargic, even w/ max cues and prompts.   -LSA,AS,LSA2    Recorded by   [LSA,AS,LSA2] Maria Guadalupe Guzman, MS  CCC-SLP (r) Estela Blanco, Speech Therapy Student (t) Maria Guadalupe Guzman, MS CCC-SLP (c)    Improve tongue base & pharyngeal wall squeeze    Status: Improve tongue base & pharyngeal wall squeeze   Progress slower than expected  -LSA,AS,LSA2    Comments: Improve tongue base & pharyngeal wall squeeze   DNA PO trials 2' lethargy.   -LSA,AS,LSA2    Recorded by   [LSA,AS,LSA2] Maria Guadalupe Guzman, MS CCC-SLP (r) Estela Blanco, Speech Therapy Student (t) Maria Guadalupe Guzman, MS CCC-SLP (c)    Dysphagia Other 1    Dysphagia Other 1 Objective   Tolerate ice chip and H2O trials without s/s aspiration with 50% accuracy and cues  -LSA,AS,LSA2    Status: Dysphagia Other 1   Progress slower than expected  -LSA,AS,LSA2    Dysphagia Other 1 Progress   continue to address  -LSA,AS,LSA2    Comments: Dysphagia Other 1   DNA PO trials 2' lethargy. Pt unable to stay alert for PO trials even w/ max cues.   -LSA,AS,LSA2    Recorded by   [LSA,AS,LSA2] Maria Guadalupe Guzman, MS CCC-SLP (r) Estela Blanco, Speech Therapy Student (t) Maria Guadalupe Guzman, MS CCC-SLP (c)    MMT (Manual Muscle Testing)    General MMT Assessment Detail  LLE: 0/5; RLE grossly 3-/5  -LS     Recorded by  [LS] Keli Awad, PT     Bed Mobility, Assessment/Treatment    Bed Mobility, Comment UIC  -LS UIC upon arrival.   -LS     Recorded by [LS] Keli Awad, PT [LS] Keli Awad, PT     Transfer Assessment/Treatment    Transfers, Sit-Stand [place] maximum assist (25% patient effort);2 person assist required;verbal cues required  -LS dependent (less than 25% patient effort);2 person assist required;verbal cues required  -LS     Transfers, Stand-Sit [place] maximum assist (25% patient effort);2 person assist required;verbal cues required  -LS dependent (less than 25% patient effort);2 person assist required;verbal cues required  -LS     Transfer, Impairments strength decreased;impaired balance  -LS strength decreased;impaired balance  -LS     Transfer, Comment Attempted STS x3 from  recliner; vc's for hand placement and rocking forward to intiate. Able to clear hips from chair but unable to achieve upright posture despite verbal and tactile cues for hip/trunk extension.   -LS Attempted STS x2 from recliner; able to clear hips from cahir surface but unable to achieve upright despite verbal and tactile cues.   -LS     Recorded by [LS] Keli Awad, PT [LS] Keli Awad, PT     Gait Assessment/Treatment    Gait, [place] Level not appropriate to assess  -LS not appropriate to assess  -LS     Recorded by [LS] Keli Awad, PT [LS] Keli Awad, PT     Motor Skills/Interventions    Additional Documentation Balance Skills Training (Group)  -LS Balance Skills Training (Group)  -LS     Recorded by [LS] Keli Awad, PT [LS] Keli Awad, PT     Balance Skills Training    Sitting-Level of Assistance Minimum assistance  -LS Minimum assistance   brief moments of supervision; L lateral and anterior lean  -LS     Sitting-Balance Support Right upper extremity supported;Left upper extremity supported;Feet supported  -LS Right upper extremity supported;Left upper extremity supported;Feet supported  -LS     Sitting-Balance Activities Lateral lean;Forward lean;Trunk control activities  -LS      Sitting # of Minutes 10  -LS      Standing-Level of Assistance Maximum assistance;x2  -LS Dependent;x2  -LS     Static Standing Balance Support Right upper extremity supported;Left upper extremity supported  -LS Right upper extremity supported;Left upper extremity supported  -LS     Standing-Balance Activities Weight Shift A-P  -LS Weight Shift A-P  -LS     Recorded by [LS] Keli Awad, PT [LS] Keli Awad, PT     Therapy Exercises    Right Lower Extremity AAROM:;sitting;ankle pumps/circles;calf stretch;hamstring stretch;hip flexion  -LS AAROM:;10 reps;ankle pumps/circles;LAQ  -LS     Left Lower Extremity PROM:;ankle pumps/circles;calf stretch;hip flexion;LAQ  -LS PROM:;10 reps;ankle pumps/circles;calf  stretch;hamstring stretch  -LS     Bilateral Upper Extremity  AAROM:;10 reps;sitting;elbow flexion/extension;hand pumps;shoulder extension/flexion  -LS     Recorded by [LS] Keli Awad, PT [LS] Keli Awad, PT     Positioning and Restraints    Pre-Treatment Position sitting in chair/recliner  -LS sitting in chair/recliner  -LS     Post Treatment Position chair  -LS chair  -LS     In Chair notified nsg;reclined;call light within reach;encouraged to call for assist;exit alarm on;with family/caregiver;with OT;RUE elevated;LUE elevated;waffle cushion;on mechanical lift sling;heels elevated;R waffle boot;L multipodus  -LS notified nsg;reclined;call light within reach;encouraged to call for assist;exit alarm on;with family/caregiver;RUE elevated;LUE elevated;waffle cushion;on mechanical lift sling;legs elevated;heels elevated;L waffle boot;R multipodus   MD present  -LS     Recorded by [LS] Keli Awad, PT [LS] Keli Awad, PT       User Key  (r) = Recorded By, (t) = Taken By, (c) = Cosigned By    Initials Name Effective Dates    CALIN Guzman MS [facility]-SLP 06/22/15 -     LS Keli Awad, PT 06/19/15 -     ANN Blanco, Speech Therapy Student 08/24/17 -                 IP PT Goals       12/05/17 1506 12/04/17 1506 12/01/17 1347    Bed Mobility PT LTG    Bed Mobility PT LTG, Date Goal Reviewed  12/04/17  -LS     Bed Mobility PT LTG, Outcome goal ongoing  -LS goal ongoing  -LS goal ongoing  -LS    Transfer Training PT LTG    Transfer Training PT  LTG, Date Goal Reviewed  12/04/17  -LS     Transfer Training PT LTG, Outcome goal ongoing  -LS goal ongoing  -LS goal ongoing  -LS    Dynamic Sitting Balance PT LTG    Dynamic Sitting Balance PT LTG, Date Goal Reviewed  12/04/17  -LS     Dynamic Sitting Balance PT LTG, Outcome goal ongoing  -LS goal ongoing  -LS goal ongoing  -LS      11/29/17 1528 11/27/17 1608 11/25/17 1519    Bed Mobility PT LTG    Bed Mobility PT LTG, Outcome goal ongoing  -LS goal ongoing  -LS  goal ongoing  -KR    Transfer Training PT LTG    Transfer Training PT LTG, Date Established   11/25/17  -KR    Transfer Training PT LTG, Time to Achieve   2 wks  -KR    Transfer Training PT LTG, Activity Type   sit to stand/stand to sit  -KR    Transfer Training PT LTG, [place] Level   moderate assist (50% patient effort);2 person assist required  -KR    Transfer Training PT  LTG, Date Goal Reviewed   11/25/17  -KR    Transfer Training PT LTG, Outcome goal ongoing  -LS goal ongoing  -LS goal revised  -KR    Transfer Training PT LTG, Reason Goal Not Met   goals revised this date  -KR    Dynamic Sitting Balance PT LTG    Dynamic Sitting Balance PT LTG, Outcome goal ongoing  -LS goal ongoing  -LS goal ongoing  -KR      11/23/17 1102          Bed Mobility PT LTG    Bed Mobility PT LTG, Date Established 11/23/17  -LSA      Bed Mobility PT LTG, Time to Achieve 2 wks  -LSA      Bed Mobility PT LTG, Activity Type supine to sit/sit to supine  -LSA      Bed Mobility PT LTG, [place] Level moderate assist (50% patient effort)  -LSA      Bed Mobility PT LTG, Outcome goal ongoing  -LSA      Transfer Training PT LTG    Transfer Training PT LTG, Date Established 11/23/17  -LSA      Transfer Training PT LTG, Time to Achieve 2 wks  -LSA      Transfer Training PT LTG, Activity Type sit to stand/stand to sit  -LSA      Transfer Training PT LTG, [place] Level maximum assist (25% patient effort);2 person assist required  -LSA      Transfer Training PT LTG, Outcome goal ongoing  -LSA      Dynamic Sitting Balance PT LTG    Dynamic Sitting Balance PT LTG, Date Established 11/23/17  -LSA      Dynamic Sitting Balance PT LTG, Time to Achieve 2 wks  -LSA      Dynamic Sitting Balance PT LTG, [place] Level minimum assist (75% patient effort)  -LSA      Dynamic Sitting Balance PT LTG, Outcome goal ongoing  -LSA        User Key  (r) = Recorded By, (t) = Taken By, (c) = Cosigned By    Initials Name Provider Type    CALIN Castillo  Enoch Crawford, PT Physical Therapist    LS Keli Awad, PT Physical Therapist    KR Sri Mejía, PT Physical Therapist          Physical Therapy Education     Title: PT OT SLP Therapies (Active)     Topic: Physical Therapy (Active)     Point: Mobility training (Active)    Learning Progress Summary    Learner Readiness Method Response Comment Documented by Status   Patient Acceptance E,D NR  LS 12/04/17 1505 Active    Acceptance E,D NR Wife verbalized understanding of appropriate technique in assisting pt with ROM and ther ex. LS 12/01/17 1346 Active    Acceptance E,D NR  LS 11/29/17 1528 Active    Acceptance E,D NR   11/27/17 1608 Active    Acceptance E NR  KR 11/25/17 1519 Active   Family Acceptance E,D NR  LS 11/29/17 1528 Active   Significant Other Acceptance E,D NR  LS 12/04/17 1505 Active    Acceptance E,D NR Wife verbalized understanding of appropriate technique in assisting pt with ROM and ther ex. LS 12/01/17 1346 Active               Point: Home exercise program (Active)    Learning Progress Summary    Learner Readiness Method Response Comment Documented by Status   Patient Acceptance E,D NR  LS 12/04/17 1505 Active    Acceptance E,D NR Wife verbalized understanding of appropriate technique in assisting pt with ROM and ther ex. LS 12/01/17 1346 Active    Acceptance E,D NR   11/29/17 1528 Active    Acceptance E,D NR   11/27/17 1608 Active    Acceptance E VU,NR Encouraged pt and spouse to work on quad sets. Also reviewed w/spouse to keep L heel elevated off of surface to help prevent skin breakdown. LS1 11/23/17 1105 Done   Family Acceptance E,D NR   11/29/17 1528 Active    Acceptance E VU,NR Encouraged pt and spouse to work on quad sets. Also reviewed w/spouse to keep L heel elevated off of surface to help prevent skin breakdown. LS1 11/23/17 1105 Done   Significant Other Acceptance E,D NR  LS 12/04/17 1505 Active    Acceptance E,D NR Wife verbalized understanding of appropriate technique in  assisting pt with ROM and ther ex. LS 12/01/17 1346 Active               Point: Body mechanics (Active)    Learning Progress Summary    Learner Readiness Method Response Comment Documented by Status   Patient Acceptance E,D NR  LS 12/04/17 1505 Active    Acceptance E,D NR Wife verbalized understanding of appropriate technique in assisting pt with ROM and ther ex. LS 12/01/17 1346 Active    Acceptance E,D NR  LS 11/29/17 1528 Active    Acceptance E,D NR  LS 11/27/17 1608 Active    Acceptance E NR  KR 11/25/17 1519 Active   Family Acceptance E,D NR  LS 11/29/17 1528 Active   Significant Other Acceptance E,D NR  LS 12/04/17 1505 Active    Acceptance E,D NR Wife verbalized understanding of appropriate technique in assisting pt with ROM and ther ex. LS 12/01/17 1346 Active               Point: Precautions (Active)    Learning Progress Summary    Learner Readiness Method Response Comment Documented by Status   Patient Acceptance E,D NR  LS 12/04/17 1505 Active    Acceptance E,D NR Wife verbalized understanding of appropriate technique in assisting pt with ROM and ther ex. LS 12/01/17 1346 Active    Acceptance E,D NR  LS 11/29/17 1528 Active    Acceptance E,D NR  LS 11/27/17 1608 Active    Acceptance E NR  KR 11/25/17 1519 Active   Family Acceptance E,D NR  LS 11/29/17 1528 Active   Significant Other Acceptance E,D NR  LS 12/04/17 1505 Active    Acceptance E,D NR Wife verbalized understanding of appropriate technique in assisting pt with ROM and ther ex. LS 12/01/17 1346 Active                      User Key     Initials Effective Dates Name Provider Type Discipline    LS1 06/19/15 -  Anna Crawford, PT Physical Therapist PT    LS 06/19/15 -  Keli Awad, PT Physical Therapist PT    KR 09/25/17 -  Sri Mejía, PT Physical Therapist PT                    PT Recommendation and Plan  Anticipated Discharge Disposition: inpatient rehabilitation facility  Planned Therapy Interventions: balance training, bed mobility  training, patient/family education, home exercise program, strengthening, transfer training  PT Frequency: daily, per priority policy  Plan of Care Review  Plan Of Care Reviewed With: patient, spouse  Progress: progress toward functional goals is gradual  Outcome Summary/Follow up Plan: Pt progressed sitting balance duration today; improved ability to achieve midline position with verbal and tactile cues. Cont to be unable to achieve fully upright posture during STS attempts. Would benefit from use of [facility] gait assist for progressing standing activity during future sessions.           Outcome Measures       12/05/17 1350 12/04/17 1350       How much help from another person do you currently need...    Turning from your back to your side while in flat bed without using bedrails? 2  -LS 2  -LS     Moving from lying on back to sitting on the side of a flat bed without bedrails? 2  -LS 2  -LS     Moving to and from a bed to a chair (including a wheelchair)? 1  -LS 1  -LS     Standing up from a chair using your arms (e.g., wheelchair, bedside chair)? 2  -LS 1  -LS     Climbing 3-5 steps with a railing? 1  -LS 1  -LS     To walk in hospital room? 1  -LS 1  -LS     AM-PAC 6 Clicks Score 9  -LS 8  -LS     Functional Assessment    Outcome Measure Options AM-PAC 6 Clicks Basic Mobility (PT)  -LS AM-PAC 6 Clicks Basic Mobility (PT)  -LS       User Key  (r) = Recorded By, (t) = Taken By, (c) = Cosigned By    Initials Name Provider Type    MAMIE Awad PT Physical Therapist           Time Calculation:         PT Charges       12/05/17 1509          Time Calculation    Start Time 1350  -LS      PT Received On 12/05/17  -      PT Goal Re-Cert Due Date 12/14/17  -      Time Calculation- PT    Total Timed Code Minutes- PT 15 minute(s)  -        User Key  (r) = Recorded By, (t) = Taken By, (c) = Cosigned By    Initials Name Provider Type    MAMIE Awad PT Physical Therapist          Therapy Charges for Today      Code Description Service Date Service Provider Modifiers Qty    94605120987 HC PT THER PROC EA 15 MIN 12/4/2017 Keli Awad, PT GP 2    81084028765 HC PT THER PROC EA 15 MIN 12/5/2017 Keli Awad, PT GP 1          PT G-Codes  Outcome Measure Options: AM-PAC 6 Clicks Basic Mobility (PT)    Keli Awad, PT  12/5/2017

## 2017-12-05 NOTE — PROGRESS NOTES
Continued Stay Note  [facility]     Patient Name: Magdi Arriaga  MRN: [id number]  Today's Date: 12/5/2017    Admit Date: 11/20/2017          Discharge Plan       12/05/17 1124    Case Management/Social Work Plan    Plan LTACH    Additional Comments Spoke with patient and wife at bedside about LTACH.  Both in agreement  M[facility] with [facility] following and will be here to speak with them today.  CM will continue to follow.               Discharge Codes     None        Expected Discharge Date and Time     Expected Discharge Date Expected Discharge Time    Dec 8, 2017             Cinthya Sanderson RN

## 2017-12-05 NOTE — PROGRESS NOTES
"Magdi Arriaga  1945  [id number]    Surgery Progress Note    Date of visit: 12/5/2017    Subjective: Up in chair  No new complaints and no change    Objective:    /95  Pulse 70  Temp 97.7 °F (36.5 °C) (Axillary)   Resp 18  Ht 172.7 cm (67.99\")  Wt 80.2 kg (176 lb 12.9 oz)  SpO2 98%  BMI 26.89 kg/m2    Intake/Output Summary (Last 24 hours) at 12/05/17 1622  Last data filed at 12/05/17 0600   Gross per 24 hour   Intake             1426 ml   Output                0 ml   Net             1426 ml         L: normal air entry    Abd: Soft and nontender.  Incision is intact and healing well      LABS:      Results from last 7 days  Lab Units 12/05/17  0422   WBC 10*3/mm3 13.23*   HEMOGLOBIN g/dL 8.8*   HEMATOCRIT % 28.4*   PLATELETS 10*3/mm3 294       Results from last 7 days  Lab Units 12/05/17  0422 12/04/17  0330   SODIUM mmol/L 144 143   POTASSIUM mmol/L 4.5 4.4   CHLORIDE mmol/L 107 109   CO2 mmol/L 33.0* 30.0   BUN mg/dL 42* 39*   CREATININE mg/dL 0.90 0.70   CALCIUM mg/dL 9.1 9.3   BILIRUBIN mg/dL  --  0.2*   ALK PHOS U/L  --  77   ALT (SGPT) U/L  --  16   AST (SGOT) U/L  --  16   GLUCOSE mg/dL 134* 116*       Results from last 7 days  Lab Units 12/05/17  0422   SODIUM mmol/L 144   POTASSIUM mmol/L 4.5   CHLORIDE mmol/L 107   CO2 mmol/L 33.0*   BUN mg/dL 42*   CREATININE mg/dL 0.90   GLUCOSE mg/dL 134*   CALCIUM mg/dL 9.1       Lab Results  Lab Value Date/Time   LIPASE 30 11/20/2017 1236         Assessment/ Plan: Overall stable course  Patient is tolerating tube feeds  Plan to continue with the current management  Awaiting transfer to [facility]  DC staples prior to transfer    Problem List Items Addressed This Visit     Small bowel obstruction (S/P Exp lap for acute SBO 11/20/17)    Relevant Orders    Tissue Pathology Exam - Tissue, Small Intestine (Completed)      Other Visit Diagnoses     Pharyngeal dysphagia    -  Primary    Lower abdominal pain        Leukocytosis, unspecified type        " Hematemesis with nausea                Cj Reyes MD  12/5/2017  4:22 PM

## 2017-12-05 NOTE — PLAN OF CARE
Problem: Patient Care Overview (Adult)  Goal: Plan of Care Review  Outcome: Ongoing (interventions implemented as appropriate)    12/05/17 1711   Outcome Evaluation   Outcome Summary/Follow up Plan Pt conts to require Max Ax2 for STS t/f, though only achieving ~50% upright. Pt progressing w/ HEP, though conts to demo significant L sided weakness. Recommend cont skilled IPOT POC.    Coping/Psychosocial Response Interventions   Plan Of Care Reviewed With patient   Patient Care Overview   Progress progress toward functional goals is gradual         Problem: Inpatient Occupational Therapy  Goal: Bed Mobility Goal LTG- OT  Outcome: Ongoing (interventions implemented as appropriate)    11/23/17 1105 12/05/17 1711   Bed Mobility OT LTG   Bed Mobility OT LTG, Date Established 11/23/17 --    Bed Mobility OT LTG, Time to Achieve by discharge --    Bed Mobility OT LTG, Activity Type supine to sit/sit to supine --    Bed Mobility OT LTG, [place] Level moderate assist (50% patient effort);2 person assist required --    Bed Mobility OT LTG, Outcome --  goal ongoing       Goal: Transfer Training Goal 1 LTG- OT  Outcome: Ongoing (interventions implemented as appropriate)    11/23/17 1105 12/05/17 1711   Transfer Training OT LTG   Transfer Training OT LTG, Date Established 11/23/17 --    Transfer Training OT LTG, Time to Achieve by discharge --    Transfer Training OT LTG, Activity Type bed to chair /chair to bed;sit to stand/stand to sit --    Transfer Training OT LTG, [place] Level moderate assist (50% patient effort);2 person assist required --    Transfer Training OT LTG, Outcome --  goal ongoing       Goal: Strength Goal LTG- OT  Outcome: Ongoing (interventions implemented as appropriate)  Goal: Grooming Goal LTG- OT  Outcome: Ongoing (interventions implemented as appropriate)    11/23/17 1105 12/05/17 1711   Grooming OT LTG   Grooming Goal OT LTG, Date Established 11/23/17 --    Grooming Goal OT LTG, Time to Achieve  by discharge --    Grooming Goal OT LTG, Activity Type pt will wash face and comb hair using R hand given setup --    Grooming Goal OT LTG, [place] Level set up required --    Grooming Goal OT LTG, Outcome --  goal ongoing

## 2017-12-05 NOTE — PROGRESS NOTES
INTENSIVIST   PROGRESS NOTE     Hospital:  LOS: 15 days     Mr. Magdi Arriaga, 72 y.o. male is followed for a Chief Complaint of: Altered Mental Status      Subjective   S   72-year-old male admitted to [facility] on 11/20 in transfer from [facility] with abdominal pain, nausea, and vomiting.  He was found to have a small bowel obstruction and underwent resection of a necrotic segment of the proximal jejunum.  He has a recent history of craniotomy due to subdural hematoma. There was some question of new CVA but this was ruled out and it was decided to avoid anticoagulants.  He also has a history of congestive heart failure, chronic kidney disease, CABG ×5 in April of this year, as well as COPD.  He was transferred to the ICU postoperatively and has progressed slowly. He has had several bloody stools and a total of 3 units of packed red blood cells. His most recent hemoglobin is now 10.4 improved from 7.4. Corpak was placed under fluoroscopy and tube feeding initiated  He has a known history of cardiomyopathy. Entresto and Coreg were restarted. . He had some runs of atrial fibrillation, and nonsustained ventricular tachycardia. Cardiology evaluated and reported that his EF was now greater than 35% which is improved. They recommended beta blockers and electrolyte replacement and maintaining a hemoglobin greater than 8.    Nursing reports that he is silently aspirating. Corpak placed under fluoroscopy. Initially started on a low rate and he is tolerating. Speech therapy is working with him. They note very weak orally, with difficulty just protruding his tongue.    Interval History:  Slightly more alert today. He is asking to get out of the bed. He has no complaints.        The patient's relevant past medical, surgical and social history were reviewed and updated in Epic as appropriate.      ROS: Unable to obtain full ROS due to difficulty understanding the patient.     Objective   O      Vitals:  Temp  Min: 97.4 °F (36.3 °C)  Max: 98.3 °F (36.8 °C)  BP  Min: 124/75  Max: 156/97  Pulse  Min: 66  Max: 77  Resp  Min: 16  Max: 20  SpO2  Min: 94 %  Max: 99 % Flow (L/min)  Min: 2  Max: 2    Intake/Ouptut 24 hrs (7:00AM - 6:59 AM)  Intake & Output (last 3 days)       12/02 0701 - 12/03 0700 12/03 0701 - 12/04 0700 12/04 0701 - 12/05 0700 12/05 0701 - 12/06 0700    Other 835 673 550     NG/GT 1527 1598 1644     Total Intake(mL/kg) 2362 (29.5) 2271 (28.3) 2194 (27.4)     Net +2362 +2271 +2194              Unmeasured Urine Occurrence 8 x 8 x 9 x 1 x    Unmeasured Stool Occurrence 1 x 2 x 4 x             Physical Examination  Telemetry:  Sinus Rhythm: normal sinus rhythm  Atrial Rhythm: atrial fibrillation (intermittent bursts of afib)      Constitutional:  No acute distress.   Cardiovascular: Normal rate, regular and rhythm. Normal heart sounds.  No murmurs, gallop or rub.   Respiratory: No respiratory distress. Normal respiratory effort.  Coarse bilaterally.     Abdominal:  Soft. No masses. Non-tender. No distension. No HSM.   Extremities: No digital cyanosis. No clubbing.  No peripheral edema.   Neurological:   Alert.   Disoriented.  Follows basic commands.              Results from last 7 days  Lab Units 12/05/17  0422 12/04/17  0330 12/02/17  0258   WBC 10*3/mm3 13.23* 11.74* 12.14*   HEMOGLOBIN g/dL 8.8* 8.7* 8.8*   MCV fL 94.7 93.6 93.1   PLATELETS 10*3/mm3 294 291 285       Results from last 7 days  Lab Units 12/05/17  0422 12/04/17  1142 12/04/17  0330  12/02/17  1849   SODIUM mmol/L 144  --  143  --  139   POTASSIUM mmol/L 4.5  --  4.4  --  4.0   CO2 mmol/L 33.0*  --  30.0  --  31.0   CREATININE mg/dL 0.90  --  0.70  --  0.90   GLUCOSE mg/dL 134*  --  116*  --  104*   MAGNESIUM mg/dL 2.0  --  2.1  --  2.1   PHOSPHORUS mg/dL 2.2* 2.6 1.9*  < > 1.0*   < > = values in this interval not displayed.  Estimated Creatinine Clearance: 84.2 mL/min (by C-G formula based on Cr of 0.9).    Results from last 7  days  Lab Units 12/04/17  0330 12/02/17  1849   ALK PHOS U/L 77 72   BILIRUBIN mg/dL 0.2* 0.2*   ALT (SGPT) U/L 16 13   AST (SGOT) U/L 16 12             Images:  CXR 12/05/2017 Personally reviewed. There has been no significant change. There is diffuse patchy airspace disease.          Results: Reviewed.  I reviewed the patient's new laboratory and imaging results.      Medications: Reviewed.    Assessment/Plan   A / P     Mr. Arriaga is a 73yo M who was admitted to the ICU for encephalopathy and respiratory failure after an ex-lap for SBO on 11/20/17. He has a history of recent subdural hemorrhage as well.     Nutrition:   Diet, Tube Feeding Tube Feeding Formula: Peptamen 1.5 (Peptide Based, Calorically Dense); PO Tray: Patient is NPO (No Tray)  Advance Directives: Full Code    Hospital Problem List     * (Principal)Altered mental status post op    Dementia    Hyperlipidemia LDL goal <70    Overview Signed 11/27/2017  3:37 PM by REI Baires     · High intensity statin therapy indicted given presence of CAD         Essential hypertension    JONNY (obstructive sleep apnea)    Overview Deleted 11/27/2017  3:45 PM by Henri Montes IV, MD            COPD (chronic obstructive pulmonary disease)    Coronary artery disease involving coronary bypass graft of native heart without angina pectoris    Overview Addendum 11/27/2017  3:45 PM by Henri Montes IV, MD     · CABG by Yohannes Berger (4/24/17): LIMA to LAD, sequential SVG to OM1/OM 2, SVG to diagonal, SVG to RCA  · Cardiac cath (08/08/2017): VICKEY to LAD/diagnonal. Patent SVG to diagonal and patent LIMA to LAD. Patent SVG to OM 1 and OM2. Patnet SVG to RCA. Small Ramus branch to small for PCI.         Ischemic cardiomyopathy    Overview Addendum 11/27/2017  3:48 PM by Henri Montes IV, MD     · Echo (08/07/2017): LVEF 30%. The cardiac valves are anatomically and functionally normal.  · Echo (08/24/2017): LVEF 30%.  An apical left  ventricular aneurysm is present.  · Echo (11/14/2017): LVEF 40%.         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 He is still requiring frequent suctioning per nursing. I spoke with his wife at bedside. She does not want to proceed with tracheostomy. If he gets reintubated, she will make decisions regarding tracheostomy at that time. She is agreeable to PEG tube placement if needed.     1. Continue ICU care as he is at high risk for reintubation if not frequently suctioned.   2. Continue to monitor mental status.   3. PT to work with patient today.   4. AM labs   5. [facility] is following for LTACH placement.       Plan of care and goals reviewed during interdisciplinary rounds.  I discussed the patient's findings and my recommendations with nursing staff    Critical Care Time: was greater than 30 minutes.(This excludes time spent performing separately reportable procedures and services).      Eliz Hearn, DO    Intensive Care Medicine and Pulmonary Medicine

## 2017-12-05 NOTE — PROGRESS NOTES
Multidisciplinary Rounds    Time: 20min  Patient Name: Magdi Arriaga  Date of Encounter: 12/05/17 3:20 PM  MRN: [id number]  Admission date: 11/20/2017      Reason for visit: MDR. RD to continue to follow per protocol.     Additional information obtained during MDR: Diarrhea improving.     Current diet: Diet, Tube Feeding Tube Feeding Formula: Peptamen 1.5 (Peptide Based, Calorically Dense); PO Tray: Patient is NPO (No Tray) goal rate 75mL/hr  24 hr EN delivery meeting 133% goal    Intervention:  Follow treatment plan  Care plan reviewed    Follow up:   Per protocol      Keiko Burroughs RDN, LD  3:20 PM

## 2017-12-06 NOTE — PLAN OF CARE
Problem: Patient Care Overview (Adult)  Goal: Plan of Care Review  Outcome: Ongoing (interventions implemented as appropriate)    12/06/17 0577   Outcome Evaluation   Outcome Summary/Follow up Plan Pt rested throughout the night. No need for NT suctioning, subglottal has been sufficient.    Coping/Psychosocial Response Interventions   Plan Of Care Reviewed With patient   Patient Care Overview   Progress improving       Goal: Adult Individualization and Mutuality  Outcome: Ongoing (interventions implemented as appropriate)  Goal: Discharge Needs Assessment  Outcome: Ongoing (interventions implemented as appropriate)    Problem: Bowel Resection (Adult)  Goal: Signs and Symptoms of Listed Potential Problems Will be Absent or Manageable (Bowel Resection)  Outcome: Ongoing (interventions implemented as appropriate)

## 2017-12-06 NOTE — PROGRESS NOTES
"Magdi Arriaga  1945  [id number]    Surgery Progress Note    Date of visit: 12/6/2017    Subjective: Stable course with no change  Awaiting transfer to [facility]    Objective:    /93  Pulse 70  Temp 98.1 °F (36.7 °C) (Axillary)   Resp 18  Ht 172.7 cm (67.99\")  Wt 80.2 kg (176 lb 12.9 oz)  SpO2 92%  BMI 26.89 kg/m2    Intake/Output Summary (Last 24 hours) at 12/06/17 0842  Last data filed at 12/06/17 0200   Gross per 24 hour   Intake             1916 ml   Output                0 ml   Net             1916 ml       L: normal air entry  Abd: Soft with no tenderness.  Incision is intact and healing well.      LABS:      Results from last 7 days  Lab Units 12/06/17  0322   WBC 10*3/mm3 12.66*   HEMOGLOBIN g/dL 8.6*   HEMATOCRIT % 28.2*   PLATELETS 10*3/mm3 304       Results from last 7 days  Lab Units 12/06/17  0322  12/04/17  0330   SODIUM mmol/L 141  < > 143   POTASSIUM mmol/L 4.3  < > 4.4   CHLORIDE mmol/L 107  < > 109   CO2 mmol/L 30.0  < > 30.0   BUN mg/dL 46*  < > 39*   CREATININE mg/dL 1.00  < > 0.70   CALCIUM mg/dL 9.7  < > 9.3   BILIRUBIN mg/dL  --   --  0.2*   ALK PHOS U/L  --   --  77   ALT (SGPT) U/L  --   --  16   AST (SGOT) U/L  --   --  16   GLUCOSE mg/dL 119*  < > 116*   < > = values in this interval not displayed.    Results from last 7 days  Lab Units 12/06/17  0322   SODIUM mmol/L 141   POTASSIUM mmol/L 4.3   CHLORIDE mmol/L 107   CO2 mmol/L 30.0   BUN mg/dL 46*   CREATININE mg/dL 1.00   GLUCOSE mg/dL 119*   CALCIUM mg/dL 9.7       Lab Results  Lab Value Date/Time   LIPASE 30 11/20/2017 1236         Assessment/ Plan: Overall stable course   continue with current management  Await transfer to [facility]  Follow-up in the office as needed      Problem List Items Addressed This Visit     Small bowel obstruction (S/P Exp lap for acute SBO 11/20/17)    Relevant Orders    Tissue Pathology Exam - Tissue, Small Intestine (Completed)      Other Visit Diagnoses     Pharyngeal dysphagia    -  Primary "    Lower abdominal pain        Leukocytosis, unspecified type        Hematemesis with nausea                Cj Reyes MD  12/6/2017  8:42 AM

## 2017-12-06 NOTE — PROGRESS NOTES
Continued Stay Note  [facility]     Patient Name: Magdi Arriaga  MRN: [id number]  Today's Date: 12/6/2017    Admit Date: 11/20/2017          Discharge Plan       12/06/17 1334    Case Management/Social Work Plan    Plan [facility]    Patient/Family In Agreement With Plan yes    Additional Comments Spoke with Mey with [facility] and she states they have a bed today for patient.  Spoke with Rashmi with [facility] at [phone].  [facility] scheduled for 1700 today.  RN to call report to [phone], fax number if needed is [phone]. PCS form in patient chartlett and copy given to charge RN. Spoke with wife and patient at bedside.  They both are still in agreement.  CM available    Final Note    Final Note [facility]              Discharge Codes     None        Expected Discharge Date and Time     Expected Discharge Date Expected Discharge Time    Dec 6, 2017             Cinthya Sanderson RN

## 2017-12-06 NOTE — THERAPY TREATMENT NOTE
Acute Care - Speech Language Pathology   Swallow Progress Note [facility]     Patient Name: Magdi Arriaga  : 1945  MRN: [id number]  Today's Date: 2017  Onset of Illness/Injury or Date of Surgery Date: 17            Admit Date: 2017    Visit Dx:      ICD-10-CM ICD-9-CM   1. Pharyngeal dysphagia R13.13 787.23   2. Small bowel obstruction K56.609 560.9   3. Lower abdominal pain R10.30 789.09   4. Leukocytosis, unspecified type D72.829 288.60   5. Hematemesis with nausea K92.0 578.0     787.02   6. Impaired mobility and ADLs Z74.09 799.89   7. 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, followed by [facility]   • Traumatic SDH, s/p right craniotomy on 2017   • Small bowel obstruction (S/P Exp lap for acute SBO 17)   • Altered mental status post op   • PVC (premature ventricular contraction)   • NSVT (nonsustained ventricular tachycardia)             Adult Rehabilitation Note       17 1330 17 1037 17 1036    Rehab Assessment/Intervention    Discipline speech language pathologist  -VW physical therapist  -MJ occupational therapist  -CL    Document Type therapy note (daily note)  -VW therapy note (daily note)  -MJ therapy note (daily note)  -CL    Subjective Information no  complaints;agree to therapy  -VW agree to therapy;no complaints  -MJ agree to therapy;no complaints  -CL    Patient Effort, Rehab Treatment adequate  -VW good  -MJ good  -CL    Symptoms Noted During/After Treatment  fatigue  -MJ fatigue  -CL    Symptoms Noted Comment  lethargic throughout; improved as session progressed  -MJ     Precautions/Limitations  fall precautions;oxygen therapy device and L/min   abd incision, feeding tube, L sided weakness  -MJ fall precautions;oxygen therapy device and L/min;other (see comments)   NG, L sided weakness, abd incision  -CL    Specific Treatment Considerations  lethergy throughout  -MJ Pt w/ significant lethargy this date.   -CL    Recorded by [VW] Pearl Amaro MA, CFY-SLP [MJ] Yanni Alcantar, PT [CL] Margarita Velasco, CHARANJIT    Vital Signs    Pre Systolic BP Rehab  104  -  -CL    Pre Treatment Diastolic BP  67  -MJ 67  -CL    Post Systolic BP Rehab  133  -  -CL    Post Treatment Diastolic BP  83  -MJ 83  -CL    Pretreatment Heart Rate (beats/min)  68  -MJ 68  -CL    Posttreatment Heart Rate (beats/min)  68  -MJ 68  -CL    Pre SpO2 (%)  95  -MJ 95  -CL    O2 Delivery Pre Treatment  supplemental O2  -MJ supplemental O2  -CL    Post SpO2 (%)  96  -MJ 96  -CL    O2 Delivery Post Treatment  supplemental O2  -MJ supplemental O2  -CL    Pre Patient Position  Sitting  -MJ Sitting  -CL    Intra Patient Position  Standing  -MJ Standing  -CL    Post Patient Position  Sitting  -MJ Sitting  -CL    Recorded by  [MJ] Yanni Alcantar, PT [CL] Margarita Velasco OT    Pain Assessment    Pain Assessment No/denies pain  -VW No/denies pain  -MJ No/denies pain  -CL    Nicole-[place] FACES Pain Rating   0  -CL    Pain Score   0  -CL    Post Pain Score   0  -CL    Pain Intervention(s)  Repositioned;Ambulation/increased activity  -MJ     Recorded by [VW] Pearl Amaro MA, CFY-SLP [MJ] Yanni Alcantar, PT [CL] Margarita Velasco OT    Cognitive Assessment/Intervention    Current  Cognitive/Communication Assessment  functional  -MJ impaired  -CL    Orientation Status  oriented to;person;place  -MJ oriented to;person;place  -CL    Follows Commands/Answers Questions  able to follow single-step instructions;75% of the time;needs cueing;needs increased time;needs repetition  -MJ 75% of the time;needs cueing;needs increased time;needs repetition  -CL    Personal Safety  moderate impairment;decreased awareness, need for assist;decreased awareness, need for safety  -MJ moderate impairment;decreased awareness, need for assist;decreased awareness, need for safety  -CL    Personal Safety Interventions  fall prevention program maintained;gait belt;muscle strengthening facilitated;nonskid shoes/slippers when out of bed  -MJ fall prevention program maintained;gait belt;nonskid shoes/slippers when out of bed  -CL    Recorded by  [MJ] Yanni Alcantar, PT [CL] Margarita Velasco OT    Dysphagia Treatment Objectives and Progress    To improve timing of pharyngeal response, patient will: Swallow in timely way using three second prep;Swallow in timely way using super-supraglottic swallow;80%;with consistent cues  -VW      Timing of Pharyngeal Response Progress continue to adress  -VW      To improve closure at entrance to airway, patient will: Complete super-supraglottic swallow;80%;with consistent cues  -VW      Closure at Entrance to Airway Progress continue to adress  -VW      To improve hyolaryngeal excursion, patient will: Complete chin tuck against resistance (comment number of repetitions);80%;with consistent cues  -VW      Hyolaryngeal Excursion Progress continue to adress  -VW      To improve tongue base & pharyngeal wall squeeze, patient will: Complete effortful swallow;Complete tongue hold swallow;80%;with consistent cues  -VW      Tongue Base/Pharyngeal Wall Squeeze Progress 30%;with consistent cues;continue to adress  -VW      Recorded by [VW] Pearl Amaro MA, CFY-SLP      Improve oral skills     To Improve Oral Skills, patient will: Increase tongue tip elevation;80%;with consistent cues  -VW      Status: Improve Oral Skills Progressing as expected  -VW      Oral Skills Progress 20%;with consistent cues;continue to adress  -VW      Comments: Improve Oral Skills Max verbal and physical cues needed to participate  -VW      Recorded by [VW] Pearl Amaro MA, CFY-SLP      Improve timing of pharyngeal response    Status: Improve timing of pharyngeal response Progress slower than expected  -VW      Comments: Improve timing of pharyngeal response DNA 2' lethargy  -VW      Recorded by [VW] Pearl Amaro MA, CFY-SLP      Improve closure at entrance to airway    Status: Improve closure at entrance to airway Progress slower than expected  -VW      Recorded by [VW] Pearl Amaro MA, CFY-SLP      Improve hyolaryngeal excursion    Status: Improve hyolaryngeal excursion Progress slower than expected  -VW      Comments: Improve hyolaryngeal excursion DNA 2' lethargy  -VW      Recorded by [VW] Pearl Amaro MA, CFY-SLP      Improve tongue base & pharyngeal wall squeeze    Status: Improve tongue base & pharyngeal wall squeeze Progress slower than expected  -VW      Comments: Improve tongue base & pharyngeal wall squeeze Max cues needed   -VW      Recorded by [VW] Pearl Amaro MA, CFY-SLP      Dysphagia Other 1    Dysphagia Other 1 Objective Tolerate ice chip and H2O trials without s/s aspiration with 50% accuracy and cues  -VW      Status: Dysphagia Other 1 Progress slower than expected  -VW      Dysphagia Other 1 Progress continue to address  -VW      Comments: Dysphagia Other 1 Ice chip x2 trialed, increased wet vocal quality/secretions noted after each trial w/ throat clear. SLP cued cough after each trial.   -VW      Recorded by [VW] Pearl Amaro MA, CFY-SLP      Bed Mobility, Assessment/Treatment    Bed Mob, Supine to Sit, [place]  not tested  -     Bed Mobility, Comment  UI   -MJ UIC.   -CL    Recorded by  [MJ] Yanni Alcantar, PT [CL] Margarita Velasco OT    Transfer Assessment/Treatment    Transfers, Sit-Stand [place]  maximum assist (25% patient effort);2 person assist required  -MJ maximum assist (25% patient effort);2 person assist required;verbal cues required  -CL    Transfers, Stand-Sit [place]  maximum assist (25% patient effort);2 person assist required  -MJ maximum assist (25% patient effort);2 person assist required;verbal cues required  -CL    Transfers, Sit-Stand-Sit, Assist Device  other (see comments)   gait belt and BUE support  -MJ     Transfer, Safety Issues  weight-shifting ability decreased  -MJ     Transfer, Impairments  strength decreased;impaired balance  -MJ     Transfer, Comment  attempted sit to stand x4; minimally cleared buttocks 2/4 attempts.  VC's for sequencing, weight shift , hand placement.  Unable to fully stand erect.  -MJ Pt attempted STS x4 reps, however was only able to clear hips on the third and fourth attempt.   -CL    Recorded by  [MJ] Yanni Alcantar, PT [CL] Margarita Velasco OT    Gait Assessment/Treatment    Gait, [place] Level  not appropriate to assess  -MJ     Recorded by  [MJ] Yanni Alcantar PT     Functional Mobility    Functional Mobility- Ind. Level   not appropriate to assess;unable to perform  -CL    Recorded by   [CL] Margarita Velasco OT    Grooming Assessment/Training    Grooming Assess/Train, Position   supported sitting  -CL    Grooming Assess/Train, Indepen Level   dependent (less than 25% patient effort)  -CL    Grooming Assess/Train, Comment   To wash face.   -CL    Recorded by   [CL] Margarita Velasco OT    Motor Skills/Interventions    Additional Documentation  Balance Skills Training (Group)  -MJ     Recorded by  [MJ] Yanni Alcantar, PT     Balance Skills Training    Sitting-Level of Assistance  Moderate assistance   with intermittent prgoression to CGA  -MJ Moderate assistance   static w/ brief moments CGA, pt Max A for  dynamic  -CL    Sitting-Balance Support  Right upper extremity supported;Left upper extremity supported;Feet supported  -MJ Right upper extremity supported;Left upper extremity supported;Feet supported  -CL    Sitting-Balance Activities  Lateral lean;Forward lean;Reaching for objects;Trunk control activities  -MJ Forward lean;Reaching for objects;Trunk control activities  -CL    Sitting # of Minutes  10  -MJ     Standing-Level of Assistance  Maximum assistance;x2  -MJ Maximum assistance;x2  -CL    Static Standing Balance Support  Right upper extremity supported;Left upper extremity supported  -MJ Right upper extremity supported;Left upper extremity supported  -CL    Standing-Balance Activities  Weight Shift A-P  -MJ Weight Shift A-P  -CL    Recorded by  [MJ] Yanni Alcantar, PT [CL] Margarita Velasco OT    Therapy Exercises    Right Lower Extremity  sitting;AAROM:;ankle pumps/circles;LAQ;hip flexion;calf stretch  -MJ     Left Lower Extremity  sitting;PROM:;10 reps;ankle pumps/circles;calf stretch;LAQ;hip flexion  -MJ     Right Upper Extremity   10 reps;AROM:;elbow flexion/extension;shoulder extension/flexion;shoulder horizontal abd/add   targetted reaching  -CL    Left Upper Extremity   10 reps;AROM:;AAROM:;elbow flexion/extension;hand pumps;pronation/supination;shoulder extension/flexion;shoulder ER/IR   wrist F/E  -CL    Recorded by  [MJ] Yanni Alcantar, PT [CL] Margarita Velasco, CHARANJIT    Positioning and Restraints    Pre-Treatment Position  sitting in chair/recliner  -MJ sitting in chair/recliner  -CL    Post Treatment Position  chair  - chair  -CL    In Chair  notified nsg;reclined;call light within reach;encouraged to call for assist;exit alarm on;compression device;waffle cushion;on mechanical lift sling;legs elevated;heels elevated  - notified nsg;reclined;call light within reach;encouraged to call for assist;exit alarm on;RUE elevated;LUE elevated;waffle cushion;on mechanical lift sling;legs elevated;heels elevated   -CL    Recorded by  [MJ] Yanni Alcantar, PT [CL] Margarita Velasco OT      12/05/17 1350 12/05/17 1345 12/04/17 1350    Rehab Assessment/Intervention    Discipline physical therapist  -LS occupational therapist  -CL physical therapist  -LS    Document Type therapy note (daily note)  -LS progress note  -CL progress note  -LS    Subjective Information agree to therapy;no complaints  -LS agree to therapy;no complaints  -CL agree to therapy;no complaints  -LS    Patient Effort, Rehab Treatment good  -LS good  -CL good  -LS    Precautions/Limitations fall precautions;oxygen therapy device and L/min;other (see comments)   abd incision; LLE weakness  -LS fall precautions;oxygen therapy device and L/min;other (see comments)   L sided weakness, abd incision  -CL fall precautions;oxygen therapy device and L/min;other (see comments)   NG, abd incision, L-side weakness  -LS    Specific Treatment Considerations Continues to demonstrate decreased processing of commands.   -LS      Recorded by [LS] Keli Awad, PT [CL] Margarita Velasco OT [LS] Keli Awad, PT    Vital Signs    Pre Systolic BP Rehab 140  -  -  -LS    Pre Treatment Diastolic BP 83  -LS 83  -CL 88  -LS    Post Systolic BP Rehab 143  -  -  -LS    Post Treatment Diastolic BP 87  -LS 87  -CL 86  -LS    Pretreatment Heart Rate (beats/min) 73  -LS 73  -CL 75  -LS    Posttreatment Heart Rate (beats/min) 72  -LS 72  -CL 74  -LS    Pre SpO2 (%)  98  -CL 97  -LS    O2 Delivery Pre Treatment supplemental O2  -LS supplemental O2  -CL supplemental O2  -LS    Post SpO2 (%) 96  -LS 96  -CL 97  -LS    O2 Delivery Post Treatment supplemental O2  -LS supplemental O2  -CL supplemental O2  -LS    Pre Patient Position Sitting  -LS Sitting  -CL Sitting  -LS    Intra Patient Position Standing  -LS Standing  -CL Standing  -LS    Post Patient Position Sitting  -LS Sitting  -CL Sitting  -LS    Recorded by [LS] Keli Awad, PT [CL] Margarita Velasco OT [LS] Keli Awad, PT     Pain Assessment    Pain Assessment 0-10  -LS No/denies pain  -CL Nicole-[place] FACES  -LS    [place] FACES Pain Rating 0  -LS 0  -CL     Pain Score 0  -LS 0  -CL 0  -LS    Post Pain Score 0  -LS 0  -CL 0  -LS    Recorded by [LS] Keli Awad, PT [CL] Margarita Velasco, OT [LS] Keli Awad, PT    Cognitive Assessment/Intervention    Current Cognitive/Communication Assessment functional  -LS impaired  -CL functional   delayed processing  -LS    Orientation Status oriented to;person;place;required verbal cueing (specifiy in comments)   cues for date  -LS oriented to;person;place;required verbal cueing (specifiy in comments);time  -CL oriented to;person;place;time  -LS    Follows Commands/Answers Questions able to follow single-step instructions;75% of the time;needs cueing;needs increased time;needs repetition  -LS 75% of the time;needs cueing;needs repetition  -CL able to follow single-step instructions;50% of the time;75% of the time;needs cueing;needs increased time;needs repetition  -LS    Personal Safety moderate impairment;decreased awareness, need for assist;decreased awareness, need for safety;decreased insight to deficits  -LS moderate impairment;decreased awareness, need for assist;decreased awareness, need for safety  -CL moderate impairment;decreased awareness, need for assist;decreased awareness, need for safety;decreased insight to deficits  -LS    Personal Safety Interventions fall prevention program maintained;gait belt;nonskid shoes/slippers when out of bed  -LS fall prevention program maintained;gait belt;nonskid shoes/slippers when out of bed  -CL fall prevention program maintained;gait belt;nonskid shoes/slippers when out of bed  -LS    Recorded by [LS] Keli Awad, PT [CL] Margarita Velasco, OT [LS] Keli Awad PT    MMT (Manual Muscle Testing)    General MMT Assessment Detail   LLE: 0/5; RLE grossly 3-/5  -LS    Recorded by   [LS] Keli Awad PT    Bed Mobility, Assessment/Treatment    Bed  Mobility, Comment UIC  -LS UIC.   -CL UIC upon arrival.   -LS    Recorded by [LS] Keli Awad, PT [CL] Margarita Velasco OT [LS] Keli Awad PT    Transfer Assessment/Treatment    Transfers, Sit-Stand [place] maximum assist (25% patient effort);2 person assist required;verbal cues required  -LS maximum assist (25% patient effort);2 person assist required;verbal cues required  -CL dependent (less than 25% patient effort);2 person assist required;verbal cues required  -LS    Transfers, Stand-Sit [place] maximum assist (25% patient effort);2 person assist required;verbal cues required  -LS maximum assist (25% patient effort);2 person assist required;verbal cues required  -CL dependent (less than 25% patient effort);2 person assist required;verbal cues required  -LS    Transfer, Impairments strength decreased;impaired balance  -LS  strength decreased;impaired balance  -LS    Transfer, Comment Attempted STS x3 from recliner; vc's for hand placement and rocking forward to intiate. Able to clear hips from chair but unable to achieve upright posture despite verbal and tactile cues for hip/trunk extension.   -LS Pt stood from chair w/ OT/PT on either side of pt w/ BUE support and B feet/knees blocked. Pt achieved ~50% upright position w/ posterior pelvic support to facilitate upright posture.    -CL Attempted STS x2 from recliner; able to clear hips from cahir surface but unable to achieve upright despite verbal and tactile cues.   -LS    Recorded by [LS] Keli Awad, PT [CL] Margarita Velasco OT [LS] Keli Awad PT    Gait Assessment/Treatment    Gait, [place] Level not appropriate to assess  -LS  not appropriate to assess  -LS    Recorded by [LS] Keli Awad, PT  [LS] Keli Awad PT    Motor Skills/Interventions    Additional Documentation Balance Skills Training (Group)  -LS  Balance Skills Training (Group)  -LS    Recorded by [LS] Keli Awad, PT  [LS] Keli Awad PT    Balance Skills Training     Sitting-Level of Assistance Minimum assistance  -LS Minimum assistance;x2  -CL Minimum assistance   brief moments of supervision; L lateral and anterior lean  -LS    Sitting-Balance Support Right upper extremity supported;Left upper extremity supported;Feet supported  -LS Right upper extremity supported;Left upper extremity supported;Feet supported  -CL Right upper extremity supported;Left upper extremity supported;Feet supported  -LS    Sitting-Balance Activities Lateral lean;Forward lean;Trunk control activities  -LS Forward lean;Reaching for objects;Trunk control activities  -CL     Sitting # of Minutes 10  -LS      Standing-Level of Assistance Maximum assistance;x2  -LS Maximum assistance;x2  -CL Dependent;x2  -LS    Static Standing Balance Support Right upper extremity supported;Left upper extremity supported  -LS Right upper extremity supported;Left upper extremity supported  -CL Right upper extremity supported;Left upper extremity supported  -LS    Standing-Balance Activities Weight Shift A-P  -LS Weight Shift A-P  -CL Weight Shift A-P  -LS    Recorded by [LS] Keli Awad, PT [CL] Margarita Velasco, OT [LS] Keli Awad, PT    Therapy Exercises    Right Lower Extremity AAROM:;sitting;ankle pumps/circles;calf stretch;hamstring stretch;hip flexion  -LS  AAROM:;10 reps;ankle pumps/circles;LAQ  -LS    Left Lower Extremity PROM:;ankle pumps/circles;calf stretch;hip flexion;LAQ  -LS  PROM:;10 reps;ankle pumps/circles;calf stretch;hamstring stretch  -LS    Right Upper Extremity  AROM:;10 reps;elbow flexion/extension;hand pumps;shoulder extension/flexion  -CL     Left Upper Extremity  AAROM:;PROM:;10 reps;elbow flexion/extension;hand pumps;pronation/supination;shoulder extension/flexion;shoulder ER/IR   wrist F/E  -CL     Bilateral Upper Extremity   AAROM:;10 reps;sitting;elbow flexion/extension;hand pumps;shoulder extension/flexion  -LS    Recorded by [LS] Keli Awad, PT [CL] Margarita Velasco, OT [LS] Keli Awad, PT     Positioning and Restraints    Pre-Treatment Position sitting in chair/recliner  -LS sitting in chair/recliner  -CL sitting in chair/recliner  -LS    Post Treatment Position chair  -LS chair  -CL chair  -LS    In Chair notified nsg;reclined;call light within reach;encouraged to call for assist;exit alarm on;with family/caregiver;with OT;RUE elevated;LUE elevated;waffle cushion;on mechanical lift sling;heels elevated;R waffle boot;L multipodus  -LS notified nsg;reclined;call light within reach;encouraged to call for assist;exit alarm on;with family/caregiver;RUE elevated;LUE elevated;waffle cushion;on mechanical lift sling;legs elevated;heels elevated;R waffle boot;L multipodus  -CL notified nsg;reclined;call light within reach;encouraged to call for assist;exit alarm on;with family/caregiver;RUE elevated;LUE elevated;waffle cushion;on mechanical lift sling;legs elevated;heels elevated;L waffle boot;R multipodus   MD present  -LS    Recorded by [LS] Keli Awad, PT [CL] Margarita Velasco OT [LS] Keli Awad, PT      12/04/17 1030          Rehab Assessment/Intervention    Discipline speech language pathologist  -LSA,AS,LSA2      Document Type therapy note (daily note)  -LSA,AS,LSA2      Subjective Information agree to therapy  -LSA,AS,LSA2      Patient Effort, Rehab Treatment fair  -LSA,AS,LSA2      Recorded by [LSA,AS,LSA2] Maria Guadalupe Guzman, MS CCC-SLP (r) Estela Blanco, Speech Therapy Student (t) Maria Guadalupe Guzman, MS CCC-SLP (c)      Pain Assessment    Pain Assessment No/denies pain  -LSA,AS,LSA2      Pain Score 0  -LSA,AS,LSA2      Post Pain Score 0  -LSA,AS,LSA2      Recorded by [LSA,AS,LSA2] Maria Guadalupe Guzman MS CCC-SLP (r) Estela Blanco, Speech Therapy Student (t) Maria Guadalupe Guzman, MS GABRIELA-SLP (c)      Cognitive Assessment/Intervention    Current Cognitive/Communication Assessment could not assess   2' lethargy   -LSA,AS,LSA2      Orientation Status oriented to;person  -LSA,AS,LSA2      Follows Commands/Answers  Questions 25% of the time;able to follow single-step instructions;needs repetition;needs increased time  -LSA,AS,LSA2      Recorded by [LSA,AS,LSA2] Maria Guadalupe Guzman MS CCC-SLP (r) Estela Blanco, Speech Therapy Student (t) Maria Guadalupe Guzman MS CCC-SLP (c)      Dysphagia Treatment Objectives and Progress    To improve timing of pharyngeal response, patient will: Swallow in timely way using three second prep;Swallow in timely way using super-supraglottic swallow;80%;with consistent cues  -LSA,AS,LSA2      Timing of Pharyngeal Response Progress continue to adress  -LSA,AS,LSA2      To improve closure at entrance to airway, patient will: Complete super-supraglottic swallow;80%;with consistent cues  -LSA,AS,LSA2      Closure at Entrance to Airway Progress continue to adress  -LSA,AS,LSA2      To improve hyolaryngeal excursion, patient will: Complete chin tuck against resistance (comment number of repetitions);80%;with consistent cues  -LSA,AS,LSA2      Hyolaryngeal Excursion Progress 0%;with consistent cues;continue to adress  -LSA,AS,LSA2      To improve tongue base & pharyngeal wall squeeze, patient will: Complete effortful swallow;Complete tongue hold swallow;80%;with consistent cues  -LSA,AS,LSA2      Tongue Base/Pharyngeal Wall Squeeze Progress continue to adress  -LSA,AS,LSA2      Recorded by [LSA,AS,LSA2] Maria Guadalupe Guzman MS CCC-SLP (r) Estela Blanco, Speech Therapy Student (t) Maria Guadalupe Guzman MS CCC-SLP (c)      Improve oral skills    To Improve Oral Skills, patient will: Increase tongue tip elevation;80%;with consistent cues  -LSA,AS,LSA2      Status: Improve Oral Skills Progressing as expected  -LSA,AS,LSA2      Oral Skills Progress 0%;with consistent cues;continue to adress  -LSA,AS,LSA2      Recorded by [LSA,AS,LSA2] Maria Guadalupe Guzman MS CCC-SLP (r) Estela Blanco, Speech Therapy Student (t) Maria Guadalupe Guzman MS CCC-SLP (c)      Improve timing of pharyngeal response    Status: Improve timing of pharyngeal response  Progress slower than expected  -LSA,AS,LSA2      Comments: Improve timing of pharyngeal response DNA 2' lethargy   -LSA,AS,LSA2      Recorded by [LSA,AS,LSA2] Maria Guadalupe Guzman MS CCC-SLP (r) Estela Blanco, Speech Therapy Student (t) Maria Guadalupe Guzman MS CCC-SLP (c)      Improve closure at entrance to airway    Status: Improve closure at entrance to airway Progress slower than expected  -LSA,AS,LSA2      Recorded by [LSA,AS,LSA2] Maria Guadalupe Guzman MS CCC-SLP (r) Estela Blanco, Speech Therapy Student (t) Maria Guadalupe Guzman MS CCC-SLP (c)      Improve hyolaryngeal excursion    Status: Improve hyolaryngeal excursion Progress slower than expected  -LSA,AS,LSA2      Comments: Improve hyolaryngeal excursion Pt unable to perform 2' lethargic, even w/ max cues and prompts.   -LSA,AS,LSA2      Recorded by [LSA,AS,LSA2] Maria Guadalupe Guzman MS CCC-SLP (r) Estela Blanco, Speech Therapy Student (t) Maria Guadalupe Guzman MS CCC-SLP (c)      Improve tongue base & pharyngeal wall squeeze    Status: Improve tongue base & pharyngeal wall squeeze Progress slower than expected  -LSA,AS,LSA2      Comments: Improve tongue base & pharyngeal wall squeeze DNA PO trials 2' lethargy.   -LSA,AS,LSA2      Recorded by [LSA,AS,LSA2] Maria Guadalupe Guzman MS CCC-SLP (r) Estela Blanco, Speech Therapy Student (t) Maria Guadalupe Guzman MS CCC-SLP (c)      Dysphagia Other 1    Dysphagia Other 1 Objective Tolerate ice chip and H2O trials without s/s aspiration with 50% accuracy and cues  -LSA,AS,LSA2      Status: Dysphagia Other 1 Progress slower than expected  -LSA,AS,LSA2      Dysphagia Other 1 Progress continue to address  -LSA,AS,LSA2      Comments: Dysphagia Other 1 DNA PO trials 2' lethargy. Pt unable to stay alert for PO trials even w/ max cues.   -LSA,AS,LSA2      Recorded by [LSA,AS,LSA2] Maria Guadalupe Guzman MS CCC-SLP (r) Estela Blanco, Speech Therapy Student (t) Maria Guadalupe Guzman, MS CCC-SLP (c)        User Key  (r) = Recorded By, (t) = Taken By, (c) = Cosigned By    Initials  Name Effective Dates    LSA Maria Guadalupe Guzman, MS CCC-SLP 06/22/15 -     LS Kelimelissa Awad, PT 06/19/15 -     CL Margarita Velasco, OT 06/08/16 -     VW Pearl Amaro MA, CFY-SLP 07/13/17 -     AS Estela Blanco, Speech Therapy Student 08/24/17 -     MJ Yanni Alcantar, PT 10/30/17 -                   IP SLP Goals       12/06/17 1531 12/04/17 1341 11/30/17 1505    Repeat Evaluation Needed    Swallow Skills to Level that Repeat Evaluation Indicated- SLP, Date Goal Reviewed 12/06/17  -VW 12/04/17  -LS (r) AS (t) LS (c) 11/30/17  -LS    Swallow Skills to Level that Repeat Evaluation Indicated- SLP, Outcome goal ongoing  -VW        11/29/17 1155 11/28/17 1124 11/25/17 1409    Repeat Evaluation Needed    Swallow Skills to Level that Repeat Evaluation Indicated- SLP, Date Established   11/25/17  -SG    Swallow Skills to Level that Repeat Evaluation Indicated- SLP, Time to Achieve   by discharge  -SG    Swallow Skills to Level that Repeat Evaluation Indicated- SLP, Date Goal Reviewed 11/29/17  -LS 11/28/17  -SG 11/25/17  -SG    Swallow Skills to Level that Repeat Evaluation Indicated- SLP, Outcome  goal ongoing  -SG goal ongoing  -SG      User Key  (r) = Recorded By, (t) = Taken By, (c) = Cosigned By    Initials Name Provider Type    SG Milla Uriarte, MS CCC-SLP Speech and Language Pathologist    LS Maria Guadalupe Guzman, MS CCC-SLP Speech and Language Pathologist    VW Pearl Amaro MA, CFY-SLP Speech and Language Pathologist    AS Estela Blanco, Speech Therapy Student Speech Therapy Student          EDUCATION  The patient has been educated in the following areas:   Dysphagia (Swallowing Impairment) Oral Care/Hydration NPO rationale.    SLP Recommendation and Plan    Plan of Care Review  Plan Of Care Reviewed With: patient, family  Progress: progress toward functional goals as expected  Outcome Summary/Follow up Plan: Pt participated adequately in dysphagia tx this pm: Ice chip x2 trialed, increased wet vocal  quality/secretions noted after each trial w/ throat clear. SLP cued cough after each trial.  Pt contiues to need max cues to participate in swallowing exercises. SLP will cont dys tx.           Time Calculation:         Time Calculation- SLP       12/06/17 1533          Time Calculation- SLP    SLP Start Time 1330  -VW      SLP Received On 12/06/17  -        User Key  (r) = Recorded By, (t) = Taken By, (c) = Cosigned By    Initials Name Provider Type     Pearl Amaro MA, CFY-SLP Speech and Language Pathologist          Therapy Charges for Today     Code Description Service Date Service Provider Modifiers Qty    [id number]  ST TREATMENT SWALLOW 3 12/6/2017 Pearl Amaro MA, CFY-SLP GN 1                 Pearl Amaro MA, TIR-SLP  12/6/2017

## 2017-12-06 NOTE — PLAN OF CARE
Problem: Patient Care Overview (Adult)  Goal: Plan of Care Review  Outcome: Ongoing (interventions implemented as appropriate)    12/06/17 1414   Outcome Evaluation   Outcome Summary/Follow up Plan Pt conts to require Max Ax2 for STS t/f, however was only able to clear hips 2/4 attempts this date. Pt conts to be limited d/t lethargy. Recommend cont skilled IPOT POC.    Coping/Psychosocial Response Interventions   Plan Of Care Reviewed With patient   Patient Care Overview   Progress progress toward functional goals is gradual         Problem: Inpatient Occupational Therapy  Goal: Bed Mobility Goal LTG- OT  Outcome: Ongoing (interventions implemented as appropriate)    11/23/17 1105 12/06/17 1414   Bed Mobility OT LTG   Bed Mobility OT LTG, Date Established --  12/06/17   Bed Mobility OT LTG, Time to Achieve by discharge --    Bed Mobility OT LTG, Activity Type supine to sit/sit to supine --    Bed Mobility OT LTG, [place] Level moderate assist (50% patient effort);2 person assist required --    Bed Mobility OT LTG, Outcome --  goal ongoing       Goal: Transfer Training Goal 1 LTG- OT  Outcome: Ongoing (interventions implemented as appropriate)    11/23/17 1105 12/06/17 1414   Transfer Training OT LTG   Transfer Training OT LTG, Date Established 11/23/17 --    Transfer Training OT LTG, Time to Achieve by discharge --    Transfer Training OT LTG, Activity Type bed to chair /chair to bed;sit to stand/stand to sit --    Transfer Training OT LTG, [place] Level moderate assist (50% patient effort);2 person assist required --    Transfer Training OT LTG, Outcome --  goal ongoing       Goal: Strength Goal LTG- OT  Outcome: Ongoing (interventions implemented as appropriate)    11/23/17 1105 12/06/17 1414   Strength OT LTG   Strength Goal OT LTG, Date Established 11/23/17 --    Strength Goal OT LTG, Time to Achieve by discharge --    Strength Goal OT LTG, Measure to Achieve Pt will increse LUE strength 1 MMG as  needed to support ADLs --    Strength Goal OT LTG, Outcome --  goal ongoing       Goal: Grooming Goal LTG- OT  Outcome: Ongoing (interventions implemented as appropriate)    11/23/17 1105 12/06/17 1414   Grooming OT LTG   Grooming Goal OT LTG, Date Established 11/23/17 --    Grooming Goal OT LTG, Time to Achieve by discharge --    Grooming Goal OT LTG, Activity Type pt will wash face and comb hair using R hand given setup --    Grooming Goal OT LTG, [place] Level set up required --    Grooming Goal OT LTG, Outcome --  goal ongoing

## 2017-12-06 NOTE — DISCHARGE SUMMARY
Discharge Summary    Patient name: Magdi Arriaga  CSN: [id number]  MRN: [id number]  : 1945  Today's date: 2017     Date of Admission: 2017  Date of Discharge:  2017    Admitting Physician:  ZHOU Dixon MD  Primary Care Provider: Colton Dickens MD  Consultations:   Dr. Foss- Neurology  Dr. Reyes- General Surgery  Dr. Montes- Cardiology    Admission Diagnosis:   Small bowel obstruction  COPD  Traumatic SDH, s/p right craniotomy on 2017  Cerebral Edema  Bronchiectasis  Dementia  Hypertension  Obstructive sleep apnea  Coronary artery disease involving coronary bypass graft of native heart without angina pectoris      Overview Signed 10/25/2017 11:32 AM by MORALES Anaya       1. [facility] 17:                        1.  Severe proximal LAD/first diagonal stenosis (ungrafted) history of a 2.5 x 23 Xience EES                        2.  Occluded LAD with patent vein graft to the diagonal and LIMA to the LAD                        3.  Severe left circumflex stenosis with widely patent vein graft to the OM1 and OM 2                        4.  Occluded RCA with patent saphenous vein graft                        5.  Disease and a small ramus branch (too small for PCI)                        6.  Normal right heart pressures and post capillary wedge pressure.         Ischemic cardiomyopathy, most recent LVEF 30%     Overview Addendum 10/25/2017 11:33 AM by MORALES Anaya       1. [facility] 17:   · Left ventricular systolic function is moderately decreased. Estimated EF = 30%.  · The cardiac valves are anatomically and functionally normal.     2. Echo 17:  · Left ventricular systolic function is moderately decreased. Estimated EF = 30%.  · An apical left ventricular aneurysm is present.         CKD (chronic kidney disease), stage III     Dyslipidemia     History of DVT (deep vein thrombosis)            Discharge Diagnoses:   Principal Problem:    Altered mental  status post op  Active Problems:    COPD (chronic obstructive pulmonary disease)    Traumatic SDH, s/p right craniotomy on 11/8/2017    Small bowel obstruction (S/P Exp lap for acute SBO 11/20/17)    Dementia    Essential hypertension    JONNY (obstructive sleep apnea)    Coronary artery disease involving coronary bypass graft of native heart without angina pectoris    Ischemic cardiomyopathy    CKD (chronic kidney disease), stage III    Acute type B viral hepatitis related to platelet transfusion, followed by Hernandez    NSVT (nonsustained ventricular tachycardia)    Hyperlipidemia LDL goal <70    History of DVT (deep vein thrombosis)    PVC (premature ventricular contraction)      Procedures:  Procedure(s):  LAPAROTOMY EXPLORATORY SMALL BOWEL RESECTION  COLON RESECTION SMALL BOWEL       History of Present Illness:  Magdi Arriaga is a 72 y.o. male, former smoker, To [facility] from [facility] with complaints of diffuse lower abdominal pain, nausea and vomiting.  Patient was recently admitted for craniotomy due to subdural hematoma and discharged on 11/15/17 [facility].  Information is obtained from emergency department records as there is no family present at this time.  Apparently the patient had a hypotensive episode today during a vomiting spell.  Blood pressure was 83/63.  Emesis in the ED was noted to be black coffee ground appearing.  He has a past history significant for congestive heart failure, chronic kidney disease, CABG ×5 in April of this year, coronary artery disease with stent, COPD, and asthma.  Upon arrival to the emergency department the patient denied a recent history of melena or hematochezia.  He did not remember any recent bowel movements while at [facility].     He is seen in ICU following exploratory laparotomy for small bowel obstruction due to adhesions.  Upon arrival the patient was noted to be intubated, a right pinpoint pupil with little  reaction and a left pupil which was reactive to light and accommodation.  He moved to noxious stimuli but did not localize to this.  He did stick his tongue out to command.  Apparently he was unable to wake up while in the recovery room and was transferred to ICU intubated and on mechanical ventilation.     Patient has been moved back to the ICU after having undergone a CT scan of the head.  This reveals a possible acute or evolving right sided infarct.  The patient is currently awake and alert, intubated, but follows commands.  He has left-sided weakness but apparently has a baseline history of left-sided weakness.     Nursing staff have suction only swallow secretions from his endotracheal tube    Hospital Course:  Patient admitted to ICU as noted in history present illness.  CT scan showed possible evolving infarct and neurology was asked to see the patient in consult.  Patient was also seen by neurosurgery both of whom do not feel that neurological changes and signs on CT scan related to post operative CVA.  Indeed, the patient had neurological deficits prior to surgery.  Cardiology also reviewed with decision to not anticoagulate.  Hospital day #1 patient's creatinine was noted to have increased to 2.2.  Sedatives were held with anticipation of ventilator weaning when mental status improved.  He was extubated on hospital day #2.  Placed on 2 L nasal cannula and seemed much more alert.  He was afebrile at that time.  Subsequently the patient's creatinine was found to have decreased to 1.9.  He was however noted to be drowsy and remained drowsy though easily arousable by hospital day #4.  Hospital day #6 the patient's hemoglobin was found to have decreased to 7.3 and he was also noted to be aspirating.  A feeding tube was placed and he required frequent NT suctioning.  He was transfused 2 units of packed red blood cells and free water was also replaced.  There was however no evidence of overt bleeding.  By  "hospital day 7 the patient was noted to have blood-tinged stool and by hospital day #8 this had resolved.  Aspirin, statin and replacement of free water were continued and the patient received Coreg and Entresto.  Neurologically he remained drowsy and required frequent suctioning due to increased secretions and thus remained in ICU.  Paxil was discontinued due to sedating effects and the patient was started on Wellbutrin.  He continued to be seen by physical and occupational therapy for debility.  By hospital day 14 the patient's neurological status did improve slightly though he continued to require frequent suctioning.  He continues to be at risk for reintubation if not frequently suction.  Preparations were made for the patient be transferred to LTAC this evening.  He is to have staples removed by surgery prior to transfer.  May consider stimulating agent such as Ritalin while at the LTAC.  CPAP at night for obstructive sleep apnea.  Ensure every one to 2 hour suctioning. His mental status continues to wax and wane likely secondary to SDH and recent critical illness in the setting of dementia. His wife is at bedside. She does not want to proceed with tracheostomy prophylactically. If he gets reintubated, she will make decisions regarding tracheostomy at that time. She is agreeable to PEG tube placement if needed    Vitals:  /85  Pulse 67  Temp 98.1 °F (36.7 °C) (Oral)   Resp 16  Ht 172.7 cm (67.99\")  Wt 80.2 kg (176 lb 12.9 oz)  SpO2 97%  BMI 26.89 kg/m2      Physical Exam:  Telemetry:  Sinus Rhythm: normal sinus rhythm  Atrial Rhythm: atrial fibrillation (intermittent bursts of afib)      Constitutional:  No acute distress. Up in chair.    Cardiovascular: Normal rate, regular and rhythm. Normal heart sounds.  No murmurs, gallop or rub.   Respiratory: No respiratory distress. Normal respiratory effort.  Coarse bilaterally.     Abdominal:  Soft. No masses. Non-tender. No distension. No HSM. "   Extremities: No digital cyanosis. No clubbing.  No peripheral edema.   Neurological:                       Sleepy.   Awakens easily to stimulation.   Does not follow commands.         Labs:    Results from last 7 days  Lab Units 12/06/17  0322   WBC 10*3/mm3 12.66*   HEMOGLOBIN g/dL 8.6*   HEMATOCRIT % 28.2*   PLATELETS 10*3/mm3 304       Results from last 7 days  Lab Units 12/06/17  0322  12/04/17  0330   SODIUM mmol/L 141  < > 143   POTASSIUM mmol/L 4.3  < > 4.4   CHLORIDE mmol/L 107  < > 109   CO2 mmol/L 30.0  < > 30.0   BUN mg/dL 46*  < > 39*   CREATININE mg/dL 1.00  < > 0.70   CALCIUM mg/dL 9.7  < > 9.3   BILIRUBIN mg/dL  --   --  0.2*   ALK PHOS U/L  --   --  77   ALT (SGPT) U/L  --   --  16   AST (SGOT) U/L  --   --  16   GLUCOSE mg/dL 119*  < > 116*   < > = values in this interval not displayed.      Magnesium   Date Value Ref Range Status   12/06/2017 2.2 1.3 - 2.7 mg/dL Final   12/05/2017 2.0 1.3 - 2.7 mg/dL Final   12/04/2017 2.1 1.3 - 2.7 mg/dL Final     Phosphorus   Date Value Ref Range Status   12/06/2017 2.0 (L) 2.4 - 5.1 mg/dL Final   12/05/2017 2.2 (L) 2.4 - 5.1 mg/dL Final   12/04/2017 2.6 2.4 - 5.1 mg/dL Final               Discharge Medications:   Magdi Arriaga   Home Medication Instructions RADHA:[id number]    Printed on:12/06/17 1243   Medication Information                      acetaminophen (TYLENOL) 325 MG tablet  Take 2 tablets by mouth Every 4 (Four) Hours As Needed for Headache or Fever (fever greater than 101.5 F).             albuterol (PROVENTIL) (5 MG/ML) 0.5% nebulizer solution  Take 0.5 mL by nebulization Every 4 (Four) Hours As Needed for Wheezing.             Amino Acids-Protein Hydrolys (PRO-STAT) liquid  1 packet by Nasogastric route Daily.             aspirin 81 MG chewable tablet  Chew 1 tablet Daily.             atorvastatin (LIPITOR) 10 MG tablet  Take 1 tablet by mouth Every Night.             bisacodyl (DULCOLAX) 10 MG suppository  Insert 1 suppository into the  rectum Daily As Needed for Constipation.             buPROPion (WELLBUTRIN) 75 MG tablet  Take 1 tablet by mouth Every 12 (Twelve) Hours.             carvedilol (COREG) 6.25 MG tablet  Take 1 tablet by mouth Every 12 (Twelve) Hours.             diphenhydrAMINE (BENADRYL) 25 mg capsule  Take 1 capsule by mouth Every 6 (Six) Hours As Needed for Itching or Allergies.             docusate sodium (COLACE) 150 MG/15ML liquid  Take 10 mL by mouth 2 (Two) Times a Day As Needed (constipation).             entecavir (BARACLUDE) 0.5 MG tablet  Take 1 tablet by mouth Daily.             famotidine (PEPCID) 20 MG tablet  Take 1 tablet by mouth Daily.             ipratropium-albuterol (DUO-NEB) 0.5-2.5 mg/mL nebulizer  Take 3 mL by nebulization 4 (Four) Times a Day.             memantine (NAMENDA) 10 MG tablet  Take 1 tablet by mouth Every 12 (Twelve) Hours.             metoclopramide (REGLAN) 5 MG/ML injection  Infuse 2 mL into a venous catheter Every 6 (Six) Hours.             nitroglycerin (NITROSTAT) 0.4 MG SL tablet  Place 1 tablet under the tongue Every 5 (Five) Minutes As Needed for Chest Pain. Take no more than 3 doses in 15 minutes.             ondansetron (ZOFRAN) 4 MG tablet  Take 1 tablet by mouth Every 6 (Six) Hours As Needed for Nausea or Vomiting.             ondansetron (ZOFRAN) 4 MG/2ML injection  Infuse 2 mL into a venous catheter Every 6 (Six) Hours As Needed for Nausea or Vomiting.             rivastigmine (EXELON) 9.5 MG/24HR patch  Place 1 patch on the skin Daily.             sacubitril-valsartan (ENTRESTO) 49-51 MG tablet  Take 1 tablet by mouth Every 12 (Twelve) Hours.             sennosides (SENOKOT) 8.8 MG/5ML syrup  Take 10 mL by mouth 2 (Two) Times a Day As Needed for Constipation.                 Discharge Diet:   Diet Instructions     Diet: Tube Feeding; Continuous; Peptamen 1.5 @ 50 ml/h; H2O @ 25 ml/h       Discharge Diet:  Tube Feeding   Feeding Type:  Continuous   Formula & Rate:  Peptamen 1.5 @  50 ml/h; H2O @ 25 ml/h                 Activity at Discharge:    Activity Instructions     Activity as Tolerated       Per PT/OT                 Follow-up Appointments  Future Appointments  Date Time Provider Department Center   1/8/2018 3:00 PM Kvng Stauffer MD [facility] None   2/13/2018 10:00 AM [facility] CT 1 BH [facility]   2/19/2018 9:45 AM REI Fernández [facility] None     Additional Instructions for the Follow-ups that You Need to Schedule     Discharge Follow-up with PCP    As directed    Follow Up Details:  ASAP after LTAC Discharge.                 Discharge Instructions:  To [facility]  Ensure Kevin D/C'd prior to transfer  Frequent suctioning, CPAP at night       REI Queen, ACNP-BC  Pulmonary & Critical Care Medicine  12/06/17 12:44 PM    Time: Discharge 45 min    CC: Colton Dickens MD         [unfilled]    *Please note that portions of this note were completed with a voice recognition program. Efforts were made to edit the dictations, but occasionally words are mistranscribed.

## 2017-12-06 NOTE — THERAPY TREATMENT NOTE
Acute Care - Physical Therapy Treatment Note  [facility]     Patient Name: Magdi Arriaga  : 1945  MRN: [id number]  Today's Date: 2017  Onset of Illness/Injury or Date of Surgery Date: 17  Date of Referral to PT: 17  Referring Physician: Earl    Admit Date: 2017    Visit Dx:    ICD-10-CM ICD-9-CM   1. Pharyngeal dysphagia R13.13 787.23   2. Small bowel obstruction K56.609 560.9   3. Lower abdominal pain R10.30 789.09   4. Leukocytosis, unspecified type D72.829 288.60   5. Hematemesis with nausea K92.0 578.0     787.02   6. Impaired mobility and ADLs Z74.09 799.89   7. 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, followed by [facility]   • Traumatic SDH, s/p right craniotomy on 2017   • Small bowel obstruction (S/P Exp lap for acute SBO 17)   • Altered mental status post op   • PVC (premature ventricular contraction)   • NSVT (nonsustained ventricular tachycardia)               Adult Rehabilitation Note       17 1330 17 1037 17 1036    Rehab Assessment/Intervention    Discipline speech language pathologist  -VW physical therapist  -MJ occupational therapist  -CL    Document Type therapy note (daily note)  -VW therapy note (daily note)  -MJ therapy note (daily  note)  -CL    Subjective Information no complaints;agree to therapy  -VW agree to therapy;no complaints  -MJ agree to therapy;no complaints  -CL    Patient Effort, Rehab Treatment adequate  -VW good  -MJ good  -CL    Symptoms Noted During/After Treatment  fatigue  -MJ fatigue  -CL    Symptoms Noted Comment  lethargic throughout; improved as session progressed  -MJ     Precautions/Limitations  fall precautions;oxygen therapy device and L/min   abd incision, feeding tube, L sided weakness  -MJ fall precautions;oxygen therapy device and L/min;other (see comments)   NG, L sided weakness, abd incision  -CL    Specific Treatment Considerations  lethergy throughout  -MJ Pt w/ significant lethargy this date.   -CL    Recorded by [VW] Pearl Amaro MA, CFY-SLP [MJ] Yanni Alcantar, PT [CL] Margarita Velasco OT    Vital Signs    Pre Systolic BP Rehab  104  -  -CL    Pre Treatment Diastolic BP  67  -MJ 67  -CL    Post Systolic BP Rehab  133  -  -CL    Post Treatment Diastolic BP  83  -MJ 83  -CL    Pretreatment Heart Rate (beats/min)  68  -MJ 68  -CL    Posttreatment Heart Rate (beats/min)  68  -MJ 68  -CL    Pre SpO2 (%)  95  -MJ 95  -CL    O2 Delivery Pre Treatment  supplemental O2  -MJ supplemental O2  -CL    Post SpO2 (%)  96  -MJ 96  -CL    O2 Delivery Post Treatment  supplemental O2  -MJ supplemental O2  -CL    Pre Patient Position  Sitting  -MJ Sitting  -CL    Intra Patient Position  Standing  -MJ Standing  -CL    Post Patient Position  Sitting  -MJ Sitting  -CL    Recorded by  [MJ] Yanni Alcantar, PT [CL] Margarita Velasco OT    Pain Assessment    Pain Assessment No/denies pain  -VW No/denies pain  -MJ No/denies pain  -CL    [place] FACES Pain Rating   0  -CL    Pain Score   0  -CL    Post Pain Score   0  -CL    Pain Intervention(s)  Repositioned;Ambulation/increased activity  -MJ     Recorded by [VW] Pearl Amaro MA, CFY-SLP [MJ] Yanni Alcantar, PT [CL] Margarita Velasco OT    Cognitive  Assessment/Intervention    Current Cognitive/Communication Assessment  functional  -MJ impaired  -CL    Orientation Status  oriented to;person;place  -MJ oriented to;person;place  -CL    Follows Commands/Answers Questions  able to follow single-step instructions;75% of the time;needs cueing;needs increased time;needs repetition  -MJ 75% of the time;needs cueing;needs increased time;needs repetition  -CL    Personal Safety  moderate impairment;decreased awareness, need for assist;decreased awareness, need for safety  -MJ moderate impairment;decreased awareness, need for assist;decreased awareness, need for safety  -CL    Personal Safety Interventions  fall prevention program maintained;gait belt;muscle strengthening facilitated;nonskid shoes/slippers when out of bed  -MJ fall prevention program maintained;gait belt;nonskid shoes/slippers when out of bed  -CL    Recorded by  [MJ] Yanni Alcantar PT [CL] Margarita Velasco OT    Dysphagia Treatment Objectives and Progress    To improve timing of pharyngeal response, patient will: Swallow in timely way using three second prep;Swallow in timely way using super-supraglottic swallow;80%;with consistent cues  -VW      Timing of Pharyngeal Response Progress continue to adress  -VW      To improve closure at entrance to airway, patient will: Complete super-supraglottic swallow;80%;with consistent cues  -VW      Closure at Entrance to Airway Progress continue to adress  -VW      To improve hyolaryngeal excursion, patient will: Complete chin tuck against resistance (comment number of repetitions);80%;with consistent cues  -VW      Hyolaryngeal Excursion Progress continue to adress  -VW      To improve tongue base & pharyngeal wall squeeze, patient will: Complete effortful swallow;Complete tongue hold swallow;80%;with consistent cues  -VW      Tongue Base/Pharyngeal Wall Squeeze Progress 30%;with consistent cues;continue to adress  -VW      Recorded by [VW] Pearl Amaro MA,  CFY-SLP      Improve oral skills    To Improve Oral Skills, patient will: Increase tongue tip elevation;80%;with consistent cues  -VW      Status: Improve Oral Skills Progressing as expected  -VW      Oral Skills Progress 20%;with consistent cues;continue to adress  -VW      Comments: Improve Oral Skills Max verbal and physical cues needed to participate  -VW      Recorded by [VW] Pearl Amaro MA, CFY-SLP      Improve timing of pharyngeal response    Status: Improve timing of pharyngeal response Progress slower than expected  -VW      Comments: Improve timing of pharyngeal response DNA 2' lethargy  -VW      Recorded by [VW] Pearl Amaro MA, CFY-SLP      Improve closure at entrance to airway    Status: Improve closure at entrance to airway Progress slower than expected  -VW      Recorded by [VW] Pearl Amaro MA, CFY-SLP      Improve hyolaryngeal excursion    Status: Improve hyolaryngeal excursion Progress slower than expected  -VW      Comments: Improve hyolaryngeal excursion DNA 2' lethargy  -VW      Recorded by [VW] Pearl Amaro MA, CFY-SLP      Improve tongue base & pharyngeal wall squeeze    Status: Improve tongue base & pharyngeal wall squeeze Progress slower than expected  -VW      Comments: Improve tongue base & pharyngeal wall squeeze Max cues needed   -VW      Recorded by [VW] Pearl Amaro MA, CFY-SLP      Dysphagia Other 1    Dysphagia Other 1 Objective Tolerate ice chip and H2O trials without s/s aspiration with 50% accuracy and cues  -VW      Status: Dysphagia Other 1 Progress slower than expected  -VW      Dysphagia Other 1 Progress continue to address  -VW      Comments: Dysphagia Other 1 Ice chip x2 trialed, increased wet vocal quality/secretions noted after each trial w/ throat clear. SLP cued cough after each trial.   -VW      Recorded by [VW] Pearl Amaro MA, CFY-SLP      Bed Mobility, Assessment/Treatment    Bed Mob, Supine to Sit, [place]  not tested   -MJ     Bed Mobility, Comment  UIC  -MJ UIC.   -CL    Recorded by  [MJ] Yanni Alcantar, PT [CL] Margarita Velasco OT    Transfer Assessment/Treatment    Transfers, Sit-Stand [place]  maximum assist (25% patient effort);2 person assist required  -MJ maximum assist (25% patient effort);2 person assist required;verbal cues required  -CL    Transfers, Stand-Sit [place]  maximum assist (25% patient effort);2 person assist required  -MJ maximum assist (25% patient effort);2 person assist required;verbal cues required  -CL    Transfers, Sit-Stand-Sit, Assist Device  other (see comments)   gait belt and BUE support  -MJ     Transfer, Safety Issues  weight-shifting ability decreased  -MJ     Transfer, Impairments  strength decreased;impaired balance  -MJ     Transfer, Comment  attempted sit to stand x4; minimally cleared buttocks 2/4 attempts.  VC's for sequencing, weight shift , hand placement.  Unable to fully stand erect.  -MJ Pt attempted STS x4 reps, however was only able to clear hips on the third and fourth attempt.   -CL    Recorded by  [MJ] Yanni Alcantar, PT [CL] Margarita Velasco OT    Gait Assessment/Treatment    Gait, [place] Level  not appropriate to assess  -MJ     Recorded by  [MJ] Yanni Alcantar PT     Functional Mobility    Functional Mobility- Ind. Level   not appropriate to assess;unable to perform  -CL    Recorded by   [CL] Margarita Velasco OT    Grooming Assessment/Training    Grooming Assess/Train, Position   supported sitting  -CL    Grooming Assess/Train, Indepen Level   dependent (less than 25% patient effort)  -CL    Grooming Assess/Train, Comment   To wash face.   -CL    Recorded by   [CL] Margarita Velasco OT    Motor Skills/Interventions    Additional Documentation  Balance Skills Training (Group)  -MJ     Recorded by  [MJ] Yanni Alcantar, PT     Balance Skills Training    Sitting-Level of Assistance  Moderate assistance   with intermittent prgoression to CGA  -MJ Moderate assistance   static  w/ brief moments CGA, pt Max A for dynamic  -CL    Sitting-Balance Support  Right upper extremity supported;Left upper extremity supported;Feet supported  -MJ Right upper extremity supported;Left upper extremity supported;Feet supported  -CL    Sitting-Balance Activities  Lateral lean;Forward lean;Reaching for objects;Trunk control activities  -MJ Forward lean;Reaching for objects;Trunk control activities  -CL    Sitting # of Minutes  10  -MJ     Standing-Level of Assistance  Maximum assistance;x2  -MJ Maximum assistance;x2  -CL    Static Standing Balance Support  Right upper extremity supported;Left upper extremity supported  -MJ Right upper extremity supported;Left upper extremity supported  -CL    Standing-Balance Activities  Weight Shift A-P  -MJ Weight Shift A-P  -CL    Recorded by  [MJ] Yanni Alcantar, PT [CL] Margarita Velasco OT    Therapy Exercises    Right Lower Extremity  sitting;AAROM:;ankle pumps/circles;LAQ;hip flexion;calf stretch  -MJ     Left Lower Extremity  sitting;PROM:;10 reps;ankle pumps/circles;calf stretch;LAQ;hip flexion  -MJ     Right Upper Extremity   10 reps;AROM:;elbow flexion/extension;shoulder extension/flexion;shoulder horizontal abd/add   targetted reaching  -CL    Left Upper Extremity   10 reps;AROM:;AAROM:;elbow flexion/extension;hand pumps;pronation/supination;shoulder extension/flexion;shoulder ER/IR   wrist F/E  -CL    Recorded by  [MJ] Yanni Alcantar, PT [CL] Margarita Velasco OT    Positioning and Restraints    Pre-Treatment Position  sitting in chair/recliner  -MJ sitting in chair/recliner  -CL    Post Treatment Position  chair  - chair  -CL    In Chair  notified nsg;reclined;call light within reach;encouraged to call for assist;exit alarm on;compression device;waffle cushion;on mechanical lift sling;legs elevated;heels elevated  - notified nsg;reclined;call light within reach;encouraged to call for assist;exit alarm on;RUE elevated;LUE elevated;waffle cushion;on mechanical lift  sling;legs elevated;heels elevated  -CL    Recorded by  [MJ] Yanni Alcantar, PT [CL] Margarita Velasco OT      12/05/17 1350 12/05/17 1345 12/04/17 1350    Rehab Assessment/Intervention    Discipline physical therapist  -LS occupational therapist  -CL physical therapist  -LS    Document Type therapy note (daily note)  -LS progress note  -CL progress note  -LS    Subjective Information agree to therapy;no complaints  -LS agree to therapy;no complaints  -CL agree to therapy;no complaints  -LS    Patient Effort, Rehab Treatment good  -LS good  -CL good  -LS    Precautions/Limitations fall precautions;oxygen therapy device and L/min;other (see comments)   abd incision; LLE weakness  -LS fall precautions;oxygen therapy device and L/min;other (see comments)   L sided weakness, abd incision  -CL fall precautions;oxygen therapy device and L/min;other (see comments)   NG, abd incision, L-side weakness  -LS    Specific Treatment Considerations Continues to demonstrate decreased processing of commands.   -LS      Recorded by [LS] Keli Awad, PT [CL] Margarita Velasco OT [LS] Keli Awad, PT    Vital Signs    Pre Systolic BP Rehab 140  -  -  -LS    Pre Treatment Diastolic BP 83  -LS 83  -CL 88  -LS    Post Systolic BP Rehab 143  -  -  -LS    Post Treatment Diastolic BP 87  -LS 87  -CL 86  -LS    Pretreatment Heart Rate (beats/min) 73  -LS 73  -CL 75  -LS    Posttreatment Heart Rate (beats/min) 72  -LS 72  -CL 74  -LS    Pre SpO2 (%)  98  -CL 97  -LS    O2 Delivery Pre Treatment supplemental O2  -LS supplemental O2  -CL supplemental O2  -LS    Post SpO2 (%) 96  -LS 96  -CL 97  -LS    O2 Delivery Post Treatment supplemental O2  -LS supplemental O2  -CL supplemental O2  -LS    Pre Patient Position Sitting  -LS Sitting  -CL Sitting  -LS    Intra Patient Position Standing  -LS Standing  -CL Standing  -LS    Post Patient Position Sitting  -LS Sitting  -CL Sitting  -LS    Recorded by [LS] Keli Awad, PT [CL]  Margarita Velasco, OT [LS] Keli Awad, PT    Pain Assessment    Pain Assessment 0-10  -LS No/denies pain  -CL [place] FACES  -LS    [place] FACES Pain Rating 0  -LS 0  -CL     Pain Score 0  -LS 0  -CL 0  -LS    Post Pain Score 0  -LS 0  -CL 0  -LS    Recorded by [LS] Keli Awad, PT [CL] Margarita Velasco OT [LS] Keli Awad, PT    Cognitive Assessment/Intervention    Current Cognitive/Communication Assessment functional  -LS impaired  -CL functional   delayed processing  -LS    Orientation Status oriented to;person;place;required verbal cueing (specifiy in comments)   cues for date  -LS oriented to;person;place;required verbal cueing (specifiy in comments);time  -CL oriented to;person;place;time  -LS    Follows Commands/Answers Questions able to follow single-step instructions;75% of the time;needs cueing;needs increased time;needs repetition  -LS 75% of the time;needs cueing;needs repetition  -CL able to follow single-step instructions;50% of the time;75% of the time;needs cueing;needs increased time;needs repetition  -LS    Personal Safety moderate impairment;decreased awareness, need for assist;decreased awareness, need for safety;decreased insight to deficits  -LS moderate impairment;decreased awareness, need for assist;decreased awareness, need for safety  -CL moderate impairment;decreased awareness, need for assist;decreased awareness, need for safety;decreased insight to deficits  -LS    Personal Safety Interventions fall prevention program maintained;gait belt;nonskid shoes/slippers when out of bed  -LS fall prevention program maintained;gait belt;nonskid shoes/slippers when out of bed  -CL fall prevention program maintained;gait belt;nonskid shoes/slippers when out of bed  -LS    Recorded by [LS] Keli Awad, PT [CL] Margarita Velasco, OT [LS] Keli Awad, PT    MMT (Manual Muscle Testing)    General MMT Assessment Detail   LLE: 0/5; RLE grossly 3-/5  -LS    Recorded by   [LS] Keli Awad, PT    Bed  Mobility, Assessment/Treatment    Bed Mobility, Comment UIC  -LS UIC.   -CL UIC upon arrival.   -LS    Recorded by [LS] Keli Awad, PT [CL] Margarita Velasco OT [LS] Keli Awad PT    Transfer Assessment/Treatment    Transfers, Sit-Stand [place] maximum assist (25% patient effort);2 person assist required;verbal cues required  -LS maximum assist (25% patient effort);2 person assist required;verbal cues required  -CL dependent (less than 25% patient effort);2 person assist required;verbal cues required  -LS    Transfers, Stand-Sit [place] maximum assist (25% patient effort);2 person assist required;verbal cues required  -LS maximum assist (25% patient effort);2 person assist required;verbal cues required  -CL dependent (less than 25% patient effort);2 person assist required;verbal cues required  -LS    Transfer, Impairments strength decreased;impaired balance  -LS  strength decreased;impaired balance  -LS    Transfer, Comment Attempted STS x3 from recliner; vc's for hand placement and rocking forward to intiate. Able to clear hips from chair but unable to achieve upright posture despite verbal and tactile cues for hip/trunk extension.   -LS Pt stood from chair w/ OT/PT on either side of pt w/ BUE support and B feet/knees blocked. Pt achieved ~50% upright position w/ posterior pelvic support to facilitate upright posture.    -CL Attempted STS x2 from recliner; able to clear hips from cahir surface but unable to achieve upright despite verbal and tactile cues.   -LS    Recorded by [LS] Keli Awad, PT [CL] Margarita Velasco OT [LS] Keli Awad PT    Gait Assessment/Treatment    Gait, [place] Level not appropriate to assess  -LS  not appropriate to assess  -LS    Recorded by [LS] Keli Awad, PT  [LS] Keli Awad PT    Motor Skills/Interventions    Additional Documentation Balance Skills Training (Group)  -LS  Balance Skills Training (Group)  -LS    Recorded by [LS] Keli Awad, PT  [LS] Keli AVILA  Ritesh PT    Balance Skills Training    Sitting-Level of Assistance Minimum assistance  -LS Minimum assistance;x2  -CL Minimum assistance   brief moments of supervision; L lateral and anterior lean  -LS    Sitting-Balance Support Right upper extremity supported;Left upper extremity supported;Feet supported  -LS Right upper extremity supported;Left upper extremity supported;Feet supported  -CL Right upper extremity supported;Left upper extremity supported;Feet supported  -LS    Sitting-Balance Activities Lateral lean;Forward lean;Trunk control activities  -LS Forward lean;Reaching for objects;Trunk control activities  -CL     Sitting # of Minutes 10  -LS      Standing-Level of Assistance Maximum assistance;x2  -LS Maximum assistance;x2  -CL Dependent;x2  -LS    Static Standing Balance Support Right upper extremity supported;Left upper extremity supported  -LS Right upper extremity supported;Left upper extremity supported  -CL Right upper extremity supported;Left upper extremity supported  -LS    Standing-Balance Activities Weight Shift A-P  -LS Weight Shift A-P  -CL Weight Shift A-P  -LS    Recorded by [LS] Keli Awad, PT [CL] Margarita Velasco OT [LS] Keli Awad, PT    Therapy Exercises    Right Lower Extremity AAROM:;sitting;ankle pumps/circles;calf stretch;hamstring stretch;hip flexion  -LS  AAROM:;10 reps;ankle pumps/circles;LAQ  -LS    Left Lower Extremity PROM:;ankle pumps/circles;calf stretch;hip flexion;LAQ  -LS  PROM:;10 reps;ankle pumps/circles;calf stretch;hamstring stretch  -LS    Right Upper Extremity  AROM:;10 reps;elbow flexion/extension;hand pumps;shoulder extension/flexion  -CL     Left Upper Extremity  AAROM:;PROM:;10 reps;elbow flexion/extension;hand pumps;pronation/supination;shoulder extension/flexion;shoulder ER/IR   wrist F/E  -CL     Bilateral Upper Extremity   AAROM:;10 reps;sitting;elbow flexion/extension;hand pumps;shoulder extension/flexion  -LS    Recorded by [LS] Keli Awad, PT [CL]  Margarita Velasco, OT [LS] Keli Awad, PT    Positioning and Restraints    Pre-Treatment Position sitting in chair/recliner  -LS sitting in chair/recliner  -CL sitting in chair/recliner  -LS    Post Treatment Position chair  -LS chair  -CL chair  -LS    In Chair notified nsg;reclined;call light within reach;encouraged to call for assist;exit alarm on;with family/caregiver;with OT;RUE elevated;LUE elevated;waffle cushion;on mechanical lift sling;heels elevated;R waffle boot;L multipodus  -LS notified nsg;reclined;call light within reach;encouraged to call for assist;exit alarm on;with family/caregiver;RUE elevated;LUE elevated;waffle cushion;on mechanical lift sling;legs elevated;heels elevated;R waffle boot;L multipodus  -CL notified nsg;reclined;call light within reach;encouraged to call for assist;exit alarm on;with family/caregiver;RUE elevated;LUE elevated;waffle cushion;on mechanical lift sling;legs elevated;heels elevated;L waffle boot;R multipodus   MD present  -LS    Recorded by [LS] Keli Awad, PT [CL] Margarita Velasco, OT [LS] Keli Awad, PT      12/04/17 1030          Rehab Assessment/Intervention    Discipline speech language pathologist  -LSA,AS,LSA2      Document Type therapy note (daily note)  -LSA,AS,LSA2      Subjective Information agree to therapy  -LSA,AS,LSA2      Patient Effort, Rehab Treatment fair  -LSA,AS,LSA2      Recorded by [LSA,AS,LSA2] Maria Guadalupe Guzman, MS CCC-SLP (r) Estela Blanco, Speech Therapy Student (t) Maria Guadalupe Guzman, MS CCC-SLP (c)      Pain Assessment    Pain Assessment No/denies pain  -LSA,AS,LSA2      Pain Score 0  -LSA,AS,LSA2      Post Pain Score 0  -LSA,AS,LSA2      Recorded by [LSA,AS,LSA2] Maria Guadalupe Guzman, MS CCC-SLP (r) Estela Blanco, Speech Therapy Student (t) Maria Guadalupe Guzman, MS CCC-SLP (c)      Cognitive Assessment/Intervention    Current Cognitive/Communication Assessment could not assess   2' lethargy   -LSA,AS,LSA2      Orientation Status oriented to;person  -LSA,AS,LSA2       Follows Commands/Answers Questions 25% of the time;able to follow single-step instructions;needs repetition;needs increased time  -LSA,AS,LSA2      Recorded by [LSA,AS,LSA2] Maria Guadalupe Guzman MS CCC-SLP (r) Estela Blanco, Speech Therapy Student (t) Maria Guadalupe Guzman MS CCC-SLP (c)      Dysphagia Treatment Objectives and Progress    To improve timing of pharyngeal response, patient will: Swallow in timely way using three second prep;Swallow in timely way using super-supraglottic swallow;80%;with consistent cues  -LSA,AS,LSA2      Timing of Pharyngeal Response Progress continue to adress  -LSA,AS,LSA2      To improve closure at entrance to airway, patient will: Complete super-supraglottic swallow;80%;with consistent cues  -LSA,AS,LSA2      Closure at Entrance to Airway Progress continue to adress  -LSA,AS,LSA2      To improve hyolaryngeal excursion, patient will: Complete chin tuck against resistance (comment number of repetitions);80%;with consistent cues  -LSA,AS,LSA2      Hyolaryngeal Excursion Progress 0%;with consistent cues;continue to adress  -LSA,AS,LSA2      To improve tongue base & pharyngeal wall squeeze, patient will: Complete effortful swallow;Complete tongue hold swallow;80%;with consistent cues  -LSA,AS,LSA2      Tongue Base/Pharyngeal Wall Squeeze Progress continue to adress  -LSA,AS,LSA2      Recorded by [LSA,AS,LSA2] Maria Guadalupe Guzman MS CCC-SLP (r) Esteal Blanco, Speech Therapy Student (t) Maria Guadalupe Guzman MS CCC-SLP (c)      Improve oral skills    To Improve Oral Skills, patient will: Increase tongue tip elevation;80%;with consistent cues  -LSA,AS,LSA2      Status: Improve Oral Skills Progressing as expected  -LSA,AS,LSA2      Oral Skills Progress 0%;with consistent cues;continue to adress  -LSA,AS,LSA2      Recorded by [LSA,AS,LSA2] Maria Guadalupe Guzman MS CCC-SLP (r) Estela Blanco, Speech Therapy Student (t) Maria Guadalupe Guzman MS CCC-SLP (c)      Improve timing of pharyngeal response    Status: Improve  timing of pharyngeal response Progress slower than expected  -LSA,AS,LSA2      Comments: Improve timing of pharyngeal response DNA 2' lethargy   -LSA,AS,LSA2      Recorded by [LSA,AS,LSA2] Maria Guadalupe Guzman MS CCC-SLP (r) Estela Blanco, Speech Therapy Student (t) Maria Guadalupe Guzman MS CCC-SLP (c)      Improve closure at entrance to airway    Status: Improve closure at entrance to airway Progress slower than expected  -LSA,AS,LSA2      Recorded by [LSA,AS,LSA2] Maria Guadalupe Guzman MS CCC-SLP (r) Estela Blanco, Speech Therapy Student (t) Maria Guadalupe Guzman MS CCC-SLP (c)      Improve hyolaryngeal excursion    Status: Improve hyolaryngeal excursion Progress slower than expected  -LSA,AS,LSA2      Comments: Improve hyolaryngeal excursion Pt unable to perform 2' lethargic, even w/ max cues and prompts.   -LSA,AS,LSA2      Recorded by [LSA,AS,LSA2] Maria Guadalupe Guzman MS CCC-SLP (r) Estela Blanco, Speech Therapy Student (t) Maria Guadalupe Guzman MS CCC-SLP (c)      Improve tongue base & pharyngeal wall squeeze    Status: Improve tongue base & pharyngeal wall squeeze Progress slower than expected  -LSA,AS,LSA2      Comments: Improve tongue base & pharyngeal wall squeeze DNA PO trials 2' lethargy.   -LSA,AS,LSA2      Recorded by [LSA,AS,LSA2] Maria Guadalupe Guzman MS CCC-SLP (r) Estela Blacno, Speech Therapy Student (t) Maria Guadalupe Guzman MS CCC-SLP (c)      Dysphagia Other 1    Dysphagia Other 1 Objective Tolerate ice chip and H2O trials without s/s aspiration with 50% accuracy and cues  -LSA,AS,LSA2      Status: Dysphagia Other 1 Progress slower than expected  -LSA,AS,LSA2      Dysphagia Other 1 Progress continue to address  -LSA,AS,LSA2      Comments: Dysphagia Other 1 DNA PO trials 2' lethargy. Pt unable to stay alert for PO trials even w/ max cues.   -LSA,AS,LSA2      Recorded by [LSA,AS,LSA2] Maria Guadalupe Guzman MS CCC-SLP (r) Estela Blanco, Speech Therapy Student (t) Maria Guadalupe Guzman, MS CCC-SLP (c)        User Key  (r) = Recorded By, (t) = Taken By,  (c) = Cosigned By    Initials Name Effective Dates    LSA Maria Guadalupe Guzman, MS CCC-SLP 06/22/15 -     LS Keli Awad, PT 06/19/15 -     CL Margarita Velasco, OT 06/08/16 -     VW Pearl Amaro MA, CFY-SLP 07/13/17 -     AS Estela Blanco, Speech Therapy Student 08/24/17 -     MJ Yanni Alcantar, PT 10/30/17 -                 IP PT Goals       12/06/17 1130 12/05/17 1506 12/04/17 1506    Bed Mobility PT LTG    Bed Mobility PT LTG, Date Goal Reviewed   12/04/17  -LS    Bed Mobility PT LTG, Outcome goal ongoing  -MJ goal ongoing  -LS goal ongoing  -LS    Transfer Training PT LTG    Transfer Training PT  LTG, Date Goal Reviewed   12/04/17  -LS    Transfer Training PT LTG, Outcome goal ongoing  -MJ goal ongoing  -LS goal ongoing  -LS    Dynamic Sitting Balance PT LTG    Dynamic Sitting Balance PT LTG, Date Goal Reviewed   12/04/17  -LS    Dynamic Sitting Balance PT LTG, Outcome goal ongoing  -MJ goal ongoing  -LS goal ongoing  -LS      12/01/17 1347 11/29/17 1528 11/27/17 1608    Bed Mobility PT LTG    Bed Mobility PT LTG, Outcome goal ongoing  -LS goal ongoing  -LS goal ongoing  -LS    Transfer Training PT LTG    Transfer Training PT LTG, Outcome goal ongoing  -LS goal ongoing  -LS goal ongoing  -LS    Dynamic Sitting Balance PT LTG    Dynamic Sitting Balance PT LTG, Outcome goal ongoing  -LS goal ongoing  -LS goal ongoing  -LS      11/25/17 1519 11/23/17 1102       Bed Mobility PT LTG    Bed Mobility PT LTG, Date Established  11/23/17  -LSA     Bed Mobility PT LTG, Time to Achieve  2 wks  -LSA     Bed Mobility PT LTG, Activity Type  supine to sit/sit to supine  -LSA     Bed Mobility PT LTG, [place] Level  moderate assist (50% patient effort)  -LSA     Bed Mobility PT LTG, Outcome goal ongoing  -KR goal ongoing  -LSA     Transfer Training PT LTG    Transfer Training PT LTG, Date Established 11/25/17  -KR 11/23/17  -LSA     Transfer Training PT LTG, Time to Achieve 2 wks  -KR 2 wks  -LSA     Transfer Training PT LTG,  Activity Type sit to stand/stand to sit  -KR sit to stand/stand to sit  -LSA     Transfer Training PT LTG, [place] Level moderate assist (50% patient effort);2 person assist required  -KR maximum assist (25% patient effort);2 person assist required  -LSA     Transfer Training PT  LTG, Date Goal Reviewed 11/25/17  -KR      Transfer Training PT LTG, Outcome goal revised  -KR goal ongoing  -LSA     Transfer Training PT LTG, Reason Goal Not Met goals revised this date  -KR      Dynamic Sitting Balance PT LTG    Dynamic Sitting Balance PT LTG, Date Established  11/23/17  -LSA     Dynamic Sitting Balance PT LTG, Time to Achieve  2 wks  -LSA     Dynamic Sitting Balance PT LTG, [place] Level  minimum assist (75% patient effort)  -LSA     Dynamic Sitting Balance PT LTG, Outcome goal ongoing  -KR goal ongoing  -LSA       User Key  (r) = Recorded By, (t) = Taken By, (c) = Cosigned By    Initials Name Provider Type    LSA Anna Crawford, PT Physical Therapist    MAMIE Awad, PT Physical Therapist    LISA Mejía, PT Physical Therapist    MJ Yanni Alcantar, PT Physical Therapist          Physical Therapy Education     Title: PT OT SLP Therapies (Active)     Topic: Physical Therapy (Active)     Point: Mobility training (Active)    Learning Progress Summary    Learner Readiness Method Response Comment Documented by Status   Patient Acceptance E,D NR   12/06/17 1129 Active    Acceptance E,D NR  LS 12/04/17 1505 Active    Acceptance E,D NR Wife verbalized understanding of appropriate technique in assisting pt with ROM and ther ex. LS 12/01/17 1346 Active    Acceptance E,D NR  LS 11/29/17 1528 Active    Acceptance E,D NR  LS 11/27/17 1608 Active    Acceptance E NR  KR 11/25/17 1519 Active   Family Acceptance E,D NR  LS 11/29/17 1528 Active   Significant Other Acceptance E,D NR  LS 12/04/17 1505 Active    Acceptance E,D NR Wife verbalized understanding of appropriate technique in assisting pt with ROM and  ther ex.  12/01/17 1346 Active               Point: Home exercise program (Active)    Learning Progress Summary    Learner Readiness Method Response Comment Documented by Status   Patient Acceptance E,D NR   12/06/17 1129 Active    Acceptance E,D NR   12/04/17 1505 Active    Acceptance E,D NR Wife verbalized understanding of appropriate technique in assisting pt with ROM and ther ex. LS 12/01/17 1346 Active    Acceptance E,D NR   11/29/17 1528 Active    Acceptance E,D NR   11/27/17 1608 Active    Acceptance E VU,NR Encouraged pt and spouse to work on quad sets. Also reviewed w/spouse to keep L heel elevated off of surface to help prevent skin breakdown. [facility] 11/23/17 1105 Done   Family Acceptance E,D NR   11/29/17 1528 Active    Acceptance E VU,NR Encouraged pt and spouse to work on quad sets. Also reviewed w/spouse to keep L heel elevated off of surface to help prevent skin breakdown. [facility] 11/23/17 1105 Done   Significant Other Acceptance E,D NR   12/04/17 1505 Active    Acceptance E,D NR Wife verbalized understanding of appropriate technique in assisting pt with ROM and ther ex.  12/01/17 1346 Active               Point: Body mechanics (Active)    Learning Progress Summary    Learner Readiness Method Response Comment Documented by Status   Patient Acceptance E,D NR   12/06/17 1129 Active    Acceptance E,D NR   12/04/17 1505 Active    Acceptance E,D NR Wife verbalized understanding of appropriate technique in assisting pt with ROM and ther ex.  12/01/17 1346 Active    Acceptance E,D NR   11/29/17 1528 Active    Acceptance E,D NR   11/27/17 1608 Active    Acceptance E NR  KR 11/25/17 1519 Active   Family Acceptance E,D NR   11/29/17 1528 Active   Significant Other Acceptance E,D NR   12/04/17 1505 Active    Acceptance E,D NR Wife verbalized understanding of appropriate technique in assisting pt with ROM and ther ex.  12/01/17 1346 Active               Point: Precautions (Active)     Learning Progress Summary    Learner Readiness Method Response Comment Documented by Status   Patient Acceptance E,D NR  MJ 12/06/17 1129 Active    Acceptance E,D NR  LS 12/04/17 1505 Active    Acceptance E,D NR Wife verbalized understanding of appropriate technique in assisting pt with ROM and ther ex. LS 12/01/17 1346 Active    Acceptance E,D NR  LS 11/29/17 1528 Active    Acceptance E,D NR  LS 11/27/17 1608 Active    Acceptance E NR  KR 11/25/17 1519 Active   Family Acceptance E,D NR  LS 11/29/17 1528 Active   Significant Other Acceptance E,D NR  LS 12/04/17 1505 Active    Acceptance E,D NR Wife verbalized understanding of appropriate technique in assisting pt with ROM and ther ex. LS 12/01/17 1346 Active                      User Key     Initials Effective Dates Name Provider Type Discipline    LS1 06/19/15 -  Anna Crawford, PT Physical Therapist PT    LS 06/19/15 -  Keli Awad, PT Physical Therapist PT    KR 09/25/17 -  Sri Mejía, PT Physical Therapist PT     10/30/17 -  Yanni Alcantar, PT Physical Therapist PT                    PT Recommendation and Plan  Anticipated Discharge Disposition: inpatient rehabilitation facility  Planned Therapy Interventions: balance training, bed mobility training, patient/family education, home exercise program, strengthening, transfer training  PT Frequency: daily, per priority policy  Plan of Care Review  Plan Of Care Reviewed With: patient  Progress: progress toward functional goals is gradual  Outcome Summary/Follow up Plan: pt. continues to demonstrate decreased core strength limiting ability to maintain center of balance(COB).  Today demonstrated intermittent bouts of maintaining COB with CGA but during dynamic sitting balance requiring max A secondary to anterior and lean to L.          Outcome Measures       12/06/17 1037 12/06/17 1036 12/05/17 1350    How much help from another person do you currently need...    Turning from your back to your side while  in flat bed without using bedrails? 2  -MJ  2  -LS    Moving from lying on back to sitting on the side of a flat bed without bedrails? 2  -MJ  2  -LS    Moving to and from a bed to a chair (including a wheelchair)? 1  -MJ  1  -LS    Standing up from a chair using your arms (e.g., wheelchair, bedside chair)? 2  -MJ  2  -LS    Climbing 3-5 steps with a railing? 1  -MJ  1  -LS    To walk in hospital room? 1  -MJ  1  -LS    AM-PAC 6 Clicks Score 9  -MJ  9  -LS    How much help from another is currently needed...    Putting on and taking off regular lower body clothing?  1  -CL     Bathing (including washing, rinsing, and drying)  2  -CL     Toileting (which includes using toilet bed pan or urinal)  1  -CL     Putting on and taking off regular upper body clothing  2  -CL     Taking care of personal grooming (such as brushing teeth)  2  -CL     Eating meals  1  -CL     Score  9  -CL     Modified [place] Scale    Modified Aida Scale  4 - Moderately severe disability.  Unable to walk without assistance, and unable to attend to own bodily needs without assistance.  -CL     Functional Assessment    Outcome Measure Options AM-PAC 6 Clicks Basic Mobility (PT)  -MJ AM-PAC 6 Clicks Daily Activity (OT)  -CL AM-PAC 6 Clicks Basic Mobility (PT)  -LS      12/05/17 1345 12/04/17 1350       How much help from another person do you currently need...    Turning from your back to your side while in flat bed without using bedrails?  2  -LS     Moving from lying on back to sitting on the side of a flat bed without bedrails?  2  -LS     Moving to and from a bed to a chair (including a wheelchair)?  1  -LS     Standing up from a chair using your arms (e.g., wheelchair, bedside chair)?  1  -LS     Climbing 3-5 steps with a railing?  1  -LS     To walk in hospital room?  1  -LS     AM-PAC 6 Clicks Score  8  -LS     How much help from another is currently needed...    Putting on and taking off regular lower body clothing? 1  -CL      Bathing  (including washing, rinsing, and drying) 2  -CL      Toileting (which includes using toilet bed pan or urinal) 1  -CL      Putting on and taking off regular upper body clothing 2  -CL      Taking care of personal grooming (such as brushing teeth) 2  -CL      Eating meals 1  -CL      Score 9  -CL      Modified Aida Scale    Modified Aida Scale 4 - Moderately severe disability.  Unable to walk without assistance, and unable to attend to own bodily needs without assistance.  -CL      Functional Assessment    Outcome Measure Options AM-PAC 6 Clicks Daily Activity (OT)  -CL AM-PAC 6 Clicks Basic Mobility (PT)  -LS       User Key  (r) = Recorded By, (t) = Taken By, (c) = Cosigned By    Initials Name Provider Type    LS Keli Awad, PT Physical Therapist    CL Margarita Velasco, OT Occupational Therapist    GIOVANNI Alcantar, PT Physical Therapist           Time Calculation:         PT Charges       12/06/17 9115          Time Calculation    Start Time 1037  -MJ      PT Received On 12/06/17  -MJ      PT Goal Re-Cert Due Date 12/14/17  -MJ      Time Calculation- PT    Total Timed Code Minutes- PT 11 minute(s)  -        User Key  (r) = Recorded By, (t) = Taken By, (c) = Cosigned By    Initials Name Provider Type    GIOVANNI Alcantar, PT Physical Therapist          Therapy Charges for Today     Code Description Service Date Service Provider Modifiers Qty    41143844935 HC PT THER PROC EA 15 MIN 12/6/2017 Yanni Alcantar, PT GP 1          PT G-Codes  Outcome Measure Options: AM-PAC 6 Clicks Basic Mobility (PT)    Yanni Alcantar, PT  12/6/2017

## 2017-12-06 NOTE — PLAN OF CARE
Problem: Patient Care Overview (Adult)  Goal: Plan of Care Review  Outcome: Ongoing (interventions implemented as appropriate)    12/06/17 1130   Outcome Evaluation   Outcome Summary/Follow up Plan pt. continues to demonstrate decreased core strength limiting ability to maintain center of balance(COB) without assistance. Today demonstrated intermittent bouts of maintaining static COB with CGA. During dynamic sitting balance requiring max A secondary to lean to the L and anteriorly.   Coping/Psychosocial Response Interventions   Plan Of Care Reviewed With patient   Patient Care Overview   Progress progress toward functional goals is gradual         Problem: Inpatient Physical Therapy  Goal: Bed Mobility Goal LTG- PT  Outcome: Ongoing (interventions implemented as appropriate)    11/23/17 1102 12/04/17 1506 12/06/17 1130   Bed Mobility PT LTG   Bed Mobility PT LTG, Date Established 11/23/17 --  --    Bed Mobility PT LTG, Time to Achieve 2 wks --  --    Bed Mobility PT LTG, Activity Type supine to sit/sit to supine --  --    Bed Mobility PT LTG, [place] Level moderate assist (50% patient effort) --  --    Bed Mobility PT LTG, Date Goal Reviewed --  12/04/17 --    Bed Mobility PT LTG, Outcome --  --  goal ongoing       Goal: Transfer Training Goal 1 LTG- PT  Outcome: Ongoing (interventions implemented as appropriate)    11/25/17 1519 12/04/17 1506 12/06/17 1130   Transfer Training PT LTG   Transfer Training PT LTG, Date Established 11/25/17 --  --    Transfer Training PT LTG, Time to Achieve 2 wks --  --    Transfer Training PT LTG, Activity Type sit to stand/stand to sit --  --    Transfer Training PT LTG, [place] Level moderate assist (50% patient effort);2 person assist required --  --    Transfer Training PT LTG, Date Goal Reviewed --  12/04/17 --    Transfer Training PT LTG, Outcome --  --  goal ongoing   Transfer Training PT LTG, Reason Goal Not Met goals revised this date --  --        Goal: Dynamic  Sitting Balance Goal LTG- PT  Outcome: Ongoing (interventions implemented as appropriate)    11/23/17 1102 12/04/17 1506 12/06/17 1130   Dynamic Sitting Balance PT LTG   Dynamic Sitting Balance PT LTG, Date Established 11/23/17 --  --    Dynamic Sitting Balance PT LTG, Time to Achieve 2 wks --  --    Dynamic Sitting Balance PT LTG, [place] Level minimum assist (75% patient effort) --  --    Dynamic Sitting Balance PT LTG, Date Goal Reviewed --  12/04/17 --    Dynamic Sitting Balance PT LTG, Outcome --  --  goal ongoing

## 2017-12-06 NOTE — PROGRESS NOTES
INTENSIVIST   PROGRESS NOTE     Hospital:  LOS: 16 days     Mr. Magdi Arriaga, 72 y.o. male is followed for a Chief Complaint of: Altered Mental Status      Subjective   S   72-year-old male admitted to [facility] on [facility]/20 in transfer from [facility] with abdominal pain, nausea, and vomiting.  He was found to have a small bowel obstruction and underwent resection of a necrotic segment of the proximal jejunum.  He has a recent history of craniotomy due to subdural hematoma. There was some question of new CVA but this was ruled out and it was decided to avoid anticoagulants.  He also has a history of congestive heart failure, chronic kidney disease, CABG ×5 in April of this year, as well as COPD.  He was transferred to the ICU postoperatively and has progressed slowly. He has had several bloody stools and a total of 3 units of packed red blood cells. His most recent hemoglobin is now 10.4 improved from 7.4. Corpak was placed under fluoroscopy and tube feeding initiated  He has a known history of cardiomyopathy. Entresto and Coreg were restarted. . He had some runs of atrial fibrillation, and nonsustained ventricular tachycardia. Cardiology evaluated and reported that his EF was now greater than 35% which is improved. They recommended beta blockers and electrolyte replacement and maintaining a hemoglobin greater than 8.    Nursing reports that he is silently aspirating. Corpak placed under fluoroscopy. Initially started on a low rate and he is tolerating. Speech therapy is working with him. They note very weak orally, with difficulty just protruding his tongue.    His mental status continues to wax and wane. He requires suctioning q2h.     Interval History:  Up in the chair. He is sleepy this AM. He does not answer questions.        The patient's relevant past medical, surgical and social history were reviewed and updated in Epic as appropriate.      ROS: Unable to obtain secondary to  mental status.     Objective   O     Vitals:  Temp  Min: 97.7 °F (36.5 °C)  Max: 98.1 °F (36.7 °C)  BP  Min: 84/58  Max: 166/93  Pulse  Min: 58  Max: 79  Resp  Min: 18  Max: 20  SpO2  Min: 92 %  Max: 98 % Flow (L/min)  Min: 2  Max: 2    Intake/Ouptut 24 hrs (7:00AM - 6:59 AM)  Intake & Output (last 3 days)       12/03 0701 - 12/04 0700 12/04 0701 - 12/05 0700 12/05 0701 - 12/06 0700 12/06 0701 - 12/07 0700    Other 673 550 613 221    NG/GT 1598 1644 1515 503    Total Intake(mL/kg) 2271 (28.3) 2194 (27.4) 2128 (26.5) 724 (9)    Net +2271 +2194 +2128 +724            Unmeasured Urine Occurrence 8 x 9 x 7 x 1 x    Unmeasured Stool Occurrence 2 x 4 x 1 x             Physical Examination  Telemetry:  Sinus Rhythm: normal sinus rhythm  Atrial Rhythm: atrial fibrillation (intermittent bursts of afib)      Constitutional:  No acute distress. Up in chair.    Cardiovascular: Normal rate, regular and rhythm. Normal heart sounds.  No murmurs, gallop or rub.   Respiratory: No respiratory distress. Normal respiratory effort.  Coarse bilaterally.     Abdominal:  Soft. No masses. Non-tender. No distension. No HSM.   Extremities: No digital cyanosis. No clubbing.  No peripheral edema.   Neurological:   Sleepy.   Awakens easily to stimulation.   Does not follow commands.               Results from last 7 days  Lab Units 12/06/17  0322 12/05/17  0422 12/04/17  0330   WBC 10*3/mm3 12.66* 13.23* 11.74*   HEMOGLOBIN g/dL 8.6* 8.8* 8.7*   MCV fL 94.9 94.7 93.6   PLATELETS 10*3/mm3 304 294 291       Results from last 7 days  Lab Units 12/06/17  0322 12/05/17  0422 12/04/17  1142 12/04/17  0330   SODIUM mmol/L 141 144  --  143   POTASSIUM mmol/L 4.3 4.5  --  4.4   CO2 mmol/L 30.0 33.0*  --  30.0   CREATININE mg/dL 1.00 0.90  --  0.70   GLUCOSE mg/dL 119* 134*  --  116*   MAGNESIUM mg/dL 2.2 2.0  --  2.1   PHOSPHORUS mg/dL 2.0* 2.2* 2.6 1.9*     Estimated Creatinine Clearance: 75.7 mL/min (by C-G formula based on Cr of 1).    Results from  last 7 days  Lab Units 12/04/17  0330 12/02/17  1849   ALK PHOS U/L 77 72   BILIRUBIN mg/dL 0.2* 0.2*   ALT (SGPT) U/L 16 13   AST (SGOT) U/L 16 12             Images:  CXR  No new imaging available.          Results: Reviewed.  I reviewed the patient's new laboratory and imaging results.      Medications: Reviewed.    Assessment/Plan   A / P     Mr. Arriaga is a 73yo M who was admitted to the ICU for encephalopathy and respiratory failure after an ex-lap for SBO on 11/20/17. He has a history of recent subdural hemorrhage as well.     Nutrition:   Diet, Tube Feeding Tube Feeding Formula: Peptamen 1.5 (Peptide Based, Calorically Dense); PO Tray: Patient is NPO (No Tray)  Advance Directives: Full Code    Hospital Problem List     * (Principal)Altered mental status post op    Dementia    Hyperlipidemia LDL goal <70    Overview Signed 11/27/2017  3:37 PM by REI Baires     · High intensity statin therapy indicted given presence of CAD         Essential hypertension    JONNY (obstructive sleep apnea)    Overview Deleted 11/27/2017  3:45 PM by Henri Montes IV, MD            COPD (chronic obstructive pulmonary disease)    Coronary artery disease involving coronary bypass graft of native heart without angina pectoris    Overview Addendum 11/27/2017  3:45 PM by Henri Montes IV, MD     · CABG by Yohannes Berger (4/24/17): LIMA to LAD, sequential SVG to OM1/OM 2, SVG to diagonal, SVG to RCA  · Cardiac cath (08/08/2017): VICKEY to LAD/diagnonal. Patent SVG to diagonal and patent LIMA to LAD. Patent SVG to OM 1 and OM2. Patnet SVG to RCA. Small Ramus branch to small for PCI.         Ischemic cardiomyopathy    Overview Addendum 11/27/2017  3:48 PM by Henri Montes IV, MD     · Echo (08/07/2017): LVEF 30%. The cardiac valves are anatomically and functionally normal.  · Echo (08/24/2017): LVEF 30%.  An apical left ventricular aneurysm is present.  · Echo (11/14/2017): LVEF 40%.          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 He is still requiring frequent suctioning per nursing. I spoke with his wife at bedside. She does not want to proceed with tracheostomy prophylactically. If he gets reintubated, she will make decisions regarding tracheostomy at that time. She is agreeable to PEG tube placement if needed.     1. Transfer to [facility] this evening.   2. D/c staples from surgery prior to transfer.   3. On Wellbutrin for mental status. Could consider a stimulating agent such as Ritalin.   4. Cpap at night for JONNY.     Plan of care and goals reviewed during interdisciplinary rounds.  I discussed the patient's findings and my recommendations with nursing staff    Time: was greater than 35 minutes.    Eliz Hearn, DO    Intensive Care Medicine and Pulmonary Medicine

## 2017-12-06 NOTE — PLAN OF CARE
Problem: Patient Care Overview (Adult)  Goal: Plan of Care Review  Outcome: Ongoing (interventions implemented as appropriate)    12/06/17 1531   Outcome Evaluation   Outcome Summary/Follow up Plan Pt participated adequately in dysphagia tx this pm: Ice chip x2 trialed, increased wet vocal quality/secretions noted after each trial w/ throat clear. SLP cued cough after each trial. Pt contiues to need max cues to participate in swallowing exercises. SLP will cont dys tx.   Coping/Psychosocial Response Interventions   Plan Of Care Reviewed With patient;family   Patient Care Overview   Progress progress toward functional goals as expected         Problem: Inpatient SLP  Goal: Dysphagia- Patient will improve swallowing skills to the level that a repeat evaluation is indicated  Outcome: Ongoing (interventions implemented as appropriate)    11/25/17 1409 12/06/17 1531   Repeat Evaluation Needed   Swallow Skills to Level that Repeat Evaluation Indicated- SLP, Date Established 11/25/17 --    Swallow Skills to Level that Repeat Evaluation Indicated- SLP, Time to Achieve by discharge --    Swallow Skills to Level that Repeat Evaluation Indicated- SLP, Date Goal Reviewed --  12/06/17   Swallow Skills to Level that Repeat Evaluation Indicated- SLP, Outcome --  goal ongoing

## 2017-12-06 NOTE — THERAPY TREATMENT NOTE
Acute Care - Occupational Therapy Treatment Note  [facility]     Patient Name: Magdi Arriaga  : 1945  MRN: [id number]  Today's Date: 2017  Onset of Illness/Injury or Date of Surgery Date: 17  Date of Referral to OT: 17  Referring Physician: Earl      Admit Date: 2017    Visit Dx:     ICD-10-CM ICD-9-CM   1. Pharyngeal dysphagia R13.13 787.23   2. Small bowel obstruction K56.609 560.9   3. Lower abdominal pain R10.30 789.09   4. Leukocytosis, unspecified type D72.829 288.60   5. Hematemesis with nausea K92.0 578.0     787.02   6. Impaired mobility and ADLs Z74.09 799.89   7. 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, followed by [facility]   • Traumatic SDH, s/p right craniotomy on 2017   • Small bowel obstruction (S/P Exp lap for acute SBO 17)   • Altered mental status post op   • PVC (premature ventricular contraction)   • NSVT (nonsustained ventricular tachycardia)             Adult Rehabilitation Note       17 1037 17 1036 17 1350    Rehab Assessment/Intervention    Discipline physical therapist  -MJ occupational therapist  -CL physical therapist  -LS    Document Type therapy note (daily note)  -MJ therapy note (daily note)  -CL therapy note (daily  note)  -LS    Subjective Information agree to therapy;no complaints  -MJ agree to therapy;no complaints  -CL agree to therapy;no complaints  -LS    Patient Effort, Rehab Treatment good  -MJ good  -CL good  -LS    Symptoms Noted During/After Treatment fatigue  -MJ fatigue  -CL     Symptoms Noted Comment lethargic throughout; improved as session progressed  -MJ      Precautions/Limitations fall precautions;oxygen therapy device and L/min   abd incision, feeding tube, L sided weakness  -MJ fall precautions;oxygen therapy device and L/min;other (see comments)   NG, L sided weakness, abd incision  -CL fall precautions;oxygen therapy device and L/min;other (see comments)   abd incision; LLE weakness  -LS    Specific Treatment Considerations lethergy throughout  -MJ Pt w/ significant lethargy this date.   -CL Continues to demonstrate decreased processing of commands.   -LS    Recorded by [MJ] Yanni Alcantar, PT [CL] Margarita Velasco, OT [LS] Keli Awad, PT    Vital Signs    Pre Systolic BP Rehab 104  -  -  -LS    Pre Treatment Diastolic BP 67  -MJ 67  -CL 83  -LS    Post Systolic BP Rehab 133  -  -  -LS    Post Treatment Diastolic BP 83  -MJ 83  -CL 87  -LS    Pretreatment Heart Rate (beats/min) 68  -MJ 68  -CL 73  -LS    Posttreatment Heart Rate (beats/min) 68  -MJ 68  -CL 72  -LS    Pre SpO2 (%) 95  -MJ 95  -CL     O2 Delivery Pre Treatment supplemental O2  -MJ supplemental O2  -CL supplemental O2  -LS    Post SpO2 (%) 96  -MJ 96  -CL 96  -LS    O2 Delivery Post Treatment supplemental O2  -MJ supplemental O2  -CL supplemental O2  -LS    Pre Patient Position Sitting  -MJ Sitting  -CL Sitting  -LS    Intra Patient Position Standing  -MJ Standing  -CL Standing  -LS    Post Patient Position Sitting  -MJ Sitting  -CL Sitting  -LS    Recorded by [MJ] Yanni Alcantar, PT [CL] Margarita Velasco, OT [LS] Keli Awad, PT    Pain Assessment    Pain Assessment No/denies pain  -MJ No/denies pain  -CL 0-10  -LS     Nicole-[place] FACES Pain Rating  0  -CL 0  -LS    Pain Score  0  -CL 0  -LS    Post Pain Score  0  -CL 0  -LS    Pain Intervention(s) Repositioned;Ambulation/increased activity  -      Recorded by [MJ] Yanni Alcantar, PT [CL] Margarita Velasco OT [LS] Keli Awad, PT    Cognitive Assessment/Intervention    Current Cognitive/Communication Assessment functional  -MJ impaired  -CL functional  -LS    Orientation Status oriented to;person;place  -MJ oriented to;person;place  -CL oriented to;person;place;required verbal cueing (specifiy in comments)   cues for date  -LS    Follows Commands/Answers Questions able to follow single-step instructions;75% of the time;needs cueing;needs increased time;needs repetition  -MJ 75% of the time;needs cueing;needs increased time;needs repetition  -CL able to follow single-step instructions;75% of the time;needs cueing;needs increased time;needs repetition  -LS    Personal Safety moderate impairment;decreased awareness, need for assist;decreased awareness, need for safety  -MJ moderate impairment;decreased awareness, need for assist;decreased awareness, need for safety  -CL moderate impairment;decreased awareness, need for assist;decreased awareness, need for safety;decreased insight to deficits  -LS    Personal Safety Interventions fall prevention program maintained;gait belt;muscle strengthening facilitated;nonskid shoes/slippers when out of bed  -MJ fall prevention program maintained;gait belt;nonskid shoes/slippers when out of bed  -CL fall prevention program maintained;gait belt;nonskid shoes/slippers when out of bed  -LS    Recorded by [MJ] Yanni Alcantar, PT [CL] Margarita Velasco OT [LS] Keli Awad, PT    Bed Mobility, Assessment/Treatment    Bed Mob, Supine to Sit, [place] not tested  -      Bed Mobility, Comment UIC  -MJ UIC.   -CL UIC  -LS    Recorded by [MJ] Yanni Alcantar, PT [CL] Margarita Velasco OT [LS] Keli Awad, PT    Transfer Assessment/Treatment    Transfers,  Sit-Stand [place] maximum assist (25% patient effort);2 person assist required  -MJ maximum assist (25% patient effort);2 person assist required;verbal cues required  -CL maximum assist (25% patient effort);2 person assist required;verbal cues required  -LS    Transfers, Stand-Sit [place] maximum assist (25% patient effort);2 person assist required  -MJ maximum assist (25% patient effort);2 person assist required;verbal cues required  -CL maximum assist (25% patient effort);2 person assist required;verbal cues required  -LS    Transfers, Sit-Stand-Sit, Assist Device other (see comments)   gait belt and BUE support  -MJ      Transfer, Safety Issues weight-shifting ability decreased  -MJ      Transfer, Impairments strength decreased;impaired balance  -MJ  strength decreased;impaired balance  -LS    Transfer, Comment attempted sit to stand x4; minimally cleared buttocks 2/4 attempts.  VC's for sequencing, weight shift , hand placement.  Unable to fully stand erect.  -MJ Pt attempted STS x4 reps, however was only able to clear hips on the third and fourth attempt.   -CL Attempted STS x3 from recliner; vc's for hand placement and rocking forward to intiate. Able to clear hips from chair but unable to achieve upright posture despite verbal and tactile cues for hip/trunk extension.   -LS    Recorded by [MJ] Yanni Alcantar, PT [CL] Margarita Velasco OT [LS] Keli Awad, PT    Gait Assessment/Treatment    Gait, [place] Level not appropriate to assess  -MJ  not appropriate to assess  -LS    Recorded by [MJ] Yanni Alcantar, PT  [LS] Keli Awad, PT    Functional Mobility    Functional Mobility- Ind. Level  not appropriate to assess;unable to perform  -CL     Recorded by  [CL] Margarita Velasco OT     Grooming Assessment/Training    Grooming Assess/Train, Position  supported sitting  -CL     Grooming Assess/Train, Indepen Level  dependent (less than 25% patient effort)  -CL     Grooming Assess/Train, Comment  To wash  face.   -CL     Recorded by  [CL] Margarita Velasco OT     Motor Skills/Interventions    Additional Documentation Balance Skills Training (Group)  -MJ  Balance Skills Training (Group)  -LS    Recorded by [MJ] Yanni Alcantar, PT  [LS] Keli Awad, PT    Balance Skills Training    Sitting-Level of Assistance Moderate assistance   with intermittent prgoression to CGA  -MJ Moderate assistance   static w/ brief moments CGA, pt Max A for dynamic  -CL Minimum assistance  -LS    Sitting-Balance Support Right upper extremity supported;Left upper extremity supported;Feet supported  -MJ Right upper extremity supported;Left upper extremity supported;Feet supported  -CL Right upper extremity supported;Left upper extremity supported;Feet supported  -LS    Sitting-Balance Activities Lateral lean;Forward lean;Reaching for objects;Trunk control activities  -MJ Forward lean;Reaching for objects;Trunk control activities  -CL Lateral lean;Forward lean;Trunk control activities  -LS    Sitting # of Minutes 10  -MJ  10  -LS    Standing-Level of Assistance Maximum assistance;x2  -MJ Maximum assistance;x2  -CL Maximum assistance;x2  -LS    Static Standing Balance Support Right upper extremity supported;Left upper extremity supported  -MJ Right upper extremity supported;Left upper extremity supported  -CL Right upper extremity supported;Left upper extremity supported  -LS    Standing-Balance Activities Weight Shift A-P  -MJ Weight Shift A-P  -CL Weight Shift A-P  -LS    Recorded by [MJ] Yanni Alcantar, PT [CL] Margarita Velasco OT [LS] Keli Awad, PT    Therapy Exercises    Right Lower Extremity sitting;AAROM:;ankle pumps/circles;LAQ;hip flexion;calf stretch  -  AAROM:;sitting;ankle pumps/circles;calf stretch;hamstring stretch;hip flexion  -LS    Left Lower Extremity sitting;PROM:;10 reps;ankle pumps/circles;calf stretch;LAQ;hip flexion  -  PROM:;ankle pumps/circles;calf stretch;hip flexion;LAQ  -LS    Right Upper Extremity  10  reps;AROM:;elbow flexion/extension;shoulder extension/flexion;shoulder horizontal abd/add   targetted reaching  -CL     Left Upper Extremity  10 reps;AROM:;AAROM:;elbow flexion/extension;hand pumps;pronation/supination;shoulder extension/flexion;shoulder ER/IR   wrist F/E  -CL     Recorded by [MJ] Yanni Alcantar, PT [CL] Margarita Velasco, OT [LS] Keli Awad, PT    Positioning and Restraints    Pre-Treatment Position sitting in chair/recliner  -MJ sitting in chair/recliner  -CL sitting in chair/recliner  -LS    Post Treatment Position chair  -MJ chair  -CL chair  -LS    In Chair notified nsg;reclined;call light within reach;encouraged to call for assist;exit alarm on;compression device;waffle cushion;on mechanical lift sling;legs elevated;heels elevated  -MJ notified nsg;reclined;call light within reach;encouraged to call for assist;exit alarm on;RUE elevated;LUE elevated;waffle cushion;on mechanical lift sling;legs elevated;heels elevated  -CL notified nsg;reclined;call light within reach;encouraged to call for assist;exit alarm on;with family/caregiver;with OT;RUE elevated;LUE elevated;waffle cushion;on mechanical lift sling;heels elevated;R waffle boot;L multipodus  -LS    Recorded by [MJ] Yanni Alcantar, PT [CL] Margarita Velasco, OT [LS] Keli Awad, PT      12/05/17 1345 12/04/17 1350 12/04/17 1030    Rehab Assessment/Intervention    Discipline occupational therapist  -CL physical therapist  -LS speech language pathologist  -LSA,AS,LSA2    Document Type progress note  -CL progress note  -LS therapy note (daily note)  -LSA,AS,LSA2    Subjective Information agree to therapy;no complaints  -CL agree to therapy;no complaints  -LS agree to therapy  -LSA,AS,LSA2    Patient Effort, Rehab Treatment good  -CL good  -LS fair  -LSA,AS,LSA2    Precautions/Limitations fall precautions;oxygen therapy device and L/min;other (see comments)   L sided weakness, abd incision  -CL fall precautions;oxygen therapy device and L/min;other  (see comments)   NG, abd incision, L-side weakness  -LS     Recorded by [CL] Margarita Velasco OT [LS] Keli Awad, PT [LSA,AS,LSA2] Maria Guadalupe Guzman, MS CCC-SLP (r) Estela Blanco, Speech Therapy Student (t) Maria Guadalupe Guzman, MS CCC-SLP (c)    Vital Signs    Pre Systolic BP Rehab 140  -  -LS     Pre Treatment Diastolic BP 83  -CL 88  -LS     Post Systolic BP Rehab 143  -  -LS     Post Treatment Diastolic BP 87  -CL 86  -LS     Pretreatment Heart Rate (beats/min) 73  -CL 75  -LS     Posttreatment Heart Rate (beats/min) 72  -CL 74  -LS     Pre SpO2 (%) 98  -CL 97  -LS     O2 Delivery Pre Treatment supplemental O2  -CL supplemental O2  -LS     Post SpO2 (%) 96  -CL 97  -LS     O2 Delivery Post Treatment supplemental O2  -CL supplemental O2  -LS     Pre Patient Position Sitting  -CL Sitting  -LS     Intra Patient Position Standing  -CL Standing  -LS     Post Patient Position Sitting  -CL Sitting  -LS     Recorded by [CL] Margarita Velasco OT [LS] Keli Awad, PT     Pain Assessment    Pain Assessment No/denies pain  -CL [place] FACES  -LS No/denies pain  -LSA,AS,LSA2    Nicole-[place] FACES Pain Rating 0  -CL      Pain Score 0  -CL 0  -LS 0  -LSA,AS,LSA2    Post Pain Score 0  -CL 0  -LS 0  -LSA,AS,LSA2    Recorded by [CL] Margarita Velasco OT [LS] Keli Awad, PT [LSA,AS,LSA2] Maria Guadalupe Guzman, MS CCC-SLP (r) Estela Blanco, Speech Therapy Student (t) Maria Guadalupe Guzman, MS CCC-SLP (c)    Cognitive Assessment/Intervention    Current Cognitive/Communication Assessment impaired  -CL functional   delayed processing  -LS could not assess   2' lethargy   -LSA,AS,LSA2    Orientation Status oriented to;person;place;required verbal cueing (specifiy in comments);time  -CL oriented to;person;place;time  -LS oriented to;person  -LSA,AS,LSA2    Follows Commands/Answers Questions 75% of the time;needs cueing;needs repetition  -CL able to follow single-step instructions;50% of the time;75% of the time;needs cueing;needs increased time;needs  repetition  -LS 25% of the time;able to follow single-step instructions;needs repetition;needs increased time  -LSA,AS,LSA2    Personal Safety moderate impairment;decreased awareness, need for assist;decreased awareness, need for safety  -CL moderate impairment;decreased awareness, need for assist;decreased awareness, need for safety;decreased insight to deficits  -LS     Personal Safety Interventions fall prevention program maintained;gait belt;nonskid shoes/slippers when out of bed  -CL fall prevention program maintained;gait belt;nonskid shoes/slippers when out of bed  -LS     Recorded by [CL] Margarita Velasco OT [LS] Keli Awad, PT [LSA,AS,LSA2] Maria Guadalupe Guzman, MS CCC-SLP (r) Estela Blanco, Speech Therapy Student (t) Maria Guadalupe Guzman, MS CCC-SLP (c)    Dysphagia Treatment Objectives and Progress    To improve timing of pharyngeal response, patient will:   Swallow in timely way using three second prep;Swallow in timely way using super-supraglottic swallow;80%;with consistent cues  -LSA,AS,LSA2    Timing of Pharyngeal Response Progress   continue to adress  -LSA,AS,LSA2    To improve closure at entrance to airway, patient will:   Complete super-supraglottic swallow;80%;with consistent cues  -LSA,AS,LSA2    Closure at Entrance to Airway Progress   continue to adress  -LSA,AS,LSA2    To improve hyolaryngeal excursion, patient will:   Complete chin tuck against resistance (comment number of repetitions);80%;with consistent cues  -LSA,AS,LSA2    Hyolaryngeal Excursion Progress   0%;with consistent cues;continue to adress  -LSA,AS,LSA2    To improve tongue base & pharyngeal wall squeeze, patient will:   Complete effortful swallow;Complete tongue hold swallow;80%;with consistent cues  -LSA,AS,LSA2    Tongue Base/Pharyngeal Wall Squeeze Progress   continue to adress  -LSA,AS,LSA2    Recorded by   [LSA,AS,LSA2] Maria Guadalupe Guzman, MS CCC-SLP (r) Estela Blanco, Speech Therapy Student (t) Maria Guadalupe Guzman, MS CCC-SLP (c)    Improve  oral skills    To Improve Oral Skills, patient will:   Increase tongue tip elevation;80%;with consistent cues  -LSA,AS,LSA2    Status: Improve Oral Skills   Progressing as expected  -LSA,AS,LSA2    Oral Skills Progress   0%;with consistent cues;continue to adress  -LSA,AS,LSA2    Recorded by   [LSA,AS,LSA2] Maria Guadalupe Guzman MS CCC-SLP (r) Estela Blanco, Speech Therapy Student (t) Maria Guadalupe Guzman MS CCC-SLP (c)    Improve timing of pharyngeal response    Status: Improve timing of pharyngeal response   Progress slower than expected  -LSA,AS,LSA2    Comments: Improve timing of pharyngeal response   DNA 2' lethargy   -LSA,AS,LSA2    Recorded by   [LSA,AS,LSA2] Maria Guadalupe Guzman MS CCC-SLP (r) Estela Blanco, Speech Therapy Student (t) Maria Guadalupe Guzman MS CCC-SLP (c)    Improve closure at entrance to airway    Status: Improve closure at entrance to airway   Progress slower than expected  -LSA,AS,LSA2    Recorded by   [LSA,AS,LSA2] Maria Guadalupe Guzman MS CCC-SLP (r) Estela Blanco, Speech Therapy Student (t) Maria Guadalupe Guzman MS CCC-SLP (c)    Improve hyolaryngeal excursion    Status: Improve hyolaryngeal excursion   Progress slower than expected  -LSA,AS,LSA2    Comments: Improve hyolaryngeal excursion   Pt unable to perform 2' lethargic, even w/ max cues and prompts.   -LSA,AS,LSA2    Recorded by   [LSA,AS,LSA2] Maria Guadalupe Guzman MS CCC-SLP (r) Estela Blanco Speech Therapy Student (t) Maria Guadalupe Guzman MS CCC-SLP (c)    Improve tongue base & pharyngeal wall squeeze    Status: Improve tongue base & pharyngeal wall squeeze   Progress slower than expected  -LSA,AS,LSA2    Comments: Improve tongue base & pharyngeal wall squeeze   DNA PO trials 2' lethargy.   -LSA,AS,LSA2    Recorded by   [LSA,AS,LSA2] Maria Guadalupe Guzman MS CCC-SLP (r) Estela Blanco Speech Therapy Student (t) Maria Guadalupe Guzman MS CCC-SLP (c)    Dysphagia Other 1    Dysphagia Other 1 Objective   [place] ice chip and H2O trials without s/s aspiration with 50% accuracy and cues   -LSA,AS,LSA2    Status: Dysphagia Other 1   Progress slower than expected  -LSA,AS,LSA2    Dysphagia Other 1 Progress   continue to address  -LSA,AS,LSA2    Comments: Dysphagia Other 1   DNA PO trials 2' lethargy. Pt unable to stay alert for PO trials even w/ max cues.   -LSA,AS,LSA2    Recorded by   [LSA,AS,LSA2] Maria Guadalupe Guzman, MS CCC-SLP (r) Estela Blanco, Speech Therapy Student (t) Maria Guadalupe Guzman, MS CCC-SLP (c)    MMT (Manual Muscle Testing)    General MMT Assessment Detail  LLE: 0/5; RLE grossly 3-/5  -LS     Recorded by  [LS] Keli Awad, PT     Bed Mobility, Assessment/Treatment    Bed Mobility, Comment UIC.   -CL UIC upon arrival.   -LS     Recorded by [CL] Margarita Velasco OT [LS] Keli Awad, PT     Transfer Assessment/Treatment    Transfers, Sit-Stand [place] maximum assist (25% patient effort);2 person assist required;verbal cues required  -CL dependent (less than 25% patient effort);2 person assist required;verbal cues required  -LS     Transfers, Stand-Sit [place] maximum assist (25% patient effort);2 person assist required;verbal cues required  -CL dependent (less than 25% patient effort);2 person assist required;verbal cues required  -LS     Transfer, Impairments  strength decreased;impaired balance  -LS     Transfer, Comment Pt stood from chair w/ OT/PT on either side of pt w/ BUE support and B feet/knees blocked. Pt achieved ~50% upright position w/ posterior pelvic support to facilitate upright posture.    -CL Attempted STS x2 from recliner; able to clear hips from cahir surface but unable to achieve upright despite verbal and tactile cues.   -LS     Recorded by [CL] Margarita Velasco OT [LS] Keli Awad, PT     Gait Assessment/Treatment    Gait, [place] Level  not appropriate to assess  -LS     Recorded by  [LS] Keli Awad, PT     Motor Skills/Interventions    Additional Documentation  Balance Skills Training (Group)  -LS     Recorded by  [LS] Keli Awad, PT     Balance Skills  Training    Sitting-Level of Assistance Minimum assistance;x2  -CL Minimum assistance   brief moments of supervision; L lateral and anterior lean  -LS     Sitting-Balance Support Right upper extremity supported;Left upper extremity supported;Feet supported  -CL Right upper extremity supported;Left upper extremity supported;Feet supported  -LS     Sitting-Balance Activities Forward lean;Reaching for objects;Trunk control activities  -CL      Standing-Level of Assistance Maximum assistance;x2  -CL Dependent;x2  -LS     Static Standing Balance Support Right upper extremity supported;Left upper extremity supported  -CL Right upper extremity supported;Left upper extremity supported  -LS     Standing-Balance Activities Weight Shift A-P  -CL Weight Shift A-P  -LS     Recorded by [CL] Margarita Velasco, OT [LS] Keli Awad, PT     Therapy Exercises    Right Lower Extremity  AAROM:;10 reps;ankle pumps/circles;LAQ  -LS     Left Lower Extremity  PROM:;10 reps;ankle pumps/circles;calf stretch;hamstring stretch  -LS     Right Upper Extremity AROM:;10 reps;elbow flexion/extension;hand pumps;shoulder extension/flexion  -CL      Left Upper Extremity AAROM:;PROM:;10 reps;elbow flexion/extension;hand pumps;pronation/supination;shoulder extension/flexion;shoulder ER/IR   wrist F/E  -CL      Bilateral Upper Extremity  AAROM:;10 reps;sitting;elbow flexion/extension;hand pumps;shoulder extension/flexion  -LS     Recorded by [CL] Margarita Velasco, OT [LS] Keli Awad, PT     Positioning and Restraints    Pre-Treatment Position sitting in chair/recliner  -CL sitting in chair/recliner  -LS     Post Treatment Position chair  -CL chair  -LS     In Chair notified nsg;reclined;call light within reach;encouraged to call for assist;exit alarm on;with family/caregiver;RUE elevated;LUE elevated;waffle cushion;on mechanical lift sling;legs elevated;heels elevated;R waffle boot;L multipodus  -CL notified nsg;reclined;call light within reach;encouraged to  call for assist;exit alarm on;with family/caregiver;RUE elevated;LUE elevated;waffle cushion;on mechanical lift sling;legs elevated;heels elevated;L waffle boot;R multipodus   MD present  -LS     Recorded by [CL] Margarita Velasco, OT [LS] Keli Awad, PT       User Key  (r) = Recorded By, (t) = Taken By, (c) = Cosigned By    Initials Name Effective Dates    LSA Maria Guadalupe Guzman, MS [facility]-SLP 06/22/15 -     LS Keli Awad, PT 06/19/15 -     CL Margarita Velasco, OT 06/08/16 -     AS Estela Blanco, Speech Therapy Student 08/24/17 -     MJ Yanni Alcantar, PT 10/30/17 -                 OT Goals       12/06/17 1414 12/05/17 1711 12/01/17 1524    Bed Mobility OT LTG    Bed Mobility OT LTG, Date Established 12/06/17  -CL      Bed Mobility OT LTG, Outcome goal ongoing  -CL goal ongoing  -CL goal ongoing  -CL    Transfer Training OT LTG    Transfer Training OT LTG, Outcome goal ongoing  -CL goal ongoing  -CL goal ongoing  -CL    Strength OT LTG    Strength Goal OT LTG, Outcome goal ongoing  -CL goal ongoing  -CL goal ongoing  -CL    Grooming OT LTG    Grooming Goal OT LTG, Outcome goal ongoing  -CL goal ongoing  -CL goal ongoing  -CL      11/29/17 1546 11/27/17 1528 11/23/17 1105    Bed Mobility OT LTG    Bed Mobility OT LTG, Date Established   11/23/17  -AC    Bed Mobility OT LTG, Time to Achieve   by discharge  -AC    Bed Mobility OT LTG, Activity Type   supine to sit/sit to supine  -AC    Bed Mobility OT LTG, [place] Level   moderate assist (50% patient effort);2 person assist required  -AC    Bed Mobility OT LTG, Outcome goal ongoing  -CL goal ongoing  -JR     Transfer Training OT LTG    Transfer Training OT LTG, Date Established   11/23/17  -AC    Transfer Training OT LTG, Time to Achieve   by discharge  -AC    Transfer Training OT LTG, Activity Type   bed to chair /chair to bed;sit to stand/stand to sit  -AC    Transfer Training OT LTG, [place] Level   moderate assist (50% patient effort);2 person assist required  -AC     Transfer Training OT LTG, Outcome goal ongoing  -CL goal ongoing  -JR     Strength OT LTG    Strength Goal OT LTG, Date Established   11/23/17  -AC    Strength Goal OT LTG, Time to Achieve   by discharge  -AC    Strength Goal OT LTG, Measure to Achieve   Pt will increse LUE strength 1 MMG as needed to support ADLs  -AC    Strength Goal OT LTG, Outcome goal ongoing  -CL goal ongoing  -JR     Grooming OT LTG    Grooming Goal OT LTG, Date Established   11/23/17  -AC    Grooming Goal OT LTG, Time to Achieve   by discharge  -AC    Grooming Goal OT LTG, Activity Type   pt will wash face and comb hair using R hand given setup  -AC    Grooming Goal OT LTG, Isabella Level   set up required  -AC    Grooming Goal OT LTG, Outcome goal ongoing  -CL goal ongoing  -JR       User Key  (r) = Recorded By, (t) = Taken By, (c) = Cosigned By    Initials Name Provider Type    AC Margie Ferrera, OT Occupational Therapist    JR Linda Quiroz, OT Occupational Therapist    BABAK Velasco, OT Occupational Therapist          Occupational Therapy Education     Title: PT OT SLP Therapies (Active)     Topic: Occupational Therapy (Active)     Point: ADL training (Active)    Description: Instruct learner(s) on proper safety adaptation and remediation techniques during self care or transfers.   Instruct in proper use of assistive devices.    Learning Progress Summary    Learner Readiness Method Response Comment Documented by Status   Patient Acceptance E,D NR Pt educated on appropriate safety percautions, t/f techniques, BUE HEP, positioning, and benefits of therapy.  12/06/17 1414 Active    Acceptance E,D NR Pt educated on appropriate safety precautions, t/f techniques, HEP, positioning, and benefits of therapy.  12/05/17 1711 Active    Acceptance E,D NR Pt educated on BUE HEP, positioning, safety precautions, t/f techniques, and benefits of therapy.  12/01/17 1523 Active    Acceptance E,D NR Pt educated on appropriate safety  precautions, t/f techniques, HEP, positioning, and benefits of therapy.  11/29/17 1545 Active    Acceptance E NR Educated pt on B UE HEP, transfer technique and benefits of therapy.  11/27/17 1528 Active    Acceptance E VU,NR benefits of activity, UE HEP AC 11/23/17 1102 Done   Family Acceptance E,D NR Pt educated on appropriate safety precautions, t/f techniques, HEP, positioning, and benefits of therapy.  12/05/17 1711 Active    Acceptance E,D NR Pt educated on BUE HEP, positioning, safety precautions, t/f techniques, and benefits of therapy.  12/01/17 1523 Active    Acceptance E,D NR Pt educated on appropriate safety precautions, t/f techniques, HEP, positioning, and benefits of therapy.  11/29/17 1545 Active    Acceptance E VU,NR benefits of activity, UE HEP AC 11/23/17 1102 Done               Point: Home exercise program (Active)    Description: Instruct learner(s) on appropriate technique for monitoring, assisting and/or progressing therapeutic exercises/activities.    Learning Progress Summary    Learner Readiness Method Response Comment Documented by Status   Patient Acceptance E,D NR Pt educated on appropriate safety percautions, t/f techniques, BUE HEP, positioning, and benefits of therapy.  12/06/17 1414 Active    Acceptance E,D NR Pt educated on appropriate safety precautions, t/f techniques, HEP, positioning, and benefits of therapy.  12/05/17 1711 Active    Acceptance E,D NR Pt educated on BUE HEP, positioning, safety precautions, t/f techniques, and benefits of therapy.  12/01/17 1523 Active    Acceptance E,D NR Pt educated on appropriate safety precautions, t/f techniques, HEP, positioning, and benefits of therapy.  11/29/17 1545 Active    Acceptance E NR Educated pt on B UE HEP, transfer technique and benefits of therapy.  11/27/17 1528 Active   Family Acceptance E,D NR Pt educated on appropriate safety precautions, t/f techniques, HEP, positioning, and benefits of therapy.   12/05/17 1711 Active    Acceptance E,D NR Pt educated on BUE HEP, positioning, safety precautions, t/f techniques, and benefits of therapy.  12/01/17 1523 Active    Acceptance E,D NR Pt educated on appropriate safety precautions, t/f techniques, HEP, positioning, and benefits of therapy.  11/29/17 1545 Active               Point: Precautions (Active)    Description: Instruct learner(s) on prescribed precautions during self-care and functional transfers.    Learning Progress Summary    Learner Readiness Method Response Comment Documented by Status   Patient Acceptance E,D NR Pt educated on appropriate safety percautions, t/f techniques, BUE HEP, positioning, and benefits of therapy.  12/06/17 1414 Active    Acceptance E,D NR Pt educated on appropriate safety precautions, t/f techniques, HEP, positioning, and benefits of therapy.  12/05/17 1711 Active    Acceptance E,D NR Pt educated on BUE HEP, positioning, safety precautions, t/f techniques, and benefits of therapy.  12/01/17 1523 Active    Acceptance E,D NR Pt educated on appropriate safety precautions, t/f techniques, HEP, positioning, and benefits of therapy.  11/29/17 1545 Active   Family Acceptance E,D NR Pt educated on appropriate safety precautions, t/f techniques, HEP, positioning, and benefits of therapy.  12/05/17 1711 Active    Acceptance E,D NR Pt educated on BUE HEP, positioning, safety precautions, t/f techniques, and benefits of therapy.  12/01/17 1523 Active    Acceptance E,D NR Pt educated on appropriate safety precautions, t/f techniques, HEP, positioning, and benefits of therapy.  11/29/17 1545 Active               Point: Body mechanics (Active)    Description: Instruct learner(s) on proper positioning and spine alignment during self-care, functional mobility activities and/or exercises.    Learning Progress Summary    Learner Readiness Method Response Comment Documented by Status   Patient Acceptance E,D NR Pt educated on  appropriate safety percautions, t/f techniques, BUE HEP, positioning, and benefits of therapy.  12/06/17 1414 Active    Acceptance E,D NR Pt educated on appropriate safety precautions, t/f techniques, HEP, positioning, and benefits of therapy. CL 12/05/17 1711 Active    Acceptance E,D NR Pt educated on BUE HEP, positioning, safety precautions, t/f techniques, and benefits of therapy.  12/01/17 1523 Active    Acceptance E,D NR Pt educated on appropriate safety precautions, t/f techniques, HEP, positioning, and benefits of therapy.  11/29/17 1545 Active   Family Acceptance E,D NR Pt educated on appropriate safety precautions, t/f techniques, HEP, positioning, and benefits of therapy.  12/05/17 1711 Active    Acceptance E,D NR Pt educated on BUE HEP, positioning, safety precautions, t/f techniques, and benefits of therapy.  12/01/17 1523 Active    Acceptance E,D NR Pt educated on appropriate safety precautions, t/f techniques, HEP, positioning, and benefits of therapy.  11/29/17 1545 Active                      User Key     Initials Effective Dates Name Provider Type Discipline    AC 06/23/15 -  Margie Ferrera, OT Occupational Therapist OT    JR 06/22/15 -  Linda Quiroz, OT Occupational Therapist OT    CL 06/08/16 -  Margarita Velasco, OT Occupational Therapist OT                  OT Recommendation and Plan  Anticipated Discharge Disposition: inpatient rehabilitation facility  Planned Therapy Interventions: ADL retraining, balance training, bed mobility training, strengthening, transfer training  Therapy Frequency: daily  Plan of Care Review  Plan Of Care Reviewed With: patient  Progress: progress toward functional goals is gradual  Outcome Summary/Follow up Plan: Pt conts to require Max Ax2 for STS t/f, however was only able to clear hips 2/4 attempts this date. Pt conts to be limited d/t lethargy. Recommend cont skilled IPOT POC.         Outcome Measures       12/06/17 1037 12/06/17 1036 12/05/17 1350    How  much help from another person do you currently need...    Turning from your back to your side while in flat bed without using bedrails? 2  -MJ  2  -LS    Moving from lying on back to sitting on the side of a flat bed without bedrails? 2  -MJ  2  -LS    Moving to and from a bed to a chair (including a wheelchair)? 1  -MJ  1  -LS    Standing up from a chair using your arms (e.g., wheelchair, bedside chair)? 2  -MJ  2  -LS    Climbing 3-5 steps with a railing? 1  -MJ  1  -LS    To walk in hospital room? 1  -MJ  1  -LS    AM-PAC 6 Clicks Score 9  -MJ  9  -LS    How much help from another is currently needed...    Putting on and taking off regular lower body clothing?  1  -CL     Bathing (including washing, rinsing, and drying)  2  -CL     Toileting (which includes using toilet bed pan or urinal)  1  -CL     Putting on and taking off regular upper body clothing  2  -CL     Taking care of personal grooming (such as brushing teeth)  2  -CL     Eating meals  1  -CL     Score  9  -CL     Modified Talbot Scale    Modified Aida Scale  4 - Moderately severe disability.  Unable to walk without assistance, and unable to attend to own bodily needs without assistance.  -CL     Functional Assessment    Outcome Measure Options AM-PAC 6 Clicks Basic Mobility (PT)  -MJ AM-PAC 6 Clicks Daily Activity (OT)  -CL AM-PAC 6 Clicks Basic Mobility (PT)  -LS      12/05/17 1345 12/04/17 1350       How much help from another person do you currently need...    Turning from your back to your side while in flat bed without using bedrails?  2  -LS     Moving from lying on back to sitting on the side of a flat bed without bedrails?  2  -LS     Moving to and from a bed to a chair (including a wheelchair)?  1  -LS     Standing up from a chair using your arms (e.g., wheelchair, bedside chair)?  1  -LS     Climbing 3-5 steps with a railing?  1  -LS     To walk in hospital room?  1  -LS     AM-PAC 6 Clicks Score  8  -LS     How much help from another is  currently needed...    Putting on and taking off regular lower body clothing? 1  -CL      Bathing (including washing, rinsing, and drying) 2  -CL      Toileting (which includes using toilet bed pan or urinal) 1  -CL      Putting on and taking off regular upper body clothing 2  -CL      Taking care of personal grooming (such as brushing teeth) 2  -CL      Eating meals 1  -CL      Score 9  -CL      Modified [place] Scale    Modified Aida Scale 4 - Moderately severe disability.  Unable to walk without assistance, and unable to attend to own bodily needs without assistance.  -CL      Functional Assessment    Outcome Measure Options AM-PAC 6 Clicks Daily Activity (OT)  -CL AM-PAC 6 Clicks Basic Mobility (PT)  -LS       User Key  (r) = Recorded By, (t) = Taken By, (c) = Cosigned By    Initials Name Provider Type    LS Keli Awad, PT Physical Therapist    CL Margarita Velasco OT Occupational Therapist    GIOVANNI Alcantar, PT Physical Therapist           Time Calculation:         Time Calculation- OT       12/06/17 1419          Time Calculation- OT    OT Start Time 1036  -CL      Total Timed Code Minutes- OT 12 minute(s)  -CL      OT Received On 12/06/17  -CL      OT Goal Re-Cert Due Date 12/15/17  -CL        User Key  (r) = Recorded By, (t) = Taken By, (c) = Cosigned By    Initials Name Provider Type    CL Margarita Velasco OT Occupational Therapist           Therapy Charges for Today     Code Description Service Date Service Provider Modifiers Qty    10805337850 HC OT THERAPEUTIC ACT EA 15 MIN 12/5/2017 Margarita Velasco OT GO 1    60789808867 HC OT THERAPEUTIC ACT EA 15 MIN 12/6/2017 Margarita Velasco OT GO 1               Margarita Velasco OT  12/6/2017

## 2017-12-06 NOTE — PROGRESS NOTES
Adult Nutrition  Assessment/PES    Patient Name:  Magdi Arriaga  YOB: 1945  MRN: [id number]  Admit Date:  11/20/2017    Assessment Date:  12/6/2017            Reason for Assessment       12/06/17 1613    Reason for Assessment    Reason For Assessment/Visit follow up protocol;TF/PN;multidisciplinary rounds    Time Spent (min) 30    Gastrointestinal SBO   s/p exploratory lap, SBR 11-20-17          Patient Active Problem List   Diagnosis   • Dementia   • Hyperlipidemia LDL goal <70   • Essential hypertension   • Obesity   • JONNY (obstructive sleep apnea)   • COPD (chronic obstructive pulmonary disease)   • Coronary artery disease involving coronary bypass graft of native heart without angina pectoris   • Ischemic cardiomyopathy   • History of atrial flutter   • History of DVT (deep vein thrombosis)   • History of exposure to infectious disease-Patient received platelet transfusion for CABG, 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4   CHLORIDE mmol/L 107  < > 109   CO2 mmol/L 30.0  < > 30.0   BUN mg/dL 46*  < > 39*   CREATININE mg/dL 1.00  < > 0.70   CALCIUM mg/dL 9.7  < > 9.3   BILIRUBIN mg/dL  --   --  0.2*   ALK PHOS U/L  --   --  77   ALT (SGPT) U/L  --   --  16   AST (SGOT) U/L  --    --  16   GLUCOSE mg/dL 119*  < > 116*   < > = values in this interval not displayed.           Physical Findings       12/06/17 1614    Physical Findings/Assessment    Additional Documentation Physical Appearance (Group)   pt alert, on nasal cannula, good hand     Per RN: no diarrhea today    Physical Appearance    Tubes nasoduoduenal tube    Skin surgical wound              Nutrition Prescription Ordered       12/06/17 1615    Nutrition Prescription PO    Current PO Diet NPO    Nutrition Prescription EN    Enteral Route ND    Product Peptamen 1.5, + Prostat 1x/day    TF Delivery Method Continuous    Continuous TF Goal Rate (mL/hr) 75 mL/hr    Continuous TF Current Rate (mL/hr) 75 mL/hr    Continuous TF Goal Volume (mL) 1200 mL   Based on 16 hour delivery goal    Water flush (mL)  50 mL    Water Flush Frequency Per hour            Evaluation of Received Nutrient/Fluid Intake       12/06/17 1617    EN Evaluation    EN Average Volume Delivered (mL/day) 1515 mL/day    % Goal Volume  126 %            Problem/Interventions:          Problem 2       12/06/17 1613    Nutrition Diagnoses Problem 2    Problem 2 Swallowing Difficulty    Etiology (related to) --   dysphagia    Signs/Symptoms (evidenced by) NPO                  Intervention Goal       12/06/17 1617    Intervention Goal    General Nutrition support treatment    TF/PN Maintain TF/PN            Nutrition Intervention       12/06/17 1618    Nutrition Intervention    RD/Tech Action Follow Tx progress;Care plan reviewd              Education/Evaluation       12/06/17 1618    Monitor/Evaluation    Monitor Per protocol;I&O;Supplement intake;Pertinent labs;TF delivery/tolerance;Skin status;Symptoms        Electronically signed by:  Laya Li RD  12/06/17 4:20 PM

## 2018-01-01 ENCOUNTER — OFFICE VISIT (OUTPATIENT)
Dept: CARDIOLOGY | Facility: HOSPITAL | Age: 73
End: 2018-01-01

## 2018-01-01 ENCOUNTER — OFFICE VISIT (OUTPATIENT)
Dept: NEUROLOGY | Facility: CLINIC | Age: 73
End: 2018-01-01

## 2018-01-01 ENCOUNTER — TELEPHONE (OUTPATIENT)
Dept: INTERNAL MEDICINE | Facility: CLINIC | Age: 73
End: 2018-01-01

## 2018-01-01 ENCOUNTER — OFFICE VISIT (OUTPATIENT)
Dept: NEUROSURGERY | Facility: CLINIC | Age: 73
End: 2018-01-01

## 2018-01-01 ENCOUNTER — TRANSITIONAL CARE MANAGEMENT TELEPHONE ENCOUNTER (OUTPATIENT)
Dept: INTERNAL MEDICINE | Facility: CLINIC | Age: 73
End: 2018-01-01

## 2018-01-01 ENCOUNTER — OFFICE VISIT (OUTPATIENT)
Dept: CARDIOLOGY | Facility: CLINIC | Age: 73
End: 2018-01-01

## 2018-01-01 ENCOUNTER — EPISODE CHANGES (OUTPATIENT)
Dept: CASE MANAGEMENT | Facility: OTHER | Age: 73
End: 2018-01-01

## 2018-01-01 ENCOUNTER — APPOINTMENT (OUTPATIENT)
Dept: GENERAL RADIOLOGY | Facility: HOSPITAL | Age: 73
End: 2018-01-01

## 2018-01-01 ENCOUNTER — APPOINTMENT (OUTPATIENT)
Dept: GENERAL RADIOLOGY | Facility: HOSPITAL | Age: 73
End: 2018-01-01
Attending: INTERNAL MEDICINE

## 2018-01-01 ENCOUNTER — OFFICE VISIT (OUTPATIENT)
Dept: INTERNAL MEDICINE | Facility: CLINIC | Age: 73
End: 2018-01-01

## 2018-01-01 ENCOUNTER — APPOINTMENT (OUTPATIENT)
Dept: ULTRASOUND IMAGING | Facility: HOSPITAL | Age: 73
End: 2018-01-01

## 2018-01-01 ENCOUNTER — TREATMENT (OUTPATIENT)
Dept: INTERNAL MEDICINE | Facility: CLINIC | Age: 73
End: 2018-01-01

## 2018-01-01 ENCOUNTER — DOCUMENTATION (OUTPATIENT)
Dept: NEUROSURGERY | Facility: CLINIC | Age: 73
End: 2018-01-01

## 2018-01-01 ENCOUNTER — HOSPITAL ENCOUNTER (OUTPATIENT)
Dept: GENERAL RADIOLOGY | Facility: HOSPITAL | Age: 73
Discharge: HOME | End: 2018-05-08
Admitting: NURSE PRACTITIONER

## 2018-01-01 ENCOUNTER — APPOINTMENT (OUTPATIENT)
Dept: CT IMAGING | Facility: HOSPITAL | Age: 73
End: 2018-01-01

## 2018-01-01 ENCOUNTER — OUTSIDE FACILITY SERVICE (OUTPATIENT)
Dept: INTERNAL MEDICINE | Facility: CLINIC | Age: 73
End: 2018-01-01

## 2018-01-01 ENCOUNTER — HOSPITAL ENCOUNTER (INPATIENT)
Facility: HOSPITAL | Age: 73
LOS: 5 days | Discharge: HOSPICE | End: 2018-07-04
Attending: EMERGENCY MEDICINE | Admitting: HOSPITALIST

## 2018-01-01 ENCOUNTER — LAB (OUTPATIENT)
Dept: LAB | Facility: HOSPITAL | Age: 73
End: 2018-01-01

## 2018-01-01 ENCOUNTER — OFFICE VISIT (OUTPATIENT)
Dept: PULMONOLOGY | Facility: CLINIC | Age: 73
End: 2018-01-01

## 2018-01-01 ENCOUNTER — DOCUMENTATION (OUTPATIENT)
Dept: SOCIAL WORK | Facility: HOSPITAL | Age: 73
End: 2018-01-01

## 2018-01-01 ENCOUNTER — TRANSCRIBE ORDERS (OUTPATIENT)
Dept: LAB | Facility: HOSPITAL | Age: 73
End: 2018-01-01

## 2018-01-01 ENCOUNTER — APPOINTMENT (OUTPATIENT)
Dept: CARDIOLOGY | Facility: HOSPITAL | Age: 73
End: 2018-01-01

## 2018-01-01 ENCOUNTER — HOSPITAL ENCOUNTER (INPATIENT)
Facility: HOSPITAL | Age: 73
LOS: 5 days | Discharge: HOME | End: 2018-06-20
Attending: EMERGENCY MEDICINE | Admitting: INTERNAL MEDICINE

## 2018-01-01 VITALS
TEMPERATURE: 97.7 F | RESPIRATION RATE: 20 BRPM | HEART RATE: 76 BPM | DIASTOLIC BLOOD PRESSURE: 90 MMHG | OXYGEN SATURATION: 94 % | SYSTOLIC BLOOD PRESSURE: 144 MMHG

## 2018-01-01 VITALS
HEIGHT: 68 IN | HEART RATE: 66 BPM | DIASTOLIC BLOOD PRESSURE: 91 MMHG | TEMPERATURE: 98.1 F | SYSTOLIC BLOOD PRESSURE: 136 MMHG | OXYGEN SATURATION: 92 %

## 2018-01-01 VITALS
HEIGHT: 68 IN | HEART RATE: 91 BPM | RESPIRATION RATE: 20 BRPM | OXYGEN SATURATION: 97 % | SYSTOLIC BLOOD PRESSURE: 155 MMHG | BODY MASS INDEX: 24.34 KG/M2 | TEMPERATURE: 99.8 F | WEIGHT: 160.6 LBS | DIASTOLIC BLOOD PRESSURE: 86 MMHG

## 2018-01-01 VITALS — DIASTOLIC BLOOD PRESSURE: 72 MMHG | HEIGHT: 68 IN | SYSTOLIC BLOOD PRESSURE: 126 MMHG

## 2018-01-01 VITALS
DIASTOLIC BLOOD PRESSURE: 64 MMHG | WEIGHT: 160 LBS | BODY MASS INDEX: 22.96 KG/M2 | SYSTOLIC BLOOD PRESSURE: 125 MMHG | TEMPERATURE: 98.4 F | HEART RATE: 64 BPM | OXYGEN SATURATION: 97 % | RESPIRATION RATE: 16 BRPM

## 2018-01-01 VITALS
WEIGHT: 165.34 LBS | SYSTOLIC BLOOD PRESSURE: 137 MMHG | HEIGHT: 68 IN | RESPIRATION RATE: 18 BRPM | DIASTOLIC BLOOD PRESSURE: 79 MMHG | TEMPERATURE: 98.7 F | OXYGEN SATURATION: 96 % | BODY MASS INDEX: 25.06 KG/M2 | HEART RATE: 75 BPM

## 2018-01-01 VITALS — WEIGHT: 166 LBS | HEIGHT: 70 IN | BODY MASS INDEX: 23.77 KG/M2

## 2018-01-01 VITALS
HEART RATE: 60 BPM | TEMPERATURE: 98.6 F | DIASTOLIC BLOOD PRESSURE: 70 MMHG | OXYGEN SATURATION: 96 % | SYSTOLIC BLOOD PRESSURE: 118 MMHG

## 2018-01-01 VITALS
HEART RATE: 68 BPM | OXYGEN SATURATION: 98 % | RESPIRATION RATE: 16 BRPM | TEMPERATURE: 97.6 F | SYSTOLIC BLOOD PRESSURE: 120 MMHG | DIASTOLIC BLOOD PRESSURE: 74 MMHG

## 2018-01-01 VITALS
BODY MASS INDEX: 24.25 KG/M2 | SYSTOLIC BLOOD PRESSURE: 130 MMHG | WEIGHT: 160 LBS | HEART RATE: 68 BPM | DIASTOLIC BLOOD PRESSURE: 86 MMHG | HEIGHT: 68 IN

## 2018-01-01 DIAGNOSIS — J44.9 CHRONIC OBSTRUCTIVE PULMONARY DISEASE, UNSPECIFIED COPD TYPE (HCC): ICD-10-CM

## 2018-01-01 DIAGNOSIS — F03.91 DEMENTIA WITH BEHAVIORAL DISTURBANCE, UNSPECIFIED DEMENTIA TYPE: Primary | ICD-10-CM

## 2018-01-01 DIAGNOSIS — K56.609 SMALL BOWEL OBSTRUCTION (HCC): ICD-10-CM

## 2018-01-01 DIAGNOSIS — L89.309 DECUBITUS ULCER OF BUTTOCK, UNSPECIFIED LATERALITY, UNSPECIFIED ULCER STAGE: ICD-10-CM

## 2018-01-01 DIAGNOSIS — N18.30 CKD (CHRONIC KIDNEY DISEASE), STAGE III (HCC): ICD-10-CM

## 2018-01-01 DIAGNOSIS — I69.391 DYSPHAGIA DUE TO OLD STROKE: ICD-10-CM

## 2018-01-01 DIAGNOSIS — Z78.9 IMPAIRED MOBILITY AND ADLS: ICD-10-CM

## 2018-01-01 DIAGNOSIS — E55.9 VITAMIN D DEFICIENCY: ICD-10-CM

## 2018-01-01 DIAGNOSIS — E78.5 HYPERLIPIDEMIA LDL GOAL <70: ICD-10-CM

## 2018-01-01 DIAGNOSIS — R73.9 HYPERGLYCEMIA: ICD-10-CM

## 2018-01-01 DIAGNOSIS — S06.5XAS LATE EFFECT OF SUBDURAL HEMATOMA DUE TO TRAUMA (HCC): ICD-10-CM

## 2018-01-01 DIAGNOSIS — J44.1 COPD EXACERBATION (HCC): ICD-10-CM

## 2018-01-01 DIAGNOSIS — Z74.09 IMPAIRED FUNCTIONAL MOBILITY, BALANCE, GAIT, AND ENDURANCE: ICD-10-CM

## 2018-01-01 DIAGNOSIS — K56.609 SMALL BOWEL OBSTRUCTION (HCC): Primary | ICD-10-CM

## 2018-01-01 DIAGNOSIS — J69.0 ASPIRATION PNEUMONIA OF RIGHT MIDDLE LOBE, UNSPECIFIED ASPIRATION PNEUMONIA TYPE (HCC): ICD-10-CM

## 2018-01-01 DIAGNOSIS — R06.02 SHORTNESS OF BREATH: Primary | ICD-10-CM

## 2018-01-01 DIAGNOSIS — I10 ESSENTIAL HYPERTENSION: Primary | ICD-10-CM

## 2018-01-01 DIAGNOSIS — N39.0 URINARY TRACT INFECTION WITHOUT HEMATURIA, SITE UNSPECIFIED: ICD-10-CM

## 2018-01-01 DIAGNOSIS — B16.9: ICD-10-CM

## 2018-01-01 DIAGNOSIS — G47.33 OSA (OBSTRUCTIVE SLEEP APNEA): ICD-10-CM

## 2018-01-01 DIAGNOSIS — R73.03 PREDIABETES: ICD-10-CM

## 2018-01-01 DIAGNOSIS — R09.02 HYPOXIA: ICD-10-CM

## 2018-01-01 DIAGNOSIS — Z86.79 HISTORY OF ATRIAL FLUTTER: ICD-10-CM

## 2018-01-01 DIAGNOSIS — Z74.09 IMPAIRED MOBILITY AND ADLS: ICD-10-CM

## 2018-01-01 DIAGNOSIS — I10 ESSENTIAL HYPERTENSION: ICD-10-CM

## 2018-01-01 DIAGNOSIS — G81.91 RIGHT HEMIPARESIS (HCC): Primary | ICD-10-CM

## 2018-01-01 DIAGNOSIS — N17.9 ACUTE RENAL FAILURE SUPERIMPOSED ON CHRONIC KIDNEY DISEASE, UNSPECIFIED CKD STAGE, UNSPECIFIED ACUTE RENAL FAILURE TYPE (HCC): ICD-10-CM

## 2018-01-01 DIAGNOSIS — I48.0 PAF (PAROXYSMAL ATRIAL FIBRILLATION) (HCC): ICD-10-CM

## 2018-01-01 DIAGNOSIS — N28.9 ACUTE ON CHRONIC RENAL INSUFFICIENCY: ICD-10-CM

## 2018-01-01 DIAGNOSIS — B37.2 MONILIAL RASH: ICD-10-CM

## 2018-01-01 DIAGNOSIS — N18.9 ACUTE RENAL FAILURE SUPERIMPOSED ON CHRONIC KIDNEY DISEASE, UNSPECIFIED CKD STAGE, UNSPECIFIED ACUTE RENAL FAILURE TYPE (HCC): ICD-10-CM

## 2018-01-01 DIAGNOSIS — R41.0 CONFUSION: Primary | ICD-10-CM

## 2018-01-01 DIAGNOSIS — Z00.00 PREVENTATIVE HEALTH CARE: ICD-10-CM

## 2018-01-01 DIAGNOSIS — I50.23 ACUTE ON CHRONIC SYSTOLIC HEART FAILURE (HCC): ICD-10-CM

## 2018-01-01 DIAGNOSIS — I47.29 NSVT (NONSUSTAINED VENTRICULAR TACHYCARDIA) (HCC): ICD-10-CM

## 2018-01-01 DIAGNOSIS — I50.23 ACUTE ON CHRONIC SYSTOLIC HEART FAILURE (HCC): Primary | ICD-10-CM

## 2018-01-01 DIAGNOSIS — I50.23 ACUTE ON CHRONIC SYSTOLIC CONGESTIVE HEART FAILURE (HCC): ICD-10-CM

## 2018-01-01 DIAGNOSIS — R06.02 SOB (SHORTNESS OF BREATH): ICD-10-CM

## 2018-01-01 DIAGNOSIS — I25.810 CORONARY ARTERY DISEASE INVOLVING CORONARY BYPASS GRAFT OF NATIVE HEART WITHOUT ANGINA PECTORIS: Primary | ICD-10-CM

## 2018-01-01 DIAGNOSIS — B16.9 ACUTE FULMINATING TYPE B VIRAL HEPATITIS: Primary | ICD-10-CM

## 2018-01-01 DIAGNOSIS — Z86.79 HISTORY OF SUBDURAL HEMATOMA: Primary | ICD-10-CM

## 2018-01-01 DIAGNOSIS — I25.5 ISCHEMIC CARDIOMYOPATHY: ICD-10-CM

## 2018-01-01 DIAGNOSIS — R06.02 SOB (SHORTNESS OF BREATH): Primary | ICD-10-CM

## 2018-01-01 DIAGNOSIS — J96.21 ACUTE ON CHRONIC RESPIRATORY FAILURE WITH HYPOXIA (HCC): ICD-10-CM

## 2018-01-01 DIAGNOSIS — N18.9 ACUTE ON CHRONIC RENAL INSUFFICIENCY: ICD-10-CM

## 2018-01-01 DIAGNOSIS — F01.518 VASCULAR DEMENTIA WITH BEHAVIOR DISTURBANCE (HCC): ICD-10-CM

## 2018-01-01 DIAGNOSIS — B16.9 ACUTE FULMINATING TYPE B VIRAL HEPATITIS: ICD-10-CM

## 2018-01-01 DIAGNOSIS — R06.00 ACUTE DYSPNEA: Primary | ICD-10-CM

## 2018-01-01 DIAGNOSIS — F01.50 VASCULAR DEMENTIA WITHOUT BEHAVIORAL DISTURBANCE (HCC): ICD-10-CM

## 2018-01-01 LAB
25(OH)D3 SERPL-MCNC: 45 NG/ML
ALBUMIN SERPL-MCNC: 2.8 G/DL (ref 2.9–4.4)
ALBUMIN SERPL-MCNC: 3 G/DL (ref 3.2–4.8)
ALBUMIN SERPL-MCNC: 3.39 G/DL (ref 3.2–4.8)
ALBUMIN SERPL-MCNC: 3.43 G/DL (ref 3.2–4.8)
ALBUMIN SERPL-MCNC: 3.52 G/DL (ref 3.2–4.8)
ALBUMIN SERPL-MCNC: 3.6 G/DL (ref 3.2–4.8)
ALBUMIN SERPL-MCNC: 3.8 G/DL (ref 3.2–4.8)
ALBUMIN SERPL-MCNC: 3.88 G/DL (ref 3.2–4.8)
ALBUMIN/GLOB SERPL: 0.7 G/DL (ref 1.5–2.5)
ALBUMIN/GLOB SERPL: 0.7 {RATIO} (ref 0.7–1.7)
ALBUMIN/GLOB SERPL: 0.9 G/DL (ref 1.5–2.5)
ALBUMIN/GLOB SERPL: 1.1 G/DL (ref 1.5–2.5)
ALBUMIN/GLOB SERPL: 1.2 G/DL (ref 1.5–2.5)
ALP SERPL-CCNC: 67 U/L (ref 25–100)
ALP SERPL-CCNC: 80 U/L (ref 25–100)
ALP SERPL-CCNC: 86 U/L (ref 25–100)
ALP SERPL-CCNC: 96 U/L (ref 25–100)
ALPHA1 GLOB FLD ELPH-MCNC: 0.4 G/DL (ref 0–0.4)
ALPHA2 GLOB SERPL ELPH-MCNC: 1.1 G/DL (ref 0.4–1)
ALT SERPL W P-5'-P-CCNC: 10 U/L (ref 7–40)
ALT SERPL W P-5'-P-CCNC: 20 U/L (ref 7–40)
ALT SERPL W P-5'-P-CCNC: 44 U/L (ref 7–40)
ALT SERPL W P-5'-P-CCNC: 8 U/L (ref 7–40)
ANION GAP SERPL CALCULATED.3IONS-SCNC: 11 MMOL/L (ref 3–11)
ANION GAP SERPL CALCULATED.3IONS-SCNC: 14 MMOL/L (ref 3–11)
ANION GAP SERPL CALCULATED.3IONS-SCNC: 14 MMOL/L (ref 3–11)
ANION GAP SERPL CALCULATED.3IONS-SCNC: 6 MMOL/L (ref 3–11)
ANION GAP SERPL CALCULATED.3IONS-SCNC: 7 MMOL/L (ref 3–11)
ANION GAP SERPL CALCULATED.3IONS-SCNC: 8 MMOL/L (ref 3–11)
ANION GAP SERPL CALCULATED.3IONS-SCNC: 8 MMOL/L (ref 3–11)
ANION GAP SERPL CALCULATED.3IONS-SCNC: 9 MMOL/L (ref 3–11)
ARTERIAL PATENCY WRIST A: ABNORMAL
ARTERIAL PATENCY WRIST A: ABNORMAL
ARTICHOKE IGE QN: 48 MG/DL (ref 0–130)
ARTICHOKE IGE QN: 57 MG/DL (ref 0–130)
AST SERPL-CCNC: 17 U/L (ref 0–33)
AST SERPL-CCNC: 25 U/L (ref 0–33)
AST SERPL-CCNC: 9 U/L (ref 0–33)
AST SERPL-CCNC: 9 U/L (ref 0–33)
ATMOSPHERIC PRESS: ABNORMAL MMHG
ATMOSPHERIC PRESS: ABNORMAL MMHG
B-GLOBULIN SERPL ELPH-MCNC: 1.1 G/DL (ref 0.7–1.3)
BACTERIA SPEC AEROBE CULT: NORMAL
BACTERIA UR QL AUTO: ABNORMAL /HPF
BASE EXCESS BLDA CALC-SCNC: -0.1 MMOL/L (ref 0–2)
BASE EXCESS BLDA CALC-SCNC: -2.7 MMOL/L (ref 0–2)
BASOPHILS # BLD AUTO: 0 10*3/MM3 (ref 0–0.2)
BASOPHILS # BLD AUTO: 0.02 10*3/MM3 (ref 0–0.2)
BASOPHILS # BLD AUTO: 0.04 10*3/MM3 (ref 0–0.2)
BASOPHILS # BLD AUTO: 0.05 10*3/MM3 (ref 0–0.2)
BASOPHILS # BLD AUTO: 0.06 10*3/MM3 (ref 0–0.2)
BASOPHILS NFR BLD AUTO: 0 % (ref 0–1)
BASOPHILS NFR BLD AUTO: 0.1 % (ref 0–1)
BASOPHILS NFR BLD AUTO: 0.3 % (ref 0–1)
BASOPHILS NFR BLD AUTO: 0.6 % (ref 0–1)
BASOPHILS NFR BLD AUTO: 0.7 % (ref 0–1)
BDY SITE: ABNORMAL
BDY SITE: ABNORMAL
BH CV ECHO MEAS - AO MAX PG (FULL): 9.2 MMHG
BH CV ECHO MEAS - AO MAX PG: 12 MMHG
BH CV ECHO MEAS - AO MEAN PG (FULL): 4.7 MMHG
BH CV ECHO MEAS - AO MEAN PG: 6.1 MMHG
BH CV ECHO MEAS - AO ROOT AREA (BSA CORRECTED): 1.8
BH CV ECHO MEAS - AO ROOT AREA: 9.3 CM^2
BH CV ECHO MEAS - AO ROOT DIAM: 3.4 CM
BH CV ECHO MEAS - AO V2 MAX: 198.6 CM/SEC
BH CV ECHO MEAS - AO V2 MEAN: 114.4 CM/SEC
BH CV ECHO MEAS - AO V2 VTI: 30 CM
BH CV ECHO MEAS - AVA(I,A): 1.5 CM^2
BH CV ECHO MEAS - AVA(I,D): 1.5 CM^2
BH CV ECHO MEAS - AVA(V,A): 1.6 CM^2
BH CV ECHO MEAS - AVA(V,D): 1.6 CM^2
BH CV ECHO MEAS - BSA(HAYCOCK): 1.9 M^2
BH CV ECHO MEAS - BSA: 1.9 M^2
BH CV ECHO MEAS - BZI_BMI: 24.8 KILOGRAMS/M^2
BH CV ECHO MEAS - BZI_METRIC_HEIGHT: 172.7 CM
BH CV ECHO MEAS - BZI_METRIC_WEIGHT: 73.9 KG
BH CV ECHO MEAS - CONTRAST EF (2CH): 20.5 ML/M^2
BH CV ECHO MEAS - CONTRAST EF 4CH: 32.3 ML/M^2
BH CV ECHO MEAS - EDV(CUBED): 192.3 ML
BH CV ECHO MEAS - EDV(MOD-SP2): 219 ML
BH CV ECHO MEAS - EDV(MOD-SP4): 257 ML
BH CV ECHO MEAS - EDV(TEICH): 164.7 ML
BH CV ECHO MEAS - EF(CUBED): 45.7 %
BH CV ECHO MEAS - EF(MOD-SP2): 20.5 %
BH CV ECHO MEAS - EF(MOD-SP4): 32.3 %
BH CV ECHO MEAS - EF(TEICH): 37.5 %
BH CV ECHO MEAS - ESV(CUBED): 104.5 ML
BH CV ECHO MEAS - ESV(MOD-SP2): 174 ML
BH CV ECHO MEAS - ESV(MOD-SP4): 174 ML
BH CV ECHO MEAS - ESV(TEICH): 102.9 ML
BH CV ECHO MEAS - FS: 18.4 %
BH CV ECHO MEAS - IVS/LVPW: 0.92
BH CV ECHO MEAS - IVSD: 1 CM
BH CV ECHO MEAS - LA DIMENSION: 4.4 CM
BH CV ECHO MEAS - LA/AO: 1.3
BH CV ECHO MEAS - LAT PEAK E' VEL: 5.6 CM/SEC
BH CV ECHO MEAS - LV DIASTOLIC VOL/BSA (35-75): 137.2 ML/M^2
BH CV ECHO MEAS - LV MASS(C)D: 248.5 GRAMS
BH CV ECHO MEAS - LV MASS(C)DI: 132.6 GRAMS/M^2
BH CV ECHO MEAS - LV MAX PG: 2.8 MMHG
BH CV ECHO MEAS - LV MEAN PG: 1.4 MMHG
BH CV ECHO MEAS - LV SYSTOLIC VOL/BSA (12-30): 92.9 ML/M^2
BH CV ECHO MEAS - LV V1 MAX: 100.7 CM/SEC
BH CV ECHO MEAS - LV V1 MEAN: 53.6 CM/SEC
BH CV ECHO MEAS - LV V1 VTI: 14.3 CM
BH CV ECHO MEAS - LVIDD: 5.8 CM
BH CV ECHO MEAS - LVIDS: 4.7 CM
BH CV ECHO MEAS - LVLD AP2: 9.8 CM
BH CV ECHO MEAS - LVLD AP4: 10.9 CM
BH CV ECHO MEAS - LVLS AP2: 9.6 CM
BH CV ECHO MEAS - LVLS AP4: 9.6 CM
BH CV ECHO MEAS - LVOT AREA (M): 3.1 CM^2
BH CV ECHO MEAS - LVOT AREA: 3.2 CM^2
BH CV ECHO MEAS - LVOT DIAM: 2 CM
BH CV ECHO MEAS - LVPWD: 1.1 CM
BH CV ECHO MEAS - MED PEAK E' VEL: 4.64 CM/SEC
BH CV ECHO MEAS - MV A MAX VEL: 98.7 CM/SEC
BH CV ECHO MEAS - MV DEC TIME: 0.14 SEC
BH CV ECHO MEAS - MV E MAX VEL: 101.2 CM/SEC
BH CV ECHO MEAS - MV E/A: 1
BH CV ECHO MEAS - MV MAX PG: 3.4 MMHG
BH CV ECHO MEAS - MV MEAN PG: 1.6 MMHG
BH CV ECHO MEAS - MV V2 MAX: 92.3 CM/SEC
BH CV ECHO MEAS - MV V2 MEAN: 59.3 CM/SEC
BH CV ECHO MEAS - MV V2 VTI: 34.5 CM
BH CV ECHO MEAS - MVA(VTI): 1.3 CM^2
BH CV ECHO MEAS - PA ACC SLOPE: 588.9 CM/SEC^2
BH CV ECHO MEAS - PA ACC TIME: 0.14 SEC
BH CV ECHO MEAS - PA MAX PG: 4.3 MMHG
BH CV ECHO MEAS - PA PR(ACCEL): 16 MMHG
BH CV ECHO MEAS - PA V2 MAX: 103.8 CM/SEC
BH CV ECHO MEAS - RAP SYSTOLE: 3 MMHG
BH CV ECHO MEAS - RVSP: 15 MMHG
BH CV ECHO MEAS - SI(AO): 149.1 ML/M^2
BH CV ECHO MEAS - SI(CUBED): 46.9 ML/M^2
BH CV ECHO MEAS - SI(LVOT): 24.3 ML/M^2
BH CV ECHO MEAS - SI(MOD-SP2): 24 ML/M^2
BH CV ECHO MEAS - SI(MOD-SP4): 44.3 ML/M^2
BH CV ECHO MEAS - SI(TEICH): 33 ML/M^2
BH CV ECHO MEAS - SV(AO): 279.4 ML
BH CV ECHO MEAS - SV(CUBED): 87.8 ML
BH CV ECHO MEAS - SV(LVOT): 45.5 ML
BH CV ECHO MEAS - SV(MOD-SP2): 45 ML
BH CV ECHO MEAS - SV(MOD-SP4): 83 ML
BH CV ECHO MEAS - SV(TEICH): 61.8 ML
BH CV ECHO MEAS - TAPSE (>1.6): 1.7 CM2
BH CV ECHO MEAS - TR MAX VEL: 170.6 CM/SEC
BH CV ECHO MEASUREMENTS AVERAGE E/E' RATIO: 19.77
BH CV VAS BP LEFT ARM: NORMAL MMHG
BH CV XLRA - RV BASE: 3.8 CM
BH CV XLRA - RV LENGTH: 8.5 CM
BH CV XLRA - RV MID: 3.3 CM
BH CV XLRA - TDI S': 8.23 CM/SEC
BILIRUB SERPL-MCNC: 0.3 MG/DL (ref 0.3–1.2)
BILIRUB SERPL-MCNC: 0.4 MG/DL (ref 0.3–1.2)
BILIRUB SERPL-MCNC: 0.4 MG/DL (ref 0.3–1.2)
BILIRUB SERPL-MCNC: 0.7 MG/DL (ref 0.3–1.2)
BILIRUB UR QL STRIP: NEGATIVE
BILIRUB UR QL STRIP: NEGATIVE
BNP SERPL-MCNC: >5000 PG/ML (ref 0–100)
BNP SERPL-MCNC: >5000 PG/ML (ref 0–100)
BUN BLD-MCNC: 32 MG/DL (ref 9–23)
BUN BLD-MCNC: 37 MG/DL (ref 9–23)
BUN BLD-MCNC: 38 MG/DL (ref 9–23)
BUN BLD-MCNC: 40 MG/DL (ref 9–23)
BUN BLD-MCNC: 40 MG/DL (ref 9–23)
BUN BLD-MCNC: 42 MG/DL (ref 9–23)
BUN BLD-MCNC: 50 MG/DL (ref 9–23)
BUN BLD-MCNC: 53 MG/DL (ref 9–23)
BUN BLD-MCNC: 59 MG/DL (ref 9–23)
BUN BLD-MCNC: 59 MG/DL (ref 9–23)
BUN BLD-MCNC: 68 MG/DL (ref 9–23)
BUN/CREAT SERPL: 19.4 (ref 7–25)
BUN/CREAT SERPL: 21.3 (ref 7–25)
BUN/CREAT SERPL: 23.3 (ref 7–25)
BUN/CREAT SERPL: 24.6 (ref 7–25)
BUN/CREAT SERPL: 25.3 (ref 7–25)
BUN/CREAT SERPL: 25.3 (ref 7–25)
BUN/CREAT SERPL: 26.5 (ref 7–25)
BUN/CREAT SERPL: 28.6 (ref 7–25)
BUN/CREAT SERPL: 30.4 (ref 7–25)
BUN/CREAT SERPL: 30.9 (ref 7–25)
BUN/CREAT SERPL: 33.8 (ref 7–25)
CALCIUM SPEC-SCNC: 10.1 MG/DL (ref 8.7–10.4)
CALCIUM SPEC-SCNC: 10.3 MG/DL (ref 8.7–10.4)
CALCIUM SPEC-SCNC: 10.4 MG/DL (ref 8.7–10.4)
CALCIUM SPEC-SCNC: 10.7 MG/DL (ref 8.7–10.4)
CALCIUM SPEC-SCNC: 9.4 MG/DL (ref 8.7–10.4)
CALCIUM SPEC-SCNC: 9.6 MG/DL (ref 8.7–10.4)
CALCIUM SPEC-SCNC: 9.7 MG/DL (ref 8.7–10.4)
CALCIUM SPEC-SCNC: 9.8 MG/DL (ref 8.7–10.4)
CALCIUM SPEC-SCNC: 9.9 MG/DL (ref 8.7–10.4)
CHLORIDE SERPL-SCNC: 104 MMOL/L (ref 99–109)
CHLORIDE SERPL-SCNC: 107 MMOL/L (ref 99–109)
CHLORIDE SERPL-SCNC: 108 MMOL/L (ref 99–109)
CHLORIDE SERPL-SCNC: 110 MMOL/L (ref 99–109)
CHLORIDE SERPL-SCNC: 111 MMOL/L (ref 99–109)
CHLORIDE SERPL-SCNC: 112 MMOL/L (ref 99–109)
CHLORIDE SERPL-SCNC: 112 MMOL/L (ref 99–109)
CHLORIDE SERPL-SCNC: 114 MMOL/L (ref 99–109)
CHLORIDE SERPL-SCNC: 123 MMOL/L (ref 99–109)
CHLORIDE SERPL-SCNC: 124 MMOL/L (ref 99–109)
CHLORIDE SERPL-SCNC: 125 MMOL/L (ref 99–109)
CHOLEST SERPL-MCNC: 116 MG/DL (ref 0–200)
CHOLEST SERPL-MCNC: 131 MG/DL (ref 0–200)
CK SERPL-CCNC: 39 U/L (ref 26–174)
CLARITY UR: ABNORMAL
CLARITY UR: CLEAR
CO2 BLDA-SCNC: 21.5 MMOL/L (ref 22–33)
CO2 BLDA-SCNC: 26.6 MMOL/L (ref 22–33)
CO2 SERPL-SCNC: 22 MMOL/L (ref 20–31)
CO2 SERPL-SCNC: 22 MMOL/L (ref 20–31)
CO2 SERPL-SCNC: 25 MMOL/L (ref 20–31)
CO2 SERPL-SCNC: 26 MMOL/L (ref 20–31)
CO2 SERPL-SCNC: 27 MMOL/L (ref 20–31)
CO2 SERPL-SCNC: 27 MMOL/L (ref 20–31)
CO2 SERPL-SCNC: 28 MMOL/L (ref 20–31)
CO2 SERPL-SCNC: 30 MMOL/L (ref 20–31)
CO2 SERPL-SCNC: 30 MMOL/L (ref 20–31)
COHGB MFR BLD: 1.5 % (ref 0–2)
COHGB MFR BLD: 1.6 % (ref 0–2)
COLOR UR: YELLOW
COLOR UR: YELLOW
CREAT BLD-MCNC: 1.4 MG/DL (ref 0.6–1.3)
CREAT BLD-MCNC: 1.46 MG/DL (ref 0.6–1.3)
CREAT BLD-MCNC: 1.5 MG/DL (ref 0.6–1.3)
CREAT BLD-MCNC: 1.5 MG/DL (ref 0.6–1.3)
CREAT BLD-MCNC: 1.71 MG/DL (ref 0.6–1.3)
CREAT BLD-MCNC: 1.91 MG/DL (ref 0.6–1.3)
CREAT BLD-MCNC: 1.94 MG/DL (ref 0.6–1.3)
CREAT BLD-MCNC: 2 MG/DL (ref 0.6–1.3)
CREAT BLD-MCNC: 2.01 MG/DL (ref 0.6–1.3)
CREAT BLD-MCNC: 2.06 MG/DL (ref 0.6–1.3)
CREAT BLD-MCNC: 2.15 MG/DL (ref 0.6–1.3)
CREAT UR-MCNC: 30.7 MG/DL
CRP SERPL-MCNC: 1.14 MG/DL (ref 0–1)
CRP SERPL-MCNC: 18.68 MG/DL (ref 0–1)
D-LACTATE SERPL-SCNC: 1 MMOL/L (ref 0.5–2)
DEPRECATED RDW RBC AUTO: 49.5 FL (ref 37–54)
DEPRECATED RDW RBC AUTO: 52.2 FL (ref 37–54)
DEPRECATED RDW RBC AUTO: 53.5 FL (ref 37–54)
DEPRECATED RDW RBC AUTO: 57.1 FL (ref 37–54)
DEPRECATED RDW RBC AUTO: 57.3 FL (ref 37–54)
DEPRECATED RDW RBC AUTO: 57.3 FL (ref 37–54)
DEPRECATED RDW RBC AUTO: 58.3 FL (ref 37–54)
EOSINOPHIL # BLD AUTO: 0 10*3/MM3 (ref 0–0.3)
EOSINOPHIL # BLD AUTO: 0.01 10*3/MM3 (ref 0–0.3)
EOSINOPHIL # BLD AUTO: 0.01 10*3/MM3 (ref 0–0.3)
EOSINOPHIL # BLD AUTO: 0.28 10*3/MM3 (ref 0–0.3)
EOSINOPHIL # BLD AUTO: 0.33 10*3/MM3 (ref 0–0.3)
EOSINOPHIL NFR BLD AUTO: 0 % (ref 0–3)
EOSINOPHIL NFR BLD AUTO: 0.1 % (ref 0–3)
EOSINOPHIL NFR BLD AUTO: 0.1 % (ref 0–3)
EOSINOPHIL NFR BLD AUTO: 2.6 % (ref 0–3)
EOSINOPHIL NFR BLD AUTO: 4.4 % (ref 0–3)
EOSINOPHIL SPEC QL MICRO: 0 % EOS/100 CELLS (ref 0–0)
ERYTHROCYTE [DISTWIDTH] IN BLOOD BY AUTOMATED COUNT: 14.4 % (ref 11.3–14.5)
ERYTHROCYTE [DISTWIDTH] IN BLOOD BY AUTOMATED COUNT: 15.2 % (ref 11.3–14.5)
ERYTHROCYTE [DISTWIDTH] IN BLOOD BY AUTOMATED COUNT: 15.3 % (ref 11.3–14.5)
ERYTHROCYTE [DISTWIDTH] IN BLOOD BY AUTOMATED COUNT: 15.9 % (ref 11.3–14.5)
ERYTHROCYTE [DISTWIDTH] IN BLOOD BY AUTOMATED COUNT: 16.4 % (ref 11.3–14.5)
ERYTHROCYTE [DISTWIDTH] IN BLOOD BY AUTOMATED COUNT: 16.5 % (ref 11.3–14.5)
ERYTHROCYTE [DISTWIDTH] IN BLOOD BY AUTOMATED COUNT: 16.7 % (ref 11.3–14.5)
FERRITIN SERPL-MCNC: 107 NG/ML (ref 22–322)
GAMMA GLOB SERPL ELPH-MCNC: 1.7 G/DL (ref 0.4–1.8)
GFR SERPL CREATININE-BSD FRML MDRD: 30 ML/MIN/1.73
GFR SERPL CREATININE-BSD FRML MDRD: 32 ML/MIN/1.73
GFR SERPL CREATININE-BSD FRML MDRD: 33 ML/MIN/1.73
GFR SERPL CREATININE-BSD FRML MDRD: 33 ML/MIN/1.73
GFR SERPL CREATININE-BSD FRML MDRD: 34 ML/MIN/1.73
GFR SERPL CREATININE-BSD FRML MDRD: 35 ML/MIN/1.73
GFR SERPL CREATININE-BSD FRML MDRD: 40 ML/MIN/1.73
GFR SERPL CREATININE-BSD FRML MDRD: 46 ML/MIN/1.73
GFR SERPL CREATININE-BSD FRML MDRD: 46 ML/MIN/1.73
GFR SERPL CREATININE-BSD FRML MDRD: 47 ML/MIN/1.73
GFR SERPL CREATININE-BSD FRML MDRD: 50 ML/MIN/1.73
GLOBULIN SER CALC-MCNC: 4.2 G/DL (ref 2.2–3.9)
GLOBULIN UR ELPH-MCNC: 3.1 GM/DL
GLOBULIN UR ELPH-MCNC: 3.4 GM/DL
GLOBULIN UR ELPH-MCNC: 4.1 GM/DL
GLOBULIN UR ELPH-MCNC: 4.7 GM/DL
GLUCOSE BLD-MCNC: 103 MG/DL (ref 70–100)
GLUCOSE BLD-MCNC: 103 MG/DL (ref 70–100)
GLUCOSE BLD-MCNC: 124 MG/DL (ref 70–100)
GLUCOSE BLD-MCNC: 129 MG/DL (ref 70–100)
GLUCOSE BLD-MCNC: 152 MG/DL (ref 70–100)
GLUCOSE BLD-MCNC: 52 MG/DL (ref 70–100)
GLUCOSE BLD-MCNC: 82 MG/DL (ref 70–100)
GLUCOSE BLD-MCNC: 85 MG/DL (ref 70–100)
GLUCOSE BLD-MCNC: 86 MG/DL (ref 70–100)
GLUCOSE BLD-MCNC: 91 MG/DL (ref 70–100)
GLUCOSE BLD-MCNC: 99 MG/DL (ref 70–100)
GLUCOSE UR STRIP-MCNC: NEGATIVE MG/DL
GLUCOSE UR STRIP-MCNC: NEGATIVE MG/DL
HBA1C MFR BLD: 5.2 % (ref 4.8–5.6)
HCO3 BLDA-SCNC: 20.6 MMOL/L (ref 20–26)
HCO3 BLDA-SCNC: 25.3 MMOL/L (ref 20–26)
HCT VFR BLD AUTO: 31 % (ref 38.9–50.9)
HCT VFR BLD AUTO: 32 % (ref 38.9–50.9)
HCT VFR BLD AUTO: 32.2 % (ref 38.9–50.9)
HCT VFR BLD AUTO: 33.1 % (ref 38.9–50.9)
HCT VFR BLD AUTO: 34.6 % (ref 38.9–50.9)
HCT VFR BLD AUTO: 35.5 % (ref 38.9–50.9)
HCT VFR BLD AUTO: 36 % (ref 38.9–50.9)
HCT VFR BLD CALC: 29.7 %
HCT VFR BLD CALC: 32.1 %
HDLC SERPL-MCNC: 55 MG/DL (ref 40–60)
HDLC SERPL-MCNC: 57 MG/DL (ref 40–60)
HGB BLD-MCNC: 10.2 G/DL (ref 13.1–17.5)
HGB BLD-MCNC: 10.3 G/DL (ref 13.1–17.5)
HGB BLD-MCNC: 10.9 G/DL (ref 13.1–17.5)
HGB BLD-MCNC: 10.9 G/DL (ref 13.1–17.5)
HGB BLD-MCNC: 9.1 G/DL (ref 13.1–17.5)
HGB BLD-MCNC: 9.1 G/DL (ref 13.1–17.5)
HGB BLD-MCNC: 9.6 G/DL (ref 13.1–17.5)
HGB BLDA-MCNC: 10.5 G/DL (ref 13.5–17.5)
HGB BLDA-MCNC: 9.7 G/DL (ref 13.5–17.5)
HGB UR QL STRIP.AUTO: NEGATIVE
HGB UR QL STRIP.AUTO: NEGATIVE
HOLD SPECIMEN: NORMAL
HOLD SPECIMEN: NORMAL
HOROWITZ INDEX BLD+IHG-RTO: 28 %
HOROWITZ INDEX BLD+IHG-RTO: 36 %
HYALINE CASTS UR QL AUTO: ABNORMAL /LPF
IMM GRANULOCYTES # BLD: 0.01 10*3/MM3 (ref 0–0.03)
IMM GRANULOCYTES # BLD: 0.03 10*3/MM3 (ref 0–0.03)
IMM GRANULOCYTES # BLD: 0.03 10*3/MM3 (ref 0–0.03)
IMM GRANULOCYTES # BLD: 0.04 10*3/MM3 (ref 0–0.03)
IMM GRANULOCYTES # BLD: 0.04 10*3/MM3 (ref 0–0.03)
IMM GRANULOCYTES NFR BLD: 0.1 % (ref 0–0.6)
IMM GRANULOCYTES NFR BLD: 0.2 % (ref 0–0.6)
IMM GRANULOCYTES NFR BLD: 0.3 % (ref 0–0.6)
IMM GRANULOCYTES NFR BLD: 0.3 % (ref 0–0.6)
IMM GRANULOCYTES NFR BLD: 0.4 % (ref 0–0.6)
IRON 24H UR-MRATE: 11 MCG/DL (ref 50–175)
IRON SATN MFR SERPL: 4 % (ref 20–50)
KETONES UR QL STRIP: ABNORMAL
KETONES UR QL STRIP: NEGATIVE
LEFT ATRIUM VOLUME INDEX: 31.6 ML/M2
LEUKOCYTE ESTERASE UR QL STRIP.AUTO: NEGATIVE
LEUKOCYTE ESTERASE UR QL STRIP.AUTO: NEGATIVE
LV EF 2D ECHO EST: 20 %
LYMPHOCYTES # BLD AUTO: 0.37 10*3/MM3 (ref 0.6–4.8)
LYMPHOCYTES # BLD AUTO: 0.57 10*3/MM3 (ref 0.6–4.8)
LYMPHOCYTES # BLD AUTO: 0.91 10*3/MM3 (ref 0.6–4.8)
LYMPHOCYTES # BLD AUTO: 1.02 10*3/MM3 (ref 0.6–4.8)
LYMPHOCYTES # BLD AUTO: 1.63 10*3/MM3 (ref 0.6–4.8)
LYMPHOCYTES NFR BLD AUTO: 13.6 % (ref 24–44)
LYMPHOCYTES NFR BLD AUTO: 15.2 % (ref 24–44)
LYMPHOCYTES NFR BLD AUTO: 3.6 % (ref 24–44)
LYMPHOCYTES NFR BLD AUTO: 4.2 % (ref 24–44)
LYMPHOCYTES NFR BLD AUTO: 7.1 % (ref 24–44)
Lab: ABNORMAL
M-SPIKE: ABNORMAL G/DL
MCH RBC QN AUTO: 27.6 PG (ref 27–31)
MCH RBC QN AUTO: 28 PG (ref 27–31)
MCH RBC QN AUTO: 28.1 PG (ref 27–31)
MCH RBC QN AUTO: 28.2 PG (ref 27–31)
MCH RBC QN AUTO: 28.8 PG (ref 27–31)
MCH RBC QN AUTO: 29.1 PG (ref 27–31)
MCH RBC QN AUTO: 30.5 PG (ref 27–31)
MCHC RBC AUTO-ENTMCNC: 28.4 G/DL (ref 32–36)
MCHC RBC AUTO-ENTMCNC: 29.4 G/DL (ref 32–36)
MCHC RBC AUTO-ENTMCNC: 29.5 G/DL (ref 32–36)
MCHC RBC AUTO-ENTMCNC: 29.8 G/DL (ref 32–36)
MCHC RBC AUTO-ENTMCNC: 30.3 G/DL (ref 32–36)
MCHC RBC AUTO-ENTMCNC: 30.7 G/DL (ref 32–36)
MCHC RBC AUTO-ENTMCNC: 31.1 G/DL (ref 32–36)
MCV RBC AUTO: 93.9 FL (ref 80–99)
MCV RBC AUTO: 94.9 FL (ref 80–99)
MCV RBC AUTO: 95.2 FL (ref 80–99)
MCV RBC AUTO: 95.3 FL (ref 80–99)
MCV RBC AUTO: 96 FL (ref 80–99)
MCV RBC AUTO: 97 FL (ref 80–99)
MCV RBC AUTO: 97.9 FL (ref 80–99)
METHGB BLD QL: 0.6 % (ref 0–1.5)
METHGB BLD QL: 0.9 % (ref 0–1.5)
MODALITY: ABNORMAL
MODALITY: ABNORMAL
MONOCYTES # BLD AUTO: 0.27 10*3/MM3 (ref 0–1)
MONOCYTES # BLD AUTO: 0.66 10*3/MM3 (ref 0–1)
MONOCYTES # BLD AUTO: 0.74 10*3/MM3 (ref 0–1)
MONOCYTES # BLD AUTO: 0.86 10*3/MM3 (ref 0–1)
MONOCYTES # BLD AUTO: 1.04 10*3/MM3 (ref 0–1)
MONOCYTES NFR BLD AUTO: 2.6 % (ref 0–12)
MONOCYTES NFR BLD AUTO: 5.5 % (ref 0–12)
MONOCYTES NFR BLD AUTO: 6.7 % (ref 0–12)
MONOCYTES NFR BLD AUTO: 8.8 % (ref 0–12)
MONOCYTES NFR BLD AUTO: 9.7 % (ref 0–12)
MUCOUS THREADS URNS QL MICRO: ABNORMAL /HPF
MV VENA CONTRACTA: 0.35 CM
NEUTROPHILS # BLD AUTO: 11.01 10*3/MM3 (ref 1.5–8.3)
NEUTROPHILS # BLD AUTO: 12.17 10*3/MM3 (ref 1.5–8.3)
NEUTROPHILS # BLD AUTO: 5.43 10*3/MM3 (ref 1.5–8.3)
NEUTROPHILS # BLD AUTO: 7.67 10*3/MM3 (ref 1.5–8.3)
NEUTROPHILS # BLD AUTO: 9.68 10*3/MM3 (ref 1.5–8.3)
NEUTROPHILS NFR BLD AUTO: 71.5 % (ref 41–71)
NEUTROPHILS NFR BLD AUTO: 72.4 % (ref 41–71)
NEUTROPHILS NFR BLD AUTO: 85.8 % (ref 41–71)
NEUTROPHILS NFR BLD AUTO: 90.1 % (ref 41–71)
NEUTROPHILS NFR BLD AUTO: 93.8 % (ref 41–71)
NITRITE UR QL STRIP: NEGATIVE
NITRITE UR QL STRIP: NEGATIVE
NRBC BLD MANUAL-RTO: 0 /100 WBC (ref 0–0)
OXYHGB MFR BLDV: 90.8 % (ref 94–99)
OXYHGB MFR BLDV: 93.5 % (ref 94–99)
PCO2 BLDA: 29.8 MM HG (ref 35–48)
PCO2 BLDA: 43.5 MM HG (ref 35–48)
PH BLDA: 7.37 PH UNITS (ref 7.35–7.45)
PH BLDA: 7.45 PH UNITS (ref 7.35–7.45)
PH UR STRIP.AUTO: <=5 [PH] (ref 5–8)
PH UR STRIP.AUTO: <=5 [PH] (ref 5–8)
PHOSPHATE SERPL-MCNC: 2.7 MG/DL (ref 2.4–5.1)
PHOSPHATE SERPL-MCNC: 3.2 MG/DL (ref 2.4–5.1)
PHOSPHATE SERPL-MCNC: 3.6 MG/DL (ref 2.4–5.1)
PLATELET # BLD AUTO: 201 10*3/MM3 (ref 150–450)
PLATELET # BLD AUTO: 224 10*3/MM3 (ref 150–450)
PLATELET # BLD AUTO: 226 10*3/MM3 (ref 150–450)
PLATELET # BLD AUTO: 235 10*3/MM3 (ref 150–450)
PLATELET # BLD AUTO: 238 10*3/MM3 (ref 150–450)
PLATELET # BLD AUTO: 262 10*3/MM3 (ref 150–450)
PLATELET # BLD AUTO: 271 10*3/MM3 (ref 150–450)
PMV BLD AUTO: 11.1 FL (ref 6–12)
PMV BLD AUTO: 11.2 FL (ref 6–12)
PMV BLD AUTO: 11.7 FL (ref 6–12)
PMV BLD AUTO: 11.7 FL (ref 6–12)
PMV BLD AUTO: 11.8 FL (ref 6–12)
PMV BLD AUTO: 11.8 FL (ref 6–12)
PMV BLD AUTO: 11.9 FL (ref 6–12)
PO2 BLDA: 69.9 MM HG (ref 83–108)
PO2 BLDA: 76.4 MM HG (ref 83–108)
POTASSIUM BLD-SCNC: 3.3 MMOL/L (ref 3.5–5.5)
POTASSIUM BLD-SCNC: 3.5 MMOL/L (ref 3.5–5.5)
POTASSIUM BLD-SCNC: 3.6 MMOL/L (ref 3.5–5.5)
POTASSIUM BLD-SCNC: 3.7 MMOL/L (ref 3.5–5.5)
POTASSIUM BLD-SCNC: 3.8 MMOL/L (ref 3.5–5.5)
POTASSIUM BLD-SCNC: 4.2 MMOL/L (ref 3.5–5.5)
POTASSIUM BLD-SCNC: 4.3 MMOL/L (ref 3.5–5.5)
POTASSIUM BLD-SCNC: 4.5 MMOL/L (ref 3.5–5.5)
POTASSIUM BLD-SCNC: 4.6 MMOL/L (ref 3.5–5.5)
POTASSIUM BLD-SCNC: 4.7 MMOL/L (ref 3.5–5.5)
POTASSIUM BLD-SCNC: 5.2 MMOL/L (ref 3.5–5.5)
PROCALCITONIN SERPL-MCNC: 0.26 NG/ML
PROT PATTERN SERPL ELPH-IMP: ABNORMAL
PROT SERPL-MCNC: 6.7 G/DL (ref 5.7–8.2)
PROT SERPL-MCNC: 7 G/DL (ref 6–8.5)
PROT SERPL-MCNC: 7.2 G/DL (ref 5.7–8.2)
PROT SERPL-MCNC: 8 G/DL (ref 5.7–8.2)
PROT SERPL-MCNC: 8.1 G/DL (ref 5.7–8.2)
PROT UR QL STRIP: ABNORMAL
PROT UR QL STRIP: ABNORMAL
PROT UR-MCNC: 18 MG/DL (ref 1–14)
RBC # BLD AUTO: 3.23 10*6/MM3 (ref 4.2–5.76)
RBC # BLD AUTO: 3.3 10*6/MM3 (ref 4.2–5.76)
RBC # BLD AUTO: 3.38 10*6/MM3 (ref 4.2–5.76)
RBC # BLD AUTO: 3.43 10*6/MM3 (ref 4.2–5.76)
RBC # BLD AUTO: 3.63 10*6/MM3 (ref 4.2–5.76)
RBC # BLD AUTO: 3.74 10*6/MM3 (ref 4.2–5.76)
RBC # BLD AUTO: 3.78 10*6/MM3 (ref 4.2–5.76)
RBC # UR: ABNORMAL /HPF
REF LAB TEST METHOD: ABNORMAL
RENAL EPI CELLS #/AREA URNS HPF: ABNORMAL /HPF
SODIUM BLD-SCNC: 140 MMOL/L (ref 132–146)
SODIUM BLD-SCNC: 142 MMOL/L (ref 132–146)
SODIUM BLD-SCNC: 144 MMOL/L (ref 132–146)
SODIUM BLD-SCNC: 144 MMOL/L (ref 132–146)
SODIUM BLD-SCNC: 147 MMOL/L (ref 132–146)
SODIUM BLD-SCNC: 148 MMOL/L (ref 132–146)
SODIUM BLD-SCNC: 150 MMOL/L (ref 132–146)
SODIUM BLD-SCNC: 150 MMOL/L (ref 132–146)
SODIUM BLD-SCNC: 155 MMOL/L (ref 132–146)
SODIUM BLD-SCNC: 158 MMOL/L (ref 132–146)
SODIUM BLD-SCNC: 163 MMOL/L (ref 132–146)
SODIUM UR-SCNC: 106 MMOL/L (ref 30–90)
SP GR UR STRIP: 1.01 (ref 1–1.03)
SP GR UR STRIP: 1.02 (ref 1–1.03)
SQUAMOUS #/AREA URNS HPF: ABNORMAL /HPF
TIBC SERPL-MCNC: 254 MCG/DL (ref 250–450)
TRIGL SERPL-MCNC: 127 MG/DL (ref 0–150)
TRIGL SERPL-MCNC: 68 MG/DL (ref 0–150)
TROPONIN I SERPL-MCNC: 0.22 NG/ML (ref 0–0.07)
TROPONIN I SERPL-MCNC: 0.23 NG/ML (ref 0–0.07)
TROPONIN I SERPL-MCNC: 0.33 NG/ML
TROPONIN I SERPL-MCNC: 0.4 NG/ML
TSH SERPL DL<=0.05 MIU/L-ACNC: 2 MIU/ML (ref 0.35–5.35)
UROBILINOGEN UR QL STRIP: ABNORMAL
UROBILINOGEN UR QL STRIP: ABNORMAL
VANCOMYCIN SERPL-MCNC: 18.5 MCG/ML (ref 5–40)
WBC NRBC COR # BLD: 10.32 10*3/MM3 (ref 3.5–10.8)
WBC NRBC COR # BLD: 10.51 10*3/MM3 (ref 3.5–10.8)
WBC NRBC COR # BLD: 10.72 10*3/MM3 (ref 3.5–10.8)
WBC NRBC COR # BLD: 12.83 10*3/MM3 (ref 3.5–10.8)
WBC NRBC COR # BLD: 13.51 10*3/MM3 (ref 3.5–10.8)
WBC NRBC COR # BLD: 13.96 10*3/MM3 (ref 3.5–10.8)
WBC NRBC COR # BLD: 7.5 10*3/MM3 (ref 3.5–10.8)
WBC UR QL AUTO: ABNORMAL /HPF
WHOLE BLOOD HOLD SPECIMEN: NORMAL
WHOLE BLOOD HOLD SPECIMEN: NORMAL

## 2018-01-01 PROCEDURE — 93306 TTE W/DOPPLER COMPLETE: CPT | Performed by: INTERNAL MEDICINE

## 2018-01-01 PROCEDURE — 25010000002 HEPARIN (PORCINE) PER 1000 UNITS: Performed by: NURSE PRACTITIONER

## 2018-01-01 PROCEDURE — 81001 URINALYSIS AUTO W/SCOPE: CPT | Performed by: EMERGENCY MEDICINE

## 2018-01-01 PROCEDURE — 73502 X-RAY EXAM HIP UNI 2-3 VIEWS: CPT

## 2018-01-01 PROCEDURE — 82728 ASSAY OF FERRITIN: CPT | Performed by: INTERNAL MEDICINE

## 2018-01-01 PROCEDURE — 87040 BLOOD CULTURE FOR BACTERIA: CPT | Performed by: EMERGENCY MEDICINE

## 2018-01-01 PROCEDURE — 99239 HOSP IP/OBS DSCHRG MGMT >30: CPT | Performed by: NURSE PRACTITIONER

## 2018-01-01 PROCEDURE — 94660 CPAP INITIATION&MGMT: CPT

## 2018-01-01 PROCEDURE — G0181 HOME HEALTH CARE SUPERVISION: HCPCS | Performed by: INTERNAL MEDICINE

## 2018-01-01 PROCEDURE — 97530 THERAPEUTIC ACTIVITIES: CPT

## 2018-01-01 PROCEDURE — 25010000002 FUROSEMIDE PER 20 MG: Performed by: PHYSICIAN ASSISTANT

## 2018-01-01 PROCEDURE — 83036 HEMOGLOBIN GLYCOSYLATED A1C: CPT | Performed by: INTERNAL MEDICINE

## 2018-01-01 PROCEDURE — 94799 UNLISTED PULMONARY SVC/PX: CPT

## 2018-01-01 PROCEDURE — 93306 TTE W/DOPPLER COMPLETE: CPT

## 2018-01-01 PROCEDURE — 99213 OFFICE O/P EST LOW 20 MIN: CPT | Performed by: INTERNAL MEDICINE

## 2018-01-01 PROCEDURE — 85025 COMPLETE CBC W/AUTO DIFF WBC: CPT | Performed by: EMERGENCY MEDICINE

## 2018-01-01 PROCEDURE — 99232 SBSQ HOSP IP/OBS MODERATE 35: CPT | Performed by: INTERNAL MEDICINE

## 2018-01-01 PROCEDURE — 97110 THERAPEUTIC EXERCISES: CPT

## 2018-01-01 PROCEDURE — 80048 BASIC METABOLIC PNL TOTAL CA: CPT

## 2018-01-01 PROCEDURE — 36600 WITHDRAWAL OF ARTERIAL BLOOD: CPT | Performed by: PHYSICIAN ASSISTANT

## 2018-01-01 PROCEDURE — 87086 URINE CULTURE/COLONY COUNT: CPT | Performed by: EMERGENCY MEDICINE

## 2018-01-01 PROCEDURE — 25010000002 PIPERACILLIN SOD-TAZOBACTAM PER 1 G: Performed by: NURSE PRACTITIONER

## 2018-01-01 PROCEDURE — 83550 IRON BINDING TEST: CPT | Performed by: INTERNAL MEDICINE

## 2018-01-01 PROCEDURE — 25010000002 VANCOMYCIN PER 500 MG

## 2018-01-01 PROCEDURE — 80061 LIPID PANEL: CPT | Performed by: NURSE PRACTITIONER

## 2018-01-01 PROCEDURE — 70450 CT HEAD/BRAIN W/O DYE: CPT

## 2018-01-01 PROCEDURE — 99238 HOSP IP/OBS DSCHRG MGMT 30/<: CPT | Performed by: INTERNAL MEDICINE

## 2018-01-01 PROCEDURE — 94010 BREATHING CAPACITY TEST: CPT | Performed by: NURSE PRACTITIONER

## 2018-01-01 PROCEDURE — 97165 OT EVAL LOW COMPLEX 30 MIN: CPT

## 2018-01-01 PROCEDURE — 94760 N-INVAS EAR/PLS OXIMETRY 1: CPT

## 2018-01-01 PROCEDURE — 25010000002 FUROSEMIDE PER 20 MG: Performed by: EMERGENCY MEDICINE

## 2018-01-01 PROCEDURE — 36415 COLL VENOUS BLD VENIPUNCTURE: CPT

## 2018-01-01 PROCEDURE — 25010000002 FUROSEMIDE PER 20 MG: Performed by: INTERNAL MEDICINE

## 2018-01-01 PROCEDURE — 93005 ELECTROCARDIOGRAM TRACING: CPT | Performed by: INTERNAL MEDICINE

## 2018-01-01 PROCEDURE — 82805 BLOOD GASES W/O2 SATURATION: CPT | Performed by: PHYSICIAN ASSISTANT

## 2018-01-01 PROCEDURE — 71045 X-RAY EXAM CHEST 1 VIEW: CPT

## 2018-01-01 PROCEDURE — 80069 RENAL FUNCTION PANEL: CPT | Performed by: INTERNAL MEDICINE

## 2018-01-01 PROCEDURE — 76775 US EXAM ABDO BACK WALL LIM: CPT

## 2018-01-01 PROCEDURE — 84145 PROCALCITONIN (PCT): CPT | Performed by: EMERGENCY MEDICINE

## 2018-01-01 PROCEDURE — 84300 ASSAY OF URINE SODIUM: CPT | Performed by: INTERNAL MEDICINE

## 2018-01-01 PROCEDURE — 92610 EVALUATE SWALLOWING FUNCTION: CPT

## 2018-01-01 PROCEDURE — 99232 SBSQ HOSP IP/OBS MODERATE 35: CPT | Performed by: HOSPITALIST

## 2018-01-01 PROCEDURE — 25010000002 METHYLPREDNISOLONE PER 125 MG: Performed by: PHYSICIAN ASSISTANT

## 2018-01-01 PROCEDURE — 94640 AIRWAY INHALATION TREATMENT: CPT

## 2018-01-01 PROCEDURE — 85025 COMPLETE CBC W/AUTO DIFF WBC: CPT | Performed by: NURSE PRACTITIONER

## 2018-01-01 PROCEDURE — 71046 X-RAY EXAM CHEST 2 VIEWS: CPT

## 2018-01-01 PROCEDURE — 97162 PT EVAL MOD COMPLEX 30 MIN: CPT

## 2018-01-01 PROCEDURE — 80048 BASIC METABOLIC PNL TOTAL CA: CPT | Performed by: INTERNAL MEDICINE

## 2018-01-01 PROCEDURE — 99223 1ST HOSP IP/OBS HIGH 75: CPT | Performed by: HOSPITALIST

## 2018-01-01 PROCEDURE — 25010000002 VANCOMYCIN 10 G RECONSTITUTED SOLUTION: Performed by: PHYSICIAN ASSISTANT

## 2018-01-01 PROCEDURE — 99233 SBSQ HOSP IP/OBS HIGH 50: CPT | Performed by: HOSPITALIST

## 2018-01-01 PROCEDURE — 99215 OFFICE O/P EST HI 40 MIN: CPT | Performed by: PSYCHIATRY & NEUROLOGY

## 2018-01-01 PROCEDURE — G0180 MD CERTIFICATION HHA PATIENT: HCPCS | Performed by: INTERNAL MEDICINE

## 2018-01-01 PROCEDURE — 25010000002 NA FERRIC GLUC CPLX PER 12.5 MG: Performed by: INTERNAL MEDICINE

## 2018-01-01 PROCEDURE — 80053 COMPREHEN METABOLIC PANEL: CPT | Performed by: INTERNAL MEDICINE

## 2018-01-01 PROCEDURE — 93005 ELECTROCARDIOGRAM TRACING: CPT | Performed by: EMERGENCY MEDICINE

## 2018-01-01 PROCEDURE — 99285 EMERGENCY DEPT VISIT HI MDM: CPT

## 2018-01-01 PROCEDURE — 82306 VITAMIN D 25 HYDROXY: CPT | Performed by: INTERNAL MEDICINE

## 2018-01-01 PROCEDURE — 25010000002 HYDROMORPHONE PER 4 MG: Performed by: INTERNAL MEDICINE

## 2018-01-01 PROCEDURE — 83880 ASSAY OF NATRIURETIC PEPTIDE: CPT | Performed by: EMERGENCY MEDICINE

## 2018-01-01 PROCEDURE — 80202 ASSAY OF VANCOMYCIN: CPT

## 2018-01-01 PROCEDURE — 84484 ASSAY OF TROPONIN QUANT: CPT

## 2018-01-01 PROCEDURE — 99222 1ST HOSP IP/OBS MODERATE 55: CPT | Performed by: INTERNAL MEDICINE

## 2018-01-01 PROCEDURE — 84484 ASSAY OF TROPONIN QUANT: CPT | Performed by: NURSE PRACTITIONER

## 2018-01-01 PROCEDURE — 83540 ASSAY OF IRON: CPT | Performed by: INTERNAL MEDICINE

## 2018-01-01 PROCEDURE — 85027 COMPLETE CBC AUTOMATED: CPT | Performed by: HOSPITALIST

## 2018-01-01 PROCEDURE — 80053 COMPREHEN METABOLIC PANEL: CPT

## 2018-01-01 PROCEDURE — 80069 RENAL FUNCTION PANEL: CPT | Performed by: HOSPITALIST

## 2018-01-01 PROCEDURE — 73560 X-RAY EXAM OF KNEE 1 OR 2: CPT

## 2018-01-01 PROCEDURE — 99232 SBSQ HOSP IP/OBS MODERATE 35: CPT | Performed by: NURSE PRACTITIONER

## 2018-01-01 PROCEDURE — 83605 ASSAY OF LACTIC ACID: CPT | Performed by: EMERGENCY MEDICINE

## 2018-01-01 PROCEDURE — 84155 ASSAY OF PROTEIN SERUM: CPT | Performed by: INTERNAL MEDICINE

## 2018-01-01 PROCEDURE — 99214 OFFICE O/P EST MOD 30 MIN: CPT | Performed by: INTERNAL MEDICINE

## 2018-01-01 PROCEDURE — 84484 ASSAY OF TROPONIN QUANT: CPT | Performed by: EMERGENCY MEDICINE

## 2018-01-01 PROCEDURE — 84156 ASSAY OF PROTEIN URINE: CPT | Performed by: INTERNAL MEDICINE

## 2018-01-01 PROCEDURE — 84443 ASSAY THYROID STIM HORMONE: CPT | Performed by: INTERNAL MEDICINE

## 2018-01-01 PROCEDURE — 85025 COMPLETE CBC W/AUTO DIFF WBC: CPT

## 2018-01-01 PROCEDURE — 84165 PROTEIN E-PHORESIS SERUM: CPT | Performed by: INTERNAL MEDICINE

## 2018-01-01 PROCEDURE — 92611 MOTION FLUOROSCOPY/SWALLOW: CPT

## 2018-01-01 PROCEDURE — 99214 OFFICE O/P EST MOD 30 MIN: CPT | Performed by: NURSE PRACTITIONER

## 2018-01-01 PROCEDURE — 25010000002 PIPERACILLIN SOD-TAZOBACTAM PER 1 G: Performed by: PHYSICIAN ASSISTANT

## 2018-01-01 PROCEDURE — 82805 BLOOD GASES W/O2 SATURATION: CPT | Performed by: EMERGENCY MEDICINE

## 2018-01-01 PROCEDURE — 99284 EMERGENCY DEPT VISIT MOD MDM: CPT

## 2018-01-01 PROCEDURE — 80048 BASIC METABOLIC PNL TOTAL CA: CPT | Performed by: NURSE PRACTITIONER

## 2018-01-01 PROCEDURE — 80061 LIPID PANEL: CPT | Performed by: INTERNAL MEDICINE

## 2018-01-01 PROCEDURE — 36600 WITHDRAWAL OF ARTERIAL BLOOD: CPT | Performed by: EMERGENCY MEDICINE

## 2018-01-01 PROCEDURE — 36415 COLL VENOUS BLD VENIPUNCTURE: CPT | Performed by: INTERNAL MEDICINE

## 2018-01-01 PROCEDURE — 25010000002 SULFUR HEXAFLUORIDE MICROSPH 60.7-25 MG RECONSTITUTED SUSPENSION: Performed by: INTERNAL MEDICINE

## 2018-01-01 PROCEDURE — 85025 COMPLETE CBC W/AUTO DIFF WBC: CPT | Performed by: INTERNAL MEDICINE

## 2018-01-01 PROCEDURE — 80053 COMPREHEN METABOLIC PANEL: CPT | Performed by: EMERGENCY MEDICINE

## 2018-01-01 PROCEDURE — 97535 SELF CARE MNGMENT TRAINING: CPT

## 2018-01-01 PROCEDURE — P9612 CATHETERIZE FOR URINE SPEC: HCPCS

## 2018-01-01 PROCEDURE — 99213 OFFICE O/P EST LOW 20 MIN: CPT | Performed by: NEUROLOGICAL SURGERY

## 2018-01-01 PROCEDURE — 81003 URINALYSIS AUTO W/O SCOPE: CPT | Performed by: INTERNAL MEDICINE

## 2018-01-01 PROCEDURE — 86140 C-REACTIVE PROTEIN: CPT | Performed by: EMERGENCY MEDICINE

## 2018-01-01 PROCEDURE — 85027 COMPLETE CBC AUTOMATED: CPT | Performed by: NURSE PRACTITIONER

## 2018-01-01 PROCEDURE — 86140 C-REACTIVE PROTEIN: CPT | Performed by: INTERNAL MEDICINE

## 2018-01-01 PROCEDURE — 25010000002 LORAZEPAM PER 2 MG: Performed by: FAMILY MEDICINE

## 2018-01-01 PROCEDURE — 82570 ASSAY OF URINE CREATININE: CPT | Performed by: INTERNAL MEDICINE

## 2018-01-01 PROCEDURE — 97166 OT EVAL MOD COMPLEX 45 MIN: CPT

## 2018-01-01 PROCEDURE — 74230 X-RAY XM SWLNG FUNCJ C+: CPT

## 2018-01-01 PROCEDURE — 82550 ASSAY OF CK (CPK): CPT | Performed by: INTERNAL MEDICINE

## 2018-01-01 PROCEDURE — 87205 SMEAR GRAM STAIN: CPT | Performed by: INTERNAL MEDICINE

## 2018-01-01 PROCEDURE — 99349 HOME/RES VST EST MOD MDM 40: CPT | Performed by: INTERNAL MEDICINE

## 2018-01-01 RX ORDER — VANCOMYCIN HYDROCHLORIDE 1 G/200ML
1000 INJECTION, SOLUTION INTRAVENOUS
Status: DISCONTINUED | OUTPATIENT
Start: 2018-01-01 | End: 2018-01-01

## 2018-01-01 RX ORDER — NITROGLYCERIN 0.4 MG/1
0.4 TABLET SUBLINGUAL
Status: DISCONTINUED | OUTPATIENT
Start: 2018-01-01 | End: 2018-01-01 | Stop reason: HOSPADM

## 2018-01-01 RX ORDER — MEMANTINE HYDROCHLORIDE 10 MG/1
10 TABLET ORAL EVERY 12 HOURS SCHEDULED
Qty: 180 TABLET | Refills: 1 | Status: SHIPPED | OUTPATIENT
Start: 2018-01-01 | End: 2018-07-05 | Stop reason: HOSPADM

## 2018-01-01 RX ORDER — MEMANTINE HYDROCHLORIDE 10 MG/1
10 TABLET ORAL EVERY 12 HOURS SCHEDULED
Qty: 60 TABLET | Refills: 2 | Status: SHIPPED | OUTPATIENT
Start: 2018-01-01 | End: 2018-01-01 | Stop reason: SDUPTHER

## 2018-01-01 RX ORDER — BUDESONIDE AND FORMOTEROL FUMARATE DIHYDRATE 160; 4.5 UG/1; UG/1
2 AEROSOL RESPIRATORY (INHALATION)
Status: DISCONTINUED | OUTPATIENT
Start: 2018-01-01 | End: 2018-07-05 | Stop reason: HOSPADM

## 2018-01-01 RX ORDER — IPRATROPIUM BROMIDE AND ALBUTEROL SULFATE 2.5; .5 MG/3ML; MG/3ML
3 SOLUTION RESPIRATORY (INHALATION) ONCE
Status: COMPLETED | OUTPATIENT
Start: 2018-01-01 | End: 2018-01-01

## 2018-01-01 RX ORDER — FUROSEMIDE 10 MG/ML
80 INJECTION INTRAMUSCULAR; INTRAVENOUS ONCE
Status: COMPLETED | OUTPATIENT
Start: 2018-01-01 | End: 2018-01-01

## 2018-01-01 RX ORDER — SODIUM CHLORIDE 0.9 % (FLUSH) 0.9 %
1-10 SYRINGE (ML) INJECTION AS NEEDED
Status: DISCONTINUED | OUTPATIENT
Start: 2018-01-01 | End: 2018-01-01 | Stop reason: HOSPADM

## 2018-01-01 RX ORDER — ATORVASTATIN CALCIUM 10 MG/1
10 TABLET, FILM COATED ORAL NIGHTLY
Qty: 90 TABLET | Refills: 1 | Status: SHIPPED | OUTPATIENT
Start: 2018-01-01 | End: 2018-07-05 | Stop reason: HOSPADM

## 2018-01-01 RX ORDER — NYSTATIN 100000 [USP'U]/G
POWDER TOPICAL 2 TIMES DAILY
Qty: 45 G | Refills: 3 | Status: SHIPPED | OUTPATIENT
Start: 2018-01-01 | End: 2018-01-01 | Stop reason: SDUPTHER

## 2018-01-01 RX ORDER — ENTECAVIR 0.5 MG/1
0.5 TABLET, FILM COATED ORAL
Qty: 90 TABLET | Refills: 0 | Status: SHIPPED | OUTPATIENT
Start: 2018-01-01 | End: 2018-07-05 | Stop reason: HOSPADM

## 2018-01-01 RX ORDER — NITROGLYCERIN 0.4 MG/1
TABLET SUBLINGUAL
Qty: 25 TABLET | Refills: 5 | Status: SHIPPED | OUTPATIENT
Start: 2018-01-01 | End: 2018-07-05 | Stop reason: HOSPADM

## 2018-01-01 RX ORDER — CIPROFLOXACIN 250 MG/1
250 TABLET, FILM COATED ORAL EVERY 12 HOURS SCHEDULED
Qty: 20 TABLET | Refills: 0 | Status: SHIPPED | OUTPATIENT
Start: 2018-01-01 | End: 2018-01-01

## 2018-01-01 RX ORDER — FUROSEMIDE 40 MG/1
40 TABLET ORAL DAILY
Status: DISCONTINUED | OUTPATIENT
Start: 2018-01-01 | End: 2018-01-01 | Stop reason: HOSPADM

## 2018-01-01 RX ORDER — SODIUM CHLORIDE 9 MG/ML
75 INJECTION, SOLUTION INTRAVENOUS CONTINUOUS
Status: DISCONTINUED | OUTPATIENT
Start: 2018-01-01 | End: 2018-01-01 | Stop reason: HOSPADM

## 2018-01-01 RX ORDER — BUPROPION HYDROCHLORIDE 75 MG/1
75 TABLET ORAL 3 TIMES DAILY
Qty: 270 TABLET | Refills: 1 | Status: SHIPPED | OUTPATIENT
Start: 2018-01-01 | End: 2019-06-27

## 2018-01-01 RX ORDER — IPRATROPIUM BROMIDE AND ALBUTEROL SULFATE 2.5; .5 MG/3ML; MG/3ML
3 SOLUTION RESPIRATORY (INHALATION)
Status: DISCONTINUED | OUTPATIENT
Start: 2018-01-01 | End: 2018-07-05 | Stop reason: HOSPADM

## 2018-01-01 RX ORDER — NYSTATIN 100000 [USP'U]/G
POWDER TOPICAL 2 TIMES DAILY
Qty: 45 G | Refills: 3 | Status: SHIPPED | OUTPATIENT
Start: 2018-01-01 | End: 2018-01-01

## 2018-01-01 RX ORDER — AMOXICILLIN AND CLAVULANATE POTASSIUM 875; 125 MG/1; MG/1
1 TABLET, FILM COATED ORAL EVERY 12 HOURS SCHEDULED
Qty: 10 TABLET | Refills: 0 | Status: SHIPPED | OUTPATIENT
Start: 2018-01-01 | End: 2018-07-09

## 2018-01-01 RX ORDER — DEXTROSE MONOHYDRATE 50 MG/ML
50 INJECTION, SOLUTION INTRAVENOUS CONTINUOUS
Status: DISCONTINUED | OUTPATIENT
Start: 2018-01-01 | End: 2018-01-01

## 2018-01-01 RX ORDER — CLONIDINE HYDROCHLORIDE 0.1 MG/1
0.1 TABLET ORAL ONCE
Status: COMPLETED | OUTPATIENT
Start: 2018-01-01 | End: 2018-01-01

## 2018-01-01 RX ORDER — CARVEDILOL 3.12 MG/1
3.12 TABLET ORAL 2 TIMES DAILY WITH MEALS
Status: DISCONTINUED | OUTPATIENT
Start: 2018-01-01 | End: 2018-07-05 | Stop reason: HOSPADM

## 2018-01-01 RX ORDER — BUDESONIDE AND FORMOTEROL FUMARATE DIHYDRATE 160; 4.5 UG/1; UG/1
2 AEROSOL RESPIRATORY (INHALATION)
Status: DISCONTINUED | OUTPATIENT
Start: 2018-01-01 | End: 2018-01-01 | Stop reason: HOSPADM

## 2018-01-01 RX ORDER — SODIUM CHLORIDE 0.9 % (FLUSH) 0.9 %
1-10 SYRINGE (ML) INJECTION AS NEEDED
Status: DISCONTINUED | OUTPATIENT
Start: 2018-01-01 | End: 2018-07-05 | Stop reason: HOSPADM

## 2018-01-01 RX ORDER — ENTECAVIR 0.5 MG/1
0.5 TABLET, FILM COATED ORAL DAILY
Status: DISCONTINUED | OUTPATIENT
Start: 2018-01-01 | End: 2018-01-01

## 2018-01-01 RX ORDER — BUPROPION HYDROCHLORIDE 75 MG/1
75 TABLET ORAL EVERY 8 HOURS SCHEDULED
Status: DISCONTINUED | OUTPATIENT
Start: 2018-01-01 | End: 2018-01-01 | Stop reason: HOSPADM

## 2018-01-01 RX ORDER — MORPHINE SULFATE 20 MG/5ML
2.5 SOLUTION ORAL
Qty: 24 ML | Refills: 0 | Status: SHIPPED | OUTPATIENT
Start: 2018-01-01

## 2018-01-01 RX ORDER — BUPROPION HYDROCHLORIDE 75 MG/1
TABLET ORAL
Qty: 180 TABLET | Refills: 1 | Status: SHIPPED | OUTPATIENT
Start: 2018-01-01 | End: 2018-01-01

## 2018-01-01 RX ORDER — BUDESONIDE AND FORMOTEROL FUMARATE DIHYDRATE 160; 4.5 UG/1; UG/1
2 AEROSOL RESPIRATORY (INHALATION)
Qty: 3 INHALER | Refills: 1 | Status: SHIPPED | OUTPATIENT
Start: 2018-01-01

## 2018-01-01 RX ORDER — METHYLPREDNISOLONE SODIUM SUCCINATE 125 MG/2ML
125 INJECTION, POWDER, LYOPHILIZED, FOR SOLUTION INTRAMUSCULAR; INTRAVENOUS ONCE
Status: COMPLETED | OUTPATIENT
Start: 2018-01-01 | End: 2018-01-01

## 2018-01-01 RX ORDER — DEXTROSE MONOHYDRATE 50 MG/ML
75 INJECTION, SOLUTION INTRAVENOUS CONTINUOUS
Status: ACTIVE | OUTPATIENT
Start: 2018-01-01 | End: 2018-01-01

## 2018-01-01 RX ORDER — MEMANTINE HYDROCHLORIDE 10 MG/1
10 TABLET ORAL EVERY 12 HOURS SCHEDULED
Status: DISCONTINUED | OUTPATIENT
Start: 2018-01-01 | End: 2018-01-01 | Stop reason: HOSPADM

## 2018-01-01 RX ORDER — FUROSEMIDE 10 MG/ML
40 INJECTION INTRAMUSCULAR; INTRAVENOUS EVERY 12 HOURS
Status: DISCONTINUED | OUTPATIENT
Start: 2018-01-01 | End: 2018-01-01

## 2018-01-01 RX ORDER — CARVEDILOL 3.12 MG/1
3.12 TABLET ORAL 2 TIMES DAILY WITH MEALS
COMMUNITY
End: 2018-01-01 | Stop reason: SDUPTHER

## 2018-01-01 RX ORDER — FUROSEMIDE 40 MG/1
40 TABLET ORAL DAILY
Qty: 30 TABLET | Refills: 11 | Status: SHIPPED | OUTPATIENT
Start: 2018-01-01 | End: 2018-01-01 | Stop reason: SDUPTHER

## 2018-01-01 RX ORDER — RIVASTIGMINE 13.3 MG/24H
1 PATCH, EXTENDED RELEASE TRANSDERMAL DAILY
Status: DISCONTINUED | OUTPATIENT
Start: 2018-01-01 | End: 2018-01-01 | Stop reason: HOSPADM

## 2018-01-01 RX ORDER — ACETAMINOPHEN 325 MG/1
650 TABLET ORAL EVERY 6 HOURS PRN
Start: 2018-01-01

## 2018-01-01 RX ORDER — RIVASTIGMINE 13.3 MG/24H
1 PATCH, EXTENDED RELEASE TRANSDERMAL DAILY
Qty: 30 PATCH | Refills: 11 | Status: SHIPPED | OUTPATIENT
Start: 2018-01-01 | End: 2018-01-01 | Stop reason: SDUPTHER

## 2018-01-01 RX ORDER — ASPIRIN 81 MG/1
81 TABLET, CHEWABLE ORAL DAILY
Status: DISCONTINUED | OUTPATIENT
Start: 2018-01-01 | End: 2018-07-05 | Stop reason: HOSPADM

## 2018-01-01 RX ORDER — ATORVASTATIN CALCIUM 10 MG/1
10 TABLET, FILM COATED ORAL NIGHTLY
Status: DISCONTINUED | OUTPATIENT
Start: 2018-01-01 | End: 2018-01-01 | Stop reason: HOSPADM

## 2018-01-01 RX ORDER — IPRATROPIUM BROMIDE AND ALBUTEROL SULFATE 2.5; .5 MG/3ML; MG/3ML
3 SOLUTION RESPIRATORY (INHALATION) EVERY 4 HOURS PRN
Status: DISCONTINUED | OUTPATIENT
Start: 2018-01-01 | End: 2018-01-01

## 2018-01-01 RX ORDER — ACETAMINOPHEN 325 MG/1
650 TABLET ORAL EVERY 6 HOURS PRN
Status: DISCONTINUED | OUTPATIENT
Start: 2018-01-01 | End: 2018-07-05 | Stop reason: HOSPADM

## 2018-01-01 RX ORDER — POTASSIUM CHLORIDE 750 MG/1
10 TABLET, FILM COATED, EXTENDED RELEASE ORAL DAILY
Qty: 90 TABLET | Refills: 1 | Status: SHIPPED | OUTPATIENT
Start: 2018-01-01 | End: 2018-07-05 | Stop reason: HOSPADM

## 2018-01-01 RX ORDER — ENTECAVIR 0.5 MG/1
0.5 TABLET, FILM COATED ORAL
Qty: 90 TABLET | Refills: 1 | Status: CANCELLED | OUTPATIENT
Start: 2018-01-01

## 2018-01-01 RX ORDER — CASTOR OIL AND BALSAM, PERU 788; 87 MG/G; MG/G
OINTMENT TOPICAL EVERY 12 HOURS SCHEDULED
Status: DISCONTINUED | OUTPATIENT
Start: 2018-01-01 | End: 2018-07-05 | Stop reason: HOSPADM

## 2018-01-01 RX ORDER — CARVEDILOL 3.12 MG/1
3.12 TABLET ORAL 2 TIMES DAILY WITH MEALS
Status: DISCONTINUED | OUTPATIENT
Start: 2018-01-01 | End: 2018-01-01 | Stop reason: HOSPADM

## 2018-01-01 RX ORDER — POTASSIUM CHLORIDE 750 MG/1
40 CAPSULE, EXTENDED RELEASE ORAL ONCE
Status: COMPLETED | OUTPATIENT
Start: 2018-01-01 | End: 2018-01-01

## 2018-01-01 RX ORDER — BUPROPION HYDROCHLORIDE 75 MG/1
75 TABLET ORAL EVERY 12 HOURS SCHEDULED
Qty: 90 TABLET | Refills: 0 | Status: SHIPPED | OUTPATIENT
Start: 2018-01-01 | End: 2018-01-01 | Stop reason: SDUPTHER

## 2018-01-01 RX ORDER — HEPARIN SODIUM 5000 [USP'U]/ML
5000 INJECTION, SOLUTION INTRAVENOUS; SUBCUTANEOUS EVERY 8 HOURS SCHEDULED
Status: DISCONTINUED | OUTPATIENT
Start: 2018-01-01 | End: 2018-01-01

## 2018-01-01 RX ORDER — MULTIVIT WITH MINERALS/LUTEIN
1000 TABLET ORAL DAILY
COMMUNITY
End: 2018-01-01

## 2018-01-01 RX ORDER — AMLODIPINE BESYLATE 5 MG/1
1 TABLET ORAL NIGHTLY
COMMUNITY
End: 2018-01-01

## 2018-01-01 RX ORDER — MEMANTINE HYDROCHLORIDE 10 MG/1
10 TABLET ORAL EVERY 12 HOURS SCHEDULED
Status: DISCONTINUED | OUTPATIENT
Start: 2018-01-01 | End: 2018-01-01

## 2018-01-01 RX ORDER — FAMOTIDINE 20 MG/1
20 TABLET, FILM COATED ORAL NIGHTLY
Qty: 90 TABLET | Refills: 3
Start: 2018-01-01 | End: 2018-01-01

## 2018-01-01 RX ORDER — ALBUTEROL SULFATE 2.5 MG/3ML
2.5 SOLUTION RESPIRATORY (INHALATION) EVERY 4 HOURS PRN
Status: DISCONTINUED | OUTPATIENT
Start: 2018-01-01 | End: 2018-01-01 | Stop reason: HOSPADM

## 2018-01-01 RX ORDER — CASTOR OIL AND BALSAM, PERU 788; 87 MG/G; MG/G
1 OINTMENT TOPICAL EVERY 12 HOURS SCHEDULED
Qty: 30 G | Refills: 0 | Status: SHIPPED | OUTPATIENT
Start: 2018-01-01

## 2018-01-01 RX ORDER — POTASSIUM CHLORIDE 750 MG/1
10 TABLET, FILM COATED, EXTENDED RELEASE ORAL DAILY
Qty: 30 TABLET | Refills: 11 | Status: SHIPPED | OUTPATIENT
Start: 2018-01-01 | End: 2018-01-01 | Stop reason: SDUPTHER

## 2018-01-01 RX ORDER — SODIUM CHLORIDE 0.9 % (FLUSH) 0.9 %
10 SYRINGE (ML) INJECTION AS NEEDED
Status: DISCONTINUED | OUTPATIENT
Start: 2018-01-01 | End: 2018-07-05 | Stop reason: HOSPADM

## 2018-01-01 RX ORDER — RIVASTIGMINE 13.3 MG/24H
1 PATCH, EXTENDED RELEASE TRANSDERMAL DAILY
Qty: 90 PATCH | Refills: 1 | Status: SHIPPED | OUTPATIENT
Start: 2018-01-01 | End: 2019-06-27

## 2018-01-01 RX ORDER — CHOLECALCIFEROL (VITAMIN D3) 125 MCG
1 CAPSULE ORAL DAILY
COMMUNITY
End: 2018-07-05 | Stop reason: HOSPADM

## 2018-01-01 RX ORDER — RIVASTIGMINE 13.3 MG/24H
1 PATCH, EXTENDED RELEASE TRANSDERMAL DAILY
Status: DISCONTINUED | OUTPATIENT
Start: 2018-01-01 | End: 2018-07-05 | Stop reason: HOSPADM

## 2018-01-01 RX ORDER — ATORVASTATIN CALCIUM 10 MG/1
10 TABLET, FILM COATED ORAL NIGHTLY
Status: DISCONTINUED | OUTPATIENT
Start: 2018-01-01 | End: 2018-01-01

## 2018-01-01 RX ORDER — CARVEDILOL 3.12 MG/1
3.12 TABLET ORAL 2 TIMES DAILY WITH MEALS
Qty: 180 TABLET | Refills: 1 | Status: SHIPPED | OUTPATIENT
Start: 2018-01-01

## 2018-01-01 RX ORDER — LORAZEPAM 2 MG/ML
0.5 INJECTION INTRAMUSCULAR EVERY 4 HOURS PRN
Status: DISCONTINUED | OUTPATIENT
Start: 2018-01-01 | End: 2018-07-05 | Stop reason: HOSPADM

## 2018-01-01 RX ORDER — FUROSEMIDE 10 MG/ML
40 INJECTION INTRAMUSCULAR; INTRAVENOUS ONCE
Status: COMPLETED | OUTPATIENT
Start: 2018-01-01 | End: 2018-01-01

## 2018-01-01 RX ORDER — FUROSEMIDE 10 MG/ML
40 INJECTION INTRAMUSCULAR; INTRAVENOUS
Status: DISCONTINUED | OUTPATIENT
Start: 2018-01-01 | End: 2018-01-01

## 2018-01-01 RX ORDER — BUDESONIDE AND FORMOTEROL FUMARATE DIHYDRATE 160; 4.5 UG/1; UG/1
2 AEROSOL RESPIRATORY (INHALATION)
COMMUNITY
End: 2018-01-01 | Stop reason: SDUPTHER

## 2018-01-01 RX ORDER — BUPROPION HYDROCHLORIDE 75 MG/1
75 TABLET ORAL 3 TIMES DAILY
Qty: 90 TABLET | Refills: 2 | Status: SHIPPED | OUTPATIENT
Start: 2018-01-01 | End: 2018-01-01 | Stop reason: SDUPTHER

## 2018-01-01 RX ORDER — HEPARIN SODIUM 5000 [USP'U]/ML
5000 INJECTION, SOLUTION INTRAVENOUS; SUBCUTANEOUS EVERY 12 HOURS SCHEDULED
Status: DISCONTINUED | OUTPATIENT
Start: 2018-01-01 | End: 2018-01-01 | Stop reason: HOSPADM

## 2018-01-01 RX ORDER — BUPROPION HYDROCHLORIDE 75 MG/1
75 TABLET ORAL 3 TIMES DAILY
Status: DISCONTINUED | OUTPATIENT
Start: 2018-01-01 | End: 2018-07-05 | Stop reason: HOSPADM

## 2018-01-01 RX ORDER — FUROSEMIDE 40 MG/1
40 TABLET ORAL DAILY
Qty: 90 TABLET | Refills: 1 | Status: SHIPPED | OUTPATIENT
Start: 2018-01-01 | End: 2018-07-05 | Stop reason: HOSPADM

## 2018-01-01 RX ORDER — ASPIRIN 81 MG/1
81 TABLET, CHEWABLE ORAL DAILY
Status: DISCONTINUED | OUTPATIENT
Start: 2018-01-01 | End: 2018-01-01 | Stop reason: HOSPADM

## 2018-01-01 RX ADMIN — ATORVASTATIN CALCIUM 10 MG: 10 TABLET, FILM COATED ORAL at 21:08

## 2018-01-01 RX ADMIN — BUPROPION HYDROCHLORIDE 75 MG: 75 TABLET, FILM COATED ORAL at 20:26

## 2018-01-01 RX ADMIN — ATORVASTATIN CALCIUM 10 MG: 10 TABLET, FILM COATED ORAL at 21:26

## 2018-01-01 RX ADMIN — ASPIRIN 81 MG CHEWABLE TABLET 81 MG: 81 TABLET CHEWABLE at 10:16

## 2018-01-01 RX ADMIN — HEPARIN SODIUM 5000 UNITS: 5000 INJECTION, SOLUTION INTRAVENOUS; SUBCUTANEOUS at 22:17

## 2018-01-01 RX ADMIN — BUDESONIDE AND FORMOTEROL FUMARATE DIHYDRATE 2 PUFF: 160; 4.5 AEROSOL RESPIRATORY (INHALATION) at 20:25

## 2018-01-01 RX ADMIN — VANCOMYCIN HYDROCHLORIDE 1000 MG: 1 INJECTION, SOLUTION INTRAVENOUS at 06:43

## 2018-01-01 RX ADMIN — BUDESONIDE AND FORMOTEROL FUMARATE DIHYDRATE 2 PUFF: 160; 4.5 AEROSOL RESPIRATORY (INHALATION) at 21:13

## 2018-01-01 RX ADMIN — ASPIRIN 81 MG CHEWABLE TABLET 81 MG: 81 TABLET CHEWABLE at 11:20

## 2018-01-01 RX ADMIN — BUPROPION HYDROCHLORIDE 75 MG: 75 TABLET, FILM COATED ORAL at 18:06

## 2018-01-01 RX ADMIN — TAZOBACTAM SODIUM AND PIPERACILLIN SODIUM 4.5 G: 500; 4 INJECTION, SOLUTION INTRAVENOUS at 16:46

## 2018-01-01 RX ADMIN — CASTOR OIL AND BALSAM, PERU: 788; 87 OINTMENT TOPICAL at 16:33

## 2018-01-01 RX ADMIN — BUDESONIDE AND FORMOTEROL FUMARATE DIHYDRATE 2 PUFF: 160; 4.5 AEROSOL RESPIRATORY (INHALATION) at 07:57

## 2018-01-01 RX ADMIN — ASPIRIN 81 MG CHEWABLE TABLET 81 MG: 81 TABLET CHEWABLE at 09:43

## 2018-01-01 RX ADMIN — FUROSEMIDE 40 MG: 40 TABLET ORAL at 09:54

## 2018-01-01 RX ADMIN — TAZOBACTAM SODIUM AND PIPERACILLIN SODIUM 4.5 G: 500; 4 INJECTION, SOLUTION INTRAVENOUS at 12:58

## 2018-01-01 RX ADMIN — ASPIRIN 81 MG CHEWABLE TABLET 81 MG: 81 TABLET CHEWABLE at 08:32

## 2018-01-01 RX ADMIN — MEMANTINE 10 MG: 10 TABLET ORAL at 20:19

## 2018-01-01 RX ADMIN — RIVASTIGMINE TRANSDERMAL SYSTEM 1 PATCH: 13.3 PATCH, EXTENDED RELEASE TRANSDERMAL at 09:13

## 2018-01-01 RX ADMIN — RIVASTIGMINE TRANSDERMAL SYSTEM 1 PATCH: 13.3 PATCH, EXTENDED RELEASE TRANSDERMAL at 08:32

## 2018-01-01 RX ADMIN — FUROSEMIDE 40 MG: 10 INJECTION, SOLUTION INTRAMUSCULAR; INTRAVENOUS at 17:53

## 2018-01-01 RX ADMIN — BUDESONIDE AND FORMOTEROL FUMARATE DIHYDRATE 2 PUFF: 160; 4.5 AEROSOL RESPIRATORY (INHALATION) at 07:41

## 2018-01-01 RX ADMIN — CARVEDILOL 3.12 MG: 3.12 TABLET, FILM COATED ORAL at 17:53

## 2018-01-01 RX ADMIN — IPRATROPIUM BROMIDE AND ALBUTEROL SULFATE 3 ML: 2.5; .5 SOLUTION RESPIRATORY (INHALATION) at 07:41

## 2018-01-01 RX ADMIN — CARVEDILOL 3.12 MG: 3.12 TABLET, FILM COATED ORAL at 18:14

## 2018-01-01 RX ADMIN — RIVASTIGMINE TRANSDERMAL SYSTEM 1 PATCH: 13.3 PATCH, EXTENDED RELEASE TRANSDERMAL at 09:54

## 2018-01-01 RX ADMIN — TAZOBACTAM SODIUM AND PIPERACILLIN SODIUM 4.5 G: 500; 4 INJECTION, SOLUTION INTRAVENOUS at 13:09

## 2018-01-01 RX ADMIN — ASPIRIN 81 MG CHEWABLE TABLET 81 MG: 81 TABLET CHEWABLE at 08:49

## 2018-01-01 RX ADMIN — LORAZEPAM 0.5 MG: 2 INJECTION INTRAMUSCULAR; INTRAVENOUS at 20:02

## 2018-01-01 RX ADMIN — HEPARIN SODIUM 5000 UNITS: 5000 INJECTION, SOLUTION INTRAVENOUS; SUBCUTANEOUS at 09:21

## 2018-01-01 RX ADMIN — SODIUM CHLORIDE 125 MG: 9 INJECTION, SOLUTION INTRAVENOUS at 10:36

## 2018-01-01 RX ADMIN — TAZOBACTAM SODIUM AND PIPERACILLIN SODIUM 4.5 G: 500; 4 INJECTION, SOLUTION INTRAVENOUS at 04:12

## 2018-01-01 RX ADMIN — VANCOMYCIN HYDROCHLORIDE 1000 MG: 1 INJECTION, SOLUTION INTRAVENOUS at 18:06

## 2018-01-01 RX ADMIN — SODIUM CHLORIDE 125 MG: 9 INJECTION, SOLUTION INTRAVENOUS at 08:50

## 2018-01-01 RX ADMIN — CARVEDILOL 3.12 MG: 3.12 TABLET, FILM COATED ORAL at 18:17

## 2018-01-01 RX ADMIN — CASTOR OIL AND BALSAM, PERU: 788; 87 OINTMENT TOPICAL at 09:04

## 2018-01-01 RX ADMIN — HEPARIN SODIUM 5000 UNITS: 5000 INJECTION, SOLUTION INTRAVENOUS; SUBCUTANEOUS at 21:27

## 2018-01-01 RX ADMIN — METHYLPREDNISOLONE SODIUM SUCCINATE 125 MG: 125 INJECTION, POWDER, FOR SOLUTION INTRAMUSCULAR; INTRAVENOUS at 13:32

## 2018-01-01 RX ADMIN — IPRATROPIUM BROMIDE AND ALBUTEROL SULFATE 3 ML: 2.5; .5 SOLUTION RESPIRATORY (INHALATION) at 12:53

## 2018-01-01 RX ADMIN — CASTOR OIL AND BALSAM, PERU: 788; 87 OINTMENT TOPICAL at 08:50

## 2018-01-01 RX ADMIN — BUPROPION HYDROCHLORIDE 75 MG: 75 TABLET, FILM COATED ORAL at 16:47

## 2018-01-01 RX ADMIN — FUROSEMIDE 80 MG: 10 INJECTION, SOLUTION INTRAMUSCULAR; INTRAVENOUS at 14:19

## 2018-01-01 RX ADMIN — BUPROPION HYDROCHLORIDE 75 MG: 75 TABLET, FILM COATED ORAL at 11:20

## 2018-01-01 RX ADMIN — ATORVASTATIN CALCIUM 10 MG: 10 TABLET, FILM COATED ORAL at 20:19

## 2018-01-01 RX ADMIN — IPRATROPIUM BROMIDE AND ALBUTEROL SULFATE 3 ML: 2.5; .5 SOLUTION RESPIRATORY (INHALATION) at 12:49

## 2018-01-01 RX ADMIN — BUDESONIDE AND FORMOTEROL FUMARATE DIHYDRATE 2 PUFF: 160; 4.5 AEROSOL RESPIRATORY (INHALATION) at 08:47

## 2018-01-01 RX ADMIN — HYDROMORPHONE HYDROCHLORIDE 0.2 MG: 1 INJECTION, SOLUTION INTRAMUSCULAR; INTRAVENOUS; SUBCUTANEOUS at 20:02

## 2018-01-01 RX ADMIN — MEMANTINE 10 MG: 10 TABLET ORAL at 09:20

## 2018-01-01 RX ADMIN — IPRATROPIUM BROMIDE AND ALBUTEROL SULFATE 3 ML: 2.5; .5 SOLUTION RESPIRATORY (INHALATION) at 12:16

## 2018-01-01 RX ADMIN — BUDESONIDE AND FORMOTEROL FUMARATE DIHYDRATE 2 PUFF: 160; 4.5 AEROSOL RESPIRATORY (INHALATION) at 19:55

## 2018-01-01 RX ADMIN — RIVASTIGMINE TRANSDERMAL SYSTEM 1 PATCH: 13.3 PATCH, EXTENDED RELEASE TRANSDERMAL at 09:07

## 2018-01-01 RX ADMIN — SACUBITRIL AND VALSARTAN 1 TABLET: 49; 51 TABLET, FILM COATED ORAL at 20:56

## 2018-01-01 RX ADMIN — IPRATROPIUM BROMIDE AND ALBUTEROL SULFATE 3 ML: 2.5; .5 SOLUTION RESPIRATORY (INHALATION) at 16:20

## 2018-01-01 RX ADMIN — IPRATROPIUM BROMIDE AND ALBUTEROL SULFATE 3 ML: 2.5; .5 SOLUTION RESPIRATORY (INHALATION) at 16:42

## 2018-01-01 RX ADMIN — CARVEDILOL 3.12 MG: 3.12 TABLET, FILM COATED ORAL at 09:20

## 2018-01-01 RX ADMIN — ASPIRIN 81 MG CHEWABLE TABLET 81 MG: 81 TABLET CHEWABLE at 09:54

## 2018-01-01 RX ADMIN — SULFUR HEXAFLUORIDE 2 ML: KIT at 11:40

## 2018-01-01 RX ADMIN — IPRATROPIUM BROMIDE AND ALBUTEROL SULFATE 3 ML: 2.5; .5 SOLUTION RESPIRATORY (INHALATION) at 21:27

## 2018-01-01 RX ADMIN — CLONIDINE HYDROCHLORIDE 0.1 MG: 0.1 TABLET ORAL at 14:20

## 2018-01-01 RX ADMIN — TAZOBACTAM SODIUM AND PIPERACILLIN SODIUM 4.5 G: 500; 4 INJECTION, SOLUTION INTRAVENOUS at 20:30

## 2018-01-01 RX ADMIN — CARVEDILOL 3.12 MG: 3.12 TABLET, FILM COATED ORAL at 09:43

## 2018-01-01 RX ADMIN — SODIUM CHLORIDE 75 ML/HR: 9 INJECTION, SOLUTION INTRAVENOUS at 23:06

## 2018-01-01 RX ADMIN — CARVEDILOL 3.12 MG: 3.12 TABLET, FILM COATED ORAL at 21:10

## 2018-01-01 RX ADMIN — SODIUM CHLORIDE 125 MG: 9 INJECTION, SOLUTION INTRAVENOUS at 09:12

## 2018-01-01 RX ADMIN — ASPIRIN 81 MG CHEWABLE TABLET 81 MG: 81 TABLET CHEWABLE at 08:43

## 2018-01-01 RX ADMIN — DEXTROSE MONOHYDRATE 75 ML/HR: 50 INJECTION, SOLUTION INTRAVENOUS at 03:29

## 2018-01-01 RX ADMIN — HEPARIN SODIUM 5000 UNITS: 5000 INJECTION, SOLUTION INTRAVENOUS; SUBCUTANEOUS at 08:33

## 2018-01-01 RX ADMIN — BARIUM SULFATE 50 ML: 400 SUSPENSION ORAL at 11:42

## 2018-01-01 RX ADMIN — BUDESONIDE AND FORMOTEROL FUMARATE DIHYDRATE 2 PUFF: 160; 4.5 AEROSOL RESPIRATORY (INHALATION) at 09:06

## 2018-01-01 RX ADMIN — SACUBITRIL AND VALSARTAN 1 TABLET: 49; 51 TABLET, FILM COATED ORAL at 08:33

## 2018-01-01 RX ADMIN — BUPROPION HYDROCHLORIDE 75 MG: 75 TABLET, FILM COATED ORAL at 20:30

## 2018-01-01 RX ADMIN — CARVEDILOL 3.12 MG: 3.12 TABLET, FILM COATED ORAL at 10:17

## 2018-01-01 RX ADMIN — RIVASTIGMINE TRANSDERMAL SYSTEM 1 PATCH: 13.3 PATCH, EXTENDED RELEASE TRANSDERMAL at 10:36

## 2018-01-01 RX ADMIN — IPRATROPIUM BROMIDE AND ALBUTEROL SULFATE 3 ML: 2.5; .5 SOLUTION RESPIRATORY (INHALATION) at 15:48

## 2018-01-01 RX ADMIN — BUPROPION HYDROCHLORIDE 75 MG: 75 TABLET, FILM COATED ORAL at 21:09

## 2018-01-01 RX ADMIN — TAZOBACTAM SODIUM AND PIPERACILLIN SODIUM 4.5 G: 500; 4 INJECTION, SOLUTION INTRAVENOUS at 13:41

## 2018-01-01 RX ADMIN — DEXTROSE MONOHYDRATE 75 ML/HR: 50 INJECTION, SOLUTION INTRAVENOUS at 09:14

## 2018-01-01 RX ADMIN — RIVASTIGMINE TRANSDERMAL SYSTEM 1 PATCH: 13.3 PATCH, EXTENDED RELEASE TRANSDERMAL at 08:49

## 2018-01-01 RX ADMIN — SACUBITRIL AND VALSARTAN 1 TABLET: 49; 51 TABLET, FILM COATED ORAL at 20:19

## 2018-01-01 RX ADMIN — MEMANTINE 10 MG: 10 TABLET ORAL at 08:33

## 2018-01-01 RX ADMIN — FUROSEMIDE 40 MG: 40 TABLET ORAL at 08:32

## 2018-01-01 RX ADMIN — POTASSIUM CHLORIDE 40 MEQ: 750 CAPSULE, EXTENDED RELEASE ORAL at 18:14

## 2018-01-01 RX ADMIN — BUDESONIDE AND FORMOTEROL FUMARATE DIHYDRATE 2 PUFF: 160; 4.5 AEROSOL RESPIRATORY (INHALATION) at 21:10

## 2018-01-01 RX ADMIN — BUPROPION HYDROCHLORIDE 75 MG: 75 TABLET, FILM COATED ORAL at 08:49

## 2018-01-01 RX ADMIN — FUROSEMIDE 40 MG: 10 INJECTION, SOLUTION INTRAMUSCULAR; INTRAVENOUS at 06:25

## 2018-01-01 RX ADMIN — RIVASTIGMINE TRANSDERMAL SYSTEM 1 PATCH: 13.3 PATCH, EXTENDED RELEASE TRANSDERMAL at 11:21

## 2018-01-01 RX ADMIN — BUDESONIDE AND FORMOTEROL FUMARATE DIHYDRATE 2 PUFF: 160; 4.5 AEROSOL RESPIRATORY (INHALATION) at 21:27

## 2018-01-01 RX ADMIN — HEPARIN SODIUM 5000 UNITS: 5000 INJECTION, SOLUTION INTRAVENOUS; SUBCUTANEOUS at 05:40

## 2018-01-01 RX ADMIN — CARVEDILOL 3.12 MG: 3.12 TABLET, FILM COATED ORAL at 19:02

## 2018-01-01 RX ADMIN — BUPROPION HYDROCHLORIDE 75 MG: 75 TABLET, FILM COATED ORAL at 08:32

## 2018-01-01 RX ADMIN — CARVEDILOL 3.12 MG: 3.12 TABLET, FILM COATED ORAL at 18:06

## 2018-01-01 RX ADMIN — MEMANTINE 10 MG: 10 TABLET ORAL at 09:54

## 2018-01-01 RX ADMIN — MEMANTINE 10 MG: 10 TABLET ORAL at 10:16

## 2018-01-01 RX ADMIN — BUPROPION HYDROCHLORIDE 75 MG: 75 TABLET, FILM COATED ORAL at 21:27

## 2018-01-01 RX ADMIN — ASPIRIN 81 MG CHEWABLE TABLET 81 MG: 81 TABLET CHEWABLE at 09:20

## 2018-01-01 RX ADMIN — HEPARIN SODIUM 5000 UNITS: 5000 INJECTION, SOLUTION INTRAVENOUS; SUBCUTANEOUS at 09:54

## 2018-01-01 RX ADMIN — BARIUM SULFATE 20 ML: 400 PASTE ORAL at 11:42

## 2018-01-01 RX ADMIN — BARIUM SULFATE 55 ML: 0.81 POWDER, FOR SUSPENSION ORAL at 11:43

## 2018-01-01 RX ADMIN — BUPROPION HYDROCHLORIDE 75 MG: 75 TABLET, FILM COATED ORAL at 18:50

## 2018-01-01 RX ADMIN — FUROSEMIDE 40 MG: 10 INJECTION, SOLUTION INTRAMUSCULAR; INTRAVENOUS at 06:26

## 2018-01-01 RX ADMIN — CASTOR OIL AND BALSAM, PERU: 788; 87 OINTMENT TOPICAL at 21:00

## 2018-01-01 RX ADMIN — TAZOBACTAM SODIUM AND PIPERACILLIN SODIUM 4.5 G: 500; 4 INJECTION, SOLUTION INTRAVENOUS at 04:32

## 2018-01-01 RX ADMIN — HEPARIN SODIUM 5000 UNITS: 5000 INJECTION, SOLUTION INTRAVENOUS; SUBCUTANEOUS at 22:03

## 2018-01-01 RX ADMIN — BUPROPION HYDROCHLORIDE 75 MG: 75 TABLET, FILM COATED ORAL at 20:19

## 2018-01-01 RX ADMIN — BUDESONIDE AND FORMOTEROL FUMARATE DIHYDRATE 2 PUFF: 160; 4.5 AEROSOL RESPIRATORY (INHALATION) at 21:45

## 2018-01-01 RX ADMIN — IPRATROPIUM BROMIDE AND ALBUTEROL SULFATE 3 ML: 2.5; .5 SOLUTION RESPIRATORY (INHALATION) at 07:57

## 2018-01-01 RX ADMIN — CARVEDILOL 3.12 MG: 3.12 TABLET, FILM COATED ORAL at 11:20

## 2018-01-01 RX ADMIN — BUPROPION HYDROCHLORIDE 75 MG: 75 TABLET, FILM COATED ORAL at 22:17

## 2018-01-01 RX ADMIN — TAZOBACTAM SODIUM AND PIPERACILLIN SODIUM 4.5 G: 500; 4 INJECTION, SOLUTION INTRAVENOUS at 12:31

## 2018-01-01 RX ADMIN — IPRATROPIUM BROMIDE AND ALBUTEROL SULFATE 3 ML: 2.5; .5 SOLUTION RESPIRATORY (INHALATION) at 08:09

## 2018-01-01 RX ADMIN — RIVASTIGMINE TRANSDERMAL SYSTEM 1 PATCH: 13.3 PATCH, EXTENDED RELEASE TRANSDERMAL at 08:42

## 2018-01-01 RX ADMIN — FUROSEMIDE 40 MG: 10 INJECTION, SOLUTION INTRAMUSCULAR; INTRAVENOUS at 14:42

## 2018-01-01 RX ADMIN — SACUBITRIL AND VALSARTAN 1 TABLET: 49; 51 TABLET, FILM COATED ORAL at 21:10

## 2018-01-01 RX ADMIN — CASTOR OIL AND BALSAM, PERU: 788; 87 OINTMENT TOPICAL at 04:47

## 2018-01-01 RX ADMIN — RIVASTIGMINE TRANSDERMAL SYSTEM 1 PATCH: 13.3 PATCH, EXTENDED RELEASE TRANSDERMAL at 11:30

## 2018-01-01 RX ADMIN — SACUBITRIL AND VALSARTAN 1 TABLET: 49; 51 TABLET, FILM COATED ORAL at 08:50

## 2018-01-01 RX ADMIN — CARVEDILOL 3.12 MG: 3.12 TABLET, FILM COATED ORAL at 09:54

## 2018-01-01 RX ADMIN — TAZOBACTAM SODIUM AND PIPERACILLIN SODIUM 4.5 G: 500; 4 INJECTION, SOLUTION INTRAVENOUS at 04:47

## 2018-01-01 RX ADMIN — IPRATROPIUM BROMIDE AND ALBUTEROL SULFATE 3 ML: 2.5; .5 SOLUTION RESPIRATORY (INHALATION) at 13:06

## 2018-01-01 RX ADMIN — CARVEDILOL 3.12 MG: 3.12 TABLET, FILM COATED ORAL at 08:33

## 2018-01-01 RX ADMIN — TAZOBACTAM SODIUM AND PIPERACILLIN SODIUM 4.5 G: 500; 4 INJECTION, SOLUTION INTRAVENOUS at 21:08

## 2018-01-01 RX ADMIN — HEPARIN SODIUM 5000 UNITS: 5000 INJECTION, SOLUTION INTRAVENOUS; SUBCUTANEOUS at 08:43

## 2018-01-01 RX ADMIN — HEPARIN SODIUM 5000 UNITS: 5000 INJECTION, SOLUTION INTRAVENOUS; SUBCUTANEOUS at 20:20

## 2018-01-01 RX ADMIN — CARVEDILOL 3.12 MG: 3.12 TABLET, FILM COATED ORAL at 18:44

## 2018-01-01 RX ADMIN — BUPROPION HYDROCHLORIDE 75 MG: 75 TABLET, FILM COATED ORAL at 06:49

## 2018-01-01 RX ADMIN — BUPROPION HYDROCHLORIDE 75 MG: 75 TABLET, FILM COATED ORAL at 18:17

## 2018-01-01 RX ADMIN — MEMANTINE 10 MG: 10 TABLET ORAL at 21:27

## 2018-01-01 RX ADMIN — MEMANTINE 10 MG: 10 TABLET ORAL at 08:43

## 2018-01-01 RX ADMIN — BUDESONIDE AND FORMOTEROL FUMARATE DIHYDRATE 2 PUFF: 160; 4.5 AEROSOL RESPIRATORY (INHALATION) at 20:52

## 2018-01-01 RX ADMIN — HYDROMORPHONE HYDROCHLORIDE 0.2 MG: 1 INJECTION, SOLUTION INTRAMUSCULAR; INTRAVENOUS; SUBCUTANEOUS at 14:08

## 2018-01-01 RX ADMIN — TAZOBACTAM SODIUM AND PIPERACILLIN SODIUM 4.5 G: 500; 4 INJECTION, SOLUTION INTRAVENOUS at 11:23

## 2018-01-01 RX ADMIN — BUPROPION HYDROCHLORIDE 75 MG: 75 TABLET, FILM COATED ORAL at 15:41

## 2018-01-01 RX ADMIN — RIVASTIGMINE TRANSDERMAL SYSTEM 1 PATCH: 13.3 PATCH, EXTENDED RELEASE TRANSDERMAL at 09:43

## 2018-01-01 RX ADMIN — IPRATROPIUM BROMIDE AND ALBUTEROL SULFATE 3 ML: 2.5; .5 SOLUTION RESPIRATORY (INHALATION) at 13:31

## 2018-01-01 RX ADMIN — VANCOMYCIN HYDROCHLORIDE 1500 MG: 10 INJECTION, POWDER, LYOPHILIZED, FOR SOLUTION INTRAVENOUS at 14:42

## 2018-01-01 RX ADMIN — ASPIRIN 81 MG CHEWABLE TABLET 81 MG: 81 TABLET CHEWABLE at 18:44

## 2018-01-01 RX ADMIN — BARIUM SULFATE 10 ML: 400 SUSPENSION ORAL at 11:42

## 2018-01-01 RX ADMIN — TAZOBACTAM SODIUM AND PIPERACILLIN SODIUM 4.5 G: 500; 4 INJECTION, SOLUTION INTRAVENOUS at 20:26

## 2018-01-01 RX ADMIN — CASTOR OIL AND BALSAM, PERU: 788; 87 OINTMENT TOPICAL at 11:31

## 2018-01-01 RX ADMIN — SACUBITRIL AND VALSARTAN 1 TABLET: 49; 51 TABLET, FILM COATED ORAL at 21:27

## 2018-01-01 RX ADMIN — BUPROPION HYDROCHLORIDE 75 MG: 75 TABLET, FILM COATED ORAL at 06:26

## 2018-01-01 RX ADMIN — IPRATROPIUM BROMIDE AND ALBUTEROL SULFATE 3 ML: 2.5; .5 SOLUTION RESPIRATORY (INHALATION) at 13:38

## 2018-01-01 RX ADMIN — BUDESONIDE AND FORMOTEROL FUMARATE DIHYDRATE 2 PUFF: 160; 4.5 AEROSOL RESPIRATORY (INHALATION) at 09:32

## 2018-01-01 RX ADMIN — TAZOBACTAM SODIUM AND PIPERACILLIN SODIUM 4.5 G: 500; 4 INJECTION, SOLUTION INTRAVENOUS at 03:55

## 2018-01-01 RX ADMIN — SODIUM CHLORIDE 125 MG: 9 INJECTION, SOLUTION INTRAVENOUS at 09:55

## 2018-01-01 RX ADMIN — IPRATROPIUM BROMIDE AND ALBUTEROL SULFATE 3 ML: 2.5; .5 SOLUTION RESPIRATORY (INHALATION) at 20:52

## 2018-01-01 RX ADMIN — MEMANTINE 10 MG: 10 TABLET ORAL at 20:56

## 2018-01-01 RX ADMIN — BUDESONIDE AND FORMOTEROL FUMARATE DIHYDRATE 2 PUFF: 160; 4.5 AEROSOL RESPIRATORY (INHALATION) at 08:58

## 2018-01-01 RX ADMIN — SACUBITRIL AND VALSARTAN 1 TABLET: 49; 51 TABLET, FILM COATED ORAL at 09:54

## 2018-01-01 RX ADMIN — BUDESONIDE AND FORMOTEROL FUMARATE DIHYDRATE 2 PUFF: 160; 4.5 AEROSOL RESPIRATORY (INHALATION) at 18:57

## 2018-01-01 RX ADMIN — HEPARIN SODIUM 5000 UNITS: 5000 INJECTION, SOLUTION INTRAVENOUS; SUBCUTANEOUS at 09:12

## 2018-01-01 RX ADMIN — CARVEDILOL 3.12 MG: 3.12 TABLET, FILM COATED ORAL at 08:49

## 2018-01-01 RX ADMIN — BUDESONIDE AND FORMOTEROL FUMARATE DIHYDRATE 2 PUFF: 160; 4.5 AEROSOL RESPIRATORY (INHALATION) at 20:20

## 2018-01-01 RX ADMIN — IPRATROPIUM BROMIDE AND ALBUTEROL SULFATE 3 ML: 2.5; .5 SOLUTION RESPIRATORY (INHALATION) at 07:36

## 2018-01-01 RX ADMIN — TAZOBACTAM SODIUM AND PIPERACILLIN SODIUM 4.5 G: 500; 4 INJECTION, SOLUTION INTRAVENOUS at 05:13

## 2018-01-01 RX ADMIN — ATORVASTATIN CALCIUM 10 MG: 10 TABLET, FILM COATED ORAL at 20:56

## 2018-01-01 RX ADMIN — HYDROMORPHONE HYDROCHLORIDE 0.2 MG: 1 INJECTION, SOLUTION INTRAMUSCULAR; INTRAVENOUS; SUBCUTANEOUS at 23:13

## 2018-01-01 RX ADMIN — SODIUM CHLORIDE 75 ML/HR: 9 INJECTION, SOLUTION INTRAVENOUS at 17:34

## 2018-01-01 RX ADMIN — IPRATROPIUM BROMIDE AND ALBUTEROL SULFATE 3 ML: 2.5; .5 SOLUTION RESPIRATORY (INHALATION) at 18:57

## 2018-01-01 RX ADMIN — CARVEDILOL 3.12 MG: 3.12 TABLET, FILM COATED ORAL at 08:43

## 2018-01-01 RX ADMIN — TAZOBACTAM SODIUM AND PIPERACILLIN SODIUM 4.5 G: 500; 4 INJECTION, SOLUTION INTRAVENOUS at 20:19

## 2018-01-01 RX ADMIN — DEXTROSE MONOHYDRATE 75 ML/HR: 50 INJECTION, SOLUTION INTRAVENOUS at 10:37

## 2018-01-01 RX ADMIN — BUPROPION HYDROCHLORIDE 75 MG: 75 TABLET, FILM COATED ORAL at 09:43

## 2018-01-01 RX ADMIN — BUDESONIDE AND FORMOTEROL FUMARATE DIHYDRATE 2 PUFF: 160; 4.5 AEROSOL RESPIRATORY (INHALATION) at 20:58

## 2018-01-01 RX ADMIN — SODIUM CHLORIDE 1000 ML: 9 INJECTION, SOLUTION INTRAVENOUS at 13:35

## 2018-01-01 RX ADMIN — CARVEDILOL 3.12 MG: 3.12 TABLET, FILM COATED ORAL at 18:50

## 2018-01-01 RX ADMIN — TAZOBACTAM SODIUM AND PIPERACILLIN SODIUM 4.5 G: 500; 4 INJECTION, SOLUTION INTRAVENOUS at 21:17

## 2018-01-01 RX ADMIN — MEMANTINE 10 MG: 10 TABLET ORAL at 21:10

## 2018-01-01 RX ADMIN — BUPROPION HYDROCHLORIDE 75 MG: 75 TABLET, FILM COATED ORAL at 16:37

## 2018-01-01 RX ADMIN — BUPROPION HYDROCHLORIDE 75 MG: 75 TABLET, FILM COATED ORAL at 10:17

## 2018-01-01 RX ADMIN — SACUBITRIL AND VALSARTAN 1 TABLET: 49; 51 TABLET, FILM COATED ORAL at 09:21

## 2018-01-01 RX ADMIN — HEPARIN SODIUM 5000 UNITS: 5000 INJECTION, SOLUTION INTRAVENOUS; SUBCUTANEOUS at 21:08

## 2018-01-01 RX ADMIN — CASTOR OIL AND BALSAM, PERU: 788; 87 OINTMENT TOPICAL at 20:18

## 2018-01-01 RX ADMIN — IPRATROPIUM BROMIDE AND ALBUTEROL SULFATE 3 ML: 2.5; .5 SOLUTION RESPIRATORY (INHALATION) at 20:20

## 2018-01-01 RX ADMIN — HEPARIN SODIUM 5000 UNITS: 5000 INJECTION, SOLUTION INTRAVENOUS; SUBCUTANEOUS at 21:47

## 2018-01-01 RX ADMIN — BUPROPION HYDROCHLORIDE 75 MG: 75 TABLET, FILM COATED ORAL at 21:17

## 2018-01-09 NOTE — PROGRESS NOTES
Pt wife called stating that pt has CT scan done yesterday @ [facility], that they will be faxing us the results of that scan.

## 2018-03-02 NOTE — PROGRESS NOTES
[facility] Pulmonary Follow up    CHIEF COMPLAINT     chronic bronchitis, abnormal x-ray with reticulonodular markings, chronic cough, congestion, and obstructive sleep apnea    HISTORY OF PRESENT ILLNESS    Magdi Arriaga is a 72 y.o.male here today for fu.At that time I placed him on a azithromycin every day as he has a history of chronic bronchitis, abnormal x-ray with reticulonodular markings, chronic cough, congestion, and obstructive sleep apnea.    When I last saw him there about to go to [place] on vacation, he took 2 rounds of a azithromycin and had less cough and congestion as well as less shortness of breath.  He continues to cough but most of time swallows whatever he expectorates, he's never had blood and he doesn't taste blood.    He does have a lot of nasal drainage, he thinks it pretty stable and doesn't really have any nosebleeds or sinusitis requiring antibiotics.  .  He remains on Singulair.  He remains on CPAP and is done well with this, he has no daytime hypersomnolence.  He can't sleep without the CPAP.    He does have mild dementia, pleasantly so, just with memory loss.  He has never been compliant using Symbicort even with a spacer, he never rinse his mouth out and in the past I talked about ordering nebulizers.  She felt he would never do this regularly    He is here for a follow-up x-ray.  Unfortunately since I saw him he was hospitalized twice, beginning of August for a few days and then a couple days at the end of August.    He has a history of CAD, and since I saw him actually underwent a CABG in April of this year, he also has a history of cardiomyopathy with chronic systolic congestive heart failure.  When I last saw him he is wearing a LifeVest, when he presented to the hospital was for syncope and review of the life that showed no evidence of dysrhythmia or bradycardia.  He had a stent placed in August on the seventh and proceeded with a permanent pacemaker ICD.  He denies chest  pains or palpitations and feels like he is breathing much better since the stent and heart surgery, and follows along with Dr. Sadler on [facility].  He has chronic lower extremity edema which is mild and stable.    By his wife's report he has significant dysphagia but is working with speech therapy and improving.  After his hospitalizations he had a PEG tube placed, or dysphagia.  He has a repeat swallowing test Monday.  He still has a cough but it's dry.  He is allowed to eat some this week.  No fevers that she knows of, he is done well each night on BiPAP.  He is on antibiotics because he had some type of a knot on his face.  Some type of cystic skin thinning.  They did not get an HRCT the chest as at this point his wife is focusing more on palliative treatment.      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   • Small bowel obstruction (S/P Exp lap for acute SBO 11/20/17)   • Altered mental status post op   • PVC (premature ventricular contraction)   • NSVT (nonsustained ventricular tachycardia)       Allergies   Allergen Reactions   • Sulfa Antibiotics      UNKNOWN CHILDHOOD REACTION       Current Outpatient Prescriptions:   •  acetaminophen (TYLENOL) 325 MG tablet, Take 2 tablets by mouth Every 4 (Four) Hours As Needed for  Headache or Fever (fever greater than 101.5 F)., Disp: , Rfl:   •  albuterol (PROVENTIL) (5 MG/ML) 0.5% nebulizer solution, Take 0.5 mL by nebulization Every 4 (Four) Hours As Needed for Wheezing., Disp: , Rfl: 12  •  Amino Acids-Protein Hydrolys (PRO-STAT) liquid, 1 packet by Nasogastric route Daily., Disp: , Rfl:   •  aspirin 81 MG chewable tablet, Chew 1 tablet Daily., Disp: , Rfl:   •  atorvastatin (LIPITOR) 10 MG tablet, Take 1 tablet by mouth Every Night., Disp: , Rfl:   •  bisacodyl (DULCOLAX) 10 MG suppository, Insert 1 suppository into the rectum Daily As Needed for Constipation., Disp: , Rfl:   •  buPROPion (WELLBUTRIN) 75 MG tablet, Take 1 tablet by mouth Every 12 (Twelve) Hours., Disp: , Rfl:   •  carvedilol (COREG) 6.25 MG tablet, Take 1 tablet by mouth Every 12 (Twelve) Hours., Disp: , Rfl:   •  diphenhydrAMINE (BENADRYL) 25 mg capsule, Take 1 capsule by mouth Every 6 (Six) Hours As Needed for Itching or Allergies., Disp: , Rfl:   •  docusate sodium (COLACE) 150 MG/15ML liquid, Take 10 mL by mouth 2 (Two) Times a Day As Needed (constipation)., Disp: , Rfl:   •  entecavir (BARACLUDE) 0.5 MG tablet, Take 1 tablet by mouth Daily., Disp: , Rfl:   •  famotidine (PEPCID) 20 MG tablet, Take 1 tablet by mouth Daily., Disp: , Rfl:   •  ipratropium-albuterol (DUO-NEB) 0.5-2.5 mg/mL nebulizer, Take 3 mL by nebulization 4 (Four) Times a Day., Disp: 360 mL, Rfl:   •  memantine (NAMENDA) 10 MG tablet, Take 1 tablet by mouth Every 12 (Twelve) Hours., Disp: , Rfl:   •  metoclopramide (REGLAN) 5 MG/ML injection, Infuse 2 mL into a venous catheter Every 6 (Six) Hours., Disp: , Rfl:   •  nitroglycerin (NITROSTAT) 0.4 MG SL tablet, Place 1 tablet under the tongue Every 5 (Five) Minutes As Needed for Chest Pain. Take no more than 3 doses in 15 minutes., Disp: , Rfl: 12  •  ondansetron (ZOFRAN) 4 MG tablet, Take 1 tablet by mouth Every 6 (Six) Hours As Needed for Nausea or Vomiting., Disp: , Rfl:   •  ondansetron (ZOFRAN) 4  MG/2ML injection, Infuse 2 mL into a venous catheter Every 6 (Six) Hours As Needed for Nausea or Vomiting., Disp: , Rfl:   •  rivastigmine (EXELON) 9.5 MG/24HR patch, Place 1 patch on the skin Daily., Disp: , Rfl:   •  sacubitril-valsartan (ENTRESTO) 49-51 MG tablet, Take 1 tablet by mouth Every 12 (Twelve) Hours., Disp: 60 tablet, Rfl:   •  sennosides (SENOKOT) 8.8 MG/5ML syrup, Take 10 mL by mouth 2 (Two) Times a Day As Needed for Constipation., Disp: , Rfl:   MEDICATION LIST AND ALLERGIES REVIEWED.    Social History   Substance Use Topics   • Smoking status: Former Smoker     Packs/day: 1.00     Years: 17.00     Types: Cigarettes     Start date: 1965     Quit date: 1982   • Smokeless tobacco: Never Used   • Alcohol use No      Comment: Never drinker       FAMILY AND SOCIAL HISTORY REVIEWED.    Review of Systems   Constitutional: Negative for activity change, chills, fatigue and fever.   HENT: Positive for postnasal drip and trouble swallowing. Negative for hearing loss, nosebleeds and sneezing.    Respiratory: Positive for cough and shortness of breath. Negative for apnea, choking, chest tightness, wheezing and stridor.    Cardiovascular: Negative for chest pain, palpitations and leg swelling.        NO INCREASE IN MILD BLE EDEMA; NO CALF TENDERNESS    Gastrointestinal: Negative for abdominal pain, diarrhea and nausea.   Neurological: Negative for dizziness, syncope and light-headedness.   .    /70 (BP Location: Left arm, Patient Position: Sitting, Cuff Size: Adult)  Pulse 60  Temp 98.6 °F (37 °C)  SpO2 96%  Physical Exam   Constitutional: He is oriented to person, place, and time. He appears well-developed. No distress.   HENT:   Head: Normocephalic.   Eyes: Pupils are equal, round, and reactive to light.   Neck: No JVD present. No thyromegaly present.   Cardiovascular: Normal rate, regular rhythm and normal heart sounds.  Exam reveals no friction rub.    Mild BLE edema; no venous cords or calf  tenderness.    Pulmonary/Chest: Effort normal. No stridor. No respiratory distress. He has no wheezes. He has no rales. He exhibits no tenderness.   Lymphadenopathy:     He has no cervical adenopathy.   Neurological: He is alert and oriented to person, place, and time.   Skin: He is not diaphoretic.   Psychiatric: He has a normal mood and affect. His behavior is normal. Thought content normal.       RESULTS    He was unable to do the testing to produce accurate results  He is unable stand for chest x-ray today    PROBLEM LIST    Dysphasia,  --On tube feedings, status post PEG tube and working with speech therapy    Remote history of tobacco abuse, smoking for only 15 years before he quit over 20 years ago    Obstructive sleep apnea  --On CPAP    Diastolic and congestive heart failure  --With an ICD permanent pacemaker    Very mild dementia with memory loss  --On Namenda    Abnormal chest x-ray  --With reticulonodular markings, probably combination of fluid from his heart failure, and changes consistent with interstitial lung disease      DISCUSSION    His wife wants him to just be followed up with an x-ray at [facility] prior to long term I think is reasonable, a lot of the reticulonodular markings could be due to the chronic aspiration and volume overload from the heart failure etc. we discussed this today.  He is doing better with dysphagia or cautions and speech therapy, he'll continue that.    Follow-up in a couple months with an x-ray prior to the visit at [facility] as they have a portable x-ray machine and he is unable stand.    He and his wife were told to call and given extensions 104 if needed for an earlier visit if problems arise prior to the scheduled followup.      Continue BiPAP therapy and oxygen.    Tonya Bonilla, APRN  03/02/20189:57 AM  Electronically signed     Please note that portions of this note were completed with a voice recognition program. Efforts were made to edit  the dictations, but occasionally words are mistranscribed.      CC: Colton Dickens MD

## 2018-03-06 NOTE — PROGRESS NOTES
Patient: Magdi Arriaga  : 1945    Primary Care Provider: Colton Dickens MD    Requesting Provider: As above        History    Chief Complaint: History of subdural hematoma with left sided weakness.    History of Present Illness: Mr. Arriaga is a 72-year-old gentleman who suffered a ground-level fall and suffered an acute interhemispheric subdural hematoma.  He was managed nonoperatively and went to rehabilitation but declined.  As such on 17 he underwent right craniotomy to evacuate the convexity and interhemispheric subdural hematoma.  Subsequently he required bowel surgery and bowel resection.  He's had a long drawn out recovery.  He has no headache.  He is not ambulatory given ongoing weakness in his left leg.  He continues to undergo therapy.  He has recently passed his swallowing test and is now eating.    Review of Systems   Constitutional: Positive for activity change, appetite change and fatigue. Negative for chills, diaphoresis, fever and unexpected weight change.   HENT: Negative for congestion, dental problem, drooling, ear discharge, ear pain, facial swelling, hearing loss, mouth sores, nosebleeds, postnasal drip, rhinorrhea, sinus pressure, sneezing, sore throat, tinnitus, trouble swallowing and voice change.    Eyes: Negative for photophobia, pain, discharge, redness, itching and visual disturbance.   Respiratory: Positive for apnea and cough. Negative for choking, chest tightness, shortness of breath, wheezing and stridor.    Cardiovascular: Negative for chest pain, palpitations and leg swelling.   Gastrointestinal: Negative for abdominal distention, abdominal pain, anal bleeding, blood in stool, constipation, diarrhea, nausea, rectal pain and vomiting.   Endocrine: Negative for cold intolerance, heat intolerance, polydipsia, polyphagia and polyuria.   Genitourinary: Positive for flank pain. Negative for decreased urine volume, difficulty urinating, dysuria, enuresis, frequency,  "genital sores, hematuria and urgency.   Musculoskeletal: Positive for arthralgias and back pain. Negative for gait problem, joint swelling, myalgias, neck pain and neck stiffness.   Skin: Negative for color change, pallor, rash and wound.   Allergic/Immunologic: Negative for environmental allergies, food allergies and immunocompromised state.   Neurological: Negative for dizziness, tremors, seizures, syncope, facial asymmetry, speech difficulty, weakness, light-headedness, numbness and headaches.   Hematological: Negative for adenopathy. Does not bruise/bleed easily.   Psychiatric/Behavioral: Positive for decreased concentration. Negative for agitation, behavioral problems, confusion, dysphoric mood, hallucinations, self-injury, sleep disturbance and suicidal ideas. The patient is not nervous/anxious and is not hyperactive.    All other systems reviewed and are negative.      The patient's past medical history, past surgical history, family history, and social history have been reviewed at length in the electronic medical record.    Physical Exam:   Ht 177.8 cm (69.99\")  Wt 75.3 kg (166 lb)  BMI 23.83 kg/m2  The patient is awake, alert, and sitting up in his wheelchair.  He follows simple commands.  There is a subtle left pronator drift.  His left lower extremity strength is not quite antigravity.  Sensation is intact to light touch testing.    Medical Decision Making    Data Review:   CT of the brain demonstrates changes related to his prior craniotomy.  The continues to be just some poor definition of the sulci and gyri over the right convexity but I don't see any overt residual or certainly recurrent fluid collection.    Diagnosis:   Subdural hematoma status post right craniotomy for evacuation.    Treatment Options:   Mr. Arriaga is holding his own.  I would pursue an aggressive program of therapy to try and get him back up on his feet.  I will be available if he has further difficulties.  He will follow-up " on an as-needed basis.       Diagnosis Plan   1. History of subdural hematoma         Scribed for True Frank MD by Keke Olivares CMA on 03/06/2018 at 10:37 AM      I, Dr. Frank, personally performed the services described in the documentation, as scribed in my presence, and it is both accurate and complete.

## 2018-03-16 NOTE — TELEPHONE ENCOUNTER
JOE WITH [facility] CALLED AND SAID DELMA WAS RELEASE FROM THE HOSPITAL ON Wednesday AN HE IS SCHEDULED TO SEE TGF ON MONDAY - SHE NEEDS A VERBAL ORDER TO START SPEECH THERAPY - YOU CAN CALL HER [phone] IF SHE IS WITH A PATIENT YOU CAN LEAVE A VERBAL ORDER ON HER SECURE VOICE MAIL

## 2018-03-16 NOTE — TELEPHONE ENCOUNTER
SISSY WITH [facility] IS CALLING ASK FOR VERBAL ORDERS FOR HOME HEALTH AND OT  OT 2 X'S  WEEKLY FOR 6 WEEKS     PLEASE CALL HER [phone]

## 2018-03-22 NOTE — PROGRESS NOTES
Subjective   Magdi Arriaga is a 72 y.o. male.     History of Present Illness     The patient underwent craniotomy in November for subdural hematoma following a fall.  Remarkably he developed acute bowel obstruction the same hospitalization and underwent laparotomy.  A PEG tube was placed and he has convalesced at skilled care until the last week.  He is back at home now and has home Health for rehabilitation.  The PEG tube is not working and is eating solid foods adequately.  He has no appreciable pain.  He has had no cough.  He does have some degree of constipation.    The following portions of the patient's history were reviewed and updated as appropriate: allergies, current medications, past family history, past medical history, past social history, past surgical history and problem list.    Review of Systems   Constitutional: Negative for appetite change and fatigue.        Appetite with solid foods improving   HENT: Negative for ear pain and sore throat.    Respiratory: Negative for cough and shortness of breath.    Cardiovascular: Negative for chest pain and palpitations.   Gastrointestinal: Negative for abdominal pain and nausea.   Musculoskeletal: Negative for arthralgias and back pain.   Skin:        Buttocks skin mildly inflamed   Neurological: Negative for dizziness and headaches.   Psychiatric/Behavioral: Negative for sleep disturbance. The patient is not nervous/anxious.        Objective   Blood pressure 125/64, pulse 64, temperature 98.4 °F (36.9 °C), resp. rate 16, weight 72.6 kg (160 lb), SpO2 97 %.    Physical Exam   Constitutional: He appears well-developed and well-nourished. No distress.   Neck: Normal range of motion. Neck supple. No JVD present.   Cardiovascular: Normal rate, regular rhythm and normal heart sounds.    Pulmonary/Chest: Effort normal and breath sounds normal. He has no wheezes. He has no rales.   Abdominal: Soft. Bowel sounds are normal. He exhibits no mass. There is no  tenderness.   PEG tube in place in upper abdomen   Musculoskeletal: Normal range of motion. He exhibits no edema or tenderness.   Lymphadenopathy:     He has no cervical adenopathy.   Neurological: He is alert.   Oriented ×2.  Motor 3 over 4 bilaterally upper and lower extremities   Skin: Skin is warm and dry.   1 cm stage I decubitus change on right buttocks   Psychiatric: He has a normal mood and affect. His behavior is normal.   Thought content and judgment are shallow   Nursing note and vitals reviewed.    Procedures  Assessment/Plan   Magdi was seen today for abdominal pain.    Diagnoses and all orders for this visit:    Essential hypertension    Chronic obstructive pulmonary disease, unspecified COPD type    JONNY (obstructive sleep apnea)    Small bowel obstruction (S/P Exp lap for acute SBO 11/20/17)    Acute type B viral hepatitis related to platelet transfusion, followed by [facility]      The patient seems to have some persistent degree of cognitive impairment.  He has no hard neurologic signs now and has an opportunity for steady improvement.    The patient is eating adequately now in the PEG tube should be removed.    Evaluation has substantial heart disease and intermittent hypertension.  He should resume his normal amlodipine.    The patient has marked impairment of physical activity at this time.  Unable to stand and bear weight.  He should continue home health for steady progress in his functional capacity.    The patient has acute hepatitis B over the last several months.  He continues regular monitoring and treatment from Dr. Ng.    Patient Instructions   1.  Plan removal of PEG tube soon.    2.  Start amlodipine 5 mg at night to regulate blood pressure.    3.  Follow well-balanced diet low in salt.    4.  Use barrier cream on buttocks - to every 2 hours prevent pressure injury.    5.  Continue home health PT, OT, and ST.    6.  Stop Senokot - use MiraLAX daily as needed to regulate  bowels.    7.  Office visit in one month.    Electronically signed Colton Dickens M.D.3/22/2018 6:43 AM

## 2018-03-22 NOTE — PATIENT INSTRUCTIONS
1.  Plan removal of PEG tube soon.    2.  Start amlodipine 5 mg at night to regulate blood pressure.    3.  Follow well-balanced diet low in salt.    4.  Use barrier cream on buttocks - to every 2 hours prevent pressure injury.    5.  Continue home health PT, OT, and ST.    6.  Stop Senokot - use MiraLAX daily as needed to regulate bowels.    7.  Office visit in one month.

## 2018-04-02 NOTE — TELEPHONE ENCOUNTER
JUSTICE CALLED AND STATES DELMA IS DOING OK - WANTS TO KNOW IF TGF KNOWS WHEN HIS STOMACH TUBE WILL COME OUT AND SHE ALSO NEEDS FOR TGF TO PUT IN AN ORDER FOR A ROHO CUSHION FOR HIS WHEELCHAIRA,HE HAS A SORE ON HIS BUTTOCK ----THE THERAPIST WAS THERE AND STATED THIS IS WHAT HE NEEDS-     PLEASE SEND THIS TO   PATIENT AIDS PHONE NUMBER [phone]  WE CAN FAX IT [phone]

## 2018-04-03 PROBLEM — R41.0 CONFUSION: Status: ACTIVE | Noted: 2018-01-01

## 2018-04-03 PROBLEM — N39.0 UTI (URINARY TRACT INFECTION): Status: ACTIVE | Noted: 2018-01-01

## 2018-04-03 PROBLEM — R40.1 STUPOR: Status: RESOLVED | Noted: 2017-01-01 | Resolved: 2018-01-01

## 2018-04-03 PROBLEM — B37.2 MONILIAL RASH: Status: ACTIVE | Noted: 2018-01-01

## 2018-04-03 PROBLEM — L89.309 DECUBITUS ULCER, BUTTOCK: Status: ACTIVE | Noted: 2018-01-01

## 2018-04-03 NOTE — PROGRESS NOTES
Telephone call from home health nurse BRANDI 's morning.  [phone].  The patient has darkened urine and increased confusion.  Obtain UA CNS.  Start Cipro 250 every 12 hours 10 days.  Also has monilial rash in skin folds.  Start nystatin powder.  Also has new decubitus and buttocks area.  Stage unclear.  Start daily cleaning and application of Silvadene cream.  Relieve pressure on buttocks through Veronique, and therapeutic bed.

## 2018-04-03 NOTE — TELEPHONE ENCOUNTER
[facility] IS CALLING SEEM TO BE AN ISSUE WITH HIS CALLED IN MEDICATION

## 2018-04-03 NOTE — TELEPHONE ENCOUNTER
I called and left [place] on wife's phone to assure her that TGF is looking into his PEG tube situation.  Order for ROHO cushion faxed per TGF.

## 2018-04-03 NOTE — TELEPHONE ENCOUNTER
I called Linda at [facility].  Pt allergic to sulfa - questions silver sulfadiazine cream.  Per TGF, allergy unk childhood hx.  OK to dispense, but pt to test it first - stop and call office if rash develops at decubitus area after use. Linda said RX will state that.  I also called pt's wife and explained this to her.  She verb understanding.

## 2018-04-09 NOTE — TELEPHONE ENCOUNTER
I called wife Netta and notified her per TGF UA shows no infection and to ensure adequate hydration. She verb understanding.

## 2018-04-09 NOTE — TELEPHONE ENCOUNTER
----- Message from Colton Dickens MD sent at 4/5/2018  7:53 PM EDT -----  Notify wife that urinalysis shows nose  Infection.  Ensure adequate hydration.

## 2018-04-10 NOTE — TELEPHONE ENCOUNTER
Med Refill:  Entecavir 0.5 mg and Bupropion 75 mg.  These meds were given while in the hospital and he was given 1 months supply while in the nursing home, he now needs refills.

## 2018-04-19 NOTE — PROGRESS NOTES
Subjective:     Encounter Date:04/19/2018      Patient ID: Magdi Arriaga is a 72 y.o. male.    PCP: Colton Dickens MD    Chief Complaint: No chief complaint on file.       PROBLEM LIST:  1. Coronary artery disease:  a. NSTEMI, (04/2017).   b. LHC (04/18/2017): Showed occluded proximal LAD, 80% large ostial first diagonal, occluded mid Circ, 80 % large OM1, 80% mid RCA. EF 25%.  c. Echocardiogram: EF 40%.  d. CABG x 5 (04/24/2017): LIMA to LAD, VG sequence OM1 and OM2. VG to DM of LAD and VG to RCA. (Dr. Berger).  e. LHC (08/08/2017):Severe proximal LAD/first diagonal stenosis (ungrafted) history of a 2.5 x 23 Xience EES. Occluded LAD with patent vein graft to the diagonal and LIMA to the LAD. Severe left circumflex stenosis with widely patent vein graft to the OM1 and OM 2. Occluded RCA with patent saphenous vein graft. Disease and a small ramus branch (too small for PCI) Normal right heart pressures and post capillary wedge pressure.  f. Echocardiogram (11/13/2017): EF 40%.   2. Atrial flutter, s/p ECV (05/2017)   3. LBBB.  4. Hypertension.  5. Hyperlipidemia.  6. Sleep apnea.  7. COPD.  8. Traumatic subdural hematoma (SDH), s/p right craniotomy (11/08/2017).  9. Renal insufficiency.  10. Arthritis.  11. Asthma.  12. Depression.  13. Hx of bilatreal DVT.  14. Hx of hemorrhagic shock and PTX from right renal RP bleed (09/2014.  15. Surgical Hx:  a. Appendectomy.  b. Back surgery.  c. CABG.  d. L knee surgery.  e. L TKR.  f. Tonsillectomy.  g. IVC filter.  h. Small bowel obstruction, s/p colon resection, Dr. BOB Corona (11/20/2017).  i. PEG tube removed on (04/19/2018).    History of Present Illness  Magdi Arriaga presents today for a 6 week hospital follow-up. The patient has a history of CAD, atrial flutter, LBBB, and cardiac risk factors. Since last visit, the patient reports feeling fine from a cardiac standpoint. He has been home from the hospital for a month now, and it has been approximately 1  year since his CABG. He states that he has been experiencing weakness on the left side of his body and that he has difficulty managing his left foot. The wife adds that the patient's confusion and speech have worsened slightly. Denies chest pain, shortness of breath, edema, palpitations, and syncope. The patient is currently limited to a wheelchair because of his overall weakness, weight loss, and loss of muscle mass. However, he continues to attend physical therapy and the goal is to eventually stand and walk again.     Allergies   Allergen Reactions   • Sulfa Antibiotics      UNKNOWN CHILDHOOD REACTION         Current Outpatient Prescriptions:   •  acetaminophen (TYLENOL) 325 MG tablet, Take 2 tablets by mouth Every 4 (Four) Hours As Needed for Headache or Fever (fever greater than 101.5 F)., Disp: , Rfl:   •  aspirin 81 MG chewable tablet, Chew 1 tablet Daily., Disp: , Rfl:   •  atorvastatin (LIPITOR) 10 MG tablet, Take 1 tablet by mouth Every Night., Disp: , Rfl:   •  buPROPion (WELLBUTRIN) 75 MG tablet, Take 1 tablet by mouth Every 12 (Twelve) Hours., Disp: 90 tablet, Rfl: 0  •  carvedilol (COREG) 3.125 MG tablet, Take 3.125 mg by mouth 2 (Two) Times a Day With Meals., Disp: , Rfl:   •  entecavir (BARACLUDE) 0.5 MG tablet, Take 1 tablet by mouth Daily., Disp: 90 tablet, Rfl: 0  •  memantine (NAMENDA) 10 MG tablet, Take 1 tablet by mouth Every 12 (Twelve) Hours., Disp: , Rfl:   •  nitroglycerin (NITROSTAT) 0.4 MG SL tablet, Place 1 tablet under the tongue Every 5 (Five) Minutes As Needed for Chest Pain. Take no more than 3 doses in 15 minutes., Disp: , Rfl: 12  •  rivastigmine (EXELON) 9.5 MG/24HR patch, Place 1 patch on the skin Daily., Disp: , Rfl:   •  sacubitril-valsartan (ENTRESTO) 49-51 MG tablet, Take 1 tablet by mouth Every 12 (Twelve) Hours., Disp: 60 tablet, Rfl:   •  albuterol (PROVENTIL) (5 MG/ML) 0.5% nebulizer solution, Take 0.5 mL by nebulization Every 4 (Four) Hours As Needed for Wheezing., Disp:  , Rfl: 12  •  Amino Acids-Protein Hydrolys (PRO-STAT) liquid, 1 packet by Nasogastric route Daily., Disp: , Rfl:   •  amLODIPine (NORVASC) 5 MG tablet, Take 1 tablet by mouth Every Night., Disp: , Rfl:   •  ciprofloxacin (CIPRO) 250 MG tablet, Take 1 tablet by mouth Every 12 (Twelve) Hours., Disp: 20 tablet, Rfl: 0  •  docusate sodium (COLACE) 150 MG/15ML liquid, Take 10 mL by mouth 2 (Two) Times a Day As Needed (constipation)., Disp: , Rfl:   •  famotidine (PEPCID) 20 MG tablet, Take 1 tablet by mouth Daily., Disp: , Rfl:   •  nystatin (MYCOSTATIN) 376652 UNIT/GM powder, Apply  topically 2 (Two) Times a Day., Disp: 45 g, Rfl: 3  •  ondansetron (ZOFRAN) 4 MG tablet, Take 1 tablet by mouth Every 6 (Six) Hours As Needed for Nausea or Vomiting., Disp: , Rfl:   •  ondansetron (ZOFRAN) 4 MG/2ML injection, Infuse 2 mL into a venous catheter Every 6 (Six) Hours As Needed for Nausea or Vomiting., Disp: , Rfl:   •  silver sulfadiazine (SILVADENE) 1 % cream, Apply  topically Every 12 (Twelve) Hours., Disp: 85 g, Rfl: 1    The following portions of the patient's history were reviewed and updated as appropriate: allergies, current medications, past family history, past medical history, past social history, past surgical history and problem list.    Review of Systems   Constitution: Negative.   Cardiovascular: Negative.    Respiratory: Positive for cough and snoring.    Hematologic/Lymphatic: Negative for bleeding problem. Bruises/bleeds easily.   Skin: Negative for rash.   Musculoskeletal: Positive for arthritis, back pain, falls, muscle cramps, muscle weakness, myalgias, neck pain and stiffness.   Gastrointestinal: Negative for heartburn, nausea and vomiting.   Genitourinary: Positive for bladder incontinence.   Neurological: Positive for difficulty with concentration, disturbances in coordination and loss of balance.   Psychiatric/Behavioral: The patient is nervous/anxious.         Confusion   Allergic/Immunologic: Positive  "for environmental allergies.          Objective:     Vitals:    04/19/18 1435   BP: 130/86   BP Location: Left arm   Patient Position: Sitting   Pulse: 68   Weight: 72.6 kg (160 lb)   Height: 172.7 cm (68\")         Physical Exam   Constitutional: He is oriented to person, place, and time. He appears well-developed and well-nourished.   HENT:   Mouth/Throat: Oropharynx is clear and moist.   Neck: No JVD present. Carotid bruit is not present. No thyromegaly present.   Cardiovascular: Regular rhythm, S1 normal, S2 normal, normal heart sounds and intact distal pulses.  Exam reveals no gallop, no S3 and no S4.    No murmur heard.  Pulses:       Carotid pulses are 2+ on the right side, and 2+ on the left side.       Radial pulses are 2+ on the right side, and 2+ on the left side.   Pulmonary/Chest: He has wheezes.   Abdominal: Soft. Bowel sounds are normal. He exhibits no mass. There is no tenderness.   Musculoskeletal: He exhibits no edema.   Neurological: He is alert and oriented to person, place, and time.   Skin: Skin is warm and dry. No rash noted.       Lab Review:    Lab Results   Component Value Date    GLUCOSE 86 03/06/2018    BUN 40 (H) 03/06/2018    CREATININE 1.40 (H) 03/06/2018    EGFRIFNONA 50 (L) 03/06/2018    BCR 28.6 (H) 03/06/2018    K 5.2 03/06/2018    CO2 30.0 03/06/2018    CALCIUM 10.3 03/06/2018    ALBUMIN 3.80 03/06/2018    LABIL2 1.1 (L) 03/06/2018    AST 25 03/06/2018    ALT 44 (H) 03/06/2018     Lab Results   Component Value Date    CHOL 132 12/02/2017    TRIG 169 (H) 12/02/2017    HDL 65 (H) 09/22/2017    AST 25 03/06/2018    ALT 44 (H) 03/06/2018     Lab Results   Component Value Date    HGBA1C 5.00 10/22/2017         Procedures        Assessment:   Diagnoses and all orders for this visit:    Coronary artery disease involving coronary bypass graft of native heart without angina pectoris    History of atrial flutter    Essential hypertension    Hyperlipidemia LDL goal " <70        Impression  1. Coronary artery disease: No anginal symptoms reported.Though not active.  One year post CABG  2. Atrial flutter: Asymptomatic.  Not anticoagulation candidate  3. LBBB: Asymptomatic.  4. Hypertension: Controlled.   5. Hyperlipidemia: Stable, on statin therapy.  6. Generalized debilitation, essentially wheelchair bound, due to chronic traumatic subdural hematoma    Plan:  1. Encouraged to stay active as tolerated.  Exercise prescription given.  2. Not a candidate for anticoagulation therapy.   3. Continue current medications, as listed above.  4. Revisit in 12 months, or sooner as needed.    Scribed for Kvng Stauffer MD by Lakia Lama. 4/19/2018  3:19 PM    I, Kvng Stauffer MD, personally performed the services described in this documentation as scribed by the above individual in my presence, and it is both accurate and complete      Please note that portions of this note may have been completed with a voice recognition program. Efforts were made to edit the dictations, but occasionally words are mistranscribed.

## 2018-04-26 NOTE — TELEPHONE ENCOUNTER
NEEDS A NEW ORDER FOR PT AND OT, NOT MUCH PROGRESS IN THE LAST 2 MONTHS.  FAX ORDER TO [facility] -[id number]

## 2018-04-27 NOTE — TELEPHONE ENCOUNTER
"Per TGF, called pt's wife to inquire re pt's general status. She said \"he's doing ok, except he's unable to ambulate and is mostly chair bound/in a wheelchair\". She said home health has done all they can so she wld like to get him in at [facility] outpatient therapy, hopefully by next week.   TGF said he will put order in to [facility] for outpatient PT/OT but pt must be in for OX within the next 2-3 wks. Wife verb understanding. She said it will not be a problem getting pt here for an appt; they use Whe[employer] for transportation. She transferred to  to schedule OX.    "

## 2018-05-02 NOTE — TELEPHONE ENCOUNTER
[facility] called [phone] states wife called pt's urine very dark, strong odor, just finished round of abx and wants to know if needs more abx.  Per TGF, UA C&S. Marguerite verb understanding.

## 2018-05-08 PROBLEM — R41.0 CONFUSION: Status: RESOLVED | Noted: 2018-01-01 | Resolved: 2018-01-01

## 2018-05-08 PROBLEM — L89.309 DECUBITUS ULCER, BUTTOCK: Status: RESOLVED | Noted: 2018-01-01 | Resolved: 2018-01-01

## 2018-05-08 PROBLEM — Z20.9 HISTORY OF EXPOSURE TO INFECTIOUS DISEASE: Status: RESOLVED | Noted: 2017-05-16 | Resolved: 2018-01-01

## 2018-05-08 PROBLEM — N39.0 UTI (URINARY TRACT INFECTION): Status: RESOLVED | Noted: 2018-01-01 | Resolved: 2018-01-01

## 2018-05-08 NOTE — PATIENT INSTRUCTIONS
1.  Continue usual medicines and supplements - as listed.    2.  Follow well-balanced diet - low in salt - low in sugar - low in fat.    3.  Stretching exercises every day - promote muscle strength.    4.  Change positions in bed and chair - regularly - prevent skin injuries - clean and inspect skin daily.    5.  Return visit September - annual checkup fasting.

## 2018-05-08 NOTE — PROGRESS NOTES
Subjective   Magdi Arriaga is a 72 y.o. male.     History of Present Illness     The patient had a complex hospitalization last fall with a small bowel obstruction and extensive GI surgery.  A PEG tube placed in December was  removed this spring.  He suffered a subdural hematoma last fall and underwent craniotomy.  He has been slow regaining mentation and is left with generalized weakness with a bedfast and chair fast existence.  He lives in his own home and has close supervision and assistance from his wife.  He has local grown children who visit regularly.  He did experience skin breakdown this winter but has been much more closely regarding his body position and cleanliness.  His skin has healed this spring.    The following portions of the patient's history were reviewed and updated as appropriate: allergies, current medications, past family history, past medical history, past social history, past surgical history and problem list.    Review of Systems   Constitutional: Negative for appetite change and fatigue.   HENT: Negative for ear pain and sore throat.    Respiratory: Negative for cough and shortness of breath.    Cardiovascular: Negative for chest pain and palpitations.   Gastrointestinal: Negative for abdominal pain and nausea.   Musculoskeletal: Negative for arthralgias and back pain.   Neurological: Negative for dizziness and headaches.       Objective   Blood pressure 120/74, pulse 68, temperature 97.6 °F (36.4 °C), temperature source Oral, resp. rate 16, SpO2 98 %.    Physical Exam   Constitutional: He is oriented to person, place, and time. He appears well-developed and well-nourished. No distress.   HENT:   Right Ear: External ear normal.   Left Ear: External ear normal.   Mouth/Throat: Oropharynx is clear and moist.   Neck: Normal range of motion. Neck supple. No JVD present.   Cardiovascular: Normal rate, regular rhythm and normal heart sounds.    Pulmonary/Chest: Effort normal and breath  sounds normal. He has no wheezes. He has no rales.   Abdominal: He exhibits no mass. There is no tenderness.   Lymphadenopathy:     He has no cervical adenopathy.   Neurological: He is alert and oriented to person, place, and time. Coordination normal.   Upper extremity strength 4/5.  Lower extreme strength 3/5.  Unable to transfer independently.   Skin: Skin is warm and dry. No rash noted.   Psychiatric: He has a normal mood and affect. His behavior is normal. Judgment and thought content normal.   Vitals reviewed.    Procedures  Assessment/Plan   Magdi was seen today for extremity weakness.    Diagnoses and all orders for this visit:    Essential hypertension    Hyperlipidemia LDL goal <70  -     Comprehensive Metabolic Panel  -     Lipid Panel    Acute type B viral hepatitis related to platelet transfusion, followed by [facility]    Vascular dementia without behavioral disturbance    Monilial rash  -     CBC & Differential  -     C-reactive Protein  -     Hemoglobin A1c  -     TSH  -     CBC Auto Differential    JONNY (obstructive sleep apnea)    Vitamin D deficiency  -     Vitamin D 25 Hydroxy    Hyperglycemia   -     Hemoglobin A1c    Prediabetes   -     TSH    Other orders  -     nystatin (MYCOSTATIN) 862200 UNIT/GM powder; Apply  topically 2 (Two) Times a Day. Yeast in skin folds  -     famotidine (PEPCID) 20 MG tablet; Take 1 tablet by mouth Every Night.      The patient has a marked reduction in quality of life over the last year following a subdural hematoma and extensive GI surgery.  He is bedfast and chair fast most the time.  I've counseled the patient and his wife on close monitoring of skin integrity.    The patient has experienced significant brain injury and has mild cognitive impairment.  He should continue regular monitoring with the neurologist.  He remains on the Exelon patch and Namenda and the patient wife feel he has gained significant cognitive impairment function.  He has excellent  conversational skills today with intelligent and insightful response.  His impaired physical health however created great deal of isolation and loss of executive functions.  Bupropion has had a significantly to depression control and improved his quality of life.    Patient has severe coronary artery disease and is status post CABG.  His secondary prevention is adequate now with atorvastatin and aspirin.    The patient experienced a remarkable acute hepatitis B from a platelet transfusion.  Laboratory test indicate he is in complete remission and should continue monitoring with infectious disease.    Patient Instructions   1.  Continue usual medicines and supplements - as listed.    2.  Follow well-balanced diet - low in salt - low in sugar - low in fat.    3.  Stretching exercises every day - promote muscle strength.    4.  Change positions in bed and chair - regularly - prevent skin injuries - clean and inspect skin daily.    5.  Return visit September - annual checkup fasting.    6.  Blood count is mildly anemic but improved from previous values.    7.  LDL cholesterol excellent at 57.    8.  C-reactive protein is almost normal at 1.1.  Marked improvement in systemic inflammation.    9.  Hemoglobin A1c, TSH, and vitamin D level are normal.    10.  Please verify treatment status of hepatitis B and recent visit with infectious disease.    Electronically signed Colton Dickens M.D.5/10/2018 7:03 AM

## 2018-05-11 NOTE — TELEPHONE ENCOUNTER
I called labs to pt's wife Netta.  Left VM per  [facility]CIRA.      Per TGF:  Mildly anemic but improved from previous values.  LDL excellent at 57.  C-reactive protein is almost normal at 1.1.  Marked improvement in systemic inflammation.  Hemoglobin A1c, TSH, and vitamin D level are normal.    Please call to verify treatment status of hepatitis B vaccines & recent visit with infectious disease.  Call w. Questions too

## 2018-05-15 NOTE — TELEPHONE ENCOUNTER
I called wife. Per MARINA, advised her to discuss this with Dr Ng, his infectious disease doc. She verb understanding.

## 2018-05-15 NOTE — TELEPHONE ENCOUNTER
Returning nurse call, Magdi only has one Hep B vac on 5/17/17.  None since then.  Is it too late to take the 2nd and 3rd shots?

## 2018-05-16 NOTE — PROGRESS NOTES
"Subjective     CC: Memory Loss (NP, Last seen in 2014)      History of Present Illness   Magdi Arriaga is a 72 y.o. male who comes to clinic today for evaluation of cognitive impairment. His family has noted impairments in memory since 2008, though this has significantly worsened since at least late 2017. He has had worsening memory and executive function. There has been associated irritability. This may in part be due to his loss of independence. There are no clear modifying factors.    He has had a very complicated medical history with multiple hospitalizations including surgical evacuation of SDH.    It is noted that I evaluated him in about 2014 with memory impairment suspected secondary to multiple medical co-morbidities and depression.    Prior evaluation has included screening blood work  and a head CT  which were unremarkable (consistent with his history of SDH).     I have reviewed and confirmed the past family, social and medical history as accurate on 5/16/18.     Review of Systems   Constitutional: Negative.    Respiratory: Negative.    Cardiovascular: Negative.    Gastrointestinal: Negative.    Genitourinary: Negative.    Musculoskeletal: Negative.    All other systems reviewed and are negative.      Objective   General appearance today is normal.   Peripheral pulses were present and symmetric.   The ophthalmoscopic exam today is unremarkable. The discs and posterior elements are unremarkable.    /72   Ht 172.7 cm (67.99\")     Physical Exam   Neurological: He has normal strength. He has a normal Finger-Nose-Finger Test.   Reflex Scores:       Tricep reflexes are 1+ on the right side and 1+ on the left side.       Bicep reflexes are 1+ on the right side and 1+ on the left side.       Brachioradialis reflexes are 1+ on the right side and 1+ on the left side.       Patellar reflexes are 1+ on the right side and 1+ on the left side.       Achilles reflexes are 1+ on the right side and 1+ on the " left side.  Psychiatric: His speech is normal.        Neurologic Exam     Mental Status   Oriented to person.   Disoriented to place.   Disoriented to time.   Registration: recalls 3 of 3 objects. Recall of objects at 5 minutes: 0/3. Follows 3 step commands.   Attention: normal.   Speech: speech is normal   Level of consciousness: alert  Knowledge: poor.   Normal comprehension.     Cranial Nerves   Cranial nerves II through XII intact.     Motor Exam   Muscle bulk: normal  Overall muscle tone: normal    Strength   Strength 5/5 throughout.     Sensory Exam   Light touch normal.     Gait, Coordination, and Reflexes     Gait  Gait: (largely wheelchair bound)    Coordination   Finger to nose coordination: normal    Reflexes   Right brachioradialis: 1+  Left brachioradialis: 1+  Right biceps: 1+  Left biceps: 1+  Right triceps: 1+  Left triceps: 1+  Right patellar: 1+  Left patellar: 1+  Right achilles: 1+  Left achilles: 1+      MMSE=18    Assessment/Plan   Magdi was seen today for memory loss.    Diagnoses and all orders for this visit:    Dementia with behavioral disturbance, unspecified dementia type    Other orders  -     rivastigmine (EXELON) 13.3 MG/24HR patch; Place 1 patch on the skin Daily.  -     buPROPion (WELLBUTRIN) 75 MG tablet; Take 1 tablet by mouth 3 (Three) Times a Day.          DISCUSSION/SUMMARY    Magdi Arriaga comes to clinic today with a history of Dementia . This is likely multi-factorial.  After discussing potential treatment options, it was elected to continue on  memantine and increase rivastigmine patch to 13.3 mg. Next we will increase his Wellbutrin to 75 mg tid. He will then follow up in 3 months , or sooner if needed.     As part of this visit I reviewed prior lab results, reviewed radiology results, obtained additional history from the family which is incorporated in the HPI and reviewed records from prior hospitalizations which is incorporated in the HPI. Please see above for  additional details.

## 2018-06-15 PROBLEM — R06.00 ACUTE DYSPNEA: Status: ACTIVE | Noted: 2018-01-01

## 2018-06-15 NOTE — ED PROVIDER NOTES
Subjective   Magdi Arriaga is a 72 y.o.male who presents to the ED by EMS with c/o shortness of breath and confusion that has been progressively worsening for the past 24 hours. His wife called EMS for increasing confusion and shortness of breath but the patient himself has no complaints. Per his wife, he has had rhinorrhea, worsening cough and weakness but denies chest pain, chest tightness, lower extremity edema, fevers, chills or any other complaints at this time. He received a breathing treatment last night by his wife and by EMS en route that improved his shortness of breath. He has a h/o dementia and is not a reliable historian. He has a h/o COPD, PNA and CHF. He is not on a diuretic and is followed by Dr. Stauffer, Cardiology.         History provided by:  Patient and spouse  Shortness of Breath   Severity:  Moderate  Onset quality:  Gradual  Duration:  24 hours  Timing:  Constant  Progression:  Worsening  Relieved by:  Oxygen  Worsened by:  Exertion  Ineffective treatments:  Rest  Associated symptoms: cough (chronic)    Associated symptoms: no abdominal pain, no chest pain and no fever    Risk factors comment:  COPD      Review of Systems   Unable to perform ROS: Dementia   Constitutional: Negative for fever.   HENT: Positive for rhinorrhea.    Respiratory: Positive for cough (chronic) and shortness of breath.    Cardiovascular: Negative for chest pain and leg swelling.   Gastrointestinal: Negative for abdominal pain.   Neurological: Positive for weakness.        Patient's confusion and weakness from SDH is not improving.    Psychiatric/Behavioral: Positive for confusion.       Past Medical History:   Diagnosis Date   • Allergic rhinitis     History of allergy injections as a child.   • Arthritis    • Asthma      Patient has had a history of asthma since childhood.   • Bradycardia 8/23/2017   • Bronchiectasis 1/6/2017    A.  CT scan of the chest 12/13/2015 reports mild to moderate bronchiectasis.    • CHF  (congestive heart failure) 2017    ECHO - EF 25%   • Closed fracture of multiple ribs of right side 10/21/2017   • COPD (chronic obstructive pulmonary disease)    • Coronary artery disease 2017    CABG X 5   • Depression 1/6/2017   • DVT (deep venous thrombosis)     Bilateral   • Falls     A.  History of falling traumatic injuries in April 2014 and May 2014 resulting and left rib  fracture and left clavicle fracture.   • Hepatitis B 2017    Transfusion platelets - acute disease managed by ID   • History of transfusion    • Hypertension    • Iron deficiency 7/24/2017   • Left bundle branch block 4/17/2017   • Lumbar spinal stenosis 2013    Spinal surgery   • Normocytic anemia 4/17/2017   • Osteoarthritis 1/6/2017   • Patellar tendon rupture     A.  Status post primary repair of the left periprosthetic patellar tendon tear 6/11/2014, post fall at the end of April 2014.   • Pneumonia      A.  Left lower lobe pneumonia treated at [facility], 5/8/2014 through 5/20/2014.   • Pneumothorax      A.  Status post chest tube placed 5/13/2014 and discontinued 5/15/2014 with stable chest x-ray.   • SBO (small bowel obstruction) 2017    Partial colon and small bowel obstruction   • SDH (subdural hematoma) 2017 NOV 2017 craniotomy    • Sleep apnea with use of continuous positive airway pressure (CPAP)    • Traumatic hemorrhagic shock      A.  Secondary to right renal retroperitoneal hemorrhage, 9/2014.   • Vitamin D deficiency 1/6/2017       Allergies   Allergen Reactions   • Sulfa Antibiotics      UNKNOWN CHILDHOOD REACTION       Past Surgical History:   Procedure Laterality Date   • APPENDECTOMY  1959   • CARDIAC CATHETERIZATION N/A 4/18/2017    Procedure: Left Heart Cath;  Surgeon: Kvng Stauffer MD;  Location:  [facility];  Service:    • CARDIAC CATHETERIZATION N/A 8/9/2017    Procedure: Right and Left Heart Cath;  Surgeon: Kvng Stauffer MD;  Location:  [facility];  Service:    • COLON  RESECTION SMALL BOWEL N/A 11/20/2017    Procedure: COLON RESECTION SMALL BOWEL;  Surgeon: Cj Reyes MD;  Location:  [facility] OR;  Service:    • COLONOSCOPY W/ POLYPECTOMY     • CORONARY ANGIOPLASTY WITH STENT PLACEMENT     • CORONARY ARTERY BYPASS GRAFT N/A 4/24/2017    Procedure: MEDIAN STERNOTOMY, CORONARY ARTERY BYPASS GRAFT X5 UTILIZING THE INTERNAL MAMMARY ARTERY, ENDOVASCULAR VEING HARVEST UTILIZING RIGHT SAPHENOUS VEIN,TRANSESOPHAGEAL ECHO;  Surgeon: Kanu Berger MD;  Location:  [facility] OR;  Service:    • EXPLORATORY LAPAROTOMY N/A 11/20/2017    Procedure: LAPAROTOMY EXPLORATORY SMALL BOWEL RESECTION;  Surgeon: Cj Reyes MD;  Location:  [facility];  Service:    • FRACTURE SURGERY     • KNEE SURGERY  2006, 2014    primary repair -left periprosthetic patellar tendon tear 6/11/2014   • LUMBAR LAMINECTOMY  2013    With discectomy   • LUMBAR LAMINECTOMY  10/16/2013    LAMI L4/5 (Dr. Holt)   • LA HARLEY HOLE EVAC SUBDUR/EXTRA HEMATOMA Right 11/8/2017    Procedure: CRANIOTOMY FOR SUBDURAL HEMATOMA;  Surgeon: True Frank MD;  Location:  [facility];  Service: Neurosurgery   • REPLACEMENT TOTAL KNEE Bilateral 2009, 2013    Left - 2009 and right - 2013.   • SKIN BIOPSY     • TONSILLECTOMY  1957   • VENA CAVA FILTER PLACEMENT  2014       Family History   Problem Relation Age of Onset   • Adopted: Yes   • No Known Problems Mother         Adopted as child   • No Known Problems Father         No knowledge of birth family       Social History     Social History   • Marital status:    • Number of children: 2     Occupational History   • Post Office at       Retired     Social History Main Topics   • Smoking status: Former Smoker     Packs/day: 1.00     Years: 17.00     Types: Cigarettes     Start date: 1965     Quit date: 1982   • Smokeless tobacco: Never Used   • Alcohol use No      Comment: Never drinker   • Drug use: No     Other Topics Concern   • Not on file     Social History Narrative     Domestic life :  Lives in private home with wife        [employer] :   [employer]        Sleep hygiene :  In bed 11 PM to 8 AM for 8 hours of sleep        Caffeine use :  4 cup of coffee and 1 diet cola daily        Exercise habits :   Cardiac rehabilitation thru 2017 - 2018 home health rehabilitation        Diet :  Low calorie diet - low in salt and low in sugar        Occupation :   Retired        Hearing :        Vision :        Driving :  No driving             Objective   Physical Exam   Constitutional: He appears well-developed and well-nourished. No distress.   HENT:   Head: Normocephalic and atraumatic.   Right Ear: External ear normal.   Left Ear: External ear normal.   Nose: Nose normal.   Mouth/Throat: Oropharynx is clear and moist.   Airway patent.    Eyes: Conjunctivae are normal. No scleral icterus.   Neck: Normal range of motion. Neck supple. No JVD present.   Cardiovascular: Normal rate, regular rhythm and normal heart sounds.  Exam reveals no friction rub.    No murmur heard.  Pulmonary/Chest: Effort normal and breath sounds normal. No respiratory distress. He has no wheezes. He has no rales.   Lungs are clear.    Abdominal: Soft. Bowel sounds are normal. He exhibits no distension. There is no tenderness.   Musculoskeletal: Normal range of motion. He exhibits edema. He exhibits no tenderness.   Trace pretibial edema.    Lymphadenopathy:     He has no cervical adenopathy.   Neurological: He is alert.   He is oriented to person and place but not to time.   He is mildly confused.    Skin: Skin is warm and dry. He is not diaphoretic.   Psychiatric: He has a normal mood and affect. His behavior is normal.   Nursing note and vitals reviewed.      Procedures         ED Course  ED Course as of Festus 15 2238   Fri Festus 15, 2018   1326 With CHF and worsening renal disease, admission needed.  I have talked with Dr. Stauffer who agrees.  He will see him in the ED.  [LI]   1326 Dr. Dobson d/w Dr. Stauffer, Cardiology who  will see the patient in the ED and agrees with plan of admission.   [ML]   2453 Dr. Dobson updated the patient upon plans of admission.   [ML]      ED Course User Index  [LI] Alejandro Dobson MD  [ML] Donny Triplett      Recent Results (from the past 24 hour(s))   Comprehensive Metabolic Panel    Collection Time: 06/15/18 12:41 PM   Result Value Ref Range    Glucose 85 70 - 100 mg/dL    BUN 40 (H) 9 - 23 mg/dL    Creatinine 2.06 (H) 0.60 - 1.30 mg/dL    Sodium 148 (H) 132 - 146 mmol/L    Potassium 4.7 3.5 - 5.5 mmol/L    Chloride 112 (H) 99 - 109 mmol/L    CO2 22.0 20.0 - 31.0 mmol/L    Calcium 10.7 (H) 8.7 - 10.4 mg/dL    Total Protein 8.0 5.7 - 8.2 g/dL    Albumin 3.88 3.20 - 4.80 g/dL    ALT (SGPT) 10 7 - 40 U/L    AST (SGOT) 9 0 - 33 U/L    Alkaline Phosphatase 80 25 - 100 U/L    Total Bilirubin 0.7 0.3 - 1.2 mg/dL    eGFR Non African Amer 32 (L) >60 mL/min/1.73    Globulin 4.1 gm/dL    A/G Ratio 0.9 (L) 1.5 - 2.5 g/dL    BUN/Creatinine Ratio 19.4 7.0 - 25.0    Anion Gap 14.0 (H) 3.0 - 11.0 mmol/L   BNP    Collection Time: 06/15/18 12:41 PM   Result Value Ref Range    BNP >5,000.0 (H) 0.0 - 100.0 pg/mL   Troponin    Collection Time: 06/15/18 12:41 PM   Result Value Ref Range    Troponin I 0.400 (H) <=0.039 ng/mL   CBC Auto Differential    Collection Time: 06/15/18 12:41 PM   Result Value Ref Range    WBC 12.83 (H) 3.50 - 10.80 10*3/mm3    RBC 3.78 (L) 4.20 - 5.76 10*6/mm3    Hemoglobin 10.9 (L) 13.1 - 17.5 g/dL    Hematocrit 36.0 (L) 38.9 - 50.9 %    MCV 95.2 80.0 - 99.0 fL    MCH 28.8 27.0 - 31.0 pg    MCHC 30.3 (L) 32.0 - 36.0 g/dL    RDW 15.3 (H) 11.3 - 14.5 %    RDW-SD 53.5 37.0 - 54.0 fl    MPV 11.7 6.0 - 12.0 fL    Platelets 238 150 - 450 10*3/mm3    Neutrophil % 85.8 (H) 41.0 - 71.0 %    Lymphocyte % 7.1 (L) 24.0 - 44.0 %    Monocyte % 6.7 0.0 - 12.0 %    Eosinophil % 0.1 0.0 - 3.0 %    Basophil % 0.3 0.0 - 1.0 %    Immature Grans % 0.3 0.0 - 0.6 %    Neutrophils, Absolute 11.01 (H) 1.50 - 8.30 10*3/mm3     Lymphocytes, Absolute 0.91 0.60 - 4.80 10*3/mm3    Monocytes, Absolute 0.86 0.00 - 1.00 10*3/mm3    Eosinophils, Absolute 0.01 0.00 - 0.30 10*3/mm3    Basophils, Absolute 0.04 0.00 - 0.20 10*3/mm3    Immature Grans, Absolute 0.04 (H) 0.00 - 0.03 10*3/mm3   Blood Gas, Arterial    Collection Time: 06/15/18 12:55 PM   Result Value Ref Range    Site Arterial: left radial     Tino's Test N/A     pH, Arterial 7.448 7.350 - 7.450 pH units    pCO2, Arterial 29.8 (L) 35.0 - 48.0 mm Hg    pO2, Arterial 76.4 (L) 83.0 - 108.0 mm Hg    HCO3, Arterial 20.6 20.0 - 26.0 mmol/L    Base Excess, Arterial -2.7 (L) 0.0 - 2.0 mmol/L    Hemoglobin, Blood Gas 10.5 (L) 13.5 - 17.5 g/dL    Hematocrit, Blood Gas 32.1 %    Oxyhemoglobin 93.5 (L) 94 - 99 %    Methemoglobin 0.60 0.00 - 1.50 %    Carboxyhemoglobin 1.5 0 - 2 %    CO2 Content 21.5 (L) 22 - 33    Barometric Pressure for Blood Gas  mmHg    Modality Cannula     FIO2 36 %     Note: In addition to lab results from this visit, the labs listed above may include labs taken at another facility or during a different encounter within the last 24 hours. Please correlate lab times with ED admission and discharge times for further clarification of the services performed during this visit.    XR Chest 1 View   Preliminary Result   Congestive heart failure.       D:  06/15/2018   E:  06/15/2018                Vitals:    06/15/18 1902 06/15/18 2019 06/15/18 2025 06/15/18 2030   BP: 137/92 (!) 135/105  (!) 135/105   BP Location:       Patient Position:       Pulse: 89 93 103 96   Resp: 20      Temp: 98.3 °F (36.8 °C)      TempSrc: Oral      SpO2:   95%    Weight:       Height:         Medications   albuterol (PROVENTIL) nebulizer solution 0.083% 2.5 mg/3mL (not administered)   aspirin chewable tablet 81 mg (81 mg Oral Given 6/15/18 1844)   atorvastatin (LIPITOR) tablet 10 mg (10 mg Oral Given 6/15/18 2019)   budesonide-formoterol (SYMBICORT) 160-4.5 MCG/ACT inhaler 2 puff ( Inhalation Canceled  Entry 6/15/18 2130)   buPROPion (WELLBUTRIN) tablet 75 mg (75 mg Oral Given 6/15/18 2030)   carvedilol (COREG) tablet 3.125 mg (3.125 mg Oral Given 6/15/18 1844)   memantine (NAMENDA) tablet 10 mg (10 mg Oral Given 6/15/18 2019)   nitroglycerin (NITROSTAT) SL tablet 0.4 mg (not administered)   rivastigmine (EXELON) 13.3 MG/24HR patch 1 patch (not administered)   sacubitril-valsartan (ENTRESTO) 49-51 MG tablet 1 tablet (1 tablet Oral Given 6/15/18 2019)   sodium chloride 0.9 % flush 1-10 mL (not administered)   heparin (porcine) 5000 UNIT/ML injection 5,000 Units (5,000 Units Subcutaneous Given 6/15/18 2020)   furosemide (LASIX) injection 40 mg (not administered)   furosemide (LASIX) injection 80 mg (80 mg Intravenous Given 6/15/18 1419)   CloNIDine (CATAPRES) tablet 0.1 mg (0.1 mg Oral Given 6/15/18 1420)     ECG/EMG Results (last 24 hours)     Procedure Component Value Units Date/Time    ECG 12 Lead [[id number]] Collected:  06/15/18 1230     Updated:  06/15/18 1233                          MDM    Final diagnoses:   Acute dyspnea   Acute on chronic systolic congestive heart failure   Acute on chronic renal insufficiency       Documentation assistance provided by christine Triplett.  Information recorded by the christine was done at my direction and has been verified and validated by me.     Donny Triplett  06/15/18 1244       Donny Triplett  06/15/18 1313       Donny Triplett  06/15/18 1325       Alejandro Dobson MD  06/15/18 [id number]

## 2018-06-15 NOTE — H&P
[facility] at [facility]   History and physical      Patient Care Team:  Colton Dickens MD as PCP - General (Internal Medicine)  Bola Rivera Jr., OD as PCP - Claims Attributed  Kvng Stauffer MD as Consulting Physician (Cardiology)     PROBLEM LIST:  1. Coronary artery disease:  a. NSTEMI, (04/2017).   b. LHC (04/18/2017): Showed occluded proximal LAD, 80% large ostial first diagonal, occluded mid Circ, 80 % large OM1, 80% mid RCA. EF 25%.  c. Echocardiogram: EF 40%.  d. CABG x 5 (04/24/2017): LIMA to LAD, VG sequence OM1 and OM2. VG to DM of LAD and VG to RCA. (Dr. Berger).  e. C (08/08/2017):Severe proximal LAD/first diagonal stenosis (ungrafted) history of a 2.5 x 23 Xience EES. Occluded LAD with patent vein graft to the diagonal and LIMA to the LAD. Severe left circumflex stenosis with widely patent vein graft to the OM1 and OM 2. Occluded RCA with patent saphenous vein graft. Disease and a small ramus branch (too small for PCI) Normal right heart pressures and post capillary wedge pressure.  f. Echocardiogram (11/13/2017): EF 40%.   2. Chronic systolic congestive heart failure  3. Atrial flutter, s/p ECV (05/2017)   4. LBBB.  5. Hypertension.  6. Hyperlipidemia.  7. Sleep apnea.  8. COPD.  9. Traumatic subdural hematoma (SDH), s/p right craniotomy (11/08/2017).  10. Renal insufficiency.  11. Arthritis.  12. Asthma.  13. Depression.  14. Dementia  15. Hx of bilatreal DVT.  16. Hx of hemorrhagic shock and PTX from right renal RP bleed (09/2014.  17. Hepatitis B  18. Surgical Hx:  a. Appendectomy.  b. Back surgery.  c. CABG.  d. L knee surgery.  e. L TKR.  f. Tonsillectomy.  g. IVC filter.  h. Small bowel obstruction, s/p colon resection, Dr. BOB Corona (11/20/2017).  i. PEG tube removed on (04/19/2018).      Allergies   Allergen Reactions   • Sulfa Antibiotics      UNKNOWN CHILDHOOD REACTION         No current facility-administered medications for this encounter.     Current Outpatient  Prescriptions:   •  albuterol (PROVENTIL) (5 MG/ML) 0.5% nebulizer solution, Take 0.5 mL by nebulization Every 4 (Four) Hours As Needed for Wheezing., Disp: , Rfl: 12  •  aspirin 81 MG chewable tablet, Chew 1 tablet Daily., Disp: , Rfl:   •  atorvastatin (LIPITOR) 10 MG tablet, Take 1 tablet by mouth Every Night., Disp: , Rfl:   •  budesonide-formoterol (SYMBICORT) 160-4.5 MCG/ACT inhaler, Inhale 2 puffs 2 (Two) Times a Day., Disp: , Rfl:   •  buPROPion (WELLBUTRIN) 75 MG tablet, Take 1 tablet by mouth 3 (Three) Times a Day., Disp: 90 tablet, Rfl: 2  •  carvedilol (COREG) 3.125 MG tablet, Take 3.125 mg by mouth 2 (Two) Times a Day With Meals., Disp: , Rfl:   •  Cholecalciferol (VITAMIN D3) 2000 units tablet, Take 1 tablet by mouth Daily., Disp: , Rfl:   •  entecavir (BARACLUDE) 0.5 MG tablet, Take 1 tablet by mouth Daily., Disp: 90 tablet, Rfl: 0  •  memantine (NAMENDA) 10 MG tablet, Take 1 tablet by mouth Every 12 (Twelve) Hours., Disp: 60 tablet, Rfl: 2  •  Multiple Vitamins-Minerals (MULTIVITAMIN ADULT PO), Take 1 tablet by mouth Daily., Disp: , Rfl:   •  rivastigmine (EXELON) 13.3 MG/24HR patch, Place 1 patch on the skin Daily., Disp: 30 patch, Rfl: 11  •  sacubitril-valsartan (ENTRESTO) 49-51 MG tablet, Take 1 tablet by mouth Every 12 (Twelve) Hours., Disp: 60 tablet, Rfl:   •  vitamin E 1000 UNIT capsule, Take 1,000 Units by mouth Daily., Disp: , Rfl:   •  nitroglycerin (NITROSTAT) 0.4 MG SL tablet, Place 1 tablet under the tongue Every 5 (Five) Minutes As Needed for Chest Pain. Take no more than 3 doses in 15 minutes., Disp: , Rfl: 12      No current facility-administered medications for this encounter.       (Not in a hospital admission)      Subjective .   History of present illness:    Patient is a 72-year-old  male who we are asked to see today for further evaluation and admission for acute on chronic congestive heart failure.  His previous history of coronary artery disease as well as previous  history of cardiomyopathy.  Last documented ejection fraction was 40%.  Since his admission in November of last year for subdural hematoma and eventual evacuation he has had continued ambulatory issues with weakness in his legs.  He has predominantly chair bound.  Patient's wife also notes that his mentation has not returned to normal and at times he has some confusion.  Over the course of the last week she has noticed some increasing shortness of breath.  Both he and his wife deny any increased volume intake, sodium intake.  Patient denies any chest pain, pressure, tightness.  No reported syncope, near-syncope, or edema.  Currently complains of orthopnea and some mild lower extremity edema.  Due to worsening symptoms patient presented to the emergency department for further evaluation.  He was just recently given a dose of IV Lasix secondary to volume overload.      Social History     Social History   • Marital status:      Spouse name: N/A   • Number of children: 2   • Years of education: N/A     Occupational History   • Post Office at       Retired     Social History Main Topics   • Smoking status: Former Smoker     Packs/day: 1.00     Years: 17.00     Types: Cigarettes     Start date: 1965     Quit date: 1982   • Smokeless tobacco: Never Used   • Alcohol use No      Comment: Never drinker   • Drug use: No   • Sexual activity: Not on file     Other Topics Concern   • Not on file     Social History Narrative    Domestic life :  Lives in private home with wife        B[employer] :   L[employer]-day        Sleep hygiene :  In bed 11 PM to 8 AM for 8 hours of sleep        Caffeine use :  4 cup of coffee and 1 diet cola daily        Exercise habits :   Cardiac rehabilitation thru 2017 - 2018 home health rehabilitation        Diet :  Low calorie diet - low in salt and low in sugar        Occupation :   Retired        Hearing :        Vision :        Driving :  No driving         Family History   Problem Relation Age of  "Onset   • Adopted: Yes   • No Known Problems Mother         Adopted as child   • No Known Problems Father         No knowledge of birth family         Review of Systems:  Review of Systems   Constitution: Positive for weakness and malaise/fatigue. Negative for fever.   HENT: Negative for nosebleeds.    Eyes: Negative for redness and visual disturbance.   Cardiovascular: Positive for orthopnea. Negative for palpitations and paroxysmal nocturnal dyspnea.   Respiratory: Positive for shortness of breath. Negative for cough, snoring, sputum production and wheezing.    Hematologic/Lymphatic: Negative for bleeding problem.   Skin: Negative for flushing, itching and rash.   Musculoskeletal: Positive for falls and muscle weakness. Negative for joint pain and muscle cramps.   Gastrointestinal: Negative for abdominal pain, diarrhea, heartburn, nausea and vomiting.   Genitourinary: Negative for hematuria.   Neurological: Positive for excessive daytime sleepiness. Negative for dizziness, headaches and tremors.   Psychiatric/Behavioral: Negative for substance abuse. The patient is not nervous/anxious.         Objective   Vitals:  Blood pressure (!) 159/117, pulse 102, temperature 98.5 °F (36.9 °C), temperature source Oral, resp. rate 16, height 172.7 cm (68\"), weight 63.5 kg (140 lb), SpO2 97 %.       Physical Exam   Constitutional: He is oriented to person, place, and time. He appears well-developed and well-nourished. No distress.   HENT:   Head: Normocephalic and atraumatic.   Eyes: Right eye exhibits no discharge. Left eye exhibits no discharge.   Neck: No JVD present. No tracheal deviation present.   Cardiovascular: Normal rate, regular rhythm and normal heart sounds.  Exam reveals no friction rub.    No murmur heard.  Pulmonary/Chest: Effort normal.   Breathing mildly labored.  By basilar rales.   Abdominal: Soft. Bowel sounds are normal. There is no tenderness.   Musculoskeletal: He exhibits edema (trace bilateral lower " extremity edema). He exhibits no deformity.   Neurological: He is alert and oriented to person, place, and time.   Skin: Skin is warm and dry.            Results Review:  I reviewed the patient's new clinical results.    Results from last 7 days  Lab Units 06/15/18  1241   WBC 10*3/mm3 12.83*   HEMOGLOBIN g/dL 10.9*   HEMATOCRIT % 36.0*   PLATELETS 10*3/mm3 238       Results from last 7 days  Lab Units 06/15/18  1241   SODIUM mmol/L 148*   POTASSIUM mmol/L 4.7   CHLORIDE mmol/L 112*   CO2 mmol/L 22.0   BUN mg/dL 40*   CREATININE mg/dL 2.06*   CALCIUM mg/dL 10.7*   BILIRUBIN mg/dL 0.7   ALK PHOS U/L 80   ALT (SGPT) U/L 10   AST (SGOT) U/L 9   GLUCOSE mg/dL 85       Results from last 7 days  Lab Units 06/15/18  1241   SODIUM mmol/L 148*   POTASSIUM mmol/L 4.7   CHLORIDE mmol/L 112*   CO2 mmol/L 22.0   BUN mg/dL 40*   CREATININE mg/dL 2.06*   GLUCOSE mg/dL 85   CALCIUM mg/dL 10.7*           Lab Results  Lab Value Date/Time   TROPONINI 0.400 (H) 06/15/2018 1241   TROPONINI 0.191 (H) 11/20/2017 2335   TROPONINI 0.227 (H) 11/20/2017 1940   TROPONINI 0.059 (H) 10/21/2017 2153   TROPONINI 0.039 08/23/2017 2038   TROPONINI 0.173 (H) 08/07/2017 0836   TROPONINI 0.211 (H) 08/07/2017 0241   TROPONINI 0.166 (H) 08/06/2017 2048   TROPONINI 0.019 07/17/2017 1227   TROPONINI 0.044 (H) 06/12/2017 2139   TROPONINI 9.425 (C) 04/18/2017 0757   TROPONINI 7.403 (C) 04/17/2017 2340                       Tele:  SR    EKG: SR with LBBB      Assessment/Plan     1. Acute on chronic systolic congestive heart failure.  Last documented ejection fraction of 40%.  Patient with noted chronic left bundle branch block.  2. Coronary artery disease, no current angina.  3. Chronic kidney disease  4. Hypertension  5. Dyslipidemia      Plan:    1. Patient will be admitted to telemetry floor for further observation and diuresis.  Will initiate scheduled IV diuretics.  Continue supplemental oxygen.  Check an echocardiogram to evaluate ejection fraction for  possible change/decrease.  Daily weights, strict I's and O's. Continue home cardiac medications including beta blocker, entresto, ASA, and statin. Continue to follow renal function with diuretic therapy.  No need for ischemic eval at this time. Further recommendations to follow hospital course.      REI Quijano obtained past medical, family history, social history, review of systems and functioned as a scribe for the remainder of the dictation for Dr. Stauffer.      REI Quijano  06/15/18  2:38 PM  IKvng MD, personally performed the services described in this documentation as scribed by the above individual in my presence, and it is both accurate and complete    Dictated utilizing Dragon dictation

## 2018-06-15 NOTE — PLAN OF CARE
Problem: Fall Risk (Adult)  Goal: Identify Related Risk Factors and Signs and Symptoms  Outcome: Ongoing (interventions implemented as appropriate)  Bed alarm on at all times, waffle mattress.     Problem: Skin Injury Risk (Adult)  Goal: Identify Related Risk Factors and Signs and Symptoms  Outcome: Ongoing (interventions implemented as appropriate)

## 2018-06-16 NOTE — CONSULTS
[facility] Consult Note    Magdi Arriaga  1945  [id number]    Date of Admit:  6/15/2018    Date of Consult: 6/16/2018        Requesting Provider: Alejandro Dobson MD  Evaluating Physician: Cesar Sotomayor MD        Reason for Consultation:  STAGE 3 CKD.  History of present illness:    Patient is a 72 y.o.  Yr old male KNOWN TO [facility] ( LAST SEEN IN THE HOSPITAL 7/2017. HE HAS NOT SEEN US IN THE OFFICE IN > A YEAR ) WITH STAGE 3 CKD ( CREAT 2.15 NOW. 2.0-2.2 7/2017. RANGE 1.4-2.2 ), CAD, ICM ( EF 25% ), HTN, HLP, DEMENTIA, HEP B, H/O DVT ( WITH VENA-CAVA FILTER ), AND VIT D DEF WHO WAS  ADMITTED WITH CHF AND WE ARE ASKED TO SEE FOR ABNORMAL RENAL FXN, HYPERNATREMIA ( -150 ), HYPERCALCEMIA ( CA WAS 10.7. WAS ON CHOLECALCIFEROL ) AND ANEMIA ( HGB 10.9 ). PATIENT DEMENTED AND BASICALLY HOME-BOUND AND UNABLE TO PROVIDE ANY MEANINGFUL H/O. WIFE PROVIDES MOST OF H/O. HAD A RENAL U/S 4/2017 THAT WAS UNREMARKABLE EXCEPT FOR A L SIMPLE RENAL CYST.    Past Medical History:   Diagnosis Date   • Allergic rhinitis     History of allergy injections as a child.   • Arthritis    • Asthma      Patient has had a history of asthma since childhood.   • Bradycardia 8/23/2017   • Bronchiectasis 1/6/2017    A.  CT scan of the chest 12/13/2015 reports mild to moderate bronchiectasis.    • CHF (congestive heart failure) 2017    ECHO - EF 25%   • Closed fracture of multiple ribs of right side 10/21/2017   • COPD (chronic obstructive pulmonary disease)    • Coronary artery disease 2017    CABG X 5   • Depression 1/6/2017   • DVT (deep venous thrombosis)     Bilateral   • Falls     A.  History of falling traumatic injuries in April 2014 and May 2014 resulting and left rib  fracture and left clavicle fracture.   • Hepatitis B 2017    Transfusion platelets - acute disease managed by ID   • History of transfusion    • Hypertension    • Iron deficiency 7/24/2017   • Left bundle branch block 4/17/2017   • Lumbar spinal stenosis 2013     Spinal surgery   • Normocytic anemia 4/17/2017   • Osteoarthritis 1/6/2017   • Patellar tendon rupture     A.  Status post primary repair of the left periprosthetic patellar tendon tear 6/11/2014, post fall at the end of April 2014.   • Pneumonia      A.  Left lower lobe pneumonia treated at [facility], 5/8/2014 through 5/20/2014.   • Pneumothorax      A.  Status post chest tube placed 5/13/2014 and discontinued 5/15/2014 with stable chest x-ray.   • SBO (small bowel obstruction) 2017    Partial colon and small bowel obstruction   • SDH (subdural hematoma) 2017 NOV 2017 craniotomy    • Sleep apnea with use of continuous positive airway pressure (CPAP)    • Traumatic hemorrhagic shock      A.  Secondary to right renal retroperitoneal hemorrhage, 9/2014.   • Vitamin D deficiency 1/6/2017       Past Surgical History:   Procedure Laterality Date   • APPENDECTOMY  1959   • CARDIAC CATHETERIZATION N/A 4/18/2017    Procedure: Left Heart Cath;  Surgeon: Kvng Stauffer MD;  Location:  [facility];  Service:    • CARDIAC CATHETERIZATION N/A 8/9/2017    Procedure: Right and Left Heart Cath;  Surgeon: Kvng Stauffer MD;  Location:  [facility];  Service:    • COLON RESECTION SMALL BOWEL N/A 11/20/2017    Procedure: COLON RESECTION SMALL BOWEL;  Surgeon: Cj Reyes MD;  Location:  [facility];  Service:    • COLONOSCOPY W/ POLYPECTOMY     • CORONARY ANGIOPLASTY WITH STENT PLACEMENT     • CORONARY ARTERY BYPASS GRAFT N/A 4/24/2017    Procedure: MEDIAN STERNOTOMY, CORONARY ARTERY BYPASS GRAFT X5 UTILIZING THE INTERNAL MAMMARY ARTERY, ENDOVASCULAR VEING HARVEST UTILIZING RIGHT SAPHENOUS VEIN,TRANSESOPHAGEAL ECHO;  Surgeon: Kanu Berger MD;  Location:  [facility];  Service:    • EXPLORATORY LAPAROTOMY N/A 11/20/2017    Procedure: LAPAROTOMY EXPLORATORY SMALL BOWEL RESECTION;  Surgeon: Cj Reyes MD;  Location:  [facility];  Service:    • FRACTURE SURGERY     • KNEE SURGERY  2006,  2014    primary repair -left periprosthetic patellar tendon tear 6/11/2014   • LUMBAR LAMINECTOMY  2013    With discectomy   • LUMBAR LAMINECTOMY  10/16/2013    LAMI L4/5 (Dr. Holt)   • AK HARLEY HOLE EVAC SUBDUR/EXTRA HEMATOMA Right 11/8/2017    Procedure: CRANIOTOMY FOR SUBDURAL HEMATOMA;  Surgeon: True Frank MD;  Location: [facility];  Service: Neurosurgery   • REPLACEMENT TOTAL KNEE Bilateral 2009, 2013    Left - 2009 and right - 2013.   • SKIN BIOPSY     • TONSILLECTOMY  1957   • VENA CAVA FILTER PLACEMENT  2014       Social History     Social History   • Marital status:    • Number of children: 2     Occupational History   • [employer] at       Retired     Social History Main Topics   • Smoking status: Former Smoker     Packs/day: 1.00     Years: 17.00     Types: Cigarettes     Start date: 1965     Quit date: 1982   • Smokeless tobacco: Never Used   • Alcohol use No      Comment: Never drinker   • Drug use: No     Other Topics Concern   • Not on file     Social History Narrative    Domestic life :  Lives in private home with wife        Alevism :   Judaism        Sleep hygiene :  In bed 11 PM to 8 AM for 8 hours of sleep        Caffeine use :  4 cup of coffee and 1 diet cola daily        Exercise habits :   Cardiac rehabilitation thru 2017 - 2018 home health rehabilitation        Diet :  Low calorie diet - low in salt and low in sugar        Occupation :   Retired        Hearing :        Vision :        Driving :  No driving           family history includes No Known Problems in his father and mother. He was adopted. UNABLE TO GET FH OF RENAL DZ BECAUSE OF THIS.    Allergies   Allergen Reactions   • Sulfa Antibiotics      UNKNOWN CHILDHOOD REACTION       Medication:    Current Facility-Administered Medications:   •  albuterol (PROVENTIL) nebulizer solution 0.083% 2.5 mg/3mL, 2.5 mg, Nebulization, Q4H PRN, REI Quijano  •  aspirin chewable tablet 81 mg, 81 mg, Oral, Daily, Sosa  REI Decker, 81 mg at 06/16/18 0843  •  atorvastatin (LIPITOR) tablet 10 mg, 10 mg, Oral, Nightly, REI Quijano, 10 mg at 06/15/18 2019  •  budesonide-formoterol (SYMBICORT) 160-4.5 MCG/ACT inhaler 2 puff, 2 puff, Inhalation, BID - RT, REI Quijano, 2 puff at 06/15/18 2025  •  buPROPion (WELLBUTRIN) tablet 75 mg, 75 mg, Oral, Q8H, REI Quijano, 75 mg at 06/15/18 2030  •  carvedilol (COREG) tablet 3.125 mg, 3.125 mg, Oral, BID With Meals, REI Quijano, 3.125 mg at 06/16/18 0843  •  furosemide (LASIX) injection 40 mg, 40 mg, Intravenous, Q12H, Kvng Stauffer MD, 40 mg at 06/16/18 0626  •  heparin (porcine) 5000 UNIT/ML injection 5,000 Units, 5,000 Units, Subcutaneous, Q12H, REI Quijano, 5,000 Units at 06/16/18 0843  •  memantine (NAMENDA) tablet 10 mg, 10 mg, Oral, Q12H, REI Quijano, 10 mg at 06/16/18 0843  •  nitroglycerin (NITROSTAT) SL tablet 0.4 mg, 0.4 mg, Sublingual, Q5 Min PRN, REI Quijano  •  Pharmacy Consult - [place], , Does not apply, Daily, IVÁN Ibarra, [facility]  •  rivastigmine (EXELON) 13.3 MG/24HR patch 1 patch, 1 patch, Transdermal, Daily, REI Quijano, 1 patch at 06/16/18 0842  •  sacubitril-valsartan (ENTRESTO) 49-51 MG tablet 1 tablet, 1 tablet, Oral, Q12H, REI Quijano, 1 tablet at 06/16/18 0850  •  sodium chloride 0.9 % flush 1-10 mL, 1-10 mL, Intravenous, PRN, REI Quijano    Prescriptions Prior to Admission   Medication Sig Dispense Refill Last Dose   • albuterol (PROVENTIL) (5 MG/ML) 0.5% nebulizer solution Take 0.5 mL by nebulization Every 4 (Four) Hours As Needed for Wheezing.  12 6/14/2018 at Unknown time   • aspirin 81 MG chewable tablet Chew 1 tablet Daily.   6/14/2018 at Unknown time   • atorvastatin (LIPITOR) 10 MG tablet Take 1 tablet by mouth Every Night.   6/14/2018 at Unknown time   • budesonide-formoterol (SYMBICORT) 160-4.5 MCG/ACT inhaler Inhale 2 puffs 2 (Two) Times a Day.    6/14/2018 at Unknown time   • buPROPion (WELLBUTRIN) 75 MG tablet Take 1 tablet by mouth 3 (Three) Times a Day. 90 tablet 2 6/14/2018 at Unknown time   • carvedilol (COREG) 3.125 MG tablet Take 3.125 mg by mouth 2 (Two) Times a Day With Meals.   6/14/2018 at Unknown time   • Cholecalciferol (VITAMIN D3) 2000 units tablet Take 1 tablet by mouth Daily.   6/14/2018 at Unknown time   • entecavir (BARACLUDE) 0.5 MG tablet Take 1 tablet by mouth Daily. 90 tablet 0 6/14/2018 at Unknown time   • memantine (NAMENDA) 10 MG tablet Take 1 tablet by mouth Every 12 (Twelve) Hours. 60 tablet 2 6/14/2018 at Unknown time   • Multiple Vitamins-Minerals (MULTIVITAMIN ADULT PO) Take 1 tablet by mouth Daily.   6/14/2018 at Unknown time   • rivastigmine (EXELON) 13.3 MG/24HR patch Place 1 patch on the skin Daily. 30 patch 11 6/15/2018 at Unknown time   • sacubitril-valsartan (ENTRESTO) 49-51 MG tablet Take 1 tablet by mouth Every 12 (Twelve) Hours. 60 tablet  6/14/2018 at Unknown time   • vitamin E 1000 UNIT capsule Take 1,000 Units by mouth Daily.   6/14/2018 at Unknown time   • nitroglycerin (NITROSTAT) 0.4 MG SL tablet Place 1 tablet under the tongue Every 5 (Five) Minutes As Needed for Chest Pain. Take no more than 3 doses in 15 minutes.  12 More than a month at Unknown time       Review of Systems:    Constitutional-- No Fever, chills or sweats. No significant change in weight.+ FATIGUE/MALAISE.  Eye-- no diplopia, no conjunctivitis  ENT-- No new hearing or throat complaints.  No epistaxis or oral sores. No odynophagia or dysphagia. No headache, photophobia or neck stiffness.  CV-- No chest pain/palpitations. + soa & edema. + ORTHOPNEA.  Resp-- + SOB. NO cough/Hemoptysis  GI- No nausea, vomiting, or diarrhea.  No hematochezia, melena, or hematemesis.  -- No dysuria, hematuria, or flank pain. No lower tract obstructive symptoms  Skin-- No rash, warm and dry  Lymph- no painful or swollen lymph nodes in neck/axilla or groin.   Heme-  "No active bruising or bleeding; no Hx of DVT or PE.  MS-- no swelling or pain in the joints  Neuro-- No acute focal weakness or numbness in the arms or legs.  No seizures. + FALLS/SLEEPINESS.  Psych-- + DEMENTIA.  Endo--No cold or heat intolerance.  No polyuria, polydipsia, or polyphagia    Full review of systems reviewed and negative otherwise for acute complaints    Physical Exam:   Vital Signs   Blood pressure 128/75, pulse 77, temperature 97.9 °F (36.6 °C), temperature source Axillary, resp. rate 18, height 172.7 cm (68\"), weight 73.9 kg (163 lb), SpO2 98 %.     GENERAL: Awake and alert, in no acute distress. CONFUSED  HEENT: Normocephalic, atraumatic.  PER.  No conjunctivitis. No icterus. Oropharynx clear without evidence of thrush or exudate. No evidence of peridontal disease.    NECK: Supple without nuchal rigidity. No mass.  LYMPH: No painful cervical, axillary or inguinal lymphadenopathy.  HEART: RRR; No murmur, rubs, gallops. No bruits in neck, abdomen, or groins. TR edema  LUNGS: Normal respiratory effort. Nonlabored. No dullness.  No crepitus. FEW BIBASILAR CRACKLES.   ABDOMEN: Soft, nontender, nondistended. Positive bowel sounds. No rebound or guarding. NO mass or HSM.  JOINTS:  Full range of motion, no redness or tenderness.  EXT:  No cyanosis/clubbing.   :  MARCANO IN PLACE  SKIN: Warm and dry without rash  NEURO: NOT Oriented.  No focal neurological deficits. Strength equal bilateral  PSYCHIATRIC: NOT Normal insight and judgement. Cooperative with PE    Laboratory Data    Results from last 7 days  Lab Units 06/16/18  0617 06/15/18  1241   HEMOGLOBIN g/dL 9.6* 10.9*   HEMATOCRIT % 32.2* 36.0*       Results from last 7 days  Lab Units 06/16/18  0617 06/15/18  1241   SODIUM mmol/L 150* 148*   POTASSIUM mmol/L 3.8 4.7   CHLORIDE mmol/L 114* 112*   CO2 mmol/L 27.0 22.0   BUN mg/dL 50* 40*   CREATININE mg/dL 2.15* 2.06*   CALCIUM mg/dL 10.1 10.7*   ALBUMIN g/dL  --  3.88       Results from last 7 days  Lab " Units 06/16/18  0617   GLUCOSE mg/dL 82           Results from last 7 days  Lab Units 06/15/18  1241   ALK PHOS U/L 80   BILIRUBIN mg/dL 0.7   ALT (SGPT) U/L 10   AST (SGOT) U/L 9     Estimated Creatinine Clearance: 32.5 mL/min (A) (by C-G formula based on SCr of 2.15 mg/dL (H)).    Radiology: CXR: + CHF.        Impression: STAGE 3 CKD WITH GFR NEAR BASELINE. I SUSPECT ETIOLOGY IS CARDIORENAL SYNDROME. HYPERNATREMIA. HYPERCALCEMIA THAT IS RESOLVING OFF CHOLECALCIFEROL. ANEMIA THAT IS WORSENING DESPITE DIURESIS. CHF. DEMENTIA. HTN.      PLAN: Thank you for asking us to see Magdi Arriaga, I recommend the following: WATCH GFR WITH DIURESIS. PUSH PO FLUIDS WITH HYPERNATREMIA. AGREE STOPPING VIT D AND WOULD KEEP HIM OFF THIS. WILL CHECK: CPK, FE, RENAL PANEL, SPEP, URINE FOR UA, U EOS, P/C RATIO AND U NA. WILL FOLLOW.       Cesar Sotomayor MD  6/16/2018  1:11 PM

## 2018-06-16 NOTE — PLAN OF CARE
Problem: Patient Care Overview  Goal: Plan of Care Review  Outcome: Ongoing (interventions implemented as appropriate)   06/16/18 0591   Coping/Psychosocial   Plan of Care Reviewed With patient   Plan of Care Review   Progress no change   OTHER   Outcome Summary vss. Pt NSR on monitor, 3L NC and Bipap when sleeping. External condom catheter placed, with 900 ml output through this shift. pt slept all night, no other complaints. will continue to monitor.

## 2018-06-16 NOTE — PROGRESS NOTES
"  [facility]   Inpatient Progress Note       LOS: 1 day   Patient Care Team:  Colton Dickens MD as PCP - General (Internal Medicine)  Bola Rivera Jr., OD as PCP - Claims Attributed  Kvng Stauffer MD as Consulting Physician (Cardiology)    Chief Complaint:  Follow-up for acute on chronic systolic congestive heart failure.    Subjective     Interval History:   States that breathing is a little better. Edema resolved. Having some trouble with feeding himself today. Has questions regarding checking xrays on his hip.  Patient feels somewhat confused today, and the patient's wife agrees      Review of Systems:   Pertinent positives noted in history, exam, and assessment. Otherwise reviewed and negative.      Objective     Vitals:  Blood pressure 128/75, pulse 77, temperature 97.9 °F (36.6 °C), temperature source Axillary, resp. rate 18, height 172.7 cm (68\"), weight 74.1 kg (163 lb 6.4 oz), SpO2 98 %.     Intake/Output Summary (Last 24 hours) at 06/16/18 1009  Last data filed at 06/16/18 0422   Gross per 24 hour   Intake                0 ml   Output              900 ml   Net             -900 ml     Physical Exam   Constitutional: He is oriented to person, place, and time. He appears well-developed and well-nourished.   Neck: No JVD present. No tracheal deviation present.   Cardiovascular: Normal rate, regular rhythm and normal heart sounds.  Exam reveals no friction rub.    No murmur heard.  Pulmonary/Chest: Effort normal. No respiratory distress.   Decreased bases   Abdominal: Soft. Bowel sounds are normal. There is no tenderness.   Musculoskeletal: He exhibits no edema or deformity.   Neurological: He is alert and oriented to person, place, and time.          Results Review:     I reviewed the patient's new clinical results.      Results from last 7 days  Lab Units 06/16/18  0617   WBC 10*3/mm3 10.51   HEMOGLOBIN g/dL 9.6*   HEMATOCRIT % 32.2*   PLATELETS 10*3/mm3 201       Results " from last 7 days  Lab Units 06/16/18  0617 06/15/18  1241   SODIUM mmol/L 150* 148*   POTASSIUM mmol/L 3.8 4.7   CHLORIDE mmol/L 114* 112*   CO2 mmol/L 27.0 22.0   BUN mg/dL 50* 40*   CREATININE mg/dL 2.15* 2.06*   CALCIUM mg/dL 10.1 10.7*   BILIRUBIN mg/dL  --  0.7   ALK PHOS U/L  --  80   ALT (SGPT) U/L  --  10   AST (SGOT) U/L  --  9   GLUCOSE mg/dL 82 85       Results from last 7 days  Lab Units 06/16/18  0617   SODIUM mmol/L 150*   POTASSIUM mmol/L 3.8   CHLORIDE mmol/L 114*   CO2 mmol/L 27.0   BUN mg/dL 50*   CREATININE mg/dL 2.15*   GLUCOSE mg/dL 82   CALCIUM mg/dL 10.1           Lab Results  Lab Value Date/Time   TROPONINI 0.329 (H) 06/16/2018 0617   TROPONINI 0.400 (H) 06/15/2018 1241   TROPONINI 0.191 (H) 11/20/2017 2335   TROPONINI 0.227 (H) 11/20/2017 1940   TROPONINI 0.059 (H) 10/21/2017 2153   TROPONINI 0.039 08/23/2017 2038   TROPONINI 0.173 (H) 08/07/2017 0836   TROPONINI 0.211 (H) 08/07/2017 0241   TROPONINI 0.166 (H) 08/06/2017 2048   TROPONINI 0.019 07/17/2017 1227   TROPONINI 0.044 (H) 06/12/2017 2139   TROPONINI 9.425 (C) 04/18/2017 0757   TROPONINI 7.403 (C) 04/17/2017 2340           Results from last 7 days  Lab Units 06/16/18  0617   CHOLESTEROL mg/dL 116   TRIGLYCERIDES mg/dL 68   HDL CHOL mg/dL 55   LDL CHOL mg/dL 48         ECHO: ordered and pending    Tele:  SR    Assessment/Plan     Active Problems:    Acute dyspnea      1. Acute on chronic systolic congestive heart failure.    - Last documented ejection fraction of 40%.    - Patient with noted chronic left bundle branch block.  - currently diuresing  2. Coronary artery disease, no current angina.  3. Chronic kidney disease  4. Hypertension  5. Dyslipidemia  6. Hypernatremia     Plan:  We'll x-ray his hip and knee given the significant pain limiting mobility.  Continue diuresing disease does appear to be still volume overloaded, but will get renal input as well.    Sosa Decker, REI  06/16/18  10:09 AM  IKvng have  reviewed the note in full and agree with all aspects of the above including physical exam, assessment, labs and plan with changes made accordingly.     Kvng Stauffer MD  06/16/18  12:15 PM        Dictated utilizing Dragon dictation

## 2018-06-16 NOTE — PLAN OF CARE
Problem: Patient Care Overview  Goal: Plan of Care Review  Outcome: Ongoing (interventions implemented as appropriate)   06/16/18 1032   Coping/Psychosocial   Plan of Care Reviewed With patient;spouse   Plan of Care Review   Progress no change   OTHER   Outcome Summary Patient presents with unstagable PI on right lateral malleolus POA. Wound bed is covered with 100% slough. Cleaned wound and applied Thera honey for autolytic debridement promotion and help decrease bioburden. Please see wound care orders for care needs. Wound measures 2.0x2.0x0.1cm. On waffle mattress. Bridge of nose is reddened and intact form CPAP mask. Please use Protecto Gel prior to mask placement. WOC nurse will continue to follow. Please contact WOC nurse if needs arise. Thanks

## 2018-06-17 NOTE — PLAN OF CARE
Problem: Patient Care Overview  Goal: Plan of Care Review  Speech eval. Plan for video swallow tomorrow,  Consult PT/OT today, on dysphagia 4 diet nectar thick liquids, fluids D5W started. NSR SB, seemed more alert today. Lungs clear still diuresing

## 2018-06-17 NOTE — PLAN OF CARE
Problem: Patient Care Overview  Goal: Plan of Care Review  Outcome: Ongoing (interventions implemented as appropriate)   06/17/18 1530   Coping/Psychosocial   Plan of Care Reviewed With patient;family   OTHER   Outcome Summary OT completed a brief chart review relating to presenting physical deficits. Pt is Max Ax2 for bed mobility though progressed to CGA for static sitting balance at EOB. Pt limited d/t lethargy and residual L sided weakness. Recommend cont skilled IPOT intervention. Recommend pt DC to SNF.

## 2018-06-17 NOTE — PLAN OF CARE
"Problem: Patient Care Overview  Goal: Plan of Care Review  Outcome: Ongoing (interventions implemented as appropriate)   06/17/18 0548   Coping/Psychosocial   Plan of Care Reviewed With patient;spouse   Plan of Care Review   Progress no change   OTHER   Outcome Summary Pt's vitals stable. Pt's spouse expressed concern about progressive weakness, inability to feed himself and \"not enaging\" with the family - a change from his baseline in the last 2 days - order for CT head w/o received. No changes in mentation noted this shift. Pt had no complaints, slept with Bipap on.      Goal: Discharge Needs Assessment  Outcome: Ongoing (interventions implemented as appropriate)   06/15/18 1725 06/17/18 0548   Discharge Needs Assessment   Readmission Within the Last 30 Days --  no previous admission in last 30 days   Concerns to be Addressed --  cognitive/perceptual   Patient/Family Anticipates Transition to home with family --    Transportation Anticipated family or friend will provide --    Current Discharge Risk --  chronically ill;dependent with mobility/activities of daily living       Problem: Cardiac: Heart Failure (Adult)  Goal: Signs and Symptoms of Listed Potential Problems Will be Absent, Minimized or Managed (Cardiac: Heart Failure)  Outcome: Ongoing (interventions implemented as appropriate)   06/17/18 0548   Goal/Outcome Evaluation   Problems Assessed (Heart Failure) all   Problems Present (Heart Failure) cardiac pump dysfunction;decreased quality of life;fluid/electrolyte imbalance;functional decline/self-care deficit         "

## 2018-06-17 NOTE — PROGRESS NOTES
"   LOS: 2 days    Patient Care Team:  Colton Dickens MD as PCP - General (Internal Medicine)  Bola Rivera Jr., OD as PCP - Claims Attributed  Kvng Stauffer MD as Consulting Physician (Cardiology)    Reason For Visit:  F/U STAGE 3 CKD.  Subjective           Review of Systems:    Pulm: No soa   CV:  No CP      Objective       aspirin 81 mg Oral Daily   atorvastatin 10 mg Oral Nightly   budesonide-formoterol 2 puff Inhalation BID - RT   buPROPion 75 mg Oral Q8H   carvedilol 3.125 mg Oral BID With Meals   ferric gluconate (FERRLECIT) IVPB 125 mg Intravenous Daily   furosemide 40 mg Intravenous Q12H   heparin (porcine) 5,000 Units Subcutaneous Q12H   memantine 10 mg Oral Q12H   pharmacy consult - MTM  Does not apply Daily   rivastigmine 1 patch Transdermal Daily   sacubitril-valsartan 1 tablet Oral Q12H       dextrose 75 mL/hr         Vital Signs:  Blood pressure 137/82, pulse 52, temperature 98.5 °F (36.9 °C), temperature source Oral, resp. rate 16, height 172.7 cm (68\"), weight 73.9 kg (163 lb), SpO2 99 %.    Flowsheet Rows      First Filed Value   Admission Height  172.7 cm (68\") Documented at 06/15/2018 1223   Admission Weight  63.5 kg (140 lb) Documented at 06/15/2018 1223          06/16 0701 - 06/17 0700  In: 480 [P.O.:480]  Out: 2850 [Urine:2850]    Physical Exam:    General Appearance: NAD, alert and cooperative, Ox3  Eyes: PER, conjunctivae and sclerae normal, no icterus  Lungs: respirations regular and unlabored, no crepitus, clear to auscultation  Heart/CV: regular rhythm & normal rate, no murmur, no gallop, no rub and no edema  Abdomen: not distended, soft, non-tender, no masses,  bowel sounds present  Skin: No rash, Warm and dry. : MARCANO.    Radiology:            Labs: CPK 39. FE SAT 4%. FERRITIN 107. U EOS NEG. U . P/C RATIO 586.    Results from last 7 days  Lab Units 06/16/18  0617 06/15/18  1241   WBC 10*3/mm3 10.51 12.83*   HEMOGLOBIN g/dL 9.6* 10.9*   HEMATOCRIT % 32.2* 36.0* "   PLATELETS 10*3/mm3 201 238       Results from last 7 days  Lab Units 06/17/18  0447 06/16/18  0617 06/15/18  1241   SODIUM mmol/L 150* 150* 148*   POTASSIUM mmol/L 3.7 3.8 4.7   CHLORIDE mmol/L 112* 114* 112*   CO2 mmol/L 30.0 27.0 22.0   BUN mg/dL 53* 50* 40*   CREATININE mg/dL 2.00* 2.15* 2.06*   CALCIUM mg/dL 9.6 10.1 10.7*   PHOSPHORUS mg/dL 3.6  --   --    ALBUMIN g/dL 3.52  --  3.88       Results from last 7 days  Lab Units 06/17/18  0447   GLUCOSE mg/dL 91         Results from last 7 days  Lab Units 06/15/18  1241   ALK PHOS U/L 80   BILIRUBIN mg/dL 0.7   ALT (SGPT) U/L 10   AST (SGOT) U/L 9       Results from last 7 days  Lab Units 06/15/18  1255   PH, ARTERIAL pH units 7.448   PO2 ART mm Hg 76.4*   PCO2, ARTERIAL mm Hg 29.8*   HCO3 ART mmol/L 20.6         Results from last 7 days  Lab Units 06/16/18  1539   COLOR UA  Yellow   CLARITY UA  Clear   PH, URINE  <=5.0   SPECIFIC GRAVITY, URINE  1.010   GLUCOSE UA  Negative   KETONES UA  Negative   BILIRUBIN UA  Negative   PROTEIN UA  Trace*   BLOOD UA  Negative   LEUKOCYTES UA  Negative   NITRITE UA  Negative       Estimated Creatinine Clearance: 34.9 mL/min (A) (by C-G formula based on SCr of 2 mg/dL (H)).      Assessment     Active Problems:    Acute dyspnea            Impression: STAGE 3 CKD. STABLE GFR. HYPERNATREMIA. NO IMPROVEMENT. ANEMIA WITH LOW FE STORES. PROTEINURIA.            Recommendations: IV FE. D5W IVF.      Cesar Sotomayor MD  06/17/18  8:28 AM

## 2018-06-17 NOTE — PLAN OF CARE
Problem: Patient Care Overview  Goal: Plan of Care Review  Outcome: Ongoing (interventions implemented as appropriate)   06/17/18 5689   Coping/Psychosocial   Plan of Care Reviewed With patient;spouse   Plan of Care Review   Progress (initial eval )   SLP evaluation completed. Will address dysphagia. Please see note for further details and recommendations.

## 2018-06-17 NOTE — THERAPY EVALUATION
Acute Care - Occupational Therapy Initial Evaluation  [facility]     Patient Name: Magdi Arriaga  : 1945  MRN: [id number]  Today's Date: 2018  Onset of Illness/Injury or Date of Surgery: 06/15/18  Date of Referral to OT: 18  Referring Physician: MD Eh    Admit Date: 6/15/2018       ICD-10-CM ICD-9-CM   1. Acute dyspnea R06.00 786.09   2. Acute on chronic systolic congestive heart failure I50.23 428.23     428.0   3. Acute on chronic renal insufficiency N28.9 593.9    N18.9 585.9   4. Impaired mobility and ADLs Z74.09 799.89     Patient Active Problem List   Diagnosis   • Dementia   • Hyperlipidemia LDL goal <70   • Essential hypertension   • Obesity   • JONNY (obstructive sleep apnea)   • COPD (chronic obstructive pulmonary disease)   • Coronary artery disease involving coronary bypass graft of native heart without angina pectoris   • Ischemic cardiomyopathy   • History of atrial flutter   • History of DVT (deep vein thrombosis)   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, followed by [facility]   • Traumatic SDH, s/p right craniotomy on 2017   • Small bowel obstruction (S/P Exp lap for acute SBO 17)   • PVC (premature ventricular contraction)   • NSVT (nonsustained ventricular tachycardia)   • Monilial rash   • Acute dyspnea     Past Medical History:   Diagnosis Date   • Allergic rhinitis     History of allergy injections as a child.   • Arthritis    • Asthma      Patient has had a history of asthma since childhood.   • Bradycardia 2017   • Bronchiectasis 2017    A.  CT scan of the chest 2015 reports mild to moderate bronchiectasis.    • CHF (congestive heart failure) 2017    ECHO - EF 25%   • Closed fracture of multiple ribs of right side 10/21/2017   • COPD (chronic obstructive pulmonary disease)    • Coronary artery disease 2017    CABG X 5   • Depression 2017   • DVT (deep venous thrombosis)      Bilateral   • Falls     A.  History of falling traumatic injuries in April 2014 and May 2014 resulting and left rib  fracture and left clavicle fracture.   • Hepatitis B 2017    Transfusion platelets - acute disease managed by ID   • History of transfusion    • Hypertension    • Iron deficiency 7/24/2017   • Left bundle branch block 4/17/2017   • Lumbar spinal stenosis 2013    Spinal surgery   • Normocytic anemia 4/17/2017   • Osteoarthritis 1/6/2017   • Patellar tendon rupture     A.  Status post primary repair of the left periprosthetic patellar tendon tear 6/11/2014, post fall at the end of April 2014.   • Pneumonia      A.  Left lower lobe pneumonia treated at [facility], 5/8/2014 through 5/20/2014.   • Pneumothorax      A.  Status post chest tube placed 5/13/2014 and discontinued 5/15/2014 with stable chest x-ray.   • SBO (small bowel obstruction) 2017    Partial colon and small bowel obstruction   • SDH (subdural hematoma) 2017 NOV 2017 craniotomy    • Sleep apnea with use of continuous positive airway pressure (CPAP)    • Traumatic hemorrhagic shock      A.  Secondary to right renal retroperitoneal hemorrhage, 9/2014.   • Vitamin D deficiency 1/6/2017     Past Surgical History:   Procedure Laterality Date   • APPENDECTOMY  1959   • CARDIAC CATHETERIZATION N/A 4/18/2017    Procedure: Left Heart Cath;  Surgeon: Kvng Stauffer MD;  Location:  [facility];  Service:    • CARDIAC CATHETERIZATION N/A 8/9/2017    Procedure: Right and Left Heart Cath;  Surgeon: Kvng Stauffer MD;  Location:  [facility];  Service:    • COLON RESECTION SMALL BOWEL N/A 11/20/2017    Procedure: COLON RESECTION SMALL BOWEL;  Surgeon: Cj Reyes MD;  Location:  [facility];  Service:    • COLONOSCOPY W/ POLYPECTOMY     • CORONARY ANGIOPLASTY WITH STENT PLACEMENT     • CORONARY ARTERY BYPASS GRAFT N/A 4/24/2017    Procedure: MEDIAN STERNOTOMY, CORONARY ARTERY BYPASS GRAFT X5 UTILIZING THE INTERNAL  MAMMARY ARTERY, ENDOVASCULAR VEING HARVEST UTILIZING RIGHT SAPHENOUS VEIN,TRANSESOPHAGEAL ECHO;  Surgeon: Kanu Berger MD;  Location:  [facility] OR;  Service:    • EXPLORATORY LAPAROTOMY N/A 11/20/2017    Procedure: LAPAROTOMY EXPLORATORY SMALL BOWEL RESECTION;  Surgeon: Cj Reyes MD;  Location:  [facility] OR;  Service:    • FRACTURE SURGERY     • KNEE SURGERY  2006, 2014    primary repair -left periprosthetic patellar tendon tear 6/11/2014   • LUMBAR LAMINECTOMY  2013    With discectomy   • LUMBAR LAMINECTOMY  10/16/2013    LAMI L4/5 (Dr. Holt)   • RI HARLEY HOLE EVAC SUBDUR/EXTRA HEMATOMA Right 11/8/2017    Procedure: CRANIOTOMY FOR SUBDURAL HEMATOMA;  Surgeon: True Frank MD;  Location:  [facility];  Service: Neurosurgery   • REPLACEMENT TOTAL KNEE Bilateral 2009, 2013    Left - 2009 and right - 2013.   • SKIN BIOPSY     • TONSILLECTOMY  1957   • VENA CAVA FILTER PLACEMENT  2014          OT ASSESSMENT FLOWSHEET (last 72 hours)      Occupational Therapy Evaluation     Row Name 06/17/18 1525 06/17/18 1440                OT Evaluation Time/Intention    Subjective Information  -- complains of;fatigue  -CL       Document Type  -- evaluation  -CL       Mode of Treatment  -- occupational therapy  -CL       Evaluation Not Performed --  -CL  --       Patient Effort  -- good  -CL       Symptoms Noted During/After Treatment  -- fatigue  -CL          General Information    Patient Profile Reviewed?  -- yes  -CL       Onset of Illness/Injury or Date of Surgery  -- 06/15/18  -CL       Referring Physician  -- MD Eh  -CL       Prior Level of Function  -- max assist:;dependent:;all household mobility;transfer;ADL's;dressing;bathing   [place] lift for t/fs, has been home since 03/2018 from rehab  -CL       Equipment Currently Used at Home  -- cane, straight;commode, bedside;hospital bed;lift device;raised toilet;walker, rolling;wheelchair  -CL       Pertinent History of Current Functional Problem  -- Pt presented to  the ED 2/2 to SOA and confusion. PMH: h/o SDH s/p R crani 11/08/2017 w/ residual L sided weakness  -CL       Existing Precautions/Restrictions  -- fall;oxygen therapy device and L/min;other (see comments)   swallowing precautions, residual L sided weakness  -CL       Limitations/Impairments  -- safety/cognitive  -CL       Risks Reviewed  -- patient and family:;LOB;nausea/vomiting;dizziness;increased discomfort;change in vital signs;lines disloged  -CL       Benefits Reviewed  -- patient and family:;improve function;increase independence;increase strength;decrease pain;increase balance;increase knowledge  -CL       Barriers to Rehab  -- medically complex;cognitive status;previous functional deficit  -CL          Relationship/Environment    Lives With  -- spouse   Pt has 24/7 assist  -CL          Resource/Environmental Concerns    Current Living Arrangements  -- home/apartment/condo  -CL          Home Main Entrance    Number of Stairs, Main Entrance  -- three  -CL          Cognitive Assessment/Intervention- PT/OT    Affect/Mental Status (Cognitive)  -- low arousal/lethargic  -CL       Orientation Status (Cognition)  -- oriented to;person  -CL       Follows Commands (Cognition)  -- follows one step commands;50-74% accuracy;repetition of directions required;verbal cues/prompting required;increased processing time needed  -CL       Cognitive Function (Cognitive)  -- safety deficit  -CL       Safety Deficit (Cognitive)  -- moderate deficit;awareness of need for assistance;safety precautions awareness  -CL       Personal Safety Interventions  -- fall prevention program maintained;gait belt;nonskid shoes/slippers when out of bed  -CL          Bed Mobility Assessment/Treatment    Bed Mobility Assessment/Treatment  -- supine-sit;sit-supine  -CL       Supine-Sit [place] (Bed Mobility)  -- maximum assist (25% patient effort);2 person assist;verbal cues  -CL       Sit-Supine [place] (Bed Mobility)  -- maximum assist  (25% patient effort);2 person assist;verbal cues  -       Assistive Device (Bed Mobility)  -- head of bed elevated;draw sheet;bed rails  -CL       Comment (Bed Mobility)  -- Upon sitting EOB pt w/ initial posterior and L lateral lean.   -CL          Functional Mobility    Functional Mobility- Comment  -- Defer to PT.   -CL          Transfer Assessment/Treatment    Comment (Transfers)  -- Deferred this date d/t fatigue/lethargy.   -          ADL Assessment/Intervention    BADL Assessment/Intervention  -- lower body dressing  -CL          Lower Body Dressing Assessment/Training    Lower Body Dressing [place] Level  -- don;socks;dependent (less than 25% patient effort)  -       Lower Body Dressing Position  -- supine  -CL          General ROM    GENERAL ROM COMMENTS  -- BUE grossly WFL.   -          General Assessment (Manual Muscle Testing)    Comment, General Manual Muscle Testing (MMT) Assessment  -- RUE grossly 3+/5. LUE shldr FE 2-/5, bicep/tricep 3-/5,  3/5.   -          Motor Assessment/Interventions    Additional Documentation  -- Balance (Group);Therapeutic Exercise (Group)  -          Therapeutic Exercise    Therapeutic Exercise  -- seated, upper extremities  -          Upper Extremity Seated Therapeutic Exercise    Performed, Seated Upper Extremity (Therapeutic Exercise)  -- shoulder flexion/extension;elbow flexion/extension;forearm supination/pronation;wrist flexion/extension;digit flexion/extension  -       Exercise Type, Seated Upper Extremity (Therapeutic Exercise)  -- AAROM (active assistive range of motion);PROM (passive range of motion);AROM (active range of motion)  -       Sets/Reps Detail, Seated Upper Extremity (Therapeutic Exercise)  -- 1/8  -       Comment, Seated Upper Extremity (Therapeutic Exercise)  -- LUE PROM, targetted reaching RUE  -          Balance    Balance  -- static sitting balance  -          Static Sitting Balance    Level of [place]  (Unsupported Sitting, Static Balance)  -- moderate assist, 50 to 74% patient effort   progressed to moments of CGA, posterior and L lateral lean  -CL       Sitting Position (Unsupported Sitting, Static Balance)  -- sitting on edge of bed  -CL       Comment (Unsupported Sitting, Static Balance)  -- BUE/BLE support, R/L weight shifting, RUE weight bearing, A/P lean, reaching across midline, reaching for objects  -CL          Sensory Assessment/Intervention    Sensory General Assessment  -- --   Unable to formally assess d/t cognitive status  -CL          Positioning and Restraints    Pre-Treatment Position  -- in bed  -CL       Post Treatment Position  -- bed  -CL       In Bed  -- notified nsg;fowlers;call light within reach;encouraged to call for assist;exit alarm on;with family/caregiver;RUE elevated;LUE elevated;legs elevated;heels elevated  -CL          Pain Scale: Numbers Pre/Post-Treatment    Pain Scale: Numbers, Pretreatment  -- 0/10 - no pain  -CL       Pain Scale: Numbers, Post-Treatment  -- 0/10 - no pain  -CL          Wound 06/15/18 1730 Right other (see notes) abrasion    Wound - Properties Group Date first assessed: 06/15/18  -MT Time first assessed: 1730  -MT Present On Admission : yes  -MT Side: Right  -MT Location: other (see notes)  -MT Type: abrasion  -MT Additional Comments: slight abrasion right buttock  -MT       Wound 06/16/18 1010 Right lateral malleolus pressure injury    Wound - Properties Group Date first assessed: 06/16/18  -AS Time first assessed: 1010  -AS Present On Admission : yes;picture taken  -AS Side: Right  -AS Orientation: lateral  -AS Location: malleolus  -AS Type: pressure injury  -AS Stage, Pressure Injury: unstageable  -AS       Clinical Impression (OT)    Date of Referral to OT  -- 06/17/18  -CL       OT Diagnosis  -- Decreased independence in ADLs.   -CL       Patient/Family Goals Statement (OT Eval)  -- Return to PLOF.   -CL       Criteria for Skilled Therapeutic  Interventions Met (OT Eval)  -- yes;treatment indicated  -CL       Rehab Potential (OT Eval)  -- good, to achieve stated therapy goals  -CL       Therapy Frequency (OT Eval)  -- 3 times/wk   MWF  -CL       Anticipated Equipment Needs at Discharge (OT)  -- --   TBA further  -CL       Anticipated Discharge Disposition (OT)  -- skilled nursing facility   vs home w/ assist and cont OP rehab, pending available abner  -CL          Vital Signs    Pre Systolic BP Rehab  -- --   VSS, RN cleared for tx.   -CL          OT Goals    Transfer Goal Selection (OT)  -- transfer, OT goal 1  -CL       Self-Feeding Goal Selection (OT)  -- self feeding, OT goal 1  -CL       Strength Goal Selection (OT)  -- strength, OT goal 1  -CL       Balance Goal Selection (OT)  -- balance, OT goal 1  -CL       Additional Documentation  -- Self-Feeding Goal Selection (OT) (Row);Strength Goal Selection (OT) (Row);Balance Goal Selection (OT) (Row)  -CL          Transfer Goal 1 (OT)    Activity/Assistive Device (Transfer Goal 1, OT)  -- sit-to-stand/stand-to-sit;toilet  -CL       [place] Level/Cues Needed (Transfer Goal 1, OT)  -- maximum assist (25-49% patient effort);verbal cues required  -CL       Time Frame (Transfer Goal 1, OT)  -- by discharge;long term goal (LTG)  -CL       Progress/Outcome (Transfer Goal 1, OT)  -- goal ongoing  -CL          Self-Feeding Goal 1 (OT)    Activity/Assistive Device (Self-Feeding Goal 1, OT)  -- liquids to mouth;scoop food and bring to mouth;built-up handle utensils;adapted cup  -CL       [place] Level/Cues Needed (Self-Feeding Goal 1, OT)  -- minimum assist (75% or more patient effort);verbal cues required  -CL       Time Frame (Self-Feeding Goal 1, OT)  -- by discharge;long term goal (LTG)  -CL       Progress/Outcomes (Self-Feeding Goal 1, OT)  -- goal ongoing  -CL          Strength Goal 1 (OT)    Strength Goal 1 (OT)  -- Pt will tolerate RUE AROM and LUE AAROM HEP x15 reps to promote ADL performance.    -CL       Time Frame (Strength Goal 1, OT)  -- by discharge;long term goal (LTG)  -CL       Progress/Outcome (Strength Goal 1, OT)  -- goal ongoing  -CL          Balance Goal 1 (OT)    Activity/Assistive Device (Balance Goal 1, OT)  -- sitting, static  -CL       [place] Level/Cues Needed (Balance Goal 1, OT)  -- standby assist;verbal cues required  -CL       Time Frame (Balance Goal 1, OT)  -- by discharge;long term goal (LTG)  -CL       Progress/Outcomes (Balance Goal 1, OT)  -- goal ongoing  -CL          Living Environment    Home Accessibility  -- stairs to enter home  -CL         User Key  (r) = Recorded By, (t) = Taken By, (c) = Cosigned By    Initials Name Effective Dates    AS Rubens Bain RN 06/16/16 -     MT Clary Fong RN 06/16/16 -     CL Margarita Velasco OT 04/03/18 -            Occupational Therapy Education     Title: PT OT SLP Therapies (Active)     Topic: Occupational Therapy (Active)     Point: ADL training (Active)     Description: Instruct learner(s) on proper safety adaptation and remediation techniques during self care or transfers.   Instruct in proper use of assistive devices.   Learning Progress Summary     Learner Status Readiness Method Response Comment Documented by    Patient Active Acceptance E NR Pt educated on appropriate safety precautions, t/f techniques, positioning, and benefits of therapy. CL 06/17/18 1539    Family Active Acceptance E NR Pt educated on appropriate safety precautions, t/f techniques, positioning, and benefits of therapy. CL 06/17/18 1539          Point: Home exercise program (Active)     Description: Instruct learner(s) on appropriate technique for monitoring, assisting and/or progressing therapeutic exercises/activities.   Learning Progress Summary     Learner Status Readiness Method Response Comment Documented by    Patient Active Acceptance E NR Pt educated on appropriate safety precautions, t/f techniques, positioning, and benefits of therapy. CL  06/17/18 1539    Family Active Acceptance E NR Pt educated on appropriate safety precautions, t/f techniques, positioning, and benefits of therapy. CL 06/17/18 1539          Point: Precautions (Active)     Description: Instruct learner(s) on prescribed precautions during self-care and functional transfers.   Learning Progress Summary     Learner Status Readiness Method Response Comment Documented by    Patient Active Acceptance E NR Pt educated on appropriate safety precautions, t/f techniques, positioning, and benefits of therapy. CL 06/17/18 1539    Family Active Acceptance E NR Pt educated on appropriate safety precautions, t/f techniques, positioning, and benefits of therapy. CL 06/17/18 1539          Point: Body mechanics (Active)     Description: Instruct learner(s) on proper positioning and spine alignment during self-care, functional mobility activities and/or exercises.   Learning Progress Summary     Learner Status Readiness Method Response Comment Documented by    Patient Active Acceptance E NR Pt educated on appropriate safety precautions, t/f techniques, positioning, and benefits of therapy. CL 06/17/18 1539    Family Active Acceptance E NR Pt educated on appropriate safety precautions, t/f techniques, positioning, and benefits of therapy. CL 06/17/18 1539                      User Key     Initials Effective Dates Name Provider Type Discipline    CL 04/03/18 -  Margarita Velasco OT Occupational Therapist OT                  OT Recommendation and Plan  Outcome Summary/Treatment Plan (OT)  Anticipated Equipment Needs at Discharge (OT):  (TBA further)  Anticipated Discharge Disposition (OT): skilled nursing facility (vs home w/ assist and cont OP rehab, pending available abner)  Therapy Frequency (OT Eval): 3 times/wk (MWF)  Plan of Care Review  Plan of Care Reviewed With: patient, family  Plan of Care Reviewed With: patient, family  Outcome Summary: OT completed a brief chart review relating to presenting  physical deficits. Pt is Max Ax2 for bed mobility though progressed to CGA for static sitting balance at EOB. Pt limited d/t lethargy and residual L sided weakness. Recommend cont skilled IPOT intervention. Recommend pt DC to SNF.           Outcome Measures     Row Name 06/17/18 1440             How much help from another is currently needed...    Putting on and taking off regular lower body clothing? 1  -CL      Bathing (including washing, rinsing, and drying) 1  -CL      Toileting (which includes using toilet bed pan or urinal) 1  -CL      Putting on and taking off regular upper body clothing 2  -CL      Taking care of personal grooming (such as brushing teeth) 2  -CL      Eating meals 2  -CL      Score 9  -CL         Functional Assessment    Outcome Measure Options AM-PAC 6 Clicks Daily Activity (OT)  -CL        User Key  (r) = Recorded By, (t) = Taken By, (c) = Cosigned By    Initials Name Provider Type    CL Margarita Velasco OT Occupational Therapist          Time Calculation:   OT Start Time: 1440  Therapy Suggested Charges     Code   Minutes Charges    None           Therapy Charges for Today     Code Description Service Date Service Provider Modifiers Qty    [id number]  OT EVAL LOW COMPLEXITY 4 6/17/2018 Margarita Velasco OT GO 1    [id number]  OT THER SUPP EA 15 MIN 6/17/2018 Margarita Velasco OT GO 2               Margarita Velasco OT  6/17/2018

## 2018-06-17 NOTE — PROGRESS NOTES
"  [facility]   Inpatient Progress Note       LOS: 2 days   Patient Care Team:  Colton Dickens MD as PCP - General (Internal Medicine)  Bola Rivera Jr., OD as PCP - Claims Attributed  Kvng Stauffer MD as Consulting Physician (Cardiology)    Chief Complaint:  Follow-up for acute on chronic systolic congestive heart failure.    Subjective     Interval History:     Somewhat lethargic this morning, but denies specific complaints.  Lying supine without dyspnea    Review of Systems:   Pertinent positives noted in history, exam, and assessment. Otherwise reviewed and negative.      Objective     Vitals:  Blood pressure 137/82, pulse 52, temperature 98.5 °F (36.9 °C), temperature source Oral, resp. rate 16, height 172.7 cm (68\"), weight 73.9 kg (163 lb), SpO2 99 %.     Intake/Output Summary (Last 24 hours) at 06/17/18 0842  Last data filed at 06/17/18 0409   Gross per 24 hour   Intake              480 ml   Output             2850 ml   Net            -2370 ml     Physical Exam   Constitutional: He is oriented to person, place, and time. He appears well-developed and well-nourished.   Neck: No JVD present. No tracheal deviation present.   Cardiovascular: Normal rate, regular rhythm and normal heart sounds.  Exam reveals no friction rub.    No murmur heard.  Pulmonary/Chest: Effort normal. No respiratory distress.   Decreased bases   Abdominal: Soft. Bowel sounds are normal. There is no tenderness.   Musculoskeletal: He exhibits no edema or deformity.   Neurological: He is alert and oriented to person, place, and time.     I've examined the patient and agree with the above     Results Review:     I reviewed the patient's new clinical results.      Results from last 7 days  Lab Units 06/16/18  0617   WBC 10*3/mm3 10.51   HEMOGLOBIN g/dL 9.6*   HEMATOCRIT % 32.2*   PLATELETS 10*3/mm3 201       Results from last 7 days  Lab Units 06/17/18  0447  06/15/18  1241   SODIUM mmol/L 150*  < > 148* "   POTASSIUM mmol/L 3.7  < > 4.7   CHLORIDE mmol/L 112*  < > 112*   CO2 mmol/L 30.0  < > 22.0   BUN mg/dL 53*  < > 40*   CREATININE mg/dL 2.00*  < > 2.06*   CALCIUM mg/dL 9.6  < > 10.7*   BILIRUBIN mg/dL  --   --  0.7   ALK PHOS U/L  --   --  80   ALT (SGPT) U/L  --   --  10   AST (SGOT) U/L  --   --  9   GLUCOSE mg/dL 91  < > 85   < > = values in this interval not displayed.    Results from last 7 days  Lab Units 06/17/18  0447   SODIUM mmol/L 150*   POTASSIUM mmol/L 3.7   CHLORIDE mmol/L 112*   CO2 mmol/L 30.0   BUN mg/dL 53*   CREATININE mg/dL 2.00*   GLUCOSE mg/dL 91   CALCIUM mg/dL 9.6           Lab Results  Lab Value Date/Time   TROPONINI 0.329 (H) 06/16/2018 0617   TROPONINI 0.400 (H) 06/15/2018 1241   TROPONINI 0.191 (H) 11/20/2017 2335   TROPONINI 0.227 (H) 11/20/2017 1940   TROPONINI 0.059 (H) 10/21/2017 2153   TROPONINI 0.039 08/23/2017 2038   TROPONINI 0.173 (H) 08/07/2017 0836   TROPONINI 0.211 (H) 08/07/2017 0241   TROPONINI 0.166 (H) 08/06/2017 2048   TROPONINI 0.019 07/17/2017 1227   TROPONINI 0.044 (H) 06/12/2017 2139   TROPONINI 9.425 (C) 04/18/2017 0757   TROPONINI 7.403 (C) 04/17/2017 2340           Results from last 7 days  Lab Units 06/16/18  0617   CHOLESTEROL mg/dL 116   TRIGLYCERIDES mg/dL 68   HDL CHOL mg/dL 55   LDL CHOL mg/dL 48         ECHO:   ·  Left ventricular systolic function is severely decreased. Estimated EF = 20%.  Moderate mitral valve regurgitation is present    Tele:  SR    Assessment/Plan     Active Problems:    Acute dyspnea      1. Acute on chronic systolic congestive heart failure.    - Last documented ejection fraction of 40%.    - Patient with noted chronic left bundle branch block.  - currently diuresing  - EF this admission 20%.  2. Coronary artery disease, no current angina.  3. Chronic kidney disease  4. Hypertension  5. Dyslipidemia  6. Hypernatremia     Plan:  Patient seems to be diuresing well, holding stable.  Worsening LV function, end-stage ischemic cardio  myopathy.  Patient has dementia.  Not a candidate, he wished to go back to a rehabilitation facility.  Not a candidate for ARB due to renal failure.  Sosa Decker, REI  06/17/18  8:42 AM    I, Kvng Stauffer have reviewed the note in full and agree with all aspects of the above including physical exam, assessment, labs and plan with changes made accordingly.     Kvng Stauffer MD  06/17/18  8:59 AM        Dictated utilizing Dragon dictation

## 2018-06-18 NOTE — PLAN OF CARE
Problem: Patient Care Overview  Goal: Plan of Care Review  Outcome: Ongoing (interventions implemented as appropriate)   06/18/18 8876   Coping/Psychosocial   Plan of Care Reviewed With patient   Plan of Care Review   Progress (<MBS)   SLP evaluation completed. Will address dysphagia. Please see note for further details and recommendations.

## 2018-06-18 NOTE — PLAN OF CARE
Problem: Patient Care Overview  Goal: Plan of Care Review   06/18/18 1133   Coping/Psychosocial   Plan of Care Reviewed With patient   Plan of Care Review   Progress improving

## 2018-06-18 NOTE — THERAPY EVALUATION
Acute Care - Physical Therapy Initial Evaluation  [facility]     Patient Name: Magdi Arriaga  : 1945  MRN: [id number]  Today's Date: 2018   Onset of Illness/Injury or Date of Surgery: 06/15/18  Date of Referral to PT: 18  Referring Physician: MD Eh      Admit Date: 6/15/2018    Visit Dx:     ICD-10-CM ICD-9-CM   1. Acute dyspnea R06.00 786.09   2. Acute on chronic systolic congestive heart failure I50.23 428.23     428.0   3. Acute on chronic renal insufficiency N28.9 593.9    N18.9 585.9   4. Impaired mobility and ADLs Z74.09 799.89   5. Impaired functional mobility, balance, gait, and endurance Z74.09 V49.89     Patient Active Problem List   Diagnosis   • Dementia   • Hyperlipidemia LDL goal <70   • Essential hypertension   • Obesity   • JONNY (obstructive sleep apnea)   • COPD (chronic obstructive pulmonary disease)   • Coronary artery disease involving coronary bypass graft of native heart without angina pectoris   • Ischemic cardiomyopathy   • History of atrial flutter   • History of DVT (deep vein thrombosis)   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, followed by [facility]   • Traumatic SDH, s/p right craniotomy on 2017   • Small bowel obstruction (S/P Exp lap for acute SBO 17)   • PVC (premature ventricular contraction)   • NSVT (nonsustained ventricular tachycardia)   • Monilial rash   • Acute dyspnea     Past Medical History:   Diagnosis Date   • Allergic rhinitis     History of allergy injections as a child.   • Arthritis    • Asthma      Patient has had a history of asthma since childhood.   • Bradycardia 2017   • Bronchiectasis 2017    A.  CT scan of the chest 2015 reports mild to moderate bronchiectasis.    • CHF (congestive heart failure) 2017    ECHO - EF 25%   • Closed fracture of multiple ribs of right side 10/21/2017   • COPD (chronic obstructive pulmonary disease)    • Coronary  artery disease 2017    CABG X 5   • Depression 1/6/2017   • DVT (deep venous thrombosis)     Bilateral   • Falls     A.  History of falling traumatic injuries in April 2014 and May 2014 resulting and left rib  fracture and left clavicle fracture.   • Hepatitis B 2017    Transfusion platelets - acute disease managed by ID   • History of transfusion    • Hypertension    • Iron deficiency 7/24/2017   • Left bundle branch block 4/17/2017   • Lumbar spinal stenosis 2013    Spinal surgery   • Normocytic anemia 4/17/2017   • Osteoarthritis 1/6/2017   • Patellar tendon rupture     A.  Status post primary repair of the left periprosthetic patellar tendon tear 6/11/2014, post fall at the end of April 2014.   • Pneumonia      A.  Left lower lobe pneumonia treated at [facility], 5/8/2014 through 5/20/2014.   • Pneumothorax      A.  Status post chest tube placed 5/13/2014 and discontinued 5/15/2014 with stable chest x-ray.   • SBO (small bowel obstruction) 2017    Partial colon and small bowel obstruction   • SDH (subdural hematoma) 2017 NOV 2017 craniotomy    • Sleep apnea with use of continuous positive airway pressure (CPAP)    • Traumatic hemorrhagic shock      A.  Secondary to right renal retroperitoneal hemorrhage, 9/2014.   • Vitamin D deficiency 1/6/2017     Past Surgical History:   Procedure Laterality Date   • APPENDECTOMY  1959   • CARDIAC CATHETERIZATION N/A 4/18/2017    Procedure: Left Heart Cath;  Surgeon: Kvng Stauffer MD;  Location:  [facility];  Service:    • CARDIAC CATHETERIZATION N/A 8/9/2017    Procedure: Right and Left Heart Cath;  Surgeon: Kvng Stauffer MD;  Location:  [facility];  Service:    • COLON RESECTION SMALL BOWEL N/A 11/20/2017    Procedure: COLON RESECTION SMALL BOWEL;  Surgeon: Cj Reyes MD;  Location:  [facility];  Service:    • COLONOSCOPY W/ POLYPECTOMY     • CORONARY ANGIOPLASTY WITH STENT PLACEMENT     • CORONARY ARTERY BYPASS GRAFT N/A  4/24/2017    Procedure: MEDIAN STERNOTOMY, CORONARY ARTERY BYPASS GRAFT X5 UTILIZING THE INTERNAL MAMMARY ARTERY, ENDOVASCULAR VEING HARVEST UTILIZING RIGHT SAPHENOUS VEIN,TRANSESOPHAGEAL ECHO;  Surgeon: Kanu Berger MD;  Location:  [facility] OR;  Service:    • EXPLORATORY LAPAROTOMY N/A 11/20/2017    Procedure: LAPAROTOMY EXPLORATORY SMALL BOWEL RESECTION;  Surgeon: Cj Reyes MD;  Location:  [facility] OR;  Service:    • FRACTURE SURGERY     • KNEE SURGERY  2006, 2014    primary repair -left periprosthetic patellar tendon tear 6/11/2014   • LUMBAR LAMINECTOMY  2013    With discectomy   • LUMBAR LAMINECTOMY  10/16/2013    LAMI L4/5 (Dr. Holt)   • TN HARLEY HOLE EVAC SUBDUR/EXTRA HEMATOMA Right 11/8/2017    Procedure: CRANIOTOMY FOR SUBDURAL HEMATOMA;  Surgeon: True Frank MD;  Location:  [facility];  Service: Neurosurgery   • REPLACEMENT TOTAL KNEE Bilateral 2009, 2013    Left - 2009 and right - 2013.   • SKIN BIOPSY     • TONSILLECTOMY  1957   • VENA CAVA FILTER PLACEMENT  2014        PT ASSESSMENT (last 12 hours)      Physical Therapy Evaluation     Row Name 06/18/18 1309          PT Evaluation Time/Intention    Subjective Information complains of;fatigue  -KR     Document Type evaluation  -KR     Mode of Treatment physical therapy  -KR     Patient Effort good  -KR     Symptoms Noted During/After Treatment fatigue  -KR     Row Name 06/18/18 1305          General Information    Patient Profile Reviewed? yes  -KR     Onset of Illness/Injury or Date of Surgery 06/15/18  -KR     Referring Physician MD Eh  -KR     Patient Observations cooperative;agree to therapy;lethargic  -KR     Prior Level of Function max assist:;dependent:;all household mobility;gait;transfer;ADL's;dressing;bathing  -KR     Equipment Currently Used at Home wheelchair;shower chair;commode, bedside;hospital bed;lift device;raised toilet;walker, rolling  -KR     Pertinent History of Current Functional Problem Pt presents for further  evaluation for acute on chronic congestive heart failure. Pt has had increased SOA and confusion. Pt with h/o SDH in Nov 2017; increased difficulty with ambulation due to weakness in legs.   -KR     Existing Precautions/Restrictions fall;oxygen therapy device and L/min  -KR     Limitations/Impairments safety/cognitive  -KR     Risks Reviewed patient:;LOB;dizziness;increased discomfort;change in vital signs  -KR     Benefits Reviewed patient:;improve function;increase independence;increase strength;increase balance  -KR     Barriers to Rehab medically complex;previous functional deficit;cognitive status  -KR     Row Name 06/18/18 1309          Relationship/Environment    Lives With spouse  -KR     Row Name 06/18/18 1309          Resource/Environmental Concerns    Current Living Arrangements home/apartment/condo  -KR     Resource/Environmental Concerns none  -KR     Row Name 06/18/18 1309          Home Main Entrance    Number of Stairs, Main Entrance three  -KR     Stair Railings, Main Entrance none  -KR     Row Name 06/18/18 1309          Stairs Within Home, Primary    Number of Stairs, Within Home, Primary other (see comments)   12  -KR     Stair Railings, Within Home, Primary railings on both sides of stairs  -KR     Row Name 06/18/18 1309          Cognitive Assessment/Interventions    Additional Documentation Cognitive Assessment/Intervention (Group)  -KR     Row Name 06/18/18 1309          Cognitive Assessment/Intervention- PT/OT    Affect/Mental Status (Cognitive) confused;low arousal/lethargic  -KR     Orientation Status (Cognition) oriented to;person  -KR     Follows Commands (Cognition) follows one step commands;75-90% accuracy;repetition of directions required;verbal cues/prompting required  -KR     Cognitive Function (Cognitive) safety deficit  -KR     Safety Deficit (Cognitive) moderate deficit;ability to follow commands;awareness of need for assistance;insight into deficits/self awareness;safety  precautions awareness;safety precautions follow-through/compliance  -KR     Personal Safety Interventions fall prevention program maintained;gait belt;nonskid shoes/slippers when out of bed  -KR     Row Name 06/18/18 1309          Safety Issues, Functional Mobility    Safety Issues Affecting Function (Mobility) ability to follow commands;awareness of need for assistance;insight into deficits/self awareness;safety precaution awareness  -KR     Impairments Affecting Function (Mobility) balance;cognition;coordination;endurance/activity tolerance;shortness of breath;strength  -KR     Row Name 06/18/18 1309          Bed Mobility Assessment/Treatment    Bed Mobility Assessment/Treatment rolling left;rolling right  -KR     Rolling Left [place] (Bed Mobility) maximum assist (25% patient effort);verbal cues  -KR     Rolling Right [place] (Bed Mobility) maximum assist (25% patient effort);verbal cues  -KR     Comment (Bed Mobility) Pt rolled L and R for placement of sling.   -KR     Row Name 06/18/18 1309          Transfer Assessment/Treatment    Transfer Assessment/Treatment bed-chair transfer;sit-stand transfer;stand-sit transfer  -KR     Comment (Transfers) Increased cueing for sequencing. STS x1 from chair with PT/tech in front on either side. Pt unable to assist; pt unable to clear hips from chair.   -KR     Bed-Chair [place] (Transfers) dependent (less than 25% patient effort);2 person assist  -KR     Assistive Device (Bed-Chair Transfers) mechanical lift/aid  -KR     Sit-Stand [place] (Transfers) dependent (less than 25% patient effort);2 person assist;verbal cues  -KR     Stand-Sit [place] (Transfers) dependent (less than 25% patient effort);2 person assist;verbal cues  -KR     Row Name 06/18/18 1309          Sit-Stand Transfer    Assistive Device (Sit-Stand Transfers) other (see comments)   BUE support  -KR     Row Name 06/18/18 1309          Stand-Sit Transfer    Assistive Device (Stand-Sit  Transfers) other (see comments)   BUE support  -[facility] Name 06/18/18 1309          Gait/Stairs Assessment/Training    [place] Level (Gait) unable to assess  -     Comment (Gait/Stairs) Ambulation deferred. Not appropriate to assess.   -[facility] Name 06/18/18 1309          General ROM    GENERAL ROM COMMENTS BLE impaired 25%  -KR     Row Name 06/18/18 1309          MMT (Manual Muscle Testing)    Additional Documentation General Assessment (Manual Muscle Testing) (Group)  -KR     Row Name 06/18/18 1309          General Assessment (Manual Muscle Testing)    General Manual Muscle Testing (MMT) Assessment lower extremity strength deficits identified  -     Comment, General Manual Muscle Testing (MMT) Assessment unable to formally assess due to decreased ROM and impaired cognition.   -[facility] Name 06/18/18 1309          Motor Assessment/Intervention    Additional Documentation Balance (Group)  -KR     Row Name 06/18/18 1309          Balance    Balance static sitting balance;static standing balance  -KR     Row Name 06/18/18 1309          Static Sitting Balance    Level of [place] (Unsupported Sitting, Static Balance) maximal assist, 25 to 49% patient effort   progressed to brief periods of CGA  -     Sitting Position (Unsupported Sitting, Static Balance) sitting in chair  -KR     Row Name 06/18/18 1309          Static Standing Balance    Level of [place] (Supported Standing, Static Balance) dependent   depA x2  -KR     Row Name 06/18/18 1309          Sensory Assessment/Intervention    Sensory General Assessment no sensation deficits identified  -KR     Row Name 06/18/18 1309          Pain Assessment    Additional Documentation Pain Scale: Numbers Pre/Post-Treatment (Group)  -KR     Row Name 06/18/18 1309          Pain Scale: Numbers Pre/Post-Treatment    Pain Scale: Numbers, Pretreatment 0/10 - no pain  -     Pain Scale: Numbers, Post-Treatment 0/10 - no pain  -KR     Row Name              Wound 06/15/18 1730 Right other (see notes) abrasion    Wound - Properties Group Date first assessed: 06/15/18  -MT Time first assessed: 1730  -MT Present On Admission : yes  -MT Side: Right  -MT Location: other (see notes)  -MT Type: abrasion  -MT Additional Comments: slight abrasion right buttock  -MT    Row Name             Wound 06/16/18 1010 Right lateral malleolus pressure injury    Wound - Properties Group Date first assessed: 06/16/18  -AS Time first assessed: 1010  -AS Present On Admission : yes;picture taken  -AS Side: Right  -AS Orientation: lateral  -AS Location: malleolus  -AS Type: pressure injury  -AS Stage, Pressure Injury: unstageable  -AS    Row Name 06/18/18 1309          Plan of Care Review    Plan of Care Reviewed With patient  -KR     Row Name 06/18/18 1309          Physical Therapy Clinical Impression    Date of Referral to PT 06/17/18  -KR     PT Diagnosis (PT Clinical Impression) impaired functional mobility  -KR     Patient/Family Goals Statement (PT Clinical Impression) return to [facility]  -KR     Criteria for Skilled Interventions Met (PT Clinical Impression) yes;treatment indicated  -KR     Rehab Potential (PT Clinical Summary) fair, will monitor progress closely  -KR     Care Plan Review (PT) evaluation/treatment results reviewed;risks/benefits reviewed;patient/other agree to care plan  -KR     Row Name 06/18/18 1309          Vital Signs    Pre Systolic BP Rehab 117  -KR     Pre Treatment Diastolic BP 73  -KR     Pretreatment Heart Rate (beats/min) 61  -KR     Posttreatment Heart Rate (beats/min) 62  -KR     Pre SpO2 (%) 97  -KR     O2 Delivery Pre Treatment supplemental O2  -KR     Post SpO2 (%) 98  -KR     O2 Delivery Post Treatment supplemental O2  -KR     Pre Patient Position Supine  -KR     Intra Patient Position Standing  -KR     Post Patient Position Sitting  -KR     Row Name 06/18/18 1309          Physical Therapy Goals    Bed Mobility Goal Selection (PT) bed mobility, PT goal 1   -KR     Transfer Goal Selection (PT) transfer, PT goal 1  -KR     Balance Goal Selection (PT) balance, PT goal 1  -KR     Additional Documentation Balance Goal Selection (PT) (Row)  -KR     Row Name 06/18/18 1309          Bed Mobility Goal 1 (PT)    Activity/Assistive Device (Bed Mobility Goal 1, PT) sit to supine;supine to sit  -KR     [place] Level/Cues Needed (Bed Mobility Goal 1, PT) moderate assist (50-74% patient effort)  -KR     Time Frame (Bed Mobility Goal 1, PT) 2 weeks  -KR     Progress/Outcomes (Bed Mobility Goal 1, PT) goal ongoing  -KR     Row Name 06/18/18 1309          Transfer Goal 1 (PT)    Activity/Assistive Device (Transfer Goal 1, PT) sit-to-stand/stand-to-sit;walker, rolling  -KR     [place] Level/Cues Needed (Transfer Goal 1, PT) maximum assist (25-49% patient effort)  -KR     Time Frame (Transfer Goal 1, PT) 2 weeks  -KR     Progress/Outcome (Transfer Goal 1, PT) goal ongoing  -KR     Row Name 06/18/18 1309          Balance Goal 1 (PT)    Activity/Assistive Device (Balance Goal 1, PT) sitting, static  -KR     [place] Level/Cues Needed (Balance Goal 1, PT) contact guard assist  -KR     Time Frame (Balance Goal 1, PT) 2 weeks  -KR     Progress/Outcomes (Balance Goal 1, PT) goal ongoing  -KR     Row Name 06/18/18 1309          Positioning and Restraints    Pre-Treatment Position in bed  -KR     Post Treatment Position chair  -KR     In Chair notified nsg;reclined;call light within reach;encouraged to call for assist;exit alarm on;waffle cushion;on mechanical lift sling;legs elevated  -KR     Row Name 06/18/18 1309          Living Environment    Home Accessibility stairs to enter home;stairs within home  -KR       User Key  (r) = Recorded By, (t) = Taken By, (c) = Cosigned By    Initials Name Provider Type    AS Rubens Bain RN Registered Nurse    KEENA Fong RN Registered Nurse    LISA Mejía, PT Physical Therapist          Physical Therapy Education     Title: PT  OT SLP Therapies (Active)     Topic: Physical Therapy (Active)     Point: Mobility training (Active)    Learning Progress Summary     Learner Status Readiness Method Response Comment Documented by    Patient Active Acceptance E NR  KR 06/18/18 1528          Point: Body mechanics (Active)    Learning Progress Summary     Learner Status Readiness Method Response Comment Documented by    Patient Active Acceptance E NR  KR 06/18/18 1528          Point: Precautions (Active)    Learning Progress Summary     Learner Status Readiness Method Response Comment Documented by    Patient Active Acceptance E NR  KR 06/18/18 1528                      User Key     Initials Effective Dates Name Provider Type Discipline     04/03/18 -  Sri Mejía, PT Physical Therapist PT                PT Recommendation and Plan  Anticipated Discharge Disposition (PT): skilled nursing facility  Planned Therapy Interventions (PT Eval): balance training, bed mobility training, strengthening, transfer training  Therapy Frequency (PT Clinical Impression): 3 times/wk  Outcome Summary/Treatment Plan (PT)  Anticipated Discharge Disposition (PT): skilled nursing facility  Plan of Care Reviewed With: patient  Outcome Summary: PT initial evaluation completed for pt presenting with increased SOA, BLE weakness, and decreased mobility. Pt transferred from bed to chair with mechanical lift. Pt required depA x2 for STS transfer. Pt's decreased independence warrants PT skilled care. Recommend D/C to SNF.          Outcome Measures     Row Name 06/18/18 1309 06/17/18 1440          How much help from another person do you currently need...    Turning from your back to your side while in flat bed without using bedrails? 2  -KR  --     Moving from lying on back to sitting on the side of a flat bed without bedrails? 2  -KR  --     Moving to and from a bed to a chair (including a wheelchair)? 1  -KR  --     Standing up from a chair using your arms (e.g., wheelchair,  bedside chair)? 1  -KR  --     Climbing 3-5 steps with a railing? 1  -KR  --     To walk in hospital room? 1  -KR  --     AM-PAC 6 Clicks Score 8  -KR  --        How much help from another is currently needed...    Putting on and taking off regular lower body clothing?  -- 1  -CL     Bathing (including washing, rinsing, and drying)  -- 1  -CL     Toileting (which includes using toilet bed pan or urinal)  -- 1  -CL     Putting on and taking off regular upper body clothing  -- 2  -CL     Taking care of personal grooming (such as brushing teeth)  -- 2  -CL     Eating meals  -- 2  -CL     Score  -- 9  -CL        Functional Assessment    Outcome Measure Options AM-PAC 6 Clicks Basic Mobility (PT)  -KR AM-PAC 6 Clicks Daily Activity (OT)  -CL       User Key  (r) = Recorded By, (t) = Taken By, (c) = Cosigned By    Initials Name Provider Type    CL Margarita Velasco OT Occupational Therapist    LISA Mejía, PT Physical Therapist           Time Calculation:         PT Charges     Row Name 06/18/18 1309             Time Calculation    Start Time 1309  -KR      PT Received On 06/18/18  -KR      PT Goal Re-Cert Due Date 06/28/18  -KR        User Key  (r) = Recorded By, (t) = Taken By, (c) = Cosigned By    Initials Name Provider Type    LISA Mejía, PT Physical Therapist        Therapy Suggested Charges     Code   Minutes Charges    None           Therapy Charges for Today     Code Description Service Date Service Provider Modifiers Qty    56255781732 HC PT EVAL MOD COMPLEXITY 4 6/18/2018 Sri Mejía, PT GP 1    43637829675 HC PT THER SUPP EA 15 MIN 6/18/2018 Sri Mejía, PT GP 2          PT G-Codes  Outcome Measure Options: AM-PAC 6 Clicks Basic Mobility (PT)      Lesvia Mejía PT  6/18/2018

## 2018-06-18 NOTE — PLAN OF CARE
Problem: Patient Care Overview  Goal: Plan of Care Review  Outcome: Ongoing (interventions implemented as appropriate)   06/18/18 2712   Coping/Psychosocial   Plan of Care Reviewed With patient   Plan of Care Review   Progress no change   OTHER   Outcome Summary Pt. was pleasant and cooperative during the evening; continues to get maintance fluids; VSS; NSR on the monitor; using CPAP through the night; seemed to rest well; condom catheter still in place and working well; spouse did not spend the night; no other problems identified; continue to monitor

## 2018-06-18 NOTE — DISCHARGE SUMMARY
Date of Discharge:  6/18/2018  Date of Admit: 6/15/2018    Colton Dickens MD      Discharge Diagnosis:  1. Acute on chronic systolic congestive heart failure.    - Last documented ejection fraction of 40%.    - Patient with noted chronic left bundle branch block.  - currently diuresing  - EF this admission 20%.  2. Coronary artery disease, no current angina.  3. Chronic kidney disease  4. Hypertension  5. Dyslipidemia  6. Hypernatremia     Hospital Course:   Patient is a 72-year-old  male who was admitted into the hospital on 6/15/18 secondary to acute on chronic systolic congestive heart failure exacerbation.  He has known history of chronic kidney disease.  He was admitted to undergo further diuresis.  Echocardiogram was performed which showed decreased LVEF of 20%.  However, patient is not felt to be an ICD candidate there for LifeVest is not ordered.  Patient denied any anginal symptoms.  Denied chest pain, pressure, tightness.  He was not on diuretic therapy at time of admission.  Prior to admission he has been predominantly chair bound and unable to ambulate.  He had complaints of hip and knee pain.  These underwent x-rays which did not show any significant abnormalities for his pain.  Patient also had some hypernatremia and acute on chronic kidney disease for which nephrology was consulted and followed the patient.  He described some generalized weakness and a CT of the head was performed which did not show any acute abnormalities.  On 6/18/18 it was deemed that he had reached maximum benefit from his stay in the hospital and that he was ready for discharge home.    Procedures Performed  CT of the head without contrast  IMPRESSION:    No acute intracranial abnormality.    Echocardiogram:  ·   Left ventricular systolic function is severely decreased. Estimated EF = 20%.  Moderate mitral valve regurgitation is present    Consults     Date and Time Order Name Status Description    6/16/2018 1127  "Inpatient Nephrology Consult Completed             Discharge Physical Exam:  /73 (BP Location: Left arm, Patient Position: Lying)   Pulse 72   Temp 98.5 °F (36.9 °C) (Oral)   Resp 16   Ht 172.7 cm (68\")   Wt 74.5 kg (164 lb 4.8 oz)   SpO2 96%   BMI 24.98 kg/m²     Physical Exam   Constitutional: He is oriented to person, place, and time. He appears well-developed and well-nourished.   HENT:   Mouth/Throat: Oropharynx is clear and moist.   Neck: No JVD present. Carotid bruit is not present. No thyromegaly present.   Cardiovascular: Regular rhythm, S1 normal, S2 normal, normal heart sounds and intact distal pulses.  Exam reveals no gallop, no S3 and no S4.    No murmur heard.  Pulses:       Carotid pulses are 2+ on the right side, and 2+ on the left side.       Radial pulses are 2+ on the right side, and 2+ on the left side.   Pulmonary/Chest: Breath sounds normal.   Abdominal: Soft. Bowel sounds are normal. He exhibits no mass. There is no tenderness.   Musculoskeletal: He exhibits no edema.   Neurological: He is alert and oriented to person, place, and time.   Skin: Skin is warm and dry. No rash noted.         Discharge Medications     Discharge Medications      New Medications      Instructions Start Date   furosemide 40 MG tablet  Commonly known as:  LASIX   40 mg, Oral, Daily   Start Date:  6/19/2018     potassium chloride 10 MEQ CR tablet  Commonly known as:  K-DUR   10 mEq, Oral, Daily         Continue These Medications      Instructions Start Date   albuterol (5 MG/ML) 0.5% nebulizer solution  Commonly known as:  PROVENTIL   2.5 mg, Nebulization, Every 4 Hours PRN      aspirin 81 MG chewable tablet   81 mg, Oral, Daily      atorvastatin 10 MG tablet  Commonly known as:  LIPITOR   10 mg, Oral, Nightly      budesonide-formoterol 160-4.5 MCG/ACT inhaler  Commonly known as:  SYMBICORT   2 puffs, Inhalation, 2 Times Daily - RT      buPROPion 75 MG tablet  Commonly known as:  WELLBUTRIN   75 mg, Oral, 3 " Times Daily      carvedilol 3.125 MG tablet  Commonly known as:  COREG   3.125 mg, Oral, 2 Times Daily With Meals      entecavir 0.5 MG tablet  Commonly known as:  BARACLUDE   0.5 mg, Oral, Every 24 Hours Scheduled      memantine 10 MG tablet  Commonly known as:  NAMENDA   10 mg, Oral, Every 12 Hours Scheduled      MULTIVITAMIN ADULT PO   1 tablet, Oral, Daily      nitroglycerin 0.4 MG SL tablet  Commonly known as:  NITROSTAT   0.4 mg, Sublingual, Every 5 Minutes PRN, Take no more than 3 doses in 15 minutes.      rivastigmine 13.3 MG/24HR patch  Commonly known as:  EXELON   1 patch, Transdermal, Daily      sacubitril-valsartan 49-51 MG tablet  Commonly known as:  ENTRESTO   1 tablet, Oral, Every 12 Hours Scheduled      Vitamin D3 2000 units tablet   1 tablet, Oral, Daily      vitamin E 1000 UNIT capsule   1,000 Units, Oral, Daily             Discharge Diet:  cardiac, renal, low sodium.    Activity at Discharge:  as tolerated.    Discharge disposition: home    Condition on Discharge: stable    Follow-up Appointments  Future Appointments  Date Time Provider Department Center   8/17/2018 11:00 AM Kyara Weems PA-C [facility] None   9/13/2018 9:00 AM Bo Duvall DO [facility] None   4/22/2019 3:15 PM Kvng Stauffer MD [facility] None     Additional Instructions for the Follow-ups that You Need to Schedule     Discharge Follow-up with Specialty: [facility]; 1 Week    As directed      Specialty:  [facility]    Follow Up:  1 Week    Follow Up Details:  with [facility]         Discharge Follow-up with Specialty: cara; 1 Month    As directed      Specialty:  cara    Follow Up:  1 Month               Test Results Pending at Discharge   Order Current Status    Protein Elec + Interp, Serum In process           Sosa Decker, APRN  06/18/18  12:01 PM  IKvng MD, personally performed the services described in this documentation as scribed by the above individual in my presence,  and it is both accurate and complete    Dictated with Dragon.

## 2018-06-18 NOTE — PLAN OF CARE
Problem: Patient Care Overview  Goal: Plan of Care Review  Outcome: Ongoing (interventions implemented as appropriate)   06/18/18 7773   Coping/Psychosocial   Plan of Care Reviewed With patient   OTHER   Outcome Summary PT initial evaluation completed for pt presenting with increased SOA, BLE weakness, and decreased mobility. Pt transferred from bed to chair with mechanical lift. Pt required depA x2 for STS transfer. Pt's decreased independence warrants PT skilled care. Recommend D/C to SNF.

## 2018-06-18 NOTE — PROGRESS NOTES
"   LOS: 3 days    Patient Care Team:  Colton Dickens MD as PCP - General (Internal Medicine)  Bola Rivera Jr., OD as PCP - Claims Attributed  Kvng Stauffer MD as Consulting Physician (Cardiology)    Reason For Visit:  F/U STAGE 3 CKD.  Subjective           Review of Systems:    Pulm: No soa   CV:  No CP      Objective       aspirin 81 mg Oral Daily   atorvastatin 10 mg Oral Nightly   budesonide-formoterol 2 puff Inhalation BID - RT   buPROPion 75 mg Oral Q8H   carvedilol 3.125 mg Oral BID With Meals   ferric gluconate (FERRLECIT) IVPB 125 mg Intravenous Daily   furosemide 40 mg Oral Daily   heparin (porcine) 5,000 Units Subcutaneous Q12H   memantine 10 mg Oral Q12H   pharmacy consult - MTM  Does not apply Daily   rivastigmine 1 patch Transdermal Daily   sacubitril-valsartan 1 tablet Oral Q12H            Vital Signs:  Blood pressure 117/73, pulse 72, temperature 98.5 °F (36.9 °C), temperature source Oral, resp. rate 16, height 172.7 cm (68\"), weight 74.5 kg (164 lb 4.8 oz), SpO2 96 %.    Flowsheet Rows      First Filed Value   Admission Height  172.7 cm (68\") Documented at 06/15/2018 1223   Admission Weight  63.5 kg (140 lb) Documented at 06/15/2018 1223          06/17 0701 - 06/18 0700  In: 2278.2 [P.O.:1080; I.V.:1088.2]  Out: 1600 [Urine:1600]    Physical Exam:    General Appearance: NAD, alert and cooperative, Ox3  Eyes: PER, conjunctivae and sclerae normal, no icterus  Lungs: respirations regular and unlabored, no crepitus, clear to auscultation  Heart/CV: regular rhythm & normal rate, no murmur, no gallop, no rub and no edema  Abdomen: not distended, soft, non-tender, no masses,  bowel sounds present  Skin: No rash, Warm and dry    Radiology:            Labs:    Results from last 7 days  Lab Units 06/16/18  0617 06/15/18  1241   WBC 10*3/mm3 10.51 12.83*   HEMOGLOBIN g/dL 9.6* 10.9*   HEMATOCRIT % 32.2* 36.0*   PLATELETS 10*3/mm3 201 238       Results from last 7 days  Lab Units 06/18/18  0533 " 06/17/18  0447 06/16/18  0617 06/15/18  1241   SODIUM mmol/L 144 150* 150* 148*   POTASSIUM mmol/L 3.6 3.7 3.8 4.7   CHLORIDE mmol/L 107 112* 114* 112*   CO2 mmol/L 28.0 30.0 27.0 22.0   BUN mg/dL 42* 53* 50* 40*   CREATININE mg/dL 1.71* 2.00* 2.15* 2.06*   CALCIUM mg/dL 9.7 9.6 10.1 10.7*   PHOSPHORUS mg/dL 2.7 3.6  --   --    ALBUMIN g/dL 3.39 3.52  --  3.88       Results from last 7 days  Lab Units 06/18/18  0533   GLUCOSE mg/dL 103*         Results from last 7 days  Lab Units 06/15/18  1241   ALK PHOS U/L 80   BILIRUBIN mg/dL 0.7   ALT (SGPT) U/L 10   AST (SGOT) U/L 9       Results from last 7 days  Lab Units 06/15/18  1255   PH, ARTERIAL pH units 7.448   PO2 ART mm Hg 76.4*   PCO2, ARTERIAL mm Hg 29.8*   HCO3 ART mmol/L 20.6         Results from last 7 days  Lab Units 06/16/18  1539   COLOR UA  Yellow   CLARITY UA  Clear   PH, URINE  <=5.0   SPECIFIC GRAVITY, URINE  1.010   GLUCOSE UA  Negative   KETONES UA  Negative   BILIRUBIN UA  Negative   PROTEIN UA  Trace*   BLOOD UA  Negative   LEUKOCYTES UA  Negative   NITRITE UA  Negative       Estimated Creatinine Clearance: 41.1 mL/min (A) (by C-G formula based on SCr of 1.71 mg/dL (H)).      Assessment     Active Problems:    Acute dyspnea            Impression: BUBBA ON CKD. IMPROVED GFR. LIKELY CARDIORENAL SYNDROME. ANEMIA WITH LOW FE STORES ON IV FE. HYPERNATREMIA IMPROVED WITH FREE H2O.            Recommendations: PRESENT CARE. IF DISCHARGED HOME THEN F/U APPT WITH [facility] ON 8/7/18 AT 10:15 AM.       Cesar Sotomayor MD  06/18/18  9:58 AM           LOS: 3 days    Patient Care Team:  Colton Dickens MD as PCP - General (Internal Medicine)  Bola Rivera Jr., OD as PCP - Claims Attributed  Kvng Stauffer MD as Consulting Physician (Cardiology)    Reason For Visit:    Subjective           Review of Systems:    Pulm: No soa   CV:  No CP      Objective       aspirin 81 mg Oral Daily   atorvastatin 10 mg Oral Nightly   budesonide-formoterol 2 puff Inhalation  "BID - RT   buPROPion 75 mg Oral Q8H   carvedilol 3.125 mg Oral BID With Meals   ferric gluconate (FERRLECIT) IVPB 125 mg Intravenous Daily   furosemide 40 mg Oral Daily   heparin (porcine) 5,000 Units Subcutaneous Q12H   memantine 10 mg Oral Q12H   pharmacy consult - MTM  Does not apply Daily   rivastigmine 1 patch Transdermal Daily   sacubitril-valsartan 1 tablet Oral Q12H            Vital Signs:  Blood pressure 117/73, pulse 72, temperature 98.5 °F (36.9 °C), temperature source Oral, resp. rate 16, height 172.7 cm (68\"), weight 74.5 kg (164 lb 4.8 oz), SpO2 96 %.    Flowsheet Rows      First Filed Value   Admission Height  172.7 cm (68\") Documented at 06/15/2018 1223   Admission Weight  63.5 kg (140 lb) Documented at 06/15/2018 1223          06/17 0701 - 06/18 0700  In: 2278.2 [P.O.:1080; I.V.:1088.2]  Out: 1600 [Urine:1600]    Physical Exam:    General Appearance: NAD, alert and cooperative, Ox3  Eyes: PER, conjunctivae and sclerae normal, no icterus  Lungs: respirations regular and unlabored, no crepitus, clear to auscultation  Heart/CV: regular rhythm & normal rate, no murmur, no gallop, no rub and no edema  Abdomen: not distended, soft, non-tender, no masses,  bowel sounds present  Skin: No rash, Warm and dry    Radiology:            Labs:    Results from last 7 days  Lab Units 06/16/18  0617 06/15/18  1241   WBC 10*3/mm3 10.51 12.83*   HEMOGLOBIN g/dL 9.6* 10.9*   HEMATOCRIT % 32.2* 36.0*   PLATELETS 10*3/mm3 201 238       Results from last 7 days  Lab Units 06/18/18  0533 06/17/18  0447 06/16/18  0617 06/15/18  1241   SODIUM mmol/L 144 150* 150* 148*   POTASSIUM mmol/L 3.6 3.7 3.8 4.7   CHLORIDE mmol/L 107 112* 114* 112*   CO2 mmol/L 28.0 30.0 27.0 22.0   BUN mg/dL 42* 53* 50* 40*   CREATININE mg/dL 1.71* 2.00* 2.15* 2.06*   CALCIUM mg/dL 9.7 9.6 10.1 10.7*   PHOSPHORUS mg/dL 2.7 3.6  --   --    ALBUMIN g/dL 3.39 3.52  --  3.88       Results from last 7 days  Lab Units 06/18/18  0533   GLUCOSE mg/dL 103* "         Results from last 7 days  Lab Units 06/15/18  1241   ALK PHOS U/L 80   BILIRUBIN mg/dL 0.7   ALT (SGPT) U/L 10   AST (SGOT) U/L 9       Results from last 7 days  Lab Units 06/15/18  1255   PH, ARTERIAL pH units 7.448   PO2 ART mm Hg 76.4*   PCO2, ARTERIAL mm Hg 29.8*   HCO3 ART mmol/L 20.6         Results from last 7 days  Lab Units 06/16/18  1539   COLOR UA  Yellow   CLARITY UA  Clear   PH, URINE  <=5.0   SPECIFIC GRAVITY, URINE  1.010   GLUCOSE UA  Negative   KETONES UA  Negative   BILIRUBIN UA  Negative   PROTEIN UA  Trace*   BLOOD UA  Negative   LEUKOCYTES UA  Negative   NITRITE UA  Negative       Estimated Creatinine Clearance: 41.1 mL/min (A) (by C-G formula based on SCr of 1.71 mg/dL (H)).      Assessment     Active Problems:    Acute dyspnea            Impression:             Recommendations:       Cesar Sotomayor MD  06/18/18  9:58 AM

## 2018-06-18 NOTE — MBS/VFSS/FEES
Acute Care - Speech Language Pathology   Swallow Initial Evaluation  [place]   Modified Barium Swallow Study (MBS)       Patient Name: Magdi Arriaga  : 1945  MRN: [id number]  Today's Date: 2018  Onset of Illness/Injury or Date of Surgery: 06/15/18     Referring Physician: MD Eh      Admit Date: 6/15/2018    Visit Dx:     ICD-10-CM ICD-9-CM   1. Acute dyspnea R06.00 786.09   2. Acute on chronic systolic congestive heart failure I50.23 428.23     428.0   3. Acute on chronic renal insufficiency N28.9 593.9    N18.9 585.9   4. Impaired mobility and ADLs Z74.09 799.89   5. Impaired functional mobility, balance, gait, and endurance Z74.09 V49.89     Patient Active Problem List   Diagnosis   • Dementia   • Hyperlipidemia LDL goal <70   • Essential hypertension   • Obesity   • JONNY (obstructive sleep apnea)   • COPD (chronic obstructive pulmonary disease)   • Coronary artery disease involving coronary bypass graft of native heart without angina pectoris   • Ischemic cardiomyopathy   • History of atrial flutter   • History of DVT (deep vein thrombosis)   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, followed by [facility]   • Traumatic SDH, s/p right craniotomy on 2017   • Small bowel obstruction (S/P Exp lap for acute SBO 17)   • PVC (premature ventricular contraction)   • NSVT (nonsustained ventricular tachycardia)   • Monilial rash   • Acute dyspnea     Past Medical History:   Diagnosis Date   • Allergic rhinitis     History of allergy injections as a child.   • Arthritis    • Asthma      Patient has had a history of asthma since childhood.   • Bradycardia 2017   • Bronchiectasis 2017    A.  CT scan of the chest 2015 reports mild to moderate bronchiectasis.    • CHF (congestive heart failure) 2017    ECHO - EF 25%   • Closed fracture of multiple ribs of right side 10/21/2017   • COPD (chronic obstructive pulmonary  disease)    • Coronary artery disease 2017    CABG X 5   • Depression 1/6/2017   • DVT (deep venous thrombosis)     Bilateral   • Falls     A.  History of falling traumatic injuries in April 2014 and May 2014 resulting and left rib  fracture and left clavicle fracture.   • Hepatitis B 2017    Transfusion platelets - acute disease managed by ID   • History of transfusion    • Hypertension    • Iron deficiency 7/24/2017   • Left bundle branch block 4/17/2017   • Lumbar spinal stenosis 2013    Spinal surgery   • Normocytic anemia 4/17/2017   • Osteoarthritis 1/6/2017   • Patellar tendon rupture     A.  Status post primary repair of the left periprosthetic patellar tendon tear 6/11/2014, post fall at the end of April 2014.   • Pneumonia      A.  Left lower lobe pneumonia treated at [facility], 5/8/2014 through 5/20/2014.   • Pneumothorax      A.  Status post chest tube placed 5/13/2014 and discontinued 5/15/2014 with stable chest x-ray.   • SBO (small bowel obstruction) 2017    Partial colon and small bowel obstruction   • SDH (subdural hematoma) 2017    NOV 2017 craniotomy    • Sleep apnea with use of continuous positive airway pressure (CPAP)    • Traumatic hemorrhagic shock      A.  Secondary to right renal retroperitoneal hemorrhage, 9/2014.   • Vitamin D deficiency 1/6/2017     Past Surgical History:   Procedure Laterality Date   • APPENDECTOMY  1959   • CARDIAC CATHETERIZATION N/A 4/18/2017    Procedure: Left Heart Cath;  Surgeon: Kvng Stauffer MD;  Location:  [facility];  Service:    • CARDIAC CATHETERIZATION N/A 8/9/2017    Procedure: Right and Left Heart Cath;  Surgeon: Kvng Stauffer MD;  Location:  [facility];  Service:    • COLON RESECTION SMALL BOWEL N/A 11/20/2017    Procedure: COLON RESECTION SMALL BOWEL;  Surgeon: Cj Reyes MD;  Location:  [facility];  Service:    • COLONOSCOPY W/ POLYPECTOMY     • CORONARY ANGIOPLASTY WITH STENT PLACEMENT     • CORONARY ARTERY  BYPASS GRAFT N/A 4/24/2017    Procedure: MEDIAN STERNOTOMY, CORONARY ARTERY BYPASS GRAFT X5 UTILIZING THE INTERNAL MAMMARY ARTERY, ENDOVASCULAR VEING HARVEST UTILIZING RIGHT SAPHENOUS VEIN,TRANSESOPHAGEAL ECHO;  Surgeon: Kanu Berger MD;  Location:  [facility];  Service:    • EXPLORATORY LAPAROTOMY N/A 11/20/2017    Procedure: LAPAROTOMY EXPLORATORY SMALL BOWEL RESECTION;  Surgeon: Cj Reyes MD;  Location:  [facility];  Service:    • FRACTURE SURGERY     • KNEE SURGERY  2006, 2014    primary repair -left periprosthetic patellar tendon tear 6/11/2014   • LUMBAR LAMINECTOMY  2013    With discectomy   • LUMBAR LAMINECTOMY  10/16/2013    LAMI L4/5 (Dr. Holt)   • ME HARLEY HOLE EVAC SUBDUR/EXTRA HEMATOMA Right 11/8/2017    Procedure: CRANIOTOMY FOR SUBDURAL HEMATOMA;  Surgeon: True Frank MD;  Location:  [facility];  Service: Neurosurgery   • REPLACEMENT TOTAL KNEE Bilateral 2009, 2013    Left - 2009 and right - 2013.   • SKIN BIOPSY     • TONSILLECTOMY  1957   • VENA CAVA FILTER PLACEMENT  2014          SWALLOW EVALUATION (last 72 hours)      SLP Adult Swallow Evaluation     Row Name 06/18/18 1100 06/17/18 1030                Rehab Evaluation    Document Type evaluation  -AV evaluation  -AV       Subjective Information no complaints  -AV no complaints  -AV       Patient Observations alert;cooperative  -AV alert;cooperative  -AV       Patient/Family Observations  -- wife present   -AV       Patient Effort adequate  -AV good  -AV          General Information    Patient Profile Reviewed  -- yes  -AV       Pertinent History Of Current Problem  -- NSTEMI, HTN, COPD, dementia, CHF, Hep B, SDH, previous dysphagia with PEG  -AV       Current Method of Nutrition  -- regular textures;thin liquids  -AV       Precautions/Limitations, Vision  -- WFL with corrective lenses  -AV       Precautions/Limitations, Hearing  -- WFL;for purposes of eval  -AV       Prior Level of Function-Communication  -- WFL  -AV       Prior  Level of Function-Swallowing  -- --   history of dysphagia with PEG   -AV       Plans/Goals Discussed with  -- patient;spouse/S.O.  -AV       Barriers to Rehab  -- medically complex  -AV       Patient's Goals for Discharge  -- return to all previous roles/activities  -AV       Family Goals for Discharge  -- patient able to return to all previous activities/roles  -AV          Pain Assessment    Additional Documentation  -- Pain Scale: Numbers Pre/Post-Treatment (Group)  -AV          Pain Scale: Numbers Pre/Post-Treatment    Pain Scale: Numbers, Pretreatment  -- 0/10 - no pain  -AV       Pain Scale: Numbers, Post-Treatment  -- 0/10 - no pain  -AV          Oral Motor and Function    Dentition Assessment  -- upper dentures/partial in place;lower dentures/partial in place  -AV       Secretion Management  -- WNL/WFL  -AV       Mucosal Quality  -- moist, healthy  -AV       Volitional Swallow  -- WFL  -AV       Volitional Cough  -- WFL  -AV          Oral Musculature and Cranial Nerve Assessment    Oral Motor General Assessment  -- WFL  -AV          General Eating/Swallowing Observations    Respiratory Support Currently in Use  -- nasal cannula  -AV       Eating/Swallowing Skills  -- fed by SLP  -AV       Positioning During Eating  -- upright 90 degree;upright in bed  -AV       Utensils Used  -- spoon;cup;straw  -AV       Consistencies Trialed  -- regular textures;pureed;thin liquids;nectar/syrup-thick liquids  -AV          Clinical Swallow Eval    Oral Prep Phase  -- WFL  -AV       Oral Transit  -- WFL  -AV       Oral Residue  -- WFL  -AV       Pharyngeal Phase  -- suspected pharyngeal impairment  -AV       Esophageal Phase  -- unremarkable  -AV       Clinical Swallow Evaluation Summary  -- Bedside swallow eval completed this am:  PAtient demonstrates cosnsitent over s/s of pen/asp with thin liquid trials. RN report has been having difficulty with thins with meals as well.  No overt s/s with nectar, puree and solids. Rec  level 4 and nectar and MBS tomorrow to further assess pharyngela skills. Patient and wife inagreement.   -AV          MBS/VFSS    Utensils Used spoon;cup;straw  -AV  --       Consistencies Trialed regular textures;pureed;thin liquids;nectar/syrup-thick liquids;honey-thick liquids  -AV  --          MBS/VFSS Interpretation    Oral Prep Phase WFL  -AV  --       Oral Transit Phase WFL  -AV  --       VFSS Summary MBS completed this am:  Patient demonstrates consistent penetration during with all trials 2' decrease closure with residue remaining in the vestibule.  Eventual aspiration after from residue remaining in vestibule as well as moderate residue in vallecula.  Patient unable to clear residue.  Paitent only responded to aspiration x1, all other aspiration silent.  Rec NPO at this time.  Will begin dysphagia therapy. PAtient in agreement.   -AV  --          Initiation of Pharyngeal Swallow    Initiation of Pharyngeal Swallow bolus in valleculae  -AV  --       Pharyngeal Phase impaired pharyngeal phase of swallowing  -AV  --       Penetration During the Swallow thin liquids;nectar-thick liquids;honey-thick liquids;pudding/puree  -AV  --       Aspiration After the Swallow all consistencies tested  -AV  --       Response to Aspiration inconsistent response   mostly silent   -AV  --       Rosenbek's Scale all consistencies:;8--->level 8  -AV  --       Pharyngeal Residue all consistencies tested  -AV  --       Response to Residue unable to clear residue  -AV  --       Attempted Compensatory Maneuvers bolus presentation style;bolus size;multiple swallows;effortful (hard swallow)  -AV  --       Response to Attempted Compensatory Maneuvers did not prevent penetration  -AV  --          Clinical Impression    SLP Swallowing Diagnosis severe;pharyngeal dysfunction  -AV suspected pharyngeal dysfunction  -AV       Functional Impact risk of aspiration/pneumonia  -AV risk of aspiration/pneumonia  -AV       Rehab  Potential/Prognosis, Swallowing adequate, monitor progress closely  -AV good, to achieve stated therapy goals  -AV       Criteria for Skilled Therapeutic Interventions Met demonstrates skilled criteria  -AV demonstrates skilled criteria  -AV          Recommendations    Therapy Frequency (Swallow) 5 days per week  -AV  --       Predicted Duration Therapy Intervention (Days) until discharge  -AV  --       SLP Diet Recommendation NPO  -AV mechanical soft with no mixed consistencies;nectar thick liquids  -AV       Recommended Diagnostics  -- VFSS (MBS)  -AV       Recommended Precautions and Strategies  -- upright posture during/after eating;small bites of food and sips of liquid  -AV       SLP Rec. for Method of Medication Administration meds via alternate route  -AV meds whole;with thick liquids;with pudding or applesauce  -AV       Monitor for Signs of Aspiration  -- yes  -AV       Anticipated Dischage Disposition inpatient rehabilitation facility  -AV inpatient rehabilitation facility  -AV         User Key  (r) = Recorded By, (t) = Taken By, (c) = Cosigned By    Initials Name Effective Dates    AV Carmenza Sharma MS CCC-SLP 04/03/18 -         EDUCATION  The patient has been educated in the following areas:   Dysphagia (Swallowing Impairment) Oral Care/Hydration NPO rationale.    SLP Recommendation and Plan  SLP Swallowing Diagnosis: severe, pharyngeal dysfunction  SLP Diet Recommendation: NPO              Criteria for Skilled Therapeutic Interventions Met: demonstrates skilled criteria  Anticipated Dischage Disposition: inpatient rehabilitation facility  Rehab Potential/Prognosis, Swallowing: adequate, monitor progress closely  Therapy Frequency (Swallow): 5 days per week  Predicted Duration Therapy Intervention (Days): until discharge       Plan of Care Reviewed With: patient  Plan of Care Review  Plan of Care Reviewed With: patient  Progress:  (<MBS)          SLP GOALS     Row Name 06/18/18 1100             Oral  Nutrition/Hydration Goal 1 (SLP)    Oral Nutrition/Hydration Goal 1, SLP Patient will return to LRD without aspiration   -AV      Time Frame (Oral Nutrition/Hydration Goal 1, SLP) by discharge  -AV         Pharyngeal Strengthening Exercise Goal 1 (SLP)    Activity (Pharyngeal Strengthening Goal 1, SLP) increase superior movement of the hyolaryngeal complex;increase anterior movement of the hyolaryngeal complex;increase closure at entrance to airway/closure of airway at glottis;increase squeeze/positive pressure generation  -AV      Increase Superior Movement of the Hyolaryngeal Complex supraglottic swallow;hard effortful swallow;chin tuck against resistance (CTAR)  -AV      Time Frame (Pharyngeal Strengthening Goal 1, SLP) short term goal (STG);by discharge  -AV        User Key  (r) = Recorded By, (t) = Taken By, (c) = Cosigned By    Initials Name Provider Type    AV Carmenza Sharma MS CCC-SLP Speech and Language Pathologist               Time Calculation:         Time Calculation- SLP     Row Name 06/18/18 1626             Time Calculation- SLP    SLP Start Time 1100  -AV      SLP Received On 06/18/18  -AV        User Key  (r) = Recorded By, (t) = Taken By, (c) = Cosigned By    Initials Name Provider Type    AV Carmenza Sharma MS CCC-SLP Speech and Language Pathologist          Therapy Charges for Today     Code Description Service Date Service Provider Modifiers Qty    12190975879 HC ST MOTION FLUORO EVAL SWALLOW 4 6/18/2018 Carmenza Sharma MS CCC-SLP GN 1               MS ROSENDO Howard  6/18/2018

## 2018-06-18 NOTE — PROGRESS NOTES
Discharge Planning Assessment  [facility]     Patient Name: Magdi Arriaga  MRN: [id number]  Today's Date: 6/18/2018    Admit Date: 6/15/2018          Discharge Needs Assessment     Row Name 06/18/18 1443       Living Environment    Lives With spouse   Pt's spouse and 2 sons are caregivers    Name(s) of Who Lives With Patient Wife - Netta    Current Living Arrangements home/apartment/condo   Lives in [facility]     Primary Care Provided by spouse/significant other;child(bartolo)    Provides Primary Care For no one, unable/limited ability to care for self    Family Caregiver if Needed child(bartolo), adult;spouse    Quality of Family Relationships helpful;involved;supportive    Able to Return to Prior Arrangements yes       Resource/Environmental Concerns    Resource/Environmental Concerns none    Transportation Concerns other (see comments)   Wheels       Transition Planning    Patient/Family Anticipates Transition to home with family    Patient/Family Anticipated Services at Transition ;outpatient care   Pt currently goes to [facility] outpatient PT 2x per week. Transport by [facility].     Transportation Anticipated agency   [employer] transportation       Discharge Needs Assessment    Concerns to be Addressed discharge planning    Equipment Currently Used at Home lift device;wheelchair;sling;bath bench;hospital bed;bipap/cpap   Has a Cpap with oxygen at night with Pt. Aides    Anticipated Changes Related to Illness none    Equipment Needed After Discharge oxygen   Pt's wife unsure if he will need oxygen at d/c. He does not currently wear at home.     Outpatient/Agency/Support Group Needs outpatient therapy;homecare agency    Current Discharge Risk chronically ill;dependent with mobility/activities of daily living            Discharge Plan     Row Name 06/18/18 1453       Plan    Plan Home with family and continue outpatient PT    Patient/Family in Agreement with Plan yes    Plan Comments Pt was off floor  for IDP, info from wife Netta at bs. She is patients primary caregiver at home. He is mostly wheelchair bound and up with cesar lift. He goes to outpatient PT at [facility] 2x per week and is transported by [facility] [employer]. Pt's wife and 2 sons are his caregivers. Wife sponge bathes him.  She declines HH or any rehab. She plans to take him home with family to assist. Pt will need [employer] arranged for transport home. Pt's wife will bring his WC and lift pad for transport. CM will cont to follow.     Final Discharge Disposition Code 01 - home or self-care        Destination     No service coordination in this encounter.      Durable Medical Equipment     No service coordination in this encounter.      Dialysis/Infusion     No service coordination in this encounter.      Home Medical Care     No service coordination in this encounter.      Social Care     No service coordination in this encounter.        Expected Discharge Date and Time     Expected Discharge Date Expected Discharge Time    Jun 18, 2018               Demographic Summary     Row Name 06/18/18 1426       General Information    Referral Source admission list    Preferred Language English    General Information Comments PCP Colton Dickens who is retiring and changing to Dr. Duvall who is in the same practice       Contact Information    Permission Granted to Share Info With ;family/designee   Wife Netta-pt has dimentia.     Row Name 06/18/18 6190       General Information    Referral Source admission list    Preferred Language English    General Information Comments PCP Colton Dickens who is retiring and changing to Dr. Duvall.        Contact Information    Permission Granted to Share Info With             Functional Status     Row Name 06/18/18 5354       Functional Status    Usual Activity Tolerance poor    Current Activity Tolerance poor    Functional Status Comments Pt is WC bound and uses a lift at home.         Functional Status, IADL    Medications completely dependent    Meal Preparation completely dependent    Housekeeping completely dependent    Laundry completely dependent    Shopping completely dependent    IADL Comments Wife and sons take care of pt at home.        Employment/    Employment/ Comments Has Medicare [employer] and [facility] PPO with prescription coverage that is affordable. Uses [employer] and uses [facility] on [address]     Row Name 06/18/18 1344       Functional Status    Usual Activity Tolerance poor    Current Activity Tolerance poor       Functional Status, IADL    Medications completely dependent    Meal Preparation completely dependent    Housekeeping completely dependent    Laundry completely dependent    Shopping completely dependent    IADL Comments Wife and sons take care of pt at home. Use a lift             Psychosocial    No documentation.           Abuse/Neglect    No documentation.           Legal    No documentation.           Substance Abuse    No documentation.           Patient Forms    No documentation.         Kala Woods

## 2018-06-19 NOTE — PROGRESS NOTES
Continued Stay Note  [facility]     Patient Name: Magdi Arriaga  MRN: [id number]  Today's Date: 6/19/2018    Admit Date: 6/15/2018          Discharge Plan     Row Name 06/19/18 6608       Plan    Plan Home with family and continue outpatient PT.     Patient/Family in Agreement with Plan yes    Plan Comments Pt to d/c today but [facility] unable to transport. Called [facility] transport and they are unable to transport today nor is [facility] transport. Pt will not qualify for ambulance transport. Cardiology aware. Called SW Malini to arrange [facility] transport for Wednesday AM. CM will cont to follow.     Row Name 06/19/18 7952       Plan    Plan Comments SW was cotnacted reporting pt discharging home today and uses wheels. SW called [facility] [phone] and spoke with [facility] to get hospital discharge transportation. [facility] reports they are too busy today and will not be able to fit pt in for transportation home. SW notified CM.              Discharge Codes    No documentation.       Expected Discharge Date and Time     Expected Discharge Date Expected Discharge Time    Jun 18, 2018             Kala Woods

## 2018-06-19 NOTE — PROGRESS NOTES
"   LOS: 4 days    Patient Care Team:  Colton Dickens MD as PCP - General (Internal Medicine)  Bola Rivera Jr., OD as PCP - Claims Attributed  Kvng Stauffer MD as Consulting Physician (Cardiology)  Jaden Duvall RN as Care Coordinator (Population Health)    Reason For Visit:  F/U BUBBA ON CKD.  Subjective           Review of Systems:    Pulm: No soa   CV:  No CP      Objective       aspirin 81 mg Oral Daily   atorvastatin 10 mg Oral Nightly   budesonide-formoterol 2 puff Inhalation BID - RT   buPROPion 75 mg Oral Q8H   carvedilol 3.125 mg Oral BID With Meals   ferric gluconate (FERRLECIT) IVPB 125 mg Intravenous Daily   furosemide 40 mg Oral Daily   heparin (porcine) 5,000 Units Subcutaneous Q12H   memantine 10 mg Oral Q12H   pharmacy consult - MTM  Does not apply Daily   rivastigmine 1 patch Transdermal Daily   sacubitril-valsartan 1 tablet Oral Q12H       sodium chloride 75 mL/hr Last Rate: 75 mL/hr (06/19/18 0400)         Vital Signs:  Blood pressure 137/81, pulse 64, temperature 98.6 °F (37 °C), temperature source Oral, resp. rate 18, height 172.7 cm (68\"), weight 76 kg (167 lb 9.6 oz), SpO2 91 %.    Flowsheet Rows      First Filed Value   Admission Height  172.7 cm (68\") Documented at 06/15/2018 1223   Admission Weight  63.5 kg (140 lb) Documented at 06/15/2018 1223          06/18 0701 - 06/19 0700  In: 1310.9 [P.O.:100; I.V.:1210.9]  Out: 550 [Urine:550]    Physical Exam:    General Appearance: NAD, alert and cooperative, Ox3  Eyes: PER, conjunctivae and sclerae normal, no icterus  Lungs: respirations regular and unlabored, no crepitus, clear to auscultation  Heart/CV: regular rhythm & normal rate, no murmur, no gallop, no rub and no edema  Abdomen: not distended, soft, non-tender, no masses,  bowel sounds present  Skin: No rash, Warm and dry    Radiology:            Labs:    Results from last 7 days  Lab Units 06/16/18  0617 06/15/18  1241   WBC 10*3/mm3 10.51 12.83*   HEMOGLOBIN g/dL 9.6* 10.9* "   HEMATOCRIT % 32.2* 36.0*   PLATELETS 10*3/mm3 201 238       Results from last 7 days  Lab Units 06/18/18  0533 06/17/18  0447 06/16/18  0617 06/15/18  1241   SODIUM mmol/L 144 150* 150* 148*   POTASSIUM mmol/L 3.6 3.7 3.8 4.7   CHLORIDE mmol/L 107 112* 114* 112*   CO2 mmol/L 28.0 30.0 27.0 22.0   BUN mg/dL 42* 53* 50* 40*   CREATININE mg/dL 1.71* 2.00* 2.15* 2.06*   CALCIUM mg/dL 9.7 9.6 10.1 10.7*   PHOSPHORUS mg/dL 2.7 3.6  --   --    ALBUMIN g/dL 3.39 3.52  2.8*  --  3.88       Results from last 7 days  Lab Units 06/18/18  0533   GLUCOSE mg/dL 103*         Results from last 7 days  Lab Units 06/15/18  1241   ALK PHOS U/L 80   BILIRUBIN mg/dL 0.7   ALT (SGPT) U/L 10   AST (SGOT) U/L 9       Results from last 7 days  Lab Units 06/15/18  1255   PH, ARTERIAL pH units 7.448   PO2 ART mm Hg 76.4*   PCO2, ARTERIAL mm Hg 29.8*   HCO3 ART mmol/L 20.6         Results from last 7 days  Lab Units 06/16/18  1539   COLOR UA  Yellow   CLARITY UA  Clear   PH, URINE  <=5.0   SPECIFIC GRAVITY, URINE  1.010   GLUCOSE UA  Negative   KETONES UA  Negative   BILIRUBIN UA  Negative   PROTEIN UA  Trace*   BLOOD UA  Negative   LEUKOCYTES UA  Negative   NITRITE UA  Negative       Estimated Creatinine Clearance: 42 mL/min (A) (by C-G formula based on SCr of 1.71 mg/dL (H)).      Assessment     Active Problems:    Acute dyspnea            Impression: BUBBA ON CKD. GFR BACK TO BASELINE. ANEMIA ON IV FE. HYPERNATREMIA IS BETTER.            Recommendations: IV FE WHILE HOSPITALIZED AND THEN PO FE AFTER DISCHARGE. F/U WITH NAL ARRANGED.      Cesar Sotomayor MD  06/19/18  10:56 AM

## 2018-06-19 NOTE — PLAN OF CARE
Problem: Fall Risk (Adult)  Goal: Identify Related Risk Factors and Signs and Symptoms  Outcome: Ongoing (interventions implemented as appropriate)    Goal: Absence of Fall  Outcome: Ongoing (interventions implemented as appropriate)      Problem: Skin Injury Risk (Adult)  Goal: Identify Related Risk Factors and Signs and Symptoms  Outcome: Ongoing (interventions implemented as appropriate)    Goal: Skin Health and Integrity  Outcome: Ongoing (interventions implemented as appropriate)      Problem: Patient Care Overview  Goal: Plan of Care Review  Outcome: Ongoing (interventions implemented as appropriate)   06/19/18 1644   Coping/Psychosocial   Plan of Care Reviewed With patient   Plan of Care Review   Progress no change   OTHER   Outcome Summary VSS. NSR on monitor. pt. continues to be NPO. iv fluids continues. pt. being discharge home tomorrow.       Problem: Cardiac: Heart Failure (Adult)  Goal: Signs and Symptoms of Listed Potential Problems Will be Absent, Minimized or Managed (Cardiac: Heart Failure)  Outcome: Ongoing (interventions implemented as appropriate)

## 2018-06-19 NOTE — PROGRESS NOTES
Continued Stay Note  [facility]     Patient Name: Magdi Arriaga  MRN: [id number]  Today's Date: 6/19/2018    Admit Date: 6/15/2018          Discharge Plan     Row Name 06/19/18 1603       Plan    Plan Comments ERIK called [employer] transportation and scheduled transportation for tomorrow morning at 10:00am. [facility] will be at [facility] tomorrow between 10:00am and 10:30am and pt will need to be downstairs in the [facility] to be picked up by 10:00am.     Row Name 06/19/18 1553       Plan    Plan Home with family and continue outpatient PT.     Patient/Family in Agreement with Plan yes    Plan Comments Pt to d/c today but [employer] unable to transport. Called [facility] transport and they are unable to transport today nor is [facility] transport. Pt will not qualify for ambulance transport. Cardiology aware. Called SW Malini to arrange [employer] transport for Wednesday AM. CM will cont to follow.     Row Name 06/19/18 7448       Plan    Plan Comments SW was cotnacted reporting pt discharging home today and uses wheels. ERIK called [employer] transportation [phone] and spoke with [employer] to get hospital discharge transportation. [employer] reports they are too busy today and will not be able to fit pt in for transportation home. ERIK notified CM.              Discharge Codes    No documentation.       Expected Discharge Date and Time     Expected Discharge Date Expected Discharge Time    Jun 20, 2018             SHAMAR Buchanan

## 2018-06-19 NOTE — PROGRESS NOTES
"  [facility]   Inpatient Progress Note       LOS: 4 days   Patient Care Team:  Colton Dickens MD as PCP - General (Internal Medicine)  Bola Rivera Jr., OD as PCP - Claims Attributed  Kvng Stauffer MD as Consulting Physician (Cardiology)  Jaden Duvall RN as Care Coordinator (Population Health)    Chief Complaint:  Follow-up for acute on chronic systolic congestive heart failure.    Subjective     Interval History:   Patient reports SOB is about the same as yesterday. Denies CP, LH, palpitations. Overall feels fair.  Discussed treatment options for his dysphagia with patient and wife.  He strongly does not want a PEG at any time as he had one for 5 months prior.  He wishes to simply go home\" eat what I want and live as good as I can\"    Review of Systems:   Pertinent positives noted in history, exam, and assessment. Otherwise reviewed and negative.      Objective     Vitals:  Blood pressure 131/75, pulse 68, temperature 97.7 °F (36.5 °C), temperature source Axillary, resp. rate 16, height 172.7 cm (68\"), weight 76 kg (167 lb 9.6 oz), SpO2 92 %.     Intake/Output Summary (Last 24 hours) at 06/19/18 0817  Last data filed at 06/19/18 0505   Gross per 24 hour   Intake          1310.94 ml   Output              400 ml   Net           910.94 ml     Physical Exam   Constitutional: He is oriented to person, place, and time. He appears well-developed and well-nourished.   HENT:   Head: Normocephalic and atraumatic.   Neck: No JVD present. No tracheal deviation present.   Cardiovascular: Normal rate, regular rhythm and normal heart sounds.  Exam reveals no friction rub.    No murmur heard.  Pulmonary/Chest: Effort normal. No respiratory distress. He has no wheezes. He has no rales. He exhibits no tenderness.   Slightly decreased bases   Abdominal: Soft. Bowel sounds are normal. He exhibits no distension. There is no tenderness. There is no rebound.   Musculoskeletal: He exhibits no edema or " deformity.   Neurological: He is alert and oriented to person, place, and time.     I've examined the patient and agree with the above     Results Review:     I reviewed the patient's new clinical results.      Results from last 7 days  Lab Units 06/16/18  0617   WBC 10*3/mm3 10.51   HEMOGLOBIN g/dL 9.6*   HEMATOCRIT % 32.2*   PLATELETS 10*3/mm3 201       Results from last 7 days  Lab Units 06/18/18  0533  06/15/18  1241   SODIUM mmol/L 144  < > 148*   POTASSIUM mmol/L 3.6  < > 4.7   CHLORIDE mmol/L 107  < > 112*   CO2 mmol/L 28.0  < > 22.0   BUN mg/dL 42*  < > 40*   CREATININE mg/dL 1.71*  < > 2.06*   CALCIUM mg/dL 9.7  < > 10.7*   BILIRUBIN mg/dL  --   --  0.7   ALK PHOS U/L  --   --  80   ALT (SGPT) U/L  --   --  10   AST (SGOT) U/L  --   --  9   GLUCOSE mg/dL 103*  < > 85   < > = values in this interval not displayed.    Results from last 7 days  Lab Units 06/18/18  0533   SODIUM mmol/L 144   POTASSIUM mmol/L 3.6   CHLORIDE mmol/L 107   CO2 mmol/L 28.0   BUN mg/dL 42*   CREATININE mg/dL 1.71*   GLUCOSE mg/dL 103*   CALCIUM mg/dL 9.7           Lab Results  Lab Value Date/Time   TROPONINI 0.329 (H) 06/16/2018 0617   TROPONINI 0.400 (H) 06/15/2018 1241   TROPONINI 0.191 (H) 11/20/2017 2335   TROPONINI 0.227 (H) 11/20/2017 1940   TROPONINI 0.059 (H) 10/21/2017 2153   TROPONINI 0.039 08/23/2017 2038   TROPONINI 0.173 (H) 08/07/2017 0836   TROPONINI 0.211 (H) 08/07/2017 0241   TROPONINI 0.166 (H) 08/06/2017 2048   TROPONINI 0.019 07/17/2017 1227   TROPONINI 0.044 (H) 06/12/2017 2139   TROPONINI 9.425 (C) 04/18/2017 0757   TROPONINI 7.403 (C) 04/17/2017 2340           Results from last 7 days  Lab Units 06/16/18  0617   CHOLESTEROL mg/dL 116   TRIGLYCERIDES mg/dL 68   HDL CHOL mg/dL 55   LDL CHOL mg/dL 48         ECHO:   ·  Left ventricular systolic function is severely decreased. Estimated EF = 20%.  Moderate mitral valve regurgitation is present    Tele:  NSR 82 bpm    Assessment/Plan     Active Problems:    Acute  dyspnea      1. Acute on chronic systolic congestive heart failure.    - Last documented ejection fraction of 40%.    - Patient with noted chronic left bundle branch block.  - currently diuresing with oral lasix; wt increased 3 lbs and (+) 900 cc output  - EF this admission 20%.        - Not a candidate for ARB due to renal failure.    2. Coronary artery disease, no current angina.  3. Chronic kidney disease  4. Hypertension  5. Dyslipidemia  6. Hypernatremia   7. Dementia  8. Dysphagia- failed bedside swallow eval, speech following with plans for modified barium swallow today    Plan:  Worsening LV function, end-stage ischemic cardiomyopathy.  Patient has dementia.  He wishes to go home.    Plans for modified barium swallow today per speech-not done per family request.  He wishes to simply go home, any bloody wishes, understanding the consequences.  The discharge in the morning, soon as transportation to be arranged    Disposition: Patient is felt to be stable and ready for discharge home today but will have to wait till AM for transportation. He will need follow up with Dr. Stauffer in 4 week and heart and valve clinic in 1-2 weeks.    [facility]  6/19/2018  8:26 AM  IKvng have reviewed the note in full and agree with all aspects of the above including physical exam, assessment, labs and plan with changes made accordingly.     Kvng Stauffer MD  06/19/18  4:05 PM          Dictated utilizing Dragon dictation

## 2018-06-19 NOTE — PROGRESS NOTES
Continued Stay Note  [facility]     Patient Name: Magdi Arriaga  MRN: [id number]  Today's Date: 6/19/2018    Admit Date: 6/15/2018          Discharge Plan     Row Name 06/19/18 1532       Plan    Plan Comments SW was cotnacted reporting pt discharging home today and uses wheels. SW called [employer] transportation [phone] and spoke with [employer] to get hospital discharge transportation. [employer] reports they are too busy today and will not be able to fit pt in for transportation home. SW notified CM.              Discharge Codes    No documentation.       Expected Discharge Date and Time     Expected Discharge Date Expected Discharge Time    Jun 18, 2018             SHAMAR Buchanan

## 2018-06-20 NOTE — PROGRESS NOTES
"   LOS: 5 days    Patient Care Team:  Colton Dickens MD as PCP - General (Internal Medicine)  Bola Rivera Jr., OD as PCP - Claims Attributed  Kvng Stauffer MD as Consulting Physician (Cardiology)  Jaden Duvall RN as Care Coordinator (Population Health)    Reason For Visit:  F/U BUBBA ON CKD.  Subjective           Review of Systems:    Pulm: No soa   CV:  No CP      Objective       aspirin 81 mg Oral Daily   atorvastatin 10 mg Oral Nightly   budesonide-formoterol 2 puff Inhalation BID - RT   buPROPion 75 mg Oral Q8H   carvedilol 3.125 mg Oral BID With Meals   ferric gluconate (FERRLECIT) IVPB 125 mg Intravenous Daily   furosemide 40 mg Oral Daily   heparin (porcine) 5,000 Units Subcutaneous Q12H   memantine 10 mg Oral Q12H   pharmacy consult - MTM  Does not apply Daily   rivastigmine 1 patch Transdermal Daily   sacubitril-valsartan 1 tablet Oral Q12H       sodium chloride 75 mL/hr Last Rate: 75 mL/hr (06/19/18 2306)         Vital Signs:  Blood pressure 137/79, pulse 75, temperature 98.7 °F (37.1 °C), temperature source Oral, resp. rate 18, height 172.7 cm (68\"), weight 75 kg (165 lb 5.5 oz), SpO2 96 %.    Flowsheet Rows      First Filed Value   Admission Height  172.7 cm (68\") Documented at 06/15/2018 1223   Admission Weight  63.5 kg (140 lb) Documented at 06/15/2018 1223          06/19 0701 - 06/20 0700  In: 336.1 [I.V.:336.1]  Out: 200 [Urine:200]    Physical Exam:    General Appearance: NAD, alert and cooperative, Ox3  Eyes: PER, conjunctivae and sclerae normal, no icterus  Lungs: FEW CRACKLES  Heart/CV: regular rhythm & normal rate, no murmur, no gallop, no rub and no edema  Abdomen: not distended, soft, non-tender, no masses,  bowel sounds present  Skin: No rash, Warm and dry    Radiology:            Labs:    Results from last 7 days  Lab Units 06/16/18  0617 06/15/18  1241   WBC 10*3/mm3 10.51 12.83*   HEMOGLOBIN g/dL 9.6* 10.9*   HEMATOCRIT % 32.2* 36.0*   PLATELETS 10*3/mm3 201 238       Results from " last 7 days  Lab Units 06/20/18  0450 06/18/18  0533 06/17/18  0447 06/16/18  0617 06/15/18  1241   SODIUM mmol/L 147* 144 150* 150* 148*   POTASSIUM mmol/L 4.2 3.6 3.7 3.8 4.7   CHLORIDE mmol/L 111* 107 112* 114* 112*   CO2 mmol/L 22.0 28.0 30.0 27.0 22.0   BUN mg/dL 37* 42* 53* 50* 40*   CREATININE mg/dL 1.46* 1.71* 2.00* 2.15* 2.06*   CALCIUM mg/dL 9.4 9.7 9.6 10.1 10.7*   PHOSPHORUS mg/dL  --  2.7 3.6  --   --    ALBUMIN g/dL  --  3.39 3.52  2.8*  --  3.88       Results from last 7 days  Lab Units 06/20/18  0450   GLUCOSE mg/dL 52*         Results from last 7 days  Lab Units 06/15/18  1241   ALK PHOS U/L 80   BILIRUBIN mg/dL 0.7   ALT (SGPT) U/L 10   AST (SGOT) U/L 9       Results from last 7 days  Lab Units 06/15/18  1255   PH, ARTERIAL pH units 7.448   PO2 ART mm Hg 76.4*   PCO2, ARTERIAL mm Hg 29.8*   HCO3 ART mmol/L 20.6         Results from last 7 days  Lab Units 06/16/18  1539   COLOR UA  Yellow   CLARITY UA  Clear   PH, URINE  <=5.0   SPECIFIC GRAVITY, URINE  1.010   GLUCOSE UA  Negative   KETONES UA  Negative   BILIRUBIN UA  Negative   PROTEIN UA  Trace*   BLOOD UA  Negative   LEUKOCYTES UA  Negative   NITRITE UA  Negative       Estimated Creatinine Clearance: 48.5 mL/min (A) (by C-G formula based on SCr of 1.46 mg/dL (H)).      Assessment     Active Problems:    Acute dyspnea            Impression: BUBBA ON CKD. GFR IMPROVED. HYPERNATREMIA. CHF. ANEMIA.            Recommendations: TO REHAB TODAY. F/U ARRANGED.      Cesar Sotomayor MD  06/20/18  9:55 AM

## 2018-06-21 NOTE — TELEPHONE ENCOUNTER
"Called pt's wife Netta for status since home from the hospital yesterday. She said \"not so well, coughing so bad he chokes.\" TGF wld like him in next Mon or Tues at 4 pm. Netta verb understanding and said they use [employer] for transportation and can bring him in Tues 6/26 at 4 pm.  "

## 2018-06-25 PROBLEM — I48.0 PAF (PAROXYSMAL ATRIAL FIBRILLATION) (HCC): Status: ACTIVE | Noted: 2018-01-01

## 2018-06-25 NOTE — PROGRESS NOTES
[facility]      Encounter Date:06/25/2018     Magdi Arriaga  [address] [place] [id number] [phone]    1945    MD Peg Namantonia Arriaga is a 72 y.o. male.      Subjective:     Chief Complaint:  hospital follow-up       HPI     Patient with a history of CAD, status post non-STEMI April 2017, status post CABG April 24, 2017.  Repeat left heart catheterization in August 2017 with drug-eluting stent.  History of atrial flutter status post ECV 5 2017, left bundle-branch, hypertension, hyperlipidemia, sleep apnea, COPD.  Patient hospitalized [facility] on 6/15/18 secondary to acute systolic heart failure exacerbation.  EF decreased to 20%.  Patient not felt to be an ICD candidate due to age and comorbidities, therefore LifeVest was not ordered.    Improved dyspnea.  NO CP, dizziness, syncope, edema.  Is not weighing due to unable to bare weight.  Taking Lasix and KCL every other day. Has not needed extra Lasix/KCL.  Sleep at night without dyspnea.  Plans to discuss home health and PT with PCP tomorrow.      Patient Active Problem List    Diagnosis   • PAF (paroxysmal atrial fibrillation) [I48.0]   • Acute dyspnea [R06.00]   • Monilial rash [B37.2]   • PVC (premature ventricular contraction) [I49.3]   • NSVT (nonsustained ventricular tachycardia) [I47.2]   • Small bowel obstruction (S/P Exp lap for acute SBO 11/20/17) [K56.609]   • Traumatic SDH, s/p right craniotomy on 11/8/2017 [S06.5X0A]   • Acute type B viral hepatitis related to platelet transfusion, followed by Hernandez [B16.9]   • CKD (chronic kidney disease), stage III [N18.3]   • History of DVT (deep vein thrombosis) [Z86.718]   • History of atrial flutter [Z86.79]   • Coronary artery disease involving coronary bypass graft of native heart without angina pectoris [I25.810]     Overview Note:     · CABG by Yohannes Berger (4/24/17): LIMA to LAD, sequential SVG to OM1/OM 2,  SVG to diagonal, SVG to RCA  · Cardiac cath (08/08/2017): VICKEY to LAD/diagnonal. Patent SVG to diagonal and patent LIMA to LAD. Patent SVG to OM 1 and OM2. Patnet SVG to RCA. Small Ramus branch to small for PCI.     • Ischemic cardiomyopathy [I25.5]     Overview Note:     · Echo (08/07/2017): LVEF 30%. The cardiac valves are anatomically and functionally normal.  · Echo (08/24/2017): LVEF 30%.  An apical left ventricular aneurysm is present.  · Echo (11/14/2017): LVEF 40%.     • COPD (chronic obstructive pulmonary disease) [J44.9]   • Dementia [F03.90]   • Hyperlipidemia LDL goal <70 [E78.5]     Overview Note:     · High intensity statin therapy indicted given presence of CAD     • Essential hypertension [I10]   • Obesity [E66.9]   • JONNY (obstructive sleep apnea) [G47.33]         Past Surgical History:   Procedure Laterality Date   • APPENDECTOMY  1959   • CARDIAC CATHETERIZATION N/A 4/18/2017    Procedure: Left Heart Cath;  Surgeon: Kvng Stauffer MD;  Location:  [facility];  Service:    • CARDIAC CATHETERIZATION N/A 8/9/2017    Procedure: Right and Left Heart Cath;  Surgeon: Kvng Stauffer MD;  Location:  [facility];  Service:    • COLON RESECTION SMALL BOWEL N/A 11/20/2017    Procedure: COLON RESECTION SMALL BOWEL;  Surgeon: Cj Reyes MD;  Location:  [facility] OR;  Service:    • COLONOSCOPY W/ POLYPECTOMY     • CORONARY ANGIOPLASTY WITH STENT PLACEMENT     • CORONARY ARTERY BYPASS GRAFT N/A 4/24/2017    Procedure: MEDIAN STERNOTOMY, CORONARY ARTERY BYPASS GRAFT X5 UTILIZING THE INTERNAL MAMMARY ARTERY, ENDOVASCULAR VEING HARVEST UTILIZING RIGHT SAPHENOUS VEIN,TRANSESOPHAGEAL ECHO;  Surgeon: Kanu Berger MD;  Location:  [facility] OR;  Service:    • EXPLORATORY LAPAROTOMY N/A 11/20/2017    Procedure: LAPAROTOMY EXPLORATORY SMALL BOWEL RESECTION;  Surgeon: Cj Reyes MD;  Location:  [facility] OR;  Service:    • FRACTURE SURGERY     • KNEE SURGERY  2006, 2014     primary repair -left periprosthetic patellar tendon tear 6/11/2014   • LUMBAR LAMINECTOMY  2013    With discectomy   • LUMBAR LAMINECTOMY  10/16/2013    LAMI L4/5 (Dr. Holt)   • MN HARLEY HOLE EVAC SUBDUR/EXTRA HEMATOMA Right 11/8/2017    Procedure: CRANIOTOMY FOR SUBDURAL HEMATOMA;  Surgeon: True Frank MD;  Location: [facility];  Service: Neurosurgery   • REPLACEMENT TOTAL KNEE Bilateral 2009, 2013    Left - 2009 and right - 2013.   • SKIN BIOPSY     • TONSILLECTOMY  1957   • VENA CAVA FILTER PLACEMENT  2014       Allergies   Allergen Reactions   • Sulfa Antibiotics      UNKNOWN CHILDHOOD REACTION         Current Outpatient Prescriptions:   •  albuterol (PROVENTIL) (5 MG/ML) 0.5% nebulizer solution, Take 0.5 mL by nebulization Every 4 (Four) Hours As Needed for Wheezing., Disp: , Rfl: 12  •  aspirin 81 MG chewable tablet, Chew 1 tablet Daily., Disp: , Rfl:   •  atorvastatin (LIPITOR) 10 MG tablet, Take 1 tablet by mouth Every Night., Disp: , Rfl:   •  budesonide-formoterol (SYMBICORT) 160-4.5 MCG/ACT inhaler, Inhale 2 puffs 2 (Two) Times a Day., Disp: , Rfl:   •  buPROPion (WELLBUTRIN) 75 MG tablet, Take 1 tablet by mouth 3 (Three) Times a Day., Disp: 90 tablet, Rfl: 2  •  carvedilol (COREG) 3.125 MG tablet, Take 3.125 mg by mouth 2 (Two) Times a Day With Meals., Disp: , Rfl:   •  Cholecalciferol (VITAMIN D3) 2000 units tablet, Take 1 tablet by mouth Daily., Disp: , Rfl:   •  entecavir (BARACLUDE) 0.5 MG tablet, Take 1 tablet by mouth Daily., Disp: 90 tablet, Rfl: 0  •  furosemide (LASIX) 40 MG tablet, Take 1 tablet by mouth Daily. (Patient taking differently: Take 40 mg by mouth Every Other Day. Lasix 40 mg every other day and extra 40 mg as needed for dyspnea.), Disp: 30 tablet, Rfl: 11  •  memantine (NAMENDA) 10 MG tablet, Take 1 tablet by mouth Every 12 (Twelve) Hours., Disp: 60 tablet, Rfl: 2  •  Multiple Vitamins-Minerals (MULTIVITAMIN ADULT PO), Take 1 tablet by mouth Daily., Disp: , Rfl:   •   nitroglycerin (NITROSTAT) 0.4 MG SL tablet, Place 1 tablet under the tongue Every 5 (Five) Minutes As Needed for Chest Pain. Take no more than 3 doses in 15 minutes., Disp: , Rfl: 12  •  potassium chloride (K-DUR) 10 MEQ CR tablet, Take 1 tablet by mouth Daily. (Patient taking differently: Take 10 mEq by mouth Daily. Pt is taking every other day with Lasix, will take extra PRN with PRN lasix.), Disp: 30 tablet, Rfl: 11  •  rivastigmine (EXELON) 13.3 MG/24HR patch, Place 1 patch on the skin Daily., Disp: 30 patch, Rfl: 11  •  sacubitril-valsartan (ENTRESTO) 49-51 MG tablet, Take 1 tablet by mouth Every 12 (Twelve) Hours., Disp: 60 tablet, Rfl:   •  vitamin E 1000 UNIT capsule, Take 1,000 Units by mouth Daily., Disp: , Rfl:     The following portions of the patient's history were reviewed and updated as appropriate: allergies, current medications, past family history, past medical history, past social history, past surgical history and problem list.    Review of Systems   Constitution: Positive for malaise/fatigue. Negative for chills, decreased appetite, diaphoresis, fever, weakness, night sweats, weight gain and weight loss.   HENT: Negative for congestion and nosebleeds.    Eyes: Negative for blurred vision, visual disturbance and visual halos.   Cardiovascular: Negative for chest pain, claudication, cyanosis, dyspnea on exertion, irregular heartbeat, leg swelling, near-syncope, orthopnea, palpitations, paroxysmal nocturnal dyspnea and syncope.   Respiratory: Positive for shortness of breath. Negative for cough, hemoptysis, sleep disturbances due to breathing, snoring, sputum production and wheezing.    Endocrine: Negative for cold intolerance, heat intolerance, polydipsia, polyphagia and polyuria.   Hematologic/Lymphatic: Does not bruise/bleed easily.   Skin: Negative for dry skin, itching and rash.   Musculoskeletal: Positive for joint pain and muscle weakness. Negative for falls, joint swelling and myalgias.  "  Gastrointestinal: Negative for bloating, abdominal pain, constipation, diarrhea, dysphagia, heartburn, melena, nausea and vomiting.   Genitourinary: Negative for dysuria, flank pain, hematuria and nocturia.   Neurological: Negative for difficulty with concentration, excessive daytime sleepiness, dizziness, headaches and loss of balance.   Psychiatric/Behavioral: Negative for altered mental status and depression. The patient is not nervous/anxious.    Allergic/Immunologic: Negative for environmental allergies.       Objective:     Vitals:    06/25/18 1212 06/25/18 1217   BP: 130/79 136/91   BP Location: Right arm Left arm   Patient Position: Sitting Sitting   Cuff Size: Large Adult Adult   Pulse: 66    Temp: 98.1 °F (36.7 °C)    SpO2: 92%    Height: 172.7 cm (67.99\")          Physical Exam   Constitutional: He appears well-developed and well-nourished. No distress.   HENT:   Head: Normocephalic and atraumatic.   Mouth/Throat: Oropharynx is clear and moist.   Eyes: Conjunctivae are normal. Pupils are equal, round, and reactive to light. No scleral icterus.   Neck: No hepatojugular reflux and no JVD present. Carotid bruit is not present. No tracheal deviation present. No thyromegaly present.   Cardiovascular: Normal rate, regular rhythm, normal heart sounds and intact distal pulses.  Exam reveals no friction rub.    No murmur heard.  Pulmonary/Chest: Effort normal. He has rales (bases).   Abdominal: Soft. Bowel sounds are normal. He exhibits no distension. There is no tenderness.   Musculoskeletal: He exhibits no edema.   Lymphadenopathy:     He has no cervical adenopathy.   Neurological: He is alert.   Skin: Skin is warm, dry and intact. No rash noted. No cyanosis or erythema. No pallor.   Psychiatric: He has a normal mood and affect. His behavior is normal. Thought content normal.   Vitals reviewed.      Lab and Diagnostic Review:  Lab Results   Component Value Date    WBC 10.51 06/16/2018    HGB 9.6 (L) 06/16/2018 "    HCT 32.2 (L) 06/16/2018    MCV 93.9 06/16/2018     06/16/2018     Lab Results   Component Value Date    GLUCOSE 52 (L) 06/20/2018    CALCIUM 9.4 06/20/2018     (H) 06/20/2018    K 4.2 06/20/2018    CO2 22.0 06/20/2018     (H) 06/20/2018    BUN 37 (H) 06/20/2018    CREATININE 1.46 (H) 06/20/2018    EGFRIFNONA 47 (L) 06/20/2018    BCR 25.3 (H) 06/20/2018    ANIONGAP 14.0 (H) 06/20/2018     Lab Results   Component Value Date    TSH 1.995 05/08/2018     Lab Results   Component Value Date    CHOL 116 06/16/2018    TRIG 68 06/16/2018    HDL 55 06/16/2018    LDL 48 06/16/2018       Assessment and Plan:         1. Acute on chronic systolic heart failure  Improved  Entresto, coreg  Lasix  - Basic Metabolic Panel; today    EF 20%,   NYHA III    Heart failure education discussed: What is heart failure, causes, signs and symptoms, medication management, low-sodium diet of less than 2 g per day, daily exercise, role the heart failure center.    Pt unable to weight, not weight bearing.    2. Ischemic cardiomyopathy  Asa, statin  S/p CABG, stents  No CP    3. Essential hypertension  stable    4. NSVT (nonsustained ventricular tachycardia)  Not ICD candidate due to dementia  BB  No syncope/dizziness, reported palpitations    5. PAF (paroxysmal atrial fibrillation)  Post AFib s/p ECV  No recurrance  Asa only due to no recurrence and fall risk, per cards.    CHADS-VASc Risk Assessment            5       Total Score        1 CHF    1 Hypertension    2 PRIOR STROKE/TIA/THROMBO    1 Age 65-74        Criteria that do not apply:    Age >/= 75    DM    Vascular Disease    Sex: Female          Discuss s/s and risk.        *Please note that portions of this note were completed with a voice recognition program. Efforts were made to edit the dictations, but occasionally words are mistranscribed.

## 2018-06-26 PROBLEM — I69.391 DYSPHAGIA DUE TO OLD STROKE: Status: ACTIVE | Noted: 2018-01-01

## 2018-06-26 PROBLEM — R06.00 ACUTE DYSPNEA: Status: RESOLVED | Noted: 2018-01-01 | Resolved: 2018-01-01

## 2018-06-26 PROBLEM — L89.90 DECUBITUS ULCERS: Status: ACTIVE | Noted: 2018-01-01

## 2018-06-26 PROBLEM — Z00.00 PREVENTATIVE HEALTH CARE: Status: ACTIVE | Noted: 2018-01-01

## 2018-06-26 NOTE — PROGRESS NOTES
Subjective   Magdi Arriaga is a 72 y.o. male.     History of Present Illness     The patient was admitted to [facility] 15 through the 18th with heart failure.  Echocardiogram confirmed an ejection fraction of 20%.  He has known coronary disease and underwent CABG in April 2017.  He is closely monitored by the cardiologist.  He has been stable with his medications, salt restriction, and has an an active lifestyle generally at bed rest.  He required intravenous Lasix but his cardiovascular symptoms stabilized quickly with minimal change in medication.  He is quite frail, cognitively impaired, and on dirt close supervision of his wife at home.    I reviewed hospital records including admission note, consultations, discharge summary, laboratory tests, Imaging, and nursing notes.  We have been in contact with the pateint by telephone to support medical needs until being seen in the office.    I discussed with the patient the details of these findings and the implications for treatment in the coming months.    The following portions of the patient's history were reviewed and updated as appropriate: allergies, current medications, past family history, past medical history, past social history, past surgical history and problem list.    Review of Systems   Constitutional: Positive for fatigue. Negative for appetite change.   HENT: Negative for ear pain and sore throat.    Respiratory: Positive for shortness of breath. Negative for cough.    Cardiovascular: Negative for chest pain and palpitations.   Gastrointestinal: Negative for abdominal pain and nausea.   Musculoskeletal: Negative for arthralgias and back pain.   Neurological: Negative for dizziness and headaches.   Psychiatric/Behavioral: Positive for decreased concentration and dysphoric mood. The patient is nervous/anxious.        Objective   Blood pressure 144/90, pulse 76, temperature 97.7 °F (36.5 °C), temperature source Oral, resp. rate  20, SpO2 94 %.    Physical Exam   Constitutional: He is oriented to person, place, and time. He appears well-developed and well-nourished. No distress.   HENT:   Right Ear: External ear normal.   Left Ear: External ear normal.   Mouth/Throat: Oropharynx is clear and moist.   Neck: Normal range of motion. Neck supple. No JVD present.   Cardiovascular: Normal rate, regular rhythm and normal heart sounds.    Pulmonary/Chest: Effort normal. He has no wheezes. He has no rales.   Breath sounds are generally tight and depressed   Lymphadenopathy:     He has no cervical adenopathy.   Neurological: He is alert and oriented to person, place, and time. He exhibits normal muscle tone. Coordination normal.   Psychiatric: He has a normal mood and affect. His behavior is normal. Judgment and thought content normal.   Nursing note and vitals reviewed.    Procedures  Assessment/Plan   Magdi was seen today for congestive heart failure.    Diagnoses and all orders for this visit:    Essential hypertension    Chronic obstructive pulmonary disease, unspecified COPD type  -     Ambulatory Referral to Home Health    JONNY (obstructive sleep apnea)  -     Ambulatory Referral to Home Health    Preventative health care    Decubitus ulcer of buttock, unspecified laterality, unspecified ulcer stage  -     Ambulatory Referral to Home Health    Ischemic cardiomyopathy    Dysphagia due to old stroke - liquids  -     Ambulatory Referral to Home Health    Vascular dementia with behavior disturbance  -     Ambulatory Referral to Home Health    Other orders  -     Discontinue: furosemide (LASIX) 40 MG tablet; Take 1 tablet by mouth Daily.  -     Discontinue: potassium chloride (K-DUR) 10 MEQ CR tablet; Take 1 tablet by mouth Daily.  -     Discontinue: sacubitril-valsartan (ENTRESTO) 49-51 MG tablet; Take 1 tablet by mouth Every 12 (Twelve) Hours.      The patient's heart failure appears compensated at this time.  His blood pressures over the last  month have often been 135-140 systolic.  His wife has had him on one half tablet of Entresto but this should go to full dose.    The patient has stage I decubiti of his buttocks area and should use adult gel cushion in his bed at home.  He is currently using a Roho cushion in his wheelchair.    The patient has significant vascular dementia from ASCVD.  He has articulated and answers questions appropriately.  He is poorly oriented and has minimal executive function.    The patient has significant dysphagia of liquids and nectar liquids on modified barium swallow during his hospitalization.  I've counseled his wife about using thickeners and they should work with a speech therapist through home health.    The patient has severe anemia which has worsened with his recent hospitalization.  He should work with skilled nursing for his heart failure, physical therapy and occupational therapy for activities of daily living and developing better skills for remaining at home.    Patient Instructions   1.  Increase Entresto 1 tablet every 12 hours.    2.  Use gel cushion or air mattress on bed - to protect skin.    3.  Use Roho cushion for wheelchair - to protect skin.    4.  Use foam cuhion in wheelchair - to protect rib cage.    5.  Use thickener in all liquids - to improve swallowing.    6.  Continue regular visits - with cardiologist.    7.  Next checkup this summer - as scheduled.    Electronically signed Colton Dickens M.D.6/28/2018 6:30 PM

## 2018-06-26 NOTE — PATIENT INSTRUCTIONS
1.  Increase Entresto 1 tablet every 12 hours.    2.  Use gel cushion or air mattress on bed - to protect skin.    3.  Use Roho cushion for wheelchair - to protect skin.    4.  Use foam cuhion in wheelchair - to protect rib cage.    5.  Use thickener in all liquids - to improve swallowing.    6.  Continue regular visits - with cardiologist.    7.  Next checkup this summer - as scheduled.

## 2018-06-27 NOTE — TELEPHONE ENCOUNTER
Fax req recvd from [facility] for RXs on all of pt's prescription meds. Per TGF, RXs sent for all meds except for his entecavir - pt to contact Dr Ng (ID) for that RX. Left VM on wife's home and cell re entecavir RX.

## 2018-06-29 PROBLEM — E87.0 HYPERNATREMIA: Status: ACTIVE | Noted: 2018-01-01

## 2018-06-29 PROBLEM — I50.23 ACUTE ON CHRONIC SYSTOLIC HEART FAILURE (HCC): Status: ACTIVE | Noted: 2018-01-01

## 2018-06-29 PROBLEM — R09.02 HYPOXIA: Status: ACTIVE | Noted: 2018-01-01

## 2018-06-29 PROBLEM — N17.9 AKI (ACUTE KIDNEY INJURY) (HCC): Status: ACTIVE | Noted: 2018-01-01

## 2018-06-29 PROBLEM — J69.0 ASPIRATION PNEUMONIA (HCC): Status: ACTIVE | Noted: 2018-01-01

## 2018-07-01 NOTE — THERAPY RE-EVALUATION
Acute Care - Speech Language Pathology   Swallow Initial Evaluation [facility]   Clinical Swallow Re-Evaluation       Patient Name: Magdi Arriaga  : 1945  MRN: [id number]  Today's Date: 2018  Onset of Illness/Injury or Date of Surgery: 18     Referring Physician: REI Bedoya      Admit Date: 2018    Visit Dx:     ICD-10-CM ICD-9-CM   1. Acute on chronic systolic heart failure I50.23 428.23   2. Acute on chronic respiratory failure with hypoxia J96.21 518.84     799.02   3. Acute renal failure superimposed on chronic kidney disease, unspecified CKD stage, unspecified acute renal failure type N17.9 584.9    N18.9 585.9   4. COPD exacerbation J44.1 491.21   5. Impaired mobility and ADLs Z74.09 799.89   6. Impaired functional mobility, balance, gait, and endurance Z74.09 V49.89     Patient Active Problem List   Diagnosis   • Dementia   • Hyperlipidemia LDL goal <70   • Essential hypertension   • Obesity   • JONNY (obstructive sleep apnea)   • COPD (chronic obstructive pulmonary disease)   • Coronary artery disease involving coronary bypass graft of native heart without angina pectoris   • Ischemic cardiomyopathy   • History of atrial flutter   • History of DVT (deep vein thrombosis)   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, followed by [facility]   • Traumatic SDH, s/p right craniotomy on 2017   • Small bowel obstruction (S/P Exp lap for acute SBO 17)   • PVC (premature ventricular contraction)   • NSVT (nonsustained ventricular tachycardia)   • Decubitus ulcers   • Monilial rash   • PAF (paroxysmal atrial fibrillation)   • Preventative health care   • Dysphagia due to old stroke - liquids   • Acute on chronic systolic heart failure   • Aspiration pneumonia   • BUBBA (acute kidney injury)   • Hypernatremia   • Hypoxia     Past Medical History:   Diagnosis Date   • Allergic rhinitis     History of allergy injections as a  child.   • Arthritis    • Asthma      Patient has had a history of asthma since childhood.   • Bradycardia 8/23/2017   • Bronchiectasis 1/6/2017    A.  CT scan of the chest 12/13/2015 reports mild to moderate bronchiectasis.    • CHF (congestive heart failure) 2017    ECHO - EF 25%   • Closed fracture of multiple ribs of right side 10/21/2017   • COPD (chronic obstructive pulmonary disease)    • Coronary artery disease 2017    CABG X 5   • Depression 1/6/2017   • DVT (deep venous thrombosis)     Bilateral   • Falls     A.  History of falling traumatic injuries in April 2014 and May 2014 resulting and left rib  fracture and left clavicle fracture.   • Hepatitis B 2017    Transfusion platelets - acute disease managed by ID   • History of transfusion    • Hypertension    • Iron deficiency 7/24/2017   • Left bundle branch block 4/17/2017   • Lumbar spinal stenosis 2013    Spinal surgery   • Normocytic anemia 4/17/2017   • Osteoarthritis 1/6/2017   • Patellar tendon rupture     A.  Status post primary repair of the left periprosthetic patellar tendon tear 6/11/2014, post fall at the end of April 2014.   • Pneumonia      A.  Left lower lobe pneumonia treated at [facility], 5/8/2014 through 5/20/2014.   • Pneumothorax      A.  Status post chest tube placed 5/13/2014 and discontinued 5/15/2014 with stable chest x-ray.   • SBO (small bowel obstruction) 2017    Partial colon and small bowel obstruction   • SDH (subdural hematoma) 2017 NOV 2017 craniotomy    • Sleep apnea with use of continuous positive airway pressure (CPAP)    • Traumatic hemorrhagic shock      A.  Secondary to right renal retroperitoneal hemorrhage, 9/2014.   • Vitamin D deficiency 1/6/2017     Past Surgical History:   Procedure Laterality Date   • APPENDECTOMY  1959   • CARDIAC CATHETERIZATION N/A 4/18/2017    Procedure: Left Heart Cath;  Surgeon: Kvng Stauffer MD;  Location: [facility] CATH INVASIVE LOCATION;  Service:    • CARDIAC CATHETERIZATION N/A 8/9/2017     Procedure: Right and Left Heart Cath;  Surgeon: Kvng Stauffer MD;  Location:  [facility] CATH INVASIVE LOCATION;  Service:    • COLON RESECTION SMALL BOWEL N/A 11/20/2017    Procedure: COLON RESECTION SMALL BOWEL;  Surgeon: Cj Reyes MD;  Location:  [facility] OR;  Service:    • COLONOSCOPY W/ POLYPECTOMY     • CORONARY ANGIOPLASTY WITH STENT PLACEMENT     • CORONARY ARTERY BYPASS GRAFT N/A 4/24/2017    Procedure: MEDIAN STERNOTOMY, CORONARY ARTERY BYPASS GRAFT X5 UTILIZING THE INTERNAL MAMMARY ARTERY, ENDOVASCULAR VEING HARVEST UTILIZING RIGHT SAPHENOUS VEIN,TRANSESOPHAGEAL ECHO;  Surgeon: Kanu Berger MD;  Location:  [facility] OR;  Service:    • EXPLORATORY LAPAROTOMY N/A 11/20/2017    Procedure: LAPAROTOMY EXPLORATORY SMALL BOWEL RESECTION;  Surgeon: Cj Reyes MD;  Location:  [facility] OR;  Service:    • FRACTURE SURGERY     • KNEE SURGERY  2006, 2014    primary repair -left periprosthetic patellar tendon tear 6/11/2014   • LUMBAR LAMINECTOMY  2013    With discectomy   • LUMBAR LAMINECTOMY  10/16/2013    LAMI L4/5 (Dr. Holt)   • WI HARLEY HOLE EVAC SUBDUR/EXTRA HEMATOMA Right 11/8/2017    Procedure: CRANIOTOMY FOR SUBDURAL HEMATOMA;  Surgeon: True Frank MD;  Location:  [facility] OR;  Service: Neurosurgery   • REPLACEMENT TOTAL KNEE Bilateral 2009, 2013    Left - 2009 and right - 2013.   • SKIN BIOPSY     • TONSILLECTOMY  1957   • VENA CAVA FILTER PLACEMENT  2014          SWALLOW EVALUATION (last 72 hours)      SLP Adult Swallow Evaluation     Row Name 07/01/18 1000 06/30/18 1000                Rehab Evaluation    Document Type evaluation  -AV evaluation  -VW       Subjective Information complains of;weakness  -AV no complaints  -VW       Patient Observations alert;cooperative  -AV alert;cooperative  -VW       Patient/Family Observations wife present   -AV Pt upright in bed, family/wife at bedside  -VW       Patient Effort good  -AV  --          General Information    Patient Profile Reviewed yes   -AV yes  -VW       Pertinent History Of Current Problem  -- Pt w/ asp pneumonia. Recently adm to [facility] and [facility] on 6/18 showing severe dysphagia resulting in NPO. Per family, pt left on nectar thick liquids and decided to drink thin liquids at home. Family to meet w/ palliative and decide comfort diet vs tx.  -VW       Current Method of Nutrition NPO  -AV NPO  -VW       Precautions/Limitations, Vision WFL;for purposes of eval  -AV WFL;for purposes of eval  -VW       Precautions/Limitations, Hearing WFL;for purposes of eval  -AV WFL;for purposes of eval  -VW       Prior Level of Function-Communication WFL  -AV other (see comments)   dementia at baseline per chart  -VW       Prior Level of Function-Swallowing --   baseline dysphagia  -AV thin liquids;nectar thick liquids  -VW       Plans/Goals Discussed with patient;spouse/S.O.;family;agreed upon  -AV patient;spouse/S.O.;family;agreed upon  -VW       Barriers to Rehab medically complex  -AV medically complex  -VW       Patient's Goals for Discharge  -- eat/drink without coughing/choking  -VW       Family Goals for Discharge  -- patient able to eat/drink without coughing/choking;other (see comments)   possible comfort diet  -VW          Pain Assessment    Additional Documentation Pain Scale: FACES Pre/Post-Treatment (Group)  -AV Pain Scale: Numbers Pre/Post-Treatment (Group)  -VW          Pain Scale: Numbers Pre/Post-Treatment    Pain Scale: Numbers, Pretreatment  -- 0/10 - no pain  -VW       Pain Scale: Numbers, Post-Treatment  -- 0/10 - no pain  -VW          Pain Scale: FACES Pre/Post-Treatment    Pain: FACES Scale, Pretreatment 0-->no hurt  -AV  --       Pain: FACES Scale, Post-Treatment 0-->no hurt  -AV  --          Oral Motor and Function    Dentition Assessment upper dentures/partial in place;lower dentures/partial in place  -AV natural, present and adequate  -VW       Secretion Management WNL/WFL  -AV WNL/WFL  -VW       Mucosal Quality moist, healthy  -AV moist,  healthy  -VW       Volitional Swallow WFL  -AV  --       Volitional Cough WFL  -AV non-productive  -VW          Oral Musculature and Cranial Nerve Assessment    Oral Motor General Assessment generalized oral motor weakness  -AV generalized oral motor weakness;lingual impairment  -VW       Lingual Impairment, Detail. Cranial Nerves IX, XII (Glossopharyngeal and Hypoglossal)  -- reduced strength  -VW          General Eating/Swallowing Observations    Respiratory Support Currently in Use  -- nasal cannula  -VW       Eating/Swallowing Skills fed by SLP  -AV self-fed;fed by SLP;unsafe self-feeding skills observed  -VW       Positioning During Eating upright in bed  -AV upright 90 degree;upright in bed  -VW       Utensils Used spoon;cup;straw  -AV spoon;cup;straw  -VW       Consistencies Trialed soft textures;pureed;thin liquids;nectar/syrup-thick liquids  -AV pureed;thin liquids;nectar/syrup-thick liquids  -          Respiratory    Respiratory Status  -- WFL  -VW          Clinical Swallow Eval    Oral Prep Phase WFL  -AV --  -VW       Oral Transit WFL  -AV WFL  -VW       Oral Residue WFL  -AV WFL  -VW       Pharyngeal Phase suspected pharyngeal impairment  -AV suspected pharyngeal impairment  -VW       Esophageal Phase unremarkable  -AV  --       Clinical Swallow Evaluation Summary Bedside swallow eval completed this am for a possible comfort diet. Spoke with patient and family about comfort options.  Patient continues to demonstrate cough and wet vocal quality with thins.  Patient with mulltiple swallows with all trials.  PAtient does not demonstrate cough with puree, soft solids, or nectar thick liquids.  After discussion with patient and wife, patient request level 4 ad nectar for meals and thin liquid water between meals after oral care.  Spoke with RN about comfort diet recs. Provided patient and wife with more oral care supplies.  Will follow up for further education as needed.    -AV Pt w/ hx severe pharyngeal  dysphagia from 6/18. Pt w/ dementia and now aspiration pneumonia. Family deciding between comfort diet vs safest diet/treatment. Wife reported they did not wish to have TFs. Palliative will be consulted. Pt w/ significant delayed cough following all trials except puree. Pt w/ general oral motor weakness. Provided extensive education to family on comfort diet vs pursuing safest recommendations/tx. At this time, pt is recommended NPO w/ ice chips after aggressive oral care until GOC determined. If comfort diet desired, pt didn't look very comfortable on any consistency- thins via tsp would be best w/ puree w/ some mashed (adv per pt preference). SLP will f/u for re-evaluation once Palliative consulted.  -VW          Oral Prep Concerns    Oral Prep Concerns  -- --  -VW          Pharyngeal Phase Concerns    Pharyngeal Phase Concerns  -- cough;multiple swallows  -VW       Multiple Swallows  -- all consistencies  -VW       Cough  -- thin;nectar  -VW          Clinical Impression    SLP Swallowing Diagnosis severe;pharyngeal dysfunction  -AV mild-moderate;oral dysfunction;severe;pharyngeal dysfunction  -VW       Functional Impact risk of aspiration/pneumonia;risk of malnutrition  -AV risk of aspiration/pneumonia;risk of malnutrition;risk of dehydration  -VW       Rehab Potential/Prognosis, Swallowing re-evaluate goals as necessary  -AV re-evaluate goals as necessary  -VW       Criteria for Skilled Therapeutic Interventions Met demonstrates skilled criteria  -AV demonstrates skilled criteria  -VW          Recommendations    Therapy Frequency (Swallow)  -- evaluation only  -VW       SLP Diet Recommendation --   comfort diet per patietn request   -AV NPO;ice chips between meals after oral care, with supervision  -VW       Recommended Diagnostics  -- reassess via clinical swallow evaluation  -VW       SLP Rec. for Method of Medication Administration as tolerated  -AV meds via alternate route  -VW       Anticipated Dischage  Disposition  -- unknown  -         User Key  (r) = Recorded By, (t) = Taken By, (c) = Cosigned By    Initials Name Effective Dates    LIYAH Sharma MS CCC-SLP 04/03/18 -     VW Pearl Amaro MA, MANFREDY-SLP 04/03/18 -         EDUCATION  The patient has been educated in the following areas:   Dysphagia (Swallowing Impairment) Oral Care/Hydration.    SLP Recommendation and Plan  SLP Swallowing Diagnosis: severe, pharyngeal dysfunction  SLP Diet Recommendation:  (comfort diet per patietn request )              Criteria for Skilled Therapeutic Interventions Met: demonstrates skilled criteria     Rehab Potential/Prognosis, Swallowing: re-evaluate goals as necessary             Plan of Care Reviewed With: patient, spouse  Plan of Care Review  Plan of Care Reviewed With: patient, spouse  Progress:  (initial eval )             Time Calculation:         Time Calculation- SLP     Row Name 07/01/18 1104             Time Calculation- SLP    SLP Start Time 0930  -AV      SLP Received On 07/01/18  -AV        User Key  (r) = Recorded By, (t) = Taken By, (c) = Cosigned By    Initials Name Provider Type    AV Carmenza Sharma MS CCC-SLP Speech and Language Pathologist          Therapy Charges for Today     Code Description Service Date Service Provider Modifiers Qty    [id number] HC ST EVAL ORAL PHARYNG SWALLOW 3 7/1/2018 Carmenza Sharma MS CCC-SLP GN 1               Carmenza Sharma MS CCC-GAL  7/1/2018

## 2018-07-01 NOTE — PLAN OF CARE
Problem: Fall Risk (Adult)  Goal: Identify Related Risk Factors and Signs and Symptoms   06/30/18 0416 06/30/18 1838   Fall Risk (Adult)   Related Risk Factors (Fall Risk) age-related changes;confusion/agitation;environment unfamiliar --    Signs and Symptoms (Fall Risk) --  presence of risk factors       Problem: Patient Care Overview  Goal: Plan of Care Review  Outcome: Ongoing (interventions implemented as appropriate)   07/01/18 0430   Plan of Care Review   Progress improving   Coping/Psychosocial   Plan of Care Reviewed With patient   OTHER   Outcome Summary VSS, denies pain, denies SOB, followed by [facility]OC for pressure ulcer and injury.        Problem: Skin Injury Risk (Adult)  Goal: Identify Related Risk Factors and Signs and Symptoms  Outcome: Ongoing (interventions implemented as appropriate)   07/01/18 0430   Skin Injury Risk (Adult)   Related Risk Factors (Skin Injury Risk) advanced age;hospitalization prolonged;fluid intake inadequate;cognitive impairment;mobility impaired     Goal: Skin Health and Integrity  Outcome: Ongoing (interventions implemented as appropriate)   07/01/18 0430   Skin Injury Risk (Adult)   Skin Health and Integrity making progress toward outcome

## 2018-07-01 NOTE — PLAN OF CARE
Problem: Patient Care Overview  Goal: Plan of Care Review   07/01/18 8420   Plan of Care Review   Progress no change   Coping/Psychosocial   Plan of Care Reviewed With patient   OTHER   Outcome Summary Pt to be admitted to hospice tomorrow, pt alert, O2 sats drop to low 80's post coughing jags, oxygen saturation 90 % on 5 liters at best. Many family and friends here today, son and wife at bedside.

## 2018-07-01 NOTE — PLAN OF CARE
Problem: Patient Care Overview  Goal: Plan of Care Review  Outcome: Ongoing (interventions implemented as appropriate)   07/01/18 1104   Plan of Care Review   Progress (initial eval )   Coping/Psychosocial   Plan of Care Reviewed With patient;spouse   SLP re-evaluation completed. Will address dysphagia. Please see note for further details and recommendations.

## 2018-07-01 NOTE — PROGRESS NOTES
Psychiatric Medicine Services  PROGRESS NOTE    Patient Name: Magdi Arriaga  : 1945  MRN: [id number]    Date of Admission: 2018  Length of Stay: 2  Primary Care Physician: Colton Dickens MD    Subjective   Subjective     CC:  FU hypoxia    HPI:  Family friend at bedside. Pt reports feeling tired but well otherwise. No complaints. Still coughing.     Review of Systems  Gen- No fevers, chills  CV- No chest pain, palpitations  Resp- +Cough, +dyspnea  GI- No N/V/D, abd pain    Otherwise ROS is negative except as mentioned in the HPI.    Objective   Objective     Vital Signs:   Temp:  [98.4 °F (36.9 °C)] 98.4 °F (36.9 °C)  Heart Rate:  [62-72] 62  Resp:  [20-22] 22  BP: (129)/(74) 129/74  FiO2 (%):  [52 %] 52 %        Physical Exam:  Constitutional: No acute distress, awake, alert on 5LNC   Eyes: PERRLA, sclerae anicteric, no conjunctival injection  HENT: NCAT, mucous membranes moist  Neck: Supple, no thyromegaly, no lymphadenopathy, trachea midline  Respiratory: Bibasilar crackles diffusely, no wheezing, nonlabored respirations   Cardiovascular: RRR, no murmurs, rubs, or gallops, palpable pedal pulses bilaterally  Gastrointestinal: Positive bowel sounds, soft, mildly TTP diffusely, nondistended  Musculoskeletal: No bilateral ankle edema, no clubbing or cyanosis to extremities  Psychiatric: Appropriate affect, cooperative  Neurologic: Oriented x 3, strength symmetric in all extremities, Cranial Nerves grossly intact to confrontation, speech clear  Skin: No rashes    Results Reviewed:  I have personally reviewed current lab, radiology, and data and agree.      Results from last 7 days  Lab Units 18  0545 18  0518  1306   WBC 10*3/mm3 13.96* 10.32 13.51*   HEMOGLOBIN g/dL 9.1* 9.1* 10.2*   HEMATOCRIT % 32.0* 31.0* 34.6*   PLATELETS 10*3/mm3 226 224 262       Results from last 7 days  Lab Units 18  0545 18  0528 18  1306   SODIUM mmol/L  163* 158* 155*   POTASSIUM mmol/L 3.3* 3.5 4.3   CHLORIDE mmol/L 125* 124* 123*   CO2 mmol/L 27.0 25.0 25.0   BUN mg/dL 68* 59* 59*   CREATININE mg/dL 2.01* 1.94* 1.91*   GLUCOSE mg/dL 103* 152* 124*   CALCIUM mg/dL 9.8 9.9 10.4   ALT (SGPT) U/L  --   --  8   AST (SGOT) U/L  --   --  9     BNP   Date Value Ref Range Status   06/29/2018 >5,000.0 (H) 0.0 - 100.0 pg/mL Final     Comment:     Results may be falsely decreased if patient taking Biotin.     pH, Arterial   Date Value Ref Range Status   06/29/2018 7.373 7.350 - 7.450 pH units Final       Microbiology Results Abnormal     Procedure Component Value - Date/Time    Blood Culture - Blood, [[id number]]  (Normal) Collected:  06/29/18 1316    Lab Status:  Preliminary result Specimen:  Blood from Hand, Digit Left Updated:  06/30/18 1345     Blood Culture No growth at 24 hours    Blood Culture - Blood, [[id number]]  (Normal) Collected:  06/29/18 1305    Lab Status:  Preliminary result Specimen:  Blood from Hand, Right Updated:  06/30/18 1345     Blood Culture No growth at 24 hours    Urine Culture - Urine, [[id number]]  (Normal) Collected:  06/29/18 1352    Lab Status:  Preliminary result Specimen:  Urine from Urine, Catheter In/Out Updated:  06/30/18 0757     Urine Culture No growth at 24 hours          Imaging Results (last 24 hours)     Procedure Component Value Units Date/Time    XR Chest 1 View [[id number]] Collected:  06/30/18 1029     Updated:  06/30/18 1816    Narrative:       EXAMINATION: XR CHEST 1 VW - 6/30/2018     INDICATION: I50.23-Acute on chronic systolic (congestive) heart failure;  J96.21-Acute and chronic respiratory failure with hypoxia; N17.9-Acute  kidney failure, unspecified; N18.9-Chronic kidney disease, unspecified;  J44.1-Chronic obstructive pulmonary disease with (acute) exacerbation.      COMPARISON: Chest x-ray 6/29/2018.     FINDINGS: Cardiac silhouette enlarged with increased central bony  vascularity. Increasing bilateral diffuse pulmonary  opacifications with  a more confluent appearance than on prior examination and concerning for  worsening airspace disease. No pneumothorax or large pleural effusion.  Degenerative changes of the spine.           Impression:       Cardiomegaly with increased central pulmonary vascularity  and widening of the vascular pedicle consistent with   edema pattern and with bilateral confluent pulmonary opacifications  which have increased since prior comparison, consistent with worsening  airspace disease. No large effusion.     DICTATED:   6/30/2018  EDITED/ls :   6/30/2018      This report was finalized on 6/30/2018 6:14 PM by Dr. Kenny Hong.           Results for orders placed during the hospital encounter of 06/15/18   Adult Transthoracic Echo Complete W/ Cont if Necessary Per Protocol    Narrative · Left ventricular systolic function is severely decreased. Estimated EF =   20%.  · Moderate mitral valve regurgitation is present          I have reviewed the medications.    Assessment/Plan   Assessment / Plan     Hospital Problem List     * (Principal)Aspiration pneumonia    Dementia    JONNY (obstructive sleep apnea)    Overview Deleted 11/27/2017  3:45 PM by Henri Montes IV, MD            COPD (chronic obstructive pulmonary disease)    Coronary artery disease involving coronary bypass graft of native heart without angina pectoris    Overview Addendum 11/27/2017  3:45 PM by Henri Montes IV, MD     · CABG by Yohannes Berger (4/24/17): LIMA to LAD, sequential SVG to OM1/OM 2, SVG to diagonal, SVG to RCA  · Cardiac cath (08/08/2017): VICKEY to LAD/diagnonal. Patent SVG to diagonal and patent LIMA to LAD. Patent SVG to OM 1 and OM2. Patnet SVG to RCA. Small Ramus branch to small for PCI.         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 Wife declined Inpatient Hospice though pt would very greatly benefit from inpatient Hospice care. Awaiting sons' arrival. Decreasing oral meds. No more labs.     - Acute hypoxemic respiratory failure: 85% on 4LNC, currently on 5LNC. Multifactorial- PNA, volume  - Sepsis: Fever with leukocytosis. Likely from aspiration PNA  - Aspiration PNA: Continue Vanc/Zosyn. Comfort diet  - BUBBA on CKD: Baseline Cr 1.5. Worse.   - AoC systolic CHF: ECHO 06/2018 EF 20%, not a candidate for ICD. Continue BB   - Hypernatremia: Trending up. Will hold off on free water replacement given an increase in O2 requirement with crackles on pulm exam    DVT Prophylaxis:  [facility]    CODE STATUS:   Code Status and Medical Interventions:   Ordered at: 06/29/18 6602     Limited Support to NOT Include:    Dialysis    Intubation    Artificial Nutrition     Level Of Support Discussed With:    Health Care Surrogate     Code Status:    No CPR     Medical Interventions (Level of Support Prior to Arrest):    Limited       Disposition: I expect the patient to be discharged TOBIAS Wilson MD  07/01/18  8:10 AM

## 2018-07-01 NOTE — PROGRESS NOTES
Palliative Care Progress Note    Date of Admission: 6/29/2018    Subjective:  Many friends visiting.  Pt awake.  Denies SOA or pain.  Taking small bites of food wife has brought in.  She declined inpatient hospice admission today as sons had not yet talked with them.  Indicates desire to finish current IV abx then to take him home with hospice.    No current facility-administered medications on file prior to encounter.      Current Outpatient Prescriptions on File Prior to Encounter   Medication Sig Dispense Refill   • albuterol (PROVENTIL) (5 MG/ML) 0.5% nebulizer solution Take 0.5 mL by nebulization Every 4 (Four) Hours As Needed for Wheezing.  12   • aspirin 81 MG chewable tablet Chew 1 tablet Daily.     • atorvastatin (LIPITOR) 10 MG tablet Take 1 tablet by mouth Every Night. 90 tablet 1   • budesonide-formoterol (SYMBICORT) 160-4.5 MCG/ACT inhaler Inhale 2 puffs 2 (Two) Times a Day. 3 inhaler 1   • buPROPion (WELLBUTRIN) 75 MG tablet Take 1 tablet by mouth 3 (Three) Times a Day. 270 tablet 1   • carvedilol (COREG) 3.125 MG tablet Take 1 tablet by mouth 2 (Two) Times a Day With Meals. 180 tablet 1   • Cholecalciferol (VITAMIN D3) 2000 units tablet Take 1 tablet by mouth Daily.     • entecavir (BARACLUDE) 0.5 MG tablet Take 1 tablet by mouth Daily. 90 tablet 0   • furosemide (LASIX) 40 MG tablet Take 1 tablet by mouth Daily. 90 tablet 1   • memantine (NAMENDA) 10 MG tablet Take 1 tablet by mouth Every 12 (Twelve) Hours. 180 tablet 1   • nitroglycerin (NITROSTAT) 0.4 MG SL tablet DISSOLVE 1 TAB UNDER TONGUE FOR CHEST PAIN - IF PAIN REMAINS AFTER 5 MIN, CALL 911 AND REPEAT DOSE. MAX 3 TABS IN 15 MINUTES 25 tablet 5   • potassium chloride (K-DUR) 10 MEQ CR tablet Take 1 tablet by mouth Daily. 90 tablet 1   • rivastigmine (EXELON) 13.3 MG/24HR patch Place 1 patch on the skin Daily. 90 patch 1   • sacubitril-valsartan (ENTRESTO) 49-51 MG tablet Take 1 tablet by mouth Every 12 (Twelve) Hours. 180 tablet 1  "      Pharmacy to dose vancomycin      •  acetaminophen  •  HYDROmorphone  •  Pharmacy to dose vancomycin  •  sodium chloride  •  sodium chloride    Objective: /82 (BP Location: Right arm, Patient Position: Lying)   Pulse 72   Temp 98.2 °F (36.8 °C) (Oral)   Resp 20   Ht 172.5 cm (67.9\")   Wt 72.6 kg (160 lb)   SpO2 (!) 89%   BMI 24.40 kg/m²      Intake/Output Summary (Last 24 hours) at 07/01/18 1925  Last data filed at 07/01/18 1300   Gross per 24 hour   Intake                0 ml   Output                0 ml   Net                0 ml     Physical Exam:    General Appearance alert, pleasant, appears stated age and cooperative  Lungs accessory muscle use, tachypnea and rhonchi bilateral lower  Heart regular rhythm & normal rate, normal S1, S2, no murmur, no rafael, no rub and no click  Abdomen normal bowel sounds, no masses, no hepatomegaly, no splenomegaly, soft non-tender, no guarding and no rebound tenderness  Extremities edema 1+ lower bilateral  Pulses Pulses palpable and equal bilaterally  Skin no bleeding, bruising or rash  Neurologic Mental Status alertness alert and awake and mood/affect normal, Speech normal content and execusion    Results from last 7 days  Lab Units 07/01/18  0545   WBC 10*3/mm3 13.96*   HEMOGLOBIN g/dL 9.1*   HEMATOCRIT % 32.0*   PLATELETS 10*3/mm3 226       Results from last 7 days  Lab Units 07/01/18  0545  06/29/18  1306   SODIUM mmol/L 163*  < > 155*   POTASSIUM mmol/L 3.3*  < > 4.3   CHLORIDE mmol/L 125*  < > 123*   CO2 mmol/L 27.0  < > 25.0   BUN mg/dL 68*  < > 59*   CREATININE mg/dL 2.01*  < > 1.91*   CALCIUM mg/dL 9.8  < > 10.4   BILIRUBIN mg/dL  --   --  0.4   ALK PHOS U/L  --   --  67   ALT (SGPT) U/L  --   --  8   AST (SGOT) U/L  --   --  9   GLUCOSE mg/dL 103*  < > 124*   < > = values in this interval not displayed.    Impression:    1.  End stage CHF complicated by dysphagia and aspiration pneumonia, oxygen dependent COPD, and JONNY requiring CPAP  2.  CKD stage " 3  3.  Functional and cognitive deficit.  H/o SDH s/p craniotomy and moderately advanced dementia     Symptom:  Dyspnea with hypoxia - will order PRN opioid for air hunger  Oral/pharyngeal dysphagia - desires pleasure feeds.     Plan: Refer to home hospice.  Reiterated PRN Dilaudid ordered.  He may try for work of breathing.    Time: 15 minutes      Karlee López MD  07/01/18  7:25 PM

## 2018-07-02 NOTE — PLAN OF CARE
Problem: Patient Care Overview  Goal: Plan of Care Review  Outcome: Ongoing (interventions implemented as appropriate)   07/01/18 4254   Plan of Care Review   Progress improving   Coping/Psychosocial   Plan of Care Reviewed With patient;spouse   OTHER   Outcome Summary VSS, alert and oriented x2. Hospice care will be started this morning. O2 90 % on 5L NC.

## 2018-07-02 NOTE — PLAN OF CARE
Problem: Patient Care Overview  Goal: Plan of Care Review  Outcome: Ongoing (interventions implemented as appropriate)   07/02/18 4869   Coping/Psychosocial   Plan of Care Reviewed With patient;spouse   OTHER   Outcome Summary Pt maxAx2 to dependent for bed mobility. Pt demonstrated poor static sitting balance and overall weakness, not appropriate to assess transfers. Pt's O2 sats remained between 85-87% throughout treatment, cues for PLB throughout. Will progress with mobility as able.

## 2018-07-02 NOTE — PLAN OF CARE
Problem: Patient Care Overview  Goal: Plan of Care Review   07/02/18 1554   Plan of Care Review   Progress no change   Coping/Psychosocial   Plan of Care Reviewed With patient;spouse     SLP caregiver instruction completed. Will sign-off dysphagia/comfort diet needs. Please see note for further details and recommendations.

## 2018-07-02 NOTE — PROGRESS NOTES
Continued Stay Note  [facility]     Patient Name: Magdi Arriaga  MRN: [id number]  Today's Date: 7/2/2018    Admit Date: 6/29/2018          Discharge Plan     Row Name 07/02/18 1939       Plan    Plan Undetermined    Plan Comments Hospice consult received.  Chart reviewed.  Plan to meet with pt/wife 10AM 7/3/18 to discuss d/c options with hospice services.  If can be of further assist please contact......ext [phone]              Discharge Codes    No documentation.           Kelsey Braxton RN

## 2018-07-02 NOTE — PROGRESS NOTES
Clinical Nutrition     Nutrition Assessment  Reason for Visit:   Identified at risk by screening criteria, MST score 2+      Patient Name: Magdi Arriaga  YOB: 1945  MRN: [id number]  Date of Encounter: 07/02/18 2:47 PM  Admission date: 6/29/2018      Comments:   Based on reported weight status pt does meet criteria for nutritional risk. Will defer pt to RD to follow up per protocol.     Nutrition Assessment   Assessment       Hospital Problem List  Principal Problem:    Aspiration pneumonia (CMS/Formerly [facility])  Active Problems:    Dementia    JONNY (obstructive sleep apnea)    COPD (chronic obstructive pulmonary disease) (CMS/Formerly [facility])    Coronary artery disease involving coronary bypass graft of native heart without angina pectoris    History of atrial flutter    CKD (chronic kidney disease), stage III    Traumatic SDH, s/p right craniotomy on 11/8/2017    Acute on chronic systolic heart failure (CMS/Formerly [facility])    BUBBA (acute kidney injury)    Hypernatremia    Hypoxia      PMH: He  has a past medical history of Allergic rhinitis; Arthritis; Asthma; Bradycardia (8/23/2017); Bronchiectasis (CMS/[facility]ly [facility]) (1/6/2017); CHF (congestive heart failure) (CMS/Formerly [facility]) (2017); Closed fracture of multiple ribs of right side (10/21/2017); COPD (chronic obstructive pulmonary disease) (CMS/Formerly [facility]); Coronary artery disease (2017); Depression (1/6/2017); DVT (deep venous thrombosis) (CMS/[facility]); Falls; Hepatitis B (2017); History of transfusion; Hypertension; Iron deficiency (7/24/2017); Left bundle branch block (4/17/2017); Lumbar spinal stenosis (2013); Normocytic anemia (4/17/2017); Osteoarthritis (1/6/2017); Patellar tendon rupture; Pneumonia; Pneumothorax; SBO (small bowel obstruction) (2017); SDH (subdural hematoma) (CMS/Formerly [facility]) (2017); Sleep apnea with use of continuous positive airway pressure (CPAP); Traumatic hemorrhagic shock; and Vitamin D deficiency (1/6/2017).   PSxH: He  has a past surgical history that includes Vena cava  "filter placement (2014); Replacement total knee (Bilateral, 2009, 2013); Knee surgery (2006, 2014); Tonsillectomy (1957); Appendectomy (1959); Fracture surgery; Skin biopsy; Cardiac catheterization (N/A, 4/18/2017); Coronary artery bypass graft (N/A, 4/24/2017); Lumbar laminectomy (2013); Colonoscopy w/ polypectomy; Cardiac catheterization (N/A, 8/9/2017); Coronary angioplasty with stent; pr genaro hole evac subdur/extra hematoma (Right, 11/8/2017); Exploratory Laparotomy (N/A, 11/20/2017); Small Bowel Resection (N/A, 11/20/2017); and Lumbar laminectomy (10/16/2013).       Reported/Observed/Food/Nutrition Related History:     Pt looked to family at bedside to answer questions during time of visit. Per family at bedside pt has maintained a weight around 160 lbs since March of this year, reported appetite comes and goes, is currently poor, will only eat a few bites of food, does drink nutritional supplements at home, family reported appetite to be poor since previous admission around 6/20/18. Pt is currently on a comfort diet- family reported pt tolerating diet well, chewing and swallowing ok.   Per EPIC weight history pt has lost 5 lbs over the past 2 weeks, since previous hospital admission. Per bedscale weight pt was 165 lbs on 6/20/18.    Anthropometrics     Height: 172.5 cm (67.9\")  Last filed wt: Weight: 72.8 kg (160 lb 6.4 oz) (07/02/18 0500)  Weight Method: Bed scale    BMI: BMI (Calculated): 25.2  Normal: 18.5-24.9kg/m2    Ideal Body Weight (IBW) (kg): 70.61    Weight Change   UBW: 160 lbs - Per EPIC pt weighed 165 lbs per bedscale on 6/20/18  Weight change: Loss of 5 lbs   % wt loss: 3%  Time of wt loss: 2 weeks     Last 15 Recorded Weights   View Complete Flowsheet   Weight Weight (kg) Weight (lbs) Weight Method VISIT REPORT   7/2/2018 72.757 kg 160 lb 6.4 oz Bed scale -   7/1/2018 72.576 kg 160 lb - -   6/30/2018 71.759 kg 158 lb 3.2 oz Bed scale -   6/29/2018 74.844 kg 165 lb Stated -   6/26/2018 (No Data)  " (No Data)  - Report   6/25/2018 (No Data)  (No Data)  - Report   6/20/2018 75 kg 165 lb 5.5 oz Bed scale -   6/19/2018 76.023 kg 167 lb 9.6 oz Bed scale -   6/18/2018 74.526 kg 164 lb 4.8 oz Bed scale -   6/16/2018 73.936 kg 163 lb - -   6/15/2018 74.118 kg 163 lb 6.4 oz Bed scale -   6/15/2018 63.504 kg 140 lb - -   5/16/2018 (No Data)  (No Data)  - Report   5/8/2018 (No Data)  (No Data)  - Report   4/19/2018 72.576 kg 160 lb         Labs reviewed       Results from last 7 days  Lab Units 07/01/18  0545 06/30/18  0528 06/29/18  1306   GLUCOSE mg/dL 103* 152* 124*   BUN mg/dL 68* 59* 59*   CREATININE mg/dL 2.01* 1.94* 1.91*   SODIUM mmol/L 163* 158* 155*   CHLORIDE mmol/L 125* 124* 123*   POTASSIUM mmol/L 3.3* 3.5 4.3   PHOSPHORUS mg/dL 3.2  --   --    ALT (SGPT) U/L  --   --  8       Results from last 7 days  Lab Units 07/01/18  0545 06/29/18  1311 06/29/18  1306   ALBUMIN g/dL 3.00*  --  3.43   CRP mg/dL  --  18.68*  --                Lab Results  Lab Value Date/Time   HGBA1C 5.20 05/08/2018 1151   HGBA1C 5.00 10/22/2017 0326     Procedures/Tests:    SLP (7/2/18): comfort diet per pt request    Intake/Ouptut 24 hrs (7:00AM - 6:59 AM)     Intake & Output (last day)       07/01 0701 - 07/02 0700 07/02 0701 - 07/03 0700    P.O. 0     I.V. (mL/kg) 1300 (17.9)     Total Intake(mL/kg) 1300 (17.9)     Urine (mL/kg/hr)      Total Output        Net +1300            Unmeasured Urine Occurrence 8 x     Unmeasured Stool Occurrence 1 x           Current Nutrition Prescription     PO: Diet Regular; GI Soft / [place]- comfort diet   No active supplement orders      Intake:  20% of 5 meals per charting         Nutrition Diagnosis       Problem Nutrition appropriate for condition at this time   Etiology Comfort diet/Pallative/Hospice following   Signs/Symptoms PO intake 20% of 5 meals       Nutrition Intervention   1.  Follow treatment progress, Care plan reviewed, Encourage intake, Supplement provided    Will place order for  strawberry boost plus TID    Goal:   General: Nutrition support treatment, Palliative care, Hospice care  PO: Tolerate PO, Increase intake  Additional goals:      Monitoring/Evaluation:   Per protocol, PO intake, Supplement intake      Will Continue to follow per protocol      Estela LIMA Waits  Time Spent: 20 minutes

## 2018-07-02 NOTE — PLAN OF CARE
Problem: Patient Care Overview  Goal: Interprofessional Rounds/Family Conf  Outcome: Ongoing (interventions implemented as appropriate)  Palliative Team Meeting Attendance at 1300:  Dr. Mark Casas, REI Walter, Nia Jiménez, [facility], REI Avalos, Shellie Flores, [facility] and Kelsey Braxton CM, RN Hospice   07/02/18 1300   Interdisciplinary Rounds/Family Conf   Summary Palliative Care consult on 6/29/2018 at 1724 /Dr. Wilson, seen by Dr. López on 6/30/2018 1719. for GOC/ACP. Patient admitted with CHF, sob, aspiration pneumonia, Family meeting and goals are home with hospice, patient reported to Dr. Casas that he feels great today. Hospice consulted and patient and family waiting for her to visit and arrange discharge home. Continue iv antibiotic until discharge, allow to eat and drink as he wants.  Palliative with continue to flollow and support.   Participants ;advanced practice nurse;nursing;pastoral care;social work/services;physician       Problem: Palliative Care (Adult)  Intervention: Optimize Function   07/02/18 1300   Musculoskeletal Interventions   Fatigue Management activity schedule adjusted;activity assistance provided;frequent rest breaks encouraged   Pain/Comfort/Sleep Interventions   Sleep/Rest Enhancement awakenings minimized;family presence promoted;regular sleep/rest pattern promoted   Cognitive Interventions   Sensory Stimulation Regulation quiet environment promoted     Intervention: Promote Informed Decision Making and Goal Setting   07/02/18 1300   Coping/Psychosocial Interventions   Life Transition/Adjustment advance care planning facilitated;decision-making facilitated;end-of-life care initiated;palliative care discussed;palliative care initiated

## 2018-07-02 NOTE — PROGRESS NOTES
[facility] Medicine Services  PROGRESS NOTE    Patient Name: Magdi Arriaga  : 1945  MRN: [id number]    Date of Admission: 2018  Length of Stay: 3  Primary Care Physician: Colton Dickens MD    Subjective   Subjective     CC:  FU hypoxia    HPI:  Family friend and wife at bedside. No complaints per patient other than wanting to drink orange juice.     Review of Systems  Gen- No fevers, chills  CV- No chest pain, palpitations  Resp- +Cough, +dyspnea  GI- No N/V/D, abd pain    Otherwise ROS is negative except as mentioned in the HPI.    Objective   Objective     Vital Signs:   Temp:  [97.9 °F (36.6 °C)-98.2 °F (36.8 °C)] 97.9 °F (36.6 °C)  Heart Rate:  [64-74] 74  Resp:  [18-20] 18  BP: (121-150)/(82-94) 143/82        Physical Exam:  Constitutional: No acute distress, awake, alert on 5LNC   Eyes: PERRLA, sclerae anicteric, no conjunctival injection  HENT: NCAT, mucous membranes moist  Neck: Supple, no thyromegaly, no lymphadenopathy, trachea midline  Respiratory: Bibasilar crackles diffusely- no change, no wheezing, nonlabored respirations   Cardiovascular: RRR, no murmurs, rubs, or gallops, palpable pedal pulses bilaterally  Gastrointestinal: Positive bowel sounds, soft, mildly TTP diffusely, nondistended  Musculoskeletal: No bilateral ankle edema, no clubbing or cyanosis to extremities  Psychiatric: Appropriate affect, cooperative  Neurologic: Oriented x 3, strength symmetric in all extremities, Cranial Nerves grossly intact to confrontation, speech clear  Skin: No rashes    Results Reviewed:  I have personally reviewed current lab, radiology, and data and agree.      Results from last 7 days  Lab Units 18  0545 18  0518  1306   WBC 10*3/mm3 13.96* 10.32 13.51*   HEMOGLOBIN g/dL 9.1* 9.1* 10.2*   HEMATOCRIT % 32.0* 31.0* 34.6*   PLATELETS 10*3/mm3 226 224 262       Results from last 7 days  Lab Units 18  0545 18  0528 18  1302    SODIUM mmol/L 163* 158* 155*   POTASSIUM mmol/L 3.3* 3.5 4.3   CHLORIDE mmol/L 125* 124* 123*   CO2 mmol/L 27.0 25.0 25.0   BUN mg/dL 68* 59* 59*   CREATININE mg/dL 2.01* 1.94* 1.91*   GLUCOSE mg/dL 103* 152* 124*   CALCIUM mg/dL 9.8 9.9 10.4   ALT (SGPT) U/L  --   --  8   AST (SGOT) U/L  --   --  9     BNP   Date Value Ref Range Status   06/29/2018 >5,000.0 (H) 0.0 - 100.0 pg/mL Final     Comment:     Results may be falsely decreased if patient taking Biotin.     pH, Arterial   Date Value Ref Range Status   06/29/2018 7.373 7.350 - 7.450 pH units Final       Microbiology Results Abnormal     Procedure Component Value - Date/Time    Blood Culture - Blood, [[id number]]  (Normal) Collected:  06/29/18 1316    Lab Status:  Preliminary result Specimen:  Blood from Hand, Digit Left Updated:  07/01/18 1345     Blood Culture No growth at 2 days    Blood Culture - Blood, [[id number]]  (Normal) Collected:  06/29/18 1305    Lab Status:  Preliminary result Specimen:  Blood from Hand, Right Updated:  07/01/18 1345     Blood Culture No growth at 2 days    Urine Culture - Urine, [[id number]]  (Normal) Collected:  06/29/18 1352    Lab Status:  Final result Specimen:  Urine from Urine, Catheter In/Out Updated:  07/01/18 0903     Urine Culture No growth at 2 days          Imaging Results (last 24 hours)     ** No results found for the last 24 hours. **        Results for orders placed during the hospital encounter of 06/15/18   Adult Transthoracic Echo Complete W/ Cont if Necessary Per Protocol    Narrative · Left ventricular systolic function is severely decreased. Estimated EF =   20%.  · Moderate mitral valve regurgitation is present          I have reviewed the medications.    Assessment/Plan   Assessment / Plan     Hospital Problem List     * (Principal)Aspiration pneumonia    Dementia    JONNY (obstructive sleep apnea)    Overview Deleted 11/27/2017  3:45 PM by Henri Montes IV, MD            COPD (chronic obstructive  pulmonary disease)    Coronary artery disease involving coronary bypass graft of native heart without angina pectoris    Overview Addendum 11/27/2017  3:45 PM by Henri Montes IV, MD     · CABG by Yohannes Berger (4/24/17): LIMA to LAD, sequential SVG to OM1/OM 2, SVG to diagonal, SVG to RCA  · Cardiac cath (08/08/2017): VICKEY to LAD/diagnonal. Patent SVG to diagonal and patent LIMA to LAD. Patent SVG to OM 1 and OM2. Patnet SVG to RCA. Small Ramus branch to small for PCI.         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 No more labs. Continue Zosyn until discharge. DC Home on Augmentin for 7 days total.    - Acute hypoxemic respiratory failure: 85% on 4LNC, currently on 5LNC. Multifactorial- PNA, volume  - Sepsis: Fever with leukocytosis. Likely from aspiration PNA  - Aspiration PNA: Continue Zosyn. Comfort diet  - BUBBA on CKD: Baseline Cr 1.5. Worse.   - AoC systolic CHF: ECHO 06/2018 EF 20%, not a candidate for ICD. Continue BB   - Hypernatremia: Trending up. Will hold off on free water replacement given an increase in O2 requirement with crackles on pulm exam    DVT Prophylaxis:  SQ    CODE STATUS:   Code Status and Medical Interventions:   Ordered at: 06/29/18 4050     Limited Support to NOT Include:    Dialysis    Intubation    Artificial Nutrition     Level Of Support Discussed With:    Health Care Surrogate     Code Status:    No CPR     Medical Interventions (Level of Support Prior to Arrest):    Limited       Disposition: I expect the patient to be discharged TOBIAS Wilson MD  07/02/18  7:21 AM

## 2018-07-02 NOTE — THERAPY TREATMENT NOTE
Acute Care - Physical Therapy Treatment Note  [facility]     Patient Name: Magdi Arriaga  : 1945  MRN: [id number]  Today's Date: 2018  Onset of Illness/Injury or Date of Surgery: 18  Date of Referral to PT: 18  Referring Physician: REI Bedoya    Admit Date: 2018    Visit Dx:    ICD-10-CM ICD-9-CM   1. Acute on chronic systolic heart failure I50.23 428.23   2. Acute on chronic respiratory failure with hypoxia J96.21 518.84     799.02   3. Acute renal failure superimposed on chronic kidney disease, unspecified CKD stage, unspecified acute renal failure type N17.9 584.9    N18.9 585.9   4. COPD exacerbation J44.1 491.21   5. Impaired mobility and ADLs Z74.09 799.89   6. Impaired functional mobility, balance, gait, and endurance Z74.09 V49.89     Patient Active Problem List   Diagnosis   • Dementia   • Hyperlipidemia LDL goal <70   • Essential hypertension   • Obesity   • JONNY (obstructive sleep apnea)   • COPD (chronic obstructive pulmonary disease)   • Coronary artery disease involving coronary bypass graft of native heart without angina pectoris   • Ischemic cardiomyopathy   • History of atrial flutter   • History of DVT (deep vein thrombosis)   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, followed by [facility]   • Traumatic SDH, s/p right craniotomy on 2017   • Small bowel obstruction (S/P Exp lap for acute SBO 17)   • PVC (premature ventricular contraction)   • NSVT (nonsustained ventricular tachycardia)   • Decubitus ulcers   • Monilial rash   • PAF (paroxysmal atrial fibrillation)   • Preventative health care   • Dysphagia due to old stroke - liquids   • Acute on chronic systolic heart failure   • Aspiration pneumonia   • BUBBA (acute kidney injury)   • Hypernatremia   • Hypoxia       Therapy Treatment          Rehabilitation Treatment Summary     Row Name 18 1335             Treatment Time/Intention     "Discipline physical therapist  -LM      Document Type therapy note (daily note)  -LM      Subjective Information complains of;pain  -LM      Mode of Treatment physical therapy;individual therapy  -LM      Patient/Family Observations Spouse present. Pt received supine in bed, O2 via NC, IV intact.  -LM      Therapy Frequency (PT Clinical Impression) 3 times/wk  -LM      Patient Effort good  -LM      Existing Precautions/Restrictions fall;oxygen therapy device and L/min  -LM      Recorded by [LM] Anna Lee, PT 07/02/18 1409      Row Name 07/02/18 1335             Vital Signs    Pre SpO2 (%) 87  -LM      O2 Delivery Pre Treatment supplemental O2  -LM      O2 Delivery Intra Treatment supplemental O2  -LM      Post SpO2 (%) 85  -LM      O2 Delivery Post Treatment supplemental O2  -LM      Pre Patient Position Supine  -LM      Intra Patient Position Sitting  -LM      Post Patient Position Supine  -LM      Recorded by [LM] Anna Lee, PT 07/02/18 1409      Row Name 07/02/18 1335             Cognitive Assessment/Intervention    Additional Documentation Cognitive Assessment/Intervention (Group)  -LM      Recorded by [LM] Anna Lee, PT 07/02/18 1409      Row Name 07/02/18 1335             Cognitive Assessment/Intervention- PT/OT    Affect/Mental Status (Cognitive) confused  -LM      Orientation Status (Cognition) oriented to;person;time;place   named \"[place]\" and knew city  -LM      Follows Commands (Cognition) follows one step commands;75-90% accuracy;increased processing time needed;verbal cues/prompting required;repetition of directions required  -LM      Safety Deficit (Cognitive) moderate deficit;awareness of need for assistance;insight into deficits/self awareness;safety precautions awareness;safety precautions follow-through/compliance  -LM      Recorded by [LM] Anna Lee, PT 07/02/18 1409      Row Name 07/02/18 1335             Safety Issues, Functional Mobility    Safety Issues Affecting " Function (Mobility) ability to follow commands;awareness of need for assistance;insight into deficits/self awareness;safety precaution awareness;safety precautions follow-through/compliance;sequencing abilities  -LM      Impairments Affecting Function (Mobility) balance;endurance/activity tolerance;cognition;pain;shortness of breath;strength  -LM      Recorded by [LM] Anna Lee, PT 07/02/18 1409      Row Name 07/02/18 1333             Bed Mobility Assessment/Treatment    Scooting/Bridging [place] (Bed Mobility) dependent (less than 25% patient effort);2 person assist;verbal cues  -LM      Supine-Sit [place] (Bed Mobility) maximum assist (25% patient effort);2 person assist;verbal cues  -LM      Sit-Supine [place] (Bed Mobility) dependent (less than 25% patient effort);2 person assist;verbal cues  -LM      Bed Mobility, Safety Issues cognitive deficits limit understanding;decreased use of arms for pushing/pulling;decreased use of legs for bridging/pushing;impaired trunk control for bed mobility  -LM      Assistive Device (Bed Mobility) bed rails;draw sheet;head of bed elevated  -LM      Comment (Bed Mobility) Pt able to assist with LEs to bring off EOB. Pt required total assist at trunk to upright sitting.   -LM      Recorded by [LM] Anna Lee, PT 07/02/18 1409      Row Name 07/02/18 9151             Transfer Assessment/Treatment    Comment (Transfers) Pt demo poor static sitting balance and weakness at LEs. Not appropriate to assess at this time. RN declined pt [facility].  -LM      Recorded by [LM] Anna Lee, PT 07/02/18 1409      Row Name 07/02/18 1331             Gait/Stairs Assessment/Training    [place] Level (Gait) unable to assess  -LM      Recorded by [LM] Anna Lee, PT 07/02/18 1409      Row Name 07/02/18 1336             Motor Skills Assessment/Interventions    Additional Documentation Therapeutic Exercise (Group);Therapeutic Exercise Interventions (Group)  -LM       Recorded by [LM] Anna CARTAGENA Jesus, PT 07/02/18 1409      Row Name 07/02/18 1335             Therapeutic Exercise    18792 - PT Therapeutic Exercise Minutes 8  -LM      21534 - PT Therapeutic Activity Minutes 11  -LM      Recorded by [LM] Anna CARTAGENA Jesus, PT 07/02/18 1409      Row Name 07/02/18 1335             Therapeutic Exercise    Upper Extremity Range of Motion (Therapeutic Exercise) shoulder flexion/extension, bilateral;elbow flexion/extension, bilateral  -LM      Lower Extremity (Therapeutic Exercise) LAQ (long arc quad), bilateral  -LM      Lower Extremity Range of Motion (Therapeutic Exercise) ankle dorsiflexion/plantar flexion, bilateral  -LM      Exercise Type (Therapeutic Exercise) AAROM (active assistive range of motion)  -LM      Position (Therapeutic Exercise) seated;supine  -LM      Sets/Reps (Therapeutic Exercise) x10 reps each  -LM      Comment (Therapeutic Exercise) AAROM UE therex and LAQ.  -LM      Recorded by [LM] Anna CARTAGENA Jesus, PT 07/02/18 1409      Row Name 07/02/18 1335             Balance    Balance static sitting balance;dynamic sitting balance  -LM      Recorded by [LM] Anna CARTAGENA Jesus, PT 07/02/18 1409      Row Name 07/02/18 1335             Static Sitting Balance    Level of [place] (Unsupported Sitting, Static Balance) maximal assist, 25 to 49% patient effort  -LM      Sitting Position (Unsupported Sitting, Static Balance) sitting on edge of bed  -LM      Time Able to Maintain Position (Unsupported Sitting, Static Balance) more than 5 minutes  -LM      Comment (Unsupported Sitting, Static Balance) Pt demo posterior lean. Tactile placement and cues to maintain wide base of support through UEs.   -LM      Recorded by [LM] Anna OSIEL Lee, PT 07/02/18 1409      Row Name 07/02/18 1335             Dynamic Sitting Balance    Level of [place], Reaches Outside Midline (Sitting, Dynamic Balance) maximal assist, 25 to 49% patient effort  -LM      Sitting Position, Reaches Outside  Midline (Sitting, Dynamic Balance) sitting on edge of bed  -LM      Comment, Reaches Outside Midline (Sitting, Dynamic Balance) During LAQ AAROM at EOB, required increased assist to maintain balance.  -LM      Recorded by [LM] Anna CARTAGENA Jesus, PT 07/02/18 1409      Row Name 07/02/18 133             Positioning and Restraints    Pre-Treatment Position in bed  -LM      Post Treatment Position bed  -LM      In Bed notified nsg;supine;call light within reach;encouraged to call for assist;exit alarm on;with family/caregiver;SCD pump applied;waffle boots/both  -LM      Recorded by [LM] Anna CARTAGENA Jesus, PT 07/02/18 1409      Row Name 07/02/18 2005             Pain Assessment    Additional Documentation Pain Scale: FACES Pre/Post-Treatment (Group)  -LM      Recorded by [LM] Anna CATRAGENA Jesus, PT 07/02/18 1409      Row Name 07/02/18 3601             Pain Scale: Numbers Pre/Post-Treatment    Pain Location - Side Bilateral  -LM      Pain Location - Orientation lower  -LM      Pain Location extremity  -LM      Pain Intervention(s) Repositioned  -LM      Recorded by [LM] Anna CARTAGENA Jesus, PT 07/02/18 1409      Row Name 07/02/18 5147             Pain Scale: FACES Pre/Post-Treatment    Pain: FACES Scale, Pretreatment 2-->hurts little bit  -LM      Pain: FACES Scale, Post-Treatment 2-->hurts little bit  -LM      Pre/Post Treatment Pain Comment Pt verbalized back pain sitting EOB; bilateral LE pain with bed mobility, and left UE pain during end range elbow flexion.  -LM      Recorded by [LM] Anna OSIEL Lee, PT 07/02/18 1409      Row Name                Wound 06/30/18 0900 Right lateral malleolus pressure injury    Wound - Properties Group Date first assessed: 06/30/18 [AS] Time first assessed: 0900 [AS] Present On Admission : picture taken [AS] Side: Right [AS] Orientation: lateral [AS] Location: malleolus [AS] Type: pressure injury [AS] Stage, Pressure Injury: unstageable [AS] Recorded by:  [AS] Rubens Bain RN 06/30/18 0948    Row  Name                Wound 06/30/18 0900 Left lateral malleolus pressure injury    Wound - Properties Group Date first assessed: 06/30/18 [AS] Time first assessed: 0900 [AS] Present On Admission : yes;picture taken [AS] Side: Left [AS] Orientation: lateral [AS] Location: malleolus [AS] Type: pressure injury [AS] Stage, Pressure Injury: deep tissue injury [AS] Recorded by:  [AS] Rubens Bain RN 06/30/18 0924    Row Name                Wound 06/30/18 0900 Left posterior heel pressure injury    Wound - Properties Group Date first assessed: 06/30/18 [AS] Time first assessed: 0900 [AS] Present On Admission : yes;picture taken [AS] Side: Left [AS] Orientation: posterior [AS] Location: heel [AS] Type: pressure injury [AS] Stage, Pressure Injury: deep tissue injury [AS] Recorded by:  [AS] Rubens Bain RN 06/30/18 0924    Row Name                Wound 06/30/18 0900 Left anterior toe pressure injury    Wound - Properties Group Date first assessed: 06/30/18 [AS] Time first assessed: 0900 [AS] Present On Admission : yes;picture taken [AS] Side: Left [AS] Orientation: anterior [AS] Location: toe [AS] Type: pressure injury [AS] Stage, Pressure Injury: deep tissue injury [AS] Recorded by:  [AS] Rubens Bain RN 06/30/18 0925    Row Name                Wound 06/30/18 0900 Bilateral sacral spine pressure injury    Wound - Properties Group Date first assessed: 06/30/18 [AS] Time first assessed: 0900 [AS] Present On Admission : yes [AS] Side: Bilateral [AS] Location: sacral spine [AS] Type: pressure injury [AS] Stage, Pressure Injury: deep tissue injury [AS] Recorded by:  [AS] Rubens Bain RN 06/30/18 0925    Row Name 07/02/18 1335             Plan of Care Review    Plan of Care Reviewed With patient;spouse  -LM      Recorded by [LM] Anna Lee, PT 07/02/18 1409      Row Name 07/02/18 1335             Outcome Summary/Treatment Plan (PT)    Daily Summary of Progress (PT) progress toward functional goals is gradual  -LM       Recorded by [LM] Anna Lee, PT 07/02/18 8594        User Key  (r) = Recorded By, (t) = Taken By, (c) = Cosigned By    Initials Name Effective Dates Discipline    AS Rubens Bain RN 06/16/16 -  Nurse    ALEIDA Lee, PT 04/03/18 -  PT          Wound 06/30/18 0900 Right lateral malleolus pressure injury (Active)   Dressing Appearance dry;intact 7/2/2018  8:00 AM   Closure DESTIN 7/2/2018  8:00 AM   Periwound intact 7/2/2018  8:00 AM   Edges open;callused 7/2/2018  8:00 AM   Care, Wound honey applied 7/1/2018  8:00 PM       Wound 06/30/18 0900 Left lateral malleolus pressure injury (Active)   Dressing Appearance dry;intact 7/2/2018  8:00 AM   Closure DESTIN 7/2/2018  8:00 AM   Periwound intact;dry;blanchable;pink 7/2/2018  8:00 AM   Dressing Care, Wound foam;low-adherent 7/1/2018  8:00 PM       Wound 06/30/18 0900 Left posterior heel pressure injury (Active)   Dressing Appearance dry;intact 7/2/2018  8:00 AM   Closure DESTIN 7/2/2018  8:00 AM   Periwound intact;dry 7/2/2018  8:00 AM   Edges callused 7/2/2018  8:00 AM       Wound 06/30/18 0900 Left anterior toe pressure injury (Active)   Dressing Appearance dry 7/2/2018  8:00 AM   Base closed/resurfaced 7/2/2018  8:00 AM   Periwound intact;dry;pink;blanchable;blistered 7/2/2018  8:00 AM   Drainage Amount none 7/2/2018  8:00 AM   Dressing Care, Wound open to air 7/1/2018  8:00 PM       Wound 06/30/18 0900 Bilateral sacral spine pressure injury (Active)   Dressing Appearance dry;intact 7/2/2018  8:00 AM   Closure DESTIN 7/2/2018  8:00 AM   Periwound intact;blanchable 7/2/2018  8:00 AM   Drainage Amount none 7/2/2018  8:00 AM   Care, Wound barrier applied 7/1/2018  8:00 PM   Dressing Care, Wound foam;low-adherent 7/1/2018  8:00 PM             Physical Therapy Education     Title: PT OT SLP Therapies (Active)     Topic: Physical Therapy (Active)     Point: Mobility training (Active)    Learning Progress Summary     Learner Status Readiness Method Response Comment  Documented by    Patient Active Acceptance E,D NR Reviewed benefits of activity.  07/02/18 1409    Significant Other Active Acceptance E,D NR Reviewed benefits of activity.  07/02/18 1409          Point: Home exercise program (Active)    Learning Progress Summary     Learner Status Readiness Method Response Comment Documented by    Patient Active Acceptance E,D NR Reviewed benefits of activity.  07/02/18 1409     Active Acceptance E NR  KR 06/30/18 1557    Family Active Acceptance E NR  KR 06/30/18 1557    Significant Other Active Acceptance E,D NR Reviewed benefits of activity.  07/02/18 1409          Point: Body mechanics (Active)    Learning Progress Summary     Learner Status Readiness Method Response Comment Documented by    Patient Active Acceptance E,D NR Reviewed benefits of activity.  07/02/18 1409     Active Acceptance E NR  KR 06/30/18 1557    Family Active Acceptance E NR  KR 06/30/18 1557    Significant Other Active Acceptance E,D NR Reviewed benefits of activity.  07/02/18 1409          Point: Precautions (Active)    Learning Progress Summary     Learner Status Readiness Method Response Comment Documented by    Patient Active Acceptance E,D NR Reviewed benefits of activity.  07/02/18 1409     Active Acceptance E NR  KR 06/30/18 1557    Family Active Acceptance E NR  KR 06/30/18 1557    Significant Other Active Acceptance E,D NR Reviewed benefits of activity.  07/02/18 1409                      User Key     Initials Effective Dates Name Provider Type Discipline     04/03/18 -  Sri Mejía, PT Physical Therapist PT     04/03/18 -  Anna Lee, PT Physical Therapist PT                    PT Recommendation and Plan  Therapy Frequency (PT Clinical Impression): 3 times/wk  Outcome Summary/Treatment Plan (PT)  Daily Summary of Progress (PT): progress toward functional goals is gradual  Plan of Care Reviewed With: patient, spouse  Outcome Summary: Pt maxAx2 to dependent for bed  mobility. Pt demonstrated poor static sitting balance and overall weakness, not appropriate to assess transfers. Pt's O2 sats remained between 85-87% throughout treatment, cues for PLB throughout. Will progress with mobility as able.           Outcome Measures     Row Name 07/02/18 6495 06/30/18 1302 06/30/18 0947       How much help from another person do you currently need...    Turning from your back to your side while in flat bed without using bedrails? 1  -LM 1  -KR  --    Moving from lying on back to sitting on the side of a flat bed without bedrails? 2  -LM 2  -KR  --    Moving to and from a bed to a chair (including a wheelchair)? 1  -LM 1  -KR  --    Standing up from a chair using your arms (e.g., wheelchair, bedside chair)? 1  -LM 1  -KR  --    Climbing 3-5 steps with a railing? 1  -LM 1  -KR  --    To walk in hospital room? 1  -LM 1  -KR  --    AM-PAC 6 Clicks Score 7  -LM 7  -KR  --       How much help from another is currently needed...    Putting on and taking off regular lower body clothing?  --  -- 1  -MM    Bathing (including washing, rinsing, and drying)  --  -- 1  -MM    Toileting (which includes using toilet bed pan or urinal)  --  -- 1  -MM    Putting on and taking off regular upper body clothing  --  -- 2  -MM    Taking care of personal grooming (such as brushing teeth)  --  -- 2  -MM    Eating meals  --  -- 2  -MM    Score  --  -- 9  -MM       Functional Assessment    Outcome Measure Options AM-PAC 6 Clicks Basic Mobility (PT)  -LM AM-PAC 6 Clicks Basic Mobility (PT)  -KR AM-PAC 6 Clicks Daily Activity (OT)  -MM      User Key  (r) = Recorded By, (t) = Taken By, (c) = Cosigned By    Initials Name Provider Type    MM Sylvia Vick, OT Occupational Therapist    LISA Mejía, PT Physical Therapist    LM Anna Lee, PT Physical Therapist           Time Calculation:         PT Charges     Row Name 07/02/18 1335             Time Calculation    Start Time 1335  -LM      PT Received  On 07/02/18  -LM      PT Goal Re-Cert Due Date 07/10/18  -LM         Time Calculation- PT    Total Timed Code Minutes- PT 19 minute(s)  -LM         Timed Charges    85784 - PT Therapeutic Exercise Minutes 8  -LM      90180 - PT Therapeutic Activity Minutes 11  -LM        User Key  (r) = Recorded By, (t) = Taken By, (c) = Cosigned By    Initials Name Provider Type    LM Anna Lee, PT Physical Therapist        Therapy Suggested Charges     Code   Minutes Charges    12860 (CPT®) Hc Pt Neuromusc Re Education Ea 15 Min      18351 (CPT®) Hc Pt Ther Proc Ea 15 Min 8     60708 (CPT®) Hc Gait Training Ea 15 Min      26222 (CPT®) Hc Pt Therapeutic Act Ea 15 Min 11 1    06006 (CPT®) Hc Pt Manual Therapy Ea 15 Min      23563 (CPT®) Hc Pt Iontophoresis Ea 15 Min      82714 (CPT®) Hc Pt Elec Stim Ea-Per 15 Min      40505 (CPT®) Hc Pt Ultrasound Ea 15 Min      51571 (CPT®) Hc Pt Self Care/Mgmt/Train Ea 15 Min      Total  19 1        Therapy Charges for Today     Code Description Service Date Service Provider Modifiers Qty    19463906180 HC PT THERAPEUTIC ACT EA 15 MIN 7/2/2018 Anna Lee, PT GP 1    98480790191 HC PT THER SUPP EA 15 MIN 7/2/2018 Anna Lee, PT GP 1          PT G-Codes  Outcome Measure Options: AM-PAC 6 Clicks Basic Mobility (PT)    Anna Lee, PT  7/2/2018

## 2018-07-02 NOTE — THERAPY TREATMENT NOTE
Acute Care - Speech Language Pathology   Swallow Treatment Note  Leni   Pt/Caregiver Education     Patient Name: Magdi Arriaga  : 1945  MRN: [id number]  Today's Date: 2018  Onset of Illness/Injury or Date of Surgery: 18     Referring Physician: REI Bedoya      Admit Date: 2018    Visit Dx:      ICD-10-CM ICD-9-CM   1. Acute on chronic systolic heart failure (CMS/[facility]) I50.23 428.23   2. Acute on chronic respiratory failure with hypoxia J96.21 518.84     799.02   3. Acute renal failure superimposed on chronic kidney disease, unspecified CKD stage, unspecified acute renal failure type (CMS/[facility]) N17.9 584.9    N18.9 585.9   4. COPD exacerbation (CMS/[facility]) J44.1 491.21   5. Impaired mobility and ADLs Z74.09 799.89   6. Impaired functional mobility, balance, gait, and endurance Z74.09 V49.89     Patient Active Problem List   Diagnosis   • Dementia   • Hyperlipidemia LDL goal <70   • Essential hypertension   • Obesity   • JONNY (obstructive sleep apnea)   • COPD (chronic obstructive pulmonary disease) (CMS/[facility])   • Coronary artery disease involving coronary bypass graft of native heart without angina pectoris   • Ischemic cardiomyopathy   • History of atrial flutter   • History of DVT (deep vein thrombosis)   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, followed by Hernandez   • Traumatic SDH, s/p right craniotomy on 2017   • Small bowel obstruction (S/P Exp lap for acute SBO 17)   • PVC (premature ventricular contraction)   • NSVT (nonsustained ventricular tachycardia) (CMS/[facility])   • Decubitus ulcers   • Monilial rash   • PAF (paroxysmal atrial fibrillation) (CMS/[facility])   • Preventative health care   • Dysphagia due to old stroke - liquids   • Acute on chronic systolic heart failure (CMS/[facility])   • Aspiration pneumonia (CMS/[facility])   • BUBBA (acute kidney injury) (CMS/[facility])   • Hypernatremia   • Hypoxia       Therapy  Treatment    Therapy Treatment / Health Promotion    Treatment Time/Intention  Discipline: speech language pathologist (07/02/18 1500 : Milla Uriarte MS CCC-SLP)  Document Type: therapy note (daily note), other (see comments) (Caregiver Instruction) (07/02/18 1500 : MS ROSENDO Estrella)  Subjective Information: no complaints (07/02/18 1500 : MS ROSENDO Estrella)  Mode of Treatment: speech-language pathology (07/02/18 1500 : Milla Uriarte MS CCC-SLP)  Patient Effort: good (07/02/18 1500 : MS ROSENDO Estrella)  Plan of Care Review  Plan of Care Reviewed With: patient, spouse (07/02/18 1554 : Milla Uriarte MS CCC-SLP)    Vitals/Pain/Safety       Cognition, Communication, Swallow  Recommendations  Anticipated Dischage Disposition: other (see comments) (home with hospice) (07/02/18 1500 : MS ROSENDO Estrella)    Outcome Summary  Outcome Summary/Treatment Plan (SLP)  Daily Summary of Progress (SLP): progress toward functional goals is good (07/02/18 1500 : Milla Uriarte MS CCC-SLP)  Plan for Continued Treatment (SLP): Home w/ hospice on comfort diet (07/02/18 1500 : MS ROSENDO Estrella)  Anticipated Dischage Disposition: other (see comments) (home with hospice) (07/02/18 1500 : MS ROSENDO Estrella)            SLP GOALS     Row Name 07/02/18 1500             Oral Nutrition/Hydration Goal 1 (SLP)    Oral Nutrition/Hydration Goal 1, SLP LTG: Pt will demonstrate tolerance with comfort diet without cues  -SG      Time Frame (Oral Nutrition/Hydration Goal 1, SLP) by discharge  -SG      Progress/Outcomes (Oral Nutrition/Hydration Goal 1, SLP) goal met  -SG         Swallow Compensatory Strategies Goal 1 (SLP)    Activity (Swallow Compensatory Strategies/Techniques Goal 1, SLP) during meal intake;postural techniques;small bites;small cup sips  -SG       [place]/Accuracy (Swallow Compensatory Strategies/Techniques Goal 1, SLP) with minimal cues (75-90% accuracy)  -SG      Time Frame (Swallow Compensatory Strategies/Techniques Goal 1, SLP) short term goal (STG);by discharge  -SG      Barriers (Swallow Compensatory Strategies/Techniques Goal 1, SLP) Pt seen for tx this date. Current plan is home w/ hospice. Pt resting. Education provided to pt's wife and RN regarding aspiration precautions, food texture modification, postural positioning (ensure upright w/ all meals), and POC. Wife and RN expressed understanding w/ all. Pt has been tolerating diet well, some occasional coughing but wife superviing PO intake, proving PT snacks when pt requests.   -SG      Progress/Outcomes (Swallow Compensatory Strategies/Techniques Goal 1, SLP) goal met  -SG        User Key  (r) = Recorded By, (t) = Taken By, (c) = Cosigned By    Initials Name Provider Type    ZOLTAN Uriarte MS CCC-SLP Speech and Language Pathologist          EDUCATION  The patient has been educated in the following areas:   Dysphagia (Swallowing Impairment) Oral Care/Hydration Modified Diet Instruction.    SLP Recommendation and Plan                       Anticipated Dischage Disposition: other (see comments) (home with hospice)                Plan of Care Reviewed With: patient, spouse  Plan of Care Review  Plan of Care Reviewed With: patient, spouse  Daily Summary of Progress (SLP): progress toward functional goals is good  Plan for Continued Treatment (SLP): Home w/ hospice on comfort diet  Progress: no change           Time Calculation:         Time Calculation- SLP     Row Name 07/02/18 1555             Time Calculation- SLP    SLP Start Time 1530  -SG      SLP Received On 07/02/18  -SG        User Key  (r) = Recorded By, (t) = Taken By, (c) = Cosigned By    Initials Name Provider Type    ZOLTAN Uriarte MS CCC-SLP Speech and Language Pathologist          Therapy Charges for  Today     Code Description Service Date Service Provider Modifiers Qty    [id number] HC ST SELF CARE/MGMT/TRAIN EA 15 MIN 7/2/2018 Milla Uriarte, MS CCC-SLP GN 1                 Milla Uriarte, MS CCC-SLP  7/2/2018

## 2018-07-02 NOTE — PROGRESS NOTES
Palliative Care Progress Note    Date of Admission: 6/29/2018    Subjective:  Patient states that he is having a very good day at this point time.  Denies any complaints    No current facility-administered medications on file prior to encounter.      Current Outpatient Prescriptions on File Prior to Encounter   Medication Sig Dispense Refill   • albuterol (PROVENTIL) (5 MG/ML) 0.5% nebulizer solution Take 0.5 mL by nebulization Every 4 (Four) Hours As Needed for Wheezing.  12   • aspirin 81 MG chewable tablet Chew 1 tablet Daily.     • atorvastatin (LIPITOR) 10 MG tablet Take 1 tablet by mouth Every Night. 90 tablet 1   • budesonide-formoterol (SYMBICORT) 160-4.5 MCG/ACT inhaler Inhale 2 puffs 2 (Two) Times a Day. 3 inhaler 1   • buPROPion (WELLBUTRIN) 75 MG tablet Take 1 tablet by mouth 3 (Three) Times a Day. 270 tablet 1   • carvedilol (COREG) 3.125 MG tablet Take 1 tablet by mouth 2 (Two) Times a Day With Meals. 180 tablet 1   • Cholecalciferol (VITAMIN D3) 2000 units tablet Take 1 tablet by mouth Daily.     • entecavir (BARACLUDE) 0.5 MG tablet Take 1 tablet by mouth Daily. 90 tablet 0   • furosemide (LASIX) 40 MG tablet Take 1 tablet by mouth Daily. 90 tablet 1   • memantine (NAMENDA) 10 MG tablet Take 1 tablet by mouth Every 12 (Twelve) Hours. 180 tablet 1   • nitroglycerin (NITROSTAT) 0.4 MG SL tablet DISSOLVE 1 TAB UNDER TONGUE FOR CHEST PAIN - IF PAIN REMAINS AFTER 5 MIN, CALL 911 AND REPEAT DOSE. MAX 3 TABS IN 15 MINUTES 25 tablet 5   • potassium chloride (K-DUR) 10 MEQ CR tablet Take 1 tablet by mouth Daily. 90 tablet 1   • rivastigmine (EXELON) 13.3 MG/24HR patch Place 1 patch on the skin Daily. 90 patch 1   • sacubitril-valsartan (ENTRESTO) 49-51 MG tablet Take 1 tablet by mouth Every 12 (Twelve) Hours. 180 tablet 1        •  acetaminophen  •  HYDROmorphone  •  sodium chloride  •  sodium chloride    Objective: BP (!) 153/104 (BP Location: Right arm, Patient Position: Lying)   Pulse 69   Temp 97.8 °F  "(36.6 °C) (Oral)   Resp 14   Ht 172.5 cm (67.9\")   Wt 72.8 kg (160 lb 6.4 oz)   SpO2 90%   BMI 24.46 kg/m²      Intake/Output Summary (Last 24 hours) at 07/02/18 1240  Last data filed at 07/02/18 0700   Gross per 24 hour   Intake             1300 ml   Output                0 ml   Net             1300 ml     Physical Exam:    General Appearance alert, pleasant, appears stated age and cooperative  Lungs accessory muscle use, tachypnea and rhonchi bilateral lower  Heart regular rhythm & normal rate, normal S1, S2, no murmur, no rafael, no rub and no click  Abdomen normal bowel sounds, no masses, no hepatomegaly, no splenomegaly, soft non-tender, no guarding and no rebound tenderness  Extremities edema 1+ lower bilateral  Pulses Pulses palpable and equal bilaterally  Skin no bleeding, bruising or rash  Neurologic Mental Status alertness alert and awake and mood/affect normal, Speech normal content and execusion    Results from last 7 days  Lab Units 07/01/18  0545   WBC 10*3/mm3 13.96*   HEMOGLOBIN g/dL 9.1*   HEMATOCRIT % 32.0*   PLATELETS 10*3/mm3 226       Results from last 7 days  Lab Units 07/01/18  0545  06/29/18  1306   SODIUM mmol/L 163*  < > 155*   POTASSIUM mmol/L 3.3*  < > 4.3   CHLORIDE mmol/L 125*  < > 123*   CO2 mmol/L 27.0  < > 25.0   BUN mg/dL 68*  < > 59*   CREATININE mg/dL 2.01*  < > 1.91*   CALCIUM mg/dL 9.8  < > 10.4   BILIRUBIN mg/dL  --   --  0.4   ALK PHOS U/L  --   --  67   ALT (SGPT) U/L  --   --  8   AST (SGOT) U/L  --   --  9   GLUCOSE mg/dL 103*  < > 124*   < > = values in this interval not displayed.    Impression:    1.  End stage CHF complicated by dysphagia and aspiration pneumonia, oxygen dependent COPD, and JONNY requiring CPAP  2.  CKD stage 3  3.  Functional and cognitive deficit.  H/o SDH s/p craniotomy and moderately advanced dementia     Symptom:  Dyspnea with hypoxia - will order PRN opioid for air hunger  Oral/pharyngeal dysphagia - desires pleasure feeds.     Plan: Refer to " home hospice.          Mark Casas DO  07/02/18  12:40 PM

## 2018-07-02 NOTE — PROGRESS NOTES
Discharge Planning Assessment  [facility]     Patient Name: Magdi Arriaga  MRN: [id number]  Today's Date: 7/2/2018    Admit Date: 6/29/2018          Discharge Needs Assessment     Row Name 07/02/18 1442       Living Environment    Lives With spouse    Current Living Arrangements home/apartment/condo    Primary Care Provided by spouse/significant other    Provides Primary Care For no one    Family Caregiver if Needed child(bartolo), adult;spouse    Quality of Family Relationships helpful;supportive    Able to Return to Prior Arrangements yes       Resource/Environmental Concerns    Resource/Environmental Concerns none    Transportation Concerns other (see comments)       Transition Planning    Patient/Family Anticipates Transition to other (see comments)    Patient/Family Anticipated Services at Transition hospice care    Transportation Anticipated agency       Discharge Needs Assessment    Readmission Within the Last 30 Days current reason for admission unrelated to previous admission    Concerns to be Addressed discharge planning    Equipment Currently Used at Home commode;hospital bed;lift device;wheelchair;oxygen;bipap/cpap    Anticipated Changes Related to Illness inability to care for self    Equipment Needed After Discharge none    Discharge Facility/Level of Care Needs hospice facility;other (see comments)    Offered/Gave Vendor List no            Discharge Plan     Row Name 07/02/18 1444       Plan    Plan Pt lives in [facility] [place] w wife. They are interested in going home w Hospice. A consult has been ordered. Pt uses documented DME from [facility]. He has used [facility] before for HH. Was not getting HH or PT/OT prior to admission. Pt PCP is Dr. Dickens and his insurance covers his medications. Pt will need transportation at d/c. Wife stated she could but would rather him go by ambulance - will need to determine transportation need according to medical necessity at d/c. Pt goal is to go home w home hospice if  approprate and accepted. CM will follow and assist.     Final Discharge Disposition Code 50 - home with hospice        Destination     No service coordination in this encounter.      Durable Medical Equipment     No service coordination in this encounter.      Dialysis/Infusion     No service coordination in this encounter.      Home Medical Care     No service coordination in this encounter.      Social Care     No service coordination in this encounter.                Demographic Summary     Row Name 07/02/18 1441       General Information    Admission Type inpatient    Arrived From home    Referral Source admission list    Reason for Consult discharge planning    Preferred Language English       Contact Information    Permission Granted to Share Info With ;family/designee    Contact Information Comments Linda FLORES) [phone]            Functional Status     Row Name 07/02/18 1442       Functional Status    Usual Activity Tolerance good       Functional Status, IADL    Medications assistive person    Meal Preparation assistive person    Housekeeping assistive person    Laundry assistive person    Shopping assistive person       Employment/    Employment Status unemployed            Psychosocial    No documentation.           Abuse/Neglect    No documentation.           Legal    No documentation.           Substance Abuse    No documentation.           Patient Forms    No documentation.         Miriam Rios RN

## 2018-07-03 NOTE — PLAN OF CARE
Problem: Patient Care Overview  Goal: Interprofessional Rounds/Family Conf  Outcome: Ongoing (interventions implemented as appropriate)   07/03/18 1300   Interdisciplinary Rounds/Family Conf   Summary LILIAM Suarez saw patient and reports at team that he is waiting for Hospice Nurse to see and that he wants to go to his son's home in [facility]. Kelsey Braxton RN/CM reports that she will see him after our team meeting. She called Dr. Casas for home regimen to be started today and especially something for secretions.   Participants advanced practice nurse;nursing;pastoral care;social work/services;physician;

## 2018-07-03 NOTE — PROGRESS NOTES
"Continued Stay Note  [facility]     Patient Name: Magdi Arriaga  MRN: [id number]  Today's Date: 7/3/2018    Admit Date: 6/29/2018          Discharge Plan     Row Name 07/03/18 1842       Plan    Plan [facility] home services    Patient/Family in Agreement with Plan yes    Plan Comments Chart reviewed.  End stage CHF complicated by dsyphagia and aspiration pneumonia.  BNP >5000 on admit.  LVEF 20%.  COPD - O2 dependent.  JONNY requiring CPAP.  CKD III.  Other co-morbidities.  Functional and cognitive deficit.  Hx SDH and moderately advanced dementia.  Awake but not verbally responsive during visit.  Using accessory muscles for respiration.  Received prn Dilaudid per primary nurse during visit - respirations became much less labored.  O2 6L/NC continuous at present.  BiPap at times - using 60%-70% \"bleed in\" O2.  50% is maximum for home BiPap.  Met with wife, Netta.  Discussed current status and goals of care.  Discussed basing O2 needs on pt's comfort level rather than O2 saturations - was agreeable when explained this is what would be done if he goes home with hospice services. States pt wants to go home. Says she has 2 sons who assist them. Explained hospice home care services.  Given written info re: hospice, [facility] and contact numbers for the Hospice Nurse Liaison.  Agreeable for hospice home care services with plan for d/c 7/5/18.  DME ordered hospital bed, bedside table, ALBA, suction machine, cesar lift, nebulizer, O2 6L/NC with BiPap 16/8 PRN to be delivered to the home 7/5/18 AM.  Ambulance transport per [facility] 7/5/18 at 1330.  Update to primary nurse and RN Case Manager, Nia Truong.  Will follow for hospice support.  If can be of further assist please contact......ext [phone].              Discharge Codes    No documentation.           Kelsey Braxton RN    "

## 2018-07-03 NOTE — PROGRESS NOTES
[facility] Medicine Services  PROGRESS NOTE    Patient Name: Magdi Arriaga  : 1945  MRN: [id number]    Date of Admission: 2018  Length of Stay: 4  Primary Care Physician: Colton Dickens MD    Subjective   Subjective     CC:  FU hypoxia    HPI:  Yazidi member at bedside. Patient sleeping but arouses and has no complaints this morning.   RN states was changed to BiPAP overnight for hypoxia, but seems to tolerate/ be asymptomatic with lower O2 sats. + suction needed for upper airway secretions    Review of Systems  Gen- No fevers, chills  CV- No chest pain, palpitations  Resp- +Cough, +dyspnea  GI- No N/V/D, abd pain    Otherwise ROS is negative except as mentioned in the HPI.    Objective   Objective     Vital Signs:   Temp:  [98.4 °F (36.9 °C)-98.5 °F (36.9 °C)] 98.5 °F (36.9 °C)  Heart Rate:  [69-82] 69  Resp:  [16-26] 26  BP: (144-155)/(81-94) 155/94  FiO2 (%):  [60 %-70 %] 60 %        Physical Exam:  Constitutional: No acute distress, sleeping on BiPAP  Eyes: PERRLA, sclerae anicteric, no conjunctival injection  HENT: NCAT, mucous membranes moist  Neck: Supple, no thyromegaly, no lymphadenopathy, trachea midline  Respiratory: Bibasilar crackles diffusely- no change, no wheezing, nonlabored respirations   Cardiovascular: RRR, no murmurs, rubs, or gallops, palpable pedal pulses bilaterally  Gastrointestinal: Positive bowel sounds, soft, mildly TTP diffusely, nondistended  Musculoskeletal: No bilateral ankle edema, no clubbing or cyanosis to extremities  Psychiatric: Appropriate affect, cooperative  Neurologic: Oriented x 3, strength symmetric in all extremities, Cranial Nerves grossly intact to confrontation  Skin: No rashes    Results Reviewed:  I have personally reviewed current lab, radiology, and data and agree.      Results from last 7 days  Lab Units 18  0545 18  0528 18  1306   WBC 10*3/mm3 13.96* 10.32 13.51*   HEMOGLOBIN g/dL 9.1* 9.1* 10.2*    HEMATOCRIT % 32.0* 31.0* 34.6*   PLATELETS 10*3/mm3 226 224 262       Results from last 7 days  Lab Units 07/01/18  0545 06/30/18  0528 06/29/18  1306   SODIUM mmol/L 163* 158* 155*   POTASSIUM mmol/L 3.3* 3.5 4.3   CHLORIDE mmol/L 125* 124* 123*   CO2 mmol/L 27.0 25.0 25.0   BUN mg/dL 68* 59* 59*   CREATININE mg/dL 2.01* 1.94* 1.91*   GLUCOSE mg/dL 103* 152* 124*   CALCIUM mg/dL 9.8 9.9 10.4   ALT (SGPT) U/L  --   --  8   AST (SGOT) U/L  --   --  9     No results found for: BNP  No results found for: PHART    Microbiology Results Abnormal     Procedure Component Value - Date/Time    Blood Culture - Blood, [[id number]]  (Normal) Collected:  06/29/18 1316    Lab Status:  Preliminary result Specimen:  Blood from Hand, Digit Left Updated:  07/02/18 1345     Blood Culture No growth at 3 days    Blood Culture - Blood, [[id number]]  (Normal) Collected:  06/29/18 1305    Lab Status:  Preliminary result Specimen:  Blood from Hand, Right Updated:  07/02/18 1345     Blood Culture No growth at 3 days    Urine Culture - Urine, [[id number]]  (Normal) Collected:  06/29/18 1352    Lab Status:  Final result Specimen:  Urine from Urine, Catheter In/Out Updated:  07/01/18 0903     Urine Culture No growth at 2 days          Imaging Results (last 24 hours)     ** No results found for the last 24 hours. **        Results for orders placed during the hospital encounter of 06/15/18   Adult Transthoracic Echo Complete W/ Cont if Necessary Per Protocol    Narrative · Left ventricular systolic function is severely decreased. Estimated EF =   20%.  · Moderate mitral valve regurgitation is present          I have reviewed the medications.    Assessment/Plan   Assessment / Plan     Hospital Problem List     * (Principal)Aspiration pneumonia (CMS/[facility])    Dementia    JONNY (obstructive sleep apnea)    Overview Deleted 11/27/2017  3:45 PM by Henri Montes IV, MD            COPD (chronic obstructive pulmonary disease) (CMS/HCC)    Coronary  artery disease involving coronary bypass graft of native heart without angina pectoris    Overview Addendum 11/27/2017  3:45 PM by Henri Montes IV, MD     · CABG by Yohannes Berger (4/24/17): LIMA to LAD, sequential SVG to OM1/OM 2, SVG to diagonal, SVG to RCA  · Cardiac cath (08/08/2017): VICKEY to LAD/diagnonal. Patent SVG to diagonal and patent LIMA to LAD. Patent SVG to OM 1 and OM2. Patnet SVG to RCA. Small Ramus branch to small for PCI.         History of atrial flutter    CKD (chronic kidney disease), stage III    Traumatic SDH, s/p right craniotomy on 11/8/2017    Acute on chronic systolic heart failure (CMS/HCC)    Overview Signed 6/29/2018  5:10 PM by REI Foley     EF 20%, not ICD candidate         BUBBA (acute kidney injury) (CMS/[facility])    Hypernatremia    Hypoxia        Assessment & Plan:    Plans for Home Hospice once wife is able to meet with Kelsey Braxton and after equipment can be set up. No more labs. Continue Zosyn until discharge. DC Home on Augmentin for 7 days total.   - currently oxygen settings are more than hospice can do at home. Kelsey to discuss with wife today  - plan 2pm meeting    - Acute hypoxemic respiratory failure: 85% on 4LNC, currently on 5LNC/ bipap. Multifactorial- PNA, volume  - Sepsis: Fever with leukocytosis. Likely from aspiration PNA  - Aspiration PNA: Continue Zosyn. Comfort diet  - BUBBA on CKD: Baseline Cr 1.5. Worse.   - AoC systolic CHF: ECHO 06/2018 EF 20%, not a candidate for ICD. Continue BB   - Hypernatremia: Trending up. Will hold off on free water replacement given an increase in O2 requirement with crackles on pulm exam  - continue to keep comfortable. No further lab draws    DVT Prophylaxis:  [facility]    CODE STATUS:   Code Status and Medical Interventions:   Ordered at: 06/29/18 1650     Limited Support to NOT Include:    Dialysis    Intubation    Artificial Nutrition     Level Of Support Discussed With:    Health Care Surrogate     Code Status:    No CPR      Medical Interventions (Level of Support Prior to Arrest):    Limited       Disposition: I expect the patient to be discharged 1-2 days    REI Manzanares  07/03/18  11:56 AM

## 2018-07-03 NOTE — THERAPY DISCHARGE NOTE
Acute Care - Occupational Therapy Discharge Summary  [facility]     Patient Name: Magdi Arriaga  : 1945  MRN: [id number]    Today's Date: 7/3/2018  Onset of Illness/Injury or Date of Surgery: 18    Date of Referral to OT: 18  Referring Physician: REI Bedoya      Admit Date: 2018        OT Recommendation and Plan    Visit Dx:    ICD-10-CM ICD-9-CM   1. Acute on chronic systolic heart failure (CMS/[facility]) I50.23 428.23   2. Acute on chronic respiratory failure with hypoxia (CMS/[facility]) J96.21 518.84     799.02   3. Acute renal failure superimposed on chronic kidney disease, unspecified CKD stage, unspecified acute renal failure type (CMS/[facility]) N17.9 584.9    N18.9 585.9   4. COPD exacerbation (CMS/HCC) J44.1 491.21   5. Impaired mobility and ADLs Z74.09 799.89   6. Impaired functional mobility, balance, gait, and endurance Z74.09 V49.89                         Outcome Measures     Row Name 18 1335 18 1302          How much help from another person do you currently need...    Turning from your back to your side while in flat bed without using bedrails? 1  -LM 1  -KR     Moving from lying on back to sitting on the side of a flat bed without bedrails? 2  -LM 2  -KR     Moving to and from a bed to a chair (including a wheelchair)? 1  -LM 1  -KR     Standing up from a chair using your arms (e.g., wheelchair, bedside chair)? 1  -LM 1  -KR     Climbing 3-5 steps with a railing? 1  -LM 1  -KR     To walk in hospital room? 1  -LM 1  -KR     AM-PAC 6 Clicks Score 7  -LM 7  -KR        Functional Assessment    Outcome Measure Options AM-PAC 6 Clicks Basic Mobility (PT)  -LM AM-PAC 6 Clicks Basic Mobility (PT)  -KR       User Key  (r) = Recorded By, (t) = Taken By, (c) = Cosigned By    Initials Name Provider Type    LISA Mejía, PT Physical Therapist    LM Anna Lee PT Physical Therapist          Therapy Suggested Charges     Code   Minutes Charges    98068 (CPT®) Hc Ot Neuromusc  Re Education Ea 15 Min      03722 (CPT®) Hc Ot Ther Proc Ea 15 Min 8 1    08723 (CPT®) Hc Gait Training Ea 15 Min      59145 (CPT®) Hc Ot Therapeutic Act Ea 15 Min      63894 (CPT®) Hc Ot Manual Therapy Ea 15 Min      25104 (CPT®) Hc Ot Iontophoresis Ea 15 Min      74692 (CPT®) Hc Ot Elec Stim Ea-Per 15 Min      45499 (CPT®) Hc Ot Ultrasound Ea 15 Min      08844 (CPT®) Hc Ot Self Care/Mgmt/Train Ea 15 Min      Total  8 1              OT Discharge Summary  Anticipated Discharge Disposition (OT): other (see comments) (home with hospice)  Reason for Discharge: Change in medical status (comfort measures only; home with hospice)  Outcomes Achieved: Unable to make functional progress toward goals at this time  Discharge Destination: other (comment) (home with hospice)      Margie Ferrera, OT  7/3/2018

## 2018-07-04 NOTE — DISCHARGE SUMMARY
[facility] Medicine Services  DISCHARGE SUMMARY    Patient Name: Magdi Arriaga  : 1945  MRN: [id number]    Date of Admission: 2018  Date of Discharge:  18  Primary Care Physician: Colton Dickens MD    Consults     Date and Time Order Name Status Description    2018 1724 Inpatient Palliative Care MD Consult Completed     2018 1127 Inpatient Nephrology Consult Completed         Hospital Course     Presenting Problem:   Acute on chronic systolic heart failure (CMS/[facility]) [I50.23]    Active Hospital Problems    Diagnosis Date Noted   • **Aspiration pneumonia (CMS/[facility]) [J69.0] 2018   • Acute on chronic systolic heart failure (CMS/[facility]) [I50.23] 2018   • BUBBA (acute kidney injury) (CMS/[facility]) [N17.9] 2018   • Hypernatremia [E87.0] 2018   • Hypoxia [R09.02] 2018   • Traumatic SDH, s/p right craniotomy on 2017 [S06.5X0A] 10/22/2017   • CKD (chronic kidney disease), stage III [N18.3] 2017   • History of atrial flutter [Z86.79] 2017   • Coronary artery disease involving coronary bypass graft of native heart without angina pectoris [I25.810] 2017   • COPD (chronic obstructive pulmonary disease) (CMS/[facility]) [J44.9] 2017   • Dementia [F03.90] 2017   • JONNY (obstructive sleep apnea) [G47.33] 2017      Resolved Hospital Problems    Diagnosis Date Noted Date Resolved   No resolved problems to display.          Hospital Course:  Magdi Arriaga is a 72 y.o. male  who was recently admitted 6/15- for a/c systolic HF.  During that admission was also being evaluated for dysphagia/MBS and SLP recommended level 4 pudding thick liquids.  Wife reports at home they decided to follow palliative diet.  The following day he began coughing and SOB has progressively worsened.  He was seen by his PCP on Tuesday due to elevated BP and his entresto dose was increased from 1/2 tab BID to one tab BID.  He presented to ED  "at the recommendation of home health due to \"wet lungs\".    He was admitted on 6/29/18 with aspiration pneumonia and acute/ chronic heart failure. He received antibiotics, steroids, lasix and fluids in ED on arrival. Further diuresis and fluids held after admission due to acute kidney injury/ sepsis/ and bilateral pulmonary edema. He has been on zosyn/ respiratory support.     Hospice was consult and met with family and patient who decided to return home with Hospice services. Medications have been minimalized and patient to continue with augmentin for next 5 days and morphine as needed for dyspnea. Patient will continue nasal cannula and BiPAP as tolerates and has been set up for home use. DME from Hospice to be delivered today prior to patient arrival.     Discharge Follow Up Recommendations for labs/diagnostics:   PCP PRN  Hospice to follow at home        Day of Discharge     HPI:   Patient opens eyes briefly but closes again. No overnight issues per RN staff. Currently on BiPAP    Review of Systems   Unable to perform ROS: Patient nonverbal         Otherwise ROS is negative except as mentioned in the HPI.    Vital Signs:   Temp:  [98.8 °F (37.1 °C)-99.8 °F (37.7 °C)] 99.8 °F (37.7 °C)  Heart Rate:  [70-91] 91  Resp:  [16-28] 20  BP: (155-165)/(86-95) 155/86  FiO2 (%):  [60 %] 60 %     Physical Exam:  Constitutional: No acute distress, awake, alert  HENT: NCAT, mucous membranes moist  Respiratory: rales bilateral bases, respiratory effort normal   Cardiovascular: RRR, no murmurs, rubs, or gallops, palpable pedal pulses bilaterally  Gastrointestinal: Positive bowel sounds, soft, nontender, nondistended  Musculoskeletal: No bilateral ankle edema  Neurologic: opens eyes, non verbal  Skin: No rashes      Pertinent  and/or Most Recent Results       Results from last 7 days  Lab Units 07/01/18  0545 06/30/18  0528 06/29/18  1306   WBC 10*3/mm3 13.96* 10.32 13.51*   HEMOGLOBIN g/dL 9.1* 9.1* 10.2*   HEMATOCRIT % 32.0* " 31.0* 34.6*   PLATELETS 10*3/mm3 226 224 262   SODIUM mmol/L 163* 158* 155*   POTASSIUM mmol/L 3.3* 3.5 4.3   CHLORIDE mmol/L 125* 124* 123*   CO2 mmol/L 27.0 25.0 25.0   BUN mg/dL 68* 59* 59*   CREATININE mg/dL 2.01* 1.94* 1.91*   GLUCOSE mg/dL 103* 152* 124*   CALCIUM mg/dL 9.8 9.9 10.4       Results from last 7 days  Lab Units 06/29/18  1306   BILIRUBIN mg/dL 0.4   ALK PHOS U/L 67   ALT (SGPT) U/L 8   AST (SGOT) U/L 9           Invalid input(s): TG, LDLCALC, LDLREALC    Results from last 7 days  Lab Units 06/29/18  1306   BNP pg/mL >5,000.0*     Brief Urine Lab Results  (Last result in the past 365 days)      Color   Clarity   Blood   Leuk Est   Nitrite   Protein   CREAT   Urine HCG        06/29/18 1352 Yellow Cloudy(A) Negative Negative Negative 100 mg/dL (2+)(A)               Microbiology Results Abnormal     Procedure Component Value - Date/Time    Blood Culture - Blood, [[id number]]  (Normal) Collected:  06/29/18 1316    Lab Status:  Preliminary result Specimen:  Blood from Hand, Digit Left Updated:  07/03/18 1345     Blood Culture No growth at 4 days    Blood Culture - Blood, [[id number]]  (Normal) Collected:  06/29/18 1305    Lab Status:  Preliminary result Specimen:  Blood from Hand, Right Updated:  07/03/18 1345     Blood Culture No growth at 4 days    Urine Culture - Urine, [[id number]]  (Normal) Collected:  06/29/18 1352    Lab Status:  Final result Specimen:  Urine from Urine, Catheter In/Out Updated:  07/01/18 0903     Urine Culture No growth at 2 days          Imaging Results (all)     Procedure Component Value Units Date/Time    XR Chest 1 View [[id number]] Collected:  06/30/18 1029     Updated:  06/30/18 1816    Narrative:       EXAMINATION: XR CHEST 1 VW - 6/30/2018     INDICATION: I50.23-Acute on chronic systolic (congestive) heart failure;  J96.21-Acute and chronic respiratory failure with hypoxia; N17.9-Acute  kidney failure, unspecified; N18.9-Chronic kidney disease, unspecified;  J44.1-Chronic  obstructive pulmonary disease with (acute) exacerbation.      COMPARISON: Chest x-ray 6/29/2018.     FINDINGS: Cardiac silhouette enlarged with increased central bony  vascularity. Increasing bilateral diffuse pulmonary opacifications with  a more confluent appearance than on prior examination and concerning for  worsening airspace disease. No pneumothorax or large pleural effusion.  Degenerative changes of the spine.           Impression:       Cardiomegaly with increased central pulmonary vascularity  and widening of the vascular pedicle consistent with   edema pattern and with bilateral confluent pulmonary opacifications  which have increased since prior comparison, consistent with worsening  airspace disease. No large effusion.     DICTATED:   6/30/2018  EDITED/ls :   6/30/2018      This report was finalized on 6/30/2018 6:14 PM by Dr. Kenny Hong.       US Renal Limited [[id number]] Collected:  06/29/18 2025     Updated:  06/29/18 2029    Narrative:       EXAMINATION: US RENAL LIMITED-     INDICATION: benigno on ckd; I50.23-Acute on chronic systolic (congestive)  heart failure; J96.21-Acute and chronic respiratory failure with  hypoxia; N17.9-Acute kidney failure, unspecified; N18.9-Chronic kidney  disease, unspecified; J44.1-Chronic obstructive pulmonary disease with  (acute) exacerbation      TECHNIQUE: Ultrasound kidneys and urinary bladder     COMPARISON: NONE     FINDINGS:      Right kidney measures 12.6 cm in length without evidence of  hydronephrosis or obvious calculi demonstrating single subcentimeter  anechoic focus without internal complex features consistent of simple  renal cortical cyst. Appropriate flow on Doppler to the renal  parenchyma.  Left kidney measures 12.7 cm in length without evidence of  hydronephrosis or obvious calculi. Anechoic focus without significant  internal complex features measuring 3.4 cm within the superior pole  consistent with renal cortical cyst. Appropriate flow on Doppler  to the  renal parenchyma.     Urinary bladder is partially distended and grossly unremarkable.          Impression:       No acute sonographic abnormality within the visualized  kidneys. Bilateral simple appearing renal cortical cyst. No  hydronephrosis.         This report was finalized on 6/29/2018 8:27 PM by Dr. Kenny Hong.       XR Chest 1 View [[id number]] Collected:  06/29/18 1353     Updated:  06/29/18 1502    Narrative:       EXAMINATION: XR CHEST 1 VW-06/29/2018:      INDICATION: SOA, triage protocol.      COMPARISON: 06/15/2018.     FINDINGS: The cardiac silhouette is normal. There is a diffuse mixed  pulmonary pattern which was also present on the previous examination.  There has been little change since that time.           Impression:       Chronic, stable bilateral pulmonary airspace disease when  compared with 06/15/2018.     D:  06/29/2018  E:  06/29/2018     This report was finalized on 6/29/2018 3:00 PM by Dr. Yo Torres MD.             Results for orders placed during the hospital encounter of 06/12/17   Duplex Venous Lower Extremity - Right CAR    Narrative · Normal right lower extremity venous duplex scan.  · Incidental unmeasured right groin inguinal adenpoathy and probable right   calf hematoma.          Results for orders placed during the hospital encounter of 06/12/17   Duplex Venous Lower Extremity - Right CAR    Narrative · Normal right lower extremity venous duplex scan.  · Incidental unmeasured right groin inguinal adenpoathy and probable right   calf hematoma.          Results for orders placed during the hospital encounter of 06/15/18   Adult Transthoracic Echo Complete W/ Cont if Necessary Per Protocol    Narrative · Left ventricular systolic function is severely decreased. Estimated EF =   20%.  · Moderate mitral valve regurgitation is present           Order Current Status    Blood Culture - Blood, Preliminary result    Blood Culture - Blood, Preliminary result        Discharge  Details        Discharge Medications      New Medications      Instructions Start Date   acetaminophen 325 MG tablet  Commonly known as:  TYLENOL   650 mg, Oral, Every 6 Hours PRN      amoxicillin-clavulanate 875-125 MG per tablet  Commonly known as:  AUGMENTIN   1 tablet, Oral, Every 12 Hours Scheduled      castor oil-balsam peru ointment   1 each, Topical, Every 12 Hours Scheduled      morphine 20 MG/5ML solution   2.5 mg, Oral, Every 2 Hours PRN         Continue These Medications      Instructions Start Date   albuterol (5 MG/ML) 0.5% nebulizer solution  Commonly known as:  PROVENTIL   2.5 mg, Nebulization, Every 4 Hours PRN      budesonide-formoterol 160-4.5 MCG/ACT inhaler  Commonly known as:  SYMBICORT   2 puffs, Inhalation, 2 Times Daily - RT      buPROPion 75 MG tablet  Commonly known as:  WELLBUTRIN   75 mg, Oral, 3 Times Daily      carvedilol 3.125 MG tablet  Commonly known as:  COREG   3.125 mg, Oral, 2 Times Daily With Meals      rivastigmine 13.3 MG/24HR patch  Commonly known as:  EXELON   1 patch, Transdermal, Daily         Stop These Medications    aspirin 81 MG chewable tablet     atorvastatin 10 MG tablet  Commonly known as:  LIPITOR     entecavir 0.5 MG tablet  Commonly known as:  BARACLUDE     furosemide 40 MG tablet  Commonly known as:  LASIX     memantine 10 MG tablet  Commonly known as:  NAMENDA     nitroglycerin 0.4 MG SL tablet  Commonly known as:  NITROSTAT     potassium chloride 10 MEQ CR tablet  Commonly known as:  K-DUR     sacubitril-valsartan 49-51 MG tablet  Commonly known as:  ENTRESTO     VITAMIN C PO     Vitamin D3 2000 units tablet              Discharge Disposition:  Home or Self Care    Discharge Diet:  Diet Instructions     Advance Diet As Tolerated             Discharge Activity:           Special Instructions:      Code Status/Level of Support:  Code Status and Medical Interventions:   Ordered at: 06/29/18 6070     Limited Support to NOT Include:    Dialysis    Intubation     Artificial Nutrition     Level Of Support Discussed With:    Health Care Surrogate     Code Status:    No CPR     Medical Interventions (Level of Support Prior to Arrest):    Limited       Future Appointments  Date Time Provider Department Center   8/6/2018 10:00 AM Kvng Stauffer MD [facility] None   8/17/2018 11:00 AM Kyara Weems PA-C [facility] None   9/13/2018 9:00 AM Bo Duvall DO [facility] None   11/21/2018 9:45 AM REI Boss [facility]       Additional Instructions for the Follow-ups that You Need to Schedule     Discharge Follow-up with PCP    As directed      Currently Documented PCP:  Colton Dickens MD  PCP Phone Number:  [phone]    Follow Up Details:  prn               Time Spent on Discharge:  34 minutes    Electronically signed by REI Manzanares, 07/04/18, 11:45 AM.

## 2018-07-04 NOTE — PLAN OF CARE
Problem: Fall Risk (Adult)  Goal: Identify Related Risk Factors and Signs and Symptoms  Outcome: Outcome(s) achieved Date Met: 07/04/18    Goal: Absence of Fall  Outcome: Ongoing (interventions implemented as appropriate)   07/04/18 1601   Fall Risk (Adult)   Absence of Fall making progress toward outcome       Problem: Skin Injury Risk (Adult)  Goal: Identify Related Risk Factors and Signs and Symptoms  Outcome: Outcome(s) achieved Date Met: 07/04/18    Goal: Skin Health and Integrity  Outcome: Ongoing (interventions implemented as appropriate)   07/04/18 1601   Skin Injury Risk (Adult)   Skin Health and Integrity unable to achieve outcome

## 2018-07-04 NOTE — PLAN OF CARE
Problem: Patient Care Overview  Goal: Interprofessional Rounds/Family Conf  Outcome: Ongoing (interventions implemented as appropriate)  Dr. Mark Casas and    07/04/18 1445   Interdisciplinary Rounds/Family Conf   Summary Patient is on BiPaP, he didn't open his eyes or respond to verbal to touch. Wife states he didn't respond to her today. She is crying states she has decided to admit him into in-patient hospice and not take him home. She states she had him over 50 years and that he will healed in [facility].  Kasia Pimentel, Hospice RN and Dr. Mark Casas notified of request for in-patient hospice for end of life care, request for secretion contorl, restlessness and pain medication adjustment. Dr. Casas will call and give orders to nurse. Pallitive will continue to follow for symptom management and family support until admitted to in-patient.   Participants nursing   DOMINIC Avalos

## 2018-07-04 NOTE — THERAPY DISCHARGE NOTE
Acute Care - Physical Therapy Discharge Summary  [facility]       Patient Name: Magdi Arriaga  : 1945  MRN: [id number]    Today's Date: 2018  Onset of Illness/Injury or Date of Surgery: 18    Date of Referral to PT: 18  Referring Physician: REI Bedoya      Admit Date: 2018      PT Recommendation and Plan    Visit Dx:    ICD-10-CM ICD-9-CM   1. Acute on chronic systolic heart failure (CMS/[facility]) I50.23 428.23   2. Acute on chronic respiratory failure with hypoxia (CMS/[facility]) J96.21 518.84     799.02   3. Acute renal failure superimposed on chronic kidney disease, unspecified CKD stage, unspecified acute renal failure type (CMS/HCC) N17.9 584.9    N18.9 585.9   4. COPD exacerbation (CMS/HCC) J44.1 491.21   5. Impaired mobility and ADLs Z74.09 799.89   6. Impaired functional mobility, balance, gait, and endurance Z74.09 V49.89   7. Aspiration pneumonia of right middle lobe, unspecified aspiration pneumonia type (CMS/[facility]) J69.0 507.0   8. Hypoxia R09.02 799.02             Outcome Measures     Row Name 18 1335             How much help from another person do you currently need...    Turning from your back to your side while in flat bed without using bedrails? 1  -LM      Moving from lying on back to sitting on the side of a flat bed without bedrails? 2  -LM      Moving to and from a bed to a chair (including a wheelchair)? 1  -LM      Standing up from a chair using your arms (e.g., wheelchair, bedside chair)? 1  -LM      Climbing 3-5 steps with a railing? 1  -LM      To walk in hospital room? 1  -LM      AM-PAC 6 Clicks Score 7  -LM         Functional Assessment    Outcome Measure Options AM-PAC 6 Clicks Basic Mobility (PT)  -LM        User Key  (r) = Recorded By, (t) = Taken By, (c) = Cosigned By    Initials Name Provider Type    LM Anna Lee PT Physical Therapist            Therapy Suggested Charges     Code   Minutes Charges    02229 (CPT®)  Pt Neuromusc Re Education Ea  15 Min      44719 (CPT®) Hc Pt Ther Proc Ea 15 Min 8     29261 (CPT®) Hc Gait Training Ea 15 Min      76441 (CPT®) Hc Pt Therapeutic Act Ea 15 Min 11 1    90187 (CPT®) Hc Pt Manual Therapy Ea 15 Min      55163 (CPT®) Hc Pt Iontophoresis Ea 15 Min      39937 (CPT®) Hc Pt Elec Stim Ea-Per 15 Min      47978 (CPT®) Hc Pt Ultrasound Ea 15 Min      30023 (CPT®) Hc Pt Self Care/Mgmt/Train Ea 15 Min      Total  19 1                PT Rehab Goals     Row Name 07/04/18 1151             Bed Mobility Goal 1 (PT)    Activity/Assistive Device (Bed Mobility Goal 1, PT) sit to supine;supine to sit  -MJ      [place] Level/Cues Needed (Bed Mobility Goal 1, PT) moderate assist (50-74% patient effort)  -MJ      Time Frame (Bed Mobility Goal 1, PT) 2 weeks  -MJ      Progress/Outcomes (Bed Mobility Goal 1, PT) goal not met;discharged from facility;medical status inhibited participation  -MJ         Transfer Goal 1 (PT)    Activity/Assistive Device (Transfer Goal 1, PT) sit-to-stand/stand-to-sit;walker, rolling  -MJ      [place] Level/Cues Needed (Transfer Goal 1, PT) maximum assist (25-49% patient effort)  -MJ      Time Frame (Transfer Goal 1, PT) 2 weeks  -MJ      Progress/Outcome (Transfer Goal 1, PT) goal not met;discharged from facility;medical status inhibited participation  -MJ        User Key  (r) = Recorded By, (t) = Taken By, (c) = Cosigned By    Initials Name Provider Type Discipline    GIOVANNI Hernandez, PT Physical Therapist PT              PT Discharge Summary  Reason for Discharge: Discharge from facility  Outcomes Achieved: Unable to make functional progress toward goals at this time  Discharge Destination: Home, other (comment) (Pt is discharging home with hospice. Wish is for comfort meausures only, so will discharge from skilled PT at this time)      Manav Hernandez, PT   7/4/2018

## 2018-07-05 NOTE — SIGNIFICANT NOTE
Exam confirms with auscultation zero audible heart tones and zero audible respirations. Mr.Kenneth SAYRA Arriaga was pronounced dead at 2010.  MD notified by Patient's RN.    Antonio Garcia RN  Clinical House Supervisor  7/4/2018 8:16 PM

## 2018-07-05 NOTE — NURSING NOTE
No heart tones and no respirations noted upon RN assessment. Patient is no CPR. Family is at bedside and denies additional needs at this time. Jose RN

## 2018-07-09 ENCOUNTER — EPISODE CHANGES (OUTPATIENT)
Dept: CASE MANAGEMENT | Facility: OTHER | Age: 73
End: 2018-07-09

## 2019-09-26 NOTE — PROGRESS NOTES
Attended Phase II Cardiac Rehab. No medication or health history changes reported. See David for details.  
Ambulatory

## 2021-01-10 NOTE — THERAPY TREATMENT NOTE
Acute Care - Physical Therapy Treatment Note  [facility]     Patient Name: Magdi Arriaga  : 1945  MRN: [id number]  Today's Date: 8/10/2017  Onset of Illness/Injury or Date of Surgery Date: 17  Date of Referral to PT: 17  Referring Physician: MORALES Matthews    Admit Date: 2017    Visit Dx:    ICD-10-CM ICD-9-CM   1. SOB (shortness of breath) R06.02 786.05   2. Renal insufficiency N28.9 593.9   3. Congestive heart failure, unspecified congestive heart failure chronicity, unspecified congestive heart failure type I50.9 428.0   4. Hypoxemia R09.02 799.02   5. Cardiomyopathy I42.9 425.4   6. Impaired functional mobility, balance, gait, and endurance Z74.09 V49.89   7. Impaired mobility and ADLs Z74.09 799.89   8. Coronary artery disease involving coronary bypass graft of native heart without angina pectoris I25.810 414.05   9. Ischemic cardiomyopathy, most recent LVEF 40% I25.5 414.8   10. Ischemic cardiomyopathy I25.5 414.8     Patient Active Problem List   Diagnosis   • Abnormal CT scan, chest   • Allergic rhinitis   • Asthma   • Bronchiectasis   • Dementia   • Depression   • Diastolic dysfunction   • Dyslipidemia   • Hypertension   • Obesity   • JONNY (obstructive sleep apnea)   • Osteoarthritis   • Vitamin D deficiency   • Elevated troponin   • Left bundle branch block   • Normocytic anemia   • COPD (chronic obstructive pulmonary disease)   • Coronary artery disease involving coronary bypass graft of native heart without angina pectoris   • Ischemic cardiomyopathy, most recent LVEF 40%   • Atrial flutter   • Acute deep vein thrombosis (DVT) of right lower extremity   • Hematoma, calf   • History of exposure to infectious disease-Patient received platelet transfusion for CABG, 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   • Chronic congestive heart failure   • CKD (chronic kidney disease), stage III   • Cellulitis of left lower leg   • Elevated LFTs   • Hypotension   • Iron  deficiency   • Wound of left leg   • Acute on chronic respiratory failure with hypoxia   • Acute on chronic systolic heart failure   • Normocytic anemia   • Weakness   • Bronchitis   • Acute type B viral hepatitis related to platelet transfusion, followed by [facility]               Adult Rehabilitation Note       08/10/17 1356 08/10/17 1138 08/09/17 1043    Rehab Assessment/Intervention    Discipline occupational therapist  -TA physical therapist  -DR physical therapy assistant  -AS    Document Type therapy note (daily note)  -TA therapy note (daily note)  -DR therapy note (daily note)  -AS    Subjective Information agree to therapy;no complaints  -TA agree to therapy;no complaints  -DR agree to therapy;complains of;weakness  -AS    Patient Effort, Rehab Treatment excellent  -TA good  -DR good  -AS    Symptoms Noted During/After Treatment none  -TA fatigue  -DR fatigue  -AS    Precautions/Limitations fall precautions  -TA fall precautions;oxygen therapy device and L/min  -DR fall precautions;oxygen therapy device and L/min  -AS    Recorded by [TA] Yo Murguia, OT [DR] Grzegorz Pacheco, PT [AS] Estela Nava PTA    Vital Signs    Pre Systolic BP Rehab  133  -DR     Pre Treatment Diastolic BP  79  -DR     Pre SpO2 (%) 90  -TA --   pulse ox disconnected upon entry  -DR     O2 Delivery Pre Treatment room air  -TA supplemental O2   4L  -DR     Intra SpO2 (%) 90   During ambulation in hallway w/ VC for adapt breathing  -TA      O2 Delivery Intra Treatment room air  -TA      Post SpO2 (%) 93  -TA      O2 Delivery Post Treatment room air  -TA supplemental O2   4L  -DR     Pre Patient Position Sitting  -TA Sitting  -DR     Intra Patient Position Standing  -TA Standing  -DR     Post Patient Position Sitting  -TA Sitting  -DR     Recorded by [TA] Yo Murguia, OT [DR] Grzegorz Pacheco, PT     Pain Assessment    Pain Assessment No/denies pain  -TA 0-10  -DR No/denies pain  -AS    Pain Score  0  -DR     Post Pain  Score  0  -DR     Recorded by [TA] Yo Murguia OT [DR] Grzegorz Pacheco, PT [AS] Estela Nava, BG    Cognitive Assessment/Intervention    Current Cognitive/Communication Assessment functional  -TA functional  -DR functional  -AS    Orientation Status oriented to;person;place;situation  -TA oriented to;person  -DR oriented to;person;place  -AS    Follows Commands/Answers Questions 100% of the time;able to follow single-step instructions;needs cueing  -TA able to follow single-step instructions;100% of the time  -% of the time  -AS    Personal Safety WNL/WFL  -TA WNL/WFL  -DR WNL/WFL  -AS    Personal Safety Interventions fall prevention program maintained;gait belt;muscle strengthening facilitated;nonskid shoes/slippers when out of bed;supervised activity  -TA fall prevention program maintained;gait belt;nonskid shoes/slippers when out of bed  -DR fall prevention program maintained;gait belt;nonskid shoes/slippers when out of bed;other (see comments)   exit alarm  -AS    Recorded by [TA] Yo Murguia OT [DR] Grzegorz Pacheco, PT [AS] Estela Nava, BG    Bed Mobility, Assessment/Treatment    Bed Mob, Supine to Sit, Jack   verbal cues required;contact guard assist  -AS    Bed Mobility, Comment Pt UIC  -TA UIC  -DR     Recorded by [TA] Yo Murguia OT [DR] Grzegorz Pacheco, PT [AS] Estela Nava, BG    Transfer Assessment/Treatment    Transfers, Sit-Stand Jack stand by assist;verbal cues required  -TA supervision required;verbal cues required  -DR contact guard assist;verbal cues required  -AS    Transfers, Stand-Sit [place] contact guard assist;verbal cues required  -TA supervision required;verbal cues required  -DR verbal cues required;minimum assist (75% patient effort)  -AS    Transfers, Sit-Stand-Sit, Assist Device rolling walker  -TA rolling walker  -DR rolling walker  -AS    Transfer, Safety Issues weight-shifting ability decreased  -TA weight-shifting  ability decreased;step length decreased  -DR     Transfer, Impairments strength decreased;impaired balance  -TA strength decreased;impaired balance  -DR     Transfer, Comment 1 VC for safe hand placement prior to sitting in chair  -TA VC for hand placement  -DR assist needed when turning to chair due to LOB  -AS    Recorded by [TA] Yo Murguia OT [DR] Grzegorz Pacheco, PT [AS] Estela Nava, PTA    Gait Assessment/Treatment    Gait, [place] Level  contact guard assist;verbal cues required  -DR verbal cues required;contact guard assist  -AS    Gait, Assistive Device  rolling walker  -DR rolling walker  -AS    Gait, Distance (Feet)  250  -  -AS    Gait, Gait Pattern Analysis  swing-through gait  -DR     Gait, Gait Deviations  bilateral:;kasi decreased;step length decreased  -DR kasi decreased;step length decreased;forward flexed posture  -AS    Gait, Safety Issues  step length decreased;sequencing ability decreased  -DR step length decreased;supplemental O2  -AS    Gait, Impairments  strength decreased;impaired balance  -DR strength decreased;impaired balance  -AS    Gait, Comment  VC to increase step length. No LOB today; unable to progress distance due to fatigue.  -DR verbal cues to stay inside walker and to keep walker with when turning towards chair, LOB posteriorly needing increased assist to correct.  -AS    Recorded by  [] Grzegorz Pacheco, PT [AS] Estela Nava, BG    Functional Mobility    Functional Mobility- Ind. Level contact guard assist;verbal cues required  -TA      Functional Mobility- Device rolling walker  -TA      Functional Mobility-Distance (Feet) 125  -TA      Functional Mobility- Safety Issues weight-shifting ability decreased  -TA      Functional Mobility- Comment Pt ambulated on RA; VCs required for adaptive breathing; O2 sats 90% or greater throughout  -TA      Recorded by [TA] Yo Murguia OT      Lower Body Dressing Assessment/Training    LB  Dressing Assess/Train, Clothing Type donning:;pants;slipper socks  -TA      LB Dressing Assess/Train, Position sitting  -TA      LB Dressing Assess/Train, [place] supervision required;verbal cues required  -TA      LB Dressing Assess/Train, Impairments strength decreased  -TA      LB Dressing Assess/Train, Comment VCs for EC, pacing, adaptive breathing with LBD.  -TA      Recorded by [TA] Yo Murguia OT      Toileting Assessment/Training    Toileting Assess/Train, Comment Pt denied need to void.  -TA      Recorded by [TA] Yo Murguia OT      Motor Skills/Interventions    Additional Documentation Balance Skills Training (Group)  -TA Balance Skills Training (Group)  -DR     Recorded by [TA] Yo Murguia OT [DR] Grzegorz Pacheco, PT     Balance Skills Training    Sitting-Level of Assistance Close supervision  -TA Close supervision  -DR     Sitting-Balance Support Feet supported;Feet unsupported  -TA Feet supported  -     Sitting-Balance Activities Lateral lean;Forward lean;Reaching for objects;Reaching across midline;Trunk control activities  -TA Forward lean;Trunk control activities  -DR     Sitting # of Minutes 8  -TA 3  -DR     Standing-Level of Assistance Close supervision  -TA Close supervision  -DR     Static Standing Balance Support assistive device  -TA assistive device  -DR     Standing-Balance Activities Weight Shift A-P;Weight Shift R-L;Reaching for objects  -TA Weight Shift A-P;Weight Shift R-L  -DR     Standing Balance # of Minutes 3  -TA      Recorded by [TA] Yo Murguia OT [DR] Grzegorz Pacheco, PT     Therapy Exercises    Bilateral Lower Extremities  AROM:;20 reps;sitting;ankle pumps/circles;LAQ;hip flexion  -DR AROM:;10 reps;sitting;ankle pumps/circles;hip flexion;LAQ  -AS    Bilateral Upper Extremity AROM:;10 reps;sitting;elbow flexion/extension;hand pumps;shoulder extension/flexion;shoulder horizontal abd/add  -TA      Recorded by [TA] Yo Murguia OT []  Grzegorz Pacheco, PT [AS] Estela Nava PTA    Positioning and Restraints    Pre-Treatment Position sitting in chair/recliner  -DUANE sitting in chair/recliner  -DR in bed  -AS    Post Treatment Position chair  -TA chair  -DR chair  -AS    In Chair notified nsg;sitting;call light within reach;encouraged to call for assist;exit alarm on  -TA sitting;encouraged to call for assist;with family/caregiver  - reclined;call light within reach;encouraged to call for assist;exit alarm on;with family/caregiver;waffle cushion  -AS    Recorded by [TA] Yo Murguia, OT [DR] Grzegorz Pacheco, PT [AS] Estela Nava PTA      08/08/17 1046          Rehab Assessment/Intervention    Discipline physical therapy assistant  -AS      Document Type therapy note (daily note)  -AS      Subjective Information agree to therapy;complains of;weakness  -AS      Patient Effort, Rehab Treatment good  -AS      Symptoms Noted During/After Treatment none  -AS      Precautions/Limitations cardiac precautions;fall precautions;oxygen therapy device and L/min;other (see comments)   life vest till 9/17  -AS      Recorded by [AS] Estela Nava PTA      Pain Assessment    Pain Assessment No/denies pain  -AS      Recorded by [AS] Estela Nava PTA      Cognitive Assessment/Intervention    Current Cognitive/Communication Assessment functional  -AS      Orientation Status oriented x 4  -AS      Follows Commands/Answers Questions 100% of the time;able to follow single-step instructions  -AS      Personal Safety WNL/WFL  -AS      Personal Safety Interventions fall prevention program maintained;gait belt;nonskid shoes/slippers when out of bed  -AS      Recorded by [AS] Estela Nava PTA      Bed Mobility, Assessment/Treatment    Bed Mobility, Comment patient up in chair  -AS      Recorded by [AS] Estela Nava PTA      Transfer Assessment/Treatment    Transfers, Sit-Stand [place] verbal cues required;contact guard assist;1  person + 1 person to manage equipment  -AS      Transfers, Stand-Sit [place] verbal cues required;contact guard assist;1 person + 1 person to manage equipment  -AS      Transfers, Sit-Stand-Sit, Assist Device rolling walker  -AS      Transfer, Comment verbal cues for hand placement  -AS      Recorded by [AS] Estela Nava PTA      Gait Assessment/Treatment    Gait, [place] Level verbal cues required;minimum assist (75% patient effort);1 person + 1 person to manage equipment  -AS      Gait, Assistive Device rolling walker  -AS      Gait, Distance (Feet) 250  -AS      Gait, Gait Deviations kasi decreased;forward flexed posture;step length decreased  -AS      Gait, Safety Issues step length decreased;supplemental O2  -AS      Gait, Impairments strength decreased;impaired balance  -AS      Gait, Comment verbal cues to stay inside walker and for posture, good control of walker  -AS      Recorded by [AS] Estela Nava PTA      Therapy Exercises    Bilateral Lower Extremities AROM:;10 reps;sitting;ankle pumps/circles;hip flexion;LAQ  -AS      Recorded by [AS] Estela Nava PTA      Positioning and Restraints    Pre-Treatment Position sitting in chair/recliner  -AS      Post Treatment Position chair  -AS      In Chair reclined;call light within reach;encouraged to call for assist;exit alarm on;with family/caregiver  -AS      Recorded by [AS] Estela Nava PTA        User Key  (r) = Recorded By, (t) = Taken By, (c) = Cosigned By    Initials Name Effective Dates    AS Estela Nava PTA 06/22/15 -     DUANE Murguia, OT 03/14/16 -     DR Grzegorz Pacheco, PT 09/06/16 -                 IP PT Goals       08/10/17 1553 08/09/17 1111 08/08/17 1121    Bed Mobility PT STG    Bed Mobility PT STG, Date Goal Reviewed  08/09/17  -AS 08/08/17  -AS    Bed Mobility PT STG, Outcome goal ongoing  -DR goal ongoing  -AS goal ongoing  -AS    Transfer Training PT STG    Transfer Training PT STG,  Date Goal Reviewed  08/09/17  -AS 08/08/17  -AS    Transfer Training PT STG, Outcome goal ongoing  -DR goal ongoing  -AS goal ongoing  -AS    Gait Training PT STG    Gait Training Goal PT STG, Date Goal Reviewed  08/09/17  -AS 08/08/17  -AS    Gait Training Goal PT STG, Outcome goal ongoing  -DR goal ongoing  -AS goal ongoing  -AS    Stair Training PT STG    Stair Training Goal PT STG, Date Goal Reviewed  08/09/17  -AS 08/08/17  -AS    Stair Training Goal PT STG, Outcome goal ongoing  -DR goal ongoing  -AS goal ongoing  -AS      08/07/17 1041          Bed Mobility PT STG    Bed Mobility PT STG, Date Established 08/07/17  -DM      Bed Mobility PT STG, Time to Achieve 3 days  -DM      Bed Mobility PT STG, Activity Type all bed mobility  -DM      Bed Mobility PT STG, [place] Level independent  -DM      Transfer Training PT STG    Transfer Training PT STG, Date Established 08/07/17  -DM      Transfer Training PT STG, Time to Achieve 3 days  -DM      Transfer Training PT STG, Activity Type all transfers  -DM      Transfer Training PT STG, [place] Level independent  -DM      Transfer Training PT STG, Assist Device cane, straight  -DM      Gait Training PT STG    Gait Training Goal PT STG, Date Established 08/07/17  -DM      Gait Training Goal PT STG, Time to Achieve 3 days  -DM      Gait Training Goal PT STG, [place] Level supervision required   STABLE VS  -DM      Gait Training Goal PT STG, Assist Device walker, rolling  -DM      Gait Training Goal PT STG, Distance to Achieve 350  -DM      Stair Training PT STG    Stair Training Goal PT STG, Date Established 08/07/17  -DM      Stair Training Goal PT STG, Time to Achieve 3 days  -DM      Stair Training Goal PT STG, Number of Steps 3  -DM      Stair Training Goal PT STG, [place] Level independent  -DM      Stair Training Goal PT STG, Assist Device cane, straight  -DM        User Key  (r) = Recorded By, (t) = Taken By, (c) = Cosigned By    Initials Name  Labor at Term Provider Type    DM Loretta Canela, PT Physical Therapist    AS Estela Nava, PTA Physical Therapy Assistant    DR Grzegorz Pacheco, PT Physical Therapist          Physical Therapy Education     Title: PT OT SLP Therapies (Active)     Topic: Physical Therapy (Active)     Point: Mobility training (Active)    Learning Progress Summary    Learner Readiness Method Response Comment Documented by Status   Patient Acceptance E NR  DR 08/10/17 1553 Active    Acceptance E NR  AS 08/09/17 1111 Active    Acceptance E NR  AS 08/08/17 1121 Active    Eager D,E NR  DM 08/07/17 1041 Active   Significant Other Eager D,E NR  DM 08/07/17 1041 Active               Point: Home exercise program (Active)    Learning Progress Summary    Learner Readiness Method Response Comment Documented by Status   Patient Acceptance E NR  AS 08/09/17 1111 Active    Acceptance E NR  AS 08/08/17 1121 Active    Eager D,E NR  DM 08/07/17 1041 Active   Significant Other Eager D,E NR  DM 08/07/17 1041 Active               Point: Body mechanics (Active)    Learning Progress Summary    Learner Readiness Method Response Comment Documented by Status   Patient Acceptance E NR  DR 08/10/17 1553 Active    Acceptance E NR  AS 08/09/17 1111 Active    Acceptance E NR  AS 08/08/17 1121 Active    Eager D,E NR  DM 08/07/17 1041 Active   Significant Other Eager D,E NR  DM 08/07/17 1041 Active               Point: Precautions (Active)    Learning Progress Summary    Learner Readiness Method Response Comment Documented by Status   Patient Acceptance E NR  DR 08/10/17 1553 Active    Acceptance E NR  AS 08/09/17 1111 Active    Acceptance E NR  AS 08/08/17 1121 Active    Eager D,E NR  DM 08/07/17 1041 Active   Significant Other Eager D,E NR  DM 08/07/17 1041 Active                      User Key     Initials Effective Dates Name Provider Type Discipline    DM 06/19/15 -  Loretta Canela, PT Physical Therapist PT    AS 06/22/15 -  Estela Nava, BG Physical Therapy  Assistant PT     09/06/16 -  Grzegorz Pacheco, PT Physical Therapist PT                    PT Recommendation and Plan  Anticipated Discharge Disposition: home with assist  PT Frequency: daily  Plan of Care Review  Plan Of Care Reviewed With: patient, spouse  Progress: progress toward functional goals as expected  Outcome Summary/Follow up Plan: Pt maintained forward ambulation distance at 250ft with RWx CGA, but was unable to increase distance today due to fatigue. He required verbal cues to increase step length.          Outcome Measures       08/10/17 1356 08/10/17 1138 08/09/17 1043    How much help from another person do you currently need...    Turning from your back to your side while in flat bed without using bedrails?  3  -DR 3  -AS    Moving from lying on back to sitting on the side of a flat bed without bedrails?  3  -DR 3  -AS    Moving to and from a bed to a chair (including a wheelchair)?  3  -DR 3  -AS    Standing up from a chair using your arms (e.g., wheelchair, bedside chair)?  3  -DR 3  -AS    Climbing 3-5 steps with a railing?  3  -DR 3  -AS    To walk in hospital room?  3  -DR 3  -AS    AM-PAC 6 Clicks Score  18  -DR 18  -AS    How much help from another is currently needed...    Putting on and taking off regular lower body clothing? 3  -TA      Bathing (including washing, rinsing, and drying) 3  -TA      Toileting (which includes using toilet bed pan or urinal) 3  -TA      Putting on and taking off regular upper body clothing 3  -TA      Taking care of personal grooming (such as brushing teeth) 3  -TA      Eating meals 4  -TA      Score 19  -TA      Functional Assessment    Outcome Measure Options AM-PAC 6 Clicks Daily Activity (OT)  -TA AM-PAC 6 Clicks Basic Mobility (PT)  -DR AM-PAC 6 Clicks Basic Mobility (PT)  -AS      08/08/17 1046          How much help from another person do you currently need...    Turning from your back to your side while in flat bed without using bedrails? 4  -AS       Moving from lying on back to sitting on the side of a flat bed without bedrails? 3  -AS      Moving to and from a bed to a chair (including a wheelchair)? 3  -AS      Standing up from a chair using your arms (e.g., wheelchair, bedside chair)? 3  -AS      Climbing 3-5 steps with a railing? 3  -AS      To walk in hospital room? 3  -AS      AM-PAC 6 Clicks Score 19  -AS      Functional Assessment    Outcome Measure Options AM-PAC 6 Clicks Basic Mobility (PT)  -AS        User Key  (r) = Recorded By, (t) = Taken By, (c) = Cosigned By    Initials Name Provider Type    AS Estela Nava, PTA Physical Therapy Assistant    DUANE Murguia, OT Occupational Therapist    DR Grzegorz Pacheco, PT Physical Therapist           Time Calculation:         PT Charges       08/10/17 1554          Time Calculation    Start Time 1138  -DR      PT Received On 08/10/17  -      PT Goal Re-Cert Due Date 08/17/17  -      Time Calculation- PT    Total Timed Code Minutes- PT 15 minute(s)  -        User Key  (r) = Recorded By, (t) = Taken By, (c) = Cosigned By    Initials Name Provider Type    DR Grzegorz Pacheco, PT Physical Therapist          Therapy Charges for Today     Code Description Service Date Service Provider Modifiers Qty    [id number] HC GAIT TRAINING EA 15 MIN 8/10/2017 Grzegorz Pacheco, PT GP 1          PT G-Codes  Outcome Measure Options: AM-PAC 6 Clicks Daily Activity (OT)    Grzegorz Pacheco, PT  8/10/2017

## 2022-09-22 NOTE — PLAN OF CARE
PHYSICAL MEDICINE AND REHABILITATION H&P/ADMISSION NOTE  Jade Vega 68 y o  female MRN: [id number]  Unit/Bed#: [facility] 219-01 Encounter: [id number]     Rehab Diagnosis: Pro[facility] [address] [facility] Diagnoses:  Impairment of mobility, safety and Activities of Daily Living (ADLs) due to Orthopedic Disorders:  08 4  Major Multiple Fractures  Etiologic: Closed transcervical fracture of right femur and Distal radial comminuted intra-articular fracture with ulnar styloid fracture  Date of Onset: 7/1   Date of surgery: 7/2/22    History of Present Illness:    68year old female with a PMH of hypertension, cervical radiculopathy, GERD, depression who presented with right hip pain  Patient reported she was walking her dog when she tripped, fell on right side on concrete porch  Xray revealed Nondisplaced transcervical fracture of the right femur and Distal radial comminuted intra-articular fracture with ulnar styloid fracture  Ortho consulted  Pt is s/p right total hip hemiarthroplasty and s/p closed reduction with splinting right wrist (Right) on 7/2  Pt is weight-bearing as tolerated right lower extremity with hip precautions   Patient should remain nonweightbearing of right wrist   May use platform for walker per Ortho  PT and OT have been consulted and are recommending post-acute rehab services  Patient's case has been reviewed with [facility] medical director, patient meets medical criteria for acute rehab and has demonstrated the ability to tolerate three or more hours of therapy per day    Subjective:  No complaints at this time    Review of Systems: A 10-point review of systems was performed  Negative except as listed above      Plan:    Orthopedic Disorders:  08 4  Major Multiple Fractures  Etiologic: Closed transcervical fracture of right femur and Distal radial comminuted intra-articular fracture with ulnar styloid fracture  Date of Onset: 7/1   Date of surgery: 7/2/22  Recommend acute comprehensive Problem: Patient Care Overview (Adult)  Goal: Plan of Care Review  Outcome: Ongoing (interventions implemented as appropriate)    11/12/17 0501   Coping/Psychosocial Response Interventions   Plan Of Care Reviewed With patient   Patient Care Overview   Progress progress toward functional goals as expected   Outcome Evaluation   Outcome Summary/Follow up Plan Patient rested comfortably throughout night. Patient did not report any pain or request any pain medication during night. Vital signs remain stable. Will continue to monitor.        Goal: Adult Individualization and Mutuality  Outcome: Ongoing (interventions implemented as appropriate)  Goal: Discharge Needs Assessment  Outcome: Ongoing (interventions implemented as appropriate)    Problem: Fall Risk (Adult)  Goal: Absence of Falls  Outcome: Ongoing (interventions implemented as appropriate)    Problem: Skin Integrity Impairment, Risk/Actual (Adult)  Goal: Skin Integrity/Wound Healing  Outcome: Ongoing (interventions implemented as appropriate)       Oriented - self; Oriented - place; Oriented - time/Age appropriate behavior interdisciplinary inpatient rehabilitation to include intensive skilled therapies (PT, OT, ST) as outlined with oversight and management by rehabilitation physician as well as inpatient rehab level nursing, case management and weekly interdisciplinary team meetings  Hypertension consult the hospitalist    GERD continue PPI    Will put on a bowel regimen                    Scheduled Meds:  Current Facility-Administered Medications   Medication Dose Route Frequency Provider Last Rate    acetaminophen  650 mg Oral Q6H PRN Leana Frazier, PA-C      aluminum-magnesium hydroxide-simethicone  30 mL Oral Q6H PRN Leana Frazier, PA-C      [START ON 7/7/2022] amLODIPine  10 mg Oral Daily Leana Frazier, PA-C      ascorbic acid  500 mg Oral BID Leana Frazier, PA-C      docusate sodium  100 mg Oral BID Leana Frazier, PA-C      enoxaparin  40 mg Subcutaneous Daily Leana Frazier, PA-C      [START ON 7/7/2022] ferrous sulfate  325 mg Oral Daily With Breakfast Leana Frazier, PA-C      gabapentin  600 mg Oral HS Leana Frazier, PA-C      [START ON 7/7/2022] loratadine  10 mg Oral Daily Leana Frazier, PA-C      [START ON 7/7/2022] multivitamin-minerals  1 tablet Oral Daily Leana Frazier, PA-C      ondansetron  4 mg Oral Q6H PRN Leana Frazier, PA-C      oxyCODONE  5 mg Oral Q6H PRN Leana Frazier, PA-C      pantoprazole  20 mg Oral BID JADIEL Rojas, PA-C      [START ON 7/7/2022] pravastatin  40 mg Oral Daily Leana Frazier, PA-C      senna  1 tablet Oral HS Leana Frazier, PA-C      temazepam  15 mg Oral HS PRN Leana Frazier, PA-C      [START ON 7/7/2022] venlafaxine  150 mg Oral Daily Leana Frazier, PA-C         Restrictions include: Fall precautions      Functional History - Prior to Admission:     She lives in [facility] single family home  Adia Levin is  and lives with their spouse  Self Care:  Independent, Indoor Mobility: Independent, Stairs (in/outdoor): Independent and Cognition: Independent     Functional Status Upon Admission to [facility]C:  Mobility:   Gait pattern Improper Weight shift;Decreased foot clearance;Decreased R stance; Antalgic; Excessively slow; Short stride; Step to   Gait Assistance    (CG)   Additional items Assist x 1;Verbal cues   Assistive Device R platform;Rolling walker   Distance 25 ftx2   Ambulation/Elevation Additional Comments verbal cues for proper hand placement and gait sequence with AD       Transfers:  Sit to Stand    (CG)   Additional items Assist x 1; Increased time required;Verbal cues;Armrests   Stand to Sit    (CG)   Additional items Assist x 1;Bedrails; Increased time required;Verbal cues  (cued for proper hand placement during transfers and to slide RLE forward prior to sitting)   Stand pivot    (CG)   Additional items Assist x 1;Bedrails; Increased time required;Verbal cues  (RW with platform attachment for RUE)   Toilet transfer    (CG)   Additional items Assist x 1; Increased time required;Verbal cues;Standard toilet   Additional Comments CGAx1 and RW with  RUE platform required for clothing management and hygiene needs  ADLs:     Where Assessed    (patient was already bathed this date)   Equipment Provided    (provided instruction and functional demos  in [place] - patient attentive to and interested in using same to promote Indep )   LB Bathing Comments educated in (with demos ) of compensatory techniques Per UE and THR restrictions   LB Dressing Comments educated in (with demos ) of compensatory techniques Per UE and THR restrictions           Physical Exam:  Temp:  [97 6 °F (36 4 °C)-98 7 °F (37 1 °C)] 97 6 °F (36 4 °C)  HR:  [85-99] 85  Resp:  [18-20] 20  BP: (113-146)/(66-86) 132/66  SpO2:  [90 %-96 %] 96 %    General:   alert, no apparent distress, cooperative and comfortable  HEENT:  Head: Normocephalic, no lesions, without obvious abnormality    Eye: Normal external eye, conjunctiva, lidsc cornea  Ears: Normal external ears  Nose: Normal external nose, mucus membranes  CARDIAC:  regular rate and rhythm, S1, S2 normal, no murmur, click, rub or gallop  LUNGS:  no abnormal respiratory pattern, no retractions noted, non-labored breathing   ABDOMEN:  soft, non-tender, non-distended  EXTREMITIES:  extremities normal, warm and well-perfused; no cyanosis, clubbing, or edema  NEURO:  clear speech, following all commands, oriented There are no focal neurological deficits  PSYCH:  Alert and oriented, appropriate affect  INCISION:  C/D/I      Laboratory:    Results from last 7 days   Lab Units 07/06/22 0451 07/05/22 0454 07/03/22 0449   HEMOGLOBIN g/dL 11 0* 11 2* 11 6   HEMATOCRIT % 34 1* 36 1 37 8   WBC Thousand/uL 9 64 10 98* 14 66*     Results from last 7 days   Lab Units 07/05/22 0454 07/04/22 0448 07/03/22 0449 07/02/22  0435   BUN mg/dL 21 24 26* 20   SODIUM mmol/L 138 137 137 136   POTASSIUM mmol/L 4 0 3 9 4 9 4 0   CHLORIDE mmol/L 101 101 101 102   CREATININE mg/dL 0 83 0 85 1 26 1 04   AST U/L  --   --   --  35   ALT U/L  --   --   --  35            Wt Readings from Last 1 Encounters:   07/06/22 96 1 kg (211 lb 13 8 oz)     Estimated body mass index is 35 26 kg/m² as calculated from the following:    Height as of this encounter: 5' 5" (1 651 m)  Weight as of this encounter: 96 1 kg (211 lb 13 8 oz)  Imaging: reviewed     Rehabilitation Prognosis: good     Tolerance for three hours of therapy a day: good     Family/Patient Goals:  Patient/family's goals: Return to previous home/apartment  Patient will receive PT and OT 90 minutes each per day, five days per week to achieve rehab goals or participate in 900 minutes of therapy within a 7 day week period  Mobility Goals: Modified [place]  Transfer Goals: [place]  Activities of Daily Living (ADLs) Goals: [place]    Discharge Planning:  Rehabilitation and discharge goals discussed with the patient and/or family      Case Managment and Social Work to review patient/family resources and to coordinate Discharge Planning  Estimated length of stay: 10 to 14 days    Patient and Family Education and Training:  Rehabilitation and discharge goals discussed with the patient and/or family  Patient/family education/training needs to be discussed in weekly team meeting  Equipment/DME needs: Therapy teams to assess and evaluate for additional equipment/DME needs throughout rehabilitation stay    Past Medical History:   Diagnosis Date    Anxiety     Arthritis     Depression     GERD (gastroesophageal reflux disease)     Hiatal hernia     Hyperlipidemia     Hypertension      Past Surgical History:   Procedure Laterality Date    APPENDECTOMY      CHOLECYSTECTOMY      FRACTURE SURGERY Right     arm with plate and pins    HAND SURGERY Bilateral     HYSTERECTOMY      JOINT REPLACEMENT Bilateral     SHOULDER ARTHROSCOPY Left       No family history on file    Social History     Socioeconomic History    Marital status: /Civil Union     Spouse name: Not on file    Number of children: Not on file    Years of education: Not on file    Highest education level: Not on file   Occupational History    Not on file   Tobacco Use    Smoking status: Former Smoker     Types: Cigarettes    Smokeless tobacco: Never Used   Vaping Use    Vaping Use: Never used   Substance and Sexual Activity    Alcohol use: Not Currently     Alcohol/week: 1 0 standard drink     Types: 1 Glasses of wine per week    Drug use: Never    Sexual activity: Not Currently   Other Topics Concern    Not on file   Social History Narrative    Not on file     Social Determinants of Health     Financial Resource Strain: Not on file   Food Insecurity: No Food Insecurity    Worried About Running Out of Food in the Last Year: Never true    Tereso of Food in the Last Year: Never true   Transportation Needs: No Transportation Needs    Lack of Transportation (Medical): No    Lack of Transportation (Non-Medical): No   Physical Activity: Not on file   Stress: Not on file   Social Connections: Not on file   Intimate Partner Violence: Not on file   Housing Stability: Low Risk     Unable to Pay for Housing in the Last Year: No    Number of Places Lived in the Last Year: 1    Unstable Housing in the Last Year: No       Patient Active Problem List   Diagnosis    Lumbar degenerative disc disease    Lumbar spondylosis    Cervical radiculopathy    Cervical spondylosis    Rheumatoid arthritis involving both hands ([address]    Spinal stenosis of lumbar region without neurogenic claudication    Closed transcervical fracture of right femur ([address]    Fracture of right wrist    Hypertension    Leukemoid reaction    Acute blood loss anemia     No Known Allergies       Medical Necessity Criteria for [facility] Admission:   In addition, the preadmission screen, post-admission physical evaluation, overall plan of care and admissions order demonstrate a reasonable expectation that the following criteria were met at the time of admission to the [facility]  1  The patient requires active and ongoing therapeutic intervention of multiple therapy disciplines (physical therapy, occupational therapy, speech-language pathology, or prosthetics/orthotics), one of which is physical or occupational therapy  2  Patient requires an intensive rehabilitation therapy program, as defined in Chapter 1, section 110 2 2 of the CMS Medicare Policy Manual  This intensive rehabilitation therapy program will consist of at least 3 hours of therapy per day at least 5 days per week or at least 15 hours of intensive rehabilitation therapy within a 7 consecutive day period, beginning with the date of admission to the [facility]      3  The patient is reasonably expected to actively participate in, and benefit significantly from, the intensive rehabilitation therapy program as defined in Chapter 1, section 110 2 2 of the CMS Medicare Policy Manual at this time of admission to the [facility]  She can reasonably be expected to make measurable improvement (that will be of practical value to improve the patients functional capacity or adaptation to impairments) as a result of the rehabilitation treatment, as defined in section 110 3, and such improvement can be expected to be made within the prescribed period of time  As noted in the CMS Medicare Policy Manual, the patient need not be expected to achieve complete independence in the domain of self-care nor be expected to return to his or her prior level of functioning in order to meet this standard  4  The patient must require physician supervision by a rehabilitation physician  As such, a rehabilitation physician will conduct face-to-face visits with the patient at least 3 days per week throughout the patients stay in the [facility] to assess the patient both medically and functionally, as well as to modify the course of treatment as needed to maximize the patients capacity to benefit from the rehabilitation process  5  The patient requires an intensive and coordinated interdisciplinary approach to providing rehabilitation, as defined in Chapter 1, section 110 2 5 of the CMS Medicare Policy Manual  This will be achieved through periodic team conferences, conducted at least once in a 7-day period, and comprising of an interdisciplinary team of medical professionals consisting of: a rehabilitation physician, registered nurse,  and/or , and a licensed/certified therapist from each therapy discipline involved in treating the patient  Changes Since Pre-admission Assessment: None -This patient's participation in rehab continues to be reasonable, necessary and appropriate  CMS Required Post-Admission Physician Evaluation Elements  History and Physical, including medical history, functional history and active comorbidities as in above text       Post-Admission Physician Evaluation:  The patient has the potential to make improvement and is in need of physical, occupational, and/or therapy services  The patient may also need nutritional services  Given the patient's complex medical condition and risk of further medical complications, rehabilitative services cannot be safely provided at a lower level of care, such as a skilled nursing facility  I have reviewed the patient's functional and medical status at the time of the preadmission screening and they are the same as on the day of this admission  I acknowledge that I have personally performed a full physical examination on this patient within 24 hours of admission  The patient and/or family demonstrated understanding the rehabilitation program and the discharge process after we discussed them  Agree in entirety: yes  Minor adaptions: none    Major changes: none     Jerel Greene MD  Physical Medicine and Rehabilitation    ** Please Note: Fluency Direct voice to text software may have been used in the creation of this document   **

## 2022-11-17 NOTE — PROGRESS NOTES
DR. ROCKY AVILA contacted and advised the patient's status.  He has consented to see the patient in the near future to reevaluate his PEG tube.  The patient is eating well now and has not used his PEG tube in several weeks.   No

## 2024-05-20 NOTE — PLAN OF CARE
CHILD HEALTH REPORT                              Child's Name:  Lachelle Mccullough  Parent/Guardian:   Age: 11 y.o.   Address:         : 2013 Phone: [phone]   Childcare Facility Name:       [] I authorize the  staff and my child's health professional to communicate directly if needed to clarify information on this form about my child.    Parent's signature:  _________________________________    DO NOT OMIT ANY INFORMATION  This form may be updated by a health professional.  Initial and date any new data. The  facility need a copy of the form.   Health history and medical information pertinent to routine  and diagnosis/treatment in emergency (describe, if any):  [x] None     Describe all medical and special diet the child receives and the reason for medication and special diet.  All medications a child receives should be documented in the event the child requires emergency medical care.  Attach additional sheets if necessary.  [x] None     Child's Allergies (describe, if any):  [x] None     List any health problems or special needs and recommended treatment/services.  Attach additional sheets if necessary to describe the plan for care that should be followed for the child, including indication for special training required for staff, equipment and provision for emergencies.  [x] None     In your assessment is the child able to participate in  and does the child appear to be free from contagious or communicable diseases?  [x] Yes      [] No   if no, please explain your answer       Has the child received all age appropriate screenings listed in the routine   preventative health care services currently recommended by the American Academy of Pediatrics?  (see schedule at www.aap.org)    [x] Yes         []No       Note below if the results of vision, hearing or lead screenings were abnormal.  If the screening was abnormal, provide the date the screening was  Problem: Patient Care Overview (Adult)  Goal: Plan of Care Review  Outcome: Ongoing (interventions implemented as appropriate)    12/05/17 0934   Outcome Evaluation   Outcome Summary/Follow up Plan [facility] nurse follow-up for at risk patient. Patient continues to be a low ivana. No identifiable issues at this time. All skin and pressure interventions in place per RN. Will continue to follow at this time r/t high risk for skin break down. Please contact WO nurse if needs arise. Thanks    Coping/Psychosocial Response Interventions   Plan Of Care Reviewed With patient   Patient Care Overview   Progress no change            "completed and information about referrals, implications or actions recommended for the  facility.     Hearing (subjective until age 4)          Vision (subjective until age 3)     Hearing Screening    500Hz 1000Hz 2000Hz 4000Hz   Right ear 25 25 25 25   Left ear 25 25 25 25   Vision Screening - Comments:: Patient has eye insurance and wont cover eye exam here      Lead No results found for: \"LEAD\"      Medical Care Provider:      Elen Velazco DO Signature of Physician, CRNP, or Physician's Assistant:    Elen Velazco DO     [address]  [place] [address]  Dept: [phone] License #: [id number]: [id number]      Date: 05/20/24     Immunization:   Immunization History   Administered Date(s) Administered   • DTaP / IPV 04/06/2018   • DTaP 5 2013, 2013, 2013, 08/20/2014   • HPV9 05/20/2024   • Hep A, adult 02/10/2014, 08/20/2014   • Hep B, adult 2013, 2013, 2013   • Hib (PRP-OMP) 2013, 2013, 2013, 05/12/2014   • INFLUENZA 2013, 2013   • IPV 2013, 2013, 2013   • Influenza Quadrivalent Preservative Free 3 years and older IM 09/15/2016, 10/12/2017   • Influenza Quadrivalent Preservative Free Pediatric IM 10/03/2014, 09/22/2015   • Influenza, injectable, quadrivalent, preservative free 0.5 mL 10/19/2018, 10/21/2019, 10/23/2020, 10/25/2021   • MMR 05/12/2014, 04/04/2017   • Pneumococcal Conjugate 13-Valent 2013, 2013, 2013, 02/10/2014   • Rotavirus Monovalent 2013, 2013, 2013   • Tdap 05/20/2024   • Varicella 02/10/2014, 04/04/2017   • meningococcal ACYW-135 TT Conjugate 05/20/2024     "

## 2024-06-11 NOTE — PROGRESS NOTES
Medicare Wellness Visit  Plan for Preventive Care    A good way for you to stay healthy is to use preventive care.  Medicare covers many services that can help you stay healthy.* The goal of these services is to find any health problems as quickly as possible. Finding problems early can help make them easier to treat.  Your personal plan below lists the services you may need and when they are due.      Health Maintenance Summary     COVID-19 Vaccine (6 - 2023-24 season)  Overdue since 3/25/2024    Diabetes A1C (Every 6 Months)  Ordered on 5/28/2024    Diabetes Eye Exam (Yearly)  Due soon on 6/14/2024    DM/CKD Microalbumin (Yearly)  Next due on 11/27/2024    DM/CKD GFR (Yearly)  Ordered on 5/28/2024    Depression Screening (Yearly)  Next due on 12/22/2024    Diabetes Foot Exam (Yearly)  Next due on 2/27/2025    DTaP/Tdap/Td Vaccine (2 - Td or Tdap)  Next due on 5/27/2027    Pneumococcal Vaccine 65+   Completed    Shingles Vaccine   Completed    Influenza Vaccine   Completed    Medicare Advantage- Medicare Wellness Visit   Completed    Meningococcal Vaccine   Aged Out    Hepatitis B Vaccine (For Physician/APC Discussion)   Aged Out    HPV Vaccine   Aged Out           Preventive Care for Women and Men    Heart Screenings (Cardiovascular):  Blood tests are used to check your cholesterol, lipid and triglyceride levels. High levels can increase your risk for heart disease and stroke. High levels can be treated with medications, diet and exercise. Lowering your levels can help keep your heart and blood vessels healthy.  Your provider will order these tests if they are needed.    An ultrasound is done to see if you have an abdominal aortic aneurysm (AAA).  This is an enlargement of one of the main blood vessels that delivers blood to the body.   In the [place], 9,000 deaths are caused by AAA.  You may not even know you have this problem and as many as 1 in 3 people will have a serious problem if it is not treated.   Adult Nutrition  Assessment/PES    Patient Name:  Magdi Arriaga  YOB: 1945  MRN: [id number]  Admit Date:  11/20/2017    Assessment Date:  12/4/2017    Comments:  RD EN follow up. 24 hr EN volume meeting 133% goal volume. RD to continue to follow.     Additional Recommendations:  1. If diarrhea continues consider checking Cdiff  2. Consider discontinuing Reglan if okay with surgery  3. If CDiff negative will recommend adjusting EN regimen to standard formula            Reason for Assessment       12/04/17 1512    Reason for Assessment    Reason For Assessment/Visit follow up protocol;multidisciplinary rounds;TF/PN    Time Spent (min) 45    Diagnosis Diagnosis   Per notes this adm/MD diagnosis list noted    Gastrointestinal Diarrhea    Other diagnosis dysphagia   Principal Problem:    Altered mental status post op  Active Problems:    Dementia    Hyperlipidemia LDL goal <70    Essential hypertension    JONNY (obstructive sleep apnea)    COPD (chronic obstructive pulmonary disease)    Coronary artery disease involving coronary bypass graft of native heart without angina pectoris    Ischemic cardiomyopathy    History of DVT (deep vein thrombosis)    CKD (chronic kidney disease), stage III    Acute type B viral hepatitis related to platelet transfusion, followed by [facility]    Traumatic SDH, 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 RD notes Reglan order active and being provided. Plans for further GOC/POC discussion with family concerning trach and PEG              Labs/Tests/Procedures/Meds       12/04/17 1517    Labs/Tests/Procedures/Meds    Labs/Tests Review Reviewed;BUN;Creat    Procedure Review SLP    Bedside 12/4-Rec NPO    Medication Review Reviewed, pertinent   Reglan, colace, senokot, baraclude     Results from last 7 days  Lab Units 12/04/17  0330   SODIUM mmol/L 143   POTASSIUM mmol/L 4.4   CHLORIDE mmol/L 109   CO2 mmol/L 30.0   BUN mg/dL 39*   CREATININE mg/dL 0.70   CALCIUM mg/dL 9.3   BILIRUBIN mg/dL 0.2*   ALK PHOS U/L 77   ALT (SGPT) U/L 16   AST (SGOT) U/L 16   GLUCOSE mg/dL 116*       Intake & Output (last day)       12/03 0701 - 12/04 0700 12/04 0701 - 12/05 0700    Other 673     NG/GT 1598     Total Intake(mL/kg) 2271 (28.3)     Net +2271            Unmeasured Urine Occurrence 8 x 2 x    Unmeasured Stool Occurrence 2 x 1 x                   Nutrition Prescription Ordered       12/04/17 1519    Nutrition Prescription PO    Current PO Diet NPO    Nutrition Prescription EN    Enteral Route ND    Product Peptamen 1.5    Modulars Liquid Protein (15 gm/30 mL)    Liquid Protein (15 gm/30 mL) 30 mL/1 packet    Protein Liquid Frequency Daily    TF Delivery Method Continuous    Continuous TF Goal Rate (mL/hr) 75 mL/hr    Continuous TF Current Rate (mL/hr) 75 mL/hr    Continuous TF Goal Volume (mL) 1200 mL   16 hr delivery goal    Water flush (mL)  50 mL    Water Flush Frequency Per hour            Evaluation of Received Nutrient/Fluid Intake       12/04/17 1519    Evaluation of Received Nutrient/Fluid Intake    Number of Days Evaluated 1 day    Nutrition Delivered Calorie Evaluation;Protein Evaluation;Fluid Evaluation;Free Water Evaluation;Fiber Evaluation    Calorie Intake Evaluation    Enteral Calories (kcal) 2497    Total Calories (kcal) 2497    % Kcal Needs 131    Protein Intake Evaluation    Enteral Protein (gm) 124    Total Protein (gm) 124    % Protein Needs 119    Fluid Intake Evaluation    Enteral  Fluid (mL) 1230    Free Water Flush Fluid (mL) 673    Total Fluid Intake (mL) 1903    Fiber Intake Evaluation    Fiber 0    EN Evaluation    Number of Days EN Intake Evaluated 1 day    EN Average  Early diagnosis allows for more effective treatment and cure.  If you have a family history of AAA or are a male age 65-75 who has smoked, you are at higher risk of an AAA.  Your provider can order this test, if needed.    Colorectal Screening:  There are many tests that are used to check for cancer of your colon and rectum. You and your provider should discuss what test is best for you and when to have it done.  Options include:  Screening Colonoscopy: exam of the entire colon, seen through a flexible lighted tube.  Flexible Sigmoidoscopy: exam of the last third (sigmoid portion) of the colon and rectum, seen through a flexible lighted tube.  Cologuard DNA stool test: a sample of your stool is used to screen for cancer and unseen blood in your stool.  Fecal Occult Blood Test: a sample of your stool is studied to find any unseen blood    Flu Shot:  An immunization that helps to prevent influenza (the flu). You should get this every year. The best time to get the shot is in the fall.    Pneumococcal Shot:  Vaccines help prevent pneumococcal disease, which is any type of illness caused by Streptococcus pneumoniae bacteria. There are two kinds of pneumococcal vaccines available in the [place]:   Pneumococcal conjugate vaccines (PCV20 or Enjaayb15®)  Pneumococcal polysaccharide vaccine (PPSV23 or Fhilkvrrr68®)  For those who have never received any pneumococcal conjugate vaccine, CDC recommends PVC20 for adults 65 years or older and adults 19 through 64 years old with certain medical conditions or risk factors.   For those who have previously received PCV13, this should be followed by a dose of PPSV23.     Hepatitis B Shot:  An immunization that helps to protect people from getting Hepatitis B. Hepatitis B is a virus that spreads through contact with infected blood or body fluids. Many people with the virus do not have symptoms.  The virus can lead to serious problems, such as liver disease. Some people are at  Volume Delivered (mL/day) 1598 mL/day    % Goal Volume  133 %            Problem/Interventions:          Problem 2       12/04/17 1524    Nutrition Diagnoses Problem 2    Problem 2 Swallowing Difficulty    Etiology (related to) --   dysphagia    Signs/Symptoms (evidenced by) NPO   bedside eval-NPO 12/4                  Intervention Goal       12/04/17 1524    Intervention Goal    General Nutrition support treatment            Nutrition Intervention       12/04/17 1524    Nutrition Intervention    RD/Tech Action Follow Tx progress;Care plan reviewd            Nutrition Prescription       12/04/17 1524    Nutrition Prescription EN    Enteral Prescription Continue same protocol            Education/Evaluation       12/04/17 1524    Monitor/Evaluation    Monitor Per protocol;I&O;Supplement intake;Pertinent labs;TF delivery/tolerance        Electronically signed by:  Keiko Burroughs RDN, BRENDEN  12/04/17 3:24 PM   higher risk than others. Your doctor will tell you if you need this shot.     Diabetes Screening:  A test to measure sugar (glucose) in your blood is called a fasting blood sugar. Fasting means you cannot have food or drink for at least 8 hours before the test. This test can detect diabetes long before you may notice symptoms.    Glaucoma Screening:  Glaucoma screening is performed by your eye doctor. The test measures the fluid pressure inside your eyes to determine if you have glaucoma.     Hepatitis C Screening:  A blood test to see if you have the hepatitis C virus.  Hepatitis C attacks the liver and is a major cause of chronic liver disease.  Medicare will cover a single screening for all adults born between 1945 & 1965, or high risk patients (people who have injected illegal drugs or people who have had blood transfusions).  High risk patients who continue to inject illegal drugs can be screened for Hepatitis C every year.    Smoking and Tobacco-Use Cessation Counseling:  Tobacco is the single greatest cause of disease and early death in our country today. Medication and counseling together can increase a person’s chance of quitting for good.   Medicare covers two quitting attempts per year, with four counseling sessions per attempt (eight sessions in a 12 month period)    Preventive Screening tests for Women    Screening Mammograms and Breast Exams:  An x-ray of your breasts to check for breast cancer before you or your doctor may be able to feel it.  If breast cancer is found early it can usually be treated with success.    Pelvic Exams and Pap Tests:  An exam to check for cervical and vaginal cancer. A Pap test is a lab test in which cells are taken from your cervix and sent to the lab to look for signs of cervical cancer. If cancer of the cervix is found early, chances for a cure are good. Testing can generally end at age 65, or if a woman has a hysterectomy for a benign condition. Your provider may  recommend more frequent testing if certain abnormal results are found.    Bone Mass Measurements:  A painless x-ray of your bone density to see if you are at risk for a broken bone. Bone density refers to the thickness of bones or how tightly the bone tissue is packed.    Preventive Screening tests for Men    Prostate Screening:  Should you have a prostate cancer test (PSA)?  It is up to you to decide if you want a prostate cancer test. Talk to your clinician to find out if the test is right for you.  Things for you to consider and talk about should include:  Benefits and harms of the test  Your family history  How your race/ethnicity may influence the test  If the test may impact other medical conditions you have  Your values on screenings and treatments    *Medicare pays for many preventive services to keep you healthy. For some of these services, you might have to pay a deductible, coinsurance, and / or copayment.  The amounts vary depending on the type of services you need and the kind of Medicare health plan you have.    For further details on screenings offered by Medicare please visit: https://www.medicare.gov/coverage/preventive-screening-services

## 2024-09-30 NOTE — SIGNIFICANT NOTE
11/10/17 1011   SLP Deferred Reason   SLP Deferred Reason Routine  (RN said page diet without difficulty, no further f/u indicated)      The ECG revealed a sinus rhythm. The ECG rate was 72 bpm.

## (undated) DEVICE — PRESSURE MONITORING ACCESSORY: Brand: TRUWAVE

## (undated) DEVICE — PINNACLE INTRODUCER SHEATH: Brand: PINNACLE

## (undated) DEVICE — ENCORE® LATEX MICRO SIZE 7.5, STERILE LATEX POWDER-FREE SURGICAL GLOVE: Brand: ENCORE

## (undated) DEVICE — CATH DIAG EXPO M/ PK 6FR FL4/FR4 PIG 3PK

## (undated) DEVICE — SOL NACL 0.9PCT 1000ML

## (undated) DEVICE — DEV INFL MONARCH 25W

## (undated) DEVICE — 3M™ TEGADERM™ CHG DRESSING 25/CARTON 4 CARTONS/CASE 1658: Brand: TEGADERM™

## (undated) DEVICE — 3M™ MEDIPORE™ H SOFT CLOTH SURGICAL TAPE, 2863, 3 IN X 10 YD, 12/CASE: Brand: 3M™ MEDIPORE™

## (undated) DEVICE — GLV SURG TRIUMPH ORTHO W/ALOE PF LTX 7.5 STRL

## (undated) DEVICE — GLIDESHEATH SLENDER STAINLESS STEEL KIT: Brand: GLIDESHEATH SLENDER

## (undated) DEVICE — COVADERM PLUS: Brand: DEROYAL

## (undated) DEVICE — SPNG GZ WOVN 4X4IN 12PLY 10/BX STRL

## (undated) DEVICE — MODEL AT P54, P/N 700608-035KIT CONTENTS: HAND CONTROLLER, 3-WAY HIGH-PRESSURE STOPCOCK WITH ROTATING END AND PREMIUM HIGH-PRESSURE TUBING: Brand: ANGIOTOUCH® KIT

## (undated) DEVICE — CANN VESL DLP 1WY BLNT/TP 3MM

## (undated) DEVICE — ANTIBACTERIAL UNDYED BRAIDED (POLYGLACTIN 910), SYNTHETIC ABSORBABLE SUTURE: Brand: COATED VICRYL

## (undated) DEVICE — KT VLV HEMO MAP ACC PLS LG/BORE MTL/INTRO W/TORQ/DEV

## (undated) DEVICE — CATH DIAG EXPO .056 RCB 6F 100CM

## (undated) DEVICE — GW PRESS VERRATA STR 185CM

## (undated) DEVICE — SAFESECURE,SECUREMENT,FOLEY CATH,STERILE: Brand: MEDLINE

## (undated) DEVICE — ADAPT ST INFUS ADMIN SYR 70IN

## (undated) DEVICE — Device

## (undated) DEVICE — SWAN-GANZ THERMODILUTION CATHETER: Brand: SWAN-GANZ

## (undated) DEVICE — SUT PDS 1 CTX 36IN VIO PDP371T

## (undated) DEVICE — CANNULA,OXY,ADULT,SUPERSOFT,W/7'TUB,UC: Brand: MEDLINE

## (undated) DEVICE — 12 FOOT DISPOSABLE EXTENSION CABLE WITH SAFE CONNECT / SCREW-DOWN

## (undated) DEVICE — 3M™ TEGADERM™ TRANSPARENT FILM DRESSING FIRST AID STYLE, 1621, 4 IN X 4-3/4 IN, 50 BAGS/CARTON, 4 CARTONS/CASE: Brand: 3M™ TEGADERM™

## (undated) DEVICE — PRESSURE MONITORING SET: Brand: TRUWAVE, VAMP

## (undated) DEVICE — 2963 MEDIPORE SOFT CLOTH TAPE 3 IN X 10 YD 12 RLS/CS: Brand: 3M™ MEDIPORE™

## (undated) DEVICE — LEX GENERAL ABDOMINAL SPLIT: Brand: MEDLINE INDUSTRIES, INC.

## (undated) DEVICE — BNDG ELAS CO-FLEX SLF ADHR 6IN 5YD LF STRL

## (undated) DEVICE — MODEL BT2000 P/N 700287-012KIT CONTENTS: MANIFOLD WITH SALINE AND CONTRAST PORTS, SALINE TUBING WITH SPIKE AND HAND SYRINGE, TRANSDUCER: Brand: BT2000 AUTOMATED MANIFOLD KIT

## (undated) DEVICE — CONNECTOR PH 6-IN-1 Y ST: Brand: CARDINAL HEALTH

## (undated) DEVICE — Device: Brand: MEDEX

## (undated) DEVICE — A2000 MULTI-USE SYRINGE KIT, P/N 701277-003KIT CONTENTS: 100ML CONTRAST RESERVOIR AND TUBING WITH CONTRAST SPIKE AND CLAMP: Brand: A2000 MULTI-USE SYRINGE KIT

## (undated) DEVICE — MEDI-VAC NON-CONDUCTIVE SUCTION TUBING: Brand: CARDINAL HEALTH

## (undated) DEVICE — PK CRANI 10

## (undated) DEVICE — SAFETY SCALPEL: Brand: DEROYAL

## (undated) DEVICE — SUT PROLN 3/0 SH D/A 36IN 8522H

## (undated) DEVICE — CLTH CLENS READYCLEANSE PERI CARE PK/5

## (undated) DEVICE — ENCORE® LATEX MICRO SIZE 7, STERILE LATEX POWDER-FREE SURGICAL GLOVE: Brand: ENCORE

## (undated) DEVICE — SUT PROLN 6/0 C1 D/A 30IN 8706H

## (undated) DEVICE — APPL CHLORAPREP W/TINT 26ML BLU

## (undated) DEVICE — 3M™ RANGER™ BLOOD/FLUID WARMING STANDARD FLOW SET, 24200, 10/CASE: Brand: 3M™ RANGER™

## (undated) DEVICE — INTRAOPERATIVE COVER KIT, 10 PACK: Brand: SITE-RITE

## (undated) DEVICE — MEDI-VAC YANKAUER SUCTION HANDLE W/BULBOUS TIP: Brand: CARDINAL HEALTH

## (undated) DEVICE — NDL PERC 1PRT THNWALL W/BASEPLT 18G 7CM

## (undated) DEVICE — SOL NS 500ML

## (undated) DEVICE — GUIDE CATHETER: Brand: MACH1™

## (undated) DEVICE — AIRWY 90MM NO9

## (undated) DEVICE — AVANTI + 4F STD W/GW: Brand: AVANTI

## (undated) DEVICE — TREK CORONARY DILATATION CATHETER 2.50 MM X 15 MM / RAPID-EXCHANGE: Brand: TREK

## (undated) DEVICE — ADAPT LUER STUB INTRAMEDIC PE/200 17G

## (undated) DEVICE — TOWEL,OR,DSP,ST,BLUE,STD,8/PK,10PK/CS: Brand: MEDLINE

## (undated) DEVICE — SUT VIC PLS CTD ANTIB BR 3/0 8/18IN 45CM

## (undated) DEVICE — SUT PROLN 7/0 CV BV1 24IN 8304H BX/36

## (undated) DEVICE — DRSNG WND BORDR/ADHS NONADHR/GZ LF 4X14IN STRL

## (undated) DEVICE — NC TREK CORONARY DILATATION CATHETER 3.0 MM X 12 MM / RAPID-EXCHANGE: Brand: NC TREK

## (undated) DEVICE — OASIS DRAIN, SINGLE, INLINE & ATS COMPATIBLE: Brand: OASIS

## (undated) DEVICE — FLTR GAS LN OXYGENATOR 1/4IN STRL

## (undated) DEVICE — PK CATH CARD 10

## (undated) DEVICE — ST INF 10DRP 4 PORT 4WY/STPCOCK 112IN

## (undated) DEVICE — SENSR CERBRL O2 SOMASENSOR ADHS A/ LF

## (undated) DEVICE — 32 FR STRAIGHT – SOFT PVC CATHETER: Brand: PVC THORACIC CATHETERS

## (undated) DEVICE — ST PRIM GRVTY NDLESS 3 INJ PORT 105IN

## (undated) DEVICE — PRESSURE MONITORING SET: Brand: TRUWAVE

## (undated) DEVICE — RESUSCITATOR,MANUAL,ADLT,MASK,TUBE RES: Brand: MEDLINE INDUSTRIES, INC.

## (undated) DEVICE — SUT SILK 3/0 SH CR8 18IN C013D

## (undated) DEVICE — PERF CRANI 14X11X35MM WHT

## (undated) DEVICE — SUT SILK 0/0 CT2 18IN C027D

## (undated) DEVICE — SUT SILK 2/0 TIES 18IN A185H

## (undated) DEVICE — VASOVIEW HEMOPRO: Brand: VASOVIEW HEMOPRO

## (undated) DEVICE — 32 FR RIGHT ANGLE – SOFT PVC CATHETER: Brand: PVC THORACIC CATHETERS

## (undated) DEVICE — TRAP,MUCUS SPECIMEN,40CC: Brand: MEDLINE

## (undated) DEVICE — PK HEART OPN 10

## (undated) DEVICE — ST INF PRI SMRTSTE 20DRP 2VLV 24ML 117

## (undated) DEVICE — WIPE THERAWASH SLV SPEC CARE 2PK

## (undated) DEVICE — PAD LVL SENSR MOUNTING

## (undated) DEVICE — TOTAL TRAY, 16FR 10ML SIL FOLEY, URN: Brand: MEDLINE

## (undated) DEVICE — CANN AORT ROOT DLP VNT 14G 7F

## (undated) DEVICE — ANGIO-SEAL VIP VASCULAR CLOSURE DEVICE: Brand: ANGIO-SEAL

## (undated) DEVICE — SUT SILK 4/0 TIES 18IN A183H

## (undated) DEVICE — PROXIMATE RH ROTATING HEAD SKIN STAPLERS (35 WIDE) CONTAINS 35 STAINLESS STEEL STAPLES: Brand: PROXIMATE

## (undated) DEVICE — TBG SXN RIGD MINI/SUCKER 9F 4.75IN

## (undated) DEVICE — TEMP PACING WIRE: Brand: MYO/WIRE

## (undated) DEVICE — BATTERY PK FOR POWER DRIVER SYNTHES STRL

## (undated) DEVICE — 2000CC GUARDIAN II: Brand: GUARDIAN

## (undated) DEVICE — TUBING, SUCTION, 1/4" X 10', STRAIGHT: Brand: MEDLINE

## (undated) DEVICE — ENCORE® LATEX MICRO SIZE 6.5, STERILE LATEX POWDER-FREE SURGICAL GLOVE: Brand: ENCORE

## (undated) DEVICE — DRSNG TELFA PAD NONADH STR 1S 3X8IN

## (undated) DEVICE — CATH DIAG EXPO .056 FL3.5 6F 100CM

## (undated) DEVICE — SUT PROLN 4/0 RB1 D/A 36IN 8557H

## (undated) DEVICE — SUT SILK 3/0 TIES 18IN A184H

## (undated) DEVICE — GW INQWIRE FC PTFE STD J/1.5 .035 260

## (undated) DEVICE — GW J TP FIX CORE .035 150

## (undated) DEVICE — SUT ETHLN 3/0 FS1 30IN 669H

## (undated) DEVICE — BNDG ELAS CO-FLEX SLF ADHR 4IN5YD LF STRL

## (undated) DEVICE — SUT SILK 2 SUTUPAK TIE 60IN SA8H 2STRAND

## (undated) DEVICE — SKIN AFFIX SURG ADHESIVE 72/CS 0.55ML: Brand: MEDLINE

## (undated) DEVICE — DRSNG SURESITE WNDW 2.38X2.75

## (undated) DEVICE — SYS SKIN CLS DERMABOND PRINEO W/60CM MESH TP

## (undated) DEVICE — SUT PROLN 4/0 SH D/A 36IN 8521H

## (undated) DEVICE — DRSNG SURESITE123 4X4.8IN

## (undated) DEVICE — APPL DURAPREP IODOPHOR APL 26ML

## (undated) DEVICE — BG TRANSF W/COUPLER SPK 600ML

## (undated) DEVICE — SYS SKIN CLS DERMABOND PRINEO W/22CM MESH TP

## (undated) DEVICE — ST EXT IV SMARTSITE 2VLV SP M LL 5ML IV1

## (undated) DEVICE — CONN REDUCING CANN/PUMP 3/8X3/8X3/8

## (undated) DEVICE — SUT PDS 1 TP1 48IN Z880G BX/12